# Patient Record
Sex: FEMALE | Race: WHITE | NOT HISPANIC OR LATINO | Employment: OTHER | URBAN - METROPOLITAN AREA
[De-identification: names, ages, dates, MRNs, and addresses within clinical notes are randomized per-mention and may not be internally consistent; named-entity substitution may affect disease eponyms.]

---

## 2017-03-13 ENCOUNTER — ALLSCRIPTS OFFICE VISIT (OUTPATIENT)
Dept: OTHER | Facility: OTHER | Age: 62
End: 2017-03-13

## 2017-03-13 DIAGNOSIS — I10 ESSENTIAL (PRIMARY) HYPERTENSION: ICD-10-CM

## 2017-03-13 DIAGNOSIS — R35.0 FREQUENCY OF MICTURITION: ICD-10-CM

## 2017-03-13 DIAGNOSIS — E55.9 VITAMIN D DEFICIENCY: ICD-10-CM

## 2017-03-13 DIAGNOSIS — E78.00 PURE HYPERCHOLESTEROLEMIA: ICD-10-CM

## 2017-03-13 DIAGNOSIS — R53.83 OTHER FATIGUE: ICD-10-CM
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
## 2017-04-17 ENCOUNTER — APPOINTMENT (OUTPATIENT)
Dept: LAB | Facility: CLINIC | Age: 62
End: 2017-04-17
Payer: COMMERCIAL

## 2017-04-17 ENCOUNTER — TRANSCRIBE ORDERS (OUTPATIENT)
Dept: LAB | Facility: CLINIC | Age: 62
End: 2017-04-17

## 2017-04-17 DIAGNOSIS — R35.0 FREQUENCY OF MICTURITION: ICD-10-CM

## 2017-04-17 DIAGNOSIS — E55.9 VITAMIN D DEFICIENCY: ICD-10-CM
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
## 2017-04-17 DIAGNOSIS — I10 ESSENTIAL (PRIMARY) HYPERTENSION: ICD-10-CM

## 2017-04-17 DIAGNOSIS — R53.83 OTHER FATIGUE: ICD-10-CM

## 2017-04-17 DIAGNOSIS — E78.00 PURE HYPERCHOLESTEROLEMIA: ICD-10-CM

## 2017-04-17 LAB
25(OH)D3 SERPL-MCNC: 28.7 NG/ML (ref 30–100)
ALBUMIN SERPL BCP-MCNC: 3.5 G/DL (ref 3.5–5)
ALP SERPL-CCNC: 71 U/L (ref 46–116)
ALT SERPL W P-5'-P-CCNC: 16 U/L (ref 12–78)
ANION GAP SERPL CALCULATED.3IONS-SCNC: 9 MMOL/L (ref 4–13)
AST SERPL W P-5'-P-CCNC: 12 U/L (ref 5–45)
BACTERIA UR QL AUTO: ABNORMAL /HPF
BILIRUB SERPL-MCNC: 0.47 MG/DL (ref 0.2–1)
BILIRUB UR QL STRIP: NEGATIVE
BUN SERPL-MCNC: 21 MG/DL (ref 5–25)
CALCIUM SERPL-MCNC: 9.3 MG/DL (ref 8.3–10.1)
CHLORIDE SERPL-SCNC: 106 MMOL/L (ref 100–108)
CHOLEST SERPL-MCNC: 270 MG/DL (ref 50–200)
CLARITY UR: ABNORMAL
CO2 SERPL-SCNC: 28 MMOL/L (ref 21–32)
COLOR UR: YELLOW
CREAT SERPL-MCNC: 0.64 MG/DL (ref 0.6–1.3)
ERYTHROCYTE [DISTWIDTH] IN BLOOD BY AUTOMATED COUNT: 15.8 % (ref 11.6–15.1)
GFR SERPL CREATININE-BSD FRML MDRD: >60 ML/MIN/1.73SQ M
GLUCOSE P FAST SERPL-MCNC: 95 MG/DL (ref 65–99)
GLUCOSE UR STRIP-MCNC: NEGATIVE MG/DL
HCT VFR BLD AUTO: 34.1 % (ref 34.8–46.1)
HDLC SERPL-MCNC: 55 MG/DL (ref 40–60)
HGB BLD-MCNC: 10.5 G/DL (ref 11.5–15.4)
HGB UR QL STRIP.AUTO: NEGATIVE
HYALINE CASTS #/AREA URNS LPF: ABNORMAL /LPF
KETONES UR STRIP-MCNC: NEGATIVE MG/DL
LDLC SERPL CALC-MCNC: 192 MG/DL (ref 0–100)
LEUKOCYTE ESTERASE UR QL STRIP: ABNORMAL
MCH RBC QN AUTO: 24.3 PG (ref 26.8–34.3)
MCHC RBC AUTO-ENTMCNC: 30.8 G/DL (ref 31.4–37.4)
MCV RBC AUTO: 79 FL (ref 82–98)
NITRITE UR QL STRIP: NEGATIVE
NON-SQ EPI CELLS URNS QL MICRO: ABNORMAL /HPF
PH UR STRIP.AUTO: 7 [PH] (ref 4.5–8)
PLATELET # BLD AUTO: 372 THOUSANDS/UL (ref 149–390)
PMV BLD AUTO: 11 FL (ref 8.9–12.7)
POTASSIUM SERPL-SCNC: 4.1 MMOL/L (ref 3.5–5.3)
PROT SERPL-MCNC: 7.1 G/DL (ref 6.4–8.2)
PROT UR STRIP-MCNC: NEGATIVE MG/DL
RBC # BLD AUTO: 4.32 MILLION/UL (ref 3.81–5.12)
RBC #/AREA URNS AUTO: ABNORMAL /HPF
SODIUM SERPL-SCNC: 143 MMOL/L (ref 136–145)
SP GR UR STRIP.AUTO: 1.02 (ref 1–1.03)
T3 SERPL-MCNC: 1.2 NG/ML (ref 0.6–1.8)
T4 FREE SERPL-MCNC: 1.14 NG/DL (ref 0.76–1.46)
TRIGL SERPL-MCNC: 116 MG/DL
TSH SERPL DL<=0.05 MIU/L-ACNC: 2.3 UIU/ML (ref 0.36–3.74)
UROBILINOGEN UR QL STRIP.AUTO: 0.2 E.U./DL
WBC # BLD AUTO: 4.65 THOUSAND/UL (ref 4.31–10.16)
WBC #/AREA URNS AUTO: ABNORMAL /HPF

## 2017-04-17 PROCEDURE — 85027 COMPLETE CBC AUTOMATED: CPT

## 2017-04-17 PROCEDURE — 87086 URINE CULTURE/COLONY COUNT: CPT

## 2017-04-17 PROCEDURE — 86618 LYME DISEASE ANTIBODY: CPT

## 2017-04-17 PROCEDURE — 84443 ASSAY THYROID STIM HORMONE: CPT

## 2017-04-17 PROCEDURE — 84439 ASSAY OF FREE THYROXINE: CPT

## 2017-04-17 PROCEDURE — 84480 ASSAY TRIIODOTHYRONINE (T3): CPT

## 2017-04-17 PROCEDURE — 36415 COLL VENOUS BLD VENIPUNCTURE: CPT

## 2017-04-17 PROCEDURE — 80053 COMPREHEN METABOLIC PANEL: CPT

## 2017-04-17 PROCEDURE — 82306 VITAMIN D 25 HYDROXY: CPT

## 2017-04-17 PROCEDURE — 81001 URINALYSIS AUTO W/SCOPE: CPT

## 2017-04-17 PROCEDURE — 80061 LIPID PANEL: CPT

## 2017-04-18 LAB — BACTERIA UR CULT: NORMAL

## 2017-04-19 ENCOUNTER — ALLSCRIPTS OFFICE VISIT (OUTPATIENT)
Dept: OTHER | Facility: OTHER | Age: 62
End: 2017-04-19

## 2017-04-19 DIAGNOSIS — R29.898 OTHER SYMPTOMS AND SIGNS INVOLVING THE MUSCULOSKELETAL SYSTEM: ICD-10-CM

## 2017-04-19 DIAGNOSIS — D64.9 ANEMIA: ICD-10-CM
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
## 2017-04-19 LAB
B BURGDOR IGG SER IA-ACNC: 0.07
B BURGDOR IGM SER IA-ACNC: 0.17

## 2017-04-24 ENCOUNTER — APPOINTMENT (OUTPATIENT)
Dept: LAB | Facility: CLINIC | Age: 62
End: 2017-04-24
Payer: COMMERCIAL

## 2017-04-24 DIAGNOSIS — D64.9 ANEMIA: ICD-10-CM
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
## 2017-04-24 DIAGNOSIS — R29.898 OTHER SYMPTOMS AND SIGNS INVOLVING THE MUSCULOSKELETAL SYSTEM: ICD-10-CM

## 2017-04-24 LAB — FERRITIN SERPL-MCNC: 4 NG/ML (ref 8–388)

## 2017-04-24 PROCEDURE — 86618 LYME DISEASE ANTIBODY: CPT

## 2017-04-24 PROCEDURE — 36415 COLL VENOUS BLD VENIPUNCTURE: CPT

## 2017-04-24 PROCEDURE — 82728 ASSAY OF FERRITIN: CPT

## 2017-04-26 LAB
B BURGDOR IGG SER IA-ACNC: 0.08
B BURGDOR IGM SER IA-ACNC: 0.17

## 2017-05-31 ENCOUNTER — TRANSCRIBE ORDERS (OUTPATIENT)
Dept: LAB | Facility: CLINIC | Age: 62
End: 2017-05-31

## 2017-05-31 ENCOUNTER — APPOINTMENT (OUTPATIENT)
Dept: LAB | Facility: CLINIC | Age: 62
End: 2017-05-31
Payer: COMMERCIAL

## 2017-05-31 ENCOUNTER — ALLSCRIPTS OFFICE VISIT (OUTPATIENT)
Dept: OTHER | Facility: OTHER | Age: 62
End: 2017-05-31

## 2017-05-31 DIAGNOSIS — D64.9 ANEMIA: ICD-10-CM

## 2017-05-31 LAB
IRON SERPL-MCNC: 15 UG/DL (ref 50–170)
TIBC SERPL-MCNC: 472 UG/DL (ref 250–450)

## 2017-05-31 PROCEDURE — 83550 IRON BINDING TEST: CPT

## 2017-05-31 PROCEDURE — 36415 COLL VENOUS BLD VENIPUNCTURE: CPT

## 2017-05-31 PROCEDURE — 83540 ASSAY OF IRON: CPT

## 2017-06-03 ENCOUNTER — GENERIC CONVERSION - ENCOUNTER (OUTPATIENT)
Dept: OTHER | Facility: OTHER | Age: 62
End: 2017-06-03

## 2017-06-25 ENCOUNTER — ANESTHESIA EVENT (OUTPATIENT)
Dept: GASTROENTEROLOGY | Facility: MEDICAL CENTER | Age: 62
End: 2017-06-25
Payer: COMMERCIAL

## 2017-06-26 ENCOUNTER — HOSPITAL ENCOUNTER (OUTPATIENT)
Facility: MEDICAL CENTER | Age: 62
Setting detail: OUTPATIENT SURGERY
Discharge: HOME | End: 2017-06-26
Attending: INTERNAL MEDICINE | Admitting: INTERNAL MEDICINE
Payer: COMMERCIAL

## 2017-06-26 ENCOUNTER — GENERIC CONVERSION - ENCOUNTER (OUTPATIENT)
Dept: GASTROENTEROLOGY | Facility: MEDICAL CENTER | Age: 62
End: 2017-06-26

## 2017-06-26 ENCOUNTER — ANESTHESIA (OUTPATIENT)
Dept: GASTROENTEROLOGY | Facility: MEDICAL CENTER | Age: 62
End: 2017-06-26
Payer: COMMERCIAL

## 2017-06-26 VITALS
WEIGHT: 175 LBS | HEIGHT: 63 IN | RESPIRATION RATE: 16 BRPM | SYSTOLIC BLOOD PRESSURE: 135 MMHG | BODY MASS INDEX: 31.01 KG/M2 | TEMPERATURE: 97.5 F | HEART RATE: 62 BPM | OXYGEN SATURATION: 100 % | DIASTOLIC BLOOD PRESSURE: 86 MMHG

## 2017-06-26 DIAGNOSIS — D50.9 IRON DEFICIENCY ANEMIA: ICD-10-CM

## 2017-06-26 PROCEDURE — 88313 SPECIAL STAINS GROUP 2: CPT | Performed by: INTERNAL MEDICINE

## 2017-06-26 PROCEDURE — 88305 TISSUE EXAM BY PATHOLOGIST: CPT | Performed by: INTERNAL MEDICINE

## 2017-06-26 PROCEDURE — 88342 IMHCHEM/IMCYTCHM 1ST ANTB: CPT | Performed by: INTERNAL MEDICINE

## 2017-06-26 RX ORDER — PROPOFOL 10 MG/ML
INJECTION, EMULSION INTRAVENOUS AS NEEDED
Status: DISCONTINUED | OUTPATIENT
Start: 2017-06-26 | End: 2017-06-26 | Stop reason: SURG

## 2017-06-26 RX ORDER — SODIUM CHLORIDE 9 MG/ML
125 INJECTION, SOLUTION INTRAVENOUS CONTINUOUS
Status: DISCONTINUED | OUTPATIENT
Start: 2017-06-26 | End: 2017-06-26 | Stop reason: HOSPADM

## 2017-06-26 RX ADMIN — SODIUM CHLORIDE 125 ML/HR: 0.9 INJECTION, SOLUTION INTRAVENOUS at 10:38

## 2017-06-26 RX ADMIN — PROPOFOL 100 MG: 10 INJECTION, EMULSION INTRAVENOUS at 11:11

## 2017-06-26 RX ADMIN — PROPOFOL 25 MG: 10 INJECTION, EMULSION INTRAVENOUS at 11:01

## 2017-06-26 RX ADMIN — PROPOFOL 50 MG: 10 INJECTION, EMULSION INTRAVENOUS at 11:21

## 2017-06-26 RX ADMIN — LIDOCAINE HYDROCHLORIDE 100 MG: 20 INJECTION, SOLUTION INTRAVENOUS at 10:57

## 2017-06-26 RX ADMIN — PROPOFOL 50 MG: 10 INJECTION, EMULSION INTRAVENOUS at 11:15

## 2017-06-26 RX ADMIN — PROPOFOL 125 MG: 10 INJECTION, EMULSION INTRAVENOUS at 10:57

## 2017-06-26 RX ADMIN — PROPOFOL 25 MG: 10 INJECTION, EMULSION INTRAVENOUS at 11:05

## 2017-07-04 ENCOUNTER — GENERIC CONVERSION - ENCOUNTER (OUTPATIENT)
Dept: OTHER | Facility: OTHER | Age: 62
End: 2017-07-04

## 2017-11-10 ENCOUNTER — ALLSCRIPTS OFFICE VISIT (OUTPATIENT)
Dept: OTHER | Facility: OTHER | Age: 62
End: 2017-11-10

## 2017-11-10 DIAGNOSIS — D50.9 IRON DEFICIENCY ANEMIA: ICD-10-CM

## 2017-11-11 NOTE — PROGRESS NOTES
Assessment    1  Gastric ulcer (531 90) (K25 9)   2  Iron deficiency anemia (280 9) (D50 9)    Plan  Gastric ulcer    · Protonix 40 MG Oral Tablet Delayed Release (Pantoprazole Sodium); TAKE 1TABLET DAILY   Rx By: Natalie Bustillos; Dispense: 30 Days ; #:1 X 30 Tablet Delayed Release Bottle; Refill: 0;For: Gastric ulcer; ANNETTE = N; [id number]; Last Updated By: System, [employer]; 11/10/2017 3:46:07 PM  Gastric ulcer, Iron deficiency anemia    · Pantoprazole Sodium 40 MG Oral Tablet Delayed Release (Protonix)   Rx By: Natalie Bustillos; Dispense: 90 Days ; #:180 Tablet Delayed Release; Refill: 3;For: Gastric ulcer, Iron deficiency anemia; ANNETTE = N; Sent To: [facility] #[id number]   · EGD; Status:Active; Requested for:10Nov2017;    Perform:[facility]; DVR:[id number]; Last Updated By:Jermaine Chaney; 11/10/2017 3:55:12 PM;Ordered; For:Gastric ulcer, Iron deficiency anemia; Ordered By:Jesus Alonzo;  Iron deficiency anemia    · Ferrous Sulfate 325 (65 Fe) MG Oral Tablet   Rx By: Chiquis Arora; Dispense: 90 Days ; #:270 Tablet; Refill: 3;For: Iron deficiency anemia; ANNETTE = N; [facility]; Last Updated By: Princess Jaimes; 11/10/2017 3:31:38 PM   · MoviPrep 100 GM Oral Solution Reconstituted   Rx By: Chiquis Arora; Dispense: 1 Days ; #:1 Solution Reconstituted; Refill: 0;For: Iron deficiency anemia; ANNETTE = N; Sent To: [facility] #0594; Last Updated By: Princess Jaimes; 11/10/2017 3:30:11 PM   · (1) IRON SATURATION %, TIBC; Status:Active; Requested for:10Nov2017;    Perform:[facility]; Due:10Nov2018; Ordered; For:Iron deficiency anemia; Ordered By:Jesus Alonzo;   · (1) IRON; Status:Active; Requested for:10Nov2017;    Perform:[facility]; Due:10Nov2018; Ordered; For:Iron deficiency anemia; Ordered By:Jesus Alonzo;  Joint disorder of pelvis or thigh    · TraMADol HCl - 50 MG Oral Tablet   Dispense: 0 Days ; #: Sufficient Tablet;  Refill: 0;Joint disorder of pelvis or thigh; ANNETTE = N; Record; Last Updated By: Kenia Arciniega; 11/10/2017 3:31:38 PM    Discussion/Summary  Discussion Summary:   42-year-old female here for follow-up of iron deficiency anemia and EGD and colonoscopy resultsiron deficiency anemia: she has been taking ferrous sulfate 325 mg 3 times a day  She is asymptomatic and does not report any melena or hematochezia  She had EGD and colonoscopy in June, which revealed gastric ulcers  ironcontinue iron supplementation for time beingGastric ulcers: EGD in June 2017 revealed 2 clean based ulcers in the antrum  Biopsies were negative  She has not been taking a PPI  She has recent diagnosis of bursitis and was recently prescribed anti-inflammatory by her orthopedic physician  I stated that we recommend she uses Tylenol and avoid NSAIDs however, patient states that due to her pain she will be taking anti-inflammatories  I will start her on PPIProtonix 40 mg daily while using daily anti-inflammatoriesrecommend Tylenol instead of NSAIDsrepeat EGD to ensure healing of gastric ulcersColonoscopy: colonoscopy June 2017 revealed diverticulosis otherwise normal  Her repeat colonoscopy recommended in 10 years    follow-up after EGD  Counseling Documentation With Imm: The patient was counseled regarding diagnostic results,-- instructions for management,-- risks and benefits of treatment options  Patient's Capacity to Self-Care: Patient is able to Self-Care  Medication SE Review and Pt Understands Tx: Possible side effects of new medications were reviewed with the patient/guardian today  Chief Complaint  Chief Complaint Free Text Note Form: Colon/EGD f/u  Pt states no active symptoms  History of Present Illness  HPI: 42-year-old female here for follow-up of iron deficiency anemia after EGD and colonoscopy   She was found to be iron deficient and given her strong family history of colon cancer she was sent to be evaluated by gastroenterologist  She has been taking iron supplementation 3 times a day  She had EGD and colonoscopy done in June  Colonoscopy was normal and she has recommended for repeat in 10 years  EGD revealed 2 clean based antral ulcers  She currently denies any symptoms including fatigue, dizziness, shortness of breath or any GI complaints including abdominal pain, melena, hematochezia  History Reviewed: The history was obtained today from the patient and I agree with the documented history  Review of Systems  Complete-Female GI Adult:  Constitutional: No fever, no chills, feels well, no tiredness, no recent weight gain or weight loss  Eyes: No complaints of eye pain, no red eyes, no eyesight problems, no discharge, no dry eyes, no itching of eyes  ENT: no complaints of earache, no loss of hearing, no nose bleeds, no nasal discharge, no sore throat, no hoarseness  Cardiovascular: No complaints of slow heart rate, no fast heart rate, no chest pain, no palpitations, no leg claudication, no lower extremity edema  Respiratory: No complaints of shortness of breath, no wheezing, no cough, no SOB on exertion, no orthopnea, no PND  Gastrointestinal: No complaints of abdominal pain, no constipation, no nausea or vomiting, no diarrhea, no bloody stools  Genitourinary: No complaints of dysuria, no incontinence, no pelvic pain, no dysmenorrhea, no vaginal discharge or bleeding  Musculoskeletal: No complaints of arthralgias, no myalgias, no joint swelling or stiffness, no limb pain or swelling  Integumentary: No complaints of skin rash or lesions, no itching, no skin wounds, no breast pain or lump  Neurological: No complaints of headache, no confusion, no convulsions, no numbness, no dizziness or fainting, no tingling, no limb weakness, no difficulty walking  Psychiatric: Not suicidal, no sleep disturbance, no anxiety or depression, no change in personality, no emotional problems    Endocrine: No complaints of proptosis, no hot flashes, no muscle weakness, no deepening of the voice, no feelings of weakness  Hematologic/Lymphatic: No complaints of swollen glands, no swollen glands in the neck, does not bleed easily, does not bruise easily  ROS Reviewed:   ROS reviewed  Active Problems  1  Anemia (285 9) (D64 9)   2  Benign essential hypertension (401 1) (I10)   3  Fatigue (780 79) (R53 83)   4  Hypercholesterolemia (272 0) (E78 00)   5  Hypotension (458 9) (I95 9)   6  Iron deficiency anemia (280 9) (D50 9)   7  Joint disorder of pelvis or thigh (719 95) (M25 9)   8  Leg weakness, bilateral (729 89) (R29 898)   9  Nocturia (788 43) (R35 1)   10  Overactive bladder (596 51) (N32 81)   11  Pain in both lower legs (729 5) (M79 661,M79 662)   12  Segmental and somatic dysfunction of sacral region (739 4) (M99 04)   13  Urine frequency (788 41) (R35 0)   14  Vitamin D deficiency (268 9) (E55 9)    Past Medical History  1  History of Screening for depression (V79 0) (Z13 89)   2  History of Tick bite, initial encounter (919 4,E906 4) [facility])  Active Problems And Past Medical History Reviewed: The active problems and past medical history were reviewed and updated today  Surgical History  1  History of  Section   2  History of Elbow Surgery   3  History of Hysterectomy   4  History of Knee Surgery  Surgical History Reviewed: The surgical history was reviewed and updated today  Family History  Mother    1  Family history of Colon cancer   2  Denied: Family history of Crohn's disease   3  Denied: Family history of liver disease  Father    4  Family history of    5  Family history of cardiac disorder (V17 49) (Z82 49)   6  Denied: Family history of Crohn's disease   7  Denied: Family history of liver disease   8  Family history of myocardial infarction (V17 3) (Z82 49)  Sister    5  Family history of Colon cancer  Maternal Grandmother    8  Family history of    6  Family history of malignant neoplasm (V16 9) (Z80 9)  Family History Reviewed:    The family history was reviewed and updated today  Social History     · Caffeine use (V49 89) (F15 90)   · Former smoker (U12 53) (U28 643)   · History of Former smoker (J69 36) (V60 136)   · No alcohol use  Social History Reviewed: The social history was reviewed and updated today  Current Meds   1  Ferrous Sulfate 325 (65 Fe) MG Oral Tablet; TAKE 1 TABLET 3 TIMES DAILY WITH FOOD; Therapy: 09HXS9554 to (Vince Free)  Requested for: 86BGJ5211; Last Rx:72Qrv1290 Ordered   2  MoviPrep 100 GM Oral Solution Reconstituted; USE AS DIRECTED; Therapy: 21XGC3898 to (Evaluate:01Jun2017)  Requested for: 74YLL5679; Last Rx:90Jrn5735 Ordered   3  TraMADol HCl - 50 MG Oral Tablet; TAKE 1 TABLET EVERY 6 TO 8 HOURS AS NEEDED FOR PAIN; Therapy: (Recorded:31May2017) to Recorded    Allergies  1  No Known Drug Allergies    Vitals  Vital Signs    Recorded: 94SJP0537 03:31PM   Temperature 97 9 F, Tympanic   Heart Rate 70   Systolic 347, LUE, Sitting   Diastolic 88, LUE, Sitting   Height 5 ft 3 in   Weight 185 lb    BMI Calculated 32 77   BSA Calculated 1 87   O2 Saturation 99, RA       Physical Exam   Constitutional  General appearance: No acute distress, well appearing and well nourished  Eyes  Conjunctiva and lids: No swelling, erythema or discharge  Ears, Nose, Mouth, and Throat  External inspection of ears and nose: Normal    Pulmonary  Respiratory effort: No increased work of breathing or signs of respiratory distress  Auscultation of lungs: Clear to auscultation  Cardiovascular  Auscultation of heart: Normal rate and rhythm, normal S1 and S2, without murmurs  Abdomen  Abdomen: Non-tender, no masses  The abdomen was rounded  Bowel sounds were normal  The abdomen was soft and nontender  The abdomen was not firm-- and-- not rigid  No rebound tenderness  No guarding  Liver and spleen: No hepatomegaly or splenomegaly     Musculoskeletal  Gait and station: Normal    Psychiatric  Orientation to person, place, and time: Normal    Mood and affect: Normal          Health Management  Iron deficiency anemia   COLONOSCOPY (GI, SURG); every 10 years; Last 72EXX5730; Next Due: 53WCE8901;  Active    Future Appointments    Date/Time Provider Specialty Site   12/07/2017 10:45 AM Benito Diaz DO Gastroenterology Adult [facility] GASTROENTEROLOGY ENDOSCOPY       Signatures   Electronically signed by : Karissa Barrera [facility]; Nov 10 2017  4:28PM EST                       (Author)    Electronically signed by : Renita Stokes MD; Nov 10 2017 11:09PM EST                       (Author)

## 2017-11-16 ENCOUNTER — APPOINTMENT (OUTPATIENT)
Dept: PHYSICAL THERAPY | Facility: CLINIC | Age: 62
End: 2017-11-16
Payer: COMMERCIAL

## 2017-11-16 PROCEDURE — 97140 MANUAL THERAPY 1/> REGIONS: CPT

## 2017-11-16 PROCEDURE — G8982 BODY POS GOAL STATUS: HCPCS

## 2017-11-16 PROCEDURE — 97163 PT EVAL HIGH COMPLEX 45 MIN: CPT

## 2017-11-16 PROCEDURE — G8981 BODY POS CURRENT STATUS: HCPCS

## 2017-11-20 ENCOUNTER — APPOINTMENT (OUTPATIENT)
Dept: PHYSICAL THERAPY | Facility: CLINIC | Age: 62
End: 2017-11-20
Payer: COMMERCIAL

## 2017-11-20 PROCEDURE — 97110 THERAPEUTIC EXERCISES: CPT

## 2017-11-20 PROCEDURE — 97140 MANUAL THERAPY 1/> REGIONS: CPT

## 2017-11-20 PROCEDURE — 97010 HOT OR COLD PACKS THERAPY: CPT

## 2017-11-22 ENCOUNTER — APPOINTMENT (OUTPATIENT)
Dept: PHYSICAL THERAPY | Facility: CLINIC | Age: 62
End: 2017-11-22
Payer: COMMERCIAL

## 2017-11-22 PROCEDURE — 97140 MANUAL THERAPY 1/> REGIONS: CPT

## 2017-11-22 PROCEDURE — 97110 THERAPEUTIC EXERCISES: CPT

## 2017-11-22 PROCEDURE — 97010 HOT OR COLD PACKS THERAPY: CPT

## 2017-11-27 ENCOUNTER — APPOINTMENT (OUTPATIENT)
Dept: PHYSICAL THERAPY | Facility: CLINIC | Age: 62
End: 2017-11-27
Payer: COMMERCIAL

## 2017-11-27 PROCEDURE — 97010 HOT OR COLD PACKS THERAPY: CPT

## 2017-11-27 PROCEDURE — 97140 MANUAL THERAPY 1/> REGIONS: CPT

## 2017-11-29 RX ORDER — FERROUS SULFATE 325(65) MG
325 TABLET ORAL
COMMUNITY
End: 2018-06-05 | Stop reason: SDUPTHER

## 2017-11-30 ENCOUNTER — APPOINTMENT (OUTPATIENT)
Dept: PHYSICAL THERAPY | Facility: CLINIC | Age: 62
End: 2017-11-30
Payer: COMMERCIAL

## 2017-11-30 PROCEDURE — 97140 MANUAL THERAPY 1/> REGIONS: CPT

## 2017-11-30 PROCEDURE — 97110 THERAPEUTIC EXERCISES: CPT

## 2017-11-30 PROCEDURE — 97010 HOT OR COLD PACKS THERAPY: CPT

## 2017-12-04 ENCOUNTER — APPOINTMENT (OUTPATIENT)
Dept: PHYSICAL THERAPY | Facility: CLINIC | Age: 62
End: 2017-12-04
Payer: COMMERCIAL

## 2017-12-04 PROCEDURE — 97110 THERAPEUTIC EXERCISES: CPT

## 2017-12-04 PROCEDURE — 97010 HOT OR COLD PACKS THERAPY: CPT

## 2017-12-04 PROCEDURE — 97140 MANUAL THERAPY 1/> REGIONS: CPT

## 2017-12-05 ENCOUNTER — APPOINTMENT (OUTPATIENT)
Dept: PHYSICAL THERAPY | Facility: CLINIC | Age: 62
End: 2017-12-05
Payer: COMMERCIAL

## 2017-12-07 ENCOUNTER — APPOINTMENT (OUTPATIENT)
Dept: PHYSICAL THERAPY | Facility: CLINIC | Age: 62
End: 2017-12-07
Payer: COMMERCIAL

## 2017-12-07 PROCEDURE — 97150 GROUP THERAPEUTIC PROCEDURES: CPT

## 2017-12-07 PROCEDURE — 97010 HOT OR COLD PACKS THERAPY: CPT

## 2017-12-07 PROCEDURE — 97140 MANUAL THERAPY 1/> REGIONS: CPT

## 2017-12-11 ENCOUNTER — APPOINTMENT (OUTPATIENT)
Dept: PHYSICAL THERAPY | Facility: CLINIC | Age: 62
End: 2017-12-11
Payer: COMMERCIAL

## 2017-12-11 PROCEDURE — 97110 THERAPEUTIC EXERCISES: CPT

## 2017-12-11 PROCEDURE — 97140 MANUAL THERAPY 1/> REGIONS: CPT

## 2017-12-14 ENCOUNTER — APPOINTMENT (OUTPATIENT)
Dept: PHYSICAL THERAPY | Facility: CLINIC | Age: 62
End: 2017-12-14
Payer: COMMERCIAL

## 2017-12-14 PROCEDURE — 97010 HOT OR COLD PACKS THERAPY: CPT

## 2017-12-14 PROCEDURE — 97150 GROUP THERAPEUTIC PROCEDURES: CPT

## 2017-12-14 PROCEDURE — 97140 MANUAL THERAPY 1/> REGIONS: CPT

## 2017-12-14 PROCEDURE — 97110 THERAPEUTIC EXERCISES: CPT

## 2017-12-15 ENCOUNTER — ANESTHESIA EVENT (OUTPATIENT)
Dept: GASTROENTEROLOGY | Facility: MEDICAL CENTER | Age: 62
End: 2017-12-15
Payer: COMMERCIAL

## 2017-12-18 ENCOUNTER — APPOINTMENT (OUTPATIENT)
Dept: PHYSICAL THERAPY | Facility: CLINIC | Age: 62
End: 2017-12-18
Payer: COMMERCIAL

## 2017-12-18 ENCOUNTER — GENERIC CONVERSION - ENCOUNTER (OUTPATIENT)
Dept: GASTROENTEROLOGY | Facility: CLINIC | Age: 62
End: 2017-12-18

## 2017-12-18 ENCOUNTER — ANESTHESIA (OUTPATIENT)
Dept: GASTROENTEROLOGY | Facility: MEDICAL CENTER | Age: 62
End: 2017-12-18
Payer: COMMERCIAL

## 2017-12-18 ENCOUNTER — HOSPITAL ENCOUNTER (OUTPATIENT)
Facility: MEDICAL CENTER | Age: 62
Setting detail: OUTPATIENT SURGERY
Discharge: HOME | End: 2017-12-18
Attending: INTERNAL MEDICINE | Admitting: INTERNAL MEDICINE
Payer: COMMERCIAL

## 2017-12-18 VITALS
TEMPERATURE: 99.2 F | DIASTOLIC BLOOD PRESSURE: 75 MMHG | WEIGHT: 180 LBS | HEART RATE: 69 BPM | BODY MASS INDEX: 31.89 KG/M2 | RESPIRATION RATE: 18 BRPM | HEIGHT: 63 IN | OXYGEN SATURATION: 100 % | SYSTOLIC BLOOD PRESSURE: 151 MMHG

## 2017-12-18 RX ORDER — SODIUM CHLORIDE 9 MG/ML
125 INJECTION, SOLUTION INTRAVENOUS CONTINUOUS
Status: DISCONTINUED | OUTPATIENT
Start: 2017-12-18 | End: 2017-12-18 | Stop reason: HOSPADM

## 2017-12-18 RX ORDER — PROPOFOL 10 MG/ML
INJECTION, EMULSION INTRAVENOUS AS NEEDED
Status: DISCONTINUED | OUTPATIENT
Start: 2017-12-18 | End: 2017-12-18 | Stop reason: SURG

## 2017-12-18 RX ADMIN — PROPOFOL 25 MG: 10 INJECTION, EMULSION INTRAVENOUS at 12:32

## 2017-12-18 RX ADMIN — PROPOFOL 125 MG: 10 INJECTION, EMULSION INTRAVENOUS at 12:27

## 2017-12-18 RX ADMIN — SODIUM CHLORIDE 125 ML/HR: 0.9 INJECTION, SOLUTION INTRAVENOUS at 11:41

## 2017-12-18 RX ADMIN — SODIUM CHLORIDE: 0.9 INJECTION, SOLUTION INTRAVENOUS at 12:25

## 2017-12-18 RX ADMIN — LIDOCAINE HYDROCHLORIDE 50 MG: 20 INJECTION, SOLUTION INTRAVENOUS at 12:26

## 2017-12-18 NOTE — ANESTHESIA PREPROCEDURE EVALUATION
Review of Systems/Medical History  Patient summary reviewed  Chart reviewed      Cardiovascular  Hyperlipidemia, Hypertension controlled,    Pulmonary  Smoker ex-smoker , ,        GI/Hepatic    PUD, GERD well controlled,          Comment: OAB     Endo/Other  Negative endo/other ROS      GYN  Negative gynecology ROS          Hematology  Anemia iron deficiency anemia,     Musculoskeletal  Obesity ,        Neurology    No neuromuscular disease ,    Psychology   Negative psychology ROS            Physical Exam    Airway    Mallampati score: II  TM Distance: >3 FB  Neck ROM: full     Dental   No notable dental hx     Cardiovascular  Rhythm: regular, Rate: normal, Cardiovascular exam normal    Pulmonary  Pulmonary exam normal Breath sounds clear to auscultation,     Other Findings        Anesthesia Plan  ASA Score- 2       Anesthesia Type- IV sedation with anesthesia with ASA Monitors  Additional Monitors:   Airway Plan:           Induction- intravenous  Informed Consent- Anesthetic plan and risks discussed with patient

## 2017-12-18 NOTE — OP NOTE
**** GI/ENDOSCOPY REPORT ****     PATIENT NAME: Nya Davila - VISIT ID:  Patient ID: [id number]   YOB: 1955     INTRODUCTION: Esophagogastroduodenoscopy - A 63 female patient presents   for an outpatient Esophagogastroduodenoscopy at [address] [place] [address]  INDICATIONS: Follow up of PUD  CONSENT: The benefits, risks, and alternatives to the procedure were   discussed and informed consent was obtained from the patient  PREPARATION:  EKG, pulse, pulse oximetry and blood pressure were monitored   throughout the procedure  MEDICATIONS: Anesthesia-check records     PROCEDURE:  The endoscope was passed without difficulty through the mouth   under direct visualization and advanced to the 2nd portion of the   duodenum  The scope was withdrawn and the mucosa was carefully examined  FINDINGS:   Esophagus: The entire esophagus appeared to be normal     Stomach: The entire stomach appeared to be normal   Duodenum: The 1st   portion of the duodenum and 2nd portion of the duodenum appeared to be   normal      COMPLICATIONS: There were no complications  IMPRESSIONS: Normal entire esophagus  Normal entire stomach  Normal 1st   portion of the duodenum and 2nd portion of the duodenum  RECOMMENDATIONS: The prior seen ulcers have healed  Avoid NSAIDS  Can   continue acid blocking medication only as needed for symptom control  ESTIMATED BLOOD LOSS:     PATHOLOGY SPECIMENS:     PROCEDURE CODES:     ICD-9 Codes:     ICD-10 Codes:     PERFORMED BY: MELANIE Tompkins  on 12/18/2017  Version 1, electronically signed by MELANIE Plascencia  on   12/18/2017 at 12:37

## 2017-12-18 NOTE — DISCHARGE INSTRUCTIONS
Upper Endoscopy   WHAT YOU NEED TO KNOW:   An upper endoscopy is also called an upper gastrointestinal (GI) endoscopy, or an esophagogastroduodenoscopy (EGD)  You may feel bloated, gassy, or have some abdominal discomfort after your procedure  Your throat may be sore for 24 to 36 hours  You may burp or pass gas from air that is still inside your body  DISCHARGE INSTRUCTIONS:   Call 911 if:   · You have sudden chest pain or trouble breathing  Seek care immediately if:   · You feel dizzy or faint  · You have trouble swallowing  · You have severe throat pain  · Your bowel movements are very dark or black  · Your abdomen is hard and firm and you have severe pain  · You vomit blood  Contact your healthcare provider if:   · You feel full or bloated and cannot burp or pass gas  · You have not had a bowel movement for 3 days after your procedure  · You have neck pain  · You have a fever or chills  · You have nausea or are vomiting  · You have a rash or hives  · You have questions or concerns about your endoscopy  Relieve a sore throat:  Suck on throat lozenges or crushed ice  Gargle with a small amount of warm salt water  Mix 1 teaspoon of salt and 1 cup of warm water to make salt water  Relieve gas and discomfort from bloating:  Lie on your right side with a heating pad on your abdomen  Take short walks to help pass gas  Eat small meals until bloating is relieved  Rest after your procedure:  Do not drive or make important decisions until the day after your procedure  Return to your normal activity as directed  You can usually return to work the day after your procedure  Follow up with your healthcare provider as directed:  Write down your questions so you remember to ask them during your visits  © 2017 [employer]  Information is for End User's use only and may not be sold, redistributed or otherwise used for commercial purposes   All illustrations and images included in [place] are the copyrighted property of A D A M , Inc  or Rasheed Bishop  The above information is an  only  It is not intended as medical advice for individual conditions or treatments  Talk to your doctor, nurse or pharmacist before following any medical regimen to see if it is safe and effective for you

## 2017-12-19 ENCOUNTER — APPOINTMENT (OUTPATIENT)
Dept: PHYSICAL THERAPY | Facility: CLINIC | Age: 62
End: 2017-12-19
Payer: COMMERCIAL

## 2017-12-19 PROCEDURE — 97140 MANUAL THERAPY 1/> REGIONS: CPT

## 2017-12-19 PROCEDURE — 97010 HOT OR COLD PACKS THERAPY: CPT

## 2017-12-19 PROCEDURE — 97110 THERAPEUTIC EXERCISES: CPT

## 2017-12-21 ENCOUNTER — APPOINTMENT (OUTPATIENT)
Dept: PHYSICAL THERAPY | Facility: CLINIC | Age: 62
End: 2017-12-21
Payer: COMMERCIAL

## 2017-12-21 PROCEDURE — 97010 HOT OR COLD PACKS THERAPY: CPT

## 2017-12-21 PROCEDURE — 97110 THERAPEUTIC EXERCISES: CPT

## 2017-12-21 PROCEDURE — 97140 MANUAL THERAPY 1/> REGIONS: CPT

## 2017-12-26 ENCOUNTER — APPOINTMENT (OUTPATIENT)
Dept: PHYSICAL THERAPY | Facility: CLINIC | Age: 62
End: 2017-12-26
Payer: COMMERCIAL

## 2017-12-28 ENCOUNTER — APPOINTMENT (OUTPATIENT)
Dept: PHYSICAL THERAPY | Facility: CLINIC | Age: 62
End: 2017-12-28
Payer: COMMERCIAL

## 2017-12-28 PROCEDURE — 97110 THERAPEUTIC EXERCISES: CPT

## 2017-12-28 PROCEDURE — 97140 MANUAL THERAPY 1/> REGIONS: CPT

## 2018-01-04 ENCOUNTER — APPOINTMENT (OUTPATIENT)
Dept: PHYSICAL THERAPY | Facility: CLINIC | Age: 63
End: 2018-01-04
Payer: COMMERCIAL

## 2018-01-09 ENCOUNTER — APPOINTMENT (OUTPATIENT)
Dept: PHYSICAL THERAPY | Facility: CLINIC | Age: 63
End: 2018-01-09
Payer: COMMERCIAL

## 2018-01-09 PROCEDURE — 97140 MANUAL THERAPY 1/> REGIONS: CPT

## 2018-01-09 PROCEDURE — 97010 HOT OR COLD PACKS THERAPY: CPT

## 2018-01-10 NOTE — RESULT NOTES
Verified Results  (1) IRON [id number] 11:07AM Nico Victoria Order Number: [id number]     Test Name Result Flag Reference   IRON 15 ug/dL L    Patients treated with metal-binding drugs (ie  Deferoxamine) may have depressed iron values

## 2018-01-11 ENCOUNTER — APPOINTMENT (OUTPATIENT)
Dept: PHYSICAL THERAPY | Facility: CLINIC | Age: 63
End: 2018-01-11
Payer: COMMERCIAL

## 2018-01-12 VITALS
TEMPERATURE: 98.4 F | DIASTOLIC BLOOD PRESSURE: 72 MMHG | RESPIRATION RATE: 16 BRPM | SYSTOLIC BLOOD PRESSURE: 116 MMHG | HEIGHT: 63 IN | BODY MASS INDEX: 30.71 KG/M2 | OXYGEN SATURATION: 99 % | WEIGHT: 173.31 LBS | HEART RATE: 68 BPM

## 2018-01-12 VITALS
WEIGHT: 185 LBS | HEART RATE: 70 BPM | OXYGEN SATURATION: 99 % | HEIGHT: 63 IN | BODY MASS INDEX: 32.78 KG/M2 | DIASTOLIC BLOOD PRESSURE: 88 MMHG | SYSTOLIC BLOOD PRESSURE: 126 MMHG | TEMPERATURE: 97.9 F

## 2018-01-12 NOTE — RESULT NOTES
Discussion/Summary   egd biopsies were normal     Verified Results  (1) TISSUE EXAM 26Jun2017 11:02AM Alonzo Sharp     Test Name Result Flag Reference   LAB AP CASE REPORT (Report)     Surgical Pathology Report             Case: [id number]                   Authorizing Provider: Orlando Acosta DO    Collected:      06/26/2017 1102        Ordering Location:   [address] [place] [address]    Received:      06/26/2017 [address] [facility]                            Pathologist:      Jorje Mendoza MD                             Specimens:  A) - Duodenum, cold bx's                                        B) - Stomach, antrum bx's   LAB AP FINAL DIAGNOSIS (Report)     A  Duodenum, biopsy:    - No significant histologic abnormality     - No increase in intraepithelial lymphocytes, villous blunting, acute   and chronic inflammation     - Negative for granulomas, dysplasia and malignancy  B  Gastric antrum, biopsy:    - Focal erosion, acute and chronic inflammation and reactive epithelial   changes  - Negative for Helicobacter pylori organisms (immunohistochemical stain   with appropriate positive control)  - Negative for intestinal metaplasia (AB/PAS stain with appropriate   control), dysplasia and malignancy  Electronically signed by Jorje Mendoza MD on 6/28/2017 at 10:36 AM   LAB AP NOTE      Interpretation performed at [address] [place] [address]   LAB [facility] (Report)     These tests were developed and their performance characteristics   determined by [facility] or [facility]  They may not be cleared or approved by the [place]  Food and   Drug Administration  The FDA has determined that such clearance or   approval is not necessary  These tests are used for clinical purposes  They should not be regarded as investigational or for research   This   laboratory has been approved by CLIA 88, designated as a high-complexity   laboratory and is qualified to perform these tests  LAB AP GROSS DESCRIPTION (Report)     A  The specimen is received in formalin, labeled with the patient's name   and hospital number, and is designated Duodenum  The specimen consists   of 4 tan to brown soft tissue fragments ranging in greatest dimension from   0 2 to 0 4 centimeters  Entirely submitted  One cassette  B  The specimen is received in formalin, labeled with the patient's name   and hospital number, and is designated Antrum biopsy  The specimen   consists of 2 white to tan soft tissue fragments measuring 0 3 and 0 4   centimeters in greatest dimension  Entirely submitted  One cassette  Note: The estimated total formalin fixation time based upon information   provided by the submitting clinician and the standard processing schedule   is 15 5 hours      ES   LAB AP CLINICAL INFORMATION normal     normal

## 2018-01-13 VITALS
SYSTOLIC BLOOD PRESSURE: 132 MMHG | TEMPERATURE: 97.6 F | WEIGHT: 178.25 LBS | OXYGEN SATURATION: 99 % | BODY MASS INDEX: 31.58 KG/M2 | DIASTOLIC BLOOD PRESSURE: 80 MMHG | HEART RATE: 74 BPM | HEIGHT: 63 IN

## 2018-01-14 VITALS
OXYGEN SATURATION: 98 % | TEMPERATURE: 98.2 F | BODY MASS INDEX: 31.18 KG/M2 | DIASTOLIC BLOOD PRESSURE: 70 MMHG | WEIGHT: 176 LBS | RESPIRATION RATE: 16 BRPM | HEART RATE: 76 BPM | HEIGHT: 63 IN | SYSTOLIC BLOOD PRESSURE: 150 MMHG

## 2018-03-26 ENCOUNTER — EVALUATION (OUTPATIENT)
Dept: PHYSICAL THERAPY | Facility: CLINIC | Age: 63
End: 2018-03-26
Payer: COMMERCIAL

## 2018-03-26 ENCOUNTER — TRANSCRIBE ORDERS (OUTPATIENT)
Dept: PHYSICAL THERAPY | Facility: CLINIC | Age: 63
End: 2018-03-26

## 2018-03-26 DIAGNOSIS — S13.4XXD SPRAIN OF LIGAMENTS OF CERVICAL SPINE, SUBSEQUENT ENCOUNTER: ICD-10-CM

## 2018-03-26 DIAGNOSIS — Z47.89 AFTERCARE FOLLOWING SURGERY OF THE MUSCULOSKELETAL SYSTEM: ICD-10-CM

## 2018-03-26 DIAGNOSIS — M54.2 CERVICALGIA: Primary | ICD-10-CM

## 2018-03-26 DIAGNOSIS — M25.552 LEFT HIP PAIN: ICD-10-CM

## 2018-03-26 DIAGNOSIS — Z47.89 AFTERCARE FOLLOWING SURGERY OF THE MUSCULOSKELETAL SYSTEM: Primary | ICD-10-CM

## 2018-03-26 PROCEDURE — 97162 PT EVAL MOD COMPLEX 30 MIN: CPT | Performed by: PHYSICAL THERAPIST

## 2018-03-26 PROCEDURE — G8978 MOBILITY CURRENT STATUS: HCPCS | Performed by: PHYSICAL THERAPIST

## 2018-03-26 PROCEDURE — G8979 MOBILITY GOAL STATUS: HCPCS | Performed by: PHYSICAL THERAPIST

## 2018-03-26 NOTE — LETTER
2018    Edwin Tristan MD  [address]  [address]     Patient: Joey Toussaint   YOB: 1955   Date of Visit: 3/26/2018     Encounter Diagnosis     ICD-10-CM    1  Aftercare following surgery of the musculoskeletal system Z47 89    2  Left hip pain M25 552        Dear Dr Jos Aguilar:    Please review the attached Plan of Care from [facility] recent visit  Please verify that you agree therapy should continue by signing the attached document and sending it back to our office  If you have any questions or concerns, please don't hesitate to call  Sincerely,    Rand Henley PT      Referring Provider:      I certify that I have read the below Plan of Care and certify the need for these services furnished under this plan of treatment while under my care  Edwin Tristan MD  [address]  [address]: [phone]          PT Evaluation     Today's date: 3/26/2018  Patient name: Joey Toussaint  : 1955  MRN: [id number]  Referring provider: Diedre Lanes, MD  Dx:   Encounter Diagnosis     ICD-10-CM    1  Aftercare following surgery of the musculoskeletal system Z47 89    2  Left hip pain M25 552                 Assessment  Impairments: abnormal gait, abnormal or restricted ROM, activity intolerance, impaired balance, impaired physical strength and pain with function    Assessment details: Pt is a 66year old female presenting to PT s/p left gluteus medius/minimus repair on 18  She demonstrates left hip pain, decreased range of motion, decreased lower extremity strength, antalgic gait, and decreased tolerance to activity  Patient would benefit from skilled PT services to address these issues and to maximize function  Thank you for the referral    PT placed call to surgeon to see if there is post-operative protocol, awaiting return call     Understanding of Dx/Px/POC: good   Prognosis: good    Goals  STG  1  Decrease pain 20-50% in 4 weeks  2  Increase strength 1/2 grade in 4 weeks  3  Balance & endurance & gait & locomotion are improved by 50% in 4 weeks  4  Soft tissue inflammation or restriction is reduced by 50% in 4 weeks    LTG  1  Ambulation is improved to maximal level of function  2  IADL performance in related activities is improved to maximal level of function  3  Patient is independent with HEP    Plan  Planned modality interventions: cryotherapy and H-Wave (prn)  Planned therapy interventions: manual therapy, balance, therapeutic exercise, flexibility and home exercise program  Frequency: 2-3x/week  Duration in weeks: 12        Subjective Evaluation    History of Present Illness  Date of surgery: 2018  Mechanism of injury: surgery  Mechanism of injury: Pt is a 66year old female presenting to PT s/p left gluteus medius/minimus repair on 18  She is currently 6 weeks, 4 days post-op  Previously, she trialed cortisone injections/PT for the last 8 months with no improvement in symptoms  She reports she went to [facility] and had a repeat MRI performed, revealing a 70% tear of gluteus minimus and 100% tear of gluteus medius  Pt was previously using bilateral axillary crutches for last 6 weeks  Returned to MD last week, states crutches were discharged and was instructed to have PT  Symptom AGGS: prolonged weight-bearing, prolonged sitting, ascending>descending stairs, getting in and out of car, squatting, lying on left side  Denies numbness/tingling  Pt also states left knee pain, was present prior to surgery but more prominent now  Symptom EASE: rest, ibuprofen as needed, ice  Next follow-up with MD scheduled for 18  Pt is currently off work, does ordering for OR     Pain  Current pain ratin  At best pain ratin  At worst pain ratin  Quality: dull ache and sharp    Social Support  Steps to enter house: yes (1)  Stairs in house: yes (13)   Lives in: multiple-level home    Hand dominance: right      Diagnostic Tests  MRI studies: abnormal  Treatments  Previous treatment: injection treatment and physical therapy  Patient Goals  Patient goals for therapy: decreased pain, improved balance, increased motion, decreased edema, increased strength, independence with ADLs/IADLs, return to work and return to sport/leisure activities          Objective     Strength/Myotome Testing     Left Hip   Planes of Motion   Flexion: 3+  External rotation: 3+ (pain, decreased motor control )  Internal rotation: 3+ (pain, decreased motor control )    Right Hip   Planes of Motion   Flexion: 4+  External rotation: 4  Internal rotation: 4+    Additional Strength Details  Knee ext/flexion on left: 4/5 with decreased motor control noted      General Comments     Hip Comments   Lumbar AROM: 50% flexion, 75% extension, 75% lateral flexion bilaterally, no reproduction of symptoms with active lumbar motion  (-) pelvic shift test (-) slump   Increased pelvic height on R, increased knee flexion in stance on L  Scar is closed/no excessive redness/warmth/swelling noted  No oozing present   Hypersensitivity over scar to light touch  (-) SLR, reduced hamstring length bilaterally  Gait: antalgic, limp present  Reduced quad length L>R  Reduced hip ER bilaterally L>R  Moderate tenderness to palpation glut musculature/piriformis          Flowsheet Rows    Flowsheet Row Most Recent Value   PT/OT G-Codes   Current Score  35   Projected Score  54   FOTO information reviewed  Yes   Assessment Type  Evaluation   G code set  Mobility: Walking & Moving Around   Mobility: Walking and Moving Around Current Status ()  CL   Mobility: Walking and Moving Around Goal Status ()  CK          Precautions: HTN, overactive bladder    Daily Treatment Diary     Manual  3/26            Gentle hip ER PROM in extension with anterior mob    Soft tissue mobilization over scar incision NV Exercise Diary  3/26                         Bike warm up NV            SKTC :10x5            Hip capsule release/bent knee fall outs NV            PPT with kegel NV            Prone hip iso extension NV            Prone quad stretch NV                                                                                                                                                                                     Progress to functional strengthening as tolerated    Modalities  3/26            CP/H-wave prn                                        HEP:

## 2018-03-26 NOTE — PROGRESS NOTES
PT Evaluation     Today's date: 3/26/2018  Patient name: Rani Fry  : 1955  MRN: [id number]  Referring provider: Christina Wong MD  Dx:   Encounter Diagnosis     ICD-10-CM    1  Aftercare following surgery of the musculoskeletal system Z47 89    2  Left hip pain M25 552                 Assessment  Impairments: abnormal gait, abnormal or restricted ROM, activity intolerance, impaired balance, impaired physical strength and pain with function    Assessment details: Pt is a 66year old female presenting to PT s/p left gluteus medius/minimus repair on 18  She demonstrates left hip pain, decreased range of motion, decreased lower extremity strength, antalgic gait, and decreased tolerance to activity  Patient would benefit from skilled PT services to address these issues and to maximize function  Thank you for the referral    PT placed call to surgeon to see if there is post-operative protocol, awaiting return call  Understanding of Dx/Px/POC: good   Prognosis: good    Goals  STG  1  Decrease pain 20-50% in 4 weeks  2  Increase strength 1/2 grade in 4 weeks  3  Balance & endurance & gait & locomotion are improved by 50% in 4 weeks  4  Soft tissue inflammation or restriction is reduced by 50% in 4 weeks    LTG  1  Ambulation is improved to maximal level of function  2  IADL performance in related activities is improved to maximal level of function  3  Patient is independent with HEP    Plan  Planned modality interventions: cryotherapy and H-Wave (prn)  Planned therapy interventions: manual therapy, balance, therapeutic exercise, flexibility and home exercise program  Frequency: 2-3x/week  Duration in weeks: 12        Subjective Evaluation    History of Present Illness  Date of surgery: 2018  Mechanism of injury: surgery  Mechanism of injury: Pt is a 66year old female presenting to PT s/p left gluteus medius/minimus repair on 18  She is currently 6 weeks, 4 days post-op    Previously, she trialed cortisone injections/PT for the last 8 months with no improvement in symptoms  She reports she went to [facility] and had a repeat MRI performed, revealing a 70% tear of gluteus minimus and 100% tear of gluteus medius  Pt was previously using bilateral axillary crutches for last 6 weeks  Returned to MD last week, states crutches were discharged and was instructed to have PT  Symptom AGGS: prolonged weight-bearing, prolonged sitting, ascending>descending stairs, getting in and out of car, squatting, lying on left side  Denies numbness/tingling  Pt also states left knee pain, was present prior to surgery but more prominent now  Symptom EASE: rest, ibuprofen as needed, ice  Next follow-up with MD scheduled for 18  Pt is currently off work, does ordering for OR     Pain  Current pain ratin  At best pain ratin  At worst pain ratin  Quality: dull ache and sharp    Social Support  Steps to enter house: yes (1)  Stairs in house: yes (13)   Lives in: multiple-level home    Hand dominance: right      Diagnostic Tests  MRI studies: abnormal  Treatments  Previous treatment: injection treatment and physical therapy  Patient Goals  Patient goals for therapy: decreased pain, improved balance, increased motion, decreased edema, increased strength, independence with ADLs/IADLs, return to work and return to sport/leisure activities          Objective     Strength/Myotome Testing     Left Hip   Planes of Motion   Flexion: 3+  External rotation: 3+ (pain, decreased motor control )  Internal rotation: 3+ (pain, decreased motor control )    Right Hip   Planes of Motion   Flexion: 4+  External rotation: 4  Internal rotation: 4+    Additional Strength Details  Knee ext/flexion on left: 4/5 with decreased motor control noted      General Comments     Hip Comments   Lumbar AROM: 50% flexion, 75% extension, 75% lateral flexion bilaterally, no reproduction of symptoms with active lumbar motion  (-) pelvic shift test (-) slump   Increased pelvic height on R, increased knee flexion in stance on L  Scar is closed/no excessive redness/warmth/swelling noted  No oozing present   Hypersensitivity over scar to light touch  (-) SLR, reduced hamstring length bilaterally  Gait: antalgic, limp present  Reduced quad length L>R  Reduced hip ER bilaterally L>R  Moderate tenderness to palpation glut musculature/piriformis          Flowsheet Rows    Flowsheet Row Most Recent Value   PT/OT G-Codes   Current Score  35   Projected Score  47   FOTO information reviewed  Yes   Assessment Type  Evaluation   G code set  Mobility: Walking & Moving Around   Mobility: Walking and Moving Around Current Status ()  CL   Mobility: Walking and Moving Around Goal Status ()  CK          Precautions: HTN, overactive bladder    Daily Treatment Diary     Manual  3/26            Gentle hip ER PROM in extension with anterior mob    Soft tissue mobilization over scar incision NV                                                                    Exercise Diary  3/26                         Bike warm up NV            SKTC :10x5            Hip capsule release/bent knee fall outs NV            PPT with kegel NV            Prone hip iso extension NV            Prone quad stretch NV                                                                                                                                                                                     Progress to functional strengthening as tolerated    Modalities  3/26            CP/H-wave prn                                        HEP:

## 2018-03-27 ENCOUNTER — OFFICE VISIT (OUTPATIENT)
Dept: PHYSICAL THERAPY | Facility: CLINIC | Age: 63
End: 2018-03-27
Payer: COMMERCIAL

## 2018-03-27 DIAGNOSIS — Z47.89 AFTERCARE FOLLOWING SURGERY OF THE MUSCULOSKELETAL SYSTEM: Primary | ICD-10-CM

## 2018-03-27 DIAGNOSIS — M25.552 LEFT HIP PAIN: ICD-10-CM

## 2018-03-27 PROCEDURE — 97140 MANUAL THERAPY 1/> REGIONS: CPT | Performed by: PHYSICAL THERAPIST

## 2018-03-27 PROCEDURE — 97110 THERAPEUTIC EXERCISES: CPT | Performed by: PHYSICAL THERAPIST

## 2018-03-27 NOTE — PROGRESS NOTES
Daily Note     Today's date: 3/27/2018  Patient name: Gini Padilla  : 1955  MRN: [id number]  Referring provider: Diego Trinidad MD  Dx:   Encounter Diagnosis     ICD-10-CM    1  Aftercare following surgery of the musculoskeletal system Z47 89    2  Left hip pain M25 552                   Subjective: Pt states mild left hip/buttock soreness from initial evaluation yesterday  Objective: See treatment diary below      Assessment: Progressed TE as noted this visit to good tolerance  Pt reported no pain/difficulty with exercise program  Gentle AAROM/PROM into hip ER with posterior compartment STM performed to good tolerance  Pt instructed to performed gentle self massage around incisional site to reduce hypersensitivity  Wilfredosugey De Dios was given an updated written HEP to progress home program  Reported improved symptoms post-tx, deferring modalities  Patient would benefit from continued PT  Plan: Continue per plan of care       Precautions: HTN, overactive bladder    Daily Treatment Diary     Manual  3/26 3/27           Gentle hip ER PROM in extension with anterior mob    Soft tissue mobilization over scar incision NV 10'                                                                   Exercise Diary  3/26 3/27                        Bike warm up NV 10'           SKTC :10x5 :10x5 with strap           Hip capsule release/bent knee fall outs NV :10x10           PPT with kegel NV :10x10           Resisted hip iso flexion  :[id number]           Prone hip iso extension NV :10x10           Prone quad stretch NV 2' ea                                                                                                                                                                                    Progress to functional strengthening as tolerated    Modalities  3/26 3/27           CP/H-wave prn deferred                                       HEP: SKTC, supine hip capsule release, resisted hip iso flexion, resisted hip iso extension, PPT, prone quad stretch

## 2018-03-29 ENCOUNTER — APPOINTMENT (OUTPATIENT)
Dept: PHYSICAL THERAPY | Facility: CLINIC | Age: 63
End: 2018-03-29
Payer: COMMERCIAL

## 2018-04-02 ENCOUNTER — APPOINTMENT (OUTPATIENT)
Dept: PHYSICAL THERAPY | Facility: CLINIC | Age: 63
End: 2018-04-02
Payer: COMMERCIAL

## 2018-04-03 ENCOUNTER — OFFICE VISIT (OUTPATIENT)
Dept: PHYSICAL THERAPY | Facility: CLINIC | Age: 63
End: 2018-04-03
Payer: COMMERCIAL

## 2018-04-03 DIAGNOSIS — Z47.89 AFTERCARE FOLLOWING SURGERY OF THE MUSCULOSKELETAL SYSTEM: Primary | ICD-10-CM

## 2018-04-03 DIAGNOSIS — M25.552 LEFT HIP PAIN: ICD-10-CM

## 2018-04-03 PROCEDURE — 97110 THERAPEUTIC EXERCISES: CPT | Performed by: PHYSICAL THERAPIST

## 2018-04-03 PROCEDURE — 97140 MANUAL THERAPY 1/> REGIONS: CPT | Performed by: PHYSICAL THERAPIST
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
## 2018-04-04 ENCOUNTER — OFFICE VISIT (OUTPATIENT)
Dept: URGENT CARE | Facility: CLINIC | Age: 63
End: 2018-04-04
Payer: COMMERCIAL

## 2018-04-04 VITALS
RESPIRATION RATE: 18 BRPM | SYSTOLIC BLOOD PRESSURE: 142 MMHG | DIASTOLIC BLOOD PRESSURE: 98 MMHG | HEART RATE: 73 BPM | TEMPERATURE: 98.5 F | OXYGEN SATURATION: 97 %

## 2018-04-04 DIAGNOSIS — L50.9 URTICARIA: Primary | ICD-10-CM

## 2018-04-04 PROCEDURE — 99203 OFFICE O/P NEW LOW 30 MIN: CPT | Performed by: NURSE PRACTITIONER

## 2018-04-04 RX ORDER — RANITIDINE 300 MG/1
300 CAPSULE ORAL EVERY EVENING
Qty: 7 CAPSULE | Refills: 0 | Status: SHIPPED | OUTPATIENT
Start: 2018-04-04 | End: 2017-11-01

## 2018-04-04 RX ORDER — METHYLPREDNISOLONE 4 MG/1
TABLET ORAL
Qty: 21 TABLET | Refills: 0 | Status: SHIPPED | OUTPATIENT
Start: 2018-04-04 | End: 2017-10-01

## 2018-04-04 RX ORDER — METHOCARBAMOL 750 MG/1
TABLET, FILM COATED ORAL
COMMUNITY
Start: 2018-03-28 | End: 2017-10-01

## 2018-04-04 RX ADMIN — Medication 10 MG: at 10:41

## 2018-04-04 NOTE — PATIENT INSTRUCTIONS
Urticaria   WHAT YOU NEED TO KNOW:   Urticaria is also called hives  Hives can change size and shape, and appear anywhere on your skin  They can be mild or severe and last from a few minutes to a few days  Hives may be a sign of a severe allergic reaction called anaphylaxis that needs immediate treatment  Urticaria that lasts longer than 6 weeks may be a chronic condition that needs long-term treatment  DISCHARGE INSTRUCTIONS:   Call 911 for signs or symptoms of anaphylaxis,  such as trouble breathing, swelling in your mouth or throat, or wheezing  You may also have itching, a rash, or feel like you are going to faint  Return to the emergency department if:   · Your heart is beating faster than it normally does  · You have cramping or severe pain in your abdomen  Contact your healthcare provider if:   · You have a fever  · Your skin still itches 24 hours after you take your medicine  · You still have hives after 7 days  · Your joints are painful and swollen  · You have questions or concerns about your condition or care  Medicines:   · Epinephrine  is used to treat severe allergic reactions such as anaphylaxis  · Antihistamines  decrease mild symptoms such as itching or a rash  · Steroids  decrease redness, pain, and swelling  · Take your medicine as directed  Contact your healthcare provider if you think your medicine is not helping or if you have side effects  Tell him of her if you are allergic to any medicine  Keep a list of the medicines, vitamins, and herbs you take  Include the amounts, and when and why you take them  Bring the list or the pill bottles to follow-up visits  Carry your medicine list with you in case of an emergency  Steps to take for signs or symptoms of anaphylaxis:   · Immediately  give 1 shot of epinephrine only into the outer thigh muscle  · Leave the shot in place  as directed   Your healthcare provider may recommend you leave it in place for up to 10 seconds before you remove it  This helps make sure all of the epinephrine is delivered  · Call 911 and go to the emergency department,  even if the shot improved symptoms  Do not drive yourself  Bring the used epinephrine shot with you  Safety precautions to take if you are at risk for anaphylaxis:   · Keep 2 shots of epinephrine with you at all times  You may need a second shot, because epinephrine only works for about 20 minutes and symptoms may return  Your healthcare provider can show you and family members how to give the shot  Check the expiration date every month and replace it before it expires  · Create an action plan  Your healthcare provider can help you create a written plan that explains the allergy and an emergency plan to treat a reaction  The plan explains when to give a second epinephrine shot if symptoms return or do not improve after the first  Give copies of the action plan and emergency instructions to family members, work and school staff, and  providers  Show them how to give a shot of epinephrine  · Be careful when you exercise  If you have had exercise-induced anaphylaxis, do not exercise right after you eat  Stop exercising right away if you start to develop any signs or symptoms of anaphylaxis  You may first feel tired, warm, or have itchy skin  Hives, swelling, and severe breathing problems may develop if you continue to exercise  · Carry medical alert identification  Wear medical alert jewelry or carry a card that explains the allergy  Ask your healthcare provider where to get these items  · Keep a record of triggers and symptoms  Record everything you eat, drink, or apply to your skin for 3 weeks  Include stressful events and what you were doing right before your hives started  Bring the record with you to follow-up visits with your healthcare provider  Manage urticaria:   · Cool your skin  This may help decrease itching  Apply a cool pack to your hives  Dip a hand towel in cool water, wring it out, and place it on your hives  You may also soak your skin in a cool oatmeal bath  · Do not rub your hives  This can irritate your skin and cause more hives  · Wear loose clothing  Tight clothes may irritate your skin and cause more hives  · Manage stress  Stress may trigger hives, or make them worse  Learn new ways to relax, such as deep breathing  Follow up with your healthcare provider as directed:  Write down your questions so you remember to ask them during your visits  © 2017 [employer] Information is for End User's use only and may not be sold, redistributed or otherwise used for commercial purposes  All illustrations and images included in CareNotes® are the copyrighted property of [employer]  or [employer]  The above information is an  only  It is not intended as medical advice for individual conditions or treatments  Talk to your doctor, nurse or pharmacist before following any medical regimen to see if it is safe and effective for you

## 2018-04-04 NOTE — PROGRESS NOTES
[employer]        NAME: Joan Cade is a 66 y o  female  : 1955    MRN: [id number]  DATE: 2018  TIME: 10:29 AM    Assessment and Plan   Urticaria [L50 9]  1  Urticaria  dexamethasone (DECADRON) injection 10 mg    Methylprednisolone 4 MG TBPK    ranitidine (ZANTAC) 300 MG capsule         Patient Instructions       Follow up with PCP in 3-5 days  Proceed to  ER if symptoms worsen  Chief Complaint     Chief Complaint   Patient presents with    Rash     Pt c/o a rash since yesterday  History of Present Illness       Rash   The current episode started yesterday  The problem has been gradually worsening since onset  The affected locations include the chest and torso  The rash is characterized by itchiness and redness  She was exposed to nothing  Past treatments include antihistamine  Review of Systems   Review of Systems   Constitutional: Negative  HENT: Negative  Eyes: Negative  Respiratory: Negative  Cardiovascular: Negative  Gastrointestinal: Negative  Genitourinary: Negative  Musculoskeletal: Negative  Skin: Positive for rash (anterior and posterior chest )  Neurological: Negative  Psychiatric/Behavioral: Negative            Current Medications       Current Outpatient Prescriptions:     CYCLOBENZAPRINE HCL PO, Take 10 mg by mouth daily at bedtime, Disp: , Rfl:     ferrous sulfate 325 (65 Fe) mg tablet, Take 325 mg by mouth 3 (three) times a day with meals, Disp: , Rfl:     methocarbamol (ROBAXIN) 750 mg tablet, , Disp: , Rfl:     Methylprednisolone 4 MG TBPK, Use as directed on package, Disp: 21 tablet, Rfl: 0    naproxen (NAPRELAN) 375 MG TB24, Take by mouth daily, Disp: , Rfl:     ranitidine (ZANTAC) 300 MG capsule, Take 1 capsule (300 mg total) by mouth every evening for 7 days, Disp: 7 capsule, Rfl: 0    TraMADol HCl 50 MG TBDP, Take by mouth every 8 (eight) hours as needed, Disp: , Rfl:     Current Facility-Administered Medications:     dexamethasone (DECADRON) injection 10 mg, 10 mg, Intramuscular, Once, Jonasoswaldo Townsend, CRNP    Current Allergies     Allergies as of 2018    (No Known Allergies)            The following portions of the patient's history were reviewed and updated as appropriate: allergies, current medications, past family history, past medical history, past social history, past surgical history and problem list      Past Medical History:   Diagnosis Date    Anemia     Hyperlipidemia     Hypertension     Overactive bladder     Vitamin D deficiency        Past Surgical History:   Procedure Laterality Date     SECTION      ELBOW SURGERY      ESOPHAGOGASTRODUODENOSCOPY N/A 2017    Procedure: ESOPHAGOGASTRODUODENOSCOPY (EGD); Surgeon: Nickie Gomez DO;  Location: [facility]; Service: Gastroenterology    HIP SURGERY Left 2018    glut medius/minimus repair    HYSTERECTOMY      KNEE SURGERY      x2    CT COLONOSCOPY FLX DX W/COLLJ SPEC WHEN PFRMD N/A 2017    Procedure: EGD AND COLONOSCOPY;  Surgeon: Nickie Gomez DO;  Location: [facility]; Service: Gastroenterology       No family history on file  Medications have been verified  Objective   /98   Pulse 73   Temp 98 5 °F (36 9 °C)   Resp 18   SpO2 97%        Physical Exam     Physical Exam   Constitutional: She is oriented to person, place, and time  She appears well-developed and well-nourished  No distress  HENT:   Head: Normocephalic and atraumatic  Right Ear: External ear normal    Left Ear: External ear normal    Mouth/Throat: Oropharynx is clear and moist    Eyes: Conjunctivae and EOM are normal  Pupils are equal, round, and reactive to light  Neck: Normal range of motion  Neck supple  Cardiovascular: Normal rate, regular rhythm and normal heart sounds  Pulmonary/Chest: Effort normal and breath sounds normal    Abdominal: Soft     Neurological: She is alert and oriented to person, place, and time  She has normal reflexes  Skin: Rash noted  Rash is maculopapular (anterior and posterior chest including abdomen and lower back area  )  She is not diaphoretic  Psychiatric: She has a normal mood and affect  Nursing note and vitals reviewed  I advised her to use a nondrowsy antihistamine daily for the next week

## 2018-04-05 ENCOUNTER — OFFICE VISIT (OUTPATIENT)
Dept: PHYSICAL THERAPY | Facility: CLINIC | Age: 63
End: 2018-04-05
Payer: COMMERCIAL

## 2018-04-05 DIAGNOSIS — Z47.89 AFTERCARE FOLLOWING SURGERY OF THE MUSCULOSKELETAL SYSTEM: Primary | ICD-10-CM

## 2018-04-05 DIAGNOSIS — M25.552 LEFT HIP PAIN: ICD-10-CM

## 2018-04-05 PROCEDURE — 97140 MANUAL THERAPY 1/> REGIONS: CPT

## 2018-04-05 PROCEDURE — 97110 THERAPEUTIC EXERCISES: CPT
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
## 2018-04-10 ENCOUNTER — EVALUATION (OUTPATIENT)
Dept: PHYSICAL THERAPY | Facility: CLINIC | Age: 63
End: 2018-04-10
Payer: COMMERCIAL

## 2018-04-10 DIAGNOSIS — S13.4XXD SPRAIN OF LIGAMENTS OF CERVICAL SPINE, SUBSEQUENT ENCOUNTER: ICD-10-CM

## 2018-04-10 DIAGNOSIS — M54.2 CERVICALGIA: Primary | ICD-10-CM

## 2018-04-10 PROCEDURE — 97162 PT EVAL MOD COMPLEX 30 MIN: CPT | Performed by: PHYSICAL THERAPIST

## 2018-04-10 PROCEDURE — G8982 BODY POS GOAL STATUS: HCPCS | Performed by: PHYSICAL THERAPIST

## 2018-04-10 PROCEDURE — G8981 BODY POS CURRENT STATUS: HCPCS | Performed by: PHYSICAL THERAPIST
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
## 2018-04-11 ENCOUNTER — OFFICE VISIT (OUTPATIENT)
Dept: PHYSICAL THERAPY | Facility: CLINIC | Age: 63
End: 2018-04-11
Payer: COMMERCIAL

## 2018-04-11 DIAGNOSIS — M54.2 CERVICALGIA: Primary | ICD-10-CM

## 2018-04-11 DIAGNOSIS — Z47.89 AFTERCARE FOLLOWING SURGERY OF THE MUSCULOSKELETAL SYSTEM: Primary | ICD-10-CM

## 2018-04-11 DIAGNOSIS — M25.552 LEFT HIP PAIN: ICD-10-CM

## 2018-04-11 DIAGNOSIS — S13.4XXD SPRAIN OF LIGAMENTS OF CERVICAL SPINE, SUBSEQUENT ENCOUNTER: ICD-10-CM

## 2018-04-11 PROCEDURE — 97140 MANUAL THERAPY 1/> REGIONS: CPT

## 2018-04-11 PROCEDURE — 97110 THERAPEUTIC EXERCISES: CPT

## 2018-04-11 PROCEDURE — 97014 ELECTRIC STIMULATION THERAPY: CPT

## 2018-04-11 NOTE — PROGRESS NOTES
Daily Note     Today's date: 2018  Patient name: Richie Grigsby  : 1955  MRN: [id number]  Referring provider: Aditya Finney MD  Dx:   Encounter Diagnosis     ICD-10-CM    1  Cervicalgia M54 2    2  Sprain of ligaments of cervical spine, subsequent encounter S13  4XXD                   Subjective: Patient reported having no adverse response following initial eval yesterday  Objective: See treatment diary below  Progressed program as documented below  Assessment: Tolerated treatment fair  Guarding present during manual  Provided with frequent cueing for proper breathing techniques ( continued pain in LB when completing diaphragmatic breathing )  Fair tolerance to H-wave  Patient would benefit from continued PT      Plan: Continue per plan of care  Progress treatment as tolerated  Precautions: HTN, overactive bladder    Daily Treatment Diary     Manual  4/10 4/11           *gentle* STM to cervical structures / SOR    L upper quadrant STM - NP    Assess joint mobilizations as tolerated NV 10 min                                                                   Exercise Diary  4/10 4/11           Upper thoracic ext over 1/2 pillow 2' f/b LTR :[id number] 2 min f/b LTR 5"x10 ea           Chin retractions NV 10"x  10           Supine UT stretch NV 10"x5 b/l           Diaphragmatic breathing reviewthis visit reviewed           Thoracic rotation EOT with deep breathing 3 breaths x5 each side 3 breaths x5 ea                                                                                                                                                                                                                           Progress postural strengthening as tolerated    Modalities  4/10 4/11           MHP to cervical spine with H-wave ( low setting ) supine 90/90 NV 10 min pre tx, H-wave post                                       Updated and reviewed home program with patient    HEP: sydni tucks, supine UT stretch, thoracic rotation EOT with deep breathing
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
## 2018-04-13 ENCOUNTER — OFFICE VISIT (OUTPATIENT)
Dept: PHYSICAL THERAPY | Facility: CLINIC | Age: 63
End: 2018-04-13
Payer: COMMERCIAL

## 2018-04-13 DIAGNOSIS — S13.4XXD SPRAIN OF LIGAMENTS OF CERVICAL SPINE, SUBSEQUENT ENCOUNTER: ICD-10-CM

## 2018-04-13 DIAGNOSIS — Z47.89 AFTERCARE FOLLOWING SURGERY OF THE MUSCULOSKELETAL SYSTEM: Primary | ICD-10-CM

## 2018-04-13 DIAGNOSIS — M25.552 LEFT HIP PAIN: ICD-10-CM

## 2018-04-13 DIAGNOSIS — M54.2 CERVICALGIA: Primary | ICD-10-CM

## 2018-04-13 PROCEDURE — 97110 THERAPEUTIC EXERCISES: CPT

## 2018-04-13 PROCEDURE — 97140 MANUAL THERAPY 1/> REGIONS: CPT

## 2018-04-13 PROCEDURE — 97014 ELECTRIC STIMULATION THERAPY: CPT
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
## 2018-04-13 NOTE — PROGRESS NOTES
Daily Note     Today's date: 2018  Patient name: Lenora Staley  : 1955  MRN: [id number]  Referring provider: Earline Villeda MD  Dx:   Encounter Diagnosis     ICD-10-CM    1  Cervicalgia M54 2    2  Sprain of ligaments of cervical spine, subsequent encounter S13  4XXD                   Subjective: Patient reported no significant soreness following previous treatment  Functional limitations remain with driving ( pain with cervical motions )  Objective: See treatment diary below      Assessment: Improved demonstration of diaphragmatic breathing  Positive response to manual therapy with mild improvement to tolerance levels  Provided with cueing to ensure proper form with exercise program       Plan: Continue per plan of care  Progress treatment as tolerated            Precautions: HTN, overactive bladder    Daily Treatment Diary     Manual  4/10 4/11 4/13          *gentle* STM to cervical structures / SOR    L upper quadrant STM - performed by PT    Assess joint mobilizations as tolerated NV 10 min 15 min                                                                  Exercise Diary  4/10 4/11 4/13          Upper thoracic ext over 1/2 pillow 2' f/b LTR :[id number] 2 min f/b LTR 5"x10 ea 2 min f/b LTR 5"x10 ea          Chin retractions NV 10"x  10 10"x 10          Supine UT stretch NV 10"x5 b/l 10"x5 b/l          Diaphragmatic breathing reviewthis visit reviewed reviewed          Thoracic rotation EOT with deep breathing 3 breaths x5 each side 3 breaths x5 ea 3 breaths x5 ea          Supine cervical rot isometric   NV                                                                                                                                                                                                             Progress postural strengthening as tolerated    Modalities  4/10 4/11 4/13          MHP to cervical spine with H-wave ( low setting ) supine 90/90 NV 10 min pre tx, H-wave post 10 min pre tx                                      HEP: chink tucks, supine UT stretch, thoracic rotation EOT with deep breathing    10 min of exercise not supervised

## 2018-04-17 ENCOUNTER — OFFICE VISIT (OUTPATIENT)
Dept: PHYSICAL THERAPY | Facility: CLINIC | Age: 63
End: 2018-04-17
Payer: COMMERCIAL

## 2018-04-17 DIAGNOSIS — M25.552 LEFT HIP PAIN: ICD-10-CM

## 2018-04-17 DIAGNOSIS — Z47.89 AFTERCARE FOLLOWING SURGERY OF THE MUSCULOSKELETAL SYSTEM: Primary | ICD-10-CM

## 2018-04-17 DIAGNOSIS — S13.4XXD SPRAIN OF LIGAMENTS OF CERVICAL SPINE, SUBSEQUENT ENCOUNTER: ICD-10-CM

## 2018-04-17 DIAGNOSIS — M54.2 CERVICALGIA: Primary | ICD-10-CM

## 2018-04-17 PROCEDURE — 97140 MANUAL THERAPY 1/> REGIONS: CPT | Performed by: PHYSICAL THERAPIST

## 2018-04-17 PROCEDURE — 97014 ELECTRIC STIMULATION THERAPY: CPT | Performed by: PHYSICAL THERAPIST

## 2018-04-17 PROCEDURE — 97110 THERAPEUTIC EXERCISES: CPT | Performed by: PHYSICAL THERAPIST

## 2018-04-17 NOTE — PROGRESS NOTES
Daily Note     Today's date: 2018  Patient name: Maryanne Swain  : 1955  MRN: [id number]  Referring provider: Celena Case MD  Dx:   Encounter Diagnosis     ICD-10-CM    1  Cervicalgia M54 2    2  Sprain of ligaments of cervical spine, subsequent encounter S13  4XXD                   Subjective: Patient reports an improvement in cervical pain, stating she continues to have "stiffness" in the morning that gradually reduces throughout the day  States she continues to have headaches but with decreased frequency  Objective: See treatment diary below  Assessment: Improved tolerance to manual therapy with reduced pain noted following  Good tolerance to ex program and progression  Plan: Continue per plan of care  Progress treatment as tolerated            Precautions: HTN, overactive bladder    Daily Treatment Diary     Manual  4/10 4/11 4/13 4/17         *gentle* STM to cervical structures / SOR    L upper quadrant STM - np    Assess joint mobilizations as tolerated NV 10 min 15 min 10 min                                                                 Exercise Diary  4/10 4/11 4/13 4/17         Upper thoracic ext over 1/2 pillow 2' f/b LTR :[id number] 2 min f/b LTR 5"x10 ea 2 min f/b LTR 5"x10 ea 2 min f/b LTR 5"x10 ea         Chin retractions NV 10"x  10 10"x 10 10"x 10         Supine UT stretch NV 10"x5 b/l 10"x5 b/l 10"x5 b/l         Diaphragmatic breathing reviewthis visit reviewed reviewed reviewed         Thoracic rotation EOT with deep breathing 3 breaths x5 each side 3 breaths x5 ea 3 breaths x5 ea 3 breaths x5 ea         Supine cervical rot isometric   NV :05x10 each                                                                                                                                                                                                            Progress postural strengthening as tolerated    Modalities  4/10 4/11 4/13 4/17         MHP to cervical spine with H-wave ( low setting ) supine 90/90 NV 10 min pre tx, H-wave post 10 min pre tx 10 min pre tx                                     HEP: chink tucks, supine UT stretch, thoracic rotation EOT with deep breathing

## 2018-04-17 NOTE — PROGRESS NOTES
Daily Note     Today's date: 2018  Patient name: Skyler Phelps  : 1955  MRN: [id number]  Referring provider: Yolanda Sun MD  Dx:   Encounter Diagnosis     ICD-10-CM    1  Aftercare following surgery of the musculoskeletal system Z47 89    2  Left hip pain M25 552                   Subjective: Presents 9 weeks, 5 day post-op  States continued lateral/gluteal "soreness/pain" with functional activity  Objective: See treatment diary below  Progressed strengthening program       Assessment: Good tolerance to ex program, continues to be challenged by strengthening exercises and progression of functional strengthening  Glute and lateral hip weakness present with progressions  Continued use of manual therapy to promote soft tissue extensibility and increased hip mobility to fair tolerance  Plan: Continue per plan of care  Progress treatment as tolerated           Precautions: HTN, overactive bladder    Daily Treatment Diary     Manual  3/26 3/27 4/3 4/5 4/11 4/13 4/17      Gentle hip ER PROM in extension with  anterior mob     Soft tissue mobilization over scar incision NV 10' 15' 15 min 15 min 15 min 15 min                                                              Exercise Diary  3/26 3/27 4/3 4                   Bike warm up NV 10' 10'  10 min 10 min 10 min 10 min      SKTC with strap :10x5 :10x5 with strap :10x5 with strap 10"x  10 b/l 10"x 10 b/l 10"x 10 b/l 10"x 10 b/l      Hip capsule release / bent knee fall outs NV :10x10 :10x10 10"x  10 b/l 10"x 10 b/l 10"x 10  10"x 10 on L      PPT with kegel NV :10x10 :10x10 10"x  10 10"x 10 10"x 10 10"x 10      Resisted hip iso flexion  :[id number] :10x10 each 10"x  10 b/l 10"x 10 b/l 10"x 10 b/l 10"x 10 b/l      Prone hip iso extension NV :10x10 :10x10 10"x  10 10"x  10 10"x 10 10"x 10      Prone quad stretch NV 2' ea 2' ea 2 min b/l 2 min b/l 2 min b/l 2 min on L      Bridges      5"x10 5"x10      Clamshells on L - tactile cueing      5"x10 5"x10      Sidelying hip abd - consider standing hip abd NV      unable unable      VG DLS      24% 4 min 36% 4 min      Glute squeeze on block       :10x5 each      Side stepping at bar       4 laps      Progress to SL balance as able                                                                                           Progress to functional strengthening as tolerated    Modalities  3/26 3/27 4/3 4/5 4/11 4/13 4/17      CP / H-wave prn deferred deferred NP NP CP during  cervical treatment CP during  cervical treatment                                  Updated and reviewed home program with patient    HEP: SKTC, hip capsule release, resisted hip iso flexion, prone hip iso extension, PPT, prone quad stretch, bridges, clamshells, standing hip abd

## 2018-04-19 ENCOUNTER — OFFICE VISIT (OUTPATIENT)
Dept: PHYSICAL THERAPY | Facility: CLINIC | Age: 63
End: 2018-04-19
Payer: COMMERCIAL

## 2018-04-19 DIAGNOSIS — M25.552 LEFT HIP PAIN: ICD-10-CM

## 2018-04-19 DIAGNOSIS — S13.4XXD SPRAIN OF LIGAMENTS OF CERVICAL SPINE, SUBSEQUENT ENCOUNTER: ICD-10-CM

## 2018-04-19 DIAGNOSIS — M54.2 CERVICALGIA: Primary | ICD-10-CM

## 2018-04-19 DIAGNOSIS — Z47.89 AFTERCARE FOLLOWING SURGERY OF THE MUSCULOSKELETAL SYSTEM: Primary | ICD-10-CM

## 2018-04-19 PROCEDURE — 97110 THERAPEUTIC EXERCISES: CPT | Performed by: PHYSICAL THERAPIST

## 2018-04-19 PROCEDURE — 97140 MANUAL THERAPY 1/> REGIONS: CPT | Performed by: PHYSICAL THERAPIST

## 2018-04-19 NOTE — PROGRESS NOTES
Daily Note     Today's date: 2018  Patient name: Chastity Watts  : 1955  MRN: [id number]  Referring provider: Marietta Suresh MD  Dx:   Encounter Diagnosis     ICD-10-CM    1  Aftercare following surgery of the musculoskeletal system Z47 89    2  Left hip pain M25 552                   Subjective: Presents 10 weeks post-op  Reports improved lateral hip pain from previous visit, stating symptoms have been reduced with less "pinching" pain  Objective: See treatment diary below  Assessment: Good tolerance to ex program, continues to be challenged by functional strengthening exercises  Glute weakness remains present with progressions  (+) response to MT with improved mobility noted following  Plan: Continue per plan of care  Progress treatment as tolerated           Precautions: HTN, overactive bladder    Daily Treatment Diary     Manual  3/26 3/27 4/3 4/5 4/11 4/13 4/17 4/19     Gentle hip ER PROM in extension with  anterior mob     Soft tissue mobilization over scar incision NV 10' 15' 15 min 15 min 15 min 15 min 15 min                                                             Exercise Diary  3/26 3/27 4/3 4                  Bike warm up NV 10' 10'  10 min 10 min 10 min 10 min 10 min     SKTC with strap :10x5 :10x5 with strap :10x5 with strap 10"x  10 b/l 10"x 10 b/l 10"x 10 b/l 10"x 10 b/l :10x10 on L     Hip capsule release / bent knee fall outs NV :10x10 :10x10 10"x  10 b/l 10"x 10 b/l 10"x 10  10"x 10 on L :10x10 on L (with cushioned half roll)     PPT with kegel NV :10x10 :10x10 10"x  10 10"x 10 10"x 10 10"x 10 :10x10     Resisted hip iso flexion  :[id number] :10x10 each 10"x  10 b/l 10"x 10 b/l 10"x 10 b/l 10"x 10 b/l :10x10 b/l     Prone hip iso extension NV :10x10 :10x10 10"x  10 10"x  10 10"x 10 10"x 10 10"x 10     Prone quad stretch NV 2' ea 2' ea 2 min b/l 2 min b/l 2 min b/l 2 min on L 2 min on L     Bridges      5"x10 5"x10 :05x10     Jordyn on L - tactile cueing      5"x10 5"x10 :05x10     Sidelying hip abd - consider standing hip abd NV      unable unable unable     VG DLS      86% 4 min 90% 4 min 40% 4 min     Glute squeeze on block       :10x5 each :10x5 each     Side stepping at bar       4 laps 4 laps     Progress to SL balance as able                                                                                           Progress to functional strengthening as tolerated    Modalities  3/26 3/27 4/3 4/5 4/11 4/13 4/17 4/19     CP / H-wave prn deferred deferred NP NP CP during  cervical treatment CP during  cervical treatment CP during  cervical treatment                                 Updated and reviewed home program with patient    HEP: SKTC, hip capsule release, resisted hip iso flexion, prone hip iso extension, PPT, prone quad stretch, bridges, clamshells, standing hip abd

## 2018-04-19 NOTE — PROGRESS NOTES
Daily Note     Today's date: 2018  Patient name: Sunshine Mejia  : 1955  MRN: [id number]  Referring provider: Dyanne Spurling, MD  Dx:   Encounter Diagnosis     ICD-10-CM    1  Cervicalgia M54 2    2  Sprain of ligaments of cervical spine, subsequent encounter S13  4XXD                   Subjective: Patient reports she had increased frequency of headaches, stating she woke up with headache symptoms the past two mornings  Objective: See treatment diary below  Assessment: Improved tolerance to manual therapy with reduced pain and headache symptoms noted following  Good tolerance to ex program       Plan: Continue per plan of care  Progress treatment as tolerated            Precautions: HTN, overactive bladder    Daily Treatment Diary     Manual  4/10 4/11 4/13 4/17 4/19        *gentle* STM to cervical structures / SOR    L upper quadrant STM - np    Assess joint mobilizations as tolerated NV 10 min 15 min 10 min 10 min                                                                 Exercise Diary  4/10 4/11 4/13 4/17 4/19        Upper thoracic ext over 1/2 pillow 2' f/b LTR :[id number] 2 min f/b LTR 5"x10 ea 2 min f/b LTR 5"x10 ea 2 min f/b LTR 5"x10 ea 2 min f/b LTR 5"x10 ea        Chin retractions NV 10"x  10 10"x 10 10"x 10 10"x 10        Supine UT stretch NV 10"x5 b/l 10"x5 b/l 10"x5 b/l 10"x5 b/l        Diaphragmatic breathing reviewthis visit reviewed reviewed reviewed reviewed        Thoracic rotation EOT with deep breathing 3 breaths x5 each side 3 breaths x5 ea 3 breaths x5 ea 3 breaths x5 ea 3 breaths x5 ea        Supine cervical rot isometric   NV :05x10 each :05x10 each                                                                                                                                                                                                           Progress postural strengthening as tolerated    Modalities  4/10 4/11 4/13 4/17 4/19        MHP to cervical spine with H-wave ( low setting ) supine 90/90 NV 10 min pre tx, H-wave post 10 min pre tx 10 min pre tx 10 min pre tx                                    HEP: chink tucks, supine UT stretch, thoracic rotation EOT with deep breathing

## 2018-04-23 ENCOUNTER — OFFICE VISIT (OUTPATIENT)
Dept: PHYSICAL THERAPY | Facility: CLINIC | Age: 63
End: 2018-04-23
Payer: COMMERCIAL

## 2018-04-23 DIAGNOSIS — S13.4XXD SPRAIN OF LIGAMENTS OF CERVICAL SPINE, SUBSEQUENT ENCOUNTER: ICD-10-CM

## 2018-04-23 DIAGNOSIS — Z47.89 AFTERCARE FOLLOWING SURGERY OF THE MUSCULOSKELETAL SYSTEM: Primary | ICD-10-CM

## 2018-04-23 DIAGNOSIS — M54.2 CERVICALGIA: Primary | ICD-10-CM

## 2018-04-23 DIAGNOSIS — M25.552 LEFT HIP PAIN: ICD-10-CM

## 2018-04-23 PROCEDURE — 97014 ELECTRIC STIMULATION THERAPY: CPT

## 2018-04-23 PROCEDURE — 97110 THERAPEUTIC EXERCISES: CPT

## 2018-04-23 PROCEDURE — 97140 MANUAL THERAPY 1/> REGIONS: CPT

## 2018-04-23 NOTE — PROGRESS NOTES
Daily Note     Today's date: 2018  Patient name: Kiara Horn  : 1955  MRN: [id number]  Referring provider: Camila Jain MD  Dx:   Encounter Diagnosis     ICD-10-CM    1  Cervicalgia M54 2    2  Sprain of ligaments of cervical spine, subsequent encounter S13  4XXD                   Subjective: Patient reported cervical symptoms have been making progress, along with increased cervical mobility  Objective: See treatment diary below  Progressed postural strengthening  Assessment: Responding well to manual therapy to address presence of soft tissue restrictions  Fatigued with progression of postural strengthening  Plan: Continue per plan of care  Progress treatment as tolerated            Precautions: HTN, overactive bladder    Daily Treatment Diary     Manual  4/10 4/11 4/13 4/17 4/19 4/23       *gentle* STM to cervical structures / SOR    L upper quadrant STM - NP    Assess joint mobilizations as tolerated NV 10 min 15 min 10 min 10 min  10 min                                                               Exercise Diary  4/10 4/11 4/13 4/17 4/19 4/23       Upper thoracic ext over 1/2 pillow 2' f/b LTR :[id number] 2 min f/b LTR 5"x10 ea 2 min f/b LTR 5"x10 ea 2 min f/b LTR 5"x10 ea 2 min f/b LTR 5"x10 ea 2 min f/b LTR 5"x10 ea       Chin retractions NV 10"x  10 10"x 10 10"x 10 10"x 10 10"x 10       Supine UT stretch NV 10"x5 b/l 10"x5 b/l 10"x5 b/l 10"x5 b/l 10"x5 b/l       Diaphragmatic breathing reviewthis visit reviewed reviewed reviewed reviewed NP       Thoracic rotation EOT with deep breathing 3 breaths x5 each side 3 breaths x5 ea 3 breaths x5 ea 3 breaths x5 ea 3 breaths x5 ea 3 breaths x 5 ea       Supine cervical rot isometric   NV :05x10 each :05x10 each 5"x10 b/l       Seated at foam roller TB ER / lower trap      Red 5"x10 ea Progress postural strengthening as tolerated    Modalities  4/10 4/11 4/13 4/17 4/19 4/23       MHP to cervical spine with H-wave ( low setting ) supine 90/90 NV 10 min pre tx, H-wave post 10 min pre tx 10 min pre tx 10 min pre tx 10 min pre tx                                   Consider postural strengthening for home program NV   HEP: chink tucks, supine UT stretch, thoracic rotation EOT with deep breathing

## 2018-04-23 NOTE — PROGRESS NOTES
Daily Note     Today's date: 2018  Patient name: Shola Wilkinson  : 1955  MRN: [id number]  Referring provider: Flaco Chen MD  Dx:   Encounter Diagnosis     ICD-10-CM    1  Aftercare following surgery of the musculoskeletal system Z47 89    2  Left hip pain M25 552                   Subjective: Patient reported symptoms persist, located in posterior and lateral glute / hip  Symptoms present with amb, standing, sitting ( no specific activity makes symptoms better / worse )  Hopes to return to work soon  Objective: See treatment diary below      Assessment: Fatigues with LE strengthening due to lack of functional strength in lateral and posterior hip musculature  Manual continues to promote increased hip mobility and address presence of scar tissue  Plan: Continue per plan of care  Progress treatment as tolerated  Next MD f/u scheduled for         Precautions: HTN, overactive bladder    Daily Treatment Diary     Manual  3/26 3/27 4/3 4    Gentle hip ER PROM in extension with  ( anterior mob - NP )     Soft tissue mobilization over scar incision NV 10' 15' 15 min 15 min 15 min 15 min 15 min 15 min                                                            Exercise Diary  3/26 3/27 4/3 4                 Bike warm up NV 10' 10'  10 min 10 min 10 min 10 min 10 min 10 min    SKTC with strap :10x5 :10x5 with strap :10x5 with strap 10"x  10 b/l 10"x 10 b/l 10"x 10 b/l 10"x 10 b/l :10x10 on L 10"x  10 on L    Hip capsule release / bent knee fall outs NV :10x10 :10x10 10"x  10 b/l 10"x 10 b/l 10"x 10  10"x 10 on L :10x10 on L (with cushioned half roll) 10"x  10 on L    PPT with kegel NV :10x10 :10x10 10"x  10 10"x 10 10"x 10 10"x 10 :10x10 10"x  10    Resisted hip iso flexion  :[id number] :10x10 each 10"x  10 b/l 10"x 10 b/l 10"x 10 b/l 10"x 10 b/l :10x10 b/l 10"x  10 b/l    Prone hip iso extension NV :10x10 :10x10 10"x  10 10"x  10 10"x 10 10"x 10 10"x 10 10"x  10    Prone quad stretch NV 2' ea 2' ea 2 min b/l 2 min b/l 2 min b/l 2 min on L 2 min on L 2 min on L    Bridges      5"x10 5"x10 :05x10 5"x10    Clamshells on L - tactile cueing      5"x10 5"x10 :05x10 5"x10    Sidelying hip abd - consider standing hip abd NV      unable unable unable unable    VG DLS      74% 4 min 43% 4 min 40% 4 min 40% 4 min    Glute squeeze on block       :10x5 each :10x5 each 10"x5 b/l    Side stepping at bar       4 laps 4 laps 4 laps                                                                                               Progress to functional strengthening, SL balance as able    Modalities  3/26 3/27 4/3 4/5 4/11 4/13 4/17 4/19 4/23    CP / H-wave prn deferred deferred NP NP CP during  cervical treatment CP during  cervical treatment CP during  cervical treatment CP duringcervical tratment                                HEP: SKTC, hip capsule release, resisted hip iso flexion, prone hip iso extension, PPT, prone quad stretch, bridges, clamshells, standing hip abd     10 min of exercise not supervised

## 2018-04-26 ENCOUNTER — OFFICE VISIT (OUTPATIENT)
Dept: PHYSICAL THERAPY | Facility: CLINIC | Age: 63
End: 2018-04-26
Payer: COMMERCIAL

## 2018-04-26 DIAGNOSIS — M25.552 LEFT HIP PAIN: ICD-10-CM

## 2018-04-26 DIAGNOSIS — S13.4XXD SPRAIN OF LIGAMENTS OF CERVICAL SPINE, SUBSEQUENT ENCOUNTER: ICD-10-CM

## 2018-04-26 DIAGNOSIS — M54.2 CERVICALGIA: Primary | ICD-10-CM

## 2018-04-26 DIAGNOSIS — Z47.89 AFTERCARE FOLLOWING SURGERY OF THE MUSCULOSKELETAL SYSTEM: Primary | ICD-10-CM

## 2018-04-26 PROCEDURE — 97110 THERAPEUTIC EXERCISES: CPT

## 2018-04-26 PROCEDURE — 97140 MANUAL THERAPY 1/> REGIONS: CPT

## 2018-04-26 PROCEDURE — 97014 ELECTRIC STIMULATION THERAPY: CPT

## 2018-04-26 RX ORDER — IBUPROFEN AND FAMOTIDINE 26.6; 8 MG/1; MG/1
TABLET, FILM COATED ORAL
COMMUNITY
End: 2017-10-01

## 2018-04-26 RX ORDER — RANITIDINE 300 MG/1
TABLET ORAL
COMMUNITY
Start: 2018-04-04 | End: 2017-11-01

## 2018-04-26 RX ORDER — GABAPENTIN 300 MG/1
CAPSULE ORAL
COMMUNITY
End: 2017-10-01

## 2018-04-26 NOTE — PROGRESS NOTES
Daily Note     Today's date: 2018  Patient name: Scott Mcgee  : 1955  MRN: [id number]  Referring provider: Halle Lopez MD  Dx:   Encounter Diagnosis     ICD-10-CM    1  Cervicalgia M54 2    2  Sprain of ligaments of cervical spine, subsequent encounter S13  4XXD                   Subjective: Patient reported minimal increase in soreness following progressions of postural strengthening  Objective: See treatment diary below      Assessment: Manual continues to promote increased soft tissue extensibility in cervical region, increasing pain free functional ROM  Unresolved scapular weakness, demonstrating fatigue with postural strengthening  Plan: Continue per plan of care  Progress treatment as tolerated            Precautions: HTN, overactive bladder    Daily Treatment Diary     Manual  4/10 4/11 4/13 4/17 4/19 4/23 4/26      *gentle* STM to cervical structures / SOR    L upper quadrant STM - NP    Assess joint mobilizations as tolerated NV 10 min 15 min 10 min 10 min  10 min 10 min                                                              Exercise Diary  4/10 4/11 4/13 4/17 4/19 4/23 4/26      Upper thoracic ext over 1/2 pillow 2' f/b LTR :[id number] 2 min f/b LTR 5"x10 ea 2 min f/b LTR 5"x10 ea 2 min f/b LTR 5"x10 ea 2 min f/b LTR 5"x10 ea 2 min f/b LTR 5"x10 ea 2 min f/b LTR 5"x10 ea      Chin retractions NV 10"x  10 10"x 10 10"x 10 10"x 10 10"x 10 10"x 10      Supine UT stretch NV 10"x5 b/l 10"x5 b/l 10"x5 b/l 10"x5 b/l 10"x5 b/l 10"x5 b/l      Diaphragmatic breathing reviewthis visit reviewed reviewed reviewed reviewed NP NP      Thoracic rotation EOT with deep breathing 3 breaths x5 each side 3 breaths x5 ea 3 breaths x5 ea 3 breaths x5 ea 3 breaths x5 ea 3 breaths x 5 ea 3 breaths x5 ea      Supine cervical rot isometric   NV :05x10 each :05x10 each 5"x10 b/l 5"x10 b/l      Seated at foam roller TB ER / lower trap      Red 5"x10 ea Red 5"x10 ea Progress postural strengthening as tolerated    Modalities  4/10 4/11 4/13 4/17 4/19 4/23 4/26      MHP to cervical spine with H-wave ( low setting ) supine 90/90 NV 10 min pre tx, H-wave post 10 min pre tx 10 min pre tx 10 min pre tx 10 min pre tx 10 min pre tx                                  Updated and reviewed home program with patient    HEP: chink tucks, supine UT stretch, thoracic rotation EOT with deep breathing, seated TB ER / lower trap ( red TB )

## 2018-04-26 NOTE — PROGRESS NOTES
Daily Note     Today's date: 2018  Patient name: Chuy Matias  : 1955  MRN: [id number]  Referring provider: Kendall Cortez MD  Dx:   Encounter Diagnosis     ICD-10-CM    1  Aftercare following surgery of the musculoskeletal system Z47 89    2  Left hip pain M25 552                   Subjective: Patient made her return to work this week, reporting increased soreness in L hip due to increased amb / activity  Objective: See treatment diary below      Assessment: Continues to present with functional weakness in L LE, especially through lateral hip musculature  Unresolved hip ER mobility, addressed with manual       Plan: Continue per plan of care  Progress treatment as tolerated  Next MD f/u scheduled for  ( for LB )        Precautions: HTN, overactive bladder    Daily Treatment Diary     Manual  3/26 3/27 4/3 4/5 4/11 4/13 4/17 4/19 4/23 4/26   Gentle hip ER PROM in extension with  ( anterior mob - NP )     Soft tissue mobilization over scar incision NV 10' 15' 15 min 15 min 15 min 15 min 15 min 15 min 15 min                                                           Exercise Diary  3/26 3/27 4/3 4                Bike warm up NV 10' 10'  10 min 10 min 10 min 10 min 10 min 10 min 10 min   SKTC with strap :10x5 :10x5 with strap :10x5 with strap 10"x  10 b/l 10"x 10 b/l 10"x 10 b/l 10"x 10 b/l :10x10 on L 10"x  10 on L 10"x 10 on L   Hip capsule release / bent knee fall outs NV :10x10 :10x10 10"x  10 b/l 10"x 10 b/l 10"x 10  10"x 10 on L :10x10 on L (with cushioned half roll) 10"x  10 on L 10"x 1 0 on L   PPT with kegel NV :10x10 :10x10 10"x  10 10"x 10 10"x 10 10"x 10 :10x10 10"x  10 10"x  10   Resisted hip iso flexion  :[id number] :10x10 each 10"x  10 b/l 10"x 10 b/l 10"x 10 b/l 10"x 10 b/l :10x10 b/l 10"x  10 b/l 10"x 10 b/l   Prone hip iso extension NV :10x10 :10x10 10"x  10 10"x  10 10"x 10 10"x 10 10"x 10 10"x  10 10"x  10    Prone quad stretch NV 2' ea 2' ea 2 min b/l 2 min b/l 2 min b/l 2 min on L 2 min on L 2 min on L 2 min on L   Bridges      5"x10 5"x10 :05x10 5"x10 5"x10   Clamshells on L - tactile cueing      5"x10 5"x10 :05x10 5"x10 5"x10   Sidelying hip abd - consider standing hip abd NV      unable unable unable unable NP   VG DLS      74% 4 min 99% 4 min 40% 4 min 40% 4 min 40% 4 min   Glute squeeze on block       :10x5 each :10x5 each 10"x5 b/l 10"x5 b/l   Side stepping at bar       4 laps 4 laps 4 laps 4 laps                                                                                              Progress to functional strengthening, SL balance as able    Modalities  3/26 3/27 4/3 4/5 4/11 4/13 4/17 4/19 4/23 4/26   CP / H-wave prn deferred deferred NP NP CP during  cervical treatment CP during  cervical treatment CP during  cervical treatment CP duringcervical tratment CP duringcervical treatment                               HEP: SKTC, hip capsule release, resisted hip iso flexion, prone hip iso extension, PPT, prone quad stretch, bridges, clamshells, standing hip abd

## 2018-04-30 ENCOUNTER — APPOINTMENT (OUTPATIENT)
Dept: PHYSICAL THERAPY | Facility: CLINIC | Age: 63
End: 2018-04-30
Payer: COMMERCIAL

## 2018-05-01 ENCOUNTER — OFFICE VISIT (OUTPATIENT)
Dept: FAMILY MEDICINE CLINIC | Facility: CLINIC | Age: 63
End: 2018-05-01
Payer: COMMERCIAL

## 2018-05-01 VITALS
RESPIRATION RATE: 17 BRPM | WEIGHT: 187 LBS | SYSTOLIC BLOOD PRESSURE: 150 MMHG | HEART RATE: 89 BPM | TEMPERATURE: 98.9 F | DIASTOLIC BLOOD PRESSURE: 110 MMHG | BODY MASS INDEX: 34.41 KG/M2 | OXYGEN SATURATION: 98 % | HEIGHT: 62 IN

## 2018-05-01 DIAGNOSIS — R52 PAIN: ICD-10-CM

## 2018-05-01 DIAGNOSIS — Z11.59 NEED FOR HEPATITIS C SCREENING TEST: ICD-10-CM

## 2018-05-01 DIAGNOSIS — R79.0 LOW IRON STORES: ICD-10-CM

## 2018-05-01 DIAGNOSIS — E78.00 HYPERCHOLESTEROLEMIA: Primary | ICD-10-CM

## 2018-05-01 DIAGNOSIS — I10 ESSENTIAL HYPERTENSION: ICD-10-CM

## 2018-05-01 DIAGNOSIS — N32.81 OVERACTIVE BLADDER: ICD-10-CM

## 2018-05-01 PROBLEM — R35.1 NOCTURIA: Status: ACTIVE | Noted: 2017-03-13

## 2018-05-01 PROBLEM — M25.9 JOINT DISORDER OF PELVIS OR THIGH: Status: ACTIVE | Noted: 2017-04-20

## 2018-05-01 PROBLEM — I95.9 HYPOTENSION: Status: ACTIVE | Noted: 2017-03-13

## 2018-05-01 PROBLEM — M79.661 PAIN IN BOTH LOWER LEGS: Status: ACTIVE | Noted: 2017-03-13

## 2018-05-01 PROBLEM — R29.898 LEG WEAKNESS, BILATERAL: Status: ACTIVE | Noted: 2017-04-19

## 2018-05-01 PROBLEM — E55.9 VITAMIN D DEFICIENCY: Status: ACTIVE | Noted: 2017-03-13

## 2018-05-01 PROBLEM — K25.9 GASTRIC ULCER: Status: RESOLVED | Noted: 2017-11-10 | Resolved: 2018-05-01

## 2018-05-01 PROBLEM — R53.83 FATIGUE: Status: ACTIVE | Noted: 2017-03-13

## 2018-05-01 PROBLEM — M79.662 PAIN IN BOTH LOWER LEGS: Status: ACTIVE | Noted: 2017-03-13

## 2018-05-01 PROBLEM — D50.9 IRON DEFICIENCY ANEMIA: Status: ACTIVE | Noted: 2017-05-31

## 2018-05-01 PROBLEM — M99.04 SEGMENTAL AND SOMATIC DYSFUNCTION OF SACRAL REGION: Status: ACTIVE | Noted: 2017-04-20

## 2018-05-01 PROBLEM — K25.9 GASTRIC ULCER: Status: ACTIVE | Noted: 2017-11-10

## 2018-05-01 PROCEDURE — 99214 OFFICE O/P EST MOD 30 MIN: CPT | Performed by: FAMILY MEDICINE

## 2018-05-01 RX ORDER — TRAMADOL HYDROCHLORIDE 50 MG/1
TABLET ORAL
COMMUNITY
Start: 2018-04-30 | End: 2018-05-01 | Stop reason: SDUPTHER
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
## 2018-05-03 ENCOUNTER — OFFICE VISIT (OUTPATIENT)
Dept: PHYSICAL THERAPY | Facility: CLINIC | Age: 63
End: 2018-05-03
Payer: COMMERCIAL

## 2018-05-03 DIAGNOSIS — Z47.89 AFTERCARE FOLLOWING SURGERY OF THE MUSCULOSKELETAL SYSTEM: Primary | ICD-10-CM

## 2018-05-03 DIAGNOSIS — M54.2 CERVICALGIA: Primary | ICD-10-CM

## 2018-05-03 DIAGNOSIS — M25.552 LEFT HIP PAIN: ICD-10-CM

## 2018-05-03 DIAGNOSIS — S13.4XXD SPRAIN OF LIGAMENTS OF CERVICAL SPINE, SUBSEQUENT ENCOUNTER: ICD-10-CM

## 2018-05-03 PROCEDURE — 97140 MANUAL THERAPY 1/> REGIONS: CPT

## 2018-05-03 PROCEDURE — 97014 ELECTRIC STIMULATION THERAPY: CPT

## 2018-05-03 PROCEDURE — 97110 THERAPEUTIC EXERCISES: CPT

## 2018-05-03 NOTE — PROGRESS NOTES
Daily Note     Today's date: 5/3/2018  Patient name: Jonny Barr  : 1955  MRN: [id number]  Referring provider: Kassy Zelaya MD  Dx:   Encounter Diagnosis     ICD-10-CM    1  Aftercare following surgery of the musculoskeletal system Z47 89    2  Left hip pain M25 552                   Subjective: Patient reported continued soreness with increased activity and amb at work  Objective: See treatment diary below      Assessment: Presents with antalgic gait pattern, decreasing weight bearing through L LE  Presents with continued functional weakness in lateral hip  Manual addressed unresolved hip ER deficits  Plan: Continue per plan of care  Progress treatment as tolerated           Precautions: HTN, overactive bladder    Daily Treatment Diary     Manual  5/3            Gentle hip ER PROM in extension with  ( anterior mob - NP )     Soft tissue mobilization over scar incision 10 min                                                                    Exercise Diary  5/3                         Bike warm up 10 min            SKTC with strap 10"x  10            Hip capsule release / bent knee fall outs 10"x  10            PPT with kegel 10"x  10            Resisted hip iso flexion 10"x  10 b/l            Prone hip iso extension 10"x  10            Prone quad stretch 2 min            Bridges 5"x10            Clamshells on L - tactile cueing 5"x10            Sidelying hip abd - consider standing hip abd NP            VG DLS 01% 4 min            Glute squeeze on block 10"x5            Side stepping at bar 2 laps                                                                                                       Progress to functional strengthening, SL balance as able    Modalities  5/3            CP - with cervical treatment 10 min post                                         HEP: SKTC, hip capsule release, resisted hip iso flexion, prone hip iso extension, PPT, prone quad stretch, bridges, clamshells, standing hip abd

## 2018-05-03 NOTE — PROGRESS NOTES
Daily Note     Today's date: 5/3/2018  Patient name: Yolie Del Toro  : 1955  MRN: [id number]  Referring provider: Loretta Yan MD  Dx:   Encounter Diagnosis     ICD-10-CM    1  Cervicalgia M54 2    2  Sprain of ligaments of cervical spine, subsequent encounter S13  4XXD                   Subjective: Patient reported increased cervical symptoms / soreness with work duties ( having to look down to write, look up to reach )  Objective: See treatment diary below      Assessment: Soft tissue extensibility improving through cervical region  Continued to be provided with cueing to ensure proper technique with exercises  Progressing with postural strength  Plan: Continue per plan of care  Progress treatment as tolerated            Precautions: HTN, overactive bladder    Daily Treatment Diary     Manual  4/10 4/11 4/13 4/17 4/19 4/23 4/26 5/3     *gentle* STM to cervical structures / SOR    L upper quadrant STM - NP    Assess joint mobilizations as tolerated NV 10 min 15 min 10 min 10 min  10 min 10 min 10 min                                                             Exercise Diary  4/10 4/11 4/13 4/17 4/19 4/23 4/26 53     Upper thoracic ext over 1/2 pillow 2' f/b LTR :[id number] 2 min f/b LTR 5"x10 ea 2 min f/b LTR 5"x10 ea 2 min f/b LTR 5"x10 ea 2 min f/b LTR 5"x10 ea 2 min f/b LTR 5"x10 ea 2 min f/b LTR 5"x10 ea 2 min f/b LTR 5"x10 ea     Chin retractions NV 10"x  10 10"x 10 10"x 10 10"x 10 10"x 10 10"x 10 10"x 10     Supine UT stretch NV 10"x5 b/l 10"x5 b/l 10"x5 b/l 10"x5 b/l 10"x5 b/l 10"x5 b/l 10"x5 b/l     Diaphragmatic breathing reviewthis visit reviewed reviewed reviewed reviewed NP NP NP     Thoracic rotation EOT with deep breathing 3 breaths x5 each side 3 breaths x5 ea 3 breaths x5 ea 3 breaths x5 ea 3 breaths x5 ea 3 breaths x 5 ea 3 breaths x5 ea NP     Supine cervical rot isometric   NV :05x10 each :05x10 each 5"x10 b/l 5"x10 b/l 5"x10 b/l     Seated at foam roller TB ER / lower trap Red 5"x10 ea Red 5"x10 ea Red 5"x10 ea                                                                                                                                                                                           Progress postural strengthening as tolerated    Modalities  4/10 4/11 4/13 4/17 4/19 4/23 4/26 5/3     MHP to cervical spine with H-wave ( low setting ) supine 90/90 NV 10 min pre tx, H-wave post 10 min pre tx 10 min pre tx 10 min pre tx 10 min pre tx 10 min pre tx 10 min pre tx                                 HEP: chink tucks, supine UT stretch, thoracic rotation EOT with deep breathing, seated TB ER / lower trap ( red TB )

## 2018-05-07 ENCOUNTER — APPOINTMENT (OUTPATIENT)
Dept: PHYSICAL THERAPY | Facility: CLINIC | Age: 63
End: 2018-05-07
Payer: COMMERCIAL

## 2018-05-07 ENCOUNTER — OFFICE VISIT (OUTPATIENT)
Dept: PHYSICAL THERAPY | Facility: CLINIC | Age: 63
End: 2018-05-07
Payer: COMMERCIAL

## 2018-05-07 DIAGNOSIS — M54.2 CERVICALGIA: Primary | ICD-10-CM

## 2018-05-07 DIAGNOSIS — S13.4XXD SPRAIN OF LIGAMENTS OF CERVICAL SPINE, SUBSEQUENT ENCOUNTER: ICD-10-CM

## 2018-05-07 PROCEDURE — 97140 MANUAL THERAPY 1/> REGIONS: CPT

## 2018-05-07 PROCEDURE — 97110 THERAPEUTIC EXERCISES: CPT

## 2018-05-07 PROCEDURE — 97014 ELECTRIC STIMULATION THERAPY: CPT

## 2018-05-07 NOTE — PROGRESS NOTES
Daily Note     Today's date: 2018  Patient name: Martin Rasmussen  : 1955  MRN: [id number]  Referring provider: Lux Arnett MD  Dx:   Encounter Diagnosis     ICD-10-CM    1  Cervicalgia M54 2    2  Sprain of ligaments of cervical spine, subsequent encounter S13  4XXD                   Subjective: Pt reports pain level 5/10 b/l cerv region today with work activity  Objective: See treatment diary below      Assessment:  Good tolerance to TE and manual therapy  Minimal to moderate TTP b/l cerv paraspinals and suboccipital region with manual therapy  Cont'd verbal cues required with ex for proper technique  Decrease cerv pain/tightness reported post treatment  Plan: Continue per plan of care  Progress treatment as tolerated            Precautions: HTN, overactive bladder    Daily Treatment Diary     Manual  4/10 4/11 4/13 4/17 4/19 4/23 4/26 5/3 5/7    *gentle* STM to cervical structures / SOR    L upper quadrant STM - NP    Assess joint mobilizations as tolerated NV 10 min 15 min 10 min 10 min  10 min 10 min 10 min 10 min                                                            Exercise Diary  4/10 4/11 4/13 4/17 4/19 4/23 4/26 5/3 5    Upper thoracic ext over 1/2 pillow 2' f/b LTR :[id number] 2 min f/b LTR 5"x10 ea 2 min f/b LTR 5"x10 ea 2 min f/b LTR 5"x10 ea 2 min f/b LTR 5"x10 ea 2 min f/b LTR 5"x10 ea 2 min f/b LTR 5"x10 ea 2 min f/b LTR 5"x10 ea 2 min f/b LTR 5"x10 ea    Chin retractions NV 10"x  10 10"x 10 10"x 10 10"x 10 10"x 10 10"x 10 10"x 10 10"x 10    Supine UT stretch NV 10"x5 b/l 10"x5 b/l 10"x5 b/l 10"x5 b/l 10"x5 b/l 10"x5 b/l 10"x5 b/l 10"x5 b/l    Diaphragmatic breathing reviewthis visit reviewed reviewed reviewed reviewed NP NP NP np    Thoracic rotation EOT with deep breathing 3 breaths x5 each side 3 breaths x5 ea 3 breaths x5 ea 3 breaths x5 ea 3 breaths x5 ea 3 breaths x 5 ea 3 breaths x5 ea NP np    Supine cervical rot isometric   NV :05x10 each :05x10 each 5"x10 b/l 5"x10 b/l 5"x10 b/l 5"x10 b/l    Seated at foam roller TB ER / lower trap      Red 5"x10 ea Red 5"x10 ea Red 5"x10 ea Red 5"x10 ea                                                                                                                                                                                          Progress postural strengthening as tolerated    Modalities  4/10 4/11 4/13 4/17 4/19 4/23 4/26 5/3 5/7    MHP to cervical spine with H-wave ( low setting ) supine 90/90 NV 10 min pre tx, H-wave post 10 min pre tx 10 min pre tx 10 min pre tx 10 min pre tx 10 min pre tx 10 min pre tx 10 min pre tx                                HEP: chink tucks, supine UT stretch, thoracic rotation EOT with deep breathing, seated TB ER / lower trap ( red TB )

## 2018-05-10 ENCOUNTER — OFFICE VISIT (OUTPATIENT)
Dept: PHYSICAL THERAPY | Facility: CLINIC | Age: 63
End: 2018-05-10
Payer: COMMERCIAL

## 2018-05-10 DIAGNOSIS — M25.552 LEFT HIP PAIN: ICD-10-CM

## 2018-05-10 DIAGNOSIS — M54.2 CERVICALGIA: Primary | ICD-10-CM

## 2018-05-10 DIAGNOSIS — S13.4XXD SPRAIN OF LIGAMENTS OF CERVICAL SPINE, SUBSEQUENT ENCOUNTER: ICD-10-CM

## 2018-05-10 DIAGNOSIS — Z47.89 AFTERCARE FOLLOWING SURGERY OF THE MUSCULOSKELETAL SYSTEM: Primary | ICD-10-CM

## 2018-05-10 PROCEDURE — 97140 MANUAL THERAPY 1/> REGIONS: CPT

## 2018-05-10 PROCEDURE — 97110 THERAPEUTIC EXERCISES: CPT

## 2018-05-10 PROCEDURE — 97014 ELECTRIC STIMULATION THERAPY: CPT

## 2018-05-10 NOTE — PROGRESS NOTES
Daily Note     Today's date: 5/10/2018  Patient name: Sera Garcia  : 1955  MRN: [id number]  Referring provider: Sadiq Jauregui MD  Dx:   Encounter Diagnosis     ICD-10-CM    1  Aftercare following surgery of the musculoskeletal system Z47 89    2  Left hip pain M25 552                   Subjective: Patient reported continued soreness post work due to frequent standing and amb  Recent increase in LB symptoms  Objective: See treatment diary below      Assessment: Presents with antalgic gait, decreasing stance phase on L, also compensating for lateral hip weakness  Continued fatigue and lack of strength in lateral and posterior hip / glute musculature  Tenderness over piriformis / Jazlyn Model  Plan: Continue per plan of care  Progress treatment as tolerated           Precautions: HTN, overactive bladder    Daily Treatment Diary     Manual  5/3 5/10           Gentle hip ER PROM in extension with  ( anterior mob - NP )     Soft tissue mobilization over scar incision 10 min 10 min                                                                   Exercise Diary  5/3 5/10                        Bike warm up 10 min 10 min           SKTC with strap 10"x  10 10"x 10           Hip capsule release / bent knee fall outs 10"x  10 10"x 10           PPT with kegel 10"x  10 10"x 10           Resisted hip iso flexion 10"x  10 b/l 10"x 10 b/l           Prone hip iso extension 10"x  10 10"x 10           Prone quad stretch 2 min 2 min           Bridges 5"x10 5"x10           Clamshells on L - tactile cueing 5"x10 5"x10           Sidelying hip abd - consider standing hip abd NP NP           VG DLS 43% 4 min 23% 4 min           Glute squeeze on block 10"x5 10"x5           Side stepping at bar 2 laps 2 laps                                                                                                      Progress to functional strengthening, SL balance as able    Modalities  5/3 5/10           CP - with cervical treatment 10 min post  10 min post                                       HEP: SKTC, hip capsule release, resisted hip iso flexion, prone hip iso extension, PPT, prone quad stretch, bridges, clamshells, standing hip abd

## 2018-05-10 NOTE — PROGRESS NOTES
Daily Note     Today's date: 5/10/2018  Patient name: Jonny Barr  : 1955  MRN: [id number]  Referring provider: Kassy Zelaya MD  Dx:   Encounter Diagnosis     ICD-10-CM    1  Cervicalgia M54 2    2  Sprain of ligaments of cervical spine, subsequent encounter S13  4XXD                   Subjective: Patient reported noticing mild improvement in cervical symptoms  Objective: See treatment diary below  Progressed postural strengthening  Assessment: Manual continues to focus on increasing cervical soft tissue extensibility for increased pain free functional mobility  Progressing steadily with postural strengthening, fatiguing with progressions  Plan: Continue per plan of care  Progress treatment as tolerated            Precautions: HTN, overactive bladder    Daily Treatment Diary     Manual  4/10 4/11 4/13 4/17 4/19 4/23 4/26 5/3 5/7 5/10   *gentle* STM to cervical structures / SOR    L upper quadrant STM - NP    Assess joint mobilizations as tolerated NV 10 min 15 min 10 min 10 min  10 min 10 min 10 min 10 min 10 min                                                           Exercise Diary  4/10 4/11 4/13 4/17 4/19 4/23 4/26 5/3 5/7 510   Upper thoracic ext over 1/2 pillow 2' f/b LTR :[id number] 2 min f/b LTR 5"x10 ea 2 min f/b LTR 5"x10 ea 2 min f/b LTR 5"x10 ea 2 min f/b LTR 5"x10 ea 2 min f/b LTR 5"x10 ea 2 min f/b LTR 5"x10 ea 2 min f/b LTR 5"x10 ea 2 min f/b LTR 5"x10 ea 2 min f/b LTR 5"x10 ea   Chin retractions NV 10"x  10 10"x 10 10"x 10 10"x 10 10"x 10 10"x 10 10"x 10 10"x 10 10"x 10   Supine UT stretch NV 10"x5 b/l 10"x5 b/l 10"x5 b/l 10"x5 b/l 10"x5 b/l 10"x5 b/l 10"x5 b/l 10"x5 b/l 10"x 5 b/l   Diaphragmatic breathing reviewthis visit reviewed reviewed reviewed reviewed NP NP NP np NP   Thoracic rotation EOT with deep breathing 3 breaths x5 each side 3 breaths x5 ea 3 breaths x5 ea 3 breaths x5 ea 3 breaths x5 ea 3 breaths x 5 ea 3 breaths x5 ea NP np NP   Supine cervical rot isometric   NV :05x10 each :05x10 each 5"x10 b/l 5"x10 b/l 5"x10 b/l 5"x10 b/l 5"x10 b/l   Seated at foam roller TB ER / lower trap      Red 5"x10 ea Red 5"x10 ea Red 5"x10 ea Red 5"x10 ea Green  10"x  10 ea                                                                                                                                                                                         Progress postural strengthening as tolerated    Modalities  4/10 4/11 4/13 4/17 4/19 4/23 4/26 5/3 5/7 5/10   MHP to cervical spine with H-wave ( low setting ) supine 90/90 NV 10 min pre tx, H-wave post 10 min pre tx 10 min pre tx 10 min pre tx 10 min pre tx 10 min pre tx 10 min pre tx 10 min pre tx 10 min pre tx                               HEP: chink tucks, supine UT stretch, thoracic rotation EOT with deep breathing, seated TB ER / lower trap ( red TB )

## 2018-05-14 ENCOUNTER — APPOINTMENT (OUTPATIENT)
Dept: PHYSICAL THERAPY | Facility: CLINIC | Age: 63
End: 2018-05-14
Payer: COMMERCIAL

## 2018-05-14 LAB
%SAT (HISTORICAL): 7 % (ref 20–55)
ALBUMIN SERPL BCP-MCNC: 4.2 G/DL (ref 3.5–5.7)
ALP SERPL-CCNC: 81 IU/L (ref 55–165)
ALT SERPL W P-5'-P-CCNC: 7 IU/L (ref 10–30)
ANION GAP SERPL CALCULATED.3IONS-SCNC: 12 MM/L
AST SERPL W P-5'-P-CCNC: 13 U/L (ref 7–26)
BACTERIA UR QL AUTO: ABNORMAL /HPF
BASOPHILS # BLD AUTO: 0 X3/UL (ref 0–0.3)
BASOPHILS # BLD AUTO: 0.4 % (ref 0–2)
BILIRUB SERPL-MCNC: 0.4 MG/DL (ref 0.3–1)
BILIRUB UR QL STRIP: NEGATIVE
BUN SERPL-MCNC: 23 MG/DL (ref 7–25)
CALCIUM SERPL-MCNC: 9.4 MG/DL (ref 8.6–10.5)
CHLORIDE SERPL-SCNC: 105 MM/L (ref 98–107)
CHOLEST SERPL-MCNC: 238 MG/DL (ref 0–200)
CLARITY UR: CLEAR
CO2 SERPL-SCNC: 28 MM/L (ref 21–31)
COLOR UR: YELLOW
CREAT SERPL-MCNC: 0.74 MG/DL (ref 0.6–1.2)
DEPRECATED RDW RBC AUTO: 18.1 %
EGFR (HISTORICAL): > 60 GFR
EGFR AFRICAN AMERICAN (HISTORICAL): > 60 GFR
EOSINOPHIL # BLD AUTO: 0.3 X3/UL (ref 0–0.5)
EOSINOPHIL NFR BLD AUTO: 6.3 % (ref 0–5)
FERRITIN SERPL-MCNC: 6.5 NG/ML (ref 22–322)
GLUCOSE (HISTORICAL): 102 MG/DL (ref 65–99)
GLUCOSE UR STRIP-MCNC: NEGATIVE MG/DL
HCT VFR BLD AUTO: 40.7 % (ref 37–47)
HDLC SERPL-MCNC: 52 MG/DL (ref 40–60)
HGB BLD-MCNC: 12.8 G/DL (ref 12–16)
HGB UR QL STRIP.AUTO: ABNORMAL
IRON SERPL-MCNC: 36 UG/DL (ref 50–212)
KETONES UR STRIP-MCNC: NEGATIVE MG/DL
LDLC SERPL CALC-MCNC: 164.9 MG/DL (ref 75–193)
LEUKOCYTE ESTERASE UR QL STRIP: NEGATIVE
LYMPHOCYTES # BLD AUTO: 1.4 X3/UL (ref 1.2–4.2)
LYMPHOCYTES NFR BLD AUTO: 26 % (ref 20.5–51.1)
MCH RBC QN AUTO: 24.4 PG (ref 26–34)
MCHC RBC AUTO-ENTMCNC: 31.5 G/DL (ref 31–37)
MCV RBC AUTO: 77.4 FL (ref 81–99)
MONOCYTES # BLD AUTO: 0.5 X3/UL (ref 0–1)
MONOCYTES NFR BLD AUTO: 8.8 % (ref 1.7–12)
MUCUS THREADS (HISTORICAL): PRESENT
NEUTROPHILS # BLD AUTO: 3.1 X3/UL (ref 1.4–6.5)
NEUTS SEG NFR BLD AUTO: 58.5 % (ref 42.2–75.2)
NITRITE UR QL STRIP: NEGATIVE
NON-SQ EPI CELLS URNS QL MICRO: ABNORMAL /LPF
OSMOLALITY, SERUM (HISTORICAL): 285 MOSM (ref 262–291)
PH UR STRIP.AUTO: 5.5 [PH] (ref 4.5–8)
PLATELET # BLD AUTO: 337 X3/UL (ref 130–400)
PMV BLD AUTO: 9 FL
POTASSIUM SERPL-SCNC: 4 MM/L (ref 3.5–5.5)
PROT UR STRIP-MCNC: NEGATIVE MG/DL
RBC # BLD AUTO: 5.26 X6/UL (ref 3.9–5.2)
SODIUM SERPL-SCNC: 141 MM/L (ref 134–143)
SP GR UR STRIP.AUTO: 1.02 (ref 1–1.03)
TIBC SERPL-MCNC: 514 UG/DL (ref 250–425)
TOTAL PROTEIN (HISTORICAL): 7.3 G/DL (ref 6.4–8.9)
TRANSFERRIN (HISTORICAL): 367 MG/DL (ref 250–380)
TRIGL SERPL-MCNC: 106 MG/DL (ref 44–166)
TSH SERPL DL<=0.05 MIU/L-ACNC: 2.09 UIU/M (ref 0.45–5.33)
UROBILINOGEN UR QL STRIP.AUTO: 0.2 EU/DL (ref 0.2–8)
VLDL CHOLESTEROL (HISTORICAL): 21 MG/DL (ref 5–51)
WBC # BLD AUTO: 5.4 X3/UL (ref 4.8–10.8)
WBC #/AREA URNS AUTO: ABNORMAL /HPF

## 2018-05-15 LAB — HEPATITIS C ANTIBODY (HISTORICAL): <0.1 S/CO (ref 0–0.9)

## 2018-05-16 DIAGNOSIS — N39.0 E. COLI UTI (URINARY TRACT INFECTION): Primary | ICD-10-CM

## 2018-05-16 DIAGNOSIS — B96.20 E. COLI UTI (URINARY TRACT INFECTION): Primary | ICD-10-CM

## 2018-05-16 RX ORDER — CIPROFLOXACIN 500 MG/1
500 TABLET, FILM COATED ORAL EVERY 12 HOURS SCHEDULED
Qty: 14 TABLET | Refills: 0 | Status: SHIPPED | OUTPATIENT
Start: 2018-05-16 | End: 2018-05-23

## 2018-05-17 ENCOUNTER — OFFICE VISIT (OUTPATIENT)
Dept: PHYSICAL THERAPY | Facility: CLINIC | Age: 63
End: 2018-05-17
Payer: COMMERCIAL

## 2018-05-17 DIAGNOSIS — M25.552 LEFT HIP PAIN: ICD-10-CM

## 2018-05-17 DIAGNOSIS — Z47.89 AFTERCARE FOLLOWING SURGERY OF THE MUSCULOSKELETAL SYSTEM: Primary | ICD-10-CM

## 2018-05-17 DIAGNOSIS — S13.4XXD SPRAIN OF LIGAMENTS OF CERVICAL SPINE, SUBSEQUENT ENCOUNTER: ICD-10-CM

## 2018-05-17 DIAGNOSIS — M54.2 CERVICALGIA: Primary | ICD-10-CM

## 2018-05-17 PROCEDURE — 97014 ELECTRIC STIMULATION THERAPY: CPT

## 2018-05-17 PROCEDURE — 97140 MANUAL THERAPY 1/> REGIONS: CPT

## 2018-05-17 PROCEDURE — 97110 THERAPEUTIC EXERCISES: CPT
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
## 2018-05-17 NOTE — PROGRESS NOTES
Daily Note     Today's date: 2018  Patient name: Mervat Gomez  : 1955  MRN: [id number]  Referring provider: Sofiya Cooper MD  Dx:   Encounter Diagnosis     ICD-10-CM    1  Cervicalgia M54 2    2  Sprain of ligaments of cervical spine, subsequent encounter S13  4XXD               Subjective: Pt reports decreased pain since LV and denies radiculopathy      Objective: See treatment diary below    Precautions: HTN, overactive bladder    Daily Treatment Diary     Manual  5/17 4/11 4/13 4/17 4/19 4/23 4/26 5/3 5/7 5/10   *gentle* STM to cervical structures / SOR    L upper quadrant STM - NP    Assess joint mobilizations as tolerated  10 min 15 min 10 min 10 min  10 min 10 min 10 min 10 min 10 min     Exercise Diary  5/17 4/11 4/13 4/17 4/19 4/23 4/26 5/3 5/7 5/10   Upper thoracic ext over 1/2 pillow 2' f/l LTR 5"x10 ea 2 min f/b LTR 5"x10 ea 2 min f/b LTR 5"x10 ea 2 min f/b LTR 5"x10 ea 2 min f/b LTR 5"x10 ea 2 min f/b LTR 5"x10 ea 2 min f/b LTR 5"x10 ea 2 min f/b LTR 5"x10 ea 2 min f/b LTR 5"x10 ea 2 min f/b LTR 5"x10 ea   Chin retractions 10"x10 10"x10 10"x10 10"x10 10"x10 10"x10 10"x10 10"x10 10"x10 10"x10   Supine UT stretch 10"x5 ea 10"x5 b/l 10"x5 b/l 10"x5 b/l 10"x5 b/l 10"x5 b/l 10"x5 b/l 10"x5 b/l 10"x5 b/l 10"x 5 b/l   Thoracic rotation EOT with deep breathing  3 breaths x5 ea 3 breaths x5 ea 3 breaths x5 ea 3 breaths x5 ea 3 breaths x 5 ea 3 breaths x5 ea NP np NP   Supine cervical rot isometric 5"x10 ea  NV :05x10 each :05x10 each 5"x10 b/l 5"x10 b/l 5"x10 b/l 5"x10 b/l 5"x10 b/l   Seated at foam roller TB ER / lower trap Green  10"x10 ea     Red 5"x10 ea Red 5"x10 ea Red 5"x10 ea Red 5"x10 ea Green  10"x10 ea                             Progress postural strengthening as tolerated    Modalities  5/17 4/11 4/13 4/17 4/19 4/23 4/26 5/3 5/7 5/10   MHP to cervical spine with H-wave ( low setting ) supine 90/90  10 min pre tx, H-wave post 10 min pre tx 10 min pre tx 10 min pre tx 10 min pre tx 10 min pre tx 10 min pre tx 10 min pre tx 10 min pre tx     Assessment: Tolerated treatment well  Patient demonstrated fatigue post treatment, exhibited good technique with therapeutic exercises and would benefit from continued PT  Plan: Progress treatment as tolerated        Jarrod Singh, PTA

## 2018-05-21 ENCOUNTER — APPOINTMENT (OUTPATIENT)
Dept: PHYSICAL THERAPY | Facility: CLINIC | Age: 63
End: 2018-05-21
Payer: COMMERCIAL

## 2018-05-24 ENCOUNTER — APPOINTMENT (OUTPATIENT)
Dept: PHYSICAL THERAPY | Facility: CLINIC | Age: 63
End: 2018-05-24
Payer: COMMERCIAL

## 2018-05-29 ENCOUNTER — APPOINTMENT (OUTPATIENT)
Dept: PHYSICAL THERAPY | Facility: CLINIC | Age: 63
End: 2018-05-29
Payer: COMMERCIAL

## 2018-05-31 ENCOUNTER — APPOINTMENT (OUTPATIENT)
Dept: PHYSICAL THERAPY | Facility: CLINIC | Age: 63
End: 2018-05-31
Payer: COMMERCIAL

## 2018-05-31 RX ORDER — METHYLPREDNISOLONE 4 MG/1
TABLET ORAL
COMMUNITY
Start: 2018-04-04 | End: 2018-05-21

## 2018-06-05 ENCOUNTER — OFFICE VISIT (OUTPATIENT)
Dept: FAMILY MEDICINE CLINIC | Facility: CLINIC | Age: 63
End: 2018-06-05
Payer: COMMERCIAL

## 2018-06-05 VITALS
TEMPERATURE: 98 F | HEART RATE: 74 BPM | WEIGHT: 187.4 LBS | DIASTOLIC BLOOD PRESSURE: 98 MMHG | OXYGEN SATURATION: 99 % | BODY MASS INDEX: 34.48 KG/M2 | HEIGHT: 62 IN | SYSTOLIC BLOOD PRESSURE: 152 MMHG

## 2018-06-05 DIAGNOSIS — E61.1 IRON DEFICIENCY: ICD-10-CM

## 2018-06-05 DIAGNOSIS — E78.00 HYPERCHOLESTEROLEMIA: ICD-10-CM

## 2018-06-05 DIAGNOSIS — I10 ESSENTIAL HYPERTENSION: Primary | ICD-10-CM

## 2018-06-05 DIAGNOSIS — M79.89 LEG SWELLING: ICD-10-CM

## 2018-06-05 PROCEDURE — 99214 OFFICE O/P EST MOD 30 MIN: CPT | Performed by: FAMILY MEDICINE

## 2018-06-05 PROCEDURE — 3008F BODY MASS INDEX DOCD: CPT | Performed by: FAMILY MEDICINE

## 2018-06-05 RX ORDER — LOSARTAN POTASSIUM AND HYDROCHLOROTHIAZIDE 12.5; 5 MG/1; MG/1
1 TABLET ORAL DAILY
Qty: 30 TABLET | Refills: 1 | Status: SHIPPED | OUTPATIENT
Start: 2018-06-05 | End: 2018-09-03 | Stop reason: SDUPTHER

## 2018-06-05 RX ORDER — GABAPENTIN 300 MG/1
100 CAPSULE ORAL
COMMUNITY
End: 2018-12-27

## 2018-06-05 RX ORDER — FERROUS SULFATE 325(65) MG
325 TABLET ORAL
Qty: 90 TABLET | Refills: 1 | Status: SHIPPED | OUTPATIENT
Start: 2018-06-05 | End: 2018-12-27

## 2018-06-05 NOTE — PROGRESS NOTES
Assessment/Plan:         Diagnoses and all orders for this visit:    Essential hypertension  -     losartan-hydrochlorothiazide (HYZAAR) 50-12 5 mg per tablet; Take 1 tablet by mouth daily  -     ECG 12 lead; Future    Hypercholesterolemia    Iron deficiency  -     ferrous sulfate 325 (65 Fe) mg tablet; Take 1 tablet (325 mg total) by mouth 3 (three) times a day with meals    Leg swelling    Other orders  -     Discontinue: Methylprednisolone 4 MG TBPK;   -     gabapentin (NEURONTIN) 300 mg capsule; Take 100 mg by mouth daily at bedtime          Subjective:   Chief Complaint   Patient presents with    Follow-up     Pt is here for a 3 week f/u exam and to review recent blood work results (in chart)  Patient ID: Martin Rasmussen is a 66 y o  female  Per c/c reviewed by me   was new pt here last month and lab testing done  States used to be on bp med when she lived in [place], maybe norvasc, but went off it and felt better  Does get HA's occasionally- relieved by tylenol  Also reports, "I have a little issue, my ankles are always swollen, my feet are always swollen," past 6mo to year per pt  urination has been fine, she does get up at least once at night to urinate  She "kind of" watches her salt intake        The following portions of the patient's history were reviewed and updated as appropriate: allergies, current medications, past family history, past medical history, past social history, past surgical history and problem list     Review of Systems   Constitutional: Negative  HENT: Negative  Eyes: Negative  Respiratory: Negative  Cardiovascular: Negative for chest pain and palpitations  Gastrointestinal: Negative  Endocrine: Negative  Skin: Negative  Neurological: Negative  Hematological: Negative            Objective:      /98 (BP Location: Left arm, Patient Position: Sitting, Cuff Size: Standard)   Pulse 74   Temp 98 °F (36 7 °C) (Tympanic)   Ht 5' 2" (1 575 m)   Wt 85 kg (187 lb 6 4 oz)   SpO2 99%   BMI 34 28 kg/m²          Physical Exam   Constitutional: She is oriented to person, place, and time  She appears well-developed  She is cooperative  Non-toxic appearance  She does not have a sickly appearance  She does not appear ill  No distress  HENT:   Head: Normocephalic and atraumatic  Mouth/Throat: Uvula is midline, oropharynx is clear and moist and mucous membranes are normal    Eyes: Conjunctivae and lids are normal  Pupils are equal, round, and reactive to light  Neck: Trachea normal  Neck supple  No JVD present  Carotid bruit is not present  No thyroid mass and no thyromegaly present  Cardiovascular: Normal rate, regular rhythm, normal heart sounds and normal pulses  No edema   Pulmonary/Chest: Effort normal and breath sounds normal    Lymphadenopathy:     She has no cervical adenopathy  Right: No supraclavicular adenopathy present  Left: No supraclavicular adenopathy present  Neurological: She is alert and oriented to person, place, and time  She has normal reflexes  Gait normal    Skin: Skin is warm and dry  She is not diaphoretic  No pallor  Psychiatric: She has a normal mood and affect  Her behavior is normal    Nursing note and vitals reviewed

## 2018-06-11 ENCOUNTER — EVALUATION (OUTPATIENT)
Dept: PHYSICAL THERAPY | Facility: CLINIC | Age: 63
End: 2018-06-11
Payer: COMMERCIAL

## 2018-06-11 DIAGNOSIS — Z47.89 AFTERCARE FOLLOWING SURGERY OF THE MUSCULOSKELETAL SYSTEM: Primary | ICD-10-CM

## 2018-06-11 DIAGNOSIS — M25.552 LEFT HIP PAIN: ICD-10-CM

## 2018-06-11 PROCEDURE — 97164 PT RE-EVAL EST PLAN CARE: CPT | Performed by: PHYSICAL MEDICINE & REHABILITATION

## 2018-06-11 PROCEDURE — G8979 MOBILITY GOAL STATUS: HCPCS | Performed by: PHYSICAL MEDICINE & REHABILITATION

## 2018-06-11 PROCEDURE — 97535 SELF CARE MNGMENT TRAINING: CPT | Performed by: PHYSICAL MEDICINE & REHABILITATION

## 2018-06-11 PROCEDURE — G8978 MOBILITY CURRENT STATUS: HCPCS | Performed by: PHYSICAL MEDICINE & REHABILITATION
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
## 2018-06-14 ENCOUNTER — OFFICE VISIT (OUTPATIENT)
Dept: PHYSICAL THERAPY | Facility: CLINIC | Age: 63
End: 2018-06-14
Payer: COMMERCIAL

## 2018-06-14 ENCOUNTER — TRANSCRIBE ORDERS (OUTPATIENT)
Dept: PHYSICAL THERAPY | Facility: CLINIC | Age: 63
End: 2018-06-14

## 2018-06-14 DIAGNOSIS — Z47.89 ORTHOTIC TRAINING: Primary | ICD-10-CM

## 2018-06-14 DIAGNOSIS — Z47.89 AFTERCARE FOLLOWING SURGERY OF THE MUSCULOSKELETAL SYSTEM: Primary | ICD-10-CM

## 2018-06-14 DIAGNOSIS — M25.552 LEFT HIP PAIN: ICD-10-CM

## 2018-06-14 PROCEDURE — 97110 THERAPEUTIC EXERCISES: CPT

## 2018-06-14 NOTE — PROGRESS NOTES
Daily Note     Today's date: 2018  Patient name: Chastity Watts  : 1955  MRN: [id number]  Referring provider: Marietta Suresh MD  Dx:   Encounter Diagnosis     ICD-10-CM    1  Aftercare following surgery of the musculoskeletal system Z47 89    2  Left hip pain M25 552        Start Time: 1535  Stop Time: 1630  Total time in clinic (min): 55 minutes  Precautions: HTN, overactive bladder      Re-eval Date: 18     Date       Visit Count  2      FOTO        Pain In 8/10 7/10      Pain Out 8/10         Subjective: "I worked all day so I am hurting "      Objective: See treatment diary below      Assessment: Tolerated treatment fair  Patient initiated ex program this date  Pt with limited hip motion 2* increased pain in hip and glut region  Strength deficits noted  Unable to perform 90/90 2* pain  Pt with difficulty changing position  Attempt to add ex next session if able  Plan: Continue per plan of care  Progress treatment as tolerated  Daily Treatment Diary     Manual        -Gentle L hip PROM  - Will cont to assess hip mobility upcoming Upcoming          Exercise Diary         Bike warm up vs NUSTEP  L2 10'      SKTC with strap AAROM  30"x4      Glute squeeze with hip add  20x5"      PPT with kegel  20x5"      Resisted hip iso flexion        HS stretch        Prone quad stretch  4x30"      Bridges  2x10      Clamshells on L - tactile cueing Reviewed HEP 2x10      Sidelying hip abd - consider standing hip abd  2x10       HS curls         90/90 SL hip abd         Side stepping at bar        Supine piriformis stretch 4x30"  4x30"      Standing SLR 3 way Reviewed HEP       Progress to functional strengthening, SL balance as able    Modalities         CP -  prn         -HEP was issued and reviewed this date for above noted exercises  Pt demonstrated understanding without incident and without questions/concerns  Will continue to update upcoming

## 2018-06-16 PROBLEM — M79.89 LEG SWELLING: Status: ACTIVE | Noted: 2018-06-16

## 2018-06-18 ENCOUNTER — EVALUATION (OUTPATIENT)
Dept: PHYSICAL THERAPY | Facility: CLINIC | Age: 63
End: 2018-06-18
Payer: COMMERCIAL

## 2018-06-18 DIAGNOSIS — M25.552 LEFT HIP PAIN: ICD-10-CM

## 2018-06-18 DIAGNOSIS — Z47.89 AFTERCARE FOLLOWING SURGERY OF THE MUSCULOSKELETAL SYSTEM: Primary | ICD-10-CM

## 2018-06-18 PROCEDURE — 97110 THERAPEUTIC EXERCISES: CPT | Performed by: PHYSICAL MEDICINE & REHABILITATION

## 2018-06-18 PROCEDURE — 97140 MANUAL THERAPY 1/> REGIONS: CPT | Performed by: PHYSICAL MEDICINE & REHABILITATION
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
## 2018-06-21 ENCOUNTER — APPOINTMENT (OUTPATIENT)
Dept: PHYSICAL THERAPY | Facility: CLINIC | Age: 63
End: 2018-06-21
Payer: COMMERCIAL

## 2018-06-25 ENCOUNTER — EVALUATION (OUTPATIENT)
Dept: PHYSICAL THERAPY | Facility: CLINIC | Age: 63
End: 2018-06-25
Payer: COMMERCIAL

## 2018-06-25 DIAGNOSIS — M25.552 LEFT HIP PAIN: ICD-10-CM

## 2018-06-25 DIAGNOSIS — Z47.89 AFTERCARE FOLLOWING SURGERY OF THE MUSCULOSKELETAL SYSTEM: Primary | ICD-10-CM

## 2018-06-25 PROCEDURE — 97110 THERAPEUTIC EXERCISES: CPT | Performed by: PHYSICAL MEDICINE & REHABILITATION

## 2018-06-25 PROCEDURE — 97140 MANUAL THERAPY 1/> REGIONS: CPT | Performed by: PHYSICAL MEDICINE & REHABILITATION
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
## 2018-06-28 ENCOUNTER — APPOINTMENT (OUTPATIENT)
Dept: PHYSICAL THERAPY | Facility: CLINIC | Age: 63
End: 2018-06-28
Payer: COMMERCIAL

## 2018-08-17 PROCEDURE — 88304 TISSUE EXAM BY PATHOLOGIST: CPT | Performed by: PATHOLOGY

## 2018-08-18 ENCOUNTER — LAB REQUISITION (OUTPATIENT)
Dept: LAB | Facility: HOSPITAL | Age: 63
End: 2018-08-18

## 2018-08-18 DIAGNOSIS — M25.552 PAIN IN LEFT HIP: ICD-10-CM

## 2018-08-23 NOTE — PROGRESS NOTES
Ike Avila will be discharged from outpatient physical therapy care due to expiration of plan of care  Last attended tx session was on 6/25 ;did not return to therapy after this date  No objective measures updated, Ike Avila is not present at time of discharge

## 2018-09-03 DIAGNOSIS — I10 ESSENTIAL HYPERTENSION: ICD-10-CM

## 2018-09-05 RX ORDER — LOSARTAN POTASSIUM AND HYDROCHLOROTHIAZIDE 12.5; 5 MG/1; MG/1
TABLET ORAL
Qty: 30 TABLET | Refills: 0 | Status: SHIPPED | OUTPATIENT
Start: 2018-09-05 | End: 2018-12-06 | Stop reason: SDUPTHER

## 2018-10-24 ENCOUNTER — EVALUATION (OUTPATIENT)
Dept: PHYSICAL THERAPY | Facility: CLINIC | Age: 63
End: 2018-10-24
Payer: COMMERCIAL

## 2018-10-24 DIAGNOSIS — I10 ESSENTIAL HYPERTENSION: ICD-10-CM

## 2018-10-24 DIAGNOSIS — S76.012D STRAIN OF GLUTEUS MEDIUS OF LEFT LOWER EXTREMITY, SUBSEQUENT ENCOUNTER: ICD-10-CM

## 2018-10-24 DIAGNOSIS — M25.552 PAIN OF LEFT HIP: Primary | ICD-10-CM

## 2018-10-24 DIAGNOSIS — R26.9 GAIT ABNORMALITY: ICD-10-CM

## 2018-10-24 PROCEDURE — G8979 MOBILITY GOAL STATUS: HCPCS | Performed by: PHYSICAL THERAPIST

## 2018-10-24 PROCEDURE — G8978 MOBILITY CURRENT STATUS: HCPCS | Performed by: PHYSICAL THERAPIST

## 2018-10-24 PROCEDURE — 97535 SELF CARE MNGMENT TRAINING: CPT | Performed by: PHYSICAL THERAPIST

## 2018-10-24 PROCEDURE — 97163 PT EVAL HIGH COMPLEX 45 MIN: CPT | Performed by: PHYSICAL THERAPIST
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
## 2018-10-26 ENCOUNTER — TRANSCRIBE ORDERS (OUTPATIENT)
Dept: PHYSICAL THERAPY | Facility: CLINIC | Age: 63
End: 2018-10-26

## 2018-10-26 ENCOUNTER — OFFICE VISIT (OUTPATIENT)
Dept: PHYSICAL THERAPY | Facility: CLINIC | Age: 63
End: 2018-10-26
Payer: COMMERCIAL

## 2018-10-26 DIAGNOSIS — I10 ESSENTIAL HYPERTENSION: ICD-10-CM

## 2018-10-26 DIAGNOSIS — R26.9 GAIT ABNORMALITY: ICD-10-CM

## 2018-10-26 DIAGNOSIS — M25.552 PAIN OF LEFT HIP: Primary | ICD-10-CM

## 2018-10-26 DIAGNOSIS — S76.012D STRAIN OF GLUTEUS MEDIUS OF LEFT LOWER EXTREMITY, SUBSEQUENT ENCOUNTER: ICD-10-CM

## 2018-10-26 PROCEDURE — 97140 MANUAL THERAPY 1/> REGIONS: CPT | Performed by: PHYSICAL THERAPIST

## 2018-10-26 PROCEDURE — 97110 THERAPEUTIC EXERCISES: CPT | Performed by: PHYSICAL THERAPIST

## 2018-10-26 NOTE — PROGRESS NOTES
Daily Note     Today's date: 10/26/2018  Patient name: Jos Marques  : 1955  MRN: [id number]  Referring provider: Marshal Lange, *  Dx:   Encounter Diagnosis     ICD-10-CM    1  Pain of left hip M25 552    2  Strain of gluteus medius of left lower extremity, subsequent encounter S76 012D    3  Essential hypertension I10    4  Gait abnormality R26 9        Start Time: 0900  Stop Time: 0945  Total time in clinic (min): 45 minutes    Subjective: Patient states she was sore after last session  Objective: See treatment diary below    Precautions: none noted    Re-evaluation:      Hip Specialty Daily Treatment Diary     Manual  10/24 10/26      PROM  performed      ITB         Graston left quad, ITB, scar  performed                          Exercise Diary  10/24 10/26      nustep  L1 10'      Core brace 5"x10 5"x10       Supine ball squeeze 10x 10x      Clamshell iso 5"x10 5"x10      Glut set 5"x10 5"x10      Hamstring set  Into p ball 5"x10      Heel slide        ITB stretch supine 4x15"       Hamstring stretch        Hip flexor stretch        Glut stretch        SLB        Step up        Total Gym Squat         hip IR, ER        Standing hip ext        Standing donkey kick                                                            Modalities                                  Assessment: Significant tenderness noted at region of scar with tightness into left quad and ITB likely due to alteration in gait mechanics  Plan: Continue per plan of care  Progress treatment as tolerated

## 2018-10-29 ENCOUNTER — OFFICE VISIT (OUTPATIENT)
Dept: PHYSICAL THERAPY | Facility: CLINIC | Age: 63
End: 2018-10-29
Payer: COMMERCIAL

## 2018-10-29 DIAGNOSIS — M25.552 PAIN OF LEFT HIP: Primary | ICD-10-CM

## 2018-10-29 DIAGNOSIS — S76.012D STRAIN OF GLUTEUS MEDIUS OF LEFT LOWER EXTREMITY, SUBSEQUENT ENCOUNTER: ICD-10-CM

## 2018-10-29 DIAGNOSIS — I10 ESSENTIAL HYPERTENSION: ICD-10-CM

## 2018-10-29 DIAGNOSIS — R26.9 GAIT ABNORMALITY: ICD-10-CM

## 2018-10-29 PROCEDURE — 97110 THERAPEUTIC EXERCISES: CPT | Performed by: PHYSICAL THERAPIST

## 2018-10-29 PROCEDURE — 97140 MANUAL THERAPY 1/> REGIONS: CPT | Performed by: PHYSICAL THERAPIST

## 2018-10-29 NOTE — PROGRESS NOTES
Daily Note     Today's date: 10/29/2018  Patient name: Arnoldo Eckert  : 1955  MRN: [id number]  Referring provider: Dm Kiser, *  Dx:   Encounter Diagnosis     ICD-10-CM    1  Pain of left hip M25 552    2  Strain of gluteus medius of left lower extremity, subsequent encounter S76 012D    3  Essential hypertension I10    4  Gait abnormality R26 9        Start Time: 0900  Stop Time: 09  Total time in clinic (min): 55 minutes    Subjective: Patient states a little soreness post last session, but no bruising or lasting pain  Pain level 5/10 in left hip today  Objective: See treatment diary below    Precautions: none noted    Re-evaluation:      Hip Specialty Daily Treatment Diary     Manual  10/24 10/26 10/29     PROM  performed performed     ITB    2 min     Graston left quad, ITB, scar  performed performed                         Exercise Diary  10/24 10/26 10/29     nustep  L1 10' L1 10'     Core brace 5"x10 5"x10  5"x10      Supine ball squeeze 10x 10x 10x     Clamshell iso 5"x10 5"x10 5"x10     Glut set 5"x10 5"x10 5"x10     Hamstring set  Into p ball 5"x10 Into p ball 5"x10     Heel slide   With p ball 5"x10     ITB stretch supine 4x15"  4x15"     Hamstring stretch   Seated 3x30"     Hip flexor stretch        Glut stretch        SLB        Step up        Total Gym Squat         hip IR, ER        Standing hip ext        Standing donkey kick                                                            Modalities                                  Assessment: Sensitivity to touch at scar region with trigger points and musculature tightness noted in left quad and ITB possibly contributing to symptoms  Plan: Continue per plan of care  Progress treatment as tolerated

## 2018-10-31 ENCOUNTER — OFFICE VISIT (OUTPATIENT)
Dept: PHYSICAL THERAPY | Facility: CLINIC | Age: 63
End: 2018-10-31
Payer: COMMERCIAL

## 2018-10-31 DIAGNOSIS — R26.9 GAIT ABNORMALITY: ICD-10-CM

## 2018-10-31 DIAGNOSIS — I10 ESSENTIAL HYPERTENSION: ICD-10-CM

## 2018-10-31 DIAGNOSIS — M25.552 PAIN OF LEFT HIP: Primary | ICD-10-CM

## 2018-10-31 DIAGNOSIS — S76.012D STRAIN OF GLUTEUS MEDIUS OF LEFT LOWER EXTREMITY, SUBSEQUENT ENCOUNTER: ICD-10-CM

## 2018-10-31 PROCEDURE — 97140 MANUAL THERAPY 1/> REGIONS: CPT | Performed by: PHYSICAL THERAPIST

## 2018-10-31 PROCEDURE — 97110 THERAPEUTIC EXERCISES: CPT | Performed by: PHYSICAL THERAPIST

## 2018-10-31 NOTE — PROGRESS NOTES
Daily Note     Today's date: 10/31/2018  Patient name: Sharon Hernandez  : 1955  MRN: [id number]  Referring provider: Paloma Aviles, *  Dx:   Encounter Diagnosis     ICD-10-CM    1  Pain of left hip M25 552    2  Strain of gluteus medius of left lower extremity, subsequent encounter S76 012D    3  Essential hypertension I10    4  Gait abnormality R26 9        Start Time: 0900  Stop Time: 1000  Total time in clinic (min): 60 minutes    Subjective: Feels cracking with some pain at lateral left knee when bending and straightening knee  Has been trying to walk around home without using SPC  Objective: See treatment diary below    Precautions: none noted    Re-evaluation:      Hip Specialty Daily Treatment Diary     Manual  10/24 10/26 10/29 10/31    PROM  performed performed np    ITB    2 min np    Graston left quad, ITB, scar  performed performed Performed supine and sidelying                        Exercise Diary  10/24 10/26 10/29 10/31    nustep  L1 10' L1 10' L1 10'    Core brace 5"x10 5"x10  5"x10  5"x10     Supine ball squeeze 10x 10x 10x 2x10    Clamshell iso 5"x10 5"x10 5"x10 5"x20    Glut set 5"x10 5"x10 5"x10 5"x20    Hamstring set  Into p ball 5"x10 Into p ball 5"x10 Into p ball 5"x10    Heel slide   With p ball 5"x10 With p ball 5"x10    ITB stretch supine 4x15"  4x15" 4x15"    Hamstring stretch   Seated 3x30" Seated 3x30"    Hip flexor stretch        Glut stretch        SLB        Step up    Small step 10x    Total Gym Squat         hip IR, ER        Standing hip ext    2x10 left    Standing donkey kick        Side steps in // bars    4 laps                                                Modalities                                  Assessment: Absent heel strike during ambulation without SPC with no TKE present  [place] during initial contact   Patient was advised to continue to utilize [facility] due to poor LLE mechanics when ambulating without, will continue to progress in therapy and instruct when she is able to begin to progress away from cane use  Patient was agreeable  Plan: Continue per plan of care  Progress treatment as tolerated

## 2018-11-02 ENCOUNTER — OFFICE VISIT (OUTPATIENT)
Dept: PHYSICAL THERAPY | Facility: CLINIC | Age: 63
End: 2018-11-02
Payer: COMMERCIAL

## 2018-11-02 DIAGNOSIS — S76.012D STRAIN OF GLUTEUS MEDIUS OF LEFT LOWER EXTREMITY, SUBSEQUENT ENCOUNTER: ICD-10-CM

## 2018-11-02 DIAGNOSIS — I10 ESSENTIAL HYPERTENSION: ICD-10-CM

## 2018-11-02 DIAGNOSIS — M25.552 PAIN OF LEFT HIP: Primary | ICD-10-CM

## 2018-11-02 DIAGNOSIS — R26.9 GAIT ABNORMALITY: ICD-10-CM

## 2018-11-02 PROCEDURE — 97110 THERAPEUTIC EXERCISES: CPT | Performed by: PHYSICAL THERAPIST

## 2018-11-02 PROCEDURE — 97140 MANUAL THERAPY 1/> REGIONS: CPT | Performed by: PHYSICAL THERAPIST

## 2018-11-02 NOTE — PROGRESS NOTES
Daily Note     Today's date: 2018  Patient name: Jessy Cain  : 1955  MRN: [id number]  Referring provider: Maddy Lynch, *  Dx:   Encounter Diagnosis     ICD-10-CM    1  Pain of left hip M25 552    2  Strain of gluteus medius of left lower extremity, subsequent encounter S76 012D    3  Essential hypertension I10    4  Gait abnormality R26 9        Start Time: 0850  Stop Time: 1000  Total time in clinic (min): 70 minutes    Subjective: Patient states she was sore and painful yesterday at lateral left hip and glut  Relaxed and didn't do any exercise  Is feeling better today, but still 6/10 pain level        Objective: See treatment diary below    Precautions: none noted    Re-evaluation:      Hip Specialty Daily Treatment Diary     Manual  10/24 10/26 10/29 10/31 11/2   PROM  performed performed np performed   ITB    2 min np np   Graston left quad, ITB, scar  performed performed Performed supine and sidelying np                       Exercise Diary  10/24 10/26 10/29 10/31 11/2   nustep  L1 10' L1 10' L1 10' L1   Core brace 5"x10 5"x10  5"x10  5"x10  5"x15   Supine ball squeeze 10x 10x 10x 2x10 2x10   Clamshell iso 5"x10 5"x10 5"x10 5"x20 5"x20   Glut set 5"x10 5"x10 5"x10 5"x20 5"x20   Hamstring set  Into p ball 5"x10 Into p ball 5"x10 Into p ball 5"x10 Into p ball 5"x10   Heel slide   With p ball 5"x10 With p ball 5"x10 With p ball 5"x10   ITB stretch supine 4x15"  4x15" 4x15" 4x15"   Hamstring stretch   Seated 3x30" Seated 3x30" Seated 3x30"   Hip flexor stretch        Glut stretch        SLB        Step up    Small step 10x Small step 10x   Total Gym Squat         hip IR, ER        Standing hip ext    2x10 left 2x10 left   Standing donkey kick        Side steps in // bars    4 laps 4 laps   Standing HR     2x10   UE wall slide     x10                               Modalities        IFC      Right side lying 10'                     Assessment: Overall patient is tolerating sessions well with ability to progress standing exercises for improved functional mobility  Does still display significant weakness and decreased mobility of left hip impacting gait mechanics  Plan: Continue per plan of care  Progress treatment as tolerated

## 2018-11-05 ENCOUNTER — OFFICE VISIT (OUTPATIENT)
Dept: PHYSICAL THERAPY | Facility: CLINIC | Age: 63
End: 2018-11-05
Payer: COMMERCIAL

## 2018-11-05 DIAGNOSIS — R26.9 GAIT ABNORMALITY: ICD-10-CM

## 2018-11-05 DIAGNOSIS — S76.012D STRAIN OF GLUTEUS MEDIUS OF LEFT LOWER EXTREMITY, SUBSEQUENT ENCOUNTER: ICD-10-CM

## 2018-11-05 DIAGNOSIS — M25.552 PAIN OF LEFT HIP: Primary | ICD-10-CM

## 2018-11-05 PROCEDURE — 97140 MANUAL THERAPY 1/> REGIONS: CPT | Performed by: PHYSICAL THERAPIST

## 2018-11-05 PROCEDURE — 97110 THERAPEUTIC EXERCISES: CPT | Performed by: PHYSICAL THERAPIST

## 2018-11-05 NOTE — PROGRESS NOTES
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 and had increased pain and soreness  Stated it felt like she had worked a 10 hour day when she got done  Pain today 5-6/10      Objective: See treatment diary below  Precautions: none noted    Re-evaluation:      Hip Specialty Daily Treatment Diary     Manual  11/5 10/26 10/29 10/31 11   PROM performed performed performed np performed   ITB    2 min np np   Graston left quad, ITB, scar performed performed performed Performed supine and sidelying np   Hamstring/calf stretch performed                    Exercise Diary  11/5 10/26 10/29 10/31 11/2   nustep L1 10' L1 10' L1 10' L1 10' L1   Core brace 5"x10 5"x10  5"x10  5"x10  5"x15   Supine ball squeeze 3x10 10x 10x 2x10 2x10   Clamshell iso 5"x20 5"x10 5"x10 5"x20 5"x20   Glut set 5"x20 5"x10 5"x10 5"x20 5"x20   Hamstring set Into p ball 5"x20 Into p ball 5"x10 Into p ball 5"x10 Into p ball 5"x10 Into p ball 5"x10   Heel slide With p ball 5"x10  With p ball 5"x10 With p ball 5"x10 With p ball 5"x10   ITB stretch supine 4x15"  4x15" 4x15" 4x15"   Hamstring stretch Seated 3x30"  Seated 3x30" Seated 3x30" Seated 3x30"   Hip flexor stretch        Glut stretch        SLB        Step up Small step 10x   Small step 10x Small step 10x   Total Gym Squat         hip IR, ER        Standing hip ext 2x10 left   2x10 left 2x10 left   Standing donkey kick        Side steps in // bars 4 laps 4 laps 4 laps   Standing HR 2x10    2x10   UE wall slide x10    x10                               Modalities 11/5 11/2   IFC  Seated 10'    Right side lying 10'                       Assessment: Decreased sensitivity to touch of lateral left hip at region of incision with increased scar mobility and decreased adhesions causing puckering of incision  Continues to be point tender distal to incision and at piriformis causing pain and tenderness  Plan: Continue per plan of care  Progress treatment as tolerated

## 2018-11-09 ENCOUNTER — OFFICE VISIT (OUTPATIENT)
Dept: PHYSICAL THERAPY | Facility: CLINIC | Age: 63
End: 2018-11-09
Payer: COMMERCIAL

## 2018-11-09 DIAGNOSIS — S76.012D STRAIN OF GLUTEUS MEDIUS OF LEFT LOWER EXTREMITY, SUBSEQUENT ENCOUNTER: ICD-10-CM

## 2018-11-09 DIAGNOSIS — R26.9 GAIT ABNORMALITY: ICD-10-CM

## 2018-11-09 DIAGNOSIS — M25.552 PAIN OF LEFT HIP: Primary | ICD-10-CM

## 2018-11-09 PROCEDURE — 97110 THERAPEUTIC EXERCISES: CPT | Performed by: PHYSICAL THERAPIST

## 2018-11-09 NOTE — PROGRESS NOTES
Daily Note     Today's date: 2018  Patient name: Kevin Fish  : 1955  MRN: [id number]  Referring provider: Radha Henderson, *  Dx:   Encounter Diagnosis     ICD-10-CM    1  Pain of left hip M25 552    2  Strain of gluteus medius of left lower extremity, subsequent encounter S76 012D    3  Gait abnormality R26 9        Start Time: 1010  Stop Time: 1055  Total time in clinic (min): 45 minutes    Subjective: Patient saw MD since last visit  Stated incision looked good at hip, but was more focused on knee pain  Was given cortisone injection and x-ray which was (+) lateral knee degeneration  To return to MD Dec  4th for possible 2nd injection and f/u with hip  Patient states knee was sore after seeing MD, but is feeling better the last few days  No pain in knee, hip has been bothering her more at left lateral glut region          Objective: See treatment diary below  Precautions: none noted    Re-evaluation:      Hip Specialty Daily Treatment Diary     Manual  11/5 11/9 10/29 10/31 11   PROM performed  performed np performed   ITB    2 min np np   Graston left quad, ITB, scar performed  performed Performed supine and sidelying np   Hamstring/calf stretch performed                    Exercise Diary  11/5 11/9 10/29 10/31 11   nustep L1 10' L1 10' L1 10' L1 10' L1   Core brace 5"x10 5"x10  5"x10  5"x10  5"x15   Supine ball squeeze 3x10 3x10 10x 2x10 2x10   Clamshell iso 5"x20 5"x20 5"x10 5"x20 5"x20   Glut set 5"x20 5"x20 5"x10 5"x20 5"x20   Hamstring set Into p ball 5"x20 Into p ball 5"x20 Into p ball 5"x10 Into p ball 5"x10 Into p ball 5"x10   Heel slide With p ball 5"x10 With p ball 5"x10 With p ball 5"x10 With p ball 5"x10 With p ball 5"x10   ITB stretch supine 4x15" 4x15" 4x15" 4x15" 4x15"   Hamstring stretch Seated 3x30" Seated 3x30" Seated 3x30" Seated 3x30" Seated 3x30"   Hip flexor stretch        SLB        Step up Small step 10x Small step 15x  Small step 10x Small step 10x   Total Gym Squat         hip IR, ER        Standing hip ext 2x10 left 2x10 left  2x10 left 2x10 left   Standing donkey kick        Side steps in // bars 4 laps 4 laps  4 laps 4 laps   Standing HR 2x10 2x10   2x10   UE wall slide x10 2x10   x10   VMO SLR  2x5      SAQ with ball squeeze  3x10                  Modalities 11/5 11/9   11/2   IFC  Seated 10'    Right side lying 10'                       Assessment: Medial knee weakness with lateral LE tightness as well as hip abductor and extensor weakness possibly contributing to knee and hip symptoms  Needs to work on [employer] and glut med strength as well as ITB flexibility  Continuation of skilled PT indicated  Plan: Continue per plan of care  Progress treatment as tolerated

## 2018-11-12 ENCOUNTER — OFFICE VISIT (OUTPATIENT)
Dept: PHYSICAL THERAPY | Facility: CLINIC | Age: 63
End: 2018-11-12
Payer: COMMERCIAL

## 2018-11-12 DIAGNOSIS — M25.552 PAIN OF LEFT HIP: Primary | ICD-10-CM

## 2018-11-12 DIAGNOSIS — S76.012D STRAIN OF GLUTEUS MEDIUS OF LEFT LOWER EXTREMITY, SUBSEQUENT ENCOUNTER: ICD-10-CM

## 2018-11-12 DIAGNOSIS — R26.9 GAIT ABNORMALITY: ICD-10-CM

## 2018-11-12 PROCEDURE — 97110 THERAPEUTIC EXERCISES: CPT | Performed by: PHYSICAL THERAPIST

## 2018-11-12 PROCEDURE — 97140 MANUAL THERAPY 1/> REGIONS: CPT | Performed by: PHYSICAL THERAPIST

## 2018-11-12 NOTE — PROGRESS NOTES
Daily Note     Today's date: 2018  Patient name: Eloisa Ramsey  : 1955  MRN: [id number]  Referring provider: Ling Cha, *  Dx:   Encounter Diagnosis     ICD-10-CM    1  Pain of left hip M25 552    2  Strain of gluteus medius of left lower extremity, subsequent encounter S76 012D    3  Gait abnormality R26 9        Start Time: 0900  Stop Time: 1005  Total time in clinic (min): 65 minutes    Subjective: 5/10 pain today in left hip  States she was in the car a lot over the weekend which irritated hip causing increased pain  Knee hasn't been doing as much "popping", but feels tight        Objective: See treatment diary below    Precautions: none noted    Re-evaluation:      Hip Specialty Daily Treatment Diary     Manual  11/5 11/9 11/12 10/31 11/2   PROM performed   np performed   ITB    Left quad, ITB np np   Graston left quad, ITB, scar performed   Performed supine and sidelying np   Hamstring/calf stretch performed        Active release stretch   Left ITB, quad         Exercise Diary  11/5 11/9 11/12 10/31 11/2   nustep L1 10' L1 10' L1 10' L1 10' L1   Core brace 5"x10 5"x10  5"x10 5"x10  5"x15   Supine ball squeeze 3x10 3x10 3x10 2x10 2x10   Clamshell iso 5"x20 5"x20 5"x20 5"x20 5"x20   Glut set 5"x20 5"x20 5"x20 5"x20 5"x20   Hamstring set Into p ball 5"x20 Into p ball 5"x20 Into p ball 5"x20 Into p ball 5"x10 Into p ball 5"x10   Heel slide With p ball 5"x10 With p ball 5"x10 With p ball 5"x10 With p ball 5"x10 With p ball 5"x10   ITB stretch supine 4x15" 4x15" nv 4x15" 4x15"   Hamstring stretch Seated 3x30" Seated 3x30" Seated 3x30" Seated 3x30" Seated 3x30"   Hip flexor stretch        SLB        Step up Small step 10x Small step 15x Small step 15x Small step 10x Small step 10x   Total Gym Squat         hip IR, ER        Standing hip ext 2x10 left 2x10 left 2x10 left 2x10 left 2x10 left   Standing donkey kick        Side steps in // bars 4 laps 4 laps 4x along counter 4 laps 4 laps   Standing HR 2x10 2x10 2x10  2x10   UE wall slide x10 2x10 2x10  x10   VMO SLR  2x5 2x5     SAQ with ball squeeze  3x10 3x10     LAQ with ball squeeze   10x         Modalities 11/5 11/9 11/12 11/2   IFC  Seated 10'  Seated 10'  Right side lying 10'                     Assessment: Muscle tightness and trigger points along quads and ITB on left possibly contributing to symptoms and likely caused by overcompensation due to glut weakness  Plan: Continue per plan of care  Progress treatment as tolerated

## 2018-11-14 ENCOUNTER — OFFICE VISIT (OUTPATIENT)
Dept: PHYSICAL THERAPY | Facility: CLINIC | Age: 63
End: 2018-11-14
Payer: COMMERCIAL

## 2018-11-14 DIAGNOSIS — M25.552 PAIN OF LEFT HIP: Primary | ICD-10-CM

## 2018-11-14 DIAGNOSIS — R26.9 GAIT ABNORMALITY: ICD-10-CM

## 2018-11-14 DIAGNOSIS — I10 ESSENTIAL HYPERTENSION: ICD-10-CM

## 2018-11-14 DIAGNOSIS — S76.012D STRAIN OF GLUTEUS MEDIUS OF LEFT LOWER EXTREMITY, SUBSEQUENT ENCOUNTER: ICD-10-CM

## 2018-11-14 PROCEDURE — 97140 MANUAL THERAPY 1/> REGIONS: CPT | Performed by: PHYSICAL THERAPIST

## 2018-11-14 PROCEDURE — 97110 THERAPEUTIC EXERCISES: CPT | Performed by: PHYSICAL THERAPIST

## 2018-11-14 NOTE — PROGRESS NOTES
Daily Note     Today's date: 2018  Patient name: Jos Marques  : 1955  MRN: [id number]  Referring provider: Marshal Lange, *  Dx:   Encounter Diagnosis     ICD-10-CM    1  Pain of left hip M25 552    2  Strain of gluteus medius of left lower extremity, subsequent encounter S76 012D    3  Gait abnormality R26 9    4  Essential hypertension I10        Start Time: 0800  Stop Time: 0910  Total time in clinic (min): 70 minutes    Subjective: Patient states knee hasn't been "popping" as much         Objective: See treatment diary below  Precautions: none noted    Re-evaluation:      Hip Specialty Daily Treatment Diary     Manual     PROM performed    performed   ITB    Left quad, ITB Left quad, ITB np   Graston left quad, ITB, scar performed    np   Hamstring/calf stretch performed        Active release stretch   Left ITB, quad Left quad, ITB        Exercise Diary     nustep L1 10' L1 10' L1 10' L1 10' L1   Core brace 5"x10 5"x10  5"x10 5"x10 5"x15   Supine ball squeeze 3x10 3x10 3x10 3x10 2x10   Clamshell iso 5"x20 5"x20 5"x20 5"x20 5"x20   Glut set 5"x20 5"x20 5"x20 5"x20 5"x20   Hamstring set Into p ball 5"x20 Into p ball 5"x20 Into p ball 5"x20 Into p ball 5"x20 Into p ball 5"x10   Heel slide With p ball 5"x10 With p ball 5"x10 With p ball 5"x10 With p ball 5"x10 With p ball 5"x10   ITB stretch supine 4x15" 4x15" nv 4x15" 4x15"   Hamstring stretch Seated 3x30" Seated 3x30" Seated 3x30" Seated 3x30" Seated 3x30"   Hip flexor stretch        SLB        Step up Small step 10x Small step 15x Small step 15x Small 20x Small step 10x   Total Gym Squat         hip IR, ER        Standing hip ext 2x10 left 2x10 left 2x10 left 2x10 left 2x10 left   Standing donkey kick        Side steps in // bars 4 laps 4 laps 4x along counter // bars 4 laps 4 laps   Standing HR 2x10 2x10 2x10 2x10 2x10   UE wall slide x10 2x10 2x10 nv x10   VMO SLR  2x5 2x5 2x5    SAQ with ball squeeze  3x10 3x10 3x10    LAQ with ball squeeze   10x 2x10        Modalities 11/5 11/9 11/12 11/14 11/2   IFC  Seated 10'  Seated 10' Seated 10' Right side lying 10'                       Assessment: Continued weakness of gluteal musculature with tightness of quad and ITB contributing to symptoms and gait dysfunction  Plan: Continue per plan of care  Progress treatment as tolerated

## 2018-11-16 ENCOUNTER — APPOINTMENT (OUTPATIENT)
Dept: PHYSICAL THERAPY | Facility: CLINIC | Age: 63
End: 2018-11-16
Payer: COMMERCIAL

## 2018-11-19 ENCOUNTER — OFFICE VISIT (OUTPATIENT)
Dept: PHYSICAL THERAPY | Facility: CLINIC | Age: 63
End: 2018-11-19
Payer: COMMERCIAL

## 2018-11-19 DIAGNOSIS — R26.9 GAIT ABNORMALITY: ICD-10-CM

## 2018-11-19 DIAGNOSIS — M25.552 PAIN OF LEFT HIP: Primary | ICD-10-CM

## 2018-11-19 DIAGNOSIS — S76.012D STRAIN OF GLUTEUS MEDIUS OF LEFT LOWER EXTREMITY, SUBSEQUENT ENCOUNTER: ICD-10-CM

## 2018-11-19 PROCEDURE — 97140 MANUAL THERAPY 1/> REGIONS: CPT | Performed by: PHYSICAL THERAPIST

## 2018-11-19 PROCEDURE — 97110 THERAPEUTIC EXERCISES: CPT | Performed by: PHYSICAL THERAPIST

## 2018-11-19 NOTE — PROGRESS NOTES
Daily Note     Today's date: 2018  Patient name: Oracio Romo  : 1955  MRN: [id number]  Referring provider: Adal Barron, *  Dx:   Encounter Diagnosis     ICD-10-CM    1  Pain of left hip M25 552    2  Strain of gluteus medius of left lower extremity, subsequent encounter S76 012D    3  Gait abnormality R26 9        Start Time: 0900  Stop Time: 1010  Total time in clinic (min): 70 minutes    Subjective: Patient states knee overall is feeling better  Both knee and hip hurt when ascending stairs  Continues to have the "same pain" in left glut region  NDV         Objective: See treatment diary below  Precautions: none noted    Re-evaluation:      Hip Specialty Daily Treatment Diary     Manual     PROM performed       ITB    Left quad, ITB Left quad, ITB Left quad, ITB   Graston left quad, ITB, scar performed       Hamstring/calf stretch performed        Active release stretch   Left ITB, quad Left quad, ITB Left quad, ITB       Exercise Diary     nustep L1 10' L1 10' L1 10' L1 10' L110'   Core brace 5"x10 5"x10  5"x10 5"x10 5"x10   Supine ball squeeze 3x10 3x10 3x10 3x10 3x10   Clamshell iso 5"x20 5"x20 5"x20 5"x20 5"x20   Glut set 5"x20 5"x20 5"x20 5"x20 5"x20   Hamstring set Into p ball 5"x20 Into p ball 5"x20 Into p ball 5"x20 Into p ball 5"x20 Into p ball 5"x20   Heel slide With p ball 5"x10 With p ball 5"x10 With p ball 5"x10 With p ball 5"x10 with p ball 5"x20   ITB stretch supine 4x15" 4x15" nv 4x15" 4x15"   Hamstring stretch Seated 3x30" Seated 3x30" Seated 3x30" Seated 3x30" Seated 3x30"   Hip flexor stretch        SLB        Step up Small step 10x Small step 15x Small step 15x Small 20x    Total Gym Squat         hip IR, ER        Standing hip ext 2x10 left 2x10 left 2x10 left 2x10 left 2x10 left   Standing donkey kick        Side steps in // bars 4 laps 4 laps 4x along counter // bars 4 laps    Standing HR 2x10 2x10 2x10 2x10 2x10   UE wall slide x10 2x10 2x10 nv    VMO SLR  2x5 2x5 2x5 3x5   SAQ with ball squeeze  3x10 3x10 3x10 3x10   LAQ with ball squeeze   10x 2x10 3x10       Modalities 11/5 11/9 11/12 11/14 11/19   IFC  Seated 10'  Seated 10' Seated 10'                        Assessment: Able to progress as noted above with some difficulty and soreness  Continues to display significant gluteal weakness with point tender pain  Plan: Continue per plan of care  Progress treatment as tolerated

## 2018-11-21 ENCOUNTER — OFFICE VISIT (OUTPATIENT)
Dept: PHYSICAL THERAPY | Facility: CLINIC | Age: 63
End: 2018-11-21
Payer: COMMERCIAL

## 2018-11-21 DIAGNOSIS — R26.9 GAIT ABNORMALITY: ICD-10-CM

## 2018-11-21 DIAGNOSIS — M25.552 PAIN OF LEFT HIP: Primary | ICD-10-CM

## 2018-11-21 DIAGNOSIS — I10 ESSENTIAL HYPERTENSION: ICD-10-CM

## 2018-11-21 DIAGNOSIS — S76.012D STRAIN OF GLUTEUS MEDIUS OF LEFT LOWER EXTREMITY, SUBSEQUENT ENCOUNTER: ICD-10-CM

## 2018-11-21 PROCEDURE — 97140 MANUAL THERAPY 1/> REGIONS: CPT | Performed by: PHYSICAL THERAPIST

## 2018-11-21 PROCEDURE — 97110 THERAPEUTIC EXERCISES: CPT | Performed by: PHYSICAL THERAPIST

## 2018-11-21 NOTE — PROGRESS NOTES
Daily Note     Today's date: 2018  Patient name: Ness Rooney  : 1955  MRN: [id number]  Referring provider: Josseline Deal, *  Dx:   Encounter Diagnosis     ICD-10-CM    1  Pain of left hip M25 552    2  Strain of gluteus medius of left lower extremity, subsequent encounter S76 012D    3  Gait abnormality R26 9    4  Essential hypertension I10        Start Time: 0900  Stop Time: 1000  Total time in clinic (min): 60 minutes    Subjective: Patient states she was looking after Grandchild and did some shopping yesterday which may be causing some increased left glut pain today  PQ 6/10        Objective: See treatment diary below  Precautions: none noted    Re-evaluation:      Hip Specialty Daily Treatment Diary     Manual     PROM        ITB  ITB, quad  Left quad, ITB Left quad, ITB Left quad, ITB   Graston left quad, ITB, scar Scar, ITB       Hamstring/calf stretch        Active release stretch   Left ITB, quad Left quad, ITB Left quad, ITB       Exercise Diary     nustep L1 10' L1 10' L1 10' L1 10' L110'   Core brace 5"x20 5"x10  5"x10 5"x10 5"x10   Supine ball squeeze 3x10 3x10 3x10 3x10 3x10   Clamshell iso Red TB 2x10 5"x20 5"x20 5"x20 5"x20   Glut set 5"x20 5"x20 5"x20 5"x20 5"x20   Hamstring set Into p ball 5"x20 Into p ball 5"x20 Into p ball 5"x20 Into p ball 5"x20 Into p ball 5"x20   Heel slide With p ball 5"x20 With p ball 5"x10 With p ball 5"x10 With p ball 5"x10 with p ball 5"x20   ITB stretch supine 4x15" 4x15" nv 4x15" 4x15"   Hamstring stretch Seated 3x30" Seated 3x30" Seated 3x30" Seated 3x30" Seated 3x30"   Hip flexor stretch        SLB        Step up nv Small step 15x Small step 15x Small 20x    Total Gym Squat 10x        hip IR, ER        Standing hip ext 2x10 left 2x10 left 2x10 left 2x10 left 2x10 left   Standing donkey kick        Side steps in // bars 4 laps along counter 4 laps 4x along counter // bars 4 laps    Standing HR nv 2x10 2x10 2x10 2x10   UE wall slide nv 2x10 2x10 nv    VMO SLR 5x (decreased due to glut pain) 2x5 2x5 2x5 3x5   SAQ with ball squeeze 3x10 3x10 3x10 3x10 3x10   LAQ with ball squeeze 3x10  10x 2x10 3x10       Modalities 11/21 11/9 11/12 11/14 11/19   IFC  declined  Seated 10' Seated 10'                        Assessment: Toe walk when ambulating without crutch likely to avoid left hip extension due to glut weakness and pain  Continuation of skilled PT indicated  Plan: Continue per plan of care  Progress treatment as tolerated

## 2018-11-23 ENCOUNTER — EVALUATION (OUTPATIENT)
Dept: PHYSICAL THERAPY | Facility: CLINIC | Age: 63
End: 2018-11-23
Payer: COMMERCIAL

## 2018-11-23 DIAGNOSIS — S76.012D STRAIN OF GLUTEUS MEDIUS OF LEFT LOWER EXTREMITY, SUBSEQUENT ENCOUNTER: ICD-10-CM

## 2018-11-23 DIAGNOSIS — M25.552 PAIN OF LEFT HIP: Primary | ICD-10-CM

## 2018-11-23 DIAGNOSIS — R26.9 GAIT ABNORMALITY: ICD-10-CM

## 2018-11-23 PROCEDURE — 97164 PT RE-EVAL EST PLAN CARE: CPT | Performed by: PHYSICAL THERAPIST

## 2018-11-23 PROCEDURE — G8979 MOBILITY GOAL STATUS: HCPCS | Performed by: PHYSICAL THERAPIST

## 2018-11-23 PROCEDURE — G8978 MOBILITY CURRENT STATUS: HCPCS | Performed by: PHYSICAL THERAPIST

## 2018-11-23 PROCEDURE — 97110 THERAPEUTIC EXERCISES: CPT | Performed by: PHYSICAL THERAPIST

## 2018-11-23 PROCEDURE — 97140 MANUAL THERAPY 1/> REGIONS: CPT | Performed by: PHYSICAL THERAPIST
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
## 2018-11-26 ENCOUNTER — OFFICE VISIT (OUTPATIENT)
Dept: PHYSICAL THERAPY | Facility: CLINIC | Age: 63
End: 2018-11-26
Payer: COMMERCIAL

## 2018-11-26 DIAGNOSIS — M25.552 PAIN OF LEFT HIP: Primary | ICD-10-CM

## 2018-11-26 DIAGNOSIS — S76.012D STRAIN OF GLUTEUS MEDIUS OF LEFT LOWER EXTREMITY, SUBSEQUENT ENCOUNTER: ICD-10-CM

## 2018-11-26 DIAGNOSIS — R26.9 GAIT ABNORMALITY: ICD-10-CM

## 2018-11-26 PROCEDURE — 97110 THERAPEUTIC EXERCISES: CPT

## 2018-11-26 PROCEDURE — 97140 MANUAL THERAPY 1/> REGIONS: CPT

## 2018-11-26 NOTE — PROGRESS NOTES
Daily Note     Today's date: 2018  Patient name: Malgorzata Perry  : 1955  MRN: [id number]  Referring provider: Claire Sharif, *  Dx:   Encounter Diagnosis     ICD-10-CM    1  Pain of left hip M25 552    2  Strain of gluteus medius of left lower extremity, subsequent encounter S76 012D    3  Gait abnormality R26 9                   Subjective: Patient states her hip pain is a 5/10 today  Patient states her home exercises are going "OK", but she knows she needs to do more at home       Objective: See treatment diary below  Precautions: none noted    Re-evaluation:      Hip Specialty Daily Treatment Diary     Manual     PROM  Left hip and knee Left hip and knee     ITB  ITB, quad   Left quad, ITB Left quad, ITB   Graston left quad, ITB, scar Scar, ITB       Hamstring/calf stretch        Active release stretch    Left quad, ITB Left quad, ITB       Exercise Diary     nustep S=8 L1 10' L1 10' L2x  10' L1 10' L110'   Core brace 5"x20 5"x20 :05x 20 5"x10 5"x10   Supine ball squeeze 3x10 3x10 3x10 3x10 3x10   Clamshell iso Red TB 2x10 Red 2x10 Supine red x20 5"x20 5"x20   Glut set 5"x20 5"x20 :05x20 5"x20 5"x20   Hamstring set Into p ball 5"x20 Into p ball 5"x20 Into p ball 5"x20 Into p ball 5"x20 Into p ball 5"x20   Heel slide With p ball 5"x20 With p ball 5"x20 With p ball 5"x20 With p ball 5"x10 with p ball 5"x20   ITB stretch supine 4x15"  4x :15 4x15" 4x15"   Hamstring stretch Seated 3x30" Seated 3x30" Seated 3x :30 Seated 3x30" Seated 3x30"   Hip flexor stretch        SLB        Step up nv  Small x20 Small 20x    Total Gym Squat 10x  x10      hip IR, ER        Standing hip ext 2x10 left  Left 2x10 2x10 left 2x10 left   Standing donkey kick        Side steps in // bars 4 laps along counter  4 laps along bars // bars 4 laps    Standing HR nv 2x10 2x10 2x10 2x10   UE wall slide nv 2x10 2x10 nv    VMO SLR 5x (decreased due to glut pain)  x5 2x5 3x5   SAQ with ball squeeze 3x10 3x10 3x10 3x10 3x10   LAQ with ball squeeze 3x10 3x10 3x10 2x10 3x10       Modalities 11/21 11/23 11/26 11/14 11/19   IFC  declined Seated 10' Seated x 10 min Seated 10'                        Assessment: Tolerated treatment well  Patient would benefit from continued PTfor stretching and strengthening  Reinforcement of proper performance of exercises needed 30% of the time  Plan: Continue per plan of care  Progress treatment as tolerated

## 2018-11-28 ENCOUNTER — OFFICE VISIT (OUTPATIENT)
Dept: PHYSICAL THERAPY | Facility: CLINIC | Age: 63
End: 2018-11-28
Payer: COMMERCIAL

## 2018-11-28 DIAGNOSIS — R26.9 GAIT ABNORMALITY: ICD-10-CM

## 2018-11-28 DIAGNOSIS — M25.552 PAIN OF LEFT HIP: Primary | ICD-10-CM

## 2018-11-28 DIAGNOSIS — S76.012D STRAIN OF GLUTEUS MEDIUS OF LEFT LOWER EXTREMITY, SUBSEQUENT ENCOUNTER: ICD-10-CM

## 2018-11-28 PROCEDURE — 97140 MANUAL THERAPY 1/> REGIONS: CPT | Performed by: PHYSICAL THERAPIST

## 2018-11-28 PROCEDURE — 97110 THERAPEUTIC EXERCISES: CPT | Performed by: PHYSICAL THERAPIST

## 2018-11-28 NOTE — PROGRESS NOTES
Daily Note     Today's date: 2018  Patient name: Too Triplett  : 1955  MRN: [id number]  Referring provider: Rei Lr, *  Dx:   Encounter Diagnosis     ICD-10-CM    1  Pain of left hip M25 552    2  Strain of gluteus medius of left lower extremity, subsequent encounter S76 012D    3  Gait abnormality R26 9        Start Time: 0900  Stop Time: 1010  Total time in clinic (min): 70 minutes    Subjective: Patient states she was a little sore after Monday's PT session, thinks it was because she didn't do a lot over the weekend  Has been putting up [place] decorations around the house involving bending and walking causing some discomfort in left glut region  PQ 6/10 today        Objective: See treatment diary below  Precautions: none noted    Re-evaluation:      Hip Specialty Daily Treatment Diary     Manual     PROM  Left hip and knee Left hip and knee Left knee and hip     ITB  ITB, quad   Left ITB and quad Left quad, ITB   Graston left quad, ITB, scar Scar, ITB       Hamstring/calf stretch        Active release stretch     Left quad, ITB       Exercise Diary     nustep S=8 L1 10' L1 10' L2x  10' L2 10' L110'   Core brace 5"x20 5"x20 :05x 20 5"x20 5"x10   Supine ball squeeze 3x10 3x10 3x10 3x10 3x10   Clamshell iso Red TB 2x10 Red 2x10 Supine red x20 Supine red x20 5"x20   Glut set 5"x20 5"x20 :05x20 5"x20 5"x20   Hamstring set Into p ball 5"x20 Into p ball 5"x20 Into p ball 5"x20 Into p ball 5"x20 Into p ball 5"x20   Heel slide With p ball 5"x20 With p ball 5"x20 With p ball 5"x20 With p ball 5"x20 with p ball 5"x20   ITB stretch supine 4x15"  4x :15 4x15" 4x15"   Hamstring stretch Seated 3x30" Seated 3x30" Seated 3x :30 Seated 3x30" no stool Seated 3x30"   Hip flexor stretch        SLB        Step up nv  Small x20 Small 2x10    Total Gym Squat 10x  x10 x10     hip IR, ER        Standing hip ext 2x10 left  Left 2x10 Left 2x10 2x10 left   Standing donkey kick    nv    Side steps in // bars 4 laps along counter  4 laps along bars 4 laps along counter    Standing HR nv 2x10 2x10 2x10 2x10   UE wall slide nv 2x10 2x10 2x10    VMO SLR 5x (decreased due to glut pain)  x5 x5 3x5   SAQ with ball squeeze 3x10 3x10 3x10 3x10 3x10   LAQ with ball squeeze 3x10 3x10 3x10 3x10 3x10   TKE    2x10        Modalities 11/21 11/23 11/26 11/28 11/19   IFC  declined Seated 10' Seated x 10 min Seated 10'                        Assessment: Patient continues to display decreased LLE stability with absent hip extension during mobility tasks due to general hip and LE weakness  Continuation of skilled PT indicated to address current deficits and improve QOL  Plan: Continue per plan of care  Progress treatment as tolerated

## 2018-11-30 ENCOUNTER — OFFICE VISIT (OUTPATIENT)
Dept: PHYSICAL THERAPY | Facility: CLINIC | Age: 63
End: 2018-11-30
Payer: COMMERCIAL

## 2018-11-30 DIAGNOSIS — R26.9 GAIT ABNORMALITY: ICD-10-CM

## 2018-11-30 DIAGNOSIS — S76.012D STRAIN OF GLUTEUS MEDIUS OF LEFT LOWER EXTREMITY, SUBSEQUENT ENCOUNTER: ICD-10-CM

## 2018-11-30 DIAGNOSIS — M25.552 PAIN OF LEFT HIP: Primary | ICD-10-CM

## 2018-11-30 PROCEDURE — 97110 THERAPEUTIC EXERCISES: CPT | Performed by: PHYSICAL THERAPIST

## 2018-11-30 PROCEDURE — 97140 MANUAL THERAPY 1/> REGIONS: CPT | Performed by: PHYSICAL THERAPIST

## 2018-11-30 NOTE — PROGRESS NOTES
Daily Note     Today's date: 2018  Patient name: Kirke Heimlich  : 1955  MRN: [id number]  Referring provider: Yoanna Angel, *  Dx:   Encounter Diagnosis     ICD-10-CM    1  Pain of left hip M25 552    2  Strain of gluteus medius of left lower extremity, subsequent encounter S76 012D    3  Gait abnormality R26 9        Start Time: 0900  Stop Time: 1005  Total time in clinic (min): 65 minutes    Subjective: Patient states symptoms are "the same"         Objective: See treatment diary below    Re-evaluation:      Hip Specialty Daily Treatment Diary     Manual     PROM  Left hip and knee Left hip and knee Left knee and hip  Left knee and hip   ITB  ITB, quad   Left ITB and quad Left ITB and quad   Graston left quad, ITB, scar Scar, ITB       Hamstring/calf stretch        Active release stretch            Exercise Diary     nustep S=8 L1 10' L1 10' L2x  10' L2 10' L2 10'   Core brace 5"x20 5"x20 :05x 20 5"x20 5"x20   Supine ball squeeze 3x10 3x10 3x10 3x10 3x10   Clamshell iso Red TB 2x10 Red 2x10 Supine red x20 Supine red x20 Supine yellow    Glut set 5"x20 5"x20 :05x20 5"x20 5"x20   Hamstring set Into p ball 5"x20 Into p ball 5"x20 Into p ball 5"x20 Into p ball 5"x20 into p ball 5"x20   Heel slide With p ball 5"x20 With p ball 5"x20 With p ball 5"x20 With p ball 5"x20 With p ball 5"x20   ITB stretch supine 4x15"  4x :15 4x15" 4x15"   Hamstring stretch Seated 3x30" Seated 3x30" Seated 3x :30 Seated 3x30" no stool Seated 3x30" no stool   Hip flexor stretch        SLB        Step up nv  Small x20 Small 2x10 Small 2x10   Total Gym Squat 10x  x10 x10     hip IR, ER        Standing hip ext 2x10 left  Left 2x10 Left 2x10 Left 2x10   Standing donkey kick    nv 10x   Side steps in // bars 4 laps along counter  4 laps along bars 4 laps along counter 4 laps along counter   Standing HR nv 2x10 2x10 2x10 2x10   UE wall slide nv 2x10 2x10 2x10    VMO SLR 5x (decreased due to glut pain)  x5 x5 5x   SAQ with ball squeeze 3x10 3x10 3x10 3x10 3x10   LAQ with ball squeeze 3x10 3x10 3x10 3x10 3x10   TKE    2x10 2x10   PPU     10"x10       Modalities 11/21 11/23 11/26 11/28 11/30   IFC  declined Seated 10' Seated x 10 min Seated 10' Seated 10'                     Assessment: Limitation with left hip extension active and passive  Patient avoids glut activation and hip extension due to pain impacting gait mechanics and general mobility  Continuation of skilled PT indicated  Plan: Continue per plan of care  Progress treatment as tolerated

## 2018-12-03 ENCOUNTER — OFFICE VISIT (OUTPATIENT)
Dept: PHYSICAL THERAPY | Facility: CLINIC | Age: 63
End: 2018-12-03
Payer: COMMERCIAL

## 2018-12-03 DIAGNOSIS — S76.012D STRAIN OF GLUTEUS MEDIUS OF LEFT LOWER EXTREMITY, SUBSEQUENT ENCOUNTER: ICD-10-CM

## 2018-12-03 DIAGNOSIS — M25.552 PAIN OF LEFT HIP: Primary | ICD-10-CM

## 2018-12-03 DIAGNOSIS — R26.9 GAIT ABNORMALITY: ICD-10-CM

## 2018-12-03 PROCEDURE — 97110 THERAPEUTIC EXERCISES: CPT | Performed by: PHYSICAL THERAPIST

## 2018-12-03 PROCEDURE — 97014 ELECTRIC STIMULATION THERAPY: CPT | Performed by: PHYSICAL THERAPIST

## 2018-12-03 PROCEDURE — 97140 MANUAL THERAPY 1/> REGIONS: CPT | Performed by: PHYSICAL THERAPIST

## 2018-12-03 NOTE — PROGRESS NOTES
Daily Note     Today's date: 12/3/2018  Patient name: Sami Lewis  : 1955  MRN: [id number]  Referring provider: Isabell Bender, *  Dx:   Encounter Diagnosis     ICD-10-CM    1  Pain of left hip M25 552    2  Strain of gluteus medius of left lower extremity, subsequent encounter S76 012D    3  Gait abnormality R26 9        Start Time: 0900  Stop Time: 1010  Total time in clinic (min): 70 minutes    Subjective: Patient states increased pain Friday night of unknown cause  Thought it felt better when waking this morning, but once she started moving only took a few minutes to return  To see MD tomorrow         Objective: See treatment diary below  Re-evaluation:      Hip Specialty Daily Treatment Diary     Manual  12/3 11/23 11/26 11/28 11/30   PROM Left knee and hip Left hip and knee Left hip and knee Left knee and hip  Left knee and hip   ITB  Left ITB and quad   Left ITB and quad Left ITB and quad   Graston left quad, ITB, scar        Hamstring/calf stretch        Active release stretch            Exercise Diary  12/3 11/23 11/26 11/28 11/30   nustep S=8 L2 10' L1 10' L2x  10' L2 10' L2 10'   Core brace 5"x20 5"x20 :05x 20 5"x20 5"x20   Supine ball squeeze 3x10 3x10 3x10 3x10 3x10   Clamshell iso Supine yellow 2x10 Red 2x10 Supine red x20 Supine red x20 Supine yellow    Glut set 5"x20 5"x20 :05x20 5"x20 5"x20   Hamstring set into p ball 5"x20 Into p ball 5"x20 Into p ball 5"x20 Into p ball 5"x20 into p ball 5"x20   Heel slide With p ball 5"x20 With p ball 5"x20 With p ball 5"x20 With p ball 5"x20 With p ball 5"x20   ITB stretch supine   4x :15 4x15" 4x15"   Hamstring stretch Seated 3x30" Seated 3x30" Seated 3x :30 Seated 3x30" no stool Seated 3x30" no stool   Hip flexor stretch        SLB        Step up Small 20x  Small x20 Small 2x10 Small 2x10   Total Gym Squat 10x  x10 x10     hip IR, ER        Standing hip ext Left 2x10  Left 2x10 Left 2x10 Left 2x10   Standing donkey kick 10x nv 10x   Side steps in // bars 4 laps along counter  4 laps along bars 4 laps along counter 4 laps along counter   Standing HR 2x10 2x10 2x10 2x10 2x10   UE wall slide 2x10 2x10 2x10 2x10    VMO SLR np  x5 x5 5x   SAQ with ball squeeze 3x10 3x10 3x10 3x10 3x10   LAQ with ball squeeze 3x10 3x10 3x10 3x10 3x10   TKE Green 2x10   2x10 2x10   PPU nv    10"x10       Modalities 12/3 11/23 11/26 11/28 11/30   IFC  Seated 10' Seated 10' Seated x 10 min Seated 10' Seated 10'                       Assessment: Noted tightness of left hip flexors and quad likely due to patient avoiding left hip extension  Continuation of skilled PT indicated  Plan: Continue per plan of care  Progress treatment as tolerated

## 2018-12-05 ENCOUNTER — OFFICE VISIT (OUTPATIENT)
Dept: PHYSICAL THERAPY | Facility: CLINIC | Age: 63
End: 2018-12-05
Payer: COMMERCIAL

## 2018-12-05 DIAGNOSIS — S76.012D STRAIN OF GLUTEUS MEDIUS OF LEFT LOWER EXTREMITY, SUBSEQUENT ENCOUNTER: ICD-10-CM

## 2018-12-05 DIAGNOSIS — M25.552 PAIN OF LEFT HIP: Primary | ICD-10-CM

## 2018-12-05 DIAGNOSIS — R26.9 GAIT ABNORMALITY: ICD-10-CM

## 2018-12-05 PROCEDURE — 97014 ELECTRIC STIMULATION THERAPY: CPT | Performed by: PHYSICAL THERAPIST

## 2018-12-05 PROCEDURE — 97110 THERAPEUTIC EXERCISES: CPT | Performed by: PHYSICAL THERAPIST

## 2018-12-05 NOTE — PROGRESS NOTES
Daily Note     Today's date: 2018  Patient name: Zari Landon  : 1955  MRN: [id number]  Referring provider: Socrates Flower, *  Dx:   Encounter Diagnosis     ICD-10-CM    1  Pain of left hip M25 552    2  Strain of gluteus medius of left lower extremity, subsequent encounter S76 012D    3  Gait abnormality R26 9        Start Time: 1000  Stop Time: 1105  Total time in clinic (min): 65 minutes    Subjective: Patient states pain -7/10 today  Saw MD yesterday, x-rays taken of left knee, left hip, and low back  Bone spur in left knee possibly causing "clunk"  MD thinks pain in left hip may also be referred from low back  To f/u in 2 weeks         Objective: See treatment diary below  Re-evaluation:      Hip Specialty Daily Treatment Diary     Manual  12/3 12/5 11/26 11/28 11/30   PROM Left knee and hip  Left hip and knee Left knee and hip  Left knee and hip   ITB  Left ITB and quad   Left ITB and quad Left ITB and quad   Graston left quad, ITB, scar        Hamstring/calf stretch        Active release stretch            Exercise Diary  12/3 12/5 11/26 11/28 11/30   nustep S=8 L2 10' L2 10' L2x  10' L2 10' L2 10'   Core brace 5"x20 5"x20 with p ball :05x 20 5"x20 5"x20   Supine ball squeeze 3x10 3x10 3x10 3x10 3x10   Clamshell iso Supine yellow 2x10 Supine yellow 2x10 Supine red x20 Supine red x20 Supine yellow    Glut set 5"x20 5"x20 :05x20 5"x20 5"x20   Hamstring set into p ball 5"x20 Into p ball 5"x20 Into p ball 5"x20 Into p ball 5"x20 into p ball 5"x20   Heel slide With p ball 5"x20 With p ball 10"x10 With p ball 5"x20 With p ball 5"x20 With p ball 5"x20   ITB stretch supine  4x15" 4x :15 4x15" 4x15"   Hamstring stretch Seated 3x30" Seated 3x30" Seated 3x :30 Seated 3x30" no stool Seated 3x30" no stool   Hip flexor stretch        LTR  15x      Step up Small 20x Small 20x Small x20 Small 2x10 Small 2x10   Total Gym Squat 10x np x10 x10    Seated p ball fwd, diag rolls  10x10" fwd  5x10" diag      Standing hip ext Left 2x10 Left 2x10 Left 2x10 Left 2x10 Left 2x10   Standing donkey kick 10x 10x  nv 10x   Side steps in // bars 4 laps along counter 4 laps along counter 4 laps along bars 4 laps along counter 4 laps along counter   Standing HR 2x10 2x10 2x10 2x10 2x10   UE wall slide 2x10 2x10 2x10 2x10    VMO SLR np np x5 x5 5x   SAQ with ball squeeze 3x10 3x10 3x10 3x10 3x10   LAQ with ball squeeze 3x10 3x10 3x10 3x10 3x10   TKE Green 2x10 nv  2x10 2x10   PPU nv dc   10"x10       Modalities 12/3 12/5 11/26 11/28 11/30   IFC  Seated 10' Seated 10' Seated x 10 min Seated 10' Seated 10'                     Pain with repetitive lumbar extension, no change with flexion  Poor activation of core with core brace, increased with resisted UE flexion  Patient instructed to continue to work on at home  Assessment: Program to include flexion bias exercises for back symptoms  Patient responded well to treatment with no increased pain today  Continuation of skilled PT indicated  Plan: Continue per plan of care  Progress treatment as tolerated

## 2018-12-06 DIAGNOSIS — I10 ESSENTIAL HYPERTENSION: ICD-10-CM

## 2018-12-07 ENCOUNTER — OFFICE VISIT (OUTPATIENT)
Dept: PHYSICAL THERAPY | Facility: CLINIC | Age: 63
End: 2018-12-07
Payer: COMMERCIAL

## 2018-12-07 DIAGNOSIS — M25.552 PAIN OF LEFT HIP: Primary | ICD-10-CM

## 2018-12-07 DIAGNOSIS — R26.9 GAIT ABNORMALITY: ICD-10-CM

## 2018-12-07 DIAGNOSIS — S76.012D STRAIN OF GLUTEUS MEDIUS OF LEFT LOWER EXTREMITY, SUBSEQUENT ENCOUNTER: ICD-10-CM

## 2018-12-07 PROCEDURE — 97014 ELECTRIC STIMULATION THERAPY: CPT | Performed by: PHYSICAL THERAPIST

## 2018-12-07 PROCEDURE — 97110 THERAPEUTIC EXERCISES: CPT | Performed by: PHYSICAL THERAPIST

## 2018-12-07 RX ORDER — LOSARTAN POTASSIUM AND HYDROCHLOROTHIAZIDE 12.5; 5 MG/1; MG/1
TABLET ORAL
Qty: 30 TABLET | Refills: 0 | Status: SHIPPED | OUTPATIENT
Start: 2018-12-07 | End: 2018-12-28

## 2018-12-07 NOTE — PROGRESS NOTES
Daily Note     Today's date: 2018  Patient name: Juan Potts  : 1955  MRN: [id number]  Referring provider: Khushbu Loya, *  Dx:   Encounter Diagnosis     ICD-10-CM    1  Pain of left hip M25 552    2  Strain of gluteus medius of left lower extremity, subsequent encounter S76 012D    3  Gait abnormality R26 9        Start Time: 0850  Stop Time: 1000  Total time in clinic (min): 70 minutes    Subjective: Patient states she was shopping for about an hour yesterday and "that was enough"  PQ increased to 7/10 in lateral left hip and increased popping in left knee  Feels best in am, pain in both knee and hip increases throughout the day         Objective: See treatment diary below  Re-evaluation:      Hip Specialty Daily Treatment Diary     Manual  12/3 12/5 12/7 11/28 11/30   PROM Left knee and hip   Left knee and hip  Left knee and hip   ITB  Left ITB and quad   Left ITB and quad Left ITB and quad   Graston left quad, ITB, scar        Hamstring/calf stretch        Active release stretch            Exercise Diary  12/3 12/5 12/7 11/28 11/30   nustep S=8 L2 10' L2 10' L2x  10' L2 10' L2 10'   Core brace 5"x20 5"x20 with p ball 5"x20 5"x20 5"x20   Supine ball squeeze 3x10 3x10 3x10 3x10 3x10   Clamshell iso Supine yellow 2x10 Supine yellow 2x10 Supine yellow 2x10 Supine red x20 Supine yellow    Glut set 5"x20 5"x20 5"x20 5"x20 5"x20   Hamstring set into p ball 5"x20 Into p ball 5"x20 Into p ball 5"x20 Into p ball 5"x20 into p ball 5"x20   Heel slide With p ball 5"x20 With p ball 10"x10 With p ball 10"x10 With p ball 5"x20 With p ball 5"x20   ITB stretch supine  4x15" 4x15" 4x15" 4x15"   Hamstring stretch Seated 3x30" Seated 3x30" Seated 3x30" Seated 3x30" no stool Seated 3x30" no stool   Hip flexor stretch        LTR  15x      Step up Small 20x Small 20x Small 2x10 Small 2x10 Small 2x10   Total Gym Squat 10x np np x10    Seated p ball fwd, diag rolls  10x10" fwd  5x10" diag 10x10" fwd  10x10" diag     Standing hip ext Left 2x10 Left 2x10 Left 25x Left 2x10 Left 2x10   Standing donkey kick 10x 10x 10x nv 10x   Side steps in // bars 4 laps along counter 4 laps along counter 6 laps in // bars 4 laps along counter 4 laps along counter   Standing HR 2x10 2x10 2x10 2x10 2x10   UE wall slide 2x10 2x10 2x10 2x10    VMO SLR np np np x5 5x   SAQ with ball squeeze 3x10 3x10 3x10 3x10 3x10   LAQ with ball squeeze 3x10 3x10 3x10 3x10 3x10   TKE Green 2x10 nv grn 3x10 2x10 2x10   PPU nv dc --  10"x10       Modalities 12/3 12/5 12/7 11/28 11/30   IFC  Seated 10' Seated 10' Seated x 10 min Seated 10' Seated 10'                       Assessment: Significant pop at approximately 60 degrees while performing LAQ  Needs to continue to work on stability and strength of right knee to promoted proper mechanics  Plan: Continue per plan of care  Progress treatment as tolerated

## 2018-12-10 ENCOUNTER — OFFICE VISIT (OUTPATIENT)
Dept: PHYSICAL THERAPY | Facility: CLINIC | Age: 63
End: 2018-12-10
Payer: COMMERCIAL

## 2018-12-10 DIAGNOSIS — S76.012D STRAIN OF GLUTEUS MEDIUS OF LEFT LOWER EXTREMITY, SUBSEQUENT ENCOUNTER: ICD-10-CM

## 2018-12-10 DIAGNOSIS — R26.9 GAIT ABNORMALITY: ICD-10-CM

## 2018-12-10 DIAGNOSIS — M25.552 PAIN OF LEFT HIP: Primary | ICD-10-CM

## 2018-12-10 PROCEDURE — 97110 THERAPEUTIC EXERCISES: CPT | Performed by: PHYSICAL THERAPIST

## 2018-12-10 PROCEDURE — 97014 ELECTRIC STIMULATION THERAPY: CPT | Performed by: PHYSICAL THERAPIST

## 2018-12-10 NOTE — PROGRESS NOTES
Daily Note     Today's date: 12/10/2018  Patient name: Kirke Heimlich  : 1955  MRN: [id number]  Referring provider: Yoanna Angel, *  Dx:   Encounter Diagnosis     ICD-10-CM    1  Pain of left hip M25 552    2  Strain of gluteus medius of left lower extremity, subsequent encounter S76 012D    3  Gait abnormality R26 9        Start Time: 0800  Stop Time: 0910  Total time in clinic (min): 70 minutes    Subjective: Patient states she drove to [place] over the weekend for a party, then went [place] shopping causing increased pain and discomfort  States she gets pain when she walks and pain when she sits too long         Objective: See treatment diary below    Re-evaluation:      Hip Specialty Daily Treatment Diary     Manual  12/3 12/5 12/7 12/10 11/30   PROM Left knee and hip    Left knee and hip   ITB  Left ITB and quad    Left ITB and quad   Graston left quad, ITB, scar        Hamstring/calf stretch        Active release stretch            Exercise Diary  12/3 12/5 12/7 12/10 11/30   nustep S=8 L2 10' L2 10' L2x  10' L2 10' L2 10'   Core brace 5"x20 5"x20 with p ball 5"x20 5"x20 5"x20   Supine ball squeeze 3x10 3x10 3x10 3x10 3x10   Clamshell iso Supine yellow 2x10 Supine yellow 2x10 Supine yellow 2x10 Supine yellow 3x10 Supine yellow    Glut set 5"x20 5"x20 5"x20 5"x20 5"x20   Hamstring set into p ball 5"x20 Into p ball 5"x20 Into p ball 5"x20 Into p ball 5"x20 into p ball 5"x20   Heel slide With p ball 5"x20 With p ball 10"x10 With p ball 10"x10 With p ball 10"x10 With p ball 5"x20   ITB stretch supine  4x15" 4x15" 4x15" 4x15"   Hamstring stretch Seated 3x30" Seated 3x30" Seated 3x30" Seated 3x30" Seated 3x30" no stool   Hip flexor stretch        LTR  15x  20x    Step up Small 20x Small 20x Small 2x10 Small 2x10 Small 2x10   Total Gym Squat 10x np np     Seated p ball fwd, diag rolls  10x10" fwd  5x10" diag 10x10" fwd  10x10" diag 10x10" fwd  10x10" diag    Standing hip ext Left 2x10 Left 2x10 Left 25x Left 25x Left 2x10   Standing donkey kick 10x 10x 10x 2x10 10x   Side steps in // bars 4 laps along counter 4 laps along counter 6 laps in // bars 4 laps along counter 4 laps along counter   Standing HR 2x10 2x10 2x10 3x10 2x10   UE wall slide 2x10 2x10 2x10 2x10    VMO SLR np np np -- 5x   SAQ with ball squeeze 3x10 3x10 3x10 3x10 3x10   LAQ with ball squeeze 3x10 3x10 3x10 3x10 3x10   TKE Green 2x10 nv grn 3x10 grn 3x10 2x10   PPU nv dc -- -- 10"x10       Modalities 12/3 12/5 12/7 12/10 11/30   IFC  Seated 10' Seated 10' Seated x 10 min  Seated 10'                     Assessment: Patient shows slight decrease in symptoms with lumbar flexion exercises, but continues to display abnormal gait with absent left hip extension  Continuation of skilled PT indicated  Plan: Continue per plan of care  Progress treatment as tolerated

## 2018-12-12 ENCOUNTER — OFFICE VISIT (OUTPATIENT)
Dept: PHYSICAL THERAPY | Facility: CLINIC | Age: 63
End: 2018-12-12
Payer: COMMERCIAL

## 2018-12-12 DIAGNOSIS — M25.552 PAIN OF LEFT HIP: Primary | ICD-10-CM

## 2018-12-12 DIAGNOSIS — R26.9 GAIT ABNORMALITY: ICD-10-CM

## 2018-12-12 DIAGNOSIS — S76.012D STRAIN OF GLUTEUS MEDIUS OF LEFT LOWER EXTREMITY, SUBSEQUENT ENCOUNTER: ICD-10-CM

## 2018-12-12 PROCEDURE — 97110 THERAPEUTIC EXERCISES: CPT | Performed by: PHYSICAL THERAPIST

## 2018-12-12 PROCEDURE — 97035 APP MDLTY 1+ULTRASOUND EA 15: CPT | Performed by: PHYSICAL THERAPIST

## 2018-12-12 NOTE — PROGRESS NOTES
Daily Note     Today's date: 2018  Patient name: Sj Santiago  : 1955  MRN: [id number]  Referring provider: Rod Prado, *  Dx:   Encounter Diagnosis     ICD-10-CM    1  Pain of left hip M25 552    2  Strain of gluteus medius of left lower extremity, subsequent encounter S76 012D    3  Gait abnormality R26 9        Start Time: 0900  Stop Time: 1005  Total time in clinic (min): 65 minutes    Subjective: Patient states sometimes she "feels like she's almost going backwards" with progress  Thinks she may be doing to much, but "it's hard not to with the holidays"         Objective: See treatment diary below  Re-evaluation:      Hip Specialty Daily Treatment Diary     Manual  12/3 12/5 12/7 12/10 12/12   PROM Left knee and hip       ITB  Left ITB and quad       Graston left quad, ITB, scar        Hamstring/calf stretch        Active release stretch            Exercise Diary  12/3 12/5 12/7 12/10 12/12   nustep S=8 L2 10' L2 10' L2x  10' L2 10' L2 10'   Core brace 5"x20 5"x20 with p ball 5"x20 5"x20 5"x20   Supine ball squeeze 3x10 3x10 3x10 3x10 nv   Clamshell iso Supine yellow 2x10 Supine yellow 2x10 Supine yellow 2x10 Supine yellow 3x10 nv   Glut set 5"x20 5"x20 5"x20 5"x20 5"x20   Hamstring set into p ball 5"x20 Into p ball 5"x20 Into p ball 5"x20 Into p ball 5"x20 Into p ball 5"x20   Heel slide With p ball 5"x20 With p ball 10"x10 With p ball 10"x10 With p ball 10"x10 With p ball 10"x10   ITB stretch supine  4x15" 4x15" 4x15" 4x15"   Hamstring stretch Seated 3x30" Seated 3x30" Seated 3x30" Seated 3x30" Seated 3x30"   Hip flexor stretch        LTR  15x  20x nv   Step up Small 20x Small 20x Small 2x10 Small 2x10 Small 2x10   Total Gym Squat 10x np np     Seated p ball fwd, diag rolls  10x10" fwd  5x10" diag 10x10" fwd  10x10" diag 10x10" fwd  10x10" diag 10x10" fwd  10x10" diag ea   Standing hip ext Left 2x10 Left 2x10 Left 25x Left 25x Left 25x   Standing donkey kick 10x 10x 10x 2x10 2x10   Side steps in // bars 4 laps along counter 4 laps along counter 6 laps in // bars 4 laps along counter 4 laps along counter   Standing HR 2x10 2x10 2x10 3x10 3x10   UE wall slide 2x10 2x10 2x10 2x10 30x   VMO SLR np np np -- --   SAQ with ball squeeze 3x10 3x10 3x10 3x10 1# 2x10    LAQ with ball squeeze 3x10 3x10 3x10 3x10 1# 2x10   TKE Green 2x10 nv grn 3x10 grn 3x10 grn 3x10   PPU nv dc -- --        Modalities 12/3 12/5 12/7 12/10 12/12   IFC  Seated 10' Seated 10' Seated x 10 min Seated 10'    Continuous US     1 MHz, 1 4W/cm^2 8' to left glut             LAQ 0-45 degrees only due to popping of knee from 90-45 degrees  Assessment: Will administer US at each session for possible benefits with deep healing  Continuation of skilled PT indicated  Plan: Continue per plan of care  Progress treatment as tolerated

## 2018-12-14 ENCOUNTER — OFFICE VISIT (OUTPATIENT)
Dept: PHYSICAL THERAPY | Facility: CLINIC | Age: 63
End: 2018-12-14
Payer: COMMERCIAL

## 2018-12-14 DIAGNOSIS — R26.9 GAIT ABNORMALITY: ICD-10-CM

## 2018-12-14 DIAGNOSIS — M25.552 PAIN OF LEFT HIP: Primary | ICD-10-CM

## 2018-12-14 DIAGNOSIS — S76.012D STRAIN OF GLUTEUS MEDIUS OF LEFT LOWER EXTREMITY, SUBSEQUENT ENCOUNTER: ICD-10-CM

## 2018-12-14 PROCEDURE — 97110 THERAPEUTIC EXERCISES: CPT | Performed by: PHYSICAL THERAPIST

## 2018-12-14 PROCEDURE — 97035 APP MDLTY 1+ULTRASOUND EA 15: CPT | Performed by: PHYSICAL THERAPIST

## 2018-12-14 NOTE — PROGRESS NOTES
Daily Note     Today's date: 2018  Patient name: Sudeep Saxena  : 1955  MRN: [id number]  Referring provider: Ruperto Marin, *  Dx:   Encounter Diagnosis     ICD-10-CM    1  Pain of left hip M25 552    2  Strain of gluteus medius of left lower extremity, subsequent encounter S76 012D    3  Gait abnormality R26 9        Start Time: 1000  Stop Time: 1105  Total time in clinic (min): 65 minutes    Subjective: Patient states she feels "sluggish" today  No significant change in pain  Objective: See treatment diary below      Assessment: Tolerated treatment well today with no increased pain  Continues to show deficits with left glut strength and hip range of motion with pain limiting overall functional mobility  Continuation of skilled PT indicated  Plan: Continue per plan of care  Progress treatment as tolerated          Re-evaluation:      Hip Specialty Daily Treatment Diary     Manual  12/14 12/5 12/7 12/10 12/12   PROM        ITB         Graston left quad, ITB, scar        Hamstring/calf stretch        Active release stretch            Exercise Diary  12/14 12/5 12/7 12/10 12/12   nustep S=8 L2 10' L2 10' L2x  10' L2 10' L2 10'   Core brace 5"x20 5"x20 with p ball 5"x20 5"x20 5"x20   Supine ball squeeze 3x10 3x10 3x10 3x10 nv   Clamshell iso np Supine yellow 2x10 Supine yellow 2x10 Supine yellow 3x10 nv   Glut set 5"x20 5"x20 5"x20 5"x20 5"x20   Hamstring set Into p ball 5"x20 Into p ball 5"x20 Into p ball 5"x20 Into p ball 5"x20 Into p ball 5"x20   Heel slide With p ball 10"x10 With p ball 10"x10 With p ball 10"x10 With p ball 10"x10 With p ball 10"x10   ITB stretch supine np 4x15" 4x15" 4x15" 4x15"   Hamstring stretch Seated 3x30" Seated 3x30" Seated 3x30" Seated 3x30" Seated 3x30"   Hip flexor stretch        LTR np 15x  20x nv   Step up Small 20x Small 20x Small 2x10 Small 2x10 Small 2x10   Total Gym Squat  np np     Seated p ball fwd, diag rolls 10x10" fwd  10x10" diag ea 10x10" fwd  5x10" diag 10x10" fwd  10x10" diag 10x10" fwd  10x10" diag 10x10" fwd  10x10" diag ea   Standing hip ext Left 3x10 Left 2x10 Left 25x Left 25x Left 25x   Standing donkey kick 10x2 10x 10x 2x10 2x10   Side steps in // bars 6 laps in // bars 4 laps along counter 6 laps in // bars 4 laps along counter 4 laps along counter   Standing HR 3x10 2x10 2x10 3x10 3x10   UE wall slide 2x10 2x10 2x10 2x10 30x   VMO SLR -- np np -- --   SAQ with ball squeeze 1# 3x10 3x10 3x10 3x10 1# 2x10    LAQ with ball squeeze 1# 3x10 3x10 3x10 3x10 1# 2x10   TKE Green 3x10 nv grn 3x10 grn 3x10 grn 3x10       Modalities 12/14 12/5 12/7 12/10 12/12   IFC  Seated 10' Seated 10' Seated x 10 min Seated 10'    Continuous US 1 MHz, 1 4W/cm^2 8' to left glut    1 MHz, 1 4W/cm^2 8' to left glut

## 2018-12-17 ENCOUNTER — OFFICE VISIT (OUTPATIENT)
Dept: PHYSICAL THERAPY | Facility: CLINIC | Age: 63
End: 2018-12-17
Payer: COMMERCIAL

## 2018-12-17 DIAGNOSIS — R26.9 GAIT ABNORMALITY: ICD-10-CM

## 2018-12-17 DIAGNOSIS — S76.012D STRAIN OF GLUTEUS MEDIUS OF LEFT LOWER EXTREMITY, SUBSEQUENT ENCOUNTER: ICD-10-CM

## 2018-12-17 DIAGNOSIS — M25.552 PAIN OF LEFT HIP: Primary | ICD-10-CM

## 2018-12-17 PROCEDURE — 97110 THERAPEUTIC EXERCISES: CPT | Performed by: PHYSICAL THERAPIST

## 2018-12-17 PROCEDURE — 97035 APP MDLTY 1+ULTRASOUND EA 15: CPT | Performed by: PHYSICAL THERAPIST

## 2018-12-17 NOTE — PROGRESS NOTES
Daily Note     Today's date: 2018  Patient name: Peng Chaiedz  : 1955  MRN: [id number]  Referring provider: Jeannette Quintanilla, *  Dx:   Encounter Diagnosis     ICD-10-CM    1  Pain of left hip M25 552    2  Strain of gluteus medius of left lower extremity, subsequent encounter S76 012D    3  Gait abnormality R26 9        Start Time: 0958  Stop Time: 1103  Total time in clinic (min): 65 minutes    Subjective: Patient to see MD tomorrow, will call with results         Objective: See treatment diary below    Re-evaluation:      Hip Specialty Daily Treatment Diary     Manual  12/14 12/17 12/7 12/10 12/12   PROM        ITB         Graston left quad, ITB, scar        Hamstring/calf stretch        Active release stretch            Exercise Diary  12/14 12/17 12/7 12/10 12/12   nustep S=8 L2 10' L2 10' L2x  10' L2 10' L2 10'   Core brace 5"x20 5"x20 5"x20 5"x20 5"x20   Supine ball squeeze 3x10 3x10 3x10 3x10 nv   Clamshell iso np np Supine yellow 2x10 Supine yellow 3x10 nv   Glut set 5"x20 5"x20 5"x20 5"x20 5"x20   Hamstring set Into p ball 5"x20 np Into p ball 5"x20 Into p ball 5"x20 Into p ball 5"x20   Heel slide With p ball 10"x10 np With p ball 10"x10 With p ball 10"x10 With p ball 10"x10   ITB stretch supine np np 4x15" 4x15" 4x15"   Hamstring stretch Seated 3x30" Seated 3x30" Seated 3x30" Seated 3x30" Seated 3x30"   Hip flexor stretch        LTR np np  20x nv   Step up Small 20x Small 20x Small 2x10 Small 2x10 Small 2x10   Total Gym Squat   np     Seated p ball fwd, diag rolls 10x10" fwd  10x10" diag ea 10x10" fwd  10x10" diag ea 10x10" fwd  10x10" diag 10x10" fwd  10x10" diag 10x10" fwd  10x10" diag ea   Standing hip ext Left 3x10 Left 3x10 Left 25x Left 25x Left 25x   Standing donkey kick 10x2 2x10 10x 2x10 2x10   Side steps in // bars 6 laps in // bars 4 laps along counter 6 laps in // bars 4 laps along counter 4 laps along counter   Standing HR 3x10 30x 2x10 3x10 3x10   UE wall slide 2x10 2x10 2x10 2x10 30x   VMO SLR --  np -- --   SAQ with ball squeeze 1# 3x10 1# 3x10 3x10 3x10 1# 2x10    LAQ with ball squeeze 1# 3x10 1# 3x10 3x10 3x10 1# 2x10   TKE Green 3x10 grn 3x10 grn 3x10 grn 3x10 grn 3x10       Modalities 12/14 12/17 12/7 12/10 12/12   IFC  Seated 10'  Seated x 10 min Seated 10'    Continuous US 1 MHz, 1 4W/cm^2 8' to left glut 1 MHz, 1 4W/cm^2 8' to left glut   1 MHz, 1 4W/cm^2 8' to left glut             Assessment: No significant change in symptoms  Requires SPC for normalized gait  Plan: Continue per plan of care  Progress treatment as tolerated

## 2018-12-18 ENCOUNTER — TRANSCRIBE ORDERS (OUTPATIENT)
Dept: ADMINISTRATIVE | Facility: HOSPITAL | Age: 63
End: 2018-12-18

## 2018-12-18 DIAGNOSIS — M54.16 LUMBAR RADICULOPATHY: Primary | ICD-10-CM

## 2018-12-22 ENCOUNTER — HOSPITAL ENCOUNTER (OUTPATIENT)
Dept: MRI IMAGING | Facility: HOSPITAL | Age: 63
Discharge: HOME | End: 2018-12-22
Payer: COMMERCIAL

## 2018-12-22 DIAGNOSIS — M54.16 LUMBAR RADICULOPATHY: ICD-10-CM

## 2018-12-22 PROCEDURE — 72148 MRI LUMBAR SPINE W/O DYE: CPT

## 2018-12-24 ENCOUNTER — EVALUATION (OUTPATIENT)
Dept: PHYSICAL THERAPY | Facility: CLINIC | Age: 63
End: 2018-12-24
Payer: COMMERCIAL

## 2018-12-24 DIAGNOSIS — M54.16 LUMBAR RADICULOPATHY: ICD-10-CM

## 2018-12-24 DIAGNOSIS — S76.012D STRAIN OF GLUTEUS MEDIUS OF LEFT LOWER EXTREMITY, SUBSEQUENT ENCOUNTER: ICD-10-CM

## 2018-12-24 DIAGNOSIS — M25.552 PAIN OF LEFT HIP: Primary | ICD-10-CM

## 2018-12-24 DIAGNOSIS — R26.9 GAIT ABNORMALITY: ICD-10-CM

## 2018-12-24 PROCEDURE — G8978 MOBILITY CURRENT STATUS: HCPCS | Performed by: PHYSICAL THERAPIST

## 2018-12-24 PROCEDURE — G8979 MOBILITY GOAL STATUS: HCPCS | Performed by: PHYSICAL THERAPIST

## 2018-12-24 PROCEDURE — 97164 PT RE-EVAL EST PLAN CARE: CPT | Performed by: PHYSICAL THERAPIST

## 2018-12-24 PROCEDURE — 97110 THERAPEUTIC EXERCISES: CPT | Performed by: PHYSICAL THERAPIST
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
## 2018-12-27 RX ORDER — IBUPROFEN 600 MG/1
600 TABLET ORAL EVERY 8 HOURS PRN
COMMUNITY
Start: 2018-03-27 | End: 2019-08-13

## 2018-12-27 RX ORDER — TIZANIDINE 4 MG/1
TABLET ORAL
Refills: 1 | COMMUNITY
Start: 2018-10-04 | End: 2018-12-28

## 2018-12-27 RX ORDER — ONDANSETRON 4 MG/1
4 TABLET, ORALLY DISINTEGRATING ORAL EVERY 8 HOURS PRN
COMMUNITY
Start: 2018-03-27 | End: 2018-12-28

## 2018-12-27 RX ORDER — PREDNISONE 1 MG/1
TABLET ORAL
Refills: 0 | COMMUNITY
Start: 2018-12-04 | End: 2018-12-28

## 2018-12-27 RX ORDER — METHOCARBAMOL 750 MG/1
750-1500 TABLET, FILM COATED ORAL 4 TIMES DAILY PRN
COMMUNITY
Start: 2018-03-27 | End: 2018-12-28

## 2018-12-28 ENCOUNTER — OFFICE VISIT (OUTPATIENT)
Dept: FAMILY MEDICINE CLINIC | Facility: CLINIC | Age: 63
End: 2018-12-28
Payer: COMMERCIAL

## 2018-12-28 VITALS
HEART RATE: 82 BPM | SYSTOLIC BLOOD PRESSURE: 150 MMHG | TEMPERATURE: 98.9 F | HEIGHT: 62 IN | DIASTOLIC BLOOD PRESSURE: 92 MMHG | OXYGEN SATURATION: 97 % | BODY MASS INDEX: 34.85 KG/M2 | WEIGHT: 189.4 LBS

## 2018-12-28 DIAGNOSIS — Z12.39 BREAST CANCER SCREENING: ICD-10-CM

## 2018-12-28 DIAGNOSIS — D50.9 IRON DEFICIENCY ANEMIA, UNSPECIFIED IRON DEFICIENCY ANEMIA TYPE: ICD-10-CM

## 2018-12-28 DIAGNOSIS — E04.9 ENLARGED THYROID GLAND: ICD-10-CM

## 2018-12-28 DIAGNOSIS — I10 ESSENTIAL HYPERTENSION: Primary | ICD-10-CM

## 2018-12-28 DIAGNOSIS — R73.01 IMPAIRED FASTING GLUCOSE: ICD-10-CM

## 2018-12-28 DIAGNOSIS — E78.00 HYPERCHOLESTEROLEMIA: ICD-10-CM

## 2018-12-28 DIAGNOSIS — Z23 FLU VACCINE NEED: ICD-10-CM

## 2018-12-28 DIAGNOSIS — R01.1 SYSTOLIC MURMUR: ICD-10-CM

## 2018-12-28 PROCEDURE — 90471 IMMUNIZATION ADMIN: CPT

## 2018-12-28 PROCEDURE — 90686 IIV4 VACC NO PRSV 0.5 ML IM: CPT

## 2018-12-28 PROCEDURE — 99214 OFFICE O/P EST MOD 30 MIN: CPT | Performed by: PHYSICIAN ASSISTANT

## 2018-12-28 RX ORDER — LOSARTAN POTASSIUM AND HYDROCHLOROTHIAZIDE 12.5; 1 MG/1; MG/1
1 TABLET ORAL DAILY
Qty: 90 TABLET | Refills: 0 | Status: SHIPPED | OUTPATIENT
Start: 2018-12-28 | End: 2019-02-01

## 2018-12-28 NOTE — PROGRESS NOTES
Routine Follow-up    Juan Potts 66 y o  female   Date:  12/28/2018      Assessment and Plan:    Valencia Avendano was seen today for follow-up  Diagnoses and all orders for this visit:    Essential hypertension  -     losartan-hydrochlorothiazide (HYZAAR) 100-12 5 MG per tablet; Take 1 tablet by mouth daily  -     Comprehensive metabolic panel; Future  -     Echo complete with contrast if indicated; Future    Hypercholesterolemia  -     Lipid panel; Future    Iron deficiency anemia, unspecified iron deficiency anemia type  -     CBC and differential; Future  -     Iron; Future  -     TIBC; Future    Impaired fasting glucose  -     Comprehensive metabolic panel; Future    Breast cancer screening  -     Cancel: Mammo screening bilateral w cad; Future    Flu vaccine need  -     SYRINGE/SINGLE-DOSE VIAL: influenza vaccine, [id number], quadrivalent, 0 5 mL, preservative-free, for patients 3+ yr (FLUZONE)    Enlarged thyroid gland  -     US thyroid; Future  -     TSH, 3rd generation with Free T4 reflex; Future    Systolic murmur  -     Echo complete with contrast if indicated; Future              HPI:  Chief Complaint   Patient presents with    Follow-up     HPI   Patient is a 67 yo female who presents for routine follow up  Patient has hx of thyroid nodule in the past, one being biopsied when she lived in [facility]  She never had follow up ultrasounds in a few years  She is due for repeat labs, was anemic in the past and was taking iron supplementation  She is current going through PT for L hip/buttock pain  Her Bp is better controlled at home, feels white coat syndrome elevates today  ROS: Review of Systems   Constitutional: Negative  Respiratory: Negative  Cardiovascular: Negative  Musculoskeletal: Positive for arthralgias  Negative for gait problem and neck pain  Skin: Negative  Neurological: Negative  Hematological: Negative          Past Medical History:   Diagnosis Date    Anemia     Hyperlipidemia     Hypertension     Overactive bladder     Vitamin D deficiency      Patient Active Problem List   Diagnosis    Hypercholesterolemia    Iron deficiency anemia    Joint disorder of pelvis or thigh    Leg weakness, bilateral    Nocturia    Overactive bladder    Pain in both lower legs    Segmental and somatic dysfunction of sacral region    Vitamin D deficiency    Hypertension    Leg swelling       Past Surgical History:   Procedure Laterality Date     SECTION      Last Assessed: 2017    ELBOW SURGERY      Last Assessed: 2017    ESOPHAGOGASTRODUODENOSCOPY N/A 2017    Procedure: ESOPHAGOGASTRODUODENOSCOPY (EGD); Surgeon: Denis Martinez DO;  Location: [facility]; Service: Gastroenterology    HIP SURGERY Left 2018    glut medius/minimus repair    HYSTERECTOMY          KNEE SURGERY      x2    DC COLONOSCOPY FLX DX W/COLLJ SPEC WHEN PFRMD N/A 2017    Procedure: EGD AND COLONOSCOPY;  Surgeon: Denis Martinez DO;  Location: [facility];   Service: Gastroenterology       Social History     Social History    Marital status: /Civil Union     Spouse name: N/A    Number of children: N/A    Years of education: N/A     Social History Main Topics    Smoking status: Former Smoker     Quit date:     Smokeless tobacco: Never Used      Comment: quit 25 yrs ago    Alcohol use No    Drug use: No    Sexual activity: Not Asked     Other Topics Concern    None     Social History Narrative    Caffeine use    , 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[employer], 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 [address] [place] [address]    2 grown children       Family History   Problem Relation Age of Onset    Colon cancer Mother     Heart attack Father     Other Father         Cardiac Disorder    Diabetes type II Father     Colon cancer Sister     Cancer Maternal Grandmother        No Known Allergies      Current Outpatient Prescriptions:     ibuprofen (MOTRIN) 600 mg tablet, Take 600 mg by mouth every 8 (eight) hours as needed, Disp: , Rfl:     losartan-hydrochlorothiazide (HYZAAR) 100-12 5 MG per tablet, Take 1 tablet by mouth daily, Disp: 90 tablet, Rfl: 0      Physical Exam:  /92 (BP Location: Left arm, Patient Position: Sitting, Cuff Size: Standard)   Pulse 82   Temp 98 9 °F (37 2 °C) (Tympanic)   Ht 5' 2" (1 575 m)   Wt 85 9 kg (189 lb 6 4 oz)   SpO2 97%   BMI 34 64 kg/m²     Physical Exam   Constitutional: She is oriented to person, place, and time  She appears well-developed and well-nourished  No distress  HENT:   Head: Normocephalic and atraumatic  Right Ear: External ear normal    Left Ear: External ear normal    Nose: Nose normal    Mouth/Throat: Oropharynx is clear and moist    Eyes: Pupils are equal, round, and reactive to light  Conjunctivae are normal    Neck: Normal range of motion  Neck supple  Cardiovascular: Normal rate and regular rhythm  Murmur (systolic) heard  Pulmonary/Chest: Effort normal and breath sounds normal  No respiratory distress  She has no wheezes  She has no rales  Musculoskeletal: She exhibits no edema or deformity  Lymphadenopathy:     She has no cervical adenopathy  Neurological: She is alert and oriented to person, place, and time  No cranial nerve deficit  Skin: Skin is warm and dry  No rash noted  Psychiatric: She has a normal mood and affect   Her behavior is normal          Labs:  Lab Results   Component Value Date    WBC 5 4 05/14/2018    HGB 12 8 05/14/2018    HCT 40 7 05/14/2018    MCV 77 4 (L) 05/14/2018     05/14/2018     Lab Results   Component Value Date     05/14/2018    K 4 0 05/14/2018     05/14/2018    CO2 28 05/14/2018    ANIONGAP 12 0 05/14/2018    BUN 23 05/14/2018    CREATININE 0 74 05/14/2018    GLUF 95 04/17/2017    CALCIUM 9 4 05/14/2018    AST 13 05/14/2018    ALT 7 (L) 05/14/2018    ALKPHOS 81 05/14/2018    PROT 7 3 05/14/2018    BILITOT 0 4 05/14/2018    EGFR >60 0 04/17/2017

## 2018-12-28 NOTE — PATIENT INSTRUCTIONS
Hypertension   AMBULATORY CARE:   Hypertension  is high blood pressure (BP)  Your BP is the force of your blood moving against the walls of your arteries  Normal BP is less than 120/80  Prehypertension is between 120/80 and 139/89  Hypertension is 140/90 or higher  Hypertension causes your BP to get so high that your heart has to work much harder than normal  This can damage your heart  You can control hypertension with a healthy lifestyle or medicines  A controlled blood pressure helps protect your organs, such as your heart, lungs, brain, and kidneys  Common symptoms include the following:   · Headache     · Blurred vision     · Chest pain     · Dizziness or weakness     · Trouble breathing    · Nosebleeds  Call 911 for any of the following:   · You have discomfort in your chest that feels like squeezing, pressure, fullness, or pain  · You become confused or have difficulty speaking  · You suddenly feel lightheaded or have trouble breathing  · You have pain or discomfort in your back, neck, jaw, stomach, or arm  Seek care immediately if:   · You have a severe headache or vision loss  · You have weakness in an arm or leg  Contact your healthcare provider if:   · You feel faint, dizzy, confused, or drowsy  · You have been taking your BP medicine and your BP is still higher than your healthcare provider says it should be  · You have questions or concerns about your condition or care  Treatment for hypertension  may include medicine to lower your BP and lower your cholesterol level  A low cholesterol level helps prevent heart disease and makes it easier to control your blood pressure  You may also need to make lifestyle changes  Take your medicine exactly as directed  Manage hypertension:  Talk with your healthcare provider about these and other ways to manage hypertension:  · Check your BP at home  Sit and rest for 5 minutes before you take your BP   Extend your arm and support it on a flat surface  Your arm should be at the same level as your heart  Follow the directions that came with your BP monitor  If possible, take at least 2 BP readings each time  Take your BP at least twice a day at the same times each day, such as morning and evening  Keep a record of your BP readings and bring it to your follow-up visits  Ask your healthcare provider what your BP should be  · Limit sodium (salt) as directed  Too much sodium can affect your fluid balance  Check labels to find low-sodium or no-salt-added foods  Some low-sodium foods use potassium salts for flavor  Too much potassium can also cause health problems  Your healthcare provider will tell you how much sodium and potassium are safe for you to have in a day  He or she may recommend that you limit sodium to 2,300 mg a day  · Follow the meal plan recommended by your healthcare provider  A dietitian or your provider can give you more information on low-sodium plans or the DASH (Dietary Approaches to Stop Hypertension) eating plan  The DASH plan is low in sodium, unhealthy fats, and total fat  It is high in potassium, calcium, and fiber  · Exercise to maintain a healthy weight  Exercise at least 30 minutes per day, on most days of the week  This will help decrease your blood pressure  Ask your healthcare provider about the best exercise plan for you  · Decrease stress  This may help lower your BP  Learn ways to relax, such as deep breathing or listening to music  · Limit alcohol  Women should limit alcohol to 1 drink a day  Men should limit alcohol to 2 drinks a day  A drink of alcohol is 12 ounces of beer, 5 ounces of wine, or 1½ ounces of liquor  · Do not smoke  Nicotine and other chemicals in cigarettes and cigars can increase your BP and also cause lung damage  Ask your healthcare provider for information if you currently smoke and need help to quit  E-cigarettes or smokeless tobacco still contain nicotine  Talk to your healthcare provider before you use these products  · Manage any other health conditions you have  Health conditions such as diabetes can increase your risk for hypertension  Follow your healthcare provider's instructions and take all your medicines as directed  Follow up with your healthcare provider as directed: You will need to return to have your BP checked and to have other lab tests done  Write down your questions so you remember to ask them during your visits  © 2017 [employer] Information is for End User's use only and may not be sold, redistributed or otherwise used for commercial purposes  All illustrations and images included in CareNotes® are the copyrighted property of A D A M , Inc  or Rasheed Bishop  The above information is an  only  It is not intended as medical advice for individual conditions or treatments  Talk to your doctor, nurse or pharmacist before following any medical regimen to see if it is safe and effective for you

## 2018-12-31 ENCOUNTER — HOSPITAL ENCOUNTER (OUTPATIENT)
Dept: ULTRASOUND IMAGING | Facility: HOSPITAL | Age: 63
Discharge: HOME | End: 2018-12-31
Payer: COMMERCIAL

## 2018-12-31 DIAGNOSIS — E04.9 ENLARGED THYROID GLAND: ICD-10-CM

## 2018-12-31 PROCEDURE — 76536 US EXAM OF HEAD AND NECK: CPT

## 2019-01-02 ENCOUNTER — OFFICE VISIT (OUTPATIENT)
Dept: PHYSICAL THERAPY | Facility: CLINIC | Age: 64
End: 2019-01-02
Payer: COMMERCIAL

## 2019-01-02 DIAGNOSIS — M54.16 LUMBAR RADICULOPATHY: ICD-10-CM

## 2019-01-02 DIAGNOSIS — S76.012D STRAIN OF GLUTEUS MEDIUS OF LEFT LOWER EXTREMITY, SUBSEQUENT ENCOUNTER: ICD-10-CM

## 2019-01-02 DIAGNOSIS — M25.552 PAIN OF LEFT HIP: Primary | ICD-10-CM

## 2019-01-02 DIAGNOSIS — R26.9 GAIT ABNORMALITY: ICD-10-CM

## 2019-01-02 PROCEDURE — 97110 THERAPEUTIC EXERCISES: CPT | Performed by: PHYSICAL THERAPIST

## 2019-01-02 PROCEDURE — 97035 APP MDLTY 1+ULTRASOUND EA 15: CPT | Performed by: PHYSICAL THERAPIST

## 2019-01-02 NOTE — PROGRESS NOTES
Daily Note     Today's date: 2019  Patient name: Asuncion Padilla  : 1955  MRN: [id number]  Referring provider: Chapito Britton, *  Dx:   Encounter Diagnosis     ICD-10-CM    1  Pain of left hip M25 552    2  Strain of gluteus medius of left lower extremity, subsequent encounter S76 012D    3  Gait abnormality R26 9    4  Lumbar radiculopathy M54 16        Start Time: 4548  Stop Time: 1000  Total time in clinic (min): 65 minutes    Subjective: No change in symptoms since last session  MRI results mild degenerative changes, similar to prior MRI of lumbar spine  NDV written on patient card         Objective: See treatment diary below    Re-evaluation:      Hip Specialty Daily Treatment Diary     Manual     PROM        ITB         Graston left quad, ITB, scar        Hamstring/calf stretch        Active release stretch            Exercise Diary     nustep S=8 L2 10' L2 10' L2x  10' L2 10'  L2 10'   Core brace 5"x20 5"x20 5"x20 5"x20 5"x20   Supine ball squeeze 3x10 3x10 3x10 3x10 nv   Clamshell iso np np np np nv   Glut set 5"x20 5"x20 5"x20 5"x20 5"x20   Hamstring set Into p ball 5"x20 np Into p ball 5"x20 Into p ball 5"x20 Into p ball 5"x20   Heel slide With p ball 10"x10 np With p ball 10"x10 With p ball 10"x10 With p ball 10"x10   ITB stretch supine np np np  4x15"   Hamstring stretch Seated 3x30" Seated 3x30" Seated 3x30" Seated 3x30" Seated 3x30"   Hip flexor stretch        LTR np np np np nv   Step up Small 20x Small 20x np np Small 2x10   Total Gym Squat        Seated p ball fwd, diag rolls 10x10" fwd  10x10" diag ea 10x10" fwd  10x10" diag ea 10x10" fwd  10x10" diag 10x10" fwd  10x10" diag 10x10" fwd  10x10" diag ea   Standing hip ext Left 3x10 Left 3x10 Left 30x Left 30x Left 25x   Standing donkey kick 10x2 2x10 10x2 10x2 2x10   Side steps in // bars 6 laps in // bars 4 laps along counter 4 laps along counter 4 laps along counter 4 laps along counter   Standing HR 3x10 30x 2x10 30x 3x10   UE wall slide 2x10 2x10  Lunge L UE slide 2x10 30x   VMO SLR --    --   SAQ with ball squeeze 1# 3x10 1# 3x10 np 1# 3x10 1# 2x10    LAQ with ball squeeze 1# 3x10 1# 3x10 np 1# 3x10 1# 2x10   TKE Green 3x10 grn 3x10 np np grn 3x10       Modalities 12/14 12/17 12/24 1/2 12/12   IFC  Seated 10'       Continuous US 1 MHz, 1 4W/cm^2 8' to left glut 1 MHz, 1 4W/cm^2 8' to left glut  1 MHz, 1 4W/cm^2 8' to left glut 1 MHz, 1 4W/cm^2 8' to left glut             Assessment: Completed exercises with good form  Continues to have pain with left leg extension impacting gait and transfers  Continuation of skilled PT indicated  Plan: Continue per plan of care  Progress treatment as tolerated

## 2019-01-05 ENCOUNTER — HOSPITAL ENCOUNTER (OUTPATIENT)
Dept: MAMMOGRAPHY | Facility: HOSPITAL | Age: 64
Discharge: HOME | End: 2019-01-05
Payer: COMMERCIAL

## 2019-01-05 VITALS — BODY MASS INDEX: 33.49 KG/M2 | HEIGHT: 63 IN | WEIGHT: 189 LBS

## 2019-01-05 DIAGNOSIS — Z12.39 BREAST CANCER SCREENING: ICD-10-CM

## 2019-01-05 PROCEDURE — 77063 BREAST TOMOSYNTHESIS BI: CPT

## 2019-01-05 PROCEDURE — 77067 SCR MAMMO BI INCL CAD: CPT

## 2019-01-07 ENCOUNTER — OFFICE VISIT (OUTPATIENT)
Dept: PHYSICAL THERAPY | Facility: CLINIC | Age: 64
End: 2019-01-07
Payer: COMMERCIAL

## 2019-01-07 ENCOUNTER — APPOINTMENT (OUTPATIENT)
Dept: LAB | Facility: CLINIC | Age: 64
End: 2019-01-07
Payer: COMMERCIAL

## 2019-01-07 DIAGNOSIS — M25.552 PAIN OF LEFT HIP: Primary | ICD-10-CM

## 2019-01-07 DIAGNOSIS — E04.9 ENLARGED THYROID GLAND: ICD-10-CM

## 2019-01-07 DIAGNOSIS — D50.9 IRON DEFICIENCY ANEMIA, UNSPECIFIED IRON DEFICIENCY ANEMIA TYPE: ICD-10-CM

## 2019-01-07 DIAGNOSIS — R73.01 IMPAIRED FASTING GLUCOSE: ICD-10-CM

## 2019-01-07 DIAGNOSIS — I10 ESSENTIAL HYPERTENSION: ICD-10-CM

## 2019-01-07 DIAGNOSIS — S76.012D STRAIN OF GLUTEUS MEDIUS OF LEFT LOWER EXTREMITY, SUBSEQUENT ENCOUNTER: ICD-10-CM

## 2019-01-07 DIAGNOSIS — R26.9 GAIT ABNORMALITY: ICD-10-CM

## 2019-01-07 DIAGNOSIS — M54.16 LUMBAR RADICULOPATHY: ICD-10-CM

## 2019-01-07 DIAGNOSIS — E78.00 HYPERCHOLESTEROLEMIA: ICD-10-CM

## 2019-01-07 LAB
ALBUMIN SERPL BCP-MCNC: 3.7 G/DL (ref 3.5–5)
ALP SERPL-CCNC: 94 U/L (ref 46–116)
ALT SERPL W P-5'-P-CCNC: 14 U/L (ref 12–78)
ANION GAP SERPL CALCULATED.3IONS-SCNC: 6 MMOL/L (ref 4–13)
AST SERPL W P-5'-P-CCNC: 10 U/L (ref 5–45)
BASOPHILS # BLD AUTO: 0.03 THOUSANDS/ΜL (ref 0–0.1)
BASOPHILS NFR BLD AUTO: 1 % (ref 0–1)
BILIRUB SERPL-MCNC: 0.4 MG/DL (ref 0.2–1)
BUN SERPL-MCNC: 20 MG/DL (ref 5–25)
CALCIUM SERPL-MCNC: 9.3 MG/DL (ref 8.3–10.1)
CHLORIDE SERPL-SCNC: 104 MMOL/L (ref 100–108)
CHOLEST SERPL-MCNC: 267 MG/DL (ref 50–200)
CO2 SERPL-SCNC: 30 MMOL/L (ref 21–32)
CREAT SERPL-MCNC: 0.77 MG/DL (ref 0.6–1.3)
EOSINOPHIL # BLD AUTO: 0.5 THOUSAND/ΜL (ref 0–0.61)
EOSINOPHIL NFR BLD AUTO: 8 % (ref 0–6)
ERYTHROCYTE [DISTWIDTH] IN BLOOD BY AUTOMATED COUNT: 15.5 % (ref 11.6–15.1)
GFR SERPL CREATININE-BSD FRML MDRD: 84 ML/MIN/1.73SQ M
GLUCOSE P FAST SERPL-MCNC: 99 MG/DL (ref 65–99)
HCT VFR BLD AUTO: 40.8 % (ref 34.8–46.1)
HDLC SERPL-MCNC: 49 MG/DL (ref 40–60)
HGB BLD-MCNC: 12.2 G/DL (ref 11.5–15.4)
IMM GRANULOCYTES # BLD AUTO: 0.02 THOUSAND/UL (ref 0–0.2)
IMM GRANULOCYTES NFR BLD AUTO: 0 % (ref 0–2)
IRON SERPL-MCNC: 47 UG/DL (ref 50–170)
LDLC SERPL CALC-MCNC: 179 MG/DL (ref 0–100)
LYMPHOCYTES # BLD AUTO: 2.37 THOUSANDS/ΜL (ref 0.6–4.47)
LYMPHOCYTES NFR BLD AUTO: 39 % (ref 14–44)
MCH RBC QN AUTO: 25.1 PG (ref 26.8–34.3)
MCHC RBC AUTO-ENTMCNC: 29.9 G/DL (ref 31.4–37.4)
MCV RBC AUTO: 84 FL (ref 82–98)
MONOCYTES # BLD AUTO: 0.57 THOUSAND/ΜL (ref 0.17–1.22)
MONOCYTES NFR BLD AUTO: 9 % (ref 4–12)
NEUTROPHILS # BLD AUTO: 2.55 THOUSANDS/ΜL (ref 1.85–7.62)
NEUTS SEG NFR BLD AUTO: 43 % (ref 43–75)
NONHDLC SERPL-MCNC: 218 MG/DL
NRBC BLD AUTO-RTO: 0 /100 WBCS
PLATELET # BLD AUTO: 368 THOUSANDS/UL (ref 149–390)
PMV BLD AUTO: 10.7 FL (ref 8.9–12.7)
POTASSIUM SERPL-SCNC: 4.1 MMOL/L (ref 3.5–5.3)
PROT SERPL-MCNC: 7.6 G/DL (ref 6.4–8.2)
RBC # BLD AUTO: 4.87 MILLION/UL (ref 3.81–5.12)
SODIUM SERPL-SCNC: 140 MMOL/L (ref 136–145)
TIBC SERPL-MCNC: 450 UG/DL (ref 250–450)
TRIGL SERPL-MCNC: 193 MG/DL
TSH SERPL DL<=0.05 MIU/L-ACNC: 1.9 UIU/ML (ref 0.36–3.74)
WBC # BLD AUTO: 6.04 THOUSAND/UL (ref 4.31–10.16)

## 2019-01-07 PROCEDURE — G8979 MOBILITY GOAL STATUS: HCPCS | Performed by: PHYSICAL THERAPIST

## 2019-01-07 PROCEDURE — 97110 THERAPEUTIC EXERCISES: CPT | Performed by: PHYSICAL THERAPIST

## 2019-01-07 PROCEDURE — 97035 APP MDLTY 1+ULTRASOUND EA 15: CPT | Performed by: PHYSICAL THERAPIST

## 2019-01-07 PROCEDURE — 36415 COLL VENOUS BLD VENIPUNCTURE: CPT

## 2019-01-07 PROCEDURE — 80061 LIPID PANEL: CPT

## 2019-01-07 PROCEDURE — 84443 ASSAY THYROID STIM HORMONE: CPT

## 2019-01-07 PROCEDURE — 83540 ASSAY OF IRON: CPT

## 2019-01-07 PROCEDURE — 85025 COMPLETE CBC W/AUTO DIFF WBC: CPT

## 2019-01-07 PROCEDURE — 80053 COMPREHEN METABOLIC PANEL: CPT

## 2019-01-07 PROCEDURE — G8980 MOBILITY D/C STATUS: HCPCS | Performed by: PHYSICAL THERAPIST

## 2019-01-07 PROCEDURE — 83550 IRON BINDING TEST: CPT

## 2019-01-07 NOTE — PROGRESS NOTES
Daily Note     Today's date: 2019  Patient name: Too Triplett  : 1955  MRN: [id number]  Referring provider: Rei Lr, *  Dx:   Encounter Diagnosis     ICD-10-CM    1  Pain of left hip M25 552    2  Strain of gluteus medius of left lower extremity, subsequent encounter S76 012D    3  Gait abnormality R26 9    4  Lumbar radiculopathy M54 16        Start Time: 0800  Stop Time: 0857  Total time in clinic (min): 57 minutes    Subjective: Patient states no change in symptoms since last session  To see back specialist tomorrow if weather cooperates         Objective: See treatment diary below    Re-evaluation:      Hip Specialty Daily Treatment Diary     Manual     PROM        ITB         Graston left quad, ITB, scar        Hamstring/calf stretch        Active release stretch            Exercise Diary     nustep S=8 L2 10' L2 10' L2x  10' L2 10'  L2 10'   Core brace 5"x20 5"x20 5"x20 5"x20 5"x20   Supine ball squeeze 3x10 3x10 3x10 3x10 3x10   Clamshell iso np np np np    Glut set 5"x20 5"x20 5"x20 5"x20 5"x20   Hamstring set Into p ball 5"x20 np Into p ball 5"x20 Into p ball 5"x20 Into p ball 5"x20   Heel slide With p ball 10"x10 np With p ball 10"x10 With p ball 10"x10 With p ball 10"x10   ITB stretch supine np np np     Hamstring stretch Seated 3x30" Seated 3x30" Seated 3x30" Seated 3x30" Seated 3x30"   Hip flexor stretch        LTR np np np np np   Step up Small 20x Small 20x np np np   Total Gym Squat        Seated p ball fwd, diag rolls 10x10" fwd  10x10" diag ea 10x10" fwd  10x10" diag ea 10x10" fwd  10x10" diag 10x10" fwd  10x10" diag 10x10" fwd  10x10" diag   Standing hip ext Left 3x10 Left 3x10 Left 30x Left 30x Left 30x   Standing donkey kick 10x2 2x10 10x2 10x2 2x10   Side steps in // bars 6 laps in // bars 4 laps along counter 4 laps along counter 4 laps along counter 4 laps along counter   Standing HR 3x10 30x 2x10 30x 30x   UE wall slide 2x10 2x10  Lunge L UE slide 2x10 Right forward lunge with LUE slide 2x10   VMO SLR --       SAQ with ball squeeze 1# 3x10 1# 3x10 np 1# 3x10 1# 3x10   LAQ with ball squeeze 1# 3x10 1# 3x10 np 1# 3x10 1# 3x10   TKE Green 3x10 grn 3x10 np np grn 3x10       Modalities 12/14 12/17 12/24 1/2 1/7   IFC  Seated 10'       Continuous US 1 MHz, 1 4W/cm^2 8' to left glut 1 MHz, 1 4W/cm^2 8' to left glut  1 MHz, 1 4W/cm^2 8' to left glut 1 MHz, 1 4W/cm^2 8' to left glut             Assessment: Patient continues to display weakness of left hip abductors and extensors, limited use functionally due to pain  Continuation of skilled PT indicated  Plan: Continue per plan of care  Progress treatment as tolerated

## 2019-01-09 ENCOUNTER — APPOINTMENT (OUTPATIENT)
Dept: PHYSICAL THERAPY | Facility: CLINIC | Age: 64
End: 2019-01-09
Payer: COMMERCIAL

## 2019-01-09 ENCOUNTER — HOSPITAL ENCOUNTER (OUTPATIENT)
Dept: NON INVASIVE DIAGNOSTICS | Facility: CLINIC | Age: 64
Discharge: HOME | End: 2019-01-09
Payer: COMMERCIAL

## 2019-01-09 DIAGNOSIS — I10 ESSENTIAL HYPERTENSION: ICD-10-CM

## 2019-01-09 DIAGNOSIS — R01.1 SYSTOLIC MURMUR: ICD-10-CM

## 2019-01-09 PROCEDURE — 93306 TTE W/DOPPLER COMPLETE: CPT | Performed by: INTERNAL MEDICINE

## 2019-01-09 PROCEDURE — 93306 TTE W/DOPPLER COMPLETE: CPT

## 2019-01-11 DIAGNOSIS — E61.1 IRON DEFICIENCY: Primary | ICD-10-CM

## 2019-01-11 RX ORDER — FERROUS SULFATE TAB EC 324 MG (65 MG FE EQUIVALENT) 324 (65 FE) MG
324 TABLET DELAYED RESPONSE ORAL
Qty: 30 TABLET | Refills: 1 | Status: SHIPPED | OUTPATIENT
Start: 2019-01-11 | End: 2019-09-24 | Stop reason: SDUPTHER

## 2019-02-01 ENCOUNTER — OFFICE VISIT (OUTPATIENT)
Dept: FAMILY MEDICINE CLINIC | Facility: CLINIC | Age: 64
End: 2019-02-01
Payer: COMMERCIAL

## 2019-02-01 VITALS
WEIGHT: 188 LBS | SYSTOLIC BLOOD PRESSURE: 150 MMHG | HEART RATE: 81 BPM | RESPIRATION RATE: 16 BRPM | OXYGEN SATURATION: 98 % | TEMPERATURE: 98 F | DIASTOLIC BLOOD PRESSURE: 100 MMHG | BODY MASS INDEX: 33.3 KG/M2

## 2019-02-01 DIAGNOSIS — I10 ESSENTIAL HYPERTENSION: Primary | ICD-10-CM

## 2019-02-01 DIAGNOSIS — E04.2 MULTIPLE THYROID NODULES: ICD-10-CM

## 2019-02-01 DIAGNOSIS — E78.00 HYPERCHOLESTEROLEMIA: ICD-10-CM

## 2019-02-01 DIAGNOSIS — F51.01 PRIMARY INSOMNIA: ICD-10-CM

## 2019-02-01 PROBLEM — E78.5 HYPERLIPIDEMIA: Status: RESOLVED | Noted: 2019-02-01 | Resolved: 2019-02-01

## 2019-02-01 PROCEDURE — 99214 OFFICE O/P EST MOD 30 MIN: CPT | Performed by: PHYSICIAN ASSISTANT

## 2019-02-01 RX ORDER — ATORVASTATIN CALCIUM 20 MG/1
20 TABLET, FILM COATED ORAL DAILY
Qty: 90 TABLET | Refills: 1 | Status: SHIPPED | OUTPATIENT
Start: 2019-02-01 | End: 2019-08-22 | Stop reason: SDUPTHER

## 2019-02-01 RX ORDER — HYDROXYZINE PAMOATE 25 MG/1
25-50 CAPSULE ORAL
Qty: 40 CAPSULE | Refills: 1 | Status: SHIPPED | OUTPATIENT
Start: 2019-02-01 | End: 2019-04-03 | Stop reason: SDUPTHER

## 2019-02-01 RX ORDER — LOSARTAN POTASSIUM AND HYDROCHLOROTHIAZIDE 25; 100 MG/1; MG/1
1 TABLET ORAL DAILY
Qty: 90 TABLET | Refills: 0 | Status: SHIPPED | OUTPATIENT
Start: 2019-02-01 | End: 2019-04-29 | Stop reason: SDUPTHER

## 2019-02-01 NOTE — PROGRESS NOTES
Routine Follow-up    Too Triplett 66 y o  female   Date:  2/1/2019      Assessment and Plan:    Laci Jaimes was seen today for follow-up  Diagnoses and all orders for this visit:    Essential hypertension  -     losartan-hydrochlorothiazide (HYZAAR) 100-25 MG per tablet; Take 1 tablet by mouth daily  -     Comprehensive metabolic panel; Future    Multiple thyroid nodules  -     Ambulatory referral to General Surgery; Future    Hypercholesterolemia  -     atorvastatin (LIPITOR) 20 mg tablet; Take 1 tablet (20 mg total) by mouth daily  -     Lipid panel; Future  -     Comprehensive metabolic panel; Future    Primary insomnia  -     hydrOXYzine pamoate (VISTARIL) 25 mg capsule; Take 1-2 capsules (25-50 mg total) by mouth daily at bedtime as needed for anxiety (insomnia)            HPI:  Chief Complaint   Patient presents with    Follow-up     HPI   Patient is a 65 yo female who presents for routine follow up  Her BP is above goal  She had unremarkable echo  Her labs reveal elevated cholesterol  She started taking iron and is tolerating well  She is UTD on colonoscopy, normal  She has hx of thyroid nodules in the past and upon repeat thyroid ultrasound, shows 2 new nodules and enlargement of nodule that was biopsied in the past which was inflammatory in the past  She has no concerns  ROS: Review of Systems   Constitutional: Negative for chills, fatigue, fever and unexpected weight change  HENT: Negative for congestion, ear pain, hearing loss, nosebleeds, sore throat and trouble swallowing  Eyes: Negative for pain, discharge and visual disturbance  Respiratory: Negative for cough, shortness of breath and wheezing  Cardiovascular: Negative for chest pain, palpitations and leg swelling  Gastrointestinal: Negative for abdominal pain, blood in stool, constipation, diarrhea, nausea and vomiting  Endocrine: Negative for cold intolerance and heat intolerance     Genitourinary: Negative for difficulty urinating, dysuria and hematuria  Musculoskeletal: Negative for arthralgias, gait problem and myalgias  Skin: Negative for color change, rash and wound  Neurological: Negative for dizziness, syncope, weakness, light-headedness and headaches  Hematological: Negative for adenopathy  Does not bruise/bleed easily  Psychiatric/Behavioral: Negative for confusion and sleep disturbance  The patient is not nervous/anxious  Past Medical History:   Diagnosis Date    Anemia     Hyperlipidemia     Hypertension     Overactive bladder     Vitamin D deficiency      Patient Active Problem List   Diagnosis    Hypercholesterolemia    Iron deficiency anemia    Joint disorder of pelvis or thigh    Leg weakness, bilateral    Nocturia    Overactive bladder    Pain in both lower legs    Segmental and somatic dysfunction of sacral region    Vitamin D deficiency    Hypertension    Leg swelling       Past Surgical History:   Procedure Laterality Date     SECTION      Last Assessed: 2017    ELBOW SURGERY      Last Assessed: 2017    ESOPHAGOGASTRODUODENOSCOPY N/A 2017    Procedure: ESOPHAGOGASTRODUODENOSCOPY (EGD); Surgeon: Hung Thakkar DO;  Location: [facility]; Service: Gastroenterology    HIP SURGERY Left 2018    glut medius/minimus repair  and 18    HYSTERECTOMY      2011    KNEE SURGERY      x2    AL COLONOSCOPY FLX DX W/COLLJ SPEC WHEN PFRMD N/A 2017    Procedure: EGD AND COLONOSCOPY;  Surgeon: Hung Thakkar DO;  Location: [facility];   Service: Gastroenterology       Social History     Socioeconomic History    Marital status: /Civil Union     Spouse name: Not on file    Number of children: Not on file    Years of education: Not on file    Highest education level: Not on file   Occupational History    Not on file   Social Needs    Financial resource strain: Not on file    Food insecurity:     Worry: Not on file     Inability: Not on file   Madelyn Mare Transportation needs:     Medical: Not on file     Non-medical: Not on file   Tobacco Use    Smoking status: Former Smoker     Last attempt to quit:      Years since quittin 1    Smokeless tobacco: Never Used    Tobacco comment: quit 25 yrs ago   Substance and Sexual Activity    Alcohol use: No    Drug use: No    Sexual activity: Not on file   Lifestyle    Physical activity:     Days per week: Not on file     Minutes per session: Not on file    Stress: Not on file   Relationships    Social connections:     Talks on phone: Not on file     Gets together: Not on file     Attends Hindu service: Not on file     Active member of club or organization: Not on file     Attends meetings of clubs or organizations: Not on file     Relationship status: Not on file    Intimate partner violence:     Fear of current or ex partner: Not on file     Emotionally abused: Not on file     Physically abused: Not on file     Forced sexual activity: Not on file   Other Topics Concern    Not on file   Social History Narrative    Caffeine use    , 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[employer], 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[place] [address] for [employer] [address]    2 grown children       Family History   Problem Relation Age of Onset    Colon cancer Mother     Heart attack Father     Other Father         Cardiac Disorder    Diabetes type II Father     Colon cancer Sister     Cancer Maternal Grandmother        No Known Allergies      Current Outpatient Medications:     ferrous sulfate 324 (65 Fe) mg, Take 1 tablet (324 mg total) by mouth daily before breakfast, Disp: 30 tablet, Rfl: 1    ibuprofen (MOTRIN) 600 mg tablet, Take 600 mg by mouth every 8 (eight) hours as needed, Disp: , Rfl:     atorvastatin (LIPITOR) 20 mg tablet, Take 1 tablet (20 mg total) by mouth daily, Disp: 90 tablet, Rfl: 1    hydrOXYzine pamoate (VISTARIL) 25 mg capsule, Take 1-2 capsules (25-50 mg total) by mouth daily at bedtime as needed for anxiety (insomnia), Disp: 40 capsule, Rfl: 1    losartan-hydrochlorothiazide (HYZAAR) 100-25 MG per tablet, Take 1 tablet by mouth daily, Disp: 90 tablet, Rfl: 0      Physical Exam:  /100   Pulse 81   Temp 98 °F (36 7 °C)   Resp 16   Wt 85 3 kg (188 lb)   SpO2 98%   BMI 33 30 kg/m²     Physical Exam   Constitutional: She is oriented to person, place, and time  She appears well-developed and well-nourished  No distress  HENT:   Head: Normocephalic and atraumatic  Right Ear: External ear normal    Left Ear: External ear normal    Mouth/Throat: Oropharynx is clear and moist    Eyes: Pupils are equal, round, and reactive to light  Conjunctivae are normal    Neck: Normal range of motion  Neck supple  Thyromegaly present  Cardiovascular: Normal rate, regular rhythm and intact distal pulses  Murmur heard  Pulmonary/Chest: Effort normal and breath sounds normal  No respiratory distress  She has no wheezes  She has no rales  Musculoskeletal: She exhibits no edema or deformity  Neurological: She is alert and oriented to person, place, and time  No cranial nerve deficit  Skin: Skin is warm and dry  No rash noted  Psychiatric: She has a normal mood and affect   Her behavior is normal          Labs:  Lab Results   Component Value Date    WBC 6 04 01/07/2019    HGB 12 2 01/07/2019    HCT 40 8 01/07/2019    MCV 84 01/07/2019     01/07/2019     Lab Results   Component Value Date     05/14/2018    K 4 1 01/07/2019     01/07/2019    CO2 30 01/07/2019    ANIONGAP 12 0 05/14/2018    BUN 20 01/07/2019    CREATININE 0 77 01/07/2019    GLUF 99 01/07/2019    CALCIUM 9 3 01/07/2019    AST 10 01/07/2019    ALT 14 01/07/2019    ALKPHOS 94 01/07/2019    PROT 7 3 05/14/2018    BILITOT 0 4 05/14/2018    EGFR 84 01/07/2019

## 2019-02-01 NOTE — PATIENT INSTRUCTIONS
Insomnia   AMBULATORY CARE:   Insomnia  is a condition that makes it hard to fall or stay asleep  Lack of sleep can lead to attention or memory problems during the day  You may also be li, depressed, clumsy, or have headaches  Contact your healthcare provider if:   · Your symptoms do not get better, or they get worse  · You begin to use drugs or alcohol to fall asleep  · You have questions or concerns about your condition or care  Medicines:   · Medicines  may help you sleep more regularly or help you feel less anxious  · Take your medicine as directed  Contact your healthcare provider if you think your medicine is not helping or if you have side effects  Tell him or her if you are allergic to any medicine  Keep a list of the medicines, vitamins, and herbs you take  Include the amounts, and when and why you take them  Bring the list or the pill bottles to follow-up visits  Carry your medicine list with you in case of an emergency  What you can do to improve your sleep:   · Create a sleep schedule  This will help you form a sleep routine  Keep a record of your sleep patterns, and any sleeping problems you have  Bring the record to follow-up visits with healthcare providers  · Do not take naps  Naps could make it hard for you to fall asleep at bedtime  · Keep your bedroom cool, quiet, and dark  Turn on white noise, such as a fan, to help you relax  Do not use your bed for any activity that will keep you awake  Do not read, exercise, eat, or watch TV in your bedroom  · Get up if you do not fall asleep within 20 minutes  Move to another room and do something relaxing until you become sleepy  · Limit caffeine, alcohol, and food to earlier in the day  Only drink caffeine in the morning  Do not drink alcohol within 6 hours of bedtime  Do not eat a heavy meal right before you go to bed  · Exercise regularly  Daily exercise may help you sleep better   Do not exercise within 4 hours of bedtime  Follow up with your healthcare provider as directed: Your healthcare provider may refer you for cognitive behavioral therapy  A behavioral therapist may help you find ways to relax, decrease stress, and improve sleep  Write down your questions so you remember to ask them during your visits  © 2017 [facility] Information is for End User's use only and may not be sold, redistributed or otherwise used for commercial purposes  All illustrations and images included in CareNotes® are the copyrighted property of [employer]  or Rasheed Bishop  The above information is an  only  It is not intended as medical advice for individual conditions or treatments  Talk to your doctor, nurse or pharmacist before following any medical regimen to see if it is safe and effective for you  Heart Healthy Diet   AMBULATORY CARE:   A heart healthy diet  is an eating plan low in total fat, unhealthy fats, and sodium (salt)  A heart healthy diet helps decrease your risk for heart disease and stroke  Limit the amount of fat you eat to 25% to 35% of your total daily calories  Limit sodium to less than 2,300 mg each day  Healthy fats:  Healthy fats can help improve cholesterol levels  The risk for heart disease is decreased when cholesterol levels are normal  Choose healthy fats, such as the following:  · Unsaturated fat  is found in foods such as soybean, canola, olive, corn, and safflower oils  It is also found in soft tub margarine that is made with liquid vegetable oil  · Omega-3 fat  is found in certain fish, such as salmon, tuna, and trout, and in walnuts and flaxseed  Unhealthy fats:  Unhealthy fats can cause unhealthy cholesterol levels in your blood and increase your risk of heart disease  Limit unhealthy fats, such as the following:  · Cholesterol  is found in animal foods, such as eggs and lobster, and in dairy products made from whole milk   Limit cholesterol to less than 300 milligrams (mg) each day  You may need to limit cholesterol to 200 mg each day if you have heart disease  · Saturated fat  is found in meats, such as hill and hamburger  It is also found in chicken or turkey skin, whole milk, and butter  Limit saturated fat to less than 7% of your total daily calories  Limit saturated fat to less than 6% if you have heart disease or are at increased risk for it  · Trans fat  is found in packaged foods, such as potato chips and cookies  It is also in hard margarine, some fried foods, and shortening  Avoid trans fats as much as possible    Heart healthy foods and drinks to include:  Ask your dietitian or healthcare provider how many servings to have from each of the following food groups:  · Grains:      ¨ Whole-wheat breads, cereals, and pastas, and brown rice    ¨ Low-fat, low-sodium crackers and chips    · Vegetables:      ¨ Broccoli, green beans, green peas, and spinach    ¨ Collards, kale, and lima beans    ¨ Carrots, sweet potatoes, tomatoes, and peppers    ¨ Canned vegetables with no salt added    · Fruits:      ¨ Bananas, peaches, pears, and pineapple    ¨ Grapes, raisins, and dates    ¨ Oranges, tangerines, grapefruit, orange juice, and grapefruit juice    ¨ Apricots, mangoes, melons, and papaya    ¨ Raspberries and strawberries    ¨ Canned fruit with no added sugar    · Low-fat dairy products:      ¨ Nonfat (skim) milk, 1% milk, and low-fat almond, cashew, or soy milks fortified with calcium    ¨ Low-fat cheese, regular or frozen yogurt, and cottage cheese    · Meats and proteins , such as lean cuts of beef and pork (loin, leg, round), skinless chicken and turkey, legumes, soy products, egg whites, and nuts  Foods and drinks to limit or avoid:  Ask your dietitian or healthcare provider about these and other foods that are high in unhealthy fat, sodium, and sugar:  · Snack or packaged foods , such as frozen dinners, cookies, macaroni and cheese, and cereals with more than 300 mg of sodium per serving    · Canned or dry mixes  for cakes, soups, sauces, or gravies    · Vegetables with added sodium , such as instant potatoes, vegetables with added sauces, or regular canned vegetables    · Other foods high in sodium , such as ketchup, barbecue sauce, salad dressing, pickles, olives, soy sauce, and miso    · High-fat dairy foods  such as whole or 2% milk, cream cheese, or sour cream, and cheeses     · High-fat protein foods  such as high-fat cuts of beef (T-bone steaks, ribs), chicken or turkey with skin, and organ meats, such as liver    · Cured or smoked meats , such as hot dogs, hill, and sausage    · Unhealthy fats and oils , such as butter, stick margarine, shortening, and cooking oils such as coconut or palm oil    · Food and drinks high in sugar , such as soft drinks (soda), sports drinks, sweetened tea, candy, cake, cookies, pies, and doughnuts  Other diet guidelines to follow:   · Eat more foods containing omega-3 fats  Eat fish high in omega-3 fats at least 2 times a week  · Limit alcohol  Too much alcohol can damage your heart and raise your blood pressure  Women should limit alcohol to 1 drink a day  Men should limit alcohol to 2 drinks a day  A drink of alcohol is 12 ounces of beer, 5 ounces of wine, or 1½ ounces of liquor  · Choose low-sodium foods  High-sodium foods can lead to high blood pressure  Add little or no salt to food you prepare  Use herbs and spices in place of salt  · Eat more fiber  to help lower cholesterol levels  Eat at least 5 servings of fruits and vegetables each day  Eat 3 ounces of whole-grain foods each day  Legumes (beans) are also a good source of fiber  Lifestyle guidelines:   · Do not smoke  Nicotine and other chemicals in cigarettes and cigars can cause lung and heart damage  Ask your healthcare provider for information if you currently smoke and need help to quit   E-cigarettes or smokeless tobacco still contain nicotine  Talk to your healthcare provider before you use these products  · Exercise regularly  to help you maintain a healthy weight and improve your blood pressure and cholesterol levels  Ask your healthcare provider about the best exercise plan for you  Do not start an exercise program without asking your healthcare provider  Follow up with your healthcare provider as directed:  Write down your questions so you remember to ask them during your visits  © 2017 [employer]  Information is for End User's use only and may not be sold, redistributed or otherwise used for commercial purposes  All illustrations and images included in CareNotes® are the copyrighted property of [employer]  or Rasheed Bishop  The above information is an  only  It is not intended as medical advice for individual conditions or treatments  Talk to your doctor, nurse or pharmacist before following any medical regimen to see if it is safe and effective for you

## 2019-02-04 ENCOUNTER — CONSULT (OUTPATIENT)
Dept: SURGERY | Facility: CLINIC | Age: 64
End: 2019-02-04
Payer: COMMERCIAL

## 2019-02-04 VITALS
BODY MASS INDEX: 33.31 KG/M2 | HEART RATE: 108 BPM | TEMPERATURE: 99.1 F | HEIGHT: 63 IN | DIASTOLIC BLOOD PRESSURE: 92 MMHG | SYSTOLIC BLOOD PRESSURE: 142 MMHG | RESPIRATION RATE: 18 BRPM | WEIGHT: 188 LBS

## 2019-02-04 DIAGNOSIS — E04.2 MULTIPLE THYROID NODULES: ICD-10-CM

## 2019-02-04 PROCEDURE — 99243 OFF/OP CNSLTJ NEW/EST LOW 30: CPT | Performed by: PHYSICIAN ASSISTANT

## 2019-02-04 NOTE — PROGRESS NOTES
Assessment/Plan:   Jos Marques is a 66 y  o female who is here for The encounter diagnosis was Multiple thyroid nodules  Patient with an increasing left thyroid nodule  Which was biopsied in 2012 consistent with chronic lymphocytic thyroiditis, no evidence of malignancy seen    Ultrasound of neck with a left 2 5 cm thyroid nodule, and a right upper pole nodule x2 measuring 1 7 cm    Patient with intermittent dysphagia  Plan:  Ultrasound guided biopsy with AFIRMA  Consider ENT referral due to size  May need subtotal to total thyroidectomy  ______________  CC:Thyroid Problem (Consult) and Patient Education (Given to patient )    HPI:  Jos Marques is a 66 y  o female who was referred for evaluation of Thyroid Problem (Consult) and Patient Education (Given to patient )    Patient with enlarging left mid thyroid nodule associated with tenderness and dysphagia  Currently enlarging left thyroid nodule and 2 new thyroid nodules on the right  denies fatigue, weight changes, heat/cold intolerance, bowel/skin changes or CVS symptoms    ROS:  General ROS: negative  negative for - chills, fatigue, fever or night sweats, weight loss  Respiratory ROS: no cough, shortness of breath, or wheezing  Cardiovascular ROS: no chest pain or dyspnea on exertion  Genito-Urinary ROS: no dysuria, trouble voiding, or hematuria  Musculoskeletal ROS: negative for - gait disturbance, joint pain or muscle pain  Neurological ROS: no TIA or stroke symptoms  dysphagia and see HPI  Skin ROS: no new rashes or lesions   Lymphatic ROS: no new adenopathy noted by pt     GYN ROS: see HPI, no new GYN history or bleeding noted  Psy ROS: no new mental or behavioral disturbances       Patient Active Problem List   Diagnosis    Hypercholesterolemia    Iron deficiency anemia    Joint disorder of pelvis or thigh    Leg weakness, bilateral    Nocturia    Overactive bladder    Pain in both lower legs    Segmental and somatic dysfunction of sacral region    Vitamin D deficiency    Hypertension    Leg swelling         Allergies:  Patient has no known allergies  Current Outpatient Prescriptions:     atorvastatin (LIPITOR) 20 mg tablet, Take 1 tablet (20 mg total) by mouth daily, Disp: 90 tablet, Rfl: 1    ferrous sulfate 324 (65 Fe) mg, Take 1 tablet (324 mg total) by mouth daily before breakfast, Disp: 30 tablet, Rfl: 1    hydrOXYzine pamoate (VISTARIL) 25 mg capsule, Take 1-2 capsules (25-50 mg total) by mouth daily at bedtime as needed for anxiety (insomnia), Disp: 40 capsule, Rfl: 1    ibuprofen (MOTRIN) 600 mg tablet, Take 600 mg by mouth every 8 (eight) hours as needed, Disp: , Rfl:     losartan-hydrochlorothiazide (HYZAAR) 100-25 MG per tablet, Take 1 tablet by mouth daily, Disp: 90 tablet, Rfl: 0    Past Medical History:   Diagnosis Date    Anemia     Hyperlipidemia     Hypertension     Overactive bladder     Vitamin D deficiency        Past Surgical History:   Procedure Laterality Date     SECTION      Last Assessed: 2017    ELBOW SURGERY      Last Assessed: 2017    ESOPHAGOGASTRODUODENOSCOPY N/A 2017    Procedure: ESOPHAGOGASTRODUODENOSCOPY (EGD); Surgeon: James Walker DO;  Location: [facility]; Service: Gastroenterology    HIP SURGERY Left 2018    glut medius/minimus repair  and 18    HYSTERECTOMY      2011    KNEE SURGERY      x2    DE COLONOSCOPY FLX DX W/COLLJ SPEC WHEN PFRMD N/A 2017    Procedure: EGD AND COLONOSCOPY;  Surgeon: James Walker DO;  Location: [facility]; Service: Gastroenterology       Family History   Problem Relation Age of Onset    Colon cancer Mother     Heart attack Father     Other Father         Cardiac Disorder    Diabetes type II Father     Colon cancer Sister     Cancer Maternal Grandmother         reports that she quit smoking about 28 years ago   She has never used smokeless tobacco  She reports that she does not drink alcohol or use drugs  Labs:   Lab Results   Component Value Date    WBC 6 04 01/07/2019    HGB 12 2 01/07/2019    HCT 40 8 01/07/2019    MCV 84 01/07/2019     01/07/2019     Lab Results   Component Value Date     05/14/2018    K 4 1 01/07/2019     01/07/2019    CO2 30 01/07/2019    ANIONGAP 12 0 05/14/2018    BUN 20 01/07/2019    CREATININE 0 77 01/07/2019    GLUF 99 01/07/2019    CALCIUM 9 3 01/07/2019    AST 10 01/07/2019    ALT 14 01/07/2019    ALKPHOS 94 01/07/2019    PROT 7 3 05/14/2018    BILITOT 0 4 05/14/2018    EGFR 84 01/07/2019         Imaging: Mildly enlarged multinodular gland  Largest is a solid isoechoic nodule  in the left mid thyroid lobe measuring 2 5 x 2 3 cm, a Ti-Rads 3 category  lesion  Uncertain if this has been biopsied in the past  By sonographic  features and size, this technically meets criteria for FNA  Further  evaluation with fine-needle aspiration is recommended to rule out  underlying thyroid malignancy  There are several other scattered slightly  smaller nodules which meet criteria for follow-up ultrasound in one year  PHYSICAL EXAM  General Appearance:    Alert, cooperative, no distress,   Head:    Normocephalic without obvious abnormality   Eyes:    PERRL, conjunctiva/corneas clear, EOM's intact        Neck:   Supple, no adenopathy, no JVD  enlarged and tender   Back:     Symmetric, no spinal or CVA tenderness   Lungs:     Clear to auscultation bilaterally, no wheezing or rhonchi   Heart:    Regular rate and rhythm, S1 and S2 normal, no murmur   Abdomen:    soft NTND, +BS   Extremities:   Extremities normal  No clubbing, cyanosis or edema   Psych:   Normal Affect, AOx3  Neurologic:  Skin:   CNII-XII intact  Strength symmetric, speech intact    Warm, dry, intact, no visible rashes or lesions                       Some portions of this record may have been generated with voice recognition software   There may be translation, syntax,  or grammatical errors  Occasional wrong word or "sound-a-like" substitutions may have occurred due to the inherent limitations of the voice recognition software  Read the chart carefully and recognize, using context, where substitutions may have occurred  If you have any questions, please contact the dictating provider for clarification or correction, as needed  This encounter has been coded by a non-certified coder         Mo Alanis MD    Date: 2/4/2019 Time: 1:03 PM

## 2019-02-04 NOTE — LETTER
February 4, 2019     Quentin Byers PA-C  [address]  [place] [facility]  [id number]    Patient: Asuncion Padilla   YOB: 1955   Date of Visit: 2/4/2019       Dear Dr Hayden Amanda: Thank you for referring Asuncion Padilla to me for evaluation  Below are my notes for this consultation  If you have questions, please do not hesitate to call me  I look forward to following your patient along with you  Sincerely,        Francisco Miramontes MD        CC: No Recipients  Liam Allen PA-C  2/4/2019  1:13 PM  Sign at close encounter  Assessment/Plan:   Asuncion Padilla is a 66 y  o female who is here for The encounter diagnosis was Multiple thyroid nodules  Patient with an increasing left thyroid nodule  Which was biopsied in 2012 consistent with chronic lymphocytic thyroiditis, no evidence of malignancy seen    Ultrasound of neck with a left 2 5 cm thyroid nodule, and a right upper pole nodule x2 measuring 1 7 cm    Patient with intermittent dysphagia  Plan:  Ultrasound guided biopsy with AFIRMA  Consider ENT referral due to size  May need subtotal to total thyroidectomy  ______________  CC:Thyroid Problem (Consult) and Patient Education (Given to patient )    HPI:  Asuncion Padilla is a 66 y  o female who was referred for evaluation of Thyroid Problem (Consult) and Patient Education (Given to patient )    Patient with enlarging left mid thyroid nodule associated with tenderness and dysphagia  Currently enlarging left thyroid nodule and 2 new thyroid nodules on the right     denies fatigue, weight changes, heat/cold intolerance, bowel/skin changes or CVS symptoms    ROS:  General ROS: negative  negative for - chills, fatigue, fever or night sweats, weight loss  Respiratory ROS: no cough, shortness of breath, or wheezing  Cardiovascular ROS: no chest pain or dyspnea on exertion  Genito-Urinary ROS: no dysuria, trouble voiding, or hematuria  Musculoskeletal ROS: negative for - gait disturbance, joint pain or muscle pain  Neurological ROS: no TIA or stroke symptoms  dysphagia and see HPI  Skin ROS: no new rashes or lesions   Lymphatic ROS: no new adenopathy noted by pt  GYN ROS: see HPI, no new GYN history or bleeding noted  Psy ROS: no new mental or behavioral disturbances       Patient Active Problem List   Diagnosis    Hypercholesterolemia    Iron deficiency anemia    Joint disorder of pelvis or thigh    Leg weakness, bilateral    Nocturia    Overactive bladder    Pain in both lower legs    Segmental and somatic dysfunction of sacral region    Vitamin D deficiency    Hypertension    Leg swelling         Allergies:  Patient has no known allergies  Current Outpatient Prescriptions:     atorvastatin (LIPITOR) 20 mg tablet, Take 1 tablet (20 mg total) by mouth daily, Disp: 90 tablet, Rfl: 1    ferrous sulfate 324 (65 Fe) mg, Take 1 tablet (324 mg total) by mouth daily before breakfast, Disp: 30 tablet, Rfl: 1    hydrOXYzine pamoate (VISTARIL) 25 mg capsule, Take 1-2 capsules (25-50 mg total) by mouth daily at bedtime as needed for anxiety (insomnia), Disp: 40 capsule, Rfl: 1    ibuprofen (MOTRIN) 600 mg tablet, Take 600 mg by mouth every 8 (eight) hours as needed, Disp: , Rfl:     losartan-hydrochlorothiazide (HYZAAR) 100-25 MG per tablet, Take 1 tablet by mouth daily, Disp: 90 tablet, Rfl: 0    Past Medical History:   Diagnosis Date    Anemia     Hyperlipidemia     Hypertension     Overactive bladder     Vitamin D deficiency        Past Surgical History:   Procedure Laterality Date     SECTION      Last Assessed: 2017    ELBOW SURGERY      Last Assessed: 2017    ESOPHAGOGASTRODUODENOSCOPY N/A 2017    Procedure: ESOPHAGOGASTRODUODENOSCOPY (EGD); Surgeon: [facility];  Location: [facility];   Service: Gastroenterology    HIP SURGERY Left 2018    glut medius/minimus repair  and 18    HYSTERECTOMY      2011  KNEE SURGERY      x2    MD COLONOSCOPY FLX DX W/COLLJ SPEC WHEN PFRMD N/A 6/26/2017    Procedure: EGD AND COLONOSCOPY;  Surgeon: Gamaliel Treviño DO;  Location: [facility]; Service: Gastroenterology       Family History   Problem Relation Age of Onset    Colon cancer Mother     Heart attack Father     Other Father         Cardiac Disorder    Diabetes type II Father     Colon cancer Sister     Cancer Maternal Grandmother         reports that she quit smoking about 28 years ago  She has never used smokeless tobacco  She reports that she does not drink alcohol or use drugs  Labs:   Lab Results   Component Value Date    WBC 6 04 01/07/2019    HGB 12 2 01/07/2019    HCT 40 8 01/07/2019    MCV 84 01/07/2019     01/07/2019     Lab Results   Component Value Date     05/14/2018    K 4 1 01/07/2019     01/07/2019    CO2 30 01/07/2019    ANIONGAP 12 0 05/14/2018    BUN 20 01/07/2019    CREATININE 0 77 01/07/2019    GLUF 99 01/07/2019    CALCIUM 9 3 01/07/2019    AST 10 01/07/2019    ALT 14 01/07/2019    ALKPHOS 94 01/07/2019    PROT 7 3 05/14/2018    BILITOT 0 4 05/14/2018    EGFR 84 01/07/2019         Imaging: Mildly enlarged multinodular gland  Largest is a solid isoechoic nodule  in the left mid thyroid lobe measuring 2 5 x 2 3 cm, a Ti-Rads 3 category  lesion  Uncertain if this has been biopsied in the past  By sonographic  features and size, this technically meets criteria for FNA  Further  evaluation with fine-needle aspiration is recommended to rule out  underlying thyroid malignancy  There are several other scattered slightly  smaller nodules which meet criteria for follow-up ultrasound in one year          PHYSICAL EXAM  General Appearance:    Alert, cooperative, no distress,   Head:    Normocephalic without obvious abnormality   Eyes:    PERRL, conjunctiva/corneas clear, EOM's intact        Neck:   Supple, no adenopathy, no JVD  enlarged and tender   Back:     Symmetric, no spinal or CVA tenderness   Lungs:     Clear to auscultation bilaterally, no wheezing or rhonchi   Heart:    Regular rate and rhythm, S1 and S2 normal, no murmur   Abdomen:    soft NTND, +BS   Extremities:   Extremities normal  No clubbing, cyanosis or edema   Psych:   Normal Affect, AOx3  Neurologic:  Skin:   CNII-XII intact  Strength symmetric, speech intact    Warm, dry, intact, no visible rashes or lesions                       Some portions of this record may have been generated with voice recognition software  There may be translation, syntax,  or grammatical errors  Occasional wrong word or "sound-a-like" substitutions may have occurred due to the inherent limitations of the voice recognition software  Read the chart carefully and recognize, using context, where substitutions may have occurred  If you have any questions, please contact the dictating provider for clarification or correction, as needed  This encounter has been coded by a non-certified coder         Samantha Anderson MD    Date: 2/4/2019 Time: 1:03 PM

## 2019-02-05 ENCOUNTER — TRANSCRIBE ORDERS (OUTPATIENT)
Dept: ADMINISTRATIVE | Facility: HOSPITAL | Age: 64
End: 2019-02-05

## 2019-02-05 DIAGNOSIS — M51.36 DISC DEGENERATION, LUMBAR: ICD-10-CM

## 2019-02-05 DIAGNOSIS — Z47.89 AFTERCARE FOLLOWING SURGERY OF THE MUSCULOSKELETAL SYSTEM: Primary | ICD-10-CM

## 2019-02-05 DIAGNOSIS — M70.62 GREATER TROCHANTERIC BURSITIS OF LEFT HIP: ICD-10-CM

## 2019-02-05 DIAGNOSIS — M25.552 LEFT HIP PAIN: ICD-10-CM

## 2019-02-05 DIAGNOSIS — M51.26 PROTRUDED LUMBAR DISC: ICD-10-CM

## 2019-02-05 DIAGNOSIS — S76.012A STRAIN OF GLUTEUS MEDIUS OF LEFT LOWER EXTREMITY, INITIAL ENCOUNTER: ICD-10-CM

## 2019-02-06 NOTE — PROGRESS NOTES
2/6/19: Spoke to patient today who recently had f/u with MD  New diagnosis of piriformis syndrome contributing to symptoms  To have an MRI in 2 weeks and will f/u with MD again at that time to plan of care  Will DC patient at this time from skilled PT due to lack of progress and unstable symptoms

## 2019-02-16 ENCOUNTER — HOSPITAL ENCOUNTER (OUTPATIENT)
Dept: MRI IMAGING | Facility: HOSPITAL | Age: 64
Discharge: HOME | End: 2019-02-16
Payer: COMMERCIAL

## 2019-02-16 DIAGNOSIS — M25.552 LEFT HIP PAIN: ICD-10-CM

## 2019-02-16 PROCEDURE — 73721 MRI JNT OF LWR EXTRE W/O DYE: CPT

## 2019-02-18 ENCOUNTER — APPOINTMENT (OUTPATIENT)
Dept: LAB | Facility: CLINIC | Age: 64
End: 2019-02-18
Payer: COMMERCIAL

## 2019-02-18 DIAGNOSIS — M70.62 GREATER TROCHANTERIC BURSITIS OF LEFT HIP: ICD-10-CM

## 2019-02-18 DIAGNOSIS — M51.26 PROTRUDED LUMBAR DISC: ICD-10-CM

## 2019-02-18 DIAGNOSIS — Z47.89 AFTERCARE FOLLOWING SURGERY OF THE MUSCULOSKELETAL SYSTEM: ICD-10-CM

## 2019-02-18 DIAGNOSIS — R79.0 LOW IRON STORES: ICD-10-CM

## 2019-02-18 DIAGNOSIS — M51.36 DISC DEGENERATION, LUMBAR: ICD-10-CM

## 2019-02-18 DIAGNOSIS — S76.012A STRAIN OF GLUTEUS MEDIUS OF LEFT LOWER EXTREMITY, INITIAL ENCOUNTER: ICD-10-CM

## 2019-02-18 LAB
ALBUMIN SERPL BCP-MCNC: 4.1 G/DL (ref 3.5–5)
ALP SERPL-CCNC: 103 U/L (ref 46–116)
ALT SERPL W P-5'-P-CCNC: 16 U/L (ref 12–78)
ANION GAP SERPL CALCULATED.3IONS-SCNC: 9 MMOL/L (ref 4–13)
AST SERPL W P-5'-P-CCNC: 16 U/L (ref 5–45)
BASOPHILS # BLD AUTO: 0.03 THOUSANDS/ΜL (ref 0–0.1)
BASOPHILS NFR BLD AUTO: 0 % (ref 0–1)
BILIRUB SERPL-MCNC: 0.44 MG/DL (ref 0.2–1)
BUN SERPL-MCNC: 20 MG/DL (ref 5–25)
CALCIUM SERPL-MCNC: 9.5 MG/DL (ref 8.3–10.1)
CHLORIDE SERPL-SCNC: 103 MMOL/L (ref 100–108)
CO2 SERPL-SCNC: 28 MMOL/L (ref 21–32)
CREAT SERPL-MCNC: 0.75 MG/DL (ref 0.6–1.3)
CRP SERPL QL: 3.8 MG/L
EOSINOPHIL # BLD AUTO: 0.33 THOUSAND/ΜL (ref 0–0.61)
EOSINOPHIL NFR BLD AUTO: 4 % (ref 0–6)
ERYTHROCYTE [DISTWIDTH] IN BLOOD BY AUTOMATED COUNT: 15.3 % (ref 11.6–15.1)
ERYTHROCYTE [SEDIMENTATION RATE] IN BLOOD: 21 MM/HOUR (ref 0–20)
GFR SERPL CREATININE-BSD FRML MDRD: 86 ML/MIN/1.73SQ M
GLUCOSE SERPL-MCNC: 101 MG/DL (ref 65–140)
HCT VFR BLD AUTO: 42.5 % (ref 34.8–46.1)
HGB BLD-MCNC: 13.4 G/DL (ref 11.5–15.4)
IMM GRANULOCYTES # BLD AUTO: 0.02 THOUSAND/UL (ref 0–0.2)
IMM GRANULOCYTES NFR BLD AUTO: 0 % (ref 0–2)
LYMPHOCYTES # BLD AUTO: 1.47 THOUSANDS/ΜL (ref 0.6–4.47)
LYMPHOCYTES NFR BLD AUTO: 20 % (ref 14–44)
MCH RBC QN AUTO: 26.1 PG (ref 26.8–34.3)
MCHC RBC AUTO-ENTMCNC: 31.5 G/DL (ref 31.4–37.4)
MCV RBC AUTO: 83 FL (ref 82–98)
MONOCYTES # BLD AUTO: 0.6 THOUSAND/ΜL (ref 0.17–1.22)
MONOCYTES NFR BLD AUTO: 8 % (ref 4–12)
NEUTROPHILS # BLD AUTO: 4.97 THOUSANDS/ΜL (ref 1.85–7.62)
NEUTS SEG NFR BLD AUTO: 68 % (ref 43–75)
NRBC BLD AUTO-RTO: 0 /100 WBCS
PLATELET # BLD AUTO: 321 THOUSANDS/UL (ref 149–390)
PMV BLD AUTO: 11.1 FL (ref 8.9–12.7)
POTASSIUM SERPL-SCNC: 3.4 MMOL/L (ref 3.5–5.3)
PROT SERPL-MCNC: 7.9 G/DL (ref 6.4–8.2)
RBC # BLD AUTO: 5.13 MILLION/UL (ref 3.81–5.12)
SODIUM SERPL-SCNC: 140 MMOL/L (ref 136–145)
WBC # BLD AUTO: 7.42 THOUSAND/UL (ref 4.31–10.16)

## 2019-02-18 PROCEDURE — 85025 COMPLETE CBC W/AUTO DIFF WBC: CPT

## 2019-02-18 PROCEDURE — 86140 C-REACTIVE PROTEIN: CPT

## 2019-02-18 PROCEDURE — 86430 RHEUMATOID FACTOR TEST QUAL: CPT

## 2019-02-18 PROCEDURE — 85652 RBC SED RATE AUTOMATED: CPT

## 2019-02-18 PROCEDURE — 80053 COMPREHEN METABOLIC PANEL: CPT

## 2019-02-18 PROCEDURE — 36415 COLL VENOUS BLD VENIPUNCTURE: CPT

## 2019-02-18 PROCEDURE — 86038 ANTINUCLEAR ANTIBODIES: CPT

## 2019-02-19 LAB — RHEUMATOID FACT SER QL LA: NEGATIVE

## 2019-02-20 LAB — RYE IGE QN: NEGATIVE

## 2019-03-05 ENCOUNTER — TELEPHONE (OUTPATIENT)
Dept: OBGYN CLINIC | Facility: HOSPITAL | Age: 64
End: 2019-03-05

## 2019-03-05 NOTE — TELEPHONE ENCOUNTER
Call from patient  Phone # [phone]  [place] office    Pt is checking on the medical release form that she signed so we can get her records from [facility] beds at the [facility]  She wants to see dr Tammie Perez in [place] [address]  Intake completed

## 2019-03-07 NOTE — TELEPHONE ENCOUNTER
Received another referral via fax in office  Need to obtain prior pain records  LMOM for return  for Rishi Shipman RN ([phone] ext  [phone]) at [place] [address]  to obtain these

## 2019-03-08 NOTE — TELEPHONE ENCOUNTER
Received call back from [facility] who requested another release be faxed to them  Release faxed to [phone]

## 2019-03-12 NOTE — TELEPHONE ENCOUNTER
Patient is scheduled & patient understands non opiod therapy  [employer] NP PACKET  Patient is asking if there is anyway she can be seen sooner because her job has been eliminated & she only has insurance coverage until 4/30/19  Patient is hoping to get injections prior to the insurance ending

## 2019-03-22 ENCOUNTER — OFFICE VISIT (OUTPATIENT)
Dept: PAIN MEDICINE | Facility: MEDICAL CENTER | Age: 64
End: 2019-03-22
Payer: COMMERCIAL

## 2019-03-22 VITALS
HEART RATE: 92 BPM | BODY MASS INDEX: 33.31 KG/M2 | SYSTOLIC BLOOD PRESSURE: 148 MMHG | DIASTOLIC BLOOD PRESSURE: 74 MMHG | HEIGHT: 63 IN | WEIGHT: 188 LBS | RESPIRATION RATE: 14 BRPM

## 2019-03-22 DIAGNOSIS — M25.552 GREATER TROCHANTERIC PAIN SYNDROME OF LEFT LOWER EXTREMITY: ICD-10-CM

## 2019-03-22 DIAGNOSIS — M46.1 SACROILIITIS (HCC): Primary | ICD-10-CM

## 2019-03-22 DIAGNOSIS — M79.18 MYOFASCIAL PAIN SYNDROME: ICD-10-CM

## 2019-03-22 PROCEDURE — 99244 OFF/OP CNSLTJ NEW/EST MOD 40: CPT | Performed by: PHYSICAL MEDICINE & REHABILITATION
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
## 2019-04-01 ENCOUNTER — APPOINTMENT (OUTPATIENT)
Dept: RADIOLOGY | Facility: CLINIC | Age: 64
End: 2019-04-01
Payer: COMMERCIAL

## 2019-04-01 ENCOUNTER — OFFICE VISIT (OUTPATIENT)
Dept: OBGYN CLINIC | Facility: MEDICAL CENTER | Age: 64
End: 2019-04-01
Payer: COMMERCIAL

## 2019-04-01 VITALS
SYSTOLIC BLOOD PRESSURE: 128 MMHG | BODY MASS INDEX: 32.96 KG/M2 | DIASTOLIC BLOOD PRESSURE: 86 MMHG | HEART RATE: 92 BPM | WEIGHT: 186 LBS | HEIGHT: 63 IN

## 2019-04-01 DIAGNOSIS — M25.561 RIGHT KNEE PAIN, UNSPECIFIED CHRONICITY: ICD-10-CM

## 2019-04-01 DIAGNOSIS — M25.562 LEFT KNEE PAIN, UNSPECIFIED CHRONICITY: ICD-10-CM

## 2019-04-01 DIAGNOSIS — M25.561 RIGHT KNEE PAIN, UNSPECIFIED CHRONICITY: Primary | ICD-10-CM

## 2019-04-01 DIAGNOSIS — M17.12 PRIMARY OSTEOARTHRITIS OF LEFT KNEE: ICD-10-CM

## 2019-04-01 DIAGNOSIS — M17.11 OSTEOARTHRITIS OF RIGHT KNEE, UNSPECIFIED OSTEOARTHRITIS TYPE: ICD-10-CM

## 2019-04-01 PROCEDURE — 20610 DRAIN/INJ JOINT/BURSA W/O US: CPT | Performed by: ORTHOPAEDIC SURGERY

## 2019-04-01 PROCEDURE — 73564 X-RAY EXAM KNEE 4 OR MORE: CPT

## 2019-04-01 PROCEDURE — 99203 OFFICE O/P NEW LOW 30 MIN: CPT | Performed by: ORTHOPAEDIC SURGERY

## 2019-04-01 RX ORDER — LIDOCAINE HYDROCHLORIDE 10 MG/ML
3 INJECTION, SOLUTION INFILTRATION; PERINEURAL
Status: COMPLETED | OUTPATIENT
Start: 2019-04-01 | End: 2019-04-01

## 2019-04-01 RX ORDER — METHYLPREDNISOLONE ACETATE 40 MG/ML
2 INJECTION, SUSPENSION INTRA-ARTICULAR; INTRALESIONAL; INTRAMUSCULAR; SOFT TISSUE
Status: COMPLETED | OUTPATIENT
Start: 2019-04-01 | End: 2019-04-01

## 2019-04-01 RX ORDER — DICLOFENAC SODIUM 75 MG/1
75 TABLET, DELAYED RELEASE ORAL 2 TIMES DAILY
Qty: 60 TABLET | Refills: 1 | Status: SHIPPED | OUTPATIENT
Start: 2019-04-01 | End: 2019-08-13

## 2019-04-01 RX ADMIN — LIDOCAINE HYDROCHLORIDE 3 ML: 10 INJECTION, SOLUTION INFILTRATION; PERINEURAL at 13:12

## 2019-04-01 RX ADMIN — METHYLPREDNISOLONE ACETATE 2 ML: 40 INJECTION, SUSPENSION INTRA-ARTICULAR; INTRALESIONAL; INTRAMUSCULAR; SOFT TISSUE at 13:12

## 2019-04-03 DIAGNOSIS — F51.01 PRIMARY INSOMNIA: ICD-10-CM

## 2019-04-04 RX ORDER — HYDROXYZINE PAMOATE 25 MG/1
25-50 CAPSULE ORAL
Qty: 40 CAPSULE | Refills: 0 | Status: SHIPPED | OUTPATIENT
Start: 2019-04-04 | End: 2019-04-30 | Stop reason: SDUPTHER

## 2019-04-08 ENCOUNTER — TELEPHONE (OUTPATIENT)
Dept: OBGYN CLINIC | Facility: HOSPITAL | Age: 64
End: 2019-04-08

## 2019-04-08 DIAGNOSIS — M17.12 PRIMARY OSTEOARTHRITIS OF LEFT KNEE: Primary | ICD-10-CM

## 2019-04-08 DIAGNOSIS — M17.11 OSTEOARTHRITIS OF RIGHT KNEE, UNSPECIFIED OSTEOARTHRITIS TYPE: ICD-10-CM

## 2019-04-12 ENCOUNTER — HOSPITAL ENCOUNTER (OUTPATIENT)
Dept: MRI IMAGING | Facility: HOSPITAL | Age: 64
Discharge: HOME | End: 2019-04-12
Payer: COMMERCIAL

## 2019-04-12 DIAGNOSIS — M17.12 PRIMARY OSTEOARTHRITIS OF LEFT KNEE: ICD-10-CM

## 2019-04-12 DIAGNOSIS — M17.11 OSTEOARTHRITIS OF RIGHT KNEE, UNSPECIFIED OSTEOARTHRITIS TYPE: ICD-10-CM

## 2019-04-12 PROCEDURE — 73721 MRI JNT OF LWR EXTRE W/O DYE: CPT

## 2019-04-15 ENCOUNTER — HOSPITAL ENCOUNTER (OUTPATIENT)
Dept: RADIOLOGY | Facility: MEDICAL CENTER | Age: 64
Discharge: HOME | End: 2019-04-15
Attending: PHYSICAL MEDICINE & REHABILITATION | Admitting: PHYSICAL MEDICINE & REHABILITATION
Payer: COMMERCIAL

## 2019-04-15 VITALS
SYSTOLIC BLOOD PRESSURE: 129 MMHG | OXYGEN SATURATION: 98 % | TEMPERATURE: 98 F | HEART RATE: 62 BPM | RESPIRATION RATE: 20 BRPM | DIASTOLIC BLOOD PRESSURE: 83 MMHG

## 2019-04-15 DIAGNOSIS — M46.1 SACROILIITIS (HCC): ICD-10-CM

## 2019-04-15 PROCEDURE — 27096 INJECT SACROILIAC JOINT: CPT | Performed by: PHYSICAL MEDICINE & REHABILITATION

## 2019-04-15 RX ORDER — METHYLPREDNISOLONE ACETATE 40 MG/ML
40 INJECTION, SUSPENSION INTRA-ARTICULAR; INTRALESIONAL; INTRAMUSCULAR; PARENTERAL; SOFT TISSUE ONCE
Status: COMPLETED | OUTPATIENT
Start: 2019-04-15 | End: 2019-04-15

## 2019-04-15 RX ORDER — LIDOCAINE HYDROCHLORIDE 10 MG/ML
5 INJECTION, SOLUTION EPIDURAL; INFILTRATION; INTRACAUDAL; PERINEURAL ONCE
Status: COMPLETED | OUTPATIENT
Start: 2019-04-15 | End: 2019-04-15

## 2019-04-15 RX ORDER — BUPIVACAINE HCL/PF 2.5 MG/ML
10 VIAL (ML) INJECTION ONCE
Status: COMPLETED | OUTPATIENT
Start: 2019-04-15 | End: 2019-04-15

## 2019-04-15 RX ADMIN — LIDOCAINE HYDROCHLORIDE 1 ML: 10 INJECTION, SOLUTION EPIDURAL; INFILTRATION; INTRACAUDAL; PERINEURAL at 14:54

## 2019-04-15 RX ADMIN — IOHEXOL 0.5 ML: 300 INJECTION, SOLUTION INTRAVENOUS at 14:55

## 2019-04-15 RX ADMIN — Medication 1.5 ML: at 14:55

## 2019-04-15 RX ADMIN — METHYLPREDNISOLONE ACETATE 40 MG: 40 INJECTION, SUSPENSION INTRA-ARTICULAR; INTRALESIONAL; INTRAMUSCULAR; PARENTERAL; SOFT TISSUE at 14:55

## 2019-04-19 ENCOUNTER — OFFICE VISIT (OUTPATIENT)
Dept: FAMILY MEDICINE CLINIC | Facility: CLINIC | Age: 64
End: 2019-04-19
Payer: COMMERCIAL

## 2019-04-19 VITALS
BODY MASS INDEX: 31.53 KG/M2 | SYSTOLIC BLOOD PRESSURE: 110 MMHG | WEIGHT: 178 LBS | OXYGEN SATURATION: 96 % | RESPIRATION RATE: 17 BRPM | HEART RATE: 83 BPM | DIASTOLIC BLOOD PRESSURE: 76 MMHG | TEMPERATURE: 98.9 F

## 2019-04-19 DIAGNOSIS — J06.9 VIRAL URI WITH COUGH: Primary | ICD-10-CM

## 2019-04-19 PROCEDURE — 99213 OFFICE O/P EST LOW 20 MIN: CPT | Performed by: PHYSICIAN ASSISTANT

## 2019-04-19 RX ORDER — BENZONATATE 200 MG/1
200 CAPSULE ORAL 3 TIMES DAILY PRN
Qty: 30 CAPSULE | Refills: 0 | Status: SHIPPED | OUTPATIENT
Start: 2019-04-19 | End: 2019-08-13

## 2019-04-19 RX ORDER — TRAMADOL HYDROCHLORIDE 50 MG/1
TABLET ORAL EVERY 8 HOURS PRN
COMMUNITY
Start: 2019-04-18 | End: 2020-05-20 | Stop reason: HOSPADM

## 2019-04-22 ENCOUNTER — TELEPHONE (OUTPATIENT)
Dept: FAMILY MEDICINE CLINIC | Facility: CLINIC | Age: 64
End: 2019-04-22

## 2019-04-22 ENCOUNTER — TELEPHONE (OUTPATIENT)
Dept: OBGYN CLINIC | Facility: CLINIC | Age: 64
End: 2019-04-22

## 2019-04-22 ENCOUNTER — TELEPHONE (OUTPATIENT)
Dept: PAIN MEDICINE | Facility: CLINIC | Age: 64
End: 2019-04-22

## 2019-04-22 DIAGNOSIS — R05.9 COUGH WITH FEVER: Primary | ICD-10-CM

## 2019-04-22 DIAGNOSIS — R50.9 COUGH WITH FEVER: Primary | ICD-10-CM

## 2019-04-22 RX ORDER — AZITHROMYCIN 250 MG/1
TABLET, FILM COATED ORAL
Qty: 6 TABLET | Refills: 0 | Status: SHIPPED | OUTPATIENT
Start: 2019-04-22 | End: 2019-04-26

## 2019-04-26 ENCOUNTER — OFFICE VISIT (OUTPATIENT)
Dept: OBGYN CLINIC | Facility: MEDICAL CENTER | Age: 64
End: 2019-04-26
Payer: COMMERCIAL

## 2019-04-26 ENCOUNTER — OFFICE VISIT (OUTPATIENT)
Dept: PAIN MEDICINE | Facility: MEDICAL CENTER | Age: 64
End: 2019-04-26
Payer: COMMERCIAL

## 2019-04-26 VITALS
HEART RATE: 82 BPM | HEIGHT: 63 IN | WEIGHT: 176.2 LBS | DIASTOLIC BLOOD PRESSURE: 78 MMHG | SYSTOLIC BLOOD PRESSURE: 130 MMHG | BODY MASS INDEX: 31.22 KG/M2

## 2019-04-26 VITALS
SYSTOLIC BLOOD PRESSURE: 118 MMHG | BODY MASS INDEX: 31.4 KG/M2 | HEIGHT: 63 IN | DIASTOLIC BLOOD PRESSURE: 79 MMHG | HEART RATE: 75 BPM | WEIGHT: 177.2 LBS

## 2019-04-26 DIAGNOSIS — M79.662 PAIN IN BOTH LOWER LEGS: ICD-10-CM

## 2019-04-26 DIAGNOSIS — M23.201 OLD TEAR OF LATERAL MENISCUS OF LEFT KNEE, UNSPECIFIED TEAR TYPE: ICD-10-CM

## 2019-04-26 DIAGNOSIS — M79.605 LEFT LEG PAIN: Primary | ICD-10-CM

## 2019-04-26 DIAGNOSIS — M79.661 PAIN IN BOTH LOWER LEGS: ICD-10-CM

## 2019-04-26 DIAGNOSIS — M17.0 PRIMARY OSTEOARTHRITIS OF BOTH KNEES: Primary | ICD-10-CM

## 2019-04-26 PROCEDURE — 99214 OFFICE O/P EST MOD 30 MIN: CPT | Performed by: NURSE PRACTITIONER

## 2019-04-26 PROCEDURE — 99213 OFFICE O/P EST LOW 20 MIN: CPT | Performed by: ORTHOPAEDIC SURGERY

## 2019-04-26 RX ORDER — DULOXETIN HYDROCHLORIDE 20 MG/1
CAPSULE, DELAYED RELEASE ORAL
Qty: 30 CAPSULE | Refills: 0 | Status: SHIPPED | OUTPATIENT
Start: 2019-04-26 | End: 2019-08-13

## 2019-04-29 DIAGNOSIS — I10 ESSENTIAL HYPERTENSION: ICD-10-CM

## 2019-04-30 DIAGNOSIS — F51.01 PRIMARY INSOMNIA: ICD-10-CM

## 2019-05-06 RX ORDER — LOSARTAN POTASSIUM AND HYDROCHLOROTHIAZIDE 25; 100 MG/1; MG/1
TABLET ORAL
Qty: 90 TABLET | Refills: 1 | Status: SHIPPED | OUTPATIENT
Start: 2019-05-06 | End: 2019-06-07

## 2019-05-06 RX ORDER — HYDROXYZINE PAMOATE 25 MG/1
CAPSULE ORAL
Qty: 60 CAPSULE | Refills: 1 | Status: SHIPPED | OUTPATIENT
Start: 2019-05-06 | End: 2019-07-28

## 2019-05-22 ENCOUNTER — TELEPHONE (OUTPATIENT)
Dept: PAIN MEDICINE | Facility: CLINIC | Age: 64
End: 2019-05-22

## 2019-06-07 ENCOUNTER — TELEPHONE (OUTPATIENT)
Dept: FAMILY MEDICINE CLINIC | Facility: CLINIC | Age: 64
End: 2019-06-07

## 2019-06-07 DIAGNOSIS — I10 ESSENTIAL HYPERTENSION: Primary | ICD-10-CM

## 2019-06-07 RX ORDER — CANDESARTAN CILEXETIL AND HYDROCHLOROTHIAZIDE 32; 12.5 MG/1; MG/1
1 TABLET ORAL DAILY
Qty: 90 TABLET | Refills: 0 | Status: SHIPPED | OUTPATIENT
Start: 2019-06-07 | End: 2019-08-13

## 2019-06-07 NOTE — TELEPHONE ENCOUNTER
Received paper from [employer] about recall of losartan containing Bp medications; please have patient stop and start direct alternative medicine that was sent to [facility] (candesartan 32-hctz 12 5 tab daily

## 2019-06-17 NOTE — TELEPHONE ENCOUNTER
Spoke with Alisa Jefferson at pharmacy she said that they got a restock of the losartan in not recalled and they candesartan is not covered by her ins

## 2019-07-27 DIAGNOSIS — F51.01 PRIMARY INSOMNIA: Primary | ICD-10-CM

## 2019-07-28 RX ORDER — HYDROXYZINE HYDROCHLORIDE 25 MG/1
TABLET, FILM COATED ORAL
Qty: 60 TABLET | Refills: 0 | Status: SHIPPED | OUTPATIENT
Start: 2019-07-28 | End: 2019-08-26 | Stop reason: SDUPTHER

## 2019-07-31 ENCOUNTER — TELEPHONE (OUTPATIENT)
Dept: SURGERY | Facility: CLINIC | Age: 64
End: 2019-07-31

## 2019-07-31 NOTE — TELEPHONE ENCOUNTER
Patient called, last seen 2/2019  Per office note plan: U/S guided biopsy with [facility]  Consider ENT referral due to size  Patient lost insurance at that time, she now has insurance  Questioning if this can be ordered again? Does she need regular u/s first?     Patient is aware I will call her after I speak with Azael Hall PA-C on Thursday afternoon who she saw at last visit

## 2019-08-01 ENCOUNTER — APPOINTMENT (OUTPATIENT)
Dept: LAB | Facility: CLINIC | Age: 64
End: 2019-08-01
Payer: COMMERCIAL

## 2019-08-01 DIAGNOSIS — I10 ESSENTIAL HYPERTENSION: ICD-10-CM

## 2019-08-01 DIAGNOSIS — E78.00 HYPERCHOLESTEROLEMIA: ICD-10-CM

## 2019-08-01 LAB
ALBUMIN SERPL BCP-MCNC: 3.7 G/DL (ref 3.5–5)
ALP SERPL-CCNC: 76 U/L (ref 46–116)
ALT SERPL W P-5'-P-CCNC: 16 U/L (ref 12–78)
ANION GAP SERPL CALCULATED.3IONS-SCNC: 8 MMOL/L (ref 4–13)
AST SERPL W P-5'-P-CCNC: 9 U/L (ref 5–45)
BILIRUB SERPL-MCNC: 0.77 MG/DL (ref 0.2–1)
BUN SERPL-MCNC: 26 MG/DL (ref 5–25)
CALCIUM SERPL-MCNC: 9.3 MG/DL (ref 8.3–10.1)
CHLORIDE SERPL-SCNC: 106 MMOL/L (ref 100–108)
CHOLEST SERPL-MCNC: 194 MG/DL (ref 50–200)
CO2 SERPL-SCNC: 31 MMOL/L (ref 21–32)
CREAT SERPL-MCNC: 0.81 MG/DL (ref 0.6–1.3)
FERRITIN SERPL-MCNC: 24 NG/ML (ref 8–388)
GFR SERPL CREATININE-BSD FRML MDRD: 78 ML/MIN/1.73SQ M
GLUCOSE P FAST SERPL-MCNC: 92 MG/DL (ref 65–99)
HDLC SERPL-MCNC: 60 MG/DL (ref 40–60)
LDLC SERPL CALC-MCNC: 113 MG/DL (ref 0–100)
NONHDLC SERPL-MCNC: 134 MG/DL
POTASSIUM SERPL-SCNC: 4.7 MMOL/L (ref 3.5–5.3)
PROT SERPL-MCNC: 7.2 G/DL (ref 6.4–8.2)
SODIUM SERPL-SCNC: 145 MMOL/L (ref 136–145)
TRIGL SERPL-MCNC: 104 MG/DL

## 2019-08-01 PROCEDURE — 80053 COMPREHEN METABOLIC PANEL: CPT

## 2019-08-01 PROCEDURE — 82728 ASSAY OF FERRITIN: CPT

## 2019-08-01 PROCEDURE — 36415 COLL VENOUS BLD VENIPUNCTURE: CPT

## 2019-08-01 PROCEDURE — 80061 LIPID PANEL: CPT

## 2019-08-05 NOTE — TELEPHONE ENCOUNTER
Per Bertha Vargas PA-C if she is following up with us we can reorder U/S guided biopsy with [facility]  If she is going to go right to ENT due to size she can see them 1st for their evaluation  Tried reaching patient, no answer  Left message on voicemail for patient to return call

## 2019-08-13 ENCOUNTER — OFFICE VISIT (OUTPATIENT)
Dept: FAMILY MEDICINE CLINIC | Facility: CLINIC | Age: 64
End: 2019-08-13
Payer: COMMERCIAL

## 2019-08-13 VITALS
TEMPERATURE: 97.5 F | HEIGHT: 63 IN | BODY MASS INDEX: 31.54 KG/M2 | SYSTOLIC BLOOD PRESSURE: 120 MMHG | RESPIRATION RATE: 17 BRPM | OXYGEN SATURATION: 98 % | WEIGHT: 178 LBS | DIASTOLIC BLOOD PRESSURE: 88 MMHG | HEART RATE: 66 BPM

## 2019-08-13 DIAGNOSIS — E61.1 IRON DEFICIENCY: ICD-10-CM

## 2019-08-13 DIAGNOSIS — I10 ESSENTIAL HYPERTENSION: Primary | ICD-10-CM

## 2019-08-13 DIAGNOSIS — E78.00 HYPERCHOLESTEROLEMIA: ICD-10-CM

## 2019-08-13 DIAGNOSIS — Z01.118 ABNORMAL EXAM OF RIGHT EAR: ICD-10-CM

## 2019-08-13 DIAGNOSIS — R12 HEART BURN: ICD-10-CM

## 2019-08-13 PROBLEM — M79.18 MYOFASCIAL PAIN: Status: ACTIVE | Noted: 2019-07-15

## 2019-08-13 PROBLEM — N39.3 SUI (STRESS URINARY INCONTINENCE, FEMALE): Status: ACTIVE | Noted: 2019-07-15

## 2019-08-13 PROCEDURE — 99214 OFFICE O/P EST MOD 30 MIN: CPT | Performed by: PHYSICIAN ASSISTANT

## 2019-08-13 PROCEDURE — 3008F BODY MASS INDEX DOCD: CPT | Performed by: PHYSICIAN ASSISTANT

## 2019-08-13 PROCEDURE — 3074F SYST BP LT 130 MM HG: CPT | Performed by: PHYSICIAN ASSISTANT

## 2019-08-13 RX ORDER — BACLOFEN 10 MG/1
10 TABLET ORAL 2 TIMES DAILY
COMMUNITY
Start: 2019-08-08 | End: 2020-02-12

## 2019-08-13 RX ORDER — LOSARTAN POTASSIUM AND HYDROCHLOROTHIAZIDE 25; 100 MG/1; MG/1
1 TABLET ORAL DAILY
COMMUNITY
Start: 2019-07-16 | End: 2019-08-13

## 2019-08-13 RX ORDER — OXYBUTYNIN CHLORIDE 5 MG/1
5 TABLET, EXTENDED RELEASE ORAL DAILY
COMMUNITY
Start: 2019-07-15 | End: 2020-02-12

## 2019-08-13 RX ORDER — LOSARTAN POTASSIUM 100 MG/1
100 TABLET ORAL DAILY
Qty: 90 TABLET | Refills: 1 | Status: SHIPPED | OUTPATIENT
Start: 2019-08-13 | End: 2019-12-10 | Stop reason: SDUPTHER

## 2019-08-13 RX ORDER — INDOMETHACIN 50 MG/1
50 CAPSULE ORAL DAILY
COMMUNITY
Start: 2019-08-08 | End: 2019-11-18

## 2019-08-13 RX ORDER — FAMOTIDINE 20 MG/1
20 TABLET, FILM COATED ORAL
Qty: 90 TABLET | Refills: 1 | Status: SHIPPED | OUTPATIENT
Start: 2019-08-13 | End: 2019-12-10 | Stop reason: SDUPTHER

## 2019-08-13 NOTE — PATIENT INSTRUCTIONS
Please call the office in 2-3 weeks for BP log  Gastroesophageal Reflux Disease   WHAT YOU NEED TO KNOW:   Gastroesophageal reflux occurs when acid and food in the stomach back up into the esophagus  Gastroesophageal reflux disease (GERD) is reflux that occurs more than twice a week for a few weeks  It usually causes heartburn and other symptoms  GERD can cause other health problems over time if it is not treated  DISCHARGE INSTRUCTIONS:   Return to the emergency department if:   · You feel full and cannot burp or vomit  · You have severe chest pain and sudden trouble breathing  · Your bowel movements are black, bloody, or tarry-looking  · Your vomit looks like coffee grounds or has blood in it  Contact your healthcare provider if:   · You vomit large amounts, or you vomit often  · You have trouble breathing after you vomit  · You have trouble swallowing, or pain with swallowing  · You are losing weight without trying  · Your symptoms get worse or do not improve with treatment  · You have questions or concerns about your condition or care  Medicines:   · Medicines  are used to decrease stomach acid  Medicine may also be used to help your lower esophageal sphincter and stomach contract (tighten) more  · Take your medicine as directed  Contact your healthcare provider if you think your medicine is not helping or if you have side effects  Tell him of her if you are allergic to any medicine  Keep a list of the medicines, vitamins, and herbs you take  Include the amounts, and when and why you take them  Bring the list or the pill bottles to follow-up visits  Carry your medicine list with you in case of an emergency  Manage GERD:   · Do not have foods or drinks that may increase heartburn  These include chocolate, peppermint, fried or fatty foods, drinks that contain caffeine, or carbonated drinks (soda)   Other foods include spicy foods, onions, tomatoes, and tomato-based foods  Do not have foods or drinks that can irritate your esophagus, such as citrus fruits, juices, and alcohol  · Do not eat large meals  When you eat a lot of food at one time, your stomach needs more acid to digest it  Eat 6 small meals each day instead of 3 large ones, and eat slowly  Do not eat meals 2 to 3 hours before bedtime  · Elevate the head of your bed  Place 6-inch blocks under the head of your bed frame  You may also use more than one pillow under your head and shoulders while you sleep  · Maintain a healthy weight  If you are overweight, weight loss may help relieve symptoms of GERD  · Do not smoke  Smoking weakens the lower esophageal sphincter and increases the risk of GERD  Ask your healthcare provider for information if you currently smoke and need help to quit  E-cigarettes or smokeless tobacco still contain nicotine  Talk to your healthcare provider before you use these products  · Do not wear clothing that is tight around your waist   Tight clothing can put pressure on your stomach and cause or worsen GERD symptoms  Follow up with your healthcare provider as directed:  Write down your questions so you remember to ask them during your visits  © 2017 [facility] Information is for End User's use only and may not be sold, redistributed or otherwise used for commercial purposes  All illustrations and images included in CareNotes® are the copyrighted property of [employer] , [employer]  or Rasheed Bishop  The above information is an  only  It is not intended as medical advice for individual conditions or treatments  Talk to your doctor, nurse or pharmacist before following any medical regimen to see if it is safe and effective for you

## 2019-08-13 NOTE — PROGRESS NOTES
Routine Follow-up    Richie Grigsby 60 y o  female   Date:  8/13/2019      Assessment and Plan:    Booker Tao was seen today for follow-up  Diagnoses and all orders for this visit:    Essential hypertension  -     losartan (COZAAR) 100 MG tablet; Take 1 tablet (100 mg total) by mouth daily  -     Comprehensive metabolic panel; Future  - monitor BP at home, call if BP >130/90 and may consider adding back norvasc as now off HCTZ due to urinary concerns    Heart burn  -     famotidine (PEPCID) 20 mg tablet; Take 1 tablet (20 mg total) by mouth daily before dinner  - avoid eating large meals before bed, less acidic foods  - may increase pepcid to bid, call if no improvement     Hypercholesterolemia  -     Comprehensive metabolic panel; Future  -     Lipid panel; Future  - stable, continue lipitor without changes    Iron deficiency  -     CBC and differential; Future  - ferritin normal     Abnormal exam of right ear  - no sign of infection, ?scarring of TM, see will have ENT evaluate as well  - monitor for dischrage, pain and fever and then may consider ear drop due to recent swimming        HPI:  Chief Complaint   Patient presents with    Follow-up     Review labs      HPI   Patient is a 61 yo female who presents for routine follow up  She has been struggling with overactive bladder and wonders if could come off HCTZ  Her Bp is stable on losartan-hctz combo  She will continue to monitor her BP at home with coming off HCTZ  She also used to be on norvasc, no reaction and can consider going back to it if not controlled with losartan alone  She gets heart burn at night  She cannot pin point any certain foods  She snacks at night  She does not eat big meal close  She is going to see the ENT for thyroid nodules in Sept - [facility] with Dr Katharina Son  She has had follow up with GYN and completed mammogram  She is following with urogyn due to urinary concerns and history of bladder sling   Her labs were stable - cholesterol greatly improved  She also reports that her R ear "feels weird sometimes, was not sure if related to swimming"  No hx of frequent ear infections  No fever, ear pain or discharge"  She will have ENT look at it too  ROS: Review of Systems   Constitutional: Negative  Respiratory: Negative  Cardiovascular: Negative  Gastrointestinal: Negative for abdominal pain, diarrhea, nausea and vomiting  Heart burn     Genitourinary: Positive for urgency (and stress incont, overactive)  Negative for dysuria, flank pain and menstrual problem  Skin: Negative  Neurological: Negative  Psychiatric/Behavioral: Negative  Past Medical History:   Diagnosis Date    Anemia     Hyperlipidemia     Hypertension     Overactive bladder     Vitamin D deficiency      Patient Active Problem List   Diagnosis    Iron deficiency anemia    Joint disorder of pelvis or thigh    Leg weakness, bilateral    Nocturia    OAB (overactive bladder)    Pain in both lower legs    Segmental and somatic dysfunction of sacral region    Vitamin D deficiency    Hypertension    Leg swelling    Sacroiliitis (HCC)    Myofascial pain    DALTON (stress urinary incontinence, female)       Past Surgical History:   Procedure Laterality Date     SECTION      Last Assessed: 2017    ELBOW SURGERY      Last Assessed: 2017    ESOPHAGOGASTRODUODENOSCOPY N/A 2017    Procedure: ESOPHAGOGASTRODUODENOSCOPY (EGD); Surgeon: Chet Holguin DO;  Location: [facility] GI [facility]; Service: Gastroenterology    HIP SURGERY Left 2018    glut medius/minimus repair  and 18    HYSTERECTOMY      2011    KNEE SURGERY      x2    IL COLONOSCOPY FLX DX W/COLLJ SPEC WHEN PFRMD N/A 2017    Procedure: EGD AND COLONOSCOPY;  Surgeon: Chet Holguin DO;  Location: [facility] GI LAB;   Service: Gastroenterology       Social History     Socioeconomic History    Marital status: /Civil Union     Spouse name: None    Number of children: None    Years of education: None    Highest education level: None   Occupational History    None   Social Needs    Financial resource strain: None    Food insecurity:     Worry: None     Inability: None    Transportation needs:     Medical: None     Non-medical: None   Tobacco Use    Smoking status: Former Smoker     Last attempt to quit:      Years since quittin 6    Smokeless tobacco: Never Used    Tobacco comment: quit 25 yrs ago   Substance and Sexual Activity    Alcohol use: No    Drug use: No    Sexual activity: None   Lifestyle    Physical activity:     Days per week: None     Minutes per session: None    Stress: None   Relationships    Social connections:     Talks on phone: None     Gets together: None     Attends Denominational service: None     Active member of club or organization: None     Attends meetings of clubs or organizations: None     Relationship status: None    Intimate partner violence:     Fear of current or ex partner: None     Emotionally abused: None     Physically abused: None     Forced sexual activity: None   Other Topics Concern    None   Social History Narrative    Caffeine use    , 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[employer], now at [employer] and [address] [place] [address] for [address] [address] [facility] [address]    2 grown children       Family History   Problem Relation Age of Onset    Colon cancer Mother     Heart attack Father     Other Father         Cardiac Disorder    Diabetes type II Father     Colon cancer Sister     Cancer Maternal Grandmother        No Known Allergies      Current Outpatient Medications:     atorvastatin (LIPITOR) 20 mg tablet, Take 1 tablet (20 mg total) by mouth daily, Disp: 90 tablet, Rfl: 1    baclofen 10 mg tablet, Take 10 mg by mouth 2 (two) times a day , Disp: , Rfl:     ferrous sulfate 324 (65 Fe) mg, Take 1 tablet (324 mg total) by mouth daily before breakfast, Disp: 30 tablet, Rfl: 1    hydrOXYzine HCL (ATARAX) 25 mg tablet, TAKE 1 TO 2 TABLETS BY MOUTH AT BEDTIME AS NEEDED FOR ANXIETY OR INSOMNIA, Disp: 60 tablet, Rfl: 0    indomethacin (INDOCIN) 50 mg capsule, Take 50 mg by mouth daily , Disp: , Rfl:     oxybutynin (DITROPAN-XL) 5 mg 24 hr tablet, Take 5 mg by mouth daily, Disp: , Rfl:     traMADol (ULTRAM) 50 mg tablet, , Disp: , Rfl:     famotidine (PEPCID) 20 mg tablet, Take 1 tablet (20 mg total) by mouth daily before dinner, Disp: 90 tablet, Rfl: 1    losartan (COZAAR) 100 MG tablet, Take 1 tablet (100 mg total) by mouth daily, Disp: 90 tablet, Rfl: 1      Physical Exam:  /88 (BP Location: Left arm, Patient Position: Sitting, Cuff Size: Adult)   Pulse 66   Temp 97 5 °F (36 4 °C) (Tympanic)   Resp 17   Ht 5' 2 5" (1 588 m)   Wt 80 7 kg (178 lb)   SpO2 98%   BMI 32 04 kg/m²     Physical Exam   Constitutional: She is oriented to person, place, and time  She appears well-developed and well-nourished  No distress  HENT:   Head: Normocephalic and atraumatic  Right Ear: External ear and ear canal normal  No drainage or tenderness  No middle ear effusion  Left Ear: External ear and ear canal normal  No drainage or tenderness  No middle ear effusion  Mouth/Throat: Oropharynx is clear and moist    TMs normal but does have appearance of scarring along bottom or R TM   Eyes: Pupils are equal, round, and reactive to light  Conjunctivae are normal    Neck: Normal range of motion  Neck supple  Cardiovascular: Normal rate, regular rhythm and normal heart sounds  No murmur heard  Pulmonary/Chest: Effort normal and breath sounds normal  No respiratory distress  She has no wheezes  Musculoskeletal: She exhibits no edema or deformity  Lymphadenopathy:     She has no cervical adenopathy  Neurological: She is alert and oriented to person, place, and time  No cranial nerve deficit  Skin: Skin is warm and dry  No rash noted  Psychiatric: She has a normal mood and affect   Her behavior is normal          Labs:  Lab Results Component Value Date    WBC 7 42 02/18/2019    HGB 13 4 02/18/2019    HCT 42 5 02/18/2019    MCV 83 02/18/2019     02/18/2019     Lab Results   Component Value Date     05/14/2018    K 4 7 08/01/2019     08/01/2019    CO2 31 08/01/2019    ANIONGAP 12 0 05/14/2018    BUN 26 (H) 08/01/2019    CREATININE 0 81 08/01/2019    GLUF 92 08/01/2019    CALCIUM 9 3 08/01/2019    AST 9 08/01/2019    ALT 16 08/01/2019    ALKPHOS 76 08/01/2019    PROT 7 3 05/14/2018    BILITOT 0 4 05/14/2018    EGFR 78 08/01/2019

## 2019-08-22 DIAGNOSIS — E78.00 HYPERCHOLESTEROLEMIA: ICD-10-CM

## 2019-08-22 RX ORDER — ATORVASTATIN CALCIUM 20 MG/1
20 TABLET, FILM COATED ORAL DAILY
Qty: 90 TABLET | Refills: 1 | Status: SHIPPED | OUTPATIENT
Start: 2019-08-22 | End: 2019-12-10 | Stop reason: SDUPTHER

## 2019-08-24 DIAGNOSIS — E78.00 HYPERCHOLESTEROLEMIA: ICD-10-CM

## 2019-08-24 RX ORDER — ATORVASTATIN CALCIUM 20 MG/1
TABLET, FILM COATED ORAL
Qty: 30 TABLET | Refills: 0 | OUTPATIENT
Start: 2019-08-24

## 2019-08-26 DIAGNOSIS — F51.01 PRIMARY INSOMNIA: ICD-10-CM

## 2019-08-27 RX ORDER — HYDROXYZINE HYDROCHLORIDE 25 MG/1
TABLET, FILM COATED ORAL
Qty: 60 TABLET | Refills: 0 | Status: SHIPPED | OUTPATIENT
Start: 2019-08-27 | End: 2019-09-21 | Stop reason: SDUPTHER

## 2019-08-29 ENCOUNTER — OFFICE VISIT (OUTPATIENT)
Dept: URGENT CARE | Facility: HOSPITAL | Age: 64
End: 2019-08-29
Payer: COMMERCIAL

## 2019-08-29 VITALS
OXYGEN SATURATION: 96 % | TEMPERATURE: 98.8 F | HEART RATE: 88 BPM | BODY MASS INDEX: 31.86 KG/M2 | RESPIRATION RATE: 18 BRPM | DIASTOLIC BLOOD PRESSURE: 91 MMHG | HEIGHT: 63 IN | WEIGHT: 179.8 LBS | SYSTOLIC BLOOD PRESSURE: 139 MMHG

## 2019-08-29 DIAGNOSIS — B00.1 COLD SORE: Primary | ICD-10-CM

## 2019-08-29 PROCEDURE — 99283 EMERGENCY DEPT VISIT LOW MDM: CPT | Performed by: EMERGENCY MEDICINE

## 2019-08-29 PROCEDURE — G0382 LEV 3 HOSP TYPE B ED VISIT: HCPCS | Performed by: EMERGENCY MEDICINE

## 2019-08-29 PROCEDURE — 99203 OFFICE O/P NEW LOW 30 MIN: CPT | Performed by: EMERGENCY MEDICINE

## 2019-08-29 RX ORDER — ACYCLOVIR 200 MG/1
400 CAPSULE ORAL 3 TIMES DAILY
Qty: 42 CAPSULE | Refills: 0 | Status: SHIPPED | OUTPATIENT
Start: 2019-08-29 | End: 2019-12-11

## 2019-08-29 NOTE — PROGRESS NOTES
[facility]        NAME: Chastity Watts is a 60 y o  female  : 1955    MRN: [id number]  DATE: 2019  TIME: 10:06 AM    Assessment and Plan   Cold sore [B00 1]  1  Cold sore  acyclovir (ZOVIRAX) 200 mg capsule     Acyclovir 400 TID 7 days  Lip balm    Patient Instructions     Patient Instructions   1  Cool compresses to area  2  Lip balm for dryness          Oral Herpes Simplex Virus Infections   WHAT YOU NEED TO KNOW:   Oral herpes simplex virus (HSV) infections cause sores to form on the mouth, lips, or gums  HSV has 2 types  Oral HSV infections are most often caused by HSV type 1  HSV type 2 normally affects the genital area, but may also occur in the mouth  After you are infected, the virus hides in your nerves and may return  An HSV infection that comes back is also known as a cold sore  DISCHARGE INSTRUCTIONS:   Medicines:   · Antiviral medicine: This decreases symptoms and shortens the amount of time blisters are present  You may also need to take it daily to prevent blisters  The medicine may be given as a liquid, pill, or ointment  Use as directed  · Numbing medicine: This decreases mouth pain  It is usually given as a mouth rinse  Use it before you eat or drink, or as directed  Follow up with your healthcare provider as directed:  Write down your questions so you remember to ask them during your visits  Self-care:   · Eat soft, bland foods:  Avoid salty, acidic, spicy, sharp-edged, and hard foods  Eat healthy foods to help healing  · Drink liquids:  Cool liquids may help soothe your mouth and numb the pain  Avoid citrus or carbonated drinks, such as orange or grapefruit juice, lemonade, or soda  These liquids may cause your mouth to hurt more  A straw may help if you have blisters on the lips or tongue  · Use ice:  Ice helps decrease swelling and pain  Drink cold water or suck on ice to help decrease pain on your tongue or inside your mouth   Use an ice pack, or put crushed ice in a plastic bag on your lip  Cover it with a towel and place it on your lip for 15 to 20 minutes every hour or as directed  Prevent the spread of the herpes simplex virus:   · Do not have close contact with people until the blisters heal  This includes touching, kissing, and oral sex  · Do not get close to babies or to people who are sick while you have cold sores  · Do not share eating utensils, towels, lip balm, or makeup with another person  · Do not touch the blisters or pick at the scabs  Do not touch other body parts, especially your eyes or genitals without washing your hands first  Wash your hands often  Contact your healthcare provider if:   · You have a fever  · Your symptoms become worse or do not improve a week after you start treatment  · You have difficulty eating or drinking because of the pain in your mouth  · You get a headache, are nauseated, or vomit  · Your eyes feel irritated, or you feel like you have something in your eye  · Your skin becomes itchy, swollen, or develops a rash after you take your medicine  · You have questions or concerns about your condition or care  Return to the emergency department if:   · You get a fever, feel achy, or see pus instead of clear fluid in the sores  · You get sores on your eyes  · You have abdominal pain, a severe headache, or confusion  · You get new symptoms, or old symptoms return after you have been treated  © 2017 [address] [employer] Information is for End User's use only and may not be sold, redistributed or otherwise used for commercial purposes  All illustrations and images included in CareNotes® are the copyrighted property of [employer]  or Rasheed Bishop  The above information is an  only  It is not intended as medical advice for individual conditions or treatments   Talk to your doctor, nurse or pharmacist before following any medical regimen to see if it is safe and effective for you  Follow up with PCP in 3-5 days  Proceed to  ER if symptoms worsen  Chief Complaint     Chief Complaint   Patient presents with    Mouth Lesions     on lip left side          History of Present Illness       This is a 62-year-old female who arrives with complaint of the lesion on her left lower lip  This has been a recurrent problem for her  She denies upper respiratory symptoms and fever  The ulceration is limited to her left lower lip  Review of Systems   Review of Systems   Constitutional: Negative  Negative for fever  HENT: Negative  Negative for congestion  Respiratory: Negative  Negative for cough  Skin:        Small painful lesion on the left lower lip           Current Medications       Current Outpatient Medications:     atorvastatin (LIPITOR) 20 mg tablet, Take 1 tablet (20 mg total) by mouth daily, Disp: 90 tablet, Rfl: 1    famotidine (PEPCID) 20 mg tablet, Take 1 tablet (20 mg total) by mouth daily before dinner, Disp: 90 tablet, Rfl: 1    ferrous sulfate 324 (65 Fe) mg, Take 1 tablet (324 mg total) by mouth daily before breakfast, Disp: 30 tablet, Rfl: 1    hydrOXYzine HCL (ATARAX) 25 mg tablet, TAKE 1 TO 2 TABLETS BY MOUTH AT BEDTIME AS NEEDED FOR ANXIETY OR INSOMNIA, Disp: 60 tablet, Rfl: 0    indomethacin (INDOCIN) 50 mg capsule, Take 50 mg by mouth daily , Disp: , Rfl:     losartan (COZAAR) 100 MG tablet, Take 1 tablet (100 mg total) by mouth daily, Disp: 90 tablet, Rfl: 1    oxybutynin (DITROPAN-XL) 5 mg 24 hr tablet, Take 5 mg by mouth daily, Disp: , Rfl:     traMADol (ULTRAM) 50 mg tablet, , Disp: , Rfl:     acyclovir (ZOVIRAX) 200 mg capsule, Take 2 capsules (400 mg total) by mouth 3 (three) times a day for 7 days, Disp: 42 capsule, Rfl: 0    baclofen 10 mg tablet, Take 10 mg by mouth 2 (two) times a day , Disp: , Rfl:     Current Allergies     Allergies as of 08/29/2019    (No Known Allergies)            The following portions of the patient's history were reviewed and updated as appropriate: allergies, current medications, past family history, past medical history, past social history, past surgical history and problem list      Past Medical History:   Diagnosis Date    Anemia     Hyperlipidemia     Hypertension     Overactive bladder     Vitamin D deficiency        Past Surgical History:   Procedure Laterality Date     SECTION      Last Assessed: 2017    ELBOW SURGERY      Last Assessed: 2017    ESOPHAGOGASTRODUODENOSCOPY N/A 2017    Procedure: ESOPHAGOGASTRODUODENOSCOPY (EGD); Surgeon: Dorcas Landa DO;  Location: [facility]; Service: Gastroenterology    HIP SURGERY Left 2018    glut medius/minimus repair  and 18    HYSTERECTOMY          KNEE SURGERY      x2    IN COLONOSCOPY FLX DX W/COLLJ SPEC WHEN PFRMD N/A 2017    Procedure: EGD AND COLONOSCOPY;  Surgeon: Dorcas Landa DO;  Location: [facility]; Service: Gastroenterology       Family History   Problem Relation Age of Onset    Colon cancer Mother     Heart attack Father     Other Father         Cardiac Disorder    Diabetes type II Father     Colon cancer Sister     Cancer Maternal Grandmother          Medications have been verified  Objective   /91 (BP Location: Left arm, Patient Position: Sitting, Cuff Size: Adult)   Pulse 88   Temp 98 8 °F (37 1 °C) (Tympanic)   Resp 18   Ht 5' 3" (1 6 m)   Wt 81 6 kg (179 lb 12 8 oz)   SpO2 96%   BMI 31 85 kg/m²        Physical Exam     Physical Exam   Constitutional: She is oriented to person, place, and time  She appears well-developed and well-nourished  HENT:   Head: Normocephalic  Right Ear: External ear normal    Left Ear: External ear normal    Nose: Nose normal    Mouth/Throat: Oropharynx is clear and moist    There is a circular ulceration approximately 1/2 cm to the left lower lip which does not extend onto the mucosal surface  It has some dryness and crusting about it  Eyes: Pupils are equal, round, and reactive to light  EOM are normal    Neck: Normal range of motion  Neck supple  Cardiovascular: Normal rate  Pulmonary/Chest: Effort normal    Musculoskeletal: Normal range of motion  Neurological: She is alert and oriented to person, place, and time  Skin: Skin is warm and dry  Nursing note and vitals reviewed

## 2019-08-29 NOTE — PATIENT INSTRUCTIONS
1    Cool compresses to area  2  Lip balm for dryness          Oral Herpes Simplex Virus Infections   WHAT YOU NEED TO KNOW:   Oral herpes simplex virus (HSV) infections cause sores to form on the mouth, lips, or gums  HSV has 2 types  Oral HSV infections are most often caused by HSV type 1  HSV type 2 normally affects the genital area, but may also occur in the mouth  After you are infected, the virus hides in your nerves and may return  An HSV infection that comes back is also known as a cold sore  DISCHARGE INSTRUCTIONS:   Medicines:   · Antiviral medicine: This decreases symptoms and shortens the amount of time blisters are present  You may also need to take it daily to prevent blisters  The medicine may be given as a liquid, pill, or ointment  Use as directed  · Numbing medicine: This decreases mouth pain  It is usually given as a mouth rinse  Use it before you eat or drink, or as directed  Follow up with your healthcare provider as directed:  Write down your questions so you remember to ask them during your visits  Self-care:   · Eat soft, bland foods:  Avoid salty, acidic, spicy, sharp-edged, and hard foods  Eat healthy foods to help healing  · Drink liquids:  Cool liquids may help soothe your mouth and numb the pain  Avoid citrus or carbonated drinks, such as orange or grapefruit juice, lemonade, or soda  These liquids may cause your mouth to hurt more  A straw may help if you have blisters on the lips or tongue  · Use ice:  Ice helps decrease swelling and pain  Drink cold water or suck on ice to help decrease pain on your tongue or inside your mouth  Use an ice pack, or put crushed ice in a plastic bag on your lip  Cover it with a towel and place it on your lip for 15 to 20 minutes every hour or as directed  Prevent the spread of the herpes simplex virus:   · Do not have close contact with people until the blisters heal  This includes touching, kissing, and oral sex       · Do not get close to babies or to people who are sick while you have cold sores  · Do not share eating utensils, towels, lip balm, or makeup with another person  · Do not touch the blisters or pick at the scabs  Do not touch other body parts, especially your eyes or genitals without washing your hands first  Wash your hands often  Contact your healthcare provider if:   · You have a fever  · Your symptoms become worse or do not improve a week after you start treatment  · You have difficulty eating or drinking because of the pain in your mouth  · You get a headache, are nauseated, or vomit  · Your eyes feel irritated, or you feel like you have something in your eye  · Your skin becomes itchy, swollen, or develops a rash after you take your medicine  · You have questions or concerns about your condition or care  Return to the emergency department if:   · You get a fever, feel achy, or see pus instead of clear fluid in the sores  · You get sores on your eyes  · You have abdominal pain, a severe headache, or confusion  · You get new symptoms, or old symptoms return after you have been treated  © 2017 [facility] Information is for End User's use only and may not be sold, redistributed or otherwise used for commercial purposes  All illustrations and images included in CareNotes® are the copyrighted property of A [employer]  or Rasheed Bishop  The above information is an  only  It is not intended as medical advice for individual conditions or treatments  Talk to your doctor, nurse or pharmacist before following any medical regimen to see if it is safe and effective for you

## 2019-09-19 ENCOUNTER — OFFICE VISIT (OUTPATIENT)
Dept: OBGYN CLINIC | Facility: CLINIC | Age: 64
End: 2019-09-19
Payer: COMMERCIAL

## 2019-09-19 VITALS
SYSTOLIC BLOOD PRESSURE: 145 MMHG | HEART RATE: 68 BPM | HEIGHT: 63 IN | BODY MASS INDEX: 30.83 KG/M2 | DIASTOLIC BLOOD PRESSURE: 89 MMHG | WEIGHT: 174 LBS

## 2019-09-19 DIAGNOSIS — M25.562 CHRONIC PAIN OF LEFT KNEE: ICD-10-CM

## 2019-09-19 DIAGNOSIS — G89.29 CHRONIC PAIN OF LEFT KNEE: ICD-10-CM

## 2019-09-19 DIAGNOSIS — M17.0 PRIMARY OSTEOARTHRITIS OF BOTH KNEES: Primary | ICD-10-CM

## 2019-09-19 PROCEDURE — 99214 OFFICE O/P EST MOD 30 MIN: CPT | Performed by: ORTHOPAEDIC SURGERY

## 2019-09-19 RX ORDER — TROSPIUM CHLORIDE 20 MG/1
20 TABLET, FILM COATED ORAL 2 TIMES DAILY
COMMUNITY
Start: 2019-09-18 | End: 2020-08-28 | Stop reason: ALTCHOICE

## 2019-09-19 RX ORDER — HYDROCODONE BITARTRATE AND ACETAMINOPHEN 5; 325 MG/1; MG/1
TABLET ORAL
COMMUNITY
Start: 2019-09-17 | End: 2020-02-12

## 2019-09-19 NOTE — PROGRESS NOTES
ASSESSMENT/PLAN:    Diagnoses and all orders for this visit:    Primary osteoarthritis of both knees    Chronic pain of left knee    Plan:  I discussed treatment options  I offered to perform a corticosteroid injection today  She did not want to consider corticosteroid at this time but does wish to proceed with hyaluronic acid injection  I will see her once this has been approved  I have encouraged her to contact the office if questions or concerns arise in the interim  _____________________________________________________  CHIEF COMPLAINT:  Chief Complaint   Patient presents with    Left Knee - Pain, Clicking, popping    Knee Pain     pt states has pain to L knee 7/10, pt states feels like something is pulling  SUBJECTIVE:  Sujatha November is a 60y o  year old female who presents for evaluation of her left knee  She was last seen in April by Dr Sonia Mazariegos and underwent injection of both knees  The right knee responded well and has continued to be of no significant concern to her  Her left knee pain has returned and she believes it is as bad, perhaps worse, than it was in April  She noted onset of symptoms about 1 month ago  She notes morning and start-up stiffness and pain  She denies pain is getting severe enough to force her to sit and rest, but she does have difficulty with daily activities secondary to her pain  She denies instability  She does not believe there is any significant swelling  PAST MEDICAL HISTORY:  Past Medical History:   Diagnosis Date    Anemia     Hyperlipidemia     Hypertension     Overactive bladder     Vitamin D deficiency        PAST SURGICAL HISTORY:  Past Surgical History:   Procedure Laterality Date     SECTION      Last Assessed: 2017    ELBOW SURGERY      Last Assessed: 2017    ESOPHAGOGASTRODUODENOSCOPY N/A 2017    Procedure: ESOPHAGOGASTRODUODENOSCOPY (EGD);   Surgeon: Missy Cota DO;  Location: [facility]; Service: Gastroenterology    HIP SURGERY Left 2018    glut medius/minimus repair  and 18    HYSTERECTOMY          KNEE SURGERY      x2    [place] COLONOSCOPY FLX DX W/COLLJ SPEC WHEN PFRMD N/A 2017    Procedure: EGD AND COLONOSCOPY;  Surgeon: Li Ramirez DO;  Location: [facility];   Service: Gastroenterology       FAMILY HISTORY:  Family History   Problem Relation Age of Onset    Colon cancer Mother     Heart attack Father     Other Father         Cardiac Disorder    Diabetes type II Father     Colon cancer Sister     Cancer Maternal Grandmother        SOCIAL HISTORY:  Social History     Tobacco Use    Smoking status: Former Smoker     Last attempt to quit:      Years since quittin 7    Smokeless tobacco: Never Used    Tobacco comment: quit 25 yrs ago   Substance Use Topics    Alcohol use: No    Drug use: No       MEDICATIONS:    Current Outpatient Medications:     atorvastatin (LIPITOR) 20 mg tablet, Take 1 tablet (20 mg total) by mouth daily, Disp: 90 tablet, Rfl: 1    baclofen 10 mg tablet, Take 10 mg by mouth 2 (two) times a day , Disp: , Rfl:     famotidine (PEPCID) 20 mg tablet, Take 1 tablet (20 mg total) by mouth daily before dinner, Disp: 90 tablet, Rfl: 1    ferrous sulfate 324 (65 Fe) mg, Take 1 tablet (324 mg total) by mouth daily before breakfast, Disp: 30 tablet, Rfl: 1    hydrOXYzine HCL (ATARAX) 25 mg tablet, TAKE 1 TO 2 TABLETS BY MOUTH AT BEDTIME AS NEEDED FOR ANXIETY OR INSOMNIA, Disp: 60 tablet, Rfl: 0    indomethacin (INDOCIN) 50 mg capsule, Take 50 mg by mouth daily , Disp: , Rfl:     losartan (COZAAR) 100 MG tablet, Take 1 tablet (100 mg total) by mouth daily, Disp: 90 tablet, Rfl: 1    oxybutynin (DITROPAN-XL) 5 mg 24 hr tablet, Take 5 mg by mouth daily, Disp: , Rfl:     traMADol (ULTRAM) 50 mg tablet, , Disp: , Rfl:     trospium chloride (SANCTURA) 20 mg tablet, Take 20 mg by mouth 2 (two) times a day, Disp: , Rfl:     acyclovir (ZOVIRAX) 200 mg capsule, Take 2 capsules (400 mg total) by mouth 3 (three) times a day for 7 days, Disp: 42 capsule, Rfl: 0    HYDROcodone-acetaminophen (NORCO) 5-325 mg per tablet, , Disp: , Rfl:     ALLERGIES:  No Known Allergies    Review of systems:   Constitutional: Negative for fatigue, fever or loss of apetite  HENT: Negative  Respiratory: Negative for shortness of breath, dyspnea  Cardiovascular: Negative for chest pain/tightness  Gastrointestinal: Negative for abdominal pain, N/V  Endocrine: Negative for cold/heat intolerance, unexplained weight loss/gain  Genitourinary: Negative for flank pain, dysuria, hematuria  Musculoskeletal:  Positive as in the HPI   Skin: Negative for rash  Neurological:  Negative  Psychiatric/Behavioral: Negative for agitation  _____________________________________________________  PHYSICAL EXAMINATION:    Blood pressure 145/89, pulse 68, height 5' 3" (1 6 m), weight 78 9 kg (174 lb)  General: well developed and well nourished, alert, oriented times 3 and appears comfortable  HEENT: Benign, normocephalic, atraumatic  Cardiovascular:  Regular    Pulmonary: No wheezing or stridor  Abdomen: Soft, Nontender  Skin:  No lacerations or abrasions noted  Neurovascular: Motor and sensory exams are grossly intact, pulses are palpable and there is no significant peripheral edema    MUSCULOSKELETAL EXAMINATION:  Extremities: The bilateral knee exam demonstrates good range of motion  She does complain of left knee pain with end range flexion and extension  There is mild valgus deformity  Ligaments are grossly stable although the valgus deformity can be corrected to a physiologic position  She had no localized tenderness  There is crepitus noted during range of motion  There is a minimal effusion present  Patellofemoral symptoms are absent  There is no joint line tenderness    The remainder of the lower extremity examination, bilaterally, demonstrated no acute abnormalities  _____________________________________________________  STUDIES REVIEWED:  X-rays from April were reviewed  These demonstrate significant degenerative disease of the lateral compartment of the left knee  There is lesser involvement of the medial and patellofemoral compartments  The right knee demonstrates a lesser degree of degenerative disease  The reports were reviewed          Trish Domingo

## 2019-09-21 DIAGNOSIS — F51.01 PRIMARY INSOMNIA: ICD-10-CM

## 2019-09-23 RX ORDER — HYDROXYZINE HYDROCHLORIDE 25 MG/1
TABLET, FILM COATED ORAL
Qty: 60 TABLET | Refills: 0 | Status: SHIPPED | OUTPATIENT
Start: 2019-09-23 | End: 2019-12-10 | Stop reason: SDUPTHER

## 2019-09-24 DIAGNOSIS — E61.1 IRON DEFICIENCY: ICD-10-CM

## 2019-09-24 RX ORDER — FERROUS SULFATE 325(65) MG
TABLET ORAL
Qty: 90 TABLET | Refills: 0 | OUTPATIENT
Start: 2019-09-24

## 2019-09-25 RX ORDER — FERROUS SULFATE TAB EC 324 MG (65 MG FE EQUIVALENT) 324 (65 FE) MG
324 TABLET DELAYED RESPONSE ORAL
Qty: 30 TABLET | Refills: 1 | Status: SHIPPED | OUTPATIENT
Start: 2019-09-25 | End: 2019-12-10 | Stop reason: SDUPTHER

## 2019-09-26 ENCOUNTER — TELEPHONE (OUTPATIENT)
Dept: OBGYN CLINIC | Facility: CLINIC | Age: 64
End: 2019-09-26

## 2019-11-18 ENCOUNTER — OFFICE VISIT (OUTPATIENT)
Dept: FAMILY MEDICINE CLINIC | Facility: CLINIC | Age: 64
End: 2019-11-18
Payer: COMMERCIAL

## 2019-11-18 VITALS
RESPIRATION RATE: 17 BRPM | OXYGEN SATURATION: 97 % | HEIGHT: 63 IN | TEMPERATURE: 98.5 F | DIASTOLIC BLOOD PRESSURE: 90 MMHG | BODY MASS INDEX: 32.43 KG/M2 | HEART RATE: 97 BPM | SYSTOLIC BLOOD PRESSURE: 144 MMHG | WEIGHT: 183 LBS

## 2019-11-18 DIAGNOSIS — M77.12 LEFT LATERAL EPICONDYLITIS: Primary | ICD-10-CM

## 2019-11-18 DIAGNOSIS — J06.9 VIRAL URI WITH COUGH: ICD-10-CM

## 2019-11-18 DIAGNOSIS — L91.8 SKIN TAG: ICD-10-CM

## 2019-11-18 DIAGNOSIS — D17.21 LIPOMA OF RIGHT UPPER EXTREMITY: ICD-10-CM

## 2019-11-18 DIAGNOSIS — Z12.39 BREAST CANCER SCREENING: ICD-10-CM

## 2019-11-18 PROCEDURE — 1036F TOBACCO NON-USER: CPT | Performed by: PHYSICIAN ASSISTANT

## 2019-11-18 PROCEDURE — 3725F SCREEN DEPRESSION PERFORMED: CPT | Performed by: PHYSICIAN ASSISTANT

## 2019-11-18 PROCEDURE — 99214 OFFICE O/P EST MOD 30 MIN: CPT | Performed by: PHYSICIAN ASSISTANT

## 2019-11-18 RX ORDER — CELECOXIB 200 MG/1
200 CAPSULE ORAL 2 TIMES DAILY PRN
Qty: 60 CAPSULE | Refills: 0 | Status: SHIPPED | OUTPATIENT
Start: 2019-11-18 | End: 2020-02-12

## 2019-11-18 RX ORDER — DEXTROMETHORPHAN HYDROBROMIDE AND PROMETHAZINE HYDROCHLORIDE 15; 6.25 MG/5ML; MG/5ML
2.5 SOLUTION ORAL 4 TIMES DAILY PRN
Qty: 118 ML | Refills: 0 | Status: SHIPPED | OUTPATIENT
Start: 2019-11-18 | End: 2019-12-02 | Stop reason: SDUPTHER

## 2019-11-18 NOTE — PROGRESS NOTES
Omar Jovel 60 y o  female   Date:  11/18/2019      Assessment and Plan:    Nael Murillo was seen today for elbow pain, cyst, cough and follow-up  Diagnoses and all orders for this visit:    Left lateral epicondylitis  -     celecoxib (CeleBREX) 200 mg capsule; Take 1 capsule (200 mg total) by mouth 2 (two) times a day as needed for mild pain  - rest as able, ice 20 min on off, can get elbow sleeve otc  - return if no improvement  - take celebrex x 2 weeks, then prn    Breast cancer screening  -     Mammo screening bilateral w 3d & cad; Future    Skin tag  - recommend schedule with ophthalmology    Lipoma of right upper extremity  - reassurance provided, offered ultrasound but given benign characteristics and no changes in several years, will continue to monitor without ultrasound which is reasonable    Viral URI with cough  -     Promethazine-DM (PHENERGAN-DM) 6 25-15 mg/5 mL oral syrup; Take 2 5 mL by mouth 4 (four) times a day as needed for cough  - afebrile, clear lung exam; viral, supportive care               HPI:  Chief Complaint   Patient presents with    Elbow Pain     Left elbow pain for about a month    Cyst     Right upper eyelid  Not painful  First noticed about a month ago  Has not grown in size   Cough     Started about five days ago    Follow-up     Declines flu vaccine      HPI   Patient is a 59 yo female who presents with a few concerns  She has an irritating lesion on her R eyelid  No redness, pain but is bothersome  She already had an eye doctor but cannot recall the name, unsure if optometrist or not  She has elbow surgery done about 8 years ago when living in [place]  Over the past month, she has pain along lateral part of elbow  It is aggravated by lifting  No swelling or injury  She was taking indomethacin for back pain  No icing  She started with a cough 5-6 days ago  She has sick contacts in her grandkids  No fever, chills, wheezing, sob  She is sneezing   The cough is dry  No sore throat, congestion, ear pain  She has not tried anything over the counter  She was given tessalon in the past without relief  She also appreciates lumps on R forearm x years without pain  ROS: Review of Systems   Constitutional: Negative for appetite change, chills and fever  HENT: Positive for sneezing  Negative for congestion, ear pain, rhinorrhea, sore throat and trouble swallowing  Eyes: Negative for pain, discharge, redness and visual disturbance  Lesion on R upper eyelid   Respiratory: Positive for cough  Negative for chest tightness, shortness of breath and wheezing  Cardiovascular: Negative for chest pain, palpitations and leg swelling  Musculoskeletal: Positive for arthralgias and myalgias  Negative for gait problem and joint swelling  Skin: Negative  Neurological: Negative for dizziness, syncope and numbness  Past Medical History:   Diagnosis Date    Anemia     Hyperlipidemia     Hypertension     Overactive bladder     Vitamin D deficiency      Patient Active Problem List   Diagnosis    Iron deficiency anemia    Joint disorder of pelvis or thigh    Leg weakness, bilateral    Nocturia    OAB (overactive bladder)    Pain in both lower legs    Segmental and somatic dysfunction of sacral region    Vitamin D deficiency    Hypertension    Leg swelling    Sacroiliitis (HCC)    Myofascial pain    DALTON (stress urinary incontinence, female)    Primary osteoarthritis of both knees    Chronic pain of left knee       Past Surgical History:   Procedure Laterality Date     SECTION      Last Assessed: 2017    ELBOW SURGERY      Last Assessed: 2017    ESOPHAGOGASTRODUODENOSCOPY N/A 2017    Procedure: ESOPHAGOGASTRODUODENOSCOPY (EGD); Surgeon: Ne Shore DO;  Location: [facility];   Service: Gastroenterology    HIP SURGERY Left 2018    glut medius/minimus repair  and 18    HYSTERECTOMY      2011    KNEE SURGERY      x2    [place] COLONOSCOPY FLX DX W/COLLJ SPEC WHEN PFRMD N/A 2017    Procedure: EGD AND COLONOSCOPY;  Surgeon: Juana Green DO;  Location: [facility];   Service: Gastroenterology       Social History     Socioeconomic History    Marital status: /Civil Union     Spouse name: None    Number of children: None    Years of education: None    Highest education level: None   Occupational History    None   Social Needs    Financial resource strain: None    Food insecurity:     Worry: None     Inability: None    Transportation needs:     Medical: No     Non-medical: No   Tobacco Use    Smoking status: Former Smoker     Last attempt to quit:      Years since quittin 8    Smokeless tobacco: Never Used    Tobacco comment: quit 25 yrs ago   Substance and Sexual Activity    Alcohol use: No    Drug use: No    Sexual activity: Yes     Partners: Male   Lifestyle    Physical activity:     Days per week: 0 days     Minutes per session: 0 min    Stress: None   Relationships    Social connections:     Talks on phone: None     Gets together: None     Attends Alevism service: None     Active member of club or organization: None     Attends meetings of clubs or organizations: None     Relationship status: None    Intimate partner violence:     Fear of current or ex partner: None     Emotionally abused: None     Physically abused: None     Forced sexual activity: None   Other Topics Concern    None   Social History Narrative    Caffeine use    , 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[employer], now at [employer] and [address] [place] [address] for [address] [place] [address]    2 grown children       Family History   Problem Relation Age of Onset    Colon cancer Mother     Heart attack Father     Other Father         Cardiac Disorder    Diabetes type II Father     Colon cancer Sister     Cancer Maternal Grandmother        No Known Allergies      Current Outpatient Medications:     atorvastatin (LIPITOR) 20 mg tablet, Take 1 tablet (20 mg total) by mouth daily, Disp: 90 tablet, Rfl: 1    baclofen 10 mg tablet, Take 10 mg by mouth 2 (two) times a day , Disp: , Rfl:     famotidine (PEPCID) 20 mg tablet, Take 1 tablet (20 mg total) by mouth daily before dinner, Disp: 90 tablet, Rfl: 1    ferrous sulfate 324 (65 Fe) mg, Take 1 tablet (324 mg total) by mouth daily before breakfast, Disp: 30 tablet, Rfl: 1    HYDROcodone-acetaminophen (NORCO) 5-325 mg per tablet, , Disp: , Rfl:     hydrOXYzine HCL (ATARAX) 25 mg tablet, TAKE 1 TO 2 TABLETS BY MOUTH AT BEDTIME AS NEEDED FOR ANXIETY OR INSOMNIA, Disp: 60 tablet, Rfl: 0    losartan (COZAAR) 100 MG tablet, Take 1 tablet (100 mg total) by mouth daily, Disp: 90 tablet, Rfl: 1    traMADol (ULTRAM) 50 mg tablet, , Disp: , Rfl:     trospium chloride (SANCTURA) 20 mg tablet, Take 20 mg by mouth 2 (two) times a day, Disp: , Rfl:     acyclovir (ZOVIRAX) 200 mg capsule, Take 2 capsules (400 mg total) by mouth 3 (three) times a day for 7 days, Disp: 42 capsule, Rfl: 0    celecoxib (CeleBREX) 200 mg capsule, Take 1 capsule (200 mg total) by mouth 2 (two) times a day as needed for mild pain, Disp: 60 capsule, Rfl: 0    oxybutynin (DITROPAN-XL) 5 mg 24 hr tablet, Take 5 mg by mouth daily, Disp: , Rfl:     Promethazine-DM (PHENERGAN-DM) 6 25-15 mg/5 mL oral syrup, Take 2 5 mL by mouth 4 (four) times a day as needed for cough, Disp: 118 mL, Rfl: 0      Physical Exam:  /90 (BP Location: Left arm)   Pulse 97   Temp 98 5 °F (36 9 °C) (Tympanic)   Resp 17   Ht 5' 3" (1 6 m)   Wt 83 kg (183 lb)   SpO2 97%   BMI 32 42 kg/m²     Physical Exam   Constitutional: She is oriented to person, place, and time  She appears well-developed and well-nourished  No distress  HENT:   Head: Normocephalic and atraumatic  Eyes: Pupils are equal, round, and reactive to light  Conjunctivae are normal  Right eye exhibits no discharge and no hordeolum   No foreign body present in the right eye  Left eye exhibits no discharge and no hordeolum  No foreign body present in the left eye  Cardiovascular: Normal rate, regular rhythm and normal heart sounds  Pulmonary/Chest: Effort normal and breath sounds normal  No respiratory distress  She has no wheezes  Musculoskeletal: She exhibits no edema or deformity  Normal ROM of L elbow; tenderness to palpation along lateral elbow and pain induced with resistance to pronation; no bony deformity or effusion, no redness or warmth   Neurological: She is alert and oriented to person, place, and time  No sensory deficit  Skin: Skin is warm and dry  2 mobile lipomas of R forearm   Psychiatric: She has a normal mood and affect   Her behavior is normal          Labs:  Lab Results   Component Value Date    WBC 7 42 02/18/2019    HGB 13 4 02/18/2019    HCT 42 5 02/18/2019    MCV 83 02/18/2019     02/18/2019     Lab Results   Component Value Date     05/14/2018    K 4 7 08/01/2019     08/01/2019    CO2 31 08/01/2019    ANIONGAP 12 0 05/14/2018    BUN 26 (H) 08/01/2019    CREATININE 0 81 08/01/2019    GLUF 92 08/01/2019    CALCIUM 9 3 08/01/2019    AST 9 08/01/2019    ALT 16 08/01/2019    ALKPHOS 76 08/01/2019    PROT 7 3 05/14/2018    BILITOT 0 4 05/14/2018    EGFR 78 08/01/2019

## 2019-11-18 NOTE — PATIENT INSTRUCTIONS
Tennis Elbow   WHAT YOU NEED TO KNOW:   Tennis elbow is inflammation of the tendons in your elbow  Tendons are strong tissues that connect muscle to bone  DISCHARGE INSTRUCTIONS:   Return to the emergency department if:   · You suddenly have no feeling in your arm, hand, or fingers  · You suddenly cannot move your arm, wrist, hand, or fingers  Contact your healthcare provider if:   · You have a fever  · You have more pain or weakness in your arm, wrist, hand, or fingers  · You have new numbness or tingling in your arm, hand, or fingers  · You have questions or concerns about your condition or care  Medicines:   · Acetaminophen  decreases pain and fever  It is available without a doctor's order  Ask how much to take and how often to take it  Follow directions  Read the labels of all other medicines you are using to see if they also contain acetaminophen, or ask your doctor or pharmacist  Acetaminophen can cause liver damage if not taken correctly  Do not use more than 4 grams (4,000 milligrams) total of acetaminophen in one day  · NSAIDs , such as ibuprofen, help decrease swelling, pain, and fever  This medicine is available with or without a doctor's order  NSAIDs can cause stomach bleeding or kidney problems in certain people  If you take blood thinner medicine, always ask your healthcare provider if NSAIDs are safe for you  Always read the medicine label and follow directions  · Take your medicine as directed  Contact your healthcare provider if you think your medicine is not helping or if you have side effects  Tell him or her if you are allergic to any medicine  Keep a list of the medicines, vitamins, and herbs you take  Include the amounts, and when and why you take them  Bring the list or the pill bottles to follow-up visits  Carry your medicine list with you in case of an emergency    Physical therapy:  A physical therapist teaches you exercises to help improve movement and strength, and to decrease pain  Self-care:   · Wear your support device as directed  Devices, such as an arm strap, brace, or splint, help limit your arm movement  They also decrease pain and help prevent more damage to your tendon  Ask your healthcare provider how to care for your arm while you wear a brace or splint  · Rest your injured arm  and avoid activities that cause pain  This will help your tendons heal     · Apply ice on your elbow  for 15 to 20 minutes every hour or as directed  Use an ice pack, or put crushed ice in a plastic bag  Cover it with a towel before you apply it to your skin  Ice helps prevent tissue damage and decreases swelling and pain  · Elevate your elbow  above the level of your heart as often as you can  This will help decrease swelling and pain  Prop your elbow on pillows or blankets to keep it elevated comfortably  Follow up with your healthcare provider as directed:  Write down your questions so you remember to ask them during your visits  © 2017 [facility] Information is for End User's use only and may not be sold, redistributed or otherwise used for commercial purposes  All illustrations and images included in CareNotes® are the copyrighted property of A D A M [employer] Inc  or Rasheed Bishop  The above information is an  only  It is not intended as medical advice for individual conditions or treatments  Talk to your doctor, nurse or pharmacist before following any medical regimen to see if it is safe and effective for you

## 2019-11-19 ENCOUNTER — TRANSCRIBE ORDERS (OUTPATIENT)
Dept: PHYSICAL THERAPY | Facility: CLINIC | Age: 64
End: 2019-11-19

## 2019-11-19 ENCOUNTER — EVALUATION (OUTPATIENT)
Dept: PHYSICAL THERAPY | Facility: CLINIC | Age: 64
End: 2019-11-19
Payer: COMMERCIAL

## 2019-11-19 DIAGNOSIS — M25.562 LEFT KNEE PAIN, UNSPECIFIED CHRONICITY: Primary | ICD-10-CM

## 2019-11-19 DIAGNOSIS — R26.9 ABNORMAL GAIT: ICD-10-CM

## 2019-11-19 PROCEDURE — 97535 SELF CARE MNGMENT TRAINING: CPT | Performed by: PHYSICAL THERAPIST

## 2019-11-19 PROCEDURE — 97164 PT RE-EVAL EST PLAN CARE: CPT | Performed by: PHYSICAL THERAPIST

## 2019-11-19 NOTE — PROGRESS NOTES
PT Evaluation     Today's date: 2019  Patient name: Omar Jovel  : 1955  MRN: [id number]  Referring provider: Lalo Robins, *  Dx:   Encounter Diagnosis     ICD-10-CM    1  Left knee pain, unspecified chronicity M25 562    2  Abnormal gait R26 9        Start Time: 0900  Stop Time: 0945  Total time in clinic (min): 45 minutes    Assessment  Assessment details: Omar Jovel was seen for an initial PT evaluation today  Patient is a 60 y o  female with diagnosis of left knee pain and past medical history significant for left meniscal surgery, left elbow surgery, HTN, Anemia, sacroiliitis, and multiple hip surgeries  High complexity evaluation  due to number of participation restrictions, functional outcome measure of 72% limitation, and unstable clinical presentation  Findings today show significant left hip and knee weakness with poor core stability, limited left knee range of motion, and poor balance/stability impacting ability to ambulate and perform functional ADLs  Skilled PT indicated to treat at this time to address left quad activation, LE strength and stability as well as reduction in pain to help improve quality of life  Impairments: abnormal gait, abnormal muscle firing, abnormal or restricted ROM, abnormal movement, activity intolerance, impaired balance, impaired physical strength, lacks appropriate home exercise program and pain with function    Goals  STG (4 weeks)  1  Patient will have reported 3/10 pain in left knee at rest    2  Improve patient's left knee flexion to 130 degrees for increased ability to take proper strides during ambulation  3  Increase patient's left single leg balance to 3 seconds for increased stability on stairs  LTG (8 weeks)  1  Patient's left LE strength will improve by 1 grade for ability to ambulate and return to functional activities at [facility]  2  Patient will be able to walk community distances with 4/10 pain in left knee     3  Patient will be independent with home exercise program for continued maintenance post PT discharge  Plan  Plan details: Reassessment in 4 weeks  Patient would benefit from: skilled physical therapy  Planned modality interventions: cryotherapy, unattended electrical stimulation, thermotherapy: hydrocollator packs and electrical stimulation/Russian stimulation  Planned therapy interventions: manual therapy, therapeutic activities, neuromuscular re-education, home exercise program and gait training  Frequency: 2x week  Plan of Care beginning date: 2019  Plan of Care expiration date: 2020  Treatment plan discussed with: patient and PTA        Subjective Evaluation    History of Present Illness  Date of onset: 2018  Mechanism of injury: Curly Way is a 60 y o  female who presents to outpatient Physical Therapy today with complaints of left knee pain  Knee pain began >1 year ago of insidious onset  History of left knee menisectomy with lateral release (20 years ago)  Has also had 2 surgeries on left hip which may be a contributor  Recently saw hip surgeon for knee pain who has administered 2 this year  Patient states some relief from injections, but not resolved  Would like to attempt conservative treatment in PT due to inability to have surgery on knee at this time likely due to previous surgery  Left knee IMPRESSION:     Large complex tear of the lateral meniscus body and posterior horn associated with large parameniscal cyst in the posterior lateral corner of the knee (series 5001 images 30 through 9 )     Small radial tear in the medial meniscal body (series 8001 image 14 )     Moderate tricompartmental osteoarthritis    Pain  Current pain ratin  At best pain ratin  At worst pain ratin  Location: lateral left knee  Quality: sharp and throbbing  Relieving factors: relaxation and ice  Aggravating factors: walking, standing and stair climbing  Progression: improved (since injections)    Social Support  Steps to enter house: no  Stairs in house: yes   13  Lives in: multiple-level home    Employment status: not working (disability)  Hand dominance: right      Diagnostic Tests  MRI studies: abnormal  Treatments  Current treatment: injection treatment and physical therapy  Patient Goals  Patient goal: walk without pain        Objective     Observations   Left Knee   Negative for edema  Palpation   Left   No palpable tenderness to the distal biceps femoris and distal semitendinosus  Tenderness of the rectus femoris  Tenderness   Left Knee   Tenderness in the ITB, lateral joint line, lateral patella and lateral retinaculum  No tenderness in the inferior patella, medial joint line, medial patella, patellar tendon and pes anserinus  Neurological Testing     Sensation     Knee   Left Knee   Intact: light touch    Right Knee   Intact: light touch     Active Range of Motion   Left Knee   Flexion: 120 degrees with pain  Extension: 4 degrees with pain    Right Knee   Flexion: 132 degrees   Extension: 6 degrees     Mobility   Patellar Mobility:   Left Knee   WFL: medial, lateral, superior and inferior  Strength/Myotome Testing     Left Hip   Planes of Motion   Flexion: 3+  Extension: 2+  Abduction: 2+    Right Hip   Planes of Motion   Flexion: 4+  Extension: 4  Abduction: 4-    Left Knee   Flexion: 3-  Extension: 3-  Quadriceps contraction: poor    Tests     Left Knee   Negative valgus stress test at 0 degrees and varus stress test at 0 degrees  Additional Tests Details  Tightness of left hamstring and quad    General Comments:      Knee Comments  Gait: Antalgic, no AD, decreased left hip extension and knee extension, positive trendelenburg       FOTO: 28% function, 43% predicted function                Re-Evaluation:  11/19    Knee Specialty Daily Treatment Diary     Manual  11/19       PROM        Hamstring stretch        Quad stretch        Patellar mobs        Quad/itb trell            Exercise Diary  11/19       Warm up  *      [place] e-stim  *      QS                SAQ        LAQ        Hamstring stretch        Calf stretch        Standing hamstring curls        TKE        Leg press Squat        SLB        Step up        [employer] board        [employer]                                                    Modalities

## 2019-11-19 NOTE — LETTER
2019    Shanta Griffith [facility] [phone] [id number]    Patient: Shayna Bee   YOB: 1955   Date of Visit: 2019     Encounter Diagnosis     ICD-10-CM    1  Left knee pain, unspecified chronicity M25 562    2  Abnormal gait R26 9        Dear Dr Juan Pizano: Thank you for your recent referral of Shayna Bee  Please review the attached evaluation summary from Jamee's recent visit  Please verify that you agree with the plan of care by signing the attached order  If you have any questions or concerns, please do not hesitate to call  I sincerely appreciate the opportunity to share in the care of one of your patients and hope to have another opportunity to work with you in the near future  Sincerely,    Michael Reyez, PT      Referring Provider:      I certify that I have read the below Plan of Care and certify the need for these services furnished under this plan of treatment while under my care  Ace Garcia MD  [address] [place] [address] [address] [place] [address] [place]          PT Evaluation     Today's date: 2019  Patient name: Shayna Bee  : 1955  MRN: [id number]  Referring provider: Judson Orellana, *  Dx:   Encounter Diagnosis     ICD-10-CM    1  Left knee pain, unspecified chronicity M25 562    2  Abnormal gait R26 9        Start Time: 0900  Stop Time: 0945  Total time in clinic (min): 45 minutes    Assessment  Assessment details: Shayna Bee was seen for an initial PT evaluation today  Patient is a 60 y o  female with diagnosis of left knee pain and past medical history significant for left meniscal surgery, left elbow surgery, HTN, Anemia, sacroiliitis, and multiple hip surgeries  High complexity evaluation  due to number of participation restrictions, functional outcome measure of 72% limitation, and unstable clinical presentation   Findings today show significant left hip and knee weakness with poor core stability, limited left knee range of motion, and poor balance/stability impacting ability to ambulate and perform functional ADLs  Skilled PT indicated to treat at this time to address left quad activation, LE strength and stability as well as reduction in pain to help improve quality of life  Impairments: abnormal gait, abnormal muscle firing, abnormal or restricted ROM, abnormal movement, activity intolerance, impaired balance, impaired physical strength, lacks appropriate home exercise program and pain with function    Goals  STG (4 weeks)  1  Patient will have reported 3/10 pain in left knee at rest    2  Improve patient's left knee flexion to 130 degrees for increased ability to take proper strides during ambulation  3  Increase patient's left single leg balance to 3 seconds for increased stability on stairs  LTG (8 weeks)  1  Patient's left LE strength will improve by 1 grade for ability to ambulate and return to functional activities at [facility]  2  Patient will be able to walk community distances with 4/10 pain in left knee  3  Patient will be independent with home exercise program for continued maintenance post PT discharge         Plan  Plan details: Reassessment in 4 weeks  Patient would benefit from: skilled physical therapy  Planned modality interventions: cryotherapy, unattended electrical stimulation, thermotherapy: hydrocollator packs and electrical stimulation/Russian stimulation  Planned therapy interventions: manual therapy, therapeutic activities, neuromuscular re-education, home exercise program and gait training  Frequency: 2x week  Plan of Care beginning date: 11/19/2019  Plan of Care expiration date: 1/19/2020  Treatment plan discussed with: patient and PTA        Subjective Evaluation    History of Present Illness  Date of onset: 11/19/2018  Mechanism of injury: Clotilde Allen is a 60 y o  female who presents to outpatient Physical Therapy today with complaints of left knee pain  Knee pain began >1 year ago of insidious onset  History of left knee menisectomy with lateral release (20 years ago)  Has also had 2 surgeries on left hip which may be a contributor  Recently saw hip surgeon for knee pain who has administered 2 this year  Patient states some relief from injections, but not resolved  Would like to attempt conservative treatment in PT due to inability to have surgery on knee at this time likely due to previous surgery  Left knee IMPRESSION:     Large complex tear of the lateral meniscus body and posterior horn associated with large parameniscal cyst in the posterior lateral corner of the knee (series 5001 images 30 through 9 )     Small radial tear in the medial meniscal body (series 8001 image 14 )     Moderate tricompartmental osteoarthritis  Pain  Current pain ratin  At best pain ratin  At worst pain ratin  Location: lateral left knee  Quality: sharp and throbbing  Relieving factors: relaxation and ice  Aggravating factors: walking, standing and stair climbing  Progression: improved (since injections)    Social Support  Steps to enter house: no  Stairs in house: yes   13  Lives in: multiple-level home    Employment status: not working (disability)  Hand dominance: right      Diagnostic Tests  MRI studies: abnormal  Treatments  Current treatment: injection treatment and physical therapy  Patient Goals  Patient goal: walk without pain        Objective     Observations   Left Knee   Negative for edema  Palpation   Left   No palpable tenderness to the distal biceps femoris and distal semitendinosus  Tenderness of the rectus femoris  Tenderness   Left Knee   Tenderness in the ITB, lateral joint line, lateral patella and lateral retinaculum  No tenderness in the inferior patella, medial joint line, medial patella, patellar tendon and pes anserinus       Neurological Testing     Sensation     Knee   Left Knee   Intact: light touch    Right Knee   Intact: light touch     Active Range of Motion   Left Knee   Flexion: 120 degrees with pain  Extension: 4 degrees with pain    Right Knee   Flexion: 132 degrees   Extension: 6 degrees     Mobility   Patellar Mobility:   Left Knee   WFL: medial, lateral, superior and inferior  Strength/Myotome Testing     Left Hip   Planes of Motion   Flexion: 3+  Extension: 2+  Abduction: 2+    Right Hip   Planes of Motion   Flexion: 4+  Extension: 4  Abduction: 4-    Left Knee   Flexion: 3-  Extension: 3-  Quadriceps contraction: poor    Tests     Left Knee   Negative valgus stress test at 0 degrees and varus stress test at 0 degrees  Additional Tests Details  Tightness of left hamstring and quad    General Comments:      Knee Comments  Gait: Antalgic, no AD, decreased left hip extension and knee extension, positive trendelenburg       FOTO: 28% function, 43% predicted function                Re-Evaluation:  11/19    Knee Specialty Daily Treatment Diary     Manual  11/19       PROM        Hamstring stretch        Quad stretch        Patellar mobs        Quad/itb roller            Exercise Diary  11/19       Warm up  *      [place] e-stim  *      QS                SAQ        LAQ        Hamstring stretch        Calf stretch        Standing hamstring curls        TKE        Leg press Squat        SLB        Step up        [employer]

## 2019-11-21 ENCOUNTER — OFFICE VISIT (OUTPATIENT)
Dept: PHYSICAL THERAPY | Facility: CLINIC | Age: 64
End: 2019-11-21
Payer: COMMERCIAL

## 2019-11-21 DIAGNOSIS — R26.9 ABNORMAL GAIT: ICD-10-CM

## 2019-11-21 DIAGNOSIS — M25.562 LEFT KNEE PAIN, UNSPECIFIED CHRONICITY: Primary | ICD-10-CM

## 2019-11-21 PROCEDURE — 97110 THERAPEUTIC EXERCISES: CPT | Performed by: PHYSICAL THERAPIST

## 2019-11-21 NOTE — PROGRESS NOTES
Daily Note     Today's date: 2019  Patient name: Sherren Orn  : 1955  MRN: [id number]  Referring provider: Jamila Finley, *  Dx:   Encounter Diagnosis     ICD-10-CM    1  Left knee pain, unspecified chronicity M25 562    2  Abnormal gait R26 9        Start Time: 1000  Stop Time: 1045  Total time in clinic (min): 45 minutes    Subjective: No change since initial evaluation  Objective: See treatment diary below      Assessment: Tolerated treatment well  Difficulty maintaining supine positioning due to low back and hip pain  Patient would benefit from continued PT working on left knee strength and general stability  Plan: Continue per plan of care  Progress treatment as tolerated         Re-Evaluation:      Knee Specialty Daily Treatment Diary     Manual        PROM        Hamstring stretch        Quad stretch        Patellar mobs        Quad/itb roller  left          Exercise Diary        Warm up  Bike Seat 5 10 min       [place] e-stim  15 min QS 10 min, SAQ 5 min      QS                SAQ        LAQ  3x10      Hamstring stretch        Calf stretch        Standing hamstring curls        TKE  Blue 30x      Leg press Squat        SLB        Step up        [employer]        [place]        Ball squeeze  3x10 seated      Standing HR/TR  3x10                                  Modalities

## 2019-11-26 ENCOUNTER — OFFICE VISIT (OUTPATIENT)
Dept: PHYSICAL THERAPY | Facility: CLINIC | Age: 64
End: 2019-11-26
Payer: COMMERCIAL

## 2019-11-26 DIAGNOSIS — M25.562 LEFT KNEE PAIN, UNSPECIFIED CHRONICITY: Primary | ICD-10-CM

## 2019-11-26 DIAGNOSIS — R26.9 ABNORMAL GAIT: ICD-10-CM

## 2019-11-26 PROCEDURE — 97112 NEUROMUSCULAR REEDUCATION: CPT | Performed by: PHYSICAL THERAPIST

## 2019-11-26 PROCEDURE — 97110 THERAPEUTIC EXERCISES: CPT | Performed by: PHYSICAL THERAPIST

## 2019-11-26 NOTE — PROGRESS NOTES
Daily Note     Today's date: 2019  Patient name: Jia Fox  : 1955  MRN: [id number]  Referring provider: Andree Davis, *  Dx:   Encounter Diagnosis     ICD-10-CM    1  Left knee pain, unspecified chronicity M25 562    2  Abnormal gait R26 9        Start Time: 1000  Stop Time: 1045  Total time in clinic (min): 45 minutes    Subjective: Patient states left glut has been hurting more than usual the last few days  Objective: See treatment diary below      Assessment: Tolerated treatment well  Needs to continue to work on quad strengthening and activation  Difficulty due to co-contraction of glut with quad activation causing some hip discomfort  Patient would benefit from continued PT      Plan: Continue per plan of care  Patient states if she feels as sore tomorrow as she did after last session she may have to cancel, but will call        Re-Evaluation:      Knee Specialty Daily Treatment Diary     Manual       PROM        Hamstring stretch        Quad stretch        Patellar mobs        Quad/itb roller  left          Exercise Diary       Warm up  Bike Seat 5 10 min  Bike Seat 5 10 min     [place] e-stim  15 min QS 10 min, SAQ 5 min 15 min QS 10 min, SAQ 5 min     QS                SAQ        LAQ  3x10 3x10     Hamstring stretch        Calf stretch        Standing hamstring curls        TKE  Blue 30x Ball push back      Leg press Squat        SLB        Step up        [employer]        [place]        Ball squeeze  3x10 seated hold     Standing HR/TR  3x10 3x10 ea                                 Modalities

## 2019-11-27 ENCOUNTER — APPOINTMENT (OUTPATIENT)
Dept: PHYSICAL THERAPY | Facility: CLINIC | Age: 64
End: 2019-11-27
Payer: COMMERCIAL

## 2019-12-02 DIAGNOSIS — J06.9 VIRAL URI WITH COUGH: ICD-10-CM

## 2019-12-03 ENCOUNTER — OFFICE VISIT (OUTPATIENT)
Dept: PHYSICAL THERAPY | Facility: CLINIC | Age: 64
End: 2019-12-03
Payer: COMMERCIAL

## 2019-12-03 DIAGNOSIS — R26.9 ABNORMAL GAIT: ICD-10-CM

## 2019-12-03 DIAGNOSIS — M25.562 LEFT KNEE PAIN, UNSPECIFIED CHRONICITY: Primary | ICD-10-CM

## 2019-12-03 PROCEDURE — 97110 THERAPEUTIC EXERCISES: CPT | Performed by: PHYSICAL THERAPIST

## 2019-12-03 PROCEDURE — 97112 NEUROMUSCULAR REEDUCATION: CPT | Performed by: PHYSICAL THERAPIST

## 2019-12-03 RX ORDER — DEXTROMETHORPHAN HYDROBROMIDE AND PROMETHAZINE HYDROCHLORIDE 15; 6.25 MG/5ML; MG/5ML
2.5 SOLUTION ORAL 4 TIMES DAILY PRN
Qty: 118 ML | Refills: 0 | Status: SHIPPED | OUTPATIENT
Start: 2019-12-03 | End: 2019-12-11

## 2019-12-03 NOTE — PROGRESS NOTES
Daily Note     Today's date: 12/3/2019  Patient name: Shen Vaughan  : 1955  MRN: [id number]  Referring provider: Yadiel Langford, *  Dx:   Encounter Diagnosis     ICD-10-CM    1  Left knee pain, unspecified chronicity M25 562    2  Abnormal gait R26 9        Start Time: 0900  Stop Time: 6699  Total time in clinic (min): 55 minutes    Subjective: Patient states knee feels tight when flexed  Objective: See treatment diary below      Assessment: Tolerated treatment well  Noted minimal edema in left knee possibly contributing to feeling of tightness  Good patellar mobility unlikely a cause of tightness  Patient would benefit from continued PT  Plan: Continue per plan of care        Re-Evaluation:      Knee Specialty Daily Treatment Diary     Manual  11/19 11/21 11/26 12/3    PROM        Hamstring stretch        Quad stretch        Patellar mobs    WNL    Quad/itb roller  left          Exercise Diary  11/19 11/21 11/26 12/3    Warm up  Bike Seat 5 10 min  Bike Seat 5 10 min TM 1 2 mph 10 min    [place] e-stim  15 min QS 10 min, SAQ 5 min 15 min QS 10 min, SAQ 5 min 15 min QS 10 min, SAQ 5 min Re-assess need   QS                SAQ        LAQ  3x10 3x10 3x10    Hamstring stretch        Calf stretch        Standing hamstring curls        TKE  Blue 30x Ball push back  Blue TB     Leg press Squat        SLB        Step up        [employer]        [place]        Ball squeeze  3x10 seated hold     Standing HR/TR  3x10 3x10 ea 3x10 ea                                Modalities

## 2019-12-04 ENCOUNTER — TELEPHONE (OUTPATIENT)
Dept: FAMILY MEDICINE CLINIC | Facility: CLINIC | Age: 64
End: 2019-12-04

## 2019-12-04 NOTE — TELEPHONE ENCOUNTER
She had skin tags removed today and her blood pressure was running high she said it has been running high, it was 174/102 and before she left it was 169/91  She did make an appt with you on 12/10 but didn't know if you wanted to adjust anything before that

## 2019-12-04 NOTE — TELEPHONE ENCOUNTER
Pt notified, she will call with blood pressure reading, she is not having any symptoms if she will go to ER

## 2019-12-04 NOTE — TELEPHONE ENCOUNTER
I would have her check twice daily for another day or two because we will have to add a second medication  Stay hydrated, avoid caffeine  Ensure she is not having any chest pain, sob, palpitations, dizziness, headaches, lightheadedness

## 2019-12-05 ENCOUNTER — OFFICE VISIT (OUTPATIENT)
Dept: PHYSICAL THERAPY | Facility: CLINIC | Age: 64
End: 2019-12-05
Payer: COMMERCIAL

## 2019-12-05 DIAGNOSIS — M25.562 LEFT KNEE PAIN, UNSPECIFIED CHRONICITY: Primary | ICD-10-CM

## 2019-12-05 DIAGNOSIS — R26.9 ABNORMAL GAIT: ICD-10-CM

## 2019-12-05 PROCEDURE — 97110 THERAPEUTIC EXERCISES: CPT | Performed by: PHYSICAL THERAPIST

## 2019-12-05 NOTE — PROGRESS NOTES
Daily Note     Today's date: 2019  Patient name: Curly Way  : 1955  MRN: [id number]  Referring provider: Ac Ayala, *  Dx:   Encounter Diagnosis     ICD-10-CM    1  Left knee pain, unspecified chronicity M25 562    2  Abnormal gait R26 9        Start Time: 0900  Stop Time: 0940  Total time in clinic (min): 40 minutes    Subjective: C/o stiffness in left knee 7/10 today  Hip hasn't been great since the start of PT, always has pain in hip  Objective: See treatment diary below      Assessment: Able to activate distal quads today with quad set, russian stim no longer indicated  Patient would benefit from continued PT      Plan: Continue per plan of care  Progress treatment as tolerated         Re-Evaluation:      Knee Specialty Daily Treatment Diary     Manual  11/19 11/21 11/26 12/3 12/5   PROM        Hamstring stretch        Quad stretch        Patellar mobs    WNL    Quad/itb roller  left          Exercise Diary  11/19 11/21 11/26 12/3 12/5   Warm up  Bike Seat 5 10 min  Bike Seat 5 10 min TM 1 2 mph 10 min TM 1 2 mph 10 min   [place] e-stim  15 min QS 10 min, SAQ 5 min 15 min QS 10 min, SAQ 5 min 15 min QS 10 min, SAQ 5 min DC   QS     5"x10   LAQ iso into SB     5"x5 at 90  5"x5 at 45   SAQ     2x10 large roll   LAQ  3x10 3x10 3x10 3x10   Hamstring stretch        Calf stretch        Standing hamstring curls     Seated no resistance 2x10   TKE  Blue 30x Ball push back  Blue TB  Blue TB    Leg press Squat        SLB        Step up        [facility]        [place]        Ball squeeze  3x10 seated hold     Standing HR/TR  3x10 3x10 ea 3x10 ea 3x10 ea                               Modalities

## 2019-12-08 DIAGNOSIS — M77.12 LEFT LATERAL EPICONDYLITIS: ICD-10-CM

## 2019-12-10 ENCOUNTER — OFFICE VISIT (OUTPATIENT)
Dept: PHYSICAL THERAPY | Facility: CLINIC | Age: 64
End: 2019-12-10
Payer: COMMERCIAL

## 2019-12-10 ENCOUNTER — OFFICE VISIT (OUTPATIENT)
Dept: FAMILY MEDICINE CLINIC | Facility: CLINIC | Age: 64
End: 2019-12-10
Payer: COMMERCIAL

## 2019-12-10 VITALS
SYSTOLIC BLOOD PRESSURE: 142 MMHG | BODY MASS INDEX: 32.78 KG/M2 | OXYGEN SATURATION: 98 % | DIASTOLIC BLOOD PRESSURE: 84 MMHG | HEART RATE: 86 BPM | RESPIRATION RATE: 17 BRPM | TEMPERATURE: 98.7 F | WEIGHT: 185 LBS | HEIGHT: 63 IN

## 2019-12-10 DIAGNOSIS — I10 BENIGN ESSENTIAL HYPERTENSION: Primary | ICD-10-CM

## 2019-12-10 DIAGNOSIS — R10.13 EPIGASTRIC PAIN: ICD-10-CM

## 2019-12-10 DIAGNOSIS — M25.562 LEFT KNEE PAIN, UNSPECIFIED CHRONICITY: Primary | ICD-10-CM

## 2019-12-10 DIAGNOSIS — E61.1 IRON DEFICIENCY: ICD-10-CM

## 2019-12-10 DIAGNOSIS — R26.9 ABNORMAL GAIT: ICD-10-CM

## 2019-12-10 DIAGNOSIS — Z11.4 SCREENING FOR HIV (HUMAN IMMUNODEFICIENCY VIRUS): ICD-10-CM

## 2019-12-10 DIAGNOSIS — F51.01 PRIMARY INSOMNIA: ICD-10-CM

## 2019-12-10 DIAGNOSIS — R10.11 RUQ PAIN: ICD-10-CM

## 2019-12-10 DIAGNOSIS — Z87.11 HISTORY OF GASTRIC ULCER: ICD-10-CM

## 2019-12-10 DIAGNOSIS — I10 ESSENTIAL HYPERTENSION: ICD-10-CM

## 2019-12-10 DIAGNOSIS — E78.00 HYPERCHOLESTEROLEMIA: ICD-10-CM

## 2019-12-10 DIAGNOSIS — R12 HEART BURN: ICD-10-CM

## 2019-12-10 DIAGNOSIS — R14.0 POSTPRANDIAL ABDOMINAL BLOATING: ICD-10-CM

## 2019-12-10 PROBLEM — N39.46 MIXED INCONTINENCE: Status: ACTIVE | Noted: 2019-07-15

## 2019-12-10 PROCEDURE — 1036F TOBACCO NON-USER: CPT | Performed by: PHYSICIAN ASSISTANT

## 2019-12-10 PROCEDURE — 97110 THERAPEUTIC EXERCISES: CPT | Performed by: PHYSICAL THERAPIST

## 2019-12-10 PROCEDURE — 3008F BODY MASS INDEX DOCD: CPT | Performed by: PHYSICIAN ASSISTANT

## 2019-12-10 PROCEDURE — 99214 OFFICE O/P EST MOD 30 MIN: CPT | Performed by: PHYSICIAN ASSISTANT

## 2019-12-10 RX ORDER — FAMOTIDINE 20 MG/1
20 TABLET, FILM COATED ORAL
Qty: 90 TABLET | Refills: 1 | Status: SHIPPED | OUTPATIENT
Start: 2019-12-10 | End: 2020-05-20 | Stop reason: HOSPADM

## 2019-12-10 RX ORDER — CELECOXIB 200 MG/1
200 CAPSULE ORAL 2 TIMES DAILY PRN
Qty: 60 CAPSULE | Refills: 0 | OUTPATIENT
Start: 2019-12-10

## 2019-12-10 RX ORDER — ERYTHROMYCIN 5 MG/G
OINTMENT OPHTHALMIC
COMMUNITY
Start: 2019-12-05 | End: 2020-02-12

## 2019-12-10 RX ORDER — ATORVASTATIN CALCIUM 20 MG/1
20 TABLET, FILM COATED ORAL DAILY
Qty: 90 TABLET | Refills: 1 | Status: SHIPPED | OUTPATIENT
Start: 2019-12-10 | End: 2020-06-18 | Stop reason: SDUPTHER

## 2019-12-10 RX ORDER — HYDROXYZINE HYDROCHLORIDE 25 MG/1
TABLET, FILM COATED ORAL
Qty: 60 TABLET | Refills: 0 | Status: SHIPPED | OUTPATIENT
Start: 2019-12-10 | End: 2020-03-09 | Stop reason: SDUPTHER

## 2019-12-10 RX ORDER — FERROUS SULFATE TAB EC 324 MG (65 MG FE EQUIVALENT) 324 (65 FE) MG
324 TABLET DELAYED RESPONSE ORAL
Qty: 30 TABLET | Refills: 1 | Status: SHIPPED | OUTPATIENT
Start: 2019-12-10 | End: 2020-03-08

## 2019-12-10 RX ORDER — AMLODIPINE BESYLATE 5 MG/1
5 TABLET ORAL DAILY
Qty: 90 TABLET | Refills: 1 | Status: SHIPPED | OUTPATIENT
Start: 2019-12-10 | End: 2020-05-15 | Stop reason: SDUPTHER

## 2019-12-10 RX ORDER — LOSARTAN POTASSIUM 100 MG/1
100 TABLET ORAL DAILY
Qty: 90 TABLET | Refills: 1 | Status: SHIPPED | OUTPATIENT
Start: 2019-12-10 | End: 2020-05-18 | Stop reason: SDUPTHER

## 2019-12-10 NOTE — PROGRESS NOTES
Daily Note     Today's date: 12/10/2019  Patient name: Dl Anthony  : 1955  MRN: [id number]  Referring provider: Isabel Hunter, *  Dx:   Encounter Diagnosis     ICD-10-CM    1  Left knee pain, unspecified chronicity M25 562    2  Abnormal gait R26 9        Start Time: 0900  Stop Time: 0940  Total time in clinic (min): 40 minutes    Subjective: Patient states she thinks exercises are irritating her left hip which has had more pain as of late  Will make f/u appointment with MD for  if able  Objective: See treatment diary below      Assessment: Tolerated treatment well  Felt tightness in left knee with SAQ and left hip with LAQ  Feels like there is "something in her hip stabbing her"  Patient would benefit from continued PT      Plan: Continue per plan of care        Re-Evaluation:      Knee Specialty Daily Treatment Diary     Manual  12/10 11/21 11/26 12/3 12/5   PROM        Hamstring stretch        Quad stretch        Patellar mobs    WNL    Quad/itb roller  left          Exercise Diary  12/10 11/21 11/26 12/3 12/5   Warm up TM 1 2 mph 10 min Bike Seat 5 10 min  Bike Seat 5 10 min TM 1 2 mph 10 min TM 1 2 mph 10 min   QS 5"x10    5"x10   LAQ iso into SB 5"x5 at 90  5"x5 at 45    5"x5 at 90  5"x5 at 45   SAQ 2x10 large roll    2x10 large roll   LAQ 3x10 3x10 3x10 3x10 3x10   Hamstring stretch        Calf stretch        Standing hamstring curls Seated no resistance 3x10    Seated no resistance 2x10   TKE Blue TB 20x Blue 30x Ball push back  Blue TB  Blue TB    Leg press Squat        SLB        Step up        [employer]        [place]        Ball squeeze  3x10 seated hold     Standing HR/TR 3x10 ea 3x10 3x10 ea 3x10 ea 3x10 ea                               Modalities

## 2019-12-10 NOTE — PROGRESS NOTES
Routine Follow-up    Daryel Nyhan 60 y o  female   Date:  12/10/2019      Assessment and Plan:    Marcus Keith was seen today for follow-up and hypertension  Diagnoses and all orders for this visit:    Benign essential hypertension  -     amLODIPine (NORVASC) 5 mg tablet; Take 1 tablet (5 mg total) by mouth daily  - continue losartan, add norvasc   - continue home BP monitoring     Primary insomnia  -     hydrOXYzine HCL (ATARAX) 25 mg tablet; TAKE 1 TO 2 TABLETS BY MOUTH AT BEDTIME AS NEEDED FOR ANXIETY OR INSOMNIA    Iron deficiency  -     ferrous sulfate 324 (65 Fe) mg; Take 1 tablet (324 mg total) by mouth daily before breakfast    Hypercholesterolemia  -     atorvastatin (LIPITOR) 20 mg tablet; Take 1 tablet (20 mg total) by mouth daily    Heart burn  -     famotidine (PEPCID) 20 mg tablet; Take 1 tablet (20 mg total) by mouth daily before dinner  -     Ambulatory referral to Gastroenterology; Future  - feels has been controlled    Screening for HIV (human immunodeficiency virus)  -     HIV 1/2 AG-AB combo; Future    BMI 32 0-32 9,adult    Essential hypertension  -     losartan (COZAAR) 100 MG tablet; Take 1 tablet (100 mg total) by mouth daily    Postprandial abdominal bloating  -     Ambulatory referral to Gastroenterology; Future  -     US right upper quadrant; Future  - try modifying diet, low carb/fat diet, small portions    History of gastric ulcer  -     Ambulatory referral to Gastroenterology; Future    RUQ pain;Epigastric pain  -     US right upper quadrant; Future  - return to GI          BMI Counseling: Body mass index is 32 77 kg/m²  The BMI is above normal  Nutrition recommendations include decreasing portion sizes, limiting drinks that contain sugar, moderation in carbohydrate intake, increasing intake of lean protein and reducing intake of saturated and trans fat  Exercise recommendations include exercising 3-5 times per week             HPI:  Chief Complaint   Patient presents with    Follow-up Declines flu vaccine     Hypertension     Is taking Losartan 100 mg  Blood pressure is still elevated  Has blood pressure readings for review  HPI   Patient is a 61 yo female who presents due to home BP readings being above goal, 140-150s/90s  She has been tolerating losartan  She used to be on HCTZ but was exacerbating her uinary troubles  She used to be on norvasc in the past and is agreeable to restart  She has outstanding labs in chart that are due for Feb  She does complain of abd bloating after eating - she cannot correlate to any specific foods, feels like it is all of the time  Sometimes it feels like her stomach is hard and painful  She did have EGD 2 years ago with gastric ulcer  ROS: Review of Systems   Constitutional: Negative for appetite change, chills and fever  Respiratory: Negative  Cardiovascular: Negative  Gastrointestinal: Positive for abdominal pain (and bloating)  Negative for blood in stool, constipation, diarrhea and vomiting  Skin: Negative  Neurological: Negative  Psychiatric/Behavioral: Negative          Past Medical History:   Diagnosis Date    Anemia     Hyperlipidemia     Hypertension     Overactive bladder     Vitamin D deficiency      Patient Active Problem List   Diagnosis    Iron deficiency anemia    Joint disorder of pelvis or thigh    Leg weakness, bilateral    Nocturia    OAB (overactive bladder)    Pain in both lower legs    Segmental and somatic dysfunction of sacral region    Vitamin D deficiency    Hypertension    Leg swelling    Sacroiliitis (HCC)    Myofascial pain    Mixed incontinence    Primary osteoarthritis of both knees    Chronic pain of left knee    BMI 32 0-32 9,adult       Past Surgical History:   Procedure Laterality Date     SECTION      Last Assessed: 2017    ELBOW SURGERY      Last Assessed: 2017    ESOPHAGOGASTRODUODENOSCOPY N/A 2017    Procedure: ESOPHAGOGASTRODUODENOSCOPY (EGD); Surgeon: Poonam Hercules DO;  Location: [facility]; Service: Gastroenterology    HIP SURGERY Left 2018    glut medius/minimus repair  and 18    HYSTERECTOMY      2011    KNEE SURGERY      x2    AK COLONOSCOPY FLX DX W/COLLJ SPEC WHEN PFRMD N/A 2017    Procedure: EGD AND COLONOSCOPY;  Surgeon: Poonam Hercules DO;  Location: [facility];   Service: Gastroenterology       Social History     Socioeconomic History    Marital status: /Civil Union     Spouse name: None    Number of children: None    Years of education: None    Highest education level: None   Occupational History    None   Social Needs    Financial resource strain: None    Food insecurity:     Worry: None     Inability: None    Transportation needs:     Medical: No     Non-medical: No   Tobacco Use    Smoking status: Former Smoker     Last attempt to quit:      Years since quittin 9    Smokeless tobacco: Never Used    Tobacco comment: quit 25 yrs ago   Substance and Sexual Activity    Alcohol use: No    Drug use: No    Sexual activity: Yes     Partners: Male   Lifestyle    Physical activity:     Days per week: 0 days     Minutes per session: 0 min    Stress: None   Relationships    Social connections:     Talks on phone: None     Gets together: None     Attends [employer] service: None     Active member of club or organization: None     Attends meetings of clubs or organizations: None     Relationship status: None    Intimate partner violence:     Fear of current or ex partner: None     Emotionally abused: None     Physically abused: None     Forced sexual activity: None   Other Topics Concern    None   Social History Narrative    Caffeine use    , 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[employer], now at [employer] and [email or web address] [place] for [address]    2 grown children       Family History   Problem Relation Age of Onset    Colon cancer Mother     Heart attack Father    Nishant Rodriguez Other Father Cardiac Disorder    Diabetes type II Father     Colon cancer Sister     Cancer Maternal Grandmother        No Known Allergies      Current Outpatient Medications:     atorvastatin (LIPITOR) 20 mg tablet, Take 1 tablet (20 mg total) by mouth daily, Disp: 90 tablet, Rfl: 1    baclofen 10 mg tablet, Take 10 mg by mouth 2 (two) times a day , Disp: , Rfl:     celecoxib (CeleBREX) 200 mg capsule, Take 1 capsule (200 mg total) by mouth 2 (two) times a day as needed for mild pain, Disp: 60 capsule, Rfl: 0    erythromycin (ILOTYCIN) ophthalmic ointment, , Disp: , Rfl:     famotidine (PEPCID) 20 mg tablet, Take 1 tablet (20 mg total) by mouth daily before dinner, Disp: 90 tablet, Rfl: 1    ferrous sulfate 324 (65 Fe) mg, Take 1 tablet (324 mg total) by mouth daily before breakfast, Disp: 30 tablet, Rfl: 1    HYDROcodone-acetaminophen (NORCO) 5-325 mg per tablet, , Disp: , Rfl:     hydrOXYzine HCL (ATARAX) 25 mg tablet, TAKE 1 TO 2 TABLETS BY MOUTH AT BEDTIME AS NEEDED FOR ANXIETY OR INSOMNIA, Disp: 60 tablet, Rfl: 0    losartan (COZAAR) 100 MG tablet, Take 1 tablet (100 mg total) by mouth daily, Disp: 90 tablet, Rfl: 1    traMADol (ULTRAM) 50 mg tablet, , Disp: , Rfl:     trospium chloride (SANCTURA) 20 mg tablet, Take 20 mg by mouth 2 (two) times a day, Disp: , Rfl:     acyclovir (ZOVIRAX) 200 mg capsule, Take 2 capsules (400 mg total) by mouth 3 (three) times a day for 7 days, Disp: 42 capsule, Rfl: 0    amLODIPine (NORVASC) 5 mg tablet, Take 1 tablet (5 mg total) by mouth daily, Disp: 90 tablet, Rfl: 1    oxybutynin (DITROPAN-XL) 5 mg 24 hr tablet, Take 5 mg by mouth daily, Disp: , Rfl:     Promethazine-DM (PHENERGAN-DM) 6 25-15 mg/5 mL oral syrup, Take 2 5 mL by mouth 4 (four) times a day as needed for cough (Patient not taking: Reported on 12/10/2019), Disp: 118 mL, Rfl: 0      Physical Exam:  /84 (BP Location: Left arm, Patient Position: Sitting)   Pulse 86   Temp 98 7 °F (37 1 °C) (Tympanic) Resp 17   Ht 5' 3" (1 6 m)   Wt 83 9 kg (185 lb)   SpO2 98%   BMI 32 77 kg/m²     Physical Exam   Constitutional: She is oriented to person, place, and time  She appears well-developed and well-nourished  No distress  HENT:   Head: Normocephalic and atraumatic  Eyes: Pupils are equal, round, and reactive to light  Conjunctivae are normal    Neck: Normal range of motion  Neck supple  No JVD present  Cardiovascular: Normal rate, regular rhythm and normal heart sounds  Pulmonary/Chest: Effort normal and breath sounds normal  No respiratory distress  She has no wheezes  Abdominal: Normal appearance and bowel sounds are normal  There is tenderness in the right upper quadrant and epigastric area  Musculoskeletal: She exhibits no edema  Neurological: She is alert and oriented to person, place, and time  No cranial nerve deficit  Skin: Skin is warm and dry  No rash noted  Psychiatric: She has a normal mood and affect   Her behavior is normal          Labs:  Lab Results   Component Value Date    WBC 7 42 02/18/2019    HGB 13 4 02/18/2019    HCT 42 5 02/18/2019    MCV 83 02/18/2019     02/18/2019     Lab Results   Component Value Date     05/14/2018    K 4 7 08/01/2019     08/01/2019    CO2 31 08/01/2019    ANIONGAP 12 0 05/14/2018    BUN 26 (H) 08/01/2019    CREATININE 0 81 08/01/2019    GLUF 92 08/01/2019    CALCIUM 9 3 08/01/2019    AST 9 08/01/2019    ALT 16 08/01/2019    ALKPHOS 76 08/01/2019    PROT 7 3 05/14/2018    BILITOT 0 4 05/14/2018    EGFR 78 08/01/2019

## 2019-12-12 ENCOUNTER — OFFICE VISIT (OUTPATIENT)
Dept: PHYSICAL THERAPY | Facility: CLINIC | Age: 64
End: 2019-12-12
Payer: COMMERCIAL

## 2019-12-12 DIAGNOSIS — R26.9 ABNORMAL GAIT: ICD-10-CM

## 2019-12-12 DIAGNOSIS — M25.562 LEFT KNEE PAIN, UNSPECIFIED CHRONICITY: Primary | ICD-10-CM

## 2019-12-12 PROCEDURE — 97110 THERAPEUTIC EXERCISES: CPT | Performed by: PHYSICAL THERAPIST

## 2019-12-12 NOTE — PROGRESS NOTES
Daily Note     Today's date: 2019  Patient name: Victorino Doshi  : 1955  MRN: [id number]  Referring provider: Britni Barnett, *  Dx:   Encounter Diagnosis     ICD-10-CM    1  Left knee pain, unspecified chronicity M25 562    2  Abnormal gait R26 9        Start Time: 0900  Stop Time: 0940  Total time in clinic (min): 40 minutes    Subjective: Patient states no significant change in symptoms  Did make f/u appointment with doctor for   Objective: See treatment diary below      Assessment: Some pain with LAQ activity, difficulty isolating knee strengthening from hip  Hip strengthening causing pain in glut region  Patient would benefit from continued PT      Plan: Continue per plan of care  Progress treatment as tolerated         Re-Evaluation:      Knee Specialty Daily Treatment Diary     Manual  12/10 12/12  12/3 12/5   PROM        Hamstring stretch        Quad stretch        Patellar mobs    WNL    Quad/itb roller            Exercise Diary  12/10 12/12  12/3 12/5   Warm up TM 1 2 mph 10 min TM 1 2 mph 10 min  TM 1 2 mph 10 min TM 1 2 mph 10 min   QS 5"x10 5"x10   5"x10   LAQ iso into SB 5"x5 at 90  5"x5 at 45 5"x8 at 90  5"x8 at 45   5"x5 at 90  5"x5 at 45   SAQ 2x10 large roll 3x10 large roll   2x10 large roll   LAQ 3x10 3x10  3x10 3x10   Hamstring stretch        Calf stretch        Standing hamstring curls Seated no resistance 3x10 Seated no resistance 3x10   Seated no resistance 2x10   TKE Blue TB 20x Blue 20x  Blue TB  Blue TB    Leg press Squat        SLB        Step up        [employer] board  AP 20x      Mini Squat        [place]        Ball squeeze (hold, pain)        Standing HR/TR 3x10 ea 3x10  3x10 ea 3x10 ea                               Modalities

## 2019-12-12 NOTE — PROGRESS NOTES
Daily Note     Today's date: 2019  Patient name: Wally Simms  : 1955  MRN: [id number]  Referring provider: Rose Marley, *  Dx: No diagnosis found  Subjective: ***      Objective: See treatment diary below      Assessment: Tolerated treatment {Tolerated treatment :6370827852}   Patient {assessment:[id number]}      Plan: {PLAN:}     Re-Evaluation:      Knee Specialty Daily Treatment Diary     Manual  12/12 11/21 11/26 12/3 12/5   PROM        Hamstring stretch        Quad stretch        Patellar mobs    WNL    Quad/itb roller  left          Exercise Diary   11/21 11/26 12/3 12/5   Warm up  Bike Seat 5 10 min  Bike Seat 5 10 min TM 1 2 mph 10 min TM 1 2 mph 10 min   [place] e-stim  15 min QS 10 min, SAQ 5 min 15 min QS 10 min, SAQ 5 min 15 min QS 10 min, SAQ 5 min DC   QS     5"x10   LAQ iso into SB     5"x5 at 90  5"x5 at 45   SAQ     2x10 large roll   LAQ  3x10 3x10 3x10 3x10   Hamstring stretch        Calf stretch        Standing hamstring curls     Seated no resistance 2x10   TKE  Blue 30x Ball push back  Blue TB  Blue TB    Leg press Squat        SLB        Step up        [facility]        [place]        Ball squeeze  3x10 seated hold     Standing HR/TR  3x10 3x10 ea 3x10 ea 3x10 ea                               Modalities

## 2019-12-14 ENCOUNTER — HOSPITAL ENCOUNTER (OUTPATIENT)
Dept: ULTRASOUND IMAGING | Facility: HOSPITAL | Age: 64
Discharge: HOME | End: 2019-12-14
Payer: COMMERCIAL

## 2019-12-14 DIAGNOSIS — R10.13 EPIGASTRIC PAIN: ICD-10-CM

## 2019-12-14 DIAGNOSIS — R14.0 POSTPRANDIAL ABDOMINAL BLOATING: ICD-10-CM

## 2019-12-14 PROCEDURE — 76705 ECHO EXAM OF ABDOMEN: CPT

## 2019-12-17 ENCOUNTER — EVALUATION (OUTPATIENT)
Dept: PHYSICAL THERAPY | Facility: CLINIC | Age: 64
End: 2019-12-17
Payer: COMMERCIAL

## 2019-12-17 ENCOUNTER — TRANSCRIBE ORDERS (OUTPATIENT)
Dept: PHYSICAL THERAPY | Facility: CLINIC | Age: 64
End: 2019-12-17

## 2019-12-17 DIAGNOSIS — M25.562 LEFT KNEE PAIN, UNSPECIFIED CHRONICITY: Primary | ICD-10-CM

## 2019-12-17 DIAGNOSIS — R26.9 ABNORMAL GAIT: ICD-10-CM

## 2019-12-17 PROCEDURE — 97164 PT RE-EVAL EST PLAN CARE: CPT | Performed by: PHYSICAL THERAPIST

## 2019-12-17 PROCEDURE — 97110 THERAPEUTIC EXERCISES: CPT | Performed by: PHYSICAL THERAPIST

## 2019-12-17 NOTE — PROGRESS NOTES
PT Re-Evaluation  and PT Discharge    Today's date: 2019  Patient name: Naresh Eubanks  : 1955  MRN: [id number]  Referring provider: Basilia Galan, *  Dx:   Encounter Diagnosis     ICD-10-CM    1  Left knee pain, unspecified chronicity M25 562    2  Abnormal gait R26 9        Start Time: 1000  Stop Time: 1045  Total time in clinic (min): 45 minutes    Assessment/Plan   Assessment details: Naresh Eubanks has been seen in outpatient PT for 8 sessions beginning   Patient is a 60 y o  female with diagnosis of left knee pain and past medical history significant for left meniscal surgery, left elbow surgery, HTN, Anemia, sacroiliitis, and multiple hip surgeries  Re-evaluation performed today after 4 weeks of PT  Findings today show improvement in left quad activation, but no significant change in symptoms or strength/stability of knee  FOTO functional reporting shows a decrease in function by 5 points since initial evaluation  At this time skilled PT is no longer indicated for Araceli Celesteo due to lack of progress  She was educated on current home program to help increase left knee strength, avoiding use of left hip  Impairments: abnormal gait, abnormal muscle firing, abnormal or restricted ROM, abnormal movement, activity intolerance, impaired balance, impaired physical strength, lacks appropriate home exercise program and pain with function    Goals  STG (4 weeks)  1  Patient will have reported 3/10 pain in left knee at rest  - not met  2  Improve patient's left knee flexion to 130 degrees for increased ability to take proper strides during ambulation  - not met  3  Increase patient's left single leg balance to 3 seconds for increased stability on stairs  - not met  LTG (8 weeks)  1  Patient's left LE strength will improve by 1 grade for ability to ambulate and return to functional activities at [facility]  - not met  2  Patient will be able to walk community distances with 4/10 pain in left knee  -not met  3  Patient will be independent with home exercise program for continued maintenance post PT discharge  - met      Plan  Plan details: DC PT  Plan of Care beginning date: 2019  Plan of Care expiration date: 2020  Treatment plan discussed with: patient and PTA      Subjective   History of Present Illness  Date of onset: 2018  Subjective : Patient states no improvement in left knee symptoms over the past month of PT  Left knee continues to "clunck" and feels tight with flexion  Slight increase in left hip pain during the course of PT  To see referring MD later today  Mechanism of injury: Stanley Gray is a 60 y o  female who presents to outpatient Physical Therapy today with complaints of left knee pain  Knee pain began >1 year ago of insidious onset  History of left knee menisectomy with lateral release (20 years ago)  Has also had 2 surgeries on left hip which may be a contributor  Recently saw hip surgeon for knee pain who has administered 2 this year  Patient states some relief from injections, but not resolved  Would like to attempt conservative treatment in PT due to inability to have surgery on knee at this time likely due to previous surgery  Left knee IMPRESSION:     Large complex tear of the lateral meniscus body and posterior horn associated with large parameniscal cyst in the posterior lateral corner of the knee (series 5001 images 30 through 9 )     Small radial tear in the medial meniscal body (series 8001 image 14 )     Moderate tricompartmental osteoarthritis    Pain      Current pain ratin  8  At best pain ratin  7  At worst pain ratin  9  Location: lateral left knee  Quality: sharp and throbbing  Relieving factors: relaxation and ice  Aggravating factors: walking, standing and stair climbing  Progression: improved (since injections)    Social Support  Steps to enter house: no  Stairs in house: yes   13  Lives in: multiple-level home    Employment status: not working (disability)  Hand dominance: right      Diagnostic Tests  MRI studies: abnormal  Treatments  Current treatment: injection treatment and physical therapy  Patient Goals  Patient goal: walk without pain    Objective   Observations   Left Knee   Negative for edema  Palpation   Left   No palpable tenderness to the distal biceps femoris and distal semitendinosus  Tenderness of the rectus femoris  Tenderness   Left Knee   Tenderness in the ITB, lateral joint line, lateral patella and lateral retinaculum  No tenderness in the inferior patella, medial joint line, medial patella, patellar tendon and pes anserinus  Neurological Testing     Sensation     Knee   Left Knee   Intact: light touch    Right Knee   Intact: light touch     Active Range of Motion    12/17  Left Knee   Flexion: 120 degrees with pain  115 with pain  Extension: 4 degrees with pain  WNL    Right Knee   Flexion: 132 degrees   Extension: 6 degrees     Mobility   Patellar Mobility:   Left Knee   WFL: medial, lateral, superior and inferior  Strength/Myotome Testing   12/17    Left Hip   Planes of Motion   Flexion: 3+  Extension: 2+  Abduction: 2+    Right Hip   Planes of Motion   Flexion: 4+  Extension: 4  Abduction: 4-    Left Knee   Flexion: 3-     3-  Extension: 3-     3-  Quadriceps contraction: poor   moderate    Tests     Left Knee   Negative valgus stress test at 0 degrees and varus stress test at 0 degrees  Additional Tests Details  Tightness of left hamstring and quad    General Comments:      Knee Comments  Gait: Antalgic, no AD, decreased left hip extension and knee extension, positive trendelenburg       FOTO: 23% was 28% function, 43% predicted function    Flowsheet Rows      Most Recent Value   PT/OT G-Codes   Current Score  23   Projected Score  43                 Knee Specialty Daily Treatment Diary     Manual  12/10 12/12 12/17  12/5   PROM        Hamstring stretch        Quad stretch        Patellar mobs        Quad/itb roller            Exercise Diary  12/10 12/12 12/17  12/5   Warm up TM 1 2 mph 10 min TM 1 2 mph 10 min TM 1 1 mph 10  TM 1 2 mph 10 min   QS 5"x10 5"x10 5"x20  5"x10   LAQ iso into SB 5"x5 at 90  5"x5 at 45 5"x8 at 90  5"x8 at 45 5"x8 at 90  5"x8 at 45  5"x5 at 90  5"x5 at 45   SAQ 2x10 large roll 3x10 large roll 3x10 large roll  2x10 large roll   LAQ 3x10 3x10 3x10  3x10   Hamstring stretch        Calf stretch        Standing hamstring curls Seated no resistance 3x10 Seated no resistance 3x10 Seated no resistance 3x10  Seated no resistance 2x10   TKE Blue TB 20x Blue 20x Blue 20  Blue TB    Leg press Squat        SLB        Step up        [employer] board  AP 20x AP 20x     Mini Squat        [place]        Ball squeeze (hold, pain)        Standing HR/TR 3x10 ea 3x10 3x10  3x10 ea                               Modalities                                Criss Patient was given a handout and verbal instruction of customized home exercise program  Patient accepted program and was able to demonstrate exercises

## 2019-12-17 NOTE — LETTER
2019    [id number]    Patient: Aurea Melton   YOB: 1955   Date of Visit: 2019     Encounter Diagnosis     ICD-10-CM    1  Left knee pain, unspecified chronicity M25 562    2  Abnormal gait R26 9        Dear Dr Forrest Prudent: Thank you for your recent referral of Aurea Melton  Please review the attached evaluation summary from Jamee's recent visit  Please verify that you agree with the plan of care by signing the attached order  If you have any questions or concerns, please do not hesitate to call  I sincerely appreciate the opportunity to share in the care of one of your patients and hope to have another opportunity to work with you in the near future  Sincerely,    Adrian Correa, PT      Referring Provider:      I certify that I have read the below Plan of Care and certify the need for these services furnished under this plan of treatment while under my care  Virginia Lilly MD  [address] [facility] [place]: [phone]          PT Re-Evaluation  and PT Discharge    Today's date: 2019  Patient name: Aurea Melton  : 1955  MRN: [id number]  Referring provider: Jadyn Underwood, *  Dx:   Encounter Diagnosis     ICD-10-CM    1  Left knee pain, unspecified chronicity M25 562    2  Abnormal gait R26 9        Start Time: 1000  Stop Time: 1045  Total time in clinic (min): 45 minutes    Assessment/Plan   Assessment details: Aurea Melton has been seen in outpatient PT for 8 sessions beginning   Patient is a 60 y o  female with diagnosis of left knee pain and past medical history significant for left meniscal surgery, left elbow surgery, HTN, Anemia, sacroiliitis, and multiple hip surgeries  Re-evaluation performed today after 4 weeks of PT   Findings today show improvement in left quad activation, but no significant change in symptoms or strength/stability of knee  FOTO functional reporting shows a decrease in function by 5 points since initial evaluation  At this time skilled PT is no longer indicated for Minus Moulding due to lack of progress  She was educated on current home program to help increase left knee strength, avoiding use of left hip  Impairments: abnormal gait, abnormal muscle firing, abnormal or restricted ROM, abnormal movement, activity intolerance, impaired balance, impaired physical strength, lacks appropriate home exercise program and pain with function    Goals  STG (4 weeks)  1  Patient will have reported 3/10 pain in left knee at rest  - not met  2  Improve patient's left knee flexion to 130 degrees for increased ability to take proper strides during ambulation  - not met  3  Increase patient's left single leg balance to 3 seconds for increased stability on stairs  - not met  LTG (8 weeks)  1  Patient's left LE strength will improve by 1 grade for ability to ambulate and return to functional activities at [facility]  - not met  2  Patient will be able to walk community distances with 4/10 pain in left knee  -not met  3  Patient will be independent with home exercise program for continued maintenance post PT discharge  - met      Plan  Plan details: DC PT  Plan of Care beginning date: 11/19/2019  Plan of Care expiration date: 12/17/2020  Treatment plan discussed with: patient and PTA      Subjective   History of Present Illness  Date of onset: 11/19/2018  Subjective 12/17: Patient states no improvement in left knee symptoms over the past month of PT  Left knee continues to "clunck" and feels tight with flexion  Slight increase in left hip pain during the course of PT  To see referring MD later today  Mechanism of injury: Joey Bennett is a 60 y o  female who presents to outpatient Physical Therapy today with complaints of left knee pain  Knee pain began >1 year ago of insidious onset   History of left knee menisectomy with lateral release (20 years ago)  Has also had 2 surgeries on left hip which may be a contributor  Recently saw hip surgeon for knee pain who has administered 2 this year  Patient states some relief from injections, but not resolved  Would like to attempt conservative treatment in PT due to inability to have surgery on knee at this time likely due to previous surgery  Left knee IMPRESSION:     Large complex tear of the lateral meniscus body and posterior horn associated with large parameniscal cyst in the posterior lateral corner of the knee (series 5001 images 30 through 9 )     Small radial tear in the medial meniscal body (series 8001 image 14 )     Moderate tricompartmental osteoarthritis  Pain      Current pain ratin  8  At best pain ratin  7  At worst pain ratin  9  Location: lateral left knee  Quality: sharp and throbbing  Relieving factors: relaxation and ice  Aggravating factors: walking, standing and stair climbing  Progression: improved (since injections)    Social Support  Steps to enter house: no  Stairs in house: yes   13  Lives in: multiple-level home    Employment status: not working (disability)  Hand dominance: right      Diagnostic Tests  MRI studies: abnormal  Treatments  Current treatment: injection treatment and physical therapy  Patient Goals  Patient goal: walk without pain    Objective   Observations   Left Knee   Negative for edema  Palpation   Left   No palpable tenderness to the distal biceps femoris and distal semitendinosus  Tenderness of the rectus femoris  Tenderness   Left Knee   Tenderness in the ITB, lateral joint line, lateral patella and lateral retinaculum  No tenderness in the inferior patella, medial joint line, medial patella, patellar tendon and pes anserinus       Neurological Testing     Sensation     Knee   Left Knee   Intact: light touch    Right Knee   Intact: light touch     Active Range of Motion      Left Knee   Flexion: 120 degrees with pain  115 with pain  Extension: 4 degrees with pain  WNL    Right Knee   Flexion: 132 degrees   Extension: 6 degrees     Mobility   Patellar Mobility:   Left Knee   WFL: medial, lateral, superior and inferior  Strength/Myotome Testing   12/17    Left Hip   Planes of Motion   Flexion: 3+  Extension: 2+  Abduction: 2+    Right Hip   Planes of Motion   Flexion: 4+  Extension: 4  Abduction: 4-    Left Knee   Flexion: 3-     3-  Extension: 3-     3-  Quadriceps contraction: poor   moderate    Tests     Left Knee   Negative valgus stress test at 0 degrees and varus stress test at 0 degrees  Additional Tests Details  Tightness of left hamstring and quad    General Comments:      Knee Comments  Gait: Antalgic, no AD, decreased left hip extension and knee extension, positive trendelenburg       FOTO: 23% was 28% function, 43% predicted function    Flowsheet Rows      Most Recent Value   PT/OT G-Codes   Current Score  23   Projected Score  43                 Knee Specialty Daily Treatment Diary     Manual  12/10 12/12 12/17  12/5   PROM        Hamstring stretch        Quad stretch        Patellar mobs        Quad/itb roller            Exercise Diary  12/10 12/12 12/17  12/5   Warm up TM 1 2 mph 10 min TM 1 2 mph 10 min TM 1 1 mph 10  TM 1 2 mph 10 min   QS 5"x10 5"x10 5"x20  5"x10   LAQ iso into SB 5"x5 at 90  5"x5 at 45 5"x8 at 90  5"x8 at 45 5"x8 at 90  5"x8 at 45  5"x5 at 90  5"x5 at 45   SAQ 2x10 large roll 3x10 large roll 3x10 large roll  2x10 large roll   LAQ 3x10 3x10 3x10  3x10   Hamstring stretch        Calf stretch        Standing hamstring curls Seated no resistance 3x10 Seated no resistance 3x10 Seated no resistance 3x10  Seated no resistance 2x10   TKE Blue TB 20x Blue 20x Blue 20  Blue TB    Leg press Squat        SLB        Step up        [employer] board  AP 20x AP 20x     Mini Squat        Old Stine        Ball squeeze (hold, pain)        Standing HR/TR 3x10 ea 3x10 3x10  3x10 ea Modalities                                Zoraidan Members was given a handout and verbal instruction of customized home exercise program  Patient accepted program and was able to demonstrate exercises

## 2019-12-19 ENCOUNTER — TELEPHONE (OUTPATIENT)
Dept: FAMILY MEDICINE CLINIC | Facility: CLINIC | Age: 64
End: 2019-12-19

## 2019-12-19 ENCOUNTER — APPOINTMENT (OUTPATIENT)
Dept: PHYSICAL THERAPY | Facility: CLINIC | Age: 64
End: 2019-12-19
Payer: COMMERCIAL

## 2019-12-19 NOTE — TELEPHONE ENCOUNTER
The new blood pressure medication is giving her headaches wants to know if you can give her something else

## 2019-12-20 ENCOUNTER — TELEPHONE (OUTPATIENT)
Dept: GASTROENTEROLOGY | Facility: MEDICAL CENTER | Age: 64
End: 2019-12-20

## 2019-12-20 NOTE — TELEPHONE ENCOUNTER
Pt called to schedule an egd, she has not been seen since 2017 and is having symptoms should she be scheduled or have follow up ?

## 2019-12-30 ENCOUNTER — OFFICE VISIT (OUTPATIENT)
Dept: GASTROENTEROLOGY | Facility: MEDICAL CENTER | Age: 64
End: 2019-12-30
Payer: COMMERCIAL

## 2019-12-30 VITALS
BODY MASS INDEX: 32.25 KG/M2 | SYSTOLIC BLOOD PRESSURE: 159 MMHG | DIASTOLIC BLOOD PRESSURE: 94 MMHG | HEIGHT: 63 IN | WEIGHT: 182 LBS | HEART RATE: 72 BPM | TEMPERATURE: 97.3 F

## 2019-12-30 DIAGNOSIS — R13.10 DYSPHAGIA, UNSPECIFIED TYPE: ICD-10-CM

## 2019-12-30 DIAGNOSIS — K21.9 GASTROESOPHAGEAL REFLUX DISEASE WITHOUT ESOPHAGITIS: Primary | ICD-10-CM

## 2019-12-30 DIAGNOSIS — R10.10 PAIN OF UPPER ABDOMEN: ICD-10-CM

## 2019-12-30 PROCEDURE — 99214 OFFICE O/P EST MOD 30 MIN: CPT | Performed by: PHYSICIAN ASSISTANT

## 2019-12-30 RX ORDER — OMEPRAZOLE 20 MG/1
20 CAPSULE, DELAYED RELEASE ORAL DAILY
Qty: 30 CAPSULE | Refills: 5 | Status: SHIPPED | OUTPATIENT
Start: 2019-12-30 | End: 2020-05-08 | Stop reason: SDUPTHER
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
## 2019-12-30 NOTE — PATIENT INSTRUCTIONS
Pt is scheduled with dr Preston Velazquez at [facility] for an egd on 1/27/20, pt has blue folder with instructions  Patient is aware she will need a  to and from  She will get a call the day before with an exact time for arrival   Pt is scheduled for barium swallow on 1/8/20 in [facility]

## 2020-01-03 ENCOUNTER — TELEPHONE (OUTPATIENT)
Dept: GASTROENTEROLOGY | Facility: AMBULARY SURGERY CENTER | Age: 65
End: 2020-01-03

## 2020-01-03 ENCOUNTER — HOSPITAL ENCOUNTER (OUTPATIENT)
Dept: RADIOLOGY | Facility: HOSPITAL | Age: 65
Discharge: HOME | End: 2020-01-03
Payer: COMMERCIAL

## 2020-01-03 DIAGNOSIS — K21.9 GASTROESOPHAGEAL REFLUX DISEASE WITHOUT ESOPHAGITIS: ICD-10-CM

## 2020-01-03 DIAGNOSIS — R13.10 DYSPHAGIA, UNSPECIFIED TYPE: ICD-10-CM

## 2020-01-03 PROCEDURE — 74220 X-RAY XM ESOPHAGUS 1CNTRST: CPT

## 2020-01-03 NOTE — TELEPHONE ENCOUNTER
Patients GI provider:  Dr Flower Riojas    Number to return call: ( n /a     Reason for call: Vivian Qiu from radiology called with significant findings on barium swallow , sent tiger text    Scheduled procedure/appointment date if applicable: Apt/procedure  2-10-20

## 2020-01-06 ENCOUNTER — EVALUATION (OUTPATIENT)
Dept: PHYSICAL THERAPY | Facility: CLINIC | Age: 65
End: 2020-01-06
Payer: COMMERCIAL

## 2020-01-06 ENCOUNTER — TELEPHONE (OUTPATIENT)
Dept: GASTROENTEROLOGY | Facility: CLINIC | Age: 65
End: 2020-01-06

## 2020-01-06 DIAGNOSIS — M25.522 LEFT ELBOW PAIN: ICD-10-CM

## 2020-01-06 DIAGNOSIS — M77.12 LATERAL EPICONDYLITIS OF LEFT ELBOW: Primary | ICD-10-CM

## 2020-01-06 PROCEDURE — 97110 THERAPEUTIC EXERCISES: CPT | Performed by: PHYSICAL THERAPIST

## 2020-01-06 PROCEDURE — 97163 PT EVAL HIGH COMPLEX 45 MIN: CPT | Performed by: PHYSICAL THERAPIST

## 2020-01-06 NOTE — PROGRESS NOTES
PT Evaluation     Today's date: 2020  Patient name: Stanley Gray  : 1955  MRN: [id number]  Referring provider: Sarah Henderson MD  Dx:   Encounter Diagnosis     ICD-10-CM    1  Lateral epicondylitis of left elbow M77 12    2  Left elbow pain M25 522        Start Time: 0900  Stop Time: 1000  Total time in clinic (min): 60 minutes    Assessment  Assessment details: Stanley Gray was seen for an initial PT evaluation today  Patient is a 60 y o  female with diagnosis of left tennis elbow and past medical history significant for left meniscal surgery, left elbow surgery, HTN, Anemia, sacroiliitis, and multiple hip surgeries with gluteal tearing  High complexity evaluation  due to number of participation restrictions, functional outcome measure of 62% limitation, and unstable clinical presentation  Findings today show limited left elbow range of motion with elbow and wrist weakness, tenderness to palpation, and tightness of wrist extensors contributing to pain and decreased function with ADLs  Skilled PT indicated to treat at this time to address pain, tissue restriction, and improving elbow range of motion and strength to return patient to pain free prior level of function  Impairments: abnormal muscle firing, abnormal muscle tone, abnormal or restricted ROM, abnormal movement, impaired physical strength, lacks appropriate home exercise program and pain with function    Goals  STG (4 weeks)  1  Patient's left elbow extension AROM will increase to 0 with 0/10 pain for increased ability to perform overhead ADLs  2  Patient will have 0/10 pain in left elbow at rest   3  Patient's left wrist extensor and RD strength will increase to 4/5 for increased ability to lift  LTG (8 weeks)  1  Patient's left elbow AROM equal bilaterally for ability to complete hair hygiene, overhead, and behind the back ADLs  2  Patient's UE strength will be equal bilaterally for ability to lift and carry at PLOF     3  Patient will be independent with home exercise program for continued maintenance post PT discharge  Plan  Plan details: Reassessment in 4 weeks  Patient would benefit from: skilled physical therapy  Planned modality interventions: cryotherapy, thermotherapy: hydrocollator packs, unattended electrical stimulation, iontophoresis, ultrasound and fluidotherapy  Planned therapy interventions: manual therapy, neuromuscular re-education, therapeutic activities, therapeutic exercise, gait training and home exercise program  Other planned therapy interventions: Graston Technique  Frequency: 2x week  Duration in weeks: 8  Plan of Care beginning date: 2020  Plan of Care expiration date: 3/6/2020  Treatment plan discussed with: patient and PTA        Subjective Evaluation    History of Present Illness  Date of onset: 2019  Mechanism of injury: Oralia Weinberg is a 60 y o  female who presents to outpatient Physical Therapy today with complaints of left elbow pain  Pain began of insidious onset approximately 6 months ago  Saw ortho in December where x-ray were taken (-), she received an injection, and was instructed to begin PT with f/u in 8 weeks  She was also instructed to wear an elbow strap when active  Since visit patient states decrease in symptoms overall  History of right elbow surgery and PRP to left elbow 8-9 years ago  Pain  Current pain ratin  At best pain ratin  At worst pain ratin  Location: lateral left elbow  Quality: sharp  Aggravating factors: lifting    Social Support  Steps to enter house: no  Stairs in house: yes   13  Lives in: multiple-level home    Employment status: not working (Disability)  Hand dominance: right      Diagnostic Tests  X-ray: normal  Treatments  Current treatment: injection treatment  Patient Goals  Patient goal: use left arm without pain        Objective     Palpation   Left   No palpable tenderness to the biceps, pronator teres and triceps  Tenderness of the supinator, wrist extensors and wrist flexors  Tenderness     Left Elbow   Tenderness in the lateral epicondyle, olecranon process and radial head  No tenderness in the medial epicondyle  Left Wrist/Hand   Tenderness in the lateral epicondyle and olecranon process  No tenderness in the medial epicondyle  Neurological Testing     Sensation     Elbow   Left Elbow   Inact: light touch    Right Elbow   Intact: light touch    Active Range of Motion     Left Elbow   Flexion: 142 degrees with pain  Extension: -16 degrees with pain  Forearm supination: 95 degrees   Forearm pronation: 100 degrees     Right Elbow   Flexion: 148 degrees   Extension: 9 degrees   Forearm supination: 98 degrees   Forearm pronation: 96 degrees     Additional Active Range of Motion Details  Left shoulder functional IR decreased to L3 due to lateral elbow pain       Strength/Myotome Testing     Left Elbow   Flexion: 4- (pain)  Extension: 4 (pain)  Forearm supination: 4- (pain)  Forearm pronation: 4    Right Elbow   Normal strength    Left Wrist/Hand   Wrist extension: 5  Wrist flexion: 4 (pain)  Radial deviation: 5  Ulnar deviation: 4+ (pain)     (2nd hand position)     Trial 1: 11    Trial 2: 10    Trial 3: 12    Comments: pain    Right Wrist/Hand   Normal wrist strength     (2nd hand position)     Trial 1: 48    Trial 2: 35    Trial 3: 35    General Comments:      Elbow Comments       FOTO: 38% function, 60% predicted function                   Precautions none noted       Manual  1/6       IASTM left wrist extensors/flexors        Active release of left wrist extensors/flexors and elbow flexors                                    Exercise Diary  1/6       Wrist flexor stretch with elbow extended L 4x15"       Wrist extensor stretch with elbow extended L 4x15"       Wrist AROM all directions         Modalities 1/6       US         Ice massage post        MHP pre            Jammie Angelo was given a handout and verbal instruction of customized home exercise program  Patient accepted program and was able to demonstrate exercises

## 2020-01-06 NOTE — TELEPHONE ENCOUNTER
----- Message from Elroy Roberts PA-C sent at 1/3/2020 11:40 AM EST -----  She has a large hiatal hernia likely causing her reflux & dysphagia  We can discuss hiatal hernia repair at her next visit   Toney Coronel

## 2020-01-06 NOTE — LETTER
2020    CHANDA Aparicio [address] [place] [address]    Patient: Rosa Castellanos   YOB: 1955   Date of Visit: 2020     Encounter Diagnosis     ICD-10-CM    1  Lateral epicondylitis of left elbow M77 12    2  Left elbow pain M25 522        Dear Dr Dominga White:    Thank you for your recent referral of Rosa Castellanos  Please review the attached evaluation summary from Jamee's recent visit  Please verify that you agree with the plan of care by signing the attached order  If you have any questions or concerns, please do not hesitate to call  I sincerely appreciate the opportunity to share in the care of one of your patients and hope to have another opportunity to work with you in the near future  Sincerely,    Greer Abel, PT      Referring Provider:      I certify that I have read the below Plan of Care and certify the need for these services furnished under this plan of treatment while under my care  MD ADELFO Aparicio/ Valerio [address] [place] [id number]  VIA Facsimile: [phone]          PT Evaluation     Today's date: 2020  Patient name: Rosa Castellanos  : 1955  MRN: [id number]  Referring provider: Edgardo Nieto MD  Dx:   Encounter Diagnosis     ICD-10-CM    1  Lateral epicondylitis of left elbow M77 12    2  Left elbow pain M25 522        Start Time: 0900  Stop Time: 1000  Total time in clinic (min): 60 minutes    Assessment  Assessment details: Rosa Castellanos was seen for an initial PT evaluation today  Patient is a 60 y o  female with diagnosis of left tennis elbow and past medical history significant for left meniscal surgery, left elbow surgery, HTN, Anemia, sacroiliitis, and multiple hip surgeries with gluteal tearing  High complexity evaluation  due to number of participation restrictions, functional outcome measure of 62% limitation, and unstable clinical presentation   Findings today show limited left elbow range of motion with elbow and wrist weakness, tenderness to palpation, and tightness of wrist extensors contributing to pain and decreased function with ADLs  Skilled PT indicated to treat at this time to address pain, tissue restriction, and improving elbow range of motion and strength to return patient to pain free prior level of function  Impairments: abnormal muscle firing, abnormal muscle tone, abnormal or restricted ROM, abnormal movement, impaired physical strength, lacks appropriate home exercise program and pain with function    Goals  STG (4 weeks)  1  Patient's left elbow extension AROM will increase to 0 with 0/10 pain for increased ability to perform overhead ADLs  2  Patient will have 0/10 pain in left elbow at rest   3  Patient's left wrist extensor and RD strength will increase to 4/5 for increased ability to lift  LTG (8 weeks)  1  Patient's left elbow AROM equal bilaterally for ability to complete hair hygiene, overhead, and behind the back ADLs  2  Patient's UE strength will be equal bilaterally for ability to lift and carry at PLOF  3  Patient will be independent with home exercise program for continued maintenance post PT discharge  Plan  Plan details: Reassessment in 4 weeks     Patient would benefit from: skilled physical therapy  Planned modality interventions: cryotherapy, thermotherapy: hydrocollator packs, unattended electrical stimulation, iontophoresis, ultrasound and fluidotherapy  Planned therapy interventions: manual therapy, neuromuscular re-education, therapeutic activities, therapeutic exercise, gait training and home exercise program  Other planned therapy interventions: Graston Technique  Frequency: 2x week  Duration in weeks: 8  Plan of Care beginning date: 1/6/2020  Plan of Care expiration date: 3/6/2020  Treatment plan discussed with: patient and PTA        Subjective Evaluation    History of Present Illness  Date of onset: 2019  Mechanism of injury: Serena Crane is a 60 y o  female who presents to outpatient Physical Therapy today with complaints of left elbow pain  Pain began of insidious onset approximately 6 months ago  Saw ortho in December where x-ray were taken (-), she received an injection, and was instructed to begin PT with f/u in 8 weeks  She was also instructed to wear an elbow strap when active  Since visit patient states decrease in symptoms overall  History of right elbow surgery and PRP to left elbow 8-9 years ago  Pain  Current pain ratin  At best pain ratin  At worst pain ratin  Location: lateral left elbow  Quality: sharp  Aggravating factors: lifting    Social Support  Steps to enter house: no  Stairs in house: yes   13  Lives in: multiple-level home    Employment status: not working (Disability)  Hand dominance: right      Diagnostic Tests  X-ray: normal  Treatments  Current treatment: injection treatment  Patient Goals  Patient goal: use left arm without pain        Objective     Palpation   Left   No palpable tenderness to the biceps, pronator teres and triceps  Tenderness of the supinator, wrist extensors and wrist flexors  Tenderness     Left Elbow   Tenderness in the lateral epicondyle, olecranon process and radial head  No tenderness in the medial epicondyle  Left Wrist/Hand   Tenderness in the lateral epicondyle and olecranon process  No tenderness in the medial epicondyle       Neurological Testing     Sensation     Elbow   Left Elbow   Inact: light touch    Right Elbow   Intact: light touch    Active Range of Motion     Left Elbow   Flexion: 142 degrees with pain  Extension: -16 degrees with pain  Forearm supination: 95 degrees   Forearm pronation: 100 degrees     Right Elbow   Flexion: 148 degrees   Extension: 9 degrees   Forearm supination: 98 degrees   Forearm pronation: 96 degrees     Additional Active Range of Motion Details  Left shoulder functional IR decreased to L3 due to lateral elbow pain       Strength/Myotome Testing     Left Elbow   Flexion: 4- (pain)  Extension: 4 (pain)  Forearm supination: 4- (pain)  Forearm pronation: 4    Right Elbow   Normal strength    Left Wrist/Hand   Wrist extension: 5  Wrist flexion: 4 (pain)  Radial deviation: 5  Ulnar deviation: 4+ (pain)     (2nd hand position)     Trial 1: 11    Trial 2: 10    Trial 3: 12    Comments: pain    Right Wrist/Hand   Normal wrist strength     (2nd hand position)     Trial 1: 48    Trial 2: 35    Trial 3: 35    General Comments:      Elbow Comments       FOTO: 38% function, 60% predicted function                   Precautions none noted       Manual  1/6       IASTM left wrist extensors/flexors        Active release of left wrist extensors/flexors and elbow flexors                                    Exercise Diary  1/6       Wrist flexor stretch with elbow extended L 4x15"       Wrist extensor stretch with elbow extended L 4x15"       Wrist AROM all directions                                                                                                                                                     Modalities 1/6       US         Ice massage post        MHP tashi White was given a handout and verbal instruction of customized home exercise program  Patient accepted program and was able to demonstrate exercises

## 2020-01-07 ENCOUNTER — TRANSCRIBE ORDERS (OUTPATIENT)
Dept: PHYSICAL THERAPY | Facility: CLINIC | Age: 65
End: 2020-01-07

## 2020-01-07 DIAGNOSIS — M77.12 LATERAL EPICONDYLITIS OF LEFT ELBOW: Primary | ICD-10-CM

## 2020-01-08 ENCOUNTER — APPOINTMENT (OUTPATIENT)
Dept: PHYSICAL THERAPY | Facility: CLINIC | Age: 65
End: 2020-01-08
Payer: COMMERCIAL

## 2020-01-10 ENCOUNTER — TELEPHONE (OUTPATIENT)
Dept: GASTROENTEROLOGY | Facility: CLINIC | Age: 65
End: 2020-01-10

## 2020-01-10 NOTE — TELEPHONE ENCOUNTER
Patients GI provider:  Dr Bernadette Caceres    Number to return call: [phone]    Reason for call: Pt calling to reschedule her procedure  Patient would like to know if you have 1/20/20 available       Scheduled procedure/appointment date if applicable: Apt/procedure 1/27/20

## 2020-01-13 ENCOUNTER — APPOINTMENT (OUTPATIENT)
Dept: LAB | Facility: CLINIC | Age: 65
End: 2020-01-13
Payer: COMMERCIAL

## 2020-01-13 ENCOUNTER — HOSPITAL ENCOUNTER (OUTPATIENT)
Dept: MAMMOGRAPHY | Facility: HOSPITAL | Age: 65
Discharge: HOME | End: 2020-01-13
Payer: COMMERCIAL

## 2020-01-13 ENCOUNTER — OFFICE VISIT (OUTPATIENT)
Dept: PHYSICAL THERAPY | Facility: CLINIC | Age: 65
End: 2020-01-13
Payer: COMMERCIAL

## 2020-01-13 VITALS — WEIGHT: 180 LBS | BODY MASS INDEX: 31.89 KG/M2 | HEIGHT: 63 IN

## 2020-01-13 DIAGNOSIS — I10 ESSENTIAL HYPERTENSION: ICD-10-CM

## 2020-01-13 DIAGNOSIS — E78.00 HYPERCHOLESTEROLEMIA: ICD-10-CM

## 2020-01-13 DIAGNOSIS — E61.1 IRON DEFICIENCY: ICD-10-CM

## 2020-01-13 DIAGNOSIS — M25.522 LEFT ELBOW PAIN: ICD-10-CM

## 2020-01-13 DIAGNOSIS — Z12.39 BREAST CANCER SCREENING: ICD-10-CM

## 2020-01-13 DIAGNOSIS — Z11.4 SCREENING FOR HIV (HUMAN IMMUNODEFICIENCY VIRUS): ICD-10-CM

## 2020-01-13 DIAGNOSIS — M77.12 LATERAL EPICONDYLITIS OF LEFT ELBOW: Primary | ICD-10-CM

## 2020-01-13 LAB
ALBUMIN SERPL BCP-MCNC: 3.8 G/DL (ref 3.5–5)
ALP SERPL-CCNC: 99 U/L (ref 46–116)
ALT SERPL W P-5'-P-CCNC: 17 U/L (ref 12–78)
ANION GAP SERPL CALCULATED.3IONS-SCNC: 6 MMOL/L (ref 4–13)
AST SERPL W P-5'-P-CCNC: 11 U/L (ref 5–45)
BASOPHILS # BLD AUTO: 0.02 THOUSANDS/ΜL (ref 0–0.1)
BASOPHILS NFR BLD AUTO: 0 % (ref 0–1)
BILIRUB SERPL-MCNC: 0.4 MG/DL (ref 0.2–1)
BUN SERPL-MCNC: 22 MG/DL (ref 5–25)
CALCIUM SERPL-MCNC: 9.3 MG/DL (ref 8.3–10.1)
CHLORIDE SERPL-SCNC: 109 MMOL/L (ref 100–108)
CHOLEST SERPL-MCNC: 185 MG/DL (ref 50–200)
CO2 SERPL-SCNC: 27 MMOL/L (ref 21–32)
CREAT SERPL-MCNC: 0.82 MG/DL (ref 0.6–1.3)
EOSINOPHIL # BLD AUTO: 0.49 THOUSAND/ΜL (ref 0–0.61)
EOSINOPHIL NFR BLD AUTO: 10 % (ref 0–6)
ERYTHROCYTE [DISTWIDTH] IN BLOOD BY AUTOMATED COUNT: 12.9 % (ref 11.6–15.1)
GFR SERPL CREATININE-BSD FRML MDRD: 77 ML/MIN/1.73SQ M
GLUCOSE P FAST SERPL-MCNC: 91 MG/DL (ref 65–99)
HCT VFR BLD AUTO: 41.5 % (ref 34.8–46.1)
HDLC SERPL-MCNC: 52 MG/DL
HGB BLD-MCNC: 12.5 G/DL (ref 11.5–15.4)
IMM GRANULOCYTES # BLD AUTO: 0.01 THOUSAND/UL (ref 0–0.2)
IMM GRANULOCYTES NFR BLD AUTO: 0 % (ref 0–2)
LDLC SERPL CALC-MCNC: 107 MG/DL (ref 0–100)
LYMPHOCYTES # BLD AUTO: 1.78 THOUSANDS/ΜL (ref 0.6–4.47)
LYMPHOCYTES NFR BLD AUTO: 37 % (ref 14–44)
MCH RBC QN AUTO: 26.2 PG (ref 26.8–34.3)
MCHC RBC AUTO-ENTMCNC: 30.1 G/DL (ref 31.4–37.4)
MCV RBC AUTO: 87 FL (ref 82–98)
MONOCYTES # BLD AUTO: 0.47 THOUSAND/ΜL (ref 0.17–1.22)
MONOCYTES NFR BLD AUTO: 10 % (ref 4–12)
NEUTROPHILS # BLD AUTO: 2.11 THOUSANDS/ΜL (ref 1.85–7.62)
NEUTS SEG NFR BLD AUTO: 43 % (ref 43–75)
NONHDLC SERPL-MCNC: 133 MG/DL
NRBC BLD AUTO-RTO: 0 /100 WBCS
PLATELET # BLD AUTO: 323 THOUSANDS/UL (ref 149–390)
PMV BLD AUTO: 10.9 FL (ref 8.9–12.7)
POTASSIUM SERPL-SCNC: 4.3 MMOL/L (ref 3.5–5.3)
PROT SERPL-MCNC: 7.4 G/DL (ref 6.4–8.2)
RBC # BLD AUTO: 4.78 MILLION/UL (ref 3.81–5.12)
SODIUM SERPL-SCNC: 142 MMOL/L (ref 136–145)
TRIGL SERPL-MCNC: 128 MG/DL
WBC # BLD AUTO: 4.88 THOUSAND/UL (ref 4.31–10.16)

## 2020-01-13 PROCEDURE — 77063 BREAST TOMOSYNTHESIS BI: CPT

## 2020-01-13 PROCEDURE — 80061 LIPID PANEL: CPT

## 2020-01-13 PROCEDURE — 80053 COMPREHEN METABOLIC PANEL: CPT

## 2020-01-13 PROCEDURE — 85025 COMPLETE CBC W/AUTO DIFF WBC: CPT

## 2020-01-13 PROCEDURE — 97110 THERAPEUTIC EXERCISES: CPT | Performed by: PHYSICAL THERAPIST

## 2020-01-13 PROCEDURE — 77067 SCR MAMMO BI INCL CAD: CPT

## 2020-01-13 PROCEDURE — 97140 MANUAL THERAPY 1/> REGIONS: CPT | Performed by: PHYSICAL THERAPIST

## 2020-01-13 PROCEDURE — 87389 HIV-1 AG W/HIV-1&-2 AB AG IA: CPT

## 2020-01-13 PROCEDURE — 36415 COLL VENOUS BLD VENIPUNCTURE: CPT

## 2020-01-13 NOTE — PROGRESS NOTES
Daily Note     Today's date: 2020  Patient name: Naomie Hollingsworth  : 1955  MRN: [id number]  Referring provider: Laure Hernandez MD  Dx:   Encounter Diagnosis     ICD-10-CM    1  Lateral epicondylitis of left elbow M77 12    2  Left elbow pain M25 522        Start Time: 0900  Stop Time: 1690  Total time in clinic (min): 55 minutes    Subjective: Patient states elbow is feeling much better since injection  Has been consistent with home exercise program        Objective: See treatment diary below      Assessment: Tolerated treatment well  Noted tightness in left wrist extensor muscle mass likely contributing to symptoms at this time  Patient would benefit from continued PT      Plan: Continue per plan of care  Progress treatment as tolerated  Precautions none noted       Manual        IASTM left wrist extensors/flexors  GT 6, 2, 3 x15' in sitting for patient comfort  Active release of left wrist extensors/flexors and elbow flexors  10' in sitting for patient comfort      Ice massage  5'                      : Reviewed Graston Technique with patient, questionnaire was signed         Exercise Diary        Wrist flexor stretch with elbow extended L 4x15" L 4x15"      Wrist extensor stretch with elbow extended L 4x15" L 4x15"      Wrist AROM all directions  1# 3x10 ea         Red 30x                                                                                                                                          Modalities       US         Ice massage post  10 min      MHP pre          Naomie Hollingsworth was given a handout and verbal instruction of customized home exercise program  Patient accepted program and was able to demonstrate exercises

## 2020-01-14 LAB — HIV 1+2 AB+HIV1 P24 AG SERPL QL IA: NORMAL

## 2020-01-15 ENCOUNTER — OFFICE VISIT (OUTPATIENT)
Dept: PHYSICAL THERAPY | Facility: CLINIC | Age: 65
End: 2020-01-15
Payer: COMMERCIAL

## 2020-01-15 DIAGNOSIS — M25.522 LEFT ELBOW PAIN: ICD-10-CM

## 2020-01-15 DIAGNOSIS — M77.12 LATERAL EPICONDYLITIS OF LEFT ELBOW: Primary | ICD-10-CM

## 2020-01-15 PROCEDURE — 97140 MANUAL THERAPY 1/> REGIONS: CPT | Performed by: PHYSICAL THERAPIST

## 2020-01-15 PROCEDURE — 97110 THERAPEUTIC EXERCISES: CPT | Performed by: PHYSICAL THERAPIST

## 2020-01-15 NOTE — PROGRESS NOTES
Daily Note     Today's date: 1/15/2020  Patient name: Joey Bennett  : 1955  MRN: [id number]  Referring provider: Heath Fair MD  Dx:   Encounter Diagnosis     ICD-10-CM    1  Lateral epicondylitis of left elbow M77 12    2  Left elbow pain M25 522        Start Time: 0900  Stop Time: 0149  Total time in clinic (min): 55 minutes    Subjective: Patient states no elbow pain today  No significant soreness after last session  Objective: See treatment diary below      Assessment: Tolerated treatment well  Continued tightness noted in left wrist extensors likely contributing to symptoms  Patient would benefit from continued PT      Plan: Continue per plan of care  Progress treatment as tolerated  Precautions none noted       Manual  1/6 1/13 1/15     IASTM left wrist extensors/flexors  GT 6, 2, 3 x15' in sitting for patient comfort  GT 6, 2, 3, 5 x15' in sitting for patient comfort  Active release of left wrist extensors/flexors and elbow flexors  10' in sitting for patient comfort 10' in sitting for patient comfort     Ice massage  5' 5 min                     : Reviewed Graston Technique with patient, questionnaire was signed         Exercise Diary  1/6 1/13 1/15     Wrist flexor stretch with elbow extended L 4x15" L 4x15" L 4x15"     Wrist extensor stretch with elbow extended L 4x15" L 4x15" L 4x15"     Wrist AROM all directions  1# 3x10 ea 1# 3x10 ea        Red 30x Yellow 30x, pain with red     Webbing    30x red                                                                                                                                 Modalities 1/6 1/13 1/15                      MHP pre  10 min 10 min

## 2020-01-20 ENCOUNTER — OFFICE VISIT (OUTPATIENT)
Dept: PHYSICAL THERAPY | Facility: CLINIC | Age: 65
End: 2020-01-20
Payer: COMMERCIAL

## 2020-01-20 DIAGNOSIS — M77.12 LATERAL EPICONDYLITIS OF LEFT ELBOW: Primary | ICD-10-CM

## 2020-01-20 DIAGNOSIS — M25.522 LEFT ELBOW PAIN: ICD-10-CM

## 2020-01-20 PROCEDURE — 97140 MANUAL THERAPY 1/> REGIONS: CPT | Performed by: PHYSICAL THERAPIST

## 2020-01-20 PROCEDURE — 97110 THERAPEUTIC EXERCISES: CPT | Performed by: PHYSICAL THERAPIST

## 2020-01-20 NOTE — PROGRESS NOTES
Daily Note     Today's date: 2020  Patient name: Curly Way  : 1955  MRN: [id number]  Referring provider: Gordo Bolanos MD  Dx:   Encounter Diagnosis     ICD-10-CM    1  Lateral epicondylitis of left elbow M77 12    2  Left elbow pain M25 522        Start Time: 1000  Stop Time: 1045  Total time in clinic (min): 45 minutes    Subjective: Patient states she attempted to  coffee cup over the weekend and had pain at lateral elbow  Objective: See treatment diary below      Assessment: Tolerated treatment well  Tightness noted in left wrist extensors likely contributing to stress and pain at lateral epicondyle  Patient would benefit from continued PT      Plan: Continue per plan of care  Progress treatment as tolerated  May add US as necessary  Precautions none noted       Manual  1/6 1/13 1/15 1/20    IASTM left wrist extensors/flexors  GT 6, 2, 3 x15' in sitting for patient comfort  GT 6, 2, 3, 5 x15' in sitting for patient comfort  GT 6, 2, 3, 5 x15' in sitting for patient comfort  Wrist extensors only    Active release of left wrist extensors/flexors and elbow flexors  10' in sitting for patient comfort 10' in sitting for patient comfort 10' in sitting for patient comfort; wrist extensors only    Ice massage  5' 5 min 5'                    : Reviewed Graston Technique with patient, questionnaire was signed         Exercise Diary  1/6 1/13 1/15 1/20    Wrist flexor stretch with elbow extended L 4x15" L 4x15" L 4x15" L 4x15"    Wrist extensor stretch with elbow extended L 4x15" L 4x15" L 4x15" L 4x15"    Wrist AROM all directions  1# 3x10 ea 1# 3x10 ea 1# 3x10 ea       Red 30x Yellow 30x, pain with red Yellow 30x    Webbing    30x red 30x red    therabar    bilat supination, red 15x                                                                                                                        Modalities 1/6 1/13 1/15 1/20                     MHP pre  10 min 10 min 10 min

## 2020-01-22 ENCOUNTER — OFFICE VISIT (OUTPATIENT)
Dept: PHYSICAL THERAPY | Facility: CLINIC | Age: 65
End: 2020-01-22
Payer: COMMERCIAL

## 2020-01-22 DIAGNOSIS — M77.12 LATERAL EPICONDYLITIS OF LEFT ELBOW: Primary | ICD-10-CM

## 2020-01-22 DIAGNOSIS — M25.522 LEFT ELBOW PAIN: ICD-10-CM

## 2020-01-22 PROCEDURE — 97140 MANUAL THERAPY 1/> REGIONS: CPT | Performed by: PHYSICAL THERAPIST

## 2020-01-22 PROCEDURE — 97110 THERAPEUTIC EXERCISES: CPT | Performed by: PHYSICAL THERAPIST

## 2020-01-22 NOTE — PROGRESS NOTES
Daily Note     Today's date: 20  Patient name: Cory Silverman  : 1955  MRN: [id number]  Referring provider: Yenifer Sanders MD  Dx:   Encounter Diagnosis     ICD-10-CM    1  Lateral epicondylitis of left elbow M77 12    2  Left elbow pain M25 522        Start Time: 0900  Stop Time: 0955  Total time in clinic (min): 55 minutes    Subjective: Patient states elbow is a little sore leaving PT, but once soreness goes away has been feeling a little better  Objective: See treatment diary below      Assessment: Tolerated treatment well  Continued tightness of wrist extensors, needs to be consisted with HEP stretching  Patient would benefit from continued PT      Plan: Continue per plan of care  Progress treatment as tolerated  Precautions none noted       Manual  1/6 1/13 1/15 1/20 1/22   IASTM left wrist extensors/flexors  GT 6, 2, 3 x15' in sitting for patient comfort  GT 6, 2, 3, 5 x15' in sitting for patient comfort  GT 6, 2, 3, 5 x15' in sitting for patient comfort  Wrist extensors only GT 6, 2, 3, 5 x15' in sitting for patient comfort  Wrist extensors only   Active release of left wrist extensors/flexors and elbow flexors  10' in sitting for patient comfort 10' in sitting for patient comfort 10' in sitting for patient comfort; wrist extensors only 10' in sitting for patient comfort   Ice massage  5' 5 min 5' 5'                   : Reviewed Graston Technique with patient, questionnaire was signed         Exercise Diary  1/6 1/13 1/15 1/20 1/22   Wrist flexor stretch with elbow extended L 4x15" L 4x15" L 4x15" L 4x15" L 4x15"   Wrist extensor stretch with elbow extended L 4x15" L 4x15" L 4x15" L 4x15" L 4x15"   Wrist AROM all directions  1# 3x10 ea 1# 3x10 ea 1# 3x10 ea 1# 3x10 ea      Red 30x Yellow 30x, pain with red Yellow 30x Yellow 30x   Webbing    30x red 30x red 30x red   therabar    bilat supination, red 15x bilat supination, red 15x Modalities 1/6 1/13 1/15 1/20 1/22   US                 MHP pre  10 min 10 min 10 min 10 min

## 2020-01-24 ENCOUNTER — APPOINTMENT (OUTPATIENT)
Dept: RADIOLOGY | Facility: HOSPITAL | Age: 65
End: 2020-01-24
Payer: COMMERCIAL

## 2020-01-24 ENCOUNTER — OFFICE VISIT (OUTPATIENT)
Dept: FAMILY MEDICINE CLINIC | Facility: CLINIC | Age: 65
End: 2020-01-24
Payer: COMMERCIAL

## 2020-01-24 VITALS
SYSTOLIC BLOOD PRESSURE: 130 MMHG | RESPIRATION RATE: 17 BRPM | BODY MASS INDEX: 32.95 KG/M2 | DIASTOLIC BLOOD PRESSURE: 80 MMHG | WEIGHT: 186 LBS | TEMPERATURE: 98.8 F | OXYGEN SATURATION: 98 % | HEART RATE: 86 BPM

## 2020-01-24 DIAGNOSIS — R05.3 PERSISTENT COUGH FOR 3 WEEKS OR LONGER: ICD-10-CM

## 2020-01-24 DIAGNOSIS — R05.3 PERSISTENT COUGH FOR 3 WEEKS OR LONGER: Primary | ICD-10-CM

## 2020-01-24 LAB
FLUAV RNA NPH QL NAA+PROBE: NORMAL
FLUBV RNA NPH QL NAA+PROBE: NORMAL
RSV RNA NPH QL NAA+PROBE: NORMAL

## 2020-01-24 PROCEDURE — 87631 RESP VIRUS 3-5 TARGETS: CPT | Performed by: FAMILY MEDICINE

## 2020-01-24 PROCEDURE — 71046 X-RAY EXAM CHEST 2 VIEWS: CPT

## 2020-01-24 PROCEDURE — 99213 OFFICE O/P EST LOW 20 MIN: CPT | Performed by: FAMILY MEDICINE

## 2020-01-24 PROCEDURE — 87801 DETECT AGNT MULT DNA AMPLI: CPT | Performed by: FAMILY MEDICINE

## 2020-01-24 RX ORDER — PROMETHAZINE HYDROCHLORIDE AND CODEINE PHOSPHATE 6.25; 1 MG/5ML; MG/5ML
5 SYRUP ORAL EVERY 4 HOURS PRN
Qty: 120 ML | Refills: 0 | Status: SHIPPED | OUTPATIENT
Start: 2020-01-24 | End: 2020-02-12 | Stop reason: SDUPTHER

## 2020-01-24 NOTE — PROGRESS NOTES
Assessment/Plan:         Diagnoses and all orders for this visit:    Persistent cough for 3 weeks or longer  -     Bordetella pertussis / parapertussis PCR; Future  -     Influenza A/B and RSV PCR; Future  -     XR chest pa & lateral; Future  -     promethazine-codeine (PHENERGAN WITH CODEINE) 6 25-10 mg/5 mL syrup; Take 5 mL by mouth every 4 (four) hours as needed for cough   -     Influenza A/B and RSV PCR  -     Bordetella pertussis / parapertussis PCR          Subjective:   Chief Complaint   Patient presents with    Cough     still since November         Patient ID: Serena Crane is a 60 y o  female      Here for problem visit  Cough for over a month, "keep thinking it's going to go away, but it's not"  "Pills didn't work, then gave me the syrup- that didn't work" per pt- was rx'd tessalon and phenergan   Original sx started in early November -"from what I can recall, those symptoms went away" per pt, then started back up over a month ago- dry cough, no rx meds left  No fever or chills  No recent ill contacts last few months and denies any travel  Pt coughs every few minutes throughout visit  Pt is retired/disabled  No hx asthma  Did not receive flu vaccine this season              Karthikeyan Epstein   Age: 60  Data as of: 01/24/2020    Demographics    Linked Records  Search Criteria  Summary    Summary  Total Prescriptions:    13    Total Prescribers:    8    Total Pharmacies:    2    Narcotics* (excluding Buprenorphine)  Current Qty:   0   Current MME/day:   0 00   30 Day Avg MME/day:   0 00   Buprenorphine*  Current Qty:   0   Current mg/day:   0 00   30 Day Avg mg/day:   0 00   Prescriptions    *Highlighted drugs could not be included in score calculations   Prescriptions  Total Prescriptions: 13    Total Private Pay: 0    Fill Date ID   Written Drug Qty Days Prescriber Rx # Pharmacy Refill   Daily Dose* Pymt Type     09/17/2019  1   09/06/2019  Hydrocodone-Acetamin 5-325 MG  90 00 30 [place]   [id number]   [facility] ([id number])   0  15 00 [place]  07/27/2019  1   06/29/2019  Tramadol Hcl 50 MG Tablet  240 00 30 [place]   [id number]   [facility] ([id number])   0  40 00 [place]  06/22/2019  1   04/18/2019  Tramadol Hcl 50 MG Tablet  240 00 30 [place]   [id number]   [facility] ([id number])   0  40 00 [place]  02/18/[place]  1   02/18/2019  Tramadol Hcl 50 MG Tablet  240 00 20 [place]   [id number]   [facility] (3300)   0  60 00 [place]ME  [place]  08/18/2018  1   08/18/2018  Hydrocodone-Acetamin 5-325 MG  50 00 4 [place]   [id number]   [facility] ([id number])   0  62 50 MME             The following portions of the patient's history were reviewed and updated as appropriate: allergies, current medications, past family history, past medical history, past social history, past surgical history and problem list     Review of Systems   All other systems reviewed and are negative  Objective:      /80   Pulse 86   Temp 98 8 °F (37 1 °C)   Resp 17   Wt 84 4 kg (186 lb)   SpO2 98%   BMI 32 95 kg/m²          Physical Exam   Constitutional: She is oriented to person, place, and time  She appears well-developed  She is cooperative  Non-toxic appearance  She does not have a sickly appearance  She does not appear ill  No distress  HENT:   Head: Normocephalic and atraumatic  Right Ear: Tympanic membrane and ear canal normal    Left Ear: Tympanic membrane and ear canal normal    Nose: Nose normal    Mouth/Throat: Uvula is midline, oropharynx is clear and moist and mucous membranes are normal    Eyes: Pupils are equal, round, and reactive to light  Conjunctivae and lids are normal    Neck: Trachea normal  Neck supple  No JVD present  No thyroid mass and no thyromegaly present  Cardiovascular: Normal rate, regular rhythm, normal heart sounds and normal pulses  Pulmonary/Chest: Effort normal and breath sounds normal    Abdominal: Soft  Bowel sounds are normal  She exhibits no distension and no mass  There is no hepatosplenomegaly  There is no tenderness  Lymphadenopathy:     She has no cervical adenopathy  Right: No supraclavicular adenopathy present  Left: No supraclavicular adenopathy present  Neurological: She is alert and oriented to person, place, and time  She has normal reflexes  Gait normal    Skin: Skin is warm and dry  Capillary refill takes less than 2 seconds  She is not diaphoretic  No cyanosis  No pallor  Psychiatric: She has a normal mood and affect  Her behavior is normal    Nursing note and vitals reviewed

## 2020-01-26 LAB
B PARAPERT DNA SPEC QL NAA+PROBE: NOT DETECTED
B PERT DNA SPEC QL NAA+PROBE: NOT DETECTED

## 2020-01-27 ENCOUNTER — ANESTHESIA EVENT (OUTPATIENT)
Dept: GASTROENTEROLOGY | Facility: MEDICAL CENTER | Age: 65
End: 2020-01-27

## 2020-01-27 ENCOUNTER — HOSPITAL ENCOUNTER (OUTPATIENT)
Dept: GASTROENTEROLOGY | Facility: MEDICAL CENTER | Age: 65
Setting detail: OUTPATIENT SURGERY
Discharge: HOME | End: 2020-01-27
Admitting: INTERNAL MEDICINE
Payer: COMMERCIAL

## 2020-01-27 ENCOUNTER — ANESTHESIA (OUTPATIENT)
Dept: GASTROENTEROLOGY | Facility: MEDICAL CENTER | Age: 65
End: 2020-01-27

## 2020-01-27 VITALS
TEMPERATURE: 98.8 F | WEIGHT: 186 LBS | DIASTOLIC BLOOD PRESSURE: 78 MMHG | BODY MASS INDEX: 32.96 KG/M2 | OXYGEN SATURATION: 98 % | RESPIRATION RATE: 16 BRPM | HEIGHT: 63 IN | HEART RATE: 74 BPM | SYSTOLIC BLOOD PRESSURE: 138 MMHG

## 2020-01-27 DIAGNOSIS — K44.9 HIATAL HERNIA: Primary | ICD-10-CM

## 2020-01-27 DIAGNOSIS — K21.9 GASTROESOPHAGEAL REFLUX DISEASE WITHOUT ESOPHAGITIS: ICD-10-CM

## 2020-01-27 DIAGNOSIS — R13.10 DYSPHAGIA, UNSPECIFIED TYPE: ICD-10-CM

## 2020-01-27 PROCEDURE — 88305 TISSUE EXAM BY PATHOLOGIST: CPT | Performed by: PATHOLOGY

## 2020-01-27 PROCEDURE — 43239 EGD BIOPSY SINGLE/MULTIPLE: CPT | Performed by: INTERNAL MEDICINE

## 2020-01-27 RX ORDER — PROPOFOL 10 MG/ML
INJECTION, EMULSION INTRAVENOUS AS NEEDED
Status: DISCONTINUED | OUTPATIENT
Start: 2020-01-27 | End: 2020-01-27 | Stop reason: SURG

## 2020-01-27 RX ORDER — SODIUM CHLORIDE 9 MG/ML
125 INJECTION, SOLUTION INTRAVENOUS CONTINUOUS
Status: DISCONTINUED | OUTPATIENT
Start: 2020-01-27 | End: 2020-01-31 | Stop reason: HOSPADM

## 2020-01-27 RX ADMIN — PROPOFOL 100 MG: 10 INJECTION, EMULSION INTRAVENOUS at 11:31

## 2020-01-27 RX ADMIN — SODIUM CHLORIDE 125 ML/HR: 0.9 INJECTION, SOLUTION INTRAVENOUS at 10:36

## 2020-01-27 RX ADMIN — PROPOFOL 150 MG: 10 INJECTION, EMULSION INTRAVENOUS at 11:27

## 2020-01-27 RX ADMIN — PROPOFOL 50 MG: 10 INJECTION, EMULSION INTRAVENOUS at 11:34

## 2020-01-27 RX ADMIN — PROPOFOL 100 MG: 10 INJECTION, EMULSION INTRAVENOUS at 11:48

## 2020-01-27 RX ADMIN — PROPOFOL 100 MG: 10 INJECTION, EMULSION INTRAVENOUS at 11:33

## 2020-01-27 NOTE — ANESTHESIA PREPROCEDURE EVALUATION
Review of Systems/Medical History  Patient summary reviewed  Chart reviewed      Cardiovascular  Exercise tolerance (METS): >4,  Hyperlipidemia, Hypertension ,    Pulmonary  Negative pulmonary ROS        GI/Hepatic    GERD well controlled,        Negative  ROS        Endo/Other  Negative endo/other ROS      GYN       Hematology  Anemia ,     Musculoskeletal    Arthritis     Neurology  Negative neurology ROS      Psychology   Negative psychology ROS              Physical Exam    Airway    Mallampati score: II  TM Distance: <3 FB  Neck ROM: full     Dental       Cardiovascular  Rhythm: regular, Rate: normal,     Pulmonary  Breath sounds clear to auscultation,     Other Findings        Anesthesia Plan  ASA Score- 2     Anesthesia Type- IV sedation with anesthesia with ASA Monitors  Additional Monitors:   Airway Plan:         Plan Factors-Patient not instructed to abstain from smoking on day of procedure  Patient did not smoke on day of surgery  Induction- intravenous  Postoperative Plan-     Informed Consent- Anesthetic plan and risks discussed with patient

## 2020-01-27 NOTE — ANESTHESIA POSTPROCEDURE EVALUATION
Post-Op Assessment Note    CV Status:  Stable  Pain Score: 2    Pain management: adequate     Mental Status:  Alert and awake   Hydration Status:  Euvolemic   PONV Controlled:  Controlled   Airway Patency:  Patent   Post Op Vitals Reviewed: Yes      Staff: Anesthesiologist           /66 (01/27/20 1144)    Temp      Pulse 80 (01/27/20 1144)   Resp 14 (01/27/20 1144)    SpO2 95 % (01/27/20 1144)

## 2020-01-27 NOTE — H&P
History and Physical - [facility]  Daryel Nyhan 60 y o  female MRN: [id number]                  HPI: Daryel Nyhan is a 60y o  year old female who presents for dysphagia and esophagram with hiatal hernia evaluation  EGD is planned  REVIEW OF SYSTEMS: Per the HPI, and otherwise unremarkable  Historical Information   Past Medical History:   Diagnosis Date    Anemia     Hyperlipidemia     Hypertension     Overactive bladder     Vitamin D deficiency      Past Surgical History:   Procedure Laterality Date     SECTION      Last Assessed: 2017    ELBOW SURGERY      Last Assessed: 2017    ESOPHAGOGASTRODUODENOSCOPY N/A 2017    Procedure: ESOPHAGOGASTRODUODENOSCOPY (EGD); Surgeon: Luis Armando Hartman DO;  Location: [facility]; Service: Gastroenterology    HIP SURGERY Left 2018    glut medius/minimus repair  and 18    HYSTERECTOMY      2011    KNEE SURGERY      x2    UT COLONOSCOPY FLX DX W/COLLJ SPEC WHEN PFRMD N/A 2017    Procedure: EGD AND COLONOSCOPY;  Surgeon: Luis Armando Hartman DO;  Location: [facility];   Service: Gastroenterology     Social History   Social History     Substance and Sexual Activity   Alcohol Use No     Social History     Substance and Sexual Activity   Drug Use No     Social History     Tobacco Use   Smoking Status Former Smoker    Last attempt to quit: Steve Poag Years since quittin 0   Smokeless Tobacco Never Used   Tobacco Comment    quit 25 yrs ago     Family History   Problem Relation Age of Onset    Colon cancer Mother     Heart attack Father     Other Father         Cardiac Disorder    Diabetes type II Father     Colon cancer Sister     Cancer Maternal Grandmother     No Known Problems Daughter     No Known Problems Paternal Grandmother     No Known Problems Sister     No Known Problems Sister     No Known Problems Sister     No Known Problems Sister     No Known Problems Maternal Aunt  No Known Problems Maternal Aunt     No Known Problems Maternal Aunt        Meds/Allergies       (Not in a hospital admission)    No Known Allergies    Objective     BP (!) 185/89   Pulse 78   Temp 98 8 °F (37 1 °C) (Temporal)   Resp 18   Ht 5' 3" (1 6 m)   Wt 84 4 kg (186 lb)   SpO2 97%   BMI 32 95 kg/m²       PHYSICAL EXAM    Gen: NAD  CV: RRR  CHEST: Clear  ABD: soft, NT/ND  EXT: no edema      ASSESSMENT/PLAN:  This is a 60y o  year old female here for EGD and she is stable and optimized for her procedure

## 2020-01-27 NOTE — DISCHARGE INSTRUCTIONS
Upper Endoscopy   WHAT YOU NEED TO KNOW:   An upper endoscopy is also called an upper gastrointestinal (GI) endoscopy, or an esophagogastroduodenoscopy (EGD)  You may feel bloated, gassy, or have some abdominal discomfort after your procedure  Your throat may be sore for 24 to 36 hours  You may burp or pass gas from air that is still inside your body  DISCHARGE INSTRUCTIONS:   Call 911 if:   · You have sudden chest pain or trouble breathing  Seek care immediately if:   · You feel dizzy or faint  · You have trouble swallowing  · You have severe throat pain  · Your bowel movements are very dark or black  · Your abdomen is hard and firm and you have severe pain  · You vomit blood  Contact your healthcare provider if:   · You feel full or bloated and cannot burp or pass gas  · You have not had a bowel movement for 3 days after your procedure  · You have neck pain  · You have a fever or chills  · You have nausea or are vomiting  · You have a rash or hives  · You have questions or concerns about your endoscopy  Relieve a sore throat:  Suck on throat lozenges or crushed ice  Gargle with a small amount of warm salt water  Mix 1 teaspoon of salt and 1 cup of warm water to make salt water  Relieve gas and discomfort from bloating:  Lie on your right side with a heating pad on your abdomen  Take short walks to help pass gas  Eat small meals until bloating is relieved  Rest after your procedure:  Do not drive or make important decisions until the day after your procedure  Return to your normal activity as directed  You can usually return to work the day after your procedure  Follow up with your healthcare provider as directed:  Write down your questions so you remember to ask them during your visits  © 2017 [employer] Information is for End User's use only and may not be sold, redistributed or otherwise used for commercial purposes   All illustrations and images included in CareNotes® are the copyrighted property of [employer]  or Rasheed [employer]ohp  The above information is an  only  It is not intended as medical advice for individual conditions or treatments  Talk to your doctor, nurse or pharmacist before following any medical regimen to see if it is safe and effective for you  Hiatal Hernia   WHAT YOU NEED TO KNOW:   A hiatal hernia is a condition that causes part of your stomach to bulge through the hiatus (small opening) in your diaphragm  The part of the stomach may move up and down, or it may get trapped above the diaphragm  DISCHARGE INSTRUCTIONS:   Seek care immediately if:   · You have severe abdominal pain  · You try to vomit but nothing comes out (retching)  · You have severe chest pain and sudden trouble breathing  · Your bowel movements are black or bloody  · Your vomit looks like coffee grounds or has blood in it  Contact your healthcare provider if:   · Your symptoms are getting worse  · You have nausea, and you are vomiting  · You are losing weight without trying  · You have questions or concerns about your condition or care  Medicines:   · Medicines  may be given to relieve heartburn symptoms  These medicines help to decrease or block stomach acid  You may also be given medicines that help to tighten the esophageal sphincter  · Take your medicine as directed  Contact your healthcare provider if you think your medicine is not helping or if you have side effects  Tell him or her if you are allergic to any medicine  Keep a list of the medicines, vitamins, and herbs you take  Include the amounts, and when and why you take them  Bring the list or the pill bottles to follow-up visits  Carry your medicine list with you in case of an emergency  Follow up with your healthcare provider as directed:  Write down your questions so you remember to ask them during your visits    Self care:   · Avoid foods that make your symptoms worse  These may include spicy foods, fruit juices, alcohol, caffeine, chocolate, and mint  · Eat several small meals during the day  Small meals give your stomach less food to digest     · Avoid lying down and bending forward after you eat  Do not eat meals 2 to 3 hours before bedtime  This decreases your risk for reflux  · Maintain a healthy weight  If you are overweight, weight loss may help relieve your symptoms  · Sleep with your head elevated  at least 6 inches  · Do not smoke  Smoking can increase your symptoms of heartburn  © 2017 [facility] Information is for End User's use only and may not be sold, redistributed or otherwise used for commercial purposes  All illustrations and images included in CareNotes® are the copyrighted property of [employer]  or Rasheed Bishop  The above information is an  only  It is not intended as medical advice for individual conditions or treatments  Talk to your doctor, nurse or pharmacist before following any medical regimen to see if it is safe and effective for you  Gastroesophageal Reflux Disease   WHAT YOU NEED TO KNOW:   Gastroesophageal reflux occurs when acid and food in the stomach back up into the esophagus  Gastroesophageal reflux disease (GERD) is reflux that occurs more than twice a week for a few weeks  It usually causes heartburn and other symptoms  GERD can cause other health problems over time if it is not treated  DISCHARGE INSTRUCTIONS:   Seek care immediately if:   · You feel full and cannot burp or vomit  · You have severe chest pain and sudden trouble breathing  · Your bowel movements are black, bloody, or tarry-looking  · Your vomit looks like coffee grounds or has blood in it  Contact your healthcare provider if:   · You vomit large amounts, or you vomit often  · You have trouble breathing after you vomit       · You have trouble swallowing, or pain with swallowing  · You are losing weight without trying  · Your symptoms get worse or do not improve with treatment  · You have questions or concerns about your condition or care  Medicines:   · Medicines  are used to decrease stomach acid  Medicine may also be used to help your lower esophageal sphincter and stomach contract (tighten) more  · Take your medicine as directed  Contact your healthcare provider if you think your medicine is not helping or if you have side effects  Tell him or her if you are allergic to any medicine  Keep a list of the medicines, vitamins, and herbs you take  Include the amounts, and when and why you take them  Bring the list or the pill bottles to follow-up visits  Carry your medicine list with you in case of an emergency  Manage GERD:   · Do not have foods or drinks that may increase heartburn  These include chocolate, peppermint, fried or fatty foods, drinks that contain caffeine, or carbonated drinks (soda)  Other foods include spicy foods, onions, tomatoes, and tomato-based foods  Do not have foods or drinks that can irritate your esophagus, such as citrus fruits, juices, and alcohol  · Do not eat large meals  When you eat a lot of food at one time, your stomach needs more acid to digest it  Eat 6 small meals each day instead of 3 large ones, and eat slowly  Do not eat meals 2 to 3 hours before bedtime  · Elevate the head of your bed  Place 6-inch blocks under the head of your bed frame  You may also use more than one pillow under your head and shoulders while you sleep  · Maintain a healthy weight  If you are overweight, weight loss may help relieve symptoms of GERD  · Do not smoke  Smoking weakens the lower esophageal sphincter and increases the risk of GERD  Ask your healthcare provider for information if you currently smoke and need help to quit  E-cigarettes or smokeless tobacco still contain nicotine   Talk to your healthcare provider before you use these products  · Do not wear clothing that is tight around your waist   Tight clothing can put pressure on your stomach and cause or worsen GERD symptoms  Follow up with your healthcare provider as directed:  Write down your questions so you remember to ask them during your visits  © 2017 [employer] Information is for End User's use only and may not be sold, redistributed or otherwise used for commercial purposes  All illustrations and images included in CareNotes® are the copyrighted property of [employer]  or Rasheed Bishop  The above information is an  only  It is not intended as medical advice for individual conditions or treatments  Talk to your doctor, nurse or pharmacist before following any medical regimen to see if it is safe and effective for you

## 2020-01-28 ENCOUNTER — OFFICE VISIT (OUTPATIENT)
Dept: PHYSICAL THERAPY | Facility: CLINIC | Age: 65
End: 2020-01-28
Payer: COMMERCIAL

## 2020-01-28 DIAGNOSIS — M77.12 LATERAL EPICONDYLITIS OF LEFT ELBOW: Primary | ICD-10-CM

## 2020-01-28 DIAGNOSIS — M25.522 LEFT ELBOW PAIN: ICD-10-CM

## 2020-01-28 PROCEDURE — 97140 MANUAL THERAPY 1/> REGIONS: CPT | Performed by: PHYSICAL THERAPIST

## 2020-01-28 PROCEDURE — 97035 APP MDLTY 1+ULTRASOUND EA 15: CPT | Performed by: PHYSICAL THERAPIST

## 2020-01-28 PROCEDURE — 97110 THERAPEUTIC EXERCISES: CPT | Performed by: PHYSICAL THERAPIST

## 2020-01-28 NOTE — PROGRESS NOTES
Daily Note     Today's date: 2020  Patient name: Cory Silverman  : 1955  MRN: [id number]  Referring provider: Yenifer Sanders MD  Dx: No diagnosis found  Subjective: Patient states elbow was bothering her over the weekend, thinks her injection may be wearing off  5/10 pain in elbow today  Objective: See treatment diary below      Assessment: Tolerated treatment well  Patient would benefit from continued PT      Plan: Continue per plan of care  Progress treatment as tolerated  Precautions none noted       Manual  1/6 1/13 1/15 1/20 1/22   IASTM left wrist extensors/flexors  GT 6, 2, 3 x15' in sitting for patient comfort  GT 6, 2, 3, 5 x15' in sitting for patient comfort  GT 6, 2, 3, 5 x15' in sitting for patient comfort  Wrist extensors only GT 6, 2, 3, 5 x15' in sitting for patient comfort  Wrist extensors only   Active release of left wrist extensors/flexors and elbow flexors  10' in sitting for patient comfort 10' in sitting for patient comfort 10' in sitting for patient comfort; wrist extensors only 10' in sitting for patient comfort   Ice massage  5' 5 min 5' 5'                   : Reviewed Graston Technique with patient, questionnaire was signed         Exercise Diary  1/6 1/13 1/15 1/20 1/22   Wrist flexor stretch with elbow extended L 4x15" L 4x15" L 4x15" L 4x15" L 4x15"   Wrist extensor stretch with elbow extended L 4x15" L 4x15" L 4x15" L 4x15" L 4x15"   Wrist AROM all directions  1# 3x10 ea 1# 3x10 ea 1# 3x10 ea 1# 3x10 ea      Red 30x Yellow 30x, pain with red Yellow 30x Yellow 30x   Webbing    30x red 30x red 30x red   therabar    bilat supination, red 15x bilat supination, red 15x                                                                                                                       Modalities 1/6 1/13 1/15 1/20 1/22                    MHP pre  10 min 10 min 10 min 10 min

## 2020-01-28 NOTE — PROGRESS NOTES
Daily Note     Today's date: 2020  Patient name: Greta Braga  : 1955  MRN: [id number]  Referring provider: Perri Feliciano MD  Dx:   Encounter Diagnosis     ICD-10-CM    1  Lateral epicondylitis of left elbow M77 12    2  Left elbow pain M25 522        Start Time: 1000  Stop Time: 1100  Total time in clinic (min): 60 minutes    Subjective: Patient states elbow was bothering her over the weekend, thinks her injection may be wearing off  5/10 pain in elbow today  Objective: See treatment diary below      Assessment: Tolerated treatment well  Some c/o soreness at the end of the session in lateral left elbow  Included ultrasound today with no issues  Patient would benefit from continued PT      Plan: Continue per plan of care  Progress treatment as tolerated  Precautions none noted       Manual  1/28 1/13 1/15 1/20 1/22   IASTM left wrist extensors/flexors GT 6, 2, 3, 5 x15' in sitting for patient comfort  Wrist extensors only GT 6, 2, 3 x15' in sitting for patient comfort  GT 6, 2, 3, 5 x15' in sitting for patient comfort  GT 6, 2, 3, 5 x15' in sitting for patient comfort  Wrist extensors only GT 6, 2, 3, 5 x15' in sitting for patient comfort  Wrist extensors only   Active release of left wrist extensors/flexors and elbow flexors np 10' in sitting for patient comfort 10' in sitting for patient comfort 10' in sitting for patient comfort; wrist extensors only 10' in sitting for patient comfort   Ice massage np 5' 5 min 5' 5'                   : Reviewed Graston Technique with patient, questionnaire was signed         Exercise Diary  1/28 1/13 1/15 1/20 1/22   Wrist flexor stretch with elbow extended L 4x15" L 4x15" L 4x15" L 4x15" L 4x15"   Wrist extensor stretch with elbow extended L 4x15" L 4x15" L 4x15" L 4x15" L 4x15"   Wrist AROM all directions 1# 3x10 ea 1# 3x10 ea 1# 3x10 ea 1# 3x10 ea 1# 3x10 ea     Yellow 30x Red 30x Yellow 30x, pain with red Yellow 30x Yellow 30x Webbing  30x red  30x red 30x red 30x red   therabar bilat supination, red 30x   bilat supination, red 15x bilat supination, red 15x                                                                                                                       Modalities 1/28 1/13 1/15 1/20 1/22   US  1 0w/cm2 3  3MHz 10 min               MHP pre 10 min 10 min 10 min 10 min 10 min

## 2020-01-29 ENCOUNTER — TELEPHONE (OUTPATIENT)
Dept: BARIATRICS | Facility: CLINIC | Age: 65
End: 2020-01-29

## 2020-01-29 ENCOUNTER — PREP FOR PROCEDURE (OUTPATIENT)
Dept: GASTROENTEROLOGY | Facility: MEDICAL CENTER | Age: 65
End: 2020-01-29

## 2020-01-29 DIAGNOSIS — K44.9 HIATAL HERNIA: Primary | ICD-10-CM

## 2020-01-29 NOTE — TELEPHONE ENCOUNTER
----- Message from Jolly Beasley MD sent at 1/29/2020 12:46 PM EST -----  Please call this patient to schedule a consultation  ----- Message -----  From: Destin Harris MD  Sent: 1/29/2020  11:40 AM EST  To: Jolly Beasley MD    She has a large hiatal hernia which she symptomatic from  Can we have her see you in the office  Manometry is already planned

## 2020-01-30 ENCOUNTER — OFFICE VISIT (OUTPATIENT)
Dept: PHYSICAL THERAPY | Facility: CLINIC | Age: 65
End: 2020-01-30
Payer: COMMERCIAL

## 2020-01-30 ENCOUNTER — TELEPHONE (OUTPATIENT)
Dept: FAMILY MEDICINE CLINIC | Facility: CLINIC | Age: 65
End: 2020-01-30

## 2020-01-30 DIAGNOSIS — M25.522 LEFT ELBOW PAIN: ICD-10-CM

## 2020-01-30 DIAGNOSIS — M77.12 LATERAL EPICONDYLITIS OF LEFT ELBOW: Primary | ICD-10-CM

## 2020-01-30 PROCEDURE — 97035 APP MDLTY 1+ULTRASOUND EA 15: CPT | Performed by: PHYSICAL THERAPIST

## 2020-01-30 PROCEDURE — 97140 MANUAL THERAPY 1/> REGIONS: CPT | Performed by: PHYSICAL THERAPIST

## 2020-01-30 PROCEDURE — 97110 THERAPEUTIC EXERCISES: CPT | Performed by: PHYSICAL THERAPIST

## 2020-01-30 NOTE — PROGRESS NOTES
Daily Note     Today's date: 2020  Patient name: Kye Bunn  : 1955  MRN: [id number]  Referring provider: Lane Mota MD  Dx:   Encounter Diagnosis     ICD-10-CM    1  Lateral epicondylitis of left elbow M77 12    2  Left elbow pain M25 522        Start Time: 1000  Stop Time: 1100  Total time in clinic (min): 60 minutes    Subjective: Patient states elbow was a little sore after last session  No significant change in elbow symptoms since the start of PT  Objective: See treatment diary below      Assessment: Tolerated treatment well  Lesion and tightness noted in left wrist extensors at proximal myotendinous junction likely contributing to pain  Patient would benefit from continued PT      Plan: Continue per plan of care  Progress treatment as tolerated  Precautions none noted       Manual  1/28 1/30 1/15 1/20 1/22   IASTM left wrist extensors/flexors GT 6, 2, 3, 5 x15' in sitting for patient comfort  Wrist extensors only GT 6, 2, 3 x15' in sitting for patient comfort  GT 6, 2, 3, 5 x15' in sitting for patient comfort  GT 6, 2, 3, 5 x15' in sitting for patient comfort  Wrist extensors only GT 6, 2, 3, 5 x15' in sitting for patient comfort  Wrist extensors only   Active release of left wrist extensors/flexors and elbow flexors np 10' in sitting for patient comfort 10' in sitting for patient comfort 10' in sitting for patient comfort; wrist extensors only 10' in sitting for patient comfort   Ice massage np 5' 5 min 5' 5'                   : Reviewed Graston Technique with patient, questionnaire was signed         Exercise Diary  1/28 1/30 1/15 1/20 1/22   Wrist flexor stretch with elbow extended L 4x15" L 4x15" L 4x15" L 4x15" L 4x15"   Wrist extensor stretch with elbow extended L 4x15" L 4x15" L 4x15" L 4x15" L 4x15"   Wrist AROM all directions 1# 3x10 ea 1# 3x10 ea 1# 3x10 ea 1# 3x10 ea 1# 3x10 ea     Yellow 30x Red 30x Yellow 30x, pain with red Yellow 30x Yellow 30x Webbing  30x red 30x red 30x red 30x red 30x red   therabar bilat supination, red 30x bilat supination, red 30x  bilat supination, red 15x bilat supination, red 15x                                                                                                                       Modalities 1/28 1/13 1/15 1/20 1/22   US to left proximal wrist extensors 1 0w/cm2 3  3MHz 10 min 1 4w/cm2 3  3MHz 10 min              MHP pre 10 min 10 min 10 min 10 min 10 min

## 2020-01-30 NOTE — TELEPHONE ENCOUNTER
Given that her BP was stable and her labs were stable, I am okay bumping out her 6 month follow up  We are here if she needs up earlier than that!

## 2020-01-30 NOTE — TELEPHONE ENCOUNTER
Patient called stating that Dr Angelica Aguayo reviewed her lab results when she was in for a sick visit on 01/24/2020, does she need to come in for a 6m f/u on Monday 2/3/20? In research I found patient was in 8/13/2019 and scheduled 6m f/u for 2/3/20  Patient was in 12/10/2019 for a follow up  Then sick visit on 01/24/20  Please advise if patient needs to come in on Monday or reschedule to what month  Thank you

## 2020-02-03 DIAGNOSIS — R05.3 PERSISTENT COUGH FOR 3 WEEKS OR LONGER: ICD-10-CM

## 2020-02-04 ENCOUNTER — OFFICE VISIT (OUTPATIENT)
Dept: PHYSICAL THERAPY | Facility: CLINIC | Age: 65
End: 2020-02-04
Payer: COMMERCIAL

## 2020-02-04 DIAGNOSIS — M77.12 LATERAL EPICONDYLITIS OF LEFT ELBOW: Primary | ICD-10-CM

## 2020-02-04 DIAGNOSIS — M25.522 LEFT ELBOW PAIN: ICD-10-CM

## 2020-02-04 PROCEDURE — 97110 THERAPEUTIC EXERCISES: CPT | Performed by: PHYSICAL THERAPIST

## 2020-02-04 PROCEDURE — 97035 APP MDLTY 1+ULTRASOUND EA 15: CPT | Performed by: PHYSICAL THERAPIST

## 2020-02-04 PROCEDURE — 97140 MANUAL THERAPY 1/> REGIONS: CPT | Performed by: PHYSICAL THERAPIST

## 2020-02-04 NOTE — PROGRESS NOTES
Daily Note     Today's date: 2020  Patient name: Sherren Orn  : 1955  MRN: [id number]  Referring provider: Jose De Jesus Forbes MD  Dx: No diagnosis found  Start Time: 945  Stop Time:   Total time in clinic (min): 60 minutes    Subjective: Patient states elbow was sore over the weekend  Had some pain this morning pulling on boots  Objective: See treatment diary below      Assessment: Tolerated treatment well  Was given tennis ball massage for home to decrease tightness of wrist extensors contributing to pain  Patient would benefit from continued PT      Plan: Continue per plan of care  Progress note during next visit  Progress treatment as tolerated  Precautions none noted       Manual     IASTM left wrist extensors/flexors GT 6, 2, 3, 5 x15' in sitting for patient comfort  Wrist extensors only GT 6, 2, 3 x15' in sitting for patient comfort  GT 4, 2, 5 x10' in sitting for patient comfort  GT 6, 2, 3, 5 x15' in sitting for patient comfort  Wrist extensors only GT 6, 2, 3, 5 x15' in sitting for patient comfort  Wrist extensors only   Active release of left wrist extensors/flexors and elbow flexors np 10' in sitting for patient comfort 5' in sitting for patient comfort 10' in sitting for patient comfort; wrist extensors only 10' in sitting for patient comfort   Ice massage np 5' np 5' 5'                   : Reviewed Graston Technique with patient, questionnaire was signed         Exercise Diary     Wrist flexor stretch with elbow extended L 4x15" L 4x15" L 4x15" L 4x15" L 4x15"   Wrist extensor stretch with elbow extended L 4x15" L 4x15" L 4x15" L 4x15" L 4x15"   Wrist AROM all directions 1# 3x10 ea 1# 3x10 ea 1# 3x10 ea 1# 3x10 ea 1# 3x10 ea     Yellow 30x Red 30x Yellow 30x, pain with red Yellow 30x Yellow 30x   Webbing  30x red 30x red 30x red 30x red 30x red   therabar bilat supination, red 30x bilat supination, red 30x bilat supination, red 30x bilat supination, red 15x bilat supination, red 15x   Tennis ball release   5 min                                                                                                                 Modalities 1/28 1/13 2/4 1/20 1/22   US to left proximal wrist extensors 1 0w/cm2 3  3MHz 10 min 1 4w/cm2 3  3MHz 10 min 1 2w/cm2 3  0MHz 10 min, small sound head             MHP pre 10 min 10 min 10 min 10 min 10 min

## 2020-02-05 RX ORDER — PROMETHAZINE HYDROCHLORIDE AND CODEINE PHOSPHATE 6.25; 1 MG/5ML; MG/5ML
5 SYRUP ORAL EVERY 4 HOURS PRN
Qty: 120 ML | Refills: 0 | OUTPATIENT
Start: 2020-02-05

## 2020-02-06 ENCOUNTER — EVALUATION (OUTPATIENT)
Dept: PHYSICAL THERAPY | Facility: CLINIC | Age: 65
End: 2020-02-06
Payer: COMMERCIAL

## 2020-02-06 DIAGNOSIS — M77.12 LATERAL EPICONDYLITIS OF LEFT ELBOW: Primary | ICD-10-CM

## 2020-02-06 DIAGNOSIS — M25.522 LEFT ELBOW PAIN: ICD-10-CM

## 2020-02-06 PROCEDURE — 97110 THERAPEUTIC EXERCISES: CPT | Performed by: PHYSICAL THERAPIST

## 2020-02-06 PROCEDURE — 97140 MANUAL THERAPY 1/> REGIONS: CPT | Performed by: PHYSICAL THERAPIST

## 2020-02-06 PROCEDURE — 97035 APP MDLTY 1+ULTRASOUND EA 15: CPT | Performed by: PHYSICAL THERAPIST

## 2020-02-06 NOTE — PROGRESS NOTES
PT Re-Evaluation     Today's date: 2020  Patient name: Ismael De Dios  : 1955  MRN: [id number]  Referring provider: Melissa Manjarrez MD  Dx:   Encounter Diagnosis     ICD-10-CM    1  Lateral epicondylitis of left elbow M77 12    2  Left elbow pain M25 522        Start Time: 0945  Stop Time: 1045  Total time in clinic (min): 60 minutes    Assessment/Plan   Assessment details: Ismael De Dios has been seen in outpatient PT for 6 sessions beginning   Patient is a 60 y o  female with diagnosis of left tennis elbow and past medical history significant for left meniscal surgery, left elbow surgery, HTN, Anemia, sacroiliitis, hernia, and multiple hip surgeries with gluteal tearing  FOTO score taken today at 40% function, improving from 38%  Findings today show no significant gains in left elbow or wrist strength or range of motion  Subjective pain levels have increased from initial evaluation  Continues to show tightness and restriction of wrist extensor musculature as well as brachioradialis with trigger points and tenderness  Patient states she will occasionally work on home program, but not frequently  Educated patient on cause of symptoms and need to be consistent with exercises/ stretching multiple times a day for pain relief  To visit with MD next week and will follow up with PT after that visit  Impairments: abnormal muscle firing, abnormal muscle tone, abnormal or restricted ROM, abnormal movement, impaired physical strength, lacks appropriate home exercise program and pain with function    Goals  STG (4 weeks)  1  Patient's left elbow extension AROM will increase to 0 with 0/10 pain for increased ability to perform overhead ADLs  - NOT MET (decline)  2  Patient will have 0/10 pain in left elbow at rest  -NOT MET (decline)  3  Patient's left wrist extensor and RD strength will increase to 4/5 for increased ability to lift  - NOT MET (decline)  LTG (8 weeks)  1   Patient's left elbow AROM equal bilaterally for ability to complete hair hygiene, overhead, and behind the back ADLs  - NOT MET (no change)  2  Patient's UE strength will be equal bilaterally for ability to lift and carry at PLOF  NOT MET  3  Patient will be independent with home exercise program for continued maintenance post PT discharge  - NOT MET      Plan  Plan details: Patient to see referring doctor , will hold therapy until that time  Planned modality interventions: cryotherapy, thermotherapy: hydrocollator packs, unattended electrical stimulation, iontophoresis, ultrasound and fluidotherapy  Planned therapy interventions: manual therapy, neuromuscular re-education, therapeutic activities, therapeutic exercise, gait training and home exercise program  Other planned therapy interventions: Graston Technique  Frequency: 2x week  Duration in weeks: 8  Plan of Care beginning date: 2020  Plan of Care expiration date: 3/6/2020  Treatment plan discussed with: patient and PTA      Subjective   History of Present Illness  Date of onset: 2019  Subjective 20: Patient states no significant improvement over the course of PT  Has not been consistent with HEP  Pain increases in left lateral elbow when attempting to pick something up (coffee cup)  Worse towards the end of the day  Constant pain, increased with activity  To f/u with referring doctor 20  Mechanism of injury: Drake Crenshaw is a 60 y o  female who presents to outpatient Physical Therapy today with complaints of left elbow pain  Pain began of insidious onset approximately 6 months ago  Saw ortho in December where x-ray were taken (-), she received an injection, and was instructed to begin PT with f/u in 8 weeks  She was also instructed to wear an elbow strap when active  Since visit patient states decrease in symptoms overall  History of right elbow surgery and PRP to left elbow 8-9 years ago       Pain    2/  Current pain ratin  6  At best pain rating: 0  5  At worst pain ratin  8  Location: lateral left elbow  Quality: sharp  Aggravating factors: lifting    Social Support  Steps to enter house: no  Stairs in house: yes   13  Lives in: multiple-level home    Employment status: not working (Disability)  Hand dominance: right      Diagnostic Tests  X-ray: normal  Treatments  Current treatment: injection treatment  Patient Goals  Patient goal: use left arm without pain    Objective   Palpation   Left   No palpable tenderness to the biceps, pronator teres and triceps  Tenderness of the supinator, wrist extensors and wrist flexors  Tenderness     Left Elbow   Tenderness in the lateral epicondyle, olecranon process and radial head  No tenderness in the medial epicondyle  Left Wrist/Hand   Tenderness in the lateral epicondyle and olecranon process  No tenderness in the medial epicondyle  Neurological Testing     Sensation     Elbow   Left Elbow   Inact: light touch    Right Elbow   Intact: light touch    Active Range of Motion   2/6    Left Elbow   Flexion: 142 degrees with pain 145 no pain  Extension: -16 degrees with pain -15 degrees pain  Forearm supination: 95 degrees  69 pain  Forearm pronation: 100 degrees 113 no pain    Right Elbow   Flexion: 148 degrees   Extension: 9 degrees   Forearm supination: 98 degrees   Forearm pronation: 96 degrees     Additional Active Range of Motion Details  Left shoulder functional IR decreased to L3 due to lateral elbow pain       Strength/Myotome Testing  2/6    Left Elbow   Flexion: 4- (pain)   5 no pain  Extension: 4 (pain)   4- pain  Forearm supination: 4- (pain)  4- pain  Forearm pronation: 4   4+ no pain  ;  Right Elbow   Normal strength    Left Wrist/Hand    2/6  Wrist extension: 5   4 pain  Wrist flexion: 4 (pain)   5 pain free  Radial deviation: 5   4+ pain  Ulnar deviation: 4+ (pain)  5 pain free     (2nd hand position)      Trial 1: 11    16     Trial 2: 10    9    Trial 3: 12    9    Comments: pain    Right Wrist/Hand   Normal wrist strength     (2nd hand position)     Trial 1: 48    Trial 2: 35    Trial 3: 35    General Comments:      Elbow Comments       FOTO: 40% was 38% function, 60% predicted function            Precautions none noted       Manual  1/28 1/30 2/4 2/6 1/22   IASTM left wrist extensors/flexors GT 6, 2, 3, 5 x15' in sitting for patient comfort  Wrist extensors only GT 6, 2, 3 x15' in sitting for patient comfort  GT 4, 2, 5 x10' in sitting for patient comfort  GT 6, 3, 4, 5 x15' in sitting for patient comfort  Wrist extensors only GT 6, 2, 3, 5 x15' in sitting for patient comfort  Wrist extensors only   Active release of left wrist extensors/flexors and elbow flexors np 10' in sitting for patient comfort 5' in sitting for patient comfort  10' in sitting for patient comfort   Ice massage np 5' np Patient deferred 5'                   1/13: Reviewed Graston Technique with patient, questionnaire was signed  Exercise Diary  1/28 1/30 2/4 2/6 1/22   Wrist flexor stretch with elbow extended L 4x15" L 4x15" L 4x15" L 4x15" L 4x15"   Wrist extensor stretch with elbow extended L 4x15" L 4x15" L 4x15" L 4x15" L 4x15"   Wrist AROM all directions 1# 3x10 ea 1# 3x10 ea 1# 3x10 ea 1# 3x10 ea 1# 3x10 ea     Yellow 30x Red 30x Yellow 30x, pain with red Yellow 30x Yellow 30x   Webbing  30x red 30x red 30x red 30x red 30x red   therabar bilat supination, red 30x bilat supination, red 30x bilat supination, red 30x bilat supination, red 30x bilat supination, red 15x   Tennis ball release   5 min 5 min                                                                                                                Modalities 1/28 1/13 2/4 2/6 1/22   US to left proximal wrist extensors 1 0w/cm2 3  3MHz 10 min 1 4w/cm2 3  3MHz 10 min 1 2w/cm2 3  0MHz 10 min, small sound head 1 4w/cm2 3  0MHz 10 min, small sound head            MHP pre 10 min 10 min 10 min 10 min 10 min

## 2020-02-06 NOTE — LETTER
2020    Laura ArguellesCHANDA [address] [place] [id number]    Patient: Silvano Ponce   YOB: 1955   Date of Visit: 2020     Encounter Diagnosis     ICD-10-CM    1  Lateral epicondylitis of left elbow M77 12    2  Left elbow pain M25 522        Dear Dr [employer]:    Thank you for your recent referral of Silvano Ponce  Please review the attached evaluation summary from Jamee's recent visit  Please verify that you agree with the plan of care by signing the attached order  If you have any questions or concerns, please do not hesitate to call  I sincerely appreciate the opportunity to share in the care of one of your patients and hope to have another opportunity to work with you in the near future  Sincerely,    Edna Spencer, PT      Referring Provider:      I certify that I have read the below Plan of Care and certify the need for these services furnished under this plan of treatment while under my care  Laura ArguellesCHANDA [address] [place] [id number]  VIA Facsimile: [phone]          PT Re-Evaluation     Today's date: 2020  Patient name: Silvano Ponce  : 1955  MRN: [id number]  Referring provider: Alexander Leal MD  Dx:   Encounter Diagnosis     ICD-10-CM    1  Lateral epicondylitis of left elbow M77 12    2  Left elbow pain M25 522        Start Time: 0945  Stop Time: 1045  Total time in clinic (min): 60 minutes    Assessment/Plan   Assessment details: Silvano Ponce has been seen in outpatient PT for 6 sessions beginning   Patient is a 60 y o  female with diagnosis of left tennis elbow and past medical history significant for left meniscal surgery, left elbow surgery, HTN, Anemia, sacroiliitis, hernia, and multiple hip surgeries with gluteal tearing  FOTO score taken today at 40% function, improving from 38%   Findings today show no significant gains in left elbow or wrist strength or range of motion  Subjective pain levels have increased from initial evaluation  Continues to show tightness and restriction of wrist extensor musculature as well as brachioradialis with trigger points and tenderness  Patient states she will occasionally work on home program, but not frequently  Educated patient on cause of symptoms and need to be consistent with exercises/ stretching multiple times a day for pain relief  To visit with MD next week and will follow up with PT after that visit  Impairments: abnormal muscle firing, abnormal muscle tone, abnormal or restricted ROM, abnormal movement, impaired physical strength, lacks appropriate home exercise program and pain with function    Goals  STG (4 weeks)  1  Patient's left elbow extension AROM will increase to 0 with 0/10 pain for increased ability to perform overhead ADLs  - NOT MET (decline)  2  Patient will have 0/10 pain in left elbow at rest  -NOT MET (decline)  3  Patient's left wrist extensor and RD strength will increase to 4/5 for increased ability to lift  - NOT MET (decline)  LTG (8 weeks)  1  Patient's left elbow AROM equal bilaterally for ability to complete hair hygiene, overhead, and behind the back ADLs  - NOT MET (no change)  2  Patient's UE strength will be equal bilaterally for ability to lift and carry at PLOF  NOT MET  3  Patient will be independent with home exercise program for continued maintenance post PT discharge  - NOT MET      Plan  Plan details: Patient to see referring doctor 2/12, will hold therapy until that time     Planned modality interventions: cryotherapy, thermotherapy: hydrocollator packs, unattended electrical stimulation, iontophoresis, ultrasound and fluidotherapy  Planned therapy interventions: manual therapy, neuromuscular re-education, therapeutic activities, therapeutic exercise, gait training and home exercise program  Other planned therapy interventions: Graston Technique  Frequency: 2x week  Duration in weeks: 8  Plan of Care beginning date: 2020  Plan of Care expiration date: 3/6/2020  Treatment plan discussed with: patient and PTA      Subjective   History of Present Illness  Date of onset: 2019  Subjective 20: Patient states no significant improvement over the course of PT  Has not been consistent with HEP  Pain increases in left lateral elbow when attempting to pick something up (coffee cup)  Worse towards the end of the day  Constant pain, increased with activity  To f/u with referring doctor 20  Mechanism of injury: Michael Mcnamara is a 60 y o  female who presents to outpatient Physical Therapy today with complaints of left elbow pain  Pain began of insidious onset approximately 6 months ago  Saw ortho in December where x-ray were taken (-), she received an injection, and was instructed to begin PT with f/u in 8 weeks  She was also instructed to wear an elbow strap when active  Since visit patient states decrease in symptoms overall  History of right elbow surgery and PRP to left elbow 8-9 years ago  Pain      Current pain ratin  6  At best pain ratin  5  At worst pain ratin  8  Location: lateral left elbow  Quality: sharp  Aggravating factors: lifting    Social Support  Steps to enter house: no  Stairs in house: yes   13  Lives in: multiple-level home    Employment status: not working (Disability)  Hand dominance: right      Diagnostic Tests  X-ray: normal  Treatments  Current treatment: injection treatment  Patient Goals  Patient goal: use left arm without pain    Objective   Palpation   Left   No palpable tenderness to the biceps, pronator teres and triceps  Tenderness of the supinator, wrist extensors and wrist flexors  Tenderness     Left Elbow   Tenderness in the lateral epicondyle, olecranon process and radial head  No tenderness in the medial epicondyle  Left Wrist/Hand   Tenderness in the lateral epicondyle and olecranon process   No tenderness in the medial epicondyle  Neurological Testing     Sensation     Elbow   Left Elbow   Inact: light touch    Right Elbow   Intact: light touch    Active Range of Motion   2/6    Left Elbow   Flexion: 142 degrees with pain 145 no pain  Extension: -16 degrees with pain -15 degrees pain  Forearm supination: 95 degrees  69 pain  Forearm pronation: 100 degrees 113 no pain    Right Elbow   Flexion: 148 degrees   Extension: 9 degrees   Forearm supination: 98 degrees   Forearm pronation: 96 degrees     Additional Active Range of Motion Details  Left shoulder functional IR decreased to L3 due to lateral elbow pain  Strength/Myotome Testing  2/6    Left Elbow   Flexion: 4- (pain)   5 no pain  Extension: 4 (pain)   4- pain  Forearm supination: 4- (pain)  4- pain  Forearm pronation: 4   4+ no pain  ;  Right Elbow   Normal strength    Left Wrist/Hand    2/6  Wrist extension: 5   4 pain  Wrist flexion: 4 (pain)   5 pain free  Radial deviation: 5   4+ pain  Ulnar deviation: 4+ (pain)  5 pain free     (2nd hand position)      Trial 1: 11    16     Trial 2: 10    9    Trial 3: 12    9    Comments: pain    Right Wrist/Hand   Normal wrist strength     (2nd hand position)     Trial 1: 48    Trial 2: 35    Trial 3: 35    General Comments:      Elbow Comments       FOTO: 40% was 38% function, 60% predicted function            Precautions none noted       Manual  1/28 1/30 2/4 2/6 1/22   IASTM left wrist extensors/flexors GT 6, 2, 3, 5 x15' in sitting for patient comfort  Wrist extensors only GT 6, 2, 3 x15' in sitting for patient comfort  GT 4, 2, 5 x10' in sitting for patient comfort  GT 6, 3, 4, 5 x15' in sitting for patient comfort  Wrist extensors only GT 6, 2, 3, 5 x15' in sitting for patient comfort   Wrist extensors only   Active release of left wrist extensors/flexors and elbow flexors np 10' in sitting for patient comfort 5' in sitting for patient comfort  10' in sitting for patient comfort   Ice massage np 5' np Patient deferred 5' 1/13: Reviewed Graston Technique with patient, questionnaire was signed  Exercise Diary  1/28 1/30 2/4 2/6 1/22   Wrist flexor stretch with elbow extended L 4x15" L 4x15" L 4x15" L 4x15" L 4x15"   Wrist extensor stretch with elbow extended L 4x15" L 4x15" L 4x15" L 4x15" L 4x15"   Wrist AROM all directions 1# 3x10 ea 1# 3x10 ea 1# 3x10 ea 1# 3x10 ea 1# 3x10 ea     Yellow 30x Red 30x Yellow 30x, pain with red Yellow 30x Yellow 30x   Webbing  30x red 30x red 30x red 30x red 30x red   therabar bilat supination, red 30x bilat supination, red 30x bilat supination, red 30x bilat supination, red 30x bilat supination, red 15x   Tennis ball release   5 min 5 min                                                                                                                Modalities 1/28 1/13 2/4 2/6 1/22   US to left proximal wrist extensors 1 0w/cm2 3  3MHz 10 min 1 4w/cm2 3  3MHz 10 min 1 2w/cm2 3  0MHz 10 min, small sound head 1 4w/cm2 3  0MHz 10 min, small sound head            MHP pre 10 min 10 min 10 min 10 min 10 min

## 2020-02-10 ENCOUNTER — OFFICE VISIT (OUTPATIENT)
Dept: GASTROENTEROLOGY | Facility: MEDICAL CENTER | Age: 65
End: 2020-02-10
Payer: COMMERCIAL

## 2020-02-10 VITALS
TEMPERATURE: 98.6 F | DIASTOLIC BLOOD PRESSURE: 98 MMHG | SYSTOLIC BLOOD PRESSURE: 140 MMHG | BODY MASS INDEX: 32.6 KG/M2 | HEIGHT: 63 IN | WEIGHT: 184 LBS | HEART RATE: 90 BPM

## 2020-02-10 DIAGNOSIS — R13.19 ESOPHAGEAL DYSPHAGIA: ICD-10-CM

## 2020-02-10 DIAGNOSIS — K44.9 LARGE HIATAL HERNIA: Primary | ICD-10-CM

## 2020-02-10 DIAGNOSIS — R14.0 BLOATING: ICD-10-CM

## 2020-02-10 DIAGNOSIS — R10.10 PAIN OF UPPER ABDOMEN: ICD-10-CM

## 2020-02-10 DIAGNOSIS — K21.9 GASTROESOPHAGEAL REFLUX DISEASE WITHOUT ESOPHAGITIS: ICD-10-CM

## 2020-02-10 PROCEDURE — 3080F DIAST BP >= 90 MM HG: CPT | Performed by: PHYSICIAN ASSISTANT

## 2020-02-10 PROCEDURE — 3077F SYST BP >= 140 MM HG: CPT | Performed by: PHYSICIAN ASSISTANT

## 2020-02-10 PROCEDURE — 1036F TOBACCO NON-USER: CPT | Performed by: PHYSICIAN ASSISTANT

## 2020-02-10 PROCEDURE — 99214 OFFICE O/P EST MOD 30 MIN: CPT | Performed by: PHYSICIAN ASSISTANT

## 2020-02-10 PROCEDURE — 3008F BODY MASS INDEX DOCD: CPT | Performed by: PHYSICIAN ASSISTANT

## 2020-02-10 NOTE — PROGRESS NOTES
[facility] - Outpatient Follow-up Note  Radha Mendoza 60 y o  female MRN: [id number]  Encounter: [id number]          ASSESSMENT AND PLAN:    1  Large hiatal hernia  2  GERD  3  Dysphagia  4  Upper abdominal pain  5  Bloating   -she continues to have symptoms of upper abdominal pain intermittently, difficulty swallowing solids but not liquids and bloating  She does feel that her reflux is well controlled on Prilosec and Pepcid    -she had a barium esophagram showing a mildly dilated esophagus and large hiatal hernia and free-flowing reflux    -she then had an upper endoscopy showing a large hiatal hernia but this was otherwise normal, biopsies are still pending    -she has an appointment with Dr Brandon Acuña on Thursday to discuss possible surgical correction of the hiatal hernia; it is possible although not guaranteed that this is causing her upper abdominal pain and certainly this is contributing to her reflux    -she has esophageal manometry testing scheduled for Friday to evaluate for esophageal dysmotility  -weight loss may be helpful as well   -she would like to schedule a follow-up appointment based on the outcome of her appointment with Dr Brandon Acuña; offered her a follow up in 3 months but she politely declined at this time  ____________________________________________________________    SUBJECTIVE:    82-year-old female with past medical history of GERD, hypertension and overactive bladder presents to the office for follow-up  She reports that she continues to have difficulty swallowing solid foods but denies any dysphagia to liquids  She states the dysphagia is not changing or worsening in any way  She continues to have intermittent upper abdominal pain and bloating  She has not pascual any aggravating factors  She denies any constipation or diarrhea  She feels that generally her reflux is well controlled on Prilosec and Pepcid    She has occasional breakthrough reflux though  She denies any nausea, vomiting, hematochezia, melena, change in bowel habits, change in appetite, unintended weight loss, fevers, chills or jaundice  REVIEW OF SYSTEMS IS OTHERWISE NEGATIVE  Historical Information   Past Medical History:   Diagnosis Date    Anemia     Hyperlipidemia     Hypertension     Overactive bladder     Vitamin D deficiency      Past Surgical History:   Procedure Laterality Date     SECTION      Last Assessed: 2017    ELBOW SURGERY      Last Assessed: 2017    ESOPHAGOGASTRODUODENOSCOPY N/A 2017    Procedure: ESOPHAGOGASTRODUODENOSCOPY (EGD); Surgeon: Luis Armando Hartman DO;  Location: [facility]; Service: Gastroenterology    HIP SURGERY Left 2018    glut medius/minimus repair  and 18    HYSTERECTOMY          KNEE SURGERY      x2    RI COLONOSCOPY FLX DX W/COLLJ SPEC WHEN PFRMD N/A 2017    Procedure: EGD AND COLONOSCOPY;  Surgeon: Luis Armando Hartman DO;  Location: [facility];   Service: Gastroenterology     Social History   Social History     Substance and Sexual Activity   Alcohol Use No     Social History     Substance and Sexual Activity   Drug Use No     Social History     Tobacco Use   Smoking Status Former Smoker    Last attempt to quit:     Years since quittin 1   Smokeless Tobacco Never Used   Tobacco Comment    quit 25 yrs ago     Family History   Problem Relation Age of Onset    Colon cancer Mother     Heart attack Father     Other Father         Cardiac Disorder    Diabetes type II Father     Colon cancer Sister     Cancer Maternal Grandmother     No Known Problems Daughter     No Known Problems Paternal Grandmother     No Known Problems Sister     No Known Problems Sister     No Known Problems Sister     No Known Problems Sister     No Known Problems Maternal Aunt     No Known Problems Maternal Aunt     No Known Problems Maternal Aunt        Meds/Allergies       Current Outpatient Medications:     amLODIPine (NORVASC) 5 mg tablet    atorvastatin (LIPITOR) 20 mg tablet    baclofen 10 mg tablet    celecoxib (CeleBREX) 200 mg capsule    famotidine (PEPCID) 20 mg tablet    ferrous sulfate 324 (65 Fe) mg    HYDROcodone-acetaminophen (NORCO) 5-325 mg per tablet    hydrOXYzine HCL (ATARAX) 25 mg tablet    losartan (COZAAR) 100 MG tablet    omeprazole (PriLOSEC) 20 mg delayed release capsule    traMADol (ULTRAM) 50 mg tablet    erythromycin (ILOTYCIN) ophthalmic ointment    oxybutynin (DITROPAN-XL) 5 mg 24 hr tablet    promethazine-codeine (PHENERGAN WITH CODEINE) 6 25-10 mg/5 mL syrup    trospium chloride (SANCTURA) 20 mg tablet    No Known Allergies        Objective     Blood pressure 140/98, pulse 90, temperature 98 6 °F (37 °C), temperature source Tympanic, height 5' 3" (1 6 m), weight 83 5 kg (184 lb)  PHYSICAL EXAM:      Physical Exam   Constitutional: She is oriented to person, place, and time  She appears well-developed  No distress  Obese   HENT:   Head: Normocephalic and atraumatic  Eyes: Right eye exhibits no discharge  Left eye exhibits no discharge  No scleral icterus  Neck: Neck supple  No tracheal deviation present  Cardiovascular: Normal rate, regular rhythm and normal heart sounds  Exam reveals no gallop and no friction rub  No murmur heard  Pulmonary/Chest: Effort normal  No respiratory distress  She has no wheezes  She has no rales  Abdominal: Soft  Bowel sounds are normal  She exhibits no distension  There is no tenderness  There is no rebound and no guarding  Neurological: She is alert and oriented to person, place, and time  Skin: Skin is warm and dry  Psychiatric: She has a normal mood and affect  Lab Results:   No visits with results within 1 Day(s) from this visit     Latest known visit with results is:   Office Visit on 01/24/2020   Component Date Value    INFLUENZA A PCR 01/24/2020 None Detected     INFLUENZA B PCR 01/24/2020 None Detected     RSV PCR 01/24/2020 None Detected     Bordetella Pertussis PCR 01/24/2020 Not Detected     BORDETELLA PARAPERTUSSIS* 01/24/2020 Not Detected          Radiology Results:   Xr Chest Pa & Lateral    Result Date: 1/24/2020  Narrative: CHEST INDICATION:   R05: Cough  COMPARISON:  None EXAM PERFORMED/VIEWS:  XR CHEST PA & LATERAL FINDINGS: Cardiomediastinal silhouette appears upper normal in size  Moderate hiatal hernia  The lungs are clear  No pneumothorax or pleural effusion  Osseous structures appear within normal limits for patient age  Impression: No acute cardiopulmonary disease  Workstation performed: [id number]     Mammo Screening Bilateral W 3d & Cad    Result Date: 1/14/2020  Narrative: DIAGNOSIS: Breast cancer screening TECHNIQUE: Digital screening mammography was performed  Computer Aided Detection (CAD) analyzed all applicable images  COMPARISONS: Prior breast imaging dated: 01/05/2019, 06/10/2016, 06/25/2014, 03/15/2012, 09/13/2011, and 09/08/2011 RELEVANT HISTORY: Family Breast Cancer History: No known family history of breast cancer  Family Medical History: Family medical history includes colon cancer in mother and colon cancer in sister  Personal History: No known relevant hormone history  Surgical history includes hysterectomy  No known relevant medical history  The patient is scheduled in a reminder system for screening mammography  8-10% of cancers will be missed on mammography  Management of a palpable abnormality must be based on clinical grounds  Patients will be notified of their results via letter from our facility  Accredited by [employer] of [employer] and FDA  RISK ASSESSMENT: 5 Year Tyrer-Cuzick: 1 24 % 10 Year Tyrer-Cuzick: 2 4 % Lifetime Tyrer-Cuzick: 5 66 % TISSUE DENSITY: There are scattered areas of fibroglandular density  INDICATION: Daryel Nyhan is a 60 y o  female presenting for screening mammography   FINDINGS: Bilateral There are no suspicious masses, grouped microcalcifications or areas of architectural distortion  The skin and nipple areolar complex are unremarkable  Bilateral retroareolar ectatic ducts are unchanged since multiple prior studies  Impression: No mammographic evidence of malignancy  ASSESSMENT/BI-RADS CATEGORY: Left: 2 - Benign Right: 2 - Benign Overall: 2 - Benign RECOMMENDATION:      - Routine screening mammogram in 1 year for both breasts  Workstation ID: [id number]   Answers for HPI/ROS submitted by the patient on 2/3/2020   Abdominal pain  Chronicity: new  Onset: more than 1 month ago  Onset quality: undetermined  Frequency: daily  Episode duration: 2 hours  Progression since onset: unchanged  Pain location: LLQ  Pain - numeric: 8/10  Pain quality: a sensation of fullness, sharp  Radiates to: LUQ  anorexia: No  arthralgias: Yes  belching: No  constipation: No  diarrhea: No  dysuria: No  fever: No  flatus: Yes  frequency: Yes  hematochezia: No  hematuria: No  melena: Yes  myalgias: Yes  nausea:  No  weight loss: No  vomiting: No  Aggravated by: nothing  Relieved by: certain positions  Diagnostic workup: ultrasound

## 2020-02-11 ENCOUNTER — TELEPHONE (OUTPATIENT)
Dept: FAMILY MEDICINE CLINIC | Facility: CLINIC | Age: 65
End: 2020-02-11

## 2020-02-11 NOTE — TELEPHONE ENCOUNTER
Pt called in asking for the results of her chest x-ray  Pt also wants refill of her cough syrup- but it is marked not taking as of 2/10/2020

## 2020-02-12 ENCOUNTER — OFFICE VISIT (OUTPATIENT)
Dept: FAMILY MEDICINE CLINIC | Facility: CLINIC | Age: 65
End: 2020-02-12
Payer: COMMERCIAL

## 2020-02-12 VITALS
RESPIRATION RATE: 17 BRPM | HEART RATE: 88 BPM | SYSTOLIC BLOOD PRESSURE: 136 MMHG | DIASTOLIC BLOOD PRESSURE: 86 MMHG | HEIGHT: 63 IN | WEIGHT: 186 LBS | BODY MASS INDEX: 32.96 KG/M2 | TEMPERATURE: 98.7 F | OXYGEN SATURATION: 98 %

## 2020-02-12 DIAGNOSIS — R05.3 PERSISTENT COUGH FOR 3 WEEKS OR LONGER: ICD-10-CM

## 2020-02-12 PROCEDURE — 1036F TOBACCO NON-USER: CPT | Performed by: PHYSICIAN ASSISTANT

## 2020-02-12 PROCEDURE — 3008F BODY MASS INDEX DOCD: CPT | Performed by: PHYSICIAN ASSISTANT

## 2020-02-12 PROCEDURE — 3075F SYST BP GE 130 - 139MM HG: CPT | Performed by: PHYSICIAN ASSISTANT

## 2020-02-12 PROCEDURE — 99213 OFFICE O/P EST LOW 20 MIN: CPT | Performed by: PHYSICIAN ASSISTANT

## 2020-02-12 PROCEDURE — 3079F DIAST BP 80-89 MM HG: CPT | Performed by: PHYSICIAN ASSISTANT

## 2020-02-12 RX ORDER — PROMETHAZINE HYDROCHLORIDE AND CODEINE PHOSPHATE 6.25; 1 MG/5ML; MG/5ML
5 SYRUP ORAL EVERY 4 HOURS PRN
Qty: 180 ML | Refills: 0 | Status: SHIPPED | OUTPATIENT
Start: 2020-02-12 | End: 2020-02-28 | Stop reason: ALTCHOICE

## 2020-02-12 RX ORDER — ALBUTEROL SULFATE 90 UG/1
2 AEROSOL, METERED RESPIRATORY (INHALATION) EVERY 6 HOURS PRN
Qty: 1 INHALER | Refills: 0 | Status: SHIPPED | OUTPATIENT
Start: 2020-02-12 | End: 2020-02-28 | Stop reason: ALTCHOICE

## 2020-02-12 NOTE — PROGRESS NOTES
Aurea Melton 60 y o  female   Date:  2/12/2020      Assessment and Plan:    Janet Bal was seen today for cough  Diagnoses and all orders for this visit:    Persistent cough for 3 weeks or longer  -     promethazine-codeine (PHENERGAN WITH CODEINE) 6 25-10 mg/5 mL syrup; Take 5 mL by mouth every 4 (four) hours as needed for cough  -     albuterol (PROVENTIL HFA,VENTOLIN HFA) 90 mcg/act inhaler; Inhale 2 puffs every 6 (six) hours as needed for wheezing or shortness of breath (cough)  - cough has been on and off since Nov, 3 months   - no other URI symptoms; does not sound infectious at this point   - negative flu, pertussis and chest xray   - no hx of asthma or COPD, no smoking  - patient did have some relief from above cough medicine, will not take it with tramadol which she uses intermittently  - will given albuterol to see if possible reactive bronchospasm  - however main etiology may be due to hiatal hernia vs silent acid reflux  - has an evaluation with surgery tomorrow for hiatal hernia and will mention this to him  - can consider CT chest and PFT if not felt to be related to GI issues            HPI:  Chief Complaint   Patient presents with    Cough     Started about a month ago, non productive      HPI  Patient is a 59 yo female who presents to office due to continued cough  Patient was seen by Dr Jarred Pérez on 1/24 due to nonproductive cough for about 1 month  She had negative flu, pertussis testing and normal chest xray  She reports that she started with a cough as far back as Nov and it went away and then came back  She reports otherwise feeling fine  She has no wheezing, sob, productive cough, fevers, chills, chest pain, runny nose, ear pain, tinnitus, sore throat and she cannot appreciate voice change  Of note, patient has been undergoing testing with GI for GERD and was found to have large hiatal hernia  She is going for appt with Dr Heron Ordonez tomorrow  She also has upcoming testing with manometry   Her GERD has seemed to be controlled with prilosec and pepcid however she is trying to hold on taking it due to needing to have manometry done  She has no history of asthma or COPD  She is not a smoker  ROS: Review of Systems   Constitutional: Negative for appetite change, diaphoresis, fever and unexpected weight change  HENT: Positive for trouble swallowing (dysphagia, chronic and was found to have hiatal hernia)  Negative for congestion, ear pain, postnasal drip, rhinorrhea, sore throat, tinnitus and voice change  Respiratory: Positive for cough  Negative for chest tightness, shortness of breath, wheezing and stridor  Cardiovascular: Negative for chest pain, palpitations and leg swelling  Skin: Negative  Neurological: Negative for dizziness, speech difficulty, weakness and headaches  Past Medical History:   Diagnosis Date    Anemia     Hyperlipidemia     Hypertension     Overactive bladder     Vitamin D deficiency      Patient Active Problem List   Diagnosis    Iron deficiency anemia    Joint disorder of pelvis or thigh    Leg weakness, bilateral    Nocturia    OAB (overactive bladder)    Pain in both lower legs    Segmental and somatic dysfunction of sacral region    Vitamin D deficiency    Hypertension    Leg swelling    Sacroiliitis (HCC)    Myofascial pain    Mixed incontinence    Primary osteoarthritis of both knees    Chronic pain of left knee    BMI 32 0-32 9,adult    GERD (gastroesophageal reflux disease)    Dysphagia    Pain of upper abdomen       Past Surgical History:   Procedure Laterality Date     SECTION      Last Assessed: 2017    ELBOW SURGERY      Last Assessed: 2017    ESOPHAGOGASTRODUODENOSCOPY N/A 2017    Procedure: ESOPHAGOGASTRODUODENOSCOPY (EGD); Surgeon: Donaldo Gunn DO;  Location: [facility];   Service: Gastroenterology    HIP SURGERY Left 2018    glut medius/minimus repair  and 18    HYSTERECTOMY 2011    KNEE SURGERY      x2    MA COLONOSCOPY FLX DX W/COLLJ SPEC WHEN PFRMD N/A 2017    Procedure: EGD AND COLONOSCOPY;  Surgeon: Wily Decker DO;  Location: [facility];   Service: Gastroenterology       Social History     Socioeconomic History    Marital status: /Civil Union     Spouse name: None    Number of children: None    Years of education: None    Highest education level: None   Occupational History    None   Social Needs    Financial resource strain: None    Food insecurity:     Worry: None     Inability: None    Transportation needs:     Medical: No     Non-medical: No   Tobacco Use    Smoking status: Former Smoker     Last attempt to quit:      Years since quittin 1    Smokeless tobacco: Never Used    Tobacco comment: quit 25 yrs ago   Substance and Sexual Activity    Alcohol use: No    Drug use: No    Sexual activity: Yes     Partners: Male   Lifestyle    Physical activity:     Days per week: 0 days     Minutes per session: 0 min    Stress: None   Relationships    Social connections:     Talks on phone: None     Gets together: None     Attends Sikhism service: None     Active member of club or organization: None     Attends meetings of clubs or organizations: None     Relationship status: None    Intimate partner violence:     Fear of current or ex partner: None     Emotionally abused: None     Physically abused: None     Forced sexual activity: None   Other Topics Concern    None   Social History Narrative    Caffeine use    , 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[employer], now at [employer] [address] for [address]    2 grown children       Family History   Problem Relation Age of Onset    Colon cancer Mother     Heart attack Father     Other Father         Cardiac Disorder    Diabetes type II Father     Colon cancer Sister     Cancer Maternal Grandmother     No Known Problems Daughter     No Known Problems Paternal Grandmother     No Known Problems Sister     No Known Problems Sister     No Known Problems Sister     No Known Problems Sister     No Known Problems Maternal Aunt     No Known Problems Maternal Aunt     No Known Problems Maternal Aunt        No Known Allergies      Current Outpatient Medications:     amLODIPine (NORVASC) 5 mg tablet, Take 1 tablet (5 mg total) by mouth daily, Disp: 90 tablet, Rfl: 1    atorvastatin (LIPITOR) 20 mg tablet, Take 1 tablet (20 mg total) by mouth daily, Disp: 90 tablet, Rfl: 1    famotidine (PEPCID) 20 mg tablet, Take 1 tablet (20 mg total) by mouth daily before dinner, Disp: 90 tablet, Rfl: 1    ferrous sulfate 324 (65 Fe) mg, Take 1 tablet (324 mg total) by mouth daily before breakfast, Disp: 30 tablet, Rfl: 1    hydrOXYzine HCL (ATARAX) 25 mg tablet, TAKE 1 TO 2 TABLETS BY MOUTH AT BEDTIME AS NEEDED FOR ANXIETY OR INSOMNIA, Disp: 60 tablet, Rfl: 0    losartan (COZAAR) 100 MG tablet, Take 1 tablet (100 mg total) by mouth daily, Disp: 90 tablet, Rfl: 1    omeprazole (PriLOSEC) 20 mg delayed release capsule, Take 1 capsule (20 mg total) by mouth daily, Disp: 30 capsule, Rfl: 5    traMADol (ULTRAM) 50 mg tablet, , Disp: , Rfl:     trospium chloride (SANCTURA) 20 mg tablet, Take 20 mg by mouth 2 (two) times a day, Disp: , Rfl:     albuterol (PROVENTIL HFA,VENTOLIN HFA) 90 mcg/act inhaler, Inhale 2 puffs every 6 (six) hours as needed for wheezing or shortness of breath (cough), Disp: 1 Inhaler, Rfl: 0    promethazine-codeine (PHENERGAN WITH CODEINE) 6 25-10 mg/5 mL syrup, Take 5 mL by mouth every 4 (four) hours as needed for cough, Disp: 180 mL, Rfl: 0      Physical Exam:  /86 (BP Location: Left arm, Patient Position: Sitting)   Pulse 88   Temp 98 7 °F (37 1 °C) (Tympanic)   Resp 17   Ht 5' 3" (1 6 m)   Wt 84 4 kg (186 lb)   SpO2 98%   BMI 32 95 kg/m²     Physical Exam   Constitutional: She is oriented to person, place, and time   She appears well-developed and well-nourished  No distress  HENT:   Head: Normocephalic and atraumatic  Right Ear: Ear canal normal  Tympanic membrane is scarred  Tympanic membrane is not erythematous  Left Ear: Ear canal normal  Tympanic membrane is scarred  Tympanic membrane is not erythematous  Nose: Nose normal    Mouth/Throat: Uvula is midline, oropharynx is clear and moist and mucous membranes are normal    Eyes: Pupils are equal, round, and reactive to light  Conjunctivae are normal    Neck: Normal range of motion  Neck supple  No thyromegaly present  Cardiovascular: Normal rate and regular rhythm  Pulmonary/Chest: Effort normal  No stridor  No respiratory distress  She has no wheezes  She has no rales  She exhibits no tenderness  Dry cough during appt several times   Musculoskeletal: She exhibits no edema  Lymphadenopathy:     She has no cervical adenopathy  Neurological: She is alert and oriented to person, place, and time  Skin: Skin is warm and dry  No rash noted  No pallor           Labs:  Lab Results   Component Value Date    WBC 4 88 01/13/2020    HGB 12 5 01/13/2020    HCT 41 5 01/13/2020    MCV 87 01/13/2020     01/13/2020     Lab Results   Component Value Date     05/14/2018    K 4 3 01/13/2020     (H) 01/13/2020    CO2 27 01/13/2020    ANIONGAP 12 0 05/14/2018    BUN 22 01/13/2020    CREATININE 0 82 01/13/2020    GLUF 91 01/13/2020    CALCIUM 9 3 01/13/2020    AST 11 01/13/2020    ALT 17 01/13/2020    ALKPHOS 99 01/13/2020    PROT 7 3 05/14/2018    BILITOT 0 4 05/14/2018    EGFR 77 01/13/2020

## 2020-02-12 NOTE — TELEPHONE ENCOUNTER
cxr showed no acute cardiopulmonary abnormalities  It is not appropriate to refill codeine cough syrup, and pt needs recheck if she still is having a problem with cough  When I saw pt for a same day sick visit 01/24, I had discussed with her keeping the routine appt already scheduled 02/03 with Emery Thomas to recheck cough problem as well  Per phone notes, pt called and requested rescheduling that appt to later date, explaining that I had gone over her routine labwork with her at the sick visit     Please have pt schedule recheck with Emery Thomas

## 2020-02-13 ENCOUNTER — OFFICE VISIT (OUTPATIENT)
Dept: BARIATRICS | Facility: CLINIC | Age: 65
End: 2020-02-13
Payer: COMMERCIAL

## 2020-02-13 VITALS
HEART RATE: 96 BPM | DIASTOLIC BLOOD PRESSURE: 88 MMHG | SYSTOLIC BLOOD PRESSURE: 140 MMHG | BODY MASS INDEX: 32.78 KG/M2 | TEMPERATURE: 98 F | HEIGHT: 63 IN | WEIGHT: 185 LBS

## 2020-02-13 DIAGNOSIS — K21.9 GASTROESOPHAGEAL REFLUX DISEASE WITHOUT ESOPHAGITIS: ICD-10-CM

## 2020-02-13 DIAGNOSIS — K44.9 PARAESOPHAGEAL HERNIA: Primary | ICD-10-CM

## 2020-02-13 PROCEDURE — 3008F BODY MASS INDEX DOCD: CPT | Performed by: SURGERY

## 2020-02-13 PROCEDURE — 99203 OFFICE O/P NEW LOW 30 MIN: CPT | Performed by: SURGERY

## 2020-02-13 PROCEDURE — 1036F TOBACCO NON-USER: CPT | Performed by: SURGERY

## 2020-02-13 PROCEDURE — 3077F SYST BP >= 140 MM HG: CPT | Performed by: SURGERY

## 2020-02-13 PROCEDURE — 3079F DIAST BP 80-89 MM HG: CPT | Performed by: SURGERY

## 2020-02-13 NOTE — PROGRESS NOTES
Michael Mcnamara 63 y o  female MRN: [id number]  Unit/Bed#:  Encounter: [id number]      HPI:  Michael Mcnamara is a 63 y o  female with a history of longstanding gastroesophageal reflux disease as well as a large hiatal hernia  She complains of symptoms including heartburn, regurgitation, epigastric discomfort and bloating, and shortness of breath  She was previously seen by Dr Shelton Parr who worked her up with an EGD that demonstrated a 6 cm hiatal hernia an upper GI series to demonstrated a large fixed hiatal hernia with partially intrathoracic stomach and free flowing gastroesophageal reflux  She is scheduled undergo esophageal manometry tomorrow  She presents office today discuss findings from recent workup as well as surgical options to address her large paraesophageal hernia with gastroesophageal reflux  Review of Systems   Constitutional: Negative  HENT: Negative  Eyes: Negative  Respiratory: Positive for choking and shortness of breath  Negative for apnea, cough, chest tightness, wheezing and stridor  Cardiovascular: Positive for chest pain  Negative for palpitations and leg swelling  Gastrointestinal:        As per HPI  Symptoms of GERD including heartburn and regurgitation  Endocrine: Negative  Genitourinary: Negative  Musculoskeletal: Negative  Skin: Negative  Allergic/Immunologic: Negative  Neurological: Negative  Hematological: Negative  Psychiatric/Behavioral: Negative  All other systems reviewed and are negative        Historical Information   Past Medical History:   Diagnosis Date    Anemia     Arthritis     GERD (gastroesophageal reflux disease)     Heartburn     Hiatal hernia     Hyperlipidemia     Hypertension     Overactive bladder     Vitamin D deficiency      Past Surgical History:   Procedure Laterality Date     SECTION      Last Assessed: 2017    ELBOW SURGERY      Last Assessed: 2017    ESOPHAGOGASTRODUODENOSCOPY N/A 2017    Procedure: ESOPHAGOGASTRODUODENOSCOPY (EGD); Surgeon: Ashtyn Aviles DO;  Location: [facility]; Service: Gastroenterology    HIP SURGERY Left 2018    glut medius/minimus repair  and 18    HYSTERECTOMY      2011    KNEE SURGERY      x2    IA COLONOSCOPY FLX DX W/COLLJ SPEC WHEN PFRMD N/A 2017    Procedure: EGD AND COLONOSCOPY;  Surgeon: Ashtyn Aviles DO;  Location: [facility]; Service: Gastroenterology     Social History   Social History     Substance and Sexual Activity   Alcohol Use No     Social History     Substance and Sexual Activity   Drug Use No     Social History     Tobacco Use   Smoking Status Former Smoker    Last attempt to quit: [facility] Years since quittin 1   Smokeless Tobacco Never Used   Tobacco Comment    quit 25 yrs ago     Family History: non-contributory    Meds/Allergies   all medications and allergies reviewed  No Known Allergies    Objective     Current Vitals:   Blood Pressure: 140/88 (20 1115)  Pulse: 96 (20 1115)  Temperature: 98 °F (36 7 °C) (20 111)  Temp Source: Tympanic (20 111)  Height: 5' 3" (160 cm) (20 111)  Weight - Scale: 83 9 kg (185 lb) (20)    [unfilled]    Invasive Devices     None                 Physical Exam   Constitutional: She is oriented to person, place, and time  She appears well-developed and well-nourished  Eyes: Pupils are equal, round, and reactive to light  Conjunctivae and EOM are normal    Neck: Normal range of motion  Neck supple  Cardiovascular: Normal rate, regular rhythm and normal heart sounds  Pulmonary/Chest: Effort normal and breath sounds normal    Abdominal: Soft  She exhibits no distension  There is no tenderness  Musculoskeletal: Normal range of motion  Neurological: She is alert and oriented to person, place, and time  Skin: Skin is warm and dry  Psychiatric: She has a normal mood and affect   Her behavior is normal  Judgment and thought content normal        Imaging:    BARIUM SWALLOW-ESOPHAGRAM     INDICATION:   K21 9: Gastro-esophageal reflux disease without esophagitis  R13 10: Dysphagia, unspecified      COMPARISON:  None     IMAGES:  26     FLUOROSCOPY TIME:   2 minutes     TECHNIQUE:  The patient was given effervescent granules and barium by mouth and images of the esophagus were obtained      FINDINGS:     The esophagus is mildly dilated  There is a large fixed hiatal hernia with partially intrathoracic stomach and free flowing gastroesophageal reflux  No mucosal lesion, ulceration or evidence of fold thickening is seen       IMPRESSION:     Large fixed hiatal hernia with partially intrathoracic stomach and free flowing gastroesophageal reflux    Code Status: Full Code      Assessment/Plan:  orquidea Shaikh is a 63 y o  female with a history of longstanding gastroesophageal reflux disease as well as a large hiatal hernia  Her workup today includes:    Upper GI series:   Large fixed hiatal hernia with partially intrathoracic stomach and free flowing gastroesophageal reflux  EGD:  · Large hiatal hernia - GE junction 34 cm from the incisors, diaphragmatic impression 40 cm from the incisors  · The esophagus, stomach and duodenum appeared normal  Other than large hiatal hernia EGD was within normal limits  · Performed biopsies in the upper third of the esophagus, body of the stomach, antrum, duodenal bulb and 2nd part of the duodenum  Random biopsies taken from proximal esophagus for eosinophilic esophagitis  Random biopsies taken from the stomach for H  pylori  Random biopsies taken from duodenum for celiac disease    Her pathology from her recent EGD is still pending  She is scheduled undergo esophageal manometry tomorrow        She presents office today discuss findings from recent workup as well as surgical options to address her large paraesophageal hernia with gastroesophageal reflux    Patient will undergo esophageal manometry tomorrow  Additionally, she will meet with her primary care provider to obtain preoperative risk stratification  She will will follow up in office after completion of her manometry and preoperative clearance to discuss further surgical options and schedule surgery  Consultation and examination performed with Dr Nella Power

## 2020-02-14 ENCOUNTER — HOSPITAL ENCOUNTER (OUTPATIENT)
Dept: GASTROENTEROLOGY | Facility: HOSPITAL | Age: 65
Discharge: HOME | End: 2020-02-14
Attending: INTERNAL MEDICINE
Payer: COMMERCIAL

## 2020-02-14 VITALS
TEMPERATURE: 98.4 F | OXYGEN SATURATION: 96 % | HEART RATE: 88 BPM | DIASTOLIC BLOOD PRESSURE: 101 MMHG | RESPIRATION RATE: 16 BRPM | SYSTOLIC BLOOD PRESSURE: 156 MMHG

## 2020-02-14 DIAGNOSIS — K44.9 HIATAL HERNIA: ICD-10-CM

## 2020-02-14 PROCEDURE — 91020 GASTRIC MOTILITY STUDIES: CPT

## 2020-02-17 PROCEDURE — 91010 ESOPHAGUS MOTILITY STUDY: CPT | Performed by: INTERNAL MEDICINE

## 2020-02-28 ENCOUNTER — OFFICE VISIT (OUTPATIENT)
Dept: BARIATRICS | Facility: CLINIC | Age: 65
End: 2020-02-28
Payer: COMMERCIAL

## 2020-02-28 VITALS
WEIGHT: 185 LBS | SYSTOLIC BLOOD PRESSURE: 128 MMHG | DIASTOLIC BLOOD PRESSURE: 70 MMHG | TEMPERATURE: 98.9 F | BODY MASS INDEX: 32.78 KG/M2 | HEART RATE: 75 BPM | HEIGHT: 63 IN

## 2020-02-28 DIAGNOSIS — K21.9 GASTROESOPHAGEAL REFLUX DISEASE WITHOUT ESOPHAGITIS: Primary | ICD-10-CM

## 2020-02-28 DIAGNOSIS — R13.10 DYSPHAGIA, UNSPECIFIED TYPE: ICD-10-CM

## 2020-02-28 DIAGNOSIS — K44.9 PARAESOPHAGEAL HIATAL HERNIA: ICD-10-CM

## 2020-02-28 PROBLEM — R12 HEARTBURN: Status: ACTIVE | Noted: 2020-02-28

## 2020-02-28 PROCEDURE — 1036F TOBACCO NON-USER: CPT | Performed by: SURGERY

## 2020-02-28 PROCEDURE — 3078F DIAST BP <80 MM HG: CPT | Performed by: SURGERY

## 2020-02-28 PROCEDURE — 99214 OFFICE O/P EST MOD 30 MIN: CPT | Performed by: SURGERY

## 2020-02-28 PROCEDURE — 3008F BODY MASS INDEX DOCD: CPT | Performed by: SURGERY

## 2020-02-28 PROCEDURE — 3074F SYST BP LT 130 MM HG: CPT | Performed by: SURGERY

## 2020-03-02 ENCOUNTER — EVALUATION (OUTPATIENT)
Dept: PHYSICAL THERAPY | Facility: CLINIC | Age: 65
End: 2020-03-02
Payer: COMMERCIAL

## 2020-03-02 ENCOUNTER — CLINICAL SUPPORT (OUTPATIENT)
Dept: URGENT CARE | Facility: CLINIC | Age: 65
End: 2020-03-02
Payer: COMMERCIAL

## 2020-03-02 ENCOUNTER — TRANSCRIBE ORDERS (OUTPATIENT)
Dept: PHYSICAL THERAPY | Facility: CLINIC | Age: 65
End: 2020-03-02

## 2020-03-02 DIAGNOSIS — M25.522 LEFT ELBOW PAIN: ICD-10-CM

## 2020-03-02 DIAGNOSIS — Z01.818 PREOPERATIVE CLEARANCE: Primary | ICD-10-CM

## 2020-03-02 DIAGNOSIS — Z01.818 PREOPERATIVE CLEARANCE: ICD-10-CM

## 2020-03-02 DIAGNOSIS — M77.12 LATERAL EPICONDYLITIS OF LEFT ELBOW: Primary | ICD-10-CM

## 2020-03-02 PROCEDURE — 97035 APP MDLTY 1+ULTRASOUND EA 15: CPT | Performed by: PHYSICAL THERAPIST

## 2020-03-02 PROCEDURE — 97164 PT RE-EVAL EST PLAN CARE: CPT | Performed by: PHYSICAL THERAPIST

## 2020-03-02 PROCEDURE — 93005 ELECTROCARDIOGRAM TRACING: CPT

## 2020-03-02 PROCEDURE — 97110 THERAPEUTIC EXERCISES: CPT | Performed by: PHYSICAL THERAPIST

## 2020-03-02 NOTE — LETTER
2020    Severiano Mani, [address] [place] [address]    Patient: Cory Silverman   YOB: 1955   Date of Visit: 3/2/2020     Encounter Diagnosis     ICD-10-CM    1  Lateral epicondylitis of left elbow M77 12    2  Left elbow pain M25 522        Dear Dr Thomson [place]:    Thank you for your recent referral of Cory Silverman  Please review the attached evaluation summary from Jamee's recent visit  Please verify that you agree with the plan of care by signing the attached order  If you have any questions or concerns, please do not hesitate to call  I sincerely appreciate the opportunity to share in the care of one of your patients and hope to have another opportunity to work with you in the near future  Sincerely,    Gypsy Bennett, PT      Referring Provider:      I certify that I have read the below Plan of Care and certify the need for these services furnished under this plan of treatment while under my care  Severiano Mani, [facility]: [phone]          PT Re-Evaluation     Today's date: 3/2/2020  Patient name: Cory Silverman  : 1955  MRN: [id number]  Referring provider: Yenifer Sanders MD  Dx:   Encounter Diagnosis     ICD-10-CM    1  Lateral epicondylitis of left elbow M77 12    2  Left elbow pain M25 522        Start Time: 0945  Stop Time: 1030  Total time in clinic (min): 45 minutes    Assessment/Plan   Assessment details: Cory Silverman is returning to PT after seeing MD who advised another 6 weeks of PT  Patient is a 60 y o  female with diagnosis of left tennis elbow and past medical history significant for left meniscal surgery, left elbow surgery, HTN, Anemia, sacroiliitis, hernia, and multiple hip surgeries with gluteal tearing  Re-evaluation performed today shows no significant change in subjective or objective findings when compared to last session on 2020  Skilled PT indicated to address left elbow pain, tightness, and range of motion restriction as tolerated to return patient to prior level of function  Keyla Penn was educated on need for consistent follow through with home program to make most measurable gains over the next 6 weeks which she was agreeable to  Impairments: abnormal muscle firing, abnormal muscle tone, abnormal or restricted ROM, abnormal movement, impaired physical strength, lacks appropriate home exercise program and pain with function    Goals  STG (4 weeks)  1  Patient's left elbow extension AROM will increase to 0 with 0/10 pain for increased ability to perform overhead ADLs  - NOT MET  2  Patient will have 0/10 pain in left elbow at rest  -NOT MET   3  Patient's left wrist extensor and RD strength will increase to 4/5 for increased ability to lift  - NOT MET   4  Patient will be able to lift a coffee cup with <3/10 pain in lateral elbow  NEW (3/2)  5  Patient will consistently perform HEP 2-3x/day as instructed  -NEW (3/2)  LTG (8 weeks)  1  Patient's left elbow AROM equal bilaterally for ability to complete hair hygiene, overhead, and behind the back ADLs  - NOT MET   2  Patient's UE strength will be equal bilaterally for ability to lift and carry at PLOF  NOT MET  3  Patient will be independent with home exercise program for continued maintenance post PT discharge  - NOT MET  4  Patient will be able to drive with no pain in left elbow  -NEW (3/2)    Plan  Plan details: Continuation of skilled PT  Progress note in 4-6 weeks     Planned modality interventions: cryotherapy, thermotherapy: hydrocollator packs, unattended electrical stimulation, iontophoresis, ultrasound and fluidotherapy  Planned therapy interventions: manual therapy, neuromuscular re-education, therapeutic activities, therapeutic exercise, gait training and home exercise program  Other planned therapy interventions: Graston Technique/IASTM  Frequency: 2x week  Duration in weeks: 12 additional   Plan of Care beginning date: 2020  Plan of Care expiration date: 2020  Treatment plan discussed with: patient and PTA      Subjective   History of Present Illness  Date of onset: 2019  Subjective 3/2/2020: Recent f/u with MD after MRI which patient states showed an avulsion fx of left elbow  Doctor would like her to continue with Physical Therapy for 6 additional weeks  Patient deferred cortisone injection at last visit  F/u   No significant changes over the past month since last visit  States any type of lifting or movement will cause pain  Subjective 20: Patient states no significant improvement over the course of PT  Has not been consistent with HEP  Pain increases in left lateral elbow when attempting to pick something up (coffee cup)  Worse towards the end of the day  Constant pain, increased with activity  To f/u with referring doctor 20  Mechanism of injury: Curly Way is a 60 y o  female who presents to outpatient Physical Therapy today with complaints of left elbow pain  Pain began of insidious onset approximately 6 months ago  Saw ortho in December where x-ray were taken (-), she received an injection, and was instructed to begin PT with f/u in 8 weeks  She was also instructed to wear an elbow strap when active  Since visit patient states decrease in symptoms overall  History of right elbow surgery and PRP to left elbow 8-9 years ago       Pain    2/6 3/2  Current pain ratin  6 6  At best pain ratin  5 3  At worst pain ratin  8 9  Location: lateral left elbow  Quality: sharp  Aggravating factors: lifting, "just moving"    Social Support  Steps to enter house: no  Stairs in house: yes   13  Lives in: multiple-level home    Employment status: not working (Disability)  Hand dominance: right      Diagnostic Tests  X-ray: normal  Treatments  Current treatment: injection treatment  Patient Goals  Patient goal: use left arm without pain    Objective Palpation   Left   No palpable tenderness to the biceps, pronator teres and triceps  Tenderness of the supinator, wrist extensors and wrist flexors  Tenderness     Left Elbow   Tenderness in the lateral epicondyle, olecranon process and radial head  No tenderness in the medial epicondyle  Left Wrist/Hand   Tenderness in the lateral epicondyle and olecranon process  No tenderness in the medial epicondyle  Neurological Testing     Sensation     Elbow   Left Elbow   Inact: light touch    Right Elbow   Intact: light touch    Active Range of Motion   2/6   3/2    Left Elbow   Flexion: 142 degrees with pain 145 no pain  133 pain   Extension: -16 degrees with pain -15 degrees pain  Forearm supination: 95 degrees  69 pain   53 pain  Forearm pronation: 100 degrees 113 no pain  85 pain    Right Elbow   Flexion: 148 degrees   Extension: 9 degrees   Forearm supination: 98 degrees   Forearm pronation: 96 degrees     Additional Active Range of Motion Details  Left shoulder functional IR decreased to L3 due to lateral elbow pain       Strength/Myotome Testing  2/6  3/2    Left Elbow   Flexion: 4- (pain)   5 no pain 4+ pain*  Extension: 4 (pain)   4- pain  4- pain  Forearm supination: 4- (pain)  4- pain  4- pain*  Forearm pronation: 4   4+ no pain 4+  ;  Right Elbow   Normal strength    Left Wrist/Hand    2/6  3/2  Wrist extension: 5   4 pain  4 pain*  Wrist flexion: 4 (pain)   5 pain free 4+ pain  Radial deviation: 5   4+ pain 4+ pain*  Ulnar deviation: 4+ (pain)  5 pain free 5 pain     (2nd hand position)      Trial 1: 11    16  08    Trial 2: 10    9  11    Trial 3: 12    9  10    Comments: pain    Right Wrist/Hand   Normal wrist strength     (2nd hand position)     Trial 1: 48    Trial 2: 35    Trial 3: 35    General Comments:      Elbow Comments       FOTO:37% was 38% function at IE, 60% predicted function            Precautions none noted       Manual  1/28 1/30 2/4 2/6 3/2   IASTM left wrist extensors/flexors GT 6, 2, 3, 5 x15' in sitting for patient comfort  Wrist extensors only GT 6, 2, 3 x15' in sitting for patient comfort  GT 4, 2, 5 x10' in sitting for patient comfort  GT 6, 3, 4, 5 x15' in sitting for patient comfort  Wrist extensors only Resume, no framing around epicondyles, only muscle masses  Active release of left wrist extensors/flexors and elbow flexors np 10' in sitting for patient comfort 5' in sitting for patient comfort     Ice massage np 5' np Patient deferred                    1/13: Reviewed Graston Technique with patient, questionnaire was signed  Exercise Diary  1/28 1/30 2/4 2/6 3/2   Wrist flexor stretch with elbow extended L 4x15" L 4x15" L 4x15" L 4x15" L 4x15"   Wrist extensor stretch with elbow extended L 4x15" L 4x15" L 4x15" L 4x15" L 4x15"   Wrist AROM all directions 1# 3x10 ea 1# 3x10 ea 1# 3x10 ea 1# 3x10 ea      Yellow 30x Red 30x Yellow 30x, pain with red Yellow 30x    Webbing  30x red 30x red 30x red 30x red    therabar bilat supination, red 30x bilat supination, red 30x bilat supination, red 30x bilat supination, red 30x    Tennis ball release   5 min 5 min                                                                                                                Modalities 1/28 1/13 2/4 2/6 3/2   US to left proximal wrist extensors 1 0w/cm2 3  3MHz 10 min 1 4w/cm2 3  3MHz 10 min 1 2w/cm2 3  0MHz 10 min, small sound head 1 4w/cm2 3  0MHz 10 min, small sound head 1 4w/cm2 1 MHz 8 min to wrist extensors, not around epicondyle              MHP pre 10 min 10 min 10 min 10 min

## 2020-03-02 NOTE — PROGRESS NOTES
PT Re-Evaluation     Today's date: 3/2/2020  Patient name: Naresh Eubanks  : 1955  MRN: [id number]  Referring provider: Deirdre Castillo MD  Dx:   Encounter Diagnosis     ICD-10-CM    1  Lateral epicondylitis of left elbow M77 12    2  Left elbow pain M25 522        Start Time: 0945  Stop Time: 1030  Total time in clinic (min): 45 minutes    Assessment/Plan   Assessment details: Naresh Eubanks is returning to PT after seeing MD who advised another 6 weeks of PT  Patient is a 60 y o  female with diagnosis of left tennis elbow and past medical history significant for left meniscal surgery, left elbow surgery, HTN, Anemia, sacroiliitis, hernia, and multiple hip surgeries with gluteal tearing  Re-evaluation performed today shows no significant change in subjective or objective findings when compared to last session on 2020  Skilled PT indicated to address left elbow pain, tightness, and range of motion restriction as tolerated to return patient to prior level of function  Araceli Downs was educated on need for consistent follow through with home program to make most measurable gains over the next 6 weeks which she was agreeable to  Impairments: abnormal muscle firing, abnormal muscle tone, abnormal or restricted ROM, abnormal movement, impaired physical strength, lacks appropriate home exercise program and pain with function    Goals  STG (4 weeks)  1  Patient's left elbow extension AROM will increase to 0 with 0/10 pain for increased ability to perform overhead ADLs  - NOT MET  2  Patient will have 0/10 pain in left elbow at rest  -NOT MET   3  Patient's left wrist extensor and RD strength will increase to 4/5 for increased ability to lift  - NOT MET   4  Patient will be able to lift a coffee cup with <3/10 pain in lateral elbow  NEW (3/2)  5  Patient will consistently perform HEP 2-3x/day as instructed  -NEW (3/2)  LTG (8 weeks)  1   Patient's left elbow AROM equal bilaterally for ability to complete hair hygiene, overhead, and behind the back ADLs  - NOT MET   2  Patient's UE strength will be equal bilaterally for ability to lift and carry at PLOF  NOT MET  3  Patient will be independent with home exercise program for continued maintenance post PT discharge  - NOT MET  4  Patient will be able to drive with no pain in left elbow  -NEW (3/2)    Plan  Plan details: Continuation of skilled PT  Progress note in 4-6 weeks  Planned modality interventions: cryotherapy, thermotherapy: hydrocollator packs, unattended electrical stimulation, iontophoresis, ultrasound and fluidotherapy  Planned therapy interventions: manual therapy, neuromuscular re-education, therapeutic activities, therapeutic exercise, gait training and home exercise program  Other planned therapy interventions: Graston Technique/IASTM  Frequency: 2x week  Duration in weeks: 12 additional   Plan of Care beginning date: 1/6/2020  Plan of Care expiration date: 6/2/2020  Treatment plan discussed with: patient and PTA      Subjective   History of Present Illness  Date of onset: 7/6/2019  Subjective 3/2/2020: Recent f/u with MD after MRI which patient states showed an avulsion fx of left elbow  Doctor would like her to continue with Physical Therapy for 6 additional weeks  Patient deferred cortisone injection at last visit  F/u 4/8  No significant changes over the past month since last visit  States any type of lifting or movement will cause pain  Subjective 2/6/20: Patient states no significant improvement over the course of PT  Has not been consistent with HEP  Pain increases in left lateral elbow when attempting to pick something up (coffee cup)  Worse towards the end of the day  Constant pain, increased with activity  To f/u with referring doctor 2/12/20  Mechanism of injury: Victorino Doshi is a 60 y o  female who presents to outpatient Physical Therapy today with complaints of left elbow pain   Pain began of insidious onset approximately 6 months ago  Saw ortho in December where x-ray were taken (-), she received an injection, and was instructed to begin PT with f/u in 8 weeks  She was also instructed to wear an elbow strap when active  Since visit patient states decrease in symptoms overall  History of right elbow surgery and PRP to left elbow 8-9 years ago  Pain    2/ 3/  Current pain ratin  6 6  At best pain ratin  5 3  At worst pain ratin  8 9  Location: lateral left elbow  Quality: sharp  Aggravating factors: lifting, "just moving"    Social Support  Steps to enter house: no  Stairs in house: yes   13  Lives in: multiple-level home    Employment status: not working (Disability)  Hand dominance: right      Diagnostic Tests  X-ray: normal  Treatments  Current treatment: injection treatment  Patient Goals  Patient goal: use left arm without pain    Objective   Palpation   Left   No palpable tenderness to the biceps, pronator teres and triceps  Tenderness of the supinator, wrist extensors and wrist flexors  Tenderness     Left Elbow   Tenderness in the lateral epicondyle, olecranon process and radial head  No tenderness in the medial epicondyle  Left Wrist/Hand   Tenderness in the lateral epicondyle and olecranon process  No tenderness in the medial epicondyle  Neurological Testing     Sensation     Elbow   Left Elbow   Inact: light touch    Right Elbow   Intact: light touch    Active Range of Motion   2/6   3/2    Left Elbow   Flexion: 142 degrees with pain 145 no pain  133 pain   Extension: -16 degrees with pain -15 degrees pain  Forearm supination: 95 degrees  69 pain   53 pain  Forearm pronation: 100 degrees 113 no pain  85 pain    Right Elbow   Flexion: 148 degrees   Extension: 9 degrees   Forearm supination: 98 degrees   Forearm pronation: 96 degrees     Additional Active Range of Motion Details  Left shoulder functional IR decreased to L3 due to lateral elbow pain       Strength/Myotome Testing  2/6  3/2    Left Elbow   Flexion: 4- (pain)   5 no pain 4+ pain*  Extension: 4 (pain)   4- pain  4- pain  Forearm supination: 4- (pain)  4- pain  4- pain*  Forearm pronation: 4   4+ no pain 4+  ;  Right Elbow   Normal strength    Left Wrist/Hand    2/6  3/2  Wrist extension: 5   4 pain  4 pain*  Wrist flexion: 4 (pain)   5 pain free 4+ pain  Radial deviation: 5   4+ pain 4+ pain*  Ulnar deviation: 4+ (pain)  5 pain free 5 pain     (2nd hand position)      Trial 1: 11    16  08    Trial 2: 10    9  11    Trial 3: 12    9  10    Comments: pain    Right Wrist/Hand   Normal wrist strength     (2nd hand position)     Trial 1: 48    Trial 2: 35    Trial 3: 35    General Comments:      Elbow Comments       FOTO:37% was 38% function at IE, 60% predicted function            Precautions none noted       Manual  1/28 1/30 2/4 2/6 3/2   IASTM left wrist extensors/flexors GT 6, 2, 3, 5 x15' in sitting for patient comfort  Wrist extensors only GT 6, 2, 3 x15' in sitting for patient comfort  GT 4, 2, 5 x10' in sitting for patient comfort  GT 6, 3, 4, 5 x15' in sitting for patient comfort  Wrist extensors only Resume, no framing around epicondyles, only muscle masses  Active release of left wrist extensors/flexors and elbow flexors np 10' in sitting for patient comfort 5' in sitting for patient comfort     Ice massage np 5' np Patient deferred                    1/13: Reviewed Graston Technique with patient, questionnaire was signed         Exercise Diary  1/28 1/30 2/4 2/6 3/2   Wrist flexor stretch with elbow extended L 4x15" L 4x15" L 4x15" L 4x15" L 4x15"   Wrist extensor stretch with elbow extended L 4x15" L 4x15" L 4x15" L 4x15" L 4x15"   Wrist AROM all directions 1# 3x10 ea 1# 3x10 ea 1# 3x10 ea 1# 3x10 ea      Yellow 30x Red 30x Yellow 30x, pain with red Yellow 30x    Webbing  30x red 30x red 30x red 30x red    therabar bilat supination, red 30x bilat supination, red 30x bilat supination, red 30x bilat supination, red 30x    Tennis ball release   5 min 5 min                                                                                                                Modalities 1/28 1/13 2/4 2/6 3/2   US to left proximal wrist extensors 1 0w/cm2 3  3MHz 10 min 1 4w/cm2 3  3MHz 10 min 1 2w/cm2 3  0MHz 10 min, small sound head 1 4w/cm2 3  0MHz 10 min, small sound head 1 4w/cm2 1 MHz 8 min to wrist extensors, not around epicondyle              MHP pre 10 min 10 min 10 min 10 min

## 2020-03-03 LAB
ATRIAL RATE: 69 BPM
P AXIS: 22 DEGREES
PR INTERVAL: 148 MS
QRS AXIS: -25 DEGREES
QRSD INTERVAL: 78 MS
QT INTERVAL: 392 MS
QTC INTERVAL: 420 MS
T WAVE AXIS: -4 DEGREES
VENTRICULAR RATE: 69 BPM

## 2020-03-03 PROCEDURE — 93010 ELECTROCARDIOGRAM REPORT: CPT

## 2020-03-05 ENCOUNTER — OFFICE VISIT (OUTPATIENT)
Dept: PHYSICAL THERAPY | Facility: CLINIC | Age: 65
End: 2020-03-05
Payer: COMMERCIAL

## 2020-03-05 DIAGNOSIS — M77.12 LATERAL EPICONDYLITIS OF LEFT ELBOW: Primary | ICD-10-CM

## 2020-03-05 DIAGNOSIS — M25.522 LEFT ELBOW PAIN: ICD-10-CM

## 2020-03-05 PROCEDURE — 97035 APP MDLTY 1+ULTRASOUND EA 15: CPT | Performed by: PHYSICAL THERAPIST

## 2020-03-05 PROCEDURE — 97140 MANUAL THERAPY 1/> REGIONS: CPT | Performed by: PHYSICAL THERAPIST

## 2020-03-05 PROCEDURE — 97110 THERAPEUTIC EXERCISES: CPT | Performed by: PHYSICAL THERAPIST

## 2020-03-05 NOTE — PROGRESS NOTES
Daily Note     Today's date: 3/5/2020  Patient name: Radha Mendoza  : 1955  MRN: [id number]  Referring provider: Shoshana Barnes MD  Dx:   Encounter Diagnosis     ICD-10-CM    1  Lateral epicondylitis of left elbow M77 12    2  Left elbow pain M25 522        Start Time: 0900  Stop Time: 1000  Total time in clinic (min): 60 minutes    Subjective: Patient states elbow feels ok  Went to see eye doctor earlier this week and may have       Objective: See treatment diary below      Assessment: Tolerated treatment well  Noted significant muscular restriction at distal biceps and deltoid insertion possibly contributing to symptoms  Patient would benefit from continued PT working on muscle flexibility and gentle strengthening  Plan: Continue per plan of care  Progress treatment as tolerated  Precautions none noted       Manual  3/5  2/4 2 3/2   IASTM left wrist extensors/flexors GT 3, 4, 5 x20' in sitting for patient comfort  Wrist extensors and mm proximal to elbow  GT 4, 2, 5 x10' in sitting for patient comfort  GT 6, 3, 4, 5 x15' in sitting for patient comfort  Wrist extensors only Resume, no framing around epicondyles, only muscle masses  Active release of left wrist extensors/flexors and elbow flexors   5' in sitting for patient comfort     Ice massage   np Patient deferred                    : Reviewed Graston Technique with patient, questionnaire was signed         Exercise Diary  3/5  2/4 2 3/2   Wrist flexor stretch with elbow extended L 4x15"  L 4x15" L 4x15" L 4x15"   Wrist extensor stretch with elbow extended L 4x15"  L 4x15" L 4x15" L 4x15"   Wrist AROM all directions 0#  1# 3x10 ea 1# 3x10 ea        Yellow 30x, pain with red Yellow 30x    Webbing    30x red 30x red    therabar   bilat supination, red 30x bilat supination, red 30x    Tennis ball release 5 min  5 min 5 min Modalities 1/28  2/4 2/6 3/2   US to left proximal wrist extensors 1 6W/cm2 1MHz 6 min to wrist extensors; 1 8W/cm2 1MHz 6 min to proximal elbow  1 2w/cm2 3  0MHz 10 min, small sound head 1 4w/cm2 3  0MHz 10 min, small sound head 1 4w/cm2 1 MHz 8 min to wrist extensors, not around epicondyle              MHP pre 10 min  10 min 10 min

## 2020-03-06 DIAGNOSIS — E61.1 IRON DEFICIENCY: Primary | ICD-10-CM

## 2020-03-08 RX ORDER — FERROUS SULFATE 325(65) MG
TABLET ORAL
Qty: 30 TABLET | Refills: 2 | Status: SHIPPED | OUTPATIENT
Start: 2020-03-08 | End: 2020-06-18 | Stop reason: SDUPTHER

## 2020-03-09 ENCOUNTER — APPOINTMENT (OUTPATIENT)
Dept: PHYSICAL THERAPY | Facility: CLINIC | Age: 65
End: 2020-03-09
Payer: COMMERCIAL

## 2020-03-09 DIAGNOSIS — F51.01 PRIMARY INSOMNIA: ICD-10-CM

## 2020-03-09 RX ORDER — HYDROXYZINE HYDROCHLORIDE 25 MG/1
TABLET, FILM COATED ORAL
Qty: 60 TABLET | Refills: 1 | Status: SHIPPED | OUTPATIENT
Start: 2020-03-09 | End: 2020-05-18 | Stop reason: SDUPTHER

## 2020-03-10 ENCOUNTER — APPOINTMENT (OUTPATIENT)
Dept: PHYSICAL THERAPY | Facility: CLINIC | Age: 65
End: 2020-03-10
Payer: COMMERCIAL

## 2020-03-11 ENCOUNTER — OFFICE VISIT (OUTPATIENT)
Dept: PHYSICAL THERAPY | Facility: CLINIC | Age: 65
End: 2020-03-11
Payer: COMMERCIAL

## 2020-03-11 DIAGNOSIS — M77.12 LATERAL EPICONDYLITIS OF LEFT ELBOW: Primary | ICD-10-CM

## 2020-03-11 DIAGNOSIS — M25.522 LEFT ELBOW PAIN: ICD-10-CM

## 2020-03-11 PROCEDURE — 97110 THERAPEUTIC EXERCISES: CPT | Performed by: PHYSICAL THERAPIST

## 2020-03-11 PROCEDURE — 97140 MANUAL THERAPY 1/> REGIONS: CPT | Performed by: PHYSICAL THERAPIST

## 2020-03-11 PROCEDURE — 97035 APP MDLTY 1+ULTRASOUND EA 15: CPT | Performed by: PHYSICAL THERAPIST

## 2020-03-11 NOTE — PROGRESS NOTES
Daily Note     Today's date: 3/11/2020  Patient name: Greta Braga  : 1955  MRN: [id number]  Referring provider: Perri Feliciano MD  Dx:   Encounter Diagnosis     ICD-10-CM    1  Lateral epicondylitis of left elbow M77 12    2  Left elbow pain M25 522        Start Time: 1030  Stop Time: 1130  Total time in clinic (min): 60 minutes    Subjective: Patient states elbow has been feeling better  She has been more consistent with home exercise program  <5/10 pain level today,  "pretty good"  Objective: See treatment diary below      Assessment: Tolerated treatment well  Tightness noted at distal lateral arm proximal to elbow with muscular lesions and trigger point likely contributing to symptoms  Patient would benefit from continued PT      Plan: Continue per plan of care  Progress treatment as tolerated  Precautions none noted       Manual  3/5 3/11  2/6 32   IASTM left wrist extensors/flexors GT 3, 4, 5 x20' in sitting for patient comfort  Wrist extensors and mm proximal to elbow GT 3, 4, 5 x15' in sitting for patient comfort  Wrist extensors and mm proximal to elbow  GT 6, 3, 4, 5 x15' in sitting for patient comfort  Wrist extensors only Resume, no framing around epicondyles, only muscle masses  Active release of left wrist extensors/flexors and elbow flexors        Ice massage    Patient deferred                    : Reviewed Graston Technique with patient, questionnaire was signed  Exercise Diary  3/5 3/11  2/6 3/2   Wrist flexor stretch with elbow extended L 4x15" L 4x15"  L 4x15" L 4x15"   Wrist extensor stretch with elbow extended L 4x15" L 4x15"  L 4x15" L 4x15"   Wrist AROM all directions 0# 0# 3x10  1# 3x10 ea       Yellow 30x  Yellow 30x    Webbing     30x red    therabar    bilat supination, red 30x    Tennis ball release 5 min 2 min proximal to elbow, 2 minutes distal to elbow    5 min Modalities 3/5 3/11  2/6 3/2   US to left proximal wrist extensors 1 6W/cm2 1MHz 6 min to wrist extensors; 1 8W/cm2 1MHz 6 min to proximal elbow 1 6W/cm2 1MHz 6 min to wrist extensors; 1 8W/cm2 1MHz 6 min to proximal elbow  1 4w/cm2 3  0MHz 10 min, small sound head 1 4w/cm2 1 MHz 8 min to wrist extensors, not around epicondyle              MHP pre 10 min 10 min   10 min

## 2020-03-12 ENCOUNTER — OFFICE VISIT (OUTPATIENT)
Dept: PHYSICAL THERAPY | Facility: CLINIC | Age: 65
End: 2020-03-12
Payer: COMMERCIAL

## 2020-03-12 DIAGNOSIS — M25.522 LEFT ELBOW PAIN: ICD-10-CM

## 2020-03-12 DIAGNOSIS — M77.12 LATERAL EPICONDYLITIS OF LEFT ELBOW: Primary | ICD-10-CM

## 2020-03-12 PROCEDURE — 97035 APP MDLTY 1+ULTRASOUND EA 15: CPT

## 2020-03-12 PROCEDURE — 97110 THERAPEUTIC EXERCISES: CPT

## 2020-03-12 NOTE — PROGRESS NOTES
Daily Note     Today's date: 3/12/2020  Patient name: Rosa Castellanos  : 1955  MRN: [id number]  Referring provider: Edgardo Nieto MD  Dx:   Encounter Diagnosis     ICD-10-CM    1  Lateral epicondylitis of left elbow M77 12    2  Left elbow pain M25 522        Start Time: 0930          Subjective: Patient states her pain level is a 5/10 and she feels it is getting better  Exercises are reported going "good" at home  Objective: See treatment diary below      Assessment: Tolerated treatment well  Patient would benefit from continued PT for stretching and strengthening  Patient was able to perform exercises without difficulty or increase of pain  She continues to like the treatment because it makes her feel better  Plan: Continue per plan of care  Progress treatment as tolerated  Precautions none noted       Manual  3/5 3/11 3/12  3   IASTM left wrist extensors/flexors GT 3, 4, 5 x20' in sitting for patient comfort  Wrist extensors and mm proximal to elbow GT 3, 4, 5 x15' in sitting for patient comfort  Wrist extensors and mm proximal to elbow IASTM wrist extensors and mm proximal to elbow x 10 min  Resume, no framing around epicondyles, only muscle masses  Active release of left wrist extensors/flexors and elbow flexors        Ice massage                        : Reviewed Graston Technique with patient, questionnaire was signed  Exercise Diary  3/5 3/11 3/12  3   Wrist flexor stretch with elbow extended L 4x15" L 4x15" L 4x :15  L 4x15"   Wrist extensor stretch with elbow extended L 4x15" L 4x15" L 4x :15  L 4x15"   Wrist AROM all directions 0# 0# 3x10 3x10        Yellow 30x Yellow x30     Webbing         therabar        Tennis ball release 5 min 2 min proximal to elbow, 2 minutes distal to elbow  2 min proximal to elbow, 2 minutes distal to elbow  Modalities 3/5 3/11 3/12  3/2   US to left proximal wrist extensors 1 6W/cm2 1MHz 6 min to wrist extensors; 1 8W/cm2 1MHz 6 min to proximal elbow 1 6W/cm2 1MHz 6 min to wrist extensors; 1 8W/cm2 1MHz 6 min to proximal elbow 1 6W/cm2 1MHz 6 min to wrist extensors; 1 8W/cm2 1MHz 6 min to proximal elbow  1 4w/cm2 1 MHz 8 min to wrist extensors, not around epicondyle              MHP pre 10 min 10 min  x12 min

## 2020-03-13 ENCOUNTER — OFFICE VISIT (OUTPATIENT)
Dept: FAMILY MEDICINE CLINIC | Facility: CLINIC | Age: 65
End: 2020-03-13
Payer: COMMERCIAL

## 2020-03-13 VITALS
HEIGHT: 63 IN | TEMPERATURE: 99.1 F | RESPIRATION RATE: 18 BRPM | HEART RATE: 89 BPM | DIASTOLIC BLOOD PRESSURE: 78 MMHG | WEIGHT: 185 LBS | SYSTOLIC BLOOD PRESSURE: 120 MMHG | BODY MASS INDEX: 32.78 KG/M2 | OXYGEN SATURATION: 99 %

## 2020-03-13 DIAGNOSIS — E78.00 HYPERCHOLESTEROLEMIA: ICD-10-CM

## 2020-03-13 DIAGNOSIS — R05.3 PERSISTENT COUGH: ICD-10-CM

## 2020-03-13 DIAGNOSIS — I10 ESSENTIAL HYPERTENSION: ICD-10-CM

## 2020-03-13 DIAGNOSIS — K44.9 LARGE HIATAL HERNIA: ICD-10-CM

## 2020-03-13 DIAGNOSIS — R94.31 ABNORMAL EKG: ICD-10-CM

## 2020-03-13 DIAGNOSIS — Z01.818 PREOP EXAMINATION: Primary | ICD-10-CM

## 2020-03-13 PROBLEM — R76.8 ANTI-TPO ANTIBODIES PRESENT: Status: ACTIVE | Noted: 2019-12-20

## 2020-03-13 PROBLEM — D64.9 ANEMIA: Status: ACTIVE | Noted: 2020-03-13

## 2020-03-13 PROBLEM — E04.2 MULTINODULAR GOITER: Status: ACTIVE | Noted: 2019-12-20

## 2020-03-13 PROCEDURE — 99244 OFF/OP CNSLTJ NEW/EST MOD 40: CPT | Performed by: PHYSICIAN ASSISTANT

## 2020-03-13 RX ORDER — PROMETHAZINE HYDROCHLORIDE AND CODEINE PHOSPHATE 6.25; 1 MG/5ML; MG/5ML
5 SYRUP ORAL EVERY 4 HOURS PRN
Qty: 120 ML | Refills: 0 | Status: SHIPPED | OUTPATIENT
Start: 2020-03-13 | End: 2020-04-14 | Stop reason: ALTCHOICE

## 2020-03-13 NOTE — PROGRESS NOTES
PRE-OPERATIVE EVALUATION  [facility]    NAME: Aurea Melton  AGE: 63 y o  SEX: female  : 1955     DATE: 3/13/2020    Internal Medicine Pre-Operative Evaluation      Chief Complaint: Pre-operative Evaluation     Surgery: REPAIR HERNIA PARAESOPHAGEAL LAPAROSCOPIC W ROBOTICS AND NISSEN FUNDOPLICATION  Anticipated Date of Surgery: 3/23/20  Referring Provider: Sherry Araujo MD    Chief Complaint   Patient presents with    Pre-op Exam     hernia repair        History of Present Illness:     Aurea Melton is a 63 y o  female who presents to the office today for a preoperative consultation at the request of surgeon  Planned anesthesia is general  Patient has a bleeding risk of: no recent abnormal bleeding  Patient does not have objections to receiving blood products if needed  Current anti-platelet/anti-coagulation medications that the patient is prescribed includes: sometimes take advil         Assessment of Chronic Conditions:   HTN - stable on current regimen  HLD - lipitor  Iron def anemia - on supplementation  Insomnia - prn hydroxyzine  Overactive bladder - sanctura, follows with urogyn   GERD/hiatal hernia - pepcid and prilosec, going for repair   Persistent cough - no infectious or lung disorders, most likely due to large haital hernia but the cough medicine really helps her at night, asks for 1 more refill     Assessment of Cardiac Risk:  · Denies unstable or severe angina or MI in the last 6 weeks or history of stent placement in the last year   · Denies decompensated heart failure (e g  New onset heart failure, NYHA functional class IV heart failure, or worsening existing heart failure)  · Denies significant arrhythmias such as high grade AV block, symptomatic ventricular arrhythmia, newly recognized ventricular tachycardia, supraventricular tachycardia with resting heart rate >100, or symptomatic bradycardia  · Denies severe heart valve disease including aortic stenosis or symptomatic mitral stenosis     Exercise Capacity:  · Able to walk 4 blocks without symptoms?: Yes  · Able to walk 2 flights without symptoms?: Yes (both difficult due to orthopedic problems)    Prior Anesthesia Reactions: No     Personal history of venous thromboembolic disease? No    History of steroid use for >2 weeks within last year? No       Review of Systems:     Review of Systems   Constitutional: Negative  Respiratory: Positive for cough  Negative for apnea, choking, shortness of breath and wheezing  Cardiovascular: Negative  Gastrointestinal: Negative  Genitourinary: Negative  Musculoskeletal: Positive for arthralgias  Negative for myalgias  Skin: Negative  Neurological: Negative  Psychiatric/Behavioral: Negative          Current Problem List:     Patient Active Problem List   Diagnosis    Iron deficiency anemia    Joint disorder of pelvis or thigh    Leg weakness, bilateral    Nocturia    OAB (overactive bladder)    Pain in both lower legs    Segmental and somatic dysfunction of sacral region    Vitamin D deficiency    Hypertension    Leg swelling    Sacroiliitis (HCC)    Myofascial pain    Mixed incontinence    Primary osteoarthritis of both knees    Chronic pain of left knee    BMI 32 0-32 9,adult    GERD (gastroesophageal reflux disease)    Dysphagia    Pain of upper abdomen    Paraesophageal hernia    Gastroesophageal reflux disease    Heartburn    Anti-TPO antibodies present    Multinodular goiter    Anemia       Allergies:     No Known Allergies    Physical Exam:       Current Outpatient Medications:     amLODIPine (NORVASC) 5 mg tablet, Take 1 tablet (5 mg total) by mouth daily, Disp: 90 tablet, Rfl: 1    atorvastatin (LIPITOR) 20 mg tablet, Take 1 tablet (20 mg total) by mouth daily, Disp: 90 tablet, Rfl: 1    famotidine (PEPCID) 20 mg tablet, Take 1 tablet (20 mg total) by mouth daily before dinner, Disp: 90 tablet, Rfl: 1    FEROSUL 325 (65 Fe) MG tablet, TAKE ONE TABLET BY MOUTH BEFORE BREAKFAST, Disp: 30 tablet, Rfl: 2    hydrOXYzine HCL (ATARAX) 25 mg tablet, TAKE 1 TO 2 TABLETS BY MOUTH AT BEDTIME AS NEEDED FOR ANXIETY OR INSOMNIA, Disp: 60 tablet, Rfl: 1    losartan (COZAAR) 100 MG tablet, Take 1 tablet (100 mg total) by mouth daily, Disp: 90 tablet, Rfl: 1    omeprazole (PriLOSEC) 20 mg delayed release capsule, Take 1 capsule (20 mg total) by mouth daily, Disp: 30 capsule, Rfl: 5    traMADol (ULTRAM) 50 mg tablet, , Disp: , Rfl:     trospium chloride (SANCTURA) 20 mg tablet, Take 20 mg by mouth 2 (two) times a day, Disp: , Rfl:     promethazine-codeine (PHENERGAN WITH CODEINE) 6 25-10 mg/5 mL syrup, Take 5 mL by mouth every 4 (four) hours as needed for cough, Disp: 120 mL, Rfl: 0    Past Medical History:       Past Medical History:   Diagnosis Date    Anemia     Arthritis     GERD (gastroesophageal reflux disease)     Heartburn     Hiatal hernia     Hyperlipidemia     Hypertension     Overactive bladder     Vitamin D deficiency         Past Surgical History:   Procedure Laterality Date     SECTION      Last Assessed: 2017    ELBOW SURGERY      Last Assessed: 2017    ESOPHAGOGASTRODUODENOSCOPY N/A 2017    Procedure: ESOPHAGOGASTRODUODENOSCOPY (EGD); Surgeon: Errol Caldwell DO;  Location: [facility]; Service: Gastroenterology    HIP SURGERY Left 2018    glut medius/minimus repair  and 18    HYSTERECTOMY          KNEE SURGERY      x2    ID COLONOSCOPY FLX DX W/COLLJ SPEC WHEN PFRMD N/A 2017    Procedure: EGD AND COLONOSCOPY;  Surgeon: Errol Caldwell DO;  Location: [facility];   Service: Gastroenterology        Family History   Problem Relation Age of Onset    Colon cancer Mother     Heart attack Father     Other Father         Cardiac Disorder    Diabetes type II Father     Colon cancer Sister     Cancer Maternal Grandmother     No Known Problems Daughter     No Known Problems Paternal Grandmother     No Known Problems Sister     No Known Problems Sister     No Known Problems Sister     No Known Problems Sister     No Known Problems Maternal Aunt     No Known Problems Maternal Aunt     No Known Problems Maternal Aunt         Social History     Socioeconomic History    Marital status: /Civil Union     Spouse name: Not on file    Number of children: Not on file    Years of education: Not on file    Highest education level: Not on file   Occupational History    Not on file   Social Needs    Financial resource strain: Not on file    Food insecurity:     Worry: Not on file     Inability: Not on file    Transportation needs:     Medical: No     Non-medical: No   Tobacco Use    Smoking status: Former Smoker     Last attempt to quit:      Years since quittin 2    Smokeless tobacco: Never Used    Tobacco comment: quit 25 yrs ago   Substance and Sexual Activity    Alcohol use: No    Drug use: No    Sexual activity: Yes     Partners: Male   Lifestyle    Physical activity:     Days per week: 0 days     Minutes per session: 0 min    Stress: Not on file   Relationships    Social connections:     Talks on phone: Not on file     Gets together: Not on file     Attends Cheondoism service: Not on file     Active member of club or organization: Not on file     Attends meetings of clubs or organizations: Not on file     Relationship status: Not on file    Intimate partner violence:     Fear of current or ex partner: Not on file     Emotionally abused: Not on file     Physically abused: Not on file     Forced sexual activity: Not on file   Other Topics Concern    Not on file   Social History Narrative    Caffeine use    , 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[employer], 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 [address] [place] [address] for OR    2 grown children        Physical Exam:     Vitals:    20 1330   BP: 120/78   Pulse: 89   Resp: 18   Temp: 99 1 °F (37 3 °C) SpO2: 99%     Physical Exam   Constitutional: She is oriented to person, place, and time  Vital signs are normal  She appears well-developed and well-nourished  No distress  HENT:   Head: Normocephalic and atraumatic  Right Ear: Tympanic membrane, external ear and ear canal normal    Left Ear: Tympanic membrane, external ear and ear canal normal    Nose: Nose normal    Mouth/Throat: Oropharynx is clear and moist    Eyes: Pupils are equal, round, and reactive to light  Conjunctivae and lids are normal    Neck: Trachea normal and normal range of motion  Neck supple  Cardiovascular: Normal rate, regular rhythm, S1 normal, S2 normal and normal heart sounds  No murmur heard  Pulmonary/Chest: Effort normal and breath sounds normal  No respiratory distress  She has no wheezes  She has no rhonchi  She has no rales  Abdominal: Normal appearance  Musculoskeletal: Normal range of motion  She exhibits no edema or deformity  Neurological: She is alert and oriented to person, place, and time  She has normal reflexes  No cranial nerve deficit or sensory deficit  Skin: Skin is warm and dry  No rash noted  No cyanosis  No pallor  Nails show no clubbing  Psychiatric: She has a normal mood and affect  Her behavior is normal  Cognition and memory are normal         Data:     Pre-operative work-up    Laboratory Results: I have personally reviewed the pertinent laboratory results/reports     EKG: I have personally reviewed pertinent reports  EKG NSR with nonspecific ST changes  There are no prior EKGs to compare  She had stable echo in 2019  She has never had any anginal symptoms  She recently underwent anesthesia at [address] [place] [address] without complication  We did order stress test for completeness and is scheduled for Monday 3/16  Chest x-ray: I have personally reviewed pertinent reports        Previous cardiopulmonary studies within the past year:  Echocardiogram: 0/2852 - Systolic function was normal  Ejection fraction was estimated to be 60 %  · There was mild concentric hypertrophy  ·       Assessment & Recommendations:     1  Hypercholesterolemia  Echo stress test w contrast if indicated   2  Essential hypertension  Echo stress test w contrast if indicated   3  Abnormal EKG  Echo stress test w contrast if indicated   4  Persistent cough  promethazine-codeine (PHENERGAN WITH CODEINE) 6 25-10 mg/5 mL syrup       Pre-Op Evaluation Assessment  63 y o  female with planned surgery  Known risk factors for perioperative complications: Anemia  anemia stable on labs  Cardiac Risk Estimation: per the Revised Cardiac Risk Index (Circ  100:1043, 1999), the patient's risk factors for cardiac complications include none, putting her in: RCI RISK CLASS I (0 risk factors, risk of major cardiac compl  appr  0 5%)  Current medications which may produce withdrawal symptoms if withheld perioperatively: prn tramadol    Pre-Op Evaluation Plan  1  Further preoperative workup as follows:   - No further testing required prior to surgery  We did order stress stress for nonspecific EKG with none to compare but she has never had any anginal symptoms and can tolerate physical activity without the same  I reviewed EKG with Dr Corbin Sepulveda whom felt stress testing did not have to be complete prior to surgery  She recently tolerating anesthesia without complication  However, patient was able to schedule the stress testing on Monday march 16 and this will be reviewed prior to surgery  2  Medication Management/Recommendations:   - None, continue medication regimen including morning of surgery, with sip of water  - Patient has been instructed to avoid herbs or non-directed vitamins the week prior to surgery to ensure no drug interactions with perioperative surgical and anesthetic medications  - Patient has been instructed to avoid aspirin containing medications or non-steroidal anti-inflammatory drugs for the week preceding surgery      3  Prophylaxis for cardiac events with perioperative beta-blockers: should be considered, specific regimen per anesthesia  4  Patient requires further consultation with: None    Clearance  Patient is CLEARED for surgery        Tracee Retana [facility]  [address] [facility] [address]  Phone#  [phone]  Fax#  [phone]

## 2020-03-16 ENCOUNTER — TELEPHONE (OUTPATIENT)
Dept: FAMILY MEDICINE CLINIC | Facility: CLINIC | Age: 65
End: 2020-03-16

## 2020-03-16 ENCOUNTER — OFFICE VISIT (OUTPATIENT)
Dept: PHYSICAL THERAPY | Facility: CLINIC | Age: 65
End: 2020-03-16
Payer: COMMERCIAL

## 2020-03-16 ENCOUNTER — HOSPITAL ENCOUNTER (OUTPATIENT)
Dept: NON INVASIVE DIAGNOSTICS | Facility: CLINIC | Age: 65
Discharge: HOME | End: 2020-03-16
Payer: COMMERCIAL

## 2020-03-16 DIAGNOSIS — R94.31 ABNORMAL EKG: ICD-10-CM

## 2020-03-16 DIAGNOSIS — E78.00 HYPERCHOLESTEROLEMIA: ICD-10-CM

## 2020-03-16 DIAGNOSIS — I10 ESSENTIAL HYPERTENSION: ICD-10-CM

## 2020-03-16 DIAGNOSIS — M77.12 LATERAL EPICONDYLITIS OF LEFT ELBOW: Primary | ICD-10-CM

## 2020-03-16 DIAGNOSIS — M25.522 LEFT ELBOW PAIN: ICD-10-CM

## 2020-03-16 PROCEDURE — 97140 MANUAL THERAPY 1/> REGIONS: CPT

## 2020-03-16 PROCEDURE — 93350 STRESS TTE ONLY: CPT

## 2020-03-16 PROCEDURE — 93351 STRESS TTE COMPLETE: CPT | Performed by: INTERNAL MEDICINE

## 2020-03-16 PROCEDURE — 97110 THERAPEUTIC EXERCISES: CPT

## 2020-03-16 NOTE — PROGRESS NOTES
Daily Note     Today's date: 3/16/2020  Patient name: Cindy Castellano  : 1955  MRN: [id number]  Referring provider: Juan Alberto Mejia MD  Dx:   Encounter Diagnosis     ICD-10-CM    1  Lateral epicondylitis of left elbow M77 12    2  Left elbow pain M25 522        Start Time: 0930          Subjective: Patient states her pain is a 5/10 when resting, but it increases to a 7/10 when she touches the lateral epicondyle  Exercises continue to be good  Objective: See treatment diary below      Assessment: Tolerated treatment well  Patient would benefit from continued PT for stretching and strengthening  She was not able to increase the gripper due to not feeling she has the strength and the increase discomfort when she attempted one  Patient felt good when she left department  Plan: Continue per plan of care  Progress treatment as tolerated  Precautions none  Manual  3/5 3/11 3/12 3/16    IASTM left wrist extensors/flexors GT 3, 4, 5 x20' in sitting for patient comfort  Wrist extensors and mm proximal to elbow GT 3, 4, 5 x15' in sitting for patient comfort  Wrist extensors and mm proximal to elbow IASTM wrist extensors and mm proximal to elbow x 10 min IASTM wrist extensors and mm proximal to elbow x 10 min    Active release of left wrist extensors/flexors and elbow flexors        Ice massage                        : Reviewed Graston Technique with patient, questionnaire was signed  Exercise Diary  3/5 3/11 3/12 3/16    Wrist flexor stretch with elbow extended L 4x15" L 4x15" L 4x :15 L 4x :15    Wrist extensor stretch with elbow extended L 4x15" L 4x15" L 4x :15 L 4:15    Wrist AROM all directions 0# 0# 3x10 3x10 3x10       Yellow 30x Yellow x30 Yellow x30    Webbing         therabar        Tennis ball release 5 min 2 min proximal to elbow, 2 minutes distal to elbow  2 min proximal to elbow, 2 minutes distal to elbow  2 min proximal to elbow, 2 minutes distal to elbow  Modalities 3/5 3/11 3/12 3/16    US to left proximal wrist extensors 1 6W/cm2 1MHz 6 min to wrist extensors; 1 8W/cm2 1MHz 6 min to proximal elbow 1 6W/cm2 1MHz 6 min to wrist extensors; 1 8W/cm2 1MHz 6 min to proximal elbow 1 6W/cm2 1MHz 6 min to wrist extensors; 1 8W/cm2 1MHz 6 min to proximal elbow 1 6W/cm2 1MHz 6 min to wrist extensors; 1 8W/cm2 1MHz 6 min to proximal elbow            MHP pre 10 min 10 min  x12 min x10 min

## 2020-03-16 NOTE — TELEPHONE ENCOUNTER
Called [facility] spoke with Jaja Escamilla No Ne Dom is needed as long as it is out patient and in network   For [phone] echo stress

## 2020-03-17 ENCOUNTER — APPOINTMENT (OUTPATIENT)
Dept: PHYSICAL THERAPY | Facility: CLINIC | Age: 65
End: 2020-03-17
Payer: COMMERCIAL

## 2020-03-19 ENCOUNTER — APPOINTMENT (OUTPATIENT)
Dept: PHYSICAL THERAPY | Facility: CLINIC | Age: 65
End: 2020-03-19
Payer: COMMERCIAL

## 2020-03-20 LAB
ARRHY DURING EX: NORMAL
CHEST PAIN STATEMENT: NORMAL
MAX DIASTOLIC BP: 100 MMHG
MAX HEART RATE: 173 BPM
MAX PREDICTED HEART RATE: 157 BPM
MAX. SYSTOLIC BP: 184 MMHG
PROTOCOL NAME: NORMAL
TARGET HR FORMULA: NORMAL
TIME IN EXERCISE PHASE: NORMAL

## 2020-03-23 ENCOUNTER — APPOINTMENT (OUTPATIENT)
Dept: PHYSICAL THERAPY | Facility: CLINIC | Age: 65
End: 2020-03-23
Payer: COMMERCIAL

## 2020-03-24 ENCOUNTER — TELEPHONE (OUTPATIENT)
Dept: BARIATRICS | Facility: CLINIC | Age: 65
End: 2020-03-24

## 2020-03-26 ENCOUNTER — APPOINTMENT (OUTPATIENT)
Dept: PHYSICAL THERAPY | Facility: CLINIC | Age: 65
End: 2020-03-26
Payer: COMMERCIAL

## 2020-03-31 ENCOUNTER — APPOINTMENT (OUTPATIENT)
Dept: PHYSICAL THERAPY | Facility: CLINIC | Age: 65
End: 2020-03-31
Payer: COMMERCIAL

## 2020-04-03 ENCOUNTER — TELEPHONE (OUTPATIENT)
Dept: BARIATRICS | Facility: CLINIC | Age: 65
End: 2020-04-03

## 2020-04-14 ENCOUNTER — TELEMEDICINE (OUTPATIENT)
Dept: BARIATRICS | Facility: CLINIC | Age: 65
End: 2020-04-14
Payer: COMMERCIAL

## 2020-04-14 VITALS — WEIGHT: 180 LBS | BODY MASS INDEX: 31.89 KG/M2 | HEIGHT: 63 IN

## 2020-04-14 DIAGNOSIS — K44.9 PARAESOPHAGEAL HERNIA: Primary | ICD-10-CM

## 2020-04-14 PROCEDURE — 99213 OFFICE O/P EST LOW 20 MIN: CPT | Performed by: SURGERY

## 2020-04-14 RX ORDER — PREDNISONE 10 MG/1
TABLET ORAL
COMMUNITY
Start: 2020-02-18 | End: 2020-05-08 | Stop reason: ALTCHOICE

## 2020-05-08 ENCOUNTER — OFFICE VISIT (OUTPATIENT)
Dept: BARIATRICS | Facility: CLINIC | Age: 65
End: 2020-05-08
Payer: COMMERCIAL

## 2020-05-08 VITALS
HEART RATE: 107 BPM | SYSTOLIC BLOOD PRESSURE: 140 MMHG | WEIGHT: 184.5 LBS | DIASTOLIC BLOOD PRESSURE: 78 MMHG | HEIGHT: 63 IN | BODY MASS INDEX: 32.69 KG/M2 | TEMPERATURE: 97.4 F

## 2020-05-08 DIAGNOSIS — K44.9 PARAESOPHAGEAL HERNIA: ICD-10-CM

## 2020-05-08 DIAGNOSIS — K21.9 GASTROESOPHAGEAL REFLUX DISEASE WITHOUT ESOPHAGITIS: Primary | ICD-10-CM

## 2020-05-08 PROCEDURE — 3008F BODY MASS INDEX DOCD: CPT | Performed by: SURGERY

## 2020-05-08 PROCEDURE — 1036F TOBACCO NON-USER: CPT | Performed by: SURGERY

## 2020-05-08 PROCEDURE — 3078F DIAST BP <80 MM HG: CPT | Performed by: SURGERY

## 2020-05-08 PROCEDURE — 99213 OFFICE O/P EST LOW 20 MIN: CPT | Performed by: SURGERY

## 2020-05-08 PROCEDURE — 3077F SYST BP >= 140 MM HG: CPT | Performed by: SURGERY

## 2020-05-08 RX ORDER — OMEPRAZOLE 20 MG/1
20 CAPSULE, DELAYED RELEASE ORAL DAILY
Qty: 30 CAPSULE | Refills: 3 | Status: SHIPPED | OUTPATIENT
Start: 2020-05-08 | End: 2020-07-01 | Stop reason: SDUPTHER

## 2020-05-08 RX ORDER — OXYCODONE HYDROCHLORIDE 5 MG/1
5 TABLET ORAL EVERY 4 HOURS PRN
Qty: 10 TABLET | Refills: 0 | Status: CANCELLED | OUTPATIENT
Start: 2020-05-19

## 2020-05-11 RX ORDER — INDOMETHACIN 50 MG/1
50 CAPSULE ORAL 2 TIMES DAILY WITH MEALS
COMMUNITY
End: 2020-05-20 | Stop reason: HOSPADM

## 2020-05-11 RX ORDER — LOSARTAN POTASSIUM AND HYDROCHLOROTHIAZIDE 25; 100 MG/1; MG/1
TABLET ORAL
Qty: 30 TABLET | Refills: 0 | OUTPATIENT
Start: 2020-05-11

## 2020-05-12 DIAGNOSIS — Z11.59 SCREENING FOR VIRAL DISEASE: ICD-10-CM

## 2020-05-12 PROCEDURE — U0003 INFECTIOUS AGENT DETECTION BY NUCLEIC ACID (DNA OR RNA); SEVERE ACUTE RESPIRATORY SYNDROME CORONAVIRUS 2 (SARS-COV-2) (CORONAVIRUS DISEASE [COVID-19]), AMPLIFIED PROBE TECHNIQUE, MAKING USE OF HIGH THROUGHPUT TECHNOLOGIES AS DESCRIBED BY CMS-2020-01-R: HCPCS

## 2020-05-13 LAB — SARS-COV-2 RNA RESP QL NAA+PROBE: NEGATIVE

## 2020-05-13 NOTE — PROGRESS NOTES
PT Discharge    Today's date: 2020  Patient name: Criss Krishna  : 1955  MRN: [id number]  Referring provider: Taylor Geiger MD  Dx:   Encounter Diagnosis     ICD-10-CM    1  Lateral epicondylitis of left elbow M77 12    2  Left elbow pain M25 522        Start Time: 930  Stop Time: 1022  Total time in clinic (min): 52 minutes    Assessment/Plan   Assessment details: Criss Patient is a 60 y o  Female the has attended 14 sessions of skilled PT  The patient has not attended therapy since 3/16/20 due to the Covid-19 pandemic  The patient was contacted by phone and she is set to have a medical procedure not related to her therapy diagnosis  Due to the patient having an unexpected medical procedure we will discharge therapy at this time  The patient reports she would like to return to therapy once she is cleared to do so - at which time she will need a new prescription  The patient is aware and agreeable to this plan  NO NEW OBJECTIVE MEASURES WERE TAKEN  Impairments: abnormal muscle firing, abnormal muscle tone, abnormal or restricted ROM, abnormal movement, impaired physical strength, lacks appropriate home exercise program and pain with function    Goals  STG (4 weeks)  1  Patient's left elbow extension AROM will increase to 0 with 0/10 pain for increased ability to perform overhead ADLs  - N/A  2  Patient will have 0/10 pain in left elbow at rest  -N/A  3  Patient's left wrist extensor and RD strength will increase to 4/5 for increased ability to lift  - N/A  4  Patient will be able to lift a coffee cup with <3/10 pain in lateral elbow  N/A  5  Patient will consistently perform HEP 2-3x/day as instructed  -N/A  LTG (8 weeks)  1  Patient's left elbow AROM equal bilaterally for ability to complete hair hygiene, overhead, and behind the back ADLs  - N/A  2  Patient's UE strength will be equal bilaterally for ability to lift and carry at PLOF  N/A  3   Patient will be independent with home exercise program for continued maintenance post PT discharge  - N/A  4  Patient will be able to drive with no pain in left elbow  -N/A    Plan  Plan details: D/C PT - PATIENT PLANS TO RETURN AFTER CLEARED FROM MEDICAL PROCEDURE  Subjective   History of Present Illness  Date of onset: 7/6/2019  SUBJECTIVE: 5/13/20: The patient has not attended therapy since 3/16/20 due to the Covid-19 pandemic  The patient was contacted by phone and she is set to have a medical procedure not related to her therapy diagnosis  Due to the patient having an unexpected medical procedure we will discharge therapy at this time  The patient reports she would like to return to therapy once she is cleared to do so - at which time she will need a new prescription  The patient is aware and agreeable to this plan  Subjective 3/2/2020: Recent f/u with MD after MRI which patient states showed an avulsion fx of left elbow  Doctor would like her to continue with Physical Therapy for 6 additional weeks  Patient deferred cortisone injection at last visit  F/u 4/8  No significant changes over the past month since last visit  States any type of lifting or movement will cause pain  Subjective 2/6/20: Patient states no significant improvement over the course of PT  Has not been consistent with HEP  Pain increases in left lateral elbow when attempting to pick something up (coffee cup)  Worse towards the end of the day  Constant pain, increased with activity  To f/u with referring doctor 2/12/20  Mechanism of injury: Cindy Castellano is a 60 y o  female who presents to outpatient Physical Therapy today with complaints of left elbow pain  Pain began of insidious onset approximately 6 months ago  Saw ortho in December where x-ray were taken (-), she received an injection, and was instructed to begin PT with f/u in 8 weeks  She was also instructed to wear an elbow strap when active  Since visit patient states decrease in symptoms overall  History of right elbow surgery and PRP to left elbow 8-9 years ago  Pain    2/ 3/2  Current pain ratin  6 6  At best pain ratin  5 3  At worst pain ratin  8 9  Location: lateral left elbow  Quality: sharp  Aggravating factors: lifting, "just moving"    Social Support  Steps to enter house: no  Stairs in house: yes   13  Lives in: multiple-level home    Employment status: not working (Disability)  Hand dominance: right      Diagnostic Tests  X-ray: normal  Treatments  Current treatment: injection treatment  Patient Goals  Patient goal: use left arm without pain    Objective   Palpation   Left   No palpable tenderness to the biceps, pronator teres and triceps  Tenderness of the supinator, wrist extensors and wrist flexors  Tenderness     Left Elbow   Tenderness in the lateral epicondyle, olecranon process and radial head  No tenderness in the medial epicondyle  Left Wrist/Hand   Tenderness in the lateral epicondyle and olecranon process  No tenderness in the medial epicondyle  Neurological Testing     Sensation     Elbow   Left Elbow   Inact: light touch    Right Elbow   Intact: light touch    Active Range of Motion   2/6   3/2    Left Elbow   Flexion: 142 degrees with pain 145 no pain  133 pain   Extension: -16 degrees with pain -15 degrees pain  Forearm supination: 95 degrees  69 pain   53 pain  Forearm pronation: 100 degrees 113 no pain  85 pain    Right Elbow   Flexion: 148 degrees   Extension: 9 degrees   Forearm supination: 98 degrees   Forearm pronation: 96 degrees     Additional Active Range of Motion Details  Left shoulder functional IR decreased to L3 due to lateral elbow pain       Strength/Myotome Testing  2/6  3/2    Left Elbow   Flexion: 4- (pain)   5 no pain 4+ pain*  Extension: 4 (pain)   4- pain  4- pain  Forearm supination: 4- (pain)  4- pain  4- pain*  Forearm pronation: 4   4+ no pain 4+  ;  Right Elbow   Normal strength    Left Wrist/Hand    2/6  3/2  Wrist extension: 5   4 pain  4 pain*  Wrist flexion: 4 (pain)   5 pain free 4+ pain  Radial deviation: 5   4+ pain 4+ pain*  Ulnar deviation: 4+ (pain)  5 pain free 5 pain     (2nd hand position)      Trial 1: 11    16  08    Trial 2: 10    9  11    Trial 3: 12    9  10    Comments: pain    Right Wrist/Hand   Normal wrist strength     (2nd hand position)     Trial 1: 48    Trial 2: 35    Trial 3: 35    General Comments:      Elbow Comments       FOTO:37% was 38% function at IE, 60% predicted function

## 2020-05-15 DIAGNOSIS — F51.01 PRIMARY INSOMNIA: ICD-10-CM

## 2020-05-15 DIAGNOSIS — I10 ESSENTIAL HYPERTENSION: ICD-10-CM

## 2020-05-15 DIAGNOSIS — I10 BENIGN ESSENTIAL HYPERTENSION: ICD-10-CM

## 2020-05-15 RX ORDER — LOSARTAN POTASSIUM AND HYDROCHLOROTHIAZIDE 25; 100 MG/1; MG/1
TABLET ORAL
Qty: 30 TABLET | Refills: 0 | OUTPATIENT
Start: 2020-05-15

## 2020-05-15 RX ORDER — AMLODIPINE BESYLATE 5 MG/1
5 TABLET ORAL DAILY
Qty: 90 TABLET | Refills: 1 | Status: SHIPPED | OUTPATIENT
Start: 2020-05-15 | End: 2020-06-23 | Stop reason: SDUPTHER

## 2020-05-18 ENCOUNTER — ANESTHESIA EVENT (OUTPATIENT)
Dept: PERIOP | Facility: HOSPITAL | Age: 65
End: 2020-05-18
Payer: COMMERCIAL

## 2020-05-18 RX ORDER — HYDROXYZINE HYDROCHLORIDE 25 MG/1
TABLET, FILM COATED ORAL
Qty: 60 TABLET | Refills: 1 | Status: SHIPPED | OUTPATIENT
Start: 2020-05-18 | End: 2020-06-18 | Stop reason: SDUPTHER

## 2020-05-18 RX ORDER — LOSARTAN POTASSIUM 100 MG/1
100 TABLET ORAL DAILY
Qty: 90 TABLET | Refills: 1 | Status: SHIPPED | OUTPATIENT
Start: 2020-05-18 | End: 2020-06-23 | Stop reason: SDUPTHER

## 2020-05-19 ENCOUNTER — ANESTHESIA (OUTPATIENT)
Dept: PERIOP | Facility: HOSPITAL | Age: 65
End: 2020-05-19
Payer: COMMERCIAL

## 2020-05-19 ENCOUNTER — HOSPITAL ENCOUNTER (OUTPATIENT)
Facility: HOSPITAL | Age: 65
Setting detail: OUTPATIENT SURGERY
Discharge: HOME | End: 2020-05-20
Attending: SURGERY | Admitting: SURGERY
Payer: COMMERCIAL

## 2020-05-19 DIAGNOSIS — I10 ESSENTIAL HYPERTENSION: ICD-10-CM

## 2020-05-19 DIAGNOSIS — Z11.59 SCREENING FOR VIRAL DISEASE: ICD-10-CM

## 2020-05-19 DIAGNOSIS — K44.9 PARAESOPHAGEAL HERNIA: ICD-10-CM

## 2020-05-19 DIAGNOSIS — R12 HEARTBURN: ICD-10-CM

## 2020-05-19 DIAGNOSIS — K21.9 GASTROESOPHAGEAL REFLUX DISEASE WITHOUT ESOPHAGITIS: Primary | ICD-10-CM

## 2020-05-19 DIAGNOSIS — K21.9 GASTROESOPHAGEAL REFLUX DISEASE: ICD-10-CM

## 2020-05-19 PROCEDURE — C1781 MESH (IMPLANTABLE): HCPCS | Performed by: SURGERY

## 2020-05-19 PROCEDURE — 43282 LAP PARAESOPH HER RPR W/MESH: CPT | Performed by: SURGERY

## 2020-05-19 PROCEDURE — C9290 INJ, BUPIVACAINE LIPOSOME: HCPCS | Performed by: SURGERY

## 2020-05-19 PROCEDURE — 88302 TISSUE EXAM BY PATHOLOGIST: CPT | Performed by: PATHOLOGY

## 2020-05-19 DEVICE — BIO-A TISSUE REINFORCEMENT 7CMX10CM
Type: IMPLANTABLE DEVICE | Site: ABDOMEN | Status: FUNCTIONAL
Brand: GORE BIO-A TISSUE REINFORCEMENT

## 2020-05-19 RX ORDER — METOCLOPRAMIDE HYDROCHLORIDE 5 MG/ML
10 INJECTION INTRAMUSCULAR; INTRAVENOUS EVERY 6 HOURS PRN
Status: DISCONTINUED | OUTPATIENT
Start: 2020-05-19 | End: 2020-05-20 | Stop reason: HOSPADM

## 2020-05-19 RX ORDER — ONDANSETRON 2 MG/ML
INJECTION INTRAMUSCULAR; INTRAVENOUS AS NEEDED
Status: DISCONTINUED | OUTPATIENT
Start: 2020-05-19 | End: 2020-05-19 | Stop reason: SURG

## 2020-05-19 RX ORDER — CEFAZOLIN SODIUM 2 G/50ML
2000 SOLUTION INTRAVENOUS ONCE
Status: COMPLETED | OUTPATIENT
Start: 2020-05-19 | End: 2020-05-19

## 2020-05-19 RX ORDER — GLYCOPYRROLATE 0.2 MG/ML
INJECTION INTRAMUSCULAR; INTRAVENOUS AS NEEDED
Status: DISCONTINUED | OUTPATIENT
Start: 2020-05-19 | End: 2020-05-19 | Stop reason: SURG

## 2020-05-19 RX ORDER — ACETAMINOPHEN 160 MG/5ML
975 SUSPENSION, ORAL (FINAL DOSE FORM) ORAL EVERY 8 HOURS
Status: DISCONTINUED | OUTPATIENT
Start: 2020-05-19 | End: 2020-05-20 | Stop reason: HOSPADM

## 2020-05-19 RX ORDER — MAGNESIUM HYDROXIDE 1200 MG/15ML
LIQUID ORAL AS NEEDED
Status: DISCONTINUED | OUTPATIENT
Start: 2020-05-19 | End: 2020-05-19 | Stop reason: HOSPADM

## 2020-05-19 RX ORDER — SIMETHICONE 80 MG
80 TABLET,CHEWABLE ORAL 4 TIMES DAILY PRN
Status: DISCONTINUED | OUTPATIENT
Start: 2020-05-19 | End: 2020-05-20 | Stop reason: HOSPADM

## 2020-05-19 RX ORDER — DIPHENHYDRAMINE HYDROCHLORIDE 50 MG/ML
12.5 INJECTION INTRAMUSCULAR; INTRAVENOUS EVERY 6 HOURS PRN
Status: DISCONTINUED | OUTPATIENT
Start: 2020-05-19 | End: 2020-05-20 | Stop reason: HOSPADM

## 2020-05-19 RX ORDER — HYDROMORPHONE HCL/PF 1 MG/ML
0.5 SYRINGE (ML) INJECTION EVERY 4 HOURS PRN
Status: DISCONTINUED | OUTPATIENT
Start: 2020-05-19 | End: 2020-05-20 | Stop reason: HOSPADM

## 2020-05-19 RX ORDER — DEXMEDETOMIDINE HYDROCHLORIDE 100 UG/ML
INJECTION, SOLUTION INTRAVENOUS AS NEEDED
Status: DISCONTINUED | OUTPATIENT
Start: 2020-05-19 | End: 2020-05-19 | Stop reason: SURG

## 2020-05-19 RX ORDER — FENTANYL CITRATE 50 UG/ML
INJECTION, SOLUTION INTRAMUSCULAR; INTRAVENOUS AS NEEDED
Status: DISCONTINUED | OUTPATIENT
Start: 2020-05-19 | End: 2020-05-19 | Stop reason: SURG

## 2020-05-19 RX ORDER — ONDANSETRON 2 MG/ML
4 INJECTION INTRAMUSCULAR; INTRAVENOUS EVERY 6 HOURS SCHEDULED
Status: DISCONTINUED | OUTPATIENT
Start: 2020-05-19 | End: 2020-05-20 | Stop reason: HOSPADM

## 2020-05-19 RX ORDER — SODIUM CHLORIDE, SODIUM LACTATE, POTASSIUM CHLORIDE, CALCIUM CHLORIDE 600; 310; 30; 20 MG/100ML; MG/100ML; MG/100ML; MG/100ML
100 INJECTION, SOLUTION INTRAVENOUS CONTINUOUS
Status: DISCONTINUED | OUTPATIENT
Start: 2020-05-19 | End: 2020-05-20 | Stop reason: HOSPADM

## 2020-05-19 RX ORDER — ROCURONIUM BROMIDE 10 MG/ML
INJECTION, SOLUTION INTRAVENOUS AS NEEDED
Status: DISCONTINUED | OUTPATIENT
Start: 2020-05-19 | End: 2020-05-19 | Stop reason: SURG

## 2020-05-19 RX ORDER — DEXAMETHASONE SODIUM PHOSPHATE 4 MG/ML
INJECTION, SOLUTION INTRA-ARTICULAR; INTRALESIONAL; INTRAMUSCULAR; INTRAVENOUS; SOFT TISSUE AS NEEDED
Status: DISCONTINUED | OUTPATIENT
Start: 2020-05-19 | End: 2020-05-19 | Stop reason: SURG

## 2020-05-19 RX ORDER — CEFAZOLIN SODIUM 2 G/50ML
2000 SOLUTION INTRAVENOUS ONCE
Status: DISCONTINUED | OUTPATIENT
Start: 2020-05-19 | End: 2020-05-19 | Stop reason: SDUPTHER

## 2020-05-19 RX ORDER — PROPOFOL 10 MG/ML
INJECTION, EMULSION INTRAVENOUS AS NEEDED
Status: DISCONTINUED | OUTPATIENT
Start: 2020-05-19 | End: 2020-05-19 | Stop reason: SURG

## 2020-05-19 RX ORDER — HYDRALAZINE HYDROCHLORIDE 20 MG/ML
5 INJECTION INTRAMUSCULAR; INTRAVENOUS EVERY 6 HOURS PRN
Status: DISCONTINUED | OUTPATIENT
Start: 2020-05-19 | End: 2020-05-20 | Stop reason: HOSPADM

## 2020-05-19 RX ORDER — SODIUM CHLORIDE, SODIUM LACTATE, POTASSIUM CHLORIDE, CALCIUM CHLORIDE 600; 310; 30; 20 MG/100ML; MG/100ML; MG/100ML; MG/100ML
INJECTION, SOLUTION INTRAVENOUS CONTINUOUS PRN
Status: DISCONTINUED | OUTPATIENT
Start: 2020-05-19 | End: 2020-05-19 | Stop reason: SURG

## 2020-05-19 RX ORDER — BUPIVACAINE HYDROCHLORIDE AND EPINEPHRINE 5; 5 MG/ML; UG/ML
INJECTION, SOLUTION PERINEURAL AS NEEDED
Status: DISCONTINUED | OUTPATIENT
Start: 2020-05-19 | End: 2020-05-19 | Stop reason: HOSPADM

## 2020-05-19 RX ORDER — MIDAZOLAM HYDROCHLORIDE 2 MG/2ML
INJECTION, SOLUTION INTRAMUSCULAR; INTRAVENOUS AS NEEDED
Status: DISCONTINUED | OUTPATIENT
Start: 2020-05-19 | End: 2020-05-19 | Stop reason: SURG

## 2020-05-19 RX ORDER — SODIUM CHLORIDE 9 MG/ML
125 INJECTION, SOLUTION INTRAVENOUS CONTINUOUS
Status: DISCONTINUED | OUTPATIENT
Start: 2020-05-19 | End: 2020-05-20

## 2020-05-19 RX ORDER — SCOLOPAMINE TRANSDERMAL SYSTEM 1 MG/1
1 PATCH, EXTENDED RELEASE TRANSDERMAL
Status: DISCONTINUED | OUTPATIENT
Start: 2020-05-19 | End: 2020-05-20 | Stop reason: HOSPADM

## 2020-05-19 RX ORDER — MORPHINE SULFATE 4 MG/ML
4 INJECTION, SOLUTION INTRAMUSCULAR; INTRAVENOUS EVERY 2 HOUR PRN
Status: DISCONTINUED | OUTPATIENT
Start: 2020-05-19 | End: 2020-05-20 | Stop reason: HOSPADM

## 2020-05-19 RX ORDER — NEOSTIGMINE METHYLSULFATE 1 MG/ML
INJECTION INTRAVENOUS AS NEEDED
Status: DISCONTINUED | OUTPATIENT
Start: 2020-05-19 | End: 2020-05-19 | Stop reason: SURG

## 2020-05-19 RX ORDER — KETAMINE HYDROCHLORIDE 50 MG/ML
INJECTION, SOLUTION, CONCENTRATE INTRAMUSCULAR; INTRAVENOUS AS NEEDED
Status: DISCONTINUED | OUTPATIENT
Start: 2020-05-19 | End: 2020-05-19 | Stop reason: SURG

## 2020-05-19 RX ORDER — HYDROMORPHONE HCL/PF 1 MG/ML
0.5 SYRINGE (ML) INJECTION
Status: DISCONTINUED | OUTPATIENT
Start: 2020-05-19 | End: 2020-05-19 | Stop reason: HOSPADM

## 2020-05-19 RX ORDER — OXYCODONE HCL 5 MG/5 ML
5 SOLUTION, ORAL ORAL EVERY 4 HOURS PRN
Status: DISCONTINUED | OUTPATIENT
Start: 2020-05-19 | End: 2020-05-20

## 2020-05-19 RX ORDER — FENTANYL CITRATE/PF 50 MCG/ML
25 SYRINGE (ML) INJECTION
Status: DISCONTINUED | OUTPATIENT
Start: 2020-05-19 | End: 2020-05-19 | Stop reason: HOSPADM

## 2020-05-19 RX ORDER — ACETAMINOPHEN 325 MG/1
975 TABLET ORAL EVERY 8 HOURS
Status: DISCONTINUED | OUTPATIENT
Start: 2020-05-19 | End: 2020-05-20 | Stop reason: HOSPADM

## 2020-05-19 RX ORDER — HYDROMORPHONE HCL 110MG/55ML
PATIENT CONTROLLED ANALGESIA SYRINGE INTRAVENOUS AS NEEDED
Status: DISCONTINUED | OUTPATIENT
Start: 2020-05-19 | End: 2020-05-19 | Stop reason: SURG

## 2020-05-19 RX ORDER — ALBUTEROL SULFATE 2.5 MG/3ML
2.5 SOLUTION RESPIRATORY (INHALATION) ONCE AS NEEDED
Status: DISCONTINUED | OUTPATIENT
Start: 2020-05-19 | End: 2020-05-19 | Stop reason: HOSPADM

## 2020-05-19 RX ORDER — PROMETHAZINE HYDROCHLORIDE 25 MG/ML
12.5 INJECTION, SOLUTION INTRAMUSCULAR; INTRAVENOUS EVERY 6 HOURS PRN
Status: DISCONTINUED | OUTPATIENT
Start: 2020-05-19 | End: 2020-05-20 | Stop reason: HOSPADM

## 2020-05-19 RX ADMIN — DEXMEDETOMIDINE HYDROCHLORIDE 20 MCG: 100 INJECTION, SOLUTION INTRAVENOUS at 15:16

## 2020-05-19 RX ADMIN — SODIUM CHLORIDE: 0.9 INJECTION, SOLUTION INTRAVENOUS at 15:10

## 2020-05-19 RX ADMIN — PHENYLEPHRINE HYDROCHLORIDE 100 MCG: 10 INJECTION INTRAVENOUS at 13:00

## 2020-05-19 RX ADMIN — PROPOFOL 200 MG: 10 INJECTION, EMULSION INTRAVENOUS at 12:51

## 2020-05-19 RX ADMIN — KETAMINE HYDROCHLORIDE 25 MG: 50 INJECTION INTRAMUSCULAR; INTRAVENOUS at 12:51

## 2020-05-19 RX ADMIN — ONDANSETRON 4 MG: 2 INJECTION INTRAMUSCULAR; INTRAVENOUS at 17:23

## 2020-05-19 RX ADMIN — PHENYLEPHRINE HYDROCHLORIDE 150 MCG: 10 INJECTION INTRAVENOUS at 15:00

## 2020-05-19 RX ADMIN — HYDROMORPHONE HYDROCHLORIDE 0.5 MG: 2 INJECTION, SOLUTION INTRAMUSCULAR; INTRAVENOUS; SUBCUTANEOUS at 13:59

## 2020-05-19 RX ADMIN — PHENYLEPHRINE HYDROCHLORIDE 100 MCG: 10 INJECTION INTRAVENOUS at 14:34

## 2020-05-19 RX ADMIN — GLYCOPYRROLATE 0.6 MG: 0.2 INJECTION INTRAMUSCULAR; INTRAVENOUS at 15:21

## 2020-05-19 RX ADMIN — LIDOCAINE HYDROCHLORIDE 100 MG: 20 INJECTION, SOLUTION INTRAVENOUS at 12:51

## 2020-05-19 RX ADMIN — FAMOTIDINE 20 MG: 10 INJECTION, SOLUTION INTRAVENOUS at 17:23

## 2020-05-19 RX ADMIN — ROCURONIUM BROMIDE 10 MG: 10 INJECTION, SOLUTION INTRAVENOUS at 15:01

## 2020-05-19 RX ADMIN — FENTANYL CITRATE 50 MCG: 50 INJECTION, SOLUTION INTRAMUSCULAR; INTRAVENOUS at 13:25

## 2020-05-19 RX ADMIN — SODIUM CHLORIDE, SODIUM LACTATE, POTASSIUM CHLORIDE, AND CALCIUM CHLORIDE 100 ML/HR: .6; .31; .03; .02 INJECTION, SOLUTION INTRAVENOUS at 17:14

## 2020-05-19 RX ADMIN — PHENYLEPHRINE HYDROCHLORIDE 100 MCG: 10 INJECTION INTRAVENOUS at 14:29

## 2020-05-19 RX ADMIN — ROCURONIUM BROMIDE 10 MG: 10 INJECTION, SOLUTION INTRAVENOUS at 14:32

## 2020-05-19 RX ADMIN — ONDANSETRON 4 MG: 2 INJECTION INTRAMUSCULAR; INTRAVENOUS at 15:09

## 2020-05-19 RX ADMIN — METOCLOPRAMIDE 10 MG: 5 INJECTION, SOLUTION INTRAMUSCULAR; INTRAVENOUS at 21:19

## 2020-05-19 RX ADMIN — SODIUM CHLORIDE, SODIUM LACTATE, POTASSIUM CHLORIDE, AND CALCIUM CHLORIDE: .6; .31; .03; .02 INJECTION, SOLUTION INTRAVENOUS at 13:22

## 2020-05-19 RX ADMIN — KETAMINE HYDROCHLORIDE 15 MG: 50 INJECTION INTRAMUSCULAR; INTRAVENOUS at 14:56

## 2020-05-19 RX ADMIN — FENTANYL CITRATE 50 MCG: 50 INJECTION, SOLUTION INTRAMUSCULAR; INTRAVENOUS at 12:49

## 2020-05-19 RX ADMIN — DEXAMETHASONE SODIUM PHOSPHATE 4 MG: 4 INJECTION, SOLUTION INTRAMUSCULAR; INTRAVENOUS at 12:55

## 2020-05-19 RX ADMIN — Medication 2 SPRAY: at 17:23

## 2020-05-19 RX ADMIN — MIDAZOLAM 2 MG: 1 INJECTION INTRAMUSCULAR; INTRAVENOUS at 12:40

## 2020-05-19 RX ADMIN — FENTANYL CITRATE 50 MCG: 50 INJECTION, SOLUTION INTRAMUSCULAR; INTRAVENOUS at 13:14

## 2020-05-19 RX ADMIN — ENOXAPARIN SODIUM 40 MG: 40 INJECTION SUBCUTANEOUS at 12:57

## 2020-05-19 RX ADMIN — MORPHINE SULFATE 4 MG: 4 INJECTION INTRAVENOUS at 17:23

## 2020-05-19 RX ADMIN — CEFAZOLIN SODIUM 2000 MG: 2 SOLUTION INTRAVENOUS at 12:33

## 2020-05-19 RX ADMIN — ROCURONIUM BROMIDE 50 MG: 10 INJECTION, SOLUTION INTRAVENOUS at 12:51

## 2020-05-19 RX ADMIN — NEOSTIGMINE METHYLSULFATE 4 MG: 1 INJECTION, SOLUTION INTRAVENOUS at 15:21

## 2020-05-19 RX ADMIN — ROCURONIUM BROMIDE 10 MG: 10 INJECTION, SOLUTION INTRAVENOUS at 13:56

## 2020-05-19 RX ADMIN — FENTANYL CITRATE 25 MCG: 50 INJECTION, SOLUTION INTRAMUSCULAR; INTRAVENOUS at 16:35

## 2020-05-19 RX ADMIN — KETAMINE HYDROCHLORIDE 10 MG: 50 INJECTION INTRAMUSCULAR; INTRAVENOUS at 13:56

## 2020-05-19 RX ADMIN — MORPHINE SULFATE 4 MG: 4 INJECTION INTRAVENOUS at 21:19

## 2020-05-19 RX ADMIN — SODIUM CHLORIDE 125 ML/HR: 0.9 INJECTION, SOLUTION INTRAVENOUS at 09:53

## 2020-05-19 RX ADMIN — DEXMEDETOMIDINE HYDROCHLORIDE 20 MCG: 100 INJECTION, SOLUTION INTRAVENOUS at 14:23

## 2020-05-19 RX ADMIN — FENTANYL CITRATE 50 MCG: 50 INJECTION, SOLUTION INTRAMUSCULAR; INTRAVENOUS at 15:23

## 2020-05-20 ENCOUNTER — APPOINTMENT (OUTPATIENT)
Dept: RADIOLOGY | Facility: HOSPITAL | Age: 65
End: 2020-05-20
Payer: COMMERCIAL

## 2020-05-20 VITALS
TEMPERATURE: 98.1 F | HEIGHT: 63 IN | OXYGEN SATURATION: 95 % | SYSTOLIC BLOOD PRESSURE: 127 MMHG | HEART RATE: 86 BPM | DIASTOLIC BLOOD PRESSURE: 81 MMHG | WEIGHT: 184 LBS | BODY MASS INDEX: 32.6 KG/M2 | RESPIRATION RATE: 18 BRPM

## 2020-05-20 LAB
ANION GAP SERPL CALCULATED.3IONS-SCNC: 6 MMOL/L (ref 4–13)
BUN SERPL-MCNC: 19 MG/DL (ref 5–25)
CALCIUM SERPL-MCNC: 8.4 MG/DL (ref 8.3–10.1)
CHLORIDE SERPL-SCNC: 108 MMOL/L (ref 100–108)
CO2 SERPL-SCNC: 29 MMOL/L (ref 21–32)
CREAT SERPL-MCNC: 1.07 MG/DL (ref 0.6–1.3)
ERYTHROCYTE [DISTWIDTH] IN BLOOD BY AUTOMATED COUNT: 13.9 % (ref 11.6–15.1)
GFR SERPL CREATININE-BSD FRML MDRD: 55 ML/MIN/1.73SQ M
GLUCOSE SERPL-MCNC: 127 MG/DL (ref 65–140)
HCT VFR BLD AUTO: 34 % (ref 34.8–46.1)
HGB BLD-MCNC: 10.6 G/DL (ref 11.5–15.4)
MCH RBC QN AUTO: 26.3 PG (ref 26.8–34.3)
MCHC RBC AUTO-ENTMCNC: 31.2 G/DL (ref 31.4–37.4)
MCV RBC AUTO: 84 FL (ref 82–98)
PLATELET # BLD AUTO: 256 THOUSANDS/UL (ref 149–390)
PMV BLD AUTO: 11.2 FL (ref 8.9–12.7)
POTASSIUM SERPL-SCNC: 4 MMOL/L (ref 3.5–5.3)
RBC # BLD AUTO: 4.03 MILLION/UL (ref 3.81–5.12)
SODIUM SERPL-SCNC: 143 MMOL/L (ref 136–145)
WBC # BLD AUTO: 9.76 THOUSAND/UL (ref 4.31–10.16)

## 2020-05-20 PROCEDURE — 74240 X-RAY XM UPR GI TRC 1CNTRST: CPT

## 2020-05-20 PROCEDURE — 80048 BASIC METABOLIC PNL TOTAL CA: CPT | Performed by: SURGERY

## 2020-05-20 PROCEDURE — NC001 PR NO CHARGE: Performed by: SURGERY

## 2020-05-20 PROCEDURE — 99024 POSTOP FOLLOW-UP VISIT: CPT | Performed by: SURGERY

## 2020-05-20 PROCEDURE — 85027 COMPLETE CBC AUTOMATED: CPT | Performed by: SURGERY

## 2020-05-20 RX ORDER — KETOROLAC TROMETHAMINE 30 MG/ML
15 INJECTION, SOLUTION INTRAMUSCULAR; INTRAVENOUS EVERY 6 HOURS SCHEDULED
Status: DISCONTINUED | OUTPATIENT
Start: 2020-05-20 | End: 2020-05-20 | Stop reason: HOSPADM

## 2020-05-20 RX ORDER — AMLODIPINE BESYLATE 5 MG/1
5 TABLET ORAL DAILY
Status: DISCONTINUED | OUTPATIENT
Start: 2020-05-20 | End: 2020-05-20 | Stop reason: HOSPADM

## 2020-05-20 RX ORDER — OXYCODONE HCL 5 MG/5 ML
5 SOLUTION, ORAL ORAL EVERY 4 HOURS PRN
Status: DISCONTINUED | OUTPATIENT
Start: 2020-05-20 | End: 2020-05-20 | Stop reason: HOSPADM

## 2020-05-20 RX ORDER — ATORVASTATIN CALCIUM 20 MG/1
20 TABLET, FILM COATED ORAL
Status: DISCONTINUED | OUTPATIENT
Start: 2020-05-20 | End: 2020-05-20 | Stop reason: HOSPADM

## 2020-05-20 RX ORDER — OXYCODONE HYDROCHLORIDE 5 MG/1
5 TABLET ORAL EVERY 6 HOURS PRN
Qty: 15 TABLET | Refills: 0 | Status: SHIPPED | OUTPATIENT
Start: 2020-05-20 | End: 2020-05-28 | Stop reason: ALTCHOICE

## 2020-05-20 RX ADMIN — MORPHINE SULFATE 4 MG: 4 INJECTION INTRAVENOUS at 02:59

## 2020-05-20 RX ADMIN — ONDANSETRON 4 MG: 2 INJECTION INTRAMUSCULAR; INTRAVENOUS at 05:27

## 2020-05-20 RX ADMIN — AMLODIPINE BESYLATE 5 MG: 5 TABLET ORAL at 13:42

## 2020-05-20 RX ADMIN — ONDANSETRON 4 MG: 2 INJECTION INTRAMUSCULAR; INTRAVENOUS at 11:33

## 2020-05-20 RX ADMIN — KETOROLAC TROMETHAMINE 15 MG: 30 INJECTION, SOLUTION INTRAMUSCULAR at 08:39

## 2020-05-20 RX ADMIN — FAMOTIDINE 20 MG: 10 INJECTION, SOLUTION INTRAVENOUS at 08:39

## 2020-05-20 RX ADMIN — ONDANSETRON 4 MG: 2 INJECTION INTRAMUSCULAR; INTRAVENOUS at 00:20

## 2020-05-20 RX ADMIN — SODIUM CHLORIDE, SODIUM LACTATE, POTASSIUM CHLORIDE, AND CALCIUM CHLORIDE 100 ML/HR: .6; .31; .03; .02 INJECTION, SOLUTION INTRAVENOUS at 11:36

## 2020-05-20 RX ADMIN — KETOROLAC TROMETHAMINE 15 MG: 30 INJECTION, SOLUTION INTRAMUSCULAR at 11:34

## 2020-05-20 RX ADMIN — ACETAMINOPHEN 975 MG: 160 SUSPENSION ORAL at 08:38

## 2020-05-20 RX ADMIN — IOHEXOL 100 ML: 350 INJECTION, SOLUTION INTRAVENOUS at 11:40

## 2020-05-20 RX ADMIN — SODIUM CHLORIDE, SODIUM LACTATE, POTASSIUM CHLORIDE, AND CALCIUM CHLORIDE 100 ML/HR: .6; .31; .03; .02 INJECTION, SOLUTION INTRAVENOUS at 03:00

## 2020-05-21 ENCOUNTER — TELEPHONE (OUTPATIENT)
Dept: BARIATRICS | Facility: CLINIC | Age: 65
End: 2020-05-21

## 2020-05-21 ENCOUNTER — TELEPHONE (OUTPATIENT)
Dept: MEDSURG UNIT | Facility: HOSPITAL | Age: 65
End: 2020-05-21

## 2020-05-22 ENCOUNTER — TELEPHONE (OUTPATIENT)
Dept: BARIATRICS | Facility: CLINIC | Age: 65
End: 2020-05-22

## 2020-05-26 ENCOUNTER — TELEPHONE (OUTPATIENT)
Dept: BARIATRICS | Facility: CLINIC | Age: 65
End: 2020-05-26

## 2020-05-27 ENCOUNTER — TELEPHONE (OUTPATIENT)
Dept: BARIATRICS | Facility: CLINIC | Age: 65
End: 2020-05-27

## 2020-05-28 ENCOUNTER — OFFICE VISIT (OUTPATIENT)
Dept: BARIATRICS | Facility: CLINIC | Age: 65
End: 2020-05-28

## 2020-05-28 VITALS
DIASTOLIC BLOOD PRESSURE: 80 MMHG | BODY MASS INDEX: 31.01 KG/M2 | HEIGHT: 63 IN | SYSTOLIC BLOOD PRESSURE: 142 MMHG | HEART RATE: 100 BPM | WEIGHT: 175 LBS | TEMPERATURE: 98.9 F

## 2020-05-28 DIAGNOSIS — K44.9 PARAESOPHAGEAL HERNIA: Primary | ICD-10-CM

## 2020-05-28 DIAGNOSIS — K21.9 GASTROESOPHAGEAL REFLUX DISEASE WITHOUT ESOPHAGITIS: ICD-10-CM

## 2020-05-28 DIAGNOSIS — K21.9 GASTROESOPHAGEAL REFLUX DISEASE WITHOUT ESOPHAGITIS: Primary | ICD-10-CM

## 2020-05-28 PROCEDURE — 3008F BODY MASS INDEX DOCD: CPT | Performed by: SURGERY

## 2020-05-28 PROCEDURE — 3079F DIAST BP 80-89 MM HG: CPT | Performed by: SURGERY

## 2020-05-28 PROCEDURE — RECHECK: Performed by: DIETITIAN, REGISTERED

## 2020-05-28 PROCEDURE — 99024 POSTOP FOLLOW-UP VISIT: CPT | Performed by: SURGERY

## 2020-05-28 PROCEDURE — 3077F SYST BP >= 140 MM HG: CPT | Performed by: SURGERY

## 2020-06-01 ENCOUNTER — TELEPHONE (OUTPATIENT)
Dept: BARIATRICS | Facility: CLINIC | Age: 65
End: 2020-06-01

## 2020-06-01 DIAGNOSIS — Z98.890 S/P REPAIR OF PARAESOPHAGEAL HERNIA: ICD-10-CM

## 2020-06-01 DIAGNOSIS — R11.0 NAUSEA: Primary | ICD-10-CM

## 2020-06-01 DIAGNOSIS — Z87.19 S/P REPAIR OF PARAESOPHAGEAL HERNIA: ICD-10-CM

## 2020-06-01 RX ORDER — ONDANSETRON 4 MG/1
4 TABLET, FILM COATED ORAL EVERY 8 HOURS PRN
Qty: 15 TABLET | Refills: 0 | Status: SHIPPED | OUTPATIENT
Start: 2020-06-01 | End: 2020-08-28 | Stop reason: ALTCHOICE

## 2020-06-08 ENCOUNTER — APPOINTMENT (OUTPATIENT)
Dept: PERIOP | Facility: HOSPITAL | Age: 65
End: 2020-06-08
Attending: SURGERY
Payer: COMMERCIAL

## 2020-06-08 ENCOUNTER — TELEPHONE (OUTPATIENT)
Dept: BARIATRICS | Facility: CLINIC | Age: 65
End: 2020-06-08

## 2020-06-08 ENCOUNTER — ANESTHESIA EVENT (OUTPATIENT)
Dept: PERIOP | Facility: HOSPITAL | Age: 65
End: 2020-06-08
Payer: COMMERCIAL

## 2020-06-08 ENCOUNTER — HOSPITAL ENCOUNTER (OUTPATIENT)
Facility: HOSPITAL | Age: 65
Setting detail: OBSERVATION
Discharge: HOME | End: 2020-06-09
Attending: EMERGENCY MEDICINE | Admitting: SURGERY
Payer: COMMERCIAL

## 2020-06-08 ENCOUNTER — ANESTHESIA (OUTPATIENT)
Dept: PERIOP | Facility: HOSPITAL | Age: 65
End: 2020-06-08
Payer: COMMERCIAL

## 2020-06-08 ENCOUNTER — APPOINTMENT (EMERGENCY)
Dept: CT IMAGING | Facility: HOSPITAL | Age: 65
End: 2020-06-08
Payer: COMMERCIAL

## 2020-06-08 DIAGNOSIS — E87.6 HYPOKALEMIA: ICD-10-CM

## 2020-06-08 DIAGNOSIS — K22.89 ESOPHAGEAL THICKENING: ICD-10-CM

## 2020-06-08 DIAGNOSIS — R13.10 DYSPHAGIA, UNSPECIFIED TYPE: ICD-10-CM

## 2020-06-08 DIAGNOSIS — R11.0 NAUSEA: ICD-10-CM

## 2020-06-08 DIAGNOSIS — K21.9 GASTROESOPHAGEAL REFLUX DISEASE WITHOUT ESOPHAGITIS: ICD-10-CM

## 2020-06-08 DIAGNOSIS — T18.128A FOOD IMPACTION OF ESOPHAGUS, INITIAL ENCOUNTER: ICD-10-CM

## 2020-06-08 DIAGNOSIS — R11.10 VOMITING: Primary | ICD-10-CM

## 2020-06-08 LAB
ALBUMIN SERPL BCP-MCNC: 4.2 G/DL (ref 3.5–5)
ALP SERPL-CCNC: 92 U/L (ref 46–116)
ALT SERPL W P-5'-P-CCNC: 19 U/L (ref 12–78)
ANION GAP SERPL CALCULATED.3IONS-SCNC: 10 MMOL/L (ref 4–13)
AST SERPL W P-5'-P-CCNC: 19 U/L (ref 5–45)
BASOPHILS # BLD AUTO: 0.03 THOUSANDS/ΜL (ref 0–0.1)
BASOPHILS NFR BLD AUTO: 0 % (ref 0–1)
BILIRUB SERPL-MCNC: 0.71 MG/DL (ref 0.2–1)
BILIRUB UR QL STRIP: NEGATIVE
BUN SERPL-MCNC: 21 MG/DL (ref 5–25)
CALCIUM SERPL-MCNC: 9.6 MG/DL (ref 8.3–10.1)
CHLORIDE SERPL-SCNC: 99 MMOL/L (ref 100–108)
CLARITY UR: CLEAR
CO2 SERPL-SCNC: 30 MMOL/L (ref 21–32)
COLOR UR: YELLOW
COLOR, POC: YELLOW
CREAT SERPL-MCNC: 1.08 MG/DL (ref 0.6–1.3)
EOSINOPHIL # BLD AUTO: 0.22 THOUSAND/ΜL (ref 0–0.61)
EOSINOPHIL NFR BLD AUTO: 2 % (ref 0–6)
ERYTHROCYTE [DISTWIDTH] IN BLOOD BY AUTOMATED COUNT: 14.1 % (ref 11.6–15.1)
GFR SERPL CREATININE-BSD FRML MDRD: 55 ML/MIN/1.73SQ M
GLUCOSE SERPL-MCNC: 126 MG/DL (ref 65–140)
GLUCOSE UR STRIP-MCNC: NEGATIVE MG/DL
HCT VFR BLD AUTO: 44.5 % (ref 34.8–46.1)
HGB BLD-MCNC: 14.1 G/DL (ref 11.5–15.4)
HGB UR QL STRIP.AUTO: ABNORMAL
IMM GRANULOCYTES # BLD AUTO: 0.03 THOUSAND/UL (ref 0–0.2)
IMM GRANULOCYTES NFR BLD AUTO: 0 % (ref 0–2)
KETONES UR STRIP-MCNC: ABNORMAL MG/DL
LEUKOCYTE ESTERASE UR QL STRIP: NEGATIVE
LIPASE SERPL-CCNC: 136 U/L (ref 73–393)
LYMPHOCYTES # BLD AUTO: 2.5 THOUSANDS/ΜL (ref 0.6–4.47)
LYMPHOCYTES NFR BLD AUTO: 27 % (ref 14–44)
MCH RBC QN AUTO: 26.3 PG (ref 26.8–34.3)
MCHC RBC AUTO-ENTMCNC: 31.7 G/DL (ref 31.4–37.4)
MCV RBC AUTO: 83 FL (ref 82–98)
MONOCYTES # BLD AUTO: 0.7 THOUSAND/ΜL (ref 0.17–1.22)
MONOCYTES NFR BLD AUTO: 8 % (ref 4–12)
NEUTROPHILS # BLD AUTO: 5.68 THOUSANDS/ΜL (ref 1.85–7.62)
NEUTS SEG NFR BLD AUTO: 63 % (ref 43–75)
NITRITE UR QL STRIP: POSITIVE
NRBC BLD AUTO-RTO: 0 /100 WBCS
PH UR STRIP.AUTO: 5.5 [PH] (ref 4.5–8)
PLATELET # BLD AUTO: 399 THOUSANDS/UL (ref 149–390)
PMV BLD AUTO: 11.2 FL (ref 8.9–12.7)
POTASSIUM SERPL-SCNC: 3.1 MMOL/L (ref 3.5–5.3)
PROT SERPL-MCNC: 7.8 G/DL (ref 6.4–8.2)
PROT UR STRIP-MCNC: NEGATIVE MG/DL
RBC # BLD AUTO: 5.36 MILLION/UL (ref 3.81–5.12)
SARS-COV-2 RNA RESP QL NAA+PROBE: NEGATIVE
SODIUM SERPL-SCNC: 139 MMOL/L (ref 136–145)
SP GR UR STRIP.AUTO: <=1.005 (ref 1–1.03)
UROBILINOGEN UR QL STRIP.AUTO: 0.2 E.U./DL
WBC # BLD AUTO: 9.16 THOUSAND/UL (ref 4.31–10.16)

## 2020-06-08 PROCEDURE — 74177 CT ABD & PELVIS W/CONTRAST: CPT

## 2020-06-08 PROCEDURE — 96375 TX/PRO/DX INJ NEW DRUG ADDON: CPT

## 2020-06-08 PROCEDURE — 99285 EMERGENCY DEPT VISIT HI MDM: CPT | Performed by: EMERGENCY MEDICINE

## 2020-06-08 PROCEDURE — 99285 EMERGENCY DEPT VISIT HI MDM: CPT

## 2020-06-08 PROCEDURE — C9113 INJ PANTOPRAZOLE SODIUM, VIA: HCPCS | Performed by: EMERGENCY MEDICINE

## 2020-06-08 PROCEDURE — 85025 COMPLETE CBC W/AUTO DIFF WBC: CPT | Performed by: EMERGENCY MEDICINE

## 2020-06-08 PROCEDURE — 83690 ASSAY OF LIPASE: CPT | Performed by: EMERGENCY MEDICINE

## 2020-06-08 PROCEDURE — 96361 HYDRATE IV INFUSION ADD-ON: CPT

## 2020-06-08 PROCEDURE — 96366 THER/PROPH/DIAG IV INF ADDON: CPT

## 2020-06-08 PROCEDURE — 96365 THER/PROPH/DIAG IV INF INIT: CPT

## 2020-06-08 PROCEDURE — 80053 COMPREHEN METABOLIC PANEL: CPT | Performed by: EMERGENCY MEDICINE

## 2020-06-08 PROCEDURE — 36415 COLL VENOUS BLD VENIPUNCTURE: CPT | Performed by: EMERGENCY MEDICINE

## 2020-06-08 PROCEDURE — 87635 SARS-COV-2 COVID-19 AMP PRB: CPT | Performed by: SURGERY

## 2020-06-08 PROCEDURE — 99024 POSTOP FOLLOW-UP VISIT: CPT | Performed by: SURGERY

## 2020-06-08 PROCEDURE — 43235 EGD DIAGNOSTIC BRUSH WASH: CPT | Performed by: SURGERY

## 2020-06-08 RX ORDER — PANTOPRAZOLE SODIUM 40 MG/1
40 INJECTION, POWDER, FOR SOLUTION INTRAVENOUS
Status: DISCONTINUED | OUTPATIENT
Start: 2020-06-09 | End: 2020-06-09 | Stop reason: HOSPADM

## 2020-06-08 RX ORDER — ONDANSETRON 2 MG/ML
4 INJECTION INTRAMUSCULAR; INTRAVENOUS ONCE AS NEEDED
Status: DISCONTINUED | OUTPATIENT
Start: 2020-06-08 | End: 2020-06-08 | Stop reason: HOSPADM

## 2020-06-08 RX ORDER — DIPHENHYDRAMINE HYDROCHLORIDE 50 MG/ML
25 INJECTION INTRAMUSCULAR; INTRAVENOUS EVERY 6 HOURS PRN
Status: DISCONTINUED | OUTPATIENT
Start: 2020-06-08 | End: 2020-06-09 | Stop reason: HOSPADM

## 2020-06-08 RX ORDER — LOSARTAN POTASSIUM 50 MG/1
100 TABLET ORAL DAILY
Status: DISCONTINUED | OUTPATIENT
Start: 2020-06-09 | End: 2020-06-09 | Stop reason: HOSPADM

## 2020-06-08 RX ORDER — AMLODIPINE BESYLATE 5 MG/1
5 TABLET ORAL DAILY
Status: DISCONTINUED | OUTPATIENT
Start: 2020-06-09 | End: 2020-06-09 | Stop reason: HOSPADM

## 2020-06-08 RX ORDER — PANTOPRAZOLE SODIUM 40 MG/1
40 INJECTION, POWDER, FOR SOLUTION INTRAVENOUS ONCE
Status: COMPLETED | OUTPATIENT
Start: 2020-06-08 | End: 2020-06-08

## 2020-06-08 RX ORDER — ACETAMINOPHEN 160 MG/5ML
650 SUSPENSION, ORAL (FINAL DOSE FORM) ORAL EVERY 8 HOURS PRN
Status: DISCONTINUED | OUTPATIENT
Start: 2020-06-08 | End: 2020-06-09 | Stop reason: HOSPADM

## 2020-06-08 RX ORDER — MORPHINE SULFATE 4 MG/ML
4 INJECTION, SOLUTION INTRAMUSCULAR; INTRAVENOUS EVERY 2 HOUR PRN
Status: DISCONTINUED | OUTPATIENT
Start: 2020-06-08 | End: 2020-06-09 | Stop reason: HOSPADM

## 2020-06-08 RX ORDER — SODIUM CHLORIDE, SODIUM LACTATE, POTASSIUM CHLORIDE, CALCIUM CHLORIDE 600; 310; 30; 20 MG/100ML; MG/100ML; MG/100ML; MG/100ML
75 INJECTION, SOLUTION INTRAVENOUS CONTINUOUS
Status: DISCONTINUED | OUTPATIENT
Start: 2020-06-08 | End: 2020-06-09 | Stop reason: HOSPADM

## 2020-06-08 RX ORDER — ONDANSETRON 2 MG/ML
4 INJECTION INTRAMUSCULAR; INTRAVENOUS EVERY 6 HOURS PRN
Status: DISCONTINUED | OUTPATIENT
Start: 2020-06-08 | End: 2020-06-09 | Stop reason: HOSPADM

## 2020-06-08 RX ORDER — PROPOFOL 10 MG/ML
INJECTION, EMULSION INTRAVENOUS AS NEEDED
Status: DISCONTINUED | OUTPATIENT
Start: 2020-06-08 | End: 2020-06-08 | Stop reason: SURG

## 2020-06-08 RX ORDER — POTASSIUM CHLORIDE 14.9 MG/ML
20 INJECTION INTRAVENOUS ONCE
Status: DISCONTINUED | OUTPATIENT
Start: 2020-06-08 | End: 2020-06-08

## 2020-06-08 RX ORDER — SUCCINYLCHOLINE/SOD CL,ISO/PF 100 MG/5ML
SYRINGE (ML) INTRAVENOUS AS NEEDED
Status: DISCONTINUED | OUTPATIENT
Start: 2020-06-08 | End: 2020-06-08 | Stop reason: SURG

## 2020-06-08 RX ORDER — SODIUM CHLORIDE, SODIUM LACTATE, POTASSIUM CHLORIDE, CALCIUM CHLORIDE 600; 310; 30; 20 MG/100ML; MG/100ML; MG/100ML; MG/100ML
100 INJECTION, SOLUTION INTRAVENOUS CONTINUOUS
Status: DISCONTINUED | OUTPATIENT
Start: 2020-06-08 | End: 2020-06-09 | Stop reason: HOSPADM

## 2020-06-08 RX ORDER — SIMETHICONE 80 MG
80 TABLET,CHEWABLE ORAL 4 TIMES DAILY PRN
Status: DISCONTINUED | OUTPATIENT
Start: 2020-06-08 | End: 2020-06-09 | Stop reason: HOSPADM

## 2020-06-08 RX ORDER — ONDANSETRON 2 MG/ML
INJECTION INTRAMUSCULAR; INTRAVENOUS AS NEEDED
Status: DISCONTINUED | OUTPATIENT
Start: 2020-06-08 | End: 2020-06-08 | Stop reason: SURG

## 2020-06-08 RX ORDER — ONDANSETRON 2 MG/ML
4 INJECTION INTRAMUSCULAR; INTRAVENOUS EVERY 6 HOURS PRN
Status: DISCONTINUED | OUTPATIENT
Start: 2020-06-08 | End: 2020-06-08 | Stop reason: SDUPTHER

## 2020-06-08 RX ORDER — POTASSIUM CHLORIDE 14.9 MG/ML
20 INJECTION INTRAVENOUS ONCE
Status: COMPLETED | OUTPATIENT
Start: 2020-06-08 | End: 2020-06-08

## 2020-06-08 RX ORDER — ATORVASTATIN CALCIUM 20 MG/1
20 TABLET, FILM COATED ORAL
Status: DISCONTINUED | OUTPATIENT
Start: 2020-06-09 | End: 2020-06-09 | Stop reason: HOSPADM

## 2020-06-08 RX ORDER — FENTANYL CITRATE/PF 50 MCG/ML
25 SYRINGE (ML) INJECTION
Status: DISCONTINUED | OUTPATIENT
Start: 2020-06-08 | End: 2020-06-08 | Stop reason: HOSPADM

## 2020-06-08 RX ORDER — SODIUM CHLORIDE 9 MG/ML
125 INJECTION, SOLUTION INTRAVENOUS CONTINUOUS
Status: DISCONTINUED | OUTPATIENT
Start: 2020-06-08 | End: 2020-06-09 | Stop reason: HOSPADM

## 2020-06-08 RX ORDER — ROCURONIUM BROMIDE 10 MG/ML
INJECTION, SOLUTION INTRAVENOUS AS NEEDED
Status: DISCONTINUED | OUTPATIENT
Start: 2020-06-08 | End: 2020-06-08 | Stop reason: SURG

## 2020-06-08 RX ADMIN — SODIUM CHLORIDE, SODIUM LACTATE, POTASSIUM CHLORIDE, AND CALCIUM CHLORIDE 100 ML/HR: .6; .31; .03; .02 INJECTION, SOLUTION INTRAVENOUS at 20:02

## 2020-06-08 RX ADMIN — IOHEXOL 100 ML: 350 INJECTION, SOLUTION INTRAVENOUS at 15:29

## 2020-06-08 RX ADMIN — ROCURONIUM BROMIDE 5 MG: 10 INJECTION, SOLUTION INTRAVENOUS at 18:32

## 2020-06-08 RX ADMIN — SODIUM CHLORIDE 1000 ML: 0.9 INJECTION, SOLUTION INTRAVENOUS at 13:15

## 2020-06-08 RX ADMIN — Medication 100 MG: at 18:33

## 2020-06-08 RX ADMIN — ONDANSETRON 4 MG: 2 INJECTION INTRAMUSCULAR; INTRAVENOUS at 18:41

## 2020-06-08 RX ADMIN — SODIUM CHLORIDE, SODIUM LACTATE, POTASSIUM CHLORIDE, AND CALCIUM CHLORIDE 75 ML/HR: .6; .31; .03; .02 INJECTION, SOLUTION INTRAVENOUS at 19:46

## 2020-06-08 RX ADMIN — PROPOFOL 200 MG: 10 INJECTION, EMULSION INTRAVENOUS at 18:33

## 2020-06-08 RX ADMIN — IOHEXOL 50 ML: 240 INJECTION, SOLUTION INTRATHECAL; INTRAVASCULAR; INTRAVENOUS; ORAL at 15:29

## 2020-06-08 RX ADMIN — POTASSIUM CHLORIDE 20 MEQ: 14.9 INJECTION, SOLUTION INTRAVENOUS at 14:50

## 2020-06-08 RX ADMIN — PANTOPRAZOLE SODIUM 40 MG: 40 INJECTION, POWDER, FOR SOLUTION INTRAVENOUS at 16:17

## 2020-06-08 RX ADMIN — SODIUM CHLORIDE 125 ML/HR: 0.9 INJECTION, SOLUTION INTRAVENOUS at 18:24

## 2020-06-08 RX ADMIN — Medication 2 SPRAY: at 21:21

## 2020-06-09 VITALS
TEMPERATURE: 96.2 F | HEART RATE: 68 BPM | BODY MASS INDEX: 30.46 KG/M2 | OXYGEN SATURATION: 97 % | DIASTOLIC BLOOD PRESSURE: 79 MMHG | RESPIRATION RATE: 20 BRPM | SYSTOLIC BLOOD PRESSURE: 135 MMHG | WEIGHT: 171.96 LBS
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
## 2020-06-09 PROBLEM — R11.0 NAUSEA: Status: ACTIVE | Noted: 2020-06-09

## 2020-06-09 PROBLEM — K22.2 ESOPHAGEAL OBSTRUCTION DUE TO FOOD IMPACTION: Status: ACTIVE | Noted: 2020-06-09

## 2020-06-09 PROBLEM — T18.128A ESOPHAGEAL OBSTRUCTION DUE TO FOOD IMPACTION: Status: ACTIVE | Noted: 2020-06-09

## 2020-06-09 LAB
ANION GAP SERPL CALCULATED.3IONS-SCNC: 8 MMOL/L (ref 4–13)
BUN SERPL-MCNC: 11 MG/DL (ref 5–25)
CALCIUM SERPL-MCNC: 8.6 MG/DL (ref 8.3–10.1)
CHLORIDE SERPL-SCNC: 107 MMOL/L (ref 100–108)
CO2 SERPL-SCNC: 29 MMOL/L (ref 21–32)
CREAT SERPL-MCNC: 0.83 MG/DL (ref 0.6–1.3)
ERYTHROCYTE [DISTWIDTH] IN BLOOD BY AUTOMATED COUNT: 14.4 % (ref 11.6–15.1)
GFR SERPL CREATININE-BSD FRML MDRD: 75 ML/MIN/1.73SQ M
GLUCOSE P FAST SERPL-MCNC: 92 MG/DL (ref 65–99)
GLUCOSE SERPL-MCNC: 92 MG/DL (ref 65–140)
HCT VFR BLD AUTO: 40.1 % (ref 34.8–46.1)
HGB BLD-MCNC: 12.3 G/DL (ref 11.5–15.4)
MCH RBC QN AUTO: 25.9 PG (ref 26.8–34.3)
MCHC RBC AUTO-ENTMCNC: 30.7 G/DL (ref 31.4–37.4)
MCV RBC AUTO: 85 FL (ref 82–98)
PLATELET # BLD AUTO: 309 THOUSANDS/UL (ref 149–390)
PMV BLD AUTO: 10.5 FL (ref 8.9–12.7)
POTASSIUM SERPL-SCNC: 3.3 MMOL/L (ref 3.5–5.3)
RBC # BLD AUTO: 4.74 MILLION/UL (ref 3.81–5.12)
SODIUM SERPL-SCNC: 144 MMOL/L (ref 136–145)
WBC # BLD AUTO: 8.08 THOUSAND/UL (ref 4.31–10.16)

## 2020-06-09 PROCEDURE — 85027 COMPLETE CBC AUTOMATED: CPT | Performed by: SURGERY

## 2020-06-09 PROCEDURE — NC001 PR NO CHARGE: Performed by: SURGERY

## 2020-06-09 PROCEDURE — 80048 BASIC METABOLIC PNL TOTAL CA: CPT | Performed by: SURGERY

## 2020-06-09 PROCEDURE — 99024 POSTOP FOLLOW-UP VISIT: CPT | Performed by: SURGERY

## 2020-06-09 PROCEDURE — C9113 INJ PANTOPRAZOLE SODIUM, VIA: HCPCS | Performed by: SURGERY

## 2020-06-09 RX ORDER — POLYETHYLENE GLYCOL 3350 17 G/17G
17 POWDER, FOR SOLUTION ORAL DAILY
Qty: 14 EACH | Refills: 0 | Status: SHIPPED | OUTPATIENT
Start: 2020-06-10 | End: 2020-07-01 | Stop reason: SDUPTHER

## 2020-06-09 RX ORDER — ACETAMINOPHEN 160 MG/5ML
650 SUSPENSION, ORAL (FINAL DOSE FORM) ORAL EVERY 8 HOURS PRN
Qty: 118 ML | Refills: 0 | Status: SHIPPED | OUTPATIENT
Start: 2020-06-09 | End: 2020-08-28 | Stop reason: ALTCHOICE

## 2020-06-09 RX ORDER — POLYETHYLENE GLYCOL 3350 17 G/17G
17 POWDER, FOR SOLUTION ORAL DAILY
Status: DISCONTINUED | OUTPATIENT
Start: 2020-06-09 | End: 2020-06-09 | Stop reason: HOSPADM

## 2020-06-09 RX ORDER — DOCUSATE SODIUM 100 MG/1
100 CAPSULE, LIQUID FILLED ORAL 2 TIMES DAILY
Qty: 10 CAPSULE | Refills: 0 | Status: SHIPPED | OUTPATIENT
Start: 2020-06-09 | End: 2020-07-01 | Stop reason: SDUPTHER

## 2020-06-09 RX ADMIN — PANTOPRAZOLE SODIUM 40 MG: 40 INJECTION, POWDER, FOR SOLUTION INTRAVENOUS at 09:13

## 2020-06-09 RX ADMIN — SODIUM CHLORIDE, SODIUM LACTATE, POTASSIUM CHLORIDE, AND CALCIUM CHLORIDE 75 ML/HR: .6; .31; .03; .02 INJECTION, SOLUTION INTRAVENOUS at 04:01

## 2020-06-09 RX ADMIN — LOSARTAN POTASSIUM 100 MG: 50 TABLET, FILM COATED ORAL at 09:12

## 2020-06-09 RX ADMIN — AMLODIPINE BESYLATE 5 MG: 5 TABLET ORAL at 09:12

## 2020-06-09 RX ADMIN — POLYETHYLENE GLYCOL 3350 17 G: 17 POWDER, FOR SOLUTION ORAL at 09:13

## 2020-06-10 ENCOUNTER — TELEPHONE (OUTPATIENT)
Dept: MEDSURG UNIT | Facility: HOSPITAL | Age: 65
End: 2020-06-10

## 2020-06-18 DIAGNOSIS — E78.00 HYPERCHOLESTEROLEMIA: ICD-10-CM

## 2020-06-18 DIAGNOSIS — E61.1 IRON DEFICIENCY: ICD-10-CM

## 2020-06-18 DIAGNOSIS — F51.01 PRIMARY INSOMNIA: ICD-10-CM

## 2020-06-18 RX ORDER — ATORVASTATIN CALCIUM 20 MG/1
20 TABLET, FILM COATED ORAL DAILY
Qty: 90 TABLET | Refills: 1 | Status: SHIPPED | OUTPATIENT
Start: 2020-06-18 | End: 2020-06-23 | Stop reason: SDUPTHER

## 2020-06-18 RX ORDER — HYDROXYZINE HYDROCHLORIDE 25 MG/1
TABLET, FILM COATED ORAL
Qty: 60 TABLET | Refills: 2 | Status: SHIPPED | OUTPATIENT
Start: 2020-06-18 | End: 2020-07-30 | Stop reason: SDUPTHER

## 2020-06-18 RX ORDER — FERROUS SULFATE 325(65) MG
325 TABLET ORAL
Qty: 90 TABLET | Refills: 1 | Status: SHIPPED | OUTPATIENT
Start: 2020-06-18 | End: 2020-06-23 | Stop reason: SDUPTHER

## 2020-06-19 ENCOUNTER — OFFICE VISIT (OUTPATIENT)
Dept: BARIATRICS | Facility: CLINIC | Age: 65
End: 2020-06-19

## 2020-06-19 VITALS
HEART RATE: 102 BPM | SYSTOLIC BLOOD PRESSURE: 122 MMHG | WEIGHT: 170 LBS | DIASTOLIC BLOOD PRESSURE: 78 MMHG | TEMPERATURE: 99.1 F | BODY MASS INDEX: 30.12 KG/M2 | HEIGHT: 63 IN

## 2020-06-19 DIAGNOSIS — K21.9 GASTROESOPHAGEAL REFLUX DISEASE WITHOUT ESOPHAGITIS: Primary | ICD-10-CM

## 2020-06-19 DIAGNOSIS — K22.2 ESOPHAGEAL OBSTRUCTION DUE TO FOOD IMPACTION: ICD-10-CM

## 2020-06-19 DIAGNOSIS — R13.10 DYSPHAGIA, UNSPECIFIED TYPE: ICD-10-CM

## 2020-06-19 DIAGNOSIS — K44.9 PARAESOPHAGEAL HERNIA: ICD-10-CM

## 2020-06-19 DIAGNOSIS — R12 HEARTBURN: ICD-10-CM

## 2020-06-19 DIAGNOSIS — T18.128A ESOPHAGEAL OBSTRUCTION DUE TO FOOD IMPACTION: ICD-10-CM

## 2020-06-19 PROCEDURE — RECHECK: Performed by: DIETITIAN, REGISTERED

## 2020-06-19 PROCEDURE — 3074F SYST BP LT 130 MM HG: CPT | Performed by: SURGERY

## 2020-06-19 PROCEDURE — 99024 POSTOP FOLLOW-UP VISIT: CPT | Performed by: SURGERY

## 2020-06-19 PROCEDURE — 3008F BODY MASS INDEX DOCD: CPT | Performed by: SURGERY

## 2020-06-19 PROCEDURE — 3078F DIAST BP <80 MM HG: CPT | Performed by: SURGERY

## 2020-06-19 RX ORDER — PREDNISONE 20 MG/1
75 TABLET ORAL DAILY
Qty: 20 TABLET | Refills: 0 | Status: SHIPPED | OUTPATIENT
Start: 2020-06-19 | End: 2020-06-24

## 2020-06-23 DIAGNOSIS — I10 BENIGN ESSENTIAL HYPERTENSION: ICD-10-CM

## 2020-06-23 DIAGNOSIS — I10 ESSENTIAL HYPERTENSION: ICD-10-CM

## 2020-06-23 DIAGNOSIS — E61.1 IRON DEFICIENCY: ICD-10-CM

## 2020-06-23 DIAGNOSIS — E78.00 HYPERCHOLESTEROLEMIA: ICD-10-CM

## 2020-06-23 RX ORDER — AMLODIPINE BESYLATE 5 MG/1
5 TABLET ORAL DAILY
Qty: 90 TABLET | Refills: 1 | Status: SHIPPED | OUTPATIENT
Start: 2020-06-23 | End: 2020-07-30 | Stop reason: SDUPTHER

## 2020-06-23 RX ORDER — ATORVASTATIN CALCIUM 20 MG/1
20 TABLET, FILM COATED ORAL DAILY
Qty: 90 TABLET | Refills: 1 | Status: SHIPPED | OUTPATIENT
Start: 2020-06-23 | End: 2020-07-30 | Stop reason: SDUPTHER

## 2020-06-23 RX ORDER — FERROUS SULFATE 325(65) MG
325 TABLET ORAL
Qty: 90 TABLET | Refills: 1 | Status: SHIPPED | OUTPATIENT
Start: 2020-06-23 | End: 2020-07-30 | Stop reason: SDUPTHER

## 2020-06-23 RX ORDER — LOSARTAN POTASSIUM 100 MG/1
100 TABLET ORAL DAILY
Qty: 90 TABLET | Refills: 1 | Status: SHIPPED | OUTPATIENT
Start: 2020-06-23 | End: 2020-07-30 | Stop reason: SDUPTHER

## 2020-07-01 DIAGNOSIS — R11.0 NAUSEA: ICD-10-CM

## 2020-07-01 DIAGNOSIS — R13.10 DYSPHAGIA, UNSPECIFIED TYPE: ICD-10-CM

## 2020-07-01 DIAGNOSIS — K21.9 GASTROESOPHAGEAL REFLUX DISEASE WITHOUT ESOPHAGITIS: ICD-10-CM

## 2020-07-01 RX ORDER — POLYETHYLENE GLYCOL 3350 17 G/17G
17 POWDER, FOR SOLUTION ORAL DAILY
Qty: 14 EACH | Refills: 0 | Status: SHIPPED | OUTPATIENT
Start: 2020-07-01 | End: 2020-08-28 | Stop reason: ALTCHOICE

## 2020-07-01 RX ORDER — OMEPRAZOLE 20 MG/1
20 CAPSULE, DELAYED RELEASE ORAL DAILY
Qty: 30 CAPSULE | Refills: 0 | Status: SHIPPED | OUTPATIENT
Start: 2020-07-01 | End: 2020-07-30 | Stop reason: SDUPTHER

## 2020-07-01 RX ORDER — DOCUSATE SODIUM 100 MG/1
100 CAPSULE, LIQUID FILLED ORAL 2 TIMES DAILY
Qty: 10 CAPSULE | Refills: 0 | Status: SHIPPED | OUTPATIENT
Start: 2020-07-01 | End: 2020-08-28 | Stop reason: ALTCHOICE

## 2020-07-27 DIAGNOSIS — K21.9 GASTROESOPHAGEAL REFLUX DISEASE WITHOUT ESOPHAGITIS: Primary | ICD-10-CM

## 2020-07-27 DIAGNOSIS — I10 ESSENTIAL HYPERTENSION: ICD-10-CM

## 2020-07-27 DIAGNOSIS — I10 BENIGN ESSENTIAL HYPERTENSION: ICD-10-CM

## 2020-07-27 DIAGNOSIS — E78.00 HYPERCHOLESTEROLEMIA: ICD-10-CM

## 2020-07-27 RX ORDER — ATORVASTATIN CALCIUM 20 MG/1
20 TABLET, FILM COATED ORAL DAILY
Qty: 90 TABLET | Refills: 0 | Status: CANCELLED | OUTPATIENT
Start: 2020-07-27

## 2020-07-27 RX ORDER — AMLODIPINE BESYLATE 5 MG/1
5 TABLET ORAL DAILY
Qty: 90 TABLET | Refills: 0 | Status: CANCELLED | OUTPATIENT
Start: 2020-07-27

## 2020-07-27 RX ORDER — LOSARTAN POTASSIUM 100 MG/1
100 TABLET ORAL DAILY
Qty: 90 TABLET | Refills: 0 | Status: CANCELLED | OUTPATIENT
Start: 2020-07-27

## 2020-07-28 NOTE — TELEPHONE ENCOUNTER
Last refill date:   Amlodipine- 6/23/20 # 90 with one refill (refill not due)  Losartan- 6/23/20 # 90 with one refill (refill not due)  Atorvastatin- 6/23/20 # 90 with one refill (refill not due)  Famotidine- 12/10/19 # 90 with one refill  Last appointment: 3/13/20  Next appointment:  No future appointment

## 2020-07-29 RX ORDER — FAMOTIDINE 20 MG/1
20 TABLET, FILM COATED ORAL 2 TIMES DAILY
Qty: 180 TABLET | Refills: 1 | Status: SHIPPED | OUTPATIENT
Start: 2020-07-29 | End: 2020-07-30 | Stop reason: SDUPTHER

## 2020-07-30 DIAGNOSIS — E78.00 HYPERCHOLESTEROLEMIA: ICD-10-CM

## 2020-07-30 DIAGNOSIS — K21.9 GASTROESOPHAGEAL REFLUX DISEASE WITHOUT ESOPHAGITIS: ICD-10-CM

## 2020-07-30 DIAGNOSIS — I10 BENIGN ESSENTIAL HYPERTENSION: ICD-10-CM

## 2020-07-30 DIAGNOSIS — I10 ESSENTIAL HYPERTENSION: ICD-10-CM

## 2020-07-30 DIAGNOSIS — E61.1 IRON DEFICIENCY: ICD-10-CM

## 2020-07-30 DIAGNOSIS — F51.01 PRIMARY INSOMNIA: ICD-10-CM

## 2020-07-30 RX ORDER — FERROUS SULFATE 325(65) MG
325 TABLET ORAL
Qty: 90 TABLET | Refills: 1 | Status: SHIPPED | OUTPATIENT
Start: 2020-07-30 | End: 2022-04-20 | Stop reason: ALTCHOICE

## 2020-07-30 RX ORDER — AMLODIPINE BESYLATE 5 MG/1
5 TABLET ORAL DAILY
Qty: 90 TABLET | Refills: 1 | Status: SHIPPED | OUTPATIENT
Start: 2020-07-30 | End: 2020-12-02

## 2020-07-30 RX ORDER — OMEPRAZOLE 20 MG/1
20 CAPSULE, DELAYED RELEASE ORAL DAILY
Qty: 30 CAPSULE | Refills: 0 | Status: SHIPPED | OUTPATIENT
Start: 2020-07-30 | End: 2020-08-28 | Stop reason: ALTCHOICE

## 2020-07-30 RX ORDER — LOSARTAN POTASSIUM 100 MG/1
100 TABLET ORAL DAILY
Qty: 90 TABLET | Refills: 1 | Status: SHIPPED | OUTPATIENT
Start: 2020-07-30 | End: 2020-12-02

## 2020-07-30 RX ORDER — FAMOTIDINE 20 MG/1
20 TABLET, FILM COATED ORAL 2 TIMES DAILY
Qty: 180 TABLET | Refills: 1 | Status: SHIPPED | OUTPATIENT
Start: 2020-07-30 | End: 2020-10-15 | Stop reason: SDUPTHER

## 2020-07-30 RX ORDER — HYDROXYZINE HYDROCHLORIDE 25 MG/1
TABLET, FILM COATED ORAL
Qty: 60 TABLET | Refills: 1 | Status: SHIPPED | OUTPATIENT
Start: 2020-07-30 | End: 2020-10-02

## 2020-07-30 RX ORDER — ATORVASTATIN CALCIUM 20 MG/1
20 TABLET, FILM COATED ORAL DAILY
Qty: 90 TABLET | Refills: 1 | Status: SHIPPED | OUTPATIENT
Start: 2020-07-30 | End: 2020-12-02

## 2020-07-30 NOTE — TELEPHONE ENCOUNTER
All medications need refills as pharmacy has changed    Last appointment: 3/13/20  Next appointment: no future appointment

## 2020-08-28 ENCOUNTER — OFFICE VISIT (OUTPATIENT)
Dept: BARIATRICS | Facility: CLINIC | Age: 65
End: 2020-08-28

## 2020-08-28 VITALS
DIASTOLIC BLOOD PRESSURE: 74 MMHG | WEIGHT: 150.5 LBS | SYSTOLIC BLOOD PRESSURE: 130 MMHG | TEMPERATURE: 98.8 F | HEIGHT: 63 IN | BODY MASS INDEX: 26.67 KG/M2 | HEART RATE: 89 BPM

## 2020-08-28 DIAGNOSIS — K44.9 HIATAL HERNIA: Primary | ICD-10-CM

## 2020-08-28 PROCEDURE — 3075F SYST BP GE 130 - 139MM HG: CPT | Performed by: SURGERY

## 2020-08-28 PROCEDURE — 3078F DIAST BP <80 MM HG: CPT | Performed by: SURGERY

## 2020-08-28 PROCEDURE — 99024 POSTOP FOLLOW-UP VISIT: CPT | Performed by: SURGERY

## 2020-08-28 PROCEDURE — 3008F BODY MASS INDEX DOCD: CPT | Performed by: SURGERY

## 2020-08-28 NOTE — PROGRESS NOTES
POST OP UP VISIT - BARIATRIC SURGERY  Maribeth Avalos 63 y o  female MRN: [id number]  Unit/Bed#:  Encounter: [id number]      HPI:  Maribeth Avalos is a 63 y o  female status post robotic PEHR and Nissen fundoplication by Dr Geneva Shipman 2020   Her postoperative course was complicated by a food bolus in the distal esophagus causing a partial obstruction requiring readmission and upper endoscopy with removal of food bolus  She now presents to the office for follow-up    she has no heart pain, of medication  Patient report intermittent food stuck in her throat  She also report 34 lb weight loss  She denies abdominal pain, nausea, vomiting, bloating  Review of Systems   Constitutional: Negative for chills and fatigue  HENT: Negative for ear pain  Eyes: Negative for pain  Respiratory: Negative for cough, shortness of breath and wheezing  Cardiovascular: Negative for chest pain  Gastrointestinal: Negative for abdominal distention and abdominal pain  Trouble swallowing   Endocrine: Negative for polydipsia  Genitourinary: Negative for flank pain  Musculoskeletal: Negative for arthralgias and back pain  Skin: Negative for pallor  Allergic/Immunologic: Negative for food allergies  Neurological: Negative for weakness and headaches  Hematological: Does not bruise/bleed easily  Psychiatric/Behavioral: Negative for confusion         Historical Information   Past Medical History:   Diagnosis Date    Anemia     Arthritis     Chronic pain disorder     back and hip pain    Disease of thyroid gland     nodules    GERD (gastroesophageal reflux disease)     Heart murmur     Heartburn     Hiatal hernia     Hyperlipidemia     Hypertension     Overactive bladder     Vitamin D deficiency     unsure    Wears dentures     upper    Wears glasses      Past Surgical History:   Procedure Laterality Date     SECTION      Last Assessed: 2017    ELBOW SURGERY      Last Assessed: 2017    ESOPHAGOGASTRODUODENOSCOPY N/A 2017    Procedure: ESOPHAGOGASTRODUODENOSCOPY (EGD); Surgeon: Tanya Austin DO;  Location: [facility]; Service: Gastroenterology    HIP SURGERY Left 2018    glut medius/minimus repair  and 18    HYSTERECTOMY          KNEE SURGERY      x2    PARAESOPHAGEAL HERNIA REPAIR N/A 2020    Procedure: LAPAROSCOPIC PARAESOPHAGEAL HERNIA REPAIR WITH MESH AND NISSEN FUNDOPLICATION WITH ROBOTICS;  Surgeon: Makenzie Velasco MD;  [facility];  Service: General    LA COLONOSCOPY FLX DX W/COLLJ SPEC WHEN PFRMD N/A 2017    Procedure: EGD AND COLONOSCOPY;  Surgeon: Tanya Austin DO;  Location: [facility]; Service: Gastroenterology     Social History   Social History     Substance and Sexual Activity   Alcohol Use No     Social History     Substance and Sexual Activity   Drug Use No     Social History     Tobacco Use   Smoking Status Former Smoker    Last attempt to quit: Agusto Angel Years since quittin 6   Smokeless Tobacco Never Used   Tobacco Comment    quit 25 yrs ago     Family History: non-contributory    Meds/Allergies   all medications and allergies reviewed  No Known Allergies    Objective       Current Vitals:   Blood Pressure: 130/74 (20)  Pulse: 89 (20)  Temperature: 98 8 °F (37 1 °C) (20)  Height: 5' 3" (160 cm) (20)  Weight - Scale: 68 3 kg (150 lb 8 oz) (20)      Invasive Devices     None                 Physical Exam  Constitutional:       Appearance: Normal appearance  HENT:      Head: Normocephalic  Mouth/Throat:      Mouth: Mucous membranes are moist    Eyes:      Conjunctiva/sclera: Conjunctivae normal       Pupils: Pupils are equal, round, and reactive to light  Neck:      Musculoskeletal: Normal range of motion  Cardiovascular:      Rate and Rhythm: Normal rate and regular rhythm     Pulmonary:      Effort: Pulmonary effort is normal  Abdominal:      General: Abdomen is flat  There is no distension  Tenderness: There is no abdominal tenderness  Comments: Incisions healed   Musculoskeletal: Normal range of motion  Skin:     General: Skin is warm  Capillary Refill: Capillary refill takes less than 2 seconds  Neurological:      General: No focal deficit present  Mental Status: She is alert  Psychiatric:         Mood and Affect: Mood normal              Pathology, and Other Studies: I have personally reviewed pertinent reports  Assessment/PLAN:    63 y o  female status post robotic PEHR and Nissen fundoplication by Dr James Sinclair 5/19/2020  She report intermittent dysphagia  She lost 34 lb  Follow-up within 3 months  Advanced diet as tolerated                  Jennifer Marie MD  8/28/2020  10:12 AM

## 2020-10-01 DIAGNOSIS — F51.01 PRIMARY INSOMNIA: ICD-10-CM

## 2020-10-02 RX ORDER — HYDROXYZINE HYDROCHLORIDE 25 MG/1
TABLET, FILM COATED ORAL
Qty: 60 TABLET | Refills: 1 | Status: SHIPPED | OUTPATIENT
Start: 2020-10-02 | End: 2020-11-29

## 2020-10-15 DIAGNOSIS — K21.9 GASTROESOPHAGEAL REFLUX DISEASE WITHOUT ESOPHAGITIS: ICD-10-CM

## 2020-10-16 RX ORDER — FAMOTIDINE 20 MG/1
20 TABLET, FILM COATED ORAL 2 TIMES DAILY
Qty: 180 TABLET | Refills: 0 | Status: SHIPPED | OUTPATIENT
Start: 2020-10-16 | End: 2021-01-01 | Stop reason: SDUPTHER

## 2020-11-18 ENCOUNTER — EVALUATION (OUTPATIENT)
Dept: PHYSICAL THERAPY | Facility: CLINIC | Age: 65
End: 2020-11-18
Payer: COMMERCIAL

## 2020-11-18 ENCOUNTER — TRANSCRIBE ORDERS (OUTPATIENT)
Dept: PHYSICAL THERAPY | Facility: CLINIC | Age: 65
End: 2020-11-18

## 2020-11-18 ENCOUNTER — OFFICE VISIT (OUTPATIENT)
Dept: BARIATRICS | Facility: CLINIC | Age: 65
End: 2020-11-18
Payer: COMMERCIAL

## 2020-11-18 VITALS
WEIGHT: 149 LBS | RESPIRATION RATE: 18 BRPM | TEMPERATURE: 97.6 F | HEIGHT: 63 IN | DIASTOLIC BLOOD PRESSURE: 66 MMHG | BODY MASS INDEX: 26.4 KG/M2 | SYSTOLIC BLOOD PRESSURE: 128 MMHG | HEART RATE: 76 BPM

## 2020-11-18 DIAGNOSIS — K44.9 PARAESOPHAGEAL HERNIA: Primary | ICD-10-CM

## 2020-11-18 DIAGNOSIS — G57.02 PIRIFORMIS SYNDROME OF LEFT SIDE: ICD-10-CM

## 2020-11-18 DIAGNOSIS — R26.2 DIFFICULTY WALKING: ICD-10-CM

## 2020-11-18 DIAGNOSIS — M25.552 LEFT HIP PAIN: Primary | ICD-10-CM

## 2020-11-18 DIAGNOSIS — M70.62 TROCHANTERIC BURSITIS OF LEFT HIP: ICD-10-CM

## 2020-11-18 PROCEDURE — 3078F DIAST BP <80 MM HG: CPT | Performed by: SURGERY

## 2020-11-18 PROCEDURE — 1036F TOBACCO NON-USER: CPT | Performed by: SURGERY

## 2020-11-18 PROCEDURE — 3074F SYST BP LT 130 MM HG: CPT | Performed by: SURGERY

## 2020-11-18 PROCEDURE — 97163 PT EVAL HIGH COMPLEX 45 MIN: CPT | Performed by: PHYSICAL THERAPIST

## 2020-11-18 PROCEDURE — 97112 NEUROMUSCULAR REEDUCATION: CPT | Performed by: PHYSICAL THERAPIST

## 2020-11-18 PROCEDURE — 99214 OFFICE O/P EST MOD 30 MIN: CPT | Performed by: SURGERY

## 2020-11-18 PROCEDURE — 3008F BODY MASS INDEX DOCD: CPT | Performed by: SURGERY

## 2020-11-19 ENCOUNTER — TELEPHONE (OUTPATIENT)
Dept: FAMILY MEDICINE CLINIC | Facility: CLINIC | Age: 65
End: 2020-11-19

## 2020-11-19 DIAGNOSIS — E06.3 HASHIMOTO'S THYROIDITIS: Primary | ICD-10-CM

## 2020-11-20 ENCOUNTER — LAB (OUTPATIENT)
Dept: LAB | Facility: CLINIC | Age: 65
End: 2020-11-20
Payer: COMMERCIAL

## 2020-11-20 DIAGNOSIS — E06.3 HASHIMOTO'S THYROIDITIS: ICD-10-CM

## 2020-11-20 LAB
T4 FREE SERPL-MCNC: 1.13 NG/DL (ref 0.76–1.46)
TSH SERPL DL<=0.05 MIU/L-ACNC: 0.81 UIU/ML (ref 0.36–3.74)

## 2020-11-20 PROCEDURE — 36415 COLL VENOUS BLD VENIPUNCTURE: CPT

## 2020-11-20 PROCEDURE — 84439 ASSAY OF FREE THYROXINE: CPT

## 2020-11-20 PROCEDURE — 84443 ASSAY THYROID STIM HORMONE: CPT

## 2020-11-23 ENCOUNTER — TELEPHONE (OUTPATIENT)
Dept: FAMILY MEDICINE CLINIC | Facility: CLINIC | Age: 65
End: 2020-11-23

## 2020-11-25 ENCOUNTER — APPOINTMENT (OUTPATIENT)
Dept: PHYSICAL THERAPY | Facility: CLINIC | Age: 65
End: 2020-11-25
Payer: COMMERCIAL

## 2020-11-27 ENCOUNTER — APPOINTMENT (OUTPATIENT)
Dept: PHYSICAL THERAPY | Facility: CLINIC | Age: 65
End: 2020-11-27
Payer: COMMERCIAL

## 2020-11-28 DIAGNOSIS — F51.01 PRIMARY INSOMNIA: ICD-10-CM

## 2020-11-29 RX ORDER — HYDROXYZINE HYDROCHLORIDE 25 MG/1
TABLET, FILM COATED ORAL
Qty: 60 TABLET | Refills: 1 | Status: SHIPPED | OUTPATIENT
Start: 2020-11-29 | End: 2021-01-01 | Stop reason: SDUPTHER

## 2020-11-30 ENCOUNTER — OFFICE VISIT (OUTPATIENT)
Dept: PHYSICAL THERAPY | Facility: CLINIC | Age: 65
End: 2020-11-30
Payer: COMMERCIAL

## 2020-11-30 DIAGNOSIS — G57.02 PIRIFORMIS SYNDROME OF LEFT SIDE: ICD-10-CM

## 2020-11-30 DIAGNOSIS — M70.62 TROCHANTERIC BURSITIS OF LEFT HIP: ICD-10-CM

## 2020-11-30 DIAGNOSIS — R26.2 DIFFICULTY WALKING: ICD-10-CM

## 2020-11-30 DIAGNOSIS — M25.552 LEFT HIP PAIN: Primary | ICD-10-CM

## 2020-11-30 PROCEDURE — 97110 THERAPEUTIC EXERCISES: CPT | Performed by: PHYSICAL THERAPIST

## 2020-11-30 PROCEDURE — 97112 NEUROMUSCULAR REEDUCATION: CPT | Performed by: PHYSICAL THERAPIST

## 2020-11-30 PROCEDURE — 97116 GAIT TRAINING THERAPY: CPT | Performed by: PHYSICAL THERAPIST

## 2020-12-02 ENCOUNTER — OFFICE VISIT (OUTPATIENT)
Dept: FAMILY MEDICINE CLINIC | Facility: CLINIC | Age: 65
End: 2020-12-02
Payer: COMMERCIAL

## 2020-12-02 VITALS
BODY MASS INDEX: 26.12 KG/M2 | DIASTOLIC BLOOD PRESSURE: 82 MMHG | WEIGHT: 153 LBS | HEART RATE: 88 BPM | HEIGHT: 64 IN | TEMPERATURE: 98 F | OXYGEN SATURATION: 99 % | SYSTOLIC BLOOD PRESSURE: 136 MMHG

## 2020-12-02 DIAGNOSIS — Z86.39 HISTORY OF VITAMIN B DEFICIENCY: ICD-10-CM

## 2020-12-02 DIAGNOSIS — Z12.31 BREAST CANCER SCREENING BY MAMMOGRAM: ICD-10-CM

## 2020-12-02 DIAGNOSIS — Z86.19 H/O COLD SORES: ICD-10-CM

## 2020-12-02 DIAGNOSIS — E78.00 HYPERCHOLESTEROLEMIA: ICD-10-CM

## 2020-12-02 DIAGNOSIS — Z23 NEED FOR INFLUENZA VACCINATION: ICD-10-CM

## 2020-12-02 DIAGNOSIS — Z00.00 ANNUAL PHYSICAL EXAM: Primary | ICD-10-CM

## 2020-12-02 DIAGNOSIS — Z86.79 HISTORY OF HYPERTENSION: ICD-10-CM

## 2020-12-02 PROCEDURE — 90682 RIV4 VACC RECOMBINANT DNA IM: CPT

## 2020-12-02 PROCEDURE — 3079F DIAST BP 80-89 MM HG: CPT | Performed by: PHYSICIAN ASSISTANT

## 2020-12-02 PROCEDURE — 3075F SYST BP GE 130 - 139MM HG: CPT | Performed by: PHYSICIAN ASSISTANT

## 2020-12-02 PROCEDURE — 90471 IMMUNIZATION ADMIN: CPT

## 2020-12-02 PROCEDURE — 3008F BODY MASS INDEX DOCD: CPT | Performed by: PHYSICIAN ASSISTANT

## 2020-12-02 PROCEDURE — 1036F TOBACCO NON-USER: CPT | Performed by: PHYSICIAN ASSISTANT

## 2020-12-02 PROCEDURE — 3725F SCREEN DEPRESSION PERFORMED: CPT | Performed by: PHYSICIAN ASSISTANT

## 2020-12-02 PROCEDURE — 99396 PREV VISIT EST AGE 40-64: CPT | Performed by: PHYSICIAN ASSISTANT

## 2020-12-02 RX ORDER — METHENAMINE HIPPURATE 1000 MG/1
1 TABLET ORAL
COMMUNITY
Start: 2020-09-23 | End: 2020-12-22

## 2020-12-02 RX ORDER — MELOXICAM 7.5 MG/1
7.5 TABLET ORAL
COMMUNITY
Start: 2020-11-13 | End: 2021-04-09 | Stop reason: ALTCHOICE

## 2020-12-02 RX ORDER — DEXAMETHASONE 0.5 MG/5ML
5 ELIXIR ORAL 3 TIMES DAILY PRN
Qty: 237 ML | Refills: 1 | Status: CANCELLED | OUTPATIENT
Start: 2020-12-02

## 2020-12-02 RX ORDER — VALACYCLOVIR HYDROCHLORIDE 1 G/1
1000 TABLET, FILM COATED ORAL 2 TIMES DAILY
Qty: 6 TABLET | Refills: 3 | Status: SHIPPED | OUTPATIENT
Start: 2020-12-02 | End: 2021-04-14 | Stop reason: SDUPTHER

## 2020-12-03 ENCOUNTER — LAB (OUTPATIENT)
Dept: LAB | Facility: CLINIC | Age: 65
End: 2020-12-03
Payer: COMMERCIAL

## 2020-12-03 ENCOUNTER — OFFICE VISIT (OUTPATIENT)
Dept: PHYSICAL THERAPY | Facility: CLINIC | Age: 65
End: 2020-12-03
Payer: COMMERCIAL

## 2020-12-03 DIAGNOSIS — E78.00 HYPERCHOLESTEROLEMIA: ICD-10-CM

## 2020-12-03 DIAGNOSIS — Z86.19 H/O COLD SORES: ICD-10-CM

## 2020-12-03 DIAGNOSIS — Z86.39 HISTORY OF VITAMIN B DEFICIENCY: ICD-10-CM

## 2020-12-03 DIAGNOSIS — R26.2 DIFFICULTY WALKING: ICD-10-CM

## 2020-12-03 DIAGNOSIS — M25.552 LEFT HIP PAIN: Primary | ICD-10-CM

## 2020-12-03 DIAGNOSIS — G57.02 PIRIFORMIS SYNDROME OF LEFT SIDE: ICD-10-CM

## 2020-12-03 DIAGNOSIS — M70.62 TROCHANTERIC BURSITIS OF LEFT HIP: ICD-10-CM

## 2020-12-03 LAB
CHOLEST SERPL-MCNC: 288 MG/DL (ref 50–200)
HDLC SERPL-MCNC: 60 MG/DL
LDLC SERPL CALC-MCNC: 202 MG/DL (ref 0–100)
NONHDLC SERPL-MCNC: 228 MG/DL
TRIGL SERPL-MCNC: 128 MG/DL
VIT B12 SERPL-MCNC: 394 PG/ML (ref 100–900)

## 2020-12-03 PROCEDURE — 97110 THERAPEUTIC EXERCISES: CPT | Performed by: PHYSICAL THERAPIST

## 2020-12-03 PROCEDURE — 97140 MANUAL THERAPY 1/> REGIONS: CPT | Performed by: PHYSICAL THERAPIST

## 2020-12-03 PROCEDURE — 97530 THERAPEUTIC ACTIVITIES: CPT | Performed by: PHYSICAL THERAPIST

## 2020-12-03 PROCEDURE — 97112 NEUROMUSCULAR REEDUCATION: CPT | Performed by: PHYSICAL THERAPIST

## 2020-12-03 PROCEDURE — 82607 VITAMIN B-12: CPT

## 2020-12-03 PROCEDURE — 80061 LIPID PANEL: CPT

## 2020-12-03 PROCEDURE — 36415 COLL VENOUS BLD VENIPUNCTURE: CPT

## 2020-12-06 DIAGNOSIS — E78.5 HYPERLIPIDEMIA, UNSPECIFIED HYPERLIPIDEMIA TYPE: Primary | ICD-10-CM

## 2020-12-06 RX ORDER — ATORVASTATIN CALCIUM 10 MG/1
10 TABLET, FILM COATED ORAL DAILY
Qty: 90 TABLET | Refills: 1 | Status: SHIPPED | OUTPATIENT
Start: 2020-12-06 | End: 2021-08-22 | Stop reason: SDUPTHER

## 2020-12-09 ENCOUNTER — APPOINTMENT (OUTPATIENT)
Dept: PHYSICAL THERAPY | Facility: CLINIC | Age: 65
End: 2020-12-09
Payer: COMMERCIAL

## 2020-12-10 DIAGNOSIS — I10 ESSENTIAL HYPERTENSION: ICD-10-CM

## 2020-12-10 RX ORDER — LOSARTAN POTASSIUM 100 MG/1
TABLET ORAL
Qty: 90 TABLET | Refills: 1 | OUTPATIENT
Start: 2020-12-10

## 2020-12-11 ENCOUNTER — APPOINTMENT (OUTPATIENT)
Dept: PHYSICAL THERAPY | Facility: CLINIC | Age: 65
End: 2020-12-11
Payer: COMMERCIAL

## 2020-12-16 ENCOUNTER — OFFICE VISIT (OUTPATIENT)
Dept: PHYSICAL THERAPY | Facility: CLINIC | Age: 65
End: 2020-12-16
Payer: COMMERCIAL

## 2020-12-16 DIAGNOSIS — G57.02 PIRIFORMIS SYNDROME OF LEFT SIDE: ICD-10-CM

## 2020-12-16 DIAGNOSIS — M70.62 TROCHANTERIC BURSITIS OF LEFT HIP: ICD-10-CM

## 2020-12-16 DIAGNOSIS — R26.2 DIFFICULTY WALKING: ICD-10-CM

## 2020-12-16 DIAGNOSIS — M25.552 LEFT HIP PAIN: Primary | ICD-10-CM

## 2020-12-16 PROCEDURE — 97530 THERAPEUTIC ACTIVITIES: CPT | Performed by: PHYSICAL THERAPIST

## 2020-12-16 PROCEDURE — 97110 THERAPEUTIC EXERCISES: CPT | Performed by: PHYSICAL THERAPIST

## 2020-12-16 PROCEDURE — 97112 NEUROMUSCULAR REEDUCATION: CPT | Performed by: PHYSICAL THERAPIST

## 2020-12-18 ENCOUNTER — OFFICE VISIT (OUTPATIENT)
Dept: PHYSICAL THERAPY | Facility: CLINIC | Age: 65
End: 2020-12-18
Payer: COMMERCIAL

## 2020-12-18 DIAGNOSIS — G57.02 PIRIFORMIS SYNDROME OF LEFT SIDE: ICD-10-CM

## 2020-12-18 DIAGNOSIS — R26.2 DIFFICULTY WALKING: ICD-10-CM

## 2020-12-18 DIAGNOSIS — M70.62 TROCHANTERIC BURSITIS OF LEFT HIP: ICD-10-CM

## 2020-12-18 DIAGNOSIS — M25.552 LEFT HIP PAIN: Primary | ICD-10-CM

## 2020-12-18 PROCEDURE — 97112 NEUROMUSCULAR REEDUCATION: CPT | Performed by: PHYSICAL THERAPIST

## 2020-12-18 PROCEDURE — 97110 THERAPEUTIC EXERCISES: CPT | Performed by: PHYSICAL THERAPIST

## 2020-12-18 PROCEDURE — 97140 MANUAL THERAPY 1/> REGIONS: CPT | Performed by: PHYSICAL THERAPIST

## 2020-12-21 ENCOUNTER — OFFICE VISIT (OUTPATIENT)
Dept: PHYSICAL THERAPY | Facility: CLINIC | Age: 65
End: 2020-12-21
Payer: COMMERCIAL

## 2020-12-21 DIAGNOSIS — M25.552 LEFT HIP PAIN: Primary | ICD-10-CM

## 2020-12-21 DIAGNOSIS — G57.02 PIRIFORMIS SYNDROME OF LEFT SIDE: ICD-10-CM

## 2020-12-21 DIAGNOSIS — R26.2 DIFFICULTY WALKING: ICD-10-CM

## 2020-12-21 DIAGNOSIS — M70.62 TROCHANTERIC BURSITIS OF LEFT HIP: ICD-10-CM

## 2020-12-21 PROCEDURE — 97112 NEUROMUSCULAR REEDUCATION: CPT | Performed by: PHYSICAL THERAPIST

## 2020-12-21 PROCEDURE — 97140 MANUAL THERAPY 1/> REGIONS: CPT | Performed by: PHYSICAL THERAPIST

## 2020-12-21 PROCEDURE — 97110 THERAPEUTIC EXERCISES: CPT | Performed by: PHYSICAL THERAPIST

## 2020-12-21 PROCEDURE — 97116 GAIT TRAINING THERAPY: CPT | Performed by: PHYSICAL THERAPIST

## 2020-12-30 ENCOUNTER — EVALUATION (OUTPATIENT)
Dept: PHYSICAL THERAPY | Facility: CLINIC | Age: 65
End: 2020-12-30
Payer: COMMERCIAL

## 2020-12-30 DIAGNOSIS — G57.02 PIRIFORMIS SYNDROME OF LEFT SIDE: ICD-10-CM

## 2020-12-30 DIAGNOSIS — M25.552 LEFT HIP PAIN: Primary | ICD-10-CM

## 2020-12-30 DIAGNOSIS — M70.62 TROCHANTERIC BURSITIS OF LEFT HIP: ICD-10-CM

## 2020-12-30 DIAGNOSIS — R26.2 DIFFICULTY WALKING: ICD-10-CM

## 2020-12-30 PROCEDURE — 97116 GAIT TRAINING THERAPY: CPT | Performed by: PHYSICAL THERAPIST

## 2020-12-30 PROCEDURE — 97110 THERAPEUTIC EXERCISES: CPT | Performed by: PHYSICAL THERAPIST

## 2020-12-30 PROCEDURE — 97112 NEUROMUSCULAR REEDUCATION: CPT | Performed by: PHYSICAL THERAPIST

## 2020-12-30 PROCEDURE — 97014 ELECTRIC STIMULATION THERAPY: CPT | Performed by: PHYSICAL THERAPIST

## 2021-01-01 DIAGNOSIS — F51.01 PRIMARY INSOMNIA: ICD-10-CM

## 2021-01-01 DIAGNOSIS — K21.9 GASTROESOPHAGEAL REFLUX DISEASE WITHOUT ESOPHAGITIS: ICD-10-CM

## 2021-01-04 RX ORDER — FAMOTIDINE 20 MG/1
20 TABLET, FILM COATED ORAL 2 TIMES DAILY
Qty: 180 TABLET | Refills: 0 | Status: SHIPPED | OUTPATIENT
Start: 2021-01-04 | End: 2021-04-28

## 2021-01-04 RX ORDER — HYDROXYZINE HYDROCHLORIDE 25 MG/1
TABLET, FILM COATED ORAL
Qty: 60 TABLET | Refills: 0 | OUTPATIENT
Start: 2021-01-04

## 2021-01-04 RX ORDER — HYDROXYZINE HYDROCHLORIDE 25 MG/1
25-50 TABLET, FILM COATED ORAL
Qty: 60 TABLET | Refills: 1 | Status: SHIPPED | OUTPATIENT
Start: 2021-01-04 | End: 2021-02-28

## 2021-02-28 DIAGNOSIS — F51.01 PRIMARY INSOMNIA: ICD-10-CM

## 2021-02-28 RX ORDER — HYDROXYZINE HYDROCHLORIDE 25 MG/1
25-50 TABLET, FILM COATED ORAL
Qty: 60 TABLET | Refills: 1 | Status: SHIPPED | OUTPATIENT
Start: 2021-02-28 | End: 2021-05-03

## 2021-03-31 ENCOUNTER — TRANSCRIBE ORDERS (OUTPATIENT)
Dept: PHYSICAL THERAPY | Facility: CLINIC | Age: 66
End: 2021-03-31

## 2021-03-31 ENCOUNTER — EVALUATION (OUTPATIENT)
Dept: PHYSICAL THERAPY | Facility: CLINIC | Age: 66
End: 2021-03-31
Payer: MEDICARE

## 2021-03-31 ENCOUNTER — TELEPHONE (OUTPATIENT)
Dept: OBGYN CLINIC | Facility: MEDICAL CENTER | Age: 66
End: 2021-03-31

## 2021-03-31 DIAGNOSIS — M79.18 MYOFASCIAL PAIN: Primary | ICD-10-CM

## 2021-03-31 DIAGNOSIS — M54.2 CERVICAL PAIN: ICD-10-CM

## 2021-03-31 DIAGNOSIS — M79.601 RIGHT UPPER LIMB PAIN: ICD-10-CM

## 2021-03-31 PROCEDURE — 97112 NEUROMUSCULAR REEDUCATION: CPT | Performed by: PHYSICAL THERAPIST

## 2021-03-31 PROCEDURE — 97163 PT EVAL HIGH COMPLEX 45 MIN: CPT | Performed by: PHYSICAL THERAPIST

## 2021-03-31 PROCEDURE — 97110 THERAPEUTIC EXERCISES: CPT | Performed by: PHYSICAL THERAPIST
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
## 2021-03-31 NOTE — TELEPHONE ENCOUNTER
Patient sees Dr Feli Mathis  Patient is calling in asking if Dr Stephen Mcneal office can retrieve her records for her  Please give her a call to let her know how she can have the medical release signed to have the  office fax over records to Dr Araceli Sharma       # [phone]

## 2021-03-31 NOTE — TELEPHONE ENCOUNTER
Will reach out to [facility] to have her chart scanned over  Will call patient to have her stop by one of the offices to sign the medical release
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
## 2021-04-09 ENCOUNTER — OFFICE VISIT (OUTPATIENT)
Dept: PHYSICAL THERAPY | Facility: CLINIC | Age: 66
End: 2021-04-09
Payer: MEDICARE

## 2021-04-09 ENCOUNTER — OFFICE VISIT (OUTPATIENT)
Dept: OBGYN CLINIC | Facility: CLINIC | Age: 66
End: 2021-04-09
Payer: MEDICARE

## 2021-04-09 VITALS
DIASTOLIC BLOOD PRESSURE: 100 MMHG | HEIGHT: 64 IN | HEART RATE: 70 BPM | BODY MASS INDEX: 25.95 KG/M2 | WEIGHT: 152 LBS | SYSTOLIC BLOOD PRESSURE: 163 MMHG

## 2021-04-09 DIAGNOSIS — M54.2 CERVICAL PAIN: ICD-10-CM

## 2021-04-09 DIAGNOSIS — M16.12 ARTHRITIS OF LEFT HIP: Primary | ICD-10-CM

## 2021-04-09 DIAGNOSIS — M79.601 RIGHT UPPER LIMB PAIN: ICD-10-CM

## 2021-04-09 DIAGNOSIS — M70.62 TROCHANTERIC BURSITIS OF LEFT HIP: ICD-10-CM

## 2021-04-09 DIAGNOSIS — M79.18 MYOFASCIAL PAIN: Primary | ICD-10-CM

## 2021-04-09 PROCEDURE — 99213 OFFICE O/P EST LOW 20 MIN: CPT | Performed by: ORTHOPAEDIC SURGERY

## 2021-04-09 PROCEDURE — 97110 THERAPEUTIC EXERCISES: CPT | Performed by: PHYSICAL THERAPIST

## 2021-04-09 PROCEDURE — 20610 DRAIN/INJ JOINT/BURSA W/O US: CPT | Performed by: ORTHOPAEDIC SURGERY

## 2021-04-09 PROCEDURE — 97112 NEUROMUSCULAR REEDUCATION: CPT | Performed by: PHYSICAL THERAPIST

## 2021-04-09 PROCEDURE — 97140 MANUAL THERAPY 1/> REGIONS: CPT | Performed by: PHYSICAL THERAPIST

## 2021-04-09 RX ORDER — METHYLPREDNISOLONE ACETATE 40 MG/ML
2 INJECTION, SUSPENSION INTRA-ARTICULAR; INTRALESIONAL; INTRAMUSCULAR; SOFT TISSUE
Status: COMPLETED | OUTPATIENT
Start: 2021-04-09 | End: 2021-04-09

## 2021-04-09 RX ORDER — BUPIVACAINE HYDROCHLORIDE 2.5 MG/ML
4 INJECTION, SOLUTION INFILTRATION; PERINEURAL
Status: COMPLETED | OUTPATIENT
Start: 2021-04-09 | End: 2021-04-09

## 2021-04-09 RX ADMIN — METHYLPREDNISOLONE ACETATE 2 ML: 40 INJECTION, SUSPENSION INTRA-ARTICULAR; INTRALESIONAL; INTRAMUSCULAR; SOFT TISSUE at 10:14

## 2021-04-09 RX ADMIN — BUPIVACAINE HYDROCHLORIDE 4 ML: 2.5 INJECTION, SOLUTION INFILTRATION; PERINEURAL at 10:14

## 2021-04-09 NOTE — PROGRESS NOTES
Assessment/Plan:    No problem-specific Assessment & Plan notes found for this encounter  Diagnoses and all orders for this visit:    Arthritis of left hip  -     XR hip/pelv 2-3 vws left if performed; Future    Trochanteric bursitis of left hip           the left hip was injected with Depo-Medrol and Marcaine  She tolerated   The procedure quite well  Return back in 6 weeks for strength and motion check  If there are any further problems, an MRI may be considered for her left hip  Subjective:      Patient ID: Joie Sotelo is a 61 y o  female  HPI       The patient is having trouble with her left hip for the past several years  Apparently, she has had 2 hip surgeries for repair of gluteal tendons  Both were performed at [place] [address]  The 1st from from Dr Delores Christianson, the 2nd one from Dr Carlos Cornelius today  She denies any recent trauma  She denies any numbness or tingling  She denies any fever chills  At times, she will get pain along her low back  The following portions of the patient's history were reviewed and updated as appropriate: allergies, current medications, past family history, past medical history, past social history, past surgical history and problem list     Review of Systems   Constitutional: Negative for chills, fever and unexpected weight change  HENT: Negative for hearing loss, nosebleeds and sore throat  Eyes: Negative for pain, redness and visual disturbance  Respiratory: Negative for cough, shortness of breath and wheezing  Cardiovascular: Negative for chest pain, palpitations and leg swelling  Gastrointestinal: Negative for abdominal pain, nausea and vomiting  Endocrine: Negative for polydipsia and polyuria  Genitourinary: Negative for dysuria and hematuria  Musculoskeletal: Positive for arthralgias, gait problem and myalgias  Negative for back pain, joint swelling, neck pain and neck stiffness  As noted in HPI   Skin: Negative for rash and wound  Neurological: Negative for dizziness, numbness and headaches  Psychiatric/Behavioral: Negative for decreased concentration and suicidal ideas  The patient is not nervous/anxious  Objective:      /100   Pulse 70   Ht 5' 4" (1 626 m)   Wt 68 9 kg (152 lb)   BMI 26 09 kg/m²          Physical Exam            Spine is midline  There is no gross tenderness in his spine  There is a negative straight leg and a negative contralateral straight leg raising test   Left lower extremity is neurovascular intact  Toes are pink and mobile  Compartments are soft  There is a well-healed lateral incision which is clean, dry, intact  There is point tenderness along the trochanteric bursa  There is less tenderness along the gluteus insertion point  Rotation intact bilaterally  Neurologically intact distally  Remaining sensation, motor, deep tender reflexes intact  X-rays show no fractures or dislocations  There is no metallic hardware  Articular surface is intact  Large joint arthrocentesis: L greater trochanteric bursa  Universal Protocol:  Consent: Verbal consent obtained  Risks and benefits: risks, benefits and alternatives were discussed  Consent given by: patient  Time out: Immediately prior to procedure a "time out" was called to verify the correct patient, procedure, equipment, support staff and site/side marked as required  Patient understanding: patient states understanding of the procedure being performed  Test results: test results available and properly labeled  Site marked: the operative site was marked  Radiology Images displayed and confirmed   If images not available, report reviewed: imaging studies available  Patient identity confirmed: verbally with patient    Supporting Documentation  Indications: pain   Procedure Details  Location: hip - L greater trochanteric bursa  Preparation: Patient was prepped and draped in the usual sterile fashion  Needle size: 22 G  Ultrasound guidance: no  Approach: lateral  Medications administered: 4 mL bupivacaine 0 25 %; 2 mL methylPREDNISolone acetate 40 mg/mL    Patient tolerance: patient tolerated the procedure well with no immediate complications  Dressing:  Sterile dressing applied

## 2021-04-14 DIAGNOSIS — Z86.19 H/O COLD SORES: ICD-10-CM

## 2021-04-15 ENCOUNTER — OFFICE VISIT (OUTPATIENT)
Dept: PHYSICAL THERAPY | Facility: CLINIC | Age: 66
End: 2021-04-15
Payer: MEDICARE

## 2021-04-15 DIAGNOSIS — M54.2 CERVICAL PAIN: ICD-10-CM

## 2021-04-15 DIAGNOSIS — M79.601 RIGHT UPPER LIMB PAIN: ICD-10-CM

## 2021-04-15 DIAGNOSIS — M79.18 MYOFASCIAL PAIN: Primary | ICD-10-CM

## 2021-04-15 DIAGNOSIS — Z86.19 H/O COLD SORES: ICD-10-CM

## 2021-04-15 PROCEDURE — 97140 MANUAL THERAPY 1/> REGIONS: CPT | Performed by: PHYSICAL THERAPIST

## 2021-04-15 PROCEDURE — 97110 THERAPEUTIC EXERCISES: CPT | Performed by: PHYSICAL THERAPIST

## 2021-04-15 PROCEDURE — 97032 APPL MODALITY 1+ESTIM EA 15: CPT | Performed by: PHYSICAL THERAPIST

## 2021-04-15 PROCEDURE — 97112 NEUROMUSCULAR REEDUCATION: CPT | Performed by: PHYSICAL THERAPIST

## 2021-04-15 RX ORDER — VALACYCLOVIR HYDROCHLORIDE 1 G/1
1000 TABLET, FILM COATED ORAL 2 TIMES DAILY
Qty: 6 TABLET | Refills: 2 | Status: SHIPPED | OUTPATIENT
Start: 2021-04-15 | End: 2021-05-23

## 2021-04-15 RX ORDER — VALACYCLOVIR HYDROCHLORIDE 1 G/1
1000 TABLET, FILM COATED ORAL 2 TIMES DAILY
Qty: 6 TABLET | Refills: 2 | Status: SHIPPED | OUTPATIENT
Start: 2021-04-15 | End: 2021-04-15 | Stop reason: SDUPTHER

## 2021-04-15 NOTE — PROGRESS NOTES
Daily Note     Today's date: 4/15/2021  Patient name: Jos Marques  : 1955  MRN: [id number]  Referring provider: ADELFO Sheikh*  Dx:   Encounter Diagnosis     ICD-10-CM    1  Myofascial pain  M79 18    2  Cervical pain  M54 2    3  Right upper limb pain  M79 601        Start Time: 0730  Stop Time: 0815  Total time in clinic (min): 45 minutes    Subjective: Patient states pain in right shoulder 8/10  Pain with lifting and general RUE movement as well as having arm hand to side  Also c/o difficulty sleeping due to shoulder pain  Objective: See treatment diary below      Assessment: Tolerated treatment fair  Hypersensitive to touch of right UE, pain during [facility] jt  Mobs grade II  Very guarded, increased ease with AAROM vs PROM  Patient would benefit from continued PT working on general mobility and range of motion of right UE as well as muscle activation  Plan: Continue per plan of care  Progress treatment as tolerated         Precautions: none noted  Progress note:   POC:     Manuals 3/31 4/9 4/15     Stretching with active release  Right shoulder Right shoulder, prox arm     GH jt mobs  *grade II-III AP Grade II-III     SOR        IASTM        Neuro Re-Ed        UBE        scap retraction 10x 10x 20x     Shoulder rolls 10x 10x 20x     Chin tucks  *:05x10 :05x10     MTP/LTP        Supine chin tuck :05x10 :05x10      Shoulder isometrics   nv *    Ther Ex        Pulleys  *10x:10 10x:10     UT stretch 3x:30 3x:30 bilat 3x:30 bilat     LS stretch        Table slides :05x10 :05x10 :05x10     pendulums  *:30x ea :30x ea     Supine flexion ROM pain       Supine ER ROM        Side lying shoulder ER                Prone row        Standing YTI        Wall walks                                        Ther Activity                        Gait Training                        Modalities        IFC   Seated to right shoulder with  post               Access Code: [email or web address]  URL: [email or web address]  Date: 03/31/2021  Prepared by: Austin Hammond    Exercises  Supine Cervical Retraction with Towel - 3 x daily - 7 x weekly - 1 sets - 10 reps - 5 sec hold  Seated Upper Trapezius Stretch - 3 x daily - 7 x weekly - 3 reps - 1 sets - 30 sec hold  Seated Shoulder Flexion Towel Slide at Table Top - 3 x daily - 7 x weekly - 10 reps - 1 sets - 5 sec hold  Seated Shoulder Abduction Towel Slide at Table Top - 3 x daily - 7 x weekly - 10 reps - 1 sets - 5 sec hold  Standing Backward Shoulder Rolls - 3 x daily - 7 x weekly - 3 sets - 10 reps  Seated Scapular Retraction - 3 x daily - 7 x weekly - 3 sets - 10 reps

## 2021-04-16 ENCOUNTER — OFFICE VISIT (OUTPATIENT)
Dept: SURGERY | Facility: CLINIC | Age: 66
End: 2021-04-16
Payer: MEDICARE

## 2021-04-16 ENCOUNTER — OFFICE VISIT (OUTPATIENT)
Dept: PHYSICAL THERAPY | Facility: CLINIC | Age: 66
End: 2021-04-16
Payer: MEDICARE

## 2021-04-16 VITALS
BODY MASS INDEX: 26.93 KG/M2 | HEIGHT: 63 IN | WEIGHT: 152 LBS | HEART RATE: 70 BPM | TEMPERATURE: 98.1 F | RESPIRATION RATE: 12 BRPM

## 2021-04-16 DIAGNOSIS — M54.2 CERVICAL PAIN: ICD-10-CM

## 2021-04-16 DIAGNOSIS — D17.22 BENIGN LIPOMATOUS NEOPLASM OF SKIN AND SUBCUTANEOUS TISSUE OF LEFT ARM: Primary | ICD-10-CM

## 2021-04-16 DIAGNOSIS — M79.601 RIGHT UPPER LIMB PAIN: ICD-10-CM

## 2021-04-16 DIAGNOSIS — M79.18 MYOFASCIAL PAIN: Primary | ICD-10-CM

## 2021-04-16 PROCEDURE — 88304 TISSUE EXAM BY PATHOLOGIST: CPT | Performed by: PATHOLOGY

## 2021-04-16 PROCEDURE — 97112 NEUROMUSCULAR REEDUCATION: CPT | Performed by: PHYSICAL THERAPIST

## 2021-04-16 PROCEDURE — 97140 MANUAL THERAPY 1/> REGIONS: CPT | Performed by: PHYSICAL THERAPIST

## 2021-04-16 PROCEDURE — 25065 BIOPSY FOREARM SOFT TISSUES: CPT | Performed by: SURGERY

## 2021-04-16 PROCEDURE — 97110 THERAPEUTIC EXERCISES: CPT | Performed by: PHYSICAL THERAPIST

## 2021-04-16 PROCEDURE — 99212 OFFICE O/P EST SF 10 MIN: CPT | Performed by: SURGERY

## 2021-04-16 PROCEDURE — 97014 ELECTRIC STIMULATION THERAPY: CPT | Performed by: PHYSICAL THERAPIST

## 2021-04-16 RX ORDER — TRAMADOL HYDROCHLORIDE 50 MG/1
TABLET ORAL
COMMUNITY
Start: 2021-03-18 | End: 2021-06-23 | Stop reason: HOSPADM

## 2021-04-16 NOTE — ASSESSMENT & PLAN NOTE
Patient presents to the office with complaints of symptomatic lipomas bilaterally  The larger 2 lipomas on the left forearm and left arm are most problematic  The patient is interested in definitive treatment  On physical examination the patient has a 25 mm left forearm lipoma and a 20 mm left arm lipoma  Both amenable to excision under local anesthesia  The technical details of the procedures well as the benefits and risks reviewed and informed verbal consent taken to proceed

## 2021-04-16 NOTE — LETTER
April 16, 2021     Balwinder Wang PA-C  [address] [facility] [address]    Patient: Raymundo Menendez   YOB: 1955   Date of Visit: 4/16/2021       Dear Dr Álvaro Kenny: Thank you for referring Raymundo Menendez to me for evaluation  Below are my notes for this consultation  If you have questions, please do not hesitate to call me  I look forward to following your patient along with you  Sincerely,        Lon Gale MD        CC: No Recipients  Lon Gale MD  4/16/2021  1:37 PM  Sign when Signing Visit  Assessment/Plan:    Benign lipomatous neoplasm of skin and subcutaneous tissue of left arm  Patient presents to the office with complaints of symptomatic lipomas bilaterally  The larger 2 lipomas on the left forearm and left arm are most problematic  The patient is interested in definitive treatment  On physical examination the patient has a 25 mm left forearm lipoma and a 20 mm left arm lipoma  Both amenable to excision under local anesthesia  The technical details of the procedures well as the benefits and risks reviewed and informed verbal consent taken to proceed  Subjective:      Patient ID: Raymundo Menendez is a 61 y o  female  62 yo F here for bothersome skin cysts on bilateral upper extremities, 4 in total, present for "a while"  Review of Systems   Constitutional: Negative for chills and fever  HENT: Negative for ear pain and sore throat  Eyes: Negative for pain and visual disturbance  Respiratory: Negative for cough and shortness of breath  Cardiovascular: Negative for chest pain and palpitations  Gastrointestinal: Negative for abdominal pain and vomiting  Genitourinary: Negative for dysuria and hematuria  Musculoskeletal: Negative for arthralgias and back pain  Skin: Negative for color change and rash  Neurological: Negative for seizures and syncope  All other systems reviewed and are negative  Objective:      Pulse 70   Temp 98 1 °F (36 7 °C) (Temporal)   Resp 12   Ht 5' 3" (1 6 m)   Wt 68 9 kg (152 lb)   BMI 26 93 kg/m²            Physical Exam  Constitutional:       Appearance: She is well-developed  HENT:      Head: Normocephalic and atraumatic  Eyes:      Conjunctiva/sclera: Conjunctivae normal       Pupils: Pupils are equal, round, and reactive to light  Neck:      Musculoskeletal: Normal range of motion and neck supple  Cardiovascular:      Rate and Rhythm: Normal rate and regular rhythm  Pulmonary:      Effort: Pulmonary effort is normal       Breath sounds: Normal breath sounds  Abdominal:      General: Bowel sounds are normal       Palpations: Abdomen is soft  Musculoskeletal: Normal range of motion  Comments: Bilateral forearm lipomas present  Plan is for excision of the left-sided lipomas today and the right-sided lipomas at the time of the next office visit  Skin:     General: Skin is warm and dry  Neurological:      Mental Status: She is alert and oriented to person, place, and time  Psychiatric:         Behavior: Behavior normal          Thought Content: Thought content normal          Judgment: Judgment normal        Biopsy    Date/Time: 4/16/2021 1:36 PM  Performed by: Chika Funes MD  Authorized by: Chika Funes MD   Universal Protocol:  Procedure performed by: Candelaria Layton PA-C)  Consent: Verbal consent obtained  Risks and benefits: risks, benefits and alternatives were discussed  Consent given by: patient  Time out: Immediately prior to procedure a "time out" was called to verify the correct patient, procedure, equipment, support staff and site/side marked as required    Timeout called at: 4/16/2021 1:36 PM   Patient understanding: patient states understanding of the procedure being performed  Patient consent: the patient's understanding of the procedure matches consent given  Site marked: the operative site was marked  Patient identity confirmed: verbally with patient      Procedure Details - Skin Biopsy:     Biopsy tissue type: superficial soft tissue    Malignancy: benign lesion      Destruction method comment:  Excisional biopsy     Procedure note: With informed verbal consent under sterile conditions using aseptic technique using 1% lidocaine local anesthesia with epinephrine and bicarbonate the 25 mm left forearm lipoma was sharply excised and the wound closed primarily with subcuticular sutures of 4-0 Vicryl  Next the left arm lipoma was excised  This was 20 mm in diameter  The wound again closed with subcuticular 4-0 Vicryl

## 2021-04-16 NOTE — PROGRESS NOTES
Daily Note     Today's date: 2021  Patient name: Heron Capone  : 1955  MRN: [id number]  Referring provider: SCOTT Webster  Dx:   Encounter Diagnosis     ICD-10-CM    1  Myofascial pain  M79 18    2  Cervical pain  M54 2    3  Right upper limb pain  M79 601        Start Time: 0745  Stop Time: 1028  Total time in clinic (min): 50 minutes    Subjective: Patient states 8/10 pain in right shoulder today  Objective: See treatment diary below      Assessment: Tolerated treatment fair  Guarding with passive movements, no pain through range, but painful end feels  Patient would benefit from continued PT      Plan: Continue per plan of care  Progress treatment as tolerated  Precautions: none noted  Progress note:   POC:     Manuals 3/31 4/9 4/15 4/16    Stretching with active release  Right shoulder Right shoulder, prox arm Right shoulder, prox arm    GH jt mobs  *grade II-III AP Grade II-III Grade II-III    SOR        IASTM        Neuro Re-Ed        UBE        scap retraction 10x 10x 20x 20x    Shoulder rolls 10x 10x 20x 20x    Chin tucks  *:05x10 :05x10 :05x10    MTP/LTP        Supine chin tuck :05x10 :05x10      Shoulder isometrics   nv :05x5 flex, abd, IR, ER    Ther Ex        Pulleys  *10x:10 10x:10 10x:10    UT stretch 3x:30 3x:30 bilat 3x:30 bilat 3x:30    LS stretch    3x:30    Table slides :05x10 :05x10 :05x10 :05x10    pendulums  *:30x ea :30x ea :30 ea    Supine flexion ROM pain       Supine ER ROM        Side lying shoulder ER                Prone row        Standing YTI        Wall walks                                        Ther Activity                        Gait Training                        Modalities        IFC   Seated to right shoulder with MH post Seated to right shoulder with MH post 10 min              Access Code: [email or web address]  URL: [email or web address]  Date: 2021  Prepared by: Delmy Stapleton    Exercises  Supine Cervical Retraction with Towel - 3 x daily - 7 x weekly - 1 sets - 10 reps - 5 sec hold  Seated Upper Trapezius Stretch - 3 x daily - 7 x weekly - 3 reps - 1 sets - 30 sec hold  Seated Shoulder Flexion Towel Slide at Table Top - 3 x daily - 7 x weekly - 10 reps - 1 sets - 5 sec hold  Seated Shoulder Abduction Towel Slide at Table Top - 3 x daily - 7 x weekly - 10 reps - 1 sets - 5 sec hold  Standing Backward Shoulder Rolls - 3 x daily - 7 x weekly - 3 sets - 10 reps  Seated Scapular Retraction - 3 x daily - 7 x weekly - 3 sets - 10 reps

## 2021-04-16 NOTE — PROGRESS NOTES
Assessment/Plan:    Benign lipomatous neoplasm of skin and subcutaneous tissue of left arm  Patient presents to the office with complaints of symptomatic lipomas bilaterally  The larger 2 lipomas on the left forearm and left arm are most problematic  The patient is interested in definitive treatment  On physical examination the patient has a 25 mm left forearm lipoma and a 20 mm left arm lipoma  Both amenable to excision under local anesthesia  The technical details of the procedures well as the benefits and risks reviewed and informed verbal consent taken to proceed  Subjective:      Patient ID: Sami Lewis is a 61 y o  female  60 yo F here for bothersome skin cysts on bilateral upper extremities, 4 in total, present for "a while"  Review of Systems   Constitutional: Negative for chills and fever  HENT: Negative for ear pain and sore throat  Eyes: Negative for pain and visual disturbance  Respiratory: Negative for cough and shortness of breath  Cardiovascular: Negative for chest pain and palpitations  Gastrointestinal: Negative for abdominal pain and vomiting  Genitourinary: Negative for dysuria and hematuria  Musculoskeletal: Negative for arthralgias and back pain  Skin: Negative for color change and rash  Neurological: Negative for seizures and syncope  All other systems reviewed and are negative  Objective:      Pulse 70   Temp 98 1 °F (36 7 °C) (Temporal)   Resp 12   Ht 5' 3" (1 6 m)   Wt 68 9 kg (152 lb)   BMI 26 93 kg/m²            Physical Exam  Constitutional:       Appearance: She is well-developed  HENT:      Head: Normocephalic and atraumatic  Eyes:      Conjunctiva/sclera: Conjunctivae normal       Pupils: Pupils are equal, round, and reactive to light  Neck:      Musculoskeletal: Normal range of motion and neck supple  Cardiovascular:      Rate and Rhythm: Normal rate and regular rhythm     Pulmonary:      Effort: Pulmonary effort is normal       Breath sounds: Normal breath sounds  Abdominal:      General: Bowel sounds are normal       Palpations: Abdomen is soft  Musculoskeletal: Normal range of motion  Comments: Bilateral forearm lipomas present  Plan is for excision of the left-sided lipomas today and the right-sided lipomas at the time of the next office visit  Skin:     General: Skin is warm and dry  Neurological:      Mental Status: She is alert and oriented to person, place, and time  Psychiatric:         Behavior: Behavior normal          Thought Content: Thought content normal          Judgment: Judgment normal        Biopsy    Date/Time: 4/16/2021 1:36 PM  Performed by: Rosie Marquez MD  Authorized by: Rosie Marquez MD   Universal Protocol:  Procedure performed by: Doyle Layton PA-C)  Consent: Verbal consent obtained  Risks and benefits: risks, benefits and alternatives were discussed  Consent given by: patient  Time out: Immediately prior to procedure a "time out" was called to verify the correct patient, procedure, equipment, support staff and site/side marked as required  Timeout called at: 4/16/2021 1:36 PM   Patient understanding: patient states understanding of the procedure being performed  Patient consent: the patient's understanding of the procedure matches consent given  Site marked: the operative site was marked  Patient identity confirmed: verbally with patient      Procedure Details - Skin Biopsy:     Biopsy tissue type: superficial soft tissue    Malignancy: benign lesion      Destruction method comment:  Excisional biopsy     Procedure note: With informed verbal consent under sterile conditions using aseptic technique using 1% lidocaine local anesthesia with epinephrine and bicarbonate the 25 mm left forearm lipoma was sharply excised and the wound closed primarily with subcuticular sutures of 4-0 Vicryl  Next the left arm lipoma was excised  This was 20 mm in diameter    The wound again closed with subcuticular 4-0 Vicryl

## 2021-04-19 ENCOUNTER — OFFICE VISIT (OUTPATIENT)
Dept: PHYSICAL THERAPY | Facility: CLINIC | Age: 66
End: 2021-04-19
Payer: MEDICARE

## 2021-04-19 DIAGNOSIS — M79.18 MYOFASCIAL PAIN: Primary | ICD-10-CM

## 2021-04-19 DIAGNOSIS — M54.2 CERVICAL PAIN: ICD-10-CM

## 2021-04-19 DIAGNOSIS — M79.601 RIGHT UPPER LIMB PAIN: ICD-10-CM

## 2021-04-19 PROCEDURE — 97140 MANUAL THERAPY 1/> REGIONS: CPT | Performed by: PHYSICAL THERAPIST

## 2021-04-19 PROCEDURE — 97110 THERAPEUTIC EXERCISES: CPT | Performed by: PHYSICAL THERAPIST

## 2021-04-19 PROCEDURE — 97112 NEUROMUSCULAR REEDUCATION: CPT | Performed by: PHYSICAL THERAPIST

## 2021-04-19 PROCEDURE — 97014 ELECTRIC STIMULATION THERAPY: CPT | Performed by: PHYSICAL THERAPIST

## 2021-04-19 NOTE — PROGRESS NOTES
Daily Note     Today's date: 2021  Patient name: Kayla Box  : 1955  MRN: [id number]  Referring provider: ADELFO Andrade*  Dx:   Encounter Diagnosis     ICD-10-CM    1  Myofascial pain  M79 18    2  Cervical pain  M54 2    3  Right upper limb pain  M79 601        Start Time: 07  Stop Time: 08  Total time in clinic (min): 55 minutes    Subjective: No new complaints today  Objective: See treatment diary below      Assessment: Tolerated treatment well  Continues to lack significant passive and active range of motion with painful passive end feels and guarding  Patient would benefit from continued PT      Plan: Continue per plan of care  Progress treatment as tolerated  Precautions: none noted  Progress note:   POC:     Manuals 3/31 4/9 4/15 4/16 4/19   Stretching with active release  Right shoulder Right shoulder, prox arm Right shoulder, prox arm    GH jt mobs  *grade II-III AP Grade II-III Grade II-III    SOR        IASTM        Neuro Re-Ed        UBE        scap retraction 10x 10x 20x 20x 20x   Shoulder rolls 10x 10x 20x 20x 20x   Chin tucks  *:05x10 :05x10 :05x10 :05x10   MTP/LTP        Supine chin tuck :05x10 :05x10      Shoulder isometrics   nv :05x5 flex, abd, IR, ER    Ther Ex        Pulleys  *10x:10 10x:10 10x:10 10x:10   UT stretch 3x:30 3x:30 bilat 3x:30 bilat 3x:30 3x:30   LS stretch    3x:30 3x:30   Table slides :05x10 :05x10 :05x10 :05x10    pendulums  *:30x ea :30x ea :30 ea    Supine flexion ROM pain       Supine ER ROM        Side lying shoulder ER                Prone row        Standing YTI        Wall walks                                        Ther Activity                        Gait Training                        Modalities        IFC   Seated to right shoulder with MH post Seated to right shoulder with MH post 10 min              Access Code: [email or web address]  URL: [email or web address]  Date: 2021  Prepared by: Verona Rai Balajthy    Exercises  Supine Cervical Retraction with Towel - 3 x daily - 7 x weekly - 1 sets - 10 reps - 5 sec hold  Seated Upper Trapezius Stretch - 3 x daily - 7 x weekly - 3 reps - 1 sets - 30 sec hold  Seated Shoulder Flexion Towel Slide at Table Top - 3 x daily - 7 x weekly - 10 reps - 1 sets - 5 sec hold  Seated Shoulder Abduction Towel Slide at Table Top - 3 x daily - 7 x weekly - 10 reps - 1 sets - 5 sec hold  Standing Backward Shoulder Rolls - 3 x daily - 7 x weekly - 3 sets - 10 reps  Seated Scapular Retraction - 3 x daily - 7 x weekly - 3 sets - 10 reps

## 2021-04-20 ENCOUNTER — TELEPHONE (OUTPATIENT)
Dept: SURGERY | Facility: CLINIC | Age: 66
End: 2021-04-20

## 2021-04-20 NOTE — TELEPHONE ENCOUNTER
Called patient in follow up in regards to her appointment  On 04/16/2021  Stated that she satisfied with the office and everything went well  States that her incision site looks good with no redness/discharge  Post op appointment was confirmed with the patient

## 2021-04-22 ENCOUNTER — OFFICE VISIT (OUTPATIENT)
Dept: PHYSICAL THERAPY | Facility: CLINIC | Age: 66
End: 2021-04-22
Payer: MEDICARE

## 2021-04-22 DIAGNOSIS — M54.2 CERVICAL PAIN: ICD-10-CM

## 2021-04-22 DIAGNOSIS — M79.601 RIGHT UPPER LIMB PAIN: ICD-10-CM

## 2021-04-22 DIAGNOSIS — M79.18 MYOFASCIAL PAIN: Primary | ICD-10-CM

## 2021-04-22 PROCEDURE — 97014 ELECTRIC STIMULATION THERAPY: CPT | Performed by: PHYSICAL THERAPIST

## 2021-04-22 PROCEDURE — 97110 THERAPEUTIC EXERCISES: CPT | Performed by: PHYSICAL THERAPIST

## 2021-04-22 PROCEDURE — 97140 MANUAL THERAPY 1/> REGIONS: CPT | Performed by: PHYSICAL THERAPIST

## 2021-04-22 PROCEDURE — 97112 NEUROMUSCULAR REEDUCATION: CPT | Performed by: PHYSICAL THERAPIST

## 2021-04-22 NOTE — PROGRESS NOTES
Daily Note     Today's date: 2021  Patient name: Juan Potts  : 1955  MRN: [id number]  Referring provider: SCOTT Hall  Dx:   Encounter Diagnosis     ICD-10-CM    1  Myofascial pain  M79 18    2  Cervical pain  M54 2    3  Right upper limb pain  M79 601        Start Time: 730  Stop Time: 08  Total time in clinic (min): 50 minutes    Subjective: No new complaints with right shoulder  Continues to have pain with active and passive movement  Objective: See treatment diary below      Assessment: Tolerated treatment fair  Range of motion continues to be limited due to pain  Consistently painful during IR isometrics today  Patient would benefit from continued PT      Plan: Continue per plan of care  Progress treatment as tolerated         Precautions: none noted  Progress note:   POC:     Manuals 4/22  4/15 4/16 4/19   Stretching with active release Right shoulder, prox arm  Right shoulder, prox arm Right shoulder, prox arm    GH jt mobs Grade II-III  Grade II-III Grade II-III    SOR        IASTM        Neuro Re-Ed        UBE        scap retraction 20x  20x 20x 20x   Shoulder rolls 20x  20x 20x 20x   Chin tucks :05x10  :05x10 :05x10 :05x10   MTP/LTP        Supine chin tuck        Shoulder isometrics :05x5 flex, abd, IR, ER, ext  nv :05x5 flex, abd, IR, ER    Ther Ex        Pulleys Flex, scap 10x:10  10x:10 10x:10 10x:10   UT stretch 3x:30  3x:30 bilat 3x:30 3x:30   LS stretch 3x:30   3x:30 3x:30   Table slides :05x10 flex, scap  :05x10 :05x10    pendulums :30 ea  :30x ea :30 ea    Supine flexion ROM        Supine ER ROM        Side lying shoulder ER                Prone row        Standing YTI        Wall walks nv *                                      Ther Activity                        Gait Training                        Modalities        IFC Seated to right shoulder with MH post 10 min  Seated to right shoulder with MH post Seated to right shoulder with MH post 10 min Access Code: [email or web address]  URL: [email or web address]  Date: 03/31/2021  Prepared by: Aisha Selby    Exercises  Supine Cervical Retraction with Towel - 3 x daily - 7 x weekly - 1 sets - 10 reps - 5 sec hold  Seated Upper Trapezius Stretch - 3 x daily - 7 x weekly - 3 reps - 1 sets - 30 sec hold  Seated Shoulder Flexion Towel Slide at Table Top - 3 x daily - 7 x weekly - 10 reps - 1 sets - 5 sec hold  Seated Shoulder Abduction Towel Slide at Table Top - 3 x daily - 7 x weekly - 10 reps - 1 sets - 5 sec hold  Standing Backward Shoulder Rolls - 3 x daily - 7 x weekly - 3 sets - 10 reps  Seated Scapular Retraction - 3 x daily - 7 x weekly - 3 sets - 10 reps

## 2021-04-23 ENCOUNTER — HOSPITAL ENCOUNTER (OUTPATIENT)
Dept: MAMMOGRAPHY | Facility: HOSPITAL | Age: 66
Discharge: HOME | End: 2021-04-23
Payer: MEDICARE

## 2021-04-23 VITALS — BODY MASS INDEX: 26.93 KG/M2 | HEIGHT: 63 IN | WEIGHT: 152 LBS

## 2021-04-23 DIAGNOSIS — Z12.31 BREAST CANCER SCREENING BY MAMMOGRAM: ICD-10-CM

## 2021-04-23 PROCEDURE — 77067 SCR MAMMO BI INCL CAD: CPT

## 2021-04-23 PROCEDURE — 77063 BREAST TOMOSYNTHESIS BI: CPT

## 2021-04-26 ENCOUNTER — OFFICE VISIT (OUTPATIENT)
Dept: PHYSICAL THERAPY | Facility: CLINIC | Age: 66
End: 2021-04-26
Payer: MEDICARE

## 2021-04-26 ENCOUNTER — TELEPHONE (OUTPATIENT)
Dept: SURGERY | Facility: CLINIC | Age: 66
End: 2021-04-26

## 2021-04-26 DIAGNOSIS — M54.2 CERVICAL PAIN: ICD-10-CM

## 2021-04-26 DIAGNOSIS — M79.601 RIGHT UPPER LIMB PAIN: ICD-10-CM

## 2021-04-26 DIAGNOSIS — M79.18 MYOFASCIAL PAIN: Primary | ICD-10-CM

## 2021-04-26 PROCEDURE — 97110 THERAPEUTIC EXERCISES: CPT | Performed by: PHYSICAL THERAPIST

## 2021-04-26 PROCEDURE — 97014 ELECTRIC STIMULATION THERAPY: CPT | Performed by: PHYSICAL THERAPIST

## 2021-04-26 PROCEDURE — 97112 NEUROMUSCULAR REEDUCATION: CPT | Performed by: PHYSICAL THERAPIST

## 2021-04-26 PROCEDURE — 97140 MANUAL THERAPY 1/> REGIONS: CPT | Performed by: PHYSICAL THERAPIST

## 2021-04-26 NOTE — PROGRESS NOTES
Daily Note     Today's date: 2021  Patient name: Joie Sotelo  : 1955  MRN: [id number]  Referring provider: SCOTT Kumar  Dx:   Encounter Diagnosis     ICD-10-CM    1  Myofascial pain  M79 18    2  Cervical pain  M54 2    3  Right upper limb pain  M79 601        Start Time: 0730  Stop Time: 08  Total time in clinic (min): 55 minutes    Subjective: Patient states she has a HA today, will get them in the middle of the night  Objective: See treatment diary below      Assessment: Tolerated treatment fair  Reviewed sleeping postures for HA reduction  Improved tolerance to stretching today with decreased guarding and c/o pain  Able to passively reach 120 deg flex and abduction  Patient would benefit from continued PT      Plan: Continue per plan of care  Progress treatment as tolerated         Precautions: none noted  Progress note:   POC:     Manuals    Stretching with active release Right shoulder, prox arm Right shoulder, prox arm  Right shoulder, prox arm    GH jt mobs Grade II-III Grade II-III  Grade II-III    SOR        IASTM        Neuro Re-Ed        UBE        scap retraction 20x 20x  20x 20x   Shoulder rolls 20x 20x  20x 20x   Chin tucks :05x10 :05x10  :05x10 :05x10   MTP/LTP        Supine chin tuck        Shoulder isometrics :05x5 flex, abd, IR, ER, ext :05x5 flex, abd, IR, ER, ext  :05x5 flex, abd, IR, ER    Ther Ex        Pulleys Flex, scap 10x:10 Flex, scap 10x:10  10x:10 10x:10   UT stretch 3x:30 3x:30  3x:30 3x:30   LS stretch 3x:30 3x:30  3x:30 3x:30   Table slides :05x10 flex, scap :05x10 flex, scap  :05x10    pendulums :30 ea :30 ea  :30 ea    Supine flexion ROM        Supine ER ROM        Side lying shoulder ER  10x              Prone row        Standing YTI        Wall walks nv *5x flex, scap                                      Ther Activity                        Gait Training                        Modalities        IFC Seated to right shoulder with MH post 10 min Seated to right shoulder with MH post 10 min  Seated to right shoulder with MH post 10 min              Access Code: [email or web address]  URL: [email or web address]  Date: 03/31/2021  Prepared by: Jose Manuel Diana    Exercises  Supine Cervical Retraction with Towel - 3 x daily - 7 x weekly - 1 sets - 10 reps - 5 sec hold  Seated Upper Trapezius Stretch - 3 x daily - 7 x weekly - 3 reps - 1 sets - 30 sec hold  Seated Shoulder Flexion Towel Slide at Table Top - 3 x daily - 7 x weekly - 10 reps - 1 sets - 5 sec hold  Seated Shoulder Abduction Towel Slide at Table Top - 3 x daily - 7 x weekly - 10 reps - 1 sets - 5 sec hold  Standing Backward Shoulder Rolls - 3 x daily - 7 x weekly - 3 sets - 10 reps  Seated Scapular Retraction - 3 x daily - 7 x weekly - 3 sets - 10 reps

## 2021-04-27 DIAGNOSIS — K21.9 GASTROESOPHAGEAL REFLUX DISEASE WITHOUT ESOPHAGITIS: ICD-10-CM

## 2021-04-28 RX ORDER — FAMOTIDINE 20 MG/1
TABLET, FILM COATED ORAL
Qty: 180 TABLET | Refills: 0 | Status: SHIPPED | OUTPATIENT
Start: 2021-04-28 | End: 2021-06-22

## 2021-04-29 ENCOUNTER — EVALUATION (OUTPATIENT)
Dept: PHYSICAL THERAPY | Facility: CLINIC | Age: 66
End: 2021-04-29
Payer: MEDICARE

## 2021-04-29 DIAGNOSIS — M79.601 RIGHT UPPER LIMB PAIN: ICD-10-CM

## 2021-04-29 DIAGNOSIS — M54.2 CERVICAL PAIN: ICD-10-CM

## 2021-04-29 DIAGNOSIS — M79.18 MYOFASCIAL PAIN: Primary | ICD-10-CM

## 2021-04-29 PROCEDURE — 97140 MANUAL THERAPY 1/> REGIONS: CPT | Performed by: PHYSICAL THERAPIST

## 2021-04-29 PROCEDURE — 97014 ELECTRIC STIMULATION THERAPY: CPT | Performed by: PHYSICAL THERAPIST

## 2021-04-29 PROCEDURE — 97110 THERAPEUTIC EXERCISES: CPT | Performed by: PHYSICAL THERAPIST

## 2021-04-29 PROCEDURE — 97112 NEUROMUSCULAR REEDUCATION: CPT | Performed by: PHYSICAL THERAPIST
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
## 2021-04-30 ENCOUNTER — OFFICE VISIT (OUTPATIENT)
Dept: SURGERY | Facility: CLINIC | Age: 66
End: 2021-04-30
Payer: MEDICARE

## 2021-04-30 VITALS — HEIGHT: 63 IN | BODY MASS INDEX: 26.93 KG/M2 | RESPIRATION RATE: 16 BRPM | HEART RATE: 75 BPM | TEMPERATURE: 97.9 F

## 2021-04-30 DIAGNOSIS — D17.21 LIPOMA OF RIGHT FOREARM: Primary | ICD-10-CM

## 2021-04-30 DIAGNOSIS — F51.01 PRIMARY INSOMNIA: ICD-10-CM

## 2021-04-30 DIAGNOSIS — D17.22 BENIGN LIPOMATOUS NEOPLASM OF SKIN AND SUBCUTANEOUS TISSUE OF LEFT ARM: ICD-10-CM

## 2021-04-30 PROCEDURE — 88304 TISSUE EXAM BY PATHOLOGIST: CPT | Performed by: PATHOLOGY

## 2021-04-30 PROCEDURE — 25065 BIOPSY FOREARM SOFT TISSUES: CPT | Performed by: SURGERY

## 2021-04-30 PROCEDURE — 99213 OFFICE O/P EST LOW 20 MIN: CPT | Performed by: SURGERY

## 2021-04-30 NOTE — LETTER
April 30, 2021     Laila Rivas PA-C  [address] [facility] [address]    Patient: Kayla Box   YOB: 1955   Date of Visit: 4/30/2021       Dear Dr Neda Garcia: Thank you for referring Kayla Box to me for evaluation  Below are my notes for this consultation  If you have questions, please do not hesitate to call me  I look forward to following your patient along with you  Sincerely,        Lorie Campbell MD        CC: No Recipients  Lorie Campbell MD  4/30/2021 11:46 AM  Sign when Signing Visit  Assessment/Plan:    Benign lipomatous neoplasm of skin and subcutaneous tissue of left arm   Patient is well status post excision of 2 left upper extremity lipomas  The pathology report reviewed with the patient all questions answered to her satisfaction  The surgical wounds are clean dry and intact  Lipoma of right forearm  Patient is a pleasant 62-year-old female presenting with 2 symptomatic right forearm lipomas for which definitive treatment by excisional biopsy here in the office under local anesthesia is now indicated  Patient has 2 mid forearm lipomas on the dorsal aspect of the arm  Greatest diameter 25 mm for the more proximal lipoma and 20 mm for the more distal right forearm lipoma  The technical details of the procedures well as the benefits and risks again were reviewed with the patient and informed verbal consent taken to proceed  Subjective:      Patient ID: Kayla Box is a 61 y o  female  Patient is here today in follow up s/p excision of left arm and excision of lipoma on right arm  The left arm incisions sites looks good and are healing nicely  No fever or chills  Chrissy Nuñez MA          Review of Systems   Constitutional: Negative for chills and fever  HENT: Negative for ear pain and sore throat  Eyes: Negative for pain and visual disturbance     Respiratory: Negative for cough and shortness Subjective   Patient ID: Jer is a 77 year old female.    Chief Complaint   Patient presents with   • Back Pain     lower back pain, numbness traveling down right leg   • ER F/U     HPI    Patient's medications, allergies, past medical, surgical, social and family histories were reviewed and updated as appropriate.    Patient with a history of hypertension, new onset A. fib currently on metoprolol and Eliquis presenting for back pain  Pt with hx of two major surgeries in the past >10 years ago thinks it may be 2/2 pinched nerve   -Has hx of low back pain however since beginning of Jan pain has gotten worse   -7-8/10 back pain   -Feels tender on the \"bone\" per pt.   -Feels numb into the groin region and inner thigh  -Bending - feels like a needle in the back  -No injuries/falls  -No fevers/chills   -Feels decreased sensation or somewhat numb when wiping both front and back  -No Incontinence  -No pain into bilateral lower ext   -Feels weak but no gait changes or foot drop     Constipation - Change in bowel habits   -Small pellots   -Straining  -Since hospitalization   -Taking miralax     Review of Systems   All other systems reviewed and are negative.      Objective   Physical Exam  Constitutional:       Appearance: Normal appearance.   Cardiovascular:      Rate and Rhythm: Normal rate and regular rhythm.      Pulses: Normal pulses.      Heart sounds: No murmur.   Pulmonary:      Effort: Pulmonary effort is normal. No respiratory distress.   Abdominal:      General: Bowel sounds are normal. There is no distension.   Musculoskeletal:      Comments: Flexion > Extension  Pain with flexion  Bony tenderness on L4/L5   Neg straight leg test    Neurological:      Mental Status: She is alert.      Sensory: No sensory deficit.      Motor: No weakness.      Coordination: Coordination normal.      Gait: Gait normal.      Comments: Normal gait   Lower ext reflexes elicited and equal/normal   Bilateral 5/5 strength   +Normal  of breath  Cardiovascular: Negative for chest pain and palpitations  Gastrointestinal: Negative for abdominal pain and vomiting  Genitourinary: Negative for dysuria and hematuria  Musculoskeletal: Negative for arthralgias and back pain  Skin: Negative for color change and rash  Neurological: Negative for seizures and syncope  All other systems reviewed and are negative  Objective:      Pulse 75   Temp 97 9 °F (36 6 °C) (Temporal)   Resp 16   Ht 5' 3" (1 6 m)   BMI 26 93 kg/m²            Physical Exam  Constitutional:       Appearance: She is well-developed  HENT:      Head: Normocephalic and atraumatic  Eyes:      Conjunctiva/sclera: Conjunctivae normal       Pupils: Pupils are equal, round, and reactive to light  Neck:      Musculoskeletal: Normal range of motion and neck supple  Cardiovascular:      Rate and Rhythm: Normal rate and regular rhythm  Pulmonary:      Effort: Pulmonary effort is normal       Breath sounds: Normal breath sounds  Abdominal:      General: Bowel sounds are normal       Palpations: Abdomen is soft  Musculoskeletal: Normal range of motion  Comments:  Right mid forearm dorsal surface -2 lipomas, 25 mm and 20 mm in diameter, proximally and distally respectively   Skin:     General: Skin is warm and dry  Comments:  Left arm and forearm wounds clean dry and intact   Neurological:      Mental Status: She is alert and oriented to person, place, and time  Psychiatric:         Behavior: Behavior normal          Thought Content: Thought content normal          Judgment: Judgment normal        Biopsy    Date/Time: 4/30/2021 11:44 AM  Performed by: Jacques Hassan MD  Authorized by: Jacques Hassan MD   Universal Protocol:  Consent: Verbal consent obtained    Risks and benefits: risks, benefits and alternatives were discussed  Consent given by: patient  Time out: Immediately prior to procedure a "time out" was called to verify the correct patient, rectal tone   + sensation of rectum on my exam   No bruising/hematoma noted on exam  Tenderness to palpation on L4/L5          Assessment   Problem List Items Addressed This Visit        Musculoskeletal    Acute midline low back pain with sciatica - Primary     Acute on chronic midline low back pain + decreased sensation in the groin  Diff includes compression fracture vs disc herniation vs muscle spasm less likely to be cauda equina given no incontinence, normal rectal tone, normal muscle strength and sensation on exam   -Will obtain XR and MRI of lumbosacral region  -Obtain basic labs   -Follow up closely with us in 1 week   -Mild stretching exercises + Tylenol for pain control + Heat/ice   -Consider steroids after imaging results            Relevant Orders    XR LUMBAR SPINE FLEX AND EXT ONLY 2 VIEWS    XR SACRUM AND COCCYX MIN 2 VIEWS    CBC WITH DIFFERENTIAL    BASIC METABOLIC PANEL    MRI LUMBAR SPINE WO CONTRAST    MRI SACRUM COCCYX WO CONTRAST      Other Visit Diagnoses     Constipation, unspecified constipation type        Continue miralax  Encourage PO hydration  Natural: increase fiber/prunes  Ok to try OTC docusate          procedure, equipment, support staff and site/side marked as required  Timeout called at: 4/30/2021 11:44 AM   Patient understanding: patient states understanding of the procedure being performed  Patient consent: the patient's understanding of the procedure matches consent given  Site marked: the operative site was marked  Patient identity confirmed: verbally with patient      Procedure Details - Skin Biopsy:     Biopsy tissue type: superficial soft tissue    Final defect size (mm):  25    Malignancy: benign lesion      Destruction method comment:   excisional biopsy      Procedure note: With informed verbal consent under sterile conditions using aseptic technique using 1% lidocaine local anesthesia with epinephrine and bicarbonate 2 mid right forearm lipomas were sharply excised through 2 separate incisions with a 15 scalpel  Each wound was closed primarily with subcuticular 4-0 Vicryl suture  The patient tolerated the procedure well

## 2021-04-30 NOTE — ASSESSMENT & PLAN NOTE
Patient is well status post excision of 2 left upper extremity lipomas  The pathology report reviewed with the patient all questions answered to her satisfaction  The surgical wounds are clean dry and intact

## 2021-04-30 NOTE — ASSESSMENT & PLAN NOTE
Patient is a pleasant 53-year-old female presenting with 2 symptomatic right forearm lipomas for which definitive treatment by excisional biopsy here in the office under local anesthesia is now indicated  Patient has 2 mid forearm lipomas on the dorsal aspect of the arm  Greatest diameter 25 mm for the more proximal lipoma and 20 mm for the more distal right forearm lipoma  The technical details of the procedures well as the benefits and risks again were reviewed with the patient and informed verbal consent taken to proceed

## 2021-04-30 NOTE — PROGRESS NOTES
Assessment/Plan:    Benign lipomatous neoplasm of skin and subcutaneous tissue of left arm   Patient is well status post excision of 2 left upper extremity lipomas  The pathology report reviewed with the patient all questions answered to her satisfaction  The surgical wounds are clean dry and intact  Lipoma of right forearm  Patient is a pleasant 72-year-old female presenting with 2 symptomatic right forearm lipomas for which definitive treatment by excisional biopsy here in the office under local anesthesia is now indicated  Patient has 2 mid forearm lipomas on the dorsal aspect of the arm  Greatest diameter 25 mm for the more proximal lipoma and 20 mm for the more distal right forearm lipoma  The technical details of the procedures well as the benefits and risks again were reviewed with the patient and informed verbal consent taken to proceed  Subjective:      Patient ID: Peng Chaidez is a 61 y o  female  Patient is here today in follow up s/p excision of left arm and excision of lipoma on right arm  The left arm incisions sites looks good and are healing nicely  No fever or chills  Chrissy Nuñez MA          Review of Systems   Constitutional: Negative for chills and fever  HENT: Negative for ear pain and sore throat  Eyes: Negative for pain and visual disturbance  Respiratory: Negative for cough and shortness of breath  Cardiovascular: Negative for chest pain and palpitations  Gastrointestinal: Negative for abdominal pain and vomiting  Genitourinary: Negative for dysuria and hematuria  Musculoskeletal: Negative for arthralgias and back pain  Skin: Negative for color change and rash  Neurological: Negative for seizures and syncope  All other systems reviewed and are negative          Objective:      Pulse 75   Temp 97 9 °F (36 6 °C) (Temporal)   Resp 16   Ht 5' 3" (1 6 m)   BMI 26 93 kg/m²            Physical Exam  Constitutional: Appearance: She is well-developed  HENT:      Head: Normocephalic and atraumatic  Eyes:      Conjunctiva/sclera: Conjunctivae normal       Pupils: Pupils are equal, round, and reactive to light  Neck:      Musculoskeletal: Normal range of motion and neck supple  Cardiovascular:      Rate and Rhythm: Normal rate and regular rhythm  Pulmonary:      Effort: Pulmonary effort is normal       Breath sounds: Normal breath sounds  Abdominal:      General: Bowel sounds are normal       Palpations: Abdomen is soft  Musculoskeletal: Normal range of motion  Comments:  Right mid forearm dorsal surface -2 lipomas, 25 mm and 20 mm in diameter, proximally and distally respectively   Skin:     General: Skin is warm and dry  Comments:  Left arm and forearm wounds clean dry and intact   Neurological:      Mental Status: She is alert and oriented to person, place, and time  Psychiatric:         Behavior: Behavior normal          Thought Content: Thought content normal          Judgment: Judgment normal        Biopsy    Date/Time: 4/30/2021 11:44 AM  Performed by: Ary Najjar, MD  Authorized by: Ary Najjar, MD   Universal Protocol:  Consent: Verbal consent obtained  Risks and benefits: risks, benefits and alternatives were discussed  Consent given by: patient  Time out: Immediately prior to procedure a "time out" was called to verify the correct patient, procedure, equipment, support staff and site/side marked as required    Timeout called at: 4/30/2021 11:44 AM   Patient understanding: patient states understanding of the procedure being performed  Patient consent: the patient's understanding of the procedure matches consent given  Site marked: the operative site was marked  Patient identity confirmed: verbally with patient      Procedure Details - Skin Biopsy:     Biopsy tissue type: superficial soft tissue    Final defect size (mm):  25    Malignancy: benign lesion      Destruction method comment: excisional biopsy      Procedure note: With informed verbal consent under sterile conditions using aseptic technique using 1% lidocaine local anesthesia with epinephrine and bicarbonate 2 mid right forearm lipomas were sharply excised through 2 separate incisions with a 15 scalpel  Each wound was closed primarily with subcuticular 4-0 Vicryl suture  The patient tolerated the procedure well

## 2021-05-01 ENCOUNTER — IMMUNIZATIONS (OUTPATIENT)
Dept: FAMILY MEDICINE CLINIC | Facility: HOSPITAL | Age: 66
End: 2021-05-01

## 2021-05-01 DIAGNOSIS — Z23 ENCOUNTER FOR IMMUNIZATION: Primary | ICD-10-CM

## 2021-05-01 PROCEDURE — 0001A SARS-COV-2 / COVID-19 MRNA VACCINE (PFIZER-BIONTECH) 30 MCG: CPT

## 2021-05-01 PROCEDURE — 91300 SARS-COV-2 / COVID-19 MRNA VACCINE (PFIZER-BIONTECH) 30 MCG: CPT

## 2021-05-03 RX ORDER — HYDROXYZINE HYDROCHLORIDE 25 MG/1
25-50 TABLET, FILM COATED ORAL
Qty: 60 TABLET | Refills: 1 | Status: SHIPPED | OUTPATIENT
Start: 2021-05-03 | End: 2021-07-01

## 2021-05-05 ENCOUNTER — TELEPHONE (OUTPATIENT)
Dept: SURGERY | Facility: CLINIC | Age: 66
End: 2021-05-05

## 2021-05-07 ENCOUNTER — TELEPHONE (OUTPATIENT)
Dept: OBGYN CLINIC | Facility: CLINIC | Age: 66
End: 2021-05-07

## 2021-05-14 ENCOUNTER — OFFICE VISIT (OUTPATIENT)
Dept: OBGYN CLINIC | Facility: CLINIC | Age: 66
End: 2021-05-14
Payer: MEDICARE

## 2021-05-14 ENCOUNTER — OFFICE VISIT (OUTPATIENT)
Dept: SURGERY | Facility: CLINIC | Age: 66
End: 2021-05-14

## 2021-05-14 VITALS
HEART RATE: 72 BPM | DIASTOLIC BLOOD PRESSURE: 90 MMHG | WEIGHT: 152 LBS | HEIGHT: 63 IN | SYSTOLIC BLOOD PRESSURE: 164 MMHG | BODY MASS INDEX: 26.93 KG/M2

## 2021-05-14 VITALS — TEMPERATURE: 98.2 F

## 2021-05-14 DIAGNOSIS — M75.41 IMPINGEMENT SYNDROME OF RIGHT SHOULDER: Primary | ICD-10-CM

## 2021-05-14 DIAGNOSIS — D17.21 LIPOMA OF RIGHT FOREARM: Primary | ICD-10-CM

## 2021-05-14 DIAGNOSIS — M25.511 RIGHT SHOULDER PAIN, UNSPECIFIED CHRONICITY: ICD-10-CM

## 2021-05-14 PROCEDURE — 99213 OFFICE O/P EST LOW 20 MIN: CPT | Performed by: ORTHOPAEDIC SURGERY

## 2021-05-14 PROCEDURE — 99024 POSTOP FOLLOW-UP VISIT: CPT | Performed by: SURGERY

## 2021-05-14 NOTE — ASSESSMENT & PLAN NOTE
Patient returns to the office for routine follow-up status post excision of 2 right forearm lipomas  Today in the office the patient voiced no complaints referable to the wound  On physical examination the surgical wounds are clean dry and intact  The pathology report reviewed with the patient a copy provided for her permanent record  The patient has been encouraged to follow up with the practice at any point the future with questions or concerns

## 2021-05-14 NOTE — PROGRESS NOTES
Assessment/Plan:    Lipoma of right forearm    Patient returns to the office for routine follow-up status post excision of 2 right forearm lipomas  Today in the office the patient voiced no complaints referable to the wound  On physical examination the surgical wounds are clean dry and intact  The pathology report reviewed with the patient a copy provided for her permanent record  The patient has been encouraged to follow up with the practice at any point the future with questions or concerns  Subjective:      Patient ID: Heron Capone is a 61 y o  female  Patient is here today s/p excision of of right arm 4/30/2021, no sutures  Patients excision site looks good and area healing nicely  Stated that she is doing well and denied any new problems or concerns  Chrissy Nuñez MA          Review of Systems   Constitutional: Negative for chills and fever  HENT: Negative for ear pain and sore throat  Eyes: Negative for pain and visual disturbance  Respiratory: Negative for cough and shortness of breath  Cardiovascular: Negative for chest pain and palpitations  Gastrointestinal: Negative for abdominal pain and vomiting  Genitourinary: Negative for dysuria and hematuria  Musculoskeletal: Negative for arthralgias and back pain  Skin: Negative for color change and rash  Neurological: Negative for seizures and syncope  All other systems reviewed and are negative  Objective:      Temp 98 2 °F (36 8 °C) (Temporal)          Physical Exam  Constitutional:       Appearance: She is well-developed  HENT:      Head: Normocephalic and atraumatic  Eyes:      Conjunctiva/sclera: Conjunctivae normal       Pupils: Pupils are equal, round, and reactive to light  Neck:      Musculoskeletal: Normal range of motion and neck supple  Cardiovascular:      Rate and Rhythm: Normal rate and regular rhythm     Pulmonary:      Effort: Pulmonary effort is normal       Breath sounds: Normal breath sounds  Abdominal:      General: Bowel sounds are normal       Palpations: Abdomen is soft  Musculoskeletal: Normal range of motion  Skin:     General: Skin is warm and dry  Comments: Right forearm wounds clean dry and intact   Neurological:      Mental Status: She is alert and oriented to person, place, and time  Psychiatric:         Behavior: Behavior normal          Thought Content:  Thought content normal          Judgment: Judgment normal

## 2021-05-14 NOTE — LETTER
May 14, 2021     Breana Ortega PA-C  [address] [facility] [address]    Patient: Kirke Heimlich   YOB: 1955   Date of Visit: 5/14/2021       Dear Dr Filomena Rojas: Thank you for referring Kirke Heimlich to me for evaluation  Below are my notes for this consultation  If you have questions, please do not hesitate to call me  I look forward to following your patient along with you  Sincerely,        Chika Funes MD        CC: No Recipients  Chika Funes MD  5/14/2021 12:14 PM  Incomplete  Assessment/Plan:    Lipoma of right forearm    Patient returns to the office for routine follow-up status post excision of 2 right forearm lipomas  Today in the office the patient voiced no complaints referable to the wound  On physical examination the surgical wounds are clean dry and intact  The pathology report reviewed with the patient a copy provided for her permanent record  The patient has been encouraged to follow up with the practice at any point the future with questions or concerns  Subjective:      Patient ID: Kirke Heimlich is a 61 y o  female  Patient is here today s/p excision of of right arm 4/30/2021, no sutures  Patients excision site looks good and area healing nicely  Stated that she is doing well and denied any new problems or concerns  Chrissy Nuñez MA          Review of Systems   Constitutional: Negative for chills and fever  HENT: Negative for ear pain and sore throat  Eyes: Negative for pain and visual disturbance  Respiratory: Negative for cough and shortness of breath  Cardiovascular: Negative for chest pain and palpitations  Gastrointestinal: Negative for abdominal pain and vomiting  Genitourinary: Negative for dysuria and hematuria  Musculoskeletal: Negative for arthralgias and back pain  Skin: Negative for color change and rash  Neurological: Negative for seizures and syncope     All other systems reviewed and are negative  Objective:      Temp 98 2 °F (36 8 °C) (Temporal)          Physical Exam  Constitutional:       Appearance: She is well-developed  HENT:      Head: Normocephalic and atraumatic  Eyes:      Conjunctiva/sclera: Conjunctivae normal       Pupils: Pupils are equal, round, and reactive to light  Neck:      Musculoskeletal: Normal range of motion and neck supple  Cardiovascular:      Rate and Rhythm: Normal rate and regular rhythm  Pulmonary:      Effort: Pulmonary effort is normal       Breath sounds: Normal breath sounds  Abdominal:      General: Bowel sounds are normal       Palpations: Abdomen is soft  Musculoskeletal: Normal range of motion  Skin:     General: Skin is warm and dry  Comments: Right forearm wounds clean dry and intact   Neurological:      Mental Status: She is alert and oriented to person, place, and time  Psychiatric:         Behavior: Behavior normal          Thought Content:  Thought content normal          Judgment: Judgment normal

## 2021-05-14 NOTE — PROGRESS NOTES
ASSESSMENT/PLAN:    Diagnoses and all orders for this visit:    Impingement syndrome of right shoulder    Right shoulder pain, unspecified chronicity  -     XR shoulder 2+ vw right; Future  -     MRI shoulder right wo contrast; Future        61 y o  female with impingement of the right shoulder and possible rotator cuff tear  I discussed with the patient that due to her trying conservative treatment for this injury in the past without relief that an MRI of the right shoulder would be warranted at this time to further evaluate the rotator cuff  I will see her back in office after the MRI is complete to discuss the results and re-evaluate the right shoulder  Return for after MRI        the patient has had pain right shoulder for least the past 4 months  This was precipitated by fall  She has participated in physical therapy without any relief her symptomatology  Physical examination shows very limited range of motion right shoulder with approximately 85° of forward flexion and abduction  Rotator cuff strength testing was 4  Out of 5  There is a positive drop-arm test   X-rays performed show a large subacromial spur  Is my medical opinion that the patient sustained a rotator cuff tear of her right shoulder  An MRI is the gold standard to look for this pathology  The patient has exhausted all conservative treatment including activity modification, oral analgesics, as well as rehabilitation  The patient will return back once the MRI is complete       _____________________________________________________  CHIEF COMPLAINT:  Chief Complaint   Patient presents with    Right Shoulder - Pain         SUBJECTIVE:  Asuncion Padilla is a 61 y o  female who presents to the office with right shoulder pain  Patient stated that her pain began when she fell while in a grocery store  She stated that she has tried conservative treatments in the past at [facility]  including formal physical therapy without relief  Patient stated that she has decreased range of motion since the incidence  Patient stated that she experiences pain with overhead motions  Patient denied any numbness or tingling  Patient denied any fevers or chills  The following portions of the patient's history were reviewed and updated as appropriate: allergies, current medications, past family history, past medical history, past social history, past surgical history and problem list     PAST MEDICAL HISTORY:  Past Medical History:   Diagnosis Date    Anemia     Arthritis     Chronic pain disorder     back and hip pain    Disease of thyroid gland     nodules    GERD (gastroesophageal reflux disease)     Heart murmur     Heartburn     Hiatal hernia     Hyperlipidemia     Hypertension     Overactive bladder     Vitamin D deficiency     unsure    Wears dentures     upper    Wears glasses        PAST SURGICAL HISTORY:  Past Surgical History:   Procedure Laterality Date     SECTION      Last Assessed: 2017    ELBOW SURGERY      Last Assessed: 2017    ESOPHAGOGASTRODUODENOSCOPY N/A 2017    Procedure: ESOPHAGOGASTRODUODENOSCOPY (EGD); Surgeon: Andrew Abernathy DO;  Location: [facility]; Service: Gastroenterology    HERNIA REPAIR  2020    Hiatal hernia repair    HIP SURGERY Left 2018    glut medius/minimus repair  and 18    HYSTERECTOMY          KNEE SURGERY      x2    PARAESOPHAGEAL HERNIA REPAIR N/A 2020    Procedure: LAPAROSCOPIC PARAESOPHAGEAL HERNIA REPAIR WITH MESH AND NISSEN FUNDOPLICATION WITH ROBOTICS;  Surgeon: Samy Alexander MD;  Location: [facility] OR;  Service: General    NV COLONOSCOPY FLX DX W/COLLJ SPEC WHEN PFRMD N/A 2017    Procedure: EGD AND COLONOSCOPY;  Surgeon: Andrew Abernathy DO;  Location: [facility];   Service: Gastroenterology       FAMILY HISTORY:  Family History   Problem Relation Age of Onset    Colon cancer Mother     Heart attack Father     Other Father         Cardiac Disorder    Diabetes type II Father     Colon cancer Sister     Cancer Maternal Grandmother     No Known Problems Daughter     No Known Problems Paternal Grandmother     No Known Problems Sister     No Known Problems Sister     No Known Problems Sister     No Known Problems Sister     No Known Problems Maternal Aunt     No Known Problems Maternal Aunt     No Known Problems Maternal Aunt        SOCIAL HISTORY:  Social History     Tobacco Use    Smoking status: Former Smoker     Quit date:      Years since quittin 3    Smokeless tobacco: Never Used    Tobacco comment: quit 25 yrs ago   Substance Use Topics    Alcohol use: No    Drug use: No       MEDICATIONS:    Current Outpatient Medications:     atorvastatin (LIPITOR) 10 mg tablet, Take 1 tablet (10 mg total) by mouth daily, Disp: 90 tablet, Rfl: 1    Catheters MISC, Self - Cath 14-46 FR, Disp: , Rfl:     famotidine (PEPCID) 20 mg tablet, TAKE 1 TABLET (20 MG TOTAL) BY MOUTH 2 (TWO) TIMES DAILY, Disp: 180 tablet, Rfl: 0    ferrous sulfate (FeroSul) 325 (65 Fe) mg tablet, Take 1 tablet (325 mg total) by mouth daily with breakfast, Disp: 90 tablet, Rfl: 1    hydrOXYzine HCL (ATARAX) 25 mg tablet, TAKE 1-2 TABLETS (25-50 MG TOTAL) BY MOUTH DAILY AT BEDTIME AS NEEDED FOR ANXIETY (INSOMNIA), Disp: 60 tablet, Rfl: 1    traMADol (ULTRAM) 50 mg tablet, take 1 tablet by mouth four times a day if needed for SEVERE PAIN ( PAIN SCALE 7-10 ), Disp: , Rfl:     valACYclovir (VALTREX) 1,000 mg tablet, Take 1 tablet (1,000 mg total) by mouth 2 (two) times a day for 3 days, Disp: 6 tablet, Rfl: 2    ALLERGIES:  No Known Allergies    ROS:  Review of Systems   Constitutional: Negative for chills and fever  HENT: Negative for ear pain and sore throat  Eyes: Negative for pain and visual disturbance  Respiratory: Negative for cough and shortness of breath  Cardiovascular: Negative for chest pain and palpitations  Gastrointestinal: Negative for abdominal pain and vomiting  Genitourinary: Negative for dysuria and hematuria  Musculoskeletal: Positive for arthralgias  Negative for back pain  Skin: Negative for color change and rash  Neurological: Negative for seizures and syncope  All other systems reviewed and are negative  Constitutional: Negative for fatigue, fever or loss of appetite  HENT: Negative  Respiratory: Negative for shortness of breath, dyspnea  Cardiovascular: Negative for chest pain/tightness  Gastrointestinal: Negative for abdominal pain, N/V  Endocrine: Negative for cold/heat intolerance, unexplained weight loss/gain  Genitourinary: Negative for flank pain, dysuria, hematuria  Musculoskeletal: Positive for arthralgia   Skin: Negative for rash  Neurological: Negative for numbness or tingling  Psychiatric/Behavioral: Negative for agitation  _____________________________________________________  PHYSICAL EXAMINATION:    Blood pressure 164/90, pulse 72, height 5' 3" (1 6 m), weight 68 9 kg (152 lb)  Constitutional: Oriented to person, place, and time  Appears well-developed and well-nourished  No distress  HENT:   Head: Normocephalic  Eyes: Conjunctivae are normal  Right eye exhibits no discharge  Left eye exhibits no discharge  No scleral icterus  Cardiovascular: Normal rate  Pulmonary/Chest: Effort normal    Neurological: Alert and oriented to person, place, and time  Skin: Skin is warm and dry  No rash noted  Not diaphoretic  No erythema  No pallor  Psychiatric: Normal mood and affect   Behavior is normal  Judgment and thought content normal       MUSCULOSKELETAL EXAMINATION:   Physical Exam  Ortho Exam    Right Shoulder:   Skin intact   No obvious swelling   No erythema or ecchymosis   Positive drop arm test   Forward flexion less than 90 degrees actively   Compartments soft   Fingers pink and mobile   Sensation intact       Objective:  BP Readings from Last 1 Encounters:   05/14/21 164/90      Wt Readings from Last 1 Encounters:   05/14/21 68 9 kg (152 lb)        BMI:   Estimated body mass index is 26 93 kg/m² as calculated from the following:    Height as of this encounter: 5' 3" (1 6 m)  Weight as of this encounter: 68 9 kg (152 lb)        PROCEDURES PERFORMED:  Procedures      Scribe Attestation    I,:  Tommy Peña am acting as a scribe while in the presence of the attending physician :       I,:  Nadia Cehng, DO personally performed the services described in this documentation    as scribed in my presence :

## 2021-05-19 ENCOUNTER — HOSPITAL ENCOUNTER (OUTPATIENT)
Dept: MRI IMAGING | Facility: HOSPITAL | Age: 66
Discharge: HOME | End: 2021-05-19
Attending: ORTHOPAEDIC SURGERY
Payer: MEDICARE

## 2021-05-19 DIAGNOSIS — M25.511 RIGHT SHOULDER PAIN, UNSPECIFIED CHRONICITY: ICD-10-CM

## 2021-05-19 PROCEDURE — G1004 CDSM NDSC: HCPCS

## 2021-05-19 PROCEDURE — 73221 MRI JOINT UPR EXTREM W/O DYE: CPT

## 2021-05-21 ENCOUNTER — OFFICE VISIT (OUTPATIENT)
Dept: OBGYN CLINIC | Facility: CLINIC | Age: 66
End: 2021-05-21
Payer: MEDICARE

## 2021-05-21 VITALS
BODY MASS INDEX: 26.93 KG/M2 | HEIGHT: 63 IN | DIASTOLIC BLOOD PRESSURE: 103 MMHG | WEIGHT: 152 LBS | HEART RATE: 76 BPM | SYSTOLIC BLOOD PRESSURE: 185 MMHG

## 2021-05-21 DIAGNOSIS — M75.101 TEAR OF RIGHT ROTATOR CUFF, UNSPECIFIED TEAR EXTENT, UNSPECIFIED WHETHER TRAUMATIC: Primary | ICD-10-CM

## 2021-05-21 DIAGNOSIS — Z01.812 PRE-OPERATIVE LABORATORY EXAMINATION: ICD-10-CM

## 2021-05-21 PROCEDURE — 99213 OFFICE O/P EST LOW 20 MIN: CPT | Performed by: ORTHOPAEDIC SURGERY

## 2021-05-21 RX ORDER — CHLORHEXIDINE GLUCONATE 4 G/100ML
SOLUTION TOPICAL DAILY PRN
Status: CANCELLED | OUTPATIENT
Start: 2021-06-23

## 2021-05-21 RX ORDER — CEFAZOLIN SODIUM 2 G/50ML
2000 SOLUTION INTRAVENOUS ONCE
Status: CANCELLED | OUTPATIENT
Start: 2021-06-23 | End: 2021-05-21

## 2021-05-21 RX ORDER — CHLORHEXIDINE GLUCONATE 0.12 MG/ML
15 RINSE ORAL ONCE
Status: CANCELLED | OUTPATIENT
Start: 2021-06-23 | End: 2021-05-21

## 2021-05-21 NOTE — PROGRESS NOTES
Assessment/Plan:    No problem-specific Assessment & Plan notes found for this encounter  Diagnoses and all orders for this visit:    Tear of right rotator cuff, unspecified tear extent, unspecified whether traumatic  -     Case request operating room: ARTHROSCOPY SHOULDER WITH POSSIBLE ROTATOR CUFF REPAIR; Standing  -     Immob Shoulder Univeral  -     Ambulatory referral to Physical Therapy; Future  -     Ambulatory referral to Occupational Therapy; Future  -     Case request operating room: ARTHROSCOPY SHOULDER WITH POSSIBLE ROTATOR CUFF REPAIR    Pre-operative laboratory examination  -     Basic metabolic panel; Future  -     CBC and differential; Future  -     EKG 12 lead; Future  -     Immob Shoulder Univeral  -     Ambulatory referral to Physical Therapy; Future  -     Ambulatory referral to Occupational Therapy; Future    Other orders  -     Nursing Communication Warmimg Interventions Implemented; Standing  -     Nursing Communication CHG bath, have staff wash entire body (neck down) per pre-op bathing protocol  Routine, evening prior to, and day of surgery ; Standing  -     Nursing Communication Swab both nares with Povidone-Iodine solution, EXCLUDE if patient has shellfish/Iodine allergy  Routine, day of surgery, on call to OR; Standing  -     chlorhexidine (PERIDEX) 0 12 % oral rinse 15 mL  -     Void on call to OR; Standing  -     Insert peripheral IV; Standing  -     chlorhexidine (HIBICLENS) 4 % topical liquid  -     ceFAZolin (ANCEF) 2,000 mg in dextrose 5 % 100 mL IVPB           treatment options were discussed with the patient in great detail  She has opted for elective arthroscopy right shoulder with a probable rotator cuff repair    All risks, complications, benefits were discussed with the patient in great detail including bleeding, infection, blood clots, pain, stiffness, neurovascular damage, fractures, dislocations, the possibility of loss of life from surgery, heart attack, stroke, etcetera, the possibility that there is a longstanding rotator cuff repair which cannot be adequately repaired  Her surgery is tentatively scheduled for June 23, 2021    Subjective:      Patient ID: Peng Chaidez is a 61 y o  female  HPI     the patient presents for MRI report of her right shoulder  It did show a high-grade, nearly full-thickness tear of the rotator cuff  She states she still has a lot of pain  The pain does affect her lifestyle  She wants to proceed with surgical intervention to her right shoulder  As far as her left hip, she stated is fairly level for now  And does not want any further treatment along that region    The following portions of the patient's history were reviewed and updated as appropriate: allergies, current medications, past family history, past medical history, past social history, past surgical history and problem list     Review of Systems   Constitutional: Negative for chills, fever and unexpected weight change  HENT: Negative for hearing loss, nosebleeds and sore throat  Eyes: Negative for pain, redness and visual disturbance  Respiratory: Negative for cough, shortness of breath and wheezing  Cardiovascular: Negative for chest pain, palpitations and leg swelling  Gastrointestinal: Negative for abdominal pain, nausea and vomiting  Endocrine: Negative for polydipsia and polyuria  Genitourinary: Negative for dysuria and hematuria  Musculoskeletal: Positive for arthralgias, gait problem, joint swelling and myalgias  Negative for back pain, neck pain and neck stiffness  As noted in HPI   Skin: Negative for rash and wound  Neurological: Negative for dizziness, numbness and headaches  Psychiatric/Behavioral: Negative for decreased concentration and suicidal ideas  The patient is not nervous/anxious            Objective:      BP (!) 185/103   Pulse 76   Ht 5' 3" (1 6 m)   Wt 68 9 kg (152 lb)   BMI 26 93 kg/m²          Physical Exam right upper extremity was neurovascular intact  Fingers are pink and mobile  Compartments are soft  Range of motion of her shoulder was 90° of forward flexion and abduction  Internal rotation to L3-L4  There is mild signs of impingement  No gross instability  There is a positive drop-arm test   There is a painful arc of motion about her right shoulder    Rotator cuff strength testing was 3 5/5     MRI showed a nearly full-thickness tear of the rotator cuff

## 2021-05-22 ENCOUNTER — IMMUNIZATIONS (OUTPATIENT)
Dept: FAMILY MEDICINE CLINIC | Facility: HOSPITAL | Age: 66
End: 2021-05-22

## 2021-05-22 DIAGNOSIS — Z23 ENCOUNTER FOR IMMUNIZATION: Primary | ICD-10-CM

## 2021-05-22 PROCEDURE — 91300 SARS-COV-2 / COVID-19 MRNA VACCINE (PFIZER-BIONTECH) 30 MCG: CPT

## 2021-05-22 PROCEDURE — 0002A SARS-COV-2 / COVID-19 MRNA VACCINE (PFIZER-BIONTECH) 30 MCG: CPT

## 2021-05-23 DIAGNOSIS — Z86.19 H/O COLD SORES: ICD-10-CM

## 2021-05-23 RX ORDER — VALACYCLOVIR HYDROCHLORIDE 1 G/1
TABLET, FILM COATED ORAL
Qty: 6 TABLET | Refills: 2 | Status: SHIPPED | OUTPATIENT
Start: 2021-05-23 | End: 2021-06-27

## 2021-06-04 ENCOUNTER — APPOINTMENT (OUTPATIENT)
Dept: LAB | Facility: CLINIC | Age: 66
End: 2021-06-04
Payer: MEDICARE

## 2021-06-04 ENCOUNTER — TELEPHONE (OUTPATIENT)
Dept: OBGYN CLINIC | Facility: HOSPITAL | Age: 66
End: 2021-06-04

## 2021-06-04 DIAGNOSIS — Z01.812 PRE-OPERATIVE LABORATORY EXAMINATION: ICD-10-CM

## 2021-06-04 LAB
ANION GAP SERPL CALCULATED.3IONS-SCNC: 4 MMOL/L (ref 4–13)
BASOPHILS # BLD AUTO: 0.03 THOUSANDS/ΜL (ref 0–0.1)
BASOPHILS NFR BLD AUTO: 1 % (ref 0–1)
BUN SERPL-MCNC: 11 MG/DL (ref 5–25)
CALCIUM SERPL-MCNC: 9.5 MG/DL (ref 8.3–10.1)
CHLORIDE SERPL-SCNC: 110 MMOL/L (ref 100–108)
CO2 SERPL-SCNC: 29 MMOL/L (ref 21–32)
CREAT SERPL-MCNC: 0.7 MG/DL (ref 0.6–1.3)
EOSINOPHIL # BLD AUTO: 0.35 THOUSAND/ΜL (ref 0–0.61)
EOSINOPHIL NFR BLD AUTO: 7 % (ref 0–6)
ERYTHROCYTE [DISTWIDTH] IN BLOOD BY AUTOMATED COUNT: 12.5 % (ref 11.6–15.1)
GFR SERPL CREATININE-BSD FRML MDRD: 92 ML/MIN/1.73SQ M
GLUCOSE P FAST SERPL-MCNC: 90 MG/DL (ref 65–99)
HCT VFR BLD AUTO: 45.3 % (ref 34.8–46.1)
HGB BLD-MCNC: 14 G/DL (ref 11.5–15.4)
IMM GRANULOCYTES # BLD AUTO: 0.01 THOUSAND/UL (ref 0–0.2)
IMM GRANULOCYTES NFR BLD AUTO: 0 % (ref 0–2)
LYMPHOCYTES # BLD AUTO: 1.69 THOUSANDS/ΜL (ref 0.6–4.47)
LYMPHOCYTES NFR BLD AUTO: 31 % (ref 14–44)
MCH RBC QN AUTO: 28.3 PG (ref 26.8–34.3)
MCHC RBC AUTO-ENTMCNC: 30.9 G/DL (ref 31.4–37.4)
MCV RBC AUTO: 92 FL (ref 82–98)
MONOCYTES # BLD AUTO: 0.48 THOUSAND/ΜL (ref 0.17–1.22)
MONOCYTES NFR BLD AUTO: 9 % (ref 4–12)
NEUTROPHILS # BLD AUTO: 2.84 THOUSANDS/ΜL (ref 1.85–7.62)
NEUTS SEG NFR BLD AUTO: 52 % (ref 43–75)
NRBC BLD AUTO-RTO: 0 /100 WBCS
PLATELET # BLD AUTO: 314 THOUSANDS/UL (ref 149–390)
PMV BLD AUTO: 10.6 FL (ref 8.9–12.7)
POTASSIUM SERPL-SCNC: 4 MMOL/L (ref 3.5–5.3)
RBC # BLD AUTO: 4.94 MILLION/UL (ref 3.81–5.12)
SODIUM SERPL-SCNC: 143 MMOL/L (ref 136–145)
WBC # BLD AUTO: 5.4 THOUSAND/UL (ref 4.31–10.16)

## 2021-06-04 PROCEDURE — 36415 COLL VENOUS BLD VENIPUNCTURE: CPT

## 2021-06-04 PROCEDURE — 85025 COMPLETE CBC W/AUTO DIFF WBC: CPT

## 2021-06-04 PROCEDURE — 80048 BASIC METABOLIC PNL TOTAL CA: CPT

## 2021-06-04 NOTE — TELEPHONE ENCOUNTER
Dr Marika Bah    [phone]    Patient is scheduled for surgery on her right shoulder on 6/23  She is requesting a cortisone injection for her left shoulder on 6/11 @ 8 am   Is it ok if I schedule a new issue at that time? Thank you!
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
## 2021-06-11 ENCOUNTER — OFFICE VISIT (OUTPATIENT)
Dept: OBGYN CLINIC | Facility: CLINIC | Age: 66
End: 2021-06-11
Payer: MEDICARE

## 2021-06-11 ENCOUNTER — TELEPHONE (OUTPATIENT)
Dept: OBGYN CLINIC | Facility: MEDICAL CENTER | Age: 66
End: 2021-06-11

## 2021-06-11 VITALS
SYSTOLIC BLOOD PRESSURE: 147 MMHG | HEART RATE: 71 BPM | DIASTOLIC BLOOD PRESSURE: 81 MMHG | BODY MASS INDEX: 27.11 KG/M2 | WEIGHT: 153 LBS | HEIGHT: 63 IN

## 2021-06-11 DIAGNOSIS — S46.012A TRAUMATIC COMPLETE TEAR OF LEFT ROTATOR CUFF, INITIAL ENCOUNTER: ICD-10-CM

## 2021-06-11 DIAGNOSIS — M25.512 LEFT SHOULDER PAIN, UNSPECIFIED CHRONICITY: Primary | ICD-10-CM

## 2021-06-11 PROCEDURE — 20610 DRAIN/INJ JOINT/BURSA W/O US: CPT | Performed by: ORTHOPAEDIC SURGERY

## 2021-06-11 PROCEDURE — 99213 OFFICE O/P EST LOW 20 MIN: CPT | Performed by: ORTHOPAEDIC SURGERY

## 2021-06-11 RX ORDER — BUPIVACAINE HYDROCHLORIDE 2.5 MG/ML
4 INJECTION, SOLUTION INFILTRATION; PERINEURAL
Status: COMPLETED | OUTPATIENT
Start: 2021-06-11 | End: 2021-06-11

## 2021-06-11 RX ORDER — METHYLPREDNISOLONE ACETATE 40 MG/ML
2 INJECTION, SUSPENSION INTRA-ARTICULAR; INTRALESIONAL; INTRAMUSCULAR; SOFT TISSUE
Status: COMPLETED | OUTPATIENT
Start: 2021-06-11 | End: 2021-06-11

## 2021-06-11 RX ADMIN — METHYLPREDNISOLONE ACETATE 2 ML: 40 INJECTION, SUSPENSION INTRA-ARTICULAR; INTRALESIONAL; INTRAMUSCULAR; SOFT TISSUE at 08:33

## 2021-06-11 RX ADMIN — BUPIVACAINE HYDROCHLORIDE 4 ML: 2.5 INJECTION, SOLUTION INFILTRATION; PERINEURAL at 08:33

## 2021-06-11 NOTE — PROGRESS NOTES
Assessment/Plan:    No problem-specific Assessment & Plan notes found for this encounter  Diagnoses and all orders for this visit:    Left shoulder pain, unspecified chronicity  -     XR shoulder 2+ vw left; Future    Traumatic complete tear of left rotator cuff, initial encounter  -     MRI shoulder left wo contrast; Future    Inflammation of sacroiliac joint (HCC)           the left shoulder was injected with Depo-Medrol and Marcaine  She tolerated procedure quite well  She is already scheduled for right rotator cuff surgery on the 23rd of June  We will discuss results at that time, as long as the MRI is complete  Subjective:      Patient ID: Selene Hills is a 61 y o  female  HPI      The patient is having trouble with her left shoulder  She already has an issue with her right shoulder with a rotator cuff tear  She is scheduled for right shoulder rotator cuff surgery on June 23rd  She states she has trouble lifting her left shoulder  The pain does feel like the contralateral side  She notes weakness present she denies any numbness or tingling  She denies any neck pain  The following portions of the patient's history were reviewed and updated as appropriate: allergies, current medications, past family history, past medical history, past social history, past surgical history and problem list     Review of Systems   Constitutional: Negative for chills, fever and unexpected weight change  HENT: Negative for hearing loss, nosebleeds and sore throat  Eyes: Negative for pain, redness and visual disturbance  Respiratory: Negative for cough, shortness of breath and wheezing  Cardiovascular: Negative for chest pain, palpitations and leg swelling  Gastrointestinal: Negative for abdominal pain, nausea and vomiting  Endocrine: Negative for polydipsia and polyuria  Genitourinary: Negative for dysuria and hematuria     Musculoskeletal: Positive for arthralgias, joint swelling and myalgias  Negative for back pain, gait problem, neck pain and neck stiffness  As noted in HPI   Skin: Negative for rash and wound  Neurological: Negative for dizziness, numbness and headaches  Psychiatric/Behavioral: Negative for decreased concentration and suicidal ideas  The patient is not nervous/anxious  Objective:      /81   Pulse 71   Ht 5' 3" (1 6 m)   Wt 69 4 kg (153 lb)   BMI 27 10 kg/m²          Physical Exam          Neck was soft and supple  There is negative axial compression test her neck  Left upper extremity was neurovascular intact  Fingers are pink and mobile  Compartments are soft  There is limited range of motion along her left shoulder with 90° of flexion, 90° of abduction, internal rotation to the sacrum  There is a positive drop-arm test   Rotator cuff strength testing was 3 5/5  There is mild signs of impingement  No gross instability  No AC joint pain  X-rays of the left shoulder show a small subacromial spur    Large joint arthrocentesis: L subacromial bursa  Universal Protocol:  Consent: Verbal consent obtained  Risks and benefits: risks, benefits and alternatives were discussed  Consent given by: patient  Time out: Immediately prior to procedure a "time out" was called to verify the correct patient, procedure, equipment, support staff and site/side marked as required  Patient understanding: patient states understanding of the procedure being performed  Test results: test results available and properly labeled  Site marked: the operative site was marked  Radiology Images displayed and confirmed   If images not available, report reviewed: imaging studies available  Patient identity confirmed: verbally with patient    Supporting Documentation  Indications: pain   Procedure Details  Location: shoulder - L subacromial bursa  Preparation: Patient was prepped and draped in the usual sterile fashion  Needle size: 22 G  Ultrasound guidance: no  Approach: lateral  Medications administered: 4 mL bupivacaine 0 25 %; 2 mL methylPREDNISolone acetate 40 mg/mL    Patient tolerance: patient tolerated the procedure well with no immediate complications  Dressing:  Sterile dressing applied

## 2021-06-11 NOTE — TELEPHONE ENCOUNTER
Patient sees Dr Nadeem Arias  Patient states she was told she does not need an EKG, she spoke to someone stating it needs to be removed if she does not need the test     Please confirm if this is true      #  [phone]

## 2021-06-16 RX ORDER — LOSARTAN POTASSIUM 100 MG/1
25 TABLET ORAL DAILY
COMMUNITY
End: 2021-10-06 | Stop reason: SDUPTHER

## 2021-06-16 NOTE — PRE-PROCEDURE INSTRUCTIONS
Pre-Surgery Instructions:   Medication Instructions    atorvastatin (LIPITOR) 10 mg tablet Instructed patient per Anesthesia Guidelines   famotidine (PEPCID) 20 mg tablet Instructed patient per Anesthesia Guidelines   ferrous sulfate (FeroSul) 325 (65 Fe) mg tablet Instructed patient per Anesthesia Guidelines   hydrOXYzine HCL (ATARAX) 25 mg tablet Instructed patient per Anesthesia Guidelines   losartan (COZAAR) 100 MG tablet Instructed patient per Anesthesia Guidelines   traMADol (ULTRAM) 50 mg tablet Instructed patient per Anesthesia Guidelines   valACYclovir (VALTREX) 1,000 mg tablet Instructed patient per Anesthesia Guidelines  Pt verbalizes understanding to hold Losartan DOP but can take other meds with a sip of water  Pt verbalizes understanding of bathing instructions

## 2021-06-21 ENCOUNTER — HOSPITAL ENCOUNTER (OUTPATIENT)
Dept: MRI IMAGING | Facility: HOSPITAL | Age: 66
Discharge: HOME | End: 2021-06-21
Attending: ORTHOPAEDIC SURGERY
Payer: MEDICARE

## 2021-06-21 DIAGNOSIS — S46.012A TRAUMATIC COMPLETE TEAR OF LEFT ROTATOR CUFF, INITIAL ENCOUNTER: ICD-10-CM

## 2021-06-21 PROCEDURE — 73221 MRI JOINT UPR EXTREM W/O DYE: CPT

## 2021-06-21 PROCEDURE — NC001 PR NO CHARGE: Performed by: ORTHOPAEDIC SURGERY

## 2021-06-21 NOTE — H&P
H&P Exam - Orthopedics   Sami Lewis 61 y o  female MRN: [id number]  Unit/Bed#:  Encounter: [id number]    Assessment/Plan     Assessment:  Rotator cuff tear right shoulder  Plan:   elective right shoulder arthroscopy with possible rotator cuff repair    History of Present Illness   HPI:  Sami Lewis is a 61 y o  female who presented to our office complaining of right shoulder pain which began after she fell at a grocery store  She states she tried several conservative treatments including formal physical therapy without any relief of symptoms  An MRI was performed which was consistent with a rotator cuff tear  After exhausting conservative treatment, the risks and benefits of surgery discussed with the patient  She decided on an elective right shoulder arthroscopy with possible rotator cuff repair  Review of Systems   Constitutional: Negative for chills, fever and unexpected weight change  HENT: Negative for nosebleeds and sore throat  Eyes: Negative for pain, redness and visual disturbance  Respiratory: Negative for cough, shortness of breath and wheezing  Cardiovascular: Negative for chest pain, palpitations and leg swelling  Gastrointestinal: Negative for abdominal pain, nausea and vomiting  Endocrine: Negative for polydipsia and polyuria  Genitourinary: Negative for dysuria and hematuria  Musculoskeletal: Positive for arthralgias and myalgias  As noted in HPI   Skin: Negative for rash and wound  Neurological: Negative for dizziness, numbness and headaches  Psychiatric/Behavioral: Negative for decreased concentration and suicidal ideas  The patient is not nervous/anxious          Historical Information   Past Medical History:   Diagnosis Date    Anemia     Arthritis     Chronic pain disorder     back and hip pain    Disease of thyroid gland     nodules    GERD (gastroesophageal reflux disease)     Heart murmur     Heartburn     Hiatal hernia     Hyperlipidemia  Hypertension     Overactive bladder     Rotator cuff tear, left     Rotator cuff tear, right     Vitamin D deficiency     unsure    Wears dentures     upper    Wears glasses     Wears partial dentures     upper     Past Surgical History:   Procedure Laterality Date     SECTION      Last Assessed: 2017    ELBOW SURGERY      Last Assessed: 2017    ESOPHAGOGASTRODUODENOSCOPY N/A 2017    Procedure: ESOPHAGOGASTRODUODENOSCOPY (EGD); Surgeon: Roberto Perrin DO;  Location: [facility] GI [facility]; Service: Gastroenterology    HERNIA REPAIR  2020    Hiatal hernia repair    HIP SURGERY Left 2018    glut medius/minimus repair  and 18    HYSTERECTOMY          KNEE SURGERY      x2    PARAESOPHAGEAL HERNIA REPAIR N/A 2020    Procedure: LAPAROSCOPIC PARAESOPHAGEAL HERNIA REPAIR WITH MESH AND NISSEN FUNDOPLICATION WITH ROBOTICS;  Surgeon: Kimberlyn Trevino MD;  Location: [facility] OR;  Service: General    OH COLONOSCOPY FLX DX W/COLLJ SPEC WHEN PFRMD N/A 2017    Procedure: EGD AND COLONOSCOPY;  Surgeon: Roberto Perrin DO;  Location: [facility] GI [facility]; Service: Gastroenterology    SHOULDER ARTHROCENTESIS       Social History   Social History     Substance and Sexual Activity   Alcohol Use No     Social History     Substance and Sexual Activity   Drug Use No     Social History     Tobacco Use   Smoking Status Former Smoker    Quit date: 12    Years since quittin 4   Smokeless Tobacco Never Used   Tobacco Comment    quit 25 yrs ago     Family History: non-contributory    Meds/Allergies   all medications and allergies reviewed  No Known Allergies    Objective   Vitals: Height 5' 3" (1 6 m), weight 68 kg (150 lb)  ,Body mass index is 26 57 kg/m²  No intake or output data in the 24 hours ending 21 1251    No intake/output data recorded      Invasive Devices     None                 Physical Exam  Ortho Exam   right upper extremity is neurovascularly intact  Fingers are pink and mobile   Compartments are soft   Range of motion of the shoulder is from 0-90 degrees of forward flexion and abduction  Internal rotation to L3-L4   Mild signs of impingement   No gross instability   Positive drop-arm test   Painful arc of motion throughout her shoulder   Rotator cuff strength 3 5/5  Lungs CTA  Heart RRR  Lab Results: I have personally reviewed pertinent lab results  Imaging: I have personally reviewed pertinent reports  EKG, Pathology, and Other Studies: I have personally reviewed pertinent reports  Code Status: No Order  Advance Directive and Living Will:      Power of :    POLST:      Counseling / Coordination of Care  Total floor / unit time spent today 30 minutes  Greater than 50% of total time was spent with the patient and / or family counseling and / or coordination of care

## 2021-06-22 ENCOUNTER — ANESTHESIA EVENT (OUTPATIENT)
Dept: PERIOP | Facility: HOSPITAL | Age: 66
End: 2021-06-22
Payer: MEDICARE

## 2021-06-23 ENCOUNTER — ANESTHESIA (OUTPATIENT)
Dept: PERIOP | Facility: HOSPITAL | Age: 66
End: 2021-06-23
Payer: MEDICARE

## 2021-06-23 ENCOUNTER — HOSPITAL ENCOUNTER (OUTPATIENT)
Facility: HOSPITAL | Age: 66
Setting detail: OUTPATIENT SURGERY
Discharge: HOME | End: 2021-06-23
Attending: ORTHOPAEDIC SURGERY | Admitting: ORTHOPAEDIC SURGERY
Payer: MEDICARE

## 2021-06-23 VITALS
WEIGHT: 147 LBS | TEMPERATURE: 97.3 F | OXYGEN SATURATION: 100 % | DIASTOLIC BLOOD PRESSURE: 69 MMHG | HEART RATE: 88 BPM | BODY MASS INDEX: 26.05 KG/M2 | SYSTOLIC BLOOD PRESSURE: 129 MMHG | RESPIRATION RATE: 18 BRPM | HEIGHT: 63 IN

## 2021-06-23 DIAGNOSIS — M75.101 TEAR OF RIGHT ROTATOR CUFF, UNSPECIFIED TEAR EXTENT, UNSPECIFIED WHETHER TRAUMATIC: ICD-10-CM

## 2021-06-23 DIAGNOSIS — M75.121 COMPLETE TEAR OF RIGHT ROTATOR CUFF, UNSPECIFIED WHETHER TRAUMATIC: Primary | ICD-10-CM

## 2021-06-23 PROCEDURE — C9290 INJ, BUPIVACAINE LIPOSOME: HCPCS | Performed by: STUDENT IN AN ORGANIZED HEALTH CARE EDUCATION/TRAINING PROGRAM

## 2021-06-23 PROCEDURE — C1713 ANCHOR/SCREW BN/BN,TIS/BN: HCPCS | Performed by: ORTHOPAEDIC SURGERY

## 2021-06-23 PROCEDURE — 88304 TISSUE EXAM BY PATHOLOGIST: CPT | Performed by: PATHOLOGY

## 2021-06-23 PROCEDURE — 29827 SHO ARTHRS SRG RT8TR CUF RPR: CPT | Performed by: PHYSICIAN ASSISTANT

## 2021-06-23 PROCEDURE — 29826 SHO ARTHRS SRG DECOMPRESSION: CPT | Performed by: PHYSICIAN ASSISTANT

## 2021-06-23 PROCEDURE — 29826 SHO ARTHRS SRG DECOMPRESSION: CPT | Performed by: ORTHOPAEDIC SURGERY

## 2021-06-23 PROCEDURE — 29827 SHO ARTHRS SRG RT8TR CUF RPR: CPT | Performed by: ORTHOPAEDIC SURGERY

## 2021-06-23 DEVICE — BIO-COMP SWVLK C, CLD 4.75X19.1MM
Type: IMPLANTABLE DEVICE | Site: SHOULDER | Status: FUNCTIONAL
Brand: ARTHREX®

## 2021-06-23 RX ORDER — ONDANSETRON 2 MG/ML
4 INJECTION INTRAMUSCULAR; INTRAVENOUS ONCE AS NEEDED
Status: DISCONTINUED | OUTPATIENT
Start: 2021-06-23 | End: 2021-06-23 | Stop reason: HOSPADM

## 2021-06-23 RX ORDER — BUPIVACAINE HYDROCHLORIDE AND EPINEPHRINE 5; 5 MG/ML; UG/ML
INJECTION, SOLUTION PERINEURAL AS NEEDED
Status: DISCONTINUED | OUTPATIENT
Start: 2021-06-23 | End: 2021-06-23 | Stop reason: HOSPADM

## 2021-06-23 RX ORDER — OXYCODONE HYDROCHLORIDE AND ACETAMINOPHEN 5; 325 MG/1; MG/1
1 TABLET ORAL EVERY 4 HOURS PRN
Status: DISCONTINUED | OUTPATIENT
Start: 2021-06-23 | End: 2021-06-23 | Stop reason: HOSPADM

## 2021-06-23 RX ORDER — CEPHALEXIN 500 MG/1
500 CAPSULE ORAL EVERY 8 HOURS SCHEDULED
Qty: 15 CAPSULE | Refills: 0 | Status: SHIPPED | OUTPATIENT
Start: 2021-06-23 | End: 2021-06-28

## 2021-06-23 RX ORDER — BUPIVACAINE HYDROCHLORIDE 5 MG/ML
INJECTION, SOLUTION PERINEURAL
Status: COMPLETED | OUTPATIENT
Start: 2021-06-23 | End: 2021-06-23

## 2021-06-23 RX ORDER — ONDANSETRON 2 MG/ML
INJECTION INTRAMUSCULAR; INTRAVENOUS AS NEEDED
Status: DISCONTINUED | OUTPATIENT
Start: 2021-06-23 | End: 2021-06-23

## 2021-06-23 RX ORDER — CEFAZOLIN SODIUM 2 G/50ML
2000 SOLUTION INTRAVENOUS ONCE
Status: COMPLETED | OUTPATIENT
Start: 2021-06-23 | End: 2021-06-23

## 2021-06-23 RX ORDER — POVIDONE-IODINE 10 MG/G
OINTMENT TOPICAL AS NEEDED
Status: DISCONTINUED | OUTPATIENT
Start: 2021-06-23 | End: 2021-06-23 | Stop reason: HOSPADM

## 2021-06-23 RX ORDER — FENTANYL CITRATE/PF 50 MCG/ML
25 SYRINGE (ML) INJECTION
Status: DISCONTINUED | OUTPATIENT
Start: 2021-06-23 | End: 2021-06-23 | Stop reason: HOSPADM

## 2021-06-23 RX ORDER — FENTANYL CITRATE 50 UG/ML
INJECTION, SOLUTION INTRAMUSCULAR; INTRAVENOUS AS NEEDED
Status: DISCONTINUED | OUTPATIENT
Start: 2021-06-23 | End: 2021-06-23

## 2021-06-23 RX ORDER — CHLORHEXIDINE GLUCONATE 4 G/100ML
SOLUTION TOPICAL DAILY PRN
Status: DISCONTINUED | OUTPATIENT
Start: 2021-06-23 | End: 2021-06-23 | Stop reason: HOSPADM

## 2021-06-23 RX ORDER — SUCCINYLCHOLINE/SOD CL,ISO/PF 100 MG/5ML
SYRINGE (ML) INTRAVENOUS AS NEEDED
Status: DISCONTINUED | OUTPATIENT
Start: 2021-06-23 | End: 2021-06-23

## 2021-06-23 RX ORDER — SODIUM CHLORIDE, SODIUM LACTATE, POTASSIUM CHLORIDE, CALCIUM CHLORIDE 600; 310; 30; 20 MG/100ML; MG/100ML; MG/100ML; MG/100ML
125 INJECTION, SOLUTION INTRAVENOUS CONTINUOUS
Status: ACTIVE | OUTPATIENT
Start: 2021-06-23 | End: 2021-06-23

## 2021-06-23 RX ORDER — CHLORHEXIDINE GLUCONATE 0.12 MG/ML
15 RINSE ORAL ONCE
Status: COMPLETED | OUTPATIENT
Start: 2021-06-23 | End: 2021-06-23

## 2021-06-23 RX ORDER — SODIUM CHLORIDE, SODIUM LACTATE, POTASSIUM CHLORIDE, CALCIUM CHLORIDE 600; 310; 30; 20 MG/100ML; MG/100ML; MG/100ML; MG/100ML
125 INJECTION, SOLUTION INTRAVENOUS CONTINUOUS
Status: DISCONTINUED | OUTPATIENT
Start: 2021-06-23 | End: 2021-06-23

## 2021-06-23 RX ORDER — MIDAZOLAM HYDROCHLORIDE 2 MG/2ML
INJECTION, SOLUTION INTRAMUSCULAR; INTRAVENOUS AS NEEDED
Status: DISCONTINUED | OUTPATIENT
Start: 2021-06-23 | End: 2021-06-23

## 2021-06-23 RX ORDER — ONDANSETRON 2 MG/ML
4 INJECTION INTRAMUSCULAR; INTRAVENOUS EVERY 6 HOURS PRN
Status: DISCONTINUED | OUTPATIENT
Start: 2021-06-23 | End: 2021-06-23 | Stop reason: HOSPADM

## 2021-06-23 RX ORDER — OXYCODONE HYDROCHLORIDE AND ACETAMINOPHEN 5; 325 MG/1; MG/1
1 TABLET ORAL EVERY 4 HOURS PRN
Qty: 30 TABLET | Refills: 0 | Status: SHIPPED | OUTPATIENT
Start: 2021-06-23 | End: 2021-07-03

## 2021-06-23 RX ORDER — MELOXICAM 15 MG/1
15 TABLET ORAL DAILY
Qty: 30 TABLET | Refills: 0 | Status: SHIPPED | OUTPATIENT
Start: 2021-06-23 | End: 2021-08-06

## 2021-06-23 RX ORDER — PROPOFOL 10 MG/ML
INJECTION, EMULSION INTRAVENOUS AS NEEDED
Status: DISCONTINUED | OUTPATIENT
Start: 2021-06-23 | End: 2021-06-23

## 2021-06-23 RX ORDER — DEXAMETHASONE SODIUM PHOSPHATE 4 MG/ML
INJECTION, SOLUTION INTRA-ARTICULAR; INTRALESIONAL; INTRAMUSCULAR; INTRAVENOUS; SOFT TISSUE AS NEEDED
Status: DISCONTINUED | OUTPATIENT
Start: 2021-06-23 | End: 2021-06-23

## 2021-06-23 RX ORDER — GLYCOPYRROLATE 0.2 MG/ML
INJECTION INTRAMUSCULAR; INTRAVENOUS AS NEEDED
Status: DISCONTINUED | OUTPATIENT
Start: 2021-06-23 | End: 2021-06-23

## 2021-06-23 RX ORDER — LIDOCAINE HYDROCHLORIDE 10 MG/ML
INJECTION, SOLUTION EPIDURAL; INFILTRATION; INTRACAUDAL; PERINEURAL AS NEEDED
Status: DISCONTINUED | OUTPATIENT
Start: 2021-06-23 | End: 2021-06-23

## 2021-06-23 RX ADMIN — CHLORHEXIDINE GLUCONATE 0.12% ORAL RINSE 15 ML: 1.2 LIQUID ORAL at 10:23

## 2021-06-23 RX ADMIN — ONDANSETRON 4 MG: 2 INJECTION INTRAMUSCULAR; INTRAVENOUS at 12:54

## 2021-06-23 RX ADMIN — FENTANYL CITRATE 50 MCG: 50 INJECTION INTRAMUSCULAR; INTRAVENOUS at 11:08

## 2021-06-23 RX ADMIN — LIDOCAINE HYDROCHLORIDE 100 MG: 10 INJECTION, SOLUTION EPIDURAL; INFILTRATION; INTRACAUDAL; PERINEURAL at 12:01

## 2021-06-23 RX ADMIN — SODIUM CHLORIDE, SODIUM LACTATE, POTASSIUM CHLORIDE, AND CALCIUM CHLORIDE: .6; .31; .03; .02 INJECTION, SOLUTION INTRAVENOUS at 12:50

## 2021-06-23 RX ADMIN — SODIUM CHLORIDE, SODIUM LACTATE, POTASSIUM CHLORIDE, AND CALCIUM CHLORIDE 125 ML/HR: .6; .31; .03; .02 INJECTION, SOLUTION INTRAVENOUS at 10:17

## 2021-06-23 RX ADMIN — Medication 100 MG: at 12:01

## 2021-06-23 RX ADMIN — FENTANYL CITRATE 50 MCG: 50 INJECTION INTRAMUSCULAR; INTRAVENOUS at 12:01

## 2021-06-23 RX ADMIN — MIDAZOLAM HYDROCHLORIDE 2 MG: 1 INJECTION, SOLUTION INTRAMUSCULAR; INTRAVENOUS at 11:03

## 2021-06-23 RX ADMIN — PHENYLEPHRINE HYDROCHLORIDE 50 MCG/MIN: 10 INJECTION INTRAVENOUS at 12:05

## 2021-06-23 RX ADMIN — CEFAZOLIN SODIUM 2000 MG: 2 SOLUTION INTRAVENOUS at 11:57

## 2021-06-23 RX ADMIN — GLYCOPYRROLATE 0.2 MG: 0.2 INJECTION, SOLUTION INTRAMUSCULAR; INTRAVENOUS at 12:14

## 2021-06-23 RX ADMIN — BUPIVACAINE HYDROCHLORIDE 10 ML: 5 INJECTION, SOLUTION PERINEURAL at 11:16

## 2021-06-23 RX ADMIN — DEXAMETHASONE SODIUM PHOSPHATE 8 MG: 4 INJECTION, SOLUTION INTRA-ARTICULAR; INTRALESIONAL; INTRAMUSCULAR; INTRAVENOUS; SOFT TISSUE at 12:17

## 2021-06-23 RX ADMIN — PROPOFOL 150 MG: 10 INJECTION, EMULSION INTRAVENOUS at 12:01

## 2021-06-23 NOTE — ANESTHESIA PREPROCEDURE EVALUATION
Procedure:  SHOULDER ARTHROSCOPY WITH POSSIBLE ROTATOR CUFF REPAIR (Right Shoulder)    Relevant Problems   CARDIO   (+) Hypertension      GI/HEPATIC   (+) Dysphagia   (+) GERD (gastroesophageal reflux disease)   (+) Gastroesophageal reflux disease   (+) Paraesophageal hernia      HEMATOLOGY   (+) Anemia   (+) Iron deficiency anemia      MUSCULOSKELETAL   (+) Primary osteoarthritis of both knees   (+) Sacroiliitis (HCC)             Anesthesia Plan  ASA Score- 2     Anesthesia Type- regional and general with ASA Monitors  Additional Monitors:   Airway Plan: ETT  Plan Factors-    Chart reviewed  Induction- intravenous  Postoperative Plan- Plan for postoperative opioid use  Planned trial extubation    Informed Consent- Anesthetic plan and risks discussed with patient  I personally reviewed this patient with the CRNA  Discussed and agreed on the Anesthesia Plan with the CRNA  Arvind Chun

## 2021-06-23 NOTE — ANESTHESIA PROCEDURE NOTES
Peripheral Block    Patient location during procedure: pre-op  Start time: 6/23/2021 11:00 AM  Reason for block: at surgeon's request and post-op pain management  Staffing  Performed: anesthesiologist   Anesthesiologist: Lizy Bird DO  Preanesthetic Checklist  Completed: patient identified, IV checked, site marked, risks and benefits discussed, surgical consent, monitors and equipment checked, pre-op evaluation and timeout performed  Peripheral Block  Patient position: supine  Prep: ChloraPrep  Patient monitoring: continuous pulse ox and frequent blood pressure checks  Block type: interscalene  Laterality: right  Injection technique: single-shot  Procedures: ultrasound guided, Ultrasound guidance required for the procedure to increase accuracy and safety of medication placement and decrease risk of complications  bupivacaine (MARCAINE) 0 5 % perineural infiltration, 10 mL  Needle  Needle type: Stimuplex   Needle length: 5 cm  Needle localization: ultrasound guidance  Test dose: negative  Assessment  Injection assessment: incremental injection and local visualized surrounding nerve on ultrasound  Paresthesia pain: none  Post-procedure:  site cleaned  patient tolerated the procedure well with no immediate complications

## 2021-06-23 NOTE — INTERVAL H&P NOTE
H&P reviewed  After examining the patient I find no changes in the patients condition since the H&P had been written      Vitals:    06/23/21 0951   BP: (!) 171/68   Pulse: 74   Resp: 18   Temp: 98 4 °F (36 9 °C)   SpO2: 97%

## 2021-06-23 NOTE — OP NOTE
OPERATIVE REPORT  PATIENT NAME: Ignacio Jaimes    :  1955  MRN: [id number]  Pt Location: [address] [place] [address] OR ROOM 03    SURGERY DATE: 2021    Surgeon(s) and Role:     * Darwin Jara DO - Primary     * Garret Silverman PA-C - Assisting    Preop Diagnosis:  Tear of right rotator cuff, unspecified tear extent, unspecified whether traumatic [M75 101]    Post-Op Diagnosis Codes:     * Tear of right rotator cuff, unspecified tear extent, unspecified whether traumatic [M75 101]    Rotator cuff tear right shoulder  Impingement  Tear of glenoid labrum  Synovitis    Procedure(s) (LRB):  SHOULDER ARTHROSCOPY WITH POSSIBLE ROTATOR CUFF REPAIR (Right)     Right shoulder arthroscopy  Synovectomy  Debridement  Acromioplasty  Arthroscopic rotator cuff repair  Post arthroscopic injection of intra-articular joint space and peripheral portals    Specimen(s):  ID Type Source Tests Collected by Time Destination   1 : shavings Tissue Joint, Shoulder TISSUE EXAM Darwin Jara DO 2021 1205    Shavings    Estimated Blood Loss:   Minimal    Drains:  None    Anesthesia Type:   General w/ Interscalene Block    Operative Indications:  Tear of right rotator cuff, unspecified tear extent, unspecified whether traumatic [M75 101]      Operative Findings:  TT: 0    Complications:   None    Procedure and Technique:    The patient was properly identified and brought into the operating room suite  After successful induction of the general anesthetic, the patient was placed in the [facility] chair position  All areas of possible skin pressurization were well padded to avoid the above  The right upper extremity neck and torso region were then prepped and draped in the usual sterile fashion  It was medically necessary that the physician assistant be in the room to aid in positioning and placing the appropriate amount of retraction on the patient  A qualified resident was not available    The physician assistants rule in this procedure was very integral to its success  He has cyst in on positioning, holding of the arthroscope in a dynamic position to facilitate appropriate placement of the anchors, assisted with anchor placement, and assisted in closure of a complex wound     The surgical landmarks were outlined with a skin marker  All the portals were infiltrated with 0 5% Marcaine with epinephrine  Using a #11  Scalpel blade a posterior portal was made approximately 1-1/2 cm medial and inferior to the posterior lateral corner of the acromion  The  arthroscope was then introduced into the glenohumeral joint space  There was a tremendous amount of synovial tissue present  Using a outside in technique an accessory anterior inferior portal was made and a synovectomy was performed  There was a degenerative tear along the entire labrum which was debrided with a shaver  There was some grade 2 arthritic changes along the humeral head  The biceps tendon was probed and found to be intact  The subscapularis tendon was found to be intact  There was a large tear of the supraspinatus  tendon  This be further evaluated from the superior viewing portal  At this point, the arthroscope was then withdrawn from the glenohumeral joint space and introduced into the subacromial joint space  An accessory lateral portal was made  A bursectomy was performed  Once this occurred, there was a large rotator cuff tear present  It was confirmed with a grasper that there was adequate tissue quality  Using the Arthrex suture anchor system,  1 suture anchor was placed in a horizontal mattress fashion anatomic reducing the rotator cuff  At this point there was good tension on the cuff which was visualized using multiple arthroscopic portals  The soft tissue on the undersurface of the acromion was then removed  The coracoacromial ligament was then divided  At this point there was a large subacromial spur present    Using a motorized bur, an acromioplasty 1st occur with the bur in the lateral portal and then switched to posterior portal to move any spur that was remaining  At this point, there was adequate decompression of the subacromial joint space  The shoulder was then copiously irrigated with sterile arthroscopic solution  All the excess fluid was removed  The portals were then closed with 3-0 nylon  The subacromial joint space was injected with 0 5% Marcaine with epinephrine and dressed with ointment, Xeroform, 4x4s, ABDs, and tape  A shoulder immobilizer with an abductor pillow was placed  There was no complications during procedure   She was successfully woken, transferred to [facility], and went to recovery room in stable condition           I was present for the entire procedure, A qualified resident physician was not available and A physician assistant was required during the procedure for retraction tissue handling,dissection and suturing    Patient Disposition:  PACU     SIGNATURE: Isma Florian DO  DATE: June 23, 2021  TIME: 1:01 PM

## 2021-06-23 NOTE — ANESTHESIA POSTPROCEDURE EVALUATION
Post-Op Assessment Note    CV Status:  Stable  Pain Score: 0    Pain management: adequate     Mental Status:  Arousable   Hydration Status:  Stable   PONV Controlled:  None   Airway Patency:  Patent      Post Op Vitals Reviewed: Yes      Staff: CRNA         No complications documented      BP  121/95   Temp   97 3   Pulse  92   Resp   16   SpO2   100%

## 2021-06-25 ENCOUNTER — EVALUATION (OUTPATIENT)
Dept: PHYSICAL THERAPY | Facility: CLINIC | Age: 66
End: 2021-06-25
Payer: MEDICARE

## 2021-06-25 DIAGNOSIS — Z86.19 H/O COLD SORES: ICD-10-CM

## 2021-06-25 DIAGNOSIS — M75.101 TEAR OF RIGHT ROTATOR CUFF, UNSPECIFIED TEAR EXTENT, UNSPECIFIED WHETHER TRAUMATIC: Primary | ICD-10-CM

## 2021-06-25 DIAGNOSIS — M25.511 RIGHT SHOULDER PAIN, UNSPECIFIED CHRONICITY: ICD-10-CM

## 2021-06-25 DIAGNOSIS — R29.3 POSTURE ABNORMALITY: ICD-10-CM

## 2021-06-25 PROCEDURE — 97140 MANUAL THERAPY 1/> REGIONS: CPT | Performed by: PHYSICAL THERAPIST

## 2021-06-25 PROCEDURE — 97110 THERAPEUTIC EXERCISES: CPT | Performed by: PHYSICAL THERAPIST

## 2021-06-25 PROCEDURE — 97163 PT EVAL HIGH COMPLEX 45 MIN: CPT | Performed by: PHYSICAL THERAPIST
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
## 2021-06-27 RX ORDER — VALACYCLOVIR HYDROCHLORIDE 1 G/1
TABLET, FILM COATED ORAL
Qty: 6 TABLET | Refills: 2 | Status: SHIPPED | OUTPATIENT
Start: 2021-06-27 | End: 2021-07-28

## 2021-06-28 ENCOUNTER — OFFICE VISIT (OUTPATIENT)
Dept: PHYSICAL THERAPY | Facility: CLINIC | Age: 66
End: 2021-06-28
Payer: MEDICARE

## 2021-06-28 DIAGNOSIS — M75.101 TEAR OF RIGHT ROTATOR CUFF, UNSPECIFIED TEAR EXTENT, UNSPECIFIED WHETHER TRAUMATIC: Primary | ICD-10-CM

## 2021-06-28 DIAGNOSIS — M25.511 RIGHT SHOULDER PAIN, UNSPECIFIED CHRONICITY: ICD-10-CM

## 2021-06-28 DIAGNOSIS — R29.3 POSTURE ABNORMALITY: ICD-10-CM

## 2021-06-28 PROCEDURE — 97112 NEUROMUSCULAR REEDUCATION: CPT | Performed by: PHYSICAL THERAPIST

## 2021-06-28 PROCEDURE — 97110 THERAPEUTIC EXERCISES: CPT | Performed by: PHYSICAL THERAPIST

## 2021-06-28 PROCEDURE — 97140 MANUAL THERAPY 1/> REGIONS: CPT | Performed by: PHYSICAL THERAPIST

## 2021-06-28 NOTE — PROGRESS NOTES
Daily Note     Today's date: 2021  Patient name: Marc Sorenson  : 1955  MRN: [id number]  Referring provider: Neelima Hernandez  Dx:   Encounter Diagnosis     ICD-10-CM    1  Tear of right rotator cuff, unspecified tear extent, unspecified whether traumatic  M75 101    2  Right shoulder pain, unspecified chronicity  M25 511    3  Posture abnormality  R29 3        Start Time: 1030  Stop Time: 1115  Total time in clinic (min): 45 minutes    Subjective: Patient states she is surprised, she has had no pain  Nerve block has worn off  Objective: See treatment diary below      Assessment: Tolerated treatment well  Soreness at end range movements, no sharp pains  Progressing since last session with range of motion  Reviewed home exercise program today and was given handout for home  Patient would benefit from continued PT      Plan: Continue per plan of care  Progress treatment as tolerated  Precautions: no AROM 6 weeks  Progress note:   POC:     Manuals       Stretching with active release  *15 min      GH jt mobs        Scapular PNF        IASTM        Wound care Dressing change 10       Neuro Re-Ed        UBE        scap retraction reviewed 20x      Shoulder rolls reviewed 20x      Chin tucks  *:05x10                      Ther Ex         reviewed *red 20x      UT stretch  *3x:30      LS stretch  3x:30      Table slides  *flex 10x:10 Flex, scap     pendulums reviewed *:30 ea      Supine flexion ROM   *     Wrist AROM                                                                                                        Ther Activity                        Gait Training                        Modalities                          Access Code: [email or web address]  URL: [email or web address]  Date: 2021  Prepared by: Rebekah Christensen    Exercises  Standing Backward Shoulder Rolls - 3 x daily - 7 x weekly - 3 sets - 10 reps  Seated Scapular Retraction - 3 x daily - 7 x weekly - 3 sets - 10 reps  Circular Shoulder Pendulum with Table Support - 3 x daily - 7 x weekly - 3 sets - 10 reps  Flexion-Extension Shoulder Pendulum with Table Support - 3 x daily - 7 x weekly - 3 sets - 10 reps  Horizontal Shoulder Pendulum with Table Support - 3 x daily - 7 x weekly - 3 sets - 10 reps  Seated Upper Trapezius Stretch - 3 x daily - 7 x weekly - 3 reps - 1 sets - 30 sec hold  Gentle Levator Scapulae Stretch - 3 x daily - 7 x weekly - 3 reps - 1 sets - 30 sec hold  Seated Shoulder Flexion Towel Slide at Table Top - 3 x daily - 7 x weekly - 10 reps - 1 sets - 5 sec hold  Seated Shoulder Abduction Towel Slide at Table Top - 3 x daily - 7 x weekly - 10 reps - 1 sets - 5 sec hold

## 2021-06-30 DIAGNOSIS — F51.01 PRIMARY INSOMNIA: ICD-10-CM

## 2021-07-01 ENCOUNTER — OFFICE VISIT (OUTPATIENT)
Dept: PHYSICAL THERAPY | Facility: CLINIC | Age: 66
End: 2021-07-01
Payer: MEDICARE

## 2021-07-01 DIAGNOSIS — M75.101 TEAR OF RIGHT ROTATOR CUFF, UNSPECIFIED TEAR EXTENT, UNSPECIFIED WHETHER TRAUMATIC: Primary | ICD-10-CM

## 2021-07-01 DIAGNOSIS — R29.3 POSTURE ABNORMALITY: ICD-10-CM

## 2021-07-01 DIAGNOSIS — M25.511 RIGHT SHOULDER PAIN, UNSPECIFIED CHRONICITY: ICD-10-CM

## 2021-07-01 PROCEDURE — 97112 NEUROMUSCULAR REEDUCATION: CPT | Performed by: PHYSICAL THERAPIST

## 2021-07-01 PROCEDURE — 97140 MANUAL THERAPY 1/> REGIONS: CPT | Performed by: PHYSICAL THERAPIST

## 2021-07-01 PROCEDURE — 97110 THERAPEUTIC EXERCISES: CPT | Performed by: PHYSICAL THERAPIST

## 2021-07-01 RX ORDER — HYDROXYZINE HYDROCHLORIDE 25 MG/1
25-50 TABLET, FILM COATED ORAL
Qty: 60 TABLET | Refills: 1 | Status: SHIPPED | OUTPATIENT
Start: 2021-07-01 | End: 2021-09-07

## 2021-07-07 ENCOUNTER — APPOINTMENT (OUTPATIENT)
Dept: PHYSICAL THERAPY | Facility: CLINIC | Age: 66
End: 2021-07-07
Payer: MEDICARE

## 2021-07-09 ENCOUNTER — OFFICE VISIT (OUTPATIENT)
Dept: OBGYN CLINIC | Facility: CLINIC | Age: 66
End: 2021-07-09

## 2021-07-09 ENCOUNTER — OFFICE VISIT (OUTPATIENT)
Dept: PHYSICAL THERAPY | Facility: CLINIC | Age: 66
End: 2021-07-09
Payer: MEDICARE

## 2021-07-09 VITALS
SYSTOLIC BLOOD PRESSURE: 147 MMHG | WEIGHT: 147 LBS | HEIGHT: 63 IN | BODY MASS INDEX: 26.05 KG/M2 | HEART RATE: 76 BPM | DIASTOLIC BLOOD PRESSURE: 96 MMHG

## 2021-07-09 DIAGNOSIS — S46.011D TRAUMATIC COMPLETE TEAR OF RIGHT ROTATOR CUFF, SUBSEQUENT ENCOUNTER: Primary | ICD-10-CM

## 2021-07-09 DIAGNOSIS — Z98.890 S/P ARTHROSCOPY OF RIGHT SHOULDER: ICD-10-CM

## 2021-07-09 DIAGNOSIS — M75.101 TEAR OF RIGHT ROTATOR CUFF, UNSPECIFIED TEAR EXTENT, UNSPECIFIED WHETHER TRAUMATIC: Primary | ICD-10-CM

## 2021-07-09 DIAGNOSIS — R29.3 POSTURE ABNORMALITY: ICD-10-CM

## 2021-07-09 DIAGNOSIS — M25.511 RIGHT SHOULDER PAIN, UNSPECIFIED CHRONICITY: ICD-10-CM

## 2021-07-09 PROCEDURE — 97110 THERAPEUTIC EXERCISES: CPT | Performed by: PHYSICAL THERAPIST

## 2021-07-09 PROCEDURE — 97112 NEUROMUSCULAR REEDUCATION: CPT | Performed by: PHYSICAL THERAPIST

## 2021-07-09 PROCEDURE — 99024 POSTOP FOLLOW-UP VISIT: CPT | Performed by: ORTHOPAEDIC SURGERY

## 2021-07-09 PROCEDURE — 97140 MANUAL THERAPY 1/> REGIONS: CPT | Performed by: PHYSICAL THERAPIST

## 2021-07-09 NOTE — PROGRESS NOTES
Daily Note     Today's date: 2021  Patient name: Jeremy Polanco  : 1955  MRN: [id number]  Referring provider: Raine Washington  Dx:   Encounter Diagnosis     ICD-10-CM    1  Tear of right rotator cuff, unspecified tear extent, unspecified whether traumatic  M75 101    2  Right shoulder pain, unspecified chronicity  M25 511    3  Posture abnormality  R29 3        Start Time: 0900  Stop Time: 0945  Total time in clinic (min): 45 minutes    Subjective: Patient saw surgeon prior to visit today who was pleased with progress  Stitches were removed  To f/u in 4 weeks  Objective: See treatment diary below      Assessment: Tolerated treatment well  Difficulty relaxing during passive movements today, tendency to guard and tighten mm with OH movements  Reviewed wound care with patient  Patient would benefit from continued PT      Plan: Continue per plan of care  Progress treatment as tolerated  Precautions: no AROM 6 weeks  Progress note:   POC:     Manuals     Stretching with active release  *15 min 15 min 15 min    GH jt mobs        Scapular PNF        IASTM        Wound care Dressing change 10       Neuro Re-Ed        UBE        scap retraction reviewed 20x 20x 20x    Shoulder rolls reviewed 20x 20x 20x    Chin tucks  *:05x10 :05x10 :05x10                    Ther Ex         reviewed *red 20x red 30x red 30x    UT stretch  *3x:30 3x:30 3x:30    LS stretch  3x:30 3x:30 3x:30    Table slides  *flex 10x:10 Flex, scap 10x:10 Flex, scap 10x:10    pendulums reviewed *:30 ea :30 ea :30 ea    Supine flexion ROM   *unable to relax hold    Wrist AROM   10x ea 2x10 ea    Elbow flex    10x                                                                                            Ther Activity                        Gait Training                        Modalities                          Access Code: [email or web address]  URL: [email or web address]  Date: 2021  Prepared by: Veronica Roberts    Exercises  Standing Backward Shoulder Rolls - 3 x daily - 7 x weekly - 3 sets - 10 reps  Seated Scapular Retraction - 3 x daily - 7 x weekly - 3 sets - 10 reps  Circular Shoulder Pendulum with Table Support - 3 x daily - 7 x weekly - 3 sets - 10 reps  Flexion-Extension Shoulder Pendulum with Table Support - 3 x daily - 7 x weekly - 3 sets - 10 reps  Horizontal Shoulder Pendulum with Table Support - 3 x daily - 7 x weekly - 3 sets - 10 reps  Seated Upper Trapezius Stretch - 3 x daily - 7 x weekly - 3 reps - 1 sets - 30 sec hold  Gentle Levator Scapulae Stretch - 3 x daily - 7 x weekly - 3 reps - 1 sets - 30 sec hold  Seated Shoulder Flexion Towel Slide at Table Top - 3 x daily - 7 x weekly - 10 reps - 1 sets - 5 sec hold  Seated Shoulder Abduction Towel Slide at Table Top - 3 x daily - 7 x weekly - 10 reps - 1 sets - 5 sec hold

## 2021-07-09 NOTE — PROGRESS NOTES
Assessment/Plan:  Assessment/Plan   Diagnoses and all orders for this visit:    Traumatic complete tear of left rotator cuff, initial encounter  -     Cancel: XR shoulder 2+ vw right; Future  -     XR shoulder 2+ vw right; Future      Continue use of immobilizer with pillow for additional 4 weeks  Continue NSAIDs/ Tylenol as needed for pain and soreness  Continue PT as per protocol  Return in 4 weeks (on 8/6/2021) for Re-evaluation  the patient is doing quite well  She offers no complaints of pain  Continue immobilizer  Continue therapy  Return back in 1 month for evaluation  X-rays show adequate decompression of the subacromial joint space  If there are any problems sooner, she will not hesitate to let us know    Subjective:   Patient ID: Kyaw Desouza is a 61 y o  female  HPI   Patient presents 2 weeks and 2 days P/O right shoulder arthroscopy with rotator cuff repair performed 6/23/2021  She has initiated physical therapy as per protocol  She states that she has minimal pain, only occasional soreness and twinges with exercises and therapy  She denies any recent onset bruising, swelling, numbness, tingling, feelings of instability, or mechanical symptoms  She also denies any recent fever, chills, headache, nausea, dizziness, or malaise        The following portions of the patient's history were reviewed and updated as appropriate: allergies, current medications, past family history, past medical history, past social history, past surgical history and problem list     Past Medical History:   Diagnosis Date    Anemia     Arthritis     Chronic pain disorder     back and hip pain    Disease of thyroid gland     nodules    GERD (gastroesophageal reflux disease)     Heart murmur     Heartburn     Hiatal hernia     Hyperlipidemia     Hypertension     Overactive bladder     Rotator cuff tear, left     Rotator cuff tear, right     Vitamin D deficiency     unsure    Wears dentures     upper  Wears glasses     Wears partial dentures     upper     Past Surgical History:   Procedure Laterality Date     SECTION      Last Assessed: 2017    ELBOW SURGERY      Last Assessed: 2017    ESOPHAGOGASTRODUODENOSCOPY N/A 2017    Procedure: ESOPHAGOGASTRODUODENOSCOPY (EGD); Surgeon: Yodit Rosario DO;  Location: [facility]; Service: Gastroenterology    HERNIA REPAIR  2020    Hiatal hernia repair    HIP SURGERY Left 2018    glut medius/minimus repair  and 18    HYSTERECTOMY          KNEE SURGERY      x2    PARAESOPHAGEAL HERNIA REPAIR N/A 2020    Procedure: LAPAROSCOPIC PARAESOPHAGEAL HERNIA REPAIR WITH MESH AND NISSEN FUNDOPLICATION WITH ROBOTICS;  Surgeon: Renny Lindsay MD;  Location: [facility];  Service: General    NE COLONOSCOPY FLX DX W/COLLJ SPEC WHEN PFRMD N/A 2017    Procedure: EGD AND COLONOSCOPY;  Surgeon: Yodit Rosario DO;  Location: [facility];   Service: Gastroenterology    SHOULDER ARTHROCENTESIS      SHOULDER ARTHROSCOPY Right 2021    Procedure: SHOULDER ARTHROSCOPY WITH acromioplasty, debridment, and ROTATOR CUFF REPAIR;  Surgeon: Paul Castano DO;  Location: [facility];  Service: Orthopedics     Family History   Problem Relation Age of Onset    Colon cancer Mother     Heart attack Father     Other Father         Cardiac Disorder    Diabetes type II Father     Colon cancer Sister     Cancer Maternal Grandmother     No Known Problems Daughter     No Known Problems Paternal Grandmother     No Known Problems Sister     No Known Problems Sister     No Known Problems Sister     No Known Problems Sister     No Known Problems Maternal Aunt     No Known Problems Maternal Aunt     No Known Problems Maternal Aunt      Social History     Socioeconomic History    Marital status: /Civil Union     Spouse name: None    Number of children: None    Years of education: None    Highest education level: None Occupational History    None   Tobacco Use    Smoking status: Former Smoker     Quit date:      Years since quittin 5    Smokeless tobacco: Never Used    Tobacco comment: quit 25 yrs ago   Vaping Use    Vaping Use: Never used   Substance and Sexual Activity    Alcohol use: No    Drug use: No    Sexual activity: Yes     Partners: Male   Other Topics Concern    None   Social History Narrative    Caffeine use    , 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[employer], now at [employer] and [address] [place], [place] [address]way for [address]    2 grown children     Social Determinants of Health     Financial Resource Strain:     Difficulty of Paying Living Expenses:    Food Insecurity:     Worried About [address] in the Last Year:    [address] in the Last Year:    Transportation Needs: No Transportation Needs    Lack of Transportation (Medical): No    Lack of Transportation (Non-Medical):  No   Physical Activity: Inactive    Days of Exercise per Week: 0 days    Minutes of Exercise per Session: 0 min   Stress:     Feeling of Stress :    Social Connections:     Frequency of Communication with Friends and Family:     Frequency of Social Gatherings with Friends and Family:     Attends [employer] Services:     Active Member of Clubs or Organizations:     Attends Club or Organization Meetings:     Marital Status:    Intimate Partner Violence:     Fear of Current or Ex-Partner:     Emotionally Abused:     Physically Abused:     Sexually Abused:        Current Outpatient Medications:     atorvastatin (LIPITOR) 10 mg tablet, Take 1 tablet (10 mg total) by mouth daily, Disp: 90 tablet, Rfl: 1    famotidine (PEPCID) 20 mg tablet, TAKE 1 TABLET (20 MG TOTAL) BY MOUTH 2 (TWO) TIMES DAILY, Disp: 180 tablet, Rfl: 1    ferrous sulfate (FeroSul) 325 (65 Fe) mg tablet, Take 1 tablet (325 mg total) by mouth daily with breakfast, Disp: 90 tablet, Rfl: 1    hydrOXYzine HCL (ATARAX) 25 mg tablet, TAKE 1-2 TABLETS (25-50 MG TOTAL) BY MOUTH DAILY AT BEDTIME AS NEEDED FOR ANXIETY (INSOMNIA), Disp: 60 tablet, Rfl: 1    losartan (COZAAR) 100 MG tablet, Take 25 mg by mouth daily, Disp: , Rfl:     meloxicam (MOBIC) 15 mg tablet, Take 1 tablet (15 mg total) by mouth daily, Disp: 30 tablet, Rfl: 0    Catheters MISC, Self - Cath 14-46 FR, Disp: , Rfl:     valACYclovir (VALTREX) 1,000 mg tablet, take 1 tablet by mouth twice a day for 3 days, Disp: 6 tablet, Rfl: 2    No Known Allergies    Review of Systems   Constitutional: Negative for fatigue  Gastrointestinal: Negative for nausea  Musculoskeletal:        As noted in HPI   Neurological: Negative for dizziness, weakness, numbness and headaches  All other systems reviewed and are negative  Objective:  /96   Pulse 76   Ht 5' 3" (1 6 m)   Wt 66 7 kg (147 lb)   BMI 26 04 kg/m²     Ortho Exam   Right Shoulder -   Incisions:   Clean, dry, healing -  No signs of drainage or dehiscence  Skin is warm and dry to touch with no signs of erythema, ecchymosis, infection  ROM:  Deferred -  Demonstrates normal elbow, wrist, and finger motion  Strength: deferred  2+ distal radial pulse with brisk capillary refill to the fingers  Radial, median, and ulnar motor and sensory distributions intact  Sensation light touch intact distally    Physical Exam  Constitutional:       Appearance: She is well-developed  HENT:      Right Ear: External ear normal       Left Ear: External ear normal       Nose: Nose normal    Eyes:      Conjunctiva/sclera: Conjunctivae normal       Pupils: Pupils are equal, round, and reactive to light  Pulmonary:      Effort: Pulmonary effort is normal    Musculoskeletal:         General: Normal range of motion  Cervical back: Normal range of motion  Skin:     General: Skin is warm and dry  Neurological:      Mental Status: She is alert and oriented to person, place, and time     Psychiatric:         Behavior: Behavior normal          Thought Content:  Thought content normal          Judgment: Judgment normal          Imaging:  Attending Physician has personally reviewed pertinent imaging in PACS, impression is as follows:    Review of radiographic series taken  7/9/2021 of the  Right shoulder shows adequate decompression of the subacromial space    Scribe Attestation    I,:  Blake Phelps am acting as a scribe while in the presence of the attending physician :       I,:  Wen Noble, DO personally performed the services described in this documentation    as scribed in my presence :

## 2021-07-12 ENCOUNTER — OFFICE VISIT (OUTPATIENT)
Dept: PHYSICAL THERAPY | Facility: CLINIC | Age: 66
End: 2021-07-12
Payer: MEDICARE

## 2021-07-12 DIAGNOSIS — M25.511 RIGHT SHOULDER PAIN, UNSPECIFIED CHRONICITY: ICD-10-CM

## 2021-07-12 DIAGNOSIS — M75.101 TEAR OF RIGHT ROTATOR CUFF, UNSPECIFIED TEAR EXTENT, UNSPECIFIED WHETHER TRAUMATIC: Primary | ICD-10-CM

## 2021-07-12 DIAGNOSIS — R29.3 POSTURE ABNORMALITY: ICD-10-CM

## 2021-07-12 PROCEDURE — 97140 MANUAL THERAPY 1/> REGIONS: CPT | Performed by: PHYSICAL THERAPIST

## 2021-07-12 PROCEDURE — 97112 NEUROMUSCULAR REEDUCATION: CPT | Performed by: PHYSICAL THERAPIST

## 2021-07-12 PROCEDURE — 97110 THERAPEUTIC EXERCISES: CPT | Performed by: PHYSICAL THERAPIST

## 2021-07-12 NOTE — PROGRESS NOTES
Daily Note     Today's date: 2021  Patient name: Cholo Ledesma  : 1955  MRN: [id number]  Referring provider: Zandra Holt  Dx:   Encounter Diagnosis     ICD-10-CM    1  Tear of right rotator cuff, unspecified tear extent, unspecified whether traumatic  M75 101    2  Right shoulder pain, unspecified chronicity  M25 511    3  Posture abnormality  R29 3        Start Time: 0730  Stop Time: 9800  Total time in clinic (min): 40 minutes    Subjective: Patient states shoulder/ proximal arm has been achy and sore  Objective: See treatment diary below      Assessment: Tolerated treatment well  Showing progress with passive range of motion, need to continue to work on end range flexion and abduction  Tendency to guard during passive stretching  Patient would benefit from continued PT      Plan: Continue per plan of care  Progress treatment as tolerated         Precautions: no AROM 6 weeks  Progress note:   POC:     Manuals    Stretching with active release  *15 min 15 min 15 min 15 min   GH jt mobs        Scapular PNF        IASTM        Wound care Dressing change 10       Neuro Re-Ed        UBE        scap retraction reviewed 20x 20x 20x 20x   Shoulder rolls reviewed 20x 20x 20x 20x   Chin tucks  *:05x10 :05x10 :05x10 :05x10                   Ther Ex         reviewed *red 20x red 30x red 30x Red 30x   UT stretch  *3x:30 3x:30 3x:30 3x:30   LS stretch  3x:30 3x:30 3x:30 3x:30   Table slides  *flex 10x:10 Flex, scap 10x:10 Flex, scap 10x:10 Flex, scap 10x:10   pendulums reviewed *:30 ea :30 ea :30 ea :30 ea   Supine flexion ROM   *unable to relax hold    Wrist AROM   10x ea 2x10 ea 3x10 ea   Elbow flex    10x 10x   Elbow sup/pron     2x10                                                                                   Ther Activity                        Gait Training                        Modalities                          Access Code: [email or web address]  URL: [email or web address]  Date: 06/28/2021  Prepared by: Veronica Roberts    Exercises  Standing Backward Shoulder Rolls - 3 x daily - 7 x weekly - 3 sets - 10 reps  Seated Scapular Retraction - 3 x daily - 7 x weekly - 3 sets - 10 reps  Circular Shoulder Pendulum with Table Support - 3 x daily - 7 x weekly - 3 sets - 10 reps  Flexion-Extension Shoulder Pendulum with Table Support - 3 x daily - 7 x weekly - 3 sets - 10 reps  Horizontal Shoulder Pendulum with Table Support - 3 x daily - 7 x weekly - 3 sets - 10 reps  Seated Upper Trapezius Stretch - 3 x daily - 7 x weekly - 3 reps - 1 sets - 30 sec hold  Gentle Levator Scapulae Stretch - 3 x daily - 7 x weekly - 3 reps - 1 sets - 30 sec hold  Seated Shoulder Flexion Towel Slide at Table Top - 3 x daily - 7 x weekly - 10 reps - 1 sets - 5 sec hold  Seated Shoulder Abduction Towel Slide at Table Top - 3 x daily - 7 x weekly - 10 reps - 1 sets - 5 sec hold

## 2021-07-13 ENCOUNTER — APPOINTMENT (OUTPATIENT)
Dept: PHYSICAL THERAPY | Facility: CLINIC | Age: 66
End: 2021-07-13
Payer: MEDICARE

## 2021-07-15 ENCOUNTER — OFFICE VISIT (OUTPATIENT)
Dept: PHYSICAL THERAPY | Facility: CLINIC | Age: 66
End: 2021-07-15
Payer: MEDICARE

## 2021-07-15 DIAGNOSIS — M75.101 TEAR OF RIGHT ROTATOR CUFF, UNSPECIFIED TEAR EXTENT, UNSPECIFIED WHETHER TRAUMATIC: Primary | ICD-10-CM

## 2021-07-15 DIAGNOSIS — M25.511 RIGHT SHOULDER PAIN, UNSPECIFIED CHRONICITY: ICD-10-CM

## 2021-07-15 DIAGNOSIS — R29.3 POSTURE ABNORMALITY: ICD-10-CM

## 2021-07-15 PROCEDURE — 97112 NEUROMUSCULAR REEDUCATION: CPT | Performed by: PHYSICAL THERAPIST

## 2021-07-15 PROCEDURE — 97110 THERAPEUTIC EXERCISES: CPT | Performed by: PHYSICAL THERAPIST

## 2021-07-15 PROCEDURE — 97140 MANUAL THERAPY 1/> REGIONS: CPT | Performed by: PHYSICAL THERAPIST

## 2021-07-15 NOTE — PROGRESS NOTES
Daily Note     Today's date: 7/15/2021  Patient name: Shazia Cooney   : 1955  MRN: [id number]  Referring provider: Jordan Tinsley  Dx:   Encounter Diagnosis     ICD-10-CM    1  Tear of right rotator cuff, unspecified tear extent, unspecified whether traumatic  M75 101    2  Right shoulder pain, unspecified chronicity  M25 511    3  Posture abnormality  R29 3        Start Time: 07  Stop Time: 0815  Total time in clinic (min): 45 minutes    Subjective: States she feels like recovery is going backwards  Feels more soreness and stiffness than she had last week  Objective: See treatment diary below      Assessment: Tolerated treatment fair  Guarding with passive range of motion causing some increased discomfort and limited ability to stretch in full range  Did educated patient that soreness was normal and expected  Patient would benefit from continued PT      Plan: Continue per plan of care  Progress treatment as tolerated         Precautions: no AROM 6 weeks  Progress note:   POC:     Manuals 7/15  7/1 7/9 7/12   Stretching with active release 15 min  15 min 15 min 15 min   GH jt mobs        Scapular PNF        IASTM        Wound care        Neuro Re-Ed        UBE        scap retraction 20x  20x 20x 20x   Shoulder rolls 20x  20x 20x 20x   Chin tucks :05x10  :05x10 :05x10 :05x10                   Ther Ex         Red 30x  red 30x red 30x Red 30x   UT stretch 3x:30  3x:30 3x:30 3x:30   LS stretch 3x:30  3x:30 3x:30 3x:30   Table slides Flex, scap 10x:10  Flex, scap 10x:10 Flex, scap 10x:10 Flex, scap 10x:10   pendulums :30 ea  :30 ea :30 ea :30 ea   Supine flexion ROM   *unable to relax hold    Wrist AROM 3x10 ea  10x ea 2x10 ea 3x10 ea   Elbow flex 2x10   10x 10x   Elbow sup/pron 3x10    2x10                                                                                   Ther Activity                        Gait Training                        Modalities Access Code: [email or web address]  URL: [email or web address]  Date: 06/28/2021  Prepared by: Live Alicia    Exercises  Standing Backward Shoulder Rolls - 3 x daily - 7 x weekly - 3 sets - 10 reps  Seated Scapular Retraction - 3 x daily - 7 x weekly - 3 sets - 10 reps  Circular Shoulder Pendulum with Table Support - 3 x daily - 7 x weekly - 3 sets - 10 reps  Flexion-Extension Shoulder Pendulum with Table Support - 3 x daily - 7 x weekly - 3 sets - 10 reps  Horizontal Shoulder Pendulum with Table Support - 3 x daily - 7 x weekly - 3 sets - 10 reps  Seated Upper Trapezius Stretch - 3 x daily - 7 x weekly - 3 reps - 1 sets - 30 sec hold  Gentle Levator Scapulae Stretch - 3 x daily - 7 x weekly - 3 reps - 1 sets - 30 sec hold  Seated Shoulder Flexion Towel Slide at Table Top - 3 x daily - 7 x weekly - 10 reps - 1 sets - 5 sec hold  Seated Shoulder Abduction Towel Slide at Table Top - 3 x daily - 7 x weekly - 10 reps - 1 sets - 5 sec hold

## 2021-07-19 ENCOUNTER — OFFICE VISIT (OUTPATIENT)
Dept: PHYSICAL THERAPY | Facility: CLINIC | Age: 66
End: 2021-07-19
Payer: MEDICARE

## 2021-07-19 DIAGNOSIS — M25.511 RIGHT SHOULDER PAIN, UNSPECIFIED CHRONICITY: ICD-10-CM

## 2021-07-19 DIAGNOSIS — R29.3 POSTURE ABNORMALITY: ICD-10-CM

## 2021-07-19 DIAGNOSIS — M75.101 TEAR OF RIGHT ROTATOR CUFF, UNSPECIFIED TEAR EXTENT, UNSPECIFIED WHETHER TRAUMATIC: Primary | ICD-10-CM

## 2021-07-19 PROCEDURE — 97110 THERAPEUTIC EXERCISES: CPT | Performed by: PHYSICAL THERAPIST

## 2021-07-19 PROCEDURE — 97140 MANUAL THERAPY 1/> REGIONS: CPT | Performed by: PHYSICAL THERAPIST

## 2021-07-19 PROCEDURE — 97112 NEUROMUSCULAR REEDUCATION: CPT | Performed by: PHYSICAL THERAPIST

## 2021-07-19 NOTE — PROGRESS NOTES
Daily Note     Today's date: 2021  Patient name: Tamra Perales  : 1955  MRN: [id number]  Referring provider: Ezio Roche  Dx:   Encounter Diagnosis     ICD-10-CM    1  Tear of right rotator cuff, unspecified tear extent, unspecified whether traumatic  M75 101    2  Right shoulder pain, unspecified chronicity  M25 511    3  Posture abnormality  R29 3        Start Time: 0730  Stop Time: 5890  Total time in clinic (min): 45 minutes    Subjective: States pain in anterior arm, shoulder is getting more sore; 7/10 today  Objective: See treatment diary below      Assessment: Tolerated treatment well  Tightness and tenderness into right bicep today, guarding with stretching and attempted graston for a more effective muscle release  Patient would benefit from continued PT      Plan: Continue per plan of care  Progress treatment as tolerated         Precautions: no AROM 6 weeks  Progress note:   POC:     Manuals 7/15 7/19  7/9 7/12   Stretching with active release 15 min 10 min  15 min 15 min   GH jt mobs        Scapular PNF        IASTM  graston to anterior arm grade GT 4,5      Wound care        Neuro Re-Ed        UBE        scap retraction 20x 20x  20x 20x   Shoulder rolls 20x 20x  20x 20x   Chin tucks :05x10 :05x10  :05x10 :05x10                   Ther Ex         Red 30x Red 30x  red 30x Red 30x   UT stretch 3x:30 3x:30  3x:30 3x:30   LS stretch 3x:30 3x:30  3x:30 3x:30   Table slides Flex, scap 10x:10 Flex, scap 10x:10  Flex, scap 10x:10 Flex, scap 10x:10   pendulums :30 ea :30 ea  :30 ea :30 ea   Supine flexion ROM    hold    Wrist AROM 3x10 ea 3x10 ea  2x10 ea 3x10 ea   Elbow flex 2x10 2x10  10x 10x   Elbow sup/pron 3x10 2x10   2x10                                                                                   Ther Activity                        Gait Training                        Modalities                          Access Code: [email or web address]  URL: [email or web address]  Date: 06/28/2021  Prepared by: Tiara Jimenez    Exercises  Standing Backward Shoulder Rolls - 3 x daily - 7 x weekly - 3 sets - 10 reps  Seated Scapular Retraction - 3 x daily - 7 x weekly - 3 sets - 10 reps  Circular Shoulder Pendulum with Table Support - 3 x daily - 7 x weekly - 3 sets - 10 reps  Flexion-Extension Shoulder Pendulum with Table Support - 3 x daily - 7 x weekly - 3 sets - 10 reps  Horizontal Shoulder Pendulum with Table Support - 3 x daily - 7 x weekly - 3 sets - 10 reps  Seated Upper Trapezius Stretch - 3 x daily - 7 x weekly - 3 reps - 1 sets - 30 sec hold  Gentle Levator Scapulae Stretch - 3 x daily - 7 x weekly - 3 reps - 1 sets - 30 sec hold  Seated Shoulder Flexion Towel Slide at Table Top - 3 x daily - 7 x weekly - 10 reps - 1 sets - 5 sec hold  Seated Shoulder Abduction Towel Slide at Table Top - 3 x daily - 7 x weekly - 10 reps - 1 sets - 5 sec hold

## 2021-07-20 ENCOUNTER — APPOINTMENT (OUTPATIENT)
Dept: PHYSICAL THERAPY | Facility: CLINIC | Age: 66
End: 2021-07-20
Payer: MEDICARE

## 2021-07-22 ENCOUNTER — OFFICE VISIT (OUTPATIENT)
Dept: PHYSICAL THERAPY | Facility: CLINIC | Age: 66
End: 2021-07-22
Payer: MEDICARE

## 2021-07-22 DIAGNOSIS — R29.3 POSTURE ABNORMALITY: ICD-10-CM

## 2021-07-22 DIAGNOSIS — M25.511 RIGHT SHOULDER PAIN, UNSPECIFIED CHRONICITY: ICD-10-CM

## 2021-07-22 DIAGNOSIS — M75.101 TEAR OF RIGHT ROTATOR CUFF, UNSPECIFIED TEAR EXTENT, UNSPECIFIED WHETHER TRAUMATIC: Primary | ICD-10-CM

## 2021-07-22 PROCEDURE — 97112 NEUROMUSCULAR REEDUCATION: CPT | Performed by: PHYSICAL THERAPIST

## 2021-07-22 PROCEDURE — 97110 THERAPEUTIC EXERCISES: CPT | Performed by: PHYSICAL THERAPIST

## 2021-07-22 PROCEDURE — 97140 MANUAL THERAPY 1/> REGIONS: CPT | Performed by: PHYSICAL THERAPIST
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
## 2021-07-27 ENCOUNTER — APPOINTMENT (OUTPATIENT)
Dept: PHYSICAL THERAPY | Facility: CLINIC | Age: 66
End: 2021-07-27
Payer: MEDICARE

## 2021-07-28 ENCOUNTER — OFFICE VISIT (OUTPATIENT)
Dept: PHYSICAL THERAPY | Facility: CLINIC | Age: 66
End: 2021-07-28
Payer: MEDICARE

## 2021-07-28 DIAGNOSIS — M75.101 TEAR OF RIGHT ROTATOR CUFF, UNSPECIFIED TEAR EXTENT, UNSPECIFIED WHETHER TRAUMATIC: Primary | ICD-10-CM

## 2021-07-28 DIAGNOSIS — M25.511 RIGHT SHOULDER PAIN, UNSPECIFIED CHRONICITY: ICD-10-CM

## 2021-07-28 DIAGNOSIS — R29.3 POSTURE ABNORMALITY: ICD-10-CM

## 2021-07-28 DIAGNOSIS — Z86.19 H/O COLD SORES: ICD-10-CM

## 2021-07-28 PROCEDURE — 97140 MANUAL THERAPY 1/> REGIONS: CPT

## 2021-07-28 PROCEDURE — 97112 NEUROMUSCULAR REEDUCATION: CPT

## 2021-07-28 PROCEDURE — 97110 THERAPEUTIC EXERCISES: CPT

## 2021-07-28 RX ORDER — VALACYCLOVIR HYDROCHLORIDE 1 G/1
TABLET, FILM COATED ORAL
Qty: 6 TABLET | Refills: 2 | Status: SHIPPED | OUTPATIENT
Start: 2021-07-28 | End: 2021-09-07

## 2021-07-28 NOTE — PROGRESS NOTES
Daily Note     Today's date: 2021  Patient name: Mervat Gomez  : 1955  MRN: [id number]  Referring provider: Eulalia Hutchinson  Dx:   Encounter Diagnosis     ICD-10-CM    1  Tear of right rotator cuff, unspecified tear extent, unspecified whether traumatic  M75 101    2  Right shoulder pain, unspecified chronicity  M25 511    3  Posture abnormality  R29 3                   Subjective: The patient reports feeling a 7-8/10 pain at her right anterior upper arm this morning      Objective: See treatment diary below      Assessment: Tolerated treatment well  Overall improving well in accordance to protocol  Limited in sh flexion and abduction due to pain  Tightness/adhesions presents in bicipital area, continues benefit from Graston  Patient would benefit from continued PT      Plan: Continue per plan of care  Progress treament per protocol        Precautions: no AROM 6 weeks  Progress note:   POC:     Manuals 7/15 7/19 7/22 7/28    Stretching with active release 15 min 10 min 15 MIN 15    GH jt mobs        Scapular PNF        IASTM  graston to anterior arm grade GT 4,5 graston to anterior arm grade GT 3,4,5 graston to anterior arm grade GT 3,4,5    Wound care        Neuro Re-Ed        UBE        scap retraction 20x 20x 20x 20x    Shoulder rolls 20x 20x 20x 20x    Chin tucks :05x10 :05x10 :05x10                     Ther Ex         Red 30x Red 30x Red 30x     UT stretch 3x:30 3x:30 3x:30 3x:30    LS stretch 3x:30 3x:30 3x:30 3x:30    Table slides Flex, scap 10x:10 Flex, scap 10x:10 Flex, scap 10x:10 Flex/scap 10x:10    pendulums :30 ea :30 ea :30 ea x30 ea    Supine flexion ROM        Wrist AROM 3x10 ea 3x10 ea Deferred      Elbow flex 2x10 2x10 deferred     Elbow sup/pron 3x10 2x10 deferred                                                                                     Ther Activity                        Gait Training                        Modalities                          Access Code: [email or web address]  URL: [email or web address]  Date: 06/28/2021  Prepared by: Vladimir Paris  Standing Backward Shoulder Rolls - 3 x daily - 7 x weekly - 3 sets - 10 reps  Seated Scapular Retraction - 3 x daily - 7 x weekly - 3 sets - 10 reps  Circular Shoulder Pendulum with Table Support - 3 x daily - 7 x weekly - 3 sets - 10 reps  Flexion-Extension Shoulder Pendulum with Table Support - 3 x daily - 7 x weekly - 3 sets - 10 reps  Horizontal Shoulder Pendulum with Table Support - 3 x daily - 7 x weekly - 3 sets - 10 reps  Seated Upper Trapezius Stretch - 3 x daily - 7 x weekly - 3 reps - 1 sets - 30 sec hold  Gentle Levator Scapulae Stretch - 3 x daily - 7 x weekly - 3 reps - 1 sets - 30 sec hold  Seated Shoulder Flexion Towel Slide at Table Top - 3 x daily - 7 x weekly - 10 reps - 1 sets - 5 sec hold  Seated Shoulder Abduction Towel Slide at Table Top - 3 x daily - 7 x weekly - 10 reps - 1 sets - 5 sec hold

## 2021-07-29 ENCOUNTER — OFFICE VISIT (OUTPATIENT)
Dept: PHYSICAL THERAPY | Facility: CLINIC | Age: 66
End: 2021-07-29
Payer: MEDICARE

## 2021-07-29 DIAGNOSIS — M25.511 RIGHT SHOULDER PAIN, UNSPECIFIED CHRONICITY: ICD-10-CM

## 2021-07-29 DIAGNOSIS — M75.101 TEAR OF RIGHT ROTATOR CUFF, UNSPECIFIED TEAR EXTENT, UNSPECIFIED WHETHER TRAUMATIC: Primary | ICD-10-CM

## 2021-07-29 DIAGNOSIS — R29.3 POSTURE ABNORMALITY: ICD-10-CM

## 2021-07-29 PROCEDURE — 97110 THERAPEUTIC EXERCISES: CPT | Performed by: PHYSICAL THERAPIST

## 2021-07-29 PROCEDURE — 97140 MANUAL THERAPY 1/> REGIONS: CPT | Performed by: PHYSICAL THERAPIST

## 2021-07-29 PROCEDURE — 97112 NEUROMUSCULAR REEDUCATION: CPT | Performed by: PHYSICAL THERAPIST

## 2021-07-29 NOTE — PROGRESS NOTES
Daily Note     Today's date: 2021  Patient name: Shazia Cooney  : 1955  MRN: [id number]  Referring provider: Jordan Tinsley  Dx:   Encounter Diagnosis     ICD-10-CM    1  Tear of right rotator cuff, unspecified tear extent, unspecified whether traumatic  M75 101    2  Right shoulder pain, unspecified chronicity  M25 511    3  Posture abnormality  R29 3                   Subjective: The patient reports feeling 7-8/10 R anterior upper arm " soreness"  The patient feels she is going backwards in regards to the right shoulder progression  The patient denies and trauma or incident to cause the increased soreness  The patient denies any decline in ROM  The patient was educated to take 1 day at a time and focus on the functional return versus the functional deficits  Objective: See treatment diary below      Assessment: The patient tolerated the treatment fair today with some complaints of R anterior upper arm pain most notable with the scapular retractions  The patient had some difficulty muscle guarding with the manual therapy today  The muscle guarding may be the source of discomfort at the patient's R upper arm  The patient will benefit from continued PT to achieve her goals of therapy  Plan: Continue per plan of care  Progress treatment as tolerated  The patient is set to have her sling discharged next week       Precautions: no AROM 6 weeks  Progress note:   POC:     Manuals 7/15 7/19 7/22 7/28 7/29   Stretching with active release 15 min 10 min 15 MIN 15 X 15 mins   GH jt mobs        Scapular PNF        IASTM  graston to anterior arm grade GT 4,5 graston to anterior arm grade GT 3,4,5 graston to anterior arm grade GT 3,4,5 IASTM to R anterior upper arm grade GT 3,4,5   Wound care        Neuro Re-Ed        UBE        scap retraction 20x 20x 20x 20x 2x10   Shoulder rolls 20x 20x 20x 20x 30x   Chin tucks :05x10 :05x10 :05x10  x10                   Ther Ex         Red 30x Red 30x Red 30x RED 30 RED individual finger x30   UT stretch 3x:30 3x:30 3x:30 3x:30 3x:30   LS stretch 3x:30 3x:30 3x:30 3x:30 3x:30   Table slides Flex, scap 10x:10 Flex, scap 10x:10 Flex, scap 10x:10 Flex/scap 10x:10 Flex/scap 10x:10   pendulums :30 ea :30 ea :30 ea x30 ea x30 ea   Supine flexion ROM        Wrist AROM 3x10 ea 3x10 ea Deferred      Elbow flex 2x10 2x10 deferred     Elbow sup/pron 3x10 2x10 deferred                                                                                     Ther Activity                        Gait Training                        Modalities                          Access Code: [email or web address]  URL: [email or web address]  Date: 06/28/2021  Prepared by: Armond Galicia    Exercises  Standing Backward Shoulder Rolls - 3 x daily - 7 x weekly - 3 sets - 10 reps  Seated Scapular Retraction - 3 x daily - 7 x weekly - 3 sets - 10 reps  Circular Shoulder Pendulum with Table Support - 3 x daily - 7 x weekly - 3 sets - 10 reps  Flexion-Extension Shoulder Pendulum with Table Support - 3 x daily - 7 x weekly - 3 sets - 10 reps  Horizontal Shoulder Pendulum with Table Support - 3 x daily - 7 x weekly - 3 sets - 10 reps  Seated Upper Trapezius Stretch - 3 x daily - 7 x weekly - 3 reps - 1 sets - 30 sec hold  Gentle Levator Scapulae Stretch - 3 x daily - 7 x weekly - 3 reps - 1 sets - 30 sec hold  Seated Shoulder Flexion Towel Slide at Table Top - 3 x daily - 7 x weekly - 10 reps - 1 sets - 5 sec hold  Seated Shoulder Abduction Towel Slide at Table Top - 3 x daily - 7 x weekly - 10 reps - 1 sets - 5 sec hold

## 2021-08-02 ENCOUNTER — APPOINTMENT (OUTPATIENT)
Dept: PHYSICAL THERAPY | Facility: CLINIC | Age: 66
End: 2021-08-02
Payer: MEDICARE

## 2021-08-03 ENCOUNTER — APPOINTMENT (OUTPATIENT)
Dept: PHYSICAL THERAPY | Facility: CLINIC | Age: 66
End: 2021-08-03
Payer: MEDICARE

## 2021-08-04 ENCOUNTER — APPOINTMENT (OUTPATIENT)
Dept: PHYSICAL THERAPY | Facility: CLINIC | Age: 66
End: 2021-08-04
Payer: MEDICARE

## 2021-08-05 ENCOUNTER — APPOINTMENT (OUTPATIENT)
Dept: PHYSICAL THERAPY | Facility: CLINIC | Age: 66
End: 2021-08-05
Payer: MEDICARE

## 2021-08-06 ENCOUNTER — OFFICE VISIT (OUTPATIENT)
Dept: OBGYN CLINIC | Facility: CLINIC | Age: 66
End: 2021-08-06

## 2021-08-06 ENCOUNTER — OFFICE VISIT (OUTPATIENT)
Dept: PHYSICAL THERAPY | Facility: CLINIC | Age: 66
End: 2021-08-06
Payer: MEDICARE

## 2021-08-06 VITALS
SYSTOLIC BLOOD PRESSURE: 164 MMHG | HEIGHT: 63 IN | HEART RATE: 71 BPM | BODY MASS INDEX: 28.17 KG/M2 | WEIGHT: 159 LBS | DIASTOLIC BLOOD PRESSURE: 88 MMHG

## 2021-08-06 DIAGNOSIS — R29.3 POSTURE ABNORMALITY: ICD-10-CM

## 2021-08-06 DIAGNOSIS — Z98.890 S/P ARTHROSCOPY OF RIGHT SHOULDER: Primary | ICD-10-CM

## 2021-08-06 DIAGNOSIS — M75.101 TEAR OF RIGHT ROTATOR CUFF, UNSPECIFIED TEAR EXTENT, UNSPECIFIED WHETHER TRAUMATIC: Primary | ICD-10-CM

## 2021-08-06 DIAGNOSIS — M75.121 COMPLETE TEAR OF RIGHT ROTATOR CUFF, UNSPECIFIED WHETHER TRAUMATIC: ICD-10-CM

## 2021-08-06 DIAGNOSIS — M25.511 RIGHT SHOULDER PAIN, UNSPECIFIED CHRONICITY: ICD-10-CM

## 2021-08-06 PROCEDURE — 99024 POSTOP FOLLOW-UP VISIT: CPT | Performed by: ORTHOPAEDIC SURGERY

## 2021-08-06 PROCEDURE — 97140 MANUAL THERAPY 1/> REGIONS: CPT

## 2021-08-06 PROCEDURE — 97110 THERAPEUTIC EXERCISES: CPT

## 2021-08-06 PROCEDURE — 97112 NEUROMUSCULAR REEDUCATION: CPT

## 2021-08-06 RX ORDER — MELOXICAM 15 MG/1
TABLET ORAL
Qty: 30 TABLET | Refills: 0 | Status: SHIPPED | OUTPATIENT
Start: 2021-08-06 | End: 2021-10-10 | Stop reason: SDUPTHER

## 2021-08-06 NOTE — PROGRESS NOTES
Daily Note     Today's date: 2021  Patient name: Scott Mcgee  : 1955  MRN: [id number]  Referring provider: Faustino Lamar  Dx:   Encounter Diagnosis     ICD-10-CM    1  Tear of right rotator cuff, unspecified tear extent, unspecified whether traumatic  M75 101    2  Right shoulder pain, unspecified chronicity  M25 511    3  Posture abnormality  R29 3                   Subjective: Pt reports R shoulder feels pretty good today  States she had appointment with Dr Lina Sewell and sling was D/c'd today  Objective: See treatment diary below      Assessment: Tolerated treatment well  Patient is very guarded with R shoulder PROM  Patient exhibited good technique with therapeutic exercises and would benefit from continued PT as per RTC repair protocol  Plan: Continue per plan of care        Precautions: no AROM 6 weeks  Progress note:   POC:     Manuals    Stretching with active release 15 min 10 min 15 MIN 15 X 15 mins   GH jt mobs        Scapular PNF        IASTM IASTM to anterior arm  Grade 4,5,5  Performed by Terri Even to anterior arm grade GT 4,5 graston to anterior arm grade GT 3,4,5 graston to anterior arm grade GT 3,4,5 IASTM to R anterior upper arm grade GT 3,4,5   Wound care        Neuro Re-Ed        UBE        scap retraction 20x 20x 20x 20x 2x10   Shoulder rolls 20x 20x 20x 20x 30x   Chin tucks :05x10 :05x10 :05x10  x10                   Ther Ex         Green x20  Red 30x Red 30x RED 30 RED individual finger x30   UT stretch 3x:30 3x:30 3x:30 3x:30 3x:30   LS stretch 3x:30 3x:30 3x:30 3x:30 3x:30   Table slides Flex, scap 10x:10 Flex, scap 10x:10 Flex, scap 10x:10 Flex/scap 10x:10 Flex/scap 10x:10   pendulums :30 ea :30 ea :30 ea x30 ea x30 ea   Supine flexion ROM        Wrist AROM  3x10 ea Deferred      Elbow flex  2x10 deferred     Elbow sup/pron  2x10 deferred                                                                                     Ther Activity                        Gait Training                        Modalities                          Access Code: [email or web address]  URL: [email or web address]  Date: 06/28/2021  Prepared by: Ashley Deluca    Exercises  Standing Backward Shoulder Rolls - 3 x daily - 7 x weekly - 3 sets - 10 reps  Seated Scapular Retraction - 3 x daily - 7 x weekly - 3 sets - 10 reps  Circular Shoulder Pendulum with Table Support - 3 x daily - 7 x weekly - 3 sets - 10 reps  Flexion-Extension Shoulder Pendulum with Table Support - 3 x daily - 7 x weekly - 3 sets - 10 reps  Horizontal Shoulder Pendulum with Table Support - 3 x daily - 7 x weekly - 3 sets - 10 reps  Seated Upper Trapezius Stretch - 3 x daily - 7 x weekly - 3 reps - 1 sets - 30 sec hold  Gentle Levator Scapulae Stretch - 3 x daily - 7 x weekly - 3 reps - 1 sets - 30 sec hold  Seated Shoulder Flexion Towel Slide at Table Top - 3 x daily - 7 x weekly - 10 reps - 1 sets - 5 sec hold  Seated Shoulder Abduction Towel Slide at Table Top - 3 x daily - 7 x weekly - 10 reps - 1 sets - 5 sec hold

## 2021-08-06 NOTE — PROGRESS NOTES
Assessment/Plan:  Assessment/Plan   Diagnoses and all orders for this visit:    S/P arthroscopy of right shoulder     Discussed with patient that she can discontinue use of shoulder immobilizer  She will continue formal physical therapy as per protocol  She can continue NSAIDs / Tylenol as needed for pain and soreness  She will be seen for follow-up in 6 weeks for re-evaluation  Patient is in agreement with this treatment plan  The patient is doing quite well from a right shoulder arthroscopy  The immobilizer will be discontinued  She may start an active range in motion program with strengthening  Follow-up 6 weeks for re-evaluation  If there are any problems sooner, she will not hesitate to let us know      Subjective:   Patient ID: Michael Del Castillo is a 61 y o  female  HPI    Patient presents 6 weeks s/p right shoulder arthroscopy with rotator cuff repair performed 6/23/2021  Patient reports that she has been consistent with formal physical therapy, and use of shoulder immobilizer as instructed postoperatively  She reports soreness with general motion, but denies any recent onset bruising, swelling, numbness, tingling, feelings of instability, or mechanical symptoms      The following portions of the patient's history were reviewed and updated as appropriate: allergies, current medications, past family history, past medical history, past social history, past surgical history and problem list     Past Medical History:   Diagnosis Date    Anemia     Arthritis     Chronic pain disorder     back and hip pain    Disease of thyroid gland     nodules    GERD (gastroesophageal reflux disease)     Heart murmur     Heartburn     Hiatal hernia     Hyperlipidemia     Hypertension     Overactive bladder     Rotator cuff tear, left     Rotator cuff tear, right     Vitamin D deficiency     unsure    Wears dentures     upper    Wears glasses     Wears partial dentures     upper     Past Surgical History:   Procedure Laterality Date     SECTION      Last Assessed: 2017    ELBOW SURGERY      Last Assessed: 2017    ESOPHAGOGASTRODUODENOSCOPY N/A 2017    Procedure: ESOPHAGOGASTRODUODENOSCOPY (EGD); Surgeon: Eve Bean DO;  Location: [facility] GI LAB; Service: Gastroenterology    HERNIA REPAIR  2020    Hiatal hernia repair    HIP SURGERY Left 2018    glut medius/minimus repair  and 18    HYSTERECTOMY          KNEE SURGERY      x2    PARAESOPHAGEAL HERNIA REPAIR N/A 2020    Procedure: LAPAROSCOPIC PARAESOPHAGEAL HERNIA REPAIR WITH MESH AND NISSEN FUNDOPLICATION WITH ROBOTICS;  Surgeon: Neftaly Lorenzana MD;  Location: [facility];  Service: General    WI COLONOSCOPY FLX DX W/COLLJ SPEC WHEN PFRMD N/A 2017    Procedure: EGD AND COLONOSCOPY;  Surgeon: Eve Bean DO;  Location: [facility] GI LAB;   Service: Gastroenterology    SHOULDER ARTHROCENTESIS      SHOULDER ARTHROSCOPY Right 2021    Procedure: SHOULDER ARTHROSCOPY WITH acromioplasty, debridment, and ROTATOR CUFF REPAIR;  Surgeon: Gunner Brooks DO;  Location: [facility] MAIN OR;  Service: Orthopedics     Family History   Problem Relation Age of Onset    Colon cancer Mother     Heart attack Father     Other Father         Cardiac Disorder    Diabetes type II Father     Colon cancer Sister     Cancer Maternal Grandmother     No Known Problems Daughter     No Known Problems Paternal Grandmother     No Known Problems Sister     No Known Problems Sister     No Known Problems Sister     No Known Problems Sister     No Known Problems Maternal Aunt     No Known Problems Maternal Aunt     No Known Problems Maternal Aunt      Social History     Socioeconomic History    Marital status: /Civil Union     Spouse name: None    Number of children: None    Years of education: None    Highest education level: None   Occupational History    None   Tobacco Use    Smoking status: Former Smoker     Quit date: 12     Years since quittin 6    Smokeless tobacco: Never Used    Tobacco comment: quit 25 yrs ago   Vaping Use    Vaping Use: Never used   Substance and Sexual Activity    Alcohol use: No    Drug use: No    Sexual activity: Yes     Partners: Male   Other Topics Concern    None   Social History Narrative    Caffeine use    , 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[employer], now at [employer] for [address]    2 grown children     Social Determinants of Health     Financial Resource Strain:     Difficulty of Paying Living Expenses:    Food Insecurity:     Worried About [address] in the Last Year:    [address] in the Last Year:    Transportation Needs: No Transportation Needs    Lack of Transportation (Medical): No    Lack of Transportation (Non-Medical):  No   Physical Activity: Inactive    Days of Exercise per Week: 0 days    Minutes of Exercise per Session: 0 min   Stress:     Feeling of Stress :    Social Connections:     Frequency of Communication with Friends and Family:     Frequency of Social Gatherings with Friends and Family:     Attends Quaker Services:     Active Member of Clubs or Organizations:     Attends Club or Organization Meetings:     Marital Status:    Intimate Partner Violence:     Fear of Current or Ex-Partner:     Emotionally Abused:     Physically Abused:     Sexually Abused:        Current Outpatient Medications:     atorvastatin (LIPITOR) 10 mg tablet, Take 1 tablet (10 mg total) by mouth daily, Disp: 90 tablet, Rfl: 1    famotidine (PEPCID) 20 mg tablet, TAKE 1 TABLET (20 MG TOTAL) BY MOUTH 2 (TWO) TIMES DAILY, Disp: 180 tablet, Rfl: 1    ferrous sulfate (FeroSul) 325 (65 Fe) mg tablet, Take 1 tablet (325 mg total) by mouth daily with breakfast, Disp: 90 tablet, Rfl: 1    hydrOXYzine HCL (ATARAX) 25 mg tablet, TAKE 1-2 TABLETS (25-50 MG TOTAL) BY MOUTH DAILY AT BEDTIME AS NEEDED FOR ANXIETY (INSOMNIA), Disp: 60 tablet, Rfl: 1    losartan (COZAAR) 100 MG tablet, Take 25 mg by mouth daily, Disp: , Rfl:     meloxicam (MOBIC) 15 mg tablet, Take 1 tablet (15 mg total) by mouth daily, Disp: 30 tablet, Rfl: 0    Catheters MISC, Self - Cath 14-46 FR, Disp: , Rfl:     valACYclovir (VALTREX) 1,000 mg tablet, take 1 tablet by mouth twice a day for 3 days, Disp: 6 tablet, Rfl: 2    No Known Allergies    Review of Systems   Constitutional: Negative for fatigue  Gastrointestinal: Negative for nausea  Musculoskeletal:        As noted in HPI   Neurological: Negative for dizziness, weakness, numbness and headaches  All other systems reviewed and are negative  Objective:  /88   Pulse 71   Ht 5' 3" (1 6 m)   Wt 72 1 kg (159 lb) Comment: with immobilizer  BMI 28 17 kg/m²     Ortho Exam   Right shoulder -   Incision:  Clean, dry, well healed  Skin is warm and dry to touch with no signs of erythema, ecchymosis, infection  ROM: FF 0°-100°, ABD  0°-100° -  Demonstrates normal elbow, wrist, and finger motion  Strength: 4/5 MMT throughout  2+ distal radial pulse with brisk capillary refill to the fingers  Radial, median, and ulnar motor and sensory distributions intact  Sensation light touch intact distally    Physical Exam  Constitutional:       Appearance: She is well-developed  HENT:      Right Ear: External ear normal       Left Ear: External ear normal       Nose: Nose normal    Eyes:      Conjunctiva/sclera: Conjunctivae normal       Pupils: Pupils are equal, round, and reactive to light  Pulmonary:      Effort: Pulmonary effort is normal    Musculoskeletal:         General: Normal range of motion  Cervical back: Normal range of motion  Skin:     General: Skin is warm and dry  Neurological:      Mental Status: She is alert and oriented to person, place, and time  Psychiatric:         Behavior: Behavior normal          Thought Content:  Thought content normal          Judgment: Judgment normal          Imagaing:  No new imaging reviewed this visit    Scribe Attestation    I,:  Lance Roy am acting as a scribe while in the presence of the attending physician :       I,:  Tolu Hernandez, DO personally performed the services described in this documentation    as scribed in my presence :

## 2021-08-10 ENCOUNTER — APPOINTMENT (OUTPATIENT)
Dept: PHYSICAL THERAPY | Facility: CLINIC | Age: 66
End: 2021-08-10
Payer: MEDICARE

## 2021-08-11 ENCOUNTER — OFFICE VISIT (OUTPATIENT)
Dept: PHYSICAL THERAPY | Facility: CLINIC | Age: 66
End: 2021-08-11
Payer: MEDICARE

## 2021-08-11 DIAGNOSIS — M25.511 RIGHT SHOULDER PAIN, UNSPECIFIED CHRONICITY: ICD-10-CM

## 2021-08-11 DIAGNOSIS — R29.3 POSTURE ABNORMALITY: ICD-10-CM

## 2021-08-11 DIAGNOSIS — M75.101 TEAR OF RIGHT ROTATOR CUFF, UNSPECIFIED TEAR EXTENT, UNSPECIFIED WHETHER TRAUMATIC: Primary | ICD-10-CM

## 2021-08-11 PROCEDURE — 97112 NEUROMUSCULAR REEDUCATION: CPT

## 2021-08-11 PROCEDURE — 97110 THERAPEUTIC EXERCISES: CPT

## 2021-08-11 PROCEDURE — 97140 MANUAL THERAPY 1/> REGIONS: CPT

## 2021-08-11 NOTE — PROGRESS NOTES
Daily Note     Today's date: 2021  Patient name: Sujatha November  : 1955  MRN: [id number]  Referring provider: Eva Del Valle  Dx:   Encounter Diagnosis     ICD-10-CM    1  Tear of right rotator cuff, unspecified tear extent, unspecified whether traumatic  M75 101    2  Right shoulder pain, unspecified chronicity  M25 511    3  Posture abnormality  R29 3                   Subjective: Patient reports, "I feel a bit sore and achy "      Objective: See treatment diary below      Assessment: Tolerated treatment well  Able to progress to [facility] within protocol  Patient had difficulty with AA cane flexion able tp perform AA ER/IR  IASTM brought relief  Patient would benefit from continued PT      Plan: Continue per plan of care        Precautions: no AROM 6 weeks  Progress note:   POC:     Manuals    Stretching with active release 15 min 12 min 15 MIN 15 X 15 mins   GH jt mobs        Scapular PNF        IASTM IASTM to anterior arm  Grade 4,5,5  Performed by GF IASTM to anterior arm graston to anterior arm grade GT 3,4,5 graston to anterior arm grade GT 3,4,5 IASTM to R anterior upper arm grade GT 3,4,5   Wound care        Neuro Re-Ed        UBE        scap retraction 20x 20x  20x 20x 2x10   Shoulder rolls 20x 20x 20x 20x 30x   Chin tucks :05x10 "05x10 :05x10  x10                   Ther Ex         Green x20  Green x20 Red 30x RED 30 RED individual finger x30   UT stretch 3x:30 3x :30 3x:30 3x:30 3x:30   LS stretch 3x:30 3x :30 3x:30 3x:30 3x:30   Table slides Flex, scap 10x:10 Flex, scap 10x :10  Flex, scap 10x:10 Flex/scap 10x:10 Flex/scap 10x:10   pendulums :30 ea :30 ea :30 ea x30 ea x30 ea   Supine flexion ROM  AA Cane   unable      Supine ER/IR  AA Cane 10x ea      Wrist AROM   Deferred      Elbow flex   deferred     Elbow sup/pron   deferred     Pulleys  2min                                                                              Ther Activity Gait Training                        Modalities                          Access Code: [email or web address]  URL: [email or web address]  Date: 06/28/2021  Prepared by: Pearlean Galicia    Exercises  Standing Backward Shoulder Rolls - 3 x daily - 7 x weekly - 3 sets - 10 reps  Seated Scapular Retraction - 3 x daily - 7 x weekly - 3 sets - 10 reps  Circular Shoulder Pendulum with Table Support - 3 x daily - 7 x weekly - 3 sets - 10 reps  Flexion-Extension Shoulder Pendulum with Table Support - 3 x daily - 7 x weekly - 3 sets - 10 reps  Horizontal Shoulder Pendulum with Table Support - 3 x daily - 7 x weekly - 3 sets - 10 reps  Seated Upper Trapezius Stretch - 3 x daily - 7 x weekly - 3 reps - 1 sets - 30 sec hold  Gentle Levator Scapulae Stretch - 3 x daily - 7 x weekly - 3 reps - 1 sets - 30 sec hold  Seated Shoulder Flexion Towel Slide at Table Top - 3 x daily - 7 x weekly - 10 reps - 1 sets - 5 sec hold  Seated Shoulder Abduction Towel Slide at Table Top - 3 x daily - 7 x weekly - 10 reps - 1 sets - 5 sec hold

## 2021-08-12 ENCOUNTER — APPOINTMENT (OUTPATIENT)
Dept: PHYSICAL THERAPY | Facility: CLINIC | Age: 66
End: 2021-08-12
Payer: MEDICARE

## 2021-08-13 ENCOUNTER — OFFICE VISIT (OUTPATIENT)
Dept: PHYSICAL THERAPY | Facility: CLINIC | Age: 66
End: 2021-08-13
Payer: MEDICARE

## 2021-08-13 DIAGNOSIS — M25.511 RIGHT SHOULDER PAIN, UNSPECIFIED CHRONICITY: ICD-10-CM

## 2021-08-13 DIAGNOSIS — R29.3 POSTURE ABNORMALITY: ICD-10-CM

## 2021-08-13 DIAGNOSIS — M75.101 TEAR OF RIGHT ROTATOR CUFF, UNSPECIFIED TEAR EXTENT, UNSPECIFIED WHETHER TRAUMATIC: Primary | ICD-10-CM

## 2021-08-13 PROCEDURE — 97112 NEUROMUSCULAR REEDUCATION: CPT | Performed by: PHYSICAL THERAPIST

## 2021-08-13 PROCEDURE — 97110 THERAPEUTIC EXERCISES: CPT | Performed by: PHYSICAL THERAPIST

## 2021-08-13 PROCEDURE — 97140 MANUAL THERAPY 1/> REGIONS: CPT | Performed by: PHYSICAL THERAPIST

## 2021-08-13 NOTE — PROGRESS NOTES
Daily Note     Today's date: 2021  Patient name: Pam Sinclair  : 1955  MRN: [id number]  Referring provider: Claudean Heart  Dx:   Encounter Diagnosis     ICD-10-CM    1  Tear of right rotator cuff, unspecified tear extent, unspecified whether traumatic  M75 101    2  Right shoulder pain, unspecified chronicity  M25 511    3  Posture abnormality  R29 3        Start Time: 0745  Stop Time: 08  Total time in clinic (min): 45 minutes    Subjective: Patient states she has been having sharp pains in right shoulder of unknown cause, can just be sitting and will have pain  No longer wearing sling  Attempts to use RUE when able, restricted to <1lb lifting  Objective: See treatment diary below      Assessment: Tolerated treatment fair  Hesitant and guarding bringing arm overhead, but was able to tolerate new exercises focused on muscle activation and active range of motion  Patient would benefit from continued PT      Plan: Continue per plan of care  Progress treatment as tolerated         Precautions: <1lb lifting  Progress note:   POC:     Manuals    Stretching with active release 15 min 12 min 15min  X 15 mins   GH jt mobs   Grade II-III     Scapular PNF        IASTM IASTM to anterior arm  Grade 4,5,5  Performed by GF IASTM to anterior arm   IASTM to R anterior upper arm grade GT 3,4,5   Wound care        Neuro Re-Ed        UBE        scap retraction 20x 20x  20x  2x10   Shoulder rolls 20x 20x 20x  30x   Chin tucks :05x10 "05x10 :05x10  x10   Shoulder isometrics   :05x5 flex, abd             Ther Ex         Green x20  Green x20 Green 30x  RED individual finger x30   UT stretch 3x:30 3x :30 3x :30  3x:30   LS stretch 3x:30 3x :30 3x :30  3x:30   Table slides Flex, scap 10x:10 Flex, scap 10x :10  Flex, scap 10x :10   Flex/scap 10x:10   pendulums :30 ea :30 ea :30 ea  x30 ea   Supine flexion ROM  AA Cane   unable attempted     Supine ER/IR  AA Cane 10x ea 10x:05 Pulleys  2min 10x:10     Wall walks   :05x5 flex, scap     Standing Ronaldo Neri    *                                                            Ther Activity                        Gait Training                        Modalities                          Access Code: [email or web address]  URL: [email or web address]  Date: 06/28/2021  Prepared by: Kelli Sagastume    Exercises  Standing Backward Shoulder Rolls - 3 x daily - 7 x weekly - 3 sets - 10 reps  Seated Scapular Retraction - 3 x daily - 7 x weekly - 3 sets - 10 reps  Circular Shoulder Pendulum with Table Support - 3 x daily - 7 x weekly - 3 sets - 10 reps  Flexion-Extension Shoulder Pendulum with Table Support - 3 x daily - 7 x weekly - 3 sets - 10 reps  Horizontal Shoulder Pendulum with Table Support - 3 x daily - 7 x weekly - 3 sets - 10 reps  Seated Upper Trapezius Stretch - 3 x daily - 7 x weekly - 3 reps - 1 sets - 30 sec hold  Gentle Levator Scapulae Stretch - 3 x daily - 7 x weekly - 3 reps - 1 sets - 30 sec hold  Seated Shoulder Flexion Towel Slide at Table Top - 3 x daily - 7 x weekly - 10 reps - 1 sets - 5 sec hold  Seated Shoulder Abduction Towel Slide at Table Top - 3 x daily - 7 x weekly - 10 reps - 1 sets - 5 sec hold

## 2021-08-17 ENCOUNTER — APPOINTMENT (OUTPATIENT)
Dept: PHYSICAL THERAPY | Facility: CLINIC | Age: 66
End: 2021-08-17
Payer: MEDICARE

## 2021-08-18 ENCOUNTER — OFFICE VISIT (OUTPATIENT)
Dept: PHYSICAL THERAPY | Facility: CLINIC | Age: 66
End: 2021-08-18
Payer: MEDICARE

## 2021-08-18 DIAGNOSIS — R29.3 POSTURE ABNORMALITY: ICD-10-CM

## 2021-08-18 DIAGNOSIS — M25.511 RIGHT SHOULDER PAIN, UNSPECIFIED CHRONICITY: ICD-10-CM

## 2021-08-18 DIAGNOSIS — M75.101 TEAR OF RIGHT ROTATOR CUFF, UNSPECIFIED TEAR EXTENT, UNSPECIFIED WHETHER TRAUMATIC: Primary | ICD-10-CM

## 2021-08-18 PROCEDURE — 97112 NEUROMUSCULAR REEDUCATION: CPT | Performed by: PHYSICAL THERAPIST

## 2021-08-18 PROCEDURE — 97110 THERAPEUTIC EXERCISES: CPT | Performed by: PHYSICAL THERAPIST

## 2021-08-18 PROCEDURE — 97140 MANUAL THERAPY 1/> REGIONS: CPT | Performed by: PHYSICAL THERAPIST

## 2021-08-18 NOTE — PROGRESS NOTES
Daily Note     Today's date: 2021  Patient name: Maryanne Swain  : 1955  MRN: [id number]  Referring provider: Lamin Cornejo  Dx:   Encounter Diagnosis     ICD-10-CM    1  Tear of right rotator cuff, unspecified tear extent, unspecified whether traumatic  M75 101    2  Right shoulder pain, unspecified chronicity  M25 511    3  Posture abnormality  R29 3        Start Time: 730  Stop Time: 08  Total time in clinic (min): 50 minutes    Subjective: States she has increased stiffness and soreness in am and pm making it difficult to raise RUE  Objective: See treatment diary below      Assessment: Tolerated treatment fair+  Shows improvement with tolerance to active range of motion exercise today  Continues to guard and is fearful with passive stretching  Patient would benefit from continued PT working on continuing to progress active range of motion and strength  Plan: Continue per plan of care  Progress treatment as tolerated         Precautions: <1lb lifting  Progress note:   POC:     Manuals     Stretching with active release 15 min 12 min 15min 15 min    GH jt mobs   Grade II-III Grade 2-3    Scapular PNF        IASTM IASTM to anterior arm  Grade 4,5,5  Performed by GF IASTM to anterior arm      Wound care        Neuro Re-Ed        UBE        scap retraction 20x 20x  20x HEP/prn    Shoulder rolls 20x 20x 20x HEP/prn    Chin tucks :05x10 "05x10 :05x10 HEP    Shoulder isometrics   :05x5 flex, abd Flex, abd, IR, ER, Ext :05x5            Ther Ex         Green x20  Green x20 Green 30x Green 30x    UT stretch 3x:30 3x :30 3x :30     LS stretch 3x:30 3x :30 3x :30     Table slides Flex, scap 10x:10 Flex, scap 10x :10  Flex, scap 10x :10  Flex, scap 10x :10    pendulums :30 ea :30 ea :30 ea prn    Supine flexion ROM  AA Cane   unable attempted     Supine ER/IR  AA Cane 10x ea 10x:05 10x:05    Pulleys  2min 10x:10 10x:10    Wall walks   :05x5 flex, scap :05x5 flex, scap    Standing Cane AAROM    *flex, scap 10x                                                            Ther Activity                        Gait Training                        Modalities                          Access Code: [email or web address]  URL: [email or web address]  Date: 06/28/2021  Prepared by: Monalisa Multani    Exercises  Standing Backward Shoulder Rolls - 3 x daily - 7 x weekly - 3 sets - 10 reps  Seated Scapular Retraction - 3 x daily - 7 x weekly - 3 sets - 10 reps  Circular Shoulder Pendulum with Table Support - 3 x daily - 7 x weekly - 3 sets - 10 reps  Flexion-Extension Shoulder Pendulum with Table Support - 3 x daily - 7 x weekly - 3 sets - 10 reps  Horizontal Shoulder Pendulum with Table Support - 3 x daily - 7 x weekly - 3 sets - 10 reps  Seated Upper Trapezius Stretch - 3 x daily - 7 x weekly - 3 reps - 1 sets - 30 sec hold  Gentle Levator Scapulae Stretch - 3 x daily - 7 x weekly - 3 reps - 1 sets - 30 sec hold  Seated Shoulder Flexion Towel Slide at Table Top - 3 x daily - 7 x weekly - 10 reps - 1 sets - 5 sec hold  Seated Shoulder Abduction Towel Slide at Table Top - 3 x daily - 7 x weekly - 10 reps - 1 sets - 5 sec hold

## 2021-08-19 ENCOUNTER — APPOINTMENT (OUTPATIENT)
Dept: PHYSICAL THERAPY | Facility: CLINIC | Age: 66
End: 2021-08-19
Payer: MEDICARE

## 2021-08-20 ENCOUNTER — OFFICE VISIT (OUTPATIENT)
Dept: PHYSICAL THERAPY | Facility: CLINIC | Age: 66
End: 2021-08-20
Payer: MEDICARE

## 2021-08-20 DIAGNOSIS — M75.101 TEAR OF RIGHT ROTATOR CUFF, UNSPECIFIED TEAR EXTENT, UNSPECIFIED WHETHER TRAUMATIC: Primary | ICD-10-CM

## 2021-08-20 DIAGNOSIS — M25.511 RIGHT SHOULDER PAIN, UNSPECIFIED CHRONICITY: ICD-10-CM

## 2021-08-20 DIAGNOSIS — R29.3 POSTURE ABNORMALITY: ICD-10-CM

## 2021-08-20 PROCEDURE — 97140 MANUAL THERAPY 1/> REGIONS: CPT

## 2021-08-20 PROCEDURE — 97110 THERAPEUTIC EXERCISES: CPT

## 2021-08-20 NOTE — PROGRESS NOTES
Daily Note     Today's date: 2021  Patient name: Darryl Lr  : 1955  MRN: [id number]  Referring provider: Marlin Samaniego  Dx:   Encounter Diagnosis     ICD-10-CM    1  Tear of right rotator cuff, unspecified tear extent, unspecified whether traumatic  M75 101    2  Right shoulder pain, unspecified chronicity  M25 511    3  Posture abnormality  R29 3        Start Time: 0745  Stop Time: 0830  Total time in clinic (min): 45 minutes    Subjective: Pt states she has 8/10 shoulder pain but this is very typical for her early in the morning and late at night      Objective: See treatment diary below      Assessment: Pt tolerated treatment session fairly well today  Reports pain with sh isometrics, reinforced concept of submaximal, pain free isometrics; pt verbalized understanding  Continuing to improve PROM of R shoulder  Pt would benefit from continued OP PT services      Plan: Continue per plan of care  Precautions: <1lb lifting  Progress note:   POC:     Manuals    Stretching with active release 15 min 12 min 15min 15 min 15 min   GH jt mobs   Grade II-III Grade 2-3 Grade 2-3   Scapular PNF        IASTM IASTM to anterior arm  Grade 4,5,5  Performed by GF IASTM to anterior arm      Wound care        Neuro Re-Ed        UBE        scap retraction 20x 20x  20x HEP/prn    Shoulder rolls 20x 20x 20x HEP/prn    Chin tucks :05x10 "05x10 :05x10 HEP    Shoulder isometrics   :05x5 flex, abd Flex, abd, IR, ER, Ext :05x5 Flex, abd, IR, ER, Ext :05x10           Ther Ex         Green x20  Green x20 Green 30x Green 30x Green 30x   UT stretch 3x:30 3x :30 3x :30     LS stretch 3x:30 3x :30 3x :30     Table slides Flex, scap 10x:10 Flex, scap 10x :10  Flex, scap 10x :10  Flex, scap 10x :10 Flex, scap 10x :10   pendulums :30 ea :30 ea :30 ea prn    Supine flexion ROM  AA Cane   unable attempted  Attempted P!    Supine ER/IR  AA Cane 10x ea 10x:05 10x:05 10x:05   Pulleys  2min 10x:10 10x:10 10x :10   Wall walks   :05x5 flex, scap :05x5 flex, scap :05x5 flex, scap   Standing Cane AAROM    *flex, scap 10x flex, scap 10x                                                           Ther Activity                        Gait Training                        Modalities                          Access Code: [email or web address]  URL: [email or web address]  Date: 06/28/2021  Prepared by: Ashley Deluca    Exercises  Standing Backward Shoulder Rolls - 3 x daily - 7 x weekly - 3 sets - 10 reps  Seated Scapular Retraction - 3 x daily - 7 x weekly - 3 sets - 10 reps  Circular Shoulder Pendulum with Table Support - 3 x daily - 7 x weekly - 3 sets - 10 reps  Flexion-Extension Shoulder Pendulum with Table Support - 3 x daily - 7 x weekly - 3 sets - 10 reps  Horizontal Shoulder Pendulum with Table Support - 3 x daily - 7 x weekly - 3 sets - 10 reps  Seated Upper Trapezius Stretch - 3 x daily - 7 x weekly - 3 reps - 1 sets - 30 sec hold  Gentle Levator Scapulae Stretch - 3 x daily - 7 x weekly - 3 reps - 1 sets - 30 sec hold  Seated Shoulder Flexion Towel Slide at Table Top - 3 x daily - 7 x weekly - 10 reps - 1 sets - 5 sec hold  Seated Shoulder Abduction Towel Slide at Table Top - 3 x daily - 7 x weekly - 10 reps - 1 sets - 5 sec hold

## 2021-08-24 ENCOUNTER — APPOINTMENT (OUTPATIENT)
Dept: PHYSICAL THERAPY | Facility: CLINIC | Age: 66
End: 2021-08-24
Payer: MEDICARE

## 2021-08-25 ENCOUNTER — EVALUATION (OUTPATIENT)
Dept: PHYSICAL THERAPY | Facility: CLINIC | Age: 66
End: 2021-08-25
Payer: MEDICARE

## 2021-08-25 DIAGNOSIS — M25.511 RIGHT SHOULDER PAIN, UNSPECIFIED CHRONICITY: ICD-10-CM

## 2021-08-25 DIAGNOSIS — M75.101 TEAR OF RIGHT ROTATOR CUFF, UNSPECIFIED TEAR EXTENT, UNSPECIFIED WHETHER TRAUMATIC: Primary | ICD-10-CM

## 2021-08-25 DIAGNOSIS — R29.3 POSTURE ABNORMALITY: ICD-10-CM

## 2021-08-25 PROCEDURE — 97112 NEUROMUSCULAR REEDUCATION: CPT | Performed by: PHYSICAL THERAPIST

## 2021-08-25 PROCEDURE — 97140 MANUAL THERAPY 1/> REGIONS: CPT | Performed by: PHYSICAL THERAPIST

## 2021-08-25 PROCEDURE — 97110 THERAPEUTIC EXERCISES: CPT | Performed by: PHYSICAL THERAPIST
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
## 2021-08-26 ENCOUNTER — APPOINTMENT (OUTPATIENT)
Dept: PHYSICAL THERAPY | Facility: CLINIC | Age: 66
End: 2021-08-26
Payer: MEDICARE

## 2021-08-27 ENCOUNTER — OFFICE VISIT (OUTPATIENT)
Dept: PHYSICAL THERAPY | Facility: CLINIC | Age: 66
End: 2021-08-27
Payer: MEDICARE

## 2021-08-27 DIAGNOSIS — R29.3 POSTURE ABNORMALITY: ICD-10-CM

## 2021-08-27 DIAGNOSIS — M75.101 TEAR OF RIGHT ROTATOR CUFF, UNSPECIFIED TEAR EXTENT, UNSPECIFIED WHETHER TRAUMATIC: Primary | ICD-10-CM

## 2021-08-27 DIAGNOSIS — M25.511 RIGHT SHOULDER PAIN, UNSPECIFIED CHRONICITY: ICD-10-CM

## 2021-08-27 PROCEDURE — 97140 MANUAL THERAPY 1/> REGIONS: CPT | Performed by: PHYSICAL THERAPIST

## 2021-08-27 PROCEDURE — 97110 THERAPEUTIC EXERCISES: CPT | Performed by: PHYSICAL THERAPIST

## 2021-08-27 PROCEDURE — 97112 NEUROMUSCULAR REEDUCATION: CPT | Performed by: PHYSICAL THERAPIST

## 2021-08-27 NOTE — PROGRESS NOTES
Daily Note     Today's date: 2021  Patient name: Lenora Staley  : 1955  MRN: [id number]  Referring provider: Andre Moore  Dx:   Encounter Diagnosis     ICD-10-CM    1  Tear of right rotator cuff, unspecified tear extent, unspecified whether traumatic  M75 101    2  Right shoulder pain, unspecified chronicity  M25 511    3  Posture abnormality  R29 3        Start Time: 0745  Stop Time: 0830  Total time in clinic (min): 45 minutes    Subjective: Patient states she is sore today, has been attempting to use arm as much as she can  Continues to have discomfort and pain with overhead use  Objective: See treatment diary below      Assessment: Tolerated treatment well  Increased tolerance to passive movement today with >120 degrees flexion and abduction  Patient would benefit from continued PT      Plan: Continue per plan of care  Progress treatment as tolerated  Precautions: <1lb lifting  Progress note:   POC:     Manuals    Stretching with active release 15 min  15 min  15 min 15 min   GH jt mobs Grade 2-3 Grade 2-3 AP distraction  Grade 2-3 Grade 2-3   Scapular PNF        IAS        Wound care        Neuro Re-Ed        UBE  Scifit 4 min L1      scap retraction 20x 20x  HEP/prn    Shoulder rolls 20x 20x  HEP/prn    Chin tucks    HEP    Shoulder isometrics nv Flex, abd, IR, ER, Ext :05x10  Flex, abd, IR, ER, Ext :05x5 Flex, abd, IR, ER, Ext :05x10           Ther Ex         nv Green 30x  Green 30x Green 30x   UT stretch        LS stretch        Table slides Flex, scap 10x :10 Flex, scap 10x :10  Flex, scap 10x :10 Flex, scap 10x :10   pendulums    prn    Supine flexion ROM     Attempted P!    Supine ER/IR 10x:05 10x:05  10x:05 10x:05   Pulleys 10x :10 10x :10  10x:10 10x :10   Wall walks :05x5 flex, scap :05x5 flex, scap  :05x5 flex, scap :05x5 flex, scap   Standing Cane AAROM flex, scap 10x flex, scap 10x ^ *flex, scap 10x flex, scap 10x Ther Activity                        Gait Training                        Modalities                          Access Code: [email or web address]  URL: [email or web address]  Date: 06/28/2021  Prepared by: Crystal Gill    Exercises  Standing Backward Shoulder Rolls - 3 x daily - 7 x weekly - 3 sets - 10 reps  Seated Scapular Retraction - 3 x daily - 7 x weekly - 3 sets - 10 reps  Circular Shoulder Pendulum with Table Support - 3 x daily - 7 x weekly - 3 sets - 10 reps  Flexion-Extension Shoulder Pendulum with Table Support - 3 x daily - 7 x weekly - 3 sets - 10 reps  Horizontal Shoulder Pendulum with Table Support - 3 x daily - 7 x weekly - 3 sets - 10 reps  Seated Upper Trapezius Stretch - 3 x daily - 7 x weekly - 3 reps - 1 sets - 30 sec hold  Gentle Levator Scapulae Stretch - 3 x daily - 7 x weekly - 3 reps - 1 sets - 30 sec hold  Seated Shoulder Flexion Towel Slide at Table Top - 3 x daily - 7 x weekly - 10 reps - 1 sets - 5 sec hold  Seated Shoulder Abduction Towel Slide at Table Top - 3 x daily - 7 x weekly - 10 reps - 1 sets - 5 sec hold

## 2021-08-31 ENCOUNTER — APPOINTMENT (OUTPATIENT)
Dept: PHYSICAL THERAPY | Facility: CLINIC | Age: 66
End: 2021-08-31
Payer: MEDICARE

## 2021-09-01 ENCOUNTER — OFFICE VISIT (OUTPATIENT)
Dept: PHYSICAL THERAPY | Facility: CLINIC | Age: 66
End: 2021-09-01
Payer: MEDICARE

## 2021-09-01 DIAGNOSIS — R29.3 POSTURE ABNORMALITY: ICD-10-CM

## 2021-09-01 DIAGNOSIS — M25.511 RIGHT SHOULDER PAIN, UNSPECIFIED CHRONICITY: ICD-10-CM

## 2021-09-01 DIAGNOSIS — M75.101 TEAR OF RIGHT ROTATOR CUFF, UNSPECIFIED TEAR EXTENT, UNSPECIFIED WHETHER TRAUMATIC: Primary | ICD-10-CM

## 2021-09-01 PROCEDURE — 97112 NEUROMUSCULAR REEDUCATION: CPT | Performed by: PHYSICAL THERAPIST

## 2021-09-01 PROCEDURE — 97140 MANUAL THERAPY 1/> REGIONS: CPT | Performed by: PHYSICAL THERAPIST

## 2021-09-01 PROCEDURE — 97110 THERAPEUTIC EXERCISES: CPT | Performed by: PHYSICAL THERAPIST

## 2021-09-01 NOTE — PROGRESS NOTES
Daily Note     Today's date: 2021  Patient name: Skyler Phelps  : 1955  MRN: [id number]  Referring provider: María Fernández  Dx:   Encounter Diagnosis     ICD-10-CM    1  Tear of right rotator cuff, unspecified tear extent, unspecified whether traumatic  M75 101    2  Right shoulder pain, unspecified chronicity  M25 511    3  Posture abnormality  R29 3        Start Time: 0730  Stop Time: 0815  Total time in clinic (min): 45 minutes    Subjective: Patient states she has been feeling better the past few days  Thinks she is getting more motion with decreased pain in right shoulder  Objective: See treatment diary below      Assessment: Tolerated treatment well  Continued improvement noted with right shoulder passive range of motion  Noted significant limitation of right scapular mobility impacting scapulothoracic rhythm  Patient would benefit from continued PT      Plan: Continue per plan of care  Progress treatment as tolerated  Precautions: <1lb lifting  Progress note:   POC:     Manuals    Stretching with active release 15 min  15 min 15 min  15 min   GH jt mobs Grade 2-3 Grade 2-3 AP distraction Grade 3-4 AP and scapular mobs  Grade 2-3   Scapular PNF        IASTM        Wound care        Neuro Re-Ed        UBE  Scifit 4 min L1 Scifit 6 min L1     scap retraction 20x 20x HEP     Shoulder rolls 20x 20x HEP     Chin tucks        Shoulder isometrics nv Flex, abd, IR, ER, Ext :05x10 Flex, abd, IR, ER, Ext :05x10  Flex, abd, IR, ER, Ext :05x10           Ther Ex         nv Green 30x PRN  Green 30x   UT stretch        LS stretch        Table slides Flex, scap 10x :10 Flex, scap 10x :10 GREEN SB 10x:10 ea  Flex, scap 10x :10   pendulums        Supine flexion ROM     Attempted P!    Supine ER/IR 10x:05 10x:05 10x:05  10x:05   Pulleys 10x :10 10x :10 10x :10  10x :10   Wall walks :05x5 flex, scap :05x5 flex, scap :05x5 flex, scap  :05x5 flex, scap   Standing Cane AAROM flex, scap 10x flex, scap 10x ^ nv  flex, scap 10x                                                           Ther Activity                        Gait Training                        Modalities                          Access Code: [email or web address]  URL: [email or web address]  Date: 06/28/2021  Prepared by: Vladimir Mckeon    Exercises  Standing Backward Shoulder Rolls - 3 x daily - 7 x weekly - 3 sets - 10 reps  Seated Scapular Retraction - 3 x daily - 7 x weekly - 3 sets - 10 reps  Circular Shoulder Pendulum with Table Support - 3 x daily - 7 x weekly - 3 sets - 10 reps  Flexion-Extension Shoulder Pendulum with Table Support - 3 x daily - 7 x weekly - 3 sets - 10 reps  Horizontal Shoulder Pendulum with Table Support - 3 x daily - 7 x weekly - 3 sets - 10 reps  Seated Upper Trapezius Stretch - 3 x daily - 7 x weekly - 3 reps - 1 sets - 30 sec hold  Gentle Levator Scapulae Stretch - 3 x daily - 7 x weekly - 3 reps - 1 sets - 30 sec hold  Seated Shoulder Flexion Towel Slide at Table Top - 3 x daily - 7 x weekly - 10 reps - 1 sets - 5 sec hold  Seated Shoulder Abduction Towel Slide at Table Top - 3 x daily - 7 x weekly - 10 reps - 1 sets - 5 sec hold

## 2021-09-02 ENCOUNTER — APPOINTMENT (OUTPATIENT)
Dept: PHYSICAL THERAPY | Facility: CLINIC | Age: 66
End: 2021-09-02
Payer: MEDICARE

## 2021-09-03 ENCOUNTER — APPOINTMENT (OUTPATIENT)
Dept: PHYSICAL THERAPY | Facility: CLINIC | Age: 66
End: 2021-09-03
Payer: MEDICARE

## 2021-09-04 DIAGNOSIS — Z86.19 H/O COLD SORES: ICD-10-CM

## 2021-09-05 DIAGNOSIS — F51.01 PRIMARY INSOMNIA: ICD-10-CM

## 2021-09-07 RX ORDER — VALACYCLOVIR HYDROCHLORIDE 1 G/1
TABLET, FILM COATED ORAL
Qty: 6 TABLET | Refills: 2 | Status: SHIPPED | OUTPATIENT
Start: 2021-09-07 | End: 2021-10-04

## 2021-09-07 RX ORDER — HYDROXYZINE HYDROCHLORIDE 25 MG/1
25-50 TABLET, FILM COATED ORAL
Qty: 60 TABLET | Refills: 1 | Status: SHIPPED | OUTPATIENT
Start: 2021-09-07 | End: 2021-11-01

## 2021-09-09 ENCOUNTER — OFFICE VISIT (OUTPATIENT)
Dept: PHYSICAL THERAPY | Facility: CLINIC | Age: 66
End: 2021-09-09
Payer: MEDICARE

## 2021-09-09 DIAGNOSIS — R29.3 POSTURE ABNORMALITY: ICD-10-CM

## 2021-09-09 DIAGNOSIS — M75.101 TEAR OF RIGHT ROTATOR CUFF, UNSPECIFIED TEAR EXTENT, UNSPECIFIED WHETHER TRAUMATIC: Primary | ICD-10-CM

## 2021-09-09 DIAGNOSIS — M25.511 RIGHT SHOULDER PAIN, UNSPECIFIED CHRONICITY: ICD-10-CM

## 2021-09-09 PROCEDURE — 97140 MANUAL THERAPY 1/> REGIONS: CPT | Performed by: PHYSICAL THERAPIST

## 2021-09-09 PROCEDURE — 97112 NEUROMUSCULAR REEDUCATION: CPT | Performed by: PHYSICAL THERAPIST

## 2021-09-09 PROCEDURE — 97110 THERAPEUTIC EXERCISES: CPT | Performed by: PHYSICAL THERAPIST

## 2021-09-09 NOTE — PROGRESS NOTES
Daily Note     Today's date: 2021  Patient name: Zaynab Smith  : 1955  MRN: [id number]  Referring provider: Sherry Mclean  Dx:   Encounter Diagnosis     ICD-10-CM    1  Tear of right rotator cuff, unspecified tear extent, unspecified whether traumatic  M75 101    2  Right shoulder pain, unspecified chronicity  M25 511    3  Posture abnormality  R29 3                   Subjective: Patient states continued difficulty sleeping waking 1-2x/night  Also has been complaining of frontal HA symptoms  Objective: See treatment diary below      Assessment: Tolerated treatment well  Showing good progress with strength and active range of motion with good tolerance to exercises today  Patient would benefit from continued PT progressing shoulder strength stability and active range of motion  Plan: Continue per plan of care  Progress treatment as tolerated  Precautions: <1lb lifting  Progress note:   POC:     Manuals     Stretching with active release 15 min  15 min 15 min 15 min    GH jt mobs Grade 2-3 Grade 2-3 AP distraction Grade 3-4 AP and scapular mobs Grade 3-4 AP and scapular mobs    Scapular PNF        IASTM        Wound care        Neuro Re-Ed        UBE  Scifit 4 min L1 Scifit 6 min L1 Scifit 6 min L1 alt    scap retraction 20x 20x HEP     Shoulder rolls 20x 20x HEP     Chin tucks        Shoulder isometrics nv Flex, abd, IR, ER, Ext :05x10 Flex, abd, IR, ER, Ext :05x10 nv    MTP     ?    Ther Ex         nv Green 30x PRN     UT stretch        LS stretch        Table slides Flex, scap 10x :10 Flex, scap 10x :10 GREEN SB 10x:10 ea GREEN SB 10x:10 ea    pendulums        Supine flexion ROM        Supine ER/IR 10x:05 10x:05 10x:05 nv    Pulleys 10x :10 10x :10 10x :10 10x :10ea    Wall walks :05x5 flex, scap :05x5 flex, scap :05x5 flex, scap :05x5 flex, scap    Standing Cane AAROM flex, scap 10x flex, scap 10x ^ nv flex, scap 15x    Side lying shoulder ER 10x                                                    Ther Activity                        Gait Training                        Modalities                          Access Code: [email or web address]  URL: [email or web address]  Date: 06/28/2021  Prepared by: iTsh Gray    Exercises  Standing Backward Shoulder Rolls - 3 x daily - 7 x weekly - 3 sets - 10 reps  Seated Scapular Retraction - 3 x daily - 7 x weekly - 3 sets - 10 reps  Circular Shoulder Pendulum with Table Support - 3 x daily - 7 x weekly - 3 sets - 10 reps  Flexion-Extension Shoulder Pendulum with Table Support - 3 x daily - 7 x weekly - 3 sets - 10 reps  Horizontal Shoulder Pendulum with Table Support - 3 x daily - 7 x weekly - 3 sets - 10 reps  Seated Upper Trapezius Stretch - 3 x daily - 7 x weekly - 3 reps - 1 sets - 30 sec hold  Gentle Levator Scapulae Stretch - 3 x daily - 7 x weekly - 3 reps - 1 sets - 30 sec hold  Seated Shoulder Flexion Towel Slide at Table Top - 3 x daily - 7 x weekly - 10 reps - 1 sets - 5 sec hold  Seated Shoulder Abduction Towel Slide at Table Top - 3 x daily - 7 x weekly - 10 reps - 1 sets - 5 sec hold

## 2021-09-13 ENCOUNTER — APPOINTMENT (OUTPATIENT)
Dept: PHYSICAL THERAPY | Facility: CLINIC | Age: 66
End: 2021-09-13
Payer: MEDICARE

## 2021-09-16 ENCOUNTER — OFFICE VISIT (OUTPATIENT)
Dept: PHYSICAL THERAPY | Facility: CLINIC | Age: 66
End: 2021-09-16
Payer: MEDICARE

## 2021-09-16 DIAGNOSIS — M75.101 TEAR OF RIGHT ROTATOR CUFF, UNSPECIFIED TEAR EXTENT, UNSPECIFIED WHETHER TRAUMATIC: Primary | ICD-10-CM

## 2021-09-16 DIAGNOSIS — R29.3 POSTURE ABNORMALITY: ICD-10-CM

## 2021-09-16 DIAGNOSIS — M25.511 RIGHT SHOULDER PAIN, UNSPECIFIED CHRONICITY: ICD-10-CM

## 2021-09-16 PROCEDURE — 97112 NEUROMUSCULAR REEDUCATION: CPT | Performed by: PHYSICAL THERAPIST

## 2021-09-16 PROCEDURE — 97140 MANUAL THERAPY 1/> REGIONS: CPT | Performed by: PHYSICAL THERAPIST

## 2021-09-16 PROCEDURE — 97110 THERAPEUTIC EXERCISES: CPT | Performed by: PHYSICAL THERAPIST

## 2021-09-16 NOTE — PROGRESS NOTES
Daily Note     Today's date: 2021  Patient name: Lenora Staley  : 1955  MRN: [id number]  Referring provider: Andre Moore  Dx:   Encounter Diagnosis     ICD-10-CM    1  Tear of right rotator cuff, unspecified tear extent, unspecified whether traumatic  M75 101    2  Right shoulder pain, unspecified chronicity  M25 511    3  Posture abnormality  R29 3        Start Time: 0730  Stop Time: 0815  Total time in clinic (min): 45 minutes    Subjective: States she feels pretty much the same as at prior session  Is having difficulty sleeping due to pain waking 1-3x/night  Objective: See treatment diary below      Assessment: Tolerated treatment fair+  Fatigue and soreness post session today  Continued difficulty with end range passive motion as well as transition from AAROM to AROM  Patient would benefit from continued PT working to progress active range of motion for return to PLOF  Plan: Continue per plan of care  Progress treatment as tolerated  Precautions: <1lb lifting  Progress note:   POC:     Manuals    Stretching with active release 15 min  15 min 15 min 15 min 15 min   GH jt mobs Grade 2-3 Grade 2-3 AP distraction Grade 3-4 AP and scapular mobs Grade 3-4 AP and scapular mobs Grade 3-4 AP and scapular mobs   Scapular PNF        IASTM        Wound care        Neuro Re-Ed        UBE  Scifit 4 min L1 Scifit 6 min L1 Scifit 6 min L1 alt Scifit 8 min L1 alt   scap retraction 20x 20x HEP     Shoulder rolls 20x 20x HEP     Chin tucks        Shoulder isometrics nv Flex, abd, IR, ER, Ext :05x10 Flex, abd, IR, ER, Ext :05x10 nv    MTP     ?    Ther Ex         nv Green 30x PRN     UT stretch        LS stretch        Table slides Flex, scap 10x :10 Flex, scap 10x :10 GREEN SB 10x:10 ea GREEN SB 10x:10 ea GREEN SB 10x:10 ea   pendulums        Supine flexion ROM        Supine ER/IR 10x:05 10x:05 10x:05 nv 10x   Pulleys 10x :10 10x :10 10x :10 10x :10ea 10x :Kirstie Arms walks :05x5 flex, scap :05x5 flex, scap :05x5 flex, scap :05x5 flex, scap    Standing Cane AAROM flex, scap 10x flex, scap 10x ^ nv flex, scap 15x flex, scap 15x   Side lying shoulder ER    10x 15x   AROM flex, scap        AROM abd to 90                                        Ther Activity                        Gait Training                        Modalities                          Access Code: [email or web address]  URL: [email or web address]  Date: 06/28/2021  Prepared by: Crystal Gill    Exercises  Standing Backward Shoulder Rolls - 3 x daily - 7 x weekly - 3 sets - 10 reps  Seated Scapular Retraction - 3 x daily - 7 x weekly - 3 sets - 10 reps  Circular Shoulder Pendulum with Table Support - 3 x daily - 7 x weekly - 3 sets - 10 reps  Flexion-Extension Shoulder Pendulum with Table Support - 3 x daily - 7 x weekly - 3 sets - 10 reps  Horizontal Shoulder Pendulum with Table Support - 3 x daily - 7 x weekly - 3 sets - 10 reps  Seated Upper Trapezius Stretch - 3 x daily - 7 x weekly - 3 reps - 1 sets - 30 sec hold  Gentle Levator Scapulae Stretch - 3 x daily - 7 x weekly - 3 reps - 1 sets - 30 sec hold  Seated Shoulder Flexion Towel Slide at Table Top - 3 x daily - 7 x weekly - 10 reps - 1 sets - 5 sec hold  Seated Shoulder Abduction Towel Slide at Table Top - 3 x daily - 7 x weekly - 10 reps - 1 sets - 5 sec hold

## 2021-09-17 ENCOUNTER — OFFICE VISIT (OUTPATIENT)
Dept: OBGYN CLINIC | Facility: CLINIC | Age: 66
End: 2021-09-17

## 2021-09-17 ENCOUNTER — OFFICE VISIT (OUTPATIENT)
Dept: PHYSICAL THERAPY | Facility: CLINIC | Age: 66
End: 2021-09-17
Payer: MEDICARE

## 2021-09-17 VITALS
HEART RATE: 77 BPM | WEIGHT: 155 LBS | HEIGHT: 63 IN | DIASTOLIC BLOOD PRESSURE: 86 MMHG | BODY MASS INDEX: 27.46 KG/M2 | SYSTOLIC BLOOD PRESSURE: 133 MMHG

## 2021-09-17 DIAGNOSIS — Z98.890 S/P RIGHT ROTATOR CUFF REPAIR: Primary | ICD-10-CM

## 2021-09-17 DIAGNOSIS — M25.511 RIGHT SHOULDER PAIN, UNSPECIFIED CHRONICITY: ICD-10-CM

## 2021-09-17 DIAGNOSIS — R29.3 POSTURE ABNORMALITY: ICD-10-CM

## 2021-09-17 DIAGNOSIS — M75.101 TEAR OF RIGHT ROTATOR CUFF, UNSPECIFIED TEAR EXTENT, UNSPECIFIED WHETHER TRAUMATIC: Primary | ICD-10-CM

## 2021-09-17 PROCEDURE — 97110 THERAPEUTIC EXERCISES: CPT | Performed by: PHYSICAL THERAPIST

## 2021-09-17 PROCEDURE — 97112 NEUROMUSCULAR REEDUCATION: CPT | Performed by: PHYSICAL THERAPIST

## 2021-09-17 PROCEDURE — 99024 POSTOP FOLLOW-UP VISIT: CPT | Performed by: ORTHOPAEDIC SURGERY

## 2021-09-17 PROCEDURE — 97140 MANUAL THERAPY 1/> REGIONS: CPT | Performed by: PHYSICAL THERAPIST

## 2021-09-17 NOTE — PROGRESS NOTES
Daily Note     Today's date: 2021  Patient name: Etta Orozco  : 1955  MRN: [id number]  Referring provider: Payam Rea  Dx:   Encounter Diagnosis     ICD-10-CM    1  Tear of right rotator cuff, unspecified tear extent, unspecified whether traumatic  M75 101    2  Right shoulder pain, unspecified chronicity  M25 511    3  Posture abnormality  R29 3        Start Time: 830  Stop Time: 915  Total time in clinic (min): 45 minutes    Subjective: Saw surgeon prior to today's session  Stated he told her to continue using RUE, but don't abuse it  Will f/u in 2 months  Continue PT  Loren Zambrano c/o 7/10 soreness at start of session  Had HA and shoulder pain waking her last night  Woke with HA which resolved throughout the am       Objective: See treatment diary below      Assessment: Tolerated treatment well  Improved tolerance throughout session today with improved active and passive movement when compared to yesterdays session  Fatigue post treatment  Patient would benefit from continued PT working to progress to PLOF  Plan: Continue per plan of care  Progress treatment as tolerated  Precautions: <1lb lifting  Progress note:   POC:     Manuals    Stretching with active release 15 min  15 min 15 min 15 min   GH jt mobs Grade 3-4 AP and scapular mobs2  Grade 3-4 AP and scapular mobs Grade 3-4 AP and scapular mobs Grade 3-4 AP and scapular mobs   Scapular PNF        IASTM        Wound care        Neuro Re-Ed        UBE Scifit 8 min L1 alt  Scifit 6 min L1 Scifit 6 min L1 alt Scifit 8 min L1 alt   scap retraction   HEP     Shoulder rolls   HEP     Chin tucks        Shoulder isometrics nv  Flex, abd, IR, ER, Ext :05x10 nv Flex, abd, IR, ER, Ext :05x10   MTP     ?    Ther Ex           PRN     UT stretch        LS stretch        Table slides GREEN SB 10x:10 ea  GREEN SB 10x:10 ea GREEN SB 10x:10 ea GREEN SB 10x:10 ea   pendulums        Supine flexion ROM Supine ER/IR 10x  10x:05 nv 10x   Pulleys 10x :10ea  10x :10 10x :10ea 10x :10ea   Wall walks :05x5 flex, scap  :05x5 flex, scap :05x5 flex, scap    Standing Cane AAROM flex, scap 15x  ^ nv flex, scap 15x flex, scap 15x   Side lying shoulder ER    10x 15x   AROM flex, scap        AROM abd to 90 10x                                       Ther Activity                        Gait Training                        Modalities                          Access Code: [email or web address]  URL: [email or web address]  Date: 06/28/2021  Prepared by: Sanket Sosa    Exercises  Standing Backward Shoulder Rolls - 3 x daily - 7 x weekly - 3 sets - 10 reps  Seated Scapular Retraction - 3 x daily - 7 x weekly - 3 sets - 10 reps  Circular Shoulder Pendulum with Table Support - 3 x daily - 7 x weekly - 3 sets - 10 reps  Flexion-Extension Shoulder Pendulum with Table Support - 3 x daily - 7 x weekly - 3 sets - 10 reps  Horizontal Shoulder Pendulum with Table Support - 3 x daily - 7 x weekly - 3 sets - 10 reps  Seated Upper Trapezius Stretch - 3 x daily - 7 x weekly - 3 reps - 1 sets - 30 sec hold  Gentle Levator Scapulae Stretch - 3 x daily - 7 x weekly - 3 reps - 1 sets - 30 sec hold  Seated Shoulder Flexion Towel Slide at Table Top - 3 x daily - 7 x weekly - 10 reps - 1 sets - 5 sec hold  Seated Shoulder Abduction Towel Slide at Table Top - 3 x daily - 7 x weekly - 10 reps - 1 sets - 5 sec hold

## 2021-09-17 NOTE — PROGRESS NOTES
Assessment/Plan:    No problem-specific Assessment & Plan notes found for this encounter  Diagnoses and all orders for this visit:    S/P right rotator cuff repair         the patient is doing well  Continue home exercise program   Continue stretching  Continue therapy to maximize  Return back in 2 months for re-evaluation  If her condition changes, she will not hesitate to let us know    Subjective:      Patient ID: Richie Grigsby is a 61 y o  female  HPI      the patient is status post rotator cuff repair of her right shoulder from nearly 3 months ago  She offers no major complaints of pain  She states her shoulder is stiff in the morning when she starts to move it, it improves  She denies any focal weakness  She is actively participating in physical therapy  She denies any neck pain  The following portions of the patient's history were reviewed and updated as appropriate: allergies, current medications, past family history, past medical history, past social history, past surgical history and problem list     Review of Systems   Constitutional: Negative for chills, fever and unexpected weight change  HENT: Negative for hearing loss, nosebleeds and sore throat  Eyes: Negative for pain, redness and visual disturbance  Respiratory: Negative for cough, shortness of breath and wheezing  Cardiovascular: Negative for chest pain, palpitations and leg swelling  Gastrointestinal: Negative for abdominal pain, nausea and vomiting  Endocrine: Negative for polydipsia and polyuria  Genitourinary: Negative for dysuria and hematuria  Musculoskeletal: Positive for arthralgias and myalgias  Negative for back pain, gait problem, joint swelling, neck pain and neck stiffness  As noted in HPI   Skin: Negative for rash and wound  Neurological: Negative for dizziness, numbness and headaches  Psychiatric/Behavioral: Negative for decreased concentration and suicidal ideas   The patient is not nervous/anxious  Objective:      /86   Pulse 77   Ht 5' 3" (1 6 m)   Wt 70 3 kg (155 lb)   BMI 27 46 kg/m²          Physical Exam        Neck was soft and supple  There is a negative axial compression test in her neck  Right upper extremity is neurovascular intact  Fingers are pink and mobile  Compartments are soft  Range of motion of her right shoulder is 110° of flexion and abduction  Passively past 160°  Rotator cuff strength testing is 4 5/5  There is a negative drop-arm test   There was no impingement  No instability

## 2021-09-20 ENCOUNTER — OFFICE VISIT (OUTPATIENT)
Dept: PHYSICAL THERAPY | Facility: CLINIC | Age: 66
End: 2021-09-20
Payer: MEDICARE

## 2021-09-20 DIAGNOSIS — R29.3 POSTURE ABNORMALITY: ICD-10-CM

## 2021-09-20 DIAGNOSIS — M75.101 TEAR OF RIGHT ROTATOR CUFF, UNSPECIFIED TEAR EXTENT, UNSPECIFIED WHETHER TRAUMATIC: Primary | ICD-10-CM

## 2021-09-20 DIAGNOSIS — M25.511 RIGHT SHOULDER PAIN, UNSPECIFIED CHRONICITY: ICD-10-CM

## 2021-09-20 PROCEDURE — 97110 THERAPEUTIC EXERCISES: CPT | Performed by: PHYSICAL THERAPIST

## 2021-09-20 PROCEDURE — 97140 MANUAL THERAPY 1/> REGIONS: CPT | Performed by: PHYSICAL THERAPIST

## 2021-09-20 PROCEDURE — 97112 NEUROMUSCULAR REEDUCATION: CPT | Performed by: PHYSICAL THERAPIST

## 2021-09-20 NOTE — PROGRESS NOTES
Daily Note     Today's date: 2021  Patient name: Zaynab Smith  : 1955  MRN: [id number]  Referring provider: Sherry Mclean  Dx:   Encounter Diagnosis     ICD-10-CM    1  Tear of right rotator cuff, unspecified tear extent, unspecified whether traumatic  M75 101    2  Right shoulder pain, unspecified chronicity  M25 511    3  Posture abnormality  R29 3        Start Time: 0730  Stop Time: 0815  Total time in clinic (min): 45 minutes    Subjective: Patient states she feels she is improving, decreased pain over the weekend and today  PQ 5/10  Objective: See treatment diary below      Assessment: Tolerated treatment well  Increased tolerance to passive stretching as well as well as increased control of active RUE movement  Patient would benefit from continued PT working to progress active movement and return to PLOF      Plan: Continue per plan of care  Progress treatment as tolerated  Precautions: <1lb lifting  Progress note:   POC:     Manuals    Stretching with active release 15 min 15 min  15 min 15 min   GH jt mobs Grade 3-4 AP and scapular mobs2 Grade 3-4 AP and scapular mobs2  Grade 3-4 AP and scapular mobs Grade 3-4 AP and scapular mobs   Scapular PNF        IASTM        Wound care        Neuro Re-Ed        UBE Scifit 8 min L1 alt Scifit 8 min L1 alt  Scifit 6 min L1 alt Scifit 8 min L1 alt   scap retraction        Shoulder rolls        Chin tucks        Shoulder isometrics nv   nv Flex, abd, IR, ER, Ext :05x10   MTP  Red 10x   ?    Ther Ex                UT stretch        LS stretch        Table slides GREEN SB 10x:10 ea   GREEN SB 10x:10 ea GREEN SB 10x:10 ea   pendulums        Supine flexion ROM        Supine ER/IR 10x 10x  nv 10x   Pulleys 10x :10ea 10x :10ea  10x :10ea 10x :10ea   Wall walks :05x5 flex, scap :05x5 flex, scap  :05x5 flex, scap    Standing Cane AAROM flex, scap 15x flex, scap 15x  flex, scap 15x flex, scap 15x   Side lying shoulder ER  2x10  10x 15x   AROM flex, scap  10x flex      AROM abd to 90 10x 15x                                      Ther Activity                        Gait Training                        Modalities                          Access Code: [email or web address]  URL: [email or web address]/  Date: 06/28/2021  Prepared by: Magalis Spencer    Exercises  Standing Backward Shoulder Rolls - 3 x daily - 7 x weekly - 3 sets - 10 reps  Seated Scapular Retraction - 3 x daily - 7 x weekly - 3 sets - 10 reps  Circular Shoulder Pendulum with Table Support - 3 x daily - 7 x weekly - 3 sets - 10 reps  Flexion-Extension Shoulder Pendulum with Table Support - 3 x daily - 7 x weekly - 3 sets - 10 reps  Horizontal Shoulder Pendulum with Table Support - 3 x daily - 7 x weekly - 3 sets - 10 reps  Seated Upper Trapezius Stretch - 3 x daily - 7 x weekly - 3 reps - 1 sets - 30 sec hold  Gentle Levator Scapulae Stretch - 3 x daily - 7 x weekly - 3 reps - 1 sets - 30 sec hold  Seated Shoulder Flexion Towel Slide at Table Top - 3 x daily - 7 x weekly - 10 reps - 1 sets - 5 sec hold  Seated Shoulder Abduction Towel Slide at Table Top - 3 x daily - 7 x weekly - 10 reps - 1 sets - 5 sec hold

## 2021-09-23 ENCOUNTER — EVALUATION (OUTPATIENT)
Dept: PHYSICAL THERAPY | Facility: CLINIC | Age: 66
End: 2021-09-23
Payer: MEDICARE

## 2021-09-23 DIAGNOSIS — M25.511 RIGHT SHOULDER PAIN, UNSPECIFIED CHRONICITY: ICD-10-CM

## 2021-09-23 DIAGNOSIS — R29.3 POSTURE ABNORMALITY: ICD-10-CM

## 2021-09-23 DIAGNOSIS — M75.101 TEAR OF RIGHT ROTATOR CUFF, UNSPECIFIED TEAR EXTENT, UNSPECIFIED WHETHER TRAUMATIC: Primary | ICD-10-CM

## 2021-09-23 PROCEDURE — 97140 MANUAL THERAPY 1/> REGIONS: CPT | Performed by: PHYSICAL THERAPIST

## 2021-09-23 PROCEDURE — 97164 PT RE-EVAL EST PLAN CARE: CPT | Performed by: PHYSICAL THERAPIST

## 2021-09-23 PROCEDURE — 97112 NEUROMUSCULAR REEDUCATION: CPT | Performed by: PHYSICAL THERAPIST

## 2021-09-23 PROCEDURE — 97110 THERAPEUTIC EXERCISES: CPT | Performed by: PHYSICAL THERAPIST
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
## 2021-09-27 ENCOUNTER — OFFICE VISIT (OUTPATIENT)
Dept: PHYSICAL THERAPY | Facility: CLINIC | Age: 66
End: 2021-09-27
Payer: MEDICARE

## 2021-09-27 DIAGNOSIS — R29.3 POSTURE ABNORMALITY: ICD-10-CM

## 2021-09-27 DIAGNOSIS — M25.511 RIGHT SHOULDER PAIN, UNSPECIFIED CHRONICITY: ICD-10-CM

## 2021-09-27 DIAGNOSIS — M75.101 TEAR OF RIGHT ROTATOR CUFF, UNSPECIFIED TEAR EXTENT, UNSPECIFIED WHETHER TRAUMATIC: Primary | ICD-10-CM

## 2021-09-27 PROCEDURE — 97140 MANUAL THERAPY 1/> REGIONS: CPT | Performed by: PHYSICAL THERAPIST

## 2021-09-27 PROCEDURE — 97110 THERAPEUTIC EXERCISES: CPT | Performed by: PHYSICAL THERAPIST

## 2021-09-27 NOTE — PROGRESS NOTES
Daily Note     Today's date: 2021  Patient name: Kiara Horn  : 1955  MRN: [id number]  Referring provider: Tj Brown  Dx:   Encounter Diagnosis     ICD-10-CM    1  Tear of right rotator cuff, unspecified tear extent, unspecified whether traumatic  M75 101    2  Right shoulder pain, unspecified chronicity  M25 511    3  Posture abnormality  R29 3        Start Time: 0730  Stop Time: 0820  Total time in clinic (min): 50 minutes    Subjective: 5/10 pain in shoulder today  No new complaints  Objective: See treatment diary below      Assessment: Tolerated treatment well  Continues to show progress with right shoulder passive and active range of motion  Increased tolerance to active overhead movement today  Patient would benefit from continued PT      Plan: Continue per plan of care  Progress treatment as tolerated         Precautions: <1lb lifting  Progress note: 10/25  POC:     Manuals     Stretching with active release 15 min 15 min 15 min 15 min    GH jt mobs Grade 3-4 AP and scapular mobs2 Grade 3-4 AP and scapular mobs2 Grade 3-4 AP and scapular mobs Grade 3-4 AP and scapular mobs    Scapular PNF        IASTM        Wound care        Neuro Re-Ed        UBE Scifit 8 min L1 alt Scifit 8 min L1 alt Scifit 8 min L1 alt Scifit 8 min L1 alt    scap retraction        Shoulder rolls        Chin tucks        Shoulder isometrics nv       MTP  Red 10x Red 15x nv *   Ther Ex                UT stretch        LS stretch        Table slides GREEN SB 10x:10 ea       pendulums        Supine flexion ROM        Supine ER/IR 10x 10x 10x 10x    Pulleys 10x :10ea 10x :10ea 10x :10ea 10x :10ea    Wall walks :05x5 flex, scap :05x5 flex, scap :05x5 flex, scap :05x5 flex, scap    Standing Cane AAROM flex, scap 15x flex, scap 15x flex, scap 2x10 flex, scap 2x10            Side lying shoulder ER  2x10 nv 3x10    AROM flex, scap  10x flex 10x flex 15x ea    AROM abd to 90 10x 15x 2x10 2x10    Serratus punch     *   Standing YTIs    *5x    Bent over row   *10x 2x10            Ther Activity                        Gait Training                        Modalities                          Access Code: [email or web address]  URL: [email or web address]  Date: 06/28/2021  Prepared by: Kelli Sagastume    Exercises  Standing Backward Shoulder Rolls - 3 x daily - 7 x weekly - 3 sets - 10 reps  Seated Scapular Retraction - 3 x daily - 7 x weekly - 3 sets - 10 reps  Circular Shoulder Pendulum with Table Support - 3 x daily - 7 x weekly - 3 sets - 10 reps  Flexion-Extension Shoulder Pendulum with Table Support - 3 x daily - 7 x weekly - 3 sets - 10 reps  Horizontal Shoulder Pendulum with Table Support - 3 x daily - 7 x weekly - 3 sets - 10 reps  Seated Upper Trapezius Stretch - 3 x daily - 7 x weekly - 3 reps - 1 sets - 30 sec hold  Gentle Levator Scapulae Stretch - 3 x daily - 7 x weekly - 3 reps - 1 sets - 30 sec hold  Seated Shoulder Flexion Towel Slide at Table Top - 3 x daily - 7 x weekly - 10 reps - 1 sets - 5 sec hold  Seated Shoulder Abduction Towel Slide at Table Top - 3 x daily - 7 x weekly - 10 reps - 1 sets - 5 sec hold

## 2021-09-28 ENCOUNTER — TELEPHONE (OUTPATIENT)
Dept: OBGYN CLINIC | Facility: HOSPITAL | Age: 66
End: 2021-09-28
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
## 2021-09-28 NOTE — TELEPHONE ENCOUNTER
Please reference Dr Iraj Mata note from 04/09/2021 where he addressed this hip in question & G-Troch Inj performed  Patient previously had 2 left glutes surgery at [facility]  The 1st from from Dr William Belle, the 2nd one from Dr Ivelisse Beltran was going to have patient return 6 weeks from April for strength and motion check & to consider an MRI if needed

## 2021-09-30 ENCOUNTER — OFFICE VISIT (OUTPATIENT)
Dept: PHYSICAL THERAPY | Facility: CLINIC | Age: 66
End: 2021-09-30
Payer: MEDICARE

## 2021-09-30 DIAGNOSIS — K21.9 GASTROESOPHAGEAL REFLUX DISEASE WITHOUT ESOPHAGITIS: ICD-10-CM

## 2021-09-30 DIAGNOSIS — R29.3 POSTURE ABNORMALITY: ICD-10-CM

## 2021-09-30 DIAGNOSIS — M25.511 RIGHT SHOULDER PAIN, UNSPECIFIED CHRONICITY: ICD-10-CM

## 2021-09-30 DIAGNOSIS — M75.101 TEAR OF RIGHT ROTATOR CUFF, UNSPECIFIED TEAR EXTENT, UNSPECIFIED WHETHER TRAUMATIC: Primary | ICD-10-CM

## 2021-09-30 PROCEDURE — 97110 THERAPEUTIC EXERCISES: CPT

## 2021-09-30 PROCEDURE — 97140 MANUAL THERAPY 1/> REGIONS: CPT

## 2021-09-30 PROCEDURE — 97112 NEUROMUSCULAR REEDUCATION: CPT

## 2021-09-30 RX ORDER — FAMOTIDINE 20 MG/1
20 TABLET, FILM COATED ORAL 2 TIMES DAILY
Qty: 180 TABLET | Refills: 0 | Status: SHIPPED | OUTPATIENT
Start: 2021-09-30 | End: 2022-02-03

## 2021-09-30 NOTE — PROGRESS NOTES
Daily Note     Today's date: 2021  Patient name: Chastity Watts  : 1955  MRN: [id number]  Referring provider: Yuri Coronado  Dx:   Encounter Diagnosis     ICD-10-CM    1  Tear of right rotator cuff, unspecified tear extent, unspecified whether traumatic  M75 101    2  Right shoulder pain, unspecified chronicity  M25 511    3  Posture abnormality  R29 3        Start Time: 0730          Subjective: Patient states her shoulder still hurts and rates the pain a 6/10  She feels her range has been getting better  Objective: See treatment diary below    Patient's home exercise program updated to include additional exercises  Handout issued and explained with demonstration  Patient accepts new exercises  Patient educated on pectoral/ arm pit area massage to desensitize and loosen the area  Assessment: Tolerated treatment well  Patient would benefit from continued PT for stretching and strengthening  Patient completed her exercises with little difficulty and pain  Patient seemed to understand all education on new exercises  She felt looser by the end of the session  Patient felt ok when she left department  Plan: Continue per plan of care  Progress treatment as tolerated         Precautions: <1lb lifting  Progress note: 10/25  POC:     Manuals    Stretching with active release 15 min 15 min 15 min 15 min x15 min   GH jt mobs Grade 3-4 AP and scapular mobs2 Grade 3-4 AP and scapular mobs2 Grade 3-4 AP and scapular mobs Grade 3-4 AP and scapular mobs Muscle energy performed   Scapular PNF        IASTM        Wound care        Neuro Re-Ed        UBE Scifit 8 min L1 alt Scifit 8 min L1 alt Scifit 8 min L1 alt Scifit 8 min L1 alt Scifit 8 min L1 alt   scap retraction        Shoulder rolls        Chin tucks        Shoulder isometrics nv       MTP  Red 10x Red 15x nv *red 2x10   Ther Ex                UT stretch        LS stretch        Table slides GREEN SB PT VOMITING AT BEDSIDE, FELT BETTER AFTER. PT VSS. ER MD MADE AWARE 10x:10 ea       pendulums        Supine flexion ROM        Supine ER/IR 10x 10x 10x 10x x10   Pulleys 10x :10ea 10x :10ea 10x :10ea 10x :10ea :10x10   Wall walks :05x5 flex, scap :05x5 flex, scap :05x5 flex, scap :05x5 flex, scap :05x5 flex, scap   Standing Cane AAROM flex, scap 15x flex, scap 15x flex, scap 2x10 flex, scap 2x10 flex, scap 2x10           Side lying shoulder ER  2x10 nv 3x10 3x10   AROM flex, scap  10x flex 10x flex 15x ea x15ea   AROM abd to 90 10x 15x 2x10 2x10 2x10   Serratus punch     *x10   Standing YTIs    *5x x5 ea   Bent over row   *10x 2x10 2x10           Ther Activity                        Gait Training                        Modalities                          Access Code: [email or web address]  URL: [email or web address]  Date: 06/28/2021  Prepared by: Niraj Nieto    Exercises  Standing Backward Shoulder Rolls - 3 x daily - 7 x weekly - 3 sets - 10 reps  Seated Scapular Retraction - 3 x daily - 7 x weekly - 3 sets - 10 reps  Circular Shoulder Pendulum with Table Support - 3 x daily - 7 x weekly - 3 sets - 10 reps  Flexion-Extension Shoulder Pendulum with Table Support - 3 x daily - 7 x weekly - 3 sets - 10 reps  Horizontal Shoulder Pendulum with Table Support - 3 x daily - 7 x weekly - 3 sets - 10 reps  Seated Upper Trapezius Stretch - 3 x daily - 7 x weekly - 3 reps - 1 sets - 30 sec hold  Gentle Levator Scapulae Stretch - 3 x daily - 7 x weekly - 3 reps - 1 sets - 30 sec hold  Seated Shoulder Flexion Towel Slide at Table Top - 3 x daily - 7 x weekly - 10 reps - 1 sets - 5 sec hold  Seated Shoulder Abduction Towel Slide at Table Top - 3 x daily - 7 x weekly - 10 reps - 1 sets - 5 sec hold

## 2021-10-02 DIAGNOSIS — Z86.19 H/O COLD SORES: ICD-10-CM

## 2021-10-04 ENCOUNTER — OFFICE VISIT (OUTPATIENT)
Dept: PHYSICAL THERAPY | Facility: CLINIC | Age: 66
End: 2021-10-04
Payer: MEDICARE

## 2021-10-04 DIAGNOSIS — M75.101 TEAR OF RIGHT ROTATOR CUFF, UNSPECIFIED TEAR EXTENT, UNSPECIFIED WHETHER TRAUMATIC: Primary | ICD-10-CM

## 2021-10-04 DIAGNOSIS — R29.3 POSTURE ABNORMALITY: ICD-10-CM

## 2021-10-04 DIAGNOSIS — M25.511 RIGHT SHOULDER PAIN, UNSPECIFIED CHRONICITY: ICD-10-CM

## 2021-10-04 PROCEDURE — 97112 NEUROMUSCULAR REEDUCATION: CPT | Performed by: PHYSICAL THERAPIST

## 2021-10-04 PROCEDURE — 97110 THERAPEUTIC EXERCISES: CPT | Performed by: PHYSICAL THERAPIST

## 2021-10-04 PROCEDURE — 97140 MANUAL THERAPY 1/> REGIONS: CPT | Performed by: PHYSICAL THERAPIST

## 2021-10-04 RX ORDER — VALACYCLOVIR HYDROCHLORIDE 1 G/1
TABLET, FILM COATED ORAL
Qty: 6 TABLET | Refills: 2 | Status: SHIPPED | OUTPATIENT
Start: 2021-10-04 | End: 2021-12-01

## 2021-10-06 ENCOUNTER — OFFICE VISIT (OUTPATIENT)
Dept: BARIATRICS | Facility: CLINIC | Age: 66
End: 2021-10-06
Payer: MEDICARE

## 2021-10-06 VITALS
TEMPERATURE: 98.2 F | HEART RATE: 66 BPM | SYSTOLIC BLOOD PRESSURE: 126 MMHG | HEIGHT: 63 IN | WEIGHT: 157.5 LBS | BODY MASS INDEX: 27.91 KG/M2 | DIASTOLIC BLOOD PRESSURE: 88 MMHG

## 2021-10-06 DIAGNOSIS — M75.121 COMPLETE TEAR OF RIGHT ROTATOR CUFF, UNSPECIFIED WHETHER TRAUMATIC: ICD-10-CM

## 2021-10-06 DIAGNOSIS — Z48.815 ENCOUNTER FOR SURGICAL AFTERCARE FOLLOWING SURGERY OF DIGESTIVE SYSTEM: Primary | ICD-10-CM

## 2021-10-06 DIAGNOSIS — I10 PRIMARY HYPERTENSION: ICD-10-CM

## 2021-10-06 DIAGNOSIS — R13.10 DYSPHAGIA: ICD-10-CM

## 2021-10-06 DIAGNOSIS — K44.9 PARAESOPHAGEAL HERNIA: ICD-10-CM

## 2021-10-06 DIAGNOSIS — I10 ESSENTIAL HYPERTENSION: Primary | ICD-10-CM

## 2021-10-06 PROCEDURE — 99214 OFFICE O/P EST MOD 30 MIN: CPT | Performed by: PHYSICIAN ASSISTANT

## 2021-10-06 RX ORDER — TRAMADOL HYDROCHLORIDE 50 MG/1
TABLET ORAL
COMMUNITY
Start: 2021-10-04 | End: 2022-03-18

## 2021-10-07 ENCOUNTER — OFFICE VISIT (OUTPATIENT)
Dept: PHYSICAL THERAPY | Facility: CLINIC | Age: 66
End: 2021-10-07
Payer: MEDICARE

## 2021-10-07 DIAGNOSIS — R29.3 POSTURE ABNORMALITY: ICD-10-CM

## 2021-10-07 DIAGNOSIS — M25.511 RIGHT SHOULDER PAIN, UNSPECIFIED CHRONICITY: ICD-10-CM

## 2021-10-07 DIAGNOSIS — M75.101 TEAR OF RIGHT ROTATOR CUFF, UNSPECIFIED TEAR EXTENT, UNSPECIFIED WHETHER TRAUMATIC: Primary | ICD-10-CM

## 2021-10-07 PROCEDURE — 97110 THERAPEUTIC EXERCISES: CPT

## 2021-10-07 PROCEDURE — 97140 MANUAL THERAPY 1/> REGIONS: CPT

## 2021-10-07 PROCEDURE — 97112 NEUROMUSCULAR REEDUCATION: CPT

## 2021-10-07 RX ORDER — LOSARTAN POTASSIUM 100 MG/1
100 TABLET ORAL DAILY
Qty: 90 TABLET | Refills: 1 | Status: SHIPPED | OUTPATIENT
Start: 2021-10-07 | End: 2022-04-04

## 2021-10-08 ENCOUNTER — PREP FOR PROCEDURE (OUTPATIENT)
Dept: BARIATRICS | Facility: CLINIC | Age: 66
End: 2021-10-08

## 2021-10-08 DIAGNOSIS — K21.9 GASTROESOPHAGEAL REFLUX DISEASE WITHOUT ESOPHAGITIS: ICD-10-CM

## 2021-10-08 DIAGNOSIS — Z98.890 S/P REPAIR OF PARAESOPHAGEAL HERNIA: Primary | ICD-10-CM

## 2021-10-08 DIAGNOSIS — Z87.19 S/P REPAIR OF PARAESOPHAGEAL HERNIA: Primary | ICD-10-CM

## 2021-10-10 DIAGNOSIS — M75.121 COMPLETE TEAR OF RIGHT ROTATOR CUFF, UNSPECIFIED WHETHER TRAUMATIC: ICD-10-CM

## 2021-10-11 ENCOUNTER — APPOINTMENT (OUTPATIENT)
Dept: PHYSICAL THERAPY | Facility: CLINIC | Age: 66
End: 2021-10-11
Payer: MEDICARE

## 2021-10-11 RX ORDER — MELOXICAM 15 MG/1
15 TABLET ORAL DAILY
Qty: 30 TABLET | Refills: 0 | Status: SHIPPED | OUTPATIENT
Start: 2021-10-11 | End: 2022-01-24

## 2021-10-13 ENCOUNTER — OFFICE VISIT (OUTPATIENT)
Dept: PHYSICAL THERAPY | Facility: CLINIC | Age: 66
End: 2021-10-13
Payer: MEDICARE

## 2021-10-13 DIAGNOSIS — M75.101 TEAR OF RIGHT ROTATOR CUFF, UNSPECIFIED TEAR EXTENT, UNSPECIFIED WHETHER TRAUMATIC: Primary | ICD-10-CM

## 2021-10-13 DIAGNOSIS — M25.511 RIGHT SHOULDER PAIN, UNSPECIFIED CHRONICITY: ICD-10-CM

## 2021-10-13 DIAGNOSIS — R29.3 POSTURE ABNORMALITY: ICD-10-CM

## 2021-10-13 PROCEDURE — 97110 THERAPEUTIC EXERCISES: CPT | Performed by: PHYSICAL THERAPIST

## 2021-10-13 PROCEDURE — 97112 NEUROMUSCULAR REEDUCATION: CPT | Performed by: PHYSICAL THERAPIST

## 2021-10-13 PROCEDURE — 97140 MANUAL THERAPY 1/> REGIONS: CPT | Performed by: PHYSICAL THERAPIST

## 2021-10-14 ENCOUNTER — APPOINTMENT (OUTPATIENT)
Dept: PHYSICAL THERAPY | Facility: CLINIC | Age: 66
End: 2021-10-14
Payer: MEDICARE

## 2021-10-15 ENCOUNTER — OFFICE VISIT (OUTPATIENT)
Dept: PHYSICAL THERAPY | Facility: CLINIC | Age: 66
End: 2021-10-15
Payer: MEDICARE

## 2021-10-15 DIAGNOSIS — M75.101 TEAR OF RIGHT ROTATOR CUFF, UNSPECIFIED TEAR EXTENT, UNSPECIFIED WHETHER TRAUMATIC: Primary | ICD-10-CM

## 2021-10-15 DIAGNOSIS — M25.511 RIGHT SHOULDER PAIN, UNSPECIFIED CHRONICITY: ICD-10-CM

## 2021-10-15 DIAGNOSIS — R29.3 POSTURE ABNORMALITY: ICD-10-CM

## 2021-10-15 PROCEDURE — 97140 MANUAL THERAPY 1/> REGIONS: CPT | Performed by: PHYSICAL THERAPIST

## 2021-10-15 PROCEDURE — 97112 NEUROMUSCULAR REEDUCATION: CPT | Performed by: PHYSICAL THERAPIST

## 2021-10-15 PROCEDURE — 97110 THERAPEUTIC EXERCISES: CPT | Performed by: PHYSICAL THERAPIST

## 2021-10-18 ENCOUNTER — OFFICE VISIT (OUTPATIENT)
Dept: PHYSICAL THERAPY | Facility: CLINIC | Age: 66
End: 2021-10-18
Payer: MEDICARE

## 2021-10-18 DIAGNOSIS — M75.101 TEAR OF RIGHT ROTATOR CUFF, UNSPECIFIED TEAR EXTENT, UNSPECIFIED WHETHER TRAUMATIC: Primary | ICD-10-CM

## 2021-10-18 DIAGNOSIS — M25.511 RIGHT SHOULDER PAIN, UNSPECIFIED CHRONICITY: ICD-10-CM

## 2021-10-18 DIAGNOSIS — R29.3 POSTURE ABNORMALITY: ICD-10-CM

## 2021-10-18 PROCEDURE — 97140 MANUAL THERAPY 1/> REGIONS: CPT | Performed by: PHYSICAL THERAPIST

## 2021-10-18 PROCEDURE — 97112 NEUROMUSCULAR REEDUCATION: CPT | Performed by: PHYSICAL THERAPIST

## 2021-10-18 PROCEDURE — 97110 THERAPEUTIC EXERCISES: CPT | Performed by: PHYSICAL THERAPIST

## 2021-10-19 ENCOUNTER — OFFICE VISIT (OUTPATIENT)
Dept: FAMILY MEDICINE CLINIC | Facility: CLINIC | Age: 66
End: 2021-10-19
Payer: MEDICARE

## 2021-10-19 VITALS
HEART RATE: 72 BPM | WEIGHT: 161 LBS | HEIGHT: 63 IN | TEMPERATURE: 98.3 F | RESPIRATION RATE: 17 BRPM | SYSTOLIC BLOOD PRESSURE: 144 MMHG | DIASTOLIC BLOOD PRESSURE: 98 MMHG | OXYGEN SATURATION: 99 % | BODY MASS INDEX: 28.53 KG/M2

## 2021-10-19 DIAGNOSIS — Z00.00 MEDICARE ANNUAL WELLNESS VISIT, INITIAL: Primary | ICD-10-CM

## 2021-10-19 DIAGNOSIS — R93.7 ABNORMAL BONE DENSITY SCREENING: ICD-10-CM

## 2021-10-19 DIAGNOSIS — E78.5 HYPERLIPIDEMIA, UNSPECIFIED HYPERLIPIDEMIA TYPE: ICD-10-CM

## 2021-10-19 DIAGNOSIS — E06.3 HASHIMOTO'S THYROIDITIS: ICD-10-CM

## 2021-10-19 DIAGNOSIS — Z78.0 POSTMENOPAUSAL: ICD-10-CM

## 2021-10-19 DIAGNOSIS — I10 ESSENTIAL HYPERTENSION: ICD-10-CM

## 2021-10-19 PROCEDURE — G0402 INITIAL PREVENTIVE EXAM: HCPCS | Performed by: PHYSICIAN ASSISTANT

## 2021-10-20 ENCOUNTER — APPOINTMENT (OUTPATIENT)
Dept: LAB | Facility: CLINIC | Age: 66
End: 2021-10-20
Payer: MEDICARE

## 2021-10-20 DIAGNOSIS — E06.3 HASHIMOTO'S THYROIDITIS: ICD-10-CM

## 2021-10-20 DIAGNOSIS — E78.5 HYPERLIPIDEMIA, UNSPECIFIED HYPERLIPIDEMIA TYPE: ICD-10-CM

## 2021-10-20 DIAGNOSIS — I10 ESSENTIAL HYPERTENSION: ICD-10-CM

## 2021-10-20 LAB
ALBUMIN SERPL BCP-MCNC: 3.4 G/DL (ref 3.5–5)
ALP SERPL-CCNC: 100 U/L (ref 46–116)
ALT SERPL W P-5'-P-CCNC: 16 U/L (ref 12–78)
ANION GAP SERPL CALCULATED.3IONS-SCNC: 1 MMOL/L (ref 4–13)
AST SERPL W P-5'-P-CCNC: 17 U/L (ref 5–45)
BILIRUB SERPL-MCNC: 0.49 MG/DL (ref 0.2–1)
BUN SERPL-MCNC: 23 MG/DL (ref 5–25)
CALCIUM ALBUM COR SERPL-MCNC: 9.6 MG/DL (ref 8.3–10.1)
CALCIUM SERPL-MCNC: 9.1 MG/DL (ref 8.3–10.1)
CHLORIDE SERPL-SCNC: 108 MMOL/L (ref 100–108)
CHOLEST SERPL-MCNC: 183 MG/DL (ref 50–200)
CO2 SERPL-SCNC: 31 MMOL/L (ref 21–32)
CREAT SERPL-MCNC: 0.72 MG/DL (ref 0.6–1.3)
GFR SERPL CREATININE-BSD FRML MDRD: 89 ML/MIN/1.73SQ M
GLUCOSE P FAST SERPL-MCNC: 87 MG/DL (ref 65–99)
HDLC SERPL-MCNC: 54 MG/DL
LDLC SERPL CALC-MCNC: 105 MG/DL (ref 0–100)
NONHDLC SERPL-MCNC: 129 MG/DL
POTASSIUM SERPL-SCNC: 4.3 MMOL/L (ref 3.5–5.3)
PROT SERPL-MCNC: 7 G/DL (ref 6.4–8.2)
SODIUM SERPL-SCNC: 140 MMOL/L (ref 136–145)
T4 FREE SERPL-MCNC: 0.96 NG/DL (ref 0.76–1.46)
TRIGL SERPL-MCNC: 118 MG/DL
TSH SERPL DL<=0.05 MIU/L-ACNC: 1.87 UIU/ML (ref 0.36–3.74)

## 2021-10-20 PROCEDURE — 36415 COLL VENOUS BLD VENIPUNCTURE: CPT

## 2021-10-20 PROCEDURE — 84443 ASSAY THYROID STIM HORMONE: CPT

## 2021-10-20 PROCEDURE — 84439 ASSAY OF FREE THYROXINE: CPT

## 2021-10-20 PROCEDURE — 80053 COMPREHEN METABOLIC PANEL: CPT

## 2021-10-20 PROCEDURE — 80061 LIPID PANEL: CPT

## 2021-10-21 ENCOUNTER — OFFICE VISIT (OUTPATIENT)
Dept: PHYSICAL THERAPY | Facility: CLINIC | Age: 66
End: 2021-10-21
Payer: MEDICARE

## 2021-10-21 DIAGNOSIS — M25.511 RIGHT SHOULDER PAIN, UNSPECIFIED CHRONICITY: ICD-10-CM

## 2021-10-21 DIAGNOSIS — R29.3 POSTURE ABNORMALITY: ICD-10-CM

## 2021-10-21 DIAGNOSIS — M75.101 TEAR OF RIGHT ROTATOR CUFF, UNSPECIFIED TEAR EXTENT, UNSPECIFIED WHETHER TRAUMATIC: Primary | ICD-10-CM

## 2021-10-21 PROCEDURE — 97140 MANUAL THERAPY 1/> REGIONS: CPT | Performed by: PHYSICAL THERAPIST

## 2021-10-21 PROCEDURE — 97110 THERAPEUTIC EXERCISES: CPT | Performed by: PHYSICAL THERAPIST

## 2021-10-21 PROCEDURE — 97112 NEUROMUSCULAR REEDUCATION: CPT | Performed by: PHYSICAL THERAPIST

## 2021-10-25 ENCOUNTER — OFFICE VISIT (OUTPATIENT)
Dept: PHYSICAL THERAPY | Facility: CLINIC | Age: 66
End: 2021-10-25
Payer: MEDICARE

## 2021-10-25 DIAGNOSIS — M75.101 TEAR OF RIGHT ROTATOR CUFF, UNSPECIFIED TEAR EXTENT, UNSPECIFIED WHETHER TRAUMATIC: Primary | ICD-10-CM

## 2021-10-25 DIAGNOSIS — M25.511 RIGHT SHOULDER PAIN, UNSPECIFIED CHRONICITY: ICD-10-CM

## 2021-10-25 DIAGNOSIS — R29.3 POSTURE ABNORMALITY: ICD-10-CM

## 2021-10-25 PROCEDURE — 97140 MANUAL THERAPY 1/> REGIONS: CPT | Performed by: PHYSICAL THERAPIST

## 2021-10-25 PROCEDURE — 97110 THERAPEUTIC EXERCISES: CPT | Performed by: PHYSICAL THERAPIST

## 2021-10-25 PROCEDURE — 97112 NEUROMUSCULAR REEDUCATION: CPT | Performed by: PHYSICAL THERAPIST

## 2021-10-27 ENCOUNTER — HOSPITAL ENCOUNTER (OUTPATIENT)
Dept: BONE DENSITY | Facility: HOSPITAL | Age: 66
Discharge: HOME | End: 2021-10-27
Payer: MEDICARE

## 2021-10-27 DIAGNOSIS — R93.7 ABNORMAL BONE DENSITY SCREENING: ICD-10-CM

## 2021-10-27 DIAGNOSIS — Z78.0 POSTMENOPAUSAL: ICD-10-CM

## 2021-10-27 PROCEDURE — 77080 DXA BONE DENSITY AXIAL: CPT

## 2021-10-28 ENCOUNTER — EVALUATION (OUTPATIENT)
Dept: PHYSICAL THERAPY | Facility: CLINIC | Age: 66
End: 2021-10-28
Payer: MEDICARE

## 2021-10-28 DIAGNOSIS — M75.101 TEAR OF RIGHT ROTATOR CUFF, UNSPECIFIED TEAR EXTENT, UNSPECIFIED WHETHER TRAUMATIC: Primary | ICD-10-CM

## 2021-10-28 DIAGNOSIS — R29.3 POSTURE ABNORMALITY: ICD-10-CM

## 2021-10-28 DIAGNOSIS — M25.511 RIGHT SHOULDER PAIN, UNSPECIFIED CHRONICITY: ICD-10-CM

## 2021-10-28 PROCEDURE — 97110 THERAPEUTIC EXERCISES: CPT | Performed by: PHYSICAL THERAPIST

## 2021-10-28 PROCEDURE — 97112 NEUROMUSCULAR REEDUCATION: CPT | Performed by: PHYSICAL THERAPIST

## 2021-11-05 ENCOUNTER — OFFICE VISIT (OUTPATIENT)
Dept: OBGYN CLINIC | Facility: MEDICAL CENTER | Age: 66
End: 2021-11-05
Payer: MEDICARE

## 2021-11-05 VITALS
DIASTOLIC BLOOD PRESSURE: 106 MMHG | HEIGHT: 63 IN | BODY MASS INDEX: 28.39 KG/M2 | SYSTOLIC BLOOD PRESSURE: 149 MMHG | WEIGHT: 160.2 LBS | HEART RATE: 76 BPM

## 2021-11-05 DIAGNOSIS — M25.552 PAIN IN LEFT HIP: Primary | ICD-10-CM

## 2021-11-05 PROCEDURE — 99213 OFFICE O/P EST LOW 20 MIN: CPT | Performed by: ORTHOPAEDIC SURGERY

## 2021-11-11 ENCOUNTER — HOSPITAL ENCOUNTER (OUTPATIENT)
Dept: MRI IMAGING | Facility: HOSPITAL | Age: 66
Discharge: HOME | End: 2021-11-11
Attending: ORTHOPAEDIC SURGERY
Payer: MEDICARE

## 2021-11-11 DIAGNOSIS — M25.552 PAIN IN LEFT HIP: ICD-10-CM

## 2021-11-11 PROCEDURE — G1004 CDSM NDSC: HCPCS

## 2021-11-11 PROCEDURE — 73721 MRI JNT OF LWR EXTRE W/O DYE: CPT

## 2021-11-19 ENCOUNTER — OFFICE VISIT (OUTPATIENT)
Dept: OBGYN CLINIC | Facility: CLINIC | Age: 66
End: 2021-11-19
Payer: MEDICARE

## 2021-11-19 ENCOUNTER — TELEPHONE (OUTPATIENT)
Dept: PAIN MEDICINE | Facility: MEDICAL CENTER | Age: 66
End: 2021-11-19

## 2021-11-19 ENCOUNTER — OFFICE VISIT (OUTPATIENT)
Dept: OBGYN CLINIC | Facility: MEDICAL CENTER | Age: 66
End: 2021-11-19
Payer: MEDICARE

## 2021-11-19 VITALS
DIASTOLIC BLOOD PRESSURE: 84 MMHG | WEIGHT: 161 LBS | SYSTOLIC BLOOD PRESSURE: 140 MMHG | HEART RATE: 71 BPM | BODY MASS INDEX: 28.53 KG/M2 | HEIGHT: 63 IN

## 2021-11-19 VITALS
DIASTOLIC BLOOD PRESSURE: 86 MMHG | HEART RATE: 71 BPM | BODY MASS INDEX: 28.53 KG/M2 | SYSTOLIC BLOOD PRESSURE: 141 MMHG | WEIGHT: 161 LBS | HEIGHT: 63 IN

## 2021-11-19 DIAGNOSIS — M53.3 CHRONIC LEFT SI JOINT PAIN: ICD-10-CM

## 2021-11-19 DIAGNOSIS — M75.121 COMPLETE TEAR OF RIGHT ROTATOR CUFF, UNSPECIFIED WHETHER TRAUMATIC: Primary | ICD-10-CM

## 2021-11-19 DIAGNOSIS — G89.29 CHRONIC LEFT SI JOINT PAIN: ICD-10-CM

## 2021-11-19 DIAGNOSIS — M25.552 PAIN IN LEFT HIP: Primary | ICD-10-CM

## 2021-11-19 DIAGNOSIS — Z98.890 S/P RIGHT ROTATOR CUFF REPAIR: ICD-10-CM

## 2021-11-19 PROCEDURE — 99213 OFFICE O/P EST LOW 20 MIN: CPT | Performed by: ORTHOPAEDIC SURGERY

## 2021-11-24 DIAGNOSIS — E78.5 HYPERLIPIDEMIA, UNSPECIFIED HYPERLIPIDEMIA TYPE: ICD-10-CM

## 2021-11-24 RX ORDER — ATORVASTATIN CALCIUM 10 MG/1
TABLET, FILM COATED ORAL
Qty: 90 TABLET | Refills: 0 | Status: SHIPPED | OUTPATIENT
Start: 2021-11-24 | End: 2022-02-24

## 2021-11-30 DIAGNOSIS — Z86.19 H/O COLD SORES: ICD-10-CM

## 2021-12-01 RX ORDER — VALACYCLOVIR HYDROCHLORIDE 1 G/1
TABLET, FILM COATED ORAL
Qty: 6 TABLET | Refills: 2 | Status: SHIPPED | OUTPATIENT
Start: 2021-12-01 | End: 2022-01-04

## 2021-12-07 ENCOUNTER — TELEPHONE (OUTPATIENT)
Dept: GASTROENTEROLOGY | Facility: HOSPITAL | Age: 66
End: 2021-12-07

## 2021-12-08 ENCOUNTER — ANESTHESIA (OUTPATIENT)
Dept: GASTROENTEROLOGY | Facility: HOSPITAL | Age: 66
End: 2021-12-08

## 2021-12-08 ENCOUNTER — HOSPITAL ENCOUNTER (OUTPATIENT)
Dept: GASTROENTEROLOGY | Facility: HOSPITAL | Age: 66
Setting detail: OUTPATIENT SURGERY
Discharge: HOME | End: 2021-12-08
Attending: SURGERY | Admitting: SURGERY
Payer: MEDICARE

## 2021-12-08 ENCOUNTER — ANESTHESIA EVENT (OUTPATIENT)
Dept: GASTROENTEROLOGY | Facility: HOSPITAL | Age: 66
End: 2021-12-08

## 2021-12-08 VITALS
HEIGHT: 63 IN | DIASTOLIC BLOOD PRESSURE: 63 MMHG | WEIGHT: 160.94 LBS | RESPIRATION RATE: 16 BRPM | OXYGEN SATURATION: 98 % | BODY MASS INDEX: 28.52 KG/M2 | SYSTOLIC BLOOD PRESSURE: 136 MMHG | HEART RATE: 64 BPM

## 2021-12-08 DIAGNOSIS — Z98.890 S/P REPAIR OF PARAESOPHAGEAL HERNIA: ICD-10-CM

## 2021-12-08 DIAGNOSIS — R13.10 DYSPHAGIA, UNSPECIFIED TYPE: Primary | ICD-10-CM

## 2021-12-08 DIAGNOSIS — Z87.19 S/P REPAIR OF PARAESOPHAGEAL HERNIA: ICD-10-CM

## 2021-12-08 PROCEDURE — 43235 EGD DIAGNOSTIC BRUSH WASH: CPT | Performed by: SURGERY

## 2021-12-08 RX ORDER — LIDOCAINE HYDROCHLORIDE 20 MG/ML
INJECTION, SOLUTION EPIDURAL; INFILTRATION; INTRACAUDAL; PERINEURAL AS NEEDED
Status: DISCONTINUED | OUTPATIENT
Start: 2021-12-08 | End: 2021-12-08

## 2021-12-08 RX ORDER — PROPOFOL 10 MG/ML
INJECTION, EMULSION INTRAVENOUS AS NEEDED
Status: DISCONTINUED | OUTPATIENT
Start: 2021-12-08 | End: 2021-12-08

## 2021-12-08 RX ORDER — SODIUM CHLORIDE 9 MG/ML
125 INJECTION, SOLUTION INTRAVENOUS CONTINUOUS
Status: DISCONTINUED | OUTPATIENT
Start: 2021-12-08 | End: 2021-12-12 | Stop reason: HOSPADM

## 2021-12-08 RX ADMIN — LIDOCAINE HYDROCHLORIDE 50 MG: 20 INJECTION, SOLUTION EPIDURAL; INFILTRATION; INTRACAUDAL; PERINEURAL at 09:45

## 2021-12-08 RX ADMIN — SODIUM CHLORIDE 125 ML/HR: 0.9 INJECTION, SOLUTION INTRAVENOUS at 09:19

## 2021-12-08 RX ADMIN — PROPOFOL 150 MG: 10 INJECTION, EMULSION INTRAVENOUS at 09:45

## 2021-12-10 ENCOUNTER — HOSPITAL ENCOUNTER (OUTPATIENT)
Dept: RADIOLOGY | Facility: HOSPITAL | Age: 66
Discharge: HOME | End: 2021-12-10
Payer: MEDICARE

## 2021-12-10 ENCOUNTER — TELEPHONE (OUTPATIENT)
Dept: BARIATRICS | Facility: CLINIC | Age: 66
End: 2021-12-10

## 2021-12-10 DIAGNOSIS — R13.10 DYSPHAGIA, UNSPECIFIED TYPE: ICD-10-CM

## 2021-12-10 PROCEDURE — 74240 X-RAY XM UPR GI TRC 1CNTRST: CPT

## 2021-12-15 ENCOUNTER — OFFICE VISIT (OUTPATIENT)
Dept: BARIATRICS | Facility: CLINIC | Age: 66
End: 2021-12-15
Payer: MEDICARE

## 2021-12-15 VITALS
HEART RATE: 66 BPM | BODY MASS INDEX: 28.62 KG/M2 | WEIGHT: 161.5 LBS | HEIGHT: 63 IN | DIASTOLIC BLOOD PRESSURE: 88 MMHG | TEMPERATURE: 98.2 F | SYSTOLIC BLOOD PRESSURE: 140 MMHG

## 2021-12-15 DIAGNOSIS — Z98.84 BARIATRIC SURGERY STATUS: Primary | ICD-10-CM

## 2021-12-15 DIAGNOSIS — R13.10 DYSPHAGIA, UNSPECIFIED TYPE: ICD-10-CM

## 2021-12-15 PROCEDURE — 99213 OFFICE O/P EST LOW 20 MIN: CPT | Performed by: SURGERY

## 2021-12-15 RX ORDER — OFLOXACIN 3 MG/ML
SOLUTION/ DROPS OPHTHALMIC
COMMUNITY
Start: 2021-12-02 | End: 2022-05-11

## 2021-12-15 RX ORDER — PREDNISOLONE ACETATE 10 MG/ML
SUSPENSION/ DROPS OPHTHALMIC
COMMUNITY
Start: 2021-12-02 | End: 2022-05-11

## 2021-12-20 ENCOUNTER — OFFICE VISIT (OUTPATIENT)
Dept: PAIN MEDICINE | Facility: CLINIC | Age: 66
End: 2021-12-20
Payer: MEDICARE

## 2021-12-20 VITALS
HEART RATE: 96 BPM | WEIGHT: 161 LBS | SYSTOLIC BLOOD PRESSURE: 130 MMHG | DIASTOLIC BLOOD PRESSURE: 84 MMHG | HEIGHT: 63 IN | BODY MASS INDEX: 28.53 KG/M2

## 2021-12-20 DIAGNOSIS — S76.012D: Primary | ICD-10-CM

## 2021-12-20 DIAGNOSIS — M67.952 TENDINOPATHY OF LEFT GLUTEUS MEDIUS: ICD-10-CM

## 2021-12-20 PROCEDURE — 99214 OFFICE O/P EST MOD 30 MIN: CPT | Performed by: ANESTHESIOLOGY

## 2021-12-29 DIAGNOSIS — F51.01 PRIMARY INSOMNIA: ICD-10-CM

## 2021-12-30 RX ORDER — HYDROXYZINE HYDROCHLORIDE 25 MG/1
TABLET, FILM COATED ORAL
Qty: 60 TABLET | Refills: 1 | Status: SHIPPED | OUTPATIENT
Start: 2021-12-30 | End: 2022-02-28

## 2022-01-04 DIAGNOSIS — Z86.19 H/O COLD SORES: ICD-10-CM

## 2022-01-04 RX ORDER — VALACYCLOVIR HYDROCHLORIDE 1 G/1
TABLET, FILM COATED ORAL
Qty: 6 TABLET | Refills: 2 | Status: SHIPPED | OUTPATIENT
Start: 2022-01-04 | End: 2022-02-14

## 2022-01-12 ENCOUNTER — TELEPHONE (OUTPATIENT)
Dept: FAMILY MEDICINE CLINIC | Facility: CLINIC | Age: 67
End: 2022-01-12

## 2022-01-12 ENCOUNTER — PROCEDURE VISIT (OUTPATIENT)
Dept: PAIN MEDICINE | Facility: CLINIC | Age: 67
End: 2022-01-12
Payer: MEDICARE

## 2022-01-12 DIAGNOSIS — M67.952 TENDINOPATHY OF LEFT GLUTEUS MEDIUS: Primary | ICD-10-CM

## 2022-01-12 DIAGNOSIS — M79.18 MYOFASCIAL PAIN: ICD-10-CM

## 2022-01-12 DIAGNOSIS — S76.012D: ICD-10-CM

## 2022-01-12 PROCEDURE — 76942 ECHO GUIDE FOR BIOPSY: CPT | Performed by: ANESTHESIOLOGY

## 2022-01-12 PROCEDURE — 20553 NJX 1/MLT TRIGGER POINTS 3/>: CPT | Performed by: ANESTHESIOLOGY

## 2022-01-12 RX ORDER — BUPIVACAINE HYDROCHLORIDE 2.5 MG/ML
10 INJECTION, SOLUTION EPIDURAL; INFILTRATION; INTRACAUDAL
Status: COMPLETED | OUTPATIENT
Start: 2022-01-12 | End: 2022-01-12

## 2022-01-12 RX ORDER — TRIAMCINOLONE ACETONIDE 40 MG/ML
40 INJECTION, SUSPENSION INTRA-ARTICULAR; INTRAMUSCULAR
Status: COMPLETED | OUTPATIENT
Start: 2022-01-12 | End: 2022-01-12

## 2022-01-12 RX ADMIN — BUPIVACAINE HYDROCHLORIDE 10 ML: 2.5 INJECTION, SOLUTION EPIDURAL; INFILTRATION; INTRACAUDAL at 10:14

## 2022-01-12 RX ADMIN — TRIAMCINOLONE ACETONIDE 40 MG: 40 INJECTION, SUSPENSION INTRA-ARTICULAR; INTRAMUSCULAR at 10:14

## 2022-01-12 NOTE — TELEPHONE ENCOUNTER
Patient called asking if Annabel Moncada PA-C, would renew her Disabled [address]  Since we have forms here, the patient portion was completed and form was placed in Gali's bin for determination  Please call patient with provider's final decision

## 2022-01-12 NOTE — PROGRESS NOTES
Universal Protocol:  Consent: Verbal consent obtained  Written consent obtained  Risks and benefits: risks, benefits and alternatives were discussed  Consent given by: patient  Time out: Immediately prior to procedure a "time out" was called to verify the correct patient, procedure, equipment, support staff and site/side marked as required  Timeout called at: 1/12/2022 7:10 AM   Patient understanding: patient states understanding of the procedure being performed  Patient consent: the patient's understanding of the procedure matches consent given  Procedure consent: procedure consent matches procedure scheduled  Relevant documents: relevant documents present and verified  Test results: test results available and properly labeled  Site marked: the operative site was marked  Radiology Images displayed and confirmed  If images not available, report reviewed: imaging studies available  Patient identity confirmed: verbally with patient    Supporting Documentation  Indications: pain   Trigger Point Injections: multiple trigger points: 3 or more muscle groups    Injection site identified by: ultrasound    Procedure Details  Location(s):    Lower extremity injection site: L zGluteus minimus, caitlin, medius      Prep: patient was prepped and draped in usual sterile fashion  Needle size: 22 G  Medications: 10 mL bupivacaine (PF) 0 25 %; 40 mg triamcinolone acetonide 40 mg/mL  Patient tolerance: patient tolerated the procedure well with no immediate complications

## 2022-01-12 NOTE — PATIENT INSTRUCTIONS
1  Do not apply heat to any area that is numb  If you have discomfort or soreness at the injection site, you may apply ice today, 20 minutes on and 20 minutes off  Tomorrow you may use ice or warm, moist heat  Do not apply ice or heat directly to the skin  2  If you experience severe shortness of breath, go to the Emergency Room  3  You may have numbness for several hours from the local anesthetic  Please use caution and common sense, especially with weight-bearing activities  4  You may have an increase or change in the discomfort for 36-48 hours after your treatment  Apply ice and continue with any pain medicine you have been prescribed  5  Do not do anything strenuous today  You may shower, but no tub baths or hot tubs today  You may resume your normal activities tomorrow, but do not overdo it  Resume normal activities slowly when you are feeling better  6  If you experience redness, drainage or swelling at the injection site, or if you develop a fever above 100 degrees, please call [facility] at [phone] or go to the Emergency Room  7  Continue to take all routine medicines prescribed by your primary care physician unless otherwise instructed by our staff  Most blood thinners should be started again according to your regularly scheduled dosing  If you have any questions, please give our office a call  As no general anesthesia was used in today's procedure, you should not experience any side effects related to anesthesia  If you have a problem specifically related to your procedure, please call our office at [phone]  Problems not related to your procedure should be directed to your primary care physician

## 2022-01-19 ENCOUNTER — TELEPHONE (OUTPATIENT)
Dept: PAIN MEDICINE | Facility: CLINIC | Age: 67
End: 2022-01-19

## 2022-01-24 ENCOUNTER — OFFICE VISIT (OUTPATIENT)
Dept: FAMILY MEDICINE CLINIC | Facility: CLINIC | Age: 67
End: 2022-01-24
Payer: MEDICARE

## 2022-01-24 VITALS
HEART RATE: 70 BPM | BODY MASS INDEX: 28.35 KG/M2 | RESPIRATION RATE: 16 BRPM | SYSTOLIC BLOOD PRESSURE: 126 MMHG | WEIGHT: 160 LBS | TEMPERATURE: 98.1 F | OXYGEN SATURATION: 99 % | HEIGHT: 63 IN | DIASTOLIC BLOOD PRESSURE: 82 MMHG

## 2022-01-24 DIAGNOSIS — Z12.31 BREAST CANCER SCREENING BY MAMMOGRAM: ICD-10-CM

## 2022-01-24 DIAGNOSIS — Z01.818 PREOPERATIVE CLEARANCE: Primary | ICD-10-CM

## 2022-01-24 DIAGNOSIS — E04.2 MULTINODULAR GOITER: ICD-10-CM

## 2022-01-24 PROCEDURE — 99214 OFFICE O/P EST MOD 30 MIN: CPT | Performed by: PHYSICIAN ASSISTANT

## 2022-01-24 NOTE — PROGRESS NOTES
PRE-OPERATIVE EVALUATION  F[facility]ILY PRACTICE AT [id number]    NAME: Olman Barger  AGE: 61 y o  SEX: female  : 1955     DATE: 2022    Internal Medicine Pre-Operative Evaluation      Chief Complaint: Pre-operative Evaluation  Chief Complaint   Patient presents with    Pre-op Exam     Right eye Cataract sx 2022 @ [facility]  Declined flu shot  No refills today  History of Present Illness:     Olman Barger is a 61 y o  female who presents to the office today for a preoperative consultation at the request of surgeon, Dr Renny Kussmaul, who plans on performing cataract removal  Planned anesthesia is topical  Patient has a bleeding risk of: no recent abnormal bleeding  Patient does not have objections to receiving blood products if needed  Current anti-platelet/anti-coagulation medications that the patient is prescribed includes: none  Assessment of Chronic Conditions:   HTN - stable on ACEI  HLD - stable on lipid   Multinodular thyroid - function stable, overdue for biopsy  Iron anemia - supplement  Heart burn - currently stable, using pepcid intermittently     Assessment of Cardiac Risk:  · Denies unstable or severe angina or MI in the last 6 weeks or history of stent placement in the last year   · Denies decompensated heart failure (e g  New onset heart failure, NYHA functional class IV heart failure, or worsening existing heart failure)  · Denies significant arrhythmias such as high grade AV block, symptomatic ventricular arrhythmia, newly recognized ventricular tachycardia, supraventricular tachycardia with resting heart rate >100, or symptomatic bradycardia  · Denies severe heart valve disease including aortic stenosis or symptomatic mitral stenosis     Exercise Capacity:  · Able to walk 4 blocks without symptoms?: Yes  · Able to walk 2 flights without symptoms?: Yes    Prior Anesthesia Reactions: No     Personal history of venous thromboembolic disease?  No    History of steroid use for >2 weeks within last year? No    STOP-BANG Sleep Apnea Screening Questionnaire:         Review of Systems:     Review of Systems   Constitutional: Negative  Eyes: Positive for visual disturbance  Respiratory: Negative  Cardiovascular: Negative  Gastrointestinal: Negative  Neurological: Negative          Current Problem List:     Patient Active Problem List   Diagnosis    Iron deficiency anemia    Joint disorder of pelvis or thigh    Leg weakness, bilateral    Nocturia    OAB (overactive bladder)    Pain in both lower legs    Segmental and somatic dysfunction of sacral region    Vitamin D deficiency    Hypertension    Leg swelling    Sacroiliitis (HCC)    Myofascial pain    Mixed incontinence    Primary osteoarthritis of both knees    Chronic pain of left knee    BMI 32 0-32 9,adult    GERD (gastroesophageal reflux disease)    Dysphagia    Pain of upper abdomen    Paraesophageal hernia    Gastroesophageal reflux disease    Heartburn    Anti-TPO antibodies present    Multinodular goiter    Anemia    Nausea    Esophageal obstruction due to food impaction    Benign lipomatous neoplasm of skin and subcutaneous tissue of left arm    Tear of right rotator cuff    Rupture of gluteus minimus tendon, left, subsequent encounter    Tendinopathy of left gluteus medius       Allergies:     No Known Allergies    Physical Exam:       Current Outpatient Medications:     atorvastatin (LIPITOR) 10 mg tablet, take 1 tablet by mouth once daily, Disp: 90 tablet, Rfl: 0    famotidine (PEPCID) 20 mg tablet, Take 1 tablet (20 mg total) by mouth 2 (two) times a day (Patient taking differently: Take 20 mg by mouth 2 (two) times a day as needed  ), Disp: 180 tablet, Rfl: 0    ferrous sulfate (FeroSul) 325 (65 Fe) mg tablet, Take 1 tablet (325 mg total) by mouth daily with breakfast, Disp: 90 tablet, Rfl: 1    hydrOXYzine HCL (ATARAX) 25 mg tablet, take 1 TO 2 tablets by mouth at bedtime if needed for anxiety or insomnia, Disp: 60 tablet, Rfl: 1    losartan (COZAAR) 100 MG tablet, Take 1 tablet (100 mg total) by mouth daily, Disp: 90 tablet, Rfl: 1    ofloxacin (OCUFLOX) 0 3 % ophthalmic solution, Will start after cataract sx, Disp: , Rfl:     prednisoLONE acetate (PRED FORTE) 1 % ophthalmic suspension, instill 1 drop every 2 hours into left eye TO BE STARTED AFTER SURGERY IS COMPLETED, Disp: , Rfl:     valACYclovir (VALTREX) 1,000 mg tablet, take 1 tablet by mouth twice a day for 3 days (Patient taking differently: As needed ), Disp: 6 tablet, Rfl: 2    traMADol (ULTRAM) 50 mg tablet, , Disp: , Rfl:     Past Medical History:       Past Medical History:   Diagnosis Date    Anemia     Arthritis     Chronic pain disorder     back and hip pain    Disease of thyroid gland     nodules    GERD (gastroesophageal reflux disease)     Heart murmur     Heartburn     Hiatal hernia     Hyperlipidemia     Hypertension     Overactive bladder     Rotator cuff tear, left     Rotator cuff tear, right     Vitamin D deficiency     unsure    Wears dentures     upper    Wears glasses     Wears partial dentures     upper        Past Surgical History:   Procedure Laterality Date     SECTION      Last Assessed: 2017    ELBOW SURGERY      Last Assessed: 2017    ESOPHAGOGASTRODUODENOSCOPY N/A 2017    Procedure: ESOPHAGOGASTRODUODENOSCOPY (EGD); Surgeon: James Walker DO;  Location: [facility] GI LAB;   Service: Gastroenterology    HERNIA REPAIR  2020    Hiatal hernia repair    HIP SURGERY Left 2018    glut medius/minimus repair  and 18    HYSTERECTOMY          KNEE SURGERY      x2    PARAESOPHAGEAL HERNIA REPAIR N/A 2020    Procedure: LAPAROSCOPIC PARAESOPHAGEAL HERNIA REPAIR WITH MESH AND NISSEN FUNDOPLICATION WITH ROBOTICS;  Surgeon: Gladis Hammond MD;  Location: [facility] OR;  Service: General    WV COLONOSCOPY FLX DX W/COLLJ SPEC WHEN PFRMD N/A 2017    Procedure: EGD AND COLONOSCOPY;  Surgeon: Willian Caballero DO;  Location: [facility] GI LAB; Service: Gastroenterology    SHOULDER ARTHROCENTESIS      SHOULDER ARTHROSCOPY Right 2021    Procedure: SHOULDER ARTHROSCOPY WITH acromioplasty, debridment, and ROTATOR CUFF REPAIR;  Surgeon: Nadia Cheng DO;  Location: [address] [place] [address] OR;  Service: Orthopedics        Family History   Problem Relation Age of Onset    Colon cancer Mother     Heart attack Father     Other Father         Cardiac Disorder    Diabetes type II Father     Colon cancer Sister     Cancer Maternal Grandmother     No Known Problems Daughter     No Known Problems Paternal Grandmother     No Known Problems Sister     No Known Problems Sister     No Known Problems Sister     No Known Problems Sister     No Known Problems Maternal Aunt     No Known Problems Maternal Aunt     No Known Problems Maternal Aunt         Social History     Socioeconomic History    Marital status: /Civil Union     Spouse name: Not on file    Number of children: Not on file    Years of education: Not on file    Highest education level: Not on file   Occupational History    Not on file   Tobacco Use    Smoking status: Former Smoker     Quit date:      Years since quittin 0    Smokeless tobacco: Never Used    Tobacco comment: quit 25 yrs ago   Vaping Use    Vaping Use: Never used   Substance and Sexual Activity    Alcohol use: No    Drug use: No    Sexual activity: Yes     Partners: Male   Other Topics Concern    Not on file   Social History Narrative    Caffeine use    , 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[employer], 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[address] [place] [address] for OR    2 grown children     Social Determinants of Health     Financial Resource Strain: Not on file   Food Insecurity: Not on file   Transportation Needs: No Transportation Needs    Lack of Transportation (Medical):  No    Lack of Transportation (Non-Medical): No   Physical Activity: Not on file   Stress: Not on file   Social Connections: Not on file   Intimate Partner Violence: Not on file   Housing Stability: Not on file        Physical Exam:     Vitals:    01/24/22 0733   BP: 126/82   Pulse: 70   Resp: 16   Temp: 98 1 °F (36 7 °C)   SpO2: 99%     Physical Exam  Constitutional:       General: She is not in acute distress  Appearance: Normal appearance  HENT:      Head: Normocephalic and atraumatic  Right Ear: Tympanic membrane, ear canal and external ear normal       Left Ear: Tympanic membrane, ear canal and external ear normal       Nose: Nose normal       Mouth/Throat:      Mouth: Mucous membranes are moist       Pharynx: Oropharynx is clear  Eyes:      Conjunctiva/sclera: Conjunctivae normal       Pupils: Pupils are equal, round, and reactive to light  Cardiovascular:      Rate and Rhythm: Normal rate and regular rhythm  Heart sounds: No murmur heard  Pulmonary:      Effort: Pulmonary effort is normal  No respiratory distress  Breath sounds: Normal breath sounds  No wheezing  Musculoskeletal:         General: No deformity  Skin:     General: Skin is warm and dry  Coloration: Skin is not pale  Neurological:      General: No focal deficit present  Mental Status: She is alert and oriented to person, place, and time  Cranial Nerves: No cranial nerve deficit  Psychiatric:         Mood and Affect: Mood normal          Behavior: Behavior normal           Data:     Pre-operative work-up    Laboratory Results: I have personally reviewed the pertinent laboratory results/reports   Routine labs in Oct wn      Assessment & Recommendations:     1  Preoperative clearance     2  Breast cancer screening by mammogram  Mammo screening bilateral w 3d & cad   3  Multinodular goiter  Ambulatory Referral to Otolaryngology    CANCELED: Ambulatory Referral to Otolaryngology       Pre-Op Evaluation Assessment  61 y o  female with planned surgery above  Known risk factors for perioperative complications: Anemia  Cardiac Risk Estimation: per the Revised Cardiac Risk Index (Circ  100:1043, 1999), the patient's risk factors for cardiac complications include noen, putting her in: RCI RISK CLASS I (0 risk factors, risk of major cardiac compl  appr  0 5%)  Current medications which may produce withdrawal symptoms if withheld perioperatively: none  Pre-Op Evaluation Plan  1  Further preoperative workup as follows:   - None; no further preoperative work-up is required    2  Medication Management/Recommendations:   - None, continue medication regimen including morning of surgery, with sip of water  - Patient has been instructed to avoid aspirin containing medications or non-steroidal anti-inflammatory drugs for the week preceding surgery  3  Prophylaxis for cardiac events with perioperative beta-blockers: not indicated  4  Patient requires further consultation with: None    Clearance  Patient is CLEARED for surgery without any additional cardiac testing       Saul Kimble [facility]  [address] [facility] [address]  Phone#  [phone]  Fax#  [phone]

## 2022-02-03 ENCOUNTER — OFFICE VISIT (OUTPATIENT)
Dept: GASTROENTEROLOGY | Facility: MEDICAL CENTER | Age: 67
End: 2022-02-03
Payer: MEDICARE

## 2022-02-03 VITALS
HEART RATE: 71 BPM | SYSTOLIC BLOOD PRESSURE: 176 MMHG | TEMPERATURE: 98.1 F | BODY MASS INDEX: 28.52 KG/M2 | DIASTOLIC BLOOD PRESSURE: 122 MMHG | WEIGHT: 161 LBS

## 2022-02-03 DIAGNOSIS — Z80.0 FAMILY HISTORY OF COLON CANCER: ICD-10-CM

## 2022-02-03 DIAGNOSIS — T18.128A ESOPHAGEAL OBSTRUCTION DUE TO FOOD IMPACTION: ICD-10-CM

## 2022-02-03 DIAGNOSIS — K22.2 ESOPHAGEAL OBSTRUCTION DUE TO FOOD IMPACTION: ICD-10-CM

## 2022-02-03 DIAGNOSIS — K21.9 GASTROESOPHAGEAL REFLUX DISEASE WITHOUT ESOPHAGITIS: Primary | ICD-10-CM

## 2022-02-03 DIAGNOSIS — R14.0 BLOATING: ICD-10-CM

## 2022-02-03 DIAGNOSIS — K44.9 PARAESOPHAGEAL HERNIA: ICD-10-CM

## 2022-02-03 DIAGNOSIS — R13.10 DYSPHAGIA, UNSPECIFIED TYPE: ICD-10-CM

## 2022-02-03 PROCEDURE — 99214 OFFICE O/P EST MOD 30 MIN: CPT | Performed by: INTERNAL MEDICINE

## 2022-02-03 NOTE — PROGRESS NOTES
Outpatient Follow up  [address]    [place] [address]  Khushi Segura MD  Ph : [phone]  Fax : [phone]  Mobile : [phone]  Email : [email or web address]  Also available on [employer]    Sammie Thomason 61 y o  female MRN: [id number]    PCP: Reed Garrido PA-C  Referring: No referring provider defined for this encounter  Sammie Thomason presented for a follow up visit  My recommendations are included  Please do not hesitate to contact me with any questions you may have  ASSESSMENT AND PLAN:      No problem-specific Assessment & Plan notes found for this encounter  Diagnoses and all orders for this visit:    Gastroesophageal reflux disease without esophagitis  -     NM gastric emptying; Future    Dysphagia, unspecified type  -     EGD; Future  -     NM gastric emptying; Future    Esophageal obstruction due to food impaction    Paraesophageal hernia  -     EGD; Future    Family history of colon cancer  -     Colonoscopy; Future  -     Sodium Sulfate-Mag Sulfate-KCl 3708-096-419 MG TABS; Take 24 tablets by mouth once for 1 dose Take as directed    Bloating  -     NM gastric emptying; Future       1  Dysphagia status post Almodovar's off a hernia repair with Nissen in 2020  We will plan to do an EGD with dilation  Her barium study was also reviewed personally and with the patient  Slight narrowing is seen at the distal esophagus which could be from her hernia repair  I discussed with her that she may require multiple sessions for dilation  She is agreeable  May need a manometry again - she would like to have this done with an EGD as she does not want to be awake for it  However I would like to hold off on this at the time being and will do an EGD with dilation only 1st     2  Dyspepsia and bloating symptoms  Gastric emptying study       3  Colonoscopy this year due to family h/o colon cancer (mother and sister)  We will plan this in the future as well  Discussed genetic counselor evaluation  We have decided to hold off on that at this time until her next colonoscopy   ______________________________________________________________________    SUBJECTIVE:  61 y o  female with history of PEH repair with Nissen with Dr Rocio Milan on 5/19/2020, presents to the office today for follow up of EGD and UGI performed on 12/8/21 by Dr Rocio Milan  Patient has had symptoms of food getting stuck for the past several months  She notes these symptoms occur with solid food, but do not occur with fluids  Barium UGI : Postoperative distal esophagus narrowed lumen; thick barium mild passage delay and barium tablet marked delayed passage    She is having bloating, hiccups and gas (flatulence)  She is having this constantly  She has dysphagia  It is not constant but does happen  When she had the hiatal hernia fixed she had an EGD done food impaction  It could be any type of food  Water would help  She has lost weight initially when she had the surgery done but now is stable  She gained 8 lbs back  She has no heartburn  No regurgitation  She has no constipation/ diarrhea  Last colonsocop in 2017 - repeat recommended in 10 years  She does have a family h/o colon cancer  Her mother had colon cancer : 75 y/o, sister had colon cancer : 61 y/o  REVIEW OF SYSTEMS IS OTHERWISE NEGATIVE        Historical Information   Past Medical History:   Diagnosis Date    Anemia     Arthritis     Chronic pain disorder     back and hip pain    Disease of thyroid gland     nodules    GERD (gastroesophageal reflux disease)     Heart murmur     Heartburn     Hiatal hernia     Hyperlipidemia     Hypertension     Overactive bladder     Rotator cuff tear, left     Rotator cuff tear, right     Vitamin D deficiency     unsure    Wears dentures     upper    Wears glasses     Wears partial dentures     upper     Past Surgical History:   Procedure Laterality Date     SECTION      Last Assessed: 2017    ELBOW SURGERY      Last Assessed: 2017    ESOPHAGOGASTRODUODENOSCOPY N/A 2017    Procedure: ESOPHAGOGASTRODUODENOSCOPY (EGD); Surgeon: Shanna Gill DO;  Location: [facility]; Service: Gastroenterology    HERNIA REPAIR  2020    Hiatal hernia repair    HIP SURGERY Left 2018    glut medius/minimus repair  and 18    HYSTERECTOMY          KNEE SURGERY      x2    PARAESOPHAGEAL HERNIA REPAIR N/A 2020    Procedure: LAPAROSCOPIC PARAESOPHAGEAL HERNIA REPAIR WITH MESH AND NISSEN FUNDOPLICATION WITH ROBOTICS;  Surgeon: Sal Braun MD;  Location: [facility];  Service: General    OK COLONOSCOPY FLX DX W/COLLJ SPEC WHEN PFRMD N/A 2017    Procedure: EGD AND COLONOSCOPY;  Surgeon: Shanna Gill DO;  Location: [facility];   Service: Gastroenterology    SHOULDER ARTHROCENTESIS      SHOULDER ARTHROSCOPY Right 2021    Procedure: SHOULDER ARTHROSCOPY WITH acromioplasty, debridment, and ROTATOR CUFF REPAIR;  Surgeon: Yogesh Martel DO;  Location: [address] [place], [place] [address]o [facility];  Service: Orthopedics     Social History   Social History     Substance and Sexual Activity   Alcohol Use No     Social History     Substance and Sexual Activity   Drug Use No     Social History     Tobacco Use   Smoking Status Former Smoker    Quit date:     Years since quittin    Smokeless Tobacco Never Used   Tobacco Comment    quit 25 yrs ago     Family History   Problem Relation Age of Onset    Colon cancer Mother     Heart attack Father     Other Father         Cardiac Disorder    Diabetes type II Father     Colon cancer Sister     Cancer Maternal Grandmother     No Known Problems Daughter     No Known Problems Paternal Grandmother     No Known Problems Sister     No Known Problems Sister     No Known Problems Sister     No Known Problems Sister     No Known Problems Maternal Aunt     No Known Problems Maternal Aunt     No Known Problems Maternal Aunt        Meds/Allergies       Current Outpatient Medications:     atorvastatin (LIPITOR) 10 mg tablet    ferrous sulfate (FeroSul) 325 (65 Fe) mg tablet    hydrOXYzine HCL (ATARAX) 25 mg tablet    losartan (COZAAR) 100 MG tablet    ofloxacin (OCUFLOX) 0 3 % ophthalmic solution    prednisoLONE acetate (PRED FORTE) 1 % ophthalmic suspension    Sodium Sulfate-Mag Sulfate-KCl 4976-817-243 MG TABS    traMADol (ULTRAM) 50 mg tablet    valACYclovir (VALTREX) 1,000 mg tablet    No Known Allergies        Objective     Blood pressure (!) 176/122, pulse 71, temperature 98 1 °F (36 7 °C), temperature source Probe, weight 73 kg (161 lb)  Body mass index is 28 52 kg/m²  PHYSICAL EXAM:      Physical Exam  Constitutional:       Appearance: Normal appearance  She is well-developed  HENT:      Head: Normocephalic and atraumatic  Eyes:      General: No scleral icterus  Conjunctiva/sclera: Conjunctivae normal       Pupils: Pupils are equal, round, and reactive to light  Cardiovascular:      Rate and Rhythm: Normal rate and regular rhythm  Heart sounds: Normal heart sounds  Pulmonary:      Effort: Pulmonary effort is normal  No respiratory distress  Breath sounds: Normal breath sounds  Abdominal:      General: Bowel sounds are normal  There is no distension  Palpations: Abdomen is soft  There is no mass  Tenderness: There is no abdominal tenderness  Hernia: No hernia is present  Musculoskeletal:         General: Normal range of motion  Cervical back: Normal range of motion  Lymphadenopathy:      Cervical: No cervical adenopathy  Skin:     General: Skin is warm  Neurological:      Mental Status: She is alert and oriented to person, place, and time  Psychiatric:         Behavior: Behavior normal          Thought Content:  Thought content normal          Lab Results:   No visits with results within 1 Day(s) from this visit  Latest known visit with results is:   Appointment on 10/20/2021   Component Date Value    Cholesterol 10/20/2021 183     Triglycerides 10/20/2021 118     HDL, Direct 10/20/2021 54     LDL Calculated 10/20/2021 105*    Non-HDL-Chol (CHOL-HDL) 10/20/2021 129     TSH 3RD GENERATON 10/20/2021 1 870     Free T4 10/20/2021 0 96     Sodium 10/20/2021 140     Potassium 10/20/2021 4 3     Chloride 10/20/2021 108     CO2 10/20/2021 31     ANION GAP 10/20/2021 1*    BUN 10/20/2021 23     Creatinine 10/20/2021 0 72     Glucose, Fasting 10/20/2021 87     Calcium 10/20/2021 9 1     Corrected Calcium 10/20/2021 9 6     AST 10/20/2021 17     ALT 10/20/2021 16     Alkaline Phosphatase 10/20/2021 100     Total Protein 10/20/2021 7 0     Albumin 10/20/2021 3 4*    Total Bilirubin 10/20/2021 0 49     eGFR 10/20/2021 89          Radiology Results:   No results found  Answers for HPI/ROS submitted by the patient on 1/27/2022  Chronicity: new  Onset: more than 1 month ago  Onset quality: undetermined  Frequency: daily  Episode duration: 2 hours  Progression since onset: waxing and waning  Pain location: generalized abdominal region  Pain - numeric: 7/10  Pain quality: a sensation of fullness  Radiates to: epigastric region  anorexia: No  arthralgias: No  belching: Yes  constipation: No  diarrhea: No  dysuria: No  fever: No  flatus: Yes  frequency: Yes  headaches: No  hematochezia: No  hematuria: No  melena: No  myalgias: No  nausea:  No  weight loss: No  vomiting: No  Aggravated by: eating  Relieved by: nothing

## 2022-02-03 NOTE — PATIENT INSTRUCTIONS
Patient has 2 procedures scheduled on 2 sperate days    Scheduled date of EGD (as of today) 4/20/22  Physician performing Dr Juwan Barr  Location of procedure  [facility]  Bowel prep reviewed with patient: na  Instructions reviewed with patient by: valdemar  Clearances: na      Scheduled date of Colon (as of today) 5/11/22  Physician performing Dr Juwan Barr  Location of procedure  [facility]  Bowel prep reviewed with patient: Chris  Instructions reviewed with patient by: ma  Clearances: na

## 2022-02-04 ENCOUNTER — TELEPHONE (OUTPATIENT)
Dept: OTHER | Facility: OTHER | Age: 67
End: 2022-02-04

## 2022-02-04 NOTE — TELEPHONE ENCOUNTER
Pt called in stating she has to be sedated for her upcoming procedure  The dr has to put in a new order because sedation was not on the order  She is requesting a call back from the office

## 2022-02-21 ENCOUNTER — OFFICE VISIT (OUTPATIENT)
Dept: PAIN MEDICINE | Facility: CLINIC | Age: 67
End: 2022-02-21
Payer: MEDICARE

## 2022-02-21 ENCOUNTER — APPOINTMENT (OUTPATIENT)
Dept: RADIOLOGY | Facility: CLINIC | Age: 67
End: 2022-02-21
Payer: MEDICARE

## 2022-02-21 VITALS
BODY MASS INDEX: 28.53 KG/M2 | DIASTOLIC BLOOD PRESSURE: 100 MMHG | WEIGHT: 161 LBS | SYSTOLIC BLOOD PRESSURE: 182 MMHG | HEIGHT: 63 IN | HEART RATE: 88 BPM

## 2022-02-21 DIAGNOSIS — M54.16 LUMBAR RADICULOPATHY: Primary | ICD-10-CM

## 2022-02-21 DIAGNOSIS — M46.1 SACROILIITIS (HCC): ICD-10-CM

## 2022-02-21 DIAGNOSIS — M79.18 MYOFASCIAL PAIN: ICD-10-CM

## 2022-02-21 DIAGNOSIS — M54.16 LUMBAR RADICULOPATHY: ICD-10-CM

## 2022-02-21 PROCEDURE — 99214 OFFICE O/P EST MOD 30 MIN: CPT | Performed by: ANESTHESIOLOGY

## 2022-02-21 PROCEDURE — 72110 X-RAY EXAM L-2 SPINE 4/>VWS: CPT

## 2022-02-21 NOTE — PATIENT INSTRUCTIONS
Lumbar Radiculopathy   WHAT YOU NEED TO KNOW:   What is lumbar radiculopathy? Lumbar radiculopathy is a painful condition that happens when a nerve in your lumbar spine (lower back) is pinched or irritated  Nerves control feeling and movement in your body  What causes lumbar radiculopathy? You may get a pinched nerve in your lumbar spine if you have disc damage  Discs are natural, spongy cushions between your vertebrae (back bones) that allow your spine to move  Your discs may move out of place and pinch the nerve in your spine  Your risk for a pinched nerve and lumbar radiculopathy increases if:  · You smoke  · You have diabetes, a spinal infection, or a growth in your spine  · You are overweight  · You are male  · You are elderly  What are the signs and symptoms of lumbar radiculopathy? You may have any of the following:  · Pain that moves from your lower back to your buttocks, groin, and the back of your leg  The pain is often felt below your knee  Your pain may worsen when you cough, sneeze, stand, or sit  · Numbness, weakness, or tingling in your back or legs  How is lumbar radiculopathy diagnosed? Your healthcare provider will examine you and ask about your family history of back and leg pain  He may also test you for weakness, numbness, or tingling in your back, buttocks, and legs  Your healthcare provider may ask you to lie on your back and lift your leg to locate your pain  You may have any of the following:  · Blood tests: You may need blood taken to give healthcare providers information about how your body is working  The blood may be taken from your hand, arm, or IV  · Magnetic resonance imaging (MRI): An MRI machine is used to take a picture of your lower back  Your healthcare provider will use this picture to check for problems and changes in your back bones, nerves, and discs  You will need to lie still during this test  The MRI machine contains a very powerful magnet  Never enter the MRI room with any metal objects  This can cause serious injury  Tell your healthcare provider if you have any metal implants in your body  · X-ray: An x-ray is a picture of your lower back  A lumbar x-ray may show signs of infection or other problems with your spine  · An electromyography (EMG)  test measures the electrical activity of your muscles at rest and with movement  · Computed tomography (CT) scan: A special x-ray machine uses a computer to take pictures of your lower back  It may be used to look at your bones, discs, and nerves  You may be given dye in your IV to help improve the pictures  Tell your healthcare provider if you are allergic to shellfish, or have other allergies or medical conditions  People who are allergic to iodine or shellfish (lobster, crab, or shrimp) may be allergic to some dyes  How is lumbar radiculopathy treated? Treatment of lumbar radiculopathy may reduce pain and swelling, and improve your ability to walk and do your normal activities  Ask your healthcare provider for more information about these and other treatments for lumbar radiculopathy:  · Medicines:     ? NSAIDs , such as ibuprofen, help decrease swelling, pain, and fever  This medicine is available with or without a doctor's order  NSAIDs can cause stomach bleeding or kidney problems in certain people  If you take blood thinner medicine, always ask your healthcare provider if NSAIDs are safe for you  Always read the medicine label and follow directions  ? Muscle relaxers  help decrease pain and muscle spasms  ? Opioids: This is a strong medicine given to reduce severe pain  It is also called narcotic pain medicine  Take this medicine exactly as directed by your healthcare provider  ? Oral steroids: Steroids may be given to reduce swelling and pain  ? Steroid injections: Healthcare providers may give you steroid medicine through a needle (shot) into your lumbar spine   This may help decrease your nerve pain and swelling  You may need more than 1 injection if your symptoms do not improve after the first treatment  · Physical therapy: Your healthcare provider may suggest physical therapy  Your physical therapist may teach you certain exercises to improve posture (the way you stand and sit), flexibility, and strength in your lower back  He may also teach you how to remain safely active and avoid further injury  Follow the exercise plan given to you by your physical therapist     · Transcutaneous electrical nerve stimulation: This treatment, called TENS, stimulates your nerves and may decrease your pain  Wires are attached to pads  The pads are attached to your skin  The wires send a mild current through your nerves  · Surgery: You may need surgery to relieve your pinched nerve if your condition has not improved within 4 to 6 weeks  You may also need it if you have lumbar radiculopathy more than once  What are the risks of treatment for lumbar radiculopathy? · Without treatment, your pain may worsen  The pinched and swollen nerve may lead to problems when you walk or move  In severe cases, you may lose control of your urine or bowel movements  Bedrest can make your symptoms worse  · Pain medicines can cause vomiting, upset stomach, constipation (large, hard bowel movements that are difficult to pass), or kidney or liver problems  Opioid medicine may be addictive (hard to quit taking once you start)  Oral steroids and steroid injections may have side effects, such as facial redness, fluid retention (water weight gain), and mood changes  Steroid injections may be painful and can cause severe headaches, infections, allergic reactions, or nerve damage  Surgery risks include delayed problems with healing, spinal or bladder infection, damage to the spinal cord or other nerves, and ongoing pain  In rare cases, you could become paralyzed (not able to move your arms or legs)      How can I care for myself when I have lumbar radiculopathy? · Stay active: It is best to be active when you have lumbar radiculopathy  Your healthcare provider may tell you to take walks to ease yourself back into your daily routine  Avoid long periods of bed rest  Bed rest could worsen your symptoms  Do not move in ways that increase your pain  Ask your healthcare provider for more information about the best ways to stay active  · Use ice or heat packs:  Use ice or heat packs on the sore area of your body to decrease the pain and swelling  Put ice in a plastic bag covered with a towel on your low back  Cover heated items with a towel to avoid burns  Use ice and heat as directed  · Avoid heavy lifting: Your condition may worsen if you lift heavy things  Avoid lifting if possible  · Maintain a healthy weight:  Excess body weight may strain your back  Talk with your healthcare provider about ways to lose excess weight if you are overweight  When should I contact my healthcare provider? · Your pain does not improve within 1 to 3 weeks after treatment  · Your pain and weakness keep you from your normal activities at work, home, or school  · You lose more than 10 pounds in 6 months without trying  · You become depressed or sad because of the pain  · You have questions or concerns about your condition or care  When should I seek immediate care or call 911? · You have a fever greater than 100 4°F for longer than 2 days  · You have new, severe back or leg pain, or your pain spreads to both legs  · You have any new signs of numbness or weakness, especially in your lower back, legs, arms, or genital area  · You have new trouble controlling your urine and bowel movements  · You do not feel like your bladder empties when you urinate  CARE AGREEMENT:   You have the right to help plan your care  Learn about your health condition and how it may be treated   Discuss treatment options with your healthcare providers to decide what care you want to receive  You always have the right to refuse treatment  The above information is an  only  It is not intended as medical advice for individual conditions or treatments  Talk to your doctor, nurse or pharmacist before following any medical regimen to see if it is safe and effective for you  © Copyright [employer] 2021 Information is for End User's use only and may not be sold, redistributed or otherwise used for commercial purposes   All illustrations and images included in CareNotes® are the copyrighted property of [employer]  or [address] [place] [address]

## 2022-02-21 NOTE — PROGRESS NOTES
Assessment:  1  Lumbar radiculopathy    2  Sacroiliitis ([address] )    3  Myofascial pain        Plan:  Patient is a 51-year-old female with complaints of left hip pain and buttock pain with chronic pain syndrome secondary to sacroiliitis presents office for initial consultation  Patient is status post left gluteus medius and gluteus minimus steroid injection with ultrasound guidance performed on 01/12/2020 to which she reported 0% relief  Pain continues to be experience with sitting from standing position, standing from sitting position, sitting for any prolonged period of time  Patient has had sacroiliac joint steroid injections performed in without any significant alleviation of symptoms  After further discussion there is a high probability of assess the neuropathic symptom more so than arthritic joint pain  Patient also notes burning sensation in her    1  We will order an x-ray of the lumbar spine assess for any degenerative changes that correlate patient's current presentation  2  We will order an MRI of the lumbar spine to better assess the discogenic pathology behind patient's left-sided radicular symptoms which appear to be in the L4 and L5 nerve root distribution  3  Follow-up 1 month after injection    Comment:  Patient blood pressure 180/100 was discussed with the patient reports this is normal when she does not take her medication and she is at a doctor's office  Patient denies any symptoms of hypertensive emergency  Complete risks and benefits including bleeding, infection, tissue reaction, nerve injury and allergic reaction were discussed  The approach was demonstrated using models and literature was provided  Verbal and written consent was obtained  [place] Prescription Drug Monitoring Program report was reviewed and was appropriate       History of Present Illness: The patient is a 74 y o  female who presents for a follow up office visit in regards to Back Pain and Leg Pain     The patients current symptoms include 9/10 constant sharp, throbbing pain in the left buttock without any particular time pattern  Current pain medications includes:  Tramadol  The patient reports that this regimen is providing 30% pain relief  The patient is reporting no side effects from this pain medication regimen  I have personally reviewed and/or updated the patient's past medical history, past surgical history, family history, social history, current medications, allergies, and vital signs today  Review of Systems  Review of Systems   Musculoskeletal: Positive for gait problem  Pain in extremity- butt and back            Past Medical History:   Diagnosis Date    Anemia     Arthritis     Chronic pain disorder     back and hip pain    Disease of thyroid gland     nodules    GERD (gastroesophageal reflux disease)     Heart murmur     Heartburn     Hiatal hernia     Hyperlipidemia     Hypertension     Overactive bladder     Rotator cuff tear, left     Rotator cuff tear, right     Vitamin D deficiency     unsure    Wears dentures     upper    Wears glasses     Wears partial dentures     upper       Past Surgical History:   Procedure Laterality Date     SECTION      Last Assessed: 2017    ELBOW SURGERY      Last Assessed: 2017    ESOPHAGOGASTRODUODENOSCOPY N/A 2017    Procedure: ESOPHAGOGASTRODUODENOSCOPY (EGD); Surgeon: Tanya Austin DO;  Location: [facility];   Service: Gastroenterology    HERNIA REPAIR  2020    Hiatal hernia repair    HIP SURGERY Left 2018    glut medius/minimus repair  and 18    HYSTERECTOMY      2011    KNEE SURGERY      x2    PARAESOPHAGEAL HERNIA REPAIR N/A 2020    Procedure: LAPAROSCOPIC PARAESOPHAGEAL HERNIA REPAIR WITH MESH AND NISSEN FUNDOPLICATION WITH ROBOTICS;  Surgeon: Makenzie Velasco MD;  Location: [facility] OR;  Service: General    OR COLONOSCOPY FLX DX W/COLLJ SPEC WHEN PFRMD N/A 2017    Procedure: EGD AND COLONOSCOPY;  Surgeon: Vanesa Machuca DO;  Location: [facility];   Service: Gastroenterology    SHOULDER ARTHROCENTESIS      SHOULDER ARTHROSCOPY Right 2021    Procedure: SHOULDER ARTHROSCOPY WITH acromioplasty, debridment, and ROTATOR CUFF REPAIR;  Surgeon: Shabnam Price DO;  Location: [facility] MAIN OR;  Service: Orthopedics       Family History   Problem Relation Age of Onset    Colon cancer Mother     Heart attack Father     Other Father         Cardiac Disorder    Diabetes type II Father     Colon cancer Sister     Cancer Maternal Grandmother     No Known Problems Daughter     No Known Problems Paternal Grandmother     No Known Problems Sister     No Known Problems Sister     No Known Problems Sister     No Known Problems Sister     No Known Problems Maternal Aunt     No Known Problems Maternal Aunt     No Known Problems Maternal Aunt        Social History     Occupational History    Not on file   Tobacco Use    Smoking status: Former Smoker     Quit date:      Years since quittin 1    Smokeless tobacco: Never Used    Tobacco comment: quit 25 yrs ago   Vaping Use    Vaping Use: Never used   Substance and Sexual Activity    Alcohol use: No    Drug use: No    Sexual activity: Yes     Partners: Male         Current Outpatient Medications:     atorvastatin (LIPITOR) 10 mg tablet, take 1 tablet by mouth once daily, Disp: 90 tablet, Rfl: 0    ferrous sulfate (FeroSul) 325 (65 Fe) mg tablet, Take 1 tablet (325 mg total) by mouth daily with breakfast, Disp: 90 tablet, Rfl: 1    hydrOXYzine HCL (ATARAX) 25 mg tablet, take 1 TO 2 tablets by mouth at bedtime if needed for anxiety or insomnia, Disp: 60 tablet, Rfl: 1    losartan (COZAAR) 100 MG tablet, Take 1 tablet (100 mg total) by mouth daily, Disp: 90 tablet, Rfl: 1    ofloxacin (OCUFLOX) 0 3 % ophthalmic solution, Will start after cataract sx, Disp: , Rfl:     prednisoLONE acetate (PRED FORTE) 1 % ophthalmic suspension, instill 1 drop every 2 hours into left eye TO BE STARTED AFTER SURGERY IS COMPLETED, Disp: , Rfl:     traMADol (ULTRAM) 50 mg tablet,  , Disp: , Rfl:     valACYclovir (VALTREX) 1,000 mg tablet, take 1 tablet by mouth twice a day for 3 days, Disp: 6 tablet, Rfl: 2    No Known Allergies    Physical Exam:    BP (!) 182/100   Pulse 88   Ht 5' 3" (1 6 m)   Wt 73 kg (161 lb)   BMI 28 52 kg/m²     Constitutional:normal, well developed, well nourished, alert, in no distress and non-toxic and no overt pain behavior  Eyes:anicteric  HEENT:grossly intact  Neck:supple, symmetric, trachea midline and no masses   Pulmonary:even and unlabored  Cardiovascular:No edema or pitting edema present  Skin:Normal without rashes or lesions and well hydrated  Psychiatric:Mood and affect appropriate  Neurologic:Cranial Nerves II-XII grossly intact  Musculoskeletal:antalgic, positive pain with palpation left sacroiliac joint    Lumbar/Sacral Spine examination demonstrates  Full range of motion lumbar spine with pain upon: flexion, lateral rotation to the left/right, and bending to the left/right  Bilateral lumbar paraspinals tender to palpation  Muscle spasms noted in the lumbar area bilaterally  4/5 lower extremity strength in all muscle groups bilaterally  Positive seated straight leg raise for left lower extremities  Sensitivity to light touch intact bilateral lower extremities  2+ reflexes in the patella and Achilles  No ankle clonus             Imaging  No orders to display       No orders of the defined types were placed in this encounter

## 2022-02-22 ENCOUNTER — HOSPITAL ENCOUNTER (OUTPATIENT)
Dept: ULTRASOUND IMAGING | Facility: HOSPITAL | Age: 67
Discharge: HOME | End: 2022-02-22
Attending: OTOLARYNGOLOGY
Payer: MEDICARE

## 2022-02-22 DIAGNOSIS — E04.2 MULTINODULAR GOITER: ICD-10-CM

## 2022-02-22 PROCEDURE — 76536 US EXAM OF HEAD AND NECK: CPT

## 2022-02-24 DIAGNOSIS — E78.5 HYPERLIPIDEMIA, UNSPECIFIED HYPERLIPIDEMIA TYPE: ICD-10-CM

## 2022-02-24 RX ORDER — ATORVASTATIN CALCIUM 10 MG/1
TABLET, FILM COATED ORAL
Qty: 90 TABLET | Refills: 0 | Status: SHIPPED | OUTPATIENT
Start: 2022-02-24 | End: 2022-05-27

## 2022-03-03 ENCOUNTER — HOSPITAL ENCOUNTER (OUTPATIENT)
Dept: MRI IMAGING | Facility: HOSPITAL | Age: 67
Discharge: HOME | End: 2022-03-03
Payer: MEDICARE

## 2022-03-03 DIAGNOSIS — M54.16 LUMBAR RADICULOPATHY: ICD-10-CM

## 2022-03-03 PROCEDURE — 72148 MRI LUMBAR SPINE W/O DYE: CPT

## 2022-03-03 PROCEDURE — G1004 CDSM NDSC: HCPCS

## 2022-03-18 ENCOUNTER — OFFICE VISIT (OUTPATIENT)
Dept: PAIN MEDICINE | Facility: CLINIC | Age: 67
End: 2022-03-18
Payer: MEDICARE

## 2022-03-18 VITALS
HEART RATE: 81 BPM | SYSTOLIC BLOOD PRESSURE: 138 MMHG | WEIGHT: 154.8 LBS | DIASTOLIC BLOOD PRESSURE: 86 MMHG | BODY MASS INDEX: 27.43 KG/M2 | HEIGHT: 63 IN

## 2022-03-18 DIAGNOSIS — M54.16 LUMBAR RADICULOPATHY: ICD-10-CM

## 2022-03-18 DIAGNOSIS — G89.4 CHRONIC PAIN SYNDROME: Primary | ICD-10-CM

## 2022-03-18 DIAGNOSIS — S76.012D: ICD-10-CM

## 2022-03-18 DIAGNOSIS — M67.952 TENDINOPATHY OF LEFT GLUTEUS MEDIUS: ICD-10-CM

## 2022-03-18 DIAGNOSIS — M79.18 MYOFASCIAL PAIN: ICD-10-CM

## 2022-03-18 PROCEDURE — 99214 OFFICE O/P EST MOD 30 MIN: CPT | Performed by: NURSE PRACTITIONER

## 2022-03-18 RX ORDER — DULOXETIN HYDROCHLORIDE 30 MG/1
30 CAPSULE, DELAYED RELEASE ORAL
Qty: 30 CAPSULE | Refills: 1 | Status: SHIPPED | OUTPATIENT
Start: 2022-03-18 | End: 2022-04-20 | Stop reason: ALTCHOICE

## 2022-03-18 NOTE — PROGRESS NOTES
Assessment:  1  Chronic pain syndrome    2  Lumbar radiculopathy    3  Myofascial pain    4  Rupture of gluteus minimus tendon, left, subsequent encounter    5  Tendinopathy of left gluteus medius        Plan:  While the patient was in the office today, I did have a thorough conversation regarding their chronic pain syndrome, medication management, and treatment plan options  Patient is being seen for follow-up visit  She was last seen here on 02/21/2022 at which time an x-ray and an MRI of her lumbar spine were ordered  X-ray of the lumbar spine reveals multilevel spondylosis  MRI of the lumbar spine also revealed multilevel spondylosis  There is a left-sided neural foraminal disc protrusion with mild left neural foraminal narrowing  Patient will be scheduled for a diagnostic/therapeutic left-sided L4-5 transforaminal epidural steroid injection in the near future  Complete risks and benefits including bleeding, infection, tissue reaction, nerve injury and allergic reaction were discussed  The approach was demonstrated using models and literature was provided  Verbal and written consent was obtained  I discussed with the patient that since they have a neuropathic component of pain that it would be beneficial to start him on a medication such as Cymbalta  The patient denied being prescribed any antidepressant and/or psychiatric medications  I reviewed with the patient that it may take 3-4 weeks for the medications effects to be noticed and that it should never be abruptly stopped  Possible side effects include, but are not limited to:  Vertigo, lethargy, nausea, and edema of the extremities  Advised patient to call our office if they experience any side effects  The patient verbalized understanding  Start Cymbalta 30 mg at bedtime  Prescription was sent to her pharmacy  Follow-up 1 month after the injection  History of Present Illness:   The patient is a 74 y o  female who presents for a follow up office visit in regards to Leg Pain and Hip Pain  The patients current symptoms include complaints of low back pain, left buttock pain, pain that radiates down the lateral aspect of her left thigh  Current pain level is a 9/10  Quality pain is described as burning, sharp  Current pain medications includes:  None   I have personally reviewed and/or updated the patient's past medical history, past surgical history, family history, social history, current medications, allergies, and vital signs today  Review of Systems  Review of Systems   Respiratory: Negative for shortness of breath  Cardiovascular: Negative for chest pain  Gastrointestinal: Negative for constipation, diarrhea, nausea and vomiting  Musculoskeletal: Positive for gait problem  Negative for arthralgias, joint swelling and myalgias  Pain in extremity - back/butt   Neurological: Negative for dizziness, seizures and weakness  All other systems reviewed and are negative  Past Medical History:   Diagnosis Date    Anemia     Arthritis     Chronic pain disorder     back and hip pain    Disease of thyroid gland     nodules    GERD (gastroesophageal reflux disease)     Heart murmur     Heartburn     Hiatal hernia     Hyperlipidemia     Hypertension     Overactive bladder     Rotator cuff tear, left     Rotator cuff tear, right     Vitamin D deficiency     unsure    Wears dentures     upper    Wears glasses     Wears partial dentures     upper       Past Surgical History:   Procedure Laterality Date     SECTION      Last Assessed: 2017    ELBOW SURGERY      Last Assessed: 2017    ESOPHAGOGASTRODUODENOSCOPY N/A 2017    Procedure: ESOPHAGOGASTRODUODENOSCOPY (EGD); Surgeon: Tommie Lilly DO;  Location: [facility];   Service: Gastroenterology    HERNIA REPAIR  2020    Hiatal hernia repair    HIP SURGERY Left 2018    glut medius/minimus repair  and 18    HYSTERECTOMY      2011    KNEE SURGERY      x2    PARAESOPHAGEAL HERNIA REPAIR N/A 2020    Procedure: LAPAROSCOPIC PARAESOPHAGEAL HERNIA REPAIR WITH MESH AND NISSEN FUNDOPLICATION WITH ROBOTICS;  Surgeon: Ashley Guidry MD;  Location: [facility];  Service: General    [place] COLONOSCOPY FLX DX W/COLLJ SPEC WHEN PFRMD N/A 2017    Procedure: EGD AND COLONOSCOPY;  Surgeon: Denis Martinez DO;  Location: [facility];   Service: Gastroenterology    SHOULDER ARTHROCENTESIS      SHOULDER ARTHROSCOPY Right 2021    Procedure: SHOULDER ARTHROSCOPY WITH acromioplasty, debridment, and ROTATOR CUFF REPAIR;  Surgeon: Armen Rodriguez DO;  Location: [facility] MA[facility] OR;  Service: Orthopedics       Family History   Problem Relation Age of Onset    Colon cancer Mother     Heart attack Father     Other Father         Cardiac Disorder    Diabetes type II Father     Colon cancer Sister     Cancer Maternal Grandmother     No Known Problems Daughter     No Known Problems Paternal Grandmother     No Known Problems Sister     No Known Problems Sister     No Known Problems Sister     No Known Problems Sister     No Known Problems Maternal Aunt     No Known Problems Maternal Aunt     No Known Problems Maternal Aunt        Social History     Occupational History    Not on file   Tobacco Use    Smoking status: Former Smoker     Quit date:      Years since quittin 2    Smokeless tobacco: Never Used    Tobacco comment: quit 25 yrs ago   Vaping Use    Vaping Use: Never used   Substance and Sexual Activity    Alcohol use: No    Drug use: No    Sexual activity: Yes     Partners: Male         Current Outpatient Medications:     atorvastatin (LIPITOR) 10 mg tablet, take 1 tablet by mouth once daily, Disp: 90 tablet, Rfl: 0    ferrous sulfate (FeroSul) 325 (65 Fe) mg tablet, Take 1 tablet (325 mg total) by mouth daily with breakfast, Disp: 90 tablet, Rfl: 1    hydrOXYzine HCL (ATARAX) 25 mg tablet, take 1 TO 2 tablets by mouth at bedtime if needed for anxiety or insomnia, Disp: 60 tablet, Rfl: 1    losartan (COZAAR) 100 MG tablet, Take 1 tablet (100 mg total) by mouth daily, Disp: 90 tablet, Rfl: 1    ofloxacin (OCUFLOX) 0 3 % ophthalmic solution, Will start after cataract sx, Disp: , Rfl:     prednisoLONE acetate (PRED FORTE) 1 % ophthalmic suspension, instill 1 drop every 2 hours into left eye TO BE STARTED AFTER SURGERY IS COMPLETED, Disp: , Rfl:     DULoxetine (CYMBALTA) 30 mg delayed release capsule, Take 1 capsule (30 mg total) by mouth daily at bedtime, Disp: 30 capsule, Rfl: 1    valACYclovir (VALTREX) 1,000 mg tablet, take 1 tablet by mouth twice a day for 3 days (Patient not taking: Reported on 2/21/2022), Disp: 6 tablet, Rfl: 2    No Known Allergies    Physical Exam:    /86 (BP Location: Left arm, Patient Position: Sitting, Cuff Size: Standard)   Pulse 81   Ht 5' 3" (1 6 m)   Wt 70 2 kg (154 lb 12 8 oz)   BMI 27 42 kg/m²     Constitutional:normal, well developed, well nourished, alert, in no distress and non-toxic and no overt pain behavior  Eyes:anicteric  HEENT:grossly intact  Neck:supple, symmetric, trachea midline and no masses   Pulmonary:even and unlabored  Cardiovascular:No edema or pitting edema present  Skin:Normal without rashes or lesions and well hydrated  Psychiatric:Mood and affect appropriate  Neurologic:Cranial Nerves II-XII grossly intact  Musculoskeletal:Full range of motion of the lumbar spine  there are lumbar paraspinal muscle spasms present  Positive straight leg raise on the left at about 40 °  Imaging  MRI LUMBAR SPINE WITHOUT CONTRAST     INDICATION: M54 [id number]: Radiculopathy, lumbar region     Low back pain radiating down the left lower extremity      COMPARISON:  12/22/2018     TECHNIQUE:  Sagittal T1, sagittal T2, sagittal inversion recovery, axial T1 and axial T2, coronal T2     IMAGE QUALITY:  Diagnostic     FINDINGS:     VERTEBRAL BODIES:  There are 5 lumbar type vertebral bodies  Normal alignment of the lumbar spine  No spondylolysis or spondylolisthesis  No scoliosis  No compression fracture  Hemangioma in the posterior superior endplate of L2  Scattered   degenerative endplate changes  No focally suspicious marrow lesions  No bone marrow edema or compression abnormality      SACRUM:  Normal signal within the sacrum  No evidence of insufficiency or stress fracture      DISTAL CORD AND CONUS:  Normal size and signal within the distal cord and conus  Conus medullaris terminates at the L1-2 intervertebral disc level      PARASPINAL SOFT TISSUES:  Paraspinal soft tissues are unremarkable      LOWER THORACIC DISC SPACES:  Mild noncompressive lower thoracic degenerative change      LUMBAR DISC SPACES:     L1-L2:  There is bilateral facet hypertrophy  No significant central canal or neural foraminal narrowing      L2-L3:  There is bilateral facet hypertrophy  There is a right paracentral disc herniation, protrusion type  Mild right neural foraminal narrowing  Minimal central canal narrowing  Minimal left neural foraminal narrowing      L3-L4:  There is no focal disk herniation, central canal or neural foraminal narrowing  Bilateral facet hypertrophy noted      L4-L5:  There is bilateral facet hypertrophy  There is a small left neural foraminal disc protrusion  Very mild left neural foraminal narrowing  Central canal and left neural foramen patent      L5-S1:  There is a diffuse disk bulge  No significant central canal or neural foraminal narrowing  Bilateral facet hypertrophy noted      IMPRESSION:     Mild multilevel spondylosis

## 2022-03-18 NOTE — PATIENT INSTRUCTIONS
Duloxetine (By mouth)   Duloxetine (doo-LOX-[id number]-teen)  Treats depression, anxiety, diabetic peripheral neuropathy, fibromyalgia, and chronic muscle or bone pain  This medicine is an SSNRI  Brand Name(s): Cymbalta, izalma Sprinkle, Doka   There may be other brand names for this medicine  When This Medicine Should Not Be Used: This medicine is not right for everyone  Do not use it if you had an allergic reaction to duloxetine  How to Use This Medicine:   Capsule, Delayed Release Capsule  · Take your medicine as directed  Your dose may need to be changed several times to find what works best for you  · Delayed-release capsule: Swallow the capsule whole  Do not crush, chew, break, or open it  Do not open the Cymbalta® delayed-release capsule and sprinkle the contents on food or in liquids  · If you have trouble swallowing the Elex Lass delayed release capsule:   ? You may open the capsule and sprinkle the contents over one tablespoon (15 mL) of applesauce  Swallow the mixture right away and do not save any of the mixture to use later  ? You may open the capsule and pour the contents to an all plastic catheter tip syringe and add 50 mL of water  Do not use other liquids  Gently shake it for 10 seconds, and then use it through a nasogastric tube  Rinse with additional water (about 15 mL) if needed  · This medicine should come with a Medication Guide  Ask your pharmacist for a copy if you do not have one  · Missed dose: Take a dose as soon as you remember  If it is almost time for your next dose, wait until then and take a regular dose  Do not take extra medicine to make up for a missed dose  · Store the medicine in a closed container at room temperature, away from heat, moisture, and direct light  Drugs and Foods to Avoid:   Ask your doctor or pharmacist before using any other medicine, including over-the-counter medicines, vitamins, and herbal products    · Do not take duloxetine if you have used an MAO inhibitor (MAOI) within the past 14 days  Do not start taking an MAO inhibitor within 5 days of stopping duloxetine  Ask your doctor if you are not sure if you take an MAOI, including linezolid or methylene blue injection  · Some medicines can affect how duloxetine works  Tell your doctor if you are using any of the following:  ? Buspirone, cimetidine, ciprofloxacin, enoxacin, fentanyl, fluvoxamine, lithium, Nory's wort, theophylline, tramadol, tryptophan, warfarin  ? Amphetamines  ? Blood pressure medicine  ? Diuretic (water pill)  ? Medicine for heart rhythm problems (including flecainide, propafenone, quinidine)  ? Medicine to treat migraine headaches (including triptans)  ? NSAID pain or arthritis medicine (including aspirin, celecoxib, diclofenac, ibuprofen, naproxen)  ? Other medicine to treat depression or mood disorders (including amitriptyline, desipramine, fluoxetine, imipramine, nortriptyline, paroxetine)  ? Phenothiazine medicine (including thioridazine)  ? Stomach medicine (including famotidine, antacids containing aluminum or magnesium, PPIs)  · Tell your doctor if you use anything else that makes you sleepy  Some examples are allergy medicine, narcotic pain medicine, and alcohol  · Do not drink alcohol while you are using this medicine  Warnings While Using This Medicine:   · Tell your doctor if you are pregnant or breastfeeding, or if you have kidney disease, liver disease, bleeding problems, diabetes, digestion problems, glaucoma, heart disease, high or low blood pressure, or problems with urination  Tell your doctor if you smoke or you have a history of seizures, mental health problems (including bipolar disorder, jonathon), or drug or alcohol addiction  · This medicine may cause the following problems:   ? Serious liver problems  ? Serotonin syndrome, when used with certain medicines  ? Increased risk of bleeding problems  ?  Serious skin reactions, including erythema multiforme, Leal-Suraj syndrome  ? Low sodium levels in the blood  ? Sexual problems  · This medicine can increase thoughts of suicide  Tell your doctor right away if you start to feel depressed and have thoughts about hurting yourself  · This medicine may cause blurred vision, dizziness, drowsiness, trouble with thinking, or trouble with controlling body movements, which may lead to falls, fractures, or other injuries  Do not drive or do anything that could be dangerous until you know how this medicine affects you  Stand up slowly to avoid falls  · Do not stop using this medicine suddenly  Your doctor will need to slowly decrease your dose before you stop it completely  · Your doctor will check your progress and the effects of this medicine at regular visits  Keep all appointments  · Keep all medicine out of the reach of children  Never share your medicine with anyone    Possible Side Effects While Using This Medicine:   Call your doctor right away if you notice any of these side effects:  · Allergic reaction: Itching or hives, swelling in your face or hands, swelling or tingling in your mouth or throat, chest tightness, trouble breathing  · Anxiety, restlessness, fever, fast heartbeat, sweating, muscle spasms, diarrhea, seeing or hearing things that are not there  · Blistering, peeling, red skin rash  · Confusion, weakness, muscle twitching  · Dark urine or pale stools, nausea, vomiting, loss of appetite, stomach pain, yellow skin or eyes  · Decrease in how much or how often you urinate  · Decrease in sexual ability, desire, drive, or performance  · Eye pain, vision changes, seeing halos around lights  · Feeling more energetic than usual  · Lightheadedness, dizziness, fainting  · Unusual moods or behaviors, worsening depression, thoughts about hurting yourself, trouble sleeping  · Unusual bleeding or bruising  If you notice these less serious side effects, talk with your doctor:   · Decrease in appetite or weight  · Dry mouth, constipation, mild nausea  · Headache  · Unusual drowsiness, sleepiness, or tiredness  If you notice other side effects that you think are caused by this medicine, tell your doctor  Call your doctor for medical advice about side effects  You may report side effects to FDA at 7-954-FDA-3619    © Copyright [employer] 2022 Information is for End User's use only and may not be sold, redistributed or otherwise used for commercial purposes  The above information is an  only  It is not intended as medical advice for individual conditions or treatments  Talk to your doctor, nurse or pharmacist before following any medical regimen to see if it is safe and effective for you

## 2022-03-18 NOTE — H&P (VIEW-ONLY)
Assessment:  1  Chronic pain syndrome    2  Lumbar radiculopathy    3  Myofascial pain    4  Rupture of gluteus minimus tendon, left, subsequent encounter    5  Tendinopathy of left gluteus medius        Plan:  While the patient was in the office today, I did have a thorough conversation regarding their chronic pain syndrome, medication management, and treatment plan options  Patient is being seen for follow-up visit  She was last seen here on 02/21/2022 at which time an x-ray and an MRI of her lumbar spine were ordered  X-ray of the lumbar spine reveals multilevel spondylosis  MRI of the lumbar spine also revealed multilevel spondylosis  There is a left-sided neural foraminal disc protrusion with mild left neural foraminal narrowing  Patient will be scheduled for a diagnostic/therapeutic left-sided L4-5 transforaminal epidural steroid injection in the near future  Complete risks and benefits including bleeding, infection, tissue reaction, nerve injury and allergic reaction were discussed  The approach was demonstrated using models and literature was provided  Verbal and written consent was obtained  I discussed with the patient that since they have a neuropathic component of pain that it would be beneficial to start him on a medication such as Cymbalta  The patient denied being prescribed any antidepressant and/or psychiatric medications  I reviewed with the patient that it may take 3-4 weeks for the medications effects to be noticed and that it should never be abruptly stopped  Possible side effects include, but are not limited to:  Vertigo, lethargy, nausea, and edema of the extremities  Advised patient to call our office if they experience any side effects  The patient verbalized understanding  Start Cymbalta 30 mg at bedtime  Prescription was sent to her pharmacy  Follow-up 1 month after the injection  History of Present Illness:   The patient is a 74 y o  female who presents for a follow up office visit in regards to Leg Pain and Hip Pain  The patients current symptoms include complaints of low back pain, left buttock pain, pain that radiates down the lateral aspect of her left thigh  Current pain level is a 9/10  Quality pain is described as burning, sharp  Current pain medications includes:  None   I have personally reviewed and/or updated the patient's past medical history, past surgical history, family history, social history, current medications, allergies, and vital signs today  Review of Systems  Review of Systems   Respiratory: Negative for shortness of breath  Cardiovascular: Negative for chest pain  Gastrointestinal: Negative for constipation, diarrhea, nausea and vomiting  Musculoskeletal: Positive for gait problem  Negative for arthralgias, joint swelling and myalgias  Pain in extremity - back/butt   Neurological: Negative for dizziness, seizures and weakness  All other systems reviewed and are negative  Past Medical History:   Diagnosis Date    Anemia     Arthritis     Chronic pain disorder     back and hip pain    Disease of thyroid gland     nodules    GERD (gastroesophageal reflux disease)     Heart murmur     Heartburn     Hiatal hernia     Hyperlipidemia     Hypertension     Overactive bladder     Rotator cuff tear, left     Rotator cuff tear, right     Vitamin D deficiency     unsure    Wears dentures     upper    Wears glasses     Wears partial dentures     upper       Past Surgical History:   Procedure Laterality Date     SECTION      Last Assessed: 2017    ELBOW SURGERY      Last Assessed: 2017    ESOPHAGOGASTRODUODENOSCOPY N/A 2017    Procedure: ESOPHAGOGASTRODUODENOSCOPY (EGD); Surgeon: Arden Pope DO;  Location: [facility];   Service: Gastroenterology    HERNIA REPAIR  2020    Hiatal hernia repair    HIP SURGERY Left 2018    glut medius/minimus repair  and 18    HYSTERECTOMY      2011    KNEE SURGERY      x2    PARAESOPHAGEAL HERNIA REPAIR N/A 2020    Procedure: LAPAROSCOPIC PARAESOPHAGEAL HERNIA REPAIR WITH MESH AND NISSEN FUNDOPLICATION WITH ROBOTICS;  Surgeon: Yolanda Sotelo MD;  Location: [facility];  Service: General    [place] COLONOSCOPY FLX DX W/COLLJ SPEC WHEN PFRMD N/A 2017    Procedure: EGD AND COLONOSCOPY;  Surgeon: Ashtyn Avilse DO;  Location: [facility] GI LAB;   Service: Gastroenterology    SHOULDER ARTHROCENTESIS      SHOULDER ARTHROSCOPY Right 2021    Procedure: SHOULDER ARTHROSCOPY WITH acromioplasty, debridment, and ROTATOR CUFF REPAIR;  Surgeon: Marian Arguelles DO;  Location: [facility] MAIN OR;  Service: Orthopedics       Family History   Problem Relation Age of Onset    Colon cancer Mother     Heart attack Father     Other Father         Cardiac Disorder    Diabetes type II Father     Colon cancer Sister     Cancer Maternal Grandmother     No Known Problems Daughter     No Known Problems Paternal Grandmother     No Known Problems Sister     No Known Problems Sister     No Known Problems Sister     No Known Problems Sister     No Known Problems Maternal Aunt     No Known Problems Maternal Aunt     No Known Problems Maternal Aunt        Social History     Occupational History    Not on file   Tobacco Use    Smoking status: Former Smoker     Quit date:      Years since quittin 2    Smokeless tobacco: Never Used    Tobacco comment: quit 25 yrs ago   Vaping Use    Vaping Use: Never used   Substance and Sexual Activity    Alcohol use: No    Drug use: No    Sexual activity: Yes     Partners: Male         Current Outpatient Medications:     atorvastatin (LIPITOR) 10 mg tablet, take 1 tablet by mouth once daily, Disp: 90 tablet, Rfl: 0    ferrous sulfate (FeroSul) 325 (65 Fe) mg tablet, Take 1 tablet (325 mg total) by mouth daily with breakfast, Disp: 90 tablet, Rfl: 1    hydrOXYzine HCL (ATARAX) 25 mg tablet, take 1 TO 2 tablets by mouth at bedtime if needed for anxiety or insomnia, Disp: 60 tablet, Rfl: 1    losartan (COZAAR) 100 MG tablet, Take 1 tablet (100 mg total) by mouth daily, Disp: 90 tablet, Rfl: 1    ofloxacin (OCUFLOX) 0 3 % ophthalmic solution, Will start after cataract sx, Disp: , Rfl:     prednisoLONE acetate (PRED FORTE) 1 % ophthalmic suspension, instill 1 drop every 2 hours into left eye TO BE STARTED AFTER SURGERY IS COMPLETED, Disp: , Rfl:     DULoxetine (CYMBALTA) 30 mg delayed release capsule, Take 1 capsule (30 mg total) by mouth daily at bedtime, Disp: 30 capsule, Rfl: 1    valACYclovir (VALTREX) 1,000 mg tablet, take 1 tablet by mouth twice a day for 3 days (Patient not taking: Reported on 2/21/2022), Disp: 6 tablet, Rfl: 2    No Known Allergies    Physical Exam:    /86 (BP Location: Left arm, Patient Position: Sitting, Cuff Size: Standard)   Pulse 81   Ht 5' 3" (1 6 m)   Wt 70 2 kg (154 lb 12 8 oz)   BMI 27 42 kg/m²     Constitutional:normal, well developed, well nourished, alert, in no distress and non-toxic and no overt pain behavior  Eyes:anicteric  HEENT:grossly intact  Neck:supple, symmetric, trachea midline and no masses   Pulmonary:even and unlabored  Cardiovascular:No edema or pitting edema present  Skin:Normal without rashes or lesions and well hydrated  Psychiatric:Mood and affect appropriate  Neurologic:Cranial Nerves II-XII grossly intact  Musculoskeletal:Full range of motion of the lumbar spine  there are lumbar paraspinal muscle spasms present  Positive straight leg raise on the left at about 40 °  Imaging  MRI LUMBAR SPINE WITHOUT CONTRAST     INDICATION: M54 16: Radiculopathy, lumbar region     Low back pain radiating down the left lower extremity      COMPARISON:  12/22/2018     TECHNIQUE:  Sagittal T1, sagittal T2, sagittal inversion recovery, axial T1 and axial T2, coronal T2     IMAGE QUALITY:  Diagnostic     FINDINGS:     VERTEBRAL BODIES:  There are 5 lumbar type vertebral bodies  Normal alignment of the lumbar spine  No spondylolysis or spondylolisthesis  No scoliosis  No compression fracture  Hemangioma in the posterior superior endplate of L2  Scattered   degenerative endplate changes  No focally suspicious marrow lesions  No bone marrow edema or compression abnormality      SACRUM:  Normal signal within the sacrum  No evidence of insufficiency or stress fracture      DISTAL CORD AND CONUS:  Normal size and signal within the distal cord and conus  Conus medullaris terminates at the L1-2 intervertebral disc level      PARASPINAL SOFT TISSUES:  Paraspinal soft tissues are unremarkable      LOWER THORACIC DISC SPACES:  Mild noncompressive lower thoracic degenerative change      LUMBAR DISC SPACES:     L1-L2:  There is bilateral facet hypertrophy  No significant central canal or neural foraminal narrowing      L2-L3:  There is bilateral facet hypertrophy  There is a right paracentral disc herniation, protrusion type  Mild right neural foraminal narrowing  Minimal central canal narrowing  Minimal left neural foraminal narrowing      L3-L4:  There is no focal disk herniation, central canal or neural foraminal narrowing  Bilateral facet hypertrophy noted      L4-L5:  There is bilateral facet hypertrophy  There is a small left neural foraminal disc protrusion  Very mild left neural foraminal narrowing  Central canal and left neural foramen patent      L5-S1:  There is a diffuse disk bulge  No significant central canal or neural foraminal narrowing  Bilateral facet hypertrophy noted      IMPRESSION:     Mild multilevel spondylosis

## 2022-04-01 ENCOUNTER — HOSPITAL ENCOUNTER (OUTPATIENT)
Dept: RADIOLOGY | Facility: HOSPITAL | Age: 67
Discharge: HOME | End: 2022-04-01
Admitting: ANESTHESIOLOGY
Payer: COMMERCIAL

## 2022-04-01 VITALS
SYSTOLIC BLOOD PRESSURE: 153 MMHG | HEART RATE: 76 BPM | OXYGEN SATURATION: 98 % | DIASTOLIC BLOOD PRESSURE: 91 MMHG | TEMPERATURE: 97.9 F | RESPIRATION RATE: 20 BRPM

## 2022-04-01 DIAGNOSIS — M54.16 LUMBAR RADICULOPATHY: ICD-10-CM

## 2022-04-01 DIAGNOSIS — G89.4 CHRONIC PAIN SYNDROME: ICD-10-CM

## 2022-04-01 PROCEDURE — 64483 NJX AA&/STRD TFRM EPI L/S 1: CPT | Performed by: ANESTHESIOLOGY

## 2022-04-01 RX ORDER — LIDOCAINE HYDROCHLORIDE 10 MG/ML
5 INJECTION, SOLUTION EPIDURAL; INFILTRATION; INTRACAUDAL; PERINEURAL ONCE
Status: COMPLETED | OUTPATIENT
Start: 2022-04-01 | End: 2022-04-01

## 2022-04-01 RX ORDER — PAPAVERINE HCL 150 MG
7.5 CAPSULE, EXTENDED RELEASE ORAL ONCE
Status: COMPLETED | OUTPATIENT
Start: 2022-04-01 | End: 2022-04-01

## 2022-04-01 RX ADMIN — LIDOCAINE HYDROCHLORIDE 2 ML: 10 INJECTION, SOLUTION EPIDURAL; INFILTRATION; INTRACAUDAL; PERINEURAL at 11:47

## 2022-04-01 RX ADMIN — Medication 7.5 MG: at 11:49

## 2022-04-01 NOTE — INTERVAL H&P NOTE
Update: (This section must be completed if the H&P was completed greater than 24 hrs to procedure or admission)    H&P reviewed  After examining the patient, I find no changed to the H&P since it had been written  Patient re-evaluated   Accept as history and physical     Claudette Bradford MD/April 1, 2022/11:40 AM

## 2022-04-01 NOTE — DISCHARGE INSTRUCTIONS
Epidural Steroid Injection   WHAT YOU NEED TO KNOW:   An epidural steroid injection (BOYD) is a procedure to inject steroid medicine into the epidural space  The epidural space is between your spinal cord and vertebrae  Steroids reduce inflammation and fluid buildup in your spine that may be causing pain  You may be given pain medicine along with the steroids  ACTIVITY  · Do not drive or operate machinery today  · No strenuous activity today - bending, lifting, etc   · You may resume normal activites starting tomorrow - start slowly and as tolerated  · You may shower today, but no tub baths or hot tubs  · You may have numbness for several hours from the local anesthetic  Please use caution and common sense, especially with weight-bearing activities  CARE OF THE INJECTION SITE  · If you have soreness or pain, apply ice to the area today (20 minutes on/20 minutes off)  · Starting tomorrow, you may use warm, moist heat or ice if needed  · You may have an increase or change in your discomfort for 36-48 hours after your treatment  · Apply ice and continue with any pain medication you have been prescribed  · Notify [facility] if you have any of the following: redness, drainage, swelling, headache, stiff neck or fever above 100°F     SPECIAL INSTRUCTIONS  · Our office will contact you in approximately 7 days for a progress report  MEDICATIONS  · Continue to take all routine medications  · Our office may have instructed you to hold some medications  As no general anesthesia was used in today's procedure, you should not experience any side effects related to anesthesia  If you have a problem specifically related to your procedure, please call our office at [phone]  Problems not related to your procedure should be directed to your primary care physician

## 2022-04-08 ENCOUNTER — TELEPHONE (OUTPATIENT)
Dept: PAIN MEDICINE | Facility: CLINIC | Age: 67
End: 2022-04-08

## 2022-04-08 DIAGNOSIS — M79.18 MYOFASCIAL PAIN SYNDROME: Primary | ICD-10-CM

## 2022-04-08 NOTE — TELEPHONE ENCOUNTER
Pt is calling back stating that she had no improvement and pain level is still 8/9-10    Please advise

## 2022-04-11 RX ORDER — METHOCARBAMOL 500 MG/1
500 TABLET, FILM COATED ORAL 3 TIMES DAILY
Qty: 90 TABLET | Refills: 1 | Status: SHIPPED | OUTPATIENT
Start: 2022-04-11 | End: 2022-05-24

## 2022-04-11 NOTE — TELEPHONE ENCOUNTER
Spoke to pt regarding pain in left lower back more in the "glutes" radiating down left leg describing it as sharp and shooting  Pt stated she is taking tylenol as needed with no relief  Writing nurse advised pt to try motrin as well as alternation between ice and heat  Last OV 3/18   Please advise

## 2022-04-11 NOTE — TELEPHONE ENCOUNTER
Prescription for muscle relaxer, Robaxin, to her pharmacy  She can take 3 times daily if needed for spasms  Also, please advise her that the injection was only 10 days ago  It can take up to 2 weeks to start seeing possible improvement after this injection  Please make sure she is scheduled for 1 month follow-up visit

## 2022-04-15 ENCOUNTER — HOSPITAL ENCOUNTER (OUTPATIENT)
Dept: NUCLEAR MEDICINE | Facility: HOSPITAL | Age: 67
Discharge: HOME | End: 2022-04-15
Attending: INTERNAL MEDICINE
Payer: COMMERCIAL

## 2022-04-15 DIAGNOSIS — R13.10 DYSPHAGIA, UNSPECIFIED TYPE: ICD-10-CM

## 2022-04-15 DIAGNOSIS — K21.9 GASTROESOPHAGEAL REFLUX DISEASE WITHOUT ESOPHAGITIS: ICD-10-CM

## 2022-04-15 DIAGNOSIS — R14.0 BLOATING: ICD-10-CM

## 2022-04-15 PROCEDURE — G1004 CDSM NDSC: HCPCS

## 2022-04-15 PROCEDURE — A9541 TC99M SULFUR COLLOID: HCPCS

## 2022-04-15 PROCEDURE — 78264 GASTRIC EMPTYING IMG STUDY: CPT

## 2022-04-16 DIAGNOSIS — K21.9 GASTROESOPHAGEAL REFLUX DISEASE WITHOUT ESOPHAGITIS: ICD-10-CM

## 2022-04-18 RX ORDER — FAMOTIDINE 20 MG/1
TABLET, FILM COATED ORAL
Qty: 180 TABLET | Refills: 0 | Status: SHIPPED | OUTPATIENT
Start: 2022-04-18 | End: 2022-07-15

## 2022-04-19 ENCOUNTER — TELEPHONE (OUTPATIENT)
Dept: GASTROENTEROLOGY | Facility: CLINIC | Age: 67
End: 2022-04-19

## 2022-04-19 NOTE — TELEPHONE ENCOUNTER
Patients GI provider:  Dr Butch Fulton    Number to return call: [phone]    Reason for call: Pt called stating that the time given to her for her procedure tomorrow is too late  She is scheduled for 3:30p and she said she cannot go that long without eating  She will start to feel sick  Requesting to see if she can be pushed up earlier in the day  She does not wish to reschedule, she see waited so long for this appt already  Above is her number       Scheduled procedure/appointment date if applicable: procedure 6/09/05

## 2022-04-20 ENCOUNTER — HOSPITAL ENCOUNTER (OUTPATIENT)
Dept: GASTROENTEROLOGY | Facility: HOSPITAL | Age: 67
Setting detail: OUTPATIENT SURGERY
Discharge: HOME | End: 2022-04-20
Attending: INTERNAL MEDICINE | Admitting: INTERNAL MEDICINE
Payer: COMMERCIAL

## 2022-04-20 ENCOUNTER — ANESTHESIA (OUTPATIENT)
Dept: GASTROENTEROLOGY | Facility: HOSPITAL | Age: 67
End: 2022-04-20

## 2022-04-20 ENCOUNTER — ANESTHESIA EVENT (OUTPATIENT)
Dept: GASTROENTEROLOGY | Facility: HOSPITAL | Age: 67
End: 2022-04-20

## 2022-04-20 VITALS
RESPIRATION RATE: 16 BRPM | SYSTOLIC BLOOD PRESSURE: 167 MMHG | HEART RATE: 61 BPM | TEMPERATURE: 97.5 F | OXYGEN SATURATION: 100 % | DIASTOLIC BLOOD PRESSURE: 79 MMHG

## 2022-04-20 DIAGNOSIS — R13.10 DYSPHAGIA, UNSPECIFIED TYPE: ICD-10-CM

## 2022-04-20 DIAGNOSIS — K44.9 PARAESOPHAGEAL HERNIA: ICD-10-CM

## 2022-04-20 PROBLEM — D50.9 IRON DEFICIENCY ANEMIA: Status: RESOLVED | Noted: 2017-05-31 | Resolved: 2022-04-20

## 2022-04-20 PROBLEM — E78.5 HYPERLIPIDEMIA: Status: ACTIVE | Noted: 2019-02-01

## 2022-04-20 PROBLEM — D64.9 ANEMIA: Status: RESOLVED | Noted: 2020-03-13 | Resolved: 2022-04-20

## 2022-04-20 PROCEDURE — 43248 EGD GUIDE WIRE INSERTION: CPT | Performed by: INTERNAL MEDICINE

## 2022-04-20 PROCEDURE — 88305 TISSUE EXAM BY PATHOLOGIST: CPT | Performed by: PATHOLOGY

## 2022-04-20 PROCEDURE — 43239 EGD BIOPSY SINGLE/MULTIPLE: CPT | Performed by: INTERNAL MEDICINE

## 2022-04-20 RX ORDER — PROPOFOL 10 MG/ML
INJECTION, EMULSION INTRAVENOUS CONTINUOUS PRN
Status: DISCONTINUED | OUTPATIENT
Start: 2022-04-20 | End: 2022-04-20

## 2022-04-20 RX ORDER — SODIUM CHLORIDE 9 MG/ML
INJECTION, SOLUTION INTRAVENOUS CONTINUOUS PRN
Status: DISCONTINUED | OUTPATIENT
Start: 2022-04-20 | End: 2022-04-20

## 2022-04-20 RX ORDER — LIDOCAINE HYDROCHLORIDE 10 MG/ML
INJECTION, SOLUTION EPIDURAL; INFILTRATION; INTRACAUDAL; PERINEURAL AS NEEDED
Status: DISCONTINUED | OUTPATIENT
Start: 2022-04-20 | End: 2022-04-20

## 2022-04-20 RX ORDER — FENTANYL CITRATE 50 UG/ML
INJECTION, SOLUTION INTRAMUSCULAR; INTRAVENOUS AS NEEDED
Status: DISCONTINUED | OUTPATIENT
Start: 2022-04-20 | End: 2022-04-20

## 2022-04-20 RX ORDER — PROPOFOL 10 MG/ML
INJECTION, EMULSION INTRAVENOUS AS NEEDED
Status: DISCONTINUED | OUTPATIENT
Start: 2022-04-20 | End: 2022-04-20

## 2022-04-20 RX ADMIN — LIDOCAINE HYDROCHLORIDE 50 MG: 10 INJECTION, SOLUTION EPIDURAL; INFILTRATION; INTRACAUDAL; PERINEURAL at 12:48

## 2022-04-20 RX ADMIN — FENTANYL CITRATE 25 MCG: 50 INJECTION INTRAMUSCULAR; INTRAVENOUS at 12:56

## 2022-04-20 RX ADMIN — SODIUM CHLORIDE: 9 INJECTION, SOLUTION INTRAVENOUS at 12:42

## 2022-04-20 RX ADMIN — PROPOFOL 50 MG: 10 INJECTION, EMULSION INTRAVENOUS at 12:56

## 2022-04-20 RX ADMIN — FENTANYL CITRATE 25 MCG: 50 INJECTION INTRAMUSCULAR; INTRAVENOUS at 12:58

## 2022-04-20 RX ADMIN — PROPOFOL 100 MCG/KG/MIN: 10 INJECTION, EMULSION INTRAVENOUS at 12:52

## 2022-04-20 RX ADMIN — FENTANYL CITRATE 50 MCG: 50 INJECTION INTRAMUSCULAR; INTRAVENOUS at 12:45

## 2022-04-20 RX ADMIN — PROPOFOL 110 MG: 10 INJECTION, EMULSION INTRAVENOUS at 12:48

## 2022-04-20 RX ADMIN — PROPOFOL 40 MG: 10 INJECTION, EMULSION INTRAVENOUS at 12:51

## 2022-04-20 NOTE — RESULT ENCOUNTER NOTE
Inform patient via [employer]  Please review the pathology/lab result of further discussion      Copied from [address] message :

## 2022-04-20 NOTE — H&P
History and Physical - [facility]  Criss Patient 74 y o  female MRN: [id number]    HPI: Criss Patient is a 74y o  year old female who presents with esophageal narrowing  Review of Systems    Historical Information   Past Medical History:   Diagnosis Date    Anemia     Arthritis     Chronic pain disorder     back and hip pain    Disease of thyroid gland     nodules    GERD (gastroesophageal reflux disease)     Heart murmur     Heartburn     Hiatal hernia     Hyperlipidemia     Hypertension     Overactive bladder     Rotator cuff tear, left     Rotator cuff tear, right     Vitamin D deficiency     unsure    Wears dentures     upper    Wears glasses     Wears partial dentures     upper     Past Surgical History:   Procedure Laterality Date     SECTION      Last Assessed: 2017    ELBOW SURGERY      Last Assessed: 2017    ESOPHAGOGASTRODUODENOSCOPY N/A 2017    Procedure: ESOPHAGOGASTRODUODENOSCOPY (EGD); Surgeon: Wily Decker DO;  Location: [facility] GI LAB; Service: Gastroenterology    HERNIA REPAIR  2020    Hiatal hernia repair    HIP SURGERY Left 2018    glut medius/minimus repair  and 18    HYSTERECTOMY          KNEE SURGERY      x2    PARAESOPHAGEAL HERNIA REPAIR N/A 2020    Procedure: LAPAROSCOPIC PARAESOPHAGEAL HERNIA REPAIR WITH MESH AND NISSEN FUNDOPLICATION WITH ROBOTICS;  Surgeon: Bo Rice MD;  Location: [facility];  Service: General    [place] COLONOSCOPY FLX DX W/COLLJ SPEC WHEN PFRMD N/A 2017    Procedure: EGD AND COLONOSCOPY;  Surgeon: Wily Decker DO;  Location: [facility] GI LAB;   Service: Gastroenterology    SHOULDER ARTHROCENTESIS      SHOULDER ARTHROSCOPY Right 2021    Procedure: SHOULDER ARTHROSCOPY WITH acromioplasty, debridment, and ROTATOR CUFF REPAIR;  Surgeon: Iris Erazo DO;  Location: [facility] MAIN OR;  Service: Orthopedics     Social History   Social History     Substance and Sexual Activity   Alcohol Use No     Social History     Substance and Sexual Activity   Drug Use No     Social History     Tobacco Use   Smoking Status Former Smoker    Quit date: 12    Years since quittin 3   Smokeless Tobacco Never Used   Tobacco Comment    quit 25 yrs ago     Family History   Problem Relation Age of Onset    Colon cancer Mother     Heart attack Father     Other Father         Cardiac Disorder    Diabetes type II Father     Colon cancer Sister     Cancer Maternal Grandmother     No Known Problems Daughter     No Known Problems Paternal Grandmother     No Known Problems Sister     No Known Problems Sister     No Known Problems Sister     No Known Problems Sister     No Known Problems Maternal Aunt     No Known Problems Maternal Aunt     No Known Problems Maternal Aunt        Meds/Allergies     (Not in a hospital admission)      No Known Allergies    Objective     BP (!) 179/88   Pulse 69   Temp 99 1 °F (37 3 °C) (Tympanic)   Resp 18   SpO2 100%       PHYSICAL EXAM    Gen: NAD  CV: RRR  CHEST: Clear  ABD: soft, NT/ND  EXT: no edema  Neuro: AAO      ASSESSMENT/PLAN:  This is a 74y o  year old female here for EGD with dilatoin  H/o paraesophageal hernia repair        PLAN:   Procedure: EGD

## 2022-04-20 NOTE — ANESTHESIA PREPROCEDURE EVALUATION
Procedure:  EGD    Relevant Problems   ANESTHESIA (within normal limits)      CARDIO   (+) Hyperlipidemia   (+) Hypertension   (-) CAD (coronary artery disease)   (-) Chest pain   (-) ACOSTA (dyspnea on exertion)      GI/HEPATIC   (+) Dysphagia (asy,ptomatic this AM)   (+) GERD (gastroesophageal reflux disease)   (+) Gastroesophageal reflux disease   (+) Paraesophageal hernia      HEMATOLOGY   (+) Iron deficiency anemia (Resolved)   (-) Anemia   (-) Hypercoagulable state (HCC)      MUSCULOSKELETAL   (+) Primary osteoarthritis of both knees   (+) Sacroiliitis (HCC)      NEURO/PSYCH   (+) Chronic pain syndrome      PULMONARY   (-) Asthma   (-) Chronic obstructive pulmonary disease (HCC)   (-) Sleep apnea   (-) Smoking        Physical Exam    Airway    Mallampati score: I  TM Distance: >3 FB  Neck ROM: full     Dental   No notable dental hx upper dentures,     Cardiovascular  Rhythm: regular, Rate: normal,     Pulmonary      Other Findings        Anesthesia Plan  ASA Score- 2     Anesthesia Type- IV sedation with anesthesia with ASA Monitors  Additional Monitors:   Airway Plan:           Plan Factors-Exercise tolerance (METS): >4 METS  Existing labs reviewed  Patient summary reviewed  Patient is not a current smoker  Obstructive sleep apnea risk education given perioperatively  Induction- intravenous  Postoperative Plan-     Informed Consent- Anesthetic plan and risks discussed with patient  I personally reviewed this patient with the CRNA  Discussed and agreed on the Anesthesia Plan with the CRNA  Grady Spain

## 2022-05-04 DIAGNOSIS — F51.01 PRIMARY INSOMNIA: ICD-10-CM

## 2022-05-04 RX ORDER — HYDROXYZINE HYDROCHLORIDE 25 MG/1
TABLET, FILM COATED ORAL
Qty: 60 TABLET | Refills: 1 | Status: SHIPPED | OUTPATIENT
Start: 2022-05-04 | End: 2022-05-11

## 2022-05-05 ENCOUNTER — TELEPHONE (OUTPATIENT)
Dept: FAMILY MEDICINE CLINIC | Facility: CLINIC | Age: 67
End: 2022-05-05

## 2022-05-05 NOTE — TELEPHONE ENCOUNTER
Received notification that Hydroxyzine requires a prior authorization  Prior authorization started on covermymeds  Key code is [id number]  Awaiting response from insurance

## 2022-05-05 NOTE — RESULT ENCOUNTER NOTE
Inform patient via [employer]  Please review the pathology/lab result of further discussion  Copied from [employer] message :     Lucy Fraction,     Your biopsies were unremarkable  Follow up in the office as needed      Best regards,     Loly Marte MD
Wound Check/Suture Removal

## 2022-05-06 ENCOUNTER — OFFICE VISIT (OUTPATIENT)
Dept: PAIN MEDICINE | Facility: CLINIC | Age: 67
End: 2022-05-06
Payer: COMMERCIAL

## 2022-05-06 VITALS
DIASTOLIC BLOOD PRESSURE: 72 MMHG | TEMPERATURE: 98.4 F | HEART RATE: 82 BPM | BODY MASS INDEX: 28.88 KG/M2 | HEIGHT: 63 IN | WEIGHT: 163 LBS | SYSTOLIC BLOOD PRESSURE: 138 MMHG

## 2022-05-06 DIAGNOSIS — M54.16 LUMBAR RADICULOPATHY: ICD-10-CM

## 2022-05-06 DIAGNOSIS — M79.18 LEFT BUTTOCK PAIN: ICD-10-CM

## 2022-05-06 DIAGNOSIS — M79.18 PIRIFORMIS MUSCLE PAIN: ICD-10-CM

## 2022-05-06 DIAGNOSIS — M79.18 MYOFASCIAL PAIN: ICD-10-CM

## 2022-05-06 DIAGNOSIS — G89.4 CHRONIC PAIN SYNDROME: Primary | ICD-10-CM

## 2022-05-06 DIAGNOSIS — M67.952 TENDINOPATHY OF LEFT GLUTEUS MEDIUS: ICD-10-CM

## 2022-05-06 PROCEDURE — 99214 OFFICE O/P EST MOD 30 MIN: CPT | Performed by: NURSE PRACTITIONER

## 2022-05-06 NOTE — TELEPHONE ENCOUNTER
Please inform patient that insurance is not covering this medication for use for insomnia  Recommend her try melatonin otc as needed for sleep and follow up if further medication options need to be trialed

## 2022-05-06 NOTE — PROGRESS NOTES
Assessment:  1  Chronic pain syndrome    2  Lumbar radiculopathy    3  Myofascial pain    4  Left buttock pain    5  Tendinopathy of left gluteus medius    6  Piriformis muscle pain        Plan:  While the patient was in the office today, I did have a thorough conversation regarding their chronic pain syndrome, medication management, and treatment plan options  Patient is being seen for follow-up visit  She recently underwent a left-sided L4-5 interlaminar epidural steroid injection on 04/01/2022  Unfortunately, she denies any improvement with this injection  She previously underwent trigger point injections of the left gluteus medius and left gluteus minimus without improvement  Previous sacroiliac joint injections also failed to provide her with any relief  Patient will be scheduled for a diagnostic/therapeutic left piriformis muscle in the near future  Complete risks and benefits including bleeding, infection, tissue reaction, nerve injury and allergic reaction were discussed  The approach was demonstrated using models and literature was provided  Verbal and written consent was obtained  She has been on a number of medications in the past including gabapentin and Cymbalta which failed to provide her with relief  She has been on a number of muscle relaxers without significant improvement  Follow-up 1 month after the injection  History of Present Illness: The patient is a 74 y o  female who presents for a follow up office visit in regards to Hip Pain, Leg Pain, and Back Pain  The patients current symptoms include complaints of left buttock pain with intermittent left leg pain, lateral thigh  Current pain level is a 9/10  Quality pain is described as sharp, throbbing  Current pain medications includes:  Robaxin 500 mg 3 times daily if needed for spasms   The patient reports that this regimen is providing 10 % pain relief    The patient is reporting no side effects from this pain medication regimen  I have personally reviewed and/or updated the patient's past medical history, past surgical history, family history, social history, current medications, allergies, and vital signs today  Review of Systems  Review of Systems   Respiratory: Negative for shortness of breath  Cardiovascular: Negative for chest pain  Gastrointestinal: Negative for constipation, diarrhea, nausea and vomiting  Musculoskeletal: Positive for gait problem  Negative for arthralgias, joint swelling and myalgias  Joint stiffness  Pain in extremity - back, butt   Neurological: Negative for dizziness, seizures and weakness  All other systems reviewed and are negative  Past Medical History:   Diagnosis Date    Anemia     Arthritis     Chronic pain disorder     back and hip pain    Disease of thyroid gland     nodules    GERD (gastroesophageal reflux disease)     Heart murmur     Heartburn     Hiatal hernia     Hyperlipidemia     Hypertension     Overactive bladder     Rotator cuff tear, left     Rotator cuff tear, right     Vitamin D deficiency     unsure    Wears dentures     upper    Wears glasses     Wears partial dentures     upper       Past Surgical History:   Procedure Laterality Date     SECTION      Last Assessed: 2017    ELBOW SURGERY      Last Assessed: 2017    ESOPHAGOGASTRODUODENOSCOPY N/A 2017    Procedure: ESOPHAGOGASTRODUODENOSCOPY (EGD); Surgeon: Errol Caldwell DO;  Location: [facility] GI LAB;   Service: Gastroenterology    HERNIA REPAIR  2020    Hiatal hernia repair    HIP SURGERY Left 2018    glut medius/minimus repair  and 18    HYSTERECTOMY      2011    KNEE SURGERY      x2    PARAESOPHAGEAL HERNIA REPAIR N/A 2020    Procedure: LAPAROSCOPIC PARAESOPHAGEAL HERNIA REPAIR WITH MESH AND NISSEN FUNDOPLICATION WITH ROBOTICS;  Surgeon: Basil Jean MD;  Location: [facility] OR;  Service: General    SD COLONOSCOPY FLX DX W/COLLJ SPEC WHEN PFRMD N/A 2017    Procedure: EGD AND COLONOSCOPY;  Surgeon: Jamir Sharp DO;  Location: [facility] GI LAB;   Service: Gastroenterology    SHOULDER ARTHROCENTESIS      SHOULDER ARTHROSCOPY Right 2021    Procedure: SHOULDER ARTHROSCOPY WITH acromioplasty, debridment, and ROTATOR CUFF REPAIR;  Surgeon: Morgan Gant DO;  Location: [address] [place] [address] MAIN OR;  Service: Orthopedics       Family History   Problem Relation Age of Onset    Colon cancer Mother     Heart attack Father     Other Father         Cardiac Disorder    Diabetes type II Father     Colon cancer Sister     Cancer Maternal Grandmother     No Known Problems Daughter     No Known Problems Paternal Grandmother     No Known Problems Sister     No Known Problems Sister     No Known Problems Sister     No Known Problems Sister     No Known Problems Maternal Aunt     No Known Problems Maternal Aunt     No Known Problems Maternal Aunt        Social History     Occupational History    Not on file   Tobacco Use    Smoking status: Former Smoker     Quit date:      Years since quittin 3    Smokeless tobacco: Never Used    Tobacco comment: quit 25 yrs ago   Vaping Use    Vaping Use: Never used   Substance and Sexual Activity    Alcohol use: No    Drug use: No    Sexual activity: Yes     Partners: Male         Current Outpatient Medications:     atorvastatin (LIPITOR) 10 mg tablet, take 1 tablet by mouth once daily, Disp: 90 tablet, Rfl: 0    famotidine (PEPCID) 20 mg tablet, take 1 tablet by mouth twice a day, Disp: 180 tablet, Rfl: 0    hydrOXYzine HCL (ATARAX) 25 mg tablet, take 1 TO 2 tablets by mouth at bedtime if needed for anxiety or insomnia, Disp: 60 tablet, Rfl: 1    losartan (COZAAR) 100 MG tablet, take 1 tablet by mouth once daily, Disp: 90 tablet, Rfl: 1    methocarbamol (ROBAXIN) 500 mg tablet, Take 1 tablet (500 mg total) by mouth 3 (three) times a day, Disp: 90 tablet, Rfl: 1   ofloxacin (OCUFLOX) 0 3 % ophthalmic solution, Will start after cataract sx, Disp: , Rfl:     prednisoLONE acetate (PRED FORTE) 1 % ophthalmic suspension, instill 1 drop every 2 hours into left eye TO BE STARTED AFTER SURGERY IS COMPLETED, Disp: , Rfl:     valACYclovir (VALTREX) 1,000 mg tablet, take 1 tablet by mouth twice a day for 3 days, Disp: 6 tablet, Rfl: 2    No Known Allergies    Physical Exam:    /72 (BP Location: Left arm, Patient Position: Sitting, Cuff Size: Standard)   Pulse 82   Temp 98 4 °F (36 9 °C)   Ht 5' 3" (1 6 m)   Wt 73 9 kg (163 lb)   BMI 28 87 kg/m²     Constitutional:normal, well developed, well nourished, alert, in no distress and non-toxic and no overt pain behavior  Eyes:anicteric  HEENT:grossly intact  Neck:supple, symmetric, trachea midline and no masses   Pulmonary:even and unlabored  Cardiovascular:No edema or pitting edema present  Skin:Normal without rashes or lesions and well hydrated  Psychiatric:Mood and affect appropriate  Neurologic:Cranial Nerves II-XII grossly intact  Musculoskeletal:Gait is slow and guarded  There is tenderness over the left sacroiliac joint  there is tenderness over the left gluteus caitlin and gluteus medius muscles  There is tenderness over the left piriformis muscle  Imaging  MRI LUMBAR SPINE WITHOUT CONTRAST     INDICATION: M54 [id number]: Radiculopathy, lumbar region  Low back pain radiating down the left lower extremity      COMPARISON:  12/22/2018     TECHNIQUE:  Sagittal T1, sagittal T2, sagittal inversion recovery, axial T1 and axial T2, coronal T2     IMAGE QUALITY:  Diagnostic     FINDINGS:     VERTEBRAL BODIES:  There are 5 lumbar type vertebral bodies  Normal alignment of the lumbar spine  No spondylolysis or spondylolisthesis  No scoliosis  No compression fracture  Hemangioma in the posterior superior endplate of L2  Scattered   degenerative endplate changes  No focally suspicious marrow lesions    No bone marrow edema or compression abnormality      SACRUM:  Normal signal within the sacrum  No evidence of insufficiency or stress fracture      DISTAL CORD AND CONUS:  Normal size and signal within the distal cord and conus  Conus medullaris terminates at the L1-2 intervertebral disc level      PARASPINAL SOFT TISSUES:  Paraspinal soft tissues are unremarkable      LOWER THORACIC DISC SPACES:  Mild noncompressive lower thoracic degenerative change      LUMBAR DISC SPACES:     L1-L2:  There is bilateral facet hypertrophy  No significant central canal or neural foraminal narrowing      L2-L3:  There is bilateral facet hypertrophy  There is a right paracentral disc herniation, protrusion type  Mild right neural foraminal narrowing  Minimal central canal narrowing  Minimal left neural foraminal narrowing      L3-L4:  There is no focal disk herniation, central canal or neural foraminal narrowing  Bilateral facet hypertrophy noted      L4-L5:  There is bilateral facet hypertrophy  There is a small left neural foraminal disc protrusion  Very mild left neural foraminal narrowing  Central canal and left neural foramen patent      L5-S1:  There is a diffuse disk bulge  No significant central canal or neural foraminal narrowing  Bilateral facet hypertrophy noted      IMPRESSION:     Mild multilevel spondylosis

## 2022-05-10 ENCOUNTER — TELEPHONE (OUTPATIENT)
Dept: PAIN MEDICINE | Facility: CLINIC | Age: 67
End: 2022-05-10

## 2022-05-11 ENCOUNTER — OFFICE VISIT (OUTPATIENT)
Dept: FAMILY MEDICINE CLINIC | Facility: CLINIC | Age: 67
End: 2022-05-11
Payer: COMMERCIAL

## 2022-05-11 VITALS
WEIGHT: 164 LBS | TEMPERATURE: 97.9 F | DIASTOLIC BLOOD PRESSURE: 88 MMHG | OXYGEN SATURATION: 98 % | HEART RATE: 69 BPM | SYSTOLIC BLOOD PRESSURE: 140 MMHG | BODY MASS INDEX: 29.06 KG/M2 | HEIGHT: 63 IN

## 2022-05-11 DIAGNOSIS — G47.09 OTHER INSOMNIA: Primary | ICD-10-CM

## 2022-05-11 DIAGNOSIS — R22.43 SKIN LUMP OF LEG, BILATERAL: ICD-10-CM

## 2022-05-11 PROCEDURE — 99213 OFFICE O/P EST LOW 20 MIN: CPT | Performed by: PHYSICIAN ASSISTANT

## 2022-05-11 RX ORDER — TRAZODONE HYDROCHLORIDE 50 MG/1
50 TABLET ORAL
Qty: 30 TABLET | Refills: 1 | Status: SHIPPED | OUTPATIENT
Start: 2022-05-11 | End: 2022-05-27 | Stop reason: SDUPTHER

## 2022-05-11 NOTE — PROGRESS NOTES
Edith Bullard 74 y o  female   Date:  5/11/2022      Assessment and Plan:    Trudy Harding was seen today for follow-up and mass  Diagnoses and all orders for this visit:    Other insomnia  -     traZODone (DESYREL) 50 mg tablet; Take 1 tablet (50 mg total) by mouth daily at bedtime as needed for sleep  - no relief with melatonin, tylenol pm and hydroxyzine no longer covered  - ensure good sleep hygiene  - will trial trazodone and titrate prn    Skin lump of leg, bilateral  -     US extremity soft tissue; Future  -     Ambulatory Referral to General Surgery; Future  - ?lipomas             HPI:  Chief Complaint   Patient presents with    Follow-up     Discuss sleeping medication  Hydroxyzine not covered by insurance  Has tried melatonin with no success   Mass     Lumps on upper legs  Not bothersome  First noticed "awhile" ago but feels like the lumps are getting bigger  HPI   Patient is a 73 yo female who presents due to insomnia  Patient was previously taking hydroxyzine with good benefit, hwoever insurance is not covering due to it not being taken for itchiness or anxiety  She denies anxiety or depressive symptoms  She has trouble falling and staying asleep  She only gets a few hours of interrupted sleep  She has tried melatonin, tylenol pm  She has good sleep hygiene  She avoid caffeines later in day, does not exercise late at night  She also reports lumps on b/l thighs for >1 year  They are nontender and w/o skin changes but they are bothersome in appearance  She had lipoma removed from L arm previously  ROS: Review of Systems   Constitutional: Positive for fatigue  Negative for activity change and unexpected weight change  Respiratory: Negative  Cardiovascular: Negative  Skin: Negative for color change, rash and wound  Lumps on thighs     Psychiatric/Behavioral: Positive for sleep disturbance  Negative for dysphoric mood  The patient is not nervous/anxious          Past Medical History:   Diagnosis Date    Anemia     Arthritis     Chronic pain disorder     back and hip pain    Disease of thyroid gland     nodules    GERD (gastroesophageal reflux disease)     Heart murmur     Heartburn     Hiatal hernia     Hyperlipidemia     Hypertension     Overactive bladder     Rotator cuff tear, left     Rotator cuff tear, right     Vitamin D deficiency     unsure    Wears dentures     upper    Wears glasses     Wears partial dentures     upper     Patient Active Problem List   Diagnosis    Joint disorder of pelvis or thigh    Leg weakness, bilateral    Nocturia    OAB (overactive bladder)    Pain in both lower legs    Segmental and somatic dysfunction of sacral region    Vitamin D deficiency    Hypertension    Leg swelling    Hyperlipidemia    Sacroiliitis (HCC)    Myofascial pain    Mixed incontinence    Primary osteoarthritis of both knees    Chronic pain of left knee    BMI 32 0-32 9,adult    GERD (gastroesophageal reflux disease)    Dysphagia    Pain of upper abdomen    Paraesophageal hernia    Gastroesophageal reflux disease    Heartburn    Anti-TPO antibodies present    Multinodular goiter    Nausea    Esophageal obstruction due to food impaction    Benign lipomatous neoplasm of skin and subcutaneous tissue of left arm    Tear of right rotator cuff    Rupture of gluteus minimus tendon, left, subsequent encounter    Tendinopathy of left gluteus medius    Family history of colon cancer    Bloating    Chronic pain syndrome    Lumbar radiculopathy    Left buttock pain    Piriformis muscle pain       Past Surgical History:   Procedure Laterality Date     SECTION      Last Assessed: 2017    ELBOW SURGERY      Last Assessed: 2017    ESOPHAGOGASTRODUODENOSCOPY N/A 2017    Procedure: ESOPHAGOGASTRODUODENOSCOPY (EGD); Surgeon: Ashtyn Aviles DO;  Location: [facility] GI LAB;   Service: Gastroenterology    HERNIA REPAIR 2020    Hiatal hernia repair    HIP SURGERY Left 2018    glut medius/minimus repair  and 18    HYSTERECTOMY      2011    KNEE SURGERY      x2    PARAESOPHAGEAL HERNIA REPAIR N/A 2020    Procedure: LAPAROSCOPIC PARAESOPHAGEAL HERNIA REPAIR WITH MESH AND NISSEN FUNDOPLICATION WITH ROBOTICS;  Surgeon: Katherine Hendrix MD;  Location: [facility];  Service: General    KS COLONOSCOPY FLX DX W/COLLJ SPEC WHEN PFRMD N/A 2017    Procedure: EGD AND COLONOSCOPY;  Surgeon: Jia Luu DO;  Location: [facility];   Service: Gastroenterology    SHOULDER ARTHROCENTESIS      SHOULDER ARTHROSCOPY Right 2021    Procedure: SHOULDER ARTHROSCOPY WITH acromioplasty, debridment, and ROTATOR CUFF REPAIR;  Surgeon: Jacquie Keen DO;  Location: [address] [place] [address] MAIN OR;  Service: Orthopedics       Social History     Socioeconomic History    Marital status: /Civil Union     Spouse name: None    Number of children: None    Years of education: None    Highest education level: None   Occupational History    None   Tobacco Use    Smoking status: Former Smoker     Quit date:      Years since quittin 3    Smokeless tobacco: Never Used    Tobacco comment: quit 25 yrs ago   Vaping Use    Vaping Use: Never used   Substance and Sexual Activity    Alcohol use: No    Drug use: No    Sexual activity: Yes     Partners: Male   Other Topics Concern    None   Social History Narrative    Caffeine use    , 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 [employer] for OR    2 grown children     Social Determinants of Health     Financial Resource Strain: Not on file   Food Insecurity: Not on file   Transportation Needs: Not on file   Physical Activity: Not on file   Stress: Not on file   Social Connections: Not on file   Intimate Partner Violence: Not on file   Housing Stability: Not on file       Family History   Problem Relation Age of Onset    Colon cancer Mother  Heart attack Father     Other Father         Cardiac Disorder    Diabetes type II Father     Colon cancer Sister     Cancer Maternal Grandmother     No Known Problems Daughter     No Known Problems Paternal Grandmother     No Known Problems Sister     No Known Problems Sister     No Known Problems Sister     No Known Problems Sister     No Known Problems Maternal Aunt     No Known Problems Maternal Aunt     No Known Problems Maternal Aunt        No Known Allergies      Current Outpatient Medications:     atorvastatin (LIPITOR) 10 mg tablet, take 1 tablet by mouth once daily, Disp: 90 tablet, Rfl: 0    famotidine (PEPCID) 20 mg tablet, take 1 tablet by mouth twice a day, Disp: 180 tablet, Rfl: 0    losartan (COZAAR) 100 MG tablet, take 1 tablet by mouth once daily, Disp: 90 tablet, Rfl: 1    traZODone (DESYREL) 50 mg tablet, Take 1 tablet (50 mg total) by mouth daily at bedtime as needed for sleep, Disp: 30 tablet, Rfl: 1    methocarbamol (ROBAXIN) 500 mg tablet, Take 1 tablet (500 mg total) by mouth 3 (three) times a day (Patient not taking: Reported on 5/11/2022), Disp: 90 tablet, Rfl: 1    valACYclovir (VALTREX) 1,000 mg tablet, take 1 tablet by mouth twice a day for 3 days, Disp: 6 tablet, Rfl: 2      Physical Exam:  /88 (BP Location: Left arm, Patient Position: Sitting)   Pulse 69   Temp 97 9 °F (36 6 °C)   Ht 5' 3" (1 6 m)   Wt 74 4 kg (164 lb)   SpO2 98%   BMI 29 05 kg/m²     Physical Exam  Constitutional:       General: She is not in acute distress  Appearance: Normal appearance  HENT:      Head: Normocephalic and atraumatic  Cardiovascular:      Rate and Rhythm: Normal rate and regular rhythm  Pulmonary:      Effort: Pulmonary effort is normal  No respiratory distress  Skin:     General: Skin is warm and dry  Comments: Scattered lumps palpable on b/l thighs without redness, tenderness or skin changes   Neurological:      General: No focal deficit present  Mental Status: She is alert and oriented to person, place, and time     Psychiatric:         Mood and Affect: Mood normal          Behavior: Behavior normal            Labs:  Lab Results   Component Value Date    WBC 5 40 06/04/2021    HGB 14 0 06/04/2021    HCT 45 3 06/04/2021    MCV 92 06/04/2021     06/04/2021     Lab Results   Component Value Date     05/14/2018    K 4 3 10/20/2021     10/20/2021    CO2 31 10/20/2021    ANIONGAP 12 0 05/14/2018    BUN 23 10/20/2021    CREATININE 0 72 10/20/2021    GLUF 87 10/20/2021    CALCIUM 9 1 10/20/2021    CORRECTEDCA 9 6 10/20/2021    AST 17 10/20/2021    ALT 16 10/20/2021    ALKPHOS 100 10/20/2021    PROT 7 3 05/14/2018    BILITOT 0 4 05/14/2018    EGFR 89 10/20/2021

## 2022-05-13 ENCOUNTER — CONSULT (OUTPATIENT)
Dept: SURGERY | Facility: CLINIC | Age: 67
End: 2022-05-13
Payer: COMMERCIAL

## 2022-05-13 ENCOUNTER — TELEPHONE (OUTPATIENT)
Dept: SURGERY | Facility: CLINIC | Age: 67
End: 2022-05-13

## 2022-05-13 VITALS
RESPIRATION RATE: 14 BRPM | OXYGEN SATURATION: 98 % | DIASTOLIC BLOOD PRESSURE: 70 MMHG | SYSTOLIC BLOOD PRESSURE: 120 MMHG | HEIGHT: 63 IN | TEMPERATURE: 97.5 F | BODY MASS INDEX: 28.88 KG/M2 | HEART RATE: 60 BPM | WEIGHT: 163 LBS

## 2022-05-13 DIAGNOSIS — D17.20 LIPOMA OF THIGH: Primary | ICD-10-CM

## 2022-05-13 DIAGNOSIS — R22.43 SKIN LUMP OF LEG, BILATERAL: ICD-10-CM

## 2022-05-13 PROBLEM — M25.9 JOINT DISORDER OF PELVIS OR THIGH: Status: RESOLVED | Noted: 2017-04-20 | Resolved: 2022-05-13

## 2022-05-13 PROBLEM — R29.898 LEG WEAKNESS, BILATERAL: Status: RESOLVED | Noted: 2017-04-19 | Resolved: 2022-05-13

## 2022-05-13 PROBLEM — M99.04 SEGMENTAL AND SOMATIC DYSFUNCTION OF SACRAL REGION: Status: RESOLVED | Noted: 2017-04-20 | Resolved: 2022-05-13

## 2022-05-13 PROBLEM — R35.1 NOCTURIA: Status: RESOLVED | Noted: 2017-03-13 | Resolved: 2022-05-13

## 2022-05-13 PROBLEM — M79.662 PAIN IN BOTH LOWER LEGS: Status: RESOLVED | Noted: 2017-03-13 | Resolved: 2022-05-13

## 2022-05-13 PROBLEM — M79.89 LEG SWELLING: Status: RESOLVED | Noted: 2018-06-16 | Resolved: 2022-05-13

## 2022-05-13 PROBLEM — M79.661 PAIN IN BOTH LOWER LEGS: Status: RESOLVED | Noted: 2017-03-13 | Resolved: 2022-05-13

## 2022-05-13 PROCEDURE — 3074F SYST BP LT 130 MM HG: CPT | Performed by: SURGERY

## 2022-05-13 PROCEDURE — 3078F DIAST BP <80 MM HG: CPT | Performed by: SURGERY

## 2022-05-13 PROCEDURE — 99204 OFFICE O/P NEW MOD 45 MIN: CPT | Performed by: SURGERY

## 2022-05-13 PROCEDURE — 1036F TOBACCO NON-USER: CPT | Performed by: SURGERY

## 2022-05-13 RX ORDER — SODIUM CHLORIDE, SODIUM LACTATE, POTASSIUM CHLORIDE, CALCIUM CHLORIDE 600; 310; 30; 20 MG/100ML; MG/100ML; MG/100ML; MG/100ML
125 INJECTION, SOLUTION INTRAVENOUS CONTINUOUS
Status: CANCELLED | OUTPATIENT
Start: 2022-05-13

## 2022-05-13 NOTE — TELEPHONE ENCOUNTER
Called patient regarding scheduling her procedure  We are looking at 05/24/2022, if that can't be done then we will schedule for 05/31/2022  I went over the pre op instructions and gave her all information  Once we have the date I will mail information out  to the patient, she is aware I will call her back on Monday (05/23/2022) with a definitive date

## 2022-05-13 NOTE — H&P (VIEW-ONLY)
Assessment/Plan:    Lipoma of thigh  The patient is a pleasant 19-year-old female presenting with multiple bilateral thigh lipomas that are physically and psychologically distressing to her for which she now desires definitive treatment by excision  On physical examination she is a pleasant well-nourished well-developed female in no acute distress  Awake alert oriented  Competent reliable as a historian  She has multiple palpable subcutaneous masses consistent with the diagnosis of by laterally over the anterior surface of both thighs  Given the number and size of the lipomas I have advised excision under anesthesia  The options benefits risks alternatives the procedure were reviewed  Specific risks related to anesthesia, bleeding, infection and recurrent lipoma formation were all reviewed  The patient understands the benign nature of the condition and that surgery is not mandatory  All questions answered to the satisfaction of the patient and informed consent obtained to proceed  Subjective:      Patient ID: Sherren Orn is a 74 y o  female  Patient came in today for a lumps on both of her thigh that she had for a while but they seem to be getting bigger  The lumps only hurt when she press down on them  No fevers or chills  Preet KEE MA      The following portions of the patient's history were reviewed and updated as appropriate: allergies, current medications, past family history, past medical history, past social history, past surgical history and problem list     Review of Systems   Constitutional: Negative for chills and fever  HENT: Negative for ear pain and sore throat  Eyes: Negative for pain and visual disturbance  Respiratory: Negative for cough and shortness of breath  Cardiovascular: Negative for chest pain and palpitations  Gastrointestinal: Negative for abdominal pain and vomiting  Genitourinary: Negative for dysuria and hematuria     Musculoskeletal: Negative for arthralgias and back pain  Skin: Negative for color change and rash  Neurological: Negative for seizures and syncope  All other systems reviewed and are negative  Objective:      /70 (BP Location: Left arm, Patient Position: Sitting, Cuff Size: Large)   Pulse 60   Temp 97 5 °F (36 4 °C) (Temporal)   Resp 14   Ht 5' 3" (1 6 m)   Wt 73 9 kg (163 lb)   SpO2 98%   BMI 28 87 kg/m²          Physical Exam  Constitutional:       Appearance: She is well-developed  HENT:      Head: Normocephalic and atraumatic  Eyes:      Conjunctiva/sclera: Conjunctivae normal       Pupils: Pupils are equal, round, and reactive to light  Cardiovascular:      Rate and Rhythm: Normal rate and regular rhythm  Pulmonary:      Effort: Pulmonary effort is normal       Breath sounds: Normal breath sounds  Abdominal:      General: Bowel sounds are normal       Palpations: Abdomen is soft  Musculoskeletal:         General: Normal range of motion  Cervical back: Normal range of motion and neck supple  Comments: Bilateral thigh with multiple subcutaneous masses consistent with the diagnosis of lipoma   Skin:     General: Skin is warm and dry  Neurological:      Mental Status: She is alert and oriented to person, place, and time  Psychiatric:         Behavior: Behavior normal          Thought Content:  Thought content normal          Judgment: Judgment normal

## 2022-05-13 NOTE — LETTER
May 13, 2022     Arvind Khan PA-C  [address] [facility] [address]    Patient: Cady Payton   YOB: 1955   Date of Visit: 5/13/2022       Dear Dr Lennox Maas: Thank you for referring Cady Payton to me for evaluation  Below are my notes for this consultation  If you have questions, please do not hesitate to call me  I look forward to following your patient along with you  Sincerely,        Lisa Paez MD        CC: No Recipients  Lisa Paez MD  5/13/2022 12:33 PM  Sign when Signing Visit  Assessment/Plan:    Lipoma of thigh  The patient is a pleasant 81-year-old female presenting with multiple bilateral thigh lipomas that are physically and psychologically distressing to her for which she now desires definitive treatment by excision  On physical examination she is a pleasant well-nourished well-developed female in no acute distress  Awake alert oriented  Competent reliable as a historian  She has multiple palpable subcutaneous masses consistent with the diagnosis of by laterally over the anterior surface of both thighs  Given the number and size of the lipomas I have advised excision under anesthesia  The options benefits risks alternatives the procedure were reviewed  Specific risks related to anesthesia, bleeding, infection and recurrent lipoma formation were all reviewed  The patient understands the benign nature of the condition and that surgery is not mandatory  All questions answered to the satisfaction of the patient and informed consent obtained to proceed  Subjective:      Patient ID: Cady Payton is a 74 y o  female  Patient came in today for a lumps on both of her thigh that she had for a while but they seem to be getting bigger  The lumps only hurt when she press down on them  No fevers or chills  Preet KEE MA      The following portions of the patient's history were reviewed and updated as appropriate: allergies, current medications, past family history, past medical history, past social history, past surgical history and problem list     Review of Systems   Constitutional: Negative for chills and fever  HENT: Negative for ear pain and sore throat  Eyes: Negative for pain and visual disturbance  Respiratory: Negative for cough and shortness of breath  Cardiovascular: Negative for chest pain and palpitations  Gastrointestinal: Negative for abdominal pain and vomiting  Genitourinary: Negative for dysuria and hematuria  Musculoskeletal: Negative for arthralgias and back pain  Skin: Negative for color change and rash  Neurological: Negative for seizures and syncope  All other systems reviewed and are negative  Objective:      /70 (BP Location: Left arm, Patient Position: Sitting, Cuff Size: Large)   Pulse 60   Temp 97 5 °F (36 4 °C) (Temporal)   Resp 14   Ht 5' 3" (1 6 m)   Wt 73 9 kg (163 lb)   SpO2 98%   BMI 28 87 kg/m²          Physical Exam  Constitutional:       Appearance: She is well-developed  HENT:      Head: Normocephalic and atraumatic  Eyes:      Conjunctiva/sclera: Conjunctivae normal       Pupils: Pupils are equal, round, and reactive to light  Cardiovascular:      Rate and Rhythm: Normal rate and regular rhythm  Pulmonary:      Effort: Pulmonary effort is normal       Breath sounds: Normal breath sounds  Abdominal:      General: Bowel sounds are normal       Palpations: Abdomen is soft  Musculoskeletal:         General: Normal range of motion  Cervical back: Normal range of motion and neck supple  Comments: Bilateral thigh with multiple subcutaneous masses consistent with the diagnosis of lipoma   Skin:     General: Skin is warm and dry  Neurological:      Mental Status: She is alert and oriented to person, place, and time  Psychiatric:         Behavior: Behavior normal          Thought Content:  Thought content normal          Judgment: Judgment normal

## 2022-05-13 NOTE — ASSESSMENT & PLAN NOTE
The patient is a pleasant 72-year-old female presenting with multiple bilateral thigh lipomas that are physically and psychologically distressing to her for which she now desires definitive treatment by excision  On physical examination she is a pleasant well-nourished well-developed female in no acute distress  Awake alert oriented  Competent reliable as a historian  She has multiple palpable subcutaneous masses consistent with the diagnosis of by laterally over the anterior surface of both thighs  Given the number and size of the lipomas I have advised excision under anesthesia  The options benefits risks alternatives the procedure were reviewed  Specific risks related to anesthesia, bleeding, infection and recurrent lipoma formation were all reviewed  The patient understands the benign nature of the condition and that surgery is not mandatory  All questions answered to the satisfaction of the patient and informed consent obtained to proceed

## 2022-05-13 NOTE — H&P
Assessment/Plan:    Lipoma of thigh  The patient is a pleasant 44-year-old female presenting with multiple bilateral thigh lipomas that are physically and psychologically distressing to her for which she now desires definitive treatment by excision  On physical examination she is a pleasant well-nourished well-developed female in no acute distress  Awake alert oriented  Competent reliable as a historian  She has multiple palpable subcutaneous masses consistent with the diagnosis of by laterally over the anterior surface of both thighs  Given the number and size of the lipomas I have advised excision under anesthesia  The options benefits risks alternatives the procedure were reviewed  Specific risks related to anesthesia, bleeding, infection and recurrent lipoma formation were all reviewed  The patient understands the benign nature of the condition and that surgery is not mandatory  All questions answered to the satisfaction of the patient and informed consent obtained to proceed  Subjective:      Patient ID: Jammie Angelo is a 74 y o  female  Patient came in today for a lumps on both of her thigh that she had for a while but they seem to be getting bigger  The lumps only hurt when she press down on them  No fevers or chills  Preet KEE MA      The following portions of the patient's history were reviewed and updated as appropriate: allergies, current medications, past family history, past medical history, past social history, past surgical history and problem list     Review of Systems   Constitutional: Negative for chills and fever  HENT: Negative for ear pain and sore throat  Eyes: Negative for pain and visual disturbance  Respiratory: Negative for cough and shortness of breath  Cardiovascular: Negative for chest pain and palpitations  Gastrointestinal: Negative for abdominal pain and vomiting  Genitourinary: Negative for dysuria and hematuria     Musculoskeletal: Negative for arthralgias and back pain  Skin: Negative for color change and rash  Neurological: Negative for seizures and syncope  All other systems reviewed and are negative  Objective:      /70 (BP Location: Left arm, Patient Position: Sitting, Cuff Size: Large)   Pulse 60   Temp 97 5 °F (36 4 °C) (Temporal)   Resp 14   Ht 5' 3" (1 6 m)   Wt 73 9 kg (163 lb)   SpO2 98%   BMI 28 87 kg/m²          Physical Exam  Constitutional:       Appearance: She is well-developed  HENT:      Head: Normocephalic and atraumatic  Eyes:      Conjunctiva/sclera: Conjunctivae normal       Pupils: Pupils are equal, round, and reactive to light  Cardiovascular:      Rate and Rhythm: Normal rate and regular rhythm  Pulmonary:      Effort: Pulmonary effort is normal       Breath sounds: Normal breath sounds  Abdominal:      General: Bowel sounds are normal       Palpations: Abdomen is soft  Musculoskeletal:         General: Normal range of motion  Cervical back: Normal range of motion and neck supple  Comments: Bilateral thigh with multiple subcutaneous masses consistent with the diagnosis of lipoma   Skin:     General: Skin is warm and dry  Neurological:      Mental Status: She is alert and oriented to person, place, and time  Psychiatric:         Behavior: Behavior normal          Thought Content:  Thought content normal          Judgment: Judgment normal

## 2022-05-13 NOTE — PROGRESS NOTES
Assessment/Plan:    Lipoma of thigh  The patient is a pleasant 72-year-old female presenting with multiple bilateral thigh lipomas that are physically and psychologically distressing to her for which she now desires definitive treatment by excision  On physical examination she is a pleasant well-nourished well-developed female in no acute distress  Awake alert oriented  Competent reliable as a historian  She has multiple palpable subcutaneous masses consistent with the diagnosis of by laterally over the anterior surface of both thighs  Given the number and size of the lipomas I have advised excision under anesthesia  The options benefits risks alternatives the procedure were reviewed  Specific risks related to anesthesia, bleeding, infection and recurrent lipoma formation were all reviewed  The patient understands the benign nature of the condition and that surgery is not mandatory  All questions answered to the satisfaction of the patient and informed consent obtained to proceed  Subjective:      Patient ID: Ismael De Dios is a 74 y o  female  Patient came in today for a lumps on both of her thigh that she had for a while but they seem to be getting bigger  The lumps only hurt when she press down on them  No fevers or chills  Preet KEE MA      The following portions of the patient's history were reviewed and updated as appropriate: allergies, current medications, past family history, past medical history, past social history, past surgical history and problem list     Review of Systems   Constitutional: Negative for chills and fever  HENT: Negative for ear pain and sore throat  Eyes: Negative for pain and visual disturbance  Respiratory: Negative for cough and shortness of breath  Cardiovascular: Negative for chest pain and palpitations  Gastrointestinal: Negative for abdominal pain and vomiting  Genitourinary: Negative for dysuria and hematuria     Musculoskeletal: Negative for arthralgias and back pain  Skin: Negative for color change and rash  Neurological: Negative for seizures and syncope  All other systems reviewed and are negative  Objective:      /70 (BP Location: Left arm, Patient Position: Sitting, Cuff Size: Large)   Pulse 60   Temp 97 5 °F (36 4 °C) (Temporal)   Resp 14   Ht 5' 3" (1 6 m)   Wt 73 9 kg (163 lb)   SpO2 98%   BMI 28 87 kg/m²          Physical Exam  Constitutional:       Appearance: She is well-developed  HENT:      Head: Normocephalic and atraumatic  Eyes:      Conjunctiva/sclera: Conjunctivae normal       Pupils: Pupils are equal, round, and reactive to light  Cardiovascular:      Rate and Rhythm: Normal rate and regular rhythm  Pulmonary:      Effort: Pulmonary effort is normal       Breath sounds: Normal breath sounds  Abdominal:      General: Bowel sounds are normal       Palpations: Abdomen is soft  Musculoskeletal:         General: Normal range of motion  Cervical back: Normal range of motion and neck supple  Comments: Bilateral thigh with multiple subcutaneous masses consistent with the diagnosis of lipoma   Skin:     General: Skin is warm and dry  Neurological:      Mental Status: She is alert and oriented to person, place, and time  Psychiatric:         Behavior: Behavior normal          Thought Content:  Thought content normal          Judgment: Judgment normal

## 2022-05-16 ENCOUNTER — TELEPHONE (OUTPATIENT)
Dept: SURGERY | Facility: CLINIC | Age: 67
End: 2022-05-16

## 2022-05-20 ENCOUNTER — OFFICE VISIT (OUTPATIENT)
Dept: OBGYN CLINIC | Facility: CLINIC | Age: 67
End: 2022-05-20
Payer: COMMERCIAL

## 2022-05-20 ENCOUNTER — PREP FOR PROCEDURE (OUTPATIENT)
Dept: OBGYN CLINIC | Facility: CLINIC | Age: 67
End: 2022-05-20

## 2022-05-20 ENCOUNTER — TELEPHONE (OUTPATIENT)
Dept: PAIN MEDICINE | Facility: MEDICAL CENTER | Age: 67
End: 2022-05-20

## 2022-05-20 VITALS
BODY MASS INDEX: 28.7 KG/M2 | SYSTOLIC BLOOD PRESSURE: 151 MMHG | HEIGHT: 63 IN | WEIGHT: 162 LBS | HEART RATE: 71 BPM | DIASTOLIC BLOOD PRESSURE: 95 MMHG

## 2022-05-20 DIAGNOSIS — M25.562 LEFT KNEE PAIN, UNSPECIFIED CHRONICITY: ICD-10-CM

## 2022-05-20 DIAGNOSIS — S83.282D OTHER TEAR OF LATERAL MENISCUS OF LEFT KNEE, UNSPECIFIED WHETHER OLD OR CURRENT TEAR, SUBSEQUENT ENCOUNTER: Primary | ICD-10-CM

## 2022-05-20 DIAGNOSIS — Z01.812 PRE-OPERATIVE LABORATORY EXAMINATION: ICD-10-CM

## 2022-05-20 DIAGNOSIS — M23.201 OLD COMPLEX TEAR OF LATERAL MENISCUS OF LEFT KNEE: ICD-10-CM

## 2022-05-20 PROCEDURE — 3008F BODY MASS INDEX DOCD: CPT | Performed by: SURGERY

## 2022-05-20 PROCEDURE — 99213 OFFICE O/P EST LOW 20 MIN: CPT | Performed by: ORTHOPAEDIC SURGERY

## 2022-05-20 RX ORDER — CHLORHEXIDINE GLUCONATE 0.12 MG/ML
15 RINSE ORAL ONCE
Status: CANCELLED | OUTPATIENT
Start: 2022-05-20 | End: 2022-05-20

## 2022-05-20 RX ORDER — CEFAZOLIN SODIUM 2 G/50ML
2000 SOLUTION INTRAVENOUS ONCE
Status: CANCELLED | OUTPATIENT
Start: 2022-06-15 | End: 2022-05-20

## 2022-05-20 RX ORDER — CHLORHEXIDINE GLUCONATE 4 G/100ML
SOLUTION TOPICAL DAILY PRN
Status: CANCELLED | OUTPATIENT
Start: 2022-05-20

## 2022-05-20 NOTE — TELEPHONE ENCOUNTER
Spoke to pt regarding medication   Pt stated her last OV on 5/6 a new medication was discussed but nothing was sent to pharmacy      Pt inquiring if something was going to be sent over

## 2022-05-20 NOTE — TELEPHONE ENCOUNTER
Unfortunately, I am not sure what medication she is talking about  I specifically commented in my note that she has been on a number of medications without significant improvement  I am not sure what other medication I would have recommended, but there was not a specific medicine that I was planning to sent to her pharmacy  She can take Tylenol up to 3000 mg a day  Keep scheduled appointment for injection

## 2022-05-20 NOTE — H&P (VIEW-ONLY)
Assessment/Plan:    No problem-specific Assessment & Plan notes found for this encounter  Diagnoses and all orders for this visit:    Left knee pain, unspecified chronicity  -     XR knee 3 vw left non injury; Future    Old complex tear of lateral meniscus of left knee          Treatment options were discussed with the patient in great detail  She has opted for elective arthroscopy of her left knee  All risks, complications, benefits were discussed with the patient in great detail including bleeding, infection, blood clots, pain, stiffness, neurovascular damage, fractures, dislocations, the possibility of loss of life limb for surgery, heart attack, stroke, etcetera  Her surgery scheduled for Toshia 15, 2022    Subjective:      Patient ID: Juanis Bunch is a 74 y o  female  HPI    The patient has a history of a tear of the lateral meniscus of her left knee has been present for least the past 3 years  She did have an MRI at that time which did confirm this  She has had multiple injections in the past by other treating physicians  Unfortunately, the injections do not last for an extended period time  She wants proceed with elective left knee arthroscopy    The following portions of the patient's history were reviewed and updated as appropriate: allergies, current medications, past family history, past medical history, past social history, past surgical history and problem list     Review of Systems   Constitutional: Negative for chills, fever and unexpected weight change  HENT: Negative for hearing loss, nosebleeds and sore throat  Eyes: Negative for pain, redness and visual disturbance  Respiratory: Negative for cough, shortness of breath and wheezing  Cardiovascular: Negative for chest pain, palpitations and leg swelling  Gastrointestinal: Negative for abdominal pain, nausea and vomiting  Endocrine: Negative for polydipsia and polyuria     Genitourinary: Negative for dysuria and hematuria  Musculoskeletal: Positive for arthralgias, gait problem and myalgias  Negative for back pain, joint swelling, neck pain and neck stiffness  As noted in HPI   Skin: Negative for rash and wound  Neurological: Negative for dizziness, numbness and headaches  Psychiatric/Behavioral: Negative for decreased concentration and suicidal ideas  The patient is not nervous/anxious  Objective:      /95 (BP Location: Left arm, Patient Position: Sitting, Cuff Size: Standard)   Pulse 71   Ht 5' 3" (1 6 m)   Wt 73 5 kg (162 lb)   BMI 28 70 kg/m²          Physical Exam      Left lower extremity is neurovascular intact  Toes are pink and mobile  Compartments are soft  Range of motion of her knee is from 5-120 degrees  There is a negative Lachman's, drawer, pivot shift test   There is medial joint tenderness, more pronounced lateral joint tenderness, and patellofemoral crepitation  A positive Mago's is present    Negative Homans    X-rays show no fractures or dislocations  MRI performed in April 2019 was consistent with a complex tear of the lateral meniscus

## 2022-05-20 NOTE — TELEPHONE ENCOUNTER
Pt called stating she was offered a new script during her ov but nothing was sent to the pharmacy for her     Please advise what it was because pt does not remember       Pt can be reached at [phone]

## 2022-05-20 NOTE — PROGRESS NOTES
Assessment/Plan:    No problem-specific Assessment & Plan notes found for this encounter  Diagnoses and all orders for this visit:    Left knee pain, unspecified chronicity  -     XR knee 3 vw left non injury; Future    Old complex tear of lateral meniscus of left knee          Treatment options were discussed with the patient in great detail  She has opted for elective arthroscopy of her left knee  All risks, complications, benefits were discussed with the patient in great detail including bleeding, infection, blood clots, pain, stiffness, neurovascular damage, fractures, dislocations, the possibility of loss of life limb for surgery, heart attack, stroke, etcetera  Her surgery scheduled for Toshia 15, 2022    Subjective:      Patient ID: Serena Crane is a 74 y o  female  HPI    The patient has a history of a tear of the lateral meniscus of her left knee has been present for least the past 3 years  She did have an MRI at that time which did confirm this  She has had multiple injections in the past by other treating physicians  Unfortunately, the injections do not last for an extended period time  She wants proceed with elective left knee arthroscopy    The following portions of the patient's history were reviewed and updated as appropriate: allergies, current medications, past family history, past medical history, past social history, past surgical history and problem list     Review of Systems   Constitutional: Negative for chills, fever and unexpected weight change  HENT: Negative for hearing loss, nosebleeds and sore throat  Eyes: Negative for pain, redness and visual disturbance  Respiratory: Negative for cough, shortness of breath and wheezing  Cardiovascular: Negative for chest pain, palpitations and leg swelling  Gastrointestinal: Negative for abdominal pain, nausea and vomiting  Endocrine: Negative for polydipsia and polyuria     Genitourinary: Negative for dysuria and hematuria  Musculoskeletal: Positive for arthralgias, gait problem and myalgias  Negative for back pain, joint swelling, neck pain and neck stiffness  As noted in HPI   Skin: Negative for rash and wound  Neurological: Negative for dizziness, numbness and headaches  Psychiatric/Behavioral: Negative for decreased concentration and suicidal ideas  The patient is not nervous/anxious  Objective:      /95 (BP Location: Left arm, Patient Position: Sitting, Cuff Size: Standard)   Pulse 71   Ht 5' 3" (1 6 m)   Wt 73 5 kg (162 lb)   BMI 28 70 kg/m²          Physical Exam      Left lower extremity is neurovascular intact  Toes are pink and mobile  Compartments are soft  Range of motion of her knee is from 5-120 degrees  There is a negative Lachman's, drawer, pivot shift test   There is medial joint tenderness, more pronounced lateral joint tenderness, and patellofemoral crepitation  A positive Mago's is present    Negative Homans    X-rays show no fractures or dislocations  MRI performed in April 2019 was consistent with a complex tear of the lateral meniscus

## 2022-05-23 ENCOUNTER — ANESTHESIA EVENT (OUTPATIENT)
Dept: PERIOP | Facility: HOSPITAL | Age: 67
End: 2022-05-23
Payer: COMMERCIAL

## 2022-05-24 NOTE — PRE-PROCEDURE INSTRUCTIONS
Pre-Surgery Instructions:   Medication Instructions    atorvastatin (LIPITOR) 10 mg tablet Hold day of surgery   CALCIUM PO Hold day of surgery   famotidine (PEPCID) 20 mg tablet Take day of surgery   losartan (COZAAR) 100 MG tablet Hold day of surgery   traZODone (DESYREL) 50 mg tablet Take night before surgery    valACYclovir (VALTREX) 1,000 mg tablet Hold day of surgery  NPO after midnight, meds in am with sips of water  Understands when to expect preop phone call  Remove all jewelry  Advised of visitation policy  Before your operation, you play an important role in decreasing your risk for infection by washing with special antiseptic soap  This is an effective way to reduce bacteria on the skin which may help to prevent infections at the surgical site  Please read the following directions in advance  1  In the week before your operation purchase a 4 ounce bottle of antiseptic soap containing chlorhexidine gluconate 4%  Some brand names include: Aplicare, Endure, and Hibiclens  The cost is usually less than $5 00  · For your convenience, the [address] [place] [address] carries the soap  · It may also be available at your doctor's office or pre-admission testing center, and at most retail pharmacies  · If you are allergic or sensitive to soaps containing chlorhexidine gluconate (CHG), please let your doctor know so another antiseptic soap can be suggested  · CHG antiseptic soap is for external use only  2      The day before your operation follow these directions carefully to get ready  · Place clean lines (sheets) on your bed; you should sleep on clean sheets after your evening shower  · Get clean towels and washcloths ready - you need enough for 2 showers  · Set aside clean underwear, pajamas, and clothing to wear after the shower  Reminders:  · DO NOT use any other soap or body rinse on your skin during or after the antiseptic showers    · DO NOT use lotion , powder, deodorant, or perfume/aftershave of any kind on your skin after your antiseptic shower  · DO NOT shave any body parts in the 24 hours/the day before your operation  · DO NOT get the antiseptic soap in your eyes, ears, nose, mouth, or vaginal area  3      You will need to shower the night before AND the morning of your Surgery  Shower 1:  · The evening before your operation, take the fist shower  · First, shampoo your hair with regular shampoo and rinse it completely before you use the anitseptic soap  After washing and rinsing your hair, rinse your body  · Next, use a clean wash cloth to apply the antiseptic soap and wash your body from the neck down to your toes using 1/2 bottle of the antiseptic soap  You will use the other 1/2 bottle for the second shower  · Clean the area where your incision will be; later this area well for about 2 minutes  · If you ar having head or neck surgery, wash areas with the antiseptic soap  · Rinse yourself completely with running water  · Use a clean towel to dry off  · Wear clean underwear and clothing/pajamas  Shower 2:  · The Morning of your operation, take the second shower following the same steps as Shower 1 using the second 1/2 of the bottle of antiseptic soap  · Use clean cloths and towels to was and dry yourself off  · Wear clean underwear and clothing

## 2022-05-26 ENCOUNTER — HOSPITAL ENCOUNTER (OUTPATIENT)
Facility: HOSPITAL | Age: 67
Setting detail: OUTPATIENT SURGERY
Discharge: HOME | End: 2022-05-26
Attending: SURGERY | Admitting: SURGERY
Payer: COMMERCIAL

## 2022-05-26 ENCOUNTER — ANESTHESIA (OUTPATIENT)
Dept: PERIOP | Facility: HOSPITAL | Age: 67
End: 2022-05-26
Payer: COMMERCIAL

## 2022-05-26 VITALS
DIASTOLIC BLOOD PRESSURE: 57 MMHG | HEIGHT: 63 IN | RESPIRATION RATE: 18 BRPM | SYSTOLIC BLOOD PRESSURE: 129 MMHG | WEIGHT: 162 LBS | BODY MASS INDEX: 28.7 KG/M2 | OXYGEN SATURATION: 98 % | HEART RATE: 88 BPM | TEMPERATURE: 97.9 F

## 2022-05-26 DIAGNOSIS — D17.20 LIPOMA OF THIGH: ICD-10-CM

## 2022-05-26 DIAGNOSIS — M79.18 MYOFASCIAL PAIN: Primary | ICD-10-CM

## 2022-05-26 PROCEDURE — 88304 TISSUE EXAM BY PATHOLOGIST: CPT | Performed by: PATHOLOGY

## 2022-05-26 PROCEDURE — 27327 EXC THIGH/KNEE LES SC < 3 CM: CPT | Performed by: SURGERY

## 2022-05-26 PROCEDURE — NC001 PR NO CHARGE: Performed by: PHYSICIAN ASSISTANT

## 2022-05-26 PROCEDURE — 27337 EXC THIGH/KNEE LES SC 3 CM/>: CPT | Performed by: SURGERY

## 2022-05-26 RX ORDER — ACETAMINOPHEN 325 MG/1
650 TABLET ORAL EVERY 6 HOURS PRN
Status: DISCONTINUED | OUTPATIENT
Start: 2022-05-26 | End: 2022-05-26 | Stop reason: HOSPADM

## 2022-05-26 RX ORDER — FENTANYL CITRATE/PF 50 MCG/ML
25 SYRINGE (ML) INJECTION
Status: DISCONTINUED | OUTPATIENT
Start: 2022-05-26 | End: 2022-05-26 | Stop reason: HOSPADM

## 2022-05-26 RX ORDER — HYDROCODONE BITARTRATE AND ACETAMINOPHEN 5; 325 MG/1; MG/1
1 TABLET ORAL EVERY 6 HOURS PRN
Status: DISCONTINUED | OUTPATIENT
Start: 2022-05-26 | End: 2022-05-26 | Stop reason: HOSPADM

## 2022-05-26 RX ORDER — SODIUM CHLORIDE, SODIUM LACTATE, POTASSIUM CHLORIDE, CALCIUM CHLORIDE 600; 310; 30; 20 MG/100ML; MG/100ML; MG/100ML; MG/100ML
125 INJECTION, SOLUTION INTRAVENOUS CONTINUOUS
Status: DISCONTINUED | OUTPATIENT
Start: 2022-05-26 | End: 2022-05-26

## 2022-05-26 RX ORDER — ONDANSETRON 2 MG/ML
INJECTION INTRAMUSCULAR; INTRAVENOUS AS NEEDED
Status: DISCONTINUED | OUTPATIENT
Start: 2022-05-26 | End: 2022-05-26

## 2022-05-26 RX ORDER — CEFAZOLIN SODIUM 2 G/50ML
SOLUTION INTRAVENOUS
Status: COMPLETED
Start: 2022-05-26 | End: 2022-05-26

## 2022-05-26 RX ORDER — MIDAZOLAM HYDROCHLORIDE 2 MG/2ML
INJECTION, SOLUTION INTRAMUSCULAR; INTRAVENOUS AS NEEDED
Status: DISCONTINUED | OUTPATIENT
Start: 2022-05-26 | End: 2022-05-26

## 2022-05-26 RX ORDER — SODIUM CHLORIDE, SODIUM LACTATE, POTASSIUM CHLORIDE, CALCIUM CHLORIDE 600; 310; 30; 20 MG/100ML; MG/100ML; MG/100ML; MG/100ML
125 INJECTION, SOLUTION INTRAVENOUS CONTINUOUS
Status: ACTIVE | OUTPATIENT
Start: 2022-05-26 | End: 2022-05-26

## 2022-05-26 RX ORDER — CEFAZOLIN SODIUM 2 G/50ML
2000 SOLUTION INTRAVENOUS ONCE
Status: COMPLETED | OUTPATIENT
Start: 2022-05-26 | End: 2022-05-26

## 2022-05-26 RX ORDER — KETOROLAC TROMETHAMINE 30 MG/ML
INJECTION, SOLUTION INTRAMUSCULAR; INTRAVENOUS AS NEEDED
Status: DISCONTINUED | OUTPATIENT
Start: 2022-05-26 | End: 2022-05-26

## 2022-05-26 RX ORDER — DEXAMETHASONE SODIUM PHOSPHATE 10 MG/ML
INJECTION, SOLUTION INTRAMUSCULAR; INTRAVENOUS AS NEEDED
Status: DISCONTINUED | OUTPATIENT
Start: 2022-05-26 | End: 2022-05-26

## 2022-05-26 RX ORDER — LIDOCAINE HYDROCHLORIDE 20 MG/ML
INJECTION, SOLUTION EPIDURAL; INFILTRATION; INTRACAUDAL; PERINEURAL AS NEEDED
Status: DISCONTINUED | OUTPATIENT
Start: 2022-05-26 | End: 2022-05-26

## 2022-05-26 RX ORDER — HYDROCODONE BITARTRATE AND ACETAMINOPHEN 5; 325 MG/1; MG/1
2 TABLET ORAL EVERY 6 HOURS PRN
Status: DISCONTINUED | OUTPATIENT
Start: 2022-05-26 | End: 2022-05-26 | Stop reason: HOSPADM

## 2022-05-26 RX ORDER — LIDOCAINE HYDROCHLORIDE AND EPINEPHRINE 10; 10 MG/ML; UG/ML
INJECTION, SOLUTION INFILTRATION; PERINEURAL AS NEEDED
Status: DISCONTINUED | OUTPATIENT
Start: 2022-05-26 | End: 2022-05-26 | Stop reason: HOSPADM

## 2022-05-26 RX ORDER — SODIUM CHLORIDE, SODIUM LACTATE, POTASSIUM CHLORIDE, CALCIUM CHLORIDE 600; 310; 30; 20 MG/100ML; MG/100ML; MG/100ML; MG/100ML
75 INJECTION, SOLUTION INTRAVENOUS CONTINUOUS
Status: DISCONTINUED | OUTPATIENT
Start: 2022-05-26 | End: 2022-05-26 | Stop reason: HOSPADM

## 2022-05-26 RX ORDER — PROPOFOL 10 MG/ML
INJECTION, EMULSION INTRAVENOUS AS NEEDED
Status: DISCONTINUED | OUTPATIENT
Start: 2022-05-26 | End: 2022-05-26

## 2022-05-26 RX ORDER — ONDANSETRON 2 MG/ML
4 INJECTION INTRAMUSCULAR; INTRAVENOUS ONCE AS NEEDED
Status: DISCONTINUED | OUTPATIENT
Start: 2022-05-26 | End: 2022-05-26 | Stop reason: HOSPADM

## 2022-05-26 RX ORDER — EPHEDRINE SULFATE 50 MG/ML
INJECTION INTRAVENOUS AS NEEDED
Status: DISCONTINUED | OUTPATIENT
Start: 2022-05-26 | End: 2022-05-26

## 2022-05-26 RX ORDER — HYDROCODONE BITARTRATE AND ACETAMINOPHEN 5; 325 MG/1; MG/1
1 TABLET ORAL EVERY 6 HOURS PRN
Qty: 5 TABLET | Refills: 0 | Status: SHIPPED | OUTPATIENT
Start: 2022-05-26 | End: 2022-06-05

## 2022-05-26 RX ORDER — FENTANYL CITRATE 50 UG/ML
INJECTION, SOLUTION INTRAMUSCULAR; INTRAVENOUS AS NEEDED
Status: DISCONTINUED | OUTPATIENT
Start: 2022-05-26 | End: 2022-05-26

## 2022-05-26 RX ADMIN — SODIUM CHLORIDE, SODIUM LACTATE, POTASSIUM CHLORIDE, AND CALCIUM CHLORIDE 125 ML/HR: .6; .31; .03; .02 INJECTION, SOLUTION INTRAVENOUS at 09:38

## 2022-05-26 RX ADMIN — HYDROCODONE BITARTRATE AND ACETAMINOPHEN 1 TABLET: 5; 325 TABLET ORAL at 11:11

## 2022-05-26 RX ADMIN — EPHEDRINE SULFATE 10 MG: 50 INJECTION, SOLUTION INTRAVENOUS at 09:47

## 2022-05-26 RX ADMIN — LIDOCAINE HYDROCHLORIDE 100 MG: 20 INJECTION, SOLUTION EPIDURAL; INFILTRATION; INTRACAUDAL at 09:43

## 2022-05-26 RX ADMIN — DEXAMETHASONE SODIUM PHOSPHATE 10 MG: 10 INJECTION INTRAMUSCULAR; INTRAVENOUS at 09:43

## 2022-05-26 RX ADMIN — ONDANSETRON 4 MG: 2 INJECTION INTRAMUSCULAR; INTRAVENOUS at 10:05

## 2022-05-26 RX ADMIN — MIDAZOLAM 2 MG: 1 INJECTION INTRAMUSCULAR; INTRAVENOUS at 09:37

## 2022-05-26 RX ADMIN — PROPOFOL 150 MG: 10 INJECTION, EMULSION INTRAVENOUS at 09:43

## 2022-05-26 RX ADMIN — FENTANYL CITRATE 50 MCG: 50 INJECTION, SOLUTION INTRAMUSCULAR; INTRAVENOUS at 09:43

## 2022-05-26 RX ADMIN — KETOROLAC TROMETHAMINE 15 MG: 30 INJECTION, SOLUTION INTRAMUSCULAR at 10:05

## 2022-05-26 RX ADMIN — FENTANYL CITRATE 50 MCG: 50 INJECTION, SOLUTION INTRAMUSCULAR; INTRAVENOUS at 09:56

## 2022-05-26 RX ADMIN — CEFAZOLIN SODIUM 2000 MG: 2 SOLUTION INTRAVENOUS at 09:40

## 2022-05-26 RX ADMIN — EPHEDRINE SULFATE 10 MG: 50 INJECTION, SOLUTION INTRAVENOUS at 10:10

## 2022-05-26 NOTE — OP NOTE
OPERATIVE REPORT  PATIENT NAME: Aurea Melton    :  1955  MRN: [id number]  Pt Location: [facility] ROOM 01    SURGERY DATE: 2022    Surgeon(s) and Role:     Chula Maldonado MD - Primary     * Chilo Layton PA-C - Assisting  The PA was necessary to provide expert assistance; i e  in the form of providing optimal exposure with retraction, suturing, and assistance with dissection in order to perform the most efficient operation and in order to optimize patient safety in the abscence of a qualified surgical resident  Preop Diagnosis:  Lipoma of thigh [D17 20]    Post-Op Diagnosis Codes:     * Lipoma of thigh [D17 20]    Procedure(s) (LRB):  EXCISION BIOPSY TISSUE LESION/MASS LOWER EXTREMITY (Bilateral)    Specimen(s):  ID Type Source Tests Collected by Time Destination   1 : SOFT TISSUE MASS X 5 Tissue Leg, Right TISSUE [place] MD Tanika 2022 1853    2 : SOFT TISSUE MASS X 5 Tissue Leg, Left TISSUE Ilan Sagastume MD 2022 [id number]        Estimated Blood Loss:   Minimal    Drains:  * No LDAs found *    Anesthesia Type:   General/LMA    Operative Indications:  Lipoma of thigh [D17 20]  The patient is a pleasant 61-year-old female presenting with 10 symptomatic lipomas distributed evenly over each anterior thigh, 5 on the right 5 on the left  Maximum diameter 3 cm  Excision under anesthesia is now indicated for definitive treatment  Operative Findings:  Five anterior right thigh lipomas  Five anterior left thigh lipomas  Maximum diameter 3 cm on each thigh  Minimum diameter 1 cm on each thigh  Right thigh 1  3 cm  Right thigh 2  2 cm   Right thigh 3  2 cm  Right thigh 4  2 cm   Right thigh 5  1 cm     Left thigh 1  3 cm  Left thigh 2  2 cm  Left thigh 3  2 cm  Left thigh 4  2 cm  Left thigh 5  1 cm      Complications:   None    Procedure and Technique:  Patient taken to the operating room where they are properly identified monitored and anesthetized    Skin prepped and draped  Time-out performed  Skin infiltrated with 1% lidocaine local anesthesia with epinephrine  Excision started 1st on the right than the left  Skin incised  The lipoma was easily shelled out from the subcutaneous fat  Wounds closed primarily with simple interrupted subcuticular 4 Monocryl suture  This procedure was performed 10 times in total   5 times on the right thigh 5 times on the left thigh  At the procedures conclusion the wounds dressed  Patient extubated taken to recovery in stable condition     I was present for the entire procedure    Patient Disposition:  PACU       SIGNATURE: Tiffany Jones MD  DATE: May 26, 2022  TIME: 10:35 AM

## 2022-05-26 NOTE — ANESTHESIA PREPROCEDURE EVALUATION
Procedure:  EXCISION BIOPSY TISSUE LESION/MASS LOWER EXTREMITY (Bilateral Thigh)    Relevant Problems   CARDIO   (+) Hyperlipidemia   (+) Hypertension      GI/HEPATIC   (+) Dysphagia   (+) GERD (gastroesophageal reflux disease)   (+) Gastroesophageal reflux disease   (+) Paraesophageal hernia      MUSCULOSKELETAL   (+) Primary osteoarthritis of both knees   (+) Sacroiliitis (HCC)      NEURO/PSYCH   (+) Chronic pain syndrome        ECHO 2020  Duration of exercise was 6 min  Maximal work rate was 7 METs  Maximal heart rate during stress was 173 bpm ( 110 % of maximal predicted heart rate)  Target heart rate was achieved  There was resting hypertension with a hypertensive blood pressure response to stress  There was no chest pain during stress      ECG CONCLUSIONS:  The stress ECG was negative for ischemia      BASELINE:  There were no regional wall motion abnormalities  Left ventricular size was normal   Overall left ventricular systolic function was normal      PEAK STRESS:  There were no regional wall motion abnormalities  There was an appropriate reduction in left ventricular size  There was an appropriate augmentation in LV function      ECHO CONCLUSIONS:  There was no echocardiographic evidence for stress-induced ischemia  Physical Exam    Airway    Mallampati score: I  TM Distance: >3 FB  Neck ROM: full     Dental   upper dentures,     Cardiovascular  Cardiovascular exam normal    Pulmonary  Pulmonary exam normal     Other Findings        Anesthesia Plan  ASA Score- 2     Anesthesia Type- general with ASA Monitors  Additional Monitors:   Airway Plan: LMA  Plan Factors-    Chart reviewed  EKG reviewed  Existing labs reviewed  Patient summary reviewed  Induction- intravenous  Postoperative Plan- Plan for postoperative opioid use  Planned trial extubation    Informed Consent- Anesthetic plan and risks discussed with patient  I personally reviewed this patient with the CRNA  Discussed and agreed on the Anesthesia Plan with the CRNA  Makenzie Age is a 74 y o  history of GERD presenting today for excisional bip[sy opf masses in B/L LE    NPO since last night  Took following medications none (as noted in chart)  Discussed risk and benefits of general which include and are not limited to: MI, stroke, sore throat, PONV, post- op pain  No new symptoms of CP, SOB, F, chills, N/V, dizziness, numbness or tingling, cough, and other symptoms except as noted  AQA  Consented  History of anesthesia: no personal issues/family issues

## 2022-05-26 NOTE — INTERVAL H&P NOTE
H&P reviewed  After examining the patient I find no changes in the patients condition since the H&P had been written      Vitals:    05/26/22 0918   BP: (!) 183/81   Pulse: 81   Resp: 18   Temp: 99 5 °F (37 5 °C)   SpO2: 100%

## 2022-05-26 NOTE — ANESTHESIA POSTPROCEDURE EVALUATION
Post-Op Assessment Note    CV Status:  Stable  Pain Score: 0    Pain management: adequate     Mental Status:  Arousable   Hydration Status:  Stable   PONV Controlled:  None   Airway Patency:  Patent      Post Op Vitals Reviewed: Yes      Staff: CRNA         No complications documented      BP  138/65   Temp   97 9   Pulse  93   Resp   18   SpO2   100%

## 2022-05-27 ENCOUNTER — TELEPHONE (OUTPATIENT)
Dept: SURGERY | Facility: CLINIC | Age: 67
End: 2022-05-27

## 2022-05-27 DIAGNOSIS — G47.09 OTHER INSOMNIA: ICD-10-CM

## 2022-05-27 DIAGNOSIS — E78.5 HYPERLIPIDEMIA, UNSPECIFIED HYPERLIPIDEMIA TYPE: ICD-10-CM

## 2022-05-27 RX ORDER — ATORVASTATIN CALCIUM 10 MG/1
TABLET, FILM COATED ORAL
Qty: 90 TABLET | Refills: 0 | Status: SHIPPED | OUTPATIENT
Start: 2022-05-27

## 2022-05-27 RX ORDER — TRAZODONE HYDROCHLORIDE 50 MG/1
50 TABLET ORAL
Qty: 30 TABLET | Refills: 0 | Status: SHIPPED | OUTPATIENT
Start: 2022-05-27 | End: 2022-05-31 | Stop reason: SDUPTHER

## 2022-05-27 NOTE — TELEPHONE ENCOUNTER
Patient called to stated that she was giving Loli Slain and she stated that she can not take this medication  I placed the patient on hold and spoke with Dr Lisa Calixto  He stated that she can take tylenol or ibuprofen for the pain  Patient was agreeable

## 2022-05-27 NOTE — TELEPHONE ENCOUNTER
Pt states that 1 50mg tablet isn't working and that she needs a new script sent to pharmacy for 2 tablets at bed   Please advise

## 2022-05-31 ENCOUNTER — TELEPHONE (OUTPATIENT)
Dept: BARIATRICS | Facility: CLINIC | Age: 67
End: 2022-05-31

## 2022-05-31 DIAGNOSIS — G47.09 OTHER INSOMNIA: ICD-10-CM

## 2022-05-31 RX ORDER — TRAZODONE HYDROCHLORIDE 50 MG/1
100 TABLET ORAL
Qty: 30 TABLET | Refills: 0 | Status: SHIPPED | OUTPATIENT
Start: 2022-05-31 | End: 2022-06-08

## 2022-05-31 RX ORDER — TRAZODONE HYDROCHLORIDE 50 MG/1
50 TABLET ORAL
Qty: 30 TABLET | Refills: 0 | Status: CANCELLED | OUTPATIENT
Start: 2022-05-31

## 2022-05-31 NOTE — TELEPHONE ENCOUNTER
Reached out to Pt re: message received  Pt requested a sooner appt with Dr Nina Fowler (original appt 7/7)  After investigating Dr Dominga Sherwood schedule, informed Pt that 7/7 is currently his soonest available appt  Suggested adding Pt to Dr Dominga Sherwood wait list  Also encouraged Pt to call the office to inquire between now and the 7/7  Pt was agreeable to this plan

## 2022-06-01 ENCOUNTER — APPOINTMENT (OUTPATIENT)
Dept: LAB | Facility: CLINIC | Age: 67
End: 2022-06-01
Payer: COMMERCIAL

## 2022-06-01 DIAGNOSIS — Z01.812 PRE-OPERATIVE LABORATORY EXAMINATION: ICD-10-CM

## 2022-06-01 LAB
ALBUMIN SERPL BCP-MCNC: 3.4 G/DL (ref 3.5–5)
ALP SERPL-CCNC: 95 U/L (ref 46–116)
ALT SERPL W P-5'-P-CCNC: 17 U/L (ref 12–78)
ANION GAP SERPL CALCULATED.3IONS-SCNC: 8 MMOL/L (ref 4–13)
AST SERPL W P-5'-P-CCNC: 16 U/L (ref 5–45)
BASOPHILS # BLD AUTO: 0.02 THOUSANDS/ΜL (ref 0–0.1)
BASOPHILS NFR BLD AUTO: 0 % (ref 0–1)
BILIRUB SERPL-MCNC: 0.49 MG/DL (ref 0.2–1)
BUN SERPL-MCNC: 22 MG/DL (ref 5–25)
CALCIUM ALBUM COR SERPL-MCNC: 10 MG/DL (ref 8.3–10.1)
CALCIUM SERPL-MCNC: 9.5 MG/DL (ref 8.3–10.1)
CHLORIDE SERPL-SCNC: 109 MMOL/L (ref 100–108)
CO2 SERPL-SCNC: 26 MMOL/L (ref 21–32)
CREAT SERPL-MCNC: 0.72 MG/DL (ref 0.6–1.3)
EOSINOPHIL # BLD AUTO: 0.46 THOUSAND/ΜL (ref 0–0.61)
EOSINOPHIL NFR BLD AUTO: 7 % (ref 0–6)
ERYTHROCYTE [DISTWIDTH] IN BLOOD BY AUTOMATED COUNT: 12.5 % (ref 11.6–15.1)
GFR SERPL CREATININE-BSD FRML MDRD: 88 ML/MIN/1.73SQ M
GLUCOSE P FAST SERPL-MCNC: 94 MG/DL (ref 65–99)
HCT VFR BLD AUTO: 40.8 % (ref 34.8–46.1)
HGB BLD-MCNC: 13.4 G/DL (ref 11.5–15.4)
IMM GRANULOCYTES # BLD AUTO: 0.03 THOUSAND/UL (ref 0–0.2)
IMM GRANULOCYTES NFR BLD AUTO: 1 % (ref 0–2)
LYMPHOCYTES # BLD AUTO: 1.6 THOUSANDS/ΜL (ref 0.6–4.47)
LYMPHOCYTES NFR BLD AUTO: 24 % (ref 14–44)
MCH RBC QN AUTO: 28.7 PG (ref 26.8–34.3)
MCHC RBC AUTO-ENTMCNC: 32.8 G/DL (ref 31.4–37.4)
MCV RBC AUTO: 87 FL (ref 82–98)
MONOCYTES # BLD AUTO: 0.6 THOUSAND/ΜL (ref 0.17–1.22)
MONOCYTES NFR BLD AUTO: 9 % (ref 4–12)
NEUTROPHILS # BLD AUTO: 3.9 THOUSANDS/ΜL (ref 1.85–7.62)
NEUTS SEG NFR BLD AUTO: 59 % (ref 43–75)
NRBC BLD AUTO-RTO: 0 /100 WBCS
PLATELET # BLD AUTO: 325 THOUSANDS/UL (ref 149–390)
PMV BLD AUTO: 10.7 FL (ref 8.9–12.7)
POTASSIUM SERPL-SCNC: 4.1 MMOL/L (ref 3.5–5.3)
PROT SERPL-MCNC: 7.1 G/DL (ref 6.4–8.2)
RBC # BLD AUTO: 4.67 MILLION/UL (ref 3.81–5.12)
SODIUM SERPL-SCNC: 143 MMOL/L (ref 136–145)
WBC # BLD AUTO: 6.61 THOUSAND/UL (ref 4.31–10.16)

## 2022-06-01 PROCEDURE — 36415 COLL VENOUS BLD VENIPUNCTURE: CPT

## 2022-06-01 PROCEDURE — 80053 COMPREHEN METABOLIC PANEL: CPT

## 2022-06-01 PROCEDURE — 85025 COMPLETE CBC W/AUTO DIFF WBC: CPT

## 2022-06-03 ENCOUNTER — OFFICE VISIT (OUTPATIENT)
Dept: GASTROENTEROLOGY | Facility: MEDICAL CENTER | Age: 67
End: 2022-06-03
Payer: COMMERCIAL

## 2022-06-03 VITALS
SYSTOLIC BLOOD PRESSURE: 164 MMHG | BODY MASS INDEX: 28.7 KG/M2 | DIASTOLIC BLOOD PRESSURE: 90 MMHG | WEIGHT: 162 LBS | TEMPERATURE: 98.7 F | HEART RATE: 68 BPM

## 2022-06-03 DIAGNOSIS — Z80.0 FAMILY HISTORY OF COLON CANCER: ICD-10-CM

## 2022-06-03 DIAGNOSIS — R14.3 FLATULENCE: ICD-10-CM

## 2022-06-03 DIAGNOSIS — R13.10 DYSPHAGIA, UNSPECIFIED TYPE: Primary | ICD-10-CM

## 2022-06-03 DIAGNOSIS — R14.0 BLOATING: ICD-10-CM

## 2022-06-03 PROCEDURE — 99214 OFFICE O/P EST MOD 30 MIN: CPT | Performed by: INTERNAL MEDICINE

## 2022-06-03 NOTE — PATIENT INSTRUCTIONS
Avoid dairy for 2 weeks and keep a diary to see how your symptoms do  FODMAP diet  Try to limit 1 - 2 foods at a time and write down your symptoms  If symptoms are not improved in 3 - 4 weeks please send me a message through [phone] and let me know  We can consider starting an antibiotic at that time  Chew food well  Moisten foods  Probiotic : Align  Gas-X 2 - 3 times/ day  Colonoscopy in the future  Exercise including walking and yoga - [employer] or [employer] (yoga for beginners)   Sleep evaluation

## 2022-06-03 NOTE — PROGRESS NOTES
Outpatient Follow up  [address]    [facility] [address]  Margaret Elizabeth MD  Ph : [phone]  Fax : [phone]  Mobile : [phone]  Email : [email or web address]  Also available on [employer]    Ancil Glance 74 y o  female MRN: [id number]    PCP: Lizy Cassidy PA-C  Referring: No referring provider defined for this encounter  Ancil Glance presented for a follow up visit  My recommendations are included  Please do not hesitate to contact me with any questions you may have  ASSESSMENT AND PLAN:      No problem-specific Assessment & Plan notes found for this encounter  Diagnoses and all orders for this visit:    Dysphagia, unspecified type    Family history of colon cancer    Bloating    Flatulence         1  Dysphagia status post hernia repair with Nissen in 2020  Symptoms of dysphagia  EGD was done in April which did not show any pathology  Dilation was performed  I discussed with her that since she does not have any food impactions and the symptoms are not all the time and only with certain types of foods she would benefit most from try to take small bites, chew food well and moisten her food  She will try this and let us know if no significant improvement  If not then we may need to consider doing a manometry  2  Dyspepsia and bloating symptoms  Gastric emptying study normal   Her main symptoms are bloating  If symptoms are not improved with following a FODMAP diet, dairy free then I may consider using either Xifaxan or doxycycline for SIBO  If that does not improve any symptoms then may consider doing imaging study with CT  3  Colonoscopy this year due to family h/o colon cancer (mother and sister)  We will plan this in the future as well  Discussed genetic counselor evaluation  We have decided to hold off on that at this time until her next colonoscopy    She would like to hold off on scheduling the next colonoscopy at this time due to her surgeries there are scheduled  She will reach out to us again  Recommendations :   1  Avoid dairy for 2 weeks and keep a diary to see how your symptoms do    2  FODMAP diet  Try to limit 1 - 2 foods at a time and write down your symptoms  3  If symptoms are not improved in 3 - 4 weeks please send me a message through [address] and let me know  We can consider starting an antibiotic at that time  4  Chew food well  5  Moisten foods  6  Probiotic : Align  7  Gas-X 2 - 3 times/ day  8  Colonoscopy in the future  9  Exercise including walking and yoga - [employer] or [employer] (yoga for beginners)   10  Sleep evaluation   ______________________________________________________________________    SUBJECTIVE:  61 y o  female with history of PEH repair with Nissen with Dr Maria L Carter on 5/19/2020, presents to the office today for follow up  She had an EGD and UGI performed on 12/8/21 by Dr Maria L Carter  Patient has had symptoms of food getting stuck for the past several months  She notes these symptoms occur with solid food, but do not occur with fluids  Barium UGI : Postoperative distal esophagus narrowed lumen; thick barium mild passage delay and barium tablet marked delayed passage    She was having bloating, hiccups and gas (flatulence)  She is having this constantly  She had a gastric emptying study done which was normal     She has dysphagia  It is not constant but does happen  When she had the hiatal hernia fixed she had an EGD done food impaction  It could be any type of food  Water would help  She has lost weight initially when she had the surgery done but now is stable  She gained 8 lbs back  She then underwent an endoscopy in April 2022 which was essentially unremarkable  There was evidence of prior paraesophageal hernia repair  Dilation was performed up to [address]  No mucosal tear was seen  Biopsies were unremarkable        She has no heartburn  No regurgitation  She has no constipation/ diarrhea  Last colonsocop in 2017 - repeat recommended in 10 years  She does have a family h/o colon cancer  Her mother had colon cancer : 75 y/o, sister had colon cancer : 63 y/o  She feels that she does not sleep well  She takes sleep medications  No exercise  Answers for HPI/ROS submitted by the patient on 2022  Chronicity: recurrent  Onset: more than 1 month ago  Onset quality: gradual  Frequency: daily  Episode duration: 2 hours  Progression since onset: unchanged  Pain location: LUQ, RUQ, generalized abdominal region  Pain - numeric: 7/10  Pain quality: a sensation of fullness  Radiates to: epigastric region  anorexia: No  arthralgias: No  belching: No  constipation: No  diarrhea: No  dysuria: No  fever: No  flatus: Yes  frequency: No  headaches: No  hematochezia: No  hematuria: No  melena: No  myalgias: No  nausea: No  weight loss: No  vomiting: No  Aggravated by: eating  Relieved by: passing flatus  Diagnostic workup: lower endoscopy          REVIEW OF SYSTEMS IS OTHERWISE NEGATIVE  Historical Information   Past Medical History:   Diagnosis Date    Anemia     Arthritis     Chronic pain disorder     back and hip pain    Disease of thyroid gland     nodules    GERD (gastroesophageal reflux disease)     Heart murmur     Heartburn     Hiatal hernia     Hyperlipidemia     Hypertension     Overactive bladder     Rotator cuff tear, left     Rotator cuff tear, right     Vitamin D deficiency     unsure    Wears dentures     upper    Wears glasses     Wears partial dentures     upper     Past Surgical History:   Procedure Laterality Date     SECTION      Last Assessed: 2017    ELBOW SURGERY      Last Assessed: 2017    ESOPHAGOGASTRODUODENOSCOPY N/A 2017    Procedure: ESOPHAGOGASTRODUODENOSCOPY (EGD); Surgeon: Errol Caldwell DO;  Location: [facility] GI LAB;   Service: Gastroenterology    HERNIA REPAIR  2020    Hiatal hernia repair    HIP SURGERY Left 2018    glut medius/minimus repair  and 18    HYSTERECTOMY      2011    KNEE SURGERY      x2    PARAESOPHAGEAL HERNIA REPAIR N/A 2020    Procedure: LAPAROSCOPIC PARAESOPHAGEAL HERNIA REPAIR WITH MESH AND NISSEN FUNDOPLICATION WITH ROBOTICS;  Surgeon: Oriana Cameron MD;  Location: [facility];  Service: General    OR COLONOSCOPY FLX DX W/COLLJ SPEC WHEN PFRMD N/A 2017    Procedure: EGD AND COLONOSCOPY;  Surgeon: Sammie Church DO;  Location: [facility] GI LAB;   Service: Gastroenterology    OR EXCISON TUMOR SOFT TISSUE THIGH/KNEE SUBQ 3+CM Bilateral 2022    Procedure: EXCISION BIOPSY TISSUE LESION/MASS LOWER EXTREMITY;  Surgeon: Bharati Campbell MD;  Location: [facility];  Service: General    SHOULDER ARTHROCENTESIS      SHOULDER ARTHROSCOPY Right 2021    Procedure: SHOULDER ARTHROSCOPY WITH acromioplasty, debridment, and ROTATOR CUFF REPAIR;  Surgeon: Storm Beach DO;  Location: [address] [place] [address] MAIN OR;  Service: Orthopedics     Social History   Social History     Substance and Sexual Activity   Alcohol Use No     Social History     Substance and Sexual Activity   Drug Use No     Social History     Tobacco Use   Smoking Status Former Smoker    Packs/day: 1 00    Quit date: 12    Years since quittin 4   Smokeless Tobacco Never Used   Tobacco Comment    quit 25 yrs ago     Family History   Problem Relation Age of Onset    Colon cancer Mother     Heart attack Father     Other Father         Cardiac Disorder    Diabetes type II Father     Colon cancer Sister     Cancer Maternal Grandmother     No Known Problems Daughter     No Known Problems Paternal Grandmother     No Known Problems Sister     No Known Problems Sister     No Known Problems Sister     No Known Problems Sister     No Known Problems Maternal Aunt     No Known Problems Maternal Aunt     No Known Problems Maternal Aunt Meds/Allergies       Current Outpatient Medications:     atorvastatin (LIPITOR) 10 mg tablet    CALCIUM PO    famotidine (PEPCID) 20 mg tablet    HYDROcodone-acetaminophen (Norco) 5-325 mg per tablet    losartan (COZAAR) 100 MG tablet    traZODone (DESYREL) 50 mg tablet    valACYclovir (VALTREX) 1,000 mg tablet    No Known Allergies        Objective     Blood pressure 164/90, pulse 68, temperature 98 7 °F (37 1 °C), weight 73 5 kg (162 lb)  Body mass index is 28 7 kg/m²  PHYSICAL EXAM:      Physical Exam  Constitutional:       Appearance: Normal appearance  She is well-developed  HENT:      Head: Normocephalic and atraumatic  Eyes:      General: No scleral icterus  Conjunctiva/sclera: Conjunctivae normal       Pupils: Pupils are equal, round, and reactive to light  Cardiovascular:      Rate and Rhythm: Normal rate and regular rhythm  Heart sounds: Normal heart sounds  Pulmonary:      Effort: Pulmonary effort is normal  No respiratory distress  Breath sounds: Normal breath sounds  Abdominal:      General: Bowel sounds are normal  There is no distension  Palpations: Abdomen is soft  There is no mass  Tenderness: There is no abdominal tenderness  Hernia: No hernia is present  Musculoskeletal:         General: Normal range of motion  Cervical back: Normal range of motion  Lymphadenopathy:      Cervical: No cervical adenopathy  Skin:     General: Skin is warm  Neurological:      Mental Status: She is alert and oriented to person, place, and time  Psychiatric:         Behavior: Behavior normal          Thought Content: Thought content normal          Lab Results:   No visits with results within 1 Day(s) from this visit     Latest known visit with results is:   Appointment on 06/01/2022   Component Date Value    WBC 06/01/2022 6 61     RBC 06/01/2022 4 67     Hemoglobin 06/01/2022 13 4     Hematocrit 06/01/2022 40 8     MCV 06/01/2022 87     MCH 06/01/2022 28 7     MCHC 06/01/2022 32 8     RDW 06/01/2022 12 5     MPV 06/01/2022 10 7     Platelets 52/69/6638 325     nRBC 06/01/2022 0     Neutrophils Relative 06/01/2022 59     Immat GRANS % 06/01/2022 1     Lymphocytes Relative 06/01/2022 24     Monocytes Relative 06/01/2022 9     Eosinophils Relative 06/01/2022 7 (A)    Basophils Relative 06/01/2022 0     Neutrophils Absolute 06/01/2022 3 90     Immature Grans Absolute 06/01/2022 0 03     Lymphocytes Absolute 06/01/2022 1 60     Monocytes Absolute 06/01/2022 0 60     Eosinophils Absolute 06/01/2022 0 46     Basophils Absolute 06/01/2022 0 02     Sodium 06/01/2022 143     Potassium 06/01/2022 4 1     Chloride 06/01/2022 109 (A)    CO2 06/01/2022 26     ANION GAP 06/01/2022 8     BUN 06/01/2022 22     Creatinine 06/01/2022 0 72     Glucose, Fasting 06/01/2022 94     Calcium 06/01/2022 9 5     Corrected Calcium 06/01/2022 10 0     AST 06/01/2022 16     ALT 06/01/2022 17     Alkaline Phosphatase 06/01/2022 95     Total Protein 06/01/2022 7 1     Albumin 06/01/2022 3 4 (A)    Total Bilirubin 06/01/2022 0 49     eGFR 06/01/2022 88          Radiology Results:   No results found  Answers for HPI/ROS submitted by the patient on 1/27/2022  Chronicity: new  Onset: more than 1 month ago  Onset quality: undetermined  Frequency: daily  Episode duration: 2 hours  Progression since onset: waxing and waning  Pain location: generalized abdominal region  Pain - numeric: 7/10  Pain quality: a sensation of fullness  Radiates to: epigastric region  anorexia: No  arthralgias: No  belching: Yes  constipation: No  diarrhea: No  dysuria: No  fever: No  flatus: Yes  frequency: Yes  headaches: No  hematochezia: No  hematuria: No  melena: No  myalgias: No  nausea:  No  weight loss: No  vomiting: No  Aggravated by: eating  Relieved by: nothing

## 2022-06-08 ENCOUNTER — OFFICE VISIT (OUTPATIENT)
Dept: SURGERY | Facility: CLINIC | Age: 67
End: 2022-06-08

## 2022-06-08 ENCOUNTER — TELEPHONE (OUTPATIENT)
Dept: OBGYN CLINIC | Facility: HOSPITAL | Age: 67
End: 2022-06-08

## 2022-06-08 ENCOUNTER — TELEPHONE (OUTPATIENT)
Dept: FAMILY MEDICINE CLINIC | Facility: CLINIC | Age: 67
End: 2022-06-08

## 2022-06-08 VITALS
RESPIRATION RATE: 18 BRPM | HEART RATE: 77 BPM | DIASTOLIC BLOOD PRESSURE: 60 MMHG | BODY MASS INDEX: 28.7 KG/M2 | SYSTOLIC BLOOD PRESSURE: 140 MMHG | HEIGHT: 63 IN | TEMPERATURE: 98.3 F | WEIGHT: 162 LBS | OXYGEN SATURATION: 97 %

## 2022-06-08 DIAGNOSIS — G47.09 OTHER INSOMNIA: Primary | ICD-10-CM

## 2022-06-08 DIAGNOSIS — D17.20 LIPOMA OF THIGH: Primary | ICD-10-CM

## 2022-06-08 DIAGNOSIS — Z86.19 H/O COLD SORES: ICD-10-CM

## 2022-06-08 PROCEDURE — 99024 POSTOP FOLLOW-UP VISIT: CPT | Performed by: SURGERY

## 2022-06-08 RX ORDER — HYDROXYZINE HYDROCHLORIDE 25 MG/1
25-50 TABLET, FILM COATED ORAL
Qty: 60 TABLET | Refills: 2 | Status: SHIPPED | OUTPATIENT
Start: 2022-06-08

## 2022-06-08 RX ORDER — VALACYCLOVIR HYDROCHLORIDE 1 G/1
TABLET, FILM COATED ORAL
Qty: 6 TABLET | Refills: 2 | Status: SHIPPED | OUTPATIENT
Start: 2022-06-08 | End: 2022-07-22

## 2022-06-08 NOTE — TELEPHONE ENCOUNTER
DR Feli Mathis  RE appointment reschedule  [facility] [phone]    Patient returned call to reschedule her post op appointment with Dr Feli Mathis on 7/1/2022  Patient is unable to come in on 6/30 PM       Please call patient back with different option of day and time

## 2022-06-08 NOTE — LETTER
June 8, 2022     Lizz Almaguer PA-C  [address] [facility] [address]    Patient: Kye Bunn   YOB: 1955   Date of Visit: 6/8/2022       Dear Dr Quintin Ivory: Thank you for referring Kye Bunn to me for evaluation  Below are my notes for this consultation  If you have questions, please do not hesitate to call me  I look forward to following your patient along with you  Sincerely,        Marilee Bishop MD        CC: No Recipients  Marilee Bishop MD  6/8/2022  8:17 AM  Incomplete  Assessment/Plan:    Lipoma of thigh  Patient returns for routine scheduled follow-up status post excision of bilateral thigh lipomas, 5 from the right and 5 from the left  Patient voiced no new complaints or concerns today  She is satisfied with the wound healing  On physical examination she is well appearing  Awake alert oriented  Competent reliable as a historian  The surgical wounds are clean dry and intact  Patient appears clinically well status post excision of bilateral thigh lipomas  I will notify her with the results of pathology once that report is finalized  Patient has identified additional lipomas that she would be interested in having removed in the office later this summer  Own appointment has been scheduled for August to accommodate that request            Subjective:      Patient ID: Kye Bunn is a 74 y o  female  Patient came in today for s/p exc of bi-lateral lower extremity lipomas 05/26/2022  Patient states her inc are healed and theres no drainage  Patient thinks there might be a few more lipomas on both legs  No fevers or chills  Felix KEE MA        The following portions of the patient's history were reviewed and updated as appropriate: allergies, current medications, past family history, past medical history, past social history, past surgical history and problem list     Review of Systems   Constitutional: Negative for chills and fever  HENT: Negative for ear pain and sore throat  Eyes: Negative for pain and visual disturbance  Respiratory: Negative for cough and shortness of breath  Cardiovascular: Negative for chest pain and palpitations  Gastrointestinal: Negative for abdominal pain and vomiting  Genitourinary: Negative for dysuria and hematuria  Musculoskeletal: Negative for arthralgias and back pain  Skin: Negative for color change and rash  Neurological: Negative for seizures and syncope  All other systems reviewed and are negative  Objective:      /60 (BP Location: Left arm, Patient Position: Sitting, Cuff Size: Large)   Pulse 77   Temp 98 3 °F (36 8 °C) (Temporal)   Resp 18   Ht 5' 3" (1 6 m)   Wt 73 5 kg (162 lb)   SpO2 97%   BMI 28 70 kg/m²          Physical Exam  Constitutional:       Appearance: She is well-developed  HENT:      Head: Normocephalic and atraumatic  Eyes:      Conjunctiva/sclera: Conjunctivae normal       Pupils: Pupils are equal, round, and reactive to light  Cardiovascular:      Rate and Rhythm: Normal rate and regular rhythm  Pulmonary:      Effort: Pulmonary effort is normal       Breath sounds: Normal breath sounds  Abdominal:      General: Bowel sounds are normal       Palpations: Abdomen is soft  Musculoskeletal:         General: Normal range of motion  Cervical back: Normal range of motion and neck supple  Comments: Bilateral thigh wounds clean dry intact   Skin:     General: Skin is warm and dry  Neurological:      Mental Status: She is alert and oriented to person, place, and time  Psychiatric:         Behavior: Behavior normal          Thought Content:  Thought content normal          Judgment: Judgment normal

## 2022-06-08 NOTE — PROGRESS NOTES
Assessment/Plan:    Lipoma of thigh  Patient returns for routine scheduled follow-up status post excision of bilateral thigh lipomas, 5 from the right and 5 from the left  Patient voiced no new complaints or concerns today  She is satisfied with the wound healing  On physical examination she is well appearing  Awake alert oriented  Competent reliable as a historian  The surgical wounds are clean dry and intact  Patient appears clinically well status post excision of bilateral thigh lipomas  I will notify her with the results of pathology once that report is finalized  Patient has identified additional lipomas that she would be interested in having removed in the office later this summer  Own appointment has been scheduled for August to accommodate that request            Subjective:      Patient ID: Criss Patient is a 74 y o  female  Patient came in today for s/p exc of bi-lateral lower extremity lipomas 05/26/2022  Patient states her inc are healed and theres no drainage  Patient thinks there might be a few more lipomas on both legs  No fevers or chills  Felix KEE MA        The following portions of the patient's history were reviewed and updated as appropriate: allergies, current medications, past family history, past medical history, past social history, past surgical history and problem list     Review of Systems   Constitutional: Negative for chills and fever  HENT: Negative for ear pain and sore throat  Eyes: Negative for pain and visual disturbance  Respiratory: Negative for cough and shortness of breath  Cardiovascular: Negative for chest pain and palpitations  Gastrointestinal: Negative for abdominal pain and vomiting  Genitourinary: Negative for dysuria and hematuria  Musculoskeletal: Negative for arthralgias and back pain  Skin: Negative for color change and rash  Neurological: Negative for seizures and syncope  All other systems reviewed and are negative  Objective:      /60 (BP Location: Left arm, Patient Position: Sitting, Cuff Size: Large)   Pulse 77   Temp 98 3 °F (36 8 °C) (Temporal)   Resp 18   Ht 5' 3" (1 6 m)   Wt 73 5 kg (162 lb)   SpO2 97%   BMI 28 70 kg/m²          Physical Exam  Constitutional:       Appearance: She is well-developed  HENT:      Head: Normocephalic and atraumatic  Eyes:      Conjunctiva/sclera: Conjunctivae normal       Pupils: Pupils are equal, round, and reactive to light  Cardiovascular:      Rate and Rhythm: Normal rate and regular rhythm  Pulmonary:      Effort: Pulmonary effort is normal       Breath sounds: Normal breath sounds  Abdominal:      General: Bowel sounds are normal       Palpations: Abdomen is soft  Musculoskeletal:         General: Normal range of motion  Cervical back: Normal range of motion and neck supple  Comments: Bilateral thigh wounds clean dry intact   Skin:     General: Skin is warm and dry  Neurological:      Mental Status: She is alert and oriented to person, place, and time  Psychiatric:         Behavior: Behavior normal          Thought Content:  Thought content normal          Judgment: Judgment normal  Alert and oriented, no focal deficits, no motor or sensory deficits.

## 2022-06-08 NOTE — ASSESSMENT & PLAN NOTE
Patient returns for routine scheduled follow-up status post excision of bilateral thigh lipomas, 5 from the right and 5 from the left  Patient voiced no new complaints or concerns today  She is satisfied with the wound healing  On physical examination she is well appearing  Awake alert oriented  Competent reliable as a historian  The surgical wounds are clean dry and intact  Patient appears clinically well status post excision of bilateral thigh lipomas  I will notify her with the results of pathology once that report is finalized  Patient has identified additional lipomas that she would be interested in having removed in the office later this summer      Own appointment has been scheduled for August to accommodate that request

## 2022-06-08 NOTE — TELEPHONE ENCOUNTER
Patient called stating she is currently taking Trazodone to help her sleep  She said it is not working and actually has the opposite effect on her  She said she used to take Hydroxyzine and it helped much better  She said her insurance does not cover the Hydroxyzine but she is willing to pay out of pocket if you will write her a script for it  Please advise  Thanks!

## 2022-06-09 ENCOUNTER — TELEPHONE (OUTPATIENT)
Dept: SURGERY | Facility: CLINIC | Age: 67
End: 2022-06-09

## 2022-06-10 ENCOUNTER — APPOINTMENT (OUTPATIENT)
Dept: PREADMISSION TESTING | Facility: HOSPITAL | Age: 67
End: 2022-06-10
Payer: COMMERCIAL

## 2022-06-15 ENCOUNTER — ANESTHESIA (OUTPATIENT)
Dept: PERIOP | Facility: HOSPITAL | Age: 67
End: 2022-06-15
Payer: COMMERCIAL

## 2022-06-15 ENCOUNTER — ANESTHESIA EVENT (OUTPATIENT)
Dept: PERIOP | Facility: HOSPITAL | Age: 67
End: 2022-06-15
Payer: COMMERCIAL

## 2022-06-15 ENCOUNTER — HOSPITAL ENCOUNTER (OUTPATIENT)
Facility: HOSPITAL | Age: 67
Setting detail: OUTPATIENT SURGERY
Discharge: HOME | End: 2022-06-15
Attending: ORTHOPAEDIC SURGERY | Admitting: ORTHOPAEDIC SURGERY
Payer: COMMERCIAL

## 2022-06-15 VITALS
SYSTOLIC BLOOD PRESSURE: 164 MMHG | DIASTOLIC BLOOD PRESSURE: 89 MMHG | RESPIRATION RATE: 18 BRPM | OXYGEN SATURATION: 98 % | HEIGHT: 63 IN | BODY MASS INDEX: 28.7 KG/M2 | HEART RATE: 78 BPM | WEIGHT: 162 LBS | TEMPERATURE: 98.4 F

## 2022-06-15 DIAGNOSIS — S83.242A TEAR OF MEDIAL MENISCUS OF LEFT KNEE, CURRENT, UNSPECIFIED TEAR TYPE, INITIAL ENCOUNTER: Primary | ICD-10-CM

## 2022-06-15 DIAGNOSIS — S83.282D OTHER TEAR OF LATERAL MENISCUS OF LEFT KNEE, UNSPECIFIED WHETHER OLD OR CURRENT TEAR, SUBSEQUENT ENCOUNTER: ICD-10-CM

## 2022-06-15 PROCEDURE — 88304 TISSUE EXAM BY PATHOLOGIST: CPT | Performed by: PATHOLOGY

## 2022-06-15 PROCEDURE — 88311 DECALCIFY TISSUE: CPT | Performed by: PATHOLOGY

## 2022-06-15 PROCEDURE — 29881 ARTHRS KNE SRG MNISECTMY M/L: CPT | Performed by: ORTHOPAEDIC SURGERY

## 2022-06-15 PROCEDURE — G0289 ARTHRO, LOOSE BODY + CHONDRO: HCPCS | Performed by: ORTHOPAEDIC SURGERY

## 2022-06-15 PROCEDURE — 29881 ARTHRS KNE SRG MNISECTMY M/L: CPT | Performed by: PHYSICIAN ASSISTANT

## 2022-06-15 PROCEDURE — G0289 ARTHRO, LOOSE BODY + CHONDRO: HCPCS | Performed by: PHYSICIAN ASSISTANT

## 2022-06-15 RX ORDER — CEFAZOLIN SODIUM 2 G/50ML
2000 SOLUTION INTRAVENOUS ONCE
Status: COMPLETED | OUTPATIENT
Start: 2022-06-15 | End: 2022-06-15

## 2022-06-15 RX ORDER — MELOXICAM 15 MG/1
15 TABLET ORAL DAILY
Qty: 30 TABLET | Refills: 0 | Status: SHIPPED | OUTPATIENT
Start: 2022-06-15

## 2022-06-15 RX ORDER — HYDROMORPHONE HCL/PF 1 MG/ML
0.5 SYRINGE (ML) INJECTION
Status: DISCONTINUED | OUTPATIENT
Start: 2022-06-15 | End: 2022-06-15 | Stop reason: HOSPADM

## 2022-06-15 RX ORDER — ONDANSETRON 2 MG/ML
INJECTION INTRAMUSCULAR; INTRAVENOUS AS NEEDED
Status: DISCONTINUED | OUTPATIENT
Start: 2022-06-15 | End: 2022-06-15

## 2022-06-15 RX ORDER — FENTANYL CITRATE 50 UG/ML
INJECTION, SOLUTION INTRAMUSCULAR; INTRAVENOUS AS NEEDED
Status: DISCONTINUED | OUTPATIENT
Start: 2022-06-15 | End: 2022-06-15

## 2022-06-15 RX ORDER — SODIUM CHLORIDE, SODIUM LACTATE, POTASSIUM CHLORIDE, CALCIUM CHLORIDE 600; 310; 30; 20 MG/100ML; MG/100ML; MG/100ML; MG/100ML
125 INJECTION, SOLUTION INTRAVENOUS CONTINUOUS
Status: DISCONTINUED | OUTPATIENT
Start: 2022-06-15 | End: 2022-06-15 | Stop reason: HOSPADM

## 2022-06-15 RX ORDER — OXYCODONE HYDROCHLORIDE AND ACETAMINOPHEN 5; 325 MG/1; MG/1
1 TABLET ORAL EVERY 4 HOURS PRN
Status: DISCONTINUED | OUTPATIENT
Start: 2022-06-15 | End: 2022-06-15 | Stop reason: HOSPADM

## 2022-06-15 RX ORDER — CHLORHEXIDINE GLUCONATE 4 G/100ML
SOLUTION TOPICAL DAILY PRN
Status: DISCONTINUED | OUTPATIENT
Start: 2022-06-15 | End: 2022-06-15 | Stop reason: HOSPADM

## 2022-06-15 RX ORDER — POVIDONE-IODINE 10 MG/G
OINTMENT TOPICAL AS NEEDED
Status: DISCONTINUED | OUTPATIENT
Start: 2022-06-15 | End: 2022-06-15 | Stop reason: HOSPADM

## 2022-06-15 RX ORDER — CEPHALEXIN 500 MG/1
500 CAPSULE ORAL EVERY 8 HOURS SCHEDULED
Qty: 9 CAPSULE | Refills: 0 | Status: SHIPPED | OUTPATIENT
Start: 2022-06-15 | End: 2022-06-18

## 2022-06-15 RX ORDER — LIDOCAINE HYDROCHLORIDE 20 MG/ML
INJECTION, SOLUTION EPIDURAL; INFILTRATION; INTRACAUDAL; PERINEURAL AS NEEDED
Status: DISCONTINUED | OUTPATIENT
Start: 2022-06-15 | End: 2022-06-15

## 2022-06-15 RX ORDER — METOCLOPRAMIDE HYDROCHLORIDE 5 MG/ML
10 INJECTION INTRAMUSCULAR; INTRAVENOUS ONCE AS NEEDED
Status: DISCONTINUED | OUTPATIENT
Start: 2022-06-15 | End: 2022-06-15 | Stop reason: HOSPADM

## 2022-06-15 RX ORDER — FENTANYL CITRATE/PF 50 MCG/ML
50 SYRINGE (ML) INJECTION
Status: DISCONTINUED | OUTPATIENT
Start: 2022-06-15 | End: 2022-06-15 | Stop reason: HOSPADM

## 2022-06-15 RX ORDER — MIDAZOLAM HYDROCHLORIDE 2 MG/2ML
INJECTION, SOLUTION INTRAMUSCULAR; INTRAVENOUS AS NEEDED
Status: DISCONTINUED | OUTPATIENT
Start: 2022-06-15 | End: 2022-06-15

## 2022-06-15 RX ORDER — ONDANSETRON 2 MG/ML
4 INJECTION INTRAMUSCULAR; INTRAVENOUS EVERY 6 HOURS PRN
Status: CANCELLED | OUTPATIENT
Start: 2022-06-15

## 2022-06-15 RX ORDER — BUPIVACAINE HYDROCHLORIDE AND EPINEPHRINE 5; 5 MG/ML; UG/ML
INJECTION, SOLUTION PERINEURAL AS NEEDED
Status: DISCONTINUED | OUTPATIENT
Start: 2022-06-15 | End: 2022-06-15 | Stop reason: HOSPADM

## 2022-06-15 RX ORDER — DEXAMETHASONE SODIUM PHOSPHATE 10 MG/ML
INJECTION, SOLUTION INTRAMUSCULAR; INTRAVENOUS AS NEEDED
Status: DISCONTINUED | OUTPATIENT
Start: 2022-06-15 | End: 2022-06-15

## 2022-06-15 RX ORDER — OXYCODONE HYDROCHLORIDE AND ACETAMINOPHEN 5; 325 MG/1; MG/1
1 TABLET ORAL EVERY 4 HOURS PRN
Qty: 21 TABLET | Refills: 0 | Status: SHIPPED | OUTPATIENT
Start: 2022-06-15 | End: 2022-06-25

## 2022-06-15 RX ORDER — MAGNESIUM HYDROXIDE 1200 MG/15ML
LIQUID ORAL AS NEEDED
Status: DISCONTINUED | OUTPATIENT
Start: 2022-06-15 | End: 2022-06-15 | Stop reason: HOSPADM

## 2022-06-15 RX ORDER — PROPOFOL 10 MG/ML
INJECTION, EMULSION INTRAVENOUS AS NEEDED
Status: DISCONTINUED | OUTPATIENT
Start: 2022-06-15 | End: 2022-06-15

## 2022-06-15 RX ORDER — CHLORHEXIDINE GLUCONATE 0.12 MG/ML
15 RINSE ORAL ONCE
Status: COMPLETED | OUTPATIENT
Start: 2022-06-15 | End: 2022-06-15

## 2022-06-15 RX ADMIN — FENTANYL CITRATE 25 MCG: 50 INJECTION, SOLUTION INTRAMUSCULAR; INTRAVENOUS at 10:46

## 2022-06-15 RX ADMIN — OXYCODONE HYDROCHLORIDE AND ACETAMINOPHEN 1 TABLET: 5; 325 TABLET ORAL at 11:54

## 2022-06-15 RX ADMIN — CHLORHEXIDINE GLUCONATE 0.12% ORAL RINSE 15 ML: 1.2 LIQUID ORAL at 08:46

## 2022-06-15 RX ADMIN — CEFAZOLIN SODIUM 2000 MG: 2 SOLUTION INTRAVENOUS at 10:28

## 2022-06-15 RX ADMIN — MIDAZOLAM 2 MG: 1 INJECTION INTRAMUSCULAR; INTRAVENOUS at 10:21

## 2022-06-15 RX ADMIN — FENTANYL CITRATE 50 MCG: 50 INJECTION, SOLUTION INTRAMUSCULAR; INTRAVENOUS at 11:07

## 2022-06-15 RX ADMIN — SODIUM CHLORIDE, SODIUM LACTATE, POTASSIUM CHLORIDE, AND CALCIUM CHLORIDE 125 ML/HR: .6; .31; .03; .02 INJECTION, SOLUTION INTRAVENOUS at 08:47

## 2022-06-15 RX ADMIN — DEXAMETHASONE SODIUM PHOSPHATE 10 MG: 10 INJECTION INTRAMUSCULAR; INTRAVENOUS at 10:37

## 2022-06-15 RX ADMIN — ONDANSETRON 4 MG: 2 INJECTION INTRAMUSCULAR; INTRAVENOUS at 11:02

## 2022-06-15 RX ADMIN — FENTANYL CITRATE 25 MCG: 50 INJECTION, SOLUTION INTRAMUSCULAR; INTRAVENOUS at 10:31

## 2022-06-15 RX ADMIN — LIDOCAINE HYDROCHLORIDE 100 MG: 20 INJECTION, SOLUTION EPIDURAL; INFILTRATION; INTRACAUDAL at 10:25

## 2022-06-15 RX ADMIN — PROPOFOL 200 MG: 10 INJECTION, EMULSION INTRAVENOUS at 10:25

## 2022-06-15 NOTE — ANESTHESIA POSTPROCEDURE EVALUATION
Post-Op Assessment Note    CV Status:  Stable    Pain management: adequate     Mental Status:  Alert and awake   Hydration Status:  Euvolemic   PONV Controlled:  Controlled   Airway Patency:  Patent      Post Op Vitals Reviewed: Yes      Staff: CRNA         No complications documented      /81 (06/15/22 1117)    Temp 98 4 °F (36 9 °C) (06/15/22 1117)    Pulse 72 (06/15/22 1117)   Resp (!) 23 (06/15/22 1117)    SpO2 100 % (06/15/22 1117)

## 2022-06-15 NOTE — OP NOTE
OPERATIVE REPORT  PATIENT NAME: Edith Bullard    :  1955  MRN: [id number]  Pt Location: [facility] ROOM 02    SURGERY DATE: 6/15/2022    Surgeon(s) and Role:     * Kellie Alejandro DO - Primary     * Giovanna Broderick PA-C - Assisting    Preop Diagnosis:  Other tear of lateral meniscus of left knee, unspecified whether old or current tear, subsequent encounter [S83 282D]    Post-Op Diagnosis Codes:     * Other tear of lateral meniscus of left knee, unspecified whether old or current tear, subsequent encounter [S83 282D]    Tear of lateral meniscus left knee  Degenerative joint disease  Synovitis  Multiple large loose intra-articular bodies which had to be removed by elongating arthroscopic portal    Procedure(s) (LRB):  KNEE ARTHROSCOPY WITH POSSIBLE MENISCECTOMY (Left)     Lateral meniscectomy  Chondroplasty  Synovectomy  Excision of multiple loose bodies in which portal had to be elongated  Post arthroscopic injection of intra-articular joint space and peripheral portals    Specimen(s):  Shavings    Estimated Blood Loss:   Minimal    Drains:  None    Anesthesia Type:   LMA with local placed at conclusion of procedure    Operative Indications: Other tear of lateral meniscus of left knee, unspecified whether old or current tear, subsequent encounter [S83 282D]      Operative Findings:  TT: 18    Complications:   None    Procedure and Technique:    The patient was properly identified and brought into the  operative suite  After  Successful induction of the general anesthetic, a tourniquet was placed on patient's left proximal thigh  The left lower extremity was then prepped and draped in the usual fashion  It is medically necessary that the physician's assistant be in the room to aid in positioning and placing  the appropriate amount of retraction on  the patient  The patient was also given a dose of intravenous antibiotics   Ivie Simmonds, PA-C-assisting necessary for the procedure for assistance with minimally invasive arthroscopic techniques for medial and lateral meniscectomies as well as assistance with utilization of the camera and shaver and the biter  An Esmarch was used to exsanguinate the left lower extremity  The tourniquet was then inflated  Using a #11  Scalpel blade an anterior lateral portal was made approximately 1 cm above the joint line 1 cm lateral to patellar tendon and an anterior medial portal was made approximately 1 cm above the joint line 1 cm medial to patellar tendon  The arthroscope was 1st introduced into the  into the lateral portal   First the  medial joint space was inspected  There was a tremendous amount of synovial tissue  A synovectomy did occur with the assistance of a shaver  There was a degeneration along the medial meniscus but no focal tearing present  Meniscectomy was not warranted  There was grade 3 arthritic changes along the medial femoral condyle  An OCD lesion was present along the medial femoral condyle directly over the weight-bearing surface It was then smoothed out with shaver  A Chondroplasty did occur where there was rough articular cartilage  It was then confirmed with a probe that there was no further loosening of the meniscus and articular cartilage  The arthroscope was then introduced into the intercondylar notch  The ACL was probed and found be quite stable  There were multiple loose intra-articular bodies which were removed with an arthroscopic grasper  The medial portal had to be elongated to facilitate the removal         The arthroscope was then introduced in the lateral side of her knee  After a synovectomy was performed, there was a degenerative  tear along the lateral meniscus  There is also grade 4 arthritis along the entire lateral joint space  It appears the patient underwent a previous lateral meniscectomy  The remaining tear was removed  Using arthroscopic shaver a lateral meniscectomy did occur    It was then confirmed with a probe that there was no further loosening of the meniscal tissue  There was significant arthritis along the lateral joint space  After a meniscectomy and chondroplasty occurred  It was confirmed that there is no further loosening of this cartilage     The arthroscope was then induced the patellofemoral joint  There was some grade 3 and grade  early arthritic changes along the undersurface of the patella  Using arthroscopic shaver a chondroplasty to occur smoothing the articular cartilage down to a nice base  The medial and lateral gutters were inspected next and there was no loose bodies  The knee was then copiously irrigated sterile arthroscopic solution  The portals were closed with 3 O nylon  The portals were then injected with 0 5% Marcaine with epinephrine, and the knee was injected with 0 5% Marcaine with epinephrine and Depo-Medrol  The wounds were then dressed with ointment Xeroform 4x4s sterile Webril and Ace bandage  The tourniquet was then deflated  There was no complications during procedure  She was successfully woken transferred her hospital bed in with recurrent stable condition        I was present for the entire procedure, A qualified resident physician was not available and A physician assistant was required during the procedure for retraction tissue handling,dissection and suturing    Patient Disposition:  PACU       SIGNATURE: Mitch Rooney DO  DATE: Toshia 15, 2022  TIME: 10:25 AM

## 2022-06-15 NOTE — INTERVAL H&P NOTE
H&P reviewed  After examining the patient I find no changes in the patients condition since the H&P had been written    Lungs CTA  Heart RRR  Vitals:    06/15/22 0830   BP: (!) 177/86   Pulse: 70   Resp: 18   Temp: 98 4 °F (36 9 °C)   SpO2: 100%

## 2022-06-17 ENCOUNTER — OFFICE VISIT (OUTPATIENT)
Dept: BARIATRICS | Facility: CLINIC | Age: 67
End: 2022-06-17
Payer: COMMERCIAL

## 2022-06-17 VITALS
HEIGHT: 63 IN | TEMPERATURE: 98.4 F | BODY MASS INDEX: 29.23 KG/M2 | SYSTOLIC BLOOD PRESSURE: 146 MMHG | WEIGHT: 165 LBS | DIASTOLIC BLOOD PRESSURE: 82 MMHG | HEART RATE: 99 BPM

## 2022-06-17 DIAGNOSIS — Z98.84 BARIATRIC SURGERY STATUS: ICD-10-CM

## 2022-06-17 DIAGNOSIS — R13.10 DYSPHAGIA, UNSPECIFIED TYPE: Primary | ICD-10-CM

## 2022-06-17 PROCEDURE — 99213 OFFICE O/P EST LOW 20 MIN: CPT | Performed by: SURGERY

## 2022-06-17 PROCEDURE — 1036F TOBACCO NON-USER: CPT | Performed by: SURGERY

## 2022-06-17 RX ORDER — METOCLOPRAMIDE 10 MG/1
10 TABLET ORAL 4 TIMES DAILY
Qty: 120 TABLET | Refills: 6 | Status: SHIPPED | OUTPATIENT
Start: 2022-06-17 | End: 2022-07-17

## 2022-06-17 NOTE — PROGRESS NOTES
OFFICE VISIT - BARIATRIC SURGERY  Serena Crane 74 y o  female MRN: [id number]  Unit/Bed#:  Encounter: [id number]      HPI:  Serena Crane is a 74 y o  female status post PEH repair with Nissen Fundoplication by Dr Jerry Bryan on 5/19/20  Comes to the office today with complaints of dysphagia, hiccups, and flatulence  Subjective     Patient has had symptoms of dysphagia and food getting stuck for the past several months  She notes these symptoms occur with solid food, but do not occur with fluids  She was having bloating, hiccups and gas  She is having this constantly  She has no heartburn  No regurgitation  She has no constipation/diarrhea        She then underwent an endoscopy in April 2022 which was essentially unremarkable  There was evidence of prior paraesophageal hernia repair  Dilation was performed up to [address] [place]  No mucosal tear was seen and biopsies were unremarkable  Patient notes that dilation did not help at all  Barium UGI : Postoperative distal esophagus narrowed lumen; thick barium mild passage delay and barium tablet marked delayed passage      She had a gastric emptying study done which was normal     GI recommended to take small bites, chew food well, and moisten her food  They also recommended dairy free diet, probiotics, gas-x  If no improvement, GI plans to consider doing a manometry  Patient not taking gas x, not taking probiotics, not eating dairy free diet  Review of Systems   Constitutional: Negative for chills and fever  HENT: Negative for ear pain and sore throat  Eyes: Negative for pain and visual disturbance  Respiratory: Negative for cough and shortness of breath  Cardiovascular: Negative for chest pain and palpitations  Gastrointestinal: Negative for abdominal pain and vomiting  Dysphagia, flatulence    Genitourinary: Negative for dysuria and hematuria  Musculoskeletal: Negative for arthralgias and back pain     Skin: Negative for color change and rash  Neurological: Negative for seizures and syncope  All other systems reviewed and are negative  Historical Information   Past Medical History:   Diagnosis Date    Anemia     Arthritis     Chronic pain disorder     back and hip pain    Disease of thyroid gland     nodules    GERD (gastroesophageal reflux disease)     Heart murmur     Heartburn     Hiatal hernia     Hyperlipidemia     Hypertension     Overactive bladder     Rotator cuff tear, left     Rotator cuff tear, right     Vitamin D deficiency     unsure    Wears dentures     upper    Wears glasses     Wears partial dentures     upper     Past Surgical History:   Procedure Laterality Date     SECTION      Last Assessed: 2017    ELBOW SURGERY      Last Assessed: 2017    ESOPHAGOGASTRODUODENOSCOPY N/A 2017    Procedure: ESOPHAGOGASTRODUODENOSCOPY (EGD); Surgeon: Burgess Abiodun DO;  Location: [facility]; Service: Gastroenterology    HERNIA REPAIR  2020    Hiatal hernia repair    HIP SURGERY Left 2018    glut medius/minimus repair  and 18    HYSTERECTOMY          KNEE SURGERY      x2    PARAESOPHAGEAL HERNIA REPAIR N/A 2020    Procedure: LAPAROSCOPIC PARAESOPHAGEAL HERNIA REPAIR WITH MESH AND NISSEN FUNDOPLICATION WITH ROBOTICS;  Surgeon: Tino Anderson MD;  Location: [facility];  Service: General    UT COLONOSCOPY FLX DX W/COLLJ SPEC WHEN PFRMD N/A 2017    Procedure: EGD AND COLONOSCOPY;  Surgeon: Burgess Abiodun DO;  Location: [facility];   Service: Gastroenterology    UT EXCISON TUMOR SOFT TISSUE THIGH/KNEE SUBQ 3+CM Bilateral 2022    Procedure: EXCISION BIOPSY TISSUE LESION/MASS LOWER EXTREMITY;  Surgeon: Marilee Bishop MD;  Location: [facility];  Service: General    UT KNEE SCOPE,MED/LAT MENISECTOMY Left 6/15/2022    Procedure: ;left knee arthroscopy, meniscectomy,synevectomy,chondroplasty, loose body removal, injection;  Surgeon: Sterling Webster DO;  Location: [facility] OR;  Service: Orthopedics    SHOULDER ARTHROCENTESIS      SHOULDER ARTHROSCOPY Right 2021    Procedure: SHOULDER ARTHROSCOPY WITH acromioplasty, debridment, and ROTATOR CUFF REPAIR;  Surgeon: Adina Caruso DO;  Location: [address] [place] [address] MAIN OR;  Service: Orthopedics     Social History   Social History     Substance and Sexual Activity   Alcohol Use No     Social History     Substance and Sexual Activity   Drug Use No     Social History     Tobacco Use   Smoking Status Former Smoker    Packs/day: 1 00    Quit date: 12    Years since quittin 4   Smokeless Tobacco Never Used   Tobacco Comment    quit 25 yrs ago       Objective       Current Vitals:   Blood Pressure: 146/82 (22 1311)  Pulse: 99 (22 1311)  Temperature: 98 4 °F (36 9 °C) (22 131)  Temp Source: Tympanic (22 131)  Height: 5' 3" (160 cm) (22 131)  Weight - Scale: 74 8 kg (165 lb) (22 131)    Invasive Devices  Report    None                 Physical Exam  Constitutional:       Appearance: Normal appearance  She is obese  HENT:      Head: Normocephalic and atraumatic  Nose: Nose normal       Mouth/Throat:      Mouth: Mucous membranes are moist    Eyes:      Extraocular Movements: Extraocular movements intact  Pupils: Pupils are equal, round, and reactive to light  Cardiovascular:      Rate and Rhythm: Normal rate and regular rhythm  Pulses: Normal pulses  Heart sounds: Normal heart sounds  Pulmonary:      Effort: Pulmonary effort is normal       Breath sounds: Normal breath sounds  Abdominal:      Palpations: Abdomen is soft  Comments: Well-healed incisions   Musculoskeletal:      Cervical back: Normal range of motion  Skin:     General: Skin is warm  Neurological:      General: No focal deficit present  Mental Status: She is alert and oriented to person, place, and time     Psychiatric:         Mood and Affect: Mood normal  Behavior: Behavior normal            Pathology, and Other Studies: I have personally reviewed pertinent reports  Assessment/PLAN:    Clotilde Allen is a 74 y o  female status post PEH repair with Nissen Fundoplication by Dr Brittney Dubon on 5/19/20  Comes to the office today with complaints of dysphagia, hiccups, and flatulence  Workup thus far reviewed and discussed with patient  Workup includes:       Upper GI (12/10/21): FINDINGS:     The esophagus is normal in caliber   Esophageal motility is normal and emptying of contrast from the esophagus is prompt  There is no mucosal mass, ulceration or fold thickening identified      Distal esophagus GE junction postoperative presentation compatible with history of Niessen Fundoplication   Narrowed lumen with slight delayed thick barium passage      Administered barium tablet; marked delayed passage      No gastroesophageal reflux      The stomach is otherwise unremarkable in size   The gastric mucosa is normal   No penetrating ulcers or masses      Contrast empties promptly into the duodenum   The duodenum is normal in caliber   The ligament of Treitz/duodenojejunal junction lies in a normal position      IMPRESSION:     Postoperative distal esophagus narrowed lumen; thick barium mild passage delay and barium tablet marked delayed passage        The study was marked in EPIC for significant notification      EGD     [address] [facility]  [address]  [phone]        DATE OF SERVICE:  4/20/22     PHYSICIAN(S):  Attending:   Nico Tucker MD      Fellow:   No Staff Documented         INDICATION:  Paraesophageal hernia, Dysphagia, unspecified type     POST-OP DIAGNOSIS:  See the impression below      PREPROCEDURE:  Informed consent was obtained for the procedure, including sedation  Risks of perforation, hemorrhage, adverse drug reaction and aspiration were discussed   The patient was placed in the left lateral decubitus position      Patient was explained about the risks and benefits of the procedure  Risks including but not limited to bleeding, infection, and perforation were explained in detail  Also explained about less than 100% sensitivity with the exam and other alternatives      DETAILS OF PROCEDURE:  Patient was taken to the procedure room where a time out was performed to confirm correct patient and correct procedure  The patient underwent monitored anesthesia care, which was administered by an anesthesia professional  The patient's blood pressure, heart rate, level of consciousness, respirations and oxygen were monitored throughout the procedure  The scope was advanced to the second part of the duodenum  Retroflexion was performed in the fundus  The patient experienced no blood loss  The procedure was not difficult  The patient tolerated the procedure well  There were no apparent complications       ANESTHESIA INFORMATION:  ASA: II  Anesthesia Type: IV Sedation with Anesthesia     MEDICATIONS:  No administrations occurring from 1244 to 1307 on 04/20/22         FINDINGS:  · The esophagus appeared normal  Performed random biopsy  · Dilated in the GE junction with Savary-Naheed dilator from 54 Fr starting size to 60 Fr ending size  · Z-line 36 cm from the incisors  · Normal stomach  Retroflexion showed evidence of hernia repair  Gastric biopsies were done  · The duodenum appeared normal  Performed random biopsy  The bulb and 2nd portion were visualized         SPECIMENS:  ID Type Source Tests Collected by Time Destination   1 : cold bx - random  Tissue Esophagus TISSUE EXAM Zahraa Quintanilla MD 4/20/2022  1:00 PM     2 : cold bx - r/o H pylori Tissue Stomach TISSUE EXAM Zahraa Quintanilla MD 4/20/2022  1:02 PM     3 : cold bx - r/o celiac Tissue Duodenum TISSUE EXAM Zahraa Quintanilla MD 4/20/2022  1:03 PM              IMPRESSION:  Normal post paraesophageal hernia repair anatomy  Esophageal, gastric and duodenal biopsies were done     RECOMMENDATION:  Await pathology results  Follow up with me in clinic       Josselyn Velez MD     --------------------------------------------------------------------      Reviewed studies with patient  Reassured patient that she needs more time for wrap to loosen  Recommended patient to take gas x and reglan x 6 months for gas complaints  Follow up with dietician to discuss dietary habits     Follow up in 6 months with Dr Ronie Runner Rupell, PA-C  Bariatric Surgery  6/17/2022  1:57 PM

## 2022-06-20 ENCOUNTER — EVALUATION (OUTPATIENT)
Dept: PHYSICAL THERAPY | Facility: CLINIC | Age: 67
End: 2022-06-20
Payer: COMMERCIAL

## 2022-06-20 DIAGNOSIS — R26.9 GAIT ABNORMALITY: ICD-10-CM

## 2022-06-20 DIAGNOSIS — S83.282D TEAR OF LATERAL MENISCUS OF LEFT KNEE, UNSPECIFIED TEAR TYPE, UNSPECIFIED WHETHER OLD OR CURRENT TEAR, SUBSEQUENT ENCOUNTER: Primary | ICD-10-CM

## 2022-06-20 DIAGNOSIS — M25.562 LEFT KNEE PAIN, UNSPECIFIED CHRONICITY: ICD-10-CM

## 2022-06-20 PROCEDURE — 97162 PT EVAL MOD COMPLEX 30 MIN: CPT | Performed by: PHYSICAL THERAPIST

## 2022-06-20 PROCEDURE — 97110 THERAPEUTIC EXERCISES: CPT | Performed by: PHYSICAL THERAPIST

## 2022-06-20 NOTE — PROGRESS NOTES
PT Evaluation     Today's date: 2022  Patient name: Curly Way  : 1955  MRN: [id number]  Referring provider: Lóepz Castellanos  Dx:   Encounter Diagnosis     ICD-10-CM    1  Tear of lateral meniscus of left knee, unspecified tear type, unspecified whether old or current tear, subsequent encounter  S83 282D    2  Gait abnormality  R26 9    3  Left knee pain, unspecified chronicity  M25 562        Start Time: 1115  Stop Time: 1155  Total time in clinic (min): 40 minutes    Assessment  Assessment details: Curly Way was seen for an initial PT evaluation today  Patient is a 74 y o  female with diagnosis of L knee lateral meniscus tear with menisectomy and past medical history significant for OA, chronic pain, thyroid disease, GERD, hiatal hernia, hyperlipidemia, HTN, rotator cuff tear with repair, L buttock pain with history of surgery, lipomas with removal, lumbar radiculopathy  Moderate complexity evaluation  due to number of participation restrictions, functional outcome measure of 33% limitation, and evolving clinical presentation  Findings today show limitation in left knee strength, range of motion, stability and pain impacting gait and functional tasks consistent with s/p diagnosis  Skilled PT indicated to treat at this time to address above stated deficits and return patient to PLOF  Impairments: abnormal gait, abnormal muscle firing, abnormal muscle tone, abnormal or restricted ROM, abnormal movement, activity intolerance, impaired balance, impaired physical strength, lacks appropriate home exercise program, pain with function and safety issue  Functional limitations: walking, stairs, squatting, lunging, lifting, kneeling  Goals  STG (6 weeks)  1  Patient will have reported 0/10 pain in left knee at rest    2  Improve patient's left knee flexion to 120 degrees for increased ability to take proper strides during ambulation    3  Increase patient's left single leg balance to 5 seconds for increased stability on stairs  LTG (12 weeks)  1  Patient's LE strength will be equal bilaterally for ability to ambulate and return to functional activities at OF  2  Patient will be able to walk in community with 0/10 pain in left knee  3  Patient will be independent with home exercise program for continued maintenance post PT discharge  Plan  Plan details: Progress note in 4 weeks  Patient would benefit from: skilled physical therapy  Planned modality interventions: unattended electrical stimulation, thermotherapy: hydrocollator packs and cryotherapy  Planned therapy interventions: manual therapy, neuromuscular re-education, self care, home exercise program, gait training, therapeutic exercise and therapeutic activities  Frequency: 2x week  Duration in weeks: 12  Plan of Care beginning date: 2022  Plan of Care expiration date: 2022  Treatment plan discussed with: patient and PTA        Subjective Evaluation    History of Present Illness  Date of surgery: 6/15/2022  Mechanism of injury: Serena Crane is a 74 y o  female who presents to outpatient Physical Therapy today with complaints of left knee pain  Underwent L knee scope 6/15  Ambulated with crutches 2 days post, no AD at this time  Dressing changed , no issues reported       MD instruction: Evaluate and Treat   Status post left knee arthroscopy with meniscectomy   Weightbearing as tolerated   Range of motion, strengthening, stretching, edema control  Pain  Current pain ratin  At best pain ratin  Pain scale at highest: "stretch"  Location: anterior left knee  Quality: pulling  Relieving factors: relaxation    Social Support  Steps to enter house: yes  Stairs in house: yes   Lives in: multiple-level home  Lives with: spouse    Employment status: not working  Hand dominance: right    Patient Goals  Patient goals for therapy: decreased pain  Patient goal: "feel better"        Objective     Observations Additional Observation Details  2 scope incisions at medal and lateral L knee joint line closed with sutures  Slight Redness at sutures, no drainage  Noted 2cm edema of jt line  Covered and patient education on wound care (dressing changes and showering)       Neurological Testing     Sensation     Knee   Left Knee   Intact: light touch    Right Knee   Intact: light touch     Active Range of Motion   Left Knee   Flexion: 92 degrees   Extension: -5 degrees     Right Knee   Flexion: 137 degrees   Extension: 7 degrees     Mobility   Patellar Mobility:   Left Knee   Hypomobile: left medial, left lateral, left superior and left inferior    Strength/Myotome Testing     Left Knee   Flexion: 4-  Extension: 4-  Quadriceps contraction: fair    Right Knee   Normal strength    Swelling     Left Knee Girth Measurement (cm)   Joint line: 39 cm    Right Knee Girth Measurement (cm)   Joint line: 37 5 cm    General Comments:      Knee Comments  Gait: antalgic, decreased TKE at heel strike, decreased knee flexion     FOTO: 67% function, 76% predicted function                Precautions: None noted  Progress note: 7/20  POC: 9/20    Manuals 6/20       PROM         stretching        Patellar mobs                Neuro Re-Ed        QS :05x10       SLB        Tandem stance        Fitter board                                Ther Ex        Nustep  *      SAQ        LAQ 10x       bridging        Leg raises 10x flex       Heel slides With strap 10x:05                       HOIST  Knee ext  Knee flex          Leg press S=  Squat  HR        Standing HR/TR        TKE        Standing hamstring curls                                        Calf stretch        Hamstring stretch Seated 3x:30       Ther Activity        Step ups        Sit to stand        Mini squats                        Gait Training                        Modalities                        Ynes White was given a handout and verbal instruction of customized home exercise program  Patient accepted program and was able to demonstrate exercises  Access Code: [email or web address]  URL: [email or web address]  Date: 06/20/2022  Prepared by: Phu Mora    Exercises  · Long Sitting Quad Set - 3 x daily - 7 x weekly - 1 sets - 10 reps - 5 sec hold  · Active Straight Leg Raise with Quad Set - 3 x daily - 7 x weekly - 1-3 sets - 10 reps  · Supine Heel Slide with Strap - 3 x daily - 7 x weekly - 1-3 sets - 10 reps  · Seated Long Arc Quad - 3 x daily - 7 x weekly - 3 sets - 10 reps  · Seated Hamstring Stretch - 3 x daily - 7 x weekly - 1 sets - 3 reps - 30 sec hold

## 2022-06-22 ENCOUNTER — OFFICE VISIT (OUTPATIENT)
Dept: PHYSICAL THERAPY | Facility: CLINIC | Age: 67
End: 2022-06-22
Payer: COMMERCIAL

## 2022-06-22 DIAGNOSIS — M25.562 LEFT KNEE PAIN, UNSPECIFIED CHRONICITY: ICD-10-CM

## 2022-06-22 DIAGNOSIS — R26.9 GAIT ABNORMALITY: ICD-10-CM

## 2022-06-22 DIAGNOSIS — S83.282D TEAR OF LATERAL MENISCUS OF LEFT KNEE, UNSPECIFIED TEAR TYPE, UNSPECIFIED WHETHER OLD OR CURRENT TEAR, SUBSEQUENT ENCOUNTER: Primary | ICD-10-CM

## 2022-06-22 PROCEDURE — 97140 MANUAL THERAPY 1/> REGIONS: CPT | Performed by: PHYSICAL THERAPIST

## 2022-06-22 PROCEDURE — 97110 THERAPEUTIC EXERCISES: CPT | Performed by: PHYSICAL THERAPIST

## 2022-06-22 PROCEDURE — 97112 NEUROMUSCULAR REEDUCATION: CPT | Performed by: PHYSICAL THERAPIST

## 2022-06-22 NOTE — PROGRESS NOTES
Daily Note     Today's date: 2022  Patient name: Dl Anthony  : 1955  MRN: [id number]  Referring provider: Rosa Cooper  Dx:   Encounter Diagnosis     ICD-10-CM    1  Tear of lateral meniscus of left knee, unspecified tear type, unspecified whether old or current tear, subsequent encounter  S83 282D    2  Gait abnormality  R26 9    3  Left knee pain, unspecified chronicity  M25 562        Start Time: 09  Stop Time: 945  Total time in clinic (min): 45 minutes    Subjective: Was a little sore after last session  PQ 3/10 today  Objective: See treatment diary below      Assessment: Tolerated treatment well  Noted edema at superior patellar space  Minimal increased knee symptoms throughout session today, able to progress to standing CKC without difficulty  Patient would benefit from continued PT      Plan: Continue per plan of care  Progress treatment as tolerated         Precautions: None noted  Progress note:   POC:     Manuals       PROM   Left knee flexion      stretching  L calf (pain in glut with HS)      Patellar mobs  Grade III-IV all planes               Neuro Re-Ed        QS :05x10 :05x10      SLB        Tandem stance        Fitter board        CSMi balance  L1-3                      Ther Ex        Nustep  L1 10 min      SAQ        LAQ 10x 2x10              Leg raises 10x flex 2x10 flex      Heel slides With strap 10x:05 With strap 10x:05                      HOIST  Knee ext  Knee flex          Leg press S=  Squat  HR        Standing HR/TR  HR 2x10      TKE  Red 10x      Standing hamstring curls  10x                                      Calf stretch        Hamstring stretch Seated 3x:30 Seated with stool 3x:30      Ther Activity        Step ups  4" 10x       Sit to stand        Mini squats  CSMi 2 min                      Gait Training                        Modalities                        Dl Anthony was given a handout and verbal instruction of customized home exercise program  Patient accepted program and was able to demonstrate exercises  Access Code: [email or web address]  URL: [email or web address]  Date: 06/20/2022  Prepared by: Edna Spencer    Exercises  · Long Sitting Quad Set - 3 x daily - 7 x weekly - 1 sets - 10 reps - 5 sec hold  · Active Straight Leg Raise with Quad Set - 3 x daily - 7 x weekly - 1-3 sets - 10 reps  · Supine Heel Slide with Strap - 3 x daily - 7 x weekly - 1-3 sets - 10 reps  · Seated Long Arc Quad - 3 x daily - 7 x weekly - 3 sets - 10 reps  · Seated Hamstring Stretch - 3 x daily - 7 x weekly - 1 sets - 3 reps - 30 sec hold

## 2022-06-27 ENCOUNTER — OFFICE VISIT (OUTPATIENT)
Dept: PHYSICAL THERAPY | Facility: CLINIC | Age: 67
End: 2022-06-27
Payer: COMMERCIAL

## 2022-06-27 DIAGNOSIS — S83.282D TEAR OF LATERAL MENISCUS OF LEFT KNEE, UNSPECIFIED TEAR TYPE, UNSPECIFIED WHETHER OLD OR CURRENT TEAR, SUBSEQUENT ENCOUNTER: Primary | ICD-10-CM

## 2022-06-27 DIAGNOSIS — R26.9 GAIT ABNORMALITY: ICD-10-CM

## 2022-06-27 DIAGNOSIS — M25.562 LEFT KNEE PAIN, UNSPECIFIED CHRONICITY: ICD-10-CM

## 2022-06-27 PROCEDURE — 97110 THERAPEUTIC EXERCISES: CPT | Performed by: PHYSICAL THERAPIST

## 2022-06-27 PROCEDURE — 97112 NEUROMUSCULAR REEDUCATION: CPT | Performed by: PHYSICAL THERAPIST

## 2022-06-27 PROCEDURE — 97140 MANUAL THERAPY 1/> REGIONS: CPT | Performed by: PHYSICAL THERAPIST

## 2022-06-27 NOTE — PROGRESS NOTES
Daily Note     Today's date: 2022  Patient name: Marcela Reddy  : 1955  MRN: [id number]  Referring provider: Andres Ribeiro  Dx:   Encounter Diagnosis     ICD-10-CM    1  Tear of lateral meniscus of left knee, unspecified tear type, unspecified whether old or current tear, subsequent encounter  S83 282D    2  Gait abnormality  R26 9    3  Left knee pain, unspecified chronicity  M25 562        Start Time: 730  Stop Time: 815  Total time in clinic (min): 45 minutes    Subjective: States knee has been sore  PQ 3/10  Has been icing  No AD  Thinks a stitch may have come out  Objective: See treatment diary below      Assessment: Tolerated treatment well  Showing good progress towards goals with increased active left knee flexion  No issues performing current exercises  VC required for mini squat activity to avoid forward translation of knees, but able to keep weight distributed fairly evenly  Patient would benefit from continued PT      Plan: Continue per plan of care  Progress treatment as tolerated         Precautions: None noted  Progress note:   POC:     Manuals      PROM   Left knee flexion Left knee flexion     stretching  L calf (pain in glut with HS) L calf and hamstring     Patellar mobs  Grade III-IV all planes  Grade III-IV all planes              Neuro Re-Ed        QS :05x10 :05x10 :05x10     SLB        Tandem stance        Fitter board        CSMi balance  L1-3 L1-4                     Ther Ex        Nustep  L1 10 min L1 10 min     SAQ        LAQ 10x 2x10 2x10             Leg raises 10x flex 2x10 flex 2x10 flex     Heel slides With strap 10x:05 With strap 10x:05 With strap 10x:05                     HOIST  Knee ext  Knee flex          Leg press S=  Squat  HR        Standing HR/TR  HR 2x10 HR 2x10     TKE  Red 10x Red 2x10     Standing hamstring curls  10x 2x10                                     Calf stretch        Hamstring stretch Seated 3x:30 Seated with stool 3x:30 Seated with stool 3x:30     Ther Activity        Step ups  4" 10x  4" 15x     Sit to stand        Mini squats  CSMi 2 min CSMi 2 min                     Gait Training                        Modalities                        Michael Mcnamara was given a handout and verbal instruction of customized home exercise program  Patient accepted program and was able to demonstrate exercises  Access Code: [email or web address]  URL: [email or web address]  Date: 06/20/2022  Prepared by: Brigid Pack    Exercises  · Long Sitting Quad Set - 3 x daily - 7 x weekly - 1 sets - 10 reps - 5 sec hold  · Active Straight Leg Raise with Quad Set - 3 x daily - 7 x weekly - 1-3 sets - 10 reps  · Supine Heel Slide with Strap - 3 x daily - 7 x weekly - 1-3 sets - 10 reps  · Seated Long Arc Quad - 3 x daily - 7 x weekly - 3 sets - 10 reps  · Seated Hamstring Stretch - 3 x daily - 7 x weekly - 1 sets - 3 reps - 30 sec hold

## 2022-06-29 ENCOUNTER — OFFICE VISIT (OUTPATIENT)
Dept: PHYSICAL THERAPY | Facility: CLINIC | Age: 67
End: 2022-06-29
Payer: COMMERCIAL

## 2022-06-29 ENCOUNTER — TELEPHONE (OUTPATIENT)
Dept: OBGYN CLINIC | Facility: HOSPITAL | Age: 67
End: 2022-06-29

## 2022-06-29 DIAGNOSIS — R26.9 GAIT ABNORMALITY: ICD-10-CM

## 2022-06-29 DIAGNOSIS — S83.282D TEAR OF LATERAL MENISCUS OF LEFT KNEE, UNSPECIFIED TEAR TYPE, UNSPECIFIED WHETHER OLD OR CURRENT TEAR, SUBSEQUENT ENCOUNTER: Primary | ICD-10-CM

## 2022-06-29 DIAGNOSIS — M25.562 LEFT KNEE PAIN, UNSPECIFIED CHRONICITY: ICD-10-CM

## 2022-06-29 PROCEDURE — 97140 MANUAL THERAPY 1/> REGIONS: CPT | Performed by: PHYSICAL THERAPIST

## 2022-06-29 PROCEDURE — 97112 NEUROMUSCULAR REEDUCATION: CPT | Performed by: PHYSICAL THERAPIST

## 2022-06-29 PROCEDURE — 97110 THERAPEUTIC EXERCISES: CPT | Performed by: PHYSICAL THERAPIST

## 2022-06-29 NOTE — TELEPHONE ENCOUNTER
Patient is scheduled for Thursday,  6/30 at 1:00 pm for left knee suture removal     Patient is unable to come in at that time  Could you accommodate her early morning Thursday, June 30 or  Friday , July 1 for 2 sutures to be removed        Please advise   [phone]

## 2022-06-29 NOTE — PROGRESS NOTES
Daily Note     Today's date: 2022  Patient name: Naomie Hollingsworth  : 1955  MRN: [id number]  Referring provider: Jung Butler,*  Dx: No diagnosis found  Subjective: States knee feels a little stiff and tight today  To see surgeon tomorrow for suture removal  No new complaints  Objective: See treatment diary below      Assessment: Tolerated treatment well  Showing good progress towards goals  Does display decreased weight shift over LLE, was able to improve with verbal cueing  Patient would benefit from continued PT      Plan: Continue per plan of care  Progress treatment as tolerated         Precautions: None noted  Progress note:   POC:     Manuals     PROM   Left knee flexion Left knee flexion Left knee flexion    stretching  L calf (pain in glut with HS) L calf and hamstring     Patellar mobs  Grade III-IV all planes  Grade III-IV all planes  Grade III-IV all planes             Neuro Re-Ed        QS :05x10 :05x10 :05x10 :05x10    SLB        Tandem stance        Fitter board    10x ea with stops    CSMi balance  L1-3 L1-4 L1-4                    Ther Ex        Nustep  L1 10 min L1 10 min L1 10 min    SAQ        LAQ 10x 2x10 2x10 3x10            Leg raises 10x flex 2x10 flex 2x10 flex 2x10 flex    Heel slides With strap 10x:05 With strap 10x:05 With strap 10x:05 10x:05                    HOIST  Knee ext  Knee flex          Leg press S=  Squat  HR        Standing HR/TR  HR 2x10 HR 2x10 HR 2x10    TKE  Red 10x Red 2x10 Red 2x10    Standing hamstring curls  10x 2x10 2x10                                    Calf stretch        Hamstring stretch Seated 3x:30 Seated with stool 3x:30 Seated with stool 3x:30 Seated 3x:30    Ther Activity        Step ups  4" 10x  4" 15x 4" x15    Sit to stand        Mini squats  CSMi 2 min CSMi 2 min CSMi 2 min    Weight shifting    CSMi 2 min            Gait Training                        Modalities Cady Payton was given a handout and verbal instruction of customized home exercise program  Patient accepted program and was able to demonstrate exercises  Access Code: [email or web address]  URL: [email or web address]  Date: 06/20/2022  Prepared by: Margarita Backers    Exercises  · Long Sitting Quad Set - 3 x daily - 7 x weekly - 1 sets - 10 reps - 5 sec hold  · Active Straight Leg Raise with Quad Set - 3 x daily - 7 x weekly - 1-3 sets - 10 reps  · Supine Heel Slide with Strap - 3 x daily - 7 x weekly - 1-3 sets - 10 reps  · Seated Long Arc Quad - 3 x daily - 7 x weekly - 3 sets - 10 reps  · Seated Hamstring Stretch - 3 x daily - 7 x weekly - 1 sets - 3 reps - 30 sec hold

## 2022-06-30 ENCOUNTER — OFFICE VISIT (OUTPATIENT)
Dept: OBGYN CLINIC | Facility: CLINIC | Age: 67
End: 2022-06-30

## 2022-06-30 VITALS
HEART RATE: 70 BPM | SYSTOLIC BLOOD PRESSURE: 156 MMHG | WEIGHT: 165 LBS | HEIGHT: 63 IN | BODY MASS INDEX: 29.23 KG/M2 | DIASTOLIC BLOOD PRESSURE: 89 MMHG

## 2022-06-30 DIAGNOSIS — Z98.890 S/P LEFT KNEE ARTHROSCOPY: Primary | ICD-10-CM

## 2022-06-30 PROCEDURE — 3008F BODY MASS INDEX DOCD: CPT | Performed by: SURGERY

## 2022-06-30 PROCEDURE — 99024 POSTOP FOLLOW-UP VISIT: CPT | Performed by: ORTHOPAEDIC SURGERY

## 2022-06-30 NOTE — PROGRESS NOTES
Assessment/Plan:   Diagnoses and all orders for this visit:    S/P left knee arthroscopy    Patient presents as expected postoperatively  Knee is stable and she demonstrates good strength  Continue physical therapy as previously prescribed  Continue NSAIDs/Tylenol as needed for pain and soreness  She will be seen in 4 weeks for 6 week follow-up postop  Should any questions or concerns arise prior to that time, she can contact the office to be seen sooner  Patient expresses understanding is in agreement with this treatment plan  The patient is doing quite well from her left knee arthroscopy  She understands the implication of her grade 4 arthritis  Continue therapy till she maximizes  Return back in 1 month for re-evaluation  If her condition changes, she will not hesitate to let us know    Subjective:   Patient ID: Jammie Angelo  1955     HPI  Patient is a 74 y o  female who presents for postop evaluation 15 days s/p left knee arthroscopy with meniscectomy performed 6/15/2022  Patient states that she is seen improvement in her mechanical symptoms and stabbing pain, but still reports achy pain in the medial aspect of the knee  She denies any recent onset bruising, swelling, numbness, or tingling  She also denies any feelings of instability  She denies any recent fever, chills, headache, nausea, dizziness, or malaise  She reports she has been consistent with physical therapy as prescribed      The following portions of the patient's history were reviewed and updated as appropriate:  Past medical history, past surgical history, Family history, social history, current medications and allergies    Past Medical History:   Diagnosis Date    Anemia     Arthritis     Chronic pain disorder     back and hip pain    Disease of thyroid gland     nodules    GERD (gastroesophageal reflux disease)     Heart murmur     Heartburn     Hiatal hernia     Hyperlipidemia     Hypertension     Overactive bladder     Rotator cuff tear, left     Rotator cuff tear, right     Vitamin D deficiency     unsure    Wears dentures     upper    Wears glasses     Wears partial dentures     upper       Past Surgical History:   Procedure Laterality Date     SECTION      Last Assessed: 2017    ELBOW SURGERY      Last Assessed: 2017    ESOPHAGOGASTRODUODENOSCOPY N/A 2017    Procedure: ESOPHAGOGASTRODUODENOSCOPY (EGD); Surgeon: Aakash Mora DO;  Location: [facility]; Service: Gastroenterology    HERNIA REPAIR  2020    Hiatal hernia repair    HIP SURGERY Left 2018    glut medius/minimus repair  and 18    HYSTERECTOMY          KNEE SURGERY      x2    PARAESOPHAGEAL HERNIA REPAIR N/A 2020    Procedure: LAPAROSCOPIC PARAESOPHAGEAL HERNIA REPAIR WITH MESH AND NISSEN FUNDOPLICATION WITH ROBOTICS;  Surgeon: Vel Velazquez MD;  Location: [facility];  Service: General    VA COLONOSCOPY FLX DX W/COLLJ SPEC WHEN PFRMD N/A 2017    Procedure: EGD AND COLONOSCOPY;  Surgeon: Aakash Mora DO;  Location: [facility];   Service: Gastroenterology    VA EXCISON TUMOR SOFT TISSUE THIGH/KNEE SUBQ 3+CM Bilateral 2022    Procedure: EXCISION BIOPSY TISSUE LESION/MASS LOWER EXTREMITY;  Surgeon: Malachi Hills MD;  Location: [facility];  Service: General    VA KNEE SCOPE,MED/LAT MENISECTOMY Left 6/15/2022    Procedure: ;left knee arthroscopy, meniscectomy,synevectomy,chondroplasty, loose body removal, injection;  Surgeon: Ryan Jaffe DO;  Location: [facility];  Service: Orthopedics    SHOULDER ARTHROCENTESIS      SHOULDER ARTHROSCOPY Right 2021    Procedure: SHOULDER ARTHROSCOPY WITH acromioplasty, debridment, and ROTATOR CUFF REPAIR;  Surgeon: Rosario Don DO;  Location: [facility] MA[facility] OR;  Service: Orthopedics       Family History   Problem Relation Age of Onset    Colon cancer Mother     Heart attack Father     Other Father         Cardiac Disorder    Diabetes type II Father     Colon cancer Sister     Cancer Maternal Grandmother     No Known Problems Daughter     No Known Problems Paternal Grandmother     No Known Problems Sister     No Known Problems Sister     No Known Problems Sister     No Known Problems Sister     No Known Problems Maternal Aunt     No Known Problems Maternal Aunt     No Known Problems Maternal Aunt        Social History     Socioeconomic History    Marital status: /Civil Union     Spouse name: None    Number of children: None    Years of education: None    Highest education level: None   Occupational History    None   Tobacco Use    Smoking status: Former Smoker     Packs/day:  00     Quit date:      Years since quittin 5    Smokeless tobacco: Never Used    Tobacco comment: quit 25 yrs ago   Vaping Use    Vaping Use: Never used   Substance and Sexual Activity    Alcohol use: No    Drug use: No    Sexual activity: Yes     Partners: Male   Other Topics Concern    None   Social History Narrative    Caffeine use    , 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[employer], now at [employer] and [address] [place] [address] for OR    2 grown children     Social Determinants of Health     Financial Resource Strain: Not on file   Food Insecurity: Not on file   Transportation Needs: Not on file   Physical Activity: Not on file   Stress: Not on file   Social Connections: Not on file   Intimate Partner Violence: Not on file   Housing Stability: Not on file         Current Outpatient Medications:     atorvastatin (LIPITOR) 10 mg tablet, take 1 tablet by mouth once daily, Disp: 90 tablet, Rfl: 0    CALCIUM PO, Take by mouth, Disp: , Rfl:     famotidine (PEPCID) 20 mg tablet, take 1 tablet by mouth twice a day (Patient taking differently: Take 20 mg by mouth 2 (two) times a day as needed), Disp: 180 tablet, Rfl: 0    hydrOXYzine HCL (ATARAX) 25 mg tablet, Take 1-2 tablets (25-50 mg total) by mouth daily at bedtime as needed for anxiety (insomnia), Disp: 60 tablet, Rfl: 2    losartan (COZAAR) 100 MG tablet, take 1 tablet by mouth once daily, Disp: 90 tablet, Rfl: 1    meloxicam (MOBIC) 15 mg tablet, Take 1 tablet (15 mg total) by mouth daily, Disp: 30 tablet, Rfl: 0    metoclopramide (Reglan) 10 mg tablet, Take 1 tablet (10 mg total) by mouth 4 (four) times a day, Disp: 120 tablet, Rfl: 6    valACYclovir (VALTREX) 1,000 mg tablet, take 1 tablet by mouth twice a day for 3 days, Disp: 6 tablet, Rfl: 2    Allergies   Allergen Reactions    Hydrocodone Other (See Comments) and Irritability     Patient reports insomnia when taking        Review of Systems   Constitutional: Negative for fatigue  Gastrointestinal: Negative for nausea  Musculoskeletal:        As noted in HPI   Neurological: Negative for dizziness, weakness, numbness and headaches  All other systems reviewed and are negative  Objective:  /89 (BP Location: Left arm, Patient Position: Sitting, Cuff Size: Standard)   Pulse 70   Ht 5' 3" (1 6 m)   Wt 74 8 kg (165 lb)   BMI 29 23 kg/m²     Ortho Exam  Left knee -   Weight-bearing status:  Full WBAT uses no assistive device  Incision(s):  Clean, dry, edges well approximated with sutures - sutures removed at time of visit without difficulty - no drainage or dehiscence  Skin is warm and dry with no signs of erythema, ecchymosis, or infection  Mild soft tissue swelling as expected postoperatively, no effusion  ROM:  5°-110°  Strength:  4+/5 throughout  Knee is stable to varus and valgus stress  Calf compartments are soft  - Yany's sign  2+ TP and DP pulses with brisk capillary refill to the toes  Sural, saphenous, tibial, superficial and deep peroneal motor and sensory distributions intact  Sensation light touch intact distally    Physical Exam  Vitals and nursing note reviewed  Constitutional:       General: She is not in acute distress  Appearance: She is well-developed     HENT:      Head: Normocephalic and atraumatic  Eyes:      Conjunctiva/sclera: Conjunctivae normal    Cardiovascular:      Rate and Rhythm: Normal rate  Pulmonary:      Effort: Pulmonary effort is normal    Musculoskeletal:      Cervical back: Neck supple  Skin:     General: Skin is warm and dry  Capillary Refill: Capillary refill takes less than 2 seconds  Neurological:      Mental Status: She is alert and oriented to person, place, and time     Psychiatric:         Mood and Affect: Mood normal          Behavior: Behavior normal           Diagnostic Test Review:  No new imaging performed this visit    Procedures   No procedures performed this visit    Scribe Attestation    I,:  Kareem Karimi am acting as a scribe while in the presence of the attending physician :       I,:  Kellie Gave, DO personally performed the services described in this documentation    as scribed in my presence :

## 2022-07-06 ENCOUNTER — APPOINTMENT (OUTPATIENT)
Dept: PHYSICAL THERAPY | Facility: CLINIC | Age: 67
End: 2022-07-06
Payer: COMMERCIAL

## 2022-07-08 ENCOUNTER — APPOINTMENT (OUTPATIENT)
Dept: PHYSICAL THERAPY | Facility: CLINIC | Age: 67
End: 2022-07-08
Payer: COMMERCIAL

## 2022-07-13 ENCOUNTER — APPOINTMENT (OUTPATIENT)
Dept: PHYSICAL THERAPY | Facility: CLINIC | Age: 67
End: 2022-07-13
Payer: COMMERCIAL

## 2022-07-15 ENCOUNTER — APPOINTMENT (OUTPATIENT)
Dept: PHYSICAL THERAPY | Facility: CLINIC | Age: 67
End: 2022-07-15
Payer: COMMERCIAL

## 2022-07-20 ENCOUNTER — EVALUATION (OUTPATIENT)
Dept: PHYSICAL THERAPY | Facility: CLINIC | Age: 67
End: 2022-07-20
Payer: COMMERCIAL

## 2022-07-20 DIAGNOSIS — R26.9 GAIT ABNORMALITY: ICD-10-CM

## 2022-07-20 DIAGNOSIS — S83.282D TEAR OF LATERAL MENISCUS OF LEFT KNEE, UNSPECIFIED TEAR TYPE, UNSPECIFIED WHETHER OLD OR CURRENT TEAR, SUBSEQUENT ENCOUNTER: Primary | ICD-10-CM

## 2022-07-20 PROCEDURE — 97110 THERAPEUTIC EXERCISES: CPT | Performed by: PHYSICAL THERAPIST

## 2022-07-20 PROCEDURE — 97112 NEUROMUSCULAR REEDUCATION: CPT | Performed by: PHYSICAL THERAPIST

## 2022-07-20 NOTE — PROGRESS NOTES
PT Re-Evaluation  and PT Discharge    Today's date: 2022  Patient name: Juan Potts  : 1955  MRN: [id number]  Referring provider: Cristy Rivera  Dx:   Encounter Diagnosis     ICD-10-CM    1  Tear of lateral meniscus of left knee, unspecified tear type, unspecified whether old or current tear, subsequent encounter  S83 282D    2  Gait abnormality  R26 9        Start Time: 0730  Stop Time: 0810  Total time in clinic (min): 40 minutes    Assessment  Assessment details: Juan Potts was seen for an initial PT evaluation and 3 f/u sessions  Patient is a 74 y o  female with diagnosis of L knee lateral meniscus tear with menisectomy and past medical history significant for OA, chronic pain, thyroid disease, GERD, hiatal hernia, hyperlipidemia, HTN, rotator cuff tear with repair, L buttock pain with history of surgery, lipomas with removal, lumbar radiculopathy  FOTO functional score shows improvement from 67% at intake to 91% today surpassing predicted value of 76%  Findings of examination today show improvement with left knee range of motion, strength, mm activation and stability  Noted continued edema of left knee likely due to increased activity and educated patient on edema control techniques (compression and cryo)  At this time Doneta Age no longer requires skilled PT and has met all goals set at initial evaluation, to be discharged from [facility]  STG (6 weeks)  1  Patient will have reported 0/10 pain in left knee at rest  - MET   2  Improve patient's left knee flexion to 120 degrees for increased ability to take proper strides during ambulation  - MET   3  Increase patient's left single leg balance to 5 seconds for increased stability on stairs  - MET 7 (10 SEC)  LTG (12 weeks)  1  Patient's LE strength will be equal bilaterally for ability to ambulate and return to functional activities at [facility]  - MET   2   Patient will be able to walk in community with 0/10 pain in left knee  - MET   3  Patient will be independent with home exercise program for continued maintenance post PT discharge  - MET       Plan  Plan details: DC PT TO HEP  Plan of Care beginning date: 2022  Treatment plan discussed with: patient and PTA        Subjective Evaluation    History of Present Illness  Date of surgery: 6/15/2022  Subjective : Patient states she feels 100% improved since surgery  Has had no pain and is able to return to prior level of function  Denies difficulty walking, stairs and squatting  To have f/u with surgeon later this week  Mechanism of injury: Ness Rooney is a 74 y o  female who presents to outpatient Physical Therapy today with complaints of left knee pain  Underwent L knee scope 6/15  Ambulated with crutches 2 days post, no AD at this time  Dressing changed , no issues reported  MD instruction: Evaluate and Treat   Status post left knee arthroscopy with meniscectomy   Weightbearing as tolerated   Range of motion, strengthening, stretching, edema control  Pain      Current pain ratin  0  At best pain ratin  Pain scale at highest: "stretch"  Location: anterior left knee  Quality: pulling  Relieving factors: relaxation    Social Support  Steps to enter house: yes  Stairs in house: yes   Lives in: multiple-level home  Lives with: spouse    Employment status: not working  Hand dominance: right    Patient Goals  Patient goals for therapy: decreased pain  Patient goal: "feel better"        Objective     Observations     Additional Observation Details  2 scope incisions at medal and lateral L knee joint line closed with sutures  Slight Redness at sutures, no drainage  Noted 2cm edema of jt line  Covered and patient education on wound care (dressing changes and showering)       : 2 scope incisions closed and scared, good mobility    Neurological Testing     Sensation     Knee   Left Knee   Intact: light touch    Right Knee   Intact: light touch     Active Range of Motion  7/20  Left Knee   Flexion: 92 degrees   130  Extension: -5 degrees  3    Right Knee   Flexion: 137 degrees   Extension: 7 degrees     Mobility   Patellar Mobility:   Left Knee   Hypomobile: left medial, left lateral, left superior and left inferior    Strength/Myotome Testing  7/20    Left Knee   Flexion: 4-    5  Extension: 4-    5  Quadriceps contraction: fair  Good, no ext lag    Right Knee   Normal strength    Swelling     Left Knee Girth Measurement (cm)   Joint line: 39 cm  7/20: 40cm    Right Knee Girth Measurement (cm)   Joint line: 37 5 cm      General Comments:      Knee Comments  Gait: antalgic, decreased TKE at heel strike, decreased knee flexion   7/20: baseline, no AD    FOTO: 67% function, 76% predicted function   7/20: 91%         Precautions: None noted  Progress note: 7/20  POC: 9/20    Manuals 6/20 6/22 6/27 6/29 7/20   PROM   Left knee flexion Left knee flexion Left knee flexion    stretching  L calf (pain in glut with HS) L calf and hamstring     Patellar mobs  Grade III-IV all planes  Grade III-IV all planes  Grade III-IV all planes             Neuro Re-Ed        QS :05x10 :05x10 :05x10 :05x10    SLB        Tandem stance        Fitter board    10x ea with stops    CSMi balance  L1-3 L1-4 L1-4                    Ther Ex     HEP review   Nustep  L1 10 min L1 10 min L1 10 min L1 10 min   SAQ        LAQ 10x 2x10 2x10 3x10            Leg raises 10x flex 2x10 flex 2x10 flex 2x10 flex    Heel slides With strap 10x:05 With strap 10x:05 With strap 10x:05 10x:05                    HOIST  Knee ext  Knee flex          Leg press S=  Squat  HR        Standing HR/TR  HR 2x10 HR 2x10 HR 2x10    TKE  Red 10x Red 2x10 Red 2x10    Standing hamstring curls  10x 2x10 2x10                                    Calf stretch        Hamstring stretch Seated 3x:30 Seated with stool 3x:30 Seated with stool 3x:30 Seated 3x:30    Ther Activity        Step ups  4" 10x  4" 15x 4" x15 Sit to stand        Mini squats  CSMi 2 min CSMi 2 min CSMi 2 min    Weight shifting    CSMi 2 min            Gait Training                        Modalities                        Heron Capone was given a handout and verbal instruction of customized home exercise program  Patient accepted program and was able to demonstrate exercises  ·  Access Code: [email or web address]  · URL: [email or web address]  · Date: 07/20/2022  · Prepared by: Delmy Stapleton  ·   · Exercises  · Seated Long Arc Quad - 3 x daily - 7 x weekly - 3 sets - 10 reps  · Seated Hamstring Stretch - 3 x daily - 7 x weekly - 1 sets - 3 reps - 30 sec hold  · Mini Squat with Counter Support - 1 x daily - 7 x weekly - 3 sets - 10 reps  · Standing Heel Raise - 1 x daily - 7 x weekly - 3 sets - 10 reps  · Single Leg Stance - 1 x daily - 7 x weekly - 1 sets - 3 reps - 30 sec hold  · Step Up - 1 x daily - 7 x weekly - 3 sets - 10 reps  ·

## 2022-07-22 ENCOUNTER — OFFICE VISIT (OUTPATIENT)
Dept: OBGYN CLINIC | Facility: CLINIC | Age: 67
End: 2022-07-22

## 2022-07-22 VITALS
BODY MASS INDEX: 29.23 KG/M2 | SYSTOLIC BLOOD PRESSURE: 164 MMHG | HEART RATE: 66 BPM | HEIGHT: 63 IN | DIASTOLIC BLOOD PRESSURE: 103 MMHG | WEIGHT: 165 LBS

## 2022-07-22 DIAGNOSIS — Z98.890 S/P LEFT KNEE ARTHROSCOPY: Primary | ICD-10-CM

## 2022-07-22 PROCEDURE — 99024 POSTOP FOLLOW-UP VISIT: CPT | Performed by: ORTHOPAEDIC SURGERY

## 2022-07-22 NOTE — PROGRESS NOTES
Patient Name:  Jos Marques  MRN:  [id number]    Assessment     1  S/P left knee arthroscopy         Plan     1  Shaun Mejia is doing well, knee ROM is full and she is not experiencing any pain   2  Activities to tolerance, no restrictions   3  Follow up in the office as needed if symptoms worsen or fail to improve     Return if symptoms worsen or fail to improve  The patient is doing quite well from her left knee arthroscopy  Strength and motion are intact  No major tenderness  Continue home exercise program   Continue stretching  Follow up on an as-needed basis  If her condition changes, she will not hesitate to let us know      Subjective     74 y o  female presents to the office aprox  6 weeks s/p left knee arthroscopy with menisectomy, DOS 6/15/22  Overall Shaun Mejia is doing well  She notes knee ROM is full  She denies any pain  She notes some swelling to the knee  Objective     BP (!) 164/103   Pulse 66   Ht 5' 3" (1 6 m)   Wt 74 8 kg (165 lb)   BMI 29 23 kg/m²     Left knee:   No erythema, ecchymosis or significant edema  No effusion   Well healed surgical incision   Full knee ROM   Non tender to palpation medial or lateral joint line   Ambulates without assistance       Data Review     I have personally reviewed pertinent films in PACS, and my interpretation follows      No new imaging to review       Scribe Attestation    I,:  Jessica Reynoso am acting as a scribe while in the presence of the attending physician :       I,:  Fabrizio Scott, DO personally performed the services described in this documentation    as scribed in my presence :

## 2022-08-01 NOTE — TELEPHONE ENCOUNTER
Okay to schedule with [facility] Vital Signs Last 24 Hrs  T(C): 36.6 (01 Aug 2022 15:00), Max: 37 (01 Aug 2022 14:39)  T(F): 97.8 (01 Aug 2022 15:00), Max: 98.6 (01 Aug 2022 14:39)  HR: 65 (01 Aug 2022 15:00) (65 - 170)  BP: 105/79 (01 Aug 2022 15:00) (101/57 - 123/103)  BP(mean): 83 (01 Aug 2022 14:39) (71 - 96)  RR: 19 (01 Aug 2022 15:00) (16 - 20)  SpO2: 95% (01 Aug 2022 15:00) (90% - 96%)    Parameters below as of 01 Aug 2022 15:00  Patient On (Oxygen Delivery Method): nasal cannula  O2 Flow (L/min): 2      CONSTITUTIONAL: Well-groomed, in no apparent distress  EYES: No conjunctival or scleral injection, non-icteric; PERRLA and symmetric  ENMT: No external nasal lesions; nasal mucosa not inflamed; normal dentition; no pharyngeal injection or exudates, oral mucosa with moist membranes  NECK: Trachea midline without palpable neck mass; thyroid not enlarged and non-tender  RESPIRATORY: Breathing comfortably; no dullness to percussion; lungs CTA without wheeze/rhonchi/rales  CARDIOVASCULAR: +S1S2, RRR, no M/G/R; no carotid bruits; pedal pulses full and symmetric; no lower extremity edema  CHEST/BREAST: Breasts are symmetric in appearance; no palpable masses or lumps  GASTROINTESTINAL: No palpable masses or tenderness, +BS throughout, no rebound/guarding; no hepatosplenomegaly; no hernia palpated  LYMPHATIC: No cervical LAD or tenderness; no axillary LAD or tenderness; no inguinal LAD or tenderness  MUSCULOSKELETAL: no digital clubbing or cyanosis; no paraspinal tenderness; normal strength and tone of extremities  SKIN: No rashes or ulcers noted; no subcutaneous nodules or induration palpable  NEUROLOGIC: CN II-XII intact; sensation intact in LEs b/l to light touch  PSYCHIATRIC: A+O x 3; mood and affect appropriate; appropriate insight and judgment

## 2022-08-04 ENCOUNTER — TELEPHONE (OUTPATIENT)
Dept: PAIN MEDICINE | Facility: CLINIC | Age: 67
End: 2022-08-04

## 2022-08-04 NOTE — TELEPHONE ENCOUNTER
Patient called asking if the office can call if theres any cancellations prior to the 10/13 procedure - thank you        [phone]

## 2022-08-08 NOTE — PROGRESS NOTES
Weight Management Nutrition Follow-Up    Weight 74 3 kg (163 lb 11 2 oz)  Diagnosis: paraesophageal hernia, GERD, Heartburn    Bariatric Surgeon: Dr Raymon Hurst    Surgery: LAPAROSCOPIC PARAESOPHAGEAL HERNIA REPAIR WITH MESH AND NISSEN FUNDOPLICATION WITH ROBOTICS on 5/19/2020  complaints of dysphagia, hiccups, and flatulence    Patient has had symptoms of dysphagia and food getting stuck for the past several months  She notes these symptoms occur with solid food, but do not occur with fluids  She was having bloating, hiccups and gas  She is having this constantly  She has no heartburn  No regurgitation  She has no constipation/diarrhea  Topics discussed today include:   Reviewed post-operative foregut/hernia repair/fundoplication diet stages with pt  Surgeon is recommending one week pureed foods and then six weeks of soft foods  Reviewed pureed and soft food diet choices/suggestions  Provided contact info for future questions  Patient was able to verbalize basic diet (protein, fluid, vitamin and mineral) recommendations and possible nutrition-related complications   Yes

## 2022-08-10 ENCOUNTER — OFFICE VISIT (OUTPATIENT)
Dept: BARIATRICS | Facility: CLINIC | Age: 67
End: 2022-08-10

## 2022-08-10 VITALS — WEIGHT: 163.7 LBS | BODY MASS INDEX: 29 KG/M2

## 2022-08-10 DIAGNOSIS — K44.9 PARAESOPHAGEAL HERNIA: ICD-10-CM

## 2022-08-10 DIAGNOSIS — S83.242A TEAR OF MEDIAL MENISCUS OF LEFT KNEE, CURRENT, UNSPECIFIED TEAR TYPE, INITIAL ENCOUNTER: ICD-10-CM

## 2022-08-10 DIAGNOSIS — R13.10 DYSPHAGIA, UNSPECIFIED TYPE: Primary | ICD-10-CM

## 2022-08-10 PROCEDURE — RECHECK: Performed by: DIETITIAN, REGISTERED

## 2022-08-12 RX ORDER — CEPHALEXIN 500 MG/1
CAPSULE ORAL
Qty: 9 CAPSULE | Refills: 0 | OUTPATIENT
Start: 2022-08-12

## 2022-08-17 ENCOUNTER — TELEPHONE (OUTPATIENT)
Dept: SURGERY | Facility: CLINIC | Age: 67
End: 2022-08-17

## 2022-08-17 ENCOUNTER — PROCEDURE VISIT (OUTPATIENT)
Dept: SURGERY | Facility: CLINIC | Age: 67
End: 2022-08-17
Payer: COMMERCIAL

## 2022-08-17 VITALS
TEMPERATURE: 97.1 F | RESPIRATION RATE: 18 BRPM | OXYGEN SATURATION: 98 % | DIASTOLIC BLOOD PRESSURE: 70 MMHG | WEIGHT: 162 LBS | HEART RATE: 77 BPM | SYSTOLIC BLOOD PRESSURE: 128 MMHG | BODY MASS INDEX: 28.7 KG/M2

## 2022-08-17 DIAGNOSIS — D17.20 LIPOMA OF THIGH: Primary | ICD-10-CM

## 2022-08-17 PROCEDURE — 99214 OFFICE O/P EST MOD 30 MIN: CPT | Performed by: SURGERY

## 2022-08-17 RX ORDER — SODIUM CHLORIDE, SODIUM LACTATE, POTASSIUM CHLORIDE, CALCIUM CHLORIDE 600; 310; 30; 20 MG/100ML; MG/100ML; MG/100ML; MG/100ML
125 INJECTION, SOLUTION INTRAVENOUS CONTINUOUS
Status: CANCELLED | OUTPATIENT
Start: 2022-08-17

## 2022-08-17 RX ORDER — CEFAZOLIN SODIUM 2 G/50ML
2000 SOLUTION INTRAVENOUS ONCE
Status: CANCELLED | OUTPATIENT
Start: 2022-08-25 | End: 2022-08-17

## 2022-08-17 NOTE — ASSESSMENT & PLAN NOTE
Patient is a pleasant 68-year-old female presenting to the office 3 months status post excision of bilateral thigh lipomas with new symptomatic lipomas distributed over both eyes for which the patient has a strong desire for definitive treatment by excisional biopsy  Given the number of lipomas that the patient has identified at the time of today's appointment I have advised that she be return to the operating room for the excision of the new benign neoplasms  The options benefits risks and alternatives to surgery under anesthesia in the operating room were discussed including the option to remove them in the office under local anesthesia over a series of visits  Patient understands that there is a high probability that she will identified new lipomas over her arms or legs in the future requiring additional surgical interventions for symptom relief  All questions answered to the satisfaction of the patient and informed consent obtained to proceed

## 2022-08-17 NOTE — H&P (VIEW-ONLY)
Assessment/Plan:    Lipoma of thigh  Patient is a pleasant 74-year-old female presenting to the office 3 months status post excision of bilateral thigh lipomas with new symptomatic lipomas distributed over both eyes for which the patient has a strong desire for definitive treatment by excisional biopsy  Given the number of lipomas that the patient has identified at the time of today's appointment I have advised that she be return to the operating room for the excision of the new benign neoplasms  The options benefits risks and alternatives to surgery under anesthesia in the operating room were discussed including the option to remove them in the office under local anesthesia over a series of visits  Patient understands that there is a high probability that she will identified new lipomas over her arms or legs in the future requiring additional surgical interventions for symptom relief  All questions answered to the satisfaction of the patient and informed consent obtained to proceed  Subjective:      Patient ID: Peng Chaidez is a 74 y o  female  Pt is coming in today for lipomas in the thighs she was seen in the OR and there is still some there she would like to be taken out  No fever/chills and no pain ZORA Collins MA  The following portions of the patient's history were reviewed and updated as appropriate: allergies, current medications, past family history, past medical history, past social history, past surgical history and problem list     Review of Systems   Constitutional: Negative for chills and fever  HENT: Negative for ear pain and sore throat  Eyes: Negative for pain and visual disturbance  Respiratory: Negative for cough and shortness of breath  Cardiovascular: Negative for chest pain and palpitations  Gastrointestinal: Negative for abdominal pain and vomiting  Genitourinary: Negative for dysuria and hematuria     Musculoskeletal: Negative for arthralgias and back pain  Skin: Negative for color change and rash  Neurological: Negative for seizures and syncope  All other systems reviewed and are negative  Objective:      /70 (BP Location: Left arm, Patient Position: Sitting, Cuff Size: Large)   Pulse 77   Temp (!) 97 1 °F (36 2 °C) (Temporal)   Resp 18   Wt 73 5 kg (162 lb)   SpO2 98%   BMI 28 70 kg/m²          Physical Exam  Constitutional:       Appearance: She is well-developed  HENT:      Head: Normocephalic and atraumatic  Eyes:      Conjunctiva/sclera: Conjunctivae normal       Pupils: Pupils are equal, round, and reactive to light  Cardiovascular:      Rate and Rhythm: Normal rate and regular rhythm  Pulmonary:      Effort: Pulmonary effort is normal       Breath sounds: Normal breath sounds  Abdominal:      General: Bowel sounds are normal       Palpations: Abdomen is soft  Musculoskeletal:         General: Normal range of motion  Cervical back: Normal range of motion and neck supple  Comments: Multiple palpable subcutaneous masses over both anterior thighs numbering approximately 12 on the right and 5 on the left  Skin:     General: Skin is warm and dry  Neurological:      Mental Status: She is alert and oriented to person, place, and time  Psychiatric:         Behavior: Behavior normal          Thought Content:  Thought content normal          Judgment: Judgment normal

## 2022-08-17 NOTE — LETTER
August 17, 2022     Aubree Stover PA-C  [address] [facility]  [address]  [address] [place] [address]    Patient: Gini Padilla   YOB: 1955   Date of Visit: 8/17/2022       Dear Dr WILLEM MARTINEZ: Thank you for referring Gini Padilla to me for evaluation  Below are my notes for this consultation  If you have questions, please do not hesitate to call me  I look forward to following your patient along with you  Sincerely,        Will Castrejon MD        CC: No Recipients  Will Castrejon MD  8/17/2022  8:25 AM  Sign when Signing Visit  Assessment/Plan:    Lipoma of thigh  Patient is a pleasant 61-year-old female presenting to the office 3 months status post excision of bilateral thigh lipomas with new symptomatic lipomas distributed over both eyes for which the patient has a strong desire for definitive treatment by excisional biopsy  Given the number of lipomas that the patient has identified at the time of today's appointment I have advised that she be return to the operating room for the excision of the new benign neoplasms  The options benefits risks and alternatives to surgery under anesthesia in the operating room were discussed including the option to remove them in the office under local anesthesia over a series of visits  Patient understands that there is a high probability that she will identified new lipomas over her arms or legs in the future requiring additional surgical interventions for symptom relief  All questions answered to the satisfaction of the patient and informed consent obtained to proceed  Subjective:      Patient ID: Gini Padilla is a 74 y o  female  Pt is coming in today for lipomas in the thighs she was seen in the OR and there is still some there she would like to be taken out  No fever/chills and no pain ZORA Collins MA        The following portions of the patient's history were reviewed and updated as appropriate: allergies, current medications, past family history, past medical history, past social history, past surgical history and problem list     Review of Systems   Constitutional: Negative for chills and fever  HENT: Negative for ear pain and sore throat  Eyes: Negative for pain and visual disturbance  Respiratory: Negative for cough and shortness of breath  Cardiovascular: Negative for chest pain and palpitations  Gastrointestinal: Negative for abdominal pain and vomiting  Genitourinary: Negative for dysuria and hematuria  Musculoskeletal: Negative for arthralgias and back pain  Skin: Negative for color change and rash  Neurological: Negative for seizures and syncope  All other systems reviewed and are negative  Objective:      /70 (BP Location: Left arm, Patient Position: Sitting, Cuff Size: Large)   Pulse 77   Temp (!) 97 1 °F (36 2 °C) (Temporal)   Resp 18   Wt 73 5 kg (162 lb)   SpO2 98%   BMI 28 70 kg/m²          Physical Exam  Constitutional:       Appearance: She is well-developed  HENT:      Head: Normocephalic and atraumatic  Eyes:      Conjunctiva/sclera: Conjunctivae normal       Pupils: Pupils are equal, round, and reactive to light  Cardiovascular:      Rate and Rhythm: Normal rate and regular rhythm  Pulmonary:      Effort: Pulmonary effort is normal       Breath sounds: Normal breath sounds  Abdominal:      General: Bowel sounds are normal       Palpations: Abdomen is soft  Musculoskeletal:         General: Normal range of motion  Cervical back: Normal range of motion and neck supple  Comments: Multiple palpable subcutaneous masses over both anterior thighs numbering approximately 12 on the right and 5 on the left  Skin:     General: Skin is warm and dry  Neurological:      Mental Status: She is alert and oriented to person, place, and time  Psychiatric:         Behavior: Behavior normal          Thought Content:  Thought content normal          Judgment: Judgment normal

## 2022-08-17 NOTE — H&P
Assessment/Plan:    Lipoma of thigh  Patient is a pleasant 72-year-old female presenting to the office 3 months status post excision of bilateral thigh lipomas with new symptomatic lipomas distributed over both eyes for which the patient has a strong desire for definitive treatment by excisional biopsy  Given the number of lipomas that the patient has identified at the time of today's appointment I have advised that she be return to the operating room for the excision of the new benign neoplasms  The options benefits risks and alternatives to surgery under anesthesia in the operating room were discussed including the option to remove them in the office under local anesthesia over a series of visits  Patient understands that there is a high probability that she will identified new lipomas over her arms or legs in the future requiring additional surgical interventions for symptom relief  All questions answered to the satisfaction of the patient and informed consent obtained to proceed  Subjective:      Patient ID: Laure Phelps is a 74 y o  female  Pt is coming in today for lipomas in the thighs she was seen in the OR and there is still some there she would like to be taken out  No fever/chills and no pain ZORA Collins MA  The following portions of the patient's history were reviewed and updated as appropriate: allergies, current medications, past family history, past medical history, past social history, past surgical history and problem list     Review of Systems   Constitutional: Negative for chills and fever  HENT: Negative for ear pain and sore throat  Eyes: Negative for pain and visual disturbance  Respiratory: Negative for cough and shortness of breath  Cardiovascular: Negative for chest pain and palpitations  Gastrointestinal: Negative for abdominal pain and vomiting  Genitourinary: Negative for dysuria and hematuria     Musculoskeletal: Negative for arthralgias and back pain  Skin: Negative for color change and rash  Neurological: Negative for seizures and syncope  All other systems reviewed and are negative  Objective:      /70 (BP Location: Left arm, Patient Position: Sitting, Cuff Size: Large)   Pulse 77   Temp (!) 97 1 °F (36 2 °C) (Temporal)   Resp 18   Wt 73 5 kg (162 lb)   SpO2 98%   BMI 28 70 kg/m²          Physical Exam  Constitutional:       Appearance: She is well-developed  HENT:      Head: Normocephalic and atraumatic  Eyes:      Conjunctiva/sclera: Conjunctivae normal       Pupils: Pupils are equal, round, and reactive to light  Cardiovascular:      Rate and Rhythm: Normal rate and regular rhythm  Pulmonary:      Effort: Pulmonary effort is normal       Breath sounds: Normal breath sounds  Abdominal:      General: Bowel sounds are normal       Palpations: Abdomen is soft  Musculoskeletal:         General: Normal range of motion  Cervical back: Normal range of motion and neck supple  Comments: Multiple palpable subcutaneous masses over both anterior thighs numbering approximately 12 on the right and 5 on the left  Skin:     General: Skin is warm and dry  Neurological:      Mental Status: She is alert and oriented to person, place, and time  Psychiatric:         Behavior: Behavior normal          Thought Content:  Thought content normal          Judgment: Judgment normal

## 2022-08-17 NOTE — PROGRESS NOTES
Assessment/Plan:    Lipoma of thigh  Patient is a pleasant 63-year-old female presenting to the office 3 months status post excision of bilateral thigh lipomas with new symptomatic lipomas distributed over both eyes for which the patient has a strong desire for definitive treatment by excisional biopsy  Given the number of lipomas that the patient has identified at the time of today's appointment I have advised that she be return to the operating room for the excision of the new benign neoplasms  The options benefits risks and alternatives to surgery under anesthesia in the operating room were discussed including the option to remove them in the office under local anesthesia over a series of visits  Patient understands that there is a high probability that she will identified new lipomas over her arms or legs in the future requiring additional surgical interventions for symptom relief  All questions answered to the satisfaction of the patient and informed consent obtained to proceed  Subjective:      Patient ID: Kevin Fish is a 74 y o  female  Pt is coming in today for lipomas in the thighs she was seen in the OR and there is still some there she would like to be taken out  No fever/chills and no pain ZORA Collins MA  The following portions of the patient's history were reviewed and updated as appropriate: allergies, current medications, past family history, past medical history, past social history, past surgical history and problem list     Review of Systems   Constitutional: Negative for chills and fever  HENT: Negative for ear pain and sore throat  Eyes: Negative for pain and visual disturbance  Respiratory: Negative for cough and shortness of breath  Cardiovascular: Negative for chest pain and palpitations  Gastrointestinal: Negative for abdominal pain and vomiting  Genitourinary: Negative for dysuria and hematuria     Musculoskeletal: Negative for arthralgias and back pain  Skin: Negative for color change and rash  Neurological: Negative for seizures and syncope  All other systems reviewed and are negative  Objective:      /70 (BP Location: Left arm, Patient Position: Sitting, Cuff Size: Large)   Pulse 77   Temp (!) 97 1 °F (36 2 °C) (Temporal)   Resp 18   Wt 73 5 kg (162 lb)   SpO2 98%   BMI 28 70 kg/m²          Physical Exam  Constitutional:       Appearance: She is well-developed  HENT:      Head: Normocephalic and atraumatic  Eyes:      Conjunctiva/sclera: Conjunctivae normal       Pupils: Pupils are equal, round, and reactive to light  Cardiovascular:      Rate and Rhythm: Normal rate and regular rhythm  Pulmonary:      Effort: Pulmonary effort is normal       Breath sounds: Normal breath sounds  Abdominal:      General: Bowel sounds are normal       Palpations: Abdomen is soft  Musculoskeletal:         General: Normal range of motion  Cervical back: Normal range of motion and neck supple  Comments: Multiple palpable subcutaneous masses over both anterior thighs numbering approximately 12 on the right and 5 on the left  Skin:     General: Skin is warm and dry  Neurological:      Mental Status: She is alert and oriented to person, place, and time  Psychiatric:         Behavior: Behavior normal          Thought Content:  Thought content normal          Judgment: Judgment normal

## 2022-08-22 DIAGNOSIS — E78.5 HYPERLIPIDEMIA, UNSPECIFIED HYPERLIPIDEMIA TYPE: ICD-10-CM

## 2022-08-23 RX ORDER — ATORVASTATIN CALCIUM 10 MG/1
TABLET, FILM COATED ORAL
Qty: 90 TABLET | Refills: 0 | Status: SHIPPED | OUTPATIENT
Start: 2022-08-23

## 2022-08-23 NOTE — PRE-PROCEDURE INSTRUCTIONS
Pre-Surgery Instructions:   Medication Instructions    atorvastatin (LIPITOR) 10 mg tablet Take night before surgery    CALCIUM PO Hold until after surgery    famotidine (PEPCID) 20 mg tablet Uses PRN- OK to take day of surgery    hydrOXYzine HCL (ATARAX) 25 mg tablet Uses PRN- ok to take night prior to surgery    losartan (COZAAR) 100 MG tablet Hold day of surgery  Reviewed with patient, in detail, instructions from "My Surgical Experience"  Instructed to avoid all  OTC vitamins/supplements and NSAIDS from now until after surgery per anesthesia guidelines  Tylenol ok to take PRN  Advised patient that Maxx Escudero will call with surgery arrival time and hospital directions the business day prior to surgery  Advised patient nothing eat or drink after midnight prior to surgery  Instructed to call surgeon's office in meantime with any new illnesses/exposure  Patient verbalized understanding of current visitor restrictions/masking guidelines and advised that he/she can confirm these at time of arrival call with Maxx Escudero  Patient verbalized understanding and knows to call surgeon's office with any additional questions prior to surgery

## 2022-08-24 ENCOUNTER — ANESTHESIA EVENT (OUTPATIENT)
Dept: PERIOP | Facility: HOSPITAL | Age: 67
End: 2022-08-24
Payer: COMMERCIAL

## 2022-08-25 ENCOUNTER — ANESTHESIA (OUTPATIENT)
Dept: PERIOP | Facility: HOSPITAL | Age: 67
End: 2022-08-25
Payer: COMMERCIAL

## 2022-08-25 ENCOUNTER — HOSPITAL ENCOUNTER (OUTPATIENT)
Facility: HOSPITAL | Age: 67
Setting detail: OUTPATIENT SURGERY
Discharge: HOME | End: 2022-08-25
Attending: SURGERY | Admitting: SURGERY
Payer: COMMERCIAL

## 2022-08-25 VITALS
DIASTOLIC BLOOD PRESSURE: 58 MMHG | HEART RATE: 70 BPM | HEIGHT: 63 IN | WEIGHT: 162 LBS | SYSTOLIC BLOOD PRESSURE: 127 MMHG | OXYGEN SATURATION: 96 % | RESPIRATION RATE: 18 BRPM | BODY MASS INDEX: 28.7 KG/M2 | TEMPERATURE: 97.6 F

## 2022-08-25 DIAGNOSIS — D17.20 LIPOMA OF THIGH: Primary | ICD-10-CM

## 2022-08-25 DIAGNOSIS — Z86.19 H/O COLD SORES: ICD-10-CM

## 2022-08-25 PROCEDURE — 11406 EXC TR-EXT B9+MARG >4.0 CM: CPT | Performed by: SURGERY

## 2022-08-25 PROCEDURE — NC001 PR NO CHARGE: Performed by: SURGERY

## 2022-08-25 PROCEDURE — 88304 TISSUE EXAM BY PATHOLOGIST: CPT | Performed by: PATHOLOGY

## 2022-08-25 PROCEDURE — 11402 EXC TR-EXT B9+MARG 1.1-2 CM: CPT | Performed by: SURGERY

## 2022-08-25 PROCEDURE — 11401 EXC TR-EXT B9+MARG 0.6-1 CM: CPT | Performed by: SURGERY

## 2022-08-25 PROCEDURE — 11403 EXC TR-EXT B9+MARG 2.1-3CM: CPT | Performed by: SURGERY

## 2022-08-25 PROCEDURE — 11404 EXC TR-EXT B9+MARG 3.1-4 CM: CPT | Performed by: SURGERY

## 2022-08-25 RX ORDER — HYDROMORPHONE HCL/PF 1 MG/ML
0.5 SYRINGE (ML) INJECTION
Status: DISCONTINUED | OUTPATIENT
Start: 2022-08-25 | End: 2022-08-25 | Stop reason: HOSPADM

## 2022-08-25 RX ORDER — ACETAMINOPHEN 325 MG/1
650 TABLET ORAL EVERY 6 HOURS PRN
Status: DISCONTINUED | OUTPATIENT
Start: 2022-08-25 | End: 2022-08-25 | Stop reason: HOSPADM

## 2022-08-25 RX ORDER — OXYCODONE HYDROCHLORIDE AND ACETAMINOPHEN 5; 325 MG/1; MG/1
1 TABLET ORAL EVERY 6 HOURS PRN
Status: DISCONTINUED | OUTPATIENT
Start: 2022-08-25 | End: 2022-08-25 | Stop reason: HOSPADM

## 2022-08-25 RX ORDER — FENTANYL CITRATE 50 UG/ML
INJECTION, SOLUTION INTRAMUSCULAR; INTRAVENOUS AS NEEDED
Status: DISCONTINUED | OUTPATIENT
Start: 2022-08-25 | End: 2022-08-25

## 2022-08-25 RX ORDER — METOCLOPRAMIDE HYDROCHLORIDE 5 MG/ML
10 INJECTION INTRAMUSCULAR; INTRAVENOUS ONCE AS NEEDED
Status: DISCONTINUED | OUTPATIENT
Start: 2022-08-25 | End: 2022-08-25 | Stop reason: HOSPADM

## 2022-08-25 RX ORDER — MAGNESIUM HYDROXIDE 1200 MG/15ML
LIQUID ORAL AS NEEDED
Status: DISCONTINUED | OUTPATIENT
Start: 2022-08-25 | End: 2022-08-25 | Stop reason: HOSPADM

## 2022-08-25 RX ORDER — DEXAMETHASONE SODIUM PHOSPHATE 10 MG/ML
INJECTION, SOLUTION INTRAMUSCULAR; INTRAVENOUS AS NEEDED
Status: DISCONTINUED | OUTPATIENT
Start: 2022-08-25 | End: 2022-08-25

## 2022-08-25 RX ORDER — OXYCODONE HYDROCHLORIDE AND ACETAMINOPHEN 5; 325 MG/1; MG/1
1 TABLET ORAL EVERY 4 HOURS PRN
Qty: 5 TABLET | Refills: 0 | Status: SHIPPED | OUTPATIENT
Start: 2022-08-25 | End: 2022-09-04

## 2022-08-25 RX ORDER — PROPOFOL 10 MG/ML
INJECTION, EMULSION INTRAVENOUS AS NEEDED
Status: DISCONTINUED | OUTPATIENT
Start: 2022-08-25 | End: 2022-08-25

## 2022-08-25 RX ORDER — SODIUM CHLORIDE, SODIUM LACTATE, POTASSIUM CHLORIDE, CALCIUM CHLORIDE 600; 310; 30; 20 MG/100ML; MG/100ML; MG/100ML; MG/100ML
75 INJECTION, SOLUTION INTRAVENOUS CONTINUOUS
Status: DISCONTINUED | OUTPATIENT
Start: 2022-08-25 | End: 2022-08-25 | Stop reason: HOSPADM

## 2022-08-25 RX ORDER — FENTANYL CITRATE/PF 50 MCG/ML
50 SYRINGE (ML) INJECTION
Status: DISCONTINUED | OUTPATIENT
Start: 2022-08-25 | End: 2022-08-25 | Stop reason: HOSPADM

## 2022-08-25 RX ORDER — SODIUM CHLORIDE, SODIUM LACTATE, POTASSIUM CHLORIDE, CALCIUM CHLORIDE 600; 310; 30; 20 MG/100ML; MG/100ML; MG/100ML; MG/100ML
125 INJECTION, SOLUTION INTRAVENOUS CONTINUOUS
Status: DISCONTINUED | OUTPATIENT
Start: 2022-08-25 | End: 2022-08-25 | Stop reason: HOSPADM

## 2022-08-25 RX ORDER — ONDANSETRON 2 MG/ML
INJECTION INTRAMUSCULAR; INTRAVENOUS AS NEEDED
Status: DISCONTINUED | OUTPATIENT
Start: 2022-08-25 | End: 2022-08-25

## 2022-08-25 RX ORDER — LIDOCAINE HYDROCHLORIDE 20 MG/ML
INJECTION, SOLUTION EPIDURAL; INFILTRATION; INTRACAUDAL; PERINEURAL AS NEEDED
Status: DISCONTINUED | OUTPATIENT
Start: 2022-08-25 | End: 2022-08-25

## 2022-08-25 RX ORDER — OXYCODONE HYDROCHLORIDE AND ACETAMINOPHEN 5; 325 MG/1; MG/1
2 TABLET ORAL EVERY 6 HOURS PRN
Status: DISCONTINUED | OUTPATIENT
Start: 2022-08-25 | End: 2022-08-25 | Stop reason: HOSPADM

## 2022-08-25 RX ORDER — VALACYCLOVIR HYDROCHLORIDE 1 G/1
TABLET, FILM COATED ORAL
Qty: 6 TABLET | Refills: 2 | Status: SHIPPED | OUTPATIENT
Start: 2022-08-25 | End: 2022-08-28

## 2022-08-25 RX ORDER — CEFAZOLIN SODIUM 2 G/50ML
2000 SOLUTION INTRAVENOUS ONCE
Status: COMPLETED | OUTPATIENT
Start: 2022-08-25 | End: 2022-08-25

## 2022-08-25 RX ORDER — MIDAZOLAM HYDROCHLORIDE 2 MG/2ML
INJECTION, SOLUTION INTRAMUSCULAR; INTRAVENOUS AS NEEDED
Status: DISCONTINUED | OUTPATIENT
Start: 2022-08-25 | End: 2022-08-25

## 2022-08-25 RX ORDER — SODIUM CHLORIDE, SODIUM LACTATE, POTASSIUM CHLORIDE, CALCIUM CHLORIDE 600; 310; 30; 20 MG/100ML; MG/100ML; MG/100ML; MG/100ML
125 INJECTION, SOLUTION INTRAVENOUS CONTINUOUS
Status: CANCELLED | OUTPATIENT
Start: 2022-08-25 | End: 2022-08-25

## 2022-08-25 RX ADMIN — SODIUM CHLORIDE, SODIUM LACTATE, POTASSIUM CHLORIDE, AND CALCIUM CHLORIDE: .6; .31; .03; .02 INJECTION, SOLUTION INTRAVENOUS at 07:19

## 2022-08-25 RX ADMIN — FENTANYL CITRATE 25 MCG: 50 INJECTION, SOLUTION INTRAMUSCULAR; INTRAVENOUS at 07:39

## 2022-08-25 RX ADMIN — OXYCODONE HYDROCHLORIDE AND ACETAMINOPHEN 2 TABLET: 5; 325 TABLET ORAL at 09:26

## 2022-08-25 RX ADMIN — FENTANYL CITRATE 50 MCG: 50 INJECTION INTRAMUSCULAR; INTRAVENOUS at 09:06

## 2022-08-25 RX ADMIN — FENTANYL CITRATE 25 MCG: 50 INJECTION, SOLUTION INTRAMUSCULAR; INTRAVENOUS at 08:12

## 2022-08-25 RX ADMIN — SODIUM CHLORIDE, SODIUM LACTATE, POTASSIUM CHLORIDE, AND CALCIUM CHLORIDE 125 ML/HR: .6; .31; .03; .02 INJECTION, SOLUTION INTRAVENOUS at 06:52

## 2022-08-25 RX ADMIN — FENTANYL CITRATE 25 MCG: 50 INJECTION, SOLUTION INTRAMUSCULAR; INTRAVENOUS at 07:30

## 2022-08-25 RX ADMIN — PROPOFOL 200 MG: 10 INJECTION, EMULSION INTRAVENOUS at 07:30

## 2022-08-25 RX ADMIN — DEXAMETHASONE SODIUM PHOSPHATE 8 MG: 10 INJECTION, SOLUTION INTRAMUSCULAR; INTRAVENOUS at 07:30

## 2022-08-25 RX ADMIN — MIDAZOLAM 2 MG: 1 INJECTION INTRAMUSCULAR; INTRAVENOUS at 07:22

## 2022-08-25 RX ADMIN — FENTANYL CITRATE 25 MCG: 50 INJECTION, SOLUTION INTRAMUSCULAR; INTRAVENOUS at 08:00

## 2022-08-25 RX ADMIN — ONDANSETRON 4 MG: 2 INJECTION INTRAMUSCULAR; INTRAVENOUS at 08:35

## 2022-08-25 RX ADMIN — CEFAZOLIN SODIUM 2000 MG: 2 SOLUTION INTRAVENOUS at 07:22

## 2022-08-25 RX ADMIN — LIDOCAINE HYDROCHLORIDE 100 MG: 20 INJECTION, SOLUTION EPIDURAL; INFILTRATION; INTRACAUDAL; PERINEURAL at 07:30

## 2022-08-25 RX ADMIN — FENTANYL CITRATE 50 MCG: 50 INJECTION INTRAMUSCULAR; INTRAVENOUS at 09:13

## 2022-08-25 NOTE — ANESTHESIA PREPROCEDURE EVALUATION
Procedure:  EXCISION BIOPSY TISSUE LESION/MASS LOWER EXTREMITY (Bilateral Thigh)    Relevant Problems   CARDIO   (+) Hyperlipidemia   (+) Hypertension      GI/HEPATIC   (+) Dysphagia   (+) GERD (gastroesophageal reflux disease)   (+) Gastroesophageal reflux disease   (+) Paraesophageal hernia      MUSCULOSKELETAL   (+) Primary osteoarthritis of both knees   (+) Sacroiliitis (HCC)      NEURO/PSYCH   (+) Chronic pain syndrome        Physical Exam    Airway    Mallampati score: I  TM Distance: >3 FB  Neck ROM: full     Dental   upper dentures,     Cardiovascular      Pulmonary      Other Findings        Anesthesia Plan  ASA Score- 2     Anesthesia Type- general with ASA Monitors  Additional Monitors:   Airway Plan: LMA  Plan Factors-Exercise tolerance (METS): >4 METS  Chart reviewed  EKG reviewed  Patient summary reviewed  Patient is not a current smoker  There is medical exclusion for perioperative obstructive sleep apnea risk education  Induction- intravenous  Postoperative Plan- Plan for postoperative opioid use  Informed Consent- Anesthetic plan and risks discussed with patient  I personally reviewed this patient with the CRNA  Discussed and agreed on the Anesthesia Plan with the CRNA  Grisel Shin

## 2022-08-25 NOTE — OP NOTE
OPERATIVE REPORT  PATIENT NAME: Sj Santiago    :  1955  MRN: [id number]  Pt Location: [facility] ROOM 02    SURGERY DATE: 2022    Surgeon(s) and Role:     Cherylene Lack, MD - Primary     * Chilo Layton PA-C - Assisting  The PA was necessary to provide expert assistance; i e  in the form of providing optimal exposure with retraction, suturing, and assistance with dissection in order to perform the most efficient operation and in order to optimize patient safety in the abscence of a qualified surgical resident  Preop Diagnosis:  Lipoma of thigh [D17 20]    Post-Op Diagnosis Codes:     * Lipoma of thigh [D17 20]    Procedure(s) (LRB):  EXCISION BIOPSY TISSUE LESION/MASS LOWER EXTREMITY (Bilateral)    Specimen(s):  ID Type Source Tests Collected by Time Destination   1 : EXCISION OF LIPOMA(S), THIGH Tissue Leg, Left TISSUE Carlos Corley MD 2022 3901    2 : EXCISION OF LIPOMA(S), THIGH Tissue Leg, Right TISSUE Carlos Corley MD 2022 0801        Estimated Blood Loss:   Minimal    Drains:  * No LDAs found *    Anesthesia Type:   General/LMA    Operative Indications:  Lipoma of thigh [D17 20]  The patient is a 72-year-old female presenting with 21 lipomas distributed over both thighs  Fifteen on the right thigh and 6 on the left thigh for which surgery under anesthesia is indicated for safe resection of all lesions  Operative Findings: The following lipomas were excised from the right thigh:  1  Upper outer right thigh 12 mm  2  Upper inner right thigh 20 mm  3  Upper inner right thigh 14 mm  4  Upper inner right thigh 21 mm  5  Lateral right thigh 18 mm  6  Outer right thigh 22 mm  7  Distal right thigh 30 mm  8  Distal medial right thigh 7 mm  9  Medial right thigh 15 mm  10  Lower medial right thigh 15 mm  11  Lower inner right thigh 25 mm  12  Anterior right thigh 35 mm  13  Medial right thigh 20 mm  14  Medial right thigh 35 mm  15   Medial right thigh 8 mm Following lipomas were excised from the left thigh:  1  Upper lateral left thigh 15 mm  2  Upper outer left thigh 35 mm  3  Inner left thigh 25 mm  4  Outer left thigh 12 mm  5  Outer left thigh 45 mm  6  Upper outer left thigh 20     Complications:   None    Procedure and Technique:  Patient was taken to the operating room where she was properly identified monitored and anesthetized  She received antibiotics perioperatively  Skin prepped and draped  Time-out performed  Skin incised over the lipoma with a 15 blade  Lipoma dissected out with Metzenbaum scissors delivered into the operative field and sent for permanent pathologic evaluation  The skin closed with subcuticular 4-0 Monocryl suture  Wound dressed with skin glue  This procedure was performed 21 times  Patient tolerated the procedure well      I was present for the entire procedure    Patient Disposition:  PACU       SIGNATURE: Jill Dailey MD  DATE: August 25, 2022  TIME: 12:01 PM

## 2022-08-25 NOTE — INTERVAL H&P NOTE
H&P reviewed  After examining the patient I find no changes in the patients condition since the H&P had been written      Vitals:    08/25/22 0644   BP: 159/74   Pulse: 72   Resp: 18   Temp: 97 6 °F (36 4 °C)   SpO2: 100%

## 2022-08-25 NOTE — ANESTHESIA POSTPROCEDURE EVALUATION
Post-Op Assessment Note    CV Status:  Stable  Pain Score: 0    Pain management: adequate     Mental Status:  Arousable   Hydration Status:  Stable   PONV Controlled:  None   Airway Patency:  Patent      Post Op Vitals Reviewed: Yes      Staff: CRNA         No complications documented      BP   169/71   Temp   98   Pulse  63   Resp   16   SpO2   100

## 2022-08-25 NOTE — DISCHARGE INSTRUCTIONS
[facility]    Discharge Instructions  Surgical glue will fall off with time  You may shower starting tomorrow  Take discharge medications as prescribed  Notify our office for nausea, vomiting, fever, diarrhea, chest pain, trouble breathing  Follow up in our office in 2 weeks or sooner if needed  Call with additional questions or concerns [phone]

## 2022-08-30 PROCEDURE — 88304 TISSUE EXAM BY PATHOLOGIST: CPT | Performed by: PATHOLOGY

## 2022-09-09 ENCOUNTER — OFFICE VISIT (OUTPATIENT)
Dept: OBGYN CLINIC | Facility: CLINIC | Age: 67
End: 2022-09-09
Payer: COMMERCIAL

## 2022-09-09 ENCOUNTER — OFFICE VISIT (OUTPATIENT)
Dept: SURGERY | Facility: CLINIC | Age: 67
End: 2022-09-09

## 2022-09-09 VITALS
DIASTOLIC BLOOD PRESSURE: 88 MMHG | WEIGHT: 162 LBS | BODY MASS INDEX: 28.7 KG/M2 | HEART RATE: 85 BPM | HEIGHT: 63 IN | SYSTOLIC BLOOD PRESSURE: 162 MMHG

## 2022-09-09 VITALS — TEMPERATURE: 97.5 F

## 2022-09-09 DIAGNOSIS — D17.20 LIPOMA OF THIGH: Primary | ICD-10-CM

## 2022-09-09 DIAGNOSIS — M23.91 INTERNAL DERANGEMENT OF MULTIPLE SITES OF RIGHT KNEE: Primary | ICD-10-CM

## 2022-09-09 PROCEDURE — 99024 POSTOP FOLLOW-UP VISIT: CPT | Performed by: PHYSICIAN ASSISTANT

## 2022-09-09 PROCEDURE — 3077F SYST BP >= 140 MM HG: CPT | Performed by: ORTHOPAEDIC SURGERY

## 2022-09-09 PROCEDURE — 3079F DIAST BP 80-89 MM HG: CPT | Performed by: ORTHOPAEDIC SURGERY

## 2022-09-09 PROCEDURE — 99213 OFFICE O/P EST LOW 20 MIN: CPT | Performed by: ORTHOPAEDIC SURGERY

## 2022-09-09 NOTE — PROGRESS NOTES
Assessment/Plan:   Diagnoses and all orders for this visit:    Internal derangement of multiple sites of right knee  -     XR knee 3 vw right non injury; Future  -     MRI knee right wo contrast; Future    Reviewed today's physical exam findings and recent x-ray findings with patient at time of visit  Her x-rays demonstrate possibly more advanced osteoarthritic changes than what was seen comparatively on the left lower extremity earlier this year  Her physical exam is suspicious for meniscal pathology versus other intra-articular pathology  She is being provided a referral for MRI of the right knee for further evaluation  Continue activity as tolerated, but is instructed to avoid activities that exacerbate her symptoms  She can continue OTC NSAIDs/Tylenol as needed for pain and soreness  She will be seen for follow-up after MRI is complete  Patient expresses understanding is in agreement with this treatment plan  Clinically, the patient has a tear of her medial meniscus of her right knee  She has the inability to straighten her knee  There is a positive De La Rosa's  There is medial joint line pain  The knee continues to lock on her several times a day  An MRI was ordered to rule out meniscal pathology  Return back once the MRI is complete  If her condition changes, she will not hesitate let us know    Subjective:   Patient ID: Jessy Cain  1955     HPI  Patient is a 74 y o  female who presents for initial evaluation of ongoing right knee pain  She states that she has had pain for years, however her symptoms were masked by more prevalent symptoms on the left lower extremity  On today's presentation she reports deep left knee pain that is typically achy in HEP, but sharp with certain motions  She reports moments of instability that happen several times a week  She reports occasional swelling, but denies any associated bruising, numbness, or tingling      The following portions of the patient's history were reviewed and updated as appropriate:  Past medical history, past surgical history, Family history, social history, current medications and allergies    Past Medical History:   Diagnosis Date    Anemia     Arthritis     Chronic pain disorder     back and hip pain    Disease of thyroid gland     nodules    GERD (gastroesophageal reflux disease)     Heart murmur     Heartburn     Hiatal hernia     Hyperlipidemia     Hypertension     Overactive bladder     Rotator cuff tear, left     Rotator cuff tear, right     Vitamin D deficiency     unsure    Wears dentures     upper    Wears glasses     Wears partial dentures     upper       Past Surgical History:   Procedure Laterality Date     SECTION      Last Assessed: 2017    COLONOSCOPY      ELBOW SURGERY      Last Assessed: 2017    ESOPHAGOGASTRODUODENOSCOPY N/A 2017    Procedure: ESOPHAGOGASTRODUODENOSCOPY (EGD); Surgeon: Raymon Montesinos DO;  Location: [facility]; Service: Gastroenterology    HERNIA REPAIR  2020    Hiatal hernia repair    HIP SURGERY Left 2018    glut medius/minimus repair  and 18    HYSTERECTOMY          KNEE SURGERY      x2    PARAESOPHAGEAL HERNIA REPAIR N/A 2020    Procedure: LAPAROSCOPIC PARAESOPHAGEAL HERNIA REPAIR WITH MESH AND NISSEN FUNDOPLICATION WITH ROBOTICS;  Surgeon: Arie Bello MD;  Location: [facility];  Service: General    AK COLONOSCOPY FLX DX W/COLLJ SPEC WHEN PFRMD N/A 2017    Procedure: EGD AND COLONOSCOPY;  Surgeon: Raymon Montesinos DO;  Location: [facility];   Service: Gastroenterology    AK EXCISON TUMOR SOFT TISSUE THIGH/KNEE SUBQ 3+CM Bilateral 2022    Procedure: EXCISION BIOPSY TISSUE LESION/MASS LOWER EXTREMITY;  Surgeon: Codi Martinez MD;  Location: [facility];  Service: General    AK EXCISON TUMOR SOFT TISSUE THIGH/KNEE SUBQ 3+CM Bilateral 2022    Procedure: EXCISION BIOPSY TISSUE LESION/MASS LOWER EXTREMITY;  Surgeon: Ary Najjar, MD;  Location: [facility];  Service: General    SD KNEE SCOPE,MED/LAT MENISECTOMY Left 06/15/2022    Procedure: ;left knee arthroscopy, meniscectomy,synevectomy,chondroplasty, loose body removal, injection;  Surgeon: Rodríguez Ramsey DO;  Location: [facility];  Service: Orthopedics    SHOULDER ARTHROCENTESIS      SHOULDER ARTHROSCOPY Right 2021    Procedure: SHOULDER ARTHROSCOPY WITH acromioplasty, debridment, and ROTATOR CUFF REPAIR;  Surgeon: Darwin Jara DO;  Location: [facility] OR;  Service: Orthopedics       Family History   Problem Relation Age of Onset    Colon cancer Mother     Heart attack Father     Other Father         Cardiac Disorder    Diabetes type II Father     Colon cancer Sister     No Known Problems Sister     No Known Problems Sister     No Known Problems Sister     No Known Problems Sister     No Known Problems Daughter     No Known Problems Maternal Aunt     No Known Problems Maternal Aunt     No Known Problems Maternal Aunt     Cancer Maternal Grandmother     No Known Problems Paternal Grandmother        Social History     Socioeconomic History    Marital status: /Civil Union     Spouse name: None    Number of children: None    Years of education: None    Highest education level: None   Occupational History    None   Tobacco Use    Smoking status: Former Smoker     Packs/day: 1 00     Quit date:      Years since quittin 7    Smokeless tobacco: Never Used    Tobacco comment: quit 25 yrs ago   Vaping Use    Vaping Use: Never used   Substance and Sexual Activity    Alcohol use: Not Currently    Drug use: No    Sexual activity: Yes     Partners: Male   Other Topics Concern    None   Social History Narrative    Caffeine use    , 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[employer], now at [employer] and [address] [place], [place] [address]way for [address]    2 grown children     Social Determinants of [address] [facility] Strain: Not on file   Food Insecurity: Not on file   Transportation Needs: Not on file   Physical Activity: Not on file   Stress: Not on file   Social Connections: Not on file   Intimate Partner Violence: Not on file   Housing Stability: Not on file         Current Outpatient Medications:     atorvastatin (LIPITOR) 10 mg tablet, take 1 tablet by mouth once daily (Patient taking differently: Take 10 mg by mouth every evening), Disp: 90 tablet, Rfl: 0    CALCIUM PO, Take by mouth, Disp: , Rfl:     famotidine (PEPCID) 20 mg tablet, Take 1 tablet (20 mg total) by mouth 2 (two) times a day (Patient taking differently: Take 20 mg by mouth as needed), Disp: 180 tablet, Rfl: 1    hydrOXYzine HCL (ATARAX) 25 mg tablet, TAKE 1 TO 2 TABLETS BY MOUTH DAILY AT BEDTIME AS NEEDED, Disp: 60 tablet, Rfl: 2    losartan (COZAAR) 100 MG tablet, take 1 tablet by mouth once daily (Patient taking differently: Take 100 mg by mouth every morning), Disp: 90 tablet, Rfl: 1    meloxicam (MOBIC) 15 mg tablet, Take 1 tablet (15 mg total) by mouth daily, Disp: 30 tablet, Rfl: 0    valACYclovir (VALTREX) 1,000 mg tablet, take 1 tablet by mouth twice a day for 3 days, Disp: 6 tablet, Rfl: 2    Allergies   Allergen Reactions    Hydrocodone Other (See Comments) and Irritability     Patient reports insomnia when taking        Review of Systems   Constitutional: Negative for chills, fever and unexpected weight change  HENT: Negative for hearing loss, nosebleeds and sore throat  Eyes: Negative for pain, redness and visual disturbance  Respiratory: Negative for cough, shortness of breath and wheezing  Cardiovascular: Negative for chest pain, palpitations and leg swelling  Gastrointestinal: Negative for abdominal pain, nausea and vomiting  Endocrine: Negative for polydipsia and polyuria  Genitourinary: Negative for dysuria and hematuria  Musculoskeletal:        As noted in HPI   Skin: Negative for rash and wound     Neurological: Negative for dizziness, numbness and headaches  Psychiatric/Behavioral: Negative for decreased concentration and suicidal ideas  The patient is not nervous/anxious  Objective:  /88   Pulse 85   Ht 5' 3" (1 6 m)   Wt 73 5 kg (162 lb)   BMI 28 70 kg/m²     Ortho Exam  Right knee -   Patient ambulates with stable gait pattern  Uses no assistive device  No anatomical deformity  Skin is warm and dry to touch with no signs of erythema, ecchymosis, infection  No soft tissue swelling, trace effusion noted  ROM 5°-120° with pain at end range flexion  TTP over medial joint line, mildly TTP over lateral joint line  Flexor and extensor mechanisms intact  Knee is stable to varus and valgus stress  - Lachman's  - Anterior Drawer, - Posterior Drawer  + medial Mago's, - lateral Mago's  - Pivot Shift  Patella tracks centrally with palpable crepitus  Calf compartments are soft and supple  2+ TP and DP pulses with brisk capillary refill to the toes  Sural, saphenous, tibial, superficial and deep peroneal motor and sensory distributions intact  Sensation light touch intact distally    Physical Exam  Vitals and nursing note reviewed  Constitutional:       General: She is not in acute distress  Appearance: She is well-developed  HENT:      Head: Normocephalic and atraumatic  Eyes:      Conjunctiva/sclera: Conjunctivae normal    Cardiovascular:      Rate and Rhythm: Normal rate  Pulmonary:      Effort: Pulmonary effort is normal    Musculoskeletal:      Cervical back: Neck supple  Skin:     General: Skin is warm and dry  Capillary Refill: Capillary refill takes less than 2 seconds  Neurological:      Mental Status: She is alert and oriented to person, place, and time     Psychiatric:         Mood and Affect: Mood normal          Behavior: Behavior normal           Diagnostic Test Review:  Attending Physician has personally reviewed pertinent imaging in PACS, impression is as follows:    Review of radiographic series taken 9/9/2022 of the right knee shows moderate tricompartmental osteoarthritis evident joint space narrowing in all 3 compartments      Procedures   No procedures performed this visit    Scribe Attestation    I,:  Obed Shoemaker am acting as a scribe while in the presence of the attending physician :       I,:  Annabelle Patricio DO personally performed the services described in this documentation    as scribed in my presence :

## 2022-09-09 NOTE — ASSESSMENT & PLAN NOTE
Doing well after lipoma excision  One site on the anterior right thigh with evidence of small hematoma without infection  I advised monitoring alone, or use of heat/ice for comfort  Remainder of incisions well healed  Copy of op/path provided and reviewed  Questions answered, will see back on as needed basis

## 2022-09-22 ENCOUNTER — HOSPITAL ENCOUNTER (OUTPATIENT)
Dept: MRI IMAGING | Facility: HOSPITAL | Age: 67
End: 2022-09-22
Attending: ORTHOPAEDIC SURGERY
Payer: COMMERCIAL

## 2022-09-22 DIAGNOSIS — M23.91 INTERNAL DERANGEMENT OF MULTIPLE SITES OF RIGHT KNEE: ICD-10-CM

## 2022-09-22 PROCEDURE — 73721 MRI JNT OF LWR EXTRE W/O DYE: CPT

## 2022-09-22 PROCEDURE — G1004 CDSM NDSC: HCPCS

## 2022-09-30 ENCOUNTER — OFFICE VISIT (OUTPATIENT)
Dept: OBGYN CLINIC | Facility: CLINIC | Age: 67
End: 2022-09-30
Payer: COMMERCIAL

## 2022-09-30 VITALS — WEIGHT: 162 LBS | HEIGHT: 63 IN | BODY MASS INDEX: 28.7 KG/M2

## 2022-09-30 DIAGNOSIS — M17.0 PRIMARY OSTEOARTHRITIS OF BOTH KNEES: Primary | ICD-10-CM

## 2022-09-30 PROCEDURE — 20610 DRAIN/INJ JOINT/BURSA W/O US: CPT | Performed by: ORTHOPAEDIC SURGERY

## 2022-09-30 PROCEDURE — 99213 OFFICE O/P EST LOW 20 MIN: CPT | Performed by: ORTHOPAEDIC SURGERY

## 2022-09-30 RX ORDER — BUPIVACAINE HYDROCHLORIDE 2.5 MG/ML
4 INJECTION, SOLUTION INFILTRATION; PERINEURAL
Status: COMPLETED | OUTPATIENT
Start: 2022-09-30 | End: 2022-09-30

## 2022-09-30 RX ORDER — TRIAMCINOLONE ACETONIDE 40 MG/ML
80 INJECTION, SUSPENSION INTRA-ARTICULAR; INTRAMUSCULAR
Status: COMPLETED | OUTPATIENT
Start: 2022-09-30 | End: 2022-09-30

## 2022-09-30 RX ADMIN — TRIAMCINOLONE ACETONIDE 80 MG: 40 INJECTION, SUSPENSION INTRA-ARTICULAR; INTRAMUSCULAR at 08:54

## 2022-09-30 RX ADMIN — BUPIVACAINE HYDROCHLORIDE 4 ML: 2.5 INJECTION, SOLUTION INFILTRATION; PERINEURAL at 08:54

## 2022-09-30 NOTE — PROGRESS NOTES
Assessment/Plan:    No problem-specific Assessment & Plan notes found for this encounter  Diagnoses and all orders for this visit:    Primary osteoarthritis of both knees          Under aseptic technique, the right knee was injected with Kenalog and Marcaine  She tolerated procedure quite well  Return back in 6 weeks re-evaluation  In the meantime, we will get her approved for viscosupplementation to her bilateral knees    Subjective:      Patient ID: Kayla Box is a 74 y o  female  HPI    The patient presents for MRI report of her right knee  Fortunately, showed no meniscal pathology but did have disseminated arthritis  She states she still has a lot of pain in that knee  She has not received any injections  She denies any numbness or tingling  She denies any fever or chills    The following portions of the patient's history were reviewed and updated as appropriate: allergies, current medications, past family history, past medical history, past social history, past surgical history and problem list     Review of Systems   Constitutional: Negative for chills, fever and unexpected weight change  HENT: Negative for hearing loss, nosebleeds and sore throat  Eyes: Negative for pain, redness and visual disturbance  Respiratory: Negative for cough, shortness of breath and wheezing  Cardiovascular: Negative for chest pain, palpitations and leg swelling  Gastrointestinal: Negative for abdominal pain, nausea and vomiting  Endocrine: Negative for polydipsia and polyuria  Genitourinary: Negative for dysuria and hematuria  Musculoskeletal: Positive for arthralgias, gait problem and myalgias  Negative for back pain, joint swelling, neck pain and neck stiffness  As noted in HPI   Skin: Negative for rash and wound  Neurological: Negative for dizziness, numbness and headaches  Psychiatric/Behavioral: Negative for decreased concentration and suicidal ideas   The patient is not nervous/anxious  Objective:      Ht 5' 3" (1 6 m)   Wt 73 5 kg (162 lb)   BMI 28 70 kg/m²          Physical Exam        Right lower extremity is neurovascular intact  Toes are pink and mobile  Compartments are soft  A varus deformities present  Range of motion is from 5-115 degrees  There is a negative Lachman's, drawer, pivot shift test   There is medial joint line tenderness, lateral joint tenderness, and patellofemoral crepitation    MRI as above    Large joint arthrocentesis: R knee  Universal Protocol:  Consent: Verbal consent obtained  Written consent not obtained  Consent given by: patient  Time out: Immediately prior to procedure a "time out" was called to verify the correct patient, procedure, equipment, support staff and site/side marked as required  Patient understanding: patient states understanding of the procedure being performed  Test results: test results available and properly labeled  Site marked: the operative site was marked  Radiology Images displayed and confirmed   If images not available, report reviewed: imaging studies available  Patient identity confirmed: verbally with patient    Supporting Documentation  Indications: pain   Procedure Details  Location: knee - R knee  Preparation: Patient was prepped and draped in the usual sterile fashion  Needle size: 22 G  Ultrasound guidance: no  Approach: lateral  Medications administered: 4 mL bupivacaine 0 25 %; 80 mg triamcinolone acetonide 40 mg/mL    Patient tolerance: patient tolerated the procedure well with no immediate complications  Dressing:  Sterile dressing applied

## 2022-10-02 DIAGNOSIS — K21.9 GASTROESOPHAGEAL REFLUX DISEASE WITHOUT ESOPHAGITIS: ICD-10-CM

## 2022-10-03 ENCOUNTER — TELEPHONE (OUTPATIENT)
Dept: PAIN MEDICINE | Facility: CLINIC | Age: 67
End: 2022-10-03

## 2022-10-03 ENCOUNTER — OFFICE VISIT (OUTPATIENT)
Dept: PAIN MEDICINE | Facility: CLINIC | Age: 67
End: 2022-10-03
Payer: COMMERCIAL

## 2022-10-03 VITALS
HEIGHT: 63 IN | BODY MASS INDEX: 29.06 KG/M2 | HEART RATE: 60 BPM | DIASTOLIC BLOOD PRESSURE: 93 MMHG | SYSTOLIC BLOOD PRESSURE: 175 MMHG | WEIGHT: 164 LBS

## 2022-10-03 DIAGNOSIS — M67.952 TENDINOPATHY OF LEFT GLUTEUS MEDIUS: ICD-10-CM

## 2022-10-03 DIAGNOSIS — M67.951 TENDINOPATHY OF RIGHT GLUTEUS MEDIUS: ICD-10-CM

## 2022-10-03 DIAGNOSIS — S76.012D: Primary | ICD-10-CM

## 2022-10-03 PROCEDURE — 99214 OFFICE O/P EST MOD 30 MIN: CPT | Performed by: ANESTHESIOLOGY

## 2022-10-03 RX ORDER — FAMOTIDINE 20 MG/1
20 TABLET, FILM COATED ORAL 2 TIMES DAILY
Qty: 180 TABLET | Refills: 0 | Status: SHIPPED | OUTPATIENT
Start: 2022-10-03

## 2022-10-03 NOTE — PATIENT INSTRUCTIONS
Core Strengthening Exercises   WHAT YOU NEED TO KNOW:   What do I need to know about core strengthening exercises? Your core includes the muscles of your lower back, hip, pelvis, and abdomen  Core strengthening exercises help heal and strengthen these muscles  This helps prevent another injury, and keeps your pelvis, spine, and hips in the correct position  What do I need to know about exercise safety? Talk to your healthcare provider before you start an exercise program  A physical therapist can teach you how to do core strengthening exercises safely  Do the exercises on a mat or firm surface  A firm surface will support your spine and prevent low back pain  Do not do these exercises on a bed  Move slowly and smoothly  Avoid fast or jerky motions  Stop if you feel pain  Core exercises should not be painful  Stop if you feel pain  Breathe normally during core exercises  Do not hold your breath  This may cause an increase in blood pressure and prevent muscle strengthening  Your healthcare provider will tell you when to inhale and exhale during the exercise  Begin all of your exercises with abdominal bracing  Abdominal bracing helps warm up your core muscles  You can also practice abdominal bracing throughout the day  Lie on your back with your knees bent and feet flat on the floor  Place your arms in a relaxed position beside your body  Tighten your abdominal muscles  Pull your belly button in and up toward your spine  Hold for 5 seconds  Relax your muscles  Repeat 10 times  How do I perform core strengthening exercises? Your healthcare provider will tell you how often to do these exercises  The provider will also tell you how many repetitions of each exercise you should do  Hold each exercise for 5 seconds or as directed  As you get stronger, increase your hold to 10 to 15 seconds  You can do some of these exercises on a stability ball, or with a weight   Ask your healthcare provider how to use a stability ball or weight for these exercises:  Bridging:  Lie on your back with your knees bent and feet flat on the floor  Rest your arms at your side  Tighten your buttocks, and then lift your hips 1 inch off the floor  Hold for 5 seconds  When you can do this exercise without pain for 10 seconds, increase the distance you lift your hips  A good goal is to be able to lift your hips so that your shoulders, hips, and knees are in a straight line  Dead bug:  Lie on your back with your knees bent and feet flat on the floor  Place your arms in a relaxed position beside your body  Begin with abdominal bracing  Next, raise one leg, keeping your knee bent  Hold for 5 seconds  Repeat with the other leg  When you can do this exercise without pain for 10 to 15 seconds, you may raise one straight leg and hold  Repeat with the other leg  Quadruped:  Place your hands and knees on the floor  Keep your wrists directly below your shoulders and your knees directly below your hips  Pull your belly button in toward your spine  Do not flatten or arch your back  Tighten your abdominal muscles below your belly button  Hold for 5 seconds  When you can do this exercise without pain for 10 to 15 seconds, you may extend one arm and hold  Repeat on the other side  Side bridge exercises:      Standing side bridge:  Stand next to a wall and extend one arm toward the wall  Place your palm flat on the wall with your fingers pointing upward  Begin with abdominal bracing  Next, without moving your feet, slowly bend your arm to 90 degrees  Hold for 5 seconds  Repeat on the other side  When you can do this exercise without pain for 10 to 15 seconds, you may do the bent leg side bridge on the floor  Bent leg side bridge:  Lie on one side with your legs, hips, and shoulders in a straight line  Prop yourself up onto your forearm so your elbow is directly below your shoulder   Bend your knees back to 90 degrees  Begin with abdominal bracing  Next, lift your hips and balance yourself on your forearm and knees  Hold for 5 seconds  Repeat on the other side  When you can do this exercise without pain for 10 to 15 seconds, you may do the straight leg side bridge on the floor  Straight leg side bridge:  Lie on one side with your legs, hips, and shoulders in a straight line  Prop yourself up onto your forearm so your elbow is directly below your shoulder  Begin with abdominal bracing  Lift your hips off the floor and balance yourself on your forearm and the outside of your flexed foot  Do not let your ankle bend sideways  Hold for 5 seconds  Repeat on the other side  When you can do this exercise without pain for 10 to 15 seconds, ask your healthcare provider for more advanced exercises  Superman:  Lie on your stomach  Extend your arms forward on the floor  Tighten your abdominal muscles and lift your right hand and left leg off the floor  Hold this position  Slowly return to the starting position  Tighten your abdominal muscles and lift your left hand and right leg off the floor  Hold this position  Slowly return to the starting position  Clam:  Lie on your side with your knees bent  Put your bottom arm under your head to keep your neck in line  Put your top hand on your hip to keep your pelvis from moving  Put your heels together, and keep them together during this exercise  Slowly raise your top knee toward the ceiling  Then lower your leg so your knees are together  Repeat this exercise 10 times  Then switch sides and do the exercise 10 times with the other leg  Curl up:  Lie on your back with your knees bent and feet flat on the floor  Place your hands, palms down, underneath your lower back  Next, with your elbows on the floor, lift your shoulders and chest 2 to 3 inches off the floor  Keep your head in line with your shoulders  Hold this position   Slowly return to the starting position  Straight leg raises:  Lie on your back with one leg straight  Bend the other knee and place your foot flat on the floor  Tighten your abdominal muscles  Keep your leg straight and slowly lift it straight up 6 to 12 inches off the floor  Hold this position  Lower your leg slowly  Do as many repetitions as directed on this side  Repeat with the other leg  Plank:  Lie on your stomach  Bend your elbows and place your forearms flat on the floor  Lift your chest, stomach, and knees off the floor  Make sure your elbows are below your shoulders  Your body should be in a straight line  Do not let your hips or lower back sink to the ground  Squeeze your abdominal muscles together and hold for 15 seconds  To make this exercise harder, hold for 30 seconds or lift 1 leg at a time  Bicycles:  Lie on your back  Bend both knees and bring them toward your chest  Your calves should be parallel to the floor  Place the palms of your hands on the back of your head  Straighten your right leg and keep it lifted 2 inches off the floor  Raise your head and shoulders off the floor and twist towards your left  Keep your head and shoulders lifted  Bend your right knee while you straighten your left leg  Keep your left leg 2 inches off the floor  Twist your head and chest towards the left leg  Continue to straighten 1 leg at a time and twist        When should I contact my healthcare provider? You have sharp or worsening pain during exercise or at rest     You have questions or concerns about your condition, care, or exercise program     CARE AGREEMENT:   You have the right to help plan your care  Learn about your health condition and how it may be treated  Discuss treatment options with your healthcare providers to decide what care you want to receive  You always have the right to refuse treatment  The above information is an  only   It is not intended as medical advice for individual conditions or treatments  Talk to your doctor, nurse or pharmacist before following any medical regimen to see if it is safe and effective for you  © Copyright [employer] [facility] 2022 Information is for End User's use only and may not be sold, redistributed or otherwise used for commercial purposes   All illustrations and images included in CareNotes® are the copyrighted property of A D A M , [employer]  or [address] [place] [address]

## 2022-10-03 NOTE — PROGRESS NOTES
Assessment:  1  Rupture of gluteus minimus tendon, left, subsequent encounter    2  Tendinopathy of left gluteus medius    3  Tendinopathy of right gluteus medius        Plan:  Patient is a 71-year-old female with complaints of bilateral gluteal pain status post gluteus medius muscle tear presents office for follow-up visit  Patient has failed multiple sacroiliac joint steroid injections, tendon steroid injections and tendinopathy procedures  At this time we feel that the best step is to exhaust conservative therapy with strengthening the remaining muscles in order to offset pain from the week stress muscles  1  We will provide patient with aqua therapy for gluteal muscle strengthening and lumbar core strengthening exercises    History of Present Illness: The patient is a 74 y o  female who presents for a follow up office visit in regards to Back Pain  The patients current symptoms include 9/10 constant dull/aching, sharp pain in the right buttock without any particular time pattern  Current pain medications includes:  None  I have personally reviewed and/or updated the patient's past medical history, past surgical history, family history, social history, current medications, allergies, and vital signs today  Review of Systems  Review of Systems   Constitutional: Negative for unexpected weight change  HENT: Negative for hearing loss  Eyes: Negative for visual disturbance  Respiratory: Negative for shortness of breath  Cardiovascular: Negative for leg swelling  Gastrointestinal: Negative for constipation  Endocrine: Negative for polyuria  Genitourinary: Negative for difficulty urinating  Musculoskeletal: Positive for gait problem  Negative for joint swelling and myalgias  Pain in extremity- glutes   Skin: Negative for rash  Neurological: Negative for weakness and headaches  Psychiatric/Behavioral: Negative for decreased concentration     All other systems reviewed and are negative  Past Medical History:   Diagnosis Date    Anemia     Arthritis     Chronic pain disorder     back and hip pain    Disease of thyroid gland     nodules    GERD (gastroesophageal reflux disease)     Heart murmur     Heartburn     Hiatal hernia     Hyperlipidemia     Hypertension     Overactive bladder     Rotator cuff tear, left     Rotator cuff tear, right     Vitamin D deficiency     unsure    Wears dentures     upper    Wears glasses     Wears partial dentures     upper       Past Surgical History:   Procedure Laterality Date     SECTION      Last Assessed: 2017    COLONOSCOPY      ELBOW SURGERY      Last Assessed: 2017    ESOPHAGOGASTRODUODENOSCOPY N/A 2017    Procedure: ESOPHAGOGASTRODUODENOSCOPY (EGD); Surgeon: Rekha Gonzalez DO;  Location: [facility]; Service: Gastroenterology    HERNIA REPAIR  2020    Hiatal hernia repair    HIP SURGERY Left 2018    glut medius/minimus repair  and 18    HYSTERECTOMY          KNEE SURGERY      x2    PARAESOPHAGEAL HERNIA REPAIR N/A 2020    Procedure: LAPAROSCOPIC PARAESOPHAGEAL HERNIA REPAIR WITH MESH AND NISSEN FUNDOPLICATION WITH ROBOTICS;  Surgeon: Abraham Guerra MD;  Location: [facility];  Service: General    LA COLONOSCOPY FLX DX W/COLLJ SPEC WHEN PFRMD N/A 2017    Procedure: EGD AND COLONOSCOPY;  Surgeon: Rekha Gonzalez DO;  Location: [facility];   Service: Gastroenterology    LA EXCISON TUMOR SOFT TISSUE THIGH/KNEE SUBQ 3+CM Bilateral 2022    Procedure: EXCISION BIOPSY TISSUE LESION/MASS LOWER EXTREMITY;  Surgeon: Getachew Haley MD;  Location: [facility];  Service: General    LA EXCISON TUMOR SOFT TISSUE THIGH/KNEE SUBQ 3+CM Bilateral 2022    Procedure: EXCISION BIOPSY TISSUE LESION/MASS LOWER EXTREMITY;  Surgeon: Getachew Haley MD;  Location: [facility];  Service: General    LA KNEE SCOPE,MED/LAT MENISECTOMY Left 06/15/2022 Procedure: ;left knee arthroscopy, meniscectomy,synevectomy,chondroplasty, loose body removal, injection;  Surgeon: Tami Ng DO;  Location: [facility];  Service: Orthopedics    SHOULDER ARTHROCENTESIS      SHOULDER ARTHROSCOPY Right 2021    Procedure: SHOULDER ARTHROSCOPY WITH acromioplasty, debridment, and ROTATOR CUFF REPAIR;  Surgeon: Deborah Saenz DO;  [facility] MA[facility];  Service: Orthopedics       Family History   Problem Relation Age of Onset    Colon cancer Mother     Heart attack Father     Other Father         Cardiac Disorder    Diabetes type II Father     Colon cancer Sister     No Known Problems Sister     No Known Problems Sister     No Known Problems Sister     No Known Problems Sister     No Known Problems Daughter     No Known Problems Maternal Aunt     No Known Problems Maternal Aunt     No Known Problems Maternal Aunt     Cancer Maternal Grandmother     No Known Problems Paternal Grandmother        Social History     Occupational History    Not on file   Tobacco Use    Smoking status: Former Smoker     Packs/day:      Quit date:      Years since quittin 7    Smokeless tobacco: Never Used    Tobacco comment: quit 25 yrs ago   Vaping Use    Vaping Use: Never used   Substance and Sexual Activity    Alcohol use: Not Currently    Drug use: No    Sexual activity: Yes     Partners: Male         Current Outpatient Medications:     atorvastatin (LIPITOR) 10 mg tablet, take 1 tablet by mouth once daily (Patient taking differently: Take 10 mg by mouth every evening), Disp: 90 tablet, Rfl: 0    CALCIUM PO, Take by mouth, Disp: , Rfl:     famotidine (PEPCID) 20 mg tablet, Take 1 tablet (20 mg total) by mouth 2 (two) times a day, Disp: 180 tablet, Rfl: 0    hydrOXYzine HCL (ATARAX) 25 mg tablet, TAKE 1 TO 2 TABLETS BY MOUTH DAILY AT BEDTIME AS NEEDED, Disp: 60 tablet, Rfl: 2    losartan (COZAAR) 100 MG tablet, take 1 tablet by mouth once daily (Patient taking differently: Take 100 mg by mouth every morning), Disp: 90 tablet, Rfl: 1    meloxicam (MOBIC) 15 mg tablet, Take 1 tablet (15 mg total) by mouth daily, Disp: 30 tablet, Rfl: 0    valACYclovir (VALTREX) 1,000 mg tablet, take 1 tablet by mouth twice a day for 3 days, Disp: 6 tablet, Rfl: 2    Allergies   Allergen Reactions    Hydrocodone Other (See Comments) and Irritability     Patient reports insomnia when taking        Physical Exam:    BP (!) 175/93   Pulse 60   Ht 5' 3" (1 6 m)   Wt 74 4 kg (164 lb)   BMI 29 05 kg/m²     Constitutional:normal, well developed, well nourished, alert, in no distress and non-toxic and no overt pain behavior  Eyes:anicteric  HEENT:grossly intact  Neck:supple, symmetric, trachea midline and no masses   Pulmonary:even and unlabored  Cardiovascular:No edema or pitting edema present  Skin:Normal without rashes or lesions and well hydrated  Psychiatric:Mood and affect appropriate  Neurologic:Cranial Nerves II-XII grossly intact  Musculoskeletal:normal    Imaging  No orders to display       No orders of the defined types were placed in this encounter

## 2022-10-03 NOTE — TELEPHONE ENCOUNTER
Pt called with a new issue- her right side glut muscle     Can slot be opened for her in the afternoon for her in [place]      cb [phone]

## 2022-10-05 DIAGNOSIS — M67.951 TENDINOPATHY OF RIGHT GLUTEUS MEDIUS: Primary | ICD-10-CM

## 2022-10-05 NOTE — TELEPHONE ENCOUNTER
S/w pt who states that she has the same feeling in her right gluteus and hip that she had when she tore her glute 4 yrs ago and is requesting an MRI before starting aquatherapy  Pt is concerned about damaging further if  already torn  Advised will notify RM and cb    Please advise

## 2022-10-05 NOTE — TELEPHONE ENCOUNTER
Pt called asking if RM can give a script so she can have a MRI done of both her gluts before she starts the PT  Please call the pt when RM thinks she can have the MRI and when it was ordered      Pt # [phone]

## 2022-10-06 DIAGNOSIS — I10 ESSENTIAL HYPERTENSION: ICD-10-CM

## 2022-10-06 RX ORDER — LOSARTAN POTASSIUM 100 MG/1
TABLET ORAL
Qty: 90 TABLET | Refills: 1 | Status: SHIPPED | OUTPATIENT
Start: 2022-10-06

## 2022-10-15 ENCOUNTER — HOSPITAL ENCOUNTER (OUTPATIENT)
Dept: MRI IMAGING | Facility: HOSPITAL | Age: 67
Discharge: HOME | End: 2022-10-15
Payer: COMMERCIAL

## 2022-10-15 DIAGNOSIS — M67.951 TENDINOPATHY OF RIGHT GLUTEUS MEDIUS: ICD-10-CM

## 2022-10-15 PROCEDURE — 73721 MRI JNT OF LWR EXTRE W/O DYE: CPT

## 2022-10-15 PROCEDURE — G1004 CDSM NDSC: HCPCS

## 2022-10-20 ENCOUNTER — EVALUATION (OUTPATIENT)
Dept: PHYSICAL THERAPY | Age: 67
End: 2022-10-20
Payer: COMMERCIAL

## 2022-10-20 DIAGNOSIS — M67.952 TENDINOPATHY OF LEFT GLUTEUS MEDIUS: Primary | ICD-10-CM

## 2022-10-20 DIAGNOSIS — S76.012D: ICD-10-CM

## 2022-10-20 DIAGNOSIS — M67.951 TENDINOPATHY OF RIGHT GLUTEUS MEDIUS: ICD-10-CM

## 2022-10-20 PROCEDURE — 97113 AQUATIC THERAPY/EXERCISES: CPT | Performed by: PHYSICAL THERAPIST

## 2022-10-20 PROCEDURE — 97162 PT EVAL MOD COMPLEX 30 MIN: CPT | Performed by: PHYSICAL THERAPIST
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
## 2022-10-24 ENCOUNTER — OFFICE VISIT (OUTPATIENT)
Dept: PHYSICAL THERAPY | Age: 67
End: 2022-10-24
Payer: COMMERCIAL

## 2022-10-24 DIAGNOSIS — S76.012D: ICD-10-CM

## 2022-10-24 DIAGNOSIS — M67.952 TENDINOPATHY OF LEFT GLUTEUS MEDIUS: Primary | ICD-10-CM

## 2022-10-24 DIAGNOSIS — M67.951 TENDINOPATHY OF RIGHT GLUTEUS MEDIUS: ICD-10-CM

## 2022-10-24 PROCEDURE — 97113 AQUATIC THERAPY/EXERCISES: CPT

## 2022-10-24 NOTE — PROGRESS NOTES
Daily Note     Today's date: 10/24/2022  Patient name: Hector Lemus  : 1955  MRN: [id number]  Referring provider: Alphonse Hendricks MD  Dx:   Encounter Diagnosis     ICD-10-CM    1  Tendinopathy of left gluteus medius  M67 952    2  Tendinopathy of right gluteus medius  M67 951    3  Rupture of gluteus minimus tendon, left, subsequent encounter  S76 012D        Start Time: 0800  Stop Time: 0900  Total time in clinic (min): 60 minutes    Subjective: pt notes she was sore after her initial visit  She continues w/ soreness in LB and into glut  Objective: See treatment diary below      Assessment: Tolerated treatment fair  Gentle ex's today for LE's performed in chest deep water  Noodle ex's for LB and posture f/b seated ex's for LE's  some relief after session will monitor soreness next visit  Did educate pt on  muscle soreness after PT sessions  Initiated gentle ex program today in the water, no c/o increased pain or soreness w/ new ex's  Gentle LE stretches after session  Patient would benefit from continued PT      Plan: Continue per plan of care        Precautions: L glut repair 2x 2018, L RTC repair , L knee scope 2022, OA, GERD, HTN    Daily Treatment Diary     Date 10/20 10/24         Water Walk (FW, BW, side) x10’  10 12         LE work at wall               Hip FF/ext swing   2           Toe/heel   1           Hip ABD/ADD   2                      Knee flexion & extension  1           Squats  1         UE noodle x3             Push/pull   1           Rotate   1           Row forward & back   2           OH side bend            UE/Core (AROM, paddles, MB)              ABD/ADD              Horizontal Pec Fly              Alt  arm swing (shld flex/ext)              Push pull              Single paddle rotation           SLS (EO, EC, ball toss)            Aqua Step (FW, lat, eccentric)            Core work:              MF press (red, blue, blk)             Kickboard press (blue, red)              Row/ext w/ tubing (red, blue, blk)           Seated on bench             ankle DF/PF              March 1           ABD/ADD   1           Knee flexion/extension   1         DW TX - hang in the deep water  5 5,8           DW ABD/ADD              DW Bicycle  5 5           DW Scissor hip flexion/extension            Stretches              HS/calf   2           Wall stretches             Other:            Hot Tub - 10 minutes only  5 10

## 2022-10-31 ENCOUNTER — OFFICE VISIT (OUTPATIENT)
Dept: PHYSICAL THERAPY | Age: 67
End: 2022-10-31

## 2022-10-31 DIAGNOSIS — S76.012D: ICD-10-CM

## 2022-10-31 DIAGNOSIS — M67.952 TENDINOPATHY OF LEFT GLUTEUS MEDIUS: Primary | ICD-10-CM

## 2022-10-31 DIAGNOSIS — M67.951 TENDINOPATHY OF RIGHT GLUTEUS MEDIUS: ICD-10-CM

## 2022-10-31 NOTE — PROGRESS NOTES
Daily Note     Today's date: 10/31/2022  Patient name: Michael Noble  : 1955  MRN: [id number]  Referring provider: Edda Paulino MD  Dx:   Encounter Diagnosis     ICD-10-CM    1  Tendinopathy of left gluteus medius  M67 952    2  Tendinopathy of right gluteus medius  M67 951    3  Rupture of gluteus minimus tendon, left, subsequent encounter  S76 012D        Start Time: 1500  Stop Time: 1545  Total time in clinic (min): 45 minutes    Subjective: pt notes she had sig increased soreness and pain after her last visit  It was still bothering her over the weekend  It did calm down for today  Objective: See treatment diary below      Assessment: Tolerated treatment fair  Modified session today due to increased pain after last visit  Gentle movements today w/ rest periods of DWTX and no HS or glut ex's today  Will monitor pt's pain and soreness next visit as we progress pt  No pain after session  Patient would benefit from continued PT      Plan: Continue per plan of care        Precautions: L glut repair 2x 2018, L RTC repair , L knee scope 2022, OA, GERD, HTN    Daily Treatment Diary     Date 10/20 10/24 10/31        Water Walk (FW, BW, side) x10’  10 12 10        LE work at wall               Hip FF/ext swing   2           Toe/heel   1 1          Hip ABD/ADD   2  1          Knee flexion & extension  1           Squats  1         UE noodle x3             Push/pull   1           Rotate   1           Row forward & back   2           OH side bend            UE/Core (AROM, paddles, MB)              ABD/ADD              Horizontal Pec Fly              Alt  arm swing (shld flex/ext)              Push pull              Single paddle rotation           SLS (EO, EC, ball toss)            Aqua Step (FW, lat, eccentric)            Core work:              MF press (red, blue, blk)             Kickboard press (blue, red)              Row/ext w/ tubing (red, blue, blk)           Seated on bench             ankle DF/PF              March 1           ABD/ADD   1           Knee flexion/extension   1         DW TX - hang in the deep water  5 5,8 5,10          DW ABD/ADD              DW Bicycle  5 5 5          DW Scissor hip flexion/extension            Stretches              HS/calf   2 2          Wall stretches             Other:            Hot Tub - 10 minutes only  5 10 10

## 2022-11-03 ENCOUNTER — OFFICE VISIT (OUTPATIENT)
Dept: PHYSICAL THERAPY | Age: 67
End: 2022-11-03

## 2022-11-03 DIAGNOSIS — M67.951 TENDINOPATHY OF RIGHT GLUTEUS MEDIUS: ICD-10-CM

## 2022-11-03 DIAGNOSIS — M67.952 TENDINOPATHY OF LEFT GLUTEUS MEDIUS: Primary | ICD-10-CM

## 2022-11-03 DIAGNOSIS — S76.012D: ICD-10-CM

## 2022-11-03 NOTE — PROGRESS NOTES
Daily Note     Today's date: 11/3/2022  Patient name: Amadeo Cotto  : 1955  MRN: [id number]  Referring provider: Sherri Sierra MD  Dx:   Encounter Diagnosis     ICD-10-CM    1  Tendinopathy of left gluteus medius  M67 952    2  Tendinopathy of right gluteus medius  M67 951    3  Rupture of gluteus minimus tendon, left, subsequent encounter  S76 012D        Start Time: 0800  Stop Time: 09  Total time in clinic (min): 60 minutes    Subjective: Patient reports she is still the same with therapy, notes she is getting increased pain and spasms end of the day  Patient rates her pain this morning 8/10 in her L glute      Objective: See treatment diary below      Assessment: Tolerated treatment fair, continued with modified program today due to high pain levels, kept patient in dw most of session, increased dw exercises today  C/o soreness with all movements in L glute>R glute  Patient demonstrated fatigue post treatment and would benefit from continued PT      Plan: Continue per plan of care        Precautions: L glut repair 2x 2018, L RTC repair , L knee scope 2022, OA, GERD, HTN    Daily Treatment Diary     Date 10/20 10/24 10/31 11/3       Water Walk (FW, BW, side) x10’  10 12 10 10       LE work at wall               Hip FF/ext swing   2           Toe/heel   1 1 1         Hip ABD/ADD   2 1 1          1 1         Knee flexion & extension  1  1         Squats  1         UE noodle x3             Push/pull   1           Rotate   1           Row forward & back   2           OH side bend            UE/Core (AROM, paddles, MB)              ABD/ADD              Horizontal Pec Fly              Alt  arm swing (shld flex/ext)              Push pull              Single paddle rotation           SLS (EO, EC, ball toss)            Aqua Step (FW, lat, eccentric)            Core work:              MF press (red, blue, blk)             Kickboard press (blue, red)              Row/ext w/ tubing (red, blue, blk)           Seated on bench             ankle DF/PF     1 March 1  1         ABD/ADD   1  1         Knee flexion/extension   1  2       DW TX - hang in the deep water  5 5,8 5,10 5 10         DW ABD/ADD     1         DW Bicycle  5 5 5 5         DW Scissor hip flexion/extension     1       Stretches              HS/calf   2 2 2         Wall stretches             Other:            Hot Tub - 10 minutes only  5 10 10 10

## 2022-11-07 ENCOUNTER — OFFICE VISIT (OUTPATIENT)
Dept: PHYSICAL THERAPY | Age: 67
End: 2022-11-07

## 2022-11-07 DIAGNOSIS — S76.012D: ICD-10-CM

## 2022-11-07 DIAGNOSIS — M67.952 TENDINOPATHY OF LEFT GLUTEUS MEDIUS: Primary | ICD-10-CM

## 2022-11-07 DIAGNOSIS — M67.951 TENDINOPATHY OF RIGHT GLUTEUS MEDIUS: ICD-10-CM

## 2022-11-07 NOTE — PROGRESS NOTES
Daily Note     Today's date: 2022  Patient name: Edith Bullard  : 1955  MRN: [id number]  Referring provider: Manuel Marin MD  Dx:   Encounter Diagnosis     ICD-10-CM    1  Tendinopathy of left gluteus medius  M67 952    2  Tendinopathy of right gluteus medius  M67 951    3  Rupture of gluteus minimus tendon, left, subsequent encounter  S76 012D        Start Time: 1400  Stop Time: 1500  Total time in clinic (min): 60 minutes    Subjective: pt notes "I feel the same " high pain levels 8/10 w/ no relief post PT  Objective: See treatment diary below      Assessment: Tolerated treatment fair  Progressing slow w/ ex's for pt due to high pain levels  Gentle movements in the water w/ no c/o increased symptoms in the water  Patient would benefit from continued PT      Plan: Continue per plan of care        Precautions: L glut repair 2x 2018, L RTC repair , L knee scope 2022, OA, GERD, HTN    Daily Treatment Diary     Date 10/20 10/24 10/31 11/3 11/7      Water Walk (FW, BW, side) x10’  10 12 10 10 10      LE work at wall               Hip FF/ext swing   2   2        Toe/heel   1 1 1 1        Hip ABD/ADD   2 1 1 2         1 1 1        Knee flexion & extension  1  1 1        Squats  1         UE noodle x3             Push/pull   1           Rotate   1   1        Row forward & back   2           OH side bend            UE/Core (AROM, paddles, MB)              ABD/ADD              Horizontal Pec Fly              Alt  arm swing (shld flex/ext)              Push pull              Single paddle rotation           SLS (EO, EC, ball toss)            Aqua Step (FW, lat, eccentric)            Core work:              MF press (red, blue, blk)             Kickboard press (blue, red)              Row/ext w/ tubing (red, blue, blk)           Seated on bench             ankle DF/PF     1 1          1 1        ABD/ADD   1  1 1        Knee flexion/extension   1  2 2      DW TX - hang in the deep water  5 5,8 5,10 5 10 10        DW ABD/ADD     1 1        DW Bicycle  5 5 5 5 6        DW Scissor hip flexion/extension     1 1 dktc      Stretches              HS/calf   2 2 2 2        Wall stretches             Other:            Hot Tub - 10 minutes only  5 10 10 10 10

## 2022-11-10 ENCOUNTER — OFFICE VISIT (OUTPATIENT)
Dept: PHYSICAL THERAPY | Age: 67
End: 2022-11-10

## 2022-11-10 DIAGNOSIS — M67.951 TENDINOPATHY OF RIGHT GLUTEUS MEDIUS: ICD-10-CM

## 2022-11-10 DIAGNOSIS — S76.012D: ICD-10-CM

## 2022-11-10 DIAGNOSIS — M67.952 TENDINOPATHY OF LEFT GLUTEUS MEDIUS: Primary | ICD-10-CM

## 2022-11-10 NOTE — PROGRESS NOTES
Daily Note     Today's date: 11/10/2022  Patient name: Shayna Bee  : 1955  MRN: [id number]  Referring provider: Elvira Foster MD  Dx:   Encounter Diagnosis     ICD-10-CM    1  Tendinopathy of left gluteus medius  M67 952    2  Tendinopathy of right gluteus medius  M67 951    3  Rupture of gluteus minimus tendon, left, subsequent encounter  S76 012D        Start Time: 0800  Stop Time: 0900  Total time in clinic (min): 60 minutes    Subjective: the same  Objective: See treatment diary below      Assessment: Tolerated treatment fairly well, continue to have increased pain with TE, increased program today added step ups and theraband row and ext focusing on core strengthening  Reduced pain level after session  Cues for proper ex technique and carry over  Patient demonstrated fatigue post treatment and would benefit from continued PT      Plan: Continue per plan of care        Precautions: L glut repair 2x 2018, L RTC repair , L knee scope 2022, OA, GERD, HTN    Daily Treatment Diary     Date 10/20 10/24 10/31 11/3 11/7 11/10     Water Walk (FW, BW, side) x10’  10 12 10 10 10 12     LE work at wall               Hip FF/ext swing   2   2 2       Toe/heel   1 1 1 1 1       Hip ABD/ADD   2 1 1 2 2       March  1 1 1 1 1       Knee flexion & extension  1  1 1 1       Squats  1    1     UE noodle x3             Push/pull   1           Rotate   1   1 1       Row forward & back   2           OH side bend            UE/Core (AROM, paddles, MB)              ABD/ADD              Horizontal Pec Fly              Alt  arm swing (shld flex/ext)              Push pull              Single paddle rotation           SLS (EO, EC, ball toss)            Aqua Step (FW, lat, eccentric)       2' fwd     Core work:              MF press (red, blue, blk)             Kickboard press (blue, red)              Row/ext w/ tubing (red, blue, blk)      2 yellow     Seated on bench             ankle DF/PF     1 1 1 March   1  1 1 1       ABD/ADD   1  1 1 1       Knee flexion/extension   1  2 2 2     DW TX - hang in the deep water  5 5,8 5,10 5 10 10 10       DW ABD/ADD     1 1 1       DW Bicycle  5 5 5 5 6 5       DW Scissor hip flexion/extension     1 1 dktc 1     Stretches              HS/calf   2 2 2 2 2       Wall stretches             Other:            Hot Tub - 10 minutes only  5 10 10 10 10 10

## 2022-11-14 ENCOUNTER — OFFICE VISIT (OUTPATIENT)
Dept: PHYSICAL THERAPY | Age: 67
End: 2022-11-14

## 2022-11-14 DIAGNOSIS — M67.952 TENDINOPATHY OF LEFT GLUTEUS MEDIUS: Primary | ICD-10-CM

## 2022-11-14 DIAGNOSIS — M67.951 TENDINOPATHY OF RIGHT GLUTEUS MEDIUS: ICD-10-CM

## 2022-11-14 DIAGNOSIS — S76.012D: ICD-10-CM

## 2022-11-14 NOTE — PROGRESS NOTES
Daily Note     Today's date: 2022  Patient name: Asuncion Padilla  : 1955  MRN: [id number]  Referring provider: Corinna Jones MD  Dx:   Encounter Diagnosis     ICD-10-CM    1  Tendinopathy of left gluteus medius  M67 952    2  Tendinopathy of right gluteus medius  M67 951    3  Rupture of gluteus minimus tendon, left, subsequent encounter  S76 012D        Start Time: 1600  Stop Time: 1645  Total time in clinic (min): 45 minutes    Subjective: Patient reports that she felt good after last session but by the time her 25 minute drive home her pain increased  Objective: See treatment diary below      Assessment: Tolerated treatment fairly well, continues to have increased pain with TE, kept program same focusing on core strengthen  Some relief after session but short lived  Patient demonstrated fatigue post treatment and would benefit from continued PT      Plan: Continue per plan of care        Precautions: L glut repair 2x 2018, L RTC repair , L knee scope 2022, OA, GERD, HTN    Daily Treatment Diary     Date 10/20 10/24 10/31 11/3 11/7 11/10 11/14    Water Walk (FW, BW, side) x10’  10 12 10 10 10 12 12    LE work at wall               Hip FF/ext swing   2   2 2 2      Toe/heel   1 1 1 1 1 1      Hip ABD/ADD   2 1 1 2 2 2      March  1 1 1 1 1 1      Knee flexion & extension  1  1 1 1 1      Squats  1    1 1    UE noodle x3             Push/pull   1           Rotate   1   1 1 1      Row forward & back   2           OH side bend            UE/Core (AROM, paddles, MB)              ABD/ADD              Horizontal Pec Fly              Alt  arm swing (shld flex/ext)              Push pull              Single paddle rotation           SLS (EO, EC, ball toss)            Aqua Step (FW, lat, eccentric)       2' fwd 2'    Core work:              MF press (red, blue, blk)             Kickboard press (blue, red)              Row/ext w/ tubing (red, blue, blk)      2 yellow 2'    Seated on bench ankle DF/PF     1 1 1 1      March   1  1 1 1 1      ABD/ADD   1  1 1 1 1      Knee flexion/extension   1  2 2 2 2    DW TX - hang in the deep water  5 5,8 5,10 5 10 10 10 10      DW ABD/ADD     1 1 1 1      DW Bicycle  5 5 5 5 6 5 5      DW Scissor hip flexion/extension     1 1 dktc 1 1    Stretches              HS/calf   2 2 2 2 2 2      Wall stretches             Other:            Hot Tub - 10 minutes only  5 10 10 10 10 10 10

## 2022-11-15 ENCOUNTER — PROCEDURE VISIT (OUTPATIENT)
Dept: OBGYN CLINIC | Facility: CLINIC | Age: 67
End: 2022-11-15

## 2022-11-15 VITALS
HEART RATE: 69 BPM | SYSTOLIC BLOOD PRESSURE: 138 MMHG | HEIGHT: 63 IN | DIASTOLIC BLOOD PRESSURE: 87 MMHG | BODY MASS INDEX: 29.05 KG/M2

## 2022-11-15 DIAGNOSIS — M17.0 PRIMARY OSTEOARTHRITIS OF BOTH KNEES: Primary | ICD-10-CM

## 2022-11-15 NOTE — PROGRESS NOTES
ASSESSMENT/PLAN:    Diagnoses and all orders for this visit:    Primary osteoarthritis of both knees    Other orders  -     Large joint arthrocentesis      Both of the patient's knees were injected with her 1st set of Orthovisc  She tolerated the injections quite well  She will follow up with our office next week for her 2nd set  The patient is acceptable to this plan  Return in about 1 week (around 11/22/2022)  Under aseptic technique, both knees were injected with her 1st set of Orthovisc  She tolerated procedures quite well  Return back next week for 2nd set of injections  If her condition changes, she will not hesitate to let us know    _____________________________________________________  CHIEF COMPLAINT:  Chief Complaint   Patient presents with   • Left Knee - Follow-up   • Right Knee - Follow-up         SUBJECTIVE:  Maribeth Avalos is a 74 y o  female who presents to our office for viscosupplementation of both knees  She is here today to begin Orthovisc injections  She still complains of bilateral knee pain  She denies any numbness or tingling  She denies any fever or chills        The following portions of the patient's history were reviewed and updated as appropriate: allergies, current medications, past family history, past medical history, past social history, past surgical history and problem list     PAST MEDICAL HISTORY:  Past Medical History:   Diagnosis Date   • Anemia    • Arthritis    • Chronic pain disorder     back and hip pain   • Disease of thyroid gland     nodules   • GERD (gastroesophageal reflux disease)    • Heart murmur    • Heartburn    • Hiatal hernia    • Hyperlipidemia    • Hypertension    • Overactive bladder    • Rotator cuff tear, left    • Rotator cuff tear, right    • Vitamin D deficiency     unsure   • Wears dentures     upper   • Wears glasses    • Wears partial dentures     upper       PAST SURGICAL HISTORY:  Past Surgical History:   Procedure Laterality Date •  SECTION      Last Assessed: 2017   • COLONOSCOPY     • ELBOW SURGERY      Last Assessed: 2017   • ESOPHAGOGASTRODUODENOSCOPY N/A 2017    Procedure: ESOPHAGOGASTRODUODENOSCOPY (EGD); Surgeon: Carlos Sy DO;  Location: [facility]; Service: Gastroenterology   • HERNIA REPAIR  2020    Hiatal hernia repair   • HIP SURGERY Left 2018    glut medius/minimus repair  and 18   • HYSTERECTOMY         • KNEE SURGERY      x2   • PARAESOPHAGEAL HERNIA REPAIR N/A 2020    Procedure: LAPAROSCOPIC PARAESOPHAGEAL HERNIA REPAIR WITH MESH AND NISSEN FUNDOPLICATION WITH ROBOTICS;  Surgeon: Sonia Arita MD;  Location: [facility];  Service: General   • MA COLONOSCOPY FLX DX W/COLLJ SPEC WHEN PFRMD N/A 2017    Procedure: EGD AND COLONOSCOPY;  Surgeon: Carlos Sy DO;  Location: [facility];   Service: Gastroenterology   • MA EXCISON TUMOR SOFT TISSUE THIGH/KNEE SUBQ 3+CM Bilateral 2022    Procedure: EXCISION BIOPSY TISSUE LESION/MASS LOWER EXTREMITY;  Surgeon: Gagan Hitchcock MD;  Location: [facility];  Service: General   • MA EXCISON TUMOR SOFT TISSUE THIGH/KNEE SUBQ 3+CM Bilateral 2022    Procedure: EXCISION BIOPSY TISSUE LESION/MASS LOWER EXTREMITY;  Surgeon: Gagan Hitchcock MD;  Location: [facility];  Service: General   • MA KNEE SCOPE,MED/LAT MENISECTOMY Left 06/15/2022    Procedure: ;left knee arthroscopy, meniscectomy,synevectomy,chondroplasty, loose body removal, injection;  Surgeon: Fabrizio Scott DO;  Location: [facility];  Service: Orthopedics   • SHOULDER ARTHROCENTESIS     • SHOULDER ARTHROSCOPY Right 2021    Procedure: SHOULDER ARTHROSCOPY WITH acromioplasty, debridment, and ROTATOR CUFF REPAIR;  Surgeon: Silvestre Cummings DO;  Location: [address] [place] [address] MAIN OR;  Service: Orthopedics       FAMILY HISTORY:  Family History   Problem Relation Age of Onset   • Colon cancer Mother    • Heart attack Father    • Other Father         Cardiac Disorder   • Diabetes type II Father    • Colon cancer Sister    • No Known Problems Sister    • No Known Problems Sister    • No Known Problems Sister    • No Known Problems Sister    • No Known Problems Daughter    • No Known Problems Maternal Aunt    • No Known Problems Maternal Aunt    • No Known Problems Maternal Aunt    • Cancer Maternal Grandmother    • No Known Problems Paternal Grandmother        SOCIAL HISTORY:  Social History     Tobacco Use   • Smoking status: Former Smoker     Packs/day: 1 00     Quit date:      Years since quittin 8   • Smokeless tobacco: Never Used   • Tobacco comment: quit 25 yrs ago   Vaping Use   • Vaping Use: Never used   Substance Use Topics   • Alcohol use: Not Currently   • Drug use: No       MEDICATIONS:    Current Outpatient Medications:   •  atorvastatin (LIPITOR) 10 mg tablet, take 1 tablet by mouth once daily (Patient taking differently: Take 10 mg by mouth every evening), Disp: 90 tablet, Rfl: 0  •  CALCIUM PO, Take by mouth, Disp: , Rfl:   •  famotidine (PEPCID) 20 mg tablet, Take 1 tablet (20 mg total) by mouth 2 (two) times a day, Disp: 180 tablet, Rfl: 0  •  hydrOXYzine HCL (ATARAX) 25 mg tablet, TAKE 1 TO 2 TABLETS BY MOUTH DAILY AT BEDTIME AS NEEDED, Disp: 60 tablet, Rfl: 2  •  losartan (COZAAR) 100 MG tablet, take 1 tablet by mouth once daily, Disp: 90 tablet, Rfl: 1  •  meloxicam (MOBIC) 15 mg tablet, Take 1 tablet (15 mg total) by mouth daily, Disp: 30 tablet, Rfl: 0  •  valACYclovir (VALTREX) 1,000 mg tablet, take 1 tablet by mouth twice a day for 3 days, Disp: 6 tablet, Rfl: 2    ALLERGIES:  Allergies   Allergen Reactions   • Hydrocodone Other (See Comments) and Irritability     Patient reports insomnia when taking        ROS:  Review of Systems     Constitutional: Negative for fatigue, fever or loss of appetite  HENT: Negative  Respiratory: Negative for shortness of breath, dyspnea  Cardiovascular: Negative for chest pain/tightness  Gastrointestinal: Negative for abdominal pain, N/V  Endocrine: Negative for cold/heat intolerance, unexplained weight loss/gain  Genitourinary: Negative for flank pain, dysuria, hematuria  Musculoskeletal: Positive for arthralgia   Skin: Negative for rash  Neurological: Negative for numbness or tingling  Psychiatric/Behavioral: Negative for agitation  _____________________________________________________  PHYSICAL EXAMINATION:    Blood pressure 138/87, pulse 69, height 5' 3" (1 6 m)  Constitutional: Oriented to person, place, and time  Appears well-developed and well-nourished  No distress  HENT:   Head: Normocephalic  Eyes: Conjunctivae are normal  Right eye exhibits no discharge  Left eye exhibits no discharge  No scleral icterus  Cardiovascular: Normal rate  Pulmonary/Chest: Effort normal    Neurological: Alert and oriented to person, place, and time  Skin: Skin is warm and dry  No rash noted  Not diaphoretic  No erythema  No pallor  Psychiatric: Normal mood and affect  Behavior is normal  Judgment and thought content normal       MUSCULOSKELETAL EXAMINATION:   Physical Exam  Ortho Exam    Bilateral lower extremities are neurovascularly intact  Toes are pink and mobile   Compartments are soft  No warmth, erythema or ecchymosis  ROM of knees are from 5-115 degrees  Negative Lachman, drawer or pivot shift  No medial instability  Medial joint line tenderness, slight lateral joint line tenderness  Patellofemoral crepitation  Objective:  BP Readings from Last 1 Encounters:   11/15/22 138/87      Wt Readings from Last 1 Encounters:   10/03/22 74 4 kg (164 lb)        BMI:   Estimated body mass index is 29 05 kg/m² as calculated from the following:    Height as of this encounter: 5' 3" (1 6 m)  Weight as of 10/3/22: 74 4 kg (164 lb)        PROCEDURES PERFORMED:  Large joint arthrocentesis: bilateral knee  Universal Protocol:  Risks and benefits: risks, benefits and alternatives were discussed  Consent given by: patient  Patient understanding: patient states understanding of the procedure being performed  Site marked: the operative site was marked  Supporting Documentation  Indications: pain   Procedure Details  Location: knee - bilateral knee  Preparation: Patient was prepped and draped in the usual sterile fashion  Needle size: 22 G  Ultrasound guidance: no  Approach: lateral    Medications (Right): 30 mg sodium hyaluronate 30 mg/2 mLMedications (Left): 30 mg sodium hyaluronate 30 mg/2 mL   Patient tolerance: patient tolerated the procedure well with no immediate complications  Dressing:  Sterile dressing applied            Scribe Attestation    I,:  Ivy Pierson PA-C am acting as a scribe while in the presence of the attending physician :       I,:  Katya Hernandez DO personally performed the services described in this documentation    as scribed in my presence :

## 2022-11-17 ENCOUNTER — APPOINTMENT (OUTPATIENT)
Dept: PHYSICAL THERAPY | Age: 67
End: 2022-11-17

## 2022-11-21 ENCOUNTER — APPOINTMENT (OUTPATIENT)
Dept: PHYSICAL THERAPY | Age: 67
End: 2022-11-21
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
## 2022-11-22 ENCOUNTER — APPOINTMENT (OUTPATIENT)
Dept: PHYSICAL THERAPY | Age: 67
End: 2022-11-22

## 2022-11-22 ENCOUNTER — PROCEDURE VISIT (OUTPATIENT)
Dept: OBGYN CLINIC | Facility: CLINIC | Age: 67
End: 2022-11-22

## 2022-11-22 VITALS
HEIGHT: 63 IN | DIASTOLIC BLOOD PRESSURE: 85 MMHG | HEART RATE: 76 BPM | SYSTOLIC BLOOD PRESSURE: 142 MMHG | BODY MASS INDEX: 29.06 KG/M2 | WEIGHT: 164 LBS

## 2022-11-22 DIAGNOSIS — M17.0 PRIMARY OSTEOARTHRITIS OF BOTH KNEES: Primary | ICD-10-CM

## 2022-11-22 NOTE — PROGRESS NOTES
Which were of two left shoulders to degrees sign Assessment/Plan:   Diagnoses and all orders for this visit:    Primary osteoarthritis of both knees  -     Large joint arthrocentesis  -     Large joint arthrocentesis    Patient was provided with 2nd of 3 shot Orthovisc Visco injection series for treatment of primary osteoarthritis of the bilateral knees  Patient tolerated treatment well  She will be seen for follow-up in 1 week for completion of Visco injection series  Patient expresses understanding and is in agreement with this treatment plan  Under aseptic technique, both knees were injected with her 2nd set of Orthovisc  She tolerated procedures quite well  Return back next week for 3rd set of injections  If her condition changes, she will not hesitate to let us know quite clean with motion he    Subjective:   Patient ID: Clotilde Allen  1955     HPI  Patient is a 74 y o  female who presents for 2nd of 3 shot Orthovisc viscosupplementation injection series for treatment of primary osteoarthritis of the bilateral knees  She was last seen in regards to this issue on 11/15/2022 at which time she received her initial injections  Patient states that she experienced no adverse reactions to those injections including fever, chills, headache, nausea, dizziness, or malaise      The following portions of the patient's history were reviewed and updated as appropriate:  Past medical history, past surgical history, Family history, social history, current medications and allergies    Past Medical History:   Diagnosis Date   • Anemia    • Arthritis    • Chronic pain disorder     back and hip pain   • Disease of thyroid gland     nodules   • GERD (gastroesophageal reflux disease)    • Heart murmur    • Heartburn    • Hiatal hernia    • Hyperlipidemia    • Hypertension    • Overactive bladder    • Rotator cuff tear, left    • Rotator cuff tear, right    • Vitamin D deficiency     unsure   • Wears dentures upper   • Wears glasses    • Wears partial dentures     upper       Past Surgical History:   Procedure Laterality Date   •  SECTION      Last Assessed: 2017   • COLONOSCOPY     • ELBOW SURGERY      Last Assessed: 2017   • ESOPHAGOGASTRODUODENOSCOPY N/A 2017    Procedure: ESOPHAGOGASTRODUODENOSCOPY (EGD); Surgeon: Karolina Bosch DO;  Location: [facility] GI [facility]; Service: Gastroenterology   • HERNIA REPAIR  2020    Hiatal hernia repair   • HIP SURGERY Left 2018    glut medius/minimus repair  and 18   • HYSTERECTOMY         • KNEE SURGERY      x2   • PARAESOPHAGEAL HERNIA REPAIR N/A 2020    Procedure: LAPAROSCOPIC PARAESOPHAGEAL HERNIA REPAIR WITH MESH AND NISSEN FUNDOPLICATION WITH ROBOTICS;  Surgeon: Melissa Gomez MD;  Location: [facility];  Service: General   • TX COLONOSCOPY FLX DX W/COLLJ SPEC WHEN PFRMD N/A 2017    Procedure: EGD AND COLONOSCOPY;  Surgeon: Karolina Bosch DO;  Location: [facility];   Service: Gastroenterology   • TX EXCISON TUMOR SOFT TISSUE THIGH/KNEE SUBQ 3+CM Bilateral 2022    Procedure: EXCISION BIOPSY TISSUE LESION/MASS LOWER EXTREMITY;  Surgeon: Obed Jenkins MD;  Location: [facility];  Service: General   • TX EXCISON TUMOR SOFT TISSUE THIGH/KNEE SUBQ 3+CM Bilateral 2022    Procedure: EXCISION BIOPSY TISSUE LESION/MASS LOWER EXTREMITY;  Surgeon: Obed Jenkins MD;  Location: [facility];  Service: General   • TX KNEE SCOPE,MED/LAT MENISECTOMY Left 06/15/2022    Procedure: ;left knee arthroscopy, meniscectomy,synevectomy,chondroplasty, loose body removal, injection;  Surgeon: Karyle Sites, DO;  Location: [facility];  Service: Orthopedics   • SHOULDER ARTHROCENTESIS     • SHOULDER ARTHROSCOPY Right 2021    Procedure: SHOULDER ARTHROSCOPY WITH acromioplasty, debridment, and ROTATOR CUFF REPAIR;  Surgeon: Yang Velasquez DO;  Location: [facility] MA[facility] OR;  Service: Orthopedics       Family History   Problem Relation Age of Onset   • Colon cancer Mother    • Heart attack Father    • Other Father         Cardiac Disorder   • Diabetes type II Father    • Colon cancer Sister    • No Known Problems Sister    • No Known Problems Sister    • No Known Problems Sister    • No Known Problems Sister    • No Known Problems Daughter    • No Known Problems Maternal Aunt    • No Known Problems Maternal Aunt    • No Known Problems Maternal Aunt    • Cancer Maternal Grandmother    • No Known Problems Paternal Grandmother        Social History     Socioeconomic History   • Marital status: /Civil Union     Spouse name: None   • Number of children: None   • Years of education: None   • Highest education level: None   Occupational History   • None   Tobacco Use   • Smoking status: Former     Packs/day:      Types: Cigarettes     Quit date:      Years since quittin 9   • Smokeless tobacco: Never   • Tobacco comments:     quit 25 yrs ago   Vaping Use   • Vaping Use: Never used   Substance and Sexual Activity   • Alcohol use: Not Currently   • Drug use: No   • Sexual activity: Yes     Partners: Male   Other Topics Concern   • None   Social History Narrative    Caffeine use    , 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[employer], now at [employer] and [address] [place] [address] for OR    2 grown children     Social Determinants of Health     Financial Resource Strain: Not on file   Food Insecurity: Not on file   Transportation Needs: Not on file   Physical Activity: Not on file   Stress: Not on file   Social Connections: Not on file   Intimate Partner Violence: Not on file   Housing Stability: Not on file         Current Outpatient Medications:   •  atorvastatin (LIPITOR) 10 mg tablet, take 1 tablet by mouth once daily (Patient taking differently: Take 10 mg by mouth every evening), Disp: 90 tablet, Rfl: 0  •  CALCIUM PO, Take by mouth, Disp: , Rfl:   •  famotidine (PEPCID) 20 mg tablet, Take 1 tablet (20 mg total) by mouth 2 (two) times a day, Disp: 180 tablet, Rfl: 0  •  hydrOXYzine HCL (ATARAX) 25 mg tablet, TAKE 1 TO 2 TABLETS BY MOUTH DAILY AT BEDTIME AS NEEDED, Disp: 60 tablet, Rfl: 2  •  losartan (COZAAR) 100 MG tablet, take 1 tablet by mouth once daily, Disp: 90 tablet, Rfl: 1  •  meloxicam (MOBIC) 15 mg tablet, Take 1 tablet (15 mg total) by mouth daily, Disp: 30 tablet, Rfl: 0  •  valACYclovir (VALTREX) 1,000 mg tablet, take 1 tablet by mouth twice a day for 3 days, Disp: 6 tablet, Rfl: 2    Allergies   Allergen Reactions   • Hydrocodone Other (See Comments) and Irritability     Patient reports insomnia when taking        Review of Systems   Constitutional: Negative for fatigue  Gastrointestinal: Negative for nausea  Musculoskeletal:        As noted in HPI   Neurological: Negative for dizziness, weakness, numbness and headaches  All other systems reviewed and are negative  Objective:  /85   Pulse 76   Ht 5' 3" (1 6 m)   Wt 74 4 kg (164 lb)   BMI 29 05 kg/m²     Ortho Exam  Bilateral knees -   Patient ambulates with steady gait pattern  Uses no assistive device  No anatomical deformity  Skin is warm and dry to touch with no signs of erythema, ecchymosis, infection  No soft tissue swelling, no effusion noted  ROM 5°-115°  TTP over medial joint lines, mildly TTP over lateral joint line  Flexor and extensor mechanisms intact  Knee is stable to varus and valgus stress  - Lachman's  - Anterior Drawer, - Posterior Drawer  - medial Mago's, - lateral Mago's  - Pivot Shift  Patella tracks centrally with palpable crepitus  Calf compartments are soft and supple  2+ TP and DP pulses with brisk capillary refill to the toes  Sural, saphenous, tibial, superficial and deep peroneal motor and sensory distributions intact  Sensation light touch intact distally    Physical Exam  Vitals and nursing note reviewed  Constitutional:       General: She is not in acute distress  Appearance: She is well-developed     HENT: Head: Normocephalic and atraumatic  Eyes:      Conjunctiva/sclera: Conjunctivae normal    Cardiovascular:      Rate and Rhythm: Normal rate  Pulmonary:      Effort: Pulmonary effort is normal    Musculoskeletal:      Cervical back: Neck supple  Skin:     General: Skin is warm and dry  Capillary Refill: Capillary refill takes less than 2 seconds  Neurological:      Mental Status: She is alert and oriented to person, place, and time  Psychiatric:         Mood and Affect: Mood normal          Behavior: Behavior normal           Diagnostic Test Review:  No new imaging reviewed this visit    Large joint arthrocentesis: R knee  Universal Protocol:  Consent: Verbal consent obtained  Risks and benefits: risks, benefits and alternatives were discussed  Consent given by: patient  Time out: Immediately prior to procedure a "time out" was called to verify the correct patient, procedure, equipment, support staff and site/side marked as required  Timeout called at: 11/22/2022 2:34 PM   Patient understanding: patient states understanding of the procedure being performed  Site marked: the operative site was marked  Radiology Images displayed and confirmed  If images not available, report reviewed: imaging studies available  Patient identity confirmed: verbally with patient    Supporting Documentation  Indications: pain and joint swelling   Procedure Details  Location: knee - R knee  Preparation: Patient was prepped and draped in the usual sterile fashion  Needle size: 22 G  Ultrasound guidance: no  Approach: anterolateral  Medications administered: 30 mg sodium hyaluronate 30 mg/2 mL    Patient tolerance: patient tolerated the procedure well with no immediate complications  Dressing:  Sterile dressing applied    Large joint arthrocentesis: L knee  Universal Protocol:  Consent: Verbal consent obtained    Risks and benefits: risks, benefits and alternatives were discussed  Consent given by: patient  Time out: Immediately prior to procedure a "time out" was called to verify the correct patient, procedure, equipment, support staff and site/side marked as required  Timeout called at: 11/22/2022 2:34 PM   Patient understanding: patient states understanding of the procedure being performed  Site marked: the operative site was marked  Radiology Images displayed and confirmed   If images not available, report reviewed: imaging studies available  Patient identity confirmed: verbally with patient    Supporting Documentation  Indications: pain and joint swelling   Procedure Details  Location: knee - L knee  Preparation: Patient was prepped and draped in the usual sterile fashion  Needle size: 22 G  Ultrasound guidance: no  Approach: anterolateral  Medications administered: 30 mg sodium hyaluronate 30 mg/2 mL    Patient tolerance: patient tolerated the procedure well with no immediate complications  Dressing:  Sterile dressing applied         Scribe Attestation    I,:  Kobe Melendez am acting as a scribe while in the presence of the attending physician :       I,:  Magali Castle DO personally performed the services described in this documentation    as scribed in my presence :

## 2022-11-25 ENCOUNTER — EVALUATION (OUTPATIENT)
Dept: PHYSICAL THERAPY | Age: 67
End: 2022-11-25

## 2022-11-25 DIAGNOSIS — S76.012D: ICD-10-CM

## 2022-11-25 DIAGNOSIS — M67.951 TENDINOPATHY OF RIGHT GLUTEUS MEDIUS: ICD-10-CM

## 2022-11-25 DIAGNOSIS — M67.952 TENDINOPATHY OF LEFT GLUTEUS MEDIUS: Primary | ICD-10-CM
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
## 2022-11-28 ENCOUNTER — OFFICE VISIT (OUTPATIENT)
Dept: PHYSICAL THERAPY | Age: 67
End: 2022-11-28

## 2022-11-28 DIAGNOSIS — M67.952 TENDINOPATHY OF LEFT GLUTEUS MEDIUS: Primary | ICD-10-CM

## 2022-11-28 DIAGNOSIS — M67.951 TENDINOPATHY OF RIGHT GLUTEUS MEDIUS: ICD-10-CM

## 2022-11-28 DIAGNOSIS — S76.012D: ICD-10-CM

## 2022-11-28 NOTE — PROGRESS NOTES
Daily Note     Today's date: 2022  Patient name: Daryel Nyhan  : 1955  MRN: [id number]  Referring provider: Saida Solis MD  Dx:   Encounter Diagnosis     ICD-10-CM    1  Tendinopathy of left gluteus medius  M67 952       2  Tendinopathy of right gluteus medius  M67 951       3  Rupture of gluteus minimus tendon, left, subsequent encounter  S76 012D                      Subjective: Patient reports 7/10 pain today coming into therapy  Objective: See treatment diary below      Assessment: Tolerated treatment fairly well, cues for ex technique, less pain noted end of session  Patient demonstrated fatigue post treatment and would benefit from continued PT      Plan: Continue per plan of care  Progress as tolerated        Precautions: L glut repair 2x 2018, L RTC repair , L knee scope 2022, OA, GERD, HTN    Daily Treatment Diary     Date 11/28  10/31 11/3 11/7 11/10 11/14 11/25   Water Walk (FW, BW, side) x10’  12 12 10 10 10 12 12 12   LE work at wall               Hip FF/extention 2 2   2 2 2 4'     Toe/heel  1 1 1 1 1 1 1 1     Hip ABD/ADD 2 2 1 1 2 2 2 2      1 1 1 1 1 1 1     Knee flexion & extension 1 1  1 1 1 1 1     Squats with hip ER 1 1    1 1 1   UE noodle x3             Push/pull   1           Rotate  1 1   1 1 1 1'     Row forward & back   2           OH side bend            UE/Core (AROM, paddles, MB)              ABD/ADD              Horizontal Pec Fly              Alt  arm swing (shld flex/ext)              Push pull              Single paddle rotation           SLS (EO, EC, ball toss)            Aqua Step (FW, lat, eccentric)  2     2' fwd 2' 2   Core work:              MF press (red, blue, blk)             Kickboard press (blue, red)              Row/ext w/ tubing (red, blue, blk) 2 blue     2 yellow 2' 2'   Seated on bench             ankle DF/PF  1   1 1 1 1 1'     March  1 1  1 1 1 1 1'     ABD/ADD  1 1  1 1 1 1 1'     Knee flexion/extension  1 1  2 2 2 2 1'   DW TX - hang in the deep water  5 5,8 5,10 5 10 10 10 10 nt     DW ABD/ADD     1 1 1 1 1'     DW Bicycle  5 5 5 5 6 5 5 5'     DW Scissor hip flexion/extension  1   1 1 dktc 1 1 1'   Stretches              HS/calf  2 2 2 2 2 2 2 2     Wall stretches             Other:            Hot Tub - 10 minutes only  10 10 10 10 10 10 10 10

## 2022-11-29 ENCOUNTER — PROCEDURE VISIT (OUTPATIENT)
Dept: OBGYN CLINIC | Facility: CLINIC | Age: 67
End: 2022-11-29

## 2022-11-29 VITALS
HEIGHT: 63 IN | WEIGHT: 164 LBS | DIASTOLIC BLOOD PRESSURE: 88 MMHG | BODY MASS INDEX: 29.06 KG/M2 | SYSTOLIC BLOOD PRESSURE: 161 MMHG | HEART RATE: 76 BPM

## 2022-11-29 DIAGNOSIS — M17.0 PRIMARY OSTEOARTHRITIS OF BOTH KNEES: Primary | ICD-10-CM

## 2022-11-29 NOTE — PROGRESS NOTES
ASSESSMENT/PLAN:    Diagnoses and all orders for this visit:    Primary osteoarthritis of both knees    Other orders  -     Large joint arthrocentesis        Both of the patient's knees were injected with her 3rd and final set of Orthovisc  She tolerated the injections quite well  She will follow up with our office in 6 weeks  The patient is acceptable to this plan  Return in about 6 weeks (around 1/10/2023)  Under aseptic technique, both knees were injected with her final set of Orthovisc  She tolerated procedures quite well  Return back in 6 weeks re-evaluation  If her condition changes, she will not hesitate to let us know      _____________________________________________________  CHIEF COMPLAINT:  Chief Complaint   Patient presents with   • Right Knee - Follow-up   • Left Knee - Follow-up         SUBJECTIVE:  Omar Jovel is a 74 y o  female who presents to our office for viscosupplementation of both knees  She is here today for her 3rd and final set of Orthovisc  She tolerated her previous injections without issue  She denies any numbness or tingling  She denies any fever or chills        The following portions of the patient's history were reviewed and updated as appropriate: allergies, current medications, past family history, past medical history, past social history, past surgical history and problem list     PAST MEDICAL HISTORY:  Past Medical History:   Diagnosis Date   • Anemia    • Arthritis    • Chronic pain disorder     back and hip pain   • Disease of thyroid gland     nodules   • GERD (gastroesophageal reflux disease)    • Heart murmur    • Heartburn    • Hiatal hernia    • Hyperlipidemia    • Hypertension    • Overactive bladder    • Rotator cuff tear, left    • Rotator cuff tear, right    • Vitamin D deficiency     unsure   • Wears dentures     upper   • Wears glasses    • Wears partial dentures     upper       PAST SURGICAL HISTORY:  Past Surgical History:   Procedure Laterality Date   •  SECTION      Last Assessed: 2017   • COLONOSCOPY     • ELBOW SURGERY      Last Assessed: 2017   • ESOPHAGOGASTRODUODENOSCOPY N/A 2017    Procedure: ESOPHAGOGASTRODUODENOSCOPY (EGD); Surgeon: Harris Chapman DO;  Location: [facility]; Service: Gastroenterology   • HERNIA REPAIR  2020    Hiatal hernia repair   • HIP SURGERY Left 2018    glut medius/minimus repair  and 18   • HYSTERECTOMY         • KNEE SURGERY      x2   • PARAESOPHAGEAL HERNIA REPAIR N/A 2020    Procedure: LAPAROSCOPIC PARAESOPHAGEAL HERNIA REPAIR WITH MESH AND NISSEN FUNDOPLICATION WITH ROBOTICS;  Surgeon: Ranjith Singh MD;  Location: [facility];  Service: General   • NM COLONOSCOPY FLX DX W/COLLJ SPEC WHEN PFRMD N/A 2017    Procedure: EGD AND COLONOSCOPY;  Surgeon: Harris Chapman DO;  Location: [facility];   Service: Gastroenterology   • NM EXCISON TUMOR SOFT TISSUE THIGH/KNEE SUBQ 3+CM Bilateral 2022    Procedure: EXCISION BIOPSY TISSUE LESION/MASS LOWER EXTREMITY;  Surgeon: Zohra Green MD;  Location: [facility];  Service: General   • NM EXCISON TUMOR SOFT TISSUE THIGH/KNEE SUBQ 3+CM Bilateral 2022    Procedure: EXCISION BIOPSY TISSUE LESION/MASS LOWER EXTREMITY;  Surgeon: Zohra Green MD;  Location: [facility];  Service: General   • NM KNEE SCOPE,MED/LAT MENISECTOMY Left 06/15/2022    Procedure: ;left knee arthroscopy, meniscectomy,synevectomy,chondroplasty, loose body removal, injection;  Surgeon: Cristin Huang DO;  Location: [facility];  Service: Orthopedics   • SHOULDER ARTHROCENTESIS     • SHOULDER ARTHROSCOPY Right 2021    Procedure: SHOULDER ARTHROSCOPY WITH acromioplasty, debridment, and ROTATOR CUFF REPAIR;  Surgeon: Yayo Hernandez DO;  Location: [address] [place] [address] MAIN OR;  Service: Orthopedics       FAMILY HISTORY:  Family History   Problem Relation Age of Onset   • Colon cancer Mother    • Heart attack Father    • Other Father Cardiac Disorder   • Diabetes type II Father    • Colon cancer Sister    • No Known Problems Sister    • No Known Problems Sister    • No Known Problems Sister    • No Known Problems Sister    • No Known Problems Daughter    • No Known Problems Maternal Aunt    • No Known Problems Maternal Aunt    • No Known Problems Maternal Aunt    • Cancer Maternal Grandmother    • No Known Problems Paternal Grandmother        SOCIAL HISTORY:  Social History     Tobacco Use   • Smoking status: Former     Packs/day: 1 00     Types: Cigarettes     Quit date:      Years since quittin 9   • Smokeless tobacco: Never   • Tobacco comments:     quit 25 yrs ago   Vaping Use   • Vaping Use: Never used   Substance Use Topics   • Alcohol use: Not Currently   • Drug use: No       MEDICATIONS:    Current Outpatient Medications:   •  atorvastatin (LIPITOR) 10 mg tablet, take 1 tablet by mouth once daily (Patient taking differently: Take 10 mg by mouth every evening), Disp: 90 tablet, Rfl: 0  •  CALCIUM PO, Take by mouth, Disp: , Rfl:   •  famotidine (PEPCID) 20 mg tablet, Take 1 tablet (20 mg total) by mouth 2 (two) times a day, Disp: 180 tablet, Rfl: 0  •  hydrOXYzine HCL (ATARAX) 25 mg tablet, TAKE 1 TO 2 TABLETS BY MOUTH DAILY AT BEDTIME AS NEEDED, Disp: 60 tablet, Rfl: 2  •  losartan (COZAAR) 100 MG tablet, take 1 tablet by mouth once daily, Disp: 90 tablet, Rfl: 1  •  meloxicam (MOBIC) 15 mg tablet, Take 1 tablet (15 mg total) by mouth daily, Disp: 30 tablet, Rfl: 0  •  valACYclovir (VALTREX) 1,000 mg tablet, take 1 tablet by mouth twice a day for 3 days, Disp: 6 tablet, Rfl: 2    ALLERGIES:  Allergies   Allergen Reactions   • Hydrocodone Other (See Comments) and Irritability     Patient reports insomnia when taking        ROS:  Review of Systems     Constitutional: Negative for fatigue, fever or loss of appetite  HENT: Negative  Respiratory: Negative for shortness of breath, dyspnea      Cardiovascular: Negative for chest pain/tightness  Gastrointestinal: Negative for abdominal pain, N/V  Endocrine: Negative for cold/heat intolerance, unexplained weight loss/gain  Genitourinary: Negative for flank pain, dysuria, hematuria  Musculoskeletal: Positive for arthralgia   Skin: Negative for rash  Neurological: Negative for numbness or tingling  Psychiatric/Behavioral: Negative for agitation  _____________________________________________________  PHYSICAL EXAMINATION:    Blood pressure 161/88, pulse 76, height 5' 3" (1 6 m), weight 74 4 kg (164 lb)  Constitutional: Oriented to person, place, and time  Appears well-developed and well-nourished  No distress  HENT:   Head: Normocephalic  Eyes: Conjunctivae are normal  Right eye exhibits no discharge  Left eye exhibits no discharge  No scleral icterus  Cardiovascular: Normal rate  Pulmonary/Chest: Effort normal    Neurological: Alert and oriented to person, place, and time  Skin: Skin is warm and dry  No rash noted  Not diaphoretic  No erythema  No pallor  Psychiatric: Normal mood and affect  Behavior is normal  Judgment and thought content normal       MUSCULOSKELETAL EXAMINATION:   Physical Exam  Ortho Exam    Bilateral lower extremities are neurovascularly intact  Toes are pink and mobile   Compartments are soft  No warmth, erythema or ecchymosis  ROM of knees are from 5-115 degrees  Negative Lachman, drawer or pivot shift  No medial instability  Medial joint line tenderness, slight lateral joint line tenderness  Patellofemoral crepitation    Objective:  BP Readings from Last 1 Encounters:   11/29/22 161/88      Wt Readings from Last 1 Encounters:   11/29/22 74 4 kg (164 lb)        BMI:   Estimated body mass index is 29 05 kg/m² as calculated from the following:    Height as of this encounter: 5' 3" (1 6 m)  Weight as of this encounter: 74 4 kg (164 lb)        PROCEDURES PERFORMED:  Large joint arthrocentesis: bilateral knee  Universal Protocol:  Risks and benefits: risks, benefits and alternatives were discussed  Consent given by: patient  Patient understanding: patient states understanding of the procedure being performed  Site marked: the operative site was marked  Supporting Documentation  Indications: pain   Procedure Details  Location: knee - bilateral knee  Preparation: Patient was prepped and draped in the usual sterile fashion  Needle size: 22 G  Ultrasound guidance: no  Approach: lateral    Medications (Right): 30 mg sodium hyaluronate 30 mg/2 mLMedications (Left): 30 mg sodium hyaluronate 30 mg/2 mL   Patient tolerance: patient tolerated the procedure well with no immediate complications  Dressing:  Sterile dressing applied            Scribe Attestation    I,:  Mona Mena PA-C am acting as a scribe while in the presence of the attending physician :       I,:  Xena Olvera DO personally performed the services described in this documentation    as scribed in my presence :

## 2022-12-01 ENCOUNTER — OFFICE VISIT (OUTPATIENT)
Dept: PHYSICAL THERAPY | Age: 67
End: 2022-12-01

## 2022-12-01 DIAGNOSIS — M67.951 TENDINOPATHY OF RIGHT GLUTEUS MEDIUS: ICD-10-CM

## 2022-12-01 DIAGNOSIS — M67.952 TENDINOPATHY OF LEFT GLUTEUS MEDIUS: Primary | ICD-10-CM

## 2022-12-01 DIAGNOSIS — S76.012D: ICD-10-CM

## 2022-12-01 NOTE — PROGRESS NOTES
Daily Note     Today's date: 2022  Patient name: Greta Braga  : 1955  MRN: [id number]  Referring provider: Caio Thao MD  Dx:   Encounter Diagnosis     ICD-10-CM    1  Tendinopathy of left gluteus medius  M67 952       2  Tendinopathy of right gluteus medius  M67 951       3  Rupture of gluteus minimus tendon, left, subsequent encounter  S76 012D           Start Time: 0700  Stop Time: 0800  Total time in clinic (min): 60 minutes    Subjective: Patient reports she 7/10 pain today, note the same overall  Objective: See treatment diary below      Assessment: Tolerated treatment fairly well, able to complete program added lateral strep ups, notes she still having increased pain throughout program today kelsie with seated TE  Patient demonstrated fatigue post treatment and would benefit from continued PT      Plan: Continue per plan of care  Progress as tolerated        Precautions: L glut repair 2x 2018, L RTC repair , L knee scope 2022, OA, GERD, HTN    Daily Treatment Diary     Date 11/28 12/1  11/3 11/7 11/10 11/14 11/25   Water Walk (FW, BW, side) x10’  12 12 10 10 10 12 12 12   LE work at wall               Hip FF/extention 2 2   2 2 2 4'     Toe/heel  1 1 1 1 1 1 1 1     Hip ABD/ADD 2 2 1 1 2 2 2 2      1 1 1 1 1 1 1     Knee flexion & extension 1 1  1 1 1 1 1     Squats with hip ER 1 1    1 1 1   UE noodle x3             Push/pull   1           Rotate  1 1   1 1 1 1'     Row forward & back   2           OH side bend            UE/Core (AROM, paddles, MB)              ABD/ADD              Horizontal Pec Fly              Alt  arm swing (shld flex/ext)              Push pull              Single paddle rotation           SLS (EO, EC, ball toss)            Aqua Step (FW, lat, eccentric)  2     2' fwd 2' 2   Core work:              MF press (red, blue, blk)             Kickboard press (blue, red)              Row/ext w/ tubing (red, blue, blk) 2 blue 2    2 yellow 2' 2' Seated on bench             ankle DF/PF  1   1 1 1 1 1'     March  1 1  1 1 1 1 1'     ABD/ADD  1 1  1 1 1 1 1'     Knee flexion/extension  1 1  2 2 2 2 1'   DW TX - hang in the deep water  5 10 5,10 5 10 10 10 10 nt     DW ABD/ADD     1 1 1 1 1'     DW Bicycle  5 6 5 5 6 5 5 5'     DW Scissor hip flexion/extension  1 1  1 1 dktc 1 1 1'   Stretches              HS/calf  2 2 2 2 2 2 2 2     Wall stretches             Other:            Hot Tub - 10 minutes only  10 10 10 10 10 10 10 10

## 2022-12-05 ENCOUNTER — APPOINTMENT (OUTPATIENT)
Dept: PHYSICAL THERAPY | Age: 67
End: 2022-12-05

## 2022-12-05 ENCOUNTER — OFFICE VISIT (OUTPATIENT)
Dept: PAIN MEDICINE | Facility: CLINIC | Age: 67
End: 2022-12-05

## 2022-12-05 VITALS
BODY MASS INDEX: 29.84 KG/M2 | WEIGHT: 168.4 LBS | OXYGEN SATURATION: 99 % | HEIGHT: 63 IN | DIASTOLIC BLOOD PRESSURE: 80 MMHG | HEART RATE: 71 BPM | SYSTOLIC BLOOD PRESSURE: 120 MMHG

## 2022-12-05 DIAGNOSIS — S76.012D: Primary | ICD-10-CM

## 2022-12-05 DIAGNOSIS — M67.951 TENDINOPATHY OF RIGHT GLUTEUS MEDIUS: ICD-10-CM

## 2022-12-05 DIAGNOSIS — M67.952 TENDINOPATHY OF LEFT GLUTEAL REGION: ICD-10-CM

## 2022-12-05 NOTE — PROGRESS NOTES
Assessment:  1  Rupture of gluteus minimus tendon, left, subsequent encounter    2  Tendinopathy of right gluteus medius    3  Tendinopathy of left gluteal region        Plan:  Patient is a 79-year-old female complains of left gluteal pain we copiously secondary to gluteal tendon rupture status post repair presents to the office for follow-up visit  Patient has failed physical therapy and multiple steroid injections with ultrasound guidance to help alleviate her pain  Patient has also failed water therapy  At this time we have exhausted conservative therapy  It appears that her pain has been getting steadily worse after being improved after the surgery  There is a high suspicion for a re-tear of the tendon  New line   1  We will order a new MRI of the left hip to assess for a gluteal tear  2  Will provide patient with a plastic surgery referral for assessment of peripheral nerve entrapment  3  Follow up in 1 month    History of Present Illness: The patient is a 74 y o  female who presents for a follow up office visit in regards to Back Pain  The patient’s current symptoms include 8/10 constant throbbing, pressure-like pain with any particular side  Current pain medications includes:  None  I have personally reviewed and/or updated the patient's past medical history, past surgical history, family history, social history, current medications, allergies, and vital signs today  Review of Systems  Review of Systems   Constitutional: Negative for unexpected weight change  HENT: Negative for hearing loss  Eyes: Negative for visual disturbance  Respiratory: Negative for shortness of breath  Cardiovascular: Negative for leg swelling  Gastrointestinal: Negative for constipation  Endocrine: Negative for polyuria  Genitourinary: Negative for difficulty urinating  Musculoskeletal: Positive for gait problem  Negative for joint swelling and myalgias  Skin: Negative for rash  Neurological: Negative for weakness and headaches  Psychiatric/Behavioral: Negative for decreased concentration  All other systems reviewed and are negative  Past Medical History:   Diagnosis Date   • Anemia    • Arthritis    • Chronic pain disorder     back and hip pain   • Disease of thyroid gland     nodules   • GERD (gastroesophageal reflux disease)    • Heart murmur    • Heartburn    • Hiatal hernia    • Hyperlipidemia    • Hypertension    • Overactive bladder    • Rotator cuff tear, left    • Rotator cuff tear, right    • Vitamin D deficiency     unsure   • Wears dentures     upper   • Wears glasses    • Wears partial dentures     upper       Past Surgical History:   Procedure Laterality Date   •  SECTION      Last Assessed: 2017   • COLONOSCOPY     • ELBOW SURGERY      Last Assessed: 2017   • ESOPHAGOGASTRODUODENOSCOPY N/A 2017    Procedure: ESOPHAGOGASTRODUODENOSCOPY (EGD); Surgeon: Ezequiel Yanes DO;  Location: [facility]; Service: Gastroenterology   • HERNIA REPAIR  2020    Hiatal hernia repair   • HIP SURGERY Left 2018    glut medius/minimus repair  and 18   • HYSTERECTOMY         • KNEE SURGERY      x2   • PARAESOPHAGEAL HERNIA REPAIR N/A 2020    Procedure: LAPAROSCOPIC PARAESOPHAGEAL HERNIA REPAIR WITH MESH AND NISSEN FUNDOPLICATION WITH ROBOTICS;  Surgeon: López Villasenor MD;  Location: [facility];  Service: General   • [place] COLONOSCOPY FLX DX W/COLLJ SPEC WHEN PFRMD N/A 2017    Procedure: EGD AND COLONOSCOPY;  Surgeon: Ezequiel Yanes DO;  Location: [facility];   Service: Gastroenterology   • [place] EXCISON TUMOR SOFT TISSUE THIGH/KNEE SUBQ 3+CM Bilateral 2022    Procedure: EXCISION BIOPSY TISSUE LESION/MASS LOWER EXTREMITY;  Surgeon: Cassie Morales MD;  Location: [facility];  Service: General   • [place] EXCISON TUMOR SOFT TISSUE THIGH/KNEE SUBQ 3+CM Bilateral 2022    Procedure: EXCISION BIOPSY TISSUE LESION/MASS LOWER EXTREMITY;  Surgeon: Rebel Kirk MD;  Location: [facility];  Service: General   • [place] KNEE SCOPE,MED/LAT MENISECTOMY Left 06/15/2022    Procedure: ;left knee arthroscopy, meniscectomy,synevectomy,chondroplasty, loose body removal, injection;  Surgeon: Polly Arguello DO;  Location: [facility];  Service: Orthopedics   • SHOULDER ARTHROCENTESIS     • SHOULDER ARTHROSCOPY Right 2021    Procedure: SHOULDER ARTHROSCOPY WITH acromioplasty, debridment, and ROTATOR CUFF REPAIR;  Surgeon: Rachelle Bermudez DO;  Location: [facility] MA[facility] OR;  Service: Orthopedics       Family History   Problem Relation Age of Onset   • Colon cancer Mother    • Heart attack Father    • Other Father         Cardiac Disorder   • Diabetes type II Father    • Colon cancer Sister    • No Known Problems Sister    • No Known Problems Sister    • No Known Problems Sister    • No Known Problems Sister    • No Known Problems Daughter    • No Known Problems Maternal Aunt    • No Known Problems Maternal Aunt    • No Known Problems Maternal Aunt    • Cancer Maternal Grandmother    • No Known Problems Paternal Grandmother        Social History     Occupational History   • Not on file   Tobacco Use   • Smoking status: Former     Packs/day: 1 00     Types: Cigarettes     Quit date:      Years since quittin 9   • Smokeless tobacco: Never   • Tobacco comments:     quit 25 yrs ago   Vaping Use   • Vaping Use: Never used   Substance and Sexual Activity   • Alcohol use: Not Currently   • Drug use: No   • Sexual activity: Yes     Partners: Male         Current Outpatient Medications:   •  atorvastatin (LIPITOR) 10 mg tablet, take 1 tablet by mouth once daily, Disp: 90 tablet, Rfl: 0  •  CALCIUM PO, Take by mouth, Disp: , Rfl:   •  famotidine (PEPCID) 20 mg tablet, Take 1 tablet (20 mg total) by mouth 2 (two) times a day, Disp: 180 tablet, Rfl: 0  •  hydrOXYzine HCL (ATARAX) 25 mg tablet, TAKE 1 TO 2 TABLETS BY MOUTH DAILY AT BEDTIME AS NEEDED, Disp: 60 tablet, Rfl: 2  •  losartan (COZAAR) 100 MG tablet, take 1 tablet by mouth once daily, Disp: 90 tablet, Rfl: 1  •  meloxicam (MOBIC) 15 mg tablet, Take 1 tablet (15 mg total) by mouth daily, Disp: 30 tablet, Rfl: 0  •  valACYclovir (VALTREX) 1,000 mg tablet, take 1 tablet by mouth twice a day for 3 days, Disp: 6 tablet, Rfl: 2    Allergies   Allergen Reactions   • Hydrocodone Other (See Comments) and Irritability     Patient reports insomnia when taking        Physical Exam:    /80   Pulse 71   Ht 5' 3" (1 6 m)   Wt 76 4 kg (168 lb 6 4 oz)   SpO2 99%   BMI 29 83 kg/m²     Constitutional:normal, well developed, well nourished, alert, in no distress and non-toxic and no overt pain behavior  Eyes:anicteric  HEENT:grossly intact  Neck:supple, symmetric, trachea midline and no masses   Pulmonary:even and unlabored  Cardiovascular:No edema or pitting edema present  Skin:Normal without rashes or lesions and well hydrated  Psychiatric:Mood and affect appropriate  Neurologic:Cranial Nerves II-XII grossly intact  Musculoskeletal:antalgic    Imaging  No orders to display       No orders of the defined types were placed in this encounter

## 2022-12-05 NOTE — PATIENT INSTRUCTIONS
Core Strengthening Exercises   WHAT YOU NEED TO KNOW:   What do I need to know about core strengthening exercises? Your core includes the muscles of your lower back, hip, pelvis, and abdomen  Core strengthening exercises help heal and strengthen these muscles  This helps prevent another injury, and keeps your pelvis, spine, and hips in the correct position  What do I need to know about exercise safety? Talk to your healthcare provider before you start an exercise program  A physical therapist can teach you how to do core strengthening exercises safely  Do the exercises on a mat or firm surface  A firm surface will support your spine and prevent low back pain  Do not do these exercises on a bed  Move slowly and smoothly  Avoid fast or jerky motions  Stop if you feel pain  Core exercises should not be painful  Stop if you feel pain  Breathe normally during core exercises  Do not hold your breath  This may cause an increase in blood pressure and prevent muscle strengthening  Your healthcare provider will tell you when to inhale and exhale during the exercise  Begin all of your exercises with abdominal bracing  Abdominal bracing helps warm up your core muscles  You can also practice abdominal bracing throughout the day  Lie on your back with your knees bent and feet flat on the floor  Place your arms in a relaxed position beside your body  Tighten your abdominal muscles  Pull your belly button in and up toward your spine  Hold for 5 seconds  Relax your muscles  Repeat 10 times  How do I perform core strengthening exercises? Your healthcare provider will tell you how often to do these exercises  The provider will also tell you how many repetitions of each exercise you should do  Hold each exercise for 5 seconds or as directed  As you get stronger, increase your hold to 10 to 15 seconds  You can do some of these exercises on a stability ball, or with a weight   Ask your healthcare provider how to use a stability ball or weight for these exercises:  Bridging:  Lie on your back with your knees bent and feet flat on the floor  Rest your arms at your side  Tighten your buttocks, and then lift your hips 1 inch off the floor  Hold for 5 seconds  When you can do this exercise without pain for 10 seconds, increase the distance you lift your hips  A good goal is to be able to lift your hips so that your shoulders, hips, and knees are in a straight line  Dead bug:  Lie on your back with your knees bent and feet flat on the floor  Place your arms in a relaxed position beside your body  Begin with abdominal bracing  Next, raise one leg, keeping your knee bent  Hold for 5 seconds  Repeat with the other leg  When you can do this exercise without pain for 10 to 15 seconds, you may raise one straight leg and hold  Repeat with the other leg  Quadruped:  Place your hands and knees on the floor  Keep your wrists directly below your shoulders and your knees directly below your hips  Pull your belly button in toward your spine  Do not flatten or arch your back  Tighten your abdominal muscles below your belly button  Hold for 5 seconds  When you can do this exercise without pain for 10 to 15 seconds, you may extend one arm and hold  Repeat on the other side  Side bridge exercises:      Standing side bridge:  Stand next to a wall and extend one arm toward the wall  Place your palm flat on the wall with your fingers pointing upward  Begin with abdominal bracing  Next, without moving your feet, slowly bend your arm to 90 degrees  Hold for 5 seconds  Repeat on the other side  When you can do this exercise without pain for 10 to 15 seconds, you may do the bent leg side bridge on the floor  Bent leg side bridge:  Lie on one side with your legs, hips, and shoulders in a straight line  Prop yourself up onto your forearm so your elbow is directly below your shoulder   Bend your knees back to 90 degrees  Begin with abdominal bracing  Next, lift your hips and balance yourself on your forearm and knees  Hold for 5 seconds  Repeat on the other side  When you can do this exercise without pain for 10 to 15 seconds, you may do the straight leg side bridge on the floor  Straight leg side bridge:  Lie on one side with your legs, hips, and shoulders in a straight line  Prop yourself up onto your forearm so your elbow is directly below your shoulder  Begin with abdominal bracing  Lift your hips off the floor and balance yourself on your forearm and the outside of your flexed foot  Do not let your ankle bend sideways  Hold for 5 seconds  Repeat on the other side  When you can do this exercise without pain for 10 to 15 seconds, ask your healthcare provider for more advanced exercises  Superman:  Lie on your stomach  Extend your arms forward on the floor  Tighten your abdominal muscles and lift your right hand and left leg off the floor  Hold this position  Slowly return to the starting position  Tighten your abdominal muscles and lift your left hand and right leg off the floor  Hold this position  Slowly return to the starting position  Clam:  Lie on your side with your knees bent  Put your bottom arm under your head to keep your neck in line  Put your top hand on your hip to keep your pelvis from moving  Put your heels together, and keep them together during this exercise  Slowly raise your top knee toward the ceiling  Then lower your leg so your knees are together  Repeat this exercise 10 times  Then switch sides and do the exercise 10 times with the other leg  Curl up:  Lie on your back with your knees bent and feet flat on the floor  Place your hands, palms down, underneath your lower back  Next, with your elbows on the floor, lift your shoulders and chest 2 to 3 inches off the floor  Keep your head in line with your shoulders  Hold this position   Slowly return to the starting position  Straight leg raises:  Lie on your back with one leg straight  Bend the other knee and place your foot flat on the floor  Tighten your abdominal muscles  Keep your leg straight and slowly lift it straight up 6 to 12 inches off the floor  Hold this position  Lower your leg slowly  Do as many repetitions as directed on this side  Repeat with the other leg  Plank:  Lie on your stomach  Bend your elbows and place your forearms flat on the floor  Lift your chest, stomach, and knees off the floor  Make sure your elbows are below your shoulders  Your body should be in a straight line  Do not let your hips or lower back sink to the ground  Squeeze your abdominal muscles together and hold for 15 seconds  To make this exercise harder, hold for 30 seconds or lift 1 leg at a time  Bicycles:  Lie on your back  Bend both knees and bring them toward your chest  Your calves should be parallel to the floor  Place the palms of your hands on the back of your head  Straighten your right leg and keep it lifted 2 inches off the floor  Raise your head and shoulders off the floor and twist towards your left  Keep your head and shoulders lifted  Bend your right knee while you straighten your left leg  Keep your left leg 2 inches off the floor  Twist your head and chest towards the left leg  Continue to straighten 1 leg at a time and twist        When should I contact my healthcare provider? You have sharp or worsening pain during exercise or at rest     You have questions or concerns about your condition, care, or exercise program     CARE AGREEMENT:   You have the right to help plan your care  Learn about your health condition and how it may be treated  Discuss treatment options with your healthcare providers to decide what care you want to receive  You always have the right to refuse treatment  The above information is an  only   It is not intended as medical advice for individual conditions or treatments  Talk to your doctor, nurse or pharmacist before following any medical regimen to see if it is safe and effective for you  © Copyright [employer] [facility] 2022 Information is for End User's use only and may not be sold, redistributed or otherwise used for commercial purposes   All illustrations and images included in CareNotes® are the copyrighted property of A D A M , Inc  or [address] [place] [address]

## 2022-12-08 ENCOUNTER — APPOINTMENT (OUTPATIENT)
Dept: PHYSICAL THERAPY | Age: 67
End: 2022-12-08

## 2022-12-09 ENCOUNTER — TELEPHONE (OUTPATIENT)
Dept: PAIN MEDICINE | Facility: CLINIC | Age: 67
End: 2022-12-09

## 2022-12-09 ENCOUNTER — TELEPHONE (OUTPATIENT)
Dept: RADIOLOGY | Facility: CLINIC | Age: 67
End: 2022-12-09

## 2022-12-09 NOTE — TELEPHONE ENCOUNTER
----- Message -----   From: Arnoldo Stafford   Sent: 12/9/2022   8:58 AM EST   To: [facility]   Subject: Peer to peer                                     The prior authorization request for this study has not been approved  You can schedule a peer to peer by calling [facility] Director [phone] Case# [id number] with the deadline date of 12/16  The insurance determined the following:     [ ] Does not meet Medical Necessity   [x ] No prior imaging   [ ] PT or conservative treatment incomplete   [ ] Missing Labs   [ ] Frequency     The request cannot be approved because: You must provide results of an x-ray taken after your symptoms started or changed that   show a need for further imaging  If you choose not to complete a peer to peer then please reply to this message to let us know   Please notify your patient and contact central scheduling by calling [phone] to cancel the appointment and cancel the order in Epic

## 2022-12-09 NOTE — TELEPHONE ENCOUNTER
Caller: PT    Doctor/Office: Dr Leslie Alvarez asked to speak to: Nurse    Call was transferred to: Nurse

## 2022-12-09 NOTE — TELEPHONE ENCOUNTER
Caller: Priscilla Rosenberg    Doctor: Jennifer Tamez     Reason for call: returning our call but she is not sure who called her    Call back#: [phone]

## 2022-12-09 NOTE — TELEPHONE ENCOUNTER
S/w pt informed her about MRI denial and reason for denial  Advised by Monday RM will give us advise about the denial, maybe most likely RM will order xray first and she can call central scheduling on Monday to cancel her MRI  Pt verbalized understanding

## 2022-12-12 ENCOUNTER — APPOINTMENT (OUTPATIENT)
Dept: PHYSICAL THERAPY | Age: 67
End: 2022-12-12

## 2022-12-12 ENCOUNTER — HOSPITAL ENCOUNTER (OUTPATIENT)
Dept: RADIOLOGY | Facility: HOSPITAL | Age: 67
Discharge: HOME | End: 2022-12-12

## 2022-12-12 DIAGNOSIS — S76.012D: ICD-10-CM

## 2022-12-12 DIAGNOSIS — S76.012D: Primary | ICD-10-CM

## 2022-12-12 NOTE — TELEPHONE ENCOUNTER
I ordered an XR of the left hip which I am not sure why they require it when looking for a muscle tear but at this point I will try anything

## 2022-12-12 NOTE — TELEPHONE ENCOUNTER
S/W pt  She has not seen previous provider since 2018  (coordinated health)  Switched to our practice as she no longer wanted to receive care there    Please advise further as denial was based on her not having an xray prior to the MRI being ordered DISPLAY PLAN FREE TEXT

## 2022-12-12 NOTE — TELEPHONE ENCOUNTER
Caller: Elena Avelar    Doctor: Jonny Tatum    Reason for call: patient called for status of xray order or Dr Mary Rosas next steps in care     Call back#: [phone]

## 2022-12-12 NOTE — TELEPHONE ENCOUNTER
See below  MRI was denied    (Noted no prior imaging)   Pt is looking for plan for next steps in her care

## 2022-12-13 NOTE — TELEPHONE ENCOUNTER
Caller: Jane Coleman PT    Doctor: Dr Jonna Betts     Reason for call: Pt called in to let the doctor know the [facility] has been completed  Now can the pt reschedule the MRI    Please advise    Call back#: [phone]

## 2022-12-15 ENCOUNTER — APPOINTMENT (OUTPATIENT)
Dept: PHYSICAL THERAPY | Age: 67
End: 2022-12-15

## 2022-12-15 NOTE — TELEPHONE ENCOUNTER
Please let pt know    Janeth Acosta, CHELSIE  Good morning,  I uploaded the Xray to the MRI case with [employer] and they have now approved it  Auth info is in the referral, patient is still on the schedule to have it done so she is all set     Thank you, Oralia Carrasco

## 2022-12-16 DIAGNOSIS — R13.10 DYSPHAGIA, UNSPECIFIED TYPE: Primary | ICD-10-CM

## 2022-12-16 RX ORDER — METOCLOPRAMIDE 10 MG/1
10 TABLET ORAL 4 TIMES DAILY
Qty: 120 TABLET | Refills: 6 | Status: SHIPPED | OUTPATIENT
Start: 2022-12-16

## 2022-12-17 ENCOUNTER — HOSPITAL ENCOUNTER (OUTPATIENT)
Dept: MRI IMAGING | Facility: HOSPITAL | Age: 67
Discharge: HOME | End: 2022-12-17

## 2022-12-17 DIAGNOSIS — M67.952 TENDINOPATHY OF LEFT GLUTEAL REGION: ICD-10-CM

## 2022-12-17 DIAGNOSIS — S76.012D: ICD-10-CM

## 2022-12-19 ENCOUNTER — TELEPHONE (OUTPATIENT)
Dept: PAIN MEDICINE | Facility: MEDICAL CENTER | Age: 67
End: 2022-12-19

## 2022-12-19 ENCOUNTER — APPOINTMENT (OUTPATIENT)
Dept: PHYSICAL THERAPY | Age: 67
End: 2022-12-19

## 2022-12-19 NOTE — TELEPHONE ENCOUNTER
Caller: Jamee Shaikh     Doctor: Dr Bradford     Reason for call: Patient would like to review MRI Result please call.    Call back#: [phone]

## 2022-12-22 ENCOUNTER — APPOINTMENT (OUTPATIENT)
Dept: PHYSICAL THERAPY | Age: 67
End: 2022-12-22

## 2022-12-26 DIAGNOSIS — K21.9 GASTROESOPHAGEAL REFLUX DISEASE WITHOUT ESOPHAGITIS: ICD-10-CM

## 2022-12-27 RX ORDER — FAMOTIDINE 20 MG/1
TABLET, FILM COATED ORAL
Qty: 180 TABLET | Refills: 0 | Status: SHIPPED | OUTPATIENT
Start: 2022-12-27 | End: 2022-12-31 | Stop reason: SDUPTHER

## 2022-12-30 ENCOUNTER — TELEPHONE (OUTPATIENT)
Dept: FAMILY MEDICINE CLINIC | Facility: CLINIC | Age: 67
End: 2022-12-30

## 2023-01-11 ENCOUNTER — OFFICE VISIT (OUTPATIENT)
Dept: PAIN MEDICINE | Facility: CLINIC | Age: 68
End: 2023-01-11

## 2023-01-11 VITALS
HEART RATE: 45 BPM | WEIGHT: 164.6 LBS | DIASTOLIC BLOOD PRESSURE: 86 MMHG | SYSTOLIC BLOOD PRESSURE: 150 MMHG | BODY MASS INDEX: 29.16 KG/M2 | HEIGHT: 63 IN

## 2023-01-11 DIAGNOSIS — S76.012D: ICD-10-CM

## 2023-01-11 DIAGNOSIS — M79.18 LEFT BUTTOCK PAIN: ICD-10-CM

## 2023-01-11 DIAGNOSIS — M67.951 TENDINOPATHY OF RIGHT GLUTEUS MEDIUS: ICD-10-CM

## 2023-01-11 DIAGNOSIS — M67.952 TENDINOPATHY OF LEFT GLUTEUS MEDIUS: ICD-10-CM

## 2023-01-11 DIAGNOSIS — Z86.19 H/O COLD SORES: ICD-10-CM

## 2023-01-11 DIAGNOSIS — G89.4 CHRONIC PAIN SYNDROME: Primary | ICD-10-CM

## 2023-01-11 RX ORDER — METHOCARBAMOL 750 MG/1
750 TABLET, FILM COATED ORAL EVERY 8 HOURS SCHEDULED
Qty: 90 TABLET | Refills: 1 | Status: SHIPPED | OUTPATIENT
Start: 2023-01-11

## 2023-01-11 RX ORDER — VALACYCLOVIR HYDROCHLORIDE 1 G/1
TABLET, FILM COATED ORAL
Qty: 6 TABLET | Refills: 2 | Status: SHIPPED | OUTPATIENT
Start: 2023-01-11 | End: 2023-01-19

## 2023-01-11 NOTE — PROGRESS NOTES
Assessment:  1  Chronic pain syndrome    2  Left buttock pain    3  Rupture of gluteus minimus tendon, left, subsequent encounter    4  Tendinopathy of left gluteus medius    5  Tendinopathy of right gluteus medius        Plan:  While the patient was in the office today, I did have a thorough conversation regarding their chronic pain syndrome, medication management, and treatment plan options  Is being seen for a follow-up visit  She was last seen here on 12/5/2022 at which time an MRI of her left hip was ordered  MRI reveals prior left gluteal tendon repair with no discrete read tear seen  There is moderate fatty atrophy of the left gluteus medius and minimus musculature  MRI results were reviewed with patient during today's visit  She has participated in physical therapy and aqua therapy without significant improvement in her symptoms  Patient has failed multiple injections with ultrasound guidance  Since the patient is having myofascial pain and/or spasms, I advised the patient that starting on a muscle relaxer could help decrease some of the myofascial pain and/or spasms  I advised patient that they should not drive or operate heavy machinery while on this medication as it could cause lethargy  I advised the patient to call our office if they experience any side effects  The patient verbalized understanding  Start Robaxin-750 milligrams 3 times daily if needed for spasms  The patient will follow-up in 6 weeks for medication prescription refill and reevaluation  The patient was advised to contact the office should their symptoms worsen in the interim  The patient was agreeable and verbalized an understanding  History of Present Illness: The patient is a 74 y o  female who presents for a follow up office visit in regards to Back Pain and Hip Pain  The patient’s current symptoms include complaints of left buttock pain, radiates down the posterior left thigh  Current pain level is an 8/10  Quality of pain is described as dull, aching, throbbing  Current pain medications includes: None  I have personally reviewed and/or updated the patient's past medical history, past surgical history, family history, social history, current medications, allergies, and vital signs today  Review of Systems  Review of Systems   Constitutional: Negative for unexpected weight change  HENT: Negative for hearing loss  Eyes: Negative for visual disturbance  Respiratory: Negative for shortness of breath  Cardiovascular: Negative for leg swelling  Gastrointestinal: Negative for constipation  Endocrine: Negative for polyuria  Genitourinary: Negative for difficulty urinating  Musculoskeletal: Positive for gait problem  Negative for joint swelling and myalgias  Skin: Negative for rash  Neurological: Positive for dizziness  Negative for weakness and headaches  Psychiatric/Behavioral: Negative for decreased concentration  All other systems reviewed and are negative  Past Medical History:   Diagnosis Date   • Anemia    • Arthritis    • Chronic pain disorder     back and hip pain   • Disease of thyroid gland     nodules   • GERD (gastroesophageal reflux disease)    • Heart murmur    • Heartburn    • Hiatal hernia    • Hyperlipidemia    • Hypertension    • Overactive bladder    • Rotator cuff tear, left    • Rotator cuff tear, right    • Vitamin D deficiency     unsure   • Wears dentures     upper   • Wears glasses    • Wears partial dentures     upper       Past Surgical History:   Procedure Laterality Date   •  SECTION      Last Assessed: 2017   • COLONOSCOPY     • ELBOW SURGERY      Last Assessed: 2017   • ESOPHAGOGASTRODUODENOSCOPY N/A 2017    Procedure: ESOPHAGOGASTRODUODENOSCOPY (EGD); Surgeon: Lisa Nino DO;  Location: [facility];   Service: Gastroenterology   • HERNIA REPAIR  2020    Hiatal hernia repair   • HIP SURGERY Left 2018 glut medius/minimus repair  and 8/8/18   • HYSTERECTOMY      2011   • KNEE SURGERY      x2   • PARAESOPHAGEAL HERNIA REPAIR N/A 05/19/2020    Procedure: LAPAROSCOPIC PARAESOPHAGEAL HERNIA REPAIR WITH MESH AND NISSEN FUNDOPLICATION WITH ROBOTICS;  Surgeon: Jeanmarie Gee MD;  Location: [facility];  Service: General   • NV ARTHRS [address] W/MENISCECTOMY MED/LAT W/SHVG Left 06/15/2022    Procedure: ;left knee arthroscopy, meniscectomy,synevectomy,chondroplasty, loose body removal, injection;  Surgeon: Tami Ng DO;  Location: [facility];  Service: Orthopedics   • NV COLONOSCOPY FLX DX W/COLLJ SPEC WHEN PFRMD N/A 06/26/2017    Procedure: EGD AND COLONOSCOPY;  Surgeon: Dave Wright DO;  Location: [facility];   Service: Gastroenterology   • NV EXCISON TUMOR SOFT TISSUE THIGH/KNEE SUBQ 3 CM/> Bilateral 05/26/2022    Procedure: EXCISION BIOPSY TISSUE LESION/MASS LOWER EXTREMITY;  Surgeon: Raad Ardon MD;  Location: [facility];  Service: General   • NV EXCISON TUMOR SOFT TISSUE THIGH/KNEE SUBQ 3 CM/> Bilateral 8/25/2022    Procedure: EXCISION BIOPSY TISSUE LESION/MASS LOWER EXTREMITY;  Surgeon: Raad Ardon MD;  Location: [facility];  Service: General   • SHOULDER ARTHROCENTESIS     • SHOULDER ARTHROSCOPY Right 06/23/2021    Procedure: SHOULDER ARTHROSCOPY WITH acromioplasty, debridment, and ROTATOR CUFF REPAIR;  Surgeon: Deborah Saenz DO;  Location: [address] [place] [address] MAIN OR;  Service: Orthopedics       Family History   Problem Relation Age of Onset   • Colon cancer Mother    • Heart attack Father    • Other Father         Cardiac Disorder   • Diabetes type II Father    • Colon cancer Sister    • No Known Problems Sister    • No Known Problems Sister    • No Known Problems Sister    • No Known Problems Sister    • No Known Problems Daughter    • No Known Problems Maternal Aunt    • No Known Problems Maternal Aunt    • No Known Problems Maternal Aunt    • Cancer Maternal Grandmother    • No Known Problems Paternal Grandmother        Social History     Occupational History   • Not on file   Tobacco Use   • Smoking status: Former     Packs/day: 1 00     Types: Cigarettes     Quit date:      Years since quittin 0   • Smokeless tobacco: Never   • Tobacco comments:     quit 25 yrs ago   Vaping Use   • Vaping Use: Never used   Substance and Sexual Activity   • Alcohol use: Not Currently   • Drug use: No   • Sexual activity: Yes     Partners: Male         Current Outpatient Medications:   •  atorvastatin (LIPITOR) 10 mg tablet, take 1 tablet by mouth once daily, Disp: 90 tablet, Rfl: 0  •  CALCIUM PO, Take by mouth, Disp: , Rfl:   •  famotidine (PEPCID) 20 mg tablet, Take 1 tablet (20 mg total) by mouth 2 (two) times a day, Disp: 180 tablet, Rfl: 0  •  hydrOXYzine HCL (ATARAX) 25 mg tablet, TAKE 1 TO 2 TABLETS BY MOUTH DAILY AT BEDTIME AS NEEDED, Disp: 60 tablet, Rfl: 2  •  losartan (COZAAR) 100 MG tablet, take 1 tablet by mouth once daily, Disp: 90 tablet, Rfl: 1  •  meloxicam (MOBIC) 15 mg tablet, Take 1 tablet (15 mg total) by mouth daily, Disp: 30 tablet, Rfl: 0  •  methocarbamol (Robaxin-750) 750 mg tablet, Take 1 tablet (750 mg total) by mouth every 8 (eight) hours, Disp: 90 tablet, Rfl: 1  •  metoclopramide (REGLAN) 10 mg tablet, Take 1 tablet (10 mg total) by mouth 4 (four) times a day, Disp: 120 tablet, Rfl: 6  •  valACYclovir (VALTREX) 1,000 mg tablet, take 1 tablet by mouth twice a day for 3 days, Disp: 6 tablet, Rfl: 2    Allergies   Allergen Reactions   • Hydrocodone Other (See Comments) and Irritability     Patient reports insomnia when taking        Physical Exam:    /86   Pulse (!) 45   Ht 5' 3" (1 6 m)   Wt 74 7 kg (164 lb 9 6 oz)   BMI 29 16 kg/m²     Constitutional:normal, well developed, well nourished, alert, in no distress and non-toxic and no overt pain behavior    Eyes:anicteric  HEENT:grossly intact  Neck:supple, symmetric, trachea midline and no masses   Pulmonary:even and unlabored  Cardiovascular:No edema or pitting edema present  Skin:Normal without rashes or lesions and well hydrated  Psychiatric:Mood and affect appropriate  Neurologic:Cranial Nerves II-XII grossly intact  Musculoskeletal:Gait is slow and guarded  Imaging  No orders to display       No orders of the defined types were placed in this encounter

## 2023-01-17 ENCOUNTER — OFFICE VISIT (OUTPATIENT)
Dept: URGENT CARE | Facility: CLINIC | Age: 68
End: 2023-01-17

## 2023-01-17 VITALS
DIASTOLIC BLOOD PRESSURE: 90 MMHG | HEART RATE: 64 BPM | BODY MASS INDEX: 29.06 KG/M2 | WEIGHT: 164 LBS | HEIGHT: 63 IN | TEMPERATURE: 98.6 F | RESPIRATION RATE: 18 BRPM | OXYGEN SATURATION: 96 % | SYSTOLIC BLOOD PRESSURE: 157 MMHG

## 2023-01-17 DIAGNOSIS — M54.50 ACUTE BILATERAL LOW BACK PAIN WITHOUT SCIATICA: Primary | ICD-10-CM

## 2023-01-17 LAB
SL AMB  POCT GLUCOSE, UA: NEGATIVE
SL AMB LEUKOCYTE ESTERASE,UA: NEGATIVE
SL AMB POCT BILIRUBIN,UA: NEGATIVE
SL AMB POCT BLOOD,UA: NEGATIVE
SL AMB POCT CLARITY,UA: CLEAR
SL AMB POCT COLOR,UA: YELLOW
SL AMB POCT KETONES,UA: NEGATIVE
SL AMB POCT NITRITE,UA: NEGATIVE
SL AMB POCT PH,UA: 6.5
SL AMB POCT SPECIFIC GRAVITY,UA: 1.01
SL AMB POCT URINE PROTEIN: NEGATIVE
SL AMB POCT UROBILINOGEN: 0.2

## 2023-01-17 RX ORDER — LIDOCAINE 50 MG/G
1 PATCH TOPICAL DAILY
Qty: 30 PATCH | Refills: 0 | Status: SHIPPED | OUTPATIENT
Start: 2023-01-17 | End: 2023-01-23 | Stop reason: SDUPTHER

## 2023-01-17 RX ORDER — METHYLPREDNISOLONE 4 MG/1
TABLET ORAL
Qty: 21 TABLET | Refills: 0 | Status: SHIPPED | OUTPATIENT
Start: 2023-01-17

## 2023-01-17 NOTE — PATIENT INSTRUCTIONS
Take medication as directed  Apply heat when patch is off  If you develop any increased pain, numbness, tingling, weakness of extremities, incontinence, or any new or concerning symptoms please return or proceed to ER   Follow up with pcp in 3-5 days

## 2023-01-17 NOTE — PROGRESS NOTES
[address]        NAME: Luli George is a 74 y o  female  : 1955    MRN: [id number]  DATE: 2023  TIME: 12:49 PM    Assessment and Plan   Acute bilateral low back pain without sciatica [M54 50]  1  Acute bilateral low back pain without sciatica  POCT urine dip    methylPREDNISolone 4 MG tablet therapy pack    lidocaine (Lidoderm) 5 %        Urine dip WNL    Patient Instructions     Patient Instructions   Take medication as directed  Apply heat when patch is off  If you develop any increased pain, numbness, tingling, weakness of extremities, incontinence, or any new or concerning symptoms please return or proceed to ER  Follow up with pcp in 3-5 days      Chief Complaint   No chief complaint on file  History of Present Illness       Back Pain  This is a new problem  Episode onset: 2 days ago  The problem occurs constantly  The problem is unchanged  The pain is present in the lumbar spine  The quality of the pain is described as aching (tight)  The pain is moderate  The symptoms are aggravated by bending and position  Stiffness is present all day  Pertinent negatives include no abdominal pain, bladder incontinence, bowel incontinence, chest pain, dysuria, fever, headaches, leg pain, numbness, paresthesias, tingling or weakness  Risk factors: hx of chronic pain  She has tried analgesics (robaxin) for the symptoms  The treatment provided mild relief  Review of Systems   Review of Systems   Constitutional: Negative for chills, diaphoresis, fatigue and fever  Respiratory: Negative for cough, chest tightness, shortness of breath, wheezing and stridor  Cardiovascular: Negative for chest pain and palpitations  Gastrointestinal: Negative for abdominal pain, blood in stool, bowel incontinence, diarrhea, nausea and vomiting  Genitourinary: Negative for bladder incontinence, decreased urine volume, difficulty urinating, dysuria, flank pain, frequency, hematuria and urgency  Musculoskeletal: Positive for back pain  Negative for arthralgias, gait problem, joint swelling, myalgias, neck pain and neck stiffness  Skin: Negative for rash  Neurological: Negative for dizziness, tingling, syncope, weakness, light-headedness, numbness, headaches and paresthesias           Current Medications       Current Outpatient Medications:   •  atorvastatin (LIPITOR) 10 mg tablet, take 1 tablet by mouth once daily, Disp: 90 tablet, Rfl: 0  •  CALCIUM PO, Take by mouth, Disp: , Rfl:   •  famotidine (PEPCID) 20 mg tablet, Take 1 tablet (20 mg total) by mouth 2 (two) times a day, Disp: 180 tablet, Rfl: 0  •  hydrOXYzine HCL (ATARAX) 25 mg tablet, TAKE 1 TO 2 TABLETS BY MOUTH DAILY AT BEDTIME AS NEEDED, Disp: 60 tablet, Rfl: 2  •  lidocaine (Lidoderm) 5 %, Apply 1 patch topically over 12 hours daily Remove & Discard patch within 12 hours or as directed by MD, Disp: 30 patch, Rfl: 0  •  losartan (COZAAR) 100 MG tablet, take 1 tablet by mouth once daily, Disp: 90 tablet, Rfl: 1  •  methocarbamol (Robaxin-750) 750 mg tablet, Take 1 tablet (750 mg total) by mouth every 8 (eight) hours, Disp: 90 tablet, Rfl: 1  •  methylPREDNISolone 4 MG tablet therapy pack, Use as directed on package, Disp: 21 tablet, Rfl: 0  •  metoclopramide (REGLAN) 10 mg tablet, Take 1 tablet (10 mg total) by mouth 4 (four) times a day, Disp: 120 tablet, Rfl: 6  •  meloxicam (MOBIC) 15 mg tablet, Take 1 tablet (15 mg total) by mouth daily (Patient not taking: Reported on 1/17/2023), Disp: 30 tablet, Rfl: 0  •  valACYclovir (VALTREX) 1,000 mg tablet, take 1 tablet by mouth twice a day for 3 days (Patient not taking: Reported on 1/17/2023), Disp: 6 tablet, Rfl: 2    Current Allergies     Allergies as of 01/17/2023 - Reviewed 01/17/2023   Allergen Reaction Noted   • Hydrocodone Other (See Comments) and Irritability 06/15/2022            The following portions of the patient's history were reviewed and updated as appropriate: allergies, current medications, past family history, past medical history, past social history, past surgical history and problem list      Past Medical History:   Diagnosis Date   • Anemia    • Arthritis    • Chronic pain disorder     back and hip pain   • Disease of thyroid gland     nodules   • GERD (gastroesophageal reflux disease)    • Heart murmur    • Heartburn    • Hiatal hernia    • Hyperlipidemia    • Hypertension    • Overactive bladder    • Rotator cuff tear, left    • Rotator cuff tear, right    • Vitamin D deficiency     unsure   • Wears dentures     upper   • Wears glasses    • Wears partial dentures     upper       Past Surgical History:   Procedure Laterality Date   •  SECTION      Last Assessed: 2017   • COLONOSCOPY     • ELBOW SURGERY      Last Assessed: 2017   • ESOPHAGOGASTRODUODENOSCOPY N/A 2017    Procedure: ESOPHAGOGASTRODUODENOSCOPY (EGD); Surgeon: José Miguel Rosario DO;  Location: [facility]; Service: Gastroenterology   • HERNIA REPAIR  2020    Hiatal hernia repair   • HIP SURGERY Left 2018    glut medius/minimus repair  and 18   • HYSTERECTOMY         • KNEE SURGERY      x2   • PARAESOPHAGEAL HERNIA REPAIR N/A 2020    Procedure: LAPAROSCOPIC PARAESOPHAGEAL HERNIA REPAIR WITH MESH AND NISSEN FUNDOPLICATION WITH ROBOTICS;  Surgeon: Elzbieta Matthews MD;  Location: [facility];  Service: General   • GA ARTHRS [address] W/MENISCECTOMY MED/LAT W/SHVG Left 06/15/2022    Procedure: ;left knee arthroscopy, meniscectomy,synevectomy,chondroplasty, loose body removal, injection;  Surgeon: Daphne Swift DO;  Location: [facility];  Service: Orthopedics   • GA COLONOSCOPY FLX DX W/COLLJ SPEC WHEN PFRMD N/A 2017    Procedure: EGD AND COLONOSCOPY;  Surgeon: José Miguel Rosario DO;  Location: [facility];   Service: Gastroenterology   • GA EXCISON TUMOR SOFT TISSUE THIGH/KNEE SUBQ 3 CM/> Bilateral 2022    Procedure: EXCISION BIOPSY TISSUE LESION/MASS LOWER EXTREMITY;  Surgeon: Aneta Caldera MD;  Location: [facility];  Service: General   • RI EXCISON TUMOR SOFT TISSUE THIGH/KNEE SUBQ 3 CM/> Bilateral 8/25/2022    Procedure: EXCISION BIOPSY TISSUE LESION/MASS LOWER EXTREMITY;  Surgeon: Aneta Caldera MD;  Location: [facility];  Service: General   • SHOULDER ARTHROCENTESIS     • SHOULDER ARTHROSCOPY Right 06/23/2021    Procedure: SHOULDER ARTHROSCOPY WITH acromioplasty, debridment, and ROTATOR CUFF REPAIR;  Surgeon: Marialuisa Sweeney DO;  Location: [address] [place] [address] MAIN OR;  Service: Orthopedics       Family History   Problem Relation Age of Onset   • Colon cancer Mother    • Heart attack Father    • Other Father         Cardiac Disorder   • Diabetes type II Father    • Colon cancer Sister    • No Known Problems Sister    • No Known Problems Sister    • No Known Problems Sister    • No Known Problems Sister    • No Known Problems Daughter    • No Known Problems Maternal Aunt    • No Known Problems Maternal Aunt    • No Known Problems Maternal Aunt    • Cancer Maternal Grandmother    • No Known Problems Paternal Grandmother          Medications have been verified  Objective   /90   Pulse 64   Temp 98 6 °F (37 °C) (Temporal)   Resp 18   Ht 5' 3" (1 6 m)   Wt 74 4 kg (164 lb)   SpO2 96%   BMI 29 05 kg/m²   No LMP recorded  Patient has had a hysterectomy  Physical Exam     Physical Exam  Constitutional:       General: She is not in acute distress  Appearance: Normal appearance  She is well-developed  She is not diaphoretic  Cardiovascular:      Rate and Rhythm: Normal rate and regular rhythm  Heart sounds: Normal heart sounds  Pulmonary:      Effort: Pulmonary effort is normal       Breath sounds: Normal breath sounds  Abdominal:      General: Bowel sounds are normal  There is no distension  Palpations: Abdomen is soft  Abdomen is not rigid  There is no mass  Tenderness: There is no abdominal tenderness   There is no right CVA tenderness, left CVA tenderness, guarding or rebound  Negative signs include Rdz's sign and McBurney's sign  Musculoskeletal:      Cervical back: Normal       Thoracic back: Normal       Lumbar back: Tenderness (TTP over bilateral lumbar paraspinal muscles  no crepitus  no spinous process tenderness) present  No swelling, lacerations, spasms or bony tenderness  Normal range of motion  Negative right straight leg raise test and negative left straight leg raise test    Skin:     General: Skin is warm and dry  Capillary Refill: Capillary refill takes less than 2 seconds  Neurological:      Mental Status: She is alert and oriented to person, place, and time

## 2023-01-19 ENCOUNTER — OFFICE VISIT (OUTPATIENT)
Dept: BARIATRICS | Facility: CLINIC | Age: 68
End: 2023-01-19

## 2023-01-19 VITALS
HEART RATE: 65 BPM | BODY MASS INDEX: 28.97 KG/M2 | HEIGHT: 63 IN | SYSTOLIC BLOOD PRESSURE: 146 MMHG | TEMPERATURE: 98 F | DIASTOLIC BLOOD PRESSURE: 90 MMHG | WEIGHT: 163.5 LBS

## 2023-01-19 DIAGNOSIS — R13.19 ESOPHAGEAL DYSPHAGIA: Primary | ICD-10-CM

## 2023-01-19 NOTE — PROGRESS NOTES
OFFICE VISIT - BARIATRIC SURGERY  Terri Pitts 74 y o  female MRN: [id number]  Unit/Bed#:  Encounter: [id number]      HPI:  Terri Pitts is a 74 y o  female status post PEH repair with Nissen Fundoplication by Dr Garcia January on 5/19/20  Comes to the office today for follow up  She had last been seen about 6 months ago and had complaints of dysphagia, bloating, hiccups and gas  These issues persist       Subjective     Patient has had symptoms of dysphagia and food getting stuck for the past several months  She notes these symptoms occur with solid food, but do not occur with fluids  She was having bloating, hiccups and gas  She is having this constantly  She has no heartburn  No regurgitation  She has no constipation/diarrhea        She then underwent an endoscopy in April 2022 which was essentially unremarkable  There was evidence of prior paraesophageal hernia repair  Dilation was performed up to [address] [place]  No mucosal tear was seen and biopsies were unremarkable  Patient notes that dilation did not help at all  Barium UGI : Postoperative distal esophagus narrowed lumen; thick barium mild passage delay and barium tablet marked delayed passage      She had a gastric emptying study done which was normal     GI recommended to take small bites, chew food well, and moisten her food  They also recommended dairy free diet, probiotics, gas-x  If no improvement, GI plans to consider doing a manometry  Patient not taking gas x, not taking probiotics, not eating dairy free diet  Review of Systems   Constitutional: Negative for chills and fever  HENT: Negative for ear pain and sore throat  Eyes: Negative for pain and visual disturbance  Respiratory: Negative for cough and shortness of breath  Cardiovascular: Negative for chest pain and palpitations  Gastrointestinal: Negative for abdominal pain and vomiting  Dysphagia, flatulence    Genitourinary: Negative for dysuria and hematuria  Musculoskeletal: Negative for arthralgias and back pain  Skin: Negative for color change and rash  Neurological: Negative for seizures and syncope  All other systems reviewed and are negative  Historical Information   Past Medical History:   Diagnosis Date   • Anemia    • Arthritis    • Chronic pain disorder     back and hip pain   • Disease of thyroid gland     nodules   • GERD (gastroesophageal reflux disease)    • Heart murmur    • Heartburn    • Hiatal hernia    • Hyperlipidemia    • Hypertension    • Overactive bladder    • Rotator cuff tear, left    • Rotator cuff tear, right    • Vitamin D deficiency     unsure   • Wears dentures     upper   • Wears glasses    • Wears partial dentures     upper     Past Surgical History:   Procedure Laterality Date   •  SECTION      Last Assessed: 2017   • COLONOSCOPY     • ELBOW SURGERY      Last Assessed: 2017   • ESOPHAGOGASTRODUODENOSCOPY N/A 2017    Procedure: ESOPHAGOGASTRODUODENOSCOPY (EGD); Surgeon: Jean Claude Bae DO;  Location: [facility]; Service: Gastroenterology   • HERNIA REPAIR  2020    Hiatal hernia repair   • HIP SURGERY Left 2018    glut medius/minimus repair  and 18   • HYSTERECTOMY         • KNEE SURGERY      x2   • PARAESOPHAGEAL HERNIA REPAIR N/A 2020    Procedure: LAPAROSCOPIC PARAESOPHAGEAL HERNIA REPAIR WITH MESH AND NISSEN FUNDOPLICATION WITH ROBOTICS;  Surgeon: Cely Martinez MD;  [facility];  Service: General   • WA ARTHRS [address] W/MENISCECTOMY MED/LAT W/SHVG Left 06/15/2022    Procedure: ;left knee arthroscopy, meniscectomy,synevectomy,chondroplasty, loose body removal, injection;  Surgeon: Alta Toro DO;  [facility];  Service: Orthopedics   • WA COLONOSCOPY FLX DX W/COLLJ SPEC WHEN PFRMD N/A 2017    Procedure: EGD AND COLONOSCOPY;  Surgeon: Jean Claude Bae DO;  Location: [facility];   Service: Gastroenterology   • WA EXCISON TUMOR SOFT TISSUE THIGH/KNEE SUBQ 3 CM/> Bilateral 2022    Procedure: EXCISION BIOPSY TISSUE LESION/MASS LOWER EXTREMITY;  Surgeon: Ben Steinberg MD;  [facility];  Service: General   • MT EXCISON TUMOR SOFT TISSUE THIGH/KNEE SUBQ 3 CM/> Bilateral 2022    Procedure: EXCISION BIOPSY TISSUE LESION/MASS LOWER EXTREMITY;  Surgeon: Ben Steinberg MD;  [facility];  Service: General   • SHOULDER ARTHROCENTESIS     • SHOULDER ARTHROSCOPY Right 2021    Procedure: SHOULDER ARTHROSCOPY WITH acromioplasty, debridment, and ROTATOR CUFF REPAIR;  Surgeon: Tobin Sorensen DO;  Location: [facility] MAIN OR;  Service: Orthopedics     Social History   Social History     Substance and Sexual Activity   Alcohol Use Not Currently     Social History     Substance and Sexual Activity   Drug Use No     Social History     Tobacco Use   Smoking Status Former   • Packs/day: 1 00   • Types: Cigarettes   • Quit date:    • Years since quittin 0   Smokeless Tobacco Never   Tobacco Comments    quit 25 yrs ago       Objective       Current Vitals:   Blood Pressure: 146/90 (23 1009)  Pulse: 65 (23 1009)  Temperature: 98 °F (36 7 °C) (23 1009)  Temp Source: Tympanic (23 1009)  Height: 5' 3" (160 cm) (23 1009)  Weight - Scale: 74 2 kg (163 lb 8 oz) (23 1009)    Invasive Devices     None                 Physical Exam  Constitutional:       Appearance: Normal appearance  She is obese  HENT:      Head: Normocephalic and atraumatic  Nose: Nose normal       Mouth/Throat:      Mouth: Mucous membranes are moist    Eyes:      Extraocular Movements: Extraocular movements intact  Pupils: Pupils are equal, round, and reactive to light  Cardiovascular:      Rate and Rhythm: Normal rate and regular rhythm  Pulses: Normal pulses  Heart sounds: Normal heart sounds  Pulmonary:      Effort: Pulmonary effort is normal       Breath sounds: Normal breath sounds     Abdominal: Palpations: Abdomen is soft  Comments: Well-healed incisions   Musculoskeletal:      Cervical back: Normal range of motion  Skin:     General: Skin is warm  Neurological:      General: No focal deficit present  Mental Status: She is alert and oriented to person, place, and time  Psychiatric:         Mood and Affect: Mood normal          Behavior: Behavior normal            Pathology, and Other Studies: I have personally reviewed pertinent reports  Assessment/PLAN:    Terri Pitts is a 74 y o  female status post PEH repair with Nissen Fundoplication by Dr Garcia January on 5/19/20  Comes to the office for follow up of complaints of dysphagia, hiccups, and flatulence  Workup thus far reviewed and discussed with patient  Workup includes:       Upper GI (12/10/21): FINDINGS:     The esophagus is normal in caliber   Esophageal motility is normal and emptying of contrast from the esophagus is prompt   There is no mucosal mass, ulceration or fold thickening identified      Distal esophagus GE junction postoperative presentation compatible with history of Niessen Fundoplication   Narrowed lumen with slight delayed thick barium passage      Administered barium tablet; marked delayed passage      No gastroesophageal reflux      The stomach is otherwise unremarkable in size   The gastric mucosa is normal   No penetrating ulcers or masses      Contrast empties promptly into the duodenum   The duodenum is normal in caliber   The ligament of Treitz/duodenojejunal junction lies in a normal position      IMPRESSION:     Postoperative distal esophagus narrowed lumen; thick barium mild passage delay and barium tablet marked delayed passage        The study was marked in EPIC for significant notification      EGD     [facility]  [address] [place] [address]  [phone]        DATE OF SERVICE:  4/20/22     PHYSICIAN(S):  Attending:   Luis Morgan MD      Fellow:   No Staff Documented         INDICATION:  Paraesophageal hernia, Dysphagia, unspecified type     POST-OP DIAGNOSIS:  See the impression below      PREPROCEDURE:  Informed consent was obtained for the procedure, including sedation  Risks of perforation, hemorrhage, adverse drug reaction and aspiration were discussed  The patient was placed in the left lateral decubitus position      Patient was explained about the risks and benefits of the procedure  Risks including but not limited to bleeding, infection, and perforation were explained in detail  Also explained about less than 100% sensitivity with the exam and other alternatives      DETAILS OF PROCEDURE:  Patient was taken to the procedure room where a time out was performed to confirm correct patient and correct procedure  The patient underwent monitored anesthesia care, which was administered by an anesthesia professional  The patient's blood pressure, heart rate, level of consciousness, respirations and oxygen were monitored throughout the procedure  The scope was advanced to the second part of the duodenum  Retroflexion was performed in the fundus  The patient experienced no blood loss  The procedure was not difficult  The patient tolerated the procedure well  There were no apparent complications       ANESTHESIA INFORMATION:  ASA: II  Anesthesia Type: IV Sedation with Anesthesia     MEDICATIONS:  No administrations occurring from 1244 to 1307 on 04/20/22         FINDINGS:  · The esophagus appeared normal  Performed random biopsy  · Dilated in the GE junction with Savabdulkadir-Naheed dilator from 54 Fr starting size to 60 Fr ending size  · Z-line 36 cm from the incisors  · Normal stomach  Retroflexion showed evidence of hernia repair  Gastric biopsies were done  · The duodenum appeared normal  Performed random biopsy   The bulb and 2nd portion were visualized         SPECIMENS:  ID Type Source Tests Collected by Time Destination   1 : cold bx - random  Tissue Esophagus TISSUE EXAM Teresa Lopez MD 2022  1:00 PM     2 : cold bx - r/o H pylori Tissue Stomach TISSUE EXAM Teresa Lopez MD 2022  1:02 PM     3 : cold bx - r/o celiac Tissue Duodenum TISSUE EXAM Teresa Lopez MD 2022  1:03 PM              IMPRESSION:  Normal post paraesophageal hernia repair anatomy   Esophageal, gastric and duodenal biopsies were done       RECOMMENDATION:  Await pathology results  Follow up with me in clinic       Teresa Lopez MD     --------------------------------------------------------------------      Plan:  UGI to assess esophageal function and wrap    Follow up after the UGI              Michelle Santiago MD  Bariatric Surgery Fellow   2023  10:12 AM

## 2023-01-20 ENCOUNTER — TELEPHONE (OUTPATIENT)
Dept: FAMILY MEDICINE CLINIC | Facility: CLINIC | Age: 68
End: 2023-01-20

## 2023-01-20 NOTE — TELEPHONE ENCOUNTER
[facility] called for a prior auth for the patient informed that this is not the office the patient gets seen at, they also mentioned the provider was Dr Thomas Bending

## 2023-01-23 ENCOUNTER — TELEPHONE (OUTPATIENT)
Dept: OTHER | Facility: OTHER | Age: 68
End: 2023-01-23

## 2023-01-23 DIAGNOSIS — M54.50 ACUTE BILATERAL LOW BACK PAIN WITHOUT SCIATICA: ICD-10-CM

## 2023-01-23 RX ORDER — LIDOCAINE 50 MG/G
1 PATCH TOPICAL DAILY
Qty: 30 PATCH | Refills: 0 | Status: SHIPPED | OUTPATIENT
Start: 2023-01-23

## 2023-01-23 NOTE — TELEPHONE ENCOUNTER
Attempted to complete prior authorization for patches on covermymeds  Received message that insurance needed to be contacted at [phone]  Called number provided and spoke with [place]  [facility] states that authorization was denied due to patient calling for a coverage determination  Authorization needs a re determination  Information given and authorization will be sent for review  Case number is [id number]  Response can take up to 7 days

## 2023-01-23 NOTE — TELEPHONE ENCOUNTER
Lidocaine patches sent to pharmacy  Please let her know they are also available over-the-counter if insurance does not cover my prescription as well

## 2023-01-23 NOTE — TELEPHONE ENCOUNTER
Patient called stating she went to a [facility] last Monday for back pain and was prescribed Lidocaine patches  However, her insurance will not approve it because it's from an urgent care  Patient is wondering if her doctor can send her prescription to the [facility] in [facility]  Please call patient back today to discuss

## 2023-01-24 ENCOUNTER — PREP FOR PROCEDURE (OUTPATIENT)
Dept: OBGYN CLINIC | Facility: CLINIC | Age: 68
End: 2023-01-24

## 2023-01-24 ENCOUNTER — APPOINTMENT (OUTPATIENT)
Dept: RADIOLOGY | Facility: CLINIC | Age: 68
End: 2023-01-24

## 2023-01-24 ENCOUNTER — TELEPHONE (OUTPATIENT)
Dept: FAMILY MEDICINE CLINIC | Facility: CLINIC | Age: 68
End: 2023-01-24

## 2023-01-24 ENCOUNTER — OFFICE VISIT (OUTPATIENT)
Dept: OBGYN CLINIC | Facility: CLINIC | Age: 68
End: 2023-01-24

## 2023-01-24 VITALS
HEART RATE: 83 BPM | SYSTOLIC BLOOD PRESSURE: 142 MMHG | BODY MASS INDEX: 28.88 KG/M2 | HEIGHT: 63 IN | DIASTOLIC BLOOD PRESSURE: 77 MMHG | WEIGHT: 163 LBS

## 2023-01-24 DIAGNOSIS — Z01.812 PRE-OPERATIVE LABORATORY EXAMINATION: ICD-10-CM

## 2023-01-24 DIAGNOSIS — M17.12 PRIMARY OSTEOARTHRITIS OF LEFT KNEE: Primary | ICD-10-CM

## 2023-01-24 DIAGNOSIS — M17.0 PRIMARY OSTEOARTHRITIS OF BOTH KNEES: ICD-10-CM

## 2023-01-24 RX ORDER — CHLORHEXIDINE GLUCONATE 0.12 MG/ML
15 RINSE ORAL ONCE
OUTPATIENT
Start: 2023-01-24 | End: 2023-01-24

## 2023-01-24 RX ORDER — CEFAZOLIN SODIUM 2 G/50ML
2000 SOLUTION INTRAVENOUS ONCE
OUTPATIENT
Start: 2023-01-24 | End: 2023-01-24

## 2023-01-24 RX ORDER — FOLIC ACID 1 MG/1
1 TABLET ORAL DAILY
Qty: 30 TABLET | Refills: 1 | Status: SHIPPED | OUTPATIENT
Start: 2023-04-17 | End: 2023-06-16

## 2023-01-24 RX ORDER — CHLORHEXIDINE GLUCONATE 4 G/100ML
SOLUTION TOPICAL DAILY PRN
OUTPATIENT
Start: 2023-01-24

## 2023-01-24 RX ORDER — MULTIVIT-MIN/IRON FUM/FOLIC AC 7.5 MG-4
1 TABLET ORAL DAILY
Qty: 30 TABLET | Refills: 1 | Status: SHIPPED | OUTPATIENT
Start: 2023-04-17 | End: 2023-06-16

## 2023-01-24 RX ORDER — ASCORBIC ACID 500 MG
500 TABLET ORAL 2 TIMES DAILY
Qty: 60 TABLET | Refills: 1 | Status: SHIPPED | OUTPATIENT
Start: 2023-04-17 | End: 2023-06-16

## 2023-01-24 RX ORDER — FERROUS SULFATE TAB EC 324 MG (65 MG FE EQUIVALENT) 324 (65 FE) MG
324 TABLET DELAYED RESPONSE ORAL
Qty: 60 TABLET | Refills: 1 | Status: SHIPPED | OUTPATIENT
Start: 2023-04-17 | End: 2023-06-16

## 2023-01-24 NOTE — PROGRESS NOTES
Assessment:   Diagnosis ICD-10-CM Associated Orders   1  Primary osteoarthritis of left knee  M17 12 XR knee 3 vw left non injury          Plan:  • Explained to the patient that her x rays reveals moderate tricompartmental osteoarthritis  As she has tried and failed all non operative treatments up to this point, the next step in her treatment process would be to perform a total knee arthroplasty  She was in agreement with this treatment plan and wished to proceed  • A thorough discussion was performed with the patient reviewing all operative and nonoperative options as well as the risks of the procedure  Risks discussed include but not limited to persistent pain, dislocation, loosening of the prosthesis, infection, need for further surgery including revision, neurovascular injury, as well as the risk of anesthesia  After this discussion all questions were answered and informed consent was obtained for Total Knee Arthroplasty of the left knee     the patient has advanced arthritic changes along her left knee  Treatment options were discussed  She has opted for elective left total knee replacement  All risks, complications, benefits were discussed with the patient in great detail including bleeding, infection, blood clots, pain, stiffness, neurovascular damage, fractures, dislocations, the possibility of loss of life from surgery, heart attack, stroke, etcetera  Her surgery scheduled for May 17, 2023    To do next visit:  Return for post operative visit after left TKA  The above stated was discussed in layman's terms and the patient expressed understanding  All questions were answered to the patient's satisfaction         Scribe Attestation    I,:  Yenifer Rascon am acting as a scribe while in the presence of the attending physician :       I,:  Mary Jo Buckner DO personally performed the services described in this documentation    as scribed in my presence :             Subjective:   Kellen Thapa is a 74 y o  female who presents today for a follow up visit for her left knee  Patient has known osteoarthritis of the left knee  She underwent visco injections with the last injection being on 22  Patient reports no benefit from the visco injections  She continues to c/o anteromedial and laterally based knee pain  Pain is worse with ambulating and standing  No numbness or tingling  No fevers or chills  Review of systems negative unless otherwise specified in HPI  Review of Systems   Constitutional: Negative for chills, fever and unexpected weight change  HENT: Negative for hearing loss, nosebleeds and sore throat  Eyes: Negative for pain, redness and visual disturbance  Respiratory: Negative for cough, shortness of breath and wheezing  Cardiovascular: Negative for chest pain, palpitations and leg swelling  Gastrointestinal: Negative for abdominal distention, nausea and vomiting  Endocrine: Negative for polydipsia and polyuria  Genitourinary: Negative for dysuria and hematuria  Skin: Negative for rash and wound  Neurological: Negative for dizziness, numbness and headaches  Psychiatric/Behavioral: Negative for decreased concentration and suicidal ideas         Past Medical History:   Diagnosis Date   • Anemia    • Arthritis    • Chronic pain disorder     back and hip pain   • Disease of thyroid gland     nodules   • GERD (gastroesophageal reflux disease)    • Heart murmur    • Heartburn    • Hiatal hernia    • Hyperlipidemia    • Hypertension    • Overactive bladder    • Rotator cuff tear, left    • Rotator cuff tear, right    • Vitamin D deficiency     unsure   • Wears dentures     upper   • Wears glasses    • Wears partial dentures     upper       Past Surgical History:   Procedure Laterality Date   •  SECTION      Last Assessed: 2017   • COLONOSCOPY     • ELBOW SURGERY      Last Assessed: 2017   • ESOPHAGOGASTRODUODENOSCOPY N/A 2017    Procedure: ESOPHAGOGASTRODUODENOSCOPY (EGD); Surgeon: Regi Booth DO;  Location: [facility] GI [facility]; Service: Gastroenterology   • HERNIA REPAIR  05/19/2020    Hiatal hernia repair   • HIP SURGERY Left 02/08/2018    glut medius/minimus repair  and 8/8/18   • HYSTERECTOMY      2011   • KNEE SURGERY      x2   • PARAESOPHAGEAL HERNIA REPAIR N/A 05/19/2020    Procedure: LAPAROSCOPIC PARAESOPHAGEAL HERNIA REPAIR WITH MESH AND NISSEN FUNDOPLICATION WITH ROBOTICS;  Surgeon: Rubens Pickard MD;  Location: [facility];  Service: General   • MS ARTHRS [address] W/MENISCECTOMY MED/LAT W/SHVG Left 06/15/2022    Procedure: ;left knee arthroscopy, meniscectomy,synevectomy,chondroplasty, loose body removal, injection;  Surgeon: Yareli Nieto DO;  Location: [facility];  Service: Orthopedics   • MS COLONOSCOPY FLX DX W/COLLJ SPEC WHEN PFRMD N/A 06/26/2017    Procedure: EGD AND COLONOSCOPY;  Surgeon: Regi Booth DO;  Location: [facility];   Service: Gastroenterology   • MS EXCISON TUMOR SOFT TISSUE THIGH/KNEE SUBQ 3 CM/> Bilateral 05/26/2022    Procedure: EXCISION BIOPSY TISSUE LESION/MASS LOWER EXTREMITY;  Surgeon: David Ann MD;  Location: [facility];  Service: General   • MS EXCISON TUMOR SOFT TISSUE THIGH/KNEE SUBQ 3 CM/> Bilateral 8/25/2022    Procedure: EXCISION BIOPSY TISSUE LESION/MASS LOWER EXTREMITY;  Surgeon: David Ann MD;  Location: [facility];  Service: General   • SHOULDER ARTHROCENTESIS     • SHOULDER ARTHROSCOPY Right 06/23/2021    Procedure: SHOULDER ARTHROSCOPY WITH acromioplasty, debridment, and ROTATOR CUFF REPAIR;  Surgeon: Lily Pereira DO;  Location: [facility] MAIN OR;  Service: Orthopedics       Family History   Problem Relation Age of Onset   • Colon cancer Mother    • Heart attack Father    • Other Father         Cardiac Disorder   • Diabetes type II Father    • Colon cancer Sister    • No Known Problems Sister    • No Known Problems Sister    • No Known Problems Sister    • No Known Problems Sister    • No Known Problems Daughter    • No Known Problems Maternal Aunt    • No Known Problems Maternal Aunt    • No Known Problems Maternal Aunt    • Cancer Maternal Grandmother    • No Known Problems Paternal Grandmother        Social History     Occupational History   • Not on file   Tobacco Use   • Smoking status: Former     Packs/day: 1 00     Types: Cigarettes     Quit date:      Years since quittin 0   • Smokeless tobacco: Never   • Tobacco comments:     quit 25 yrs ago   Vaping Use   • Vaping Use: Never used   Substance and Sexual Activity   • Alcohol use: Not Currently   • Drug use: No   • Sexual activity: Yes     Partners: Male         Current Outpatient Medications:   •  atorvastatin (LIPITOR) 10 mg tablet, take 1 tablet by mouth once daily, Disp: 90 tablet, Rfl: 0  •  CALCIUM PO, Take by mouth, Disp: , Rfl:   •  famotidine (PEPCID) 20 mg tablet, Take 1 tablet (20 mg total) by mouth 2 (two) times a day, Disp: 180 tablet, Rfl: 0  •  hydrOXYzine HCL (ATARAX) 25 mg tablet, TAKE 1 TO 2 TABLETS BY MOUTH DAILY AT BEDTIME AS NEEDED, Disp: 60 tablet, Rfl: 2  •  lidocaine (Lidoderm) 5 %, Apply 1 patch topically over 12 hours daily Remove & Discard patch within 12 hours or as directed by MD, Disp: 30 patch, Rfl: 0  •  losartan (COZAAR) 100 MG tablet, take 1 tablet by mouth once daily, Disp: 90 tablet, Rfl: 1  •  meloxicam (MOBIC) 15 mg tablet, Take 1 tablet (15 mg total) by mouth daily, Disp: 30 tablet, Rfl: 0  •  methocarbamol (Robaxin-750) 750 mg tablet, Take 1 tablet (750 mg total) by mouth every 8 (eight) hours, Disp: 90 tablet, Rfl: 1  •  methylPREDNISolone 4 MG tablet therapy pack, Use as directed on package, Disp: 21 tablet, Rfl: 0  •  metoclopramide (REGLAN) 10 mg tablet, Take 1 tablet (10 mg total) by mouth 4 (four) times a day, Disp: 120 tablet, Rfl: 6  •  valACYclovir (VALTREX) 1,000 mg tablet, take 1 tablet by mouth twice a day for 3 days, Disp: 6 tablet, Rfl: 2    Allergies   Allergen Reactions   • Hydrocodone Other (See Comments) and Irritability     Patient reports insomnia when taking             Vitals:    01/24/23 1430   BP: 142/77   Pulse: 83       Objective:                    Ortho Exam   Left lower extremity is neurovascularly intact  Toes are pink and mobile   Compartments are soft  No warmth, erythema or ecchymosis  ROM of knee is from 5-115 degrees  Negative Lachman, drawer or pivot shift  No medial instability  Medial joint line tenderness, slight lateral joint line tenderness  Patellofemoral crepitation    Diagnostics, reviewed and taken today if performed as documented: The attending physician has personally reviewed the pertinent films in PACS and interpretation is as follows:    X Ray Left Knee 1/24/23: Moderate tricompartmental osteoarthritis  No other acute osseous abnormalities      Procedures, if performed today:    Procedures    None performed      Portions of the record may have been created with voice recognition software  Occasional wrong word or "sound a like" substitutions may have occurred due to the inherent limitations of voice recognition software  Read the chart carefully and recognize, using context, where substitutions have occurred

## 2023-01-24 NOTE — TELEPHONE ENCOUNTER
1/24/23  [facility] Called Pt is having LTotal knee replacement 5/17/23 blood work to be completed on April 17th  Left message for the patient to call to set up an appointment for medical clearance after April 17th  Wilmer Santos

## 2023-01-26 ENCOUNTER — PREP FOR PROCEDURE (OUTPATIENT)
Dept: OBGYN CLINIC | Facility: CLINIC | Age: 68
End: 2023-01-26

## 2023-02-02 ENCOUNTER — TELEPHONE (OUTPATIENT)
Dept: PODIATRY | Facility: CLINIC | Age: 68
End: 2023-02-02

## 2023-02-07 ENCOUNTER — RA CDI HCC (OUTPATIENT)
Dept: OTHER | Facility: HOSPITAL | Age: 68
End: 2023-02-07

## 2023-02-07 NOTE — PROGRESS NOTES
[facility]  coding opportunities       Chart reviewed, no opportunity found:   [address]        Patients Insurance     Medicare Insurance: [employer] Advantage

## 2023-02-09 ENCOUNTER — HOSPITAL ENCOUNTER (OUTPATIENT)
Dept: RADIOLOGY | Facility: HOSPITAL | Age: 68
Discharge: HOME | End: 2023-02-09

## 2023-02-09 DIAGNOSIS — R13.19 ESOPHAGEAL DYSPHAGIA: ICD-10-CM

## 2023-02-13 ENCOUNTER — OFFICE VISIT (OUTPATIENT)
Dept: FAMILY MEDICINE CLINIC | Facility: CLINIC | Age: 68
End: 2023-02-13

## 2023-02-13 ENCOUNTER — TELEPHONE (OUTPATIENT)
Dept: FAMILY MEDICINE CLINIC | Facility: CLINIC | Age: 68
End: 2023-02-13

## 2023-02-13 VITALS
DIASTOLIC BLOOD PRESSURE: 78 MMHG | HEIGHT: 63 IN | BODY MASS INDEX: 28.1 KG/M2 | TEMPERATURE: 98.7 F | WEIGHT: 158.6 LBS | SYSTOLIC BLOOD PRESSURE: 165 MMHG | HEART RATE: 68 BPM | OXYGEN SATURATION: 99 %

## 2023-02-13 DIAGNOSIS — E06.3 HASHIMOTO'S THYROIDITIS: ICD-10-CM

## 2023-02-13 DIAGNOSIS — M46.1 SACROILIITIS (HCC): ICD-10-CM

## 2023-02-13 DIAGNOSIS — E04.2 MULTINODULAR GOITER: ICD-10-CM

## 2023-02-13 DIAGNOSIS — R42 VERTIGO: ICD-10-CM

## 2023-02-13 DIAGNOSIS — Z12.31 ENCOUNTER FOR SCREENING MAMMOGRAM FOR MALIGNANT NEOPLASM OF BREAST: ICD-10-CM

## 2023-02-13 DIAGNOSIS — N63.22 MASS OF UPPER INNER QUADRANT OF LEFT BREAST: Primary | ICD-10-CM

## 2023-02-13 DIAGNOSIS — I10 ESSENTIAL HYPERTENSION: ICD-10-CM

## 2023-02-13 DIAGNOSIS — E78.5 HYPERLIPIDEMIA, UNSPECIFIED HYPERLIPIDEMIA TYPE: ICD-10-CM

## 2023-02-13 RX ORDER — MECLIZINE HYDROCHLORIDE 25 MG/1
25 TABLET ORAL 3 TIMES DAILY PRN
Qty: 30 TABLET | Refills: 0 | Status: SHIPPED | OUTPATIENT
Start: 2023-02-13

## 2023-02-13 NOTE — TELEPHONE ENCOUNTER
Pt was here earlier for an appt  She is asking where she can go for Physical Therapy for vertigo? Asking if someone can call her back

## 2023-02-13 NOTE — PROGRESS NOTES
Name: Sammie Thomason      : 1955      MRN: [id number]  Encounter Provider: Eloisa Landis DO  Encounter Date: 2023   Encounter department: FAMILY [facility]ACTICE AT [address] [facility]     1  Mass of upper inner quadrant of left breast  Comments:  new problem, pt states has been present >6 months, requires workup; her Last mammo was 2021, though mammo order had been given 2022 per chart review  Orders:  -     Mammo diagnostic left w cad; Future; Expected date: 2023  -     US breast left limited (diagnostic); Future; Expected date: 2023    2  Encounter for screening mammogram for malignant neoplasm of breast  -     Mammo screening right w 3d & cad; Future; Expected date: 2023    3  Vertigo  Comments:  new problem, suspect BPPV; trial meclizine PRN plus Vestibular PT  Orders:  -     meclizine (ANTIVERT) 25 mg tablet; Take 1 tablet (25 mg total) by mouth 3 (three) times a day as needed for dizziness  -     Ambulatory Referral to Physical Therapy; Future    4  Essential hypertension  Comments:  elevated today, but pt is a bit nervous about her vertigo sx; resume home bp checks; obtain labs and return for recheck with Medicare AWV in approx 4 weeks  Orders:  -     Basic metabolic panel; Future  -     Microalbumin / creatinine urine ratio    5  Hyperlipidemia, unspecified hyperlipidemia type  Comments:  overdue for lipid recheck  Orders:  -     Lipid panel; Future    6  Hashimoto's thyroiditis  Comments:  overdue for thyroid labs- last were 10/2021 per chart review  Orders:  -     TSH, 3rd generation; Future  -     T4, free; Future    7  Multinodular goiter  Comments:  stable on last US and ENT eval 2022 per chart review; due for re-eval/repeat US   Orders:  -     TSH, 3rd generation; Future  -     T4, free; Future    8   Sacroiliitis ([facility] [address] )  Comments:  chronic problem, cpm           Subjective      Chief Complaint   Patient presents with   • Follow-up     Pt refused flu vaccine       Here for scheduled f/u visit- pt's usual PCP here has left practice  Chart review shows that she is overdue for Medicare AWV- last was 10/2021- pt states she thought today's visit was for this Medicare AWV, but I advise pt that today's appt was scheduled as short routine f/u visit; last appt here was problem visit 05/2022 for insomnia, last eval of her chronic conditions was >1 year ago - at 01/2022 preop clearance visit prior to cataract surgery  Then pt states, "I have a few complaints" points to left midsternal line upper left breast area and states "this thing" - "Feels like a lump and hurts when I press it"- admits has been getting bigger  "And I'm always dizzy- bend down and am dizzy the worst is laying down, everything starts spinning"- onset was about a month ago per pt and admits sx getting worse- "intially was just when lying down" -this position change would trigger room-spinning vertigo per pt, and then progressed to when she would bend forward and stand up would trigger sx, and now just tilting her head back triggers sx  No syncope or falling assoc; she denies any weakness, numbness, HA's or vision changes    I ask pt when she first noticed the left chest lump- "It's been awhile, definitely more than 6 months,"  Last mammo was 04/2021 per chart review; mammo order had been given by her PCP here at 01/2022 visit  denies any nipple d/c or bleeding, no breast pains    Denies any ear noise or ear pain, admits intermittent right ear "feels clogged, have to hold nose and blow and it opens up"  Pt has known multinodular goiter/Hashimoto's and per 01/2022 visit note: "Multinodular thyroid - function stable, overdue for biopsy" and ENT order was given- chart review shows pt did see ENT 02/2022, had repeat US, and ENT advised her of result "No significant interval change in size of the bilateral thyroid nodules  No nodule meets current ACR criteria for requiring biopsy but followup ultrasound is recommended in 2 years "       1/24/2022  [facility] At [address] of Chronic Conditions:   HTN - stable on ACEI  HLD - stable on lipid   Multinodular thyroid - function stable, overdue for biopsy  Iron anemia - supplement  Heart burn - currently stable, using pepcid intermittently  Assessment & Recommendations:  1  Preoperative clearance     2  Breast cancer screening by mammogram  Mammo screening bilateral w 3d & cad  3  Multinodular goiter  Ambulatory Referral to Otolaryngology        Review of Systems    Current Outpatient Medications on File Prior to Visit   Medication Sig   • [START ON 4/17/2023] ascorbic acid (VITAMIN C) 500 MG tablet Take 1 tablet (500 mg total) by mouth 2 (two) times a day Do not start before April 17, 2023  • atorvastatin (LIPITOR) 10 mg tablet take 1 tablet by mouth once daily   • CALCIUM PO Take by mouth   • famotidine (PEPCID) 20 mg tablet Take 1 tablet (20 mg total) by mouth 2 (two) times a day   • hydrOXYzine HCL (ATARAX) 25 mg tablet TAKE 1 TO 2 TABLETS BY MOUTH DAILY AT BEDTIME AS NEEDED   • losartan (COZAAR) 100 MG tablet take 1 tablet by mouth once daily   • meloxicam (MOBIC) 15 mg tablet Take 1 tablet (15 mg total) by mouth daily   • methocarbamol (Robaxin-750) 750 mg tablet Take 1 tablet (750 mg total) by mouth every 8 (eight) hours   • metoclopramide (REGLAN) 10 mg tablet Take 1 tablet (10 mg total) by mouth 4 (four) times a day   • [DISCONTINUED] ferrous sulfate 324 (65 Fe) mg Take 1 tablet (324 mg total) by mouth 2 (two) times a day before meals Do not start before April 17, 2023  (Patient not taking: Reported on 2/13/2023 Do not start before April 17, 2023 )   • [DISCONTINUED] folic acid (FOLVITE) 1 mg tablet Take 1 tablet (1 mg total) by mouth daily Do not start before April 17, 2023   (Patient not taking: Reported on 2/13/2023 Do not start before April 17, 2023 )   • [DISCONTINUED] Multiple Vitamins-Minerals (multivitamin with minerals) tablet Take 1 tablet by mouth daily Do not start before April 17, 2023  (Patient not taking: Reported on 2/13/2023 Do not start before April 17, 2023 )       Objective     /78 (BP Location: Left arm, Patient Position: Sitting, Cuff Size: Standard)   Pulse 68   Temp 98 7 °F (37 1 °C) (Tympanic)   Ht 5' 3" (1 6 m)   Wt 71 9 kg (158 lb 9 6 oz)   SpO2 99%   BMI 28 09 kg/m²     Physical Exam  Vitals and nursing note reviewed  Constitutional:       General: She is not in acute distress  Appearance: She is well-developed  She is not ill-appearing, toxic-appearing or diaphoretic  HENT:      Head: Normocephalic and atraumatic  Right Ear: Tympanic membrane and ear canal normal       Left Ear: Tympanic membrane and ear canal normal       Nose: Nose normal       Mouth/Throat:      Lips: Pink  Mouth: Mucous membranes are moist       Pharynx: Oropharynx is clear  Uvula midline  Eyes:      General: Lids are normal       Extraocular Movements: Extraocular movements intact  Right eye: No nystagmus  Left eye: No nystagmus  Conjunctiva/sclera: Conjunctivae normal    Neck:      Thyroid: Thyromegaly present  No thyroid tenderness  Trachea: Trachea and phonation normal    Cardiovascular:      Rate and Rhythm: Normal rate and regular rhythm  Pulses: Normal pulses  Heart sounds: Normal heart sounds  Pulmonary:      Effort: Pulmonary effort is normal       Breath sounds: Normal breath sounds and air entry  Chest:       Abdominal:      Palpations: Abdomen is soft  Tenderness: There is no abdominal tenderness  Musculoskeletal:      Cervical back: Neck supple  Right lower leg: No edema  Left lower leg: No edema  Lymphadenopathy:      Cervical: No cervical adenopathy  Skin:     General: Skin is warm and dry  Coloration: Skin is not pale  Neurological:      Mental Status: She is alert and oriented to person, place, and time        Cranial Nerves: Cranial nerves 2-12 are intact  Sensory: Sensation is intact  Motor: Motor function is intact  Coordination: Coordination is intact  Gait: Gait and tandem walk normal       Deep Tendon Reflexes: Reflexes are normal and symmetric  Psychiatric:         Mood and Affect: Mood is anxious (mildly)  Behavior: Behavior is cooperative         Rea Lawson, DO

## 2023-02-16 NOTE — TELEPHONE ENCOUNTER
Message left for patient provided contact information for physical therapy at [facility] as they are two locations closer to patients home  Asked patient to contact office if any questions or concerns

## 2023-02-16 NOTE — TELEPHONE ENCOUNTER
[facility] Physical Therapy/OT location list that is supposed to be given to a patient at check out from their visit when PT/OT is ordered has 2-letter codes that correspond to which services are offered at each location  Vestibular Therapy was ordered by me per visit note     Please review the PT/OT forms we have at the  for your future reference and to be able to address patients' needs

## 2023-02-17 ENCOUNTER — TELEPHONE (OUTPATIENT)
Dept: OBGYN CLINIC | Facility: HOSPITAL | Age: 68
End: 2023-02-17

## 2023-02-17 NOTE — TELEPHONE ENCOUNTER
Caller: Seda Meng    Doctor: [place]ico Pinedo    Reason for call: Patient is looking for a sooner surgery date    Call back#: [phone]

## 2023-02-20 ENCOUNTER — APPOINTMENT (OUTPATIENT)
Dept: LAB | Facility: CLINIC | Age: 68
End: 2023-02-20

## 2023-02-20 DIAGNOSIS — E78.5 HYPERLIPIDEMIA, UNSPECIFIED HYPERLIPIDEMIA TYPE: ICD-10-CM

## 2023-02-20 DIAGNOSIS — I10 ESSENTIAL HYPERTENSION: ICD-10-CM

## 2023-02-20 DIAGNOSIS — E04.2 MULTINODULAR GOITER: ICD-10-CM

## 2023-02-20 DIAGNOSIS — E06.3 HASHIMOTO'S THYROIDITIS: ICD-10-CM

## 2023-02-20 LAB
ANION GAP SERPL CALCULATED.3IONS-SCNC: 5 MMOL/L (ref 4–13)
BUN SERPL-MCNC: 19 MG/DL (ref 5–25)
CALCIUM SERPL-MCNC: 9.4 MG/DL (ref 8.3–10.1)
CHLORIDE SERPL-SCNC: 105 MMOL/L (ref 96–108)
CHOLEST SERPL-MCNC: 202 MG/DL
CO2 SERPL-SCNC: 26 MMOL/L (ref 21–32)
CREAT SERPL-MCNC: 0.68 MG/DL (ref 0.6–1.3)
GFR SERPL CREATININE-BSD FRML MDRD: 91 ML/MIN/1.73SQ M
GLUCOSE P FAST SERPL-MCNC: 90 MG/DL (ref 65–99)
HDLC SERPL-MCNC: 52 MG/DL
LDLC SERPL CALC-MCNC: 131 MG/DL (ref 0–100)
NONHDLC SERPL-MCNC: 150 MG/DL
POTASSIUM SERPL-SCNC: 4.3 MMOL/L (ref 3.5–5.3)
SODIUM SERPL-SCNC: 136 MMOL/L (ref 135–147)
T4 FREE SERPL-MCNC: 1.09 NG/DL (ref 0.76–1.46)
TRIGL SERPL-MCNC: 95 MG/DL
TSH SERPL DL<=0.05 MIU/L-ACNC: 1.15 UIU/ML (ref 0.45–4.5)

## 2023-02-21 LAB
CREAT UR-MCNC: 60.8 MG/DL
MICROALBUMIN UR-MCNC: <5 MG/L (ref 0–20)
MICROALBUMIN/CREAT 24H UR: <8 MG/G CREATININE (ref 0–30)

## 2023-02-23 ENCOUNTER — OFFICE VISIT (OUTPATIENT)
Dept: PAIN MEDICINE | Facility: CLINIC | Age: 68
End: 2023-02-23

## 2023-02-23 ENCOUNTER — EVALUATION (OUTPATIENT)
Dept: PHYSICAL THERAPY | Facility: CLINIC | Age: 68
End: 2023-02-23

## 2023-02-23 VITALS
DIASTOLIC BLOOD PRESSURE: 77 MMHG | SYSTOLIC BLOOD PRESSURE: 126 MMHG | BODY MASS INDEX: 28 KG/M2 | WEIGHT: 158 LBS | HEART RATE: 86 BPM | HEIGHT: 63 IN

## 2023-02-23 DIAGNOSIS — M67.951 TENDINOPATHY OF RIGHT GLUTEUS MEDIUS: ICD-10-CM

## 2023-02-23 DIAGNOSIS — R42 VERTIGO: Primary | ICD-10-CM

## 2023-02-23 DIAGNOSIS — M79.18 LEFT BUTTOCK PAIN: ICD-10-CM

## 2023-02-23 DIAGNOSIS — G89.4 CHRONIC PAIN SYNDROME: Primary | ICD-10-CM

## 2023-02-23 DIAGNOSIS — H81.12 BPPV (BENIGN PAROXYSMAL POSITIONAL VERTIGO), LEFT: ICD-10-CM

## 2023-02-23 RX ORDER — LIDOCAINE 50 MG/G
OINTMENT TOPICAL 3 TIMES DAILY PRN
Qty: 50 G | Refills: 5 | Status: SHIPPED | OUTPATIENT
Start: 2023-02-23

## 2023-02-23 NOTE — PROGRESS NOTES
Assessment:  1  Chronic pain syndrome    2  Left buttock pain    3  Tendinopathy of right gluteus medius        Plan:  While the patient was in the office today, I did have a thorough conversation regarding their chronic pain syndrome, medication management, and treatment plan options  Patient is being seen for a follow-up visit  She was last seen here on 1/11/2023 at which time she was started on Robaxin 750 mg every 8 hours as needed for spasms  She is reporting some benefit in pain relief with the use of Robaxin  Patient has participated in physical therapy and aqua therapy in the past without significant improvement in her symptoms  She has failed multiple injections with ultrasound guidance  Continue Robaxin-750 milligrams 3 times daily if needed for spasms  She did not require refill of this medication during today's visit  Prescription for lidocaine ointment 5% sent to patient's pharmacy  Patient is scheduled for left total knee replacement on 5/17/2023  The patient will follow-up in 2 months for medication prescription refill and reevaluation  The patient was advised to contact the office should their symptoms worsen in the interim  The patient was agreeable and verbalized an understanding  History of Present Illness: The patient is a 79 y o  female who presents for a follow up office visit in regards to Hip Pain (Follow-up)  The patient’s current symptoms include complaints of left buttock pain  Also, complaints of burning pain on the scar on the lateral aspect of her right hip/thigh  Current pain level is an 8/10  Quality pain is described as sharp and burning  Current pain medications includes: Robaxin 750 mg every 8 hours as needed for spasms  The patient reports that this regimen is providing 25% pain relief  The patient is reporting no side effects from this pain medication regimen      I have personally reviewed and/or updated the patient's past medical history, past surgical history, family history, social history, current medications, allergies, and vital signs today  Review of Systems  Review of Systems   Constitutional: Negative for chills and fever  HENT: Negative for ear pain and sore throat  Eyes: Negative for pain and visual disturbance  Respiratory: Negative for cough and shortness of breath  Cardiovascular: Negative for chest pain and palpitations  Gastrointestinal: Negative for abdominal pain and vomiting  Genitourinary: Negative for dysuria and hematuria  Musculoskeletal: Positive for gait problem  Negative for arthralgias and back pain  Pain in hip   Skin: Negative for color change and rash  Neurological: Positive for dizziness  Negative for seizures and syncope  All other systems reviewed and are negative  Past Medical History:   Diagnosis Date   • Anemia    • Arthritis    • Chronic pain disorder     back and hip pain   • Disease of thyroid gland     nodules   • GERD (gastroesophageal reflux disease)    • Heart murmur    • Heartburn    • Hiatal hernia    • Hyperlipidemia    • Hypertension    • Overactive bladder    • Rotator cuff tear, left    • Rotator cuff tear, right    • Vitamin D deficiency     unsure   • Wears dentures     upper   • Wears glasses    • Wears partial dentures     upper       Past Surgical History:   Procedure Laterality Date   •  SECTION      Last Assessed: 2017   • COLONOSCOPY     • ELBOW SURGERY      Last Assessed: 2017   • ESOPHAGOGASTRODUODENOSCOPY N/A 2017    Procedure: ESOPHAGOGASTRODUODENOSCOPY (EGD); Surgeon: Jennie Jewell DO;  Location: [facility];   Service: Gastroenterology   • HERNIA REPAIR  2020    Hiatal hernia repair   • HIP SURGERY Left 2018    glut medius/minimus repair  and 18   • HYSTERECTOMY         • KNEE SURGERY      x2   • PARAESOPHAGEAL HERNIA REPAIR N/A 2020    Procedure: LAPAROSCOPIC PARAESOPHAGEAL HERNIA REPAIR WITH MESH AND NISSEN FUNDOPLICATION WITH ROBOTICS;  Surgeon: Noel Cooper MD;  Location: [facility];  Service: General   • ND ARTHRS [address] W/MENISCECTOMY MED/LAT W/SHVG Left 06/15/2022    Procedure: ;left knee arthroscopy, meniscectomy,synevectomy,chondroplasty, loose body removal, injection;  Surgeon: Obdulia Carlos DO;  Location: [facility];  Service: Orthopedics   • ND COLONOSCOPY FLX DX W/COLLJ SPEC WHEN PFRMD N/A 06/26/2017    Procedure: EGD AND COLONOSCOPY;  Surgeon: Vanesa Machuca DO;  Location: [facility];   Service: Gastroenterology   • ND EXCISON TUMOR SOFT TISSUE THIGH/KNEE SUBQ 3 CM/> Bilateral 05/26/2022    Procedure: EXCISION BIOPSY TISSUE LESION/MASS LOWER EXTREMITY;  Surgeon: Nikos Ley MD;  Location: [facility];  Service: General   • ND EXCISON TUMOR SOFT TISSUE THIGH/KNEE SUBQ 3 CM/> Bilateral 8/25/2022    Procedure: EXCISION BIOPSY TISSUE LESION/MASS LOWER EXTREMITY;  Surgeon: Nikos Ley MD;  Location: [facility];  Service: General   • SHOULDER ARTHROCENTESIS     • SHOULDER ARTHROSCOPY Right 06/23/2021    Procedure: SHOULDER ARTHROSCOPY WITH acromioplasty, debridment, and ROTATOR CUFF REPAIR;  Surgeon: Shabnam Price DO;  Location: [address] [place] [address] MAIN OR;  Service: Orthopedics       Family History   Problem Relation Age of Onset   • Colon cancer Mother    • Heart attack Father    • Other Father         Cardiac Disorder   • Diabetes type II Father    • Colon cancer Sister    • No Known Problems Sister    • No Known Problems Sister    • No Known Problems Sister    • No Known Problems Sister    • No Known Problems Daughter    • No Known Problems Maternal Aunt    • No Known Problems Maternal Aunt    • No Known Problems Maternal Aunt    • Cancer Maternal Grandmother    • No Known Problems Paternal Grandmother        Social History     Occupational History   • Not on file   Tobacco Use   • Smoking status: Former     Packs/day: 1 00     Types: Cigarettes     Quit date: 1991     Years since quitting: 32 1   • Smokeless tobacco: Never   • Tobacco comments:     quit 25 yrs ago   Vaping Use   • Vaping Use: Never used   Substance and Sexual Activity   • Alcohol use: Not Currently   • Drug use: No   • Sexual activity: Yes     Partners: Male         Current Outpatient Medications:   •  [START ON 4/17/2023] ascorbic acid (VITAMIN C) 500 MG tablet, Take 1 tablet (500 mg total) by mouth 2 (two) times a day Do not start before April 17, 2023 , Disp: 60 tablet, Rfl: 1  •  atorvastatin (LIPITOR) 10 mg tablet, take 1 tablet by mouth once daily, Disp: 90 tablet, Rfl: 0  •  CALCIUM PO, Take by mouth, Disp: , Rfl:   •  famotidine (PEPCID) 20 mg tablet, Take 1 tablet (20 mg total) by mouth 2 (two) times a day, Disp: 180 tablet, Rfl: 0  •  hydrOXYzine HCL (ATARAX) 25 mg tablet, TAKE 1 TO 2 TABLETS BY MOUTH DAILY AT BEDTIME AS NEEDED, Disp: 60 tablet, Rfl: 2  •  lidocaine (XYLOCAINE) 5 % ointment, Apply topically 3 (three) times a day as needed for mild pain, Disp: 50 g, Rfl: 5  •  losartan (COZAAR) 100 MG tablet, take 1 tablet by mouth once daily, Disp: 90 tablet, Rfl: 1  •  meclizine (ANTIVERT) 25 mg tablet, Take 1 tablet (25 mg total) by mouth 3 (three) times a day as needed for dizziness, Disp: 30 tablet, Rfl: 0  •  meloxicam (MOBIC) 15 mg tablet, Take 1 tablet (15 mg total) by mouth daily, Disp: 30 tablet, Rfl: 0  •  methocarbamol (Robaxin-750) 750 mg tablet, Take 1 tablet (750 mg total) by mouth every 8 (eight) hours, Disp: 90 tablet, Rfl: 1  •  metoclopramide (REGLAN) 10 mg tablet, Take 1 tablet (10 mg total) by mouth 4 (four) times a day, Disp: 120 tablet, Rfl: 6    Allergies   Allergen Reactions   • Hydrocodone Other (See Comments) and Irritability     Patient reports insomnia when taking        Physical Exam:    /77 (BP Location: Left arm, Patient Position: Sitting, Cuff Size: Standard)   Pulse 86   Ht 5' 3" (1 6 m)   Wt 71 7 kg (158 lb)   BMI 27 99 kg/m²     Constitutional:normal, well developed, well nourished, alert, in no distress and non-toxic and no overt pain behavior  Eyes:anicteric  HEENT:grossly intact  Neck:supple, symmetric, trachea midline and no masses   Pulmonary:even and unlabored  Cardiovascular:No edema or pitting edema present  Skin:Normal without rashes or lesions and well hydrated  Psychiatric:Mood and affect appropriate  Neurologic:Cranial Nerves II-XII grossly intact  Musculoskeletal:normal    Imaging  No orders to display       No orders of the defined types were placed in this encounter

## 2023-02-23 NOTE — PROGRESS NOTES
PT Evaluation     Today's date: 2023  Patient name: Juan Potts  : 1955  MRN: [id number]  Referring provider: Wen Torres DO  Dx:   Encounter Diagnosis     ICD-10-CM    1  Vertigo  R42 Ambulatory Referral to Physical Therapy    new problem, suspect BPPV; trial meclizine PRN plus Vestibular PT      2  BPPV (benign paroxysmal positional vertigo), left  H81 12           Start Time: 1315  Stop Time: 1355  Total time in clinic (min): 40 minutes    Assessment  Assessment details: Juan Potts was seen for an initial PT evaluation today  Patient is a 79 y o  female with diagnosis of dizziness and past medical history significant for OA, chronic pain, thyroid disease, GERD, hiatal hernia, hyperlipidemia, HTN, rotator cuff tear with repair, L buttock pain with history of surgery, lipomas with removal, lumbar radiculopathy  Moderate complexity evaluation  due to number of participation restrictions, functional outcome measure of 51% limitation, and evolving clinical presentation  Findings today show symptom provocation with positional testing indicating left BPPV with poor balance/proprioception impacting their ability to perform dynamic functional activities including walking, transfers, and bed mobility tasks  No significant cervical or oculomotor dysfunction was found  Skilled PT indicated to treat at this time to address above stated deficits and return patient to PLOF  Impairments: abnormal gait, abnormal movement, activity intolerance, impaired balance, lacks appropriate home exercise program, safety issue and poor posture     Goals  STG (6 weeks)  1  Reduction in dizziness symptoms by 75% for increased independence with daily tasks  2  Patient will be able to verbalize understanding mechanical dysfunction of vertigo and be demonstrate home canalith repositioning as needed  LTG (12 weeks)  1  Reduction in dizziness symptoms by 100% for full return to PLOF     2  Patient will meet FOTO prediction score for return to full function  3  Patient will be independent with advanced home exercise program for continued maintenance post discharge  Plan  Plan details: Progress note in 4 weeks or upon symptom resolution  (-) re-test of L nadeen-halpike post eply maneuver  Patient was instructed that prior to next session in 1 week is to put herself in position of provocation and determine need for continued PT services  Patient would benefit from: skilled physical therapy  Planned therapy interventions: manual therapy, neuromuscular re-education, canalith repositioning, self care, therapeutic activities, therapeutic exercise and home exercise program  Frequency: 2x week  Duration in weeks: 12  Plan of Care beginning date: 2/23/2023  Plan of Care expiration date: 5/23/2023  Treatment plan discussed with: patient        Subjective Evaluation    History of Present Illness  Date of onset: 1/23/2023  Mechanism of injury: Joie Sotelo is a 79 y o  female who presents to outpatient Physical Therapy today with complaints of dizziness  States dizziness began about 1 month ago when getting into bed, but has worsened to when bending down or looking up  Has taken Meclozine prn  History of chronic neck pain  Pain  No pain reported    Social Support  Steps to enter house: no  Stairs in house: yes   Patient lives at: bi-level  Lives with: spouse    Employment status: not working  Hand dominance: right      Diagnostic Tests  No diagnostic tests performed  Patient Goals  Patient goal: Getting in and out of bed without dizziness        Objective     Concurrent Complaints  Positive for headaches (3x/wk) and visual change (floaters; bi-focals)  Negative for nausea/motion sickness, tinnitus, hearing loss, memory loss and aural fullness    Active Range of Motion   Cervical/Thoracic Spine       Cervical    Flexion: Neck active flexion: WNL  Extension: Neck active extension: WNL        Left lateral flexion: Neck active lateral bend left: WNL  Right lateral flexion: Neck active lateral bend right: WNL  Left rotation: Neck active rotation left: min restriction  Right rotation: Neck active rotation right: WNL  Neuro Exam:     Dizziness  Positive for disequilibrium and vertigo  Negative for oscillopsia and diplopia  Exacerbating factors  Positive for bending over, rolling in bed, looking up and supine to/from sitting  Negative for walking, turning head and walking in busy environment  Headaches   Patient reports headaches: Yes (3x/wk)  Cervical exam   Ligament Laxity Testing   Alar ligament: WNL  Sharp Marni:  WNL  Modified VBI   Left: asymptomatic  Right: asymptomatic  Seated posture: forward head posture    Oculomotor exam   Oculomotor ROM: WNL  Resting nystagmus: not present   Gaze holding nystagmus: not present left  and not present right  Smooth pursuits: saccadic smooth pursuit  Vertical saccades: normal  Horizontal saccades: normal  Convergence: normal (without glasses)  Cover test: normal  Crossover test: normal  Head thrust: left normal and right normal  Dynamic head: VOR x1 asymptomatic    Positional testing   Barrington-Hallpike   Left posterior canal: symptomatic and torsional  Right posterior canal: symptomatic             Precautions: none noted  Access code:   Progress note: 3/23  POC: 5/23    Manuals 2/23       eply Lx1 - not symptomatic on re-test                                                Pt  ed Vertigo etiology, diagnosis, prognosis and treatment with post treatment precautions       Neuro Re-Ed                                                                                                                                                                Ther Ex                                                                                                                                        Ther Activity                        Gait Training                        Modalities

## 2023-02-24 NOTE — TELEPHONE ENCOUNTER
Caller: Patient    Doctor: Lina Bo    Reason for call:     Patient is follow up to check if any cancelations for surgery, her surgery is 5/17/23, she would like to be on a cancelation list   ARTHROPLASTY KNEE TOTAL (Left: Knee)    Call back#: [phone]

## 2023-02-27 ENCOUNTER — TELEPHONE (OUTPATIENT)
Dept: OBGYN CLINIC | Facility: HOSPITAL | Age: 68
End: 2023-02-27

## 2023-02-27 DIAGNOSIS — G47.09 OTHER INSOMNIA: ICD-10-CM

## 2023-02-27 RX ORDER — HYDROXYZINE HYDROCHLORIDE 25 MG/1
TABLET, FILM COATED ORAL
Qty: 60 TABLET | Refills: 2 | Status: SHIPPED | OUTPATIENT
Start: 2023-02-27

## 2023-02-27 NOTE — TELEPHONE ENCOUNTER
Lvm for patient regarding sooner surgery date opportunity for 3/17 but I will have to work out details and call her back to finalize

## 2023-02-27 NOTE — TELEPHONE ENCOUNTER
Caller: Patient     Doctor: Che Payne    Reason for call: Patient would like to see if there is a sooner date for her surgery with Dr Che Payne than 5/17       Call back#: [phone]

## 2023-02-28 ENCOUNTER — HOSPITAL ENCOUNTER (OUTPATIENT)
Dept: MAMMOGRAPHY | Facility: HOSPITAL | Age: 68
Discharge: HOME | End: 2023-02-28

## 2023-02-28 ENCOUNTER — HOSPITAL ENCOUNTER (OUTPATIENT)
Dept: ULTRASOUND IMAGING | Facility: HOSPITAL | Age: 68
Discharge: HOME | End: 2023-02-28

## 2023-02-28 VITALS — WEIGHT: 158 LBS | HEIGHT: 63 IN | BODY MASS INDEX: 28 KG/M2

## 2023-02-28 DIAGNOSIS — N63.22 MASS OF UPPER INNER QUADRANT OF LEFT BREAST: ICD-10-CM

## 2023-02-28 NOTE — TELEPHONE ENCOUNTER
Caller: Patient    Doctor: Dr Joss Moraes    Reason for call: Patient calling back stating she will take the sooner date if it can be arranged  Patient asking for call back from surgery scheduler      Call back#: [phone]

## 2023-03-01 ENCOUNTER — PREP FOR PROCEDURE (OUTPATIENT)
Dept: PODIATRY | Facility: CLINIC | Age: 68
End: 2023-03-01

## 2023-03-01 DIAGNOSIS — E78.5 HYPERLIPIDEMIA, UNSPECIFIED HYPERLIPIDEMIA TYPE: ICD-10-CM

## 2023-03-01 RX ORDER — ATORVASTATIN CALCIUM 10 MG/1
TABLET, FILM COATED ORAL
Qty: 90 TABLET | Refills: 0 | Status: SHIPPED | OUTPATIENT
Start: 2023-03-01

## 2023-03-02 ENCOUNTER — OFFICE VISIT (OUTPATIENT)
Dept: LAB | Facility: HOSPITAL | Age: 68
End: 2023-03-02

## 2023-03-02 ENCOUNTER — TELEPHONE (OUTPATIENT)
Dept: FAMILY MEDICINE CLINIC | Facility: CLINIC | Age: 68
End: 2023-03-02

## 2023-03-02 ENCOUNTER — APPOINTMENT (OUTPATIENT)
Dept: PHYSICAL THERAPY | Facility: CLINIC | Age: 68
End: 2023-03-02

## 2023-03-02 ENCOUNTER — APPOINTMENT (OUTPATIENT)
Dept: LAB | Facility: HOSPITAL | Age: 68
End: 2023-03-02

## 2023-03-02 DIAGNOSIS — Z01.812 PRE-OPERATIVE LABORATORY EXAMINATION: ICD-10-CM

## 2023-03-02 DIAGNOSIS — Z01.812 ENCOUNTER FOR PRE-OPERATIVE LABORATORY TESTING: ICD-10-CM

## 2023-03-02 LAB
ABO GROUP BLD: NORMAL
ALBUMIN SERPL BCP-MCNC: 4.2 G/DL (ref 3.5–5)
ALP SERPL-CCNC: 83 U/L (ref 34–104)
ALT SERPL W P-5'-P-CCNC: 9 U/L (ref 7–52)
ANION GAP SERPL CALCULATED.3IONS-SCNC: 7 MMOL/L (ref 4–13)
APTT PPP: 28 SECONDS (ref 23–37)
AST SERPL W P-5'-P-CCNC: 15 U/L (ref 13–39)
ATRIAL RATE: 61 BPM
BASOPHILS # BLD AUTO: 0.02 THOUSANDS/ÂΜL (ref 0–0.1)
BASOPHILS NFR BLD AUTO: 0 % (ref 0–1)
BILIRUB SERPL-MCNC: 0.54 MG/DL (ref 0.2–1)
BLD GP AB SCN SERPL QL: NEGATIVE
BUN SERPL-MCNC: 22 MG/DL (ref 5–25)
CALCIUM SERPL-MCNC: 10 MG/DL (ref 8.4–10.2)
CHLORIDE SERPL-SCNC: 103 MMOL/L (ref 96–108)
CO2 SERPL-SCNC: 29 MMOL/L (ref 21–32)
CREAT SERPL-MCNC: 0.77 MG/DL (ref 0.6–1.3)
EOSINOPHIL # BLD AUTO: 0.42 THOUSAND/ÂΜL (ref 0–0.61)
EOSINOPHIL NFR BLD AUTO: 8 % (ref 0–6)
ERYTHROCYTE [DISTWIDTH] IN BLOOD BY AUTOMATED COUNT: 12.9 % (ref 11.6–15.1)
EST. AVERAGE GLUCOSE BLD GHB EST-MCNC: 114 MG/DL
GFR SERPL CREATININE-BSD FRML MDRD: 80 ML/MIN/1.73SQ M
GLUCOSE P FAST SERPL-MCNC: 93 MG/DL (ref 65–99)
HBA1C MFR BLD: 5.6 %
HCT VFR BLD AUTO: 46.2 % (ref 34.8–46.1)
HGB BLD-MCNC: 14.8 G/DL (ref 11.5–15.4)
IMM GRANULOCYTES # BLD AUTO: 0.01 THOUSAND/UL (ref 0–0.2)
IMM GRANULOCYTES NFR BLD AUTO: 0 % (ref 0–2)
INR PPP: 0.93 (ref 0.84–1.19)
LYMPHOCYTES # BLD AUTO: 1.78 THOUSANDS/ÂΜL (ref 0.6–4.47)
LYMPHOCYTES NFR BLD AUTO: 33 % (ref 14–44)
MCH RBC QN AUTO: 28.7 PG (ref 26.8–34.3)
MCHC RBC AUTO-ENTMCNC: 32 G/DL (ref 31.4–37.4)
MCV RBC AUTO: 90 FL (ref 82–98)
MONOCYTES # BLD AUTO: 0.46 THOUSAND/ÂΜL (ref 0.17–1.22)
MONOCYTES NFR BLD AUTO: 9 % (ref 4–12)
NEUTROPHILS # BLD AUTO: 2.65 THOUSANDS/ÂΜL (ref 1.85–7.62)
NEUTS SEG NFR BLD AUTO: 50 % (ref 43–75)
NRBC BLD AUTO-RTO: 0 /100 WBCS
P AXIS: 17 DEGREES
PLATELET # BLD AUTO: 292 THOUSANDS/UL (ref 149–390)
PMV BLD AUTO: 10.2 FL (ref 8.9–12.7)
POTASSIUM SERPL-SCNC: 4.4 MMOL/L (ref 3.5–5.3)
PR INTERVAL: 170 MS
PROT SERPL-MCNC: 7.1 G/DL (ref 6.4–8.4)
PROTHROMBIN TIME: 12.5 SECONDS (ref 11.6–14.5)
QRS AXIS: -30 DEGREES
QRSD INTERVAL: 92 MS
QT INTERVAL: 436 MS
QTC INTERVAL: 438 MS
RBC # BLD AUTO: 5.16 MILLION/UL (ref 3.81–5.12)
RH BLD: POSITIVE
SODIUM SERPL-SCNC: 139 MMOL/L (ref 135–147)
SPECIMEN EXPIRATION DATE: NORMAL
T WAVE AXIS: 1 DEGREES
VENTRICULAR RATE: 61 BPM
WBC # BLD AUTO: 5.34 THOUSAND/UL (ref 4.31–10.16)

## 2023-03-02 NOTE — TELEPHONE ENCOUNTER
Pt called informing us that her surgery was moved from May to March 15th and she needs a medical clearance appointment  Dr Khalil Hands schedule is full and cannot accommodate the pt  Would this pt be able to see you instead? There is an opening 3/2 at 1pm in your schedule if that is ok

## 2023-03-03 ENCOUNTER — TELEPHONE (OUTPATIENT)
Dept: FAMILY MEDICINE CLINIC | Facility: CLINIC | Age: 68
End: 2023-03-03

## 2023-03-03 ENCOUNTER — TELEPHONE (OUTPATIENT)
Dept: OBGYN CLINIC | Facility: HOSPITAL | Age: 68
End: 2023-03-03

## 2023-03-03 DIAGNOSIS — M17.12 PRIMARY OSTEOARTHRITIS OF LEFT KNEE: Primary | ICD-10-CM

## 2023-03-03 LAB
DME PARACHUTE DELIVERY DATE REQUESTED: NORMAL
DME PARACHUTE ITEM DESCRIPTION: NORMAL
DME PARACHUTE ORDER STATUS: NORMAL
DME PARACHUTE SUPPLIER NAME: NORMAL
DME PARACHUTE SUPPLIER PHONE: NORMAL

## 2023-03-03 NOTE — TELEPHONE ENCOUNTER
Preoperative Elective Admission Assessment- spoke to patient    navya DURGA TENORIO    Living Situation:    Who does pt live with: spouse  What kind of home: multi-level, [place]   How do they enter the home: garage  How many levels in home: 2    # of steps to enter home: 0 to enter   # of steps to second floor: 13 to 2nd floor   Are there handrails: Yes  Are there landings: No  Sleeping arrangement: first/entry floor  Where is Bathroom: First floor half bathroom  Where is the tub or shower: Step in tub on 2nd floor   First Floor Setup:   Is there a bathroom: Yes  Where would pt sleep: bed     DME: cane     Patient's Current Level of Function: Ambulates: Independently and ADLs: Independent    Post-op Caregiver: spouse  Caregiver Name and phone number for Inpatient discharge needs: Spouse, [phone]  Currently receive any HHC/aides/community supports: No     Post-op Transport: spouse  To/from hospital: spouse  To/from PT 2-3x/week: spouse  Uses community transport now: No     Outpatient Physical Therapy Site:  Site:[place]   pre and post-op appts scheduled? Yes     Medication Management: self  Preferred Pharmacy for Post-op Medications: [facility]   Blood Management Vitamin Regimen: Pt confirms taking as prescribed  Post-op anticoagulant: to be determined by surgical team postoperatively     DC Plan: Pt plans to be discharged home  Pt educated that our goal, if at all possible, is to appropriately discharge patient based off their post-op function while striving to maintain maximal independence  If possible, the goal is to discharge patient to home and for them to attend outpatient physical therapy      Barriers to DC identified preoperatively: none identified    BMI: 27 99     Enrolled in [facility] [employer]     Patient Education:  Pt educated on post-op pain, early mobilization (POD0), Average inpt LOS, OP PT goal   Patient educated that our goal is to appropriately discharge patient based off their post-op function while striving to maintain maximal independence  The goal is to discharge patient to home and for them to attend outpatient physical therapy

## 2023-03-03 NOTE — TELEPHONE ENCOUNTER
Pt called states to please cancel all her appointment she will transfer to a new provider that will see her for an upcoming surgery appointment that is scheduled for March 15th

## 2023-03-03 NOTE — TELEPHONE ENCOUNTER
Caller: Self    Doctor: Augie Torres    Reason for call: Patient was told by PCP that she needed to bring a packet with her for clearance appt  She did not receive one, was it faxed to the office   She also updated her PCP to Ashley Aguilera in [place]  Fax     Call back#: [phone]

## 2023-03-03 NOTE — TELEPHONE ENCOUNTER
Caller: Patient    Doctor: Caron Apo    Reason for call: Calling back because she forgot that she loaned her cane to her father  Spoke with nurse about this and advised patient that nurse said it is okay  That a cane is not required immediately after surgery and we can order one if needed  Patient verbalized understanding and appreciation       Call back#: n/a

## 2023-03-07 ENCOUNTER — EVALUATION (OUTPATIENT)
Dept: PHYSICAL THERAPY | Facility: CLINIC | Age: 68
End: 2023-03-07

## 2023-03-07 DIAGNOSIS — M17.12 PRIMARY OSTEOARTHRITIS OF LEFT KNEE: Primary | ICD-10-CM

## 2023-03-07 RX ORDER — IBUPROFEN 200 MG
TABLET ORAL EVERY 6 HOURS PRN
COMMUNITY
End: 2023-03-16

## 2023-03-07 RX ORDER — MULTIVITAMIN
1 TABLET ORAL DAILY
COMMUNITY

## 2023-03-07 RX ORDER — ACETAMINOPHEN 325 MG/1
650 TABLET ORAL EVERY 6 HOURS PRN
COMMUNITY

## 2023-03-07 RX ORDER — FOLIC ACID 1 MG/1
TABLET ORAL DAILY
COMMUNITY

## 2023-03-07 NOTE — PROGRESS NOTES
PT Evaluation     Today's date: 3/7/2023  Patient name: Jessica Montalvo  : 1955  MRN: [id number]  Referring provider: Zaida Barnett  Dx:   Encounter Diagnosis     ICD-10-CM    1  Primary osteoarthritis of left knee  M17 12 Ambulatory referral to Physical Therapy          Start Time: 1345  Stop Time: 1415  Total time in clinic (min): 30 minutes    Assessment  Assessment details: Jessica Montalvo was seen for an initial PT evaluation today  Patient is a 79 y o  female with diagnosis of left knee pain and past medical history significant for OA, chronic pain, thyroid disease, GERD, hiatal hernia, hyperlipidemia, HTN, rotator cuff tear with repair, L buttock pain with history of surgery, lipomas with removal, lumbar radiculopathy, vertigo  Moderate complexity evaluation  due to number of participation restrictions, functional outcome measures, and evolving clinical presentation  Findings today show limitation in left knee strength, balance/proprioception, range of motion, and mobility with pain and decreased knowledge of s/p treatment, precautions, and functional adaptations of movement and home set up  Skilled PT indicated to treat at this time to address any current issues, knowledge deficits and review post op home exercise program as well as setting up first f/u session post surgery  Impairments: abnormal gait, abnormal muscle firing, abnormal muscle tone, abnormal or restricted ROM, abnormal movement, activity intolerance, impaired balance, impaired physical strength, lacks appropriate home exercise program, pain with function and safety issue  Functional limitations: walking, stairs, lifting, prolonged weightbearing, squatting, lunging  Goals  Pre-Op Goals:  1  Patient will be able to identify hazards around home from review of home preparation checklist/virtual home assessment and be able to verbalize an action plan to help reduce fall risk and increase comfort    2  Patient will be able to demonstrate understanding of post-op home exercise program, as well as ambulating with AD that will likely be required at initial s/p  STG (6 weeks s/p)  1  Patient will have reported 0/10 pain in left knee at rest    2  Improve patient's left knee flexion to 100 degrees for increased ability to take proper strides during ambulation  3  Increase patient's TUG test by 5 seconds for increased mobility and decreased fall risk  LTG (12 weeks s/p)  1  Patient's LE strength will be equal bilaterally for ability to ambulate and return to functional activities at OF  2  Patient will be able to walk in community with 0/10 pain in left knee  3  Patient will be independent with home exercise program for continued maintenance post PT discharge  Plan  Plan details: Re-evaluation at first s/p follow up visit  Patient would benefit from: skilled physical therapy  Planned modality interventions: unattended electrical stimulation, thermotherapy: hydrocollator packs and cryotherapy  Planned therapy interventions: manual therapy, neuromuscular re-education, self care, home exercise program, gait training, therapeutic exercise and therapeutic activities  Frequency: 2-3x/week  Duration in weeks: 12  Plan of Care beginning date: 3/7/2023  Plan of Care expiration date: 2023  Treatment plan discussed with: patient and PTA        Subjective Evaluation    History of Present Illness  Mechanism of injury: Bhavik Samayoa is a 79 y o  female who presents to outpatient Physical Therapy today with complaints of L knee pain  Chronic left knee pain with meniscal surgery in 2022  Pain persisted with swelling and decreased function  Did attempt injections with no improvement  To have TKA 3/15/23       Pain  Current pain ratin  At best pain ratin  At worst pain rating: 10  Location: L anterior knee  Quality: dull ache    Social Support  Steps to enter house: yes  Stairs in house: yes   13  Lives in: multiple-level home  Lives with: spouse    Employment status: not working  Patient Goals  Patient goal: Walk normal without AD  Objective     Observations   Left Knee   Positive for edema and effusion  Neurological Testing     Sensation     Knee   Left Knee   Intact: light touch    Right Knee   Intact: light touch     Active Range of Motion   Left Knee   Flexion: 85 degrees   Extension: -5 degrees     Right Knee   Flexion: 140 degrees   Extension: 5 degrees     Mobility   Patellar Mobility:   Left Knee   Hypomobile: left medial, left lateral, left superior and left inferior    Right Knee   WFL: medial, lateral, superior and inferior    Strength/Myotome Testing     Left Knee   Flexion: 3-  Extension: 2+  Quadriceps contraction: poor    Right Knee   Flexion: 5  Extension: 5  Quadriceps contraction: good    Tests     Additional Tests Details  Gait: Antalgic, no AD  Decreased stance on LLE with shortened stride length    TUG Test= 19 sec (>14sec= fall risk)  30 second sit to stand= 6    knee disability and osteoarthritis outcome score survey jr:  I  Stiffness= 3  II  Pain= 10  III Function, daily living= 4  =45      General Comments:      Knee Comments  Virtual home assessment: Received and reviewed, moderate barriers (stairs)       Pre-op Paitient Instruction:  Pain management  Gait training  Stair climbing  Initial post-op HEP             Precautions: none noted  Access code: [email or web address]  Progress note: 4/7  POC: 6/7    Manuals 3/7       PROM         stretching        Patellar mobs                Neuro Re-Ed        QS reviewed       SLB        Tandem stance        Fitter board        steamboat                        Ther Ex        Bike        Ankle pumps reviewed       SAQ        LAQ reviewed       bridging        Leg raises        Heel slides reviewed       mikala ALCANTAR  Knee ext  Knee flex          Leg press S=  Squat  HR        Standing HR/TR        TKE        Standing hamstring curls Calf stretch        Hip flexor stretch        Hamstring stretch        Ther Activity        Step ups        Sit to stand        Mini squats                        Gait Training                        Modalities                        Collette Adu was given a handout and verbal instruction of customized home exercise program  Patient accepted program and was able to demonstrate exercises

## 2023-03-07 NOTE — PRE-PROCEDURE INSTRUCTIONS
Pre-Surgery Instructions:   Medication Instructions   • acetaminophen (TYLENOL) 325 mg tablet Uses PRN- OK to take day of surgery   • [START ON 4/17/2023] ascorbic acid (VITAMIN C) 500 MG tablet Hold day of surgery  • atorvastatin (LIPITOR) 10 mg tablet Take day of surgery  • Ferrous Sulfate (IRON PO) Hold day of surgery  • folic acid (FOLVITE) 1 mg tablet Hold day of surgery  • hydrOXYzine HCL (ATARAX) 25 mg tablet Take night before surgery   • ibuprofen (Advil) 200 mg tablet Stop taking 3 days prior to surgery  • losartan (COZAAR) 100 MG tablet Hold day of surgery  • methocarbamol (Robaxin-750) 750 mg tablet Uses PRN- OK to take day of surgery   • metoclopramide (REGLAN) 10 mg tablet Uses PRN- DO NOT take day of surgerypt reports has not been taking  • Multiple Vitamin (multivitamin) tablet Hold day of surgery  Medication instructions for day surgery reviewed  Please use only a sip of water to take your instructed medications  Avoid all over the counter vitamins, supplements and NSAIDS for one week prior to surgery per anesthesia guidelines  Tylenol is ok to take as needed  You will receive a call one business day prior to surgery with an arrival time and hospital directions  If your surgery is scheduled on a Monday, the hospital will be calling you on the Friday prior to your surgery  If you have not heard from anyone by 8pm, please call the hospital supervisor through the hospital  at [phone]  Pippa Astorga [phone])  Do not eat or drink anything after midnight the night before your surgery, including candy, mints, lifesavers, or chewing gum  Do not drink alcohol 24hrs before your surgery  Try not to smoke at least 24hrs before your surgery  Follow the pre surgery showering instructions as listed in the [facility] Surgical Experience Booklet” or otherwise provided by your surgeon's office  Do not shave the surgical area 24 hours before surgery   Do not apply any lotions, creams, including makeup, cologne, deodorant, or perfumes after showering on the day of your surgery  No contact lenses, eye make-up, or artificial eyelashes  Remove nail polish, including gel polish, and any artificial, gel, or acrylic nails if possible  Remove all jewelry including rings and body piercing jewelry  Wear causal clothing that is easy to take on and off  Consider your type of surgery  Keep any valuables, jewelry, piercings at home  Please bring any specially ordered equipment (sling, braces) if indicated  Arrange for a responsible person to drive you to and from the hospital on the day of your surgery  Visitor Guidelines discussed  Call the surgeon's office with any new illnesses, exposures, or additional questions prior to surgery  Please reference your [facility] Surgical Experience Booklet” for additional information to prepare for your upcoming surgery

## 2023-03-08 NOTE — TELEPHONE ENCOUNTER
Caller: Self    Doctor: Carrie Chow    Reason for call: Calling to confirm clearance paperwork was sent to office, transferred to surgery coordinator in  [facility] pass    Call back#: [phone]

## 2023-03-09 ENCOUNTER — CONSULT (OUTPATIENT)
Dept: FAMILY MEDICINE CLINIC | Facility: CLINIC | Age: 68
End: 2023-03-09

## 2023-03-09 VITALS
SYSTOLIC BLOOD PRESSURE: 122 MMHG | HEART RATE: 89 BPM | BODY MASS INDEX: 27.11 KG/M2 | WEIGHT: 153 LBS | TEMPERATURE: 99.2 F | HEIGHT: 63 IN | OXYGEN SATURATION: 99 % | DIASTOLIC BLOOD PRESSURE: 68 MMHG | RESPIRATION RATE: 20 BRPM

## 2023-03-09 DIAGNOSIS — I10 ESSENTIAL HYPERTENSION: ICD-10-CM

## 2023-03-09 DIAGNOSIS — M17.12 PRIMARY OSTEOARTHRITIS OF LEFT KNEE: Primary | ICD-10-CM

## 2023-03-09 DIAGNOSIS — E06.3 HASHIMOTO'S THYROIDITIS: ICD-10-CM

## 2023-03-09 DIAGNOSIS — E55.9 VITAMIN D DEFICIENCY: ICD-10-CM

## 2023-03-09 DIAGNOSIS — E78.5 HYPERLIPIDEMIA, UNSPECIFIED HYPERLIPIDEMIA TYPE: ICD-10-CM

## 2023-03-09 DIAGNOSIS — Z00.00 MEDICARE ANNUAL WELLNESS VISIT, SUBSEQUENT: ICD-10-CM

## 2023-03-09 DIAGNOSIS — Z01.818 PRE-OP EXAMINATION: ICD-10-CM

## 2023-03-09 LAB
DME PARACHUTE DELIVERY DATE ACTUAL: NORMAL
DME PARACHUTE DELIVERY DATE EXPECTED: NORMAL
DME PARACHUTE DELIVERY DATE REQUESTED: NORMAL
DME PARACHUTE ITEM DESCRIPTION: NORMAL
DME PARACHUTE ORDER STATUS: NORMAL
DME PARACHUTE SUPPLIER NAME: NORMAL
DME PARACHUTE SUPPLIER PHONE: NORMAL

## 2023-03-09 NOTE — PATIENT INSTRUCTIONS
Medicare Preventive Visit Patient Instructions  Thank you for completing your Welcome to Medicare Visit or Medicare Annual Wellness Visit today  Your next wellness visit will be due in one year (3/9/2024)  The screening/preventive services that you may require over the next 5-10 years are detailed below  Some tests may not apply to you based off risk factors and/or age  Screening tests ordered at today's visit but not completed yet may show as past due  Also, please note that scanned in results may not display below  Preventive Screenings:  Service Recommendations Previous Testing/Comments   Colorectal Cancer Screening  * Colonoscopy    * Fecal Occult Blood Test (FOBT)/Fecal Immunochemical Test (FIT)  * Fecal DNA/Cologuard Test  * Flexible Sigmoidoscopy Age: 39-70 years old   Colonoscopy: every 10 years (may be performed more frequently if at higher risk)  OR  FOBT/FIT: every 1 year  OR  Cologuard: every 3 years  OR  Sigmoidoscopy: every 5 years  Screening may be recommended earlier than age 39 if at higher risk for colorectal cancer  Also, an individualized decision between you and your healthcare provider will decide whether screening between the ages of 74-80 would be appropriate  Colonoscopy: 06/26/2017  FOBT/FIT: Not on file  Cologuard: Not on file  Sigmoidoscopy: Not on file    Screening Current     Breast Cancer Screening Age: 36 years old  Frequency: every 1-2 years  Not required if history of left and right mastectomy Mammogram: 02/28/2023    Screening Current   Cervical Cancer Screening Between the ages of 21-29, pap smear recommended once every 3 years  Between the ages of 33-67, can perform pap smear with HPV co-testing every 5 years     Recommendations may differ for women with a history of total hysterectomy, cervical cancer, or abnormal pap smears in past  Pap Smear: 06/20/2019    Screening Not Indicated   Hepatitis C Screening Once for adults born between 1945 and 1965  More frequently in patients at high risk for Hepatitis C Hep C Antibody: 05/15/2018    Screening Current   Diabetes Screening 1-2 times per year if you're at risk for diabetes or have pre-diabetes Fasting glucose: 93 mg/dL (3/2/2023)  A1C: 5 6 % (3/2/2023)  Screening Current   Cholesterol Screening Once every 5 years if you don't have a lipid disorder  May order more often based on risk factors  Lipid panel: 02/20/2023    Screening Not Indicated  History Lipid Disorder     Other Preventive Screenings Covered by Medicare:  1  Abdominal Aortic Aneurysm (AAA) Screening: covered once if your at risk  You're considered to be at risk if you have a family history of AAA  2  Lung Cancer Screening: covers low dose CT scan once per year if you meet all of the following conditions: (1) Age 50-69; (2) No signs or symptoms of lung cancer; (3) Current smoker or have quit smoking within the last 15 years; (4) You have a tobacco smoking history of at least 20 pack years (packs per day multiplied by number of years you smoked); (5) You get a written order from a healthcare provider  3  Glaucoma Screening: covered annually if you're considered high risk: (1) You have diabetes OR (2) Family history of glaucoma OR (3)  aged 48 and older OR (3)  American aged 72 and older  3  Osteoporosis Screening: covered every 2 years if you meet one of the following conditions: (1) You're estrogen deficient and at risk for osteoporosis based off medical history and other findings; (2) Have a vertebral abnormality; (3) On glucocorticoid therapy for more than 3 months; (4) Have primary hyperparathyroidism; (5) On osteoporosis medications and need to assess response to drug therapy  · Last bone density test (DXA Scan): 10/27/2021   5  HIV Screening: covered annually if you're between the age of 15-65  Also covered annually if you are younger than 13 and older than 72 with risk factors for HIV infection   For pregnant patients, it is covered up to 3 times per pregnancy  Immunizations:  Immunization Recommendations   Influenza Vaccine Annual influenza vaccination during flu season is recommended for all persons aged >= 6 months who do not have contraindications   Pneumococcal Vaccine   * Pneumococcal conjugate vaccine = PCV13 (Prevnar 13), PCV15 (Vaxneuvance), PCV20 (Prevnar 20)  * Pneumococcal polysaccharide vaccine = PPSV23 (Pneumovax) Adults 25-60 years old: 1-3 doses may be recommended based on certain risk factors  Adults 72 years old: 1-2 doses may be recommended based off what pneumonia vaccine you previously received   Hepatitis B Vaccine 3 dose series if at intermediate or high risk (ex: diabetes, end stage renal disease, liver disease)   Tetanus (Td) Vaccine - COST NOT COVERED BY MEDICARE PART B Following completion of primary series, a booster dose should be given every 10 years to maintain immunity against tetanus  Td may also be given as tetanus wound prophylaxis  Tdap Vaccine - COST NOT COVERED BY MEDICARE PART B Recommended at least once for all adults  For pregnant patients, recommended with each pregnancy  Shingles Vaccine (Shingrix) - COST NOT COVERED BY MEDICARE PART B  2 shot series recommended in those aged 48 and above     Health Maintenance Due:      Topic Date Due   • Breast Cancer Screening: Mammogram  02/28/2024   • Colorectal Cancer Screening  06/26/2027   • Hepatitis C Screening  Completed     Immunizations Due:      Topic Date Due   • COVID-19 Vaccine (3 - Booster for Pfizer series) 07/17/2021   • Pneumococcal Vaccine: 65+ Years (1 - PCV) Never done     Advance Directives   What are advance directives? Advance directives are legal documents that state your wishes and plans for medical care  These plans are made ahead of time in case you lose your ability to make decisions for yourself  Advance directives can apply to any medical decision, such as the treatments you want, and if you want to donate organs     What are the types of advance directives? There are many types of advance directives, and each state has rules about how to use them  You may choose a combination of any of the following:  · Living will: This is a written record of the treatment you want  You can also choose which treatments you do not want, which to limit, and which to stop at a certain time  This includes surgery, medicine, IV fluid, and tube feedings  · Durable power of  for healthcare [facility]): This is a written record that states who you want to make healthcare choices for you when you are unable to make them for yourself  This person, called a proxy, is usually a family member or a friend  You may choose more than 1 proxy  · Do not resuscitate (DNR) order:  A DNR order is used in case your heart stops beating or you stop breathing  It is a request not to have certain forms of treatment, such as CPR  A DNR order may be included in other types of advance directives  · Medical directive: This covers the care that you want if you are in a coma, near death, or unable to make decisions for yourself  You can list the treatments you want for each condition  Treatment may include pain medicine, surgery, blood transfusions, dialysis, IV or tube feedings, and a ventilator (breathing machine)  · Values history: This document has questions about your views, beliefs, and how you feel and think about life  This information can help others choose the care that you would choose  Why are advance directives important? An advance directive helps you control your care  Although spoken wishes may be used, it is better to have your wishes written down  Spoken wishes can be misunderstood, or not followed  Treatments may be given even if you do not want them  An advance directive may make it easier for your family to make difficult choices about your care     Weight Management   Why it is important to manage your weight:  Being overweight increases your risk of health conditions such as heart disease, high blood pressure, type 2 diabetes, and certain types of cancer  It can also increase your risk for osteoarthritis, sleep apnea, and other respiratory problems  Aim for a slow, steady weight loss  Even a small amount of weight loss can lower your risk of health problems  How to lose weight safely:  A safe and healthy way to lose weight is to eat fewer calories and get regular exercise  You can lose up about 1 pound a week by decreasing the number of calories you eat by 500 calories each day  Healthy meal plan for weight management:  A healthy meal plan includes a variety of foods, contains fewer calories, and helps you stay healthy  A healthy meal plan includes the following:  · Eat whole-grain foods more often  A healthy meal plan should contain fiber  Fiber is the part of grains, fruits, and vegetables that is not broken down by your body  Whole-grain foods are healthy and provide extra fiber in your diet  Some examples of whole-grain foods are whole-wheat breads and pastas, oatmeal, brown rice, and bulgur  · Eat a variety of vegetables every day  Include dark, leafy greens such as spinach, kale, karson greens, and mustard greens  Eat yellow and orange vegetables such as carrots, sweet potatoes, and winter squash  · Eat a variety of fruits every day  Choose fresh or canned fruit (canned in its own juice or light syrup) instead of juice  Fruit juice has very little or no fiber  · Eat low-fat dairy foods  Drink fat-free (skim) milk or 1% milk  Eat fat-free yogurt and low-fat cottage cheese  Try low-fat cheeses such as mozzarella and other reduced-fat cheeses  · Choose meat and other protein foods that are low in fat  Choose beans or other legumes such as split peas or lentils  Choose fish, skinless poultry (chicken or turkey), or lean cuts of red meat (beef or pork)  Before you cook meat or poultry, cut off any visible fat  · Use less fat and oil    Try baking foods instead of frying them  Add less fat, such as margarine, sour cream, regular salad dressing and mayonnaise to foods  Eat fewer high-fat foods  Some examples of high-fat foods include french fries, doughnuts, ice cream, and cakes  · Eat fewer sweets  Limit foods and drinks that are high in sugar  This includes candy, cookies, regular soda, and sweetened drinks  Exercise:  Exercise at least 30 minutes per day on most days of the week  Some examples of exercise include walking, biking, dancing, and swimming  You can also fit in more physical activity by taking the stairs instead of the elevator or parking farther away from stores  Ask your healthcare provider about the best exercise plan for you  © Copyright [employer] 2018 Information is for End User's use only and may not be sold, redistributed or otherwise used for commercial purposes   All illustrations and images included in CareNotes® are the copyrighted property of A D [employer] M [employer]  or [address] [place] [address]

## 2023-03-09 NOTE — PROGRESS NOTES
Assessment and Plan:     Preop Medical clearance completed- low cardiovascular risk    Problem List Items Addressed This Visit        Cardiovascular and Mediastinum    Essential hypertension    Relevant Orders    Comprehensive metabolic panel    CBC and differential       Other    Vitamin D deficiency    Relevant Orders    Vitamin D 25 hydroxy    Hyperlipidemia    Relevant Orders    Lipid panel   Other Visit Diagnoses     Primary osteoarthritis of left knee    -  Primary    Medicare annual wellness visit, subsequent        Pre-op examination        Hashimoto's thyroiditis        Relevant Orders    TSH, 3rd generation with Free T4 reflex            Depression Screening and Follow-up Plan: Patient was screened for depression during today's encounter  They screened negative with a PHQ-2 score of 0  Preventive health issues were discussed with patient, and age appropriate screening tests were ordered as noted in patient's After Visit Summary  Personalized health advice and appropriate referrals for health education or preventive services given if needed, as noted in patient's After Visit Summary  History of Present Illness:     Patient presents for a Medicare Wellness Visit    Presurgical Evaluation    Subjective:     Patient ID: Gricelda Urias is a 79 y o  female  No chief complaint on file  [unfilled]    The following portions of the patient's history were reviewed and updated as appropriate: allergies, current medications, past family history, past medical history, past social history, past surgical history and problem list     Procedure date: 3 15 23    Surgeon:  Dr Cris Saldaña  Planned procedure:   Total knee replacement- left knee  Diagnosis for procedure:  Left knee osteoarthritis    Prior anesthesia: Yes   General; Complications:  None / Tolerated well    CAD History: None      Pulmonary History: None    Renal history: None    Diabetes History:  None     Neurological History: None     On Immunosuppressant meds/biologics: No      @[email or web address]@     Current Outpatient Medications:  acetaminophen (TYLENOL) 325 mg tablet, Take 650 mg by mouth every 6 (six) hours as needed for mild pain, Disp: , Rfl:   (START ON 4/17/2023) ascorbic acid (VITAMIN C) 500 MG tablet, Take 1 tablet (500 mg total) by mouth 2 (two) times a day Do not start before April 17, 2023 , Disp: 60 tablet, Rfl: 1  atorvastatin (LIPITOR) 10 mg tablet, take 1 tablet by mouth once daily, Disp: 90 tablet, Rfl: 0  CALCIUM PO, Take by mouth, Disp: , Rfl:   famotidine (PEPCID) 20 mg tablet, Take 1 tablet (20 mg total) by mouth 2 (two) times a day, Disp: 180 tablet, Rfl: 0  Ferrous Sulfate (IRON PO), Take by mouth daily, Disp: , Rfl:   folic acid (FOLVITE) 1 mg tablet, Take by mouth daily, Disp: , Rfl:   ibuprofen (MOTRIN) 200 mg tablet, Take by mouth every 6 (six) hours as needed for mild pain, Disp: , Rfl:   losartan (COZAAR) 100 MG tablet, take 1 tablet by mouth once daily, Disp: 90 tablet, Rfl: 1  meloxicam (MOBIC) 15 mg tablet, Take 1 tablet (15 mg total) by mouth daily, Disp: 30 tablet, Rfl: 0  methocarbamol (Robaxin-750) 750 mg tablet, Take 1 tablet (750 mg total) by mouth every 8 (eight) hours, Disp: 90 tablet, Rfl: 1  metoclopramide (REGLAN) 10 mg tablet, Take 1 tablet (10 mg total) by mouth 4 (four) times a day, Disp: 120 tablet, Rfl: 6  Multiple Vitamin (multivitamin) tablet, Take 1 tablet by mouth daily, Disp: , Rfl:   hydrOXYzine HCL (ATARAX) 25 mg tablet, TAKE 1 TO 2 TABLETS BY MOUTH DAILY AT BEDTIME AS NEEDED (Patient taking differently: daily at bedtime), Disp: 60 tablet, Rfl: 2  lidocaine (XYLOCAINE) 5 % ointment, Apply topically 3 (three) times a day as needed for mild pain, Disp: 50 g, Rfl: 5    No current facility-administered medications for this visit        Allergies on file:   Hydrocodone    Patient Active Problem List:     OAB (overactive bladder)     Vitamin D deficiency     Essential hypertension     Hyperlipidemia Sacroiliitis (HCC)     Myofascial pain     Mixed incontinence     Primary osteoarthritis of both knees     Chronic pain of left knee     BMI 32 0-32 9,adult     GERD (gastroesophageal reflux disease)     Dysphagia     Pain of upper abdomen     Paraesophageal hernia     Gastroesophageal reflux disease     Heartburn     Anti-TPO antibodies present     Multinodular goiter     Nausea     Esophageal obstruction due to food impaction     Benign lipomatous neoplasm of skin and subcutaneous tissue of left arm     Tear of right rotator cuff     Rupture of gluteus minimus tendon, left, subsequent encounter     Tendinopathy of left gluteus medius     Family history of colon cancer     Bloating     Chronic pain syndrome     Lumbar radiculopathy     Left buttock pain     Piriformis muscle pain     Lipoma of thigh     Flatulence     Tear of medial meniscus of left knee, current     Tendinopathy of right gluteus medius       Past Medical History:  No date: Anemia  No date: Arthritis  No date: Chronic pain disorder      Comment:  back and hip pain  No date: Disease of thyroid gland      Comment:  nodules  No date: History of gastroesophageal reflux (GERD)      Comment:  "had hiatal hernia surgery"  No date: History of heart murmur in childhood  No date: Hyperlipidemia  No date: Hypertension  No date: Overactive bladder  No date: Rotator cuff tear, left      Comment:  pt unsure  No date: Rotator cuff tear, right      Comment:  had surgery  No date: Vitamin D deficiency      Comment:  unsure  No date: Wears glasses  No date: Wears partial dentures      Comment:  upper    Past Surgical History:  No date:  SECTION      Comment:  Last Assessed: 2017  No date: COLONOSCOPY  No date: ELBOW SURGERY      Comment:  Last Assessed: 2017: ESOPHAGOGASTRODUODENOSCOPY; N/A      Comment:  Procedure: ESOPHAGOGASTRODUODENOSCOPY (EGD); Surgeon:                Tim Meyer DO;  Location: [facility];   Service: Gastroenterology  05/19/2020: HERNIA REPAIR      Comment:  Hiatal hernia repair  02/08/2018: HIP SURGERY; Left      Comment:  glut medius/minimus repair  and 8/8/18--with pin                implanted  No date: HYSTERECTOMY      Comment:  2011  No date: KNEE SURGERY      Comment:  x2  05/19/2020: PARAESOPHAGEAL HERNIA REPAIR; N/A      Comment:  Procedure: LAPAROSCOPIC PARAESOPHAGEAL HERNIA REPAIR                WITH MESH AND NISSEN FUNDOPLICATION WITH ROBOTICS;                 Surgeon: Sharon Meyer MD;  Location: [facility];                 Service: General  06/15/2022: MN ARTHRS KNE SURG W/MENISCECTOMY MED/LAT W/SHVG; Left      Comment:  Procedure: ;left knee arthroscopy,                meniscectomy,synevectomy,chondroplasty, loose body                removal, injection;  Surgeon: Elmer Robison DO;                 Location: [facility];  Service: Orthopedics  06/26/2017: MN COLONOSCOPY FLX DX W/COLLJ SPEC WHEN PFRMD; N/A      Comment:  Procedure: EGD AND COLONOSCOPY;  Surgeon: Noe Grigsby DO;  Location: [facility]; Service:                Gastroenterology  05/26/2022: MN EXCISON TUMOR SOFT TISSUE THIGH/KNEE SUBQ 3 CM/>;   Bilateral      Comment:  Procedure: EXCISION BIOPSY TISSUE LESION/MASS LOWER                EXTREMITY;  Surgeon: Asia See MD;  Location: [facility];  Service: General  08/25/2022: MN EXCISON TUMOR SOFT TISSUE THIGH/KNEE SUBQ 3 CM/>;   Bilateral      Comment:  Procedure: EXCISION BIOPSY TISSUE LESION/MASS LOWER                EXTREMITY;  Surgeon: Asia See MD;  Location: [facility];  Service: General  No date: SHOULDER ARTHROCENTESIS  06/23/2021: SHOULDER ARTHROSCOPY;  Right      Comment:  Procedure: SHOULDER ARTHROSCOPY WITH acromioplasty,                debridment, and ROTATOR CUFF REPAIR;  Surgeon: Israel Cuello DO;  Location: [address] [place] [address] MAIN OR;  Service: Orthopedics  No date: TUBAL LIGATION    Review of patient's family history indicates:      Social History    Tobacco Use      Smoking status: Former        Packs/day: 1 00        Types: Cigarettes        Quit date:         Years since quittin 2      Smokeless tobacco: Never      Tobacco comments: quit 25 yrs ago    Vaping Use      Vaping Use: Never used    Alcohol use: Not Currently    Drug use: No      Objective:    ---------------------------               23                      0957         ---------------------------   BP:          122/68         Pulse:         89           Resp:          20           Temp:   99 2 °F (37 3 °C)   TempSrc:    Tympanic        SpO2:          99%          Weight: 69 4 kg (153 lb)    Height:   5' 3" (1 6 m)    ---------------------------    [unfilled]     Preop labs/testing available and reviewed: yes    eGFR       Date                     Value               Ref Range           Status                2023               80                  ml/min/1 73sq m     Final            ----------  WBC       Date                     Value               Ref Range           Status                2023               5 34                4 31 - 10 16 T*     Final            ----------  INR       Date                     Value               Ref Range           Status                2023               0 93                0 84 - 1 19         Final            ----------    EKG yes    Echo no    Stress test/cath no    PFT/[place] no    Functional capacity: Shovel snow                          6 Mets   Pick the highest level patient can comfortably perform   4 mets or greater for surgery    RCRI  High Risk surgery? 1 Point  CAD History:         1 Point   MI; Positive Stress Test; CP due to Mi;  Nitrate Usage to control Angina;  Pathologic Q wave on EKG  CHF Active:         1 Point   Pulm Edema; Paroxysmal Nocturnal Dyspnea;  Bibasilar Rales (crackles);S3; CHF on CXR  Cerebrovascular Disease (TIA or CVA):     1 Point  DM on Insulin:        1 Point  Serum Creat >2 0 mg/dl:       1 Point          Total Points: 0     Scorin: Class I, Very Low Risk (0 4%)     1: Class II, Low risk (0 9%)     2: Class III Moderate (6 6%)     3: Class IV High (>11%)      RADHA Risk:  GFR: eGFR       Date                     Value               Ref Range           Status                2023               80                  ml/min/1 73sq m     Final            ----------      For PCP:  If GFR>60, Hold ACE/ARB/Diuretic on the day of surgery, and NSAIDS 10 days before  If GFR<45, Consider PRE and POST op Nephrology Consult  If 46 <GFR> 59 : Has Patient had RADHA in last 6 Months? no   If YES: Preop Nephrology consult   If No:  Adalberto 26 Nephrology consult  Assessment/Plan:    Patient is medically optimized (cleared) for the planned procedure  Further testing/evaluation is not required      Postop concerns: no    Problem List Items Addressed This Visit    None  Visit Diagnoses     Medicare annual wellness visit, subsequent    -  Primary        Diagnoses and associated orders for this visit:    • Primary osteoarthritis of left knee    • Medicare annual wellness visit, subsequent    • Pre-op examination       Outpatient Medications Marked as Taking for the 3/9/23 encounter (Consult) with GUS Wilhelm:  acetaminophen (TYLENOL) 325 mg tablet, per anesthesia guidelines   (START ON 2023) ascorbic acid (VITAMIN C) 500 MG tablet, per anesthesia guidelines   atorvastatin (LIPITOR) 10 mg tablet, per anesthesia guidelines   CALCIUM PO, per anesthesia guidelines   famotidine (PEPCID) 20 mg tablet, per anesthesia guidelines   Ferrous Sulfate (IRON PO), per anesthesia guidelines   folic acid (FOLVITE) 1 mg tablet, per anesthesia guidelines   ibuprofen (MOTRIN) 200 mg tablet, Stop taking 1 week prior to surgery  losartan (COZAAR) 100 MG tablet, per anesthesia guidelines   meloxicam (MOBIC) 15 mg tablet, Stop taking 1 week prior to surgery  methocarbamol (Robaxin-750) 750 mg tablet, per anesthesia guidelines   metoclopramide (REGLAN) 10 mg tablet, per anesthesia guidelines   Multiple Vitamin (multivitamin) tablet, per anesthesia guidelines          NOTE: Please use the above to review important meds for your specialty, the remainder "per anesthesia Guidelines "    NOTE: Please place an Inbasket message for "[facility]" pool for complicated patients  Patient Care Team:  Wily Cornejo as PCP - General (Family Medicine)  Wily Cornejo DO as PCP - [address] [place] [address] (RTE)  Vanita Arango, DO  [facility], DO  [facility] as Endoscopist  Lethia Paget, RN as Nurse Navigator (Orthopedics)     Review of Systems:     Review of Systems   Constitutional: Negative for activity change, diaphoresis, fatigue and fever  HENT: Negative for congestion, facial swelling, hearing loss, rhinorrhea, sinus pressure, sinus pain, sneezing, sore throat and voice change  Eyes: Negative for discharge and visual disturbance  Respiratory: Negative for cough, choking, chest tightness, shortness of breath, wheezing and stridor  Cardiovascular: Negative for chest pain, palpitations and leg swelling  Gastrointestinal: Negative for abdominal distention, abdominal pain, constipation, diarrhea, nausea and vomiting  Endocrine: Negative for polydipsia, polyphagia and polyuria  Genitourinary: Negative for difficulty urinating, dysuria, frequency and urgency  Musculoskeletal: Positive for arthralgias and gait problem  Skin: Negative for color change, rash and wound  Neurological: Negative for dizziness, syncope, speech difficulty, weakness, light-headedness and headaches  Hematological: Negative for adenopathy  Does not bruise/bleed easily     Psychiatric/Behavioral: Negative for agitation, behavioral problems, confusion, hallucinations, sleep disturbance and suicidal ideas  The patient is not nervous/anxious           Problem List:     Patient Active Problem List   Diagnosis   • OAB (overactive bladder)   • Vitamin D deficiency   • Essential hypertension   • Hyperlipidemia   • Sacroiliitis (HCC)   • Myofascial pain   • Mixed incontinence   • Primary osteoarthritis of both knees   • Chronic pain of left knee   • BMI 32 0-32 9,adult   • GERD (gastroesophageal reflux disease)   • Dysphagia   • Pain of upper abdomen   • Paraesophageal hernia   • Gastroesophageal reflux disease   • Heartburn   • Anti-TPO antibodies present   • Multinodular goiter   • Nausea   • Esophageal obstruction due to food impaction   • Benign lipomatous neoplasm of skin and subcutaneous tissue of left arm   • Tear of right rotator cuff   • Rupture of gluteus minimus tendon, left, subsequent encounter   • Tendinopathy of left gluteus medius   • Family history of colon cancer   • Bloating   • Chronic pain syndrome   • Lumbar radiculopathy   • Left buttock pain   • Piriformis muscle pain   • Lipoma of thigh   • Flatulence   • Tear of medial meniscus of left knee, current   • Tendinopathy of right gluteus medius      Past Medical and Surgical History:     Past Medical History:   Diagnosis Date   • Anemia    • Arthritis    • Chronic pain disorder     back and hip pain   • Disease of thyroid gland     nodules   • History of gastroesophageal reflux (GERD)     "had hiatal hernia surgery"   • History of heart murmur in childhood    • Hyperlipidemia    • Hypertension    • Overactive bladder    • Rotator cuff tear, left     pt unsure   • Rotator cuff tear, right     had surgery   • Vitamin D deficiency     unsure   • Wears glasses    • Wears partial dentures     upper     Past Surgical History:   Procedure Laterality Date   •  SECTION      Last Assessed: 2017   • COLONOSCOPY     • ELBOW SURGERY      Last Assessed: 2017   • ESOPHAGOGASTRODUODENOSCOPY N/A 2017    Procedure: ESOPHAGOGASTRODUODENOSCOPY (EGD); Surgeon: Kelsey Meraz DO;  Location: [facility]; Service: Gastroenterology   • HERNIA REPAIR  05/19/2020    Hiatal hernia repair   • HIP SURGERY Left 02/08/2018    glut medius/minimus repair  and 8/8/18--with pin implanted   • HYSTERECTOMY      2011   • KNEE SURGERY      x2   • PARAESOPHAGEAL HERNIA REPAIR N/A 05/19/2020    Procedure: LAPAROSCOPIC PARAESOPHAGEAL HERNIA REPAIR WITH MESH AND NISSEN FUNDOPLICATION WITH ROBOTICS;  Surgeon: Asha Antonio MD;  [facility]: [facility];  Service: General   • WA ARTHRS KNE SURG W/MENISCECTOMY MED/LAT W/SHVG Left 06/15/2022    Procedure: ;left knee arthroscopy, meniscectomy,synevectomy,chondroplasty, loose body removal, injection;  Surgeon: Tobi Lindsey DO;  [facility]: [facility];  Service: Orthopedics   • WA COLONOSCOPY FLX DX W/COLLJ SPEC WHEN PFRMD N/A 06/26/2017    Procedure: EGD AND COLONOSCOPY;  Surgeon: Kelsey Meraz DO;  Location: [facility];   Service: Gastroenterology   • WA EXCISON TUMOR SOFT TISSUE THIGH/KNEE SUBQ 3 CM/> Bilateral 05/26/2022    Procedure: EXCISION BIOPSY TISSUE LESION/MASS LOWER EXTREMITY;  Surgeon: Jessica Gleason MD;  [facility];  Service: General   • WA EXCISON TUMOR SOFT TISSUE THIGH/KNEE SUBQ 3 CM/> Bilateral 08/25/2022    Procedure: EXCISION BIOPSY TISSUE LESION/MASS LOWER EXTREMITY;  Surgeon: Jessica Gleason MD;  [facility]: [facility];  Service: General   • SHOULDER ARTHROCENTESIS     • SHOULDER ARTHROSCOPY Right 06/23/2021    Procedure: SHOULDER ARTHROSCOPY WITH acromioplasty, debridment, and ROTATOR CUFF REPAIR;  Surgeon: Jenny Crum DO;  Location: [address] [place] [address] MAIN OR;  Service: Orthopedics   • TUBAL LIGATION        Family History:     Family History   Problem Relation Age of Onset   • Colon cancer Mother    • Heart attack Father    • Other Father         Cardiac Disorder   • Diabetes type II Father    • Colon cancer Sister    • No Known Problems Sister    • No Known Problems Sister    • No Known Problems Sister    • No Known Problems Sister    • No Known Problems Daughter    • No Known Problems Maternal Aunt    • No Known Problems Maternal Aunt    • No Known Problems Maternal Aunt    • Cancer Maternal Grandmother    • No Known Problems Paternal Grandmother       Social History:     Social History     Socioeconomic History   • Marital status: /Civil Union     Spouse name: None   • Number of children: None   • Years of education: None   • Highest education level: None   Occupational History   • None   Tobacco Use   • Smoking status: Former     Packs/day: 1 00     Types: Cigarettes     Quit date:      Years since quittin 2   • Smokeless tobacco: Never   • Tobacco comments:     quit 25 yrs ago   Vaping Use   • Vaping Use: Never used   Substance and Sexual Activity   • Alcohol use: Not Currently   • Drug use: No   • Sexual activity: None     Comment: defer   Other Topics Concern   • None   Social History Narrative    Caffeine use    , 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[employer], now at [employer] for OR    2 grown children     Social Determinants of Health     Financial Resource Strain: Low Risk    • Difficulty of Paying Living Expenses: Not very hard   Food Insecurity: Not on file   Transportation Needs: No Transportation Needs   • Lack of Transportation (Medical): No   • Lack of Transportation (Non-Medical):  No   Physical Activity: Not on file   Stress: Not on file   Social Connections: Not on file   Intimate Partner Violence: Not on file   Housing Stability: Not on file      Medications and Allergies:     Current Outpatient Medications   Medication Sig Dispense Refill   • acetaminophen (TYLENOL) 325 mg tablet Take 650 mg by mouth every 6 (six) hours as needed for mild pain     • [START ON 2023] ascorbic acid (VITAMIN C) 500 MG tablet Take 1 tablet (500 mg total) by mouth 2 (two) times a day Do not start before 2023  60 tablet 1   • atorvastatin (LIPITOR) 10 mg tablet take 1 tablet by mouth once daily 90 tablet 0   • CALCIUM PO Take by mouth     • famotidine (PEPCID) 20 mg tablet Take 1 tablet (20 mg total) by mouth 2 (two) times a day 180 tablet 0   • Ferrous Sulfate (IRON PO) Take by mouth daily     • folic acid (FOLVITE) 1 mg tablet Take by mouth daily     • ibuprofen (MOTRIN) 200 mg tablet Take by mouth every 6 (six) hours as needed for mild pain     • losartan (COZAAR) 100 MG tablet take 1 tablet by mouth once daily 90 tablet 1   • meloxicam (MOBIC) 15 mg tablet Take 1 tablet (15 mg total) by mouth daily 30 tablet 0   • methocarbamol (Robaxin-750) 750 mg tablet Take 1 tablet (750 mg total) by mouth every 8 (eight) hours 90 tablet 1   • Multiple Vitamin (multivitamin) tablet Take 1 tablet by mouth daily     • hydrOXYzine HCL (ATARAX) 25 mg tablet TAKE 1 TO 2 TABLETS BY MOUTH DAILY AT BEDTIME AS NEEDED (Patient taking differently: daily at bedtime) 60 tablet 2   • lidocaine (XYLOCAINE) 5 % ointment Apply topically 3 (three) times a day as needed for mild pain 50 g 5     No current facility-administered medications for this visit       Allergies   Allergen Reactions   • Hydrocodone Other (See Comments) and Irritability     Patient reports insomnia when taking       Immunizations:     Immunization History   Administered Date(s) Administered   • COVID-19 PFIZER VACCINE 0 3 ML IM 05/01/2021, 05/22/2021   • Influenza, injectable, quadrivalent, preservative free 0 5 mL 12/28/2018   • Influenza, recombinant, quadrivalent,injectable, preservative free 12/02/2020   • Tuberculin Skin Test-PPD Intradermal 01/16/2017      Health Maintenance:         Topic Date Due   • Breast Cancer Screening: Mammogram  02/28/2024   • Colorectal Cancer Screening  06/26/2027   • Hepatitis C Screening  Completed         Topic Date Due   • COVID-19 Vaccine (3 - Booster for Pfizer series) 07/17/2021   • Pneumococcal Vaccine: 65+ Years (1 - PCV) Never done      Medicare Screening Tests and Risk Assessments:     Gauri Walters is here for her Subsequent Wellness visit  Health Risk Assessment:   Patient rates overall health as good  Patient feels that their physical health rating is same  Patient is satisfied with their life  Eyesight was rated as same  Hearing was rated as same  Patient feels that their emotional and mental health rating is same  Patients states they are never, rarely angry  Patient states they are never, rarely unusually tired/fatigued  Pain experienced in the last 7 days has been some  Patient's pain rating has been 8/10  Patient states that she has experienced no weight loss or gain in last 6 months  Depression Screening:   PHQ-2 Score: 0      Fall Risk Screening: In the past year, patient has experienced: no history of falling in past year      Urinary Incontinence Screening:   Patient has not leaked urine accidently in the last six months  Home Safety:  Patient does not have trouble with stairs inside or outside of their home  Patient has working smoke alarms and has working carbon monoxide detector  Home safety hazards include: none  Nutrition:   Current diet is Regular  Medications:   Patient is currently taking over-the-counter supplements  OTC medications include: see medication list  Patient is able to manage medications  Activities of Daily Living (ADLs)/Instrumental Activities of Daily Living (IADLs):   Walk and transfer into and out of bed and chair?: Yes  Dress and groom yourself?: Yes    Bathe or shower yourself?: Yes    Feed yourself? Yes  Do your laundry/housekeeping?: Yes  Manage your money, pay your bills and track your expenses?: Yes  Make your own meals?: Yes    Do your own shopping?: Yes    Previous Hospitalizations:   Any hospitalizations or ED visits within the last 12 months?: No      Advance Care Planning:   Living will: No    Durable POA for healthcare: No    Advanced directive: No    Advanced directive counseling given:  Yes PREVENTIVE SCREENINGS      Cardiovascular Screening:    General: Screening Not Indicated and History Lipid Disorder      Diabetes Screening:     General: Screening Current      Colorectal Cancer Screening:     General: Screening Current      Breast Cancer Screening:     General: Screening Current      Cervical Cancer Screening:    General: Screening Not Indicated and Screening Current      Osteoporosis Screening:    General: Screening Current      Lung Cancer Screening:     General: Screening Not Indicated      Hepatitis C Screening:    General: Screening Current    Screening, Brief Intervention, and Referral to Treatment (SBIRT)    Screening  Typical number of drinks in a day: 0  Typical number of drinks in a week: 0  Interpretation: Low risk drinking behavior  Single Item Drug Screening:  How often have you used an illegal drug (including marijuana) or a prescription medication for non-medical reasons in the past year? never    Single Item Drug Screen Score: 0  Interpretation: Negative screen for possible drug use disorder    No results found  Physical Exam:     /68   Pulse 89   Temp 99 2 °F (37 3 °C) (Tympanic)   Resp 20   Ht 5' 3" (1 6 m)   Wt 69 4 kg (153 lb)   SpO2 99%   BMI 27 10 kg/m²     Physical Exam  Vitals and nursing note reviewed  Constitutional:       General: She is not in acute distress  Appearance: She is well-developed  She is not diaphoretic  HENT:      Right Ear: Tympanic membrane, ear canal and external ear normal       Left Ear: Tympanic membrane, ear canal and external ear normal       Nose: Nose normal       Mouth/Throat:      Mouth: Mucous membranes are moist       Pharynx: Oropharynx is clear  Eyes:      General:         Right eye: No discharge  Left eye: No discharge  Extraocular Movements: Extraocular movements intact  Conjunctiva/sclera: Conjunctivae normal       Pupils: Pupils are equal, round, and reactive to light     Cardiovascular: Rate and Rhythm: Normal rate and regular rhythm  Heart sounds: Murmur heard  Systolic murmur is present with a grade of 1/6  Pulmonary:      Effort: Pulmonary effort is normal  No respiratory distress  Breath sounds: Normal breath sounds  No wheezing  Abdominal:      General: There is no distension  Musculoskeletal:         General: Tenderness (left knee) present  Cervical back: Normal range of motion and neck supple  Skin:     General: Skin is warm and dry  Findings: No erythema or rash  Neurological:      Mental Status: She is alert and oriented to person, place, and time  Psychiatric:         Mood and Affect: Mood normal          Behavior: Behavior normal          Thought Content:  Thought content normal          Judgment: Judgment normal           GUS Palmer

## 2023-03-13 ENCOUNTER — TELEPHONE (OUTPATIENT)
Dept: PAIN MEDICINE | Facility: CLINIC | Age: 68
End: 2023-03-13

## 2023-03-13 NOTE — TELEPHONE ENCOUNTER
Received fax from [facility] aid that rx for lidocaine 5% ointment requires prior auth

## 2023-03-14 NOTE — H&P
H&P Exam - Orthopedics   Minesh Alarcon 79 y o  female MRN: [id number]  Unit/Bed#:  Encounter: [id number]    Assessment/Plan     Assessment:  Primary osteoarthritis of left knee  Plan:  Elective left total knee replacement    History of Present Illness   HPI:  Minesh Alarcon is a 79 y o  female who initially presented to our office complaining of left knee pain  The patient stated that her knee pain was continuing to worsen and starting to affect her quality of life  X-rays of her left knee were performed which were consistent with advanced arthritic changes with no joint space remaining  After exhausting conservative treatment, including corticosteroid injections and viscosupplementation, the risks and benefits of surgery were discussed with the patient  She decided on an elective left total knee replacement  Review of Systems   Constitutional: Negative for chills, fever and unexpected weight change  HENT: Negative for nosebleeds and sore throat  Eyes: Negative for pain, redness and visual disturbance  Respiratory: Negative for cough, shortness of breath and wheezing  Cardiovascular: Negative for chest pain, palpitations and leg swelling  Gastrointestinal: Negative for abdominal pain, nausea and vomiting  Endocrine: Negative for polydipsia and polyuria  Genitourinary: Negative for dysuria and hematuria  Musculoskeletal: Positive for arthralgias, gait problem and myalgias  As noted in HPI   Skin: Negative for rash and wound  Neurological: Negative for dizziness, numbness and headaches  Psychiatric/Behavioral: Negative for decreased concentration and suicidal ideas  The patient is not nervous/anxious          Historical Information   Past Medical History:   Diagnosis Date   • Anemia    • Arthritis    • Chronic pain disorder     back and hip pain   • Disease of thyroid gland     nodules   • History of gastroesophageal reflux (GERD)     "had hiatal hernia surgery"   • History of heart murmur in childhood    • Hyperlipidemia    • Hypertension    • Overactive bladder    • Rotator cuff tear, left     pt unsure   • Rotator cuff tear, right     had surgery   • Vitamin D deficiency     unsure   • Wears glasses    • Wears partial dentures     upper     Past Surgical History:   Procedure Laterality Date   •  SECTION      Last Assessed: 2017   • COLONOSCOPY     • ELBOW SURGERY      Last Assessed: 2017   • ESOPHAGOGASTRODUODENOSCOPY N/A 2017    Procedure: ESOPHAGOGASTRODUODENOSCOPY (EGD); Surgeon: Teresa Braga DO;  Location: [facility]; Service: Gastroenterology   • HERNIA REPAIR  2020    Hiatal hernia repair   • HIP SURGERY Left 2018    glut medius/minimus repair  and 18--with pin implanted   • HYSTERECTOMY         • KNEE SURGERY      x2   • PARAESOPHAGEAL HERNIA REPAIR N/A 2020    Procedure: LAPAROSCOPIC PARAESOPHAGEAL HERNIA REPAIR WITH MESH AND NISSEN FUNDOPLICATION WITH ROBOTICS;  Surgeon: Brian Hammond MD;  Location: [facility];  Service: General   • IL ARTHRS KNE SURG W/MENISCECTOMY MED/LAT W/SHVG Left 06/15/2022    Procedure: ;left knee arthroscopy, meniscectomy,synevectomy,chondroplasty, loose body removal, injection;  Surgeon: Elle Broussard DO;  Location: [facility];  Service: Orthopedics   • IL COLONOSCOPY FLX DX W/COLLJ SPEC WHEN PFRMD N/A 2017    Procedure: EGD AND COLONOSCOPY;  Surgeon: Teresa Braga DO;  Location: [facility];   Service: Gastroenterology   • IL EXCISON TUMOR SOFT TISSUE THIGH/KNEE SUBQ 3 CM/> Bilateral 2022    Procedure: EXCISION BIOPSY TISSUE LESION/MASS LOWER EXTREMITY;  Surgeon: Jihan Washington MD;  Location: [facility];  Service: General   • IL EXCISON TUMOR SOFT TISSUE THIGH/KNEE SUBQ 3 CM/> Bilateral 2022    Procedure: EXCISION BIOPSY TISSUE LESION/MASS LOWER EXTREMITY;  Surgeon: Jihan Washington MD;  Location: [facility];  Service: General   • SHOULDER ARTHROCENTESIS • SHOULDER ARTHROSCOPY Right 2021    Procedure: SHOULDER ARTHROSCOPY WITH acromioplasty, debridment, and ROTATOR CUFF REPAIR;  Surgeon: Lorenzo Leyden, DO;  Location: [facility];  Service: Orthopedics   • TUBAL LIGATION       Social History   Social History     Substance and Sexual Activity   Alcohol Use Not Currently     Social History     Substance and Sexual Activity   Drug Use No     Social History     Tobacco Use   Smoking Status Former   • Packs/day: 1 00   • Types: Cigarettes   • Quit date:    • Years since quittin 2   Smokeless Tobacco Never   Tobacco Comments    quit 25 yrs ago     Family History: non-contributory    Meds/Allergies   all medications and allergies reviewed  Allergies   Allergen Reactions   • Hydrocodone Other (See Comments) and Irritability     Patient reports insomnia when taking        Objective   Vitals: Height 5' 3" (1 6 m), weight 68 kg (150 lb)  ,Body mass index is 26 57 kg/m²  No intake or output data in the 24 hours ending 23 1437    No intake/output data recorded  Invasive Devices     None                 Physical Exam  Ortho Exam  Left lower extremity is neurovascularly intact  Toes are pink and mobile   Compartments are soft  No warmth, erythema or ecchymosis  ROM of knee is from 5-115 degrees  Negative Lachman, drawer or pivot shift  No medial instability  Medial joint line tenderness, slight lateral joint line tenderness  Patellofemoral crepitation  Lungs CTA  Heart RRR  Abdomen Soft, nontender, nondistended  Lab Results: I have personally reviewed pertinent lab results  Imaging: I have personally reviewed pertinent films in PACS  EKG, Pathology, and Other Studies: I have personally reviewed pertinent films in PACS    Code Status: Prior  Advance Directive and Living Will:      Power of :    POLST:      Counseling / Coordination of Care  Total floor / unit time spent today 30 minutes    Greater than 50% of total time was spent with the patient and / or family counseling and / or coordination of care

## 2023-03-15 ENCOUNTER — APPOINTMENT (OUTPATIENT)
Dept: RADIOLOGY | Facility: HOSPITAL | Age: 68
End: 2023-03-15

## 2023-03-15 ENCOUNTER — TELEPHONE (OUTPATIENT)
Dept: PAIN MEDICINE | Facility: CLINIC | Age: 68
End: 2023-03-15

## 2023-03-15 ENCOUNTER — ANESTHESIA EVENT (OUTPATIENT)
Dept: PERIOP | Facility: HOSPITAL | Age: 68
End: 2023-03-15

## 2023-03-15 ENCOUNTER — HOSPITAL ENCOUNTER (OUTPATIENT)
Facility: HOSPITAL | Age: 68
Setting detail: OUTPATIENT SURGERY
Discharge: HOME | End: 2023-03-16
Attending: ORTHOPAEDIC SURGERY | Admitting: ORTHOPAEDIC SURGERY

## 2023-03-15 ENCOUNTER — ANESTHESIA (OUTPATIENT)
Dept: PERIOP | Facility: HOSPITAL | Age: 68
End: 2023-03-15

## 2023-03-15 DIAGNOSIS — M17.12 PRIMARY OSTEOARTHRITIS OF LEFT KNEE: Primary | ICD-10-CM

## 2023-03-15 DIAGNOSIS — R11.0 NAUSEA: ICD-10-CM

## 2023-03-15 DIAGNOSIS — Z47.89 AFTERCARE FOLLOWING SURGERY OF THE MUSCULOSKELETAL SYSTEM: ICD-10-CM

## 2023-03-15 PROBLEM — G47.00 INSOMNIA: Status: ACTIVE | Noted: 2023-03-15

## 2023-03-15 DEVICE — PLUG STEM NEXGEN KNEE TAPER: Type: IMPLANTABLE DEVICE | Site: KNEE | Status: FUNCTIONAL

## 2023-03-15 DEVICE — IMPLANTABLE DEVICE
Type: IMPLANTABLE DEVICE | Site: KNEE | Status: FUNCTIONAL
Brand: NEXGEN®

## 2023-03-15 DEVICE — COMPONENT PATELLAR 8.5MM SZ 32 NEXGEN: Type: IMPLANTABLE DEVICE | Site: KNEE | Status: FUNCTIONAL

## 2023-03-15 DEVICE — IMPLANTABLE DEVICE: Type: IMPLANTABLE DEVICE | Site: KNEE | Status: FUNCTIONAL

## 2023-03-15 DEVICE — PALACOS® R IS A FAST-CURING, RADIOPAQUE, POLY(METHYL METHACRYLATE)-BASED BONE CEMENT.PALACOS ® R CONTAINS THE X-RAY CONTRAST MEDIUM ZIRCONIUM DIOXIDE. TO IMPROVE VISIBILITY IN THE SURGICAL FIELD PALACOS ® R HAS BEEN COLOURED WITH CHLOROPHYLL (E141). THE BONE CEMENT IS PREPARED DIRECTLY BEFORE USE BY MIXING A POLYMER POWDER COMPONENT WITH A LIQUID MONOMER COMPONENT. A DUCTILE DOUGH FORMS WHICH CURES WITHIN A FEW MINUTES.
Type: IMPLANTABLE DEVICE | Site: KNEE | Status: FUNCTIONAL
Brand: PALACOS®

## 2023-03-15 DEVICE — PALACOS® R+G IS A FAST SETTING POLYMER CONTAINING GENTAMICIN, FOR USE IN BONE SURGERY. MIXING OF THE TWO COMPONENT SYSTEM, CONSISTING OF A POWDER AND A LIQUID, INITIALLY PRODUCES A LIQUID AND THEN A PASTE, WHICH IS USED TO ANCHOR THE PROSTHESIS TO THE BONE. THE HARDENED BONE CEMENT ALLOWS STABLE FIXATION OF THE PROSTHESIS AND TRANSFERS ALL STRESSES PRODUCED IN A MOVEMENT TO THE BONE VIA THE LARGE INTERFACE. INSOLUBLE ZIRCONIUM DIOXIDE IS INCLUDED IN THE CEMENT POWDER AS AN X RAY CONTRAST MEDIUM. THE CHLOROPHYLL ADDITIVE SERVES AS OPTICAL MARKING OF THE BONE CEMENT AT THE SITE OF THE OPERATION.
Type: IMPLANTABLE DEVICE | Site: KNEE | Status: FUNCTIONAL
Brand: PALACOS®

## 2023-03-15 DEVICE — IMPLANTABLE DEVICE
Type: IMPLANTABLE DEVICE | Site: KNEE | Status: FUNCTIONAL
Brand: NEXGEN® LEGACY®

## 2023-03-15 RX ORDER — ASCORBIC ACID 500 MG
500 TABLET ORAL 2 TIMES DAILY
Status: DISCONTINUED | OUTPATIENT
Start: 2023-03-15 | End: 2023-03-16 | Stop reason: HOSPADM

## 2023-03-15 RX ORDER — DEXAMETHASONE SODIUM PHOSPHATE 4 MG/ML
INJECTION, SOLUTION INTRA-ARTICULAR; INTRALESIONAL; INTRAMUSCULAR; INTRAVENOUS; SOFT TISSUE
Status: DISCONTINUED
Start: 2023-03-15 | End: 2023-03-15 | Stop reason: WASHOUT

## 2023-03-15 RX ORDER — CHLORHEXIDINE GLUCONATE 0.12 MG/ML
15 RINSE ORAL ONCE
Status: COMPLETED | OUTPATIENT
Start: 2023-03-15 | End: 2023-03-15

## 2023-03-15 RX ORDER — HYDROMORPHONE HCL/PF 1 MG/ML
SYRINGE (ML) INJECTION
Status: COMPLETED
Start: 2023-03-15 | End: 2023-03-15

## 2023-03-15 RX ORDER — METOCLOPRAMIDE HYDROCHLORIDE 5 MG/ML
10 INJECTION INTRAMUSCULAR; INTRAVENOUS EVERY 6 HOURS SCHEDULED
Status: DISCONTINUED | OUTPATIENT
Start: 2023-03-15 | End: 2023-03-16 | Stop reason: HOSPADM

## 2023-03-15 RX ORDER — BUPIVACAINE HYDROCHLORIDE 5 MG/ML
INJECTION, SOLUTION EPIDURAL; INTRACAUDAL AS NEEDED
Status: DISCONTINUED | OUTPATIENT
Start: 2023-03-15 | End: 2023-03-15

## 2023-03-15 RX ORDER — GLYCOPYRROLATE 0.2 MG/ML
INJECTION INTRAMUSCULAR; INTRAVENOUS AS NEEDED
Status: DISCONTINUED | OUTPATIENT
Start: 2023-03-15 | End: 2023-03-15

## 2023-03-15 RX ORDER — CEFAZOLIN SODIUM 1 G/50ML
1000 SOLUTION INTRAVENOUS EVERY 8 HOURS
Status: COMPLETED | OUTPATIENT
Start: 2023-03-15 | End: 2023-03-16

## 2023-03-15 RX ORDER — HYDROMORPHONE HCL/PF 1 MG/ML
0.5 SYRINGE (ML) INJECTION
Status: DISCONTINUED | OUTPATIENT
Start: 2023-03-15 | End: 2023-03-16 | Stop reason: HOSPADM

## 2023-03-15 RX ORDER — FOLIC ACID 1 MG/1
1 TABLET ORAL DAILY
Status: DISCONTINUED | OUTPATIENT
Start: 2023-03-15 | End: 2023-03-16 | Stop reason: HOSPADM

## 2023-03-15 RX ORDER — CEFAZOLIN SODIUM 2 G/50ML
2000 SOLUTION INTRAVENOUS ONCE
Status: COMPLETED | OUTPATIENT
Start: 2023-03-15 | End: 2023-03-15

## 2023-03-15 RX ORDER — ONDANSETRON 2 MG/ML
4 INJECTION INTRAMUSCULAR; INTRAVENOUS EVERY 6 HOURS PRN
Status: DISCONTINUED | OUTPATIENT
Start: 2023-03-15 | End: 2023-03-16 | Stop reason: HOSPADM

## 2023-03-15 RX ORDER — PROPOFOL 10 MG/ML
INJECTION, EMULSION INTRAVENOUS AS NEEDED
Status: DISCONTINUED | OUTPATIENT
Start: 2023-03-15 | End: 2023-03-15

## 2023-03-15 RX ORDER — SODIUM CHLORIDE, SODIUM LACTATE, POTASSIUM CHLORIDE, CALCIUM CHLORIDE 600; 310; 30; 20 MG/100ML; MG/100ML; MG/100ML; MG/100ML
INJECTION, SOLUTION INTRAVENOUS CONTINUOUS PRN
Status: DISCONTINUED | OUTPATIENT
Start: 2023-03-15 | End: 2023-03-15

## 2023-03-15 RX ORDER — LOSARTAN POTASSIUM 50 MG/1
100 TABLET ORAL DAILY
Status: DISCONTINUED | OUTPATIENT
Start: 2023-03-15 | End: 2023-03-16 | Stop reason: HOSPADM

## 2023-03-15 RX ORDER — DEXAMETHASONE SODIUM PHOSPHATE 10 MG/ML
8 INJECTION, SOLUTION INTRAMUSCULAR; INTRAVENOUS ONCE
Status: COMPLETED | OUTPATIENT
Start: 2023-03-15 | End: 2023-03-15

## 2023-03-15 RX ORDER — POVIDONE-IODINE 10 MG/G
OINTMENT TOPICAL AS NEEDED
Status: DISCONTINUED | OUTPATIENT
Start: 2023-03-15 | End: 2023-03-15 | Stop reason: HOSPADM

## 2023-03-15 RX ORDER — DOCUSATE SODIUM 100 MG/1
100 CAPSULE, LIQUID FILLED ORAL 2 TIMES DAILY
Status: DISCONTINUED | OUTPATIENT
Start: 2023-03-15 | End: 2023-03-16 | Stop reason: HOSPADM

## 2023-03-15 RX ORDER — DEXAMETHASONE SODIUM PHOSPHATE 4 MG/ML
INJECTION, SOLUTION INTRA-ARTICULAR; INTRALESIONAL; INTRAMUSCULAR; INTRAVENOUS; SOFT TISSUE
Status: COMPLETED
Start: 2023-03-15 | End: 2023-03-15

## 2023-03-15 RX ORDER — PROPOFOL 10 MG/ML
INJECTION, EMULSION INTRAVENOUS CONTINUOUS PRN
Status: DISCONTINUED | OUTPATIENT
Start: 2023-03-15 | End: 2023-03-15

## 2023-03-15 RX ORDER — HYDROXYZINE HYDROCHLORIDE 25 MG/1
25 TABLET, FILM COATED ORAL
Status: DISCONTINUED | OUTPATIENT
Start: 2023-03-15 | End: 2023-03-16 | Stop reason: HOSPADM

## 2023-03-15 RX ORDER — FONDAPARINUX SODIUM 2.5 MG/.5ML
2.5 INJECTION SUBCUTANEOUS DAILY
Status: DISCONTINUED | OUTPATIENT
Start: 2023-03-16 | End: 2023-03-16 | Stop reason: HOSPADM

## 2023-03-15 RX ORDER — CHLORHEXIDINE GLUCONATE 4 G/100ML
SOLUTION TOPICAL DAILY PRN
Status: DISCONTINUED | OUTPATIENT
Start: 2023-03-15 | End: 2023-03-15 | Stop reason: HOSPADM

## 2023-03-15 RX ORDER — OXYCODONE HYDROCHLORIDE AND ACETAMINOPHEN 5; 325 MG/1; MG/1
2 TABLET ORAL EVERY 6 HOURS PRN
Status: DISCONTINUED | OUTPATIENT
Start: 2023-03-15 | End: 2023-03-16 | Stop reason: HOSPADM

## 2023-03-15 RX ORDER — ONDANSETRON 2 MG/ML
4 INJECTION INTRAMUSCULAR; INTRAVENOUS EVERY 6 HOURS PRN
Status: DISCONTINUED | OUTPATIENT
Start: 2023-03-15 | End: 2023-03-15 | Stop reason: HOSPADM

## 2023-03-15 RX ORDER — OXYCODONE HYDROCHLORIDE AND ACETAMINOPHEN 5; 325 MG/1; MG/1
1 TABLET ORAL EVERY 4 HOURS PRN
Status: DISCONTINUED | OUTPATIENT
Start: 2023-03-15 | End: 2023-03-16 | Stop reason: HOSPADM

## 2023-03-15 RX ORDER — MIDAZOLAM HYDROCHLORIDE 2 MG/2ML
INJECTION, SOLUTION INTRAMUSCULAR; INTRAVENOUS AS NEEDED
Status: DISCONTINUED | OUTPATIENT
Start: 2023-03-15 | End: 2023-03-15

## 2023-03-15 RX ORDER — HYDROMORPHONE HCL/PF 1 MG/ML
0.2 SYRINGE (ML) INJECTION
Status: DISCONTINUED | OUTPATIENT
Start: 2023-03-15 | End: 2023-03-15 | Stop reason: HOSPADM

## 2023-03-15 RX ORDER — ACETAMINOPHEN 325 MG/1
650 TABLET ORAL EVERY 4 HOURS PRN
Status: DISCONTINUED | OUTPATIENT
Start: 2023-03-15 | End: 2023-03-16 | Stop reason: HOSPADM

## 2023-03-15 RX ORDER — ATORVASTATIN CALCIUM 10 MG/1
10 TABLET, FILM COATED ORAL DAILY
Status: DISCONTINUED | OUTPATIENT
Start: 2023-03-15 | End: 2023-03-16 | Stop reason: HOSPADM

## 2023-03-15 RX ORDER — MAGNESIUM HYDROXIDE/ALUMINUM HYDROXICE/SIMETHICONE 120; 1200; 1200 MG/30ML; MG/30ML; MG/30ML
30 SUSPENSION ORAL EVERY 6 HOURS PRN
Status: DISCONTINUED | OUTPATIENT
Start: 2023-03-15 | End: 2023-03-16 | Stop reason: HOSPADM

## 2023-03-15 RX ORDER — FERROUS SULFATE 325(65) MG
325 TABLET ORAL 2 TIMES DAILY WITH MEALS
Status: DISCONTINUED | OUTPATIENT
Start: 2023-03-15 | End: 2023-03-16 | Stop reason: HOSPADM

## 2023-03-15 RX ORDER — FONDAPARINUX SODIUM 2.5 MG/.5ML
2.5 INJECTION SUBCUTANEOUS EVERY 24 HOURS
Qty: 7 ML | Refills: 0 | Status: SHIPPED | OUTPATIENT
Start: 2023-03-15 | End: 2023-06-02 | Stop reason: ALTCHOICE

## 2023-03-15 RX ORDER — FAMOTIDINE 20 MG/1
20 TABLET, FILM COATED ORAL 2 TIMES DAILY
Status: DISCONTINUED | OUTPATIENT
Start: 2023-03-15 | End: 2023-03-16 | Stop reason: HOSPADM

## 2023-03-15 RX ADMIN — DOCUSATE SODIUM 100 MG: 100 CAPSULE, LIQUID FILLED ORAL at 17:12

## 2023-03-15 RX ADMIN — SODIUM CHLORIDE, SODIUM LACTATE, POTASSIUM CHLORIDE, AND CALCIUM CHLORIDE: .6; .31; .03; .02 INJECTION, SOLUTION INTRAVENOUS at 10:14

## 2023-03-15 RX ADMIN — ONDANSETRON 4 MG: 2 INJECTION INTRAMUSCULAR; INTRAVENOUS at 22:06

## 2023-03-15 RX ADMIN — Medication 1 TABLET: at 16:06

## 2023-03-15 RX ADMIN — CEFAZOLIN SODIUM 1000 MG: 1 SOLUTION INTRAVENOUS at 20:26

## 2023-03-15 RX ADMIN — BUPIVACAINE 20 ML: 13.3 INJECTION, SUSPENSION, LIPOSOMAL INFILTRATION at 10:25

## 2023-03-15 RX ADMIN — OXYCODONE HYDROCHLORIDE AND ACETAMINOPHEN 2 TABLET: 5; 325 TABLET ORAL at 20:11

## 2023-03-15 RX ADMIN — HYDROMORPHONE HYDROCHLORIDE 0.2 MG: 1 INJECTION, SOLUTION INTRAMUSCULAR; INTRAVENOUS; SUBCUTANEOUS at 14:26

## 2023-03-15 RX ADMIN — OXYCODONE HYDROCHLORIDE AND ACETAMINOPHEN 500 MG: 500 TABLET ORAL at 17:12

## 2023-03-15 RX ADMIN — HYDROXYZINE HYDROCHLORIDE 25 MG: 25 TABLET, FILM COATED ORAL at 22:04

## 2023-03-15 RX ADMIN — BUPIVACAINE HYDROCHLORIDE 5 ML: 5 INJECTION, SOLUTION EPIDURAL; INTRACAUDAL; PERINEURAL at 10:25

## 2023-03-15 RX ADMIN — FERROUS SULFATE TAB 325 MG (65 MG ELEMENTAL FE) 325 MG: 325 (65 FE) TAB at 16:06

## 2023-03-15 RX ADMIN — LOSARTAN POTASSIUM 100 MG: 50 TABLET, FILM COATED ORAL at 16:06

## 2023-03-15 RX ADMIN — METOCLOPRAMIDE 10 MG: 5 INJECTION, SOLUTION INTRAMUSCULAR; INTRAVENOUS at 23:27

## 2023-03-15 RX ADMIN — GLYCOPYRROLATE 0.4 MG: 0.2 INJECTION INTRAMUSCULAR; INTRAVENOUS at 13:18

## 2023-03-15 RX ADMIN — HYDROMORPHONE HYDROCHLORIDE 0.2 MG: 1 INJECTION, SOLUTION INTRAMUSCULAR; INTRAVENOUS; SUBCUTANEOUS at 14:32

## 2023-03-15 RX ADMIN — HYDROMORPHONE HYDROCHLORIDE 0.2 MG: 1 INJECTION, SOLUTION INTRAMUSCULAR; INTRAVENOUS; SUBCUTANEOUS at 14:42

## 2023-03-15 RX ADMIN — CEFAZOLIN SODIUM 2000 MG: 2 SOLUTION INTRAVENOUS at 11:27

## 2023-03-15 RX ADMIN — ATORVASTATIN CALCIUM 10 MG: 10 TABLET, FILM COATED ORAL at 16:06

## 2023-03-15 RX ADMIN — DEXAMETHASONE SODIUM PHOSPHATE 8 MG: 10 INJECTION, SOLUTION INTRAMUSCULAR; INTRAVENOUS at 15:06

## 2023-03-15 RX ADMIN — BUPIVACAINE HYDROCHLORIDE 20 ML: 5 INJECTION, SOLUTION EPIDURAL; INTRACAUDAL; PERINEURAL at 10:28

## 2023-03-15 RX ADMIN — PROPOFOL 70 MCG/KG/MIN: 10 INJECTION, EMULSION INTRAVENOUS at 11:45

## 2023-03-15 RX ADMIN — FOLIC ACID 1 MG: 1 TABLET ORAL at 16:06

## 2023-03-15 RX ADMIN — DEXAMETHASONE SODIUM PHOSPHATE: 4 INJECTION, SOLUTION INTRA-ARTICULAR; INTRALESIONAL; INTRAMUSCULAR; INTRAVENOUS; SOFT TISSUE at 15:42

## 2023-03-15 RX ADMIN — OXYCODONE HYDROCHLORIDE AND ACETAMINOPHEN 1 TABLET: 5; 325 TABLET ORAL at 15:07

## 2023-03-15 RX ADMIN — MIDAZOLAM HYDROCHLORIDE 2 MG: 1 INJECTION, SOLUTION INTRAMUSCULAR; INTRAVENOUS at 11:26

## 2023-03-15 RX ADMIN — PHENYLEPHRINE HYDROCHLORIDE 20 MCG/MIN: 10 INJECTION INTRAVENOUS at 12:17

## 2023-03-15 RX ADMIN — HYDROMORPHONE HYDROCHLORIDE 0.2 MG: 1 INJECTION, SOLUTION INTRAMUSCULAR; INTRAVENOUS; SUBCUTANEOUS at 14:37

## 2023-03-15 RX ADMIN — HYDROMORPHONE HYDROCHLORIDE 0.5 MG: 1 INJECTION, SOLUTION INTRAMUSCULAR; INTRAVENOUS; SUBCUTANEOUS at 22:08

## 2023-03-15 RX ADMIN — ONDANSETRON 4 MG: 2 INJECTION INTRAMUSCULAR; INTRAVENOUS at 14:53

## 2023-03-15 RX ADMIN — CHLORHEXIDINE GLUCONATE 0.12% ORAL RINSE 15 ML: 1.2 LIQUID ORAL at 09:58

## 2023-03-15 RX ADMIN — PROPOFOL 50 MG: 10 INJECTION, EMULSION INTRAVENOUS at 11:45

## 2023-03-15 NOTE — ANESTHESIA PREPROCEDURE EVALUATION
Procedure:  ARTHROPLASTY KNEE TOTAL (Left: Knee)    Relevant Problems   CARDIO   (+) Essential hypertension   (+) Hyperlipidemia      GI/HEPATIC   (+) Dysphagia   (+) GERD (gastroesophageal reflux disease)   (+) Gastroesophageal reflux disease   (+) Paraesophageal hernia      MUSCULOSKELETAL   (+) Primary osteoarthritis of both knees   (+) Sacroiliitis (HCC)      NEURO/PSYCH   (+) Chronic pain syndrome        Physical Exam    Airway    Mallampati score: II  TM Distance: >3 FB  Neck ROM: full     Dental       Cardiovascular      Pulmonary      Other Findings        Anesthesia Plan  ASA Score- 2     Anesthesia Type- spinal with ASA Monitors  Additional Monitors:   Airway Plan:           Plan Factors-Exercise tolerance (METS): >4 METS  Chart reviewed  Patient is not a current smoker  Patient did not smoke on day of surgery  Obstructive sleep apnea risk education given perioperatively  Induction- intravenous  Postoperative Plan-     Informed Consent- Anesthetic plan and risks discussed with patient  I personally reviewed this patient with the CRNA  Discussed and agreed on the Anesthesia Plan with the CRNA           NPO appropriate  Discussed benefits/risks of monitored anesthetic care and discussed providing a dynamic level of mild to deep sedation  Risks include awareness, airway obstruction, aspiration which may necessitate conversion to general anesthesia  All questions answered  Patient understands and wishes to proceed  Anesthesia plan and consent discussed with Arlyce Friends who expressed understanding and agreement  Risks/benefits and alternatives discussed with patient including possible PONV, sore throat, damage to teeth/lips/gums and possibility of rare anesthetic and surgical emergencies

## 2023-03-15 NOTE — OP NOTE
OPERATIVE REPORT  PATIENT NAME: Ottoniel Montemayor    :  1955  MRN: [id number]  Pt Location: [facility] ROOM 01    SURGERY DATE: 3/15/2023    Surgeon(s) and Role:     * Nahid Concepcion DO - Primary    Assistant: Oseas Kwon PA-C    Preop Diagnosis:  Primary osteoarthritis of left knee [M17 12]    Post-Op Diagnosis Codes:     * Primary osteoarthritis of left knee [M17 12]    Procedure(s):  Left - ARTHROPLASTY KNEE TOTAL in the [employer] system the following sizes: E LPS GSF femoral component, #3 tibial tray, 14 mm polyarticular surface, and a 32 mm patella button    Specimen(s):  Bone/synovium    Estimated Blood Loss:   50 cc    Drains:  solcotrans    Anesthesia Type:   Spinal with adductor canal and iPAK blocks    Operative Indications:  Primary osteoarthritis of left knee [M17 12]      Operative Findings:  TT: 87    Complications:   None    Procedure and Technique:      The patient was properly identified and brought to the operative suite  After successful induction of the anesthetic, a tourniquet was placed on the patient's left proximal thigh  The left lower extremity was prepped and draped in the usual usual sterile fashion  It was medically necessary that the physician's assistant be in the room to aid in positioning placing the appropriate amount of retraction the patient  A qualified resident was not available  The physician assistant's role was very integral to the success of this case  He assisted on positioning, draping, retraction soft tissue, retraction neurovascular bundles, assisted with implantation of prosthesis, assisted with reduction of prosthesis, and assisted with closure of a complex wound      She was given a dose of intravenous antibiotics  Surgical landmarks were outlined  With  a skin marker  An Esmarch was used to examine the left lower extremity and the tourniquet was then inflated  Using a #10  Scalpel  Blade, an anterior incision was made on patient's left knee  Hemostasis was achieved with the use of electrocautery  Through a  significant amount of dissection through adipose tissue, the capsule and  quadriceps tendon  Was  then located  Using a 2nd #10  Scalpel blade a medial parapatellar arthrotomy did occur with rush of clear synovial fluid  A posteriormedial sleeve was developed to the level of the semi membranous  tendon  The patella then everted and  knee was flexed  The retro and  superior fat pads were excised  The cruciate ligaments were  divided  At this point the proximal tibia could  be anteriorly subluxed  An intramedullary drill hole was placed in the proximal tibia, followed by the external cutting jig  The  smallest portion of deficient medial side bone was to  be removed, with its corresponding lateral side  Varus and  valgus angulation was also checked  The collateral ligaments were  protected  The neurovascular bundle was then protected  The proximal tibia was then osteotomized  Attention then paid to the preparation of distal femur  The intramedullary denis was placed with a 6 degree valgus cut placed approximately 3° of external rotation  The external  cutting jig was placed on top of this  An additional 2 mm cut the because of a flexion contracture  The collateral ligaments were  protected  The  distal femur was then osteotomized  The femur was then sized  A size “D” did have the  best fit  Both gender and  standard cutting blocks were checked and a gender specific  cutting block did  Have the best fit  Without any excessive notching of the anterior condyle  The collaterals were then protected, the distal femur was an osteotomized in the following sequence 1 anterior condyle 2  posterior condyle 3 posterior chamfer and 4 anterior chamfer  At this point all the bone fragments and then removed  A lamina  was placed both the medial and lateral sides and the redundant menisci and posterior osteophytes were then removed  Flexion-extension blocks were placed next and a 14 mm block gave good symmetric balancing  The femur was then finished with the trochlear recess and notch block placed in a slightly lateral position to facilitate tracking patella  The tibial was sized next  A size #3 did have  best fit  The size D LPS LPS femoral component placed, followed by a #3  Tibial tray, followed by several polyarticular services and a 14 mm tray did the best fit  The rotation of the  tibia was then marked  The patella was inspected next  There was a tremendous amount of arthrosis  The  peripheral osteophytes were removed  It was confirmed with a caliper that there is adequate bone stock  And then using the [employer] reaming system approximately 9 mm of undersurface patella then reamed  The patella sized to a 32 mm patella button  This guide was drilled in a slightly superior medial position to  facilitate tracking of the patella  The tibia was then finished with a drill hole drill hole and keel punch  At this point there was good stability and range of motion in the knee  All the trial components removed  The wound was copiously irrigated sterile antibiotic solution  Cement  was being mixed on the back table was used 3rd generation cementing techniques  The proximal tibia was cemented in placed followed by the distal femur  A trial poly articular surface was placed till the cement fully cured  The patella was  cemented in place and held with a patellar clamp till the cement fully cured as well  Once the cement fully cured, it was irrigated  The polyarticular surfaces were  tried once again and a 14 mm tray to the best fit  The final 14 mm tray was placed reduced 1 final time and found to be quite stable  After it was irrigated,  a quarter-inch solcotrans  Was  placed near the superior aspect  Of the incision  The quadriceps  and capsule were closed with #1  Vicryl  Deep layer fascia closed multiple layers of 0 Vicryl  Superficial was closed with 2-0 Vicryl  The skin was approximated  With staples  The wounds were dressed with ointment Xeroform 4x4s ABDs sterile Webril and an Ace bandage  There was no complication of procedure  She was successfully awoken,  transferred  To the hospital bed,  And went to the recovery room stable  In condition          I was present for the entire procedure, A qualified resident physician was not available and A physician assistant was required during the procedure for retraction tissue handling,dissection and suturing    Patient Disposition:  PACU         SIGNATURE: Pawan Burgess DO  DATE: March 15, 2023  TIME: 11:26 AM

## 2023-03-15 NOTE — PLAN OF CARE
Problem: MOBILITY - ADULT  Goal: Maintain or return to baseline ADL function  Description: INTERVENTIONS:  -  Assess patient's ability to carry out ADLs; assess patient's baseline for ADL function and identify physical deficits which impact ability to perform ADLs (bathing, care of mouth/teeth, toileting, grooming, dressing, etc )  - Assess/evaluate cause of self-care deficits   - Assess range of motion  - Assess patient's mobility; develop plan if impaired  - Assess patient's need for assistive devices and provide as appropriate  - Encourage maximum independence but intervene and supervise when necessary  - Involve family in performance of ADLs  - Assess for home care needs following discharge   - Consider OT consult to assist with ADL evaluation and planning for discharge  - Provide patient education as appropriate  Outcome: Progressing  Goal: Maintains/Returns to pre admission functional level  Description: INTERVENTIONS:  - Perform BMAT or MOVE assessment daily    - Set and communicate daily mobility goal to care team and patient/family/caregiver  - Collaborate with rehabilitation services on mobility goals if consulted  - Reposition patient every 2 hours    - Dangle patient 3 times a day  - Stand patient 3 times a day  - Ambulate patient 3 times a day  - Out of bed to chair 2 times a day   - Out of bed for meals 3 times a day  - Out of bed for toileting  - Record patient progress and toleration of activity level   Outcome: Progressing     Problem: PAIN - ADULT  Goal: Verbalizes/displays adequate comfort level or baseline comfort level  Description: Interventions:  - Encourage patient to monitor pain and request assistance  - Assess pain using appropriate pain scale  - Administer analgesics based on type and severity of pain and evaluate response  - Implement non-pharmacological measures as appropriate and evaluate response  - Consider cultural and social influences on pain and pain management  - Notify physician/advanced practitioner if interventions unsuccessful or patient reports new pain  Outcome: Progressing     Problem: INFECTION - ADULT  Goal: Absence or prevention of progression during hospitalization  Description: INTERVENTIONS:  - Assess and monitor for signs and symptoms of infection  - Monitor lab/diagnostic results  - Monitor all insertion sites, i e  indwelling lines, tubes, and drains  - Monitor endotracheal if appropriate and nasal secretions for changes in amount and color  - [place] appropriate cooling/warming therapies per order  - Administer medications as ordered  - Instruct and encourage patient and family to use good hand hygiene technique  - Identify and instruct in appropriate isolation precautions for identified infection/condition  Outcome: Progressing     Problem: SAFETY ADULT  Goal: Maintain or return to baseline ADL function  Description: INTERVENTIONS:  -  Assess patient's ability to carry out ADLs; assess patient's baseline for ADL function and identify physical deficits which impact ability to perform ADLs (bathing, care of mouth/teeth, toileting, grooming, dressing, etc )  - Assess/evaluate cause of self-care deficits   - Assess range of motion  - Assess patient's mobility; develop plan if impaired  - Assess patient's need for assistive devices and provide as appropriate  - Encourage maximum independence but intervene and supervise when necessary  - Involve family in performance of ADLs  - Assess for home care needs following discharge   - Consider OT consult to assist with ADL evaluation and planning for discharge  - Provide patient education as appropriate  Outcome: Progressing  Goal: Maintains/Returns to pre admission functional level  Description: INTERVENTIONS:  - Perform BMAT or MOVE assessment daily    - Set and communicate daily mobility goal to care team and patient/family/caregiver     - Collaborate with rehabilitation services on mobility goals if consulted  - Reposition patient every 2 hours  - Dangle patient 3 times a day  - Stand patient 3 times a day  - Ambulate patient 3 times a day  - Out of bed to chair 2 times a day   - Out of bed for meals 3 times a day  - Out of bed for toileting  - Record patient progress and toleration of activity level   Outcome: Progressing  Goal: Patient will remain free of falls  Description: INTERVENTIONS:  - Educate patient/family on patient safety including physical limitations  - Instruct patient to call for assistance with activity   - Consult OT/PT to assist with strengthening/mobility   - Keep Call bell within reach  - Keep bed low and locked with side rails adjusted as appropriate  - Keep care items and personal belongings within reach  - Initiate and maintain comfort rounds  - Make Fall Risk Sign visible to staff  - Offer Toileting every 2 Hours, in advance of need  - Apply yellow socks and bracelet for high fall risk patients  - Consider moving patient to room near nurses station  Outcome: Progressing     Problem: DISCHARGE PLANNING  Goal: Discharge to home or other facility with appropriate resources  Description: INTERVENTIONS:  - Identify barriers to discharge w/patient and caregiver  - Arrange for needed discharge resources and transportation as appropriate  - Identify discharge learning needs (meds, wound care, etc )  - Arrange for interpretive services to assist at discharge as needed  - Refer to Case Management Department for coordinating discharge planning if the patient needs post-hospital services based on physician/advanced practitioner order or complex needs related to functional status, cognitive ability, or social support system  Outcome: Progressing     Problem: Knowledge Deficit  Goal: Patient/family/caregiver demonstrates understanding of disease process, treatment plan, medications, and discharge instructions  Description: Complete learning assessment and assess knowledge base    Interventions:  - Provide teaching at level of understanding  - Provide teaching via preferred learning methods  Outcome: Progressing     Problem: MUSCULOSKELETAL - ADULT  Goal: Maintain or return mobility to safest level of function  Description: INTERVENTIONS:  - Assess patient's ability to carry out ADLs; assess patient's baseline for ADL function and identify physical deficits which impact ability to perform ADLs (bathing, care of mouth/teeth, toileting, grooming, dressing, etc )  - Assess/evaluate cause of self-care deficits   - Assess range of motion  - Assess patient's mobility  - Assess patient's need for assistive devices and provide as appropriate  - Encourage maximum independence but intervene and supervise when necessary  - Involve family in performance of ADLs  - Assess for home care needs following discharge   - Consider OT consult to assist with ADL evaluation and planning for discharge  - Provide patient education as appropriate  Outcome: Progressing  Goal: Maintain proper alignment of affected body part  Description: INTERVENTIONS:  - Support, maintain and protect limb and body alignment  - Provide patient/ family with appropriate education  Outcome: Progressing

## 2023-03-15 NOTE — ANESTHESIA POSTPROCEDURE EVALUATION
Post-Op Assessment Note    CV Status:  Stable  Pain Score: 0    Pain management: adequate     Mental Status:  Alert   Hydration Status:  Stable   PONV Controlled:  Controlled   Airway Patency:  Patent      Post Op Vitals Reviewed: Yes      Staff: CRNA         No notable events documented      BP   107/55   Temp   97 9   Pulse  90   Resp   20   SpO2   97

## 2023-03-15 NOTE — DISCHARGE INSTR - AVS FIRST PAGE
TOTAL KNEE/TOTAL HIP REPLACEMENT  POST OPERATIVE INFORMATION    1  You should use a walker or crutches, but you may put as much weight on the leg as is comfortable unless instructed otherwise  2  You may use ice packs as needed for pain and swelling  20 minutes is required to allow the cold to penetrate deep enough  Cover skin with a layer of cloth before applying the ice pack  3  Pump your ankle up and down frequently throughout the day to facilitate circulation, maintain muscle tone, and to aid in reduction of swelling  4  You may shower after 5 days, but remember to keep the dressings dry  5  You should call our office if you experience pain not controlled by your medication, develop a fever, and experience increased drainage or redness around the incision  6  Do not drive a vehicle until you see your physician at the follow-up appointment  7  Refer to your total joint card regarding the need for antibiotics for the following procedures:  Any dental procedures, any infection, especially skin infections, vaginal or GYN exams or surgery, and colonoscopy  8  If you have had a total hip replacement following instructions below to prevent the hip from sliding out a position:   -DO NOT bend your hip more than 90°  This means no squatting, no bending over to tie your shoes, and no bending from the waist to  things from the floor, even if seated  -DO NOT cross your operated leg/foot inward ([place]-toed)   -ALWAYS sleep with pillow or cushion between your legs, as instructed by your doctor   -After surgery these precautions will be again covered by your therapists on a daily basis  9  Call our office for an appointment to see Dr Radha Mckinley in about 14 days  10  You should start outpatient therapy as soon as possible after discharge  11   The patient was instructed to paint incision with betadine two times per day

## 2023-03-15 NOTE — INTERVAL H&P NOTE
H&P reviewed  After examining the patient I find no changes in the patients condition since the H&P had been written      Vitals:    03/15/23 1002   BP: 140/84   Pulse: 68   Resp: 18   Temp: 99 °F (37 2 °C)   SpO2: 100%

## 2023-03-15 NOTE — ANESTHESIA PROCEDURE NOTES
Peripheral Block    Patient location during procedure: holding area  Start time: 3/15/2023 10:28 AM  Reason for block: at surgeon's request and post-op pain management  Staffing  Performed: Anesthesiologist   Preanesthetic Checklist  Completed: patient identified, IV checked, site marked, risks and benefits discussed, surgical consent, monitors and equipment checked, pre-op evaluation and timeout performed  Peripheral Block  Patient position: supine  Prep: ChloraPrep  Patient monitoring: continuous pulse ox and frequent blood pressure checks  Block type: adductor canal block  Laterality: left  Injection technique: single-shot  Procedures: ultrasound guided, Ultrasound guidance required for the procedure to increase accuracy and safety of medication placement and decrease risk of complications  Ultrasound permanent image saved  Needle  Needle type: pencil-tip   Needle gauge: 20 G  Needle length: 10 cm  Needle localization: anatomical landmarks  Test dose: negative  Assessment  Injection assessment: incremental injection and local visualized surrounding nerve on ultrasound  Paresthesia pain: none  Heart rate change: no  Slow fractionated injection: yes  Post-procedure:  site cleaned  patient tolerated the procedure well with no immediate complications  Additional Notes  Uncomplicated single shot adductor canal with exparel

## 2023-03-15 NOTE — TELEPHONE ENCOUNTER
Pharmacy called and pt needs prior auth on Lidocaine 5% ointment. Prior auth req scanner into chart, prior auth not started. thanks

## 2023-03-15 NOTE — ANESTHESIA PROCEDURE NOTES
Peripheral Block    Patient location during procedure: holding area  Start time: 3/15/2023 10:28 AM  Reason for block: at surgeon's request and post-op pain management  Staffing  Performed: Anesthesiologist   Preanesthetic Checklist  Completed: patient identified, IV checked, site marked, risks and benefits discussed, surgical consent, monitors and equipment checked, pre-op evaluation and timeout performed  Peripheral Block  Patient position: supine  Prep: ChloraPrep  Patient monitoring: continuous pulse ox and frequent blood pressure checks  Block type: ipack block  Laterality: left  Injection technique: single-shot  Procedures: ultrasound guided, Ultrasound guidance required for the procedure to increase accuracy and safety of medication placement and decrease risk of complications  Needle  Needle type: pencil-tip   Needle gauge: 20 G  Needle length: 10 cm  Needle localization: ultrasound guidance and anatomical landmarks  Test dose: negative  Assessment  Injection assessment: incremental injection and local visualized surrounding nerve on ultrasound  Paresthesia pain: none  Heart rate change: no  Slow fractionated injection: yes  Post-procedure:  site cleaned  patient tolerated the procedure well with no immediate complications  Additional Notes  Uncomplicated single shot IPACK

## 2023-03-15 NOTE — CONSULTS
[address] [facility] 1955, 79 y o  female MRN: [id number]  Unit/Bed#: -01 Encounter: [id number]  Primary Care Provider: Gali Street   Date and time admitted to hospital: 3/15/2023  9:26 AM    Inpatient consult to Internal Medicine  Consult performed by: GUS Wong  Consult ordered by: Fernandez Hall PA-C          Insomnia  Assessment & Plan  · Continue Atarax at bedtime    Essential hypertension  Assessment & Plan  · BP reviewed  · Continue losartan  · Monitor blood pressure    * Primary osteoarthritis of left knee  Assessment & Plan  · Status post left knee arthroplasty 3/15/2023  · Care per primary team which is orthopedic surgery      VTE Prophylaxis: Fondaparinux (Arixtra)  / sequential compression device     Recommendations for Discharge:  · Continue outpatient follow-up with orthopedic surgery and PCP    Counseling / Coordination of Care Time: 30 minutes  Greater than 50% of total time spent on patient counseling and coordination of care  Collaboration of Care: Were Recommendations Directly Discussed with Primary Treatment Team? - Yes     History of Present Illness:    Camron Velasquez is a 79 y o  female who is originally admitted to the orthopedic surgery service due to left knee arthroplasty  We are consulted for medical management of comorbid conditions  SLIM will sign off  Please contact us with any questions or new concerns  Review of Systems:    Review of Systems   Constitutional: Negative for chills and fever  HENT: Negative for ear pain and sore throat  Eyes: Negative for pain and visual disturbance  Respiratory: Negative for cough and shortness of breath  Cardiovascular: Negative for chest pain  Gastrointestinal: Negative for abdominal pain, nausea and vomiting  Genitourinary: Negative for dysuria  Musculoskeletal: Positive for arthralgias and gait problem  Negative for back pain     Skin: Negative for color change  Neurological: Negative for dizziness and syncope  Psychiatric/Behavioral: Negative for confusion  All other systems reviewed and are negative  Past Medical and Surgical History:     Past Medical History:   Diagnosis Date   • Anemia    • Arthritis    • Chronic pain disorder     back and hip pain   • Disease of thyroid gland     nodules   • History of gastroesophageal reflux (GERD)     "had hiatal hernia surgery"   • History of heart murmur in childhood    • Hyperlipidemia    • Hypertension    • Overactive bladder    • Rotator cuff tear, left     pt unsure   • Rotator cuff tear, right     had surgery   • Vitamin D deficiency     unsure   • Wears glasses    • Wears partial dentures     upper       Past Surgical History:   Procedure Laterality Date   •  SECTION      Last Assessed: 2017   • COLONOSCOPY     • ELBOW SURGERY      Last Assessed: 2017   • ESOPHAGOGASTRODUODENOSCOPY N/A 2017    Procedure: ESOPHAGOGASTRODUODENOSCOPY (EGD); Surgeon: Ayleen Hirsch DO;  Location: [facility]; Service: Gastroenterology   • HERNIA REPAIR  2020    Hiatal hernia repair   • HIP SURGERY Left 2018    glut medius/minimus repair  and 18--with pin implanted   • HYSTERECTOMY         • KNEE SURGERY      x2   • PARAESOPHAGEAL HERNIA REPAIR N/A 2020    Procedure: LAPAROSCOPIC PARAESOPHAGEAL HERNIA REPAIR WITH MESH AND NISSEN FUNDOPLICATION WITH ROBOTICS;  Surgeon: Abdon Cortez MD;  Location: [facility];  Service: General   • MA ARTHRS KNE SURG W/MENISCECTOMY MED/LAT W/SHVG Left 06/15/2022    Procedure: ;left knee arthroscopy, meniscectomy,synevectomy,chondroplasty, loose body removal, injection;  Surgeon: Rip Abdi DO;  Location: [facility];  Service: Orthopedics   • MA COLONOSCOPY FLX DX W/COLLJ SPEC WHEN PFRMD N/A 2017    Procedure: EGD AND COLONOSCOPY;  Surgeon: Ayleen Hirsch DO;  Location: [facility];   Service: Gastroenterology • CO EXCISON TUMOR SOFT TISSUE THIGH/KNEE SUBQ 3 CM/> Bilateral 2022    Procedure: EXCISION BIOPSY TISSUE LESION/MASS LOWER EXTREMITY;  Surgeon: Farida Diamond MD;  Location: [facility];  Service: General   • CO EXCISON TUMOR SOFT TISSUE THIGH/KNEE SUBQ 3 CM/> Bilateral 2022    Procedure: EXCISION BIOPSY TISSUE LESION/MASS LOWER EXTREMITY;  Surgeon: Farida Diamond MD;  Location: [facility];  Service: General   • SHOULDER ARTHROCENTESIS     • SHOULDER ARTHROSCOPY Right 2021    Procedure: SHOULDER ARTHROSCOPY WITH acromioplasty, debridment, and ROTATOR CUFF REPAIR;  Surgeon: Lisa Tellez DO;  [facility];  Service: Orthopedics   • TUBAL LIGATION         Meds/Allergies:    all medications and allergies reviewed    Allergies:    Allergies   Allergen Reactions   • Hydrocodone Other (See Comments) and Irritability     Patient reports insomnia when taking        Social History:     Marital Status: /Civil Union    Substance Use History:   Social History     Substance and Sexual Activity   Alcohol Use Not Currently     Social History     Tobacco Use   Smoking Status Former   • Packs/day: 1 00   • Types: Cigarettes   • Quit date: 12   • Years since quittin 2   Smokeless Tobacco Never   Tobacco Comments    quit 25 yrs ago     Social History     Substance and Sexual Activity   Drug Use No       Family History:    Family History   Problem Relation Age of Onset   • Colon cancer Mother    • Heart attack Father    • Other Father         Cardiac Disorder   • Diabetes type II Father    • Colon cancer Sister    • No Known Problems Sister    • No Known Problems Sister    • No Known Problems Sister    • No Known Problems Sister    • No Known Problems Daughter    • No Known Problems Maternal Aunt    • No Known Problems Maternal Aunt    • No Known Problems Maternal Aunt    • Cancer Maternal Grandmother    • No Known Problems Paternal Grandmother        Physical Exam:     Vitals:   Blood Pressure: 134/88 (03/15/23 1646)  Pulse: 72 (03/15/23 1646)  Temperature: 97 8 °F (36 6 °C) (03/15/23 1646)  Temp Source: Oral (03/15/23 1532)  Respirations: 18 (03/15/23 1646)  Height: 5' 3" (160 cm) (03/15/23 3432)  Weight - Scale: 68 kg (150 lb) (03/15/23 0969)  SpO2: 97 % (03/15/23 1646)    Physical Exam  Vitals and nursing note reviewed  Constitutional:       General: She is not in acute distress  HENT:      Head: Normocephalic and atraumatic  Mouth/Throat:      Mouth: Mucous membranes are moist       Pharynx: Oropharynx is clear  Eyes:      Pupils: Pupils are equal, round, and reactive to light  Cardiovascular:      Rate and Rhythm: Normal rate and regular rhythm  Pulmonary:      Effort: Pulmonary effort is normal  No respiratory distress  Breath sounds: Normal breath sounds  Abdominal:      General: Bowel sounds are normal       Palpations: Abdomen is soft  Tenderness: There is no abdominal tenderness  Musculoskeletal:      Cervical back: Neck supple  Right lower leg: No edema  Left lower leg: No edema  Comments: CPM in place to left knee with autotransfusion drain in place   Skin:     General: Skin is warm and dry  Capillary Refill: Capillary refill takes less than 2 seconds  Neurological:      General: No focal deficit present  Mental Status: She is alert and oriented to person, place, and time  Additional Data:     Lab Results: I have personally reviewed pertinent reports  Lab Results   Component Value Date/Time    HGBA1C 5 6 03/02/2023 06:55 AM               Imaging: I have personally reviewed pertinent reports  XR knee left 1 or 2 views    (Results Pending)       ** Please Note: This note has been constructed using a voice recognition system   **

## 2023-03-16 VITALS
BODY MASS INDEX: 26.58 KG/M2 | RESPIRATION RATE: 18 BRPM | SYSTOLIC BLOOD PRESSURE: 149 MMHG | WEIGHT: 150 LBS | DIASTOLIC BLOOD PRESSURE: 93 MMHG | HEART RATE: 72 BPM | TEMPERATURE: 99 F | OXYGEN SATURATION: 96 % | HEIGHT: 63 IN

## 2023-03-16 DIAGNOSIS — R11.0 NAUSEA: ICD-10-CM

## 2023-03-16 LAB
ANION GAP SERPL CALCULATED.3IONS-SCNC: 6 MMOL/L (ref 4–13)
BUN SERPL-MCNC: 12 MG/DL (ref 5–25)
CALCIUM SERPL-MCNC: 9 MG/DL (ref 8.4–10.2)
CHLORIDE SERPL-SCNC: 103 MMOL/L (ref 96–108)
CO2 SERPL-SCNC: 27 MMOL/L (ref 21–32)
CREAT SERPL-MCNC: 0.59 MG/DL (ref 0.6–1.3)
ERYTHROCYTE [DISTWIDTH] IN BLOOD BY AUTOMATED COUNT: 13.1 % (ref 11.6–15.1)
GFR SERPL CREATININE-BSD FRML MDRD: 95 ML/MIN/1.73SQ M
GLUCOSE P FAST SERPL-MCNC: 111 MG/DL (ref 65–99)
GLUCOSE SERPL-MCNC: 111 MG/DL (ref 65–140)
HCT VFR BLD AUTO: 37.6 % (ref 34.8–46.1)
HGB BLD-MCNC: 12.2 G/DL (ref 11.5–15.4)
MCH RBC QN AUTO: 28.4 PG (ref 26.8–34.3)
MCHC RBC AUTO-ENTMCNC: 32.4 G/DL (ref 31.4–37.4)
MCV RBC AUTO: 87 FL (ref 82–98)
PLATELET # BLD AUTO: 226 THOUSANDS/UL (ref 149–390)
PMV BLD AUTO: 10.3 FL (ref 8.9–12.7)
POTASSIUM SERPL-SCNC: 4.3 MMOL/L (ref 3.5–5.3)
RBC # BLD AUTO: 4.3 MILLION/UL (ref 3.81–5.12)
SODIUM SERPL-SCNC: 136 MMOL/L (ref 135–147)
WBC # BLD AUTO: 8.96 THOUSAND/UL (ref 4.31–10.16)

## 2023-03-16 RX ORDER — CEPHALEXIN 500 MG/1
500 CAPSULE ORAL EVERY 8 HOURS SCHEDULED
Qty: 15 CAPSULE | Refills: 0 | Status: SHIPPED | OUTPATIENT
Start: 2023-03-16 | End: 2023-03-21

## 2023-03-16 RX ORDER — ONDANSETRON 4 MG/1
4 TABLET, FILM COATED ORAL EVERY 8 HOURS PRN
Qty: 10 TABLET | Refills: 0 | Status: SHIPPED | OUTPATIENT
Start: 2023-03-16 | End: 2023-03-19 | Stop reason: SDUPTHER

## 2023-03-16 RX ORDER — OXYCODONE HYDROCHLORIDE AND ACETAMINOPHEN 5; 325 MG/1; MG/1
1 TABLET ORAL EVERY 4 HOURS PRN
Qty: 28 TABLET | Refills: 0 | Status: SHIPPED | OUTPATIENT
Start: 2023-03-16 | End: 2023-03-26

## 2023-03-16 RX ADMIN — ONDANSETRON 4 MG: 2 INJECTION INTRAMUSCULAR; INTRAVENOUS at 09:04

## 2023-03-16 RX ADMIN — OXYCODONE HYDROCHLORIDE AND ACETAMINOPHEN 2 TABLET: 5; 325 TABLET ORAL at 08:11

## 2023-03-16 RX ADMIN — FOLIC ACID 1 MG: 1 TABLET ORAL at 08:03

## 2023-03-16 RX ADMIN — OXYCODONE HYDROCHLORIDE AND ACETAMINOPHEN 500 MG: 500 TABLET ORAL at 08:04

## 2023-03-16 RX ADMIN — METOCLOPRAMIDE 10 MG: 5 INJECTION, SOLUTION INTRAMUSCULAR; INTRAVENOUS at 11:10

## 2023-03-16 RX ADMIN — FAMOTIDINE 20 MG: 20 TABLET, FILM COATED ORAL at 08:03

## 2023-03-16 RX ADMIN — DOCUSATE SODIUM 100 MG: 100 CAPSULE, LIQUID FILLED ORAL at 08:03

## 2023-03-16 RX ADMIN — OXYCODONE HYDROCHLORIDE AND ACETAMINOPHEN 2 TABLET: 5; 325 TABLET ORAL at 02:11

## 2023-03-16 RX ADMIN — Medication 1 TABLET: at 08:03

## 2023-03-16 RX ADMIN — ACETAMINOPHEN 650 MG: 325 TABLET ORAL at 15:19

## 2023-03-16 RX ADMIN — LOSARTAN POTASSIUM 100 MG: 50 TABLET, FILM COATED ORAL at 08:04

## 2023-03-16 RX ADMIN — FONDAPARINUX SODIUM 2.5 MG: 2.5 INJECTION, SOLUTION SUBCUTANEOUS at 09:09

## 2023-03-16 RX ADMIN — ATORVASTATIN CALCIUM 10 MG: 10 TABLET, FILM COATED ORAL at 08:04

## 2023-03-16 RX ADMIN — CEFAZOLIN SODIUM 1000 MG: 1 SOLUTION INTRAVENOUS at 02:00

## 2023-03-16 RX ADMIN — FERROUS SULFATE TAB 325 MG (65 MG ELEMENTAL FE) 325 MG: 325 (65 FE) TAB at 08:04

## 2023-03-16 RX ADMIN — CEFAZOLIN SODIUM 1000 MG: 1 SOLUTION INTRAVENOUS at 11:09

## 2023-03-16 RX ADMIN — METOCLOPRAMIDE 10 MG: 5 INJECTION, SOLUTION INTRAMUSCULAR; INTRAVENOUS at 05:52

## 2023-03-16 NOTE — NURSING NOTE
Discharge Instructions reviewed with patient at bedside, all questions answered regarding post-op care instructions, follow up appointments, and medications  Patients escorted via wheelchair to car by RN

## 2023-03-16 NOTE — CASE MANAGEMENT
Case Management Assessment & Discharge Planning Note    Patient name Terri Pitts  Location [address] 221/-55 MRN [id number]  : 1955 Date 3/16/2023       Current Admission Date: 3/15/2023  Current Admission Diagnosis:Primary osteoarthritis of left knee   Patient Active Problem List    Diagnosis Date Noted   • Primary osteoarthritis of left knee 03/15/2023   • Insomnia 03/15/2023   • Tendinopathy of right gluteus medius 10/03/2022   • Tear of medial meniscus of left knee, current 06/15/2022   • Flatulence 2022   • Lipoma of thigh 2022   • Left buttock pain 2022   • Piriformis muscle pain 2022   • Chronic pain syndrome 2022   • Lumbar radiculopathy 2022   • Family history of colon cancer 2022   • Bloating 2022   • Rupture of gluteus minimus tendon, left, subsequent encounter 2021   • Tendinopathy of left gluteus medius 2021   • Tear of right rotator cuff 2021   • Benign lipomatous neoplasm of skin and subcutaneous tissue of left arm 2021   • Nausea 2020   • Esophageal obstruction due to food impaction 2020   • Paraesophageal hernia 2020   • Gastroesophageal reflux disease 2020   • Heartburn 2020   • GERD (gastroesophageal reflux disease) 2019   • Dysphagia 2019   • Pain of upper abdomen 2019   • Anti-TPO antibodies present 2019   • Multinodular goiter 2019   • BMI 32 0-32 9,adult 12/10/2019   • Primary osteoarthritis of both knees 2019   • Chronic pain of left knee 2019   • Myofascial pain 07/15/2019   • Mixed incontinence 07/15/2019   • Sacroiliitis ([address] )    • Hyperlipidemia 2019   • Essential hypertension 2018   • OAB (overactive bladder) 2017   • Vitamin D deficiency 2017      LOS (days): 0  Geometric Mean LOS (GMLOS) (days):   Days to GMLOS:     OBJECTIVE:        Current admission status: Outpatient Surgery  Referral Reason: Other (OP therapy)    Preferred Pharmacy:   [address] [place] [address] #[id number] - [place], [address]  [address]  [place] [address]  Phone: [phone] Fax: [phone]    Primary Care Provider: Phong Stevenson    Primary Insurance: Scott Simms [facility]  Secondary Insurance:     ASSESSMENT:  [address] Proxies    There are no active Health Care Proxies on file  Advance Directives  Does patient have a [address]?: No  Was patient offered paperwork?: Yes (has paperwork from PCP office)  Does patient have Advance Directives?: Yes  Advance Directives: Living will, Power of  for health care  Primary Contact: Shoaib Shaikh (Spouse)   [phone] (Mobile)    Readmission Root Cause  30 Day Readmission: No    Patient Information  Admitted from[de-identified] Home  Mental Status: Alert  During Assessment patient was accompanied by: Not accompanied during assessment  Assessment information provided by[de-identified] Patient  Primary Caregiver: Self  Support Systems: Spouse/significant other, Children, Self,   [address] of Residence: [address] do you live in?: [address] entry access options   Select all that apply : Stairs  Number of steps to enter home : One Flight  Do the steps have railings?: Yes  Type of Current Residence: Bi-level  Upon entering residence, is there a bedroom on the main floor (no further steps)?: Yes  Upon entering residence, is there a bathroom on the main floor (no further steps)?: Yes  In the last 12 months, was there a time when you were not able to pay the mortgage or rent on time?: No  In the last 12 months, how many places have you lived?: 1  In the last 12 months, was there a time when you did not have a steady place to sleep or slept in a shelter (including now)?: No  Homeless/housing insecurity resource given?: N/A  Living Arrangements: Lives w/ Spouse/significant other  Is patient a ?: No    Activities of Daily Living Prior to Admission  Functional Status: Independent  Completes ADLs independently?: Yes  Ambulates independently?: Yes  Does patient use assisted devices?: Yes  Assisted Devices (DME) used: [employer]  Does patient currently own DME?: Yes  What DME does the patient currently own?: [employer]  Does patient have a history of Outpatient Therapy (PT/OT)?: Yes  Does the patient have a history of Short-Term Rehab?: No  Does patient have a history of HHC?: No  Does patient currently have [facility]?: No    Patient Information Continued  Income Source: Pension/[employer]  Does patient have prescription coverage?: Yes  Within the past 12 months, you worried that your food would run out before you got the money to buy more : Never true  Within the past 12 months, the food you bought just didn't last and you didn't have money to get more : Never true  Food insecurity resource given?: N/A  Does patient receive dialysis treatments?: No  Does patient have a history of substance abuse?: No  Does patient have a history of Mental Health Diagnosis?: No    Means of Transportation  Means of Transport to Appts[de-identified] Drives Self  In the past 12 months, has lack of transportation kept you from medical appointments or from getting medications?: No  In the past 12 months, has lack of transportation kept you from meetings, work, or from getting things needed for daily living?: No  Was application for public transport provided?: N/A    DISCHARGE DETAILS:    Discharge planning discussed with[de-identified] Patient  Freedom of Choice: Yes     CM contacted family/caregiver?: No- see comments  Were Treatment Team discharge recommendations reviewed with patient/caregiver?: Yes  Did patient/caregiver verbalize understanding of patient care needs?: Yes  Were patient/caregiver advised of the risks associated with not following Treatment Team discharge recommendations?: Yes    Contacts  Patient Contacts: Ene Guerrero (Spouse)   [phone] (Mobile)  Relationship to Patient[de-identified] Family  Contact Method: Phone  Phone Number: Bard Savage (Spouse)   [phone] (Mobile)  Reason/Outcome: Emergency [address]         Is the patient interested in Odin Fontaine at discharge?: No    DME Referral Provided  Referral made for DME?: No    Other Referral/Resources/Interventions Provided:  Interventions: None Indicated    Treatment Team Recommendation: Home  Discharge Destination Plan[de-identified] Home  Transport at Discharge : Family        Additional Comments: Met with patient at bedside to discuss discharge planning  Patient has an appointment for OP therapy 3/17 at [facility]  WW was delivered to home    No discharge needs

## 2023-03-16 NOTE — PLAN OF CARE
Problem: MOBILITY - ADULT  Goal: Maintain or return to baseline ADL function  Description: INTERVENTIONS:  -  Assess patient's ability to carry out ADLs; assess patient's baseline for ADL function and identify physical deficits which impact ability to perform ADLs (bathing, care of mouth/teeth, toileting, grooming, dressing, etc )  - Assess/evaluate cause of self-care deficits   - Assess range of motion  - Assess patient's mobility; develop plan if impaired  - Assess patient's need for assistive devices and provide as appropriate  - Encourage maximum independence but intervene and supervise when necessary  - Involve family in performance of ADLs  - Assess for home care needs following discharge   - Consider OT consult to assist with ADL evaluation and planning for discharge  - Provide patient education as appropriate  Outcome: Progressing  Goal: Maintains/Returns to pre admission functional level  Description: INTERVENTIONS:  - Perform BMAT or MOVE assessment daily    - Set and communicate daily mobility goal to care team and patient/family/caregiver  - Collaborate with rehabilitation services on mobility goals if consulted  - Reposition patient every 2 hours    - Dangle patient 3 times a day  - Stand patient 3 times a day  - Ambulate patient 3 times a day  - Out of bed to chair 2 times a day   - Out of bed for meals 3 times a day  - Out of bed for toileting  - Record patient progress and toleration of activity level   Outcome: Progressing     Problem: PAIN - ADULT  Goal: Verbalizes/displays adequate comfort level or baseline comfort level  Description: Interventions:  - Encourage patient to monitor pain and request assistance  - Assess pain using appropriate pain scale  - Administer analgesics based on type and severity of pain and evaluate response  - Implement non-pharmacological measures as appropriate and evaluate response  - Consider cultural and social influences on pain and pain management  - Notify physician/advanced practitioner if interventions unsuccessful or patient reports new pain  Outcome: Progressing     Problem: INFECTION - ADULT  Goal: Absence or prevention of progression during hospitalization  Description: INTERVENTIONS:  - Assess and monitor for signs and symptoms of infection  - Monitor lab/diagnostic results  - Monitor all insertion sites, i e  indwelling lines, tubes, and drains  - Monitor endotracheal if appropriate and nasal secretions for changes in amount and color  - [place] appropriate cooling/warming therapies per order  - Administer medications as ordered  - Instruct and encourage patient and family to use good hand hygiene technique  - Identify and instruct in appropriate isolation precautions for identified infection/condition  Outcome: Progressing     Problem: SAFETY ADULT  Goal: Maintain or return to baseline ADL function  Description: INTERVENTIONS:  -  Assess patient's ability to carry out ADLs; assess patient's baseline for ADL function and identify physical deficits which impact ability to perform ADLs (bathing, care of mouth/teeth, toileting, grooming, dressing, etc )  - Assess/evaluate cause of self-care deficits   - Assess range of motion  - Assess patient's mobility; develop plan if impaired  - Assess patient's need for assistive devices and provide as appropriate  - Encourage maximum independence but intervene and supervise when necessary  - Involve family in performance of ADLs  - Assess for home care needs following discharge   - Consider OT consult to assist with ADL evaluation and planning for discharge  - Provide patient education as appropriate  Outcome: Progressing  Goal: Maintains/Returns to pre admission functional level  Description: INTERVENTIONS:  - Perform BMAT or MOVE assessment daily    - Set and communicate daily mobility goal to care team and patient/family/caregiver     - Collaborate with rehabilitation services on mobility goals if consulted  - Reposition patient every 2 hours  - Dangle patient 3 times a day  - Stand patient 3 times a day  - Ambulate patient 3 times a day  - Out of bed to chair 2 times a day   - Out of bed for meals 3 times a day  - Out of bed for toileting  - Record patient progress and toleration of activity level   Outcome: Progressing  Goal: Patient will remain free of falls  Description: INTERVENTIONS:  - Educate patient/family on patient safety including physical limitations  - Instruct patient to call for assistance with activity   - Consult OT/PT to assist with strengthening/mobility   - Keep Call bell within reach  - Keep bed low and locked with side rails adjusted as appropriate  - Keep care items and personal belongings within reach  - Initiate and maintain comfort rounds  - Make Fall Risk Sign visible to staff  - Offer Toileting every 2 Hours, in advance of need  - Apply yellow socks and bracelet for high fall risk patients  - Consider moving patient to room near nurses station  Outcome: Progressing     Problem: DISCHARGE PLANNING  Goal: Discharge to home or other facility with appropriate resources  Description: INTERVENTIONS:  - Identify barriers to discharge w/patient and caregiver  - Arrange for needed discharge resources and transportation as appropriate  - Identify discharge learning needs (meds, wound care, etc )  - Arrange for interpretive services to assist at discharge as needed  - Refer to Case Management Department for coordinating discharge planning if the patient needs post-hospital services based on physician/advanced practitioner order or complex needs related to functional status, cognitive ability, or social support system  Outcome: Progressing     Problem: Knowledge Deficit  Goal: Patient/family/caregiver demonstrates understanding of disease process, treatment plan, medications, and discharge instructions  Description: Complete learning assessment and assess knowledge base    Interventions:  - Provide teaching at level of understanding  - Provide teaching via preferred learning methods  Outcome: Progressing     Problem: MUSCULOSKELETAL - ADULT  Goal: Maintain or return mobility to safest level of function  Description: INTERVENTIONS:  - Assess patient's ability to carry out ADLs; assess patient's baseline for ADL function and identify physical deficits which impact ability to perform ADLs (bathing, care of mouth/teeth, toileting, grooming, dressing, etc )  - Assess/evaluate cause of self-care deficits   - Assess range of motion  - Assess patient's mobility  - Assess patient's need for assistive devices and provide as appropriate  - Encourage maximum independence but intervene and supervise when necessary  - Involve family in performance of ADLs  - Assess for home care needs following discharge   - Consider OT consult to assist with ADL evaluation and planning for discharge  - Provide patient education as appropriate  Outcome: Progressing  Goal: Maintain proper alignment of affected body part  Description: INTERVENTIONS:  - Support, maintain and protect limb and body alignment  - Provide patient/ family with appropriate education  Outcome: Progressing     Problem: Prexisting or High Potential for Compromised Skin Integrity  Goal: Skin integrity is maintained or improved  Description: INTERVENTIONS:  - Identify patients at risk for skin breakdown  - Assess and monitor skin integrity  - Assess and monitor nutrition and hydration status  - Monitor labs   - Assess for incontinence   - Turn and reposition patient  - Assist with mobility/ambulation  - Relieve pressure over bony prominences  - Avoid friction and shearing  - Provide appropriate hygiene as needed including keeping skin clean and dry  - Evaluate need for skin moisturizer/barrier cream  - Collaborate with interdisciplinary team   - Patient/family teaching  - Consider wound care consult   Outcome: Progressing

## 2023-03-16 NOTE — DISCHARGE SUMMARY
Discharge Summary - Scott Kelley 79 y o  female MRN: [id number]    Unit/Bed#: -01 Encounter: [id number]    Admission Date: 3/15/2023    Admitting Diagnosis: Primary osteoarthritis of left knee [M17 12]    HPI: Pt was admitted following left TKA performed by Dr Monica Milan  Pt reports adequate pain control following surgery  PT was consulted for evaluation and recommendations regarding discharge  Procedures Performed: No orders of the defined types were placed in this encounter  Summary of Hospital Course: left TKA was performed by Dr Monica Milan on 3/48/88 without complications  Pt was admitted for observation and evaluation by PT  Pt's drain was removed on POD #1 and dressings were changed  She worked with PT without any issues and was cleared for discharge to home with outpatient rehab  She will plan to follow up with Dr Monica Milan in 2 weeks  Significant Findings, Care, Treatment and Services Provided: drain removed and appropriate dressings applied  PT sessions with clearance for discharge  Hb within normal limits  Complications: none    Discharge Diagnosis: left total knee arthroplasty    Medical Problems     Resolved Problems  Date Reviewed: 3/9/2023   None         Condition at Discharge: good         Discharge instructions/Information to patient and family:   See after visit summary for information provided to patient and family  Please read Dr Kristine Ann post-op instructions thoroughly  Provisions for Follow-Up Care:  See after visit summary for information related to follow-up care and any pertinent home health orders  Appointment with Dr Monica Milan in 2 weeks  PCP: GUS Odom    Disposition: See After Visit Summary for discharge disposition information  Planned Readmission: No      Discharge Statement   I spent 30 minutes discharging the patient  This time was spent on the day of discharge  I had direct contact with the patient on the day of discharge   Additional documentation is required if more than 30 minutes were spent on discharge  Discharge Medications:  See after visit summary for reconciled discharge medications provided to patient and family

## 2023-03-16 NOTE — PLAN OF CARE
Problem: OCCUPATIONAL THERAPY ADULT  Goal: Performs self-care activities at highest level of function for planned discharge setting  See evaluation for individualized goals  Description: Treatment Interventions: ADL retraining, Functional transfer training, Endurance training, Patient/family training, Equipment evaluation/education, Compensatory technique education, Energy conservation, Activityengagement    See flowsheet documentation for full assessment, interventions and recommendations  Note: Limitation: Decreased ADL status, Decreased Safe judgement during ADL, Decreased endurance, Decreased self-care trans, Decreased high-level ADLs  Prognosis: Good  Assessment: Pt is a 79 y o  female seen for OT evaluation s/p admit to [facility] on 3/15/2023 w/ Primary osteoarthritis of left knee  Comorbidities affecting pt's functional performance at time of assessment include: HTN, Insomnia  Personal factors affecting pt at time of IE include:steps to enter environment and difficulty performing ADLS  Prior to admission, pt was Mod I with ADLs  Upon evaluation: the following deficits impact occupational performance: decreased balance and decreased tolerance  Pt to benefit from continued skilled OT tx while in the hospital to address deficits as defined above and maximize level of functional independence w ADL's and functional mobility  Occupational Performance areas to address include: bathing/shower, toilet hygiene, dressing, functional mobility and clothing management  From OT standpoint, recommendation at time of d/c would be Out-patient OT       OT Discharge Recommendation: Home with outpatient rehabilitation     [address] [place] [address] Road, [place], OTR/L

## 2023-03-16 NOTE — PLAN OF CARE
Problem: PHYSICAL THERAPY ADULT  Goal: Performs mobility at highest level of function for planned discharge setting  See evaluation for individualized goals  Description: Treatment/Interventions: LE strengthening/ROM, Functional transfer training, Elevations, Therapeutic exercise, Endurance training, Patient/family training, Equipment eval/education, Bed mobility, Compensatory technique education, Spoke to nursing, Spoke to case management, Spoke to advanced practitioner, OT  Equipment Recommended: Fabio Farias (has own)       See flowsheet documentation for full assessment, interventions and recommendations  Note: Prognosis: Excellent  Problem List: Decreased strength, Decreased range of motion, Impaired balance, Decreased endurance, Decreased mobility, Decreased coordination, Decreased skin integrity, Orthopedic restrictions, Pain  Assessment: Pt is 79 y o  female seen for PT evaluation s/p admit to [facility] on 3/15/2023 w/ Primary osteoarthritis of left knee  PT consulted to assess pt's functional mobility and d/c needs  Order placed for PT eval and tx, w/ FWB LLE, activity-beginning POD#0 order  Comorbidities affecting pt's physical performance at time of assessment include: weakness,primary OA L knee s/p TKA 3/15/23,vitamin D deficiency, myofascial pain, insomnia, sacroiliitis, HTN    PTA, pt was independent w/ all functional mobility w/ AD usage  Personal factors affecting pt at time of IE include: stairs to enter home, inability to navigate community distances, inability to navigate level surfaces w/o external assistance, unable to perform dynamic tasks in community, inability to perform IADLs and inability to perform ADLs   Please find objective findings from PT assessment regarding body systems outlined above with impairments and limitations including weakness, decreased ROM, impaired balance, decreased endurance, impaired coordination, gait deviations, pain, decreased activity tolerance, decreased functional mobility tolerance, fall risk, orthopedic restrictions and decreased skin integrity  From PT/mobility standpoint, recommendation at time of d/c would be home with outpatient rehabilitation pending progress in order to facilitate return to PLOF  Barriers to Discharge: Inaccessible home environment  Barriers to Discharge Comments: LAZ home, to be trialed during future session when appropriate  PT Discharge Recommendation: Home with outpatient rehabilitation    See flowsheet documentation for full assessment

## 2023-03-16 NOTE — PHYSICAL THERAPY NOTE
Physical Therapy Evaluation     Patient's Name: Yoel Hubbard    Admitting Diagnosis  Primary osteoarthritis of left knee [M17 12]    Problem List  Patient Active Problem List   Diagnosis    OAB (overactive bladder)    Vitamin D deficiency    Essential hypertension    Hyperlipidemia    Sacroiliitis (HCC)    Myofascial pain    Mixed incontinence    Primary osteoarthritis of both knees    Chronic pain of left knee    BMI 32 0-32 9,adult    GERD (gastroesophageal reflux disease)    Dysphagia    Pain of upper abdomen    Paraesophageal hernia    Gastroesophageal reflux disease    Heartburn    Anti-TPO antibodies present    Multinodular goiter    Nausea    Esophageal obstruction due to food impaction    Benign lipomatous neoplasm of skin and subcutaneous tissue of left arm    Tear of right rotator cuff    Rupture of gluteus minimus tendon, left, subsequent encounter    Tendinopathy of left gluteus medius    Family history of colon cancer    Bloating    Chronic pain syndrome    Lumbar radiculopathy    Left buttock pain    Piriformis muscle pain    Lipoma of thigh    Flatulence    Tear of medial meniscus of left knee, current    Tendinopathy of right gluteus medius    Primary osteoarthritis of left knee    Insomnia       Past Medical History  Past Medical History:   Diagnosis Date    Anemia     Arthritis     Chronic pain disorder     back and hip pain    Disease of thyroid gland     nodules    History of gastroesophageal reflux (GERD)     "had hiatal hernia surgery"    History of heart murmur in childhood     Hyperlipidemia     Hypertension     Overactive bladder     Rotator cuff tear, left     pt unsure    Rotator cuff tear, right     had surgery    Vitamin D deficiency     unsure    Wears glasses     Wears partial dentures     upper       Past Surgical History  Past Surgical History:   Procedure Laterality Date     SECTION      Last Assessed: 2017    COLONOSCOPY      ELBOW SURGERY      Last Assessed: 5/31/2017    ESOPHAGOGASTRODUODENOSCOPY N/A 12/18/2017    Procedure: ESOPHAGOGASTRODUODENOSCOPY (EGD); Surgeon: Page Grimes DO;  Location: [facility]; Service: Gastroenterology    HERNIA REPAIR  05/19/2020    Hiatal hernia repair    HIP SURGERY Left 02/08/2018    glut medius/minimus repair  and 8/8/18--with pin implanted    HYSTERECTOMY      2011    KNEE SURGERY      x2    PARAESOPHAGEAL HERNIA REPAIR N/A 05/19/2020    Procedure: LAPAROSCOPIC PARAESOPHAGEAL HERNIA REPAIR WITH MESH AND NISSEN FUNDOPLICATION WITH ROBOTICS;  Surgeon: Winifred Ya MD;  Location: [facility];  Service: General    NM ARTHRS KNE SURG W/MENISCECTOMY MED/LAT W/SHVG Left 06/15/2022    Procedure: ;left knee arthroscopy, meniscectomy,synevectomy,chondroplasty, loose body removal, injection;  Surgeon: Omkar Chavarria DO;  Location: [facility];  Service: Orthopedics    NM COLONOSCOPY FLX DX W/COLLJ SPEC WHEN PFRMD N/A 06/26/2017    Procedure: EGD AND COLONOSCOPY;  Surgeon: Page Grimes DO;  Location: [facility]; Service: Gastroenterology    NM EXCISON TUMOR SOFT TISSUE THIGH/KNEE SUBQ 3 CM/> Bilateral 05/26/2022    Procedure: EXCISION BIOPSY TISSUE LESION/MASS LOWER EXTREMITY;  Surgeon: Amaris Oconnell MD;  Location: [facility];  Service: General    NM EXCISON TUMOR SOFT TISSUE THIGH/KNEE SUBQ 3 CM/> Bilateral 08/25/2022    Procedure: EXCISION BIOPSY TISSUE LESION/MASS LOWER EXTREMITY;  Surgeon: Amaris Oconnell MD;  Location: [facility];  Service: General    SHOULDER ARTHROCENTESIS      SHOULDER ARTHROSCOPY Right 06/23/2021    Procedure: SHOULDER ARTHROSCOPY WITH acromioplasty, debridment, and ROTATOR CUFF REPAIR;  Surgeon: Jenn Whitney DO;  [facility];  Service: Orthopedics    TUBAL LIGATION          03/16/23 0850   PT Last Visit   PT Visit Date 03/16/23   Note Type   Note type Evaluation   Pain Assessment   Pain Assessment Tool 0-10   Pain Score 5   Pain Location/Orientation Orientation: Left; Location: Knee   Pain Onset/Description Onset: Ongoing;Frequency: Constant/Continuous; Descriptor: Aching;Descriptor: Sore   Effect of Pain on Daily Activities yes   Patient's Stated Pain Goal No pain   Hospital Pain Intervention(s) Repositioned; Ambulation/increased activity; Elevated; Emotional support; Environmental changes; Medication (See MAR)  Breana Moore RN provided pain medication, Percocet approximately 30 minutes prior to arrival )   Multiple Pain Sites No   Restrictions/Precautions   Weight Bearing Precautions Per Order Yes   LLE Weight Bearing Per Order FWB   Braces or Orthoses Other (Comment)  (CPM usage in place)   Other Precautions Chair Alarm; Bed Alarm;WBS;Multiple lines; Fall Risk;Pain  ([place] drain, surgical site L knee)   Home Living   Type of [address] [place] [address] Ave Two level;1/2 bath on main level;Performs ADLs on one level; Able to live on main level with bedroom/bathroom;Stairs to enter with rails  (1 LAZ, full flight to 2nd floor)   Bathroom Shower/Tub Tub/shower unit   Bathroom Toilet Standard   Bathroom Accessibility Accessible   Home Equipment Walker;Cane  (utilized RW prior to arrival)   Prior Function   Level of [place] Independent with ADLs; Independent with functional mobility; Independent with IADLS   Lives With Spouse; Son   Justin Kent Help From Family  (prn)   IADLs Independent with driving; Independent with medication management; Independent with meal prep   Falls in the last 6 months 0  (denies)   Vocational Retired   General   Additional Pertinent History Myrisa OT present for co-assessment due to medical complexity, required skilled interventions of 2 clinicians for care delivery  Family/Caregiver Present No   Cognition   Overall Cognitive Status WFL   Arousal/Participation Alert   Orientation Level Oriented X4   Memory Within functional limits   Following Commands Follows all commands and directions without difficulty   Comments Yamila Starkey was agreeable to PT assessment,pleasant     Subjective   Subjective "I feel ok"   RLE Assessment   RLE Assessment WFL   LLE Assessment   LLE Assessment X   LLE Overall AROM   L Knee Flexion decreased due to pain severity, post operative limitation   L Knee Extension lacking terminal knee extension   Strength LLE   L Hip Flexion 3/5   L Hip ABduction 3+/5   L Hip ADduction 3+/5   L Ankle Dorsiflexion 3/5   L Ankle Plantar Flexion 3/5   Vision-Basic Assessment   Current Vision Wears glasses all the time   Vestibular   Spontaneous Nystagmus (-) no evidence of nystagmus at rest in room light   Coordination   Movements are Fluid and Coordinated 0   Coordination and Movement Description Incremental, antalgic mobility requiring increased time and verbal cues  Sharp/Dull   RLE Sharp/Dull Grossly intact   LLE Sharp/Dull Grossly intact   Bed Mobility   Supine to Sit 4  Minimal assistance   Additional items Assist x 1;HOB elevated; Bedrails; Increased time required;Verbal cues;LE management  (LLE assistance to initiate task only, completed latter ROM CGA )   Sit to Supine   (DNT pt  was sitting out of bed on the recliner upon conclusion )   Additional Comments BP supine head of bed elevated 117/71-> sitting 117/74  Verbal cues provided for BUE placement and proper body mechanics  Transfers   Sit to Stand   (CGA)   Additional items Assist x 1;Bedrails; Increased time required;Verbal cues   Stand to Sit   (CGA)   Additional items Assist x 1;Bedrails; Increased time required;Verbal cues   Stand pivot   (CGA)   Additional items Assist x 1; Increased time required;Verbal cues   Additional Comments Upon initially standing, verbal cues for base of support widening and mediolateral weight shifting prior to gait activities  Ambulation/Elevation   Gait pattern Improper Weight shift; Forward Flexion; Antalgic;Decreased L stance; Short stride; Step to   Gait Assistance   (CGA)   Additional items Assist x 1;Verbal cues; Tactile cues   Assistive Device Rolling walker   Distance 50 feet  (into hallway)   Stair Management Assistance Not tested  (could not assess at this time due to medical complications)   Ambulation/Elevation Additional Comments Verbal cues for proper gait pattern and RW usage  Additional verbal cues for proper pedal placement with directional changes  Balance   Static Sitting Good   Dynamic Sitting Fair +   Static Standing Fair +   Dynamic Standing Fair   Ambulatory Fair   Endurance Deficit   Endurance Deficit Yes   Endurance Deficit Description During gait activities, Jeane Angelo began feeling lightheaded and nauseous  Recliner chair provided with BLEs elevated and Zofran for symptoms  Jeane Angelo was assisted back to room and repositioned accordingly  Activity Tolerance   Activity Tolerance Patient limited by fatigue;Treatment limited secondary to medical complications (Comment)   Medical Staff Made Aware Yes, Lourdes Galeas ortho PA-C and Montserrat CM were informed of assessment outcome  Nurse Made Aware yes, Yvan Alegria RN   Assessment   Prognosis Excellent   Problem List Decreased strength;Decreased range of motion; Impaired balance;Decreased endurance;Decreased mobility; Decreased coordination;Decreased skin integrity;Orthopedic restrictions;Pain   Assessment Pt is 79 y o  female seen for PT evaluation s/p admit to [facility] on 3/15/2023 w/ Primary osteoarthritis of left knee  PT consulted to assess pt's functional mobility and d/c needs  Order placed for PT eval and tx, w/ FWB LLE, activity-beginning POD#0 order  Comorbidities affecting pt's physical performance at time of assessment include: weakness,primary OA L knee s/p TKA 3/15/23,vitamin D deficiency, myofascial pain, insomnia, sacroiliitis, HTN  PTA, pt was independent w/ all functional mobility w/ AD usage   Personal factors affecting pt at time of IE include: stairs to enter home, inability to navigate community distances, inability to navigate level surfaces w/o external assistance, unable to perform dynamic tasks in community, inability to perform IADLs and inability to perform ADLs  Please find objective findings from PT assessment regarding body systems outlined above with impairments and limitations including weakness, decreased ROM, impaired balance, decreased endurance, impaired coordination, gait deviations, pain, decreased activity tolerance, decreased functional mobility tolerance, fall risk, orthopedic restrictions and decreased skin integrity  From PT/mobility standpoint, recommendation at time of d/c would be home with outpatient rehabilitation pending progress in order to facilitate return to PLOF  Barriers to Discharge Inaccessible home environment   Barriers to Discharge Comments LAZ home, to be trialed during future session when appropriate  Goals   Patient Goals to get better soon   LTG Expiration Date 03/26/23   Long Term Goal #1 1 )Patient will complete bed mobility supervision of 1 for decrease need for caregiver assistance, decrease burden of care  2 ) Patient will complete transfers supervision of 1 to decrease risk of falls, facilitate upright standing posture  3 ) LLE strength to greater than/equal to 3+/5 gross musculature to increase ability to safely transfer, control descent to chair  4 ) Patient will exhibit increase dynamic standing to Good 4-5 minutes without LOB supervision of 1 to improve activity tolerance  5 ) Patient will exhibit increase dynamic ambulatory balance to Fair > 100 feet w/AD supervision of 1 to improve ability to mobilize to toilet, chair and decrease risk for additional medical complications  6 ) Patient will ascend/descend > 2 steps with supervision of 1 to access home environment  PT Treatment Day 0   Plan   Treatment/Interventions LE strengthening/ROM; Functional transfer training;Elevations; Therapeutic exercise; Endurance training;Patient/family training;Equipment eval/education; Bed mobility; Compensatory technique education;Spoke to nursing;Spoke to case management;Spoke to advanced practitioner;OT   PT Frequency Twice a day   Recommendation   PT Discharge Recommendation Home with outpatient rehabilitation   Equipment Recommended Ras Betts  (has own)   Rizwana Deleon Recommended Wheeled walker   Change/add to Rizwana Deleon? No   Additional Comments Upon conclusion, Edis Church was sitting out of bed on the recliner  All of her needs were within reach and cold pack was placed     AM-PAC Basic Mobility Inpatient   Turning in Flat Bed Without Bedrails 3   Lying on Back to Sitting on Edge of Flat Bed Without Bedrails 3   Moving Bed to Chair 3   Standing Up From Chair Using Arms 3   Walk in Room 3   Climb 3-5 Stairs With Railing 3   Basic Mobility Inpatient Raw Score 18   Basic Mobility Standardized Score 41 05   Highest Level Of Mobility   JH-HLM Goal 6: Walk 10 steps or more   JH-HLM Achieved 7: Walk 25 feet or more     History/Personal Factors/Comorbidities: weakness,primary OA L knee s/p TKA 3/15/23,vitamin D deficiency, myofascial pain, insomnia, sacroiliitis, HTN    # of body structures/limitations: muscle weakness, activity intolerance,decreased endurance, impaired balance, gait deviations,pain, decreased ROM    Clinical presentation: unstable as seen in constant pain, fall risk, progressive symptoms prior to hospitalization, nausea, lightheadedness    Initial Assessment Time: [id number]    Isidro Dave, PT

## 2023-03-16 NOTE — PROGRESS NOTES
Orthopedics   Ksenia Serrano 79 y o  female MRN: [id number]  Unit/Bed#:       Subjective:  79 y  o female POD #1 left total knee arthroplasty  Pt doing well  Pain controlled with Percocet, but noting nausea related to medication that is being well-controlled with Zofran  Pt reports no issues with working with PT  No other complaints at this time      Labs:  0   Lab Value Date/Time    HCT 37 6 03/16/2023 0638    HCT 46 2 (H) 03/02/2023 0655    HCT 40 8 06/01/2022 0735    HCT 40 7 05/14/2018 0652    HGB 12 2 03/16/2023 0638    HGB 14 8 03/02/2023 0655    HGB 13 4 06/01/2022 0735    HGB 12 8 05/14/2018 0652    INR 0 93 03/02/2023 0655    WBC 8 96 03/16/2023 0638    WBC 5 34 03/02/2023 0655    WBC 6 61 06/01/2022 0735    WBC 5 4 05/14/2018 0652    ESR 21 (H) 02/18/2019 1036    CRP 3 8 (H) 02/18/2019 1036       Meds:    Current Facility-Administered Medications:   •  acetaminophen (TYLENOL) tablet 650 mg, 650 mg, Oral, Q4H PRN, Jeanette Vishal Duque PA-C  •  aluminum-magnesium hydroxide-simethicone (MYLANTA) oral suspension 30 mL, 30 mL, Oral, Q6H PRN, Jeanette WHIT GarciasC  •  ascorbic acid (VITAMIN C) tablet 500 mg, 500 mg, Oral, BID, Jeanette Vishal Duque PA-C, 500 mg at 03/16/23 9630  •  atorvastatin (LIPITOR) tablet 10 mg, 10 mg, Oral, Daily, Jeanette ANTONIO Garcias-C, 10 mg at 03/16/23 7915  •  ceFAZolin (ANCEF) 1,000 mg in sodium chloride 0 9 % 1,000 mL irrigation bottle, , Irrigation, Once, Laroy Fears, DO  •  ceFAZolin (ANCEF) 3,000 mg in sodium chloride 0 9 % 3,000 mL irrigation bag, , Irrigation, Once, Laroy Fears, DO  •  docusate sodium (COLACE) capsule 100 mg, 100 mg, Oral, BID, Jeanette Vishal Odierno, PA-C, 100 mg at 03/16/23 3859  •  famotidine (PEPCID) tablet 20 mg, 20 mg, Oral, BID, Jeanette Mokane Odierno, PA-C, 20 mg at 03/16/23 1529  •  ferrous sulfate tablet 325 mg, 325 mg, Oral, BID With Meals, Jeanette Mokane Odierno, PA-C, 325 mg at 04/74/25 9600  •  folic acid (Daron Clonra) tablet 1 mg, 1 mg, Oral, Daily, Hamlet Schmid, PA-C, 1 mg at 03/16/23 8708  •  fondaparinux (ARIXTRA) subcutaneous injection 2 5 mg, 2 5 mg, Subcutaneous, Daily, Elle Duque, PA-C, 2 5 mg at 03/16/23 0939  •  HYDROmorphone (DILAUDID) injection 0 5 mg, 0 5 mg, Intravenous, Q3H PRN, Elle Duque PA-C, 0 5 mg at 03/15/23 2208  •  hydrOXYzine HCL (ATARAX) tablet 25 mg, 25 mg, Oral, HS, Elle Duque, PA-C, 25 mg at 03/15/23 2204  •  lactated ringers bolus 1,000 mL, 1,000 mL, Intravenous, Once PRN **AND** lactated ringers bolus 1,000 mL, 1,000 mL, Intravenous, Once PRN, Hamlet Schmid PA-C  •  losartan (COZAAR) tablet 100 mg, 100 mg, Oral, Daily, Hamlettiffanie Schmid, PA-C, 100 mg at 03/16/23 4255  •  metoclopramide (REGLAN) injection 10 mg, 10 mg, Intravenous, Q6H [address], Theresa Lynn, PA-C, 10 mg at 03/16/23 1110  •  multivitamin-minerals (CENTRUM) tablet 1 tablet, 1 tablet, Oral, Daily, Hamlet Schmid PA-C, 1 tablet at 03/16/23 5645  •  [facility]) injection 4 mg, 4 mg, Intravenous, Q6H PRN, Elle Duque, PA-C, 4 mg at 03/16/23 1871  •  oxyCODONE-acetaminophen (PERCOCET) 5-325 mg per tablet 1 tablet, 1 tablet, Oral, Q4H PRN, Hamlet Schmid PA-C, 1 tablet at 03/15/23 1507  •  oxyCODONE-acetaminophen (PERCOCET) 5-325 mg per tablet 2 tablet, 2 tablet, Oral, Q6H PRN, Hamlet Schmid PA-C, 2 tablet at 03/16/23 3027  •  sodium chloride 0 9 % bolus 1,000 mL, 1,000 mL, Intravenous, Once PRN **AND** sodium chloride 0 9 % bolus 1,000 mL, 1,000 mL, Intravenous, Once PRN, Hamlet Schmid PA-C    Blood Culture:   No results found for: BLOODCX    Wound Culture:   No results found for: WOUNDCULT    Ins and Outs:  I/O last 24 hours: In: 2343 3 [P O :240;  I V :1578 3; Blood:425; IV Piggyback:100]  Out: 7313 [Urine:2275; Drains:500]          Physical:  Vitals:    03/16/23 1154   BP: 121/67   Pulse: 65   Resp: 18   Temp: 98 4 °F (36 9 °C)   SpO2: 97%     left lower extremity:  · Drain removed today  No eccymosis or drainage  New dressing placed  · Dressings C/D/I  · Brisk cap refill  · Compartments soft and compressible  · Neurovascularly intact distally    _*_*_*_*_*_*_*_*_*_*_*_*_*_*_*_*_*_*_*_*_*_*_*_*_*_*_*_*_*_*_*_*_*_*_*_*_*_*_*_*_*    Assessment: 79 y  o female POD #1 left total knee arthroplasty   Doing well    Plan:  · Weight Bearing as tolerated  · Up and out of bed  · Continue DVT prophylaxis with Arixtra  · Continue pain control   · Continue Zofran as needed for nausea  · PT/OT  · PT recommending discharge home with outpatient rehab likely later today or tomorrow  · Plan to follow up with Dr Zehra Ott in outpatient setting in 2 weeks    Sudha García PA-C

## 2023-03-16 NOTE — OCCUPATIONAL THERAPY NOTE
Occupational Therapy Evaluation      [place]    3/16/2023    Principal Problem:    Primary osteoarthritis of left knee  Active Problems:    Essential hypertension    Insomnia      Past Medical History:   Diagnosis Date    Anemia     Arthritis     Chronic pain disorder     back and hip pain    Disease of thyroid gland     nodules    History of gastroesophageal reflux (GERD)     "had hiatal hernia surgery"    History of heart murmur in childhood     Hyperlipidemia     Hypertension     Overactive bladder     Rotator cuff tear, left     pt unsure    Rotator cuff tear, right     had surgery    Vitamin D deficiency     unsure    Wears glasses     Wears partial dentures     upper       Past Surgical History:   Procedure Laterality Date     SECTION      Last Assessed: 2017    COLONOSCOPY      ELBOW SURGERY      Last Assessed: 2017    ESOPHAGOGASTRODUODENOSCOPY N/A 2017    Procedure: ESOPHAGOGASTRODUODENOSCOPY (EGD); Surgeon: Oz Zayas DO;  Location: [facility] GI LAB; Service: Gastroenterology    HERNIA REPAIR  2020    Hiatal hernia repair    HIP SURGERY Left 2018    glut medius/minimus repair  and 18--with pin implanted    HYSTERECTOMY          KNEE SURGERY      x2    PARAESOPHAGEAL HERNIA REPAIR N/A 2020    Procedure: LAPAROSCOPIC PARAESOPHAGEAL HERNIA REPAIR WITH MESH AND NISSEN FUNDOPLICATION WITH ROBOTICS;  Surgeon: Sacha Talamantes MD;  Location: [facility];  Service: General    WV ARTHRS KNE SURG W/MENISCECTOMY MED/LAT W/SHVG Left 06/15/2022    Procedure: ;left knee arthroscopy, meniscectomy,synevectomy,chondroplasty, loose body removal, injection;  Surgeon: Goldie Choi DO;  Location: [facility];  Service: Orthopedics    WV COLONOSCOPY FLX DX W/COLLJ SPEC WHEN PFRMD N/A 2017    Procedure: EGD AND COLONOSCOPY;  Surgeon: Oz Zayas DO;  Location: [facility] GI LAB;   Service: Gastroenterology    WV EXCISON TUMOR SOFT TISSUE THIGH/KNEE SUBQ 3 CM/> Bilateral 05/26/2022    Procedure: EXCISION BIOPSY TISSUE LESION/MASS LOWER EXTREMITY;  Surgeon: Jihan Washington MD;  Location: [facility];  Service: General    DE EXCISON TUMOR SOFT TISSUE THIGH/KNEE SUBQ 3 CM/> Bilateral 08/25/2022    Procedure: EXCISION BIOPSY TISSUE LESION/MASS LOWER EXTREMITY;  Surgeon: Jihan Washington MD;  Location: [facility];  Service: General    SHOULDER ARTHROCENTESIS      SHOULDER ARTHROSCOPY Right 06/23/2021    Procedure: SHOULDER ARTHROSCOPY WITH acromioplasty, debridment, and ROTATOR CUFF REPAIR;  Surgeon: Yvan Dyer DO;  Location: [facility] MAIN OR;  Service: Orthopedics    TUBAL LIGATION          03/16/23 0911   OT Last Visit   OT Visit Date 03/16/23   Note Type   Note type Evaluation   Pain Assessment   Pain Assessment Tool 0-10   Pain Score 5   Pain Location/Orientation Orientation: Left; Location: Knee   Pain Onset/Description Onset: Ongoing;Frequency: Constant/Continuous; Descriptor: Aching;Descriptor: Sore   Effect of Pain on Daily Activities yes   Patient's Stated Pain Goal No pain   Hospital Pain Intervention(s) Repositioned; Ambulation/increased activity; Elevated; Emotional support; Environmental changes; Medication (See MAR)  Barbara Terry RN provided pain medication approx  30 minutes prior to arrival)   Multiple Pain Sites No   Restrictions/Precautions   Weight Bearing Precautions Per Order Yes   LLE Weight Bearing Per Order FWB   Braces or Orthoses Other (Comment)  (CPM usage in place)   Other Precautions Chair Alarm; Bed Alarm;WBS;Multiple lines; Fall Risk;Pain  ([place] drain, surgical site L knee)   Home Living   Type of [address] [place] [address] Two level;1/2 bath on main level;Performs ADLs on one level; Able to live on main level with bedroom/bathroom;Stairs to enter with rails  (1 LAZ, full flight to 2nd floor)   Bathroom Shower/Tub Tub/shower unit   Bathroom Toilet Standard   Bathroom Accessibility Accessible   Home Equipment Walker;Cane  (utilized RW prior to arrival)   Prior Function   Level of [place] Independent with ADLs; Independent with functional mobility; Independent with IADLS   Lives With Spouse; Son   Kem Help From Family  (prn)   IADLs Independent with driving; Independent with medication management; Independent with meal prep   Falls in the last 6 months 0  (denies)   Vocational Retired   ADL   UB Bathing Assistance 5  [address] [place] [address]  4  [address] 5  Supervision/Setup    [place] 4  Minimal Assistance   Bed Mobility   Supine to Sit 4  Minimal assistance   Additional items Assist x 1;HOB elevated; Bedrails; Increased time required;Verbal cues;LE management  (L LE assistance to initiate task only)   Additional Comments Pt remained OOB in recliner upon conclusion   Transfers   Sit to Stand   (CGA)   Additional items Assist x 1;Bedrails; Increased time required;Verbal cues   Stand to Sit   (CGA)   Additional items Assist x 1;Bedrails; Increased time required;Verbal cues   Stand pivot   (CGA)   Additional items Assist x 1; Increased time required;Verbal cues   Additional Comments BP supine head of bed elevated: 117/71, sittin/74   Balance   Static Sitting Good   Dynamic Sitting Fair +   Static Standing Fair +   Dynamic Standing Fair   Ambulatory Fair   Activity Tolerance   Activity Tolerance Patient limited by fatigue;Patient limited by pain;Treatment limited secondary to medical complications (Comment)  (Pt became very nauseous)   Medical Staff Made Aware Sharon Lim [facility] [place]   Nurse Made Aware CHELSIE Martel Assessment   RUE Assessment WFL   LUE Assessment   LUE Assessment WFL   Vision-Basic Assessment   Current Vision Wears glasses all the time   Cognition   Overall Cognitive Status [facility]   Arousal/Participation Alert; Cooperative   Attention Within functional limits   Orientation Level Oriented X4   Memory Within functional limits   Following Commands Follows all commands and directions without difficulty Assessment   Limitation Decreased ADL status; Decreased Safe judgement during ADL;Decreased endurance;Decreased self-care trans;Decreased high-level ADLs   Prognosis Good   Assessment Pt is a 79 y o  female seen for OT evaluation s/p admit to [facility] on 3/15/2023 w/ Primary osteoarthritis of left knee  Comorbidities affecting pt's functional performance at time of assessment include: HTN, Insomnia  Personal factors affecting pt at time of IE include:steps to enter environment and difficulty performing ADLS  Prior to admission, pt was Mod I with ADLs  Upon evaluation: the following deficits impact occupational performance: decreased balance and decreased tolerance  Pt to benefit from continued skilled OT tx while in the hospital to address deficits as defined above and maximize level of functional independence w ADL's and functional mobility  Occupational Performance areas to address include: bathing/shower, toilet hygiene, dressing, functional mobility and clothing management  From OT standpoint, recommendation at time of d/c would be Out-patient OT  Goals   Patient Goals to get better soon   Plan   Treatment Interventions ADL retraining;Functional transfer training; Endurance training;Patient/family training;Equipment evaluation/education; Compensatory technique education; Energy conservation; Activityengagement   Goal Expiration Date 03/26/23   OT Treatment Day 0   OT Frequency 3-5x/wk   Recommendation   OT Discharge Recommendation Home with outpatient rehabilitation   Additional Comments  Pt seen as a co-eval with PT due to the patient's co-morbidities, clinically unstable presentation, and present impairments which are a regression from the patient's baseline  Additional Comments 2 The patient's raw score on the AM-PAC Daily Activity Inpatient Short Form is 20  A raw score of greater than or equal to 19 suggests the patient may benefit from discharge to home   Please refer to the recommendation of the Occupational Therapist for safe discharge planning     AM-PAC Daily Activity Inpatient   Lower Body Dressing 3   Bathing 3   Toileting 3   Upper Body Dressing 3   Grooming 4   Eating 4   Daily Activity Raw Score 20   Daily Activity Standardized Score (Calc for Raw Score >=11) 42 03   AM-PAC Applied Cognition Inpatient   Following a Speech/Presentation 4   Understanding Ordinary Conversation 4   Taking Medications 4   Remembering Where Things Are Placed or Put Away 4   Remembering List of 4-5 Errands 4   Taking Care of Complicated Tasks 4   Applied Cognition Raw Score 24   Applied Cognition Standardized Score 62 21     GOALS:    Pt will achieve the following within specified time frame: STG  Pt will achieve the following goals within 5 days    *ADL transfers with (S) for inc'd independence with ADLs/purposeful tasks    *UB ADL with (I) for inc'd independence with self cares    *LB ADL with (S) using AE prn for inc'd independence with self cares    *Toileting with (S) for clothing management and hygiene for return to PLOF with personal care    *Increase static stand balance to G- and dyn stand balance to F+ for inc'd safety with standing purposeful tasks    *Increase stand tolerance x3 m for inc'd tolerance with standing purposeful tasks    *Participate in 10m UE therex to increase overall stamina/activity tolerance for purposeful tasks    *Bed mobility- (S) for inc'd independence to manage own comfort and initiate EOB & OOB purposeful tasks    Pt will achieve the following within specified time frame: LTG  Pt will achieve the following goals within 10 days    *ADL transfers with (I) for inc'd independence with ADLs/purposeful tasks    *LB ADL with (I) using AE prn for inc'd independence with self cares    *Toileting with (I) for clothing management and hygiene for return to PLOF with personal care    *Increase static stand balance to G and dyn stand balance to G- for inc'd safety with standing purposeful tasks    *Increase stand tolerance x5 m for inc'd tolerance with standing purposeful tasks    *Bed mobility- (I) for inc'd independence to manage own comfort and initiate EOB & OOB purposeful tasks      Ronni Fields MS, OTR/L

## 2023-03-16 NOTE — PHYSICAL THERAPY NOTE
PHYSICAL THERAPY TREATMENT NOTE  NAME:  Rell Hirsch  DATE: 03/16/23    Length Of Stay: 0  Performed at least 2 patient identifiers during session: Name and ID bracelet    TREATMENT FLOWSHEET:    03/16/23 1359   PT Last Visit   PT Visit Date 03/16/23   Note Type   Note Type Treatment   Pain Assessment   Pain Assessment Tool 0-10   Pain Score No Pain   Restrictions/Precautions   Weight Bearing Precautions Per Order Yes   LLE Weight Bearing Per Order FWB   Other Precautions Chair Alarm; Bed Alarm; Fall Risk;WBS   General   Chart Reviewed Yes   Response to Previous Treatment Patient with no complaints from previous session  Family/Caregiver Present No   Cognition   Overall Cognitive Status WFL   Arousal/Participation Alert; Responsive   Attention Within functional limits   Orientation Level Oriented X4   Memory Within functional limits   Following Commands Follows all commands and directions without difficulty   Comments Pt  was feeling nauseated' but when made aware she needed to be cleared on the stairs was willing to participate due to wanting to be DC'ed to home  Subjective   Subjective "I want to go home so let's go do it "   Bed Mobility   Rolling R 5  Supervision   Additional items Assist x 1;HOB elevated; Bedrails; Increased time required;Verbal cues   Rolling L 5  Supervision   Additional items Assist x 1;HOB elevated; Bedrails; Increased time required;Verbal cues   Supine to Sit 5  Supervision   Additional items Assist x 1;HOB elevated; Bedrails; Increased time required;Verbal cues   Sit to Supine 5  Supervision   Additional items Assist x 1;HOB elevated; Bedrails; Increased time required;Verbal cues   Transfers   Sit to Stand 5  Supervision   Additional items Assist x 1;Bedrails; Increased time required;Verbal cues  (RW)   Stand to Sit 5  Supervision   Additional items Assist x 1; Armrests; Increased time required;Verbal cues   Stand pivot 5  Supervision   Additional items Assist x 1; Armrests; Increased time required;Verbal cues  (RW)   Additional Comments VC's provided for proper hand placement during transitions and to kick LLE forward prior to sitting   Ambulation/Elevation   Gait pattern Improper Weight shift;Decreased foot clearance;Decreased L stance; Wide SUSAN; Short stride; Excessively slow; Step to;Decreased toe off;Decreased heel strike;Knees flexed  (Flexed L knee)   Gait Assistance 5  Supervision   Additional items Assist x 1;Verbal cues   Assistive Device Rolling walker   Distance 15 ftx2   Stair Management Assistance   (CG)   Additional items Assist x 1;Verbal cues   Stair Management Technique One rail R;Step to pattern; Foreward   Number of Stairs 15  (B directions performed)   Ambulation/Elevation Additional Comments Pt  provided with visual demonstration and VC's for proper stair climbing technique   Balance   Static Sitting Good   Dynamic Sitting Fair +   Static Standing Fair +   Dynamic Standing Fair   Ambulatory Fair   Endurance Deficit   Endurance Deficit Yes   Activity Tolerance   Activity Tolerance Patient limited by fatigue   Medical Staff Made Aware PA-C and CM made aware   Nurse Made Aware RN aware   Exercises   Quad Sets Supine;5 reps;AROM; Bilateral   Neuro re-ed dynamic standing balance activities performed including multidirectional weight shifting and B directional turns and balancing on unilateral LE supported by RW and HR  and close S/ CG   Assessment   Prognosis Excellent   Problem List Decreased strength;Decreased range of motion;Decreased endurance; Impaired balance;Decreased mobility; Decreased skin integrity;Orthopedic restrictions   Goals   Patient Goals to go home   PT Treatment Day 1   Plan   Treatment/Interventions Functional transfer training;LE strengthening/ROM; Elevations; Therapeutic exercise; Endurance training;Patient/family training;Equipment eval/education;Gait training;Bed mobility; Compensatory technique education;Spoke to nursing;Spoke to case management;Spoke to advanced practitioner   Progress Progressing toward goals   PT Frequency Twice a day   Recommendation   PT Discharge Recommendation Home with outpatient rehabilitation   Additional Comments Pt  is cleared for DC by PT  JOSE MANUEL , AMARILIS aware, PT, and RN all made aware   AM-PAC Basic Mobility Inpatient   Turning in Flat Bed Without Bedrails 3   Lying on Back to Sitting on Edge of Flat Bed Without Bedrails 3   Moving Bed to Chair 3   Standing Up From Chair Using Arms 3   Walk in Room 3   Climb 3-5 Stairs With Railing 3   Basic Mobility Inpatient Raw Score 18   Basic Mobility Standardized Score 41 05   Highest Level Of Mobility   JH-HLM Goal 6: Walk 10 steps or more   JH-HLM Achieved 7: Walk 25 feet or more       The patient's AM-PAC Basic Mobility Inpatient Short Form Raw Score is 18  A raw score greater than 16 suggests the patient may benefit from discharge to home  Please also refer to the recommendation of the Physical Therapist for safe discharge planning  Pt seen for PT treatment session this date with interventions consisting of bed mobility tasks, supine TE, transfer training, gait training, stair training, neuromuscular re-education of movement performed to improve dynamic standing balance, and education provided as needed for safety and direction to improve functional mobility, safety awareness, and activity tolerance  Pt agreeable to PT treatment session upon arrival, pt found resting in bed  At end of session, pt left in bed positioned for comfort with all needs in reach  In comparison to previous session, pt with improvements in ability to perform stair training   Continue to recommend OP PT at time of d/c in order to maximize pt's functional independence and safety w/ mobility  Pt continues to be functioning below baseline level   Pt  Has been cleared for DC to home with out patient PT but if patient should remain, PT will continue to see pt while here in order to address the deficits listed above and provide interventions consistent w/ POC in effort to achieve STGs      Ashly [place], [place]

## 2023-03-17 ENCOUNTER — OFFICE VISIT (OUTPATIENT)
Dept: PHYSICAL THERAPY | Facility: CLINIC | Age: 68
End: 2023-03-17

## 2023-03-17 ENCOUNTER — TELEPHONE (OUTPATIENT)
Dept: OBGYN CLINIC | Facility: HOSPITAL | Age: 68
End: 2023-03-17

## 2023-03-17 DIAGNOSIS — M17.12 PRIMARY OSTEOARTHRITIS OF LEFT KNEE: Primary | ICD-10-CM

## 2023-03-17 NOTE — PROGRESS NOTES
PT Re-Evaluation     Today's date: 3/17/2023  Patient name: Karrie Ramos  : 1955  MRN: [id number]  Referring provider: Trey Frazier  Dx:   Encounter Diagnosis     ICD-10-CM    1  Primary osteoarthritis of left knee  M17 12           Start Time: 0745  Stop Time: 0830  Total time in clinic (min): 45 minutes    Assessment  Assessment details: Karrie Ramos was seen for an initial PT evaluation and now presents for first s/p re-evaluation  Patient is a 79 y o  female with diagnosis of left knee pain and past medical history significant for OA, chronic pain, thyroid disease, GERD, hiatal hernia, hyperlipidemia, HTN, rotator cuff tear with repair, L buttock pain with history of surgery, lipomas with removal, lumbar radiculopathy, vertigo  Findings of examination today show limitation in LLE strength and stability with decreased active and passive range of motion with pain consistent with s/p diagnosis impacting transfers, gait, bed mobility indicating the need for skilled PT services  Impairments: abnormal gait, abnormal muscle firing, abnormal muscle tone, abnormal or restricted ROM, abnormal movement, activity intolerance, impaired balance, impaired physical strength, lacks appropriate home exercise program, pain with function and safety issue  Functional limitations: walking, stairs, lifting, prolonged weightbearing, squatting, lunging  Goals  Pre-Op Goals: - MET POST INITIAL EVALUATION  1  Patient will be able to identify hazards around home from review of home preparation checklist/virtual home assessment and be able to verbalize an action plan to help reduce fall risk and increase comfort  2  Patient will be able to demonstrate understanding of post-op home exercise program, as well as ambulating with AD that will likely be required at initial s/p  STG (6 weeks s/p)  1   Patient will have reported 0/10 pain in left knee at rest    2  Improve patient's left knee flexion to 100 degrees for increased ability to take proper strides during ambulation  3  Increase patient's TUG test by 5 seconds for increased mobility and decreased fall risk  LTG (12 weeks s/p)  1  Patient's LE strength will be equal bilaterally for ability to ambulate and return to functional activities at OF  2  Patient will be able to walk in community with 0/10 pain in left knee  3  Patient will be independent with home exercise program for continued maintenance post PT discharge  Plan  Plan details: Re-evaluation in 4 weeks  Patient would benefit from: skilled physical therapy  Planned modality interventions: unattended electrical stimulation, thermotherapy: hydrocollator packs and cryotherapy  Planned therapy interventions: manual therapy, neuromuscular re-education, self care, home exercise program, gait training, therapeutic exercise and therapeutic activities  Frequency: 2-3x/week  Duration in weeks: 12  Plan of Care beginning date: 3/7/2023  Plan of Care expiration date: 2023  Treatment plan discussed with: patient and PTA        Subjective Evaluation    History of Present Illness  Subjective 3/17: Patient underwent L TKA 3/15/23  Was DC from hospital last night, was able to sleep relatively comfortably  Continues to take medication as Rx  Did take percocet prior to today's PT session  To f/u with surgeon in 2 weeks  Mechanism of injury: Cesilia Jaime is a 79 y o  female who presents to outpatient Physical Therapy today with complaints of L knee pain  Chronic left knee pain with meniscal surgery in 2022  Pain persisted with swelling and decreased function  Did attempt injections with no improvement  To have TKA 3/15/23       Pain    3/17  Current pain ratin  7  At best pain ratin  5  At worst pain rating: 10 9  Location: L anterior knee  Quality: dull ache    Social Support  Steps to enter house: yes  Stairs in house: yes   13  Lives in: multiple-level home  Lives with: spouse    Employment status: not working  Patient Goals  Patient goal: Walk normal without AD  Objective     Observations   Left Knee   Positive for edema and effusion  3/17: linear incision closed with staples  Minimal redness along incision border, drainage WNL  Patient was educated on wound care and precautions  Neurological Testing     Sensation     Knee   Left Knee   Intact: light touch    Right Knee   Intact: light touch     Active Range of Motion  s/p  Left Knee   Flexion: 85 degrees   80  Extension: -5 degrees  -28    Right Knee   Flexion: 140 degrees   Extension: 5 degrees     Passive Range of Motion  s/p  Left Knee   Flexion: NT   90  Extension: NT    -25    Mobility   Patellar Mobility:   Left Knee   Hypomobile: left medial, left lateral, left superior and left inferior  3/17: Hypomobile: left medial, left lateral, left superior and left inferior    Right Knee   WFL: medial, lateral, superior and inferior    Strength/Myotome Testing  s/p    Left Knee   Flexion: 3-    2+  Extension: 2+    2-  Quadriceps contraction: poor  trace    Right Knee   Flexion: 5  Extension: 5  Quadriceps contraction: good    Tests     Additional Tests Details  Gait: Antalgic, no AD  Decreased stance on LLE with shortened stride length  3/17: step to, antalgic, utilizing 2WW  Foot flat landing on R    TUG Test= 19 sec (>14sec= fall risk)  3/17: 50 sec with 2WW  30 second sit to stand= 6  3/17: 6x with UE assist    knee disability and osteoarthritis outcome score survey jr:  I  Stiffness= 3  II  Pain= 10  III Function, daily living= 4  =45      FOTO s/p: 31% function, 62% predicted function       General Comments:      Knee Comments  Virtual home assessment: Received and reviewed, moderate barriers (stairs)       Pre-op Paitient Instruction:  Pain management  Gait training  Stair climbing  Initial post-op HEP           Precautions: none noted  Access code: [email or web address]  Progress note: 4/7  POC: 6/7    Manuals 3/7 3/17 PROM   Flex/ext      stretching        Patellar mobs  Grade II all               Neuro Re-Ed        QS reviewed :05x10              Tandem stance                                        Ther Ex        nustep  *      Bike        Ankle pumps reviewed 30x      SAQ        LAQ reviewed       bridging        Leg raises        Heel slides reviewed With strap 10x (reviewed seated)      clachristinel                HOIST  Knee ext  Knee flex          Leg press S=  Squat  HR        Standing HR/TR        TKE        Standing hamstring curls                                        Calf stretch        Hip flexor stretch        Hamstring stretch        Ther Activity        Step ups        Sit to stand        Mini squats        Equal WB    *     Weight shifting   *     Gait Training        Gait education   8 min              Modalities                        Scott Kelley was given a handout and verbal instruction of customized home exercise program  Patient accepted program and was able to demonstrate exercises

## 2023-03-20 ENCOUNTER — OFFICE VISIT (OUTPATIENT)
Dept: PHYSICAL THERAPY | Facility: CLINIC | Age: 68
End: 2023-03-20

## 2023-03-20 DIAGNOSIS — M17.12 PRIMARY OSTEOARTHRITIS OF LEFT KNEE: Primary | ICD-10-CM

## 2023-03-20 NOTE — PROGRESS NOTES
Daily Note     Today's date: 3/20/2023  Patient name: Juan Potts  : 1955  MRN: [id number]  Referring provider: Cristy Rivera  Dx:   Encounter Diagnosis     ICD-10-CM    1  Primary osteoarthritis of left knee  M17 12           Start Time: 0810  Stop Time: 0850  Total time in clinic (min): 40 minutes    Subjective: Patient states knee has been feeling stiffer, c/o increased swelling  Objective: See treatment diary below      Assessment: Tolerated treatment well  Decreased knee flexion ROM today vs IE due to guarding and increased pain/swelling  Reviewed home exercises with emphasis on quad activation and knee flexion/extension range of motion  Also reviewed edema control with elevation, compression and continued icing  Patient would benefit from continued PT      Plan: Continue per plan of care  Progress treatment as tolerated         Precautions: none noted  Access code: [email or web address]  Progress note:   POC:     Manuals 3/7 3/17 3/20     PROM   Flex/ext Flex/ext     stretching        Patellar mobs  Grade II all  Grade II all              Neuro Re-Ed        QS reviewed :05x10 :05X15             Tandem stance                                        Ther Ex        nustep  * L1 5 min     Bike        Ankle pumps reviewed 30x      SAQ        LAQ reviewed  10x     bridging        Leg raises        Heel slides reviewed With strap 10x (reviewed seated) With strap 20x     clamshell                HOIST  Knee ext  Knee flex          Leg press S=  Squat  HR        Standing HR/TR        TKE   Green 10x     Standing hamstring curls   10x                                     Calf stretch        Hip flexor stretch        Hamstring stretch        Ther Activity        Step ups        Sit to stand        Mini squats        Equal WB    CSMi 50/50 1 min     Weight shifting   2 min L3     Gait Training        Gait education   8 min Hurdles in // bars, mirror walking 10 min             Modalities Marc Sorenson was given a handout and verbal instruction of customized home exercise program  Patient accepted program and was able to demonstrate exercises

## 2023-03-21 DIAGNOSIS — Z96.652 STATUS POST TOTAL LEFT KNEE REPLACEMENT: Primary | ICD-10-CM

## 2023-03-21 RX ORDER — OXYCODONE HYDROCHLORIDE AND ACETAMINOPHEN 5; 325 MG/1; MG/1
1 TABLET ORAL EVERY 6 HOURS PRN
Qty: 28 TABLET | Refills: 0 | Status: SHIPPED | OUTPATIENT
Start: 2023-03-21 | End: 2023-06-16 | Stop reason: ALTCHOICE

## 2023-03-22 ENCOUNTER — OFFICE VISIT (OUTPATIENT)
Dept: PHYSICAL THERAPY | Facility: CLINIC | Age: 68
End: 2023-03-22

## 2023-03-22 DIAGNOSIS — M17.12 PRIMARY OSTEOARTHRITIS OF LEFT KNEE: Primary | ICD-10-CM

## 2023-03-22 NOTE — PROGRESS NOTES
Daily Note     Today's date: 3/22/2023  Patient name: Sammie Thomason  : 1955  MRN: [id number]  Referring provider: Maria C Du  Dx:   Encounter Diagnosis     ICD-10-CM    1  Primary osteoarthritis of left knee  M17 12           Start Time: 0900  Stop Time: 945  Total time in clinic (min): 45 minutes    Subjective: States she is stiff and sore in am, but sleeping relatively well  Continues to work on knee home exercises  Using 2WW but would like to progress to [facility]  Objective: See treatment diary below      Assessment: Tolerated treatment well  Continues to have restriction of left knee flexion impacting gait mechanics causing hip hike and LE circumduction  Overall showing improvement in quad strength, able to perform SLR but with ext lag  Patient would benefit from continued PT      Plan: Continue per plan of care  Progress treatment as tolerated         Precautions: none noted  Access code: [email or web address]  Progress note:   POC:     Manuals 3/7 3/17 3/20 3/22    PROM   Flex/ext Flex/ext Flex/ext    stretching        Patellar mobs  Grade II all  Grade II all  Grade III all            Neuro Re-Ed        QS reviewed :05x10 :05X15 :05x20            Tandem stance                                        Ther Ex        nustep  * L1 5 min L1 8 min    Bike        Ankle pumps reviewed 30x      SAQ    20x    LAQ reviewed  10x 20x    bridging        Leg raises    2x5    Heel slides reviewed With strap 10x (reviewed seated) With strap 20x With strap 20x    clamshell                HOIST  Knee ext  Knee flex          Leg press S=  Squat  HR        Standing HR/TR        TKE   Green 10x Green 10x    Standing hamstring curls   10x 20x                                    Calf stretch        Hip flexor stretch        Hamstring stretch    nv    Ther Activity        Step ups    4" 10x    Sit to stand        Mini squats    CSMi 50/50 10x    Equal WB    CSMi 50/50 1 min CSMi 50/50 1 min    Weight shifting   2 min L3 2 min L3    Gait Training        Gait education   8 min Hurdles in // bars, mirror walking 10 min Hurdles in // bars, mirror walking     Tandem/back walking    // bars 6x    Side steps    6x // bars            Modalities                        Gini Padilla was given a handout and verbal instruction of customized home exercise program  Patient accepted program and was able to demonstrate exercises

## 2023-03-22 NOTE — TELEPHONE ENCOUNTER
Patient contacted for a postoperative follow up assessment  Patient reports 5/10 pain when sitting and 5/10 when walking with RW  Patient states "Its painful when I bend my knee "  Patient confirmed post-op PT appointment, 3/22 at [facility]       Patient is taking Percocet every 4 hours, Keflex 500mg every 8 hours, Arixtra daily, Zofran 4mg PRN  Patient is taking OTC stool softeners  Patient has not yet had a BM but is passing gas  Patient denies increase in swelling and dressing is clean, dry and intact  Patient is icing the site regularly  Patient denies nausea, vomiting, abdominal pain, chest pain, shortness of breath, fever, dizziness and calf pain   Patient confirmed post-op appointment with surgeon on 3/31 at 8:30AM  Patient does not have any other questions or concerns at this time

## 2023-03-27 ENCOUNTER — TELEPHONE (OUTPATIENT)
Dept: BARIATRICS | Facility: CLINIC | Age: 68
End: 2023-03-27

## 2023-03-27 ENCOUNTER — OFFICE VISIT (OUTPATIENT)
Dept: PHYSICAL THERAPY | Facility: CLINIC | Age: 68
End: 2023-03-27

## 2023-03-27 DIAGNOSIS — M17.12 PRIMARY OSTEOARTHRITIS OF LEFT KNEE: Primary | ICD-10-CM

## 2023-03-27 NOTE — PROGRESS NOTES
"Daily Note     Today's date: 3/27/2023  Patient name: Eleuterio Alas  : 1955  MRN: [id number]  Referring provider: Yobani Phillips  Dx:   Encounter Diagnosis     ICD-10-CM    1  Primary osteoarthritis of left knee  M17 12                      Subjective: Patient states 6/10 pain today  Thinks drain incision is red, would like it checked  Objective: See treatment diary below      Assessment: Tolerated treatment well  Incision healing well and appropriately, no concerning redness or drainage  Able to progress patient to [facility] today with cueing on knee flexion during swing  Good overall stability and mechanics with cane  Patient would benefit from continued PT      Plan: Continue per plan of care  Progress treatment as tolerated         Precautions: none noted  Access code: [email or web address]  Progress note:   POC:     Manuals 3/7 3/17 3/20 3/22 3/27   PROM   Flex/ext Flex/ext Flex/ext Flex/ext   stretching        Patellar mobs  Grade II all  Grade II all  Grade III all Grade III all           Neuro Re-Ed        QS reviewed :05x10 :05X15 :05x20 :05x20           Tandem stance                                        Ther Ex        nustep  * L1 5 min L1 8 min L1 10 min   Bike        Ankle pumps reviewed 30x      SAQ    20x 20x   LAQ reviewed  10x 20x 3x10   bridging        Leg raises    2x5 2x5   Heel slides reviewed With strap 10x (reviewed seated) With strap 20x With strap 20x With strap 20x   clamshell                HOIST  Knee ext  Knee flex          Leg press S=  Squat  HR        Standing HR/TR        TKE   Green 10x Green 10x Green 15x   Standing hamstring curls   10x 20x 20x                                   Calf stretch        Hip flexor stretch        Hamstring stretch    nv    Ther Activity        Step ups    4\" 10x 4\" 20x   Sit to stand        Mini squats    CSMi 50/50 10x CSMi 50/50 15x   Equal WB    CSMi 50/50 1 min CSMi 50/50 1 min CSMi 50/50 :30   Weight shifting   2 min L3 2 min L3 2 min " L3   Gait Training        Gait education   8 min Hurdles in // bars, mirror walking 10 min Hurdles in // bars, mirror walking  With SPC x10 min   Tandem/back walking    // bars 6x    Side steps    6x // bars            Modalities                        Hanny Melvin was given a handout and verbal instruction of customized home exercise program  Patient accepted program and was able to demonstrate exercises

## 2023-03-29 ENCOUNTER — OFFICE VISIT (OUTPATIENT)
Dept: PHYSICAL THERAPY | Facility: CLINIC | Age: 68
End: 2023-03-29

## 2023-03-29 DIAGNOSIS — M17.12 PRIMARY OSTEOARTHRITIS OF LEFT KNEE: Primary | ICD-10-CM

## 2023-03-29 DIAGNOSIS — K21.9 GASTROESOPHAGEAL REFLUX DISEASE WITHOUT ESOPHAGITIS: ICD-10-CM

## 2023-03-29 NOTE — PROGRESS NOTES
Daily Note     Today's date: 3/29/2023  Patient name: Horacio Saucedo  : 1955  MRN: [id number]  Refererring provider: Misty Hathaway  Dx:   Encounter Diagnosis     ICD-10-CM    1  Primary osteoarthritis of left knee  M17 12           Start Time: 0900  Stop Time: 945  Total time in clinic (min): 45 minutes    Subjective: Patient has no new complaints  States some discomfort due to stiffness of knee  Did return to using walker due to feeling unsteady with cane  Staples to be removed later this week  Objective: See treatment diary below      Assessment: Tolerated treatment well  Pain at end range knee flexion and extension, showing improvement with knee flexion today  Included balance and SL balance activities into program today to promote gait and standing stability, tolerated well  Patient would benefit from continued PT      Plan: Continue per plan of care  Progress treatment as tolerated         Precautions: none noted  Access code: [email or web address]  Progress note:   POC:     Manuals 3/29 3/17 3/20 3/22 3/27   PROM  Flex/ext Flex/ext Flex/ext Flex/ext Flex/ext   stretching        Patellar mobs Grade III all Grade II all  Grade II all  Grade III all Grade III all           Neuro Re-Ed        QS :05x10 :05x10 :05X15 :05x20 :05x20           Tandem stance        SLS :30x3                               Ther Ex        nustep L2 10 min * L1 5 min L1 8 min L1 10 min   Bike        Ankle pumps  30x      SAQ    20x 20x   LAQ 3x10  10x 20x 3x10   bridging        Leg raises 3x5   2x5 2x5   Heel slides With strap 20x With strap 10x (reviewed seated) With strap 20x With strap 20x With strap 20x   clamshell                HOIST  Knee ext  Knee flex          Leg press S=7  Squat  HR   55# 2x10       Standing HR/TR HR 20x       TKE   Green 10x Green 10x Green 15x   Standing hamstring curls 20x  10x 20x 20x                                   Calf stretch        Hip flexor stretch        Hamstring stretch    nv "  Ther Activity        Step ups 4\" 20x   4\" 10x 4\" 20x   Sit to stand        Mini squats CSMi 50/50 15x   CSMi 50/50 10x CSMi 50/50 15x   Equal WB  CSMi 50/50 :30  CSMi 50/50 1 min CSMi 50/50 1 min CSMi 50/50 :30   Weight shifting 2 min L3  2 min L3 2 min L3 2 min L3   Gait Training        Gait education   8 min Hurdles in // bars, mirror walking 10 min Hurdles in // bars, mirror walking  With SPC x10 min   Tandem/back walking    // bars 6x    Side steps    6x // bars            Modalities                        Rkon Renato was given a handout and verbal instruction of customized home exercise program  Patient accepted program and was able to demonstrate exercises               "

## 2023-03-30 RX ORDER — FAMOTIDINE 20 MG/1
TABLET, FILM COATED ORAL
Qty: 180 TABLET | Refills: 0 | Status: SHIPPED | OUTPATIENT
Start: 2023-03-30

## 2023-03-31 ENCOUNTER — OFFICE VISIT (OUTPATIENT)
Dept: OBGYN CLINIC | Facility: CLINIC | Age: 68
End: 2023-03-31

## 2023-03-31 VITALS
HEIGHT: 63 IN | HEART RATE: 86 BPM | BODY MASS INDEX: 26.57 KG/M2 | DIASTOLIC BLOOD PRESSURE: 89 MMHG | SYSTOLIC BLOOD PRESSURE: 152 MMHG

## 2023-03-31 DIAGNOSIS — Z96.652 STATUS POST TOTAL LEFT KNEE REPLACEMENT: Primary | ICD-10-CM

## 2023-03-31 NOTE — PROGRESS NOTES
ASSESSMENT/PLAN:    Diagnoses and all orders for this visit:    Status post total left knee replacement  -     XR knee 3 vw left non injury; Future      Francisco Hardin is a 79 y o  female approximately 2 weeks status post left total knee arthroplasty performed on 3/15/2023  The patient is doing well overall    The patient's staples were removed today  They were advised the steri-strips can remain in place until they fall off and was instructed not to soak or vigorously scrub left lower extremity  I have decided to continue to prescribe physical therapy in an effort to decrease pain, improve strength, and improve flexibility  The patient completed her Lovenox shots this week  She was advised to begin to utilize baby aspirin 325 mg for continued DVT prophylaxis  The patient may begin to drive as long as she is not on narcotics  I will see the patient back in approximately 4 weeks for a range of motion check  Return in about 4 weeks (around 4/28/2023) for Recheck  The patient is doing quite well from her left total knee replacement  Range of motion and strength improved  The pain is now gone  X-rays show anatomic placement the prosthesis  Continue home exercise program   Start aspirin for DVT prophylaxis  Continue therapy  Follow-up in 1 month for reevaluation      _____________________________________________________  CHIEF COMPLAINT:  Chief Complaint   Patient presents with   • Left Knee - Post-op         SUBJECTIVE:  Francisco Hardin is a 79 y o  female who presents approximately 2 weeks status post left total knee arthroplasty performed on 3/15/2023  The patient is doing quite well overall  She notes difficulty with knee flexion  She has completed her Lovenox shots  She is attending physical therapy      The following portions of the patient's history were reviewed and updated as appropriate: allergies, current medications, past family history, past medical history, past social history, past "surgical history and problem list     PAST MEDICAL HISTORY:  Past Medical History:   Diagnosis Date   • Anemia    • Arthritis    • Chronic pain disorder     back and hip pain   • Disease of thyroid gland     nodules   • History of gastroesophageal reflux (GERD)     \"had hiatal hernia surgery\"   • History of heart murmur in childhood    • Hyperlipidemia    • Hypertension    • Overactive bladder    • Rotator cuff tear, left     pt unsure   • Rotator cuff tear, right     had surgery   • Vitamin D deficiency     unsure   • Wears glasses    • Wears partial dentures     upper       PAST SURGICAL HISTORY:  Past Surgical History:   Procedure Laterality Date   •  SECTION      Last Assessed: 2017   • COLONOSCOPY     • ELBOW SURGERY      Last Assessed: 2017   • ESOPHAGOGASTRODUODENOSCOPY N/A 2017    Procedure: ESOPHAGOGASTRODUODENOSCOPY (EGD); Surgeon: Lina Daugherty DO;  Location: [facility] GI LAB;   Service: Gastroenterology   • HERNIA REPAIR  2020    Hiatal hernia repair   • HIP SURGERY Left 2018    glut medius/minimus repair  and 18--with pin implanted   • HYSTERECTOMY         • KNEE SURGERY      x2   • PARAESOPHAGEAL HERNIA REPAIR N/A 2020    Procedure: LAPAROSCOPIC PARAESOPHAGEAL HERNIA REPAIR WITH MESH AND NISSEN FUNDOPLICATION WITH ROBOTICS;  Surgeon: Sanjiv Odom MD;  Location: [facility];  Service: General   • MT ARTHRP KNE CONDYLE&PLATU MEDIAL&LAT COMPARTMENTS Left 3/15/2023    Procedure: ARTHROPLASTY KNEE TOTAL;  Surgeon: Lawrence Brink DO;  Location: [facility];  Service: Orthopedics   • MT ARTHRS KNE SURG W/MENISCECTOMY MED/LAT W/SHVG Left 06/15/2022    Procedure: ;left knee arthroscopy, meniscectomy,synevectomy,chondroplasty, loose body removal, injection;  Surgeon: Lawrence Brink DO;  Location: [facility];  Service: Orthopedics   • MT COLONOSCOPY FLX DX W/COLLJ SPEC WHEN PFRMD N/A 2017    Procedure: EGD AND COLONOSCOPY;  Surgeon: Jeffrey Irene " DO Delia;  Location: [facility] GI LAB;   Service: Gastroenterology   • OR EXCISON TUMOR SOFT TISSUE THIGH/KNEE SUBQ 3 CM/> Bilateral 2022    Procedure: EXCISION BIOPSY TISSUE LESION/MASS LOWER EXTREMITY;  Surgeon: Lata Garcia MD;  Location: [facility];  Service: General   • OR EXCISON TUMOR SOFT TISSUE THIGH/KNEE SUBQ 3 CM/> Bilateral 2022    Procedure: EXCISION BIOPSY TISSUE LESION/MASS LOWER EXTREMITY;  Surgeon: Lata Garcia MD;  Location: [facility];  Service: General   • SHOULDER ARTHROCENTESIS     • SHOULDER ARTHROSCOPY Right 2021    Procedure: SHOULDER ARTHROSCOPY WITH acromioplasty, debridment, and ROTATOR CUFF REPAIR;  Surgeon: Edilma Almodovar DO;  Location: [facility] MAIN OR;  Service: Orthopedics   • TUBAL LIGATION         FAMILY HISTORY:  Family History   Problem Relation Age of Onset   • Colon cancer Mother    • Heart attack Father    • Other Father         Cardiac Disorder   • Diabetes type II Father    • Colon cancer Sister    • No Known Problems Sister    • No Known Problems Sister    • No Known Problems Sister    • No Known Problems Sister    • No Known Problems Daughter    • No Known Problems Maternal Aunt    • No Known Problems Maternal Aunt    • No Known Problems Maternal Aunt    • Cancer Maternal Grandmother    • No Known Problems Paternal Grandmother        SOCIAL HISTORY:  Social History     Tobacco Use   • Smoking status: Former     Packs/day: 1 00     Types: Cigarettes     Quit date:      Years since quittin 2   • Smokeless tobacco: Never   • Tobacco comments:     quit 25 yrs ago   Vaping Use   • Vaping Use: Never used   Substance Use Topics   • Alcohol use: Not Currently   • Drug use: No       MEDICATIONS:    Current Outpatient Medications:   •  acetaminophen (TYLENOL) 325 mg tablet, Take 650 mg by mouth every 6 (six) hours as needed for mild pain, Disp: , Rfl:   •  [START ON 2023] ascorbic acid (VITAMIN C) 500 MG tablet, Take 1 tablet (500 mg total) by mouth 2 (two) times a day Do not start before April 17, 2023 , Disp: 60 tablet, Rfl: 1  •  atorvastatin (LIPITOR) 10 mg tablet, take 1 tablet by mouth once daily, Disp: 90 tablet, Rfl: 0  •  CALCIUM PO, Take by mouth, Disp: , Rfl:   •  famotidine (PEPCID) 20 mg tablet, take 1 tablet by mouth twice a day, Disp: 180 tablet, Rfl: 0  •  Ferrous Sulfate (IRON PO), Take by mouth daily, Disp: , Rfl:   •  folic acid (FOLVITE) 1 mg tablet, Take by mouth daily, Disp: , Rfl:   •  hydrOXYzine HCL (ATARAX) 25 mg tablet, TAKE 1 TO 2 TABLETS BY MOUTH DAILY AT BEDTIME AS NEEDED (Patient taking differently: daily at bedtime), Disp: 60 tablet, Rfl: 2  •  losartan (COZAAR) 100 MG tablet, take 1 tablet by mouth once daily, Disp: 90 tablet, Rfl: 1  •  Multiple Vitamin (multivitamin) tablet, Take 1 tablet by mouth daily, Disp: , Rfl:   •  ondansetron (ZOFRAN) 4 mg tablet, Take 1 tablet (4 mg total) by mouth every 8 (eight) hours as needed for nausea or vomiting, Disp: 30 tablet, Rfl: 0  •  oxyCODONE-acetaminophen (Percocet) 5-325 mg per tablet, Take 1 tablet by mouth every 6 (six) hours as needed for moderate pain Max Daily Amount: 4 tablets, Disp: 28 tablet, Rfl: 0  •  fondaparinux (ARIXTRA) 2 5 mg/0 5 mL, Inject 2 5 mg under the skin every 24 hours for 14 days, Disp: 7 mL, Rfl: 0  •  lidocaine (XYLOCAINE) 5 % ointment, Apply topically 3 (three) times a day as needed for mild pain (Patient not taking: Reported on 3/31/2023), Disp: 50 g, Rfl: 5  •  methocarbamol (Robaxin-750) 750 mg tablet, Take 1 tablet (750 mg total) by mouth every 8 (eight) hours (Patient not taking: Reported on 3/15/2023), Disp: 90 tablet, Rfl: 1    ALLERGIES:  Allergies   Allergen Reactions   • Hydrocodone Other (See Comments) and Irritability     Patient reports insomnia when taking        ROS:  Review of Systems   Constitutional: Negative for appetite change and unexpected weight change  HENT: Negative for congestion and trouble swallowing      Eyes: Negative for "visual disturbance  Respiratory: Negative for cough and shortness of breath  Cardiovascular: Negative for chest pain and palpitations  Gastrointestinal: Negative for nausea and vomiting  Endocrine: Negative for cold intolerance and heat intolerance  Musculoskeletal: Negative for joint swelling and myalgias  Skin: Positive for wound  Negative for rash  Neurological: Negative for numbness  Constitutional: Negative for fatigue, fever or loss of appetite  HENT: Negative  Respiratory: Negative for shortness of breath, dyspnea  Cardiovascular: Negative for chest pain/tightness  Gastrointestinal: Negative for abdominal pain, N/V  Endocrine: Negative for cold/heat intolerance, unexplained weight loss/gain  Genitourinary: Negative for flank pain, dysuria, hematuria  Musculoskeletal: Positive for arthralgia   Skin: Negative for rash  Neurological: Negative for numbness or tingling  Psychiatric/Behavioral: Negative for agitation  _____________________________________________________  PHYSICAL EXAMINATION:    Blood pressure 152/89, pulse 86, height 5' 3\" (1 6 m)  Constitutional: Oriented to person, place, and time  Appears well-developed and well-nourished  No distress  HENT:   Head: Normocephalic  Eyes: Conjunctivae are normal  Right eye exhibits no discharge  Left eye exhibits no discharge  No scleral icterus  Cardiovascular: Normal rate  Pulmonary/Chest: Effort normal    Neurological: Alert and oriented to person, place, and time  Skin: Skin is warm and dry  No rash noted  Not diaphoretic  No erythema  No pallor  Psychiatric: Normal mood and affect  Behavior is normal  Judgment and thought content normal       MUSCULOSKELETAL EXAMINATION:   Physical Exam  Vitals and nursing note reviewed  HENT:      Head: Normocephalic and atraumatic  Eyes:      General: No scleral icterus       Conjunctiva/sclera: Conjunctivae normal    Cardiovascular:      Rate and Rhythm: Normal " "rate    Pulmonary:      Effort: Pulmonary effort is normal  No respiratory distress  Musculoskeletal:         General: Swelling present  Cervical back: Normal range of motion and neck supple  Skin:     General: Skin is warm and dry  Neurological:      Mental Status: She is alert and oriented to person, place, and time  Psychiatric:         Behavior: Behavior normal        Ortho Exam    Left Knee:   Surgical incision is healing well for the postoperative course  Staples removed in the office today  There is no erythema or drainage present  There is a normal postoperative effusion  Range of motion from 3 to 95  The patient is able to perform a straight leg raise  Calf is soft and nontender  Extremity is warm and well perfused  Toes are pink and mobile  Sensation is intact  Brisk capillary refill  The patient is neurovascular intact distally  The patient ambulates with single-point cane  Objective:  BP Readings from Last 1 Encounters:   03/31/23 152/89      Wt Readings from Last 1 Encounters:   03/15/23 68 kg (150 lb)        BMI:   Estimated body mass index is 26 57 kg/m² as calculated from the following:    Height as of this encounter: 5' 3\" (1 6 m)  Weight as of 3/15/23: 68 kg (150 lb)  PROCEDURES PERFORMED:  Procedures      Scribe Attestation    I,:  Carla Montaño am acting as a scribe while in the presence of the attending physician :       I,:  Reza Foster, DO personally performed the services described in this documentation    as scribed in my presence  :         "

## 2023-04-03 ENCOUNTER — OFFICE VISIT (OUTPATIENT)
Dept: PHYSICAL THERAPY | Facility: CLINIC | Age: 68
End: 2023-04-03

## 2023-04-03 DIAGNOSIS — M17.12 PRIMARY OSTEOARTHRITIS OF LEFT KNEE: Primary | ICD-10-CM

## 2023-04-03 NOTE — PROGRESS NOTES
Daily Note     Today's date: 4/3/2023  Patient name: Magui Anthony  : 1955  MRN: [id number]  Referring provider: La Solis  Dx:   Encounter Diagnosis     ICD-10-CM    1  Primary osteoarthritis of left knee  M17 12           Start Time: 0900  Stop Time: 945  Total time in clinic (min): 45 minutes    Subjective: Patient states she has been trying to use [facility] as much as possible, will occasionally walk without AD around the house  Had staples removed last week and covered with steri-strips  Removed steri-strips after 3 days as instructed and is concerned about wound  Objective: See treatment diary below      Assessment: Tolerated treatment well  Progressing well with tolerance to exercise and improvement in exercise mechanics  Does display hip hike with decreased push off and limited knee flexion during swing impacting gait  Inspected wound which showed appropriate heeling for this phase s/p  Added steri strips at distal portion of wound due to excessive opening  Did have minimal drainage, but no signs of infection  Patient would benefit from continued PT      Plan: Continue per plan of care  Progress treatment as tolerated         Precautions: none noted  Access code: [email or web address]  Progress note:   POC:     Manuals 3/29 4/3 3/20 3/22 3/27   PROM  Flex/ext Seated flex, ext Flex/ext Flex/ext Flex/ext   stretching        Patellar mobs Grade III all Grade III all Grade II all  Grade III all Grade III all           Neuro Re-Ed        QS :05x10 :05x10 :05X15 :05x20 :05x20           Tandem stance        SLS :30x3 CSMi xy :30x3                              Ther Ex        nustep L2 10 min L2 10 min L1 5 min L1 8 min L1 10 min   Bike        Ankle pumps        SAQ    20x 20x   LAQ 3x10 3x10 10x 20x 3x10   bridging        Leg raises 3x5 2x10  2x5 2x5   Heel slides With strap 20x With strap 10x With strap 20x With strap 20x With strap 20x   clamshell                HOIST  Knee ext  Knee flex "   Leg press S=7  Squat  HR   55# 2x10       Standing HR/TR HR 20x CSMi 50/50 HR 20x      TKE   Green 10x Green 10x Green 15x   Standing hamstring curls 20x 20x 10x 20x 20x                                   Calf stretch        Hip flexor stretch        Hamstring stretch    nv    Ther Activity        Step ups 4\" 20x 6\" 20x  4\" 10x 4\" 20x   Sit to stand        Mini squats CSMi 50/50 15x CSMi 50/50 20x  CSMi 50/50 10x CSMi 50/50 15x   Equal WB  CSMi 50/50 :30 prn CSMi 50/50 1 min CSMi 50/50 1 min CSMi 50/50 :30   Weight shifting 2 min L3 prn 2 min L3 2 min L3 2 min L3   Gait Training        Gait education   Mirror and SPC Hurdles in // bars, mirror walking 10 min Hurdles in // bars, mirror walking  With SPC x10 min   Tandem/back walking  // bars 6x  // bars 6x    Side steps    6x // bars            Modalities                        Mynor Guevara was given a handout and verbal instruction of customized home exercise program  Patient accepted program and was able to demonstrate exercises                 "

## 2023-04-05 ENCOUNTER — OFFICE VISIT (OUTPATIENT)
Dept: PHYSICAL THERAPY | Facility: CLINIC | Age: 68
End: 2023-04-05

## 2023-04-05 DIAGNOSIS — M17.12 PRIMARY OSTEOARTHRITIS OF LEFT KNEE: Primary | ICD-10-CM

## 2023-04-05 NOTE — PROGRESS NOTES
Daily Note     Today's date: 2023  Patient name: Mason Bella  : 1955  MRN: [id number]  Referring provider: Viji Mckeon  Dx:   Encounter Diagnosis     ICD-10-CM    1  Primary osteoarthritis of left knee  M17 12           Start Time: 0900  Stop Time: 09  Total time in clinic (min): 45 minutes    Subjective: Patient states she had difficulty sleeping last night  Feels like she is going backwards in recovery, but has been able to maintain use of SPC in community and no AD at home  Objective: See treatment diary below      Assessment: Tolerated treatment well  Noted progression with ease of active knee flexion during ambulation, still with gait abnormality  Gradual improvement in knee extension range of motion with improved quad activation during quad set  Patient would benefit from continued PT      Plan: Continue per plan of care  Progress treatment as tolerated         Precautions: none noted  Access code: [email or web address]  Progress note:   POC:     Manuals 3/29 4/3 4/5 3/22 3/27   PROM  Flex/ext Seated flex, ext Seated flex, ext Flex/ext Flex/ext   stretching        Patellar mobs Grade III all Grade III all Grade III all Grade III all Grade III all           Neuro Re-Ed        QS :05x10 :05x10 :05x10 :05x20 :05x20           Tandem stance        SLS :30x3 CSMi xy :30x3 CSMi xy :30x3                             Ther Ex        nustep L2 10 min L2 10 min L2 10 min L1 8 min L1 10 min   Bike        Ankle pumps        SAQ    20x 20x   LAQ 3x10 3x10 3x10 20x 3x10   bridging        Leg raises 3x5 2x10 2x10 2x5 2x5   Heel slides With strap 20x With strap 10x With strap 10x With strap 20x With strap 20x   clamshell                HOIST  Knee ext  Knee flex          Leg press S=7  Squat  HR   55# 2x10       Standing HR/TR HR 20x CSMi 50/50 HR 20x CSMi 50/50 HR 20x     TKE    Green 10x Green 15x   Standing hamstring curls 20x 20x 20x 20x 20x                                   Calf stretch "  Hip flexor stretch        Hamstring stretch    nv    Ther Activity        Step ups 4\" 20x 6\" 20x Fwd,lat  6\" 20x 4\" 10x 4\" 20x   Sit to stand        Mini squats CSMi 50/50 15x CSMi 50/50 20x CSMi 50/50 20x CSMi 50/50 10x CSMi 50/50 15x   Equal WB  CSMi 50/50 :30 prn  CSMi 50/50 1 min CSMi 50/50 :30   Weight shifting 2 min L3 prn  2 min L3 2 min L3   Gait Training        Gait education   Mirror and SPC  Hurdles in // bars, mirror walking  With SPC x10 min   Tandem/back walking  // bars 6x // bars 6x // bars 6x    Side steps   // bars 6x 6x // bars            Modalities                        Jairo Martinez was given a handout and verbal instruction of customized home exercise program  Patient accepted program and was able to demonstrate exercises                   "

## 2023-04-21 ENCOUNTER — TELEPHONE (OUTPATIENT)
Dept: PAIN MEDICINE | Facility: CLINIC | Age: 68
End: 2023-04-21

## 2023-04-21 NOTE — TELEPHONE ENCOUNTER
Caller: patient     Doctor: Barbara Kocher, CRNP    Reason for call: pt states her pharmacy sent our office information in reference to medication listed below & prior authorization    Call back#: [phone]

## 2023-04-24 ENCOUNTER — OFFICE VISIT (OUTPATIENT)
Dept: PHYSICAL THERAPY | Facility: CLINIC | Age: 68
End: 2023-04-24

## 2023-04-24 DIAGNOSIS — M17.12 PRIMARY OSTEOARTHRITIS OF LEFT KNEE: Primary | ICD-10-CM

## 2023-04-24 NOTE — TELEPHONE ENCOUNTER
Colposcopy Procedure Note    Indications: Pap smear 1 months ago showed: low-grade squamous intraepithelial neoplasia (LGSIL - encompassing HPV,mild dysplasia,STEPHANIE I). The prior pap showed no abnormalities.  Prior cervical/vaginal disease: normal exam without visible pathology. Prior cervical treatment: no treatment.    Procedure Details   The risks and benefits of the procedure and Verbal informed consent obtained.    Speculum placed in vagina and excellent visualization of cervix achieved, cervix swabbed x 3 with acetic acid solution.    Findings:  Cervix: acetowhite lesion(s) noted at 9 o'clock; cervical biopsies taken at 9 o'clock.  Vaginal inspection: vaginal colposcopy not performed.  Vulvar colposcopy: vulvar colposcopy not performed.    Specimens: 2    Complications: none.  Patient tolerated the procedure well without complications.    Plan:  Specimens labelled and sent to Pathology.  Will base further treatment on Pathology findings.   Methocarbamol and Lidocaine Patches approved through 12/31/23

## 2023-04-24 NOTE — PROGRESS NOTES
Daily Note     Today's date: 2023  Patient name: Juan Tellez  : 1955  MRN: [id number]  Referring provider: Purnima Huerta  Dx:   Encounter Diagnosis     ICD-10-CM    1  Primary osteoarthritis of left knee  M17 12           Start Time: 0815  Stop Time: 0900  Total time in clinic (min): 45 minutes    Subjective: Patient states no pain, only stiffness in knee increasing after prolonged rest  To f/u with ortho later this week  Objective: See treatment diary below      Assessment: Tolerated treatment well  Showing improvement with knee flexion active range of motion as well as improved gait mechanics, although still limited  Worked on proper gait mechanics in depth today with proper knee flexion during swing, heel strike and toe off  Noted decreased hip circumduction compensatory pattern today  Patient would benefit from continued PT      Plan: Continue per plan of care  Progress treatment as tolerated         Precautions: none noted  Access code: [email or web address]  Progress note:   POC:     Manuals 4/17 4/19 4/24 4/10 4/12   PROM  Seated flex, supine ext Seated flex, supine ext Seated flex, supine ext Seated flex, supine ext Seated flex, supine ext   stretching        Patellar mobs Grade III- IV all Grade III- IV all Grade III- IV all Grade III all Grade III all           Neuro Re-Ed        QS :05x10 :05x10 :05x10 :05x10 :05x10           Tandem stance  2x:30 bilat      SLS    CSMi xy :30x3 CSMi xy :30x3   CSMi balance     L1-3                   Ther Ex        nustep L3 10 min L3 10 min L3 5 min L2 10 min L2 10 min   Bike 5 min 1/2 rot 5 min 1/2 rot 5 min timed (able to get full rotations after 3 min)  nv   Ankle pumps        SAQ    1# 3x10    LAQ  1# 3x10  1# 3x10 1# 3x10   bridging        Leg raises 2x10 2x10 2x10 2x10 2x10   Heel slides  With strap 10x With strap 10x With strap 20x 10x with strap   Knee flex at step   :10x10  :10x10   Prone knee flex   *             Leg press "S=7  Squat  HR   55# 3x10  nv    55# 2x10   Standing HR/TR   30x CSMi 50/50 HR 20x CSMi 50/50 HR 20x   TKE nv       Standing hamstring curls nv  30x 20x 20x   HOIST  Knee flexion  Knee extension nv  nv   pl2 3x10  pl2 10x     pl2 3x10  pl2 10x                   Knee extensor   3 min     Calf stretch        Hip flexor stretch        Hamstring stretch nv 3x:30 Standing 3x:30     Ther Activity        Step ups nv 6\" 20x fwd, 10x lat 1st step 20x ea Fwd,lat  6\" 20x Fwd,lat  @ steps 20x   Sit to stand 12x 12x with UE support      Mini squats CSMi 50/50 20x 20x  CSMi 50/50 20x CSMi 50/50 20x   Equal WB         Weight shifting        Gait Training        Gait education         Tandem/back walking Along cabinets 4x    // bars 6x    Side steps Along cabinets 4x    // bars 6x    Delvin step overs Along cabinets 4x bilat and side stepping  Along cabinets 4x bilat and side stepping  Along cabinets 4x bilat   airex step over   bilat 15x     Modalities                        Hilario Claudio was given a handout and verbal instruction of customized home exercise program  Patient accepted program and was able to demonstrate exercises           "

## 2023-04-26 ENCOUNTER — APPOINTMENT (OUTPATIENT)
Dept: PHYSICAL THERAPY | Facility: CLINIC | Age: 68
End: 2023-04-26

## 2023-04-27 ENCOUNTER — OFFICE VISIT (OUTPATIENT)
Dept: PHYSICAL THERAPY | Facility: CLINIC | Age: 68
End: 2023-04-27

## 2023-04-27 DIAGNOSIS — M17.12 PRIMARY OSTEOARTHRITIS OF LEFT KNEE: Primary | ICD-10-CM

## 2023-04-27 NOTE — PROGRESS NOTES
Daily Note     Today's date: 2023  Patient name: Cisco Tolentino  : 1955  MRN: [id number]  Referring provider: Amor Rothman  Dx:   Encounter Diagnosis     ICD-10-CM    1  Primary osteoarthritis of left knee  M17 12           Start Time: 730  Stop Time: 815  Total time in clinic (min): 45 minutes    Subjective: No new complaints  To f/u with ortho tomorrow  Continues to c/o knee stiffness  Objective: See treatment diary below      Assessment: Tolerated treatment well  Noted tightness of quad restricting flexion range of motion  Was shown multiple ways to stretch at home in standing and sitting  Overall progressing well towards goals  Needs to continue to work on hamstring strength and knee flexion with walking to normalize gait  Patient would benefit from continued PT      Plan: Continue per plan of care  Progress treatment as tolerated         Precautions: none noted  Access code: [email or web address]  Progress note:   POC:     Manuals     PROM  Seated flex, supine ext Seated flex, supine ext Seated flex, supine ext Prone flex, supine ext    stretching        Patellar mobs Grade III- IV all Grade III- IV all Grade III- IV all Grade III- IV all            Neuro Re-Ed        QS :05x10 :05x10 :05x10 :05x10            Tandem stance  2x:30 bilat      SLS        CSMi balance                        Ther Ex        nustep L3 10 min L3 10 min L3 5 min L3 5 min prn   Bike 5 min 1/2 rot 5 min 1/2 rot 5 min timed (able to get full rotations after 3 min) 5 min timed (able to get full rotations after 1 min)    Ankle pumps        SAQ        LAQ  1# 3x10  1# 3x10    bridging        Leg raises 2x10 2x10 2x10 3x10    Heel slides  With strap 10x With strap 10x     Knee flex at step   :10x10     Prone knee flex   * nv            Leg press S=7  Squat  HR   55# 3x10  nv     Standing HR/TR   30x 30x    TKE nv       Standing hamstring curls nv  30x 30x    HOIST  Knee flexion  Knee extension nv " nv   Pl 2 2x10  Pl 2 2x10                    Knee extensor   3 min     Calf stretch        Hip flexor stretch    Supine :30x3; shown at step for HEP    Hamstring stretch nv 3x:30 Standing 3x:30     Ther Activity        Step ups nv 6\" 20x fwd, 10x lat 1st step 20x ea 1st step 20x ea    Sit to stand 12x 12x with UE support      Mini squats CSMi 50/50 20x 20x      Equal WB         Weight shifting        Gait Training        Gait education         Tandem/back walking Along cabinets 4x        Side steps Along cabinets 4x        Delvin step overs Along cabinets 4x bilat and side stepping  Along cabinets 4x bilat and side stepping     airex step over   bilat 15x bilat 20x     Walking butt kicks    // bars 6x    Modalities                        Estee Mckeon was given a handout and verbal instruction of customized home exercise program  Patient accepted program and was able to demonstrate exercises             "

## 2023-04-28 ENCOUNTER — OFFICE VISIT (OUTPATIENT)
Dept: OBGYN CLINIC | Facility: CLINIC | Age: 68
End: 2023-04-28

## 2023-04-28 ENCOUNTER — TELEPHONE (OUTPATIENT)
Dept: FAMILY MEDICINE CLINIC | Facility: CLINIC | Age: 68
End: 2023-04-28

## 2023-04-28 VITALS
BODY MASS INDEX: 26.22 KG/M2 | HEIGHT: 63 IN | SYSTOLIC BLOOD PRESSURE: 172 MMHG | DIASTOLIC BLOOD PRESSURE: 69 MMHG | HEART RATE: 71 BPM

## 2023-04-28 DIAGNOSIS — Z96.652 STATUS POST TOTAL LEFT KNEE REPLACEMENT: Primary | ICD-10-CM

## 2023-04-28 NOTE — PROGRESS NOTES
Assessment/Plan:   Diagnoses and all orders for this visit:    Status post total left knee replacement  -     Splint  Discussed with patient that she is progressing as expected postoperatively  She should continue to work aggressively on regaining flexion range of motion  Prescription for flexion Dynasplint was placed to work on improving flexion range of motion  She should continue with formal physical therapy as per protocol  At this time, she can discontinue aspirin for DVT prophylaxis, however she is encouraged to contact her family doctor in regards to continuing baby aspirin for general cardiac health based on her age  She will be seen for follow-up in 6 weeks, at which time we will repeat x-rays, 3 views left knee  Patient expresses understanding and is in agreement with this treatment plan  The patient is doing quite well from her left total knee replacement  There is a slight lack of terminal flexion  Would recommend obtaining a Dynasplint  Continue therapy  Follow-up 6 weeks evaluation with new x-rays of left knee-3 views    Subjective:   Patient ID: Sarah Faust  1955     HPI  Patient is a 79 y o  female who presents for follow-up evaluation 6 weeks s/p left TKA performed 3/15/2023  Patient states that she is progressing well with formal physical therapy  She continues to walk with the use of a cane, but states that the deep knee pain experienced prior to surgery has been resolved  Patient states that she generally rates her soreness at 4-5/10, with subtle increase following therapy  She denies any new onset bruising, swelling, numbness, or tingling  She also denies any recent fever, chills, headache, nausea, dizziness, malaise      The following portions of the patient's history were reviewed and updated as appropriate:  Past medical history, past surgical history, Family history, social history, current medications and allergies    Past Medical History:   Diagnosis Date   • Anemia    • "Arthritis    • Chronic pain disorder     back and hip pain   • Disease of thyroid gland     nodules   • History of gastroesophageal reflux (GERD)     \"had hiatal hernia surgery\"   • History of heart murmur in childhood    • Hyperlipidemia    • Hypertension    • Overactive bladder    • Rotator cuff tear, left     pt unsure   • Rotator cuff tear, right     had surgery   • Vitamin D deficiency     unsure   • Wears glasses    • Wears partial dentures     upper       Past Surgical History:   Procedure Laterality Date   •  SECTION      Last Assessed: 2017   • COLONOSCOPY     • ELBOW SURGERY      Last Assessed: 2017   • ESOPHAGOGASTRODUODENOSCOPY N/A 2017    Procedure: ESOPHAGOGASTRODUODENOSCOPY (EGD); Surgeon: Mone Cruz DO;  Location: [facility] GI [facility]; Service: Gastroenterology   • HERNIA REPAIR  2020    Hiatal hernia repair   • HIP SURGERY Left 2018    glut medius/minimus repair  and 18--with pin implanted   • HYSTERECTOMY         • KNEE SURGERY      x2   • PARAESOPHAGEAL HERNIA REPAIR N/A 2020    Procedure: LAPAROSCOPIC PARAESOPHAGEAL HERNIA REPAIR WITH MESH AND NISSEN FUNDOPLICATION WITH ROBOTICS;  Surgeon: Mora Prater MD;  Location: [facility];  Service: General   • WY ARTHRP KNE CONDYLE&PLATU MEDIAL&LAT COMPARTMENTS Left 3/15/2023    Procedure: ARTHROPLASTY KNEE TOTAL;  Surgeon: Jarvis Hubbard DO;  Location: [facility];  Service: Orthopedics   • WY ARTHRS KNE SURG W/MENISCECTOMY MED/LAT W/SHVG Left 06/15/2022    Procedure: ;left knee arthroscopy, meniscectomy,synevectomy,chondroplasty, loose body removal, injection;  Surgeon: Jarvis Hubbard DO;  Location: [facility];  Service: Orthopedics   • WY COLONOSCOPY FLX DX W/COLLJ SPEC WHEN PFRMD N/A 2017    Procedure: EGD AND COLONOSCOPY;  Surgeon: Mone Cruz DO;  Location: [facility];   Service: Gastroenterology   • WY EXCISON TUMOR SOFT TISSUE THIGH/KNEE SUBQ 3 CM/> Bilateral 2022    " Procedure: EXCISION BIOPSY TISSUE LESION/MASS LOWER EXTREMITY;  Surgeon: Oskar Deng MD;  Location: [facility];  Service: General   • NJ EXCISON TUMOR SOFT TISSUE THIGH/KNEE SUBQ 3 CM/> Bilateral 2022    Procedure: EXCISION BIOPSY TISSUE LESION/MASS LOWER EXTREMITY;  Surgeon: Oskar Deng MD;  Location: [facility];  Service: General   • SHOULDER ARTHROCENTESIS     • SHOULDER ARTHROSCOPY Right 2021    Procedure: SHOULDER ARTHROSCOPY WITH acromioplasty, debridment, and ROTATOR CUFF REPAIR;  Surgeon: Muriel Workman DO;  Location: [address] [place] [address] MAIN OR;  Service: Orthopedics   • TUBAL LIGATION         Family History   Problem Relation Age of Onset   • Colon cancer Mother    • Heart attack Father    • Other Father         Cardiac Disorder   • Diabetes type II Father    • Colon cancer Sister    • No Known Problems Sister    • No Known Problems Sister    • No Known Problems Sister    • No Known Problems Sister    • No Known Problems Daughter    • No Known Problems Maternal Aunt    • No Known Problems Maternal Aunt    • No Known Problems Maternal Aunt    • Cancer Maternal Grandmother    • No Known Problems Paternal Grandmother        Social History     Socioeconomic History   • Marital status: /Civil Union     Spouse name: None   • Number of children: None   • Years of education: None   • Highest education level: None   Occupational History   • None   Tobacco Use   • Smoking status: Former     Packs/day: 1 00     Types: Cigarettes     Quit date:      Years since quittin 3   • Smokeless tobacco: Never   • Tobacco comments:     quit 25 yrs ago   Vaping Use   • Vaping Use: Never used   Substance and Sexual Activity   • Alcohol use: Not Currently   • Drug use: No   • Sexual activity: Not Currently     Comment: defer   Other Topics Concern   • None   Social History Narrative    Caffeine use    , 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[employer], now at [employer] and [address] [place] [address] for OR    2 grown children     Social Determinants of Health     Financial Resource Strain: Low Risk    • Difficulty of Paying Living Expenses: Not very hard   Food Insecurity: No Food Insecurity   • Worried About Running Out of Food in the Last Year: Never true   • Ran Out of Food in the Last Year: Never true   Transportation Needs: No Transportation Needs   • Lack of Transportation (Medical): No   • Lack of Transportation (Non-Medical):  No   Physical Activity: Not on file   Stress: Not on file   Social Connections: Not on file   Intimate Partner Violence: Not on file   Housing Stability: Low Risk    • Unable to Pay for Housing in the Last Year: No   • Number of Places Lived in the Last Year: 1   • Unstable Housing in the Last Year: No         Current Outpatient Medications:   •  atorvastatin (LIPITOR) 10 mg tablet, take 1 tablet by mouth once daily, Disp: 90 tablet, Rfl: 0  •  CALCIUM PO, Take by mouth, Disp: , Rfl:   •  famotidine (PEPCID) 20 mg tablet, take 1 tablet by mouth twice a day, Disp: 180 tablet, Rfl: 0  •  hydrOXYzine HCL (ATARAX) 25 mg tablet, TAKE 1 TO 2 TABLETS BY MOUTH DAILY AT BEDTIME AS NEEDED (Patient taking differently: daily at bedtime), Disp: 60 tablet, Rfl: 2  •  lidocaine (Lidoderm) 5 %, Apply 2 patches topically over 12 hours daily Remove & Discard patch within 12 hours or as directed by MD, Disp: 60 patch, Rfl: 5  •  losartan (COZAAR) 100 MG tablet, take 1 tablet by mouth once daily, Disp: 90 tablet, Rfl: 1  •  methocarbamol (Robaxin-750) 750 mg tablet, Take 1 tablet (750 mg total) by mouth every 8 (eight) hours, Disp: 90 tablet, Rfl: 5  •  ondansetron (ZOFRAN) 4 mg tablet, take 1 tablet by mouth every 8 hours if needed for nausea and vomiting, Disp: 30 tablet, Rfl: 0  •  fondaparinux (ARIXTRA) 2 5 mg/0 5 mL, Inject 2 5 mg under the skin every 24 hours for 14 days (Patient not taking: Reported on 4/20/2023), Disp: 7 mL, Rfl: 0  •  oxyCODONE-acetaminophen (Percocet) 5-325 mg per tablet, Take 1 tablet "by mouth every 6 (six) hours as needed for moderate pain Max Daily Amount: 4 tablets (Patient not taking: Reported on 4/20/2023), Disp: 28 tablet, Rfl: 0    Allergies   Allergen Reactions   • Hydrocodone Other (See Comments) and Irritability     Patient reports insomnia when taking        Review of Systems   Constitutional: Negative for chills, fatigue and fever  Gastrointestinal: Negative for nausea  Musculoskeletal:        As noted in HPI   Neurological: Negative for dizziness, numbness and headaches  All other systems reviewed and are negative  Objective:  BP (!) 172/69 (BP Location: Left arm, Patient Position: Sitting, Cuff Size: Standard)   Pulse 71   Ht 5' 3\" (1 6 m)   BMI 26 22 kg/m²     Ortho Exam  Left knee -   Weight-bearing status: Full WBAT, single-point cane as assistive device  Incision(s): Pain, dry, healing -signs of drainage or dehiscence  Skin is warm and dry with no signs of erythema, ecchymosis, or infection  Mild generalized soft tissue swelling, no effusion  ROM: 0° - 90°  Strength: Is able to perform active knee flexion and extension, is able to perform active SLR  Knee is stable to varus and valgus stress  Knee is stable to anterior and posterior stress  Calf compartments are soft  - Yany's sign  2+ TP and DP pulses with brisk capillary refill to the toes  Sural, saphenous, tibial, superficial and deep peroneal motor and sensory distributions intact  Sensation light touch intact distally    Physical Exam  Vitals and nursing note reviewed  Constitutional:       General: She is not in acute distress  Appearance: She is well-developed  HENT:      Head: Normocephalic and atraumatic  Eyes:      Conjunctiva/sclera: Conjunctivae normal    Cardiovascular:      Rate and Rhythm: Normal rate  Pulmonary:      Effort: Pulmonary effort is normal    Musculoskeletal:      Cervical back: Neck supple  Skin:     General: Skin is warm and dry        Capillary Refill: Capillary refill " takes less than 2 seconds  Neurological:      Mental Status: She is alert and oriented to person, place, and time     Psychiatric:         Mood and Affect: Mood normal          Behavior: Behavior normal           Diagnostic Test Review:  No new imaging reviewed this visit    Procedures   No procedures performed this visit    Scribe Attestation    I,:  Gage Aldana am acting as a scribe while in the presence of the attending physician :       I,:  Naina Green, DO personally performed the services described in this documentation    as scribed in my presence :

## 2023-04-28 NOTE — TELEPHONE ENCOUNTER
Pt called office stating she had a knee replacement about 6 weeks ago through Dr Juan Araujo, and was taking ASA, but Dr Juan Araujo advised her to call and ask PCP if she should do ASA 81 permanently  Please advise

## 2023-04-28 NOTE — TELEPHONE ENCOUNTER
If she has had a upset stomach/GERD symptoms from taking it then she should stop  If she feels fine taking an 81mg ASA every day then she should continue- there is no great research done to prove it is or isn't helpful but the Phillip Lob is it will help prevent stroke and heart attack

## 2023-05-01 ENCOUNTER — OFFICE VISIT (OUTPATIENT)
Dept: PHYSICAL THERAPY | Facility: CLINIC | Age: 68
End: 2023-05-01

## 2023-05-01 ENCOUNTER — TELEPHONE (OUTPATIENT)
Dept: OBGYN CLINIC | Facility: HOSPITAL | Age: 68
End: 2023-05-01

## 2023-05-01 DIAGNOSIS — M17.12 PRIMARY OSTEOARTHRITIS OF LEFT KNEE: Primary | ICD-10-CM

## 2023-05-01 NOTE — TELEPHONE ENCOUNTER
Caller: patient    Doctor: Gauri Arnold    Reason for call: Patient is calling to check the status of the DynaSplint    Call back#: [phone]

## 2023-05-01 NOTE — TELEPHONE ENCOUNTER
Spoke with patient and she was notified that it takes several days for insurance approval  Rep will reach out to patient once insurance approval is obtained

## 2023-05-01 NOTE — PROGRESS NOTES
Daily Note     Today's date: 2023  Patient name: Valentine Benavides  : 1955  MRN: [id number]  Referring provider: Ernesto Suarez  Dx:   Encounter Diagnosis     ICD-10-CM    1  Primary osteoarthritis of left knee  M17 12           Start Time: 0815  Stop Time: 0900  Total time in clinic (min): 45 minutes    Subjective: Patient states stiffness today  Saw ortho last week who Rx dynasplint for flexion  She has not heard from rep yet  To return for f/u in 4 weeks  Objective: See treatment diary below      Assessment: Tolerated treatment well  Tightness of left quad restricting flexion motion  Did note improvement with active knee flexion in standing  Continues to utilize [facility] clinic prn  Patient would benefit from continued PT      Plan: Continue per plan of care  Progress treatment as tolerated         Precautions: none noted  Access code: [email or web address]  Progress note:   POC:     Manuals    PROM  Seated flex, supine ext Seated flex, supine ext Seated flex, supine ext Prone flex, supine ext Seated flex, prone flex, supine ext   stretching        Patellar mobs Grade III- IV all Grade III- IV all Grade III- IV all Grade III- IV all Grade III- IV all           Neuro Re-Ed        QS :05x10 :05x10 :05x10 :05x10 :05x10           Tandem stance  2x:30 bilat   2x:30 bilat   SLS        CSMi balance                        Ther Ex        nustep L3 10 min L3 10 min L3 5 min L3 5 min prn   Bike 5 min 1/2 rot 5 min 1/2 rot 5 min timed (able to get full rotations after 3 min) 5 min timed (able to get full rotations after 1 min) 10min  L0    Ankle pumps        SAQ        LAQ  1# 3x10  1# 3x10    bridging        Leg raises 2x10 2x10 2x10 3x10 3x10   Heel slides  With strap 10x With strap 10x     Knee flex at step   :10x10     Prone knee flex   * nv            Leg press S=7  Squat  HR   55# 3x10  nv    62 5# 3x10   Standing HR/TR   30x 30x 30x   TKE nv       Standing hamstring curls "nv  30x 30x 30x   HOIST  Knee flexion  Knee extension nv  nv   Pl 2 2x10  Pl 2 2x10   Pl 2 2x10  Pl 2 2x10                   Knee extensor   3 min     Calf stretch        Hip flexor stretch    Supine :30x3; shown at step for HEP    Hamstring stretch nv 3x:30 Standing 3x:30     Ther Activity        Step ups nv 6\" 20x fwd, 10x lat 1st step 20x ea 1st step 20x ea 6\" 20x fwd, 20x lat   Sit to stand 12x 12x with UE support      Mini squats CSMi 50/50 20x 20x      Equal WB         Weight shifting        Gait Training        Gait education         Tandem/back walking Along cabinets 4x        Side steps Along cabinets 4x        Delvin step overs Along cabinets 4x bilat and side stepping  Along cabinets 4x bilat and side stepping     airex step over   bilat 15x bilat 20x  bilat 20x    Walking butt kicks    // bars 6x // bars 10x   Modalities                        Lindsay Naranjo was given a handout and verbal instruction of customized home exercise program  Patient accepted program and was able to demonstrate exercises               "

## 2023-05-03 ENCOUNTER — OFFICE VISIT (OUTPATIENT)
Dept: PHYSICAL THERAPY | Facility: CLINIC | Age: 68
End: 2023-05-03

## 2023-05-03 DIAGNOSIS — M17.12 PRIMARY OSTEOARTHRITIS OF LEFT KNEE: Primary | ICD-10-CM

## 2023-05-03 NOTE — PROGRESS NOTES
Daily Note     Today's date: 5/3/2023  Patient name: Mauricio Freeman  : 1955  MRN: [id number]  Referring provider: Ashlyn Cueva  Dx:   Encounter Diagnosis     ICD-10-CM    1  Primary osteoarthritis of left knee  M17 12           Start Time: 0900  Stop Time: 0955  Total time in clinic (min): 55 minutes    Subjective: States throbbing in knee at the end of the day  Working on getting the dynasplint for flexion  Objective: See treatment diary below      Assessment: Tolerated treatment well  Progressing with left quad flexibility, ely's at 100 deg, started at 90  Gradual improvement with hamstring strength increasing ability to flex knee during swing to normalize gait  Does lack terminal knee extension leaving knee in slight flexion during heel strike  Patient would benefit from continued PT      Plan: Continue per plan of care  Progress treatment as tolerated  Precautions: none noted  Access code: [email or web address]  Progress note:   POC:     Manuals 5/3 4/19 4/24 4/27 5/1   PROM  Seated flex, prone flex, supine ext Seated flex, supine ext Seated flex, supine ext Prone flex, supine ext Seated flex, prone flex, supine ext   stretching        Patellar mobs Grade III- IV all Grade III- IV all Grade III- IV all Grade III- IV all Grade III- IV all           Neuro Re-Ed        QS :05x10 :05x10 :05x10 :05x10 :05x10           Tandem stance 2x:30 bilat 2x:30 bilat   2x:30 bilat   SLS        CSMi balance                        Ther Ex        nustep  L3 10 min L3 5 min L3 5 min prn   Bike L1 10 min 5 min 1/2 rot 5 min timed (able to get full rotations after 3 min) 5 min timed (able to get full rotations after 1 min) 10min  L0    Ankle pumps        SAQ        LAQ  1# 3x10  1# 3x10    bridging        Leg raises 3x10 2x10 2x10 3x10 3x10   Heel slides  With strap 10x With strap 10x     Knee flex at step   :10x10     Prone knee flex nv  * nv            Leg press S=7  Squat  HR   62 5# 3x10  62  5# x10 "nv    62 5# 3x10   Standing HR/TR   30x 30x 30x   TKE        Standing hamstring curls 30x  30x 30x 30x   HOIST  Knee flexion  Knee extension   Pl 2 3x10  Pl 2 3x10  nv   Pl 2 2x10  Pl 2 2x10   Pl 2 2x10  Pl 2 2x10                   Knee extensor 4min  3 min     Calf stretch        Hip flexor stretch    Supine :30x3; shown at step for HEP    Hamstring stretch  3x:30 Standing 3x:30     Ther Activity        Step ups 6\" 20x ea 6\" 20x fwd, 10x lat 1st step 20x ea 1st step 20x ea 6\" 20x fwd, 20x lat   Sit to stand  12x with UE support      Mini squats 20x 20x      Equal WB         Weight shifting        Gait Training        Gait education         Tandem/back walking        Side steps        Delvin step overs   Along cabinets 4x bilat and side stepping     airex step over bilat 20x   bilat 15x bilat 20x  bilat 20x    Walking butt kicks // bars 10x   // bars 6x // bars 10x   Modalities                        Odell Manjarrez was given a handout and verbal instruction of customized home exercise program  Patient accepted program and was able to demonstrate exercises                 "

## 2023-05-03 NOTE — LETTER
May 3, 2023    Naresh Farnsworth PA-C  [address] [facility]    Patient: Ace Kirkpatrick   YOB: 1955   Date of Visit: 5/3/2023     Encounter Diagnosis     ICD-10-CM    1  Primary osteoarthritis of left knee  M17 12           Dear Dr Blanca Shah: Thank you for your recent referral of Ace Kirkpatrick  Please review the attached evaluation summary from Jamee's recent visit  Please verify that you agree with the plan of care by signing the attached order  If you have any questions or concerns, please do not hesitate to call  I sincerely appreciate the opportunity to share in the care of one of your patients and hope to have another opportunity to work with you in the near future  Sincerely,    Marshal Adler, PT      Referring Provider:      I certify that I have read the below Plan of Care and certify the need for these services furnished under this plan of treatment while under my care  Naresh Farnsworth PA-C  [address]  [facility]  [address] [place]          Daily Note     Today's date: 5/3/2023  Patient name: Ace Kirkpatrick  : 1955  MRN: [id number]  Referring provider: Ray Oneil  Dx:   Encounter Diagnosis     ICD-10-CM    1  Primary osteoarthritis of left knee  M17 12           Start Time: 0900  Stop Time: 0955  Total time in clinic (min): 55 minutes    Subjective: States throbbing in knee at the end of the day  Working on getting the dynasplint for flexion  Objective: See treatment diary below      Assessment: Tolerated treatment well  Progressing with left quad flexibility, ely's at 100 deg, started at 90  Gradual improvement with hamstring strength increasing ability to flex knee during swing to normalize gait  Does lack terminal knee extension leaving knee in slight flexion during heel strike  Patient would benefit from continued PT      Plan: Continue per plan of care  " Progress treatment as tolerated  Precautions: none noted  Access code: [email or web address]  Progress note: 5/17  POC: 6/17    Manuals 5/3 4/19 4/24 4/27 5/1   PROM  Seated flex, prone flex, supine ext Seated flex, supine ext Seated flex, supine ext Prone flex, supine ext Seated flex, prone flex, supine ext   stretching        Patellar mobs Grade III- IV all Grade III- IV all Grade III- IV all Grade III- IV all Grade III- IV all           Neuro Re-Ed        QS :05x10 :05x10 :05x10 :05x10 :05x10           Tandem stance 2x:30 bilat 2x:30 bilat   2x:30 bilat   SLS        CSMi balance                        Ther Ex        nustep  L3 10 min L3 5 min L3 5 min prn   Bike L1 10 min 5 min 1/2 rot 5 min timed (able to get full rotations after 3 min) 5 min timed (able to get full rotations after 1 min) 10min  L0    Ankle pumps        SAQ        LAQ  1# 3x10  1# 3x10    bridging        Leg raises 3x10 2x10 2x10 3x10 3x10   Heel slides  With strap 10x With strap 10x     Knee flex at step   :10x10     Prone knee flex nv  * nv            Leg press S=7  Squat  HR   62 5# 3x10  62  5# x10  nv    62 5# 3x10   Standing HR/TR   30x 30x 30x   TKE        Standing hamstring curls 30x  30x 30x 30x   HOIST  Knee flexion  Knee extension   Pl 2 3x10  Pl 2 3x10  nv   Pl 2 2x10  Pl 2 2x10   Pl 2 2x10  Pl 2 2x10                   Knee extensor 4min  3 min     Calf stretch        Hip flexor stretch    Supine :30x3; shown at step for HEP    Hamstring stretch  3x:30 Standing 3x:30     Ther Activity        Step ups 6\" 20x ea 6\" 20x fwd, 10x lat 1st step 20x ea 1st step 20x ea 6\" 20x fwd, 20x lat   Sit to stand  12x with UE support      Mini squats 20x 20x      Equal WB         Weight shifting        Gait Training        Gait education         Tandem/back walking        Side steps        Delvin step overs   Along cabinets 4x bilat and side stepping     airex step over bilat 20x   bilat 15x bilat 20x  bilat 20x    Walking butt kicks // bars 10x   // bars 6x " // bars 10x   Modalities                        Génesis Arrington was given a handout and verbal instruction of customized home exercise program  Patient accepted program and was able to demonstrate exercises

## 2023-05-08 ENCOUNTER — OFFICE VISIT (OUTPATIENT)
Dept: PHYSICAL THERAPY | Facility: CLINIC | Age: 68
End: 2023-05-08

## 2023-05-08 DIAGNOSIS — M17.12 PRIMARY OSTEOARTHRITIS OF LEFT KNEE: Primary | ICD-10-CM

## 2023-05-08 NOTE — PROGRESS NOTES
"Daily Note     Today's date: 2023  Patient name: Shari John  : 1955  MRN: [id number]  Referring provider: Isabella Frausto  Dx:   Encounter Diagnosis     ICD-10-CM    1  Primary osteoarthritis of left knee  M17 12           Start Time: 0900  Stop Time: 09  Total time in clinic (min): 45 minutes    Subjective: States she feels she is walking better, has been working on mechanics at home  Continues to use cane as needed especially when outdoors on uneven surfaces  States she was watching [employer] game last week sitting on bleachers and when she got up and stepped down with left leg first had pain and felt like something in knee \"came loose\"  Has improved since episode, but still doesn't feel right  Objective: See treatment diary below      Assessment: Tolerated treatment well  Knee alignment WNL with no noted abnormalities  Progressing well with passive knee flexion, should continue to work on weakness of hamstring to improve gait  Lacking terminal knee extension which was addressed in program today  Patient would benefit from continued PT      Plan: Continue per plan of care  Progress treatment as tolerated         Precautions: none noted  Access code: [email or web address]  Progress note:   POC:     Manuals 5/3 5/8 4/24 4/27 5/1   PROM  Seated flex, prone flex, supine ext Seated flex, prone flex, supine ext Seated flex, supine ext Prone flex, supine ext Seated flex, prone flex, supine ext   stretching        Patellar mobs Grade III- IV all Grade III- IV all Grade III- IV all Grade III- IV all Grade III- IV all           Neuro Re-Ed        QS :05x10  :05x10 :05x10 :05x10           Tandem stance 2x:30 bilat Foam 2x:30   2x:30 bilat   SLS        CSMi balance        Fitter with stops  10x ea              Ther Ex        nustep  L3 5 min L3 5 min L3 5 min prn   Bike L1 10 min L1 5 min 5 min timed (able to get full rotations after 3 min) 5 min timed (able to get full rotations after 1 " "min) 10min  L0    Ankle pumps        SAQ        LAQ  1# 3x10  1# 3x10    bridging        Leg raises 3x10  2x10 3x10 3x10   Heel slides   With strap 10x     Knee flex at step   :10x10     Prone knee flex nv 10x * nv            Leg press S=7  Squat  HR   62 5# 3x10  62  5# x10   75# 3x10 nv    62 5# 3x10   Standing HR/TR   30x 30x 30x   TKE        Standing hamstring curls 30x  30x 30x 30x   HOIST  Knee flexion  Knee extension   Pl 2 3x10  Pl 2 3x10 nv nv   Pl 2 2x10  Pl 2 2x10   Pl 2 2x10  Pl 2 2x10                   Knee extensor 4min 5 min 3 min     Calf stretch        Hip flexor stretch    Supine :30x3; shown at step for HEP    Hamstring stretch   Standing 3x:30     Ther Activity        Step ups 6\" 20x ea 6\" 20x ea 1st step 20x ea 1st step 20x ea 6\" 20x fwd, 20x lat   Sit to stand        Mini squats 20x       Equal WB         Weight shifting        Gait Training        Gait education         Tandem/back walking        Side steps        Delvin step overs   Along cabinets 4x bilat and side stepping     airex step over bilat 20x   bilat 15x bilat 20x  bilat 20x    Walking butt kicks // bars 10x   // bars 6x // bars 10x   Modalities                        María Connolly was given a handout and verbal instruction of customized home exercise program  Patient accepted program and was able to demonstrate exercises                   "

## 2023-05-10 ENCOUNTER — OFFICE VISIT (OUTPATIENT)
Dept: PHYSICAL THERAPY | Facility: CLINIC | Age: 68
End: 2023-05-10

## 2023-05-10 DIAGNOSIS — M17.12 PRIMARY OSTEOARTHRITIS OF LEFT KNEE: Primary | ICD-10-CM

## 2023-05-10 NOTE — PROGRESS NOTES
Daily Note     Today's date: 5/10/2023  Patient name: Laura Foster  : 1955  MRN: [id number]  Referring provider: Garcia Arch  Dx:   Encounter Diagnosis     ICD-10-CM    1  Primary osteoarthritis of left knee  M17 12           Start Time: 0815  Stop Time: 0900  Total time in clinic (min): 45 minutes    Subjective: No new complaints  Would like to know if she can go into the hot tub yet  C/o difficulty descending stairs, needs to go step to  Ascending is able to go step over step  Objective: See treatment diary below      Assessment: Tolerated treatment well  Inspected incision today, fully closed with good healing  Instructed patient she may submerge it now, but should inspect after  No increased symptoms throughout session  Patient would benefit from continued PT working on increasing LLE strength, stability, end range extension, and quad flexibility  Plan: Continue per plan of care  Progress treatment as tolerated         Precautions: none noted  Access code: [email or web address]  Progress note:   POC:     Manuals 5/3 5/8 5/10 4/27 5/1   PROM  Seated flex, prone flex, supine ext Seated flex, prone flex, supine ext Seated flex, prone flex, supine ext Prone flex, supine ext Seated flex, prone flex, supine ext   stretching        Patellar mobs Grade III- IV all Grade III- IV all Grade III- IV all Grade III- IV all Grade III- IV all           Neuro Re-Ed        QS :05x10  :05x10 :05x10 :05x10           Tandem stance 2x:30 bilat Foam 2x:30 Foam 2x:30  2x:30 bilat   SLS        CSMi balance        Fitter with stops  10x ea 10x ea             Ther Ex        nustep  L3 5 min  L3 5 min prn   Bike L1 10 min L1 5 min L1 10 min 5 min timed (able to get full rotations after 1 min) 10min  L0    Ankle pumps        SAQ        LAQ  1# 3x10  1# 3x10    bridging        Leg raises 3x10  3x10 3x10 3x10   Heel slides        Knee flex at step        Prone knee flex nv 10x nv nv            Leg press "S=7  Squat  HR   62 5# 3x10  62  5# x10   75# 3x10   75# 3x10    62 5# 3x10   Standing HR/TR    30x 30x   TKE        Standing hamstring curls 30x   30x 30x   HOIST  Knee flexion  Knee extension   Pl 2 3x10  Pl 2 3x10 nv   Pl 2 3x10  Pl 2 3x10   Pl 2 2x10  Pl 2 2x10   Pl 2 2x10  Pl 2 2x10                   Knee extensor 4min 5 min      Calf stretch        Hip flexor stretch    Supine :30x3; shown at step for HEP    Hamstring stretch        Ther Activity        Step ups 6\" 20x ea 6\" 20x ea 6\" 20x ea 1st step 20x ea 6\" 20x fwd, 20x lat   Sit to stand        Mini squats 20x       Equal WB         Weight shifting        Gait Training        Gait education         Tandem/back walking        Side steps        Delvin step overs        airex step over bilat 20x    bilat 20x  bilat 20x    Walking butt kicks // bars 10x   // bars 6x // bars 10x   Keith Markham was given a handout and verbal instruction of customized home exercise program  Patient accepted program and was able to demonstrate exercises                     "

## 2023-05-15 ENCOUNTER — EVALUATION (OUTPATIENT)
Dept: PHYSICAL THERAPY | Facility: CLINIC | Age: 68
End: 2023-05-15

## 2023-05-15 DIAGNOSIS — M17.12 PRIMARY OSTEOARTHRITIS OF LEFT KNEE: Primary | ICD-10-CM

## 2023-05-15 NOTE — PROGRESS NOTES
PT Re-Evaluation     Today's date: 5/15/2023  Patient name: Pro Cortez  : 1955  MRN: [id number]  Referring provider: Angel Presley  Dx:   Encounter Diagnosis     ICD-10-CM    1  Primary osteoarthritis of left knee  M17 12           Start Time: 815  Stop Time: 52  Total time in clinic (min): 40 minutes    Assessment  Assessment details: Pro Cortez was seen for an initial PT evaluation and re-evaluation at s/p  She is now presenting for re-evaluation after 17 f/u sessions  Patient is a 79 y o  female with diagnosis of left knee pain and past medical history significant for OA, chronic pain, thyroid disease, GERD, hiatal hernia, hyperlipidemia, HTN, rotator cuff tear with repair, L buttock pain with history of surgery, lipomas with removal, lumbar radiculopathy, vertigo  FOTO functional score shows improvement from 31% at intake to 67% today surpassing predicted value of 62%  Findings of examination shows return to baseline with left knee flexion range of motion, strength and stability  Noted continued lack of terminal knee extension impacting gait mechanics  Sherry Huerta has met the goals set at initial evaluation and is independent with her home exercise program  Reviewed deficits with patient today and is able to continue to progress without the need for skilled PT services  Plan to discharge to home exercise program at this time  Impairments: abnormal gait, abnormal muscle firing, abnormal muscle tone, abnormal or restricted ROM, abnormal movement, activity intolerance, impaired balance, impaired physical strength, lacks appropriate home exercise program, pain with function and safety issue  Functional limitations: walking, stairs, lifting, prolonged weightbearing, squatting, lunging  Goals  Pre-Op Goals: - MET POST INITIAL EVALUATION  1   Patient will be able to identify hazards around home from review of home preparation checklist/virtual home assessment and be able to verbalize an action plan to help reduce fall risk and increase comfort  2  Patient will be able to demonstrate understanding of post-op home exercise program, as well as ambulating with AD that will likely be required at initial s/p  STG (6 weeks s/p)  1  Patient will have reported 0/10 pain in left knee at rest  - MET 5/15  2  Improve patient's left knee flexion to 100 degrees for increased ability to take proper strides during ambulation  - MET 4/17  3  Increase patient's TUG test by 5 seconds for increased mobility and decreased fall risk  - MET 4/17  LTG (12 weeks s/p)  1  Patient's LE strength will be equal bilaterally for ability to ambulate and return to functional activities at PLOF  - MET 5/15  2  Patient will be able to walk in community with 0/10 pain in left knee  - MET 5/15  3  Patient will be independent with home exercise program for continued maintenance post PT discharge  - MET 5/15      Plan  Plan details: DC to HEP  Plan of Care beginning date: 3/7/2023  Treatment plan discussed with: patient and PTA        Subjective Evaluation    History of Present Illness  5/15: Patient states 85% improvement since the start of PT  States she is not able to navigate stairs, slowly, step over step  Minimal to no complaints of pain  Has returned to prior level of function going out in community with [facility] prn  Feels comfortable with progressing independently  Did recently receive knee flexion dynasplint which she is sceptical of wearing, but will attempt  To f/u with ortho in 1 month  Subjective 4/17: Patient states 75% improvement since the start of PT  Has been able to progress to walking with [facility] in community, but does not use as much at home  Continues to have complaints of difficulty bending knee  Not having a lot of pain, more stiffness and pulling  To f/u with referring surgeon 4/28  Subjective 3/17: Patient underwent L TKA 3/15/23  Was DC from hospital last night, was able to sleep relatively comfortably  Continues to take medication as Rx  Did take percocet prior to today's PT session  To f/u with surgeon in 2 weeks  Mechanism of injury: Lian Toure is a 79 y o  female who presents to outpatient Physical Therapy today with complaints of L knee pain  Chronic left knee pain with meniscal surgery in June 2022  Pain persisted with swelling and decreased function  Did attempt injections with no improvement  To have TKA 3/15/23  Pain    3/17 4/17    Pain Rating (0-10) Pre-op Post-op 4/17 5/15   Current 8 7 Pulling 2 0   At least 7 5 2 0   At worst 10 9 5 2   Location L anterior knee      Quality Dull ache            Social Support  Steps to enter house: yes  Stairs in house: yes   13  Lives in: multiple-level home  Lives with: spouse    Employment status: not working  Patient Goals  Patient goal: Walk normal without AD  Objective     Observations   Left Knee   Positive for edema and effusion  3/17: linear incision closed with staples  Minimal redness along incision border, drainage WNL  Patient was educated on wound care and precautions  4/17: steri strips at mid incision intact  Healing appropriately     5/15: scared, healed appropriately     Neurological Testing     Sensation     Knee   Left Knee   Intact: light touch    Right Knee   Intact: light touch     Active Range of Motion      Knee- AROM Pre-op Post-op 4/17 5/15   Left       Flexion 85 80 100 120   Extension -5 -28 -9 -10   Right       Flexion 140      Extension 5            Passive Range of Motion      Knee- PROM Post-op 4/17 5/15   Left      Flexion 90 105 WFL   Extension -25 -8 -8       Mobility   Patellar Mobility:   Left Knee   Hypomobile: left medial, left lateral, left superior and left inferior  3/17: Hypomobile: left medial, left lateral, left superior and left inferior  4/17: hypomobile all  5/15: Med/lat WNL; sup/inf hypomobile    Right Knee   WFL: medial, lateral, superior and inferior    Strength/Myotome Testing S/p 4/12  Knee - MMT Pre-op Post-op 4/12 5/15   Left       Flexion 3- 2+ 4+ 5   Extension 2+ 2- 4+ 5   Quadriceps contraction poor trace Min-mod good   Right       Flexion 5      Extension 5      Quadriceps contraction good            Tests     Additional Tests Details  Gait: Antalgic, no AD  Decreased stance on LLE with shortened stride length  3/17: step to, antalgic, utilizing 2WW  Foot flat landing on R  4/17: decreased L knee extension in swing causing circumduction and hip hiking    5/15: absent TKE at heel strike  Improved knee flexion during swing    TUG Test= 19 sec (>14sec= fall risk)  3/17: 50 sec with 2WW  4/17: 12 sec no AD  5/15: 9 no AD  30 second sit to stand= 6  3/17: 6x with UE assist  4/17: 12x without UE support  5/15: 15x without UE support    (pre-op) knee disability and osteoarthritis outcome score survey jr:  I  Stiffness= 3  II  Pain= 10  III Function, daily living= 4  =45      FOTO s/p: 31% function, 62% predicted function   5/15: 67%        General Comments:      Knee Comments  Virtual home assessment: Received and reviewed, moderate barriers (stairs)       Pre-op Paitient Instruction:  Pain management  Gait training  Stair climbing  Initial post-op HEP               Precautions: none noted  Access code: [email or web address]      Manuals 5/3 5/8 5/10 5/15 5/1   PROM  Seated flex, prone flex, supine ext Seated flex, prone flex, supine ext Seated flex, prone flex, supine ext Seated flex, prone flex, supine ext Seated flex, prone flex, supine ext   stretching        Patellar mobs Grade III- IV all Grade III- IV all Grade III- IV all Grade III- IV all Grade III- IV all           Neuro Re-Ed        QS :05x10  :05x10 :05x10 :05x10           Tandem stance 2x:30 bilat Foam 2x:30 Foam 2x:30  2x:30 bilat   SLS        CSMi balance        Fitter with stops  10x ea 10x ea             Ther Ex        nustep  L3 5 min   prn   Bike L1 10 min L1 5 min L1 10 min L1 10 min 10min  L0    Ankle pumps        SAQ        LAQ "1# 3x10      bridging        Leg raises 3x10  3x10 3x10 3x10   Heel slides        Knee flex at step        Prone knee flex nv 10x nv             Leg press S=7  Squat  HR   62 5# 3x10  62  5# x10   75# 3x10   75# 3x10    62 5# 3x10   Standing HR/TR     30x   TKE        Standing hamstring curls 30x    30x   HOIST  Knee flexion  Knee extension   Pl 2 3x10  Pl 2 3x10 nv   Pl 2 3x10  Pl 2 3x10    Pl 2 2x10  Pl 2 2x10                   Knee extensor 4min 5 min      Calf stretch        Hip flexor stretch        Hamstring stretch        Ther Activity        Step ups 6\" 20x ea 6\" 20x ea 6\" 20x ea  6\" 20x fwd, 20x lat   Sit to stand        Mini squats 20x       Equal WB         Weight shifting        Gait Training        Gait education         Tandem/back walking        Side steps        Delvin step overs        airex step over bilat 20x     bilat 20x    Walking butt kicks // bars 10x    // bars 10x   Modalities                        Julio Cesar Medellin was given a handout and verbal instruction of customized home exercise program  Patient accepted program and was able to demonstrate exercises         "

## 2023-05-17 ENCOUNTER — APPOINTMENT (OUTPATIENT)
Dept: PHYSICAL THERAPY | Facility: CLINIC | Age: 68
End: 2023-05-17
Payer: COMMERCIAL

## 2023-05-18 ENCOUNTER — APPOINTMENT (OUTPATIENT)
Dept: PHYSICAL THERAPY | Facility: CLINIC | Age: 68
End: 2023-05-18
Payer: COMMERCIAL

## 2023-05-25 DIAGNOSIS — G47.09 OTHER INSOMNIA: ICD-10-CM

## 2023-05-25 RX ORDER — HYDROXYZINE HYDROCHLORIDE 25 MG/1
TABLET, FILM COATED ORAL
Qty: 60 TABLET | Refills: 2 | Status: SHIPPED | OUTPATIENT
Start: 2023-05-25

## 2023-06-05 ENCOUNTER — OFFICE VISIT (OUTPATIENT)
Dept: SURGERY | Facility: CLINIC | Age: 68
End: 2023-06-05

## 2023-06-05 VITALS
HEART RATE: 68 BPM | SYSTOLIC BLOOD PRESSURE: 140 MMHG | TEMPERATURE: 97.9 F | OXYGEN SATURATION: 100 % | DIASTOLIC BLOOD PRESSURE: 80 MMHG | HEIGHT: 63 IN | BODY MASS INDEX: 25.52 KG/M2 | RESPIRATION RATE: 16 BRPM | WEIGHT: 144 LBS

## 2023-06-05 DIAGNOSIS — L72.0 EPIDERMOID CYST OF SKIN OF LEFT BREAST: Primary | ICD-10-CM

## 2023-06-05 PROCEDURE — 88305 TISSUE EXAM BY PATHOLOGIST: CPT | Performed by: PATHOLOGY

## 2023-06-05 NOTE — PROGRESS NOTES
"Assessment/Plan:    Epidermoid cyst of skin of left breast  Patient presents with a symptomatic left breast epidermoid cyst at the 10 o'clock position for which definitive treatment by excisional biopsy is now indicated  The technical details of the procedure as well as the benefits and risks reviewed and informed verbal consent obtained to proceed  Subjective:      Patient ID: Chelsie Coleman is a 79 y o  female  Patient came in today for a lesion on her left breast  Patient states she has had this lesion for about 6 months and it was being monitor by pcp but now its hurting and it causing an annoyance  Patient also has a lump on both feet located by the pinky toe  Patient denies drainage, fevers/chills or med hx or family hx of skin cancer  Preet KEE MA      The following portions of the patient's history were reviewed and updated as appropriate: allergies, current medications, past family history, past medical history, past surgical history and problem list     Review of Systems   Constitutional: Negative for chills and fever  HENT: Negative for ear pain and sore throat  Eyes: Negative for pain and visual disturbance  Respiratory: Negative for cough and shortness of breath  Cardiovascular: Negative for chest pain and palpitations  Gastrointestinal: Negative for abdominal pain and vomiting  Genitourinary: Negative for dysuria and hematuria  Musculoskeletal: Negative for arthralgias and back pain  Skin: Negative for color change and rash  Neurological: Negative for seizures and syncope  All other systems reviewed and are negative  Objective:      /80 (BP Location: Left arm, Patient Position: Sitting, Cuff Size: Standard)   Pulse 68   Temp 97 9 °F (36 6 °C) (Temporal)   Resp 16   Ht 5' 3\" (1 6 m)   Wt 65 3 kg (144 lb)   SpO2 100%   BMI 25 51 kg/m²            Physical Exam  Constitutional:       Appearance: She is well-developed     HENT:      Head: Normocephalic " "and atraumatic  Eyes:      Conjunctiva/sclera: Conjunctivae normal       Pupils: Pupils are equal, round, and reactive to light  Cardiovascular:      Rate and Rhythm: Normal rate and regular rhythm  Pulmonary:      Effort: Pulmonary effort is normal       Breath sounds: Normal breath sounds  Abdominal:      General: Bowel sounds are normal       Palpations: Abdomen is soft  Musculoskeletal:         General: Normal range of motion  Cervical back: Normal range of motion and neck supple  Skin:     General: Skin is warm and dry  Comments: 8 mm left breast epidermoid cyst   Neurological:      Mental Status: She is alert and oriented to person, place, and time  Psychiatric:         Behavior: Behavior normal          Thought Content: Thought content normal          Judgment: Judgment normal        Skin excision    Date/Time: 6/5/2023 11:00 AM    Performed by: Clement Lyman MD  Authorized by: Clement Lyman MD  Universal Protocol:  Consent: Verbal consent obtained  Risks and benefits: risks, benefits and alternatives were discussed  Consent given by: patient  Time out: Immediately prior to procedure a \"time out\" was called to verify the correct patient, procedure, equipment, support staff and site/side marked as required  Timeout called at: 6/5/2023 12:00 PM   Patient understanding: patient states understanding of the procedure being performed  Patient consent: the patient's understanding of the procedure matches consent given  Site marked: the operative site was marked  Patient identity confirmed: verbally with patient      Procedure Details - Skin Excision:     Number of Lesions:  1  Lesion 1:     Body area:  Trunk    Trunk location:  L breast    Malignancy: benign lesion            Final defect size (mm):  8    Repair type:  Linear closure    Closure complexity: simple       Repair Comments: Procedure note:  With informed verbal consent under sterile conditions using aseptic technique and " 0 75% bupivacaine the left breast epidermoid cyst was sharply excised with a 15 blade and the wound closed primarily with 3 vertical mattress sutures of 3-0 nylon

## 2023-06-05 NOTE — ASSESSMENT & PLAN NOTE
Patient presents with a symptomatic left breast epidermoid cyst at the 10 o'clock position for which definitive treatment by excisional biopsy is now indicated  The technical details of the procedure as well as the benefits and risks reviewed and informed verbal consent obtained to proceed

## 2023-06-08 PROCEDURE — 88305 TISSUE EXAM BY PATHOLOGIST: CPT | Performed by: PATHOLOGY

## 2023-06-12 ENCOUNTER — OFFICE VISIT (OUTPATIENT)
Dept: OBGYN CLINIC | Facility: CLINIC | Age: 68
End: 2023-06-12

## 2023-06-12 ENCOUNTER — TELEPHONE (OUTPATIENT)
Dept: SURGERY | Facility: CLINIC | Age: 68
End: 2023-06-12

## 2023-06-12 VITALS
WEIGHT: 144 LBS | HEART RATE: 89 BPM | HEIGHT: 63 IN | SYSTOLIC BLOOD PRESSURE: 189 MMHG | DIASTOLIC BLOOD PRESSURE: 101 MMHG | BODY MASS INDEX: 25.52 KG/M2

## 2023-06-12 DIAGNOSIS — Z96.652 STATUS POST TOTAL LEFT KNEE REPLACEMENT: Primary | ICD-10-CM

## 2023-06-12 PROCEDURE — 99024 POSTOP FOLLOW-UP VISIT: CPT | Performed by: ORTHOPAEDIC SURGERY

## 2023-06-12 NOTE — PROGRESS NOTES
Assessment/Plan:    No problem-specific Assessment & Plan notes found for this encounter  Diagnoses and all orders for this visit:    Status post total left knee replacement  -     Cancel: XR knee 3 vw right non injury; Future  -     XR knee 3 vw left non injury; Future          The patient is doing quite well from her left total knee replacement  Strength and motion intact  No instability  X-rays show anatomic placed in the prosthesis  Continue home exercise program   Follow-up 3 months evaluation with new x-rays of the left knee-3 views  If her condition changes, she would not hesitate to let us know    Subjective:      Patient ID: Paris Lynch is a 79 y o  female  HPI    The patient is nearly 3-month status post left total knee replacement  She offers no major complaints of pain  She is walking in the office without any assistive devices and without an antalgic gait  She is pleased with her results    The following portions of the patient's history were reviewed and updated as appropriate: allergies, current medications, past family history, past medical history, past social history, past surgical history and problem list     Review of Systems   Constitutional: Negative for chills, fever and unexpected weight change  HENT: Negative for hearing loss, nosebleeds and sore throat  Eyes: Negative for pain, redness and visual disturbance  Respiratory: Negative for cough, shortness of breath and wheezing  Cardiovascular: Negative for chest pain, palpitations and leg swelling  Gastrointestinal: Negative for abdominal pain, nausea and vomiting  Endocrine: Negative for polydipsia and polyuria  Genitourinary: Negative for dysuria and hematuria  Musculoskeletal: Negative for arthralgias, back pain, gait problem, joint swelling, myalgias, neck pain and neck stiffness  As noted in HPI   Skin: Negative for rash and wound  Neurological: Negative for dizziness, numbness and headaches  "  Psychiatric/Behavioral: Negative for decreased concentration and suicidal ideas  The patient is not nervous/anxious  Objective:      BP (!) 189/101 (BP Location: Left arm, Patient Position: Sitting, Cuff Size: Standard)   Pulse 89   Ht 5' 3\" (1 6 m)   Wt 65 3 kg (144 lb)   BMI 25 51 kg/m²          Physical Exam      Left lower extremity is neurovascular intact  Toes are pink and mobile  Compartments are soft  Incision is clean, dry, intact  Flexion past 120 degrees  No ligament dysfunction  No warmth or erythema      X-rays show a well-seated left total knee replacement  "

## 2023-06-19 ENCOUNTER — OFFICE VISIT (OUTPATIENT)
Dept: SURGERY | Facility: CLINIC | Age: 68
End: 2023-06-19

## 2023-06-19 VITALS — TEMPERATURE: 97.5 F

## 2023-06-19 DIAGNOSIS — D23.5: Primary | ICD-10-CM

## 2023-06-19 PROCEDURE — 99024 POSTOP FOLLOW-UP VISIT: CPT | Performed by: PHYSICIAN ASSISTANT

## 2023-06-19 NOTE — PROGRESS NOTES
Post-Op Note - General Surgery   Didi Pryor 79 y o  female MRN: [id number]  Encounter: [id number]    Assessment/Plan    Dermatofibroma of female breast  Doing well after excision  Sutures removed  Pathology reviewed showing benign dermatofibroma  All questions answered, we will see back as needed  Diagnoses and all orders for this visit:    Dermatofibroma of female breast        Subjective      Chief Complaint   Patient presents with   • Post-op     S/p exc of cyst on left breast 6/5/23     Patient came in today for a S/p exc of cyst on left breast 6/5/23  Patient states the inc is healed and denies drainage or pain  Patient also states she hasn't notice any new lumps/bumps, denies nipple discharge, redness/ irritation or fever/chills  She does have her yearly mammograms and follow up with her pcp for breast exams and results  Preet KEE MA       Review of Systems   All other systems reviewed and are negative  The following portions of the patient's history were reviewed and updated as appropriate: allergies, current medications, past family history, past medical history, past social history, past surgical history and problem list     Objective      Temperature 97 5 °F (36 4 °C), temperature source Temporal    Physical Exam  Vitals and nursing note reviewed  Constitutional:       General: She is not in acute distress  Appearance: She is well-developed  She is not diaphoretic  HENT:      Head: Normocephalic and atraumatic  Eyes:      Conjunctiva/sclera: Conjunctivae normal       Pupils: Pupils are equal, round, and reactive to light  Pulmonary:      Effort: No respiratory distress  Musculoskeletal:         General: Normal range of motion  Cervical back: Normal range of motion  Skin:     General: Skin is warm and dry  Capillary Refill: Capillary refill takes less than 2 seconds  Comments: Incision clean dry and intact  Sutures removed     Neurological:      Mental Status: She is alert and oriented to person, place, and time     Psychiatric:         Behavior: Behavior normal          Signature:  Chilo Layton PA-C  Date: 6/19/2023 Time: 12:43 PM

## 2023-06-19 NOTE — ASSESSMENT & PLAN NOTE
Doing well after excision  Sutures removed  Pathology reviewed showing benign dermatofibroma  All questions answered, we will see back as needed

## 2023-06-30 ENCOUNTER — APPOINTMENT (OUTPATIENT)
Dept: RADIOLOGY | Facility: CLINIC | Age: 68
End: 2023-06-30
Payer: COMMERCIAL

## 2023-06-30 ENCOUNTER — OFFICE VISIT (OUTPATIENT)
Dept: PODIATRY | Facility: CLINIC | Age: 68
End: 2023-06-30
Payer: COMMERCIAL

## 2023-06-30 VITALS
HEIGHT: 63 IN | SYSTOLIC BLOOD PRESSURE: 181 MMHG | HEART RATE: 69 BPM | WEIGHT: 144 LBS | BODY MASS INDEX: 25.52 KG/M2 | DIASTOLIC BLOOD PRESSURE: 90 MMHG

## 2023-06-30 DIAGNOSIS — M79.672 LEFT FOOT PAIN: ICD-10-CM

## 2023-06-30 DIAGNOSIS — M79.671 RIGHT FOOT PAIN: Primary | ICD-10-CM

## 2023-06-30 DIAGNOSIS — M21.622 TAILOR'S BUNION OF BOTH FEET: ICD-10-CM

## 2023-06-30 DIAGNOSIS — M79.671 RIGHT FOOT PAIN: ICD-10-CM

## 2023-06-30 DIAGNOSIS — M21.621 TAILOR'S BUNION OF BOTH FEET: ICD-10-CM

## 2023-06-30 DIAGNOSIS — Z01.818 PREOP TESTING: ICD-10-CM

## 2023-06-30 PROCEDURE — 99204 OFFICE O/P NEW MOD 45 MIN: CPT | Performed by: STUDENT IN AN ORGANIZED HEALTH CARE EDUCATION/TRAINING PROGRAM

## 2023-06-30 PROCEDURE — 73630 X-RAY EXAM OF FOOT: CPT

## 2023-06-30 RX ORDER — CHLORHEXIDINE GLUCONATE 0.12 MG/ML
15 RINSE ORAL ONCE
OUTPATIENT
Start: 2023-06-30 | End: 2023-06-30

## 2023-06-30 NOTE — PROGRESS NOTES
Assessment/Plan:    No problem-specific Assessment & Plan notes found for this encounter  Diagnoses and all orders for this visit:    Right foot pain  -     Cancel: X-ray foot right 3+ views; Future  -     Case request operating room: BUNIONECTOMY TAILOR WITHOUT OSTEOTOMY; Standing  -     Case request operating room: BUNIONECTOMY TAILOR WITHOUT OSTEOTOMY    Left foot pain  -     Cancel: X-ray foot left 3+ views; Future  -     Case request operating room: BUNIONECTOMY TAILOR WITHOUT OSTEOTOMY; Standing  -     Case request operating room: BUNIONECTOMY TAILOR WITHOUT OSTEOTOMY    Tailor's bunion of both feet  -     Case request operating room: BUNIONECTOMY TAILOR WITHOUT OSTEOTOMY; Standing  -     Case request operating room: BUNIONECTOMY TAILOR WITHOUT OSTEOTOMY    Preop testing  -     CBC and differential; Future  -     Comprehensive metabolic panel; Future    Other orders  -     Nursing Communication Warmimg Interventions Implemented; Standing  -     Nursing Communication CHG bath, have staff wash entire body (neck down) per pre-op bathing protocol  Routine, evening prior to, and day of surgery ; Standing  -     Nursing Communication Swab both nares with Povidone-Iodine solution, EXCLUDE if patient has shellfish/Iodine allergy, and replace with nasal alcohol swabstick  Routine, day of surgery, on call to OR; Standing  -     chlorhexidine (PERIDEX) 0 12 % oral rinse 15 mL  -     Void on call to OR; Standing  -     Insert peripheral IV; Standing  -     ceFAZolin (ANCEF) 2,000 mg in dextrose 5 % 100 mL IVPB      Plan:     -  Patient was counseled and educated on the condition and the diagnosis  The diagnosis, treatment options and prognosis were discussed in detail    -Bilateral foot x-rays reviewed, I presented with x-ray finding with patient which is consistent with left foot metatarsal adductus with prominent fifth metatarsal head and fifth digit adductovarus deformity    Right foot with lateral fifth metatarsal head exostosis/prominence with mild fifth adductovarus deformity   -Plan for bilateral foot tailor's bunionectomy without osteotomy  I discussed risks benefits and alternative to the procedure  Patient reports she would like to proceed with surgical treatment as she has not done well conservatively  -Postop course discussed in detail with patient which will be 3 to 4 weeks weight-bear as tolerated in surgical shoe    -Pain medication will be prescribed on the day of surgery    I was very clear at the beginning of the discussion about alternatives to this surgery including benign neglect, bracing, and second surgical opinions  I spent time to discuss with the patient the surgical procedure(s) as listed above, pre-op testing, and post-op course required to properly heal the surgery  I discussed risks as infection, scar, swelling, chronic pain, painful or prominent hardware, possible need to remove hardware, poor healing of incision or bone that could require more surgery, incomplete correction of deformities or recurrence of deformities, change in shape of foot, toe, or walking and function, numbness, neuritis/RSD, blood clots in the leg or lung, and even death from anesthesia complications  No guarantees were given and the possibility of recurrent deformity or incomplete correction were discussed before patient signed the consent form  We also discussed the need for possible anticoagulation  The offloading device necessary after the surgery will be Surgical shoe  The surgery, history and physical and PATS will be scheduled  Subjective:      Patient ID: Woodrow Medina is a 79 y o  female  80-year-old female with past medical history as below presents for an evaluation of bilateral foot pain left worse than right  Patient reports her pain is localized on the lateral aspect of her fifth metatarsals  Pain is usually with wearing shoes and socks and from pressure    When she is wearing foot care she is constantly an aching type of pain  This has been going on for years but now the pain is gotten worse  She denies any trauma  Patient reports that she is interested in surgical treatment as she has tried wearing supportive shoe gear with wider toebox without much relief  She denies any other complaints at this time  The following portions of the patient's history were reviewed and updated as appropriate:   She  has a past medical history of Anemia, Arthritis, Chronic pain disorder, Disease of thyroid gland, History of gastroesophageal reflux (GERD), History of heart murmur in childhood, Hyperlipidemia, Hypertension, Overactive bladder, Rotator cuff tear, left, Rotator cuff tear, right, Vitamin D deficiency, Wears glasses, and Wears partial dentures    She   Patient Active Problem List    Diagnosis Date Noted   • Dermatofibroma of female breast 06/05/2023   • Status post total left knee replacement 04/28/2023   • Primary osteoarthritis of left knee 03/15/2023   • Insomnia 03/15/2023   • Tendinopathy of right gluteus medius 10/03/2022   • Tear of medial meniscus of left knee, current 06/15/2022   • Flatulence 06/03/2022   • Lipoma of thigh 05/13/2022   • Left buttock pain 05/06/2022   • Piriformis muscle pain 05/06/2022   • Chronic pain syndrome 03/18/2022   • Lumbar radiculopathy 03/18/2022   • Family history of colon cancer 02/03/2022   • Bloating 02/03/2022   • Rupture of gluteus minimus tendon, left, subsequent encounter 12/20/2021   • Tendinopathy of left gluteus medius 12/20/2021   • Tear of right rotator cuff 06/23/2021   • Benign lipomatous neoplasm of skin and subcutaneous tissue of left arm 04/16/2021   • Nausea 06/09/2020   • Esophageal obstruction due to food impaction 06/09/2020   • Paraesophageal hernia 02/28/2020   • Gastroesophageal reflux disease 02/28/2020   • Heartburn 02/28/2020   • GERD (gastroesophageal reflux disease) 12/30/2019   • Dysphagia 12/30/2019   • Pain of upper abdomen "2019   • Anti-TPO antibodies present 2019   • Multinodular goiter 2019   • BMI 32 0-32 9,adult 12/10/2019   • Primary osteoarthritis of both knees 2019   • Chronic pain of left knee 2019   • Myofascial pain 07/15/2019   • Mixed incontinence 07/15/2019   • Sacroiliitis ([address] )    • Hyperlipidemia 2019   • Essential hypertension 2018   • OAB (overactive bladder) 2017   • Vitamin D deficiency 2017     She  has a past surgical history that includes  section; Elbow surgery; pr colonoscopy flx dx w/collj spec when pfrmd (N/A, 2017); Esophagogastroduodenoscopy (N/A, 2017); Knee surgery; Hip surgery (Left, 2018); Hysterectomy; Paraesophageal hernia repair (N/A, 2020); Hernia repair (2020); Shoulder arthrocentesis; Shoulder arthroscopy (Right, 2021); pr excison tumor soft tissue thigh/knee subq 3 cm/> (Bilateral, 2022); pr arthrs kne surg w/meniscectomy med/lat w/shvg (Left, 06/15/2022); Colonoscopy; pr excison tumor soft tissue thigh/knee subq 3 cm/> (Bilateral, 2022); Tubal ligation; and pr arthrp kne condyle&platu medial&lat compartments (Left, 3/15/2023)       Review of Systems   Constitutional: Negative for chills  Respiratory: Negative for shortness of breath  Gastrointestinal: Negative for diarrhea, nausea and vomiting  Musculoskeletal: Positive for arthralgias  Skin: Negative for color change  Neurological: Negative for dizziness  Psychiatric/Behavioral: Negative for agitation  Objective:      BP (!) 181/90   Pulse 69   Ht 5' 3\" (1 6 m)   Wt 65 3 kg (144 lb)   BMI 25 51 kg/m²          Physical Exam  Vitals reviewed  Cardiovascular:      Rate and Rhythm: Normal rate  Pulses: Normal pulses  Dorsalis pedis pulses are 2+ on the right side and 2+ on the left side  Posterior tibial pulses are 2+ on the right side and 2+ on the left side     Pulmonary:      Effort: " Pulmonary effort is normal    Musculoskeletal:         General: Tenderness present  Right foot: Deformity and bony tenderness present  Left foot: Deformity and bony tenderness present  Comments: Prominent bilateral fifth metatarsal dorsal lateral exostosis noted  Pain with palpation on the prominences bilaterally  No pain with fifth metatarsals phalangeal joint range of motion  No pain with palpation to the fifth metatarsal head plantarly  Feet:      Right foot:      Protective Sensation: 10 sites tested  10 sites sensed  Skin integrity: Skin integrity normal       Left foot:      Protective Sensation: 10 sites tested  10 sites sensed  Skin integrity: Skin integrity normal    Skin:     General: Skin is warm  Neurological:      General: No focal deficit present  Mental Status: She is alert     Psychiatric:         Mood and Affect: Mood normal

## 2023-07-01 DIAGNOSIS — K21.9 GASTROESOPHAGEAL REFLUX DISEASE WITHOUT ESOPHAGITIS: ICD-10-CM

## 2023-07-03 RX ORDER — FAMOTIDINE 20 MG/1
TABLET, FILM COATED ORAL
Qty: 180 TABLET | Refills: 0 | Status: SHIPPED | OUTPATIENT
Start: 2023-07-03

## 2023-07-06 ENCOUNTER — PREP FOR PROCEDURE (OUTPATIENT)
Dept: OBGYN CLINIC | Facility: CLINIC | Age: 68
End: 2023-07-06

## 2023-07-24 ENCOUNTER — TELEPHONE (OUTPATIENT)
Dept: OBGYN CLINIC | Facility: HOSPITAL | Age: 68
End: 2023-07-24

## 2023-07-24 ENCOUNTER — APPOINTMENT (OUTPATIENT)
Dept: LAB | Facility: CLINIC | Age: 68
End: 2023-07-24
Payer: COMMERCIAL

## 2023-07-24 DIAGNOSIS — E55.9 VITAMIN D DEFICIENCY: ICD-10-CM

## 2023-07-24 DIAGNOSIS — I10 ESSENTIAL HYPERTENSION: ICD-10-CM

## 2023-07-24 DIAGNOSIS — Z01.818 PREOP TESTING: ICD-10-CM

## 2023-07-24 DIAGNOSIS — E78.5 HYPERLIPIDEMIA, UNSPECIFIED HYPERLIPIDEMIA TYPE: ICD-10-CM

## 2023-07-24 DIAGNOSIS — E06.3 HASHIMOTO'S THYROIDITIS: ICD-10-CM

## 2023-07-24 LAB
25(OH)D3 SERPL-MCNC: 31 NG/ML (ref 30–100)
ALBUMIN SERPL BCP-MCNC: 3.6 G/DL (ref 3.5–5)
ALP SERPL-CCNC: 98 U/L (ref 46–116)
ALT SERPL W P-5'-P-CCNC: 24 U/L (ref 12–78)
ANION GAP SERPL CALCULATED.3IONS-SCNC: 7 MMOL/L
AST SERPL W P-5'-P-CCNC: 18 U/L (ref 5–45)
BASOPHILS # BLD AUTO: 0.04 THOUSANDS/ÂΜL (ref 0–0.1)
BASOPHILS NFR BLD AUTO: 1 % (ref 0–1)
BILIRUB SERPL-MCNC: 0.53 MG/DL (ref 0.2–1)
BUN SERPL-MCNC: 22 MG/DL (ref 5–25)
CALCIUM SERPL-MCNC: 9.7 MG/DL (ref 8.3–10.1)
CHLORIDE SERPL-SCNC: 108 MMOL/L (ref 96–108)
CHOLEST SERPL-MCNC: 264 MG/DL
CO2 SERPL-SCNC: 27 MMOL/L (ref 21–32)
CREAT SERPL-MCNC: 0.71 MG/DL (ref 0.6–1.3)
EOSINOPHIL # BLD AUTO: 0.43 THOUSAND/ÂΜL (ref 0–0.61)
EOSINOPHIL NFR BLD AUTO: 8 % (ref 0–6)
ERYTHROCYTE [DISTWIDTH] IN BLOOD BY AUTOMATED COUNT: 14 % (ref 11.6–15.1)
GFR SERPL CREATININE-BSD FRML MDRD: 89 ML/MIN/1.73SQ M
GLUCOSE P FAST SERPL-MCNC: 95 MG/DL (ref 65–99)
HCT VFR BLD AUTO: 45 % (ref 34.8–46.1)
HDLC SERPL-MCNC: 58 MG/DL
HGB BLD-MCNC: 14.1 G/DL (ref 11.5–15.4)
IMM GRANULOCYTES # BLD AUTO: 0.01 THOUSAND/UL (ref 0–0.2)
IMM GRANULOCYTES NFR BLD AUTO: 0 % (ref 0–2)
LDLC SERPL CALC-MCNC: 184 MG/DL (ref 0–100)
LYMPHOCYTES # BLD AUTO: 1.84 THOUSANDS/ÂΜL (ref 0.6–4.47)
LYMPHOCYTES NFR BLD AUTO: 33 % (ref 14–44)
MCH RBC QN AUTO: 27.1 PG (ref 26.8–34.3)
MCHC RBC AUTO-ENTMCNC: 31.3 G/DL (ref 31.4–37.4)
MCV RBC AUTO: 87 FL (ref 82–98)
MONOCYTES # BLD AUTO: 0.47 THOUSAND/ÂΜL (ref 0.17–1.22)
MONOCYTES NFR BLD AUTO: 8 % (ref 4–12)
NEUTROPHILS # BLD AUTO: 2.83 THOUSANDS/ÂΜL (ref 1.85–7.62)
NEUTS SEG NFR BLD AUTO: 50 % (ref 43–75)
NONHDLC SERPL-MCNC: 206 MG/DL
NRBC BLD AUTO-RTO: 0 /100 WBCS
PLATELET # BLD AUTO: 274 THOUSANDS/UL (ref 149–390)
PMV BLD AUTO: 11.3 FL (ref 8.9–12.7)
POTASSIUM SERPL-SCNC: 4.5 MMOL/L (ref 3.5–5.3)
PROT SERPL-MCNC: 7.2 G/DL (ref 6.4–8.4)
RBC # BLD AUTO: 5.2 MILLION/UL (ref 3.81–5.12)
SODIUM SERPL-SCNC: 142 MMOL/L (ref 135–147)
TRIGL SERPL-MCNC: 108 MG/DL
TSH SERPL DL<=0.05 MIU/L-ACNC: 1.51 UIU/ML (ref 0.45–4.5)
WBC # BLD AUTO: 5.62 THOUSAND/UL (ref 4.31–10.16)

## 2023-07-24 PROCEDURE — 84443 ASSAY THYROID STIM HORMONE: CPT

## 2023-07-24 PROCEDURE — 36415 COLL VENOUS BLD VENIPUNCTURE: CPT

## 2023-07-24 PROCEDURE — 82306 VITAMIN D 25 HYDROXY: CPT

## 2023-07-24 PROCEDURE — 85025 COMPLETE CBC W/AUTO DIFF WBC: CPT

## 2023-07-24 PROCEDURE — 80053 COMPREHEN METABOLIC PANEL: CPT

## 2023-07-24 PROCEDURE — 80061 LIPID PANEL: CPT

## 2023-07-24 NOTE — TELEPHONE ENCOUNTER
Caller: Pt     Doctor: Hao Ely     Reason for call: Pt last seen in the office on 6/12 for the L knee (sx 3/15 TKA) . Recently reinjured her knee and would like to know if she can be seen sooner. No sooner appt available. Pt will be going out of state on [place]9.  Able to go to [facility] AT [place] or [email or web address]     Call back#: [phone]

## 2023-07-30 DIAGNOSIS — K21.9 GASTROESOPHAGEAL REFLUX DISEASE WITHOUT ESOPHAGITIS: ICD-10-CM

## 2023-07-31 ENCOUNTER — OFFICE VISIT (OUTPATIENT)
Dept: OBGYN CLINIC | Facility: CLINIC | Age: 68
End: 2023-07-31
Payer: COMMERCIAL

## 2023-07-31 ENCOUNTER — APPOINTMENT (OUTPATIENT)
Dept: RADIOLOGY | Facility: CLINIC | Age: 68
End: 2023-07-31
Payer: COMMERCIAL

## 2023-07-31 VITALS
HEART RATE: 67 BPM | SYSTOLIC BLOOD PRESSURE: 163 MMHG | HEIGHT: 63 IN | DIASTOLIC BLOOD PRESSURE: 124 MMHG | BODY MASS INDEX: 25.52 KG/M2 | WEIGHT: 144 LBS

## 2023-07-31 DIAGNOSIS — Z96.652 STATUS POST TOTAL LEFT KNEE REPLACEMENT: Primary | ICD-10-CM

## 2023-07-31 DIAGNOSIS — Z96.652 STATUS POST TOTAL LEFT KNEE REPLACEMENT: ICD-10-CM

## 2023-07-31 PROCEDURE — 99213 OFFICE O/P EST LOW 20 MIN: CPT | Performed by: PHYSICIAN ASSISTANT

## 2023-07-31 PROCEDURE — 73562 X-RAY EXAM OF KNEE 3: CPT

## 2023-07-31 RX ORDER — FAMOTIDINE 20 MG/1
TABLET, FILM COATED ORAL
Qty: 180 TABLET | Refills: 0 | OUTPATIENT
Start: 2023-07-31

## 2023-07-31 NOTE — PROGRESS NOTES
ASSESSMENT/PLAN:    Diagnoses and all orders for this visit:    Status post total left knee replacement  -     XR knee 3 vw left non injury; Future        X-rays of the patient's left knee are consistent with a well-seated left total knee replacement. The prosthesis is in good alignment. There are no signs of loosening. There are no fractures or dislocations. The patient has a stable left total knee replacement. She may continue home exercise program for strengthening, stretching and range of motion. She will follow-up with our office in approximately 6 months. We will get new x-rays of her left knee 3 views. She is acceptable to this plan. Return in about 6 months (around 1/31/2024). _____________________________________________________  CHIEF COMPLAINT:  Chief Complaint   Patient presents with   • Left Knee - Follow-up         SUBJECTIVE:  Agatha Mata is a 79 y.o. female who presents to our office for a follow-up visit. The patient is status post left total knee replaced from 3/15/2023. She is pleased with the results of surgery, however, she states she recently fell. She states she has increased "clicking" along her knee. She denies any fever or chills. She denies any increased pain.     The following portions of the patient's history were reviewed and updated as appropriate: allergies, current medications, past family history, past medical history, past social history, past surgical history and problem list.    PAST MEDICAL HISTORY:  Past Medical History:   Diagnosis Date   • Anemia    • Arthritis    • Chronic pain disorder     back and hip pain   • Disease of thyroid gland     nodules   • History of gastroesophageal reflux (GERD)     "had hiatal hernia surgery"   • History of heart murmur in childhood    • Hyperlipidemia    • Hypertension    • Overactive bladder    • Rotator cuff tear, left     pt unsure   • Rotator cuff tear, right     had surgery   • Vitamin D deficiency     unsure   • Wears glasses    • Wears partial dentures     upper       PAST SURGICAL HISTORY:  Past Surgical History:   Procedure Laterality Date   •  SECTION      Last Assessed: 2017   • COLONOSCOPY     • ELBOW SURGERY      Last Assessed: 2017   • ESOPHAGOGASTRODUODENOSCOPY N/A 2017    Procedure: ESOPHAGOGASTRODUODENOSCOPY (EGD); Surgeon: Blake Doshi DO;  Location: [facility] GI LAB; Service: Gastroenterology   • HERNIA REPAIR  2020    Hiatal hernia repair   • HIP SURGERY Left 2018    glut medius/minimus repair  and 18--with pin implanted   • HYSTERECTOMY         • KNEE SURGERY      x2   • PARAESOPHAGEAL HERNIA REPAIR N/A 2020    Procedure: LAPAROSCOPIC PARAESOPHAGEAL HERNIA REPAIR WITH MESH AND NISSEN FUNDOPLICATION WITH ROBOTICS;  Surgeon: Derek Prather MD;  Location: [facility];  Service: General   • AZ ARTHRP KNE CONDYLE&PLATU MEDIAL&LAT COMPARTMENTS Left 3/15/2023    Procedure: ARTHROPLASTY KNEE TOTAL;  Surgeon: Dar Muñiz DO;  Location: [facility];  Service: Orthopedics   • AZ ARTHRS KNE SURG W/MENISCECTOMY MED/LAT W/SHVG Left 06/15/2022    Procedure: ;left knee arthroscopy, meniscectomy,synevectomy,chondroplasty, loose body removal, injection;  Surgeon: Dar Muñiz DO;  Location: [facility];  Service: Orthopedics   • AZ COLONOSCOPY FLX DX W/COLLJ SPEC WHEN PFRMD N/A 2017    Procedure: EGD AND COLONOSCOPY;  Surgeon: Blake Doshi DO;  Location: [facility] GI LAB;   Service: Gastroenterology   • AZ EXCISON TUMOR SOFT TISSUE THIGH/KNEE SUBQ 3 CM/> Bilateral 2022    Procedure: EXCISION BIOPSY TISSUE LESION/MASS LOWER EXTREMITY;  Surgeon: Broderick Crouch MD;  Location: [facility];  Service: General   • AZ EXCISON TUMOR SOFT TISSUE THIGH/KNEE SUBQ 3 CM/> Bilateral 2022    Procedure: EXCISION BIOPSY TISSUE LESION/MASS LOWER EXTREMITY;  Surgeon: Broderick Crouch MD;  Location: [facility];  Service: General   • SHOULDER ARTHROCENTESIS     • SHOULDER ARTHROSCOPY Right 2021    Procedure: SHOULDER ARTHROSCOPY WITH acromioplasty, debridment, and ROTATOR CUFF REPAIR;  Surgeon: Chago Bridges DO;  Location: [address] [place] [address];  Service: Orthopedics   • TUBAL LIGATION         FAMILY HISTORY:  Family History   Problem Relation Age of Onset   • Colon cancer Mother    • Heart attack Father    • Other Father         Cardiac Disorder   • Diabetes type II Father    • Colon cancer Sister    • No Known Problems Sister    • No Known Problems Sister    • No Known Problems Sister    • No Known Problems Sister    • No Known Problems Daughter    • No Known Problems Maternal Aunt    • No Known Problems Maternal Aunt    • No Known Problems Maternal Aunt    • Cancer Maternal Grandmother    • No Known Problems Paternal Grandmother        SOCIAL HISTORY:  Social History     Tobacco Use   • Smoking status: Former     Packs/day: 1.00     Types: Cigarettes     Quit date:      Years since quittin.6   • Smokeless tobacco: Never   • Tobacco comments:     quit 25 yrs ago   Vaping Use   • Vaping Use: Never used   Substance Use Topics   • Alcohol use: Not Currently   • Drug use: No       MEDICATIONS:    Current Outpatient Medications:   •  atorvastatin (LIPITOR) 10 mg tablet, take 1 tablet by mouth once daily, Disp: 90 tablet, Rfl: 0  •  famotidine (PEPCID) 20 mg tablet, take 1 tablet by mouth twice a day, Disp: 180 tablet, Rfl: 0  •  hydrOXYzine HCL (ATARAX) 25 mg tablet, TAKE 1 TO 2 TABLETS BY MOUTH DAILY AT BEDTIME AS NEEDED, Disp: 60 tablet, Rfl: 2  •  lidocaine (Lidoderm) 5 %, Apply 2 patches topically over 12 hours daily Remove & Discard patch within 12 hours or as directed by MD, Disp: 60 patch, Rfl: 5  •  losartan (COZAAR) 100 MG tablet, take 1 tablet by mouth once daily, Disp: 90 tablet, Rfl: 1  •  methocarbamol (Robaxin-750) 750 mg tablet, Take 1 tablet (750 mg total) by mouth every 8 (eight) hours, Disp: 90 tablet, Rfl: 5  •  ondansetron (ZOFRAN) 4 mg tablet, take 1 tablet by mouth every 8 hours if needed for nausea and vomiting, Disp: 30 tablet, Rfl: 0  •  CALCIUM PO, Take by mouth, Disp: , Rfl:     ALLERGIES:  Allergies   Allergen Reactions   • Hydrocodone Other (See Comments) and Irritability     Patient reports insomnia when taking        ROS:  Review of Systems     Constitutional: Negative for fatigue, fever or loss of appetite. HENT: Negative. Respiratory: Negative for shortness of breath, dyspnea. Cardiovascular: Negative for chest pain/tightness. Gastrointestinal: Negative for abdominal pain, N/V. Endocrine: Negative for cold/heat intolerance, unexplained weight loss/gain. Genitourinary: Negative for flank pain, dysuria, hematuria. Musculoskeletal: Negative for arthralgia   Skin: Negative for rash. Neurological: Negative for numbness or tingling  Psychiatric/Behavioral: Negative for agitation. _____________________________________________________  PHYSICAL EXAMINATION:    Blood pressure (!) 163/124, pulse 67, height 5' 3" (1.6 m), weight 65.3 kg (144 lb). Constitutional: Oriented to person, place, and time. Appears well-developed and well-nourished. No distress. HENT:   Head: Normocephalic. Eyes: Conjunctivae are normal. Right eye exhibits no discharge. Left eye exhibits no discharge. No scleral icterus. Cardiovascular: Normal rate. Pulmonary/Chest: Effort normal.   Neurological: Alert and oriented to person, place, and time. Skin: Skin is warm and dry. No rash noted. Not diaphoretic. No erythema. No pallor. Psychiatric: Normal mood and affect.  Behavior is normal. Judgment and thought content normal.      MUSCULOSKELETAL EXAMINATION:   Physical Exam  Ortho Exam    Left lower extremity is neurovascularly intact  Toes are pink and mobile  Compartments are soft  Incision is well-healed  Range of motion of the knee is from 0 to 120 degrees  No ligament laxity  No tenderness to palpation  Brisk cap refill  Sensation intact  No warmth or erythema  No significant effusion present  Objective:  BP Readings from Last 1 Encounters:   07/31/23 (!) 163/124      Wt Readings from Last 1 Encounters:   07/31/23 65.3 kg (144 lb)        BMI:   Estimated body mass index is 25.51 kg/m² as calculated from the following:    Height as of this encounter: 5' 3" (1.6 m). Weight as of this encounter: 65.3 kg (144 lb).

## 2023-08-03 ENCOUNTER — CONSULT (OUTPATIENT)
Dept: FAMILY MEDICINE CLINIC | Facility: CLINIC | Age: 68
End: 2023-08-03
Payer: COMMERCIAL

## 2023-08-03 ENCOUNTER — APPOINTMENT (OUTPATIENT)
Dept: LAB | Facility: CLINIC | Age: 68
End: 2023-08-03
Payer: COMMERCIAL

## 2023-08-03 VITALS
SYSTOLIC BLOOD PRESSURE: 124 MMHG | DIASTOLIC BLOOD PRESSURE: 84 MMHG | BODY MASS INDEX: 25.52 KG/M2 | WEIGHT: 144 LBS | HEART RATE: 65 BPM | TEMPERATURE: 99.8 F | OXYGEN SATURATION: 98 % | HEIGHT: 63 IN

## 2023-08-03 DIAGNOSIS — M21.611 BILATERAL BUNIONS: Primary | ICD-10-CM

## 2023-08-03 DIAGNOSIS — E78.5 HYPERLIPIDEMIA, UNSPECIFIED HYPERLIPIDEMIA TYPE: ICD-10-CM

## 2023-08-03 DIAGNOSIS — M21.612 BILATERAL BUNIONS: Primary | ICD-10-CM

## 2023-08-03 DIAGNOSIS — E55.9 VITAMIN D DEFICIENCY: ICD-10-CM

## 2023-08-03 DIAGNOSIS — J32.9 CHRONIC CONGESTION OF PARANASAL SINUS: ICD-10-CM

## 2023-08-03 DIAGNOSIS — E06.3 HASHIMOTO'S THYROIDITIS: ICD-10-CM

## 2023-08-03 DIAGNOSIS — I10 ESSENTIAL HYPERTENSION: ICD-10-CM

## 2023-08-03 DIAGNOSIS — Z01.818 PRE-OPERATIVE CLEARANCE: ICD-10-CM

## 2023-08-03 DIAGNOSIS — R89.8 EOSINOPHIL COUNT RAISED: ICD-10-CM

## 2023-08-03 PROCEDURE — 86003 ALLG SPEC IGE CRUDE XTRC EA: CPT

## 2023-08-03 PROCEDURE — 82785 ASSAY OF IGE: CPT

## 2023-08-03 PROCEDURE — 36415 COLL VENOUS BLD VENIPUNCTURE: CPT

## 2023-08-03 PROCEDURE — 99214 OFFICE O/P EST MOD 30 MIN: CPT | Performed by: NURSE PRACTITIONER

## 2023-08-03 NOTE — PATIENT INSTRUCTIONS
Continue same medications  Reviewed recent labs  Get bloodwork done before next visit  Pre-op clearance completed- low cardiovascular risk

## 2023-08-03 NOTE — PROGRESS NOTES
[facility]        NAME: Dontae Byers is a 79 y.o. female  : 1955    MRN: [id number]  DATE: August 3, 2023  TIME: 10:12 AM    Assessment and Plan   Bilateral bunions [M21.611, M21.612]  1. Bilateral bunions        2. Eosinophil count raised  [facility]ergy Panel, Adult      3. Chronic congestion of paranasal sinus  [facility] Allergy Panel, Adult      4. Essential hypertension  Comprehensive metabolic panel    CBC and differential      5. Hyperlipidemia, unspecified hyperlipidemia type  Lipid panel      6. Vitamin D deficiency  Vitamin D 25 hydroxy      7. Hashimoto's thyroiditis  TSH, 3rd generation with Free T4 reflex      8. Pre-operative clearance              Patient Instructions     Patient Instructions   Continue same medications  Reviewed recent labs  Get bloodwork done before next visit  Pre-op clearance completed- low cardiovascular risk          Chief Complaint     Chief Complaint   Patient presents with   • Pre-op Exam     Surgery on 08/15. Labs on . History of Present Illness       Here for preop exam.  Reviewed recent labs- Cholesterol is higher- she was not taking her Atorvastatin for a few months- has since restarted since seeing her results    Presurgical Evaluation    Subjective:     Patient ID: Dontae Byers is a 79 y.o. female. Patient presents with:  Pre-op Exam: Surgery on 08/15. Labs on .       [unfilled]    The following portions of the patient's history were reviewed and updated as appropriate: allergies, current medications, past family history, past medical history, past social history, past surgical history and problem list.    Procedure date: 8.15.23    Surgeon:  Dr. Pallavi Tello  Planned procedure:  Bunionectomy bilateral feet  Diagnosis for procedure:  Bunions of both feet    Prior anesthesia: Yes   General; Complications:  None / Tolerated well    CAD History: None       Pulmonary History: None    Renal history: None    Diabetes History: None     Neurological History: None     On Immunosuppressant meds/biologics: No      [unfilled]     Current Outpatient Medications:  atorvastatin (LIPITOR) 10 mg tablet, take 1 tablet by mouth once daily, Disp: 90 tablet, Rfl: 0  famotidine (PEPCID) 20 mg tablet, take 1 tablet by mouth twice a day, Disp: 180 tablet, Rfl: 0  hydrOXYzine HCL (ATARAX) 25 mg tablet, TAKE 1 TO 2 TABLETS BY MOUTH DAILY AT BEDTIME AS NEEDED, Disp: 60 tablet, Rfl: 2  lidocaine (Lidoderm) 5 %, Apply 2 patches topically over 12 hours daily Remove & Discard patch within 12 hours or as directed by MD, Disp: 60 patch, Rfl: 5  losartan (COZAAR) 100 MG tablet, take 1 tablet by mouth once daily, Disp: 90 tablet, Rfl: 1  methocarbamol (Robaxin-750) 750 mg tablet, Take 1 tablet (750 mg total) by mouth every 8 (eight) hours, Disp: 90 tablet, Rfl: 5  ondansetron (ZOFRAN) 4 mg tablet, take 1 tablet by mouth every 8 hours if needed for nausea and vomiting, Disp: 30 tablet, Rfl: 0    No current facility-administered medications for this visit.       Allergies on file:   Hydrocodone    Patient Active Problem List:     OAB (overactive bladder)     Vitamin D deficiency     Essential hypertension     Hyperlipidemia     Sacroiliitis (HCC)     Myofascial pain     Mixed incontinence     Primary osteoarthritis of both knees     Chronic pain of left knee     BMI 32.0-32.9,adult     GERD (gastroesophageal reflux disease)     Dysphagia     Pain of upper abdomen     Paraesophageal hernia     Gastroesophageal reflux disease     Heartburn     Anti-TPO antibodies present     Multinodular goiter     Nausea     Esophageal obstruction due to food impaction     Benign lipomatous neoplasm of skin and subcutaneous tissue of left arm     Tear of right rotator cuff     Rupture of gluteus minimus tendon, left, subsequent encounter     Tendinopathy of left gluteus medius     Family history of colon cancer     Bloating     Chronic pain syndrome     Lumbar radiculopathy     Left buttock pain     Piriformis muscle pain     Lipoma of thigh     Flatulence     Tear of medial meniscus of left knee, current     Tendinopathy of right gluteus medius     Primary osteoarthritis of left knee     Insomnia     Status post total left knee replacement     Dermatofibroma of female breast       Past Medical History:  No date: Anemia  No date: Arthritis  No date: Chronic pain disorder      Comment:  back and hip pain  No date: Disease of thyroid gland      Comment:  nodules  6-12 months: GERD (gastroesophageal reflux disease)  No date: Heart murmur  No date: History of gastroesophageal reflux (GERD)      Comment:  "had hiatal hernia surgery"  No date: History of heart murmur in childhood  No date: Hyperlipidemia  No date: Hypertension  No date: Overactive bladder  No date: Rotator cuff tear, left      Comment:  pt unsure  No date: Rotator cuff tear, right      Comment:  had surgery  No date: Vitamin D deficiency      Comment:  unsure  No date: Wears glasses  No date: Wears partial dentures      Comment:  upper    Past Surgical History:  No date:  SECTION      Comment:  Last Assessed: 2017  No date: COLONOSCOPY  No date: ELBOW SURGERY      Comment:  Last Assessed: 2017: ESOPHAGOGASTRODUODENOSCOPY; N/A      Comment:  Procedure: ESOPHAGOGASTRODUODENOSCOPY (EGD); Surgeon:                Yina Salazar DO;  Location: [facility] GI LAB; Service:               Gastroenterology  2020: HERNIA REPAIR      Comment:  Hiatal hernia repair  2018: HIP SURGERY;  Left      Comment:  glut medius/minimus repair  and 18--with pin                implanted  No date: HYSTERECTOMY      Comment:    No date: KNEE SURGERY      Comment:  x2  2020: PARAESOPHAGEAL HERNIA REPAIR; N/A      Comment:  Procedure: LAPAROSCOPIC PARAESOPHAGEAL HERNIA REPAIR                WITH MESH AND NISSEN FUNDOPLICATION WITH ROBOTICS;                 Surgeon: Leeann Kumar MD;  Location: [facility] OR; Service: General  3/15/2023: CT ARTHRP KNE CONDYLE&PLATU MEDIAL&LAT COMPARTMENTS; Left      Comment:  Procedure: ARTHROPLASTY KNEE TOTAL;  Surgeon: Justo Oliveira DO;  Location: [facility];  Service:                Orthopedics  06/15/2022: CT ARTHRS KNE SURG W/MENISCECTOMY MED/LAT W/SHVG; Left      Comment:  Procedure: ;left knee arthroscopy,                meniscectomy,synevectomy,chondroplasty, loose body                removal, injection;  Surgeon: Justo Oliveira DO;                 Location: [facility];  Service: Orthopedics  06/26/2017: CT COLONOSCOPY FLX DX W/COLLJ SPEC WHEN PFRMD; N/A      Comment:  Procedure: EGD AND COLONOSCOPY;  Surgeon: Delano Hickman DO;  Location: [facility] LAB; Service:                Gastroenterology  05/26/2022: CT EXCISON TUMOR SOFT TISSUE THIGH/KNEE SUBQ 3 CM/>;   Bilateral      Comment:  Procedure: EXCISION BIOPSY TISSUE LESION/MASS LOWER                EXTREMITY;  Surgeon: Farida Montana MD;  Location: [facility]                MAIN OR;  Service: General  08/25/2022: CT EXCISON TUMOR SOFT TISSUE THIGH/KNEE SUBQ 3 CM/>;   Bilateral      Comment:  Procedure: EXCISION BIOPSY TISSUE LESION/MASS LOWER                EXTREMITY;  Surgeon: Farida Montana MD;  Location: [facility]                MAIN OR;  Service: General  No date: SHOULDER ARTHROCENTESIS  06/23/2021: SHOULDER ARTHROSCOPY;  Right      Comment:  Procedure: SHOULDER ARTHROSCOPY WITH acromioplasty,                debridment, and ROTATOR CUFF REPAIR;  Surgeon: Janeth Garrido DO;  Location: [address] [place] [address] MAIN OR;  Service: Orthopedics  No date: TUBAL LIGATION    Review of patient's family history indicates:  Problem: Colon cancer      Relation: Mother          Age of Onset: (Not Specified)  Problem: Heart attack      Relation: Father          Age of Onset: (Not Specified)  Problem: Other      Relation: Father          Age of Onset: (Not Specified)          Comment: Cardiac Disorder  Problem: Diabetes type II      Relation: Father          Age of Onset: (Not Specified)  Problem: Colon cancer      Relation: Sister          Age of Onset: (Not Specified)  Problem: No Known Problems      Relation: Sister          Age of Onset: (Not Specified)  Problem: No Known Problems      Relation: Sister          Age of Onset: (Not Specified)  Problem: No Known Problems      Relation: Sister          Age of Onset: (Not Specified)  Problem: No Known Problems      Relation: Sister          Age of Onset: (Not Specified)  Problem: No Known Problems      Relation: Daughter          Age of Onset: (Not Specified)  Problem: No Known Problems      Relation: Maternal Aunt          Age of Onset: (Not Specified)  Problem: No Known Problems      Relation: Maternal Aunt          Age of Onset: (Not Specified)  Problem: No Known Problems      Relation: Maternal Aunt          Age of Onset: (Not Specified)  Problem: Cancer      Relation: Maternal Grandmother          Age of Onset: (Not Specified)  Problem: No Known Problems      Relation: Paternal Grandmother          Age of Onset: (Not Specified)      Social History    Tobacco Use      Smoking status: Former        Packs/day: 1.50        Years: 30.00        Total pack years: 45        Types: Cigarettes        Quit date: 1991        Years since quittin.6      Smokeless tobacco: Never      Tobacco comments: quit 25 yrs ago    Vaping Use      Vaping Use: Never used    Alcohol use: Not Currently    Drug use: No      Objective:    ---------------------------               23                      0948         ---------------------------   BP:          124/84         Pulse:         65           Temp:   99.8 °F (37.7 °C)   TempSrc:    Tympanic        SpO2:          98%          Weight: 65.3 kg (144 lb)    Height:   5' 3" (1.6 m)    ---------------------------    [unfilled]     [facility] labs/testing available and reviewed: yes    eGFR       Date                     Value               Ref Range           Status                2023               89                  ml/min/1.73sq m     Final            ----------  WBC       Date                     Value               Ref Range           Status                2023               5.62                4.31 - 10.16 T*     Final            ----------  Hemoglobin       Date                     Value               Ref Range           Status                2023               14.1                11.5 - 15.4 g/*     Final            ----------  Hematocrit       Date                     Value               Ref Range           Status                2023               45.0                34.8 - 46.1 %       Final            ----------  Platelets       Date                     Value               Ref Range           Status                2023               274                 149 - 390 Thou*     Final            ----------       EKG yes    Echo no    Stress test/cath no    PFT/Deonte no    Functional capacity: Shovel snow                          6 Mets   Pick the highest level patient can comfortably perform   4 mets or greater for surgery    RCRI  High Risk surgery? 1 Point  CAD History:         1 Point   MI; Positive Stress Test; CP due to Mi;  Nitrate Usage to control Angina;  Pathologic Q wave on EKG  CHF Active:         1 Point   Pulm Edema; Paroxysmal Nocturnal Dyspnea;  Bibasilar Rales (crackles);S3; CHF on CXR  Cerebrovascular Disease (TIA or CVA):     1 Point  DM on Insulin:        1 Point  Serum Creat >2.0 mg/dl:       1 Point          Total Points: 0     Scorin: Class I, Very Low Risk (0.4%)     1: Class II, Low risk (0.9%)     2: Class III Moderate (6.6%)     3: Class IV High (>11%)      RADHA Risk:  GFR: eGFR       Date                     Value               Ref Range           Status                2023               89 ml/min/1.73sq m     Final            ----------      For PCP:  If GFR>60, Hold ACE/ARB/Diuretic on the day of surgery, and NSAIDS 10 days before. If GFR<45, Consider PRE and POST op Nephrology Consult. If 46 <GFR> 59 : Has Patient had RADHA in last 6 Months? no   If YES: Preop Nephrology consult   If No:  [id number] [address] [facility] Nephrology consult. Assessment/Plan:    Patient is medically optimized (cleared) for the planned procedure. Further testing/evaluation is not required. Postop concerns: no    Problem List Items Addressed This Visit    None    Diagnoses and associated orders for this visit:    • Bilateral bunions    • Eosinophil count raised   [facility] Panel, Adult; Future    • Chronic congestion of paranasal sinus   [facility] Allergy Panel, Adult; Future    • Essential hypertension   Comprehensive metabolic panel; Future   CBC and differential; Future    • Hyperlipidemia, unspecified hyperlipidemia type   Lipid panel; Future    • Vitamin D deficiency   Vitamin D 25 hydroxy; Future    • Hashimoto's thyroiditis   TSH, 3rd generation with Free T4 reflex; Future    • Pre-operative clearance       Outpatient Medications Marked as Taking for the 8/3/23 encounter (Consult) with GUS Vega:  atorvastatin (LIPITOR) 10 mg tablet, per anesthesia guidelines   famotidine (PEPCID) 20 mg tablet, per anesthesia guidelines   hydrOXYzine HCL (ATARAX) 25 mg tablet, per anesthesia guidelines   lidocaine (Lidoderm) 5 %, per anesthesia guidelines   losartan (COZAAR) 100 MG tablet, per anesthesia guidelines   methocarbamol (Robaxin-750) 750 mg tablet, per anesthesia guidelines   ondansetron (ZOFRAN) 4 mg tablet, per anesthesia guidelines          NOTE: Please use the above to review important meds for your specialty, the remainder "per anesthesia Guidelines."    NOTE: Please place an Inbasket message for "[place]" pool for complicated patients.             Review of Systems   Review of Systems   Constitutional: Negative for activity change, diaphoresis, fatigue and fever. HENT: Positive for congestion and rhinorrhea. Negative for facial swelling, hearing loss, sinus pressure, sinus pain, sneezing, sore throat and voice change. Eyes: Negative for discharge and visual disturbance. Respiratory: Negative for cough, choking, chest tightness, shortness of breath, wheezing and stridor. Cardiovascular: Negative for chest pain, palpitations and leg swelling. Gastrointestinal: Negative for abdominal distention, abdominal pain, constipation, diarrhea, nausea and vomiting. Endocrine: Negative for polydipsia, polyphagia and polyuria. Genitourinary: Negative for difficulty urinating, dysuria, frequency and urgency. Musculoskeletal: Positive for arthralgias and gait problem. Skin: Negative for color change, rash and wound. Neurological: Negative for dizziness, syncope, speech difficulty, weakness, light-headedness and headaches. Hematological: Negative for adenopathy. Does not bruise/bleed easily. Psychiatric/Behavioral: Negative for agitation, behavioral problems, confusion, hallucinations, sleep disturbance and suicidal ideas. The patient is not nervous/anxious.           Current Medications       Current Outpatient Medications:   •  atorvastatin (LIPITOR) 10 mg tablet, take 1 tablet by mouth once daily, Disp: 90 tablet, Rfl: 0  •  famotidine (PEPCID) 20 mg tablet, take 1 tablet by mouth twice a day, Disp: 180 tablet, Rfl: 0  •  hydrOXYzine HCL (ATARAX) 25 mg tablet, TAKE 1 TO 2 TABLETS BY MOUTH DAILY AT BEDTIME AS NEEDED, Disp: 60 tablet, Rfl: 2  •  lidocaine (Lidoderm) 5 %, Apply 2 patches topically over 12 hours daily Remove & Discard patch within 12 hours or as directed by MD, Disp: 60 patch, Rfl: 5  •  losartan (COZAAR) 100 MG tablet, take 1 tablet by mouth once daily, Disp: 90 tablet, Rfl: 1  •  methocarbamol (Robaxin-750) 750 mg tablet, Take 1 tablet (750 mg total) by mouth every 8 (eight) hours, Disp: 90 tablet, Rfl: 5  •  ondansetron (ZOFRAN) 4 mg tablet, take 1 tablet by mouth every 8 hours if needed for nausea and vomiting, Disp: 30 tablet, Rfl: 0    Current Allergies     Allergies as of 2023 - Reviewed 2023   Allergen Reaction Noted   • Hydrocodone Other (See Comments) and Irritability 06/15/2022            The following portions of the patient's history were reviewed and updated as appropriate: allergies, current medications, past family history, past medical history, past social history, past surgical history and problem list.     Past Medical History:   Diagnosis Date   • Anemia    • Arthritis    • Chronic pain disorder     back and hip pain   • Disease of thyroid gland     nodules   • GERD (gastroesophageal reflux disease) 6-12 months   • Heart murmur    • History of gastroesophageal reflux (GERD)     "had hiatal hernia surgery"   • History of heart murmur in childhood    • Hyperlipidemia    • Hypertension    • Overactive bladder    • Rotator cuff tear, left     pt unsure   • Rotator cuff tear, right     had surgery   • Vitamin D deficiency     unsure   • Wears glasses    • Wears partial dentures     upper       Past Surgical History:   Procedure Laterality Date   •  SECTION      Last Assessed: 2017   • COLONOSCOPY     • ELBOW SURGERY      Last Assessed: 2017   • ESOPHAGOGASTRODUODENOSCOPY N/A 2017    Procedure: ESOPHAGOGASTRODUODENOSCOPY (EGD); Surgeon: Broderick Sotelo DO;  Location: [facility] GI LAB;   Service: Gastroenterology   • HERNIA REPAIR  2020    Hiatal hernia repair   • HIP SURGERY Left 2018    glut medius/minimus repair  and 18--with pin implanted   • HYSTERECTOMY         • KNEE SURGERY      x2   • PARAESOPHAGEAL HERNIA REPAIR N/A 2020    Procedure: LAPAROSCOPIC PARAESOPHAGEAL HERNIA REPAIR WITH MESH AND NISSEN FUNDOPLICATION WITH ROBOTICS;  Surgeon: Bora Tinsley MD;  Location: [facility] OR;  Service: General   • [place] ARTHRP [facility] [facility] CONDYLE&PLATU MEDIAL&LAT COMPARTMENTS Left 3/15/2023    Procedure: ARTHROPLASTY KNEE TOTAL;  Surgeon: Malcom Solano DO;  [facility]: [facility];  Service: Orthopedics   • AL ARTHRS KNE SURG W/MENISCECTOMY MED/LAT W/SHVG Left 06/15/2022    Procedure: ;left knee arthroscopy, meniscectomy,synevectomy,chondroplasty, loose body removal, injection;  Surgeon: Malcom Solano DO;  [facility]: [facility];  Service: Orthopedics   • AL COLONOSCOPY FLX DX W/COLLJ SPEC WHEN PFRMD N/A 06/26/2017    Procedure: EGD AND COLONOSCOPY;  Surgeon: Dylan Brown DO;  Location: [facility] GI LAB; Service: Gastroenterology   • AL EXCISON TUMOR SOFT TISSUE THIGH/KNEE SUBQ 3 CM/> Bilateral 05/26/2022    Procedure: EXCISION BIOPSY TISSUE LESION/MASS LOWER EXTREMITY;  Surgeon: Oleg Álvarez MD;  Location: [facility];  Service: General   • AL EXCISON TUMOR SOFT TISSUE THIGH/KNEE SUBQ 3 CM/> Bilateral 08/25/2022    Procedure: EXCISION BIOPSY TISSUE LESION/MASS LOWER EXTREMITY;  Surgeon: Oleg Álvarez MD;  Location: [facility];  Service: General   • SHOULDER ARTHROCENTESIS     • SHOULDER ARTHROSCOPY Right 06/23/2021    Procedure: SHOULDER ARTHROSCOPY WITH acromioplasty, debridment, and ROTATOR CUFF REPAIR;  Surgeon: Luis Eduardo Brandon DO;  Location: [address] [place] [address] MAIN OR;  Service: Orthopedics   • TUBAL LIGATION         Family History   Problem Relation Age of Onset   • Colon cancer Mother    • Heart attack Father    • Other Father         Cardiac Disorder   • Diabetes type II Father    • Colon cancer Sister    • No Known Problems Sister    • No Known Problems Sister    • No Known Problems Sister    • No Known Problems Sister    • No Known Problems Daughter    • No Known Problems Maternal Aunt    • No Known Problems Maternal Aunt    • No Known Problems Maternal Aunt    • Cancer Maternal Grandmother    • No Known Problems Paternal Grandmother          Medications have been verified.         Objective   /84   Pulse 65   Temp 99.8 °F (37.7 °C) (Tympanic)   Ht 5' 3" (1.6 m)   Wt 65.3 kg (144 lb)   SpO2 98%   BMI 25.51 kg/m²        Physical Exam     Physical Exam  Vitals and nursing note reviewed. Constitutional:       General: She is not in acute distress. Appearance: She is well-developed. She is not diaphoretic. HENT:      Mouth/Throat:      Mouth: Mucous membranes are moist.      Pharynx: Oropharynx is clear. No oropharyngeal exudate or posterior oropharyngeal erythema. Neck:      Thyroid: No thyromegaly. Trachea: No tracheal deviation. Cardiovascular:      Rate and Rhythm: Normal rate and regular rhythm. Heart sounds: Normal heart sounds. No murmur heard. Pulmonary:      Effort: Pulmonary effort is normal. No respiratory distress. Breath sounds: Normal breath sounds. No wheezing. Musculoskeletal:         General: No tenderness or deformity. Normal range of motion. Cervical back: Normal range of motion and neck supple. Lymphadenopathy:      Cervical: No cervical adenopathy. Skin:     General: Skin is warm and dry. Findings: No erythema or rash. Neurological:      Mental Status: She is alert and oriented to person, place, and time. Psychiatric:         Mood and Affect: Mood normal.         Behavior: Behavior normal. Behavior is cooperative. Thought Content:  Thought content normal.         Judgment: Judgment normal.

## 2023-08-07 LAB

## 2023-08-14 NOTE — PRE-PROCEDURE INSTRUCTIONS
Pre-Surgery Instructions:   Medication Instructions   • atorvastatin (LIPITOR) 10 mg tablet Take day of surgery. • famotidine (PEPCID) 20 mg tablet Take day of surgery. • hydrOXYzine HCL (ATARAX) 25 mg tablet Uses PRN- OK to take day of surgery   • lidocaine (Lidoderm) 5 % Hold day of surgery. • losartan (COZAAR) 100 MG tablet Hold day of surgery. • methocarbamol (Robaxin-750) 750 mg tablet Uses PRN- OK to take day of surgery   • ondansetron (ZOFRAN) 4 mg tablet Uses PRN- OK to take day of surgery    Medication instructions for day surgery reviewed. Please use only a sip of water to take your instructed medications. Avoid all over the counter vitamins, supplements and NSAIDS for one week prior to surgery per anesthesia guidelines. Tylenol is ok to take as needed. You will receive a call one business day prior to surgery with an arrival time and hospital directions. If your surgery is scheduled on a Monday, the hospital will be calling you on the Friday prior to your surgery. If you have not heard from anyone by 8pm, please call the hospital supervisor through the hospital  at [phone]. Berta Alex [phone]). Do not eat or drink anything after midnight the night before your surgery, including candy, mints, lifesavers, or chewing gum. Do not drink alcohol 24hrs before your surgery. Try not to smoke at least 24hrs before your surgery. Follow the pre surgery showering instructions as listed in the [facility] Surgical Experience Booklet” or otherwise provided by your surgeon's office. Do not shave the surgical area 24 hours before surgery. Do not apply any lotions, creams, including makeup, cologne, deodorant, or perfumes after showering on the day of your surgery. No contact lenses, eye make-up, or artificial eyelashes. Remove nail polish, including gel polish, and any artificial, gel, or acrylic nails if possible. Remove all jewelry including rings and body piercing jewelry.      Wear causal clothing that is easy to take on and off. Consider your type of surgery. Keep any valuables, jewelry, piercings at home. Please bring any specially ordered equipment (sling, braces) if indicated. Arrange for a responsible person to drive you to and from the hospital on the day of your surgery. Visitor Guidelines discussed. Call the surgeon's office with any new illnesses, exposures, or additional questions prior to surgery. Please reference your [facility] Surgical Experience Booklet” for additional information to prepare for your upcoming surgery.

## 2023-08-15 ENCOUNTER — APPOINTMENT (OUTPATIENT)
Dept: RADIOLOGY | Facility: HOSPITAL | Age: 68
End: 2023-08-15
Payer: COMMERCIAL

## 2023-08-15 ENCOUNTER — HOSPITAL ENCOUNTER (OUTPATIENT)
Facility: HOSPITAL | Age: 68
Setting detail: OUTPATIENT SURGERY
Discharge: HOME | End: 2023-08-15
Attending: STUDENT IN AN ORGANIZED HEALTH CARE EDUCATION/TRAINING PROGRAM | Admitting: STUDENT IN AN ORGANIZED HEALTH CARE EDUCATION/TRAINING PROGRAM
Payer: COMMERCIAL

## 2023-08-15 ENCOUNTER — ANESTHESIA EVENT (OUTPATIENT)
Dept: PERIOP | Facility: HOSPITAL | Age: 68
End: 2023-08-15
Payer: COMMERCIAL

## 2023-08-15 ENCOUNTER — ANESTHESIA (OUTPATIENT)
Dept: PERIOP | Facility: HOSPITAL | Age: 68
End: 2023-08-15
Payer: COMMERCIAL

## 2023-08-15 ENCOUNTER — TELEPHONE (OUTPATIENT)
Dept: OBGYN CLINIC | Facility: HOSPITAL | Age: 68
End: 2023-08-15

## 2023-08-15 VITALS
OXYGEN SATURATION: 99 % | SYSTOLIC BLOOD PRESSURE: 148 MMHG | HEART RATE: 69 BPM | BODY MASS INDEX: 25.52 KG/M2 | DIASTOLIC BLOOD PRESSURE: 82 MMHG | WEIGHT: 144 LBS | RESPIRATION RATE: 20 BRPM | TEMPERATURE: 98.7 F | HEIGHT: 63 IN

## 2023-08-15 DIAGNOSIS — Z98.890 STATUS POST SURGERY: Primary | ICD-10-CM

## 2023-08-15 DIAGNOSIS — M79.671 FOOT PAIN, BILATERAL: ICD-10-CM

## 2023-08-15 DIAGNOSIS — M79.672 FOOT PAIN, BILATERAL: ICD-10-CM

## 2023-08-15 PROCEDURE — 99024 POSTOP FOLLOW-UP VISIT: CPT | Performed by: STUDENT IN AN ORGANIZED HEALTH CARE EDUCATION/TRAINING PROGRAM

## 2023-08-15 PROCEDURE — 73630 X-RAY EXAM OF FOOT: CPT

## 2023-08-15 PROCEDURE — 28110 PART REMOVAL OF METATARSAL: CPT | Performed by: STUDENT IN AN ORGANIZED HEALTH CARE EDUCATION/TRAINING PROGRAM

## 2023-08-15 PROCEDURE — NC001 PR NO CHARGE: Performed by: STUDENT IN AN ORGANIZED HEALTH CARE EDUCATION/TRAINING PROGRAM

## 2023-08-15 RX ORDER — CEFAZOLIN SODIUM 2 G/50ML
2000 SOLUTION INTRAVENOUS ONCE
Status: DISCONTINUED | OUTPATIENT
Start: 2023-08-15 | End: 2023-08-15 | Stop reason: HOSPADM

## 2023-08-15 RX ORDER — PROPOFOL 10 MG/ML
INJECTION, EMULSION INTRAVENOUS AS NEEDED
Status: DISCONTINUED | OUTPATIENT
Start: 2023-08-15 | End: 2023-08-15

## 2023-08-15 RX ORDER — ONDANSETRON 2 MG/ML
INJECTION INTRAMUSCULAR; INTRAVENOUS AS NEEDED
Status: DISCONTINUED | OUTPATIENT
Start: 2023-08-15 | End: 2023-08-15

## 2023-08-15 RX ORDER — MAGNESIUM HYDROXIDE 1200 MG/15ML
LIQUID ORAL AS NEEDED
Status: DISCONTINUED | OUTPATIENT
Start: 2023-08-15 | End: 2023-08-15 | Stop reason: HOSPADM

## 2023-08-15 RX ORDER — SODIUM CHLORIDE, SODIUM LACTATE, POTASSIUM CHLORIDE, CALCIUM CHLORIDE 600; 310; 30; 20 MG/100ML; MG/100ML; MG/100ML; MG/100ML
125 INJECTION, SOLUTION INTRAVENOUS CONTINUOUS
Status: DISCONTINUED | OUTPATIENT
Start: 2023-08-15 | End: 2023-08-15 | Stop reason: HOSPADM

## 2023-08-15 RX ORDER — BUPIVACAINE HYDROCHLORIDE 2.5 MG/ML
INJECTION, SOLUTION EPIDURAL; INFILTRATION; INTRACAUDAL AS NEEDED
Status: DISCONTINUED | OUTPATIENT
Start: 2023-08-15 | End: 2023-08-15 | Stop reason: HOSPADM

## 2023-08-15 RX ORDER — DEXAMETHASONE SODIUM PHOSPHATE 10 MG/ML
INJECTION, SOLUTION INTRAMUSCULAR; INTRAVENOUS AS NEEDED
Status: DISCONTINUED | OUTPATIENT
Start: 2023-08-15 | End: 2023-08-15

## 2023-08-15 RX ORDER — EPHEDRINE SULFATE 50 MG/ML
INJECTION INTRAVENOUS AS NEEDED
Status: DISCONTINUED | OUTPATIENT
Start: 2023-08-15 | End: 2023-08-15

## 2023-08-15 RX ORDER — CHLORHEXIDINE GLUCONATE 0.12 MG/ML
15 RINSE ORAL ONCE
Status: COMPLETED | OUTPATIENT
Start: 2023-08-15 | End: 2023-08-15

## 2023-08-15 RX ORDER — FENTANYL CITRATE 50 UG/ML
INJECTION, SOLUTION INTRAMUSCULAR; INTRAVENOUS AS NEEDED
Status: DISCONTINUED | OUTPATIENT
Start: 2023-08-15 | End: 2023-08-15

## 2023-08-15 RX ORDER — OXYCODONE HYDROCHLORIDE AND ACETAMINOPHEN 5; 325 MG/1; MG/1
1 TABLET ORAL EVERY 6 HOURS PRN
Qty: 20 TABLET | Refills: 0 | Status: SHIPPED | OUTPATIENT
Start: 2023-08-15 | End: 2023-08-25

## 2023-08-15 RX ORDER — ONDANSETRON 2 MG/ML
4 INJECTION INTRAMUSCULAR; INTRAVENOUS ONCE AS NEEDED
Status: DISCONTINUED | OUTPATIENT
Start: 2023-08-15 | End: 2023-08-15 | Stop reason: HOSPADM

## 2023-08-15 RX ORDER — FENTANYL CITRATE/PF 50 MCG/ML
25 SYRINGE (ML) INJECTION
Status: DISCONTINUED | OUTPATIENT
Start: 2023-08-15 | End: 2023-08-15 | Stop reason: HOSPADM

## 2023-08-15 RX ORDER — GLYCOPYRROLATE 0.2 MG/ML
INJECTION INTRAMUSCULAR; INTRAVENOUS AS NEEDED
Status: DISCONTINUED | OUTPATIENT
Start: 2023-08-15 | End: 2023-08-15

## 2023-08-15 RX ORDER — LIDOCAINE HYDROCHLORIDE 10 MG/ML
INJECTION, SOLUTION EPIDURAL; INFILTRATION; INTRACAUDAL; PERINEURAL AS NEEDED
Status: DISCONTINUED | OUTPATIENT
Start: 2023-08-15 | End: 2023-08-15 | Stop reason: HOSPADM

## 2023-08-15 RX ORDER — LIDOCAINE HYDROCHLORIDE 20 MG/ML
INJECTION, SOLUTION EPIDURAL; INFILTRATION; INTRACAUDAL; PERINEURAL AS NEEDED
Status: DISCONTINUED | OUTPATIENT
Start: 2023-08-15 | End: 2023-08-15

## 2023-08-15 RX ORDER — PHENYLEPHRINE HCL IN 0.9% NACL 1 MG/10 ML
SYRINGE (ML) INTRAVENOUS AS NEEDED
Status: DISCONTINUED | OUTPATIENT
Start: 2023-08-15 | End: 2023-08-15

## 2023-08-15 RX ORDER — MIDAZOLAM HYDROCHLORIDE 2 MG/2ML
INJECTION, SOLUTION INTRAMUSCULAR; INTRAVENOUS AS NEEDED
Status: DISCONTINUED | OUTPATIENT
Start: 2023-08-15 | End: 2023-08-15

## 2023-08-15 RX ADMIN — ONDANSETRON 4 MG: 2 INJECTION INTRAMUSCULAR; INTRAVENOUS at 07:25

## 2023-08-15 RX ADMIN — LIDOCAINE HYDROCHLORIDE 60 MG: 20 INJECTION, SOLUTION EPIDURAL; INFILTRATION; INTRACAUDAL; PERINEURAL at 07:33

## 2023-08-15 RX ADMIN — PROPOFOL 130 MCG/KG/MIN: 10 INJECTION, EMULSION INTRAVENOUS at 07:38

## 2023-08-15 RX ADMIN — PROPOFOL 50 MG: 10 INJECTION, EMULSION INTRAVENOUS at 07:39

## 2023-08-15 RX ADMIN — EPHEDRINE SULFATE 10 MG: 50 INJECTION, SOLUTION INTRAVENOUS at 08:08

## 2023-08-15 RX ADMIN — FENTANYL CITRATE 25 MCG: 50 INJECTION, SOLUTION INTRAMUSCULAR; INTRAVENOUS at 07:45

## 2023-08-15 RX ADMIN — CHLORHEXIDINE GLUCONATE 0.12% ORAL RINSE 15 ML: 1.2 LIQUID ORAL at 07:19

## 2023-08-15 RX ADMIN — FENTANYL CITRATE 50 MCG: 50 INJECTION, SOLUTION INTRAMUSCULAR; INTRAVENOUS at 07:31

## 2023-08-15 RX ADMIN — Medication 50 MCG: at 08:13

## 2023-08-15 RX ADMIN — DEXAMETHASONE SODIUM PHOSPHATE 6 MG: 10 INJECTION, SOLUTION INTRAMUSCULAR; INTRAVENOUS at 07:42

## 2023-08-15 RX ADMIN — MIDAZOLAM 2 MG: 1 INJECTION INTRAMUSCULAR; INTRAVENOUS at 07:26

## 2023-08-15 RX ADMIN — Medication 50 MCG: at 08:21

## 2023-08-15 RX ADMIN — LIDOCAINE HYDROCHLORIDE 20 MG: 20 INJECTION, SOLUTION EPIDURAL; INFILTRATION; INTRACAUDAL; PERINEURAL at 07:45

## 2023-08-15 RX ADMIN — SODIUM CHLORIDE, SODIUM LACTATE, POTASSIUM CHLORIDE, AND CALCIUM CHLORIDE 125 ML/HR: .6; .31; .03; .02 INJECTION, SOLUTION INTRAVENOUS at 07:19

## 2023-08-15 RX ADMIN — EPHEDRINE SULFATE 10 MG: 50 INJECTION, SOLUTION INTRAVENOUS at 08:01

## 2023-08-15 RX ADMIN — Medication 50 MCG: at 08:07

## 2023-08-15 RX ADMIN — EPHEDRINE SULFATE 5 MG: 50 INJECTION, SOLUTION INTRAVENOUS at 08:21

## 2023-08-15 RX ADMIN — GLYCOPYRROLATE 0.1 MCG: 0.2 INJECTION, SOLUTION INTRAMUSCULAR; INTRAVENOUS at 07:25

## 2023-08-15 RX ADMIN — FENTANYL CITRATE 25 MCG: 50 INJECTION, SOLUTION INTRAMUSCULAR; INTRAVENOUS at 07:43

## 2023-08-15 RX ADMIN — Medication 50 MCG: at 08:32

## 2023-08-15 NOTE — DISCHARGE SUMMARY
Discharge Summary Outpatient Procedure Podiatry -   Santos Ariza 79 y.o. female MRN: [id number]  Unit/Bed#: OR POOL Encounter: [id number]    Admission Date: 8/15/2023     Admitting Diagnosis: Right foot pain [M79.671]  Left foot pain [M79.672]  Tailor's bunion of both feet [M21.621, M21.622]    Discharge Diagnosis: same    Procedures Performed: BUNIONECTOMY TAILOR WITHOUT OSTEOTOMY: 55102 (CPT®)    Complications: none    Condition at Discharge: stable    Discharge instructions/Information to patient and family:   See after visit summary for information provided to patient and family. Provisions for Follow-Up Care/Important appointments:  See after visit summary for information related to follow-up care and any pertinent home health orders. Discharge Medications:  See after visit summary for reconciled discharge medications provided to patient and family.

## 2023-08-15 NOTE — ANESTHESIA PREPROCEDURE EVALUATION
Procedure:  BUNIONECTOMY TAILOR WITHOUT OSTEOTOMY (Bilateral: Foot)    STRESS (03/16/20):  BASELINE: There were no regional wall motion abnormalities. Left ventricular size was normal. Overall left ventricular systolic function was normal.     PEAK STRESS: There were no regional wall motion abnormalities. There was an appropriate reduction in left ventricular size. There was an appropriate augmentation in LV function.     ECHO CONCLUSIONS: There was no echocardiographic evidence for stress-induced ischemia.     Relevant Problems   ANESTHESIA (within normal limits)      CARDIO   (+) Essential hypertension   (+) Hyperlipidemia      ENDO (within normal limits)      GI/HEPATIC   (+) Dysphagia   (+) GERD (gastroesophageal reflux disease)   (+) Gastroesophageal reflux disease   (+) Paraesophageal hernia      /RENAL (within normal limits)      GYN (within normal limits)      HEMATOLOGY (within normal limits)      MUSCULOSKELETAL   (+) Paraesophageal hernia   (+) Primary osteoarthritis of both knees   (+) Primary osteoarthritis of left knee   (+) Sacroiliitis (HCC)      NEURO/PSYCH   (+) Chronic pain syndrome      PULMONARY (within normal limits)      Nervous and Auditory   (+) Lumbar radiculopathy      Lab Results   Component Value Date    WBC 5.62 07/24/2023    HGB 14.1 07/24/2023    HCT 45.0 07/24/2023    MCV 87 07/24/2023     07/24/2023     Lab Results   Component Value Date     05/14/2018    SODIUM 142 07/24/2023    K 4.5 07/24/2023     07/24/2023    CO2 27 07/24/2023    ANIONGAP 12.0 05/14/2018    AGAP 7 07/24/2023    BUN 22 07/24/2023    CREATININE 0.71 07/24/2023    GLUC 111 03/16/2023    GLUF 95 07/24/2023    CALCIUM 9.7 07/24/2023    AST 18 07/24/2023    ALT 24 07/24/2023    ALKPHOS 98 07/24/2023    PROT 7.3 05/14/2018    TP 7.2 07/24/2023    BILITOT 0.4 05/14/2018    TBILI 0.53 07/24/2023    EGFR 89 07/24/2023     Lab Results   Component Value Date    PTT 28 03/02/2023     Lab Results Component Value Date    INR 0.93 03/02/2023    PROTIME 12.5 03/02/2023       Physical Exam    Airway    Mallampati score: II  TM Distance: >3 FB  Neck ROM: full     Dental   upper dentures,     Cardiovascular  Rhythm: regular, Rate: normal, Cardiovascular exam normal    Pulmonary  Pulmonary exam normal     Other Findings        Anesthesia Plan  ASA Score- 2     Anesthesia Type- general with ASA Monitors. Additional Monitors:   Airway Plan: LMA. Plan Factors-Exercise tolerance (METS): >4 METS. Chart reviewed. EKG reviewed. Existing labs reviewed. Patient summary reviewed. Patient is not a current smoker. Patient did not smoke on day of surgery. Obstructive sleep apnea risk education given perioperatively. Induction- intravenous. Postoperative Plan- Plan for postoperative opioid use. Informed Consent- Anesthetic plan and risks discussed with patient. I personally reviewed this patient with the CRNA. Discussed and agreed on the Anesthesia Plan with the CRNA. Dung Encarnacion

## 2023-08-15 NOTE — OP NOTE
OPERATIVE REPORT - Podiatry  PATIENT NAME: Javier Brown    :  1955  MRN: [id number]  Pt Location: [facility] OR ROOM 02    SURGERY DATE: 8/15/2023    Surgeon(s) and Role:     * Zuly Dawkins DPM - Primary    Pre-op Diagnosis:  Right foot pain [M79.671]  Left foot pain [M79.672]  Tailor's bunion of both feet [M21.621, M21.622]    Post-Op Diagnosis Codes:     * Right foot pain [M79.671]     * Left foot pain [M79.672]     * Tailor's bunion of both feet [M21.621, M21.622]    Procedure(s) (LRB):  BUNIONECTOMY TAILOR WITHOUT OSTEOTOMY (Bilateral)    Specimen(s):  * No specimens in log *    Estimated Blood Loss:   Minimal    Drains:  Autologus Reinfusion/Drain Device 1 Left Knee (Active)   Number of days: 153       Anesthesia Type:   Choice with 20 ml of 1% Lidocaine and 0.25% Bupivacaine in a 1:1 mixture, 10cc in each foot  Postoperatively total of 10 cc of 0.25% Marcaine plain 5cc in each foot. Hemostasis:  Right pneumatic ankle tourniquet at 250 mmHg for 19 minutes  Left pneumatic ankle tourniquet at 250 mmHg for 16 minutes    Materials:  * No implants in log *  3-0 Vicryl, 4-0 Vicryl, 4-0 nylon    Operative Findings:  Consistent with the diagnosis. Fifth metatarsal lateral head exostosis noted to bilateral metatarsal.    Complications:   None    Procedure and Technique:     Under mild sedation, the patient was brought into the operating room and placed on the operating room table in the supine position. IV sedation was achieved by anesthesia team and a universal timeout was performed where all parties are in agreement of correct patient, correct procedure and correct site. A pneumatic tourniquet was then placed over the patient's bilateral lower extremity with ample padding. A reverse ortiz block was performed consisting of 20 ml of 1% Lidocaine and 0.25% Bupivacaine in a 1:1 mixture, 10 cc in each foot. Both feet were then prepped and draped in the usual aseptic manner.  An esmarch bandage was used to [employer] the right foot and the pneumatic tourniquet was then inflated to 250mmHg. Procedure 1: Attention was then directed to the right dorsal lateral aspect of the fifth metatarsal where an approximately 3 cm linear incision was made. The incision was deepened through the subcutaneous tissues using sharp and blunt dissection. Care was taken to identify and retract all vital neural and vascular structures. All bleeders were cauterized and ligated as necessary. A capsuloptomy was performed over the dorsal aspect of the MPJ and distal metatarsal shaft. The periosteal and capsular structures were then carefully dissected free of their osseous attachments, thus exposing the head and distal shaft of the fifth metatarsal at the operative site. There was a large exostosis noted at the level of lateral aspect of the fifth metatarsal head and neck. Next utilizing a sagittal saw stenosis of the fifth metatarsal was resected. Next utilizing a rongeur the rough edges were further smoothed and down. The operative site was then irrigated with normal sterile saline. At this time appropriate correction was achieved. The periosteal structures were then reapproximated utilizing a 3-0 Vicryl. Subcutaneous tissue was then reapproximated utilizing a 4-0 Vicryl. The skin edges were then reapproximated utilizing a 4-0 nylon in horizontal mattress fashion. The Tourniquet was then deflated and prompt hyperemic response was noted to the all digits of the right foot. Procedure 2: Attention was then directed to the left dorsal lateral aspect of the fifth metatarsal where an approximately 3 cm linear incision was made. The incision was deepened through the subcutaneous tissues using sharp and blunt dissection. Care was taken to identify and retract all vital neural and vascular structures. All bleeders were cauterized and ligated as necessary. A capsuloptomy was performed over the dorsal aspect of the MPJ and distal metatarsal shaft.  The periosteal and capsular structures were then carefully dissected free of their osseous attachments, thus exposing the head and distal shaft of the fifth metatarsal at the operative site. There was a large exostosis noted at the level of lateral aspect of the fifth metatarsal head and neck. Next utilizing a sagittal saw stenosis of the fifth metatarsal was resected. Next utilizing a rongeur the rough edges were further smoothed and down. The operative site was then irrigated with normal sterile saline. At this time appropriate correction was achieved. The periosteal structures were then reapproximated utilizing a 3-0 Vicryl. Subcutaneous tissue was then reapproximated utilizing a 4-0 Vicryl. The skin edges were then reapproximated utilizing a 4-0 nylon in horizontal mattress fashion. The Tourniquet was then deflated and prompt hyperemic response was noted to the all digits of the left foot. Both feet incision sites were then dressed with Adaptic, 4 x 4 gauze Scar and an Ace wrap. The patient tolerated the procedure and anesthesia well without immediate complications and transferred to PACU with vital signs stable. I was present for the entire procedure. Marilee Daley DPM  DATE: August 15, 2023  TIME: 9:01 AM      Portions of the record may have been created with voice recognition software. Occasional wrong word or "sound a like" substitutions may have occurred due to the inherent limitations of voice recognition software. Read the chart carefully and recognize, using context, where substitutions have occurred.

## 2023-08-15 NOTE — DISCHARGE INSTR - AVS FIRST PAGE
Dr. Judd Peaches surgery Instructions    Pain / Swelling  There is expected to be some discomfort, swelling and bruising of the foot. You might see some blood on the bandage. This is not a cause for alarm. However, if there is active or persistent bleeding (blood running out of the bandage while at rest) - call the office at once (or) go to a [address] [place] [address] ER and ask them to page the podiatry residents. Apply an ice bag to the top of your ankle for 30 minutes for each waking hour, for the first 72 hours. This should be discontinued when sleeping. This will also work through your cast if you have one. Ice must not leak and wet the dressings. Also, using the ice inappropriately can cause permanent nerve damage. Your foot should be elevated as much as possible for the first 72 hours. The foot should be above heart level. If your foot is below heart level, throbbing and pain will increase. When sleeping, elevation can be accomplished by putting a small hard suitcase between the box spring and mattress at the foot of the bed. Walking and standing will increase pain, throbbing and bleeding. Persistent pain despite elevation and your pain meds can many times be relieved by removing the tight brown compression layer (called the ACE wrap) that is over the white gauze dressing. If you are elevating and taking your pain meds and pain is still severe, remove this brown stretchy layer but leave the gauze intact. Wait 30 minutes. If the pain subsides, reapply the ACE so it’s not so tight. If pain doesn’t get better, call your doctor. Dressings / Casts  Do not remove your surgical dressings - they will be changed at your doctor appointment. Do not allow surgical dressings to get wet. Sponge baths should be used until the sutures are removed. Do not try to keep the foot dry using a garbage bag and tape - this rarely works. If you get your dressings or cast wet - call your doctor immediately.   If your cast or dressings feel tight - elevate your foot for 30 minutes. If this doesn’t help and you feel tingling or see toe discoloration - call your doctor or go to a [facility] ER and ask them to page the podiatry residents. Do not put things in your cast such as powder, coat hangers to scratch, etc.. This can cause skin damage and infections. Infection  If you have a fever at or above 100 degrees, chills, sweats, or see red streaks rising above the dressing or smell odor / see pus (creamy white drainage), call your doctor immediately or go to a [facility] ER and ask them to page the podiatry residents. Constipation  If you have severe constipation after surgery, this can be due to the pain medication. Notify your doctor and special medication will be prescribed to deal with this. Blood Clots  If you had surgery and are in a cast, have an external fixation device, or are non-weightbearing using crutches, a knee scooter, a wheelchair, a walker, or an iWalk device - you need to be on a blood thinner. Your doctor will prescribe one of the regimens below. If you run out of the blood thinner checked off below before you are walking normally on your foot and out of your cast - notify your doctor immediately so you can get a refill. Not doing so can lead to blood clots and serious complications including death. Numbness  It is normal for your foot to be numb until about dinner time. If you’ve had a popliteal block procedure, you might be numb until the following day. When you start to feel pins and needles in the foot - this means the block is wearing off. That is the appropriate time to take your pain medication. Pain Medication  Do not supplement your pain medication with over the counter drugs, old leftover pain medications, or extra Tylenol. You must discuss any additional medications with your doctor prior to taking them for pain.    Driving  No driving is allowed without discussion with the doctor  Ambulation  Weight bear as tolerated to surgical foot  If given a flat, stiff shoe / darco wedge shoe, Do not walk at all without it. Use a device (cane, walker, crutches) to take some weight off of the foot when walking  If instructed not to put weight on the surgical foot, use the following:  Surgical shoe    Putting weight on the foot will lead to complications.

## 2023-08-15 NOTE — ANESTHESIA POSTPROCEDURE EVALUATION
Post-Op Assessment Note    CV Status:  Stable  Pain Score: 0    Pain management: adequate     Mental Status:  Alert and awake   Hydration Status:  Euvolemic   PONV Controlled:  Controlled   Airway Patency:  Patent      Post Op Vitals Reviewed: Yes      Staff: Anesthesiologist, CRNA         There were no known notable events for this encounter.     BP  125/69   Temp 98   Pulse 63   Resp 18   SpO2 99

## 2023-08-23 ENCOUNTER — OFFICE VISIT (OUTPATIENT)
Dept: PODIATRY | Facility: CLINIC | Age: 68
End: 2023-08-23

## 2023-08-23 VITALS
HEIGHT: 63 IN | SYSTOLIC BLOOD PRESSURE: 177 MMHG | BODY MASS INDEX: 25.34 KG/M2 | WEIGHT: 143 LBS | DIASTOLIC BLOOD PRESSURE: 93 MMHG | HEART RATE: 68 BPM

## 2023-08-23 DIAGNOSIS — M21.622 TAILOR'S BUNION OF BOTH FEET: Primary | ICD-10-CM

## 2023-08-23 DIAGNOSIS — M21.621 TAILOR'S BUNION OF BOTH FEET: Primary | ICD-10-CM

## 2023-08-23 DIAGNOSIS — Z98.890 STATUS POST SURGERY: ICD-10-CM

## 2023-08-23 PROCEDURE — 99024 POSTOP FOLLOW-UP VISIT: CPT | Performed by: STUDENT IN AN ORGANIZED HEALTH CARE EDUCATION/TRAINING PROGRAM

## 2023-08-23 NOTE — PROGRESS NOTES
PATIENT:  Fredy Ayers      1955    ASSESSMENT     1. Tailor's bunion of both feet        2. Status post surgery               PLAN  Patient is doing well post-operatively. Sutures left intact. Incision was cleaned with betadine and DSD applied to be kept C/D/I. Continue post-op care as instructed. Stressed on patient compliance about proper off-loading, staying off of feet, and proper dressing care. Call if any increase in pain, fevers, calf pain, shortness of breath, or general distress is noted. Patient instructed to go to ER if call is not returned immediately. Return in 1 week for suture removal      HISTORY OF PRESENT ILLNESS  Patient presents for post-op appointment S/p bilateral tailor's bunionectomy DOS: 8/15/2023. Post-op pain is under control and resolving well. The patient is feeling well and in good spirits. Patient reported no post-op concern. Patient relates compliance with surgical shoe, elevation, and keeping dressing clean, dry and intact. REVIEW OF SYSTEMS  GENERAL: No fever or chills. HEART: No chest pain, or palpitation  RESPIRATORY:  No SOB or cough  GI: No Nausea, vomit or diarrhea  NEUROLOGIC: No syncope or acute weakness  MUSCULOSKELETAL: No calf pain or edema. PHYSICAL EXAMINATION  GENERAL  The patient appears in NAD / non-toxic. Afebrile. VSS    VASCULAR EXAM  Pedal pulses and vascular status are intact. No calf pain or edema bilaterally. No cyanosis. DERMATOLOGIC EXAM  Incision is coapted and healing well. No signs of infection. No active drainage. Normal post-op edema and ecchymosis. No necrosis or dehiscence. NEUROLOGIC EXAM  AAO X 3. No focal neurologic deficit. Neurologic status is intact BLE. MUSCULOSKELETAL EXAM  Good surgical correction. Normal post-op findings. ROM intact. No fluctuation or crepitus.

## 2023-08-24 DIAGNOSIS — K21.9 GASTROESOPHAGEAL REFLUX DISEASE WITHOUT ESOPHAGITIS: ICD-10-CM

## 2023-08-24 DIAGNOSIS — G47.09 OTHER INSOMNIA: ICD-10-CM

## 2023-08-25 RX ORDER — HYDROXYZINE HYDROCHLORIDE 25 MG/1
TABLET, FILM COATED ORAL
Qty: 60 TABLET | Refills: 2 | OUTPATIENT
Start: 2023-08-25

## 2023-08-25 RX ORDER — FAMOTIDINE 20 MG/1
TABLET, FILM COATED ORAL
Qty: 180 TABLET | Refills: 0 | OUTPATIENT
Start: 2023-08-25

## 2023-08-31 DIAGNOSIS — G47.09 OTHER INSOMNIA: ICD-10-CM

## 2023-08-31 DIAGNOSIS — K21.9 GASTROESOPHAGEAL REFLUX DISEASE WITHOUT ESOPHAGITIS: ICD-10-CM

## 2023-08-31 RX ORDER — FAMOTIDINE 20 MG/1
TABLET, FILM COATED ORAL
Qty: 180 TABLET | Refills: 0 | OUTPATIENT
Start: 2023-08-31

## 2023-08-31 RX ORDER — HYDROXYZINE HYDROCHLORIDE 25 MG/1
TABLET, FILM COATED ORAL
Qty: 60 TABLET | Refills: 2 | OUTPATIENT
Start: 2023-08-31

## 2023-09-01 ENCOUNTER — OFFICE VISIT (OUTPATIENT)
Dept: PODIATRY | Facility: CLINIC | Age: 68
End: 2023-09-01

## 2023-09-01 VITALS
SYSTOLIC BLOOD PRESSURE: 176 MMHG | HEART RATE: 68 BPM | DIASTOLIC BLOOD PRESSURE: 88 MMHG | HEIGHT: 63 IN | BODY MASS INDEX: 25.34 KG/M2 | WEIGHT: 143 LBS

## 2023-09-01 DIAGNOSIS — Z98.890 STATUS POST SURGERY: ICD-10-CM

## 2023-09-01 DIAGNOSIS — M21.622 TAILOR'S BUNION OF BOTH FEET: Primary | ICD-10-CM

## 2023-09-01 DIAGNOSIS — M21.621 TAILOR'S BUNION OF BOTH FEET: Primary | ICD-10-CM

## 2023-09-01 PROCEDURE — 99024 POSTOP FOLLOW-UP VISIT: CPT | Performed by: STUDENT IN AN ORGANIZED HEALTH CARE EDUCATION/TRAINING PROGRAM

## 2023-09-01 NOTE — PROGRESS NOTES
Assessment/Plan:    No problem-specific Assessment & Plan notes found for this encounter. Diagnoses and all orders for this visit:    Tailor's bunion of both feet    Status post surgery      Plan:     -  Patient was counseled and educated on the condition and the diagnosis. The diagnosis, treatment options and prognosis were discussed in detail.   -Sutures removed from both feet incision sites have healed. Recommended patient to transition to sneakers with weight-bear as tolerated  -Addressed all questions and concerns  -Return in 2 to 3 weeks for follow-up we will obtain new set of both feet x-rays    Subjective:      Patient ID: Nahid Edge is a 79 y.o. female. Patient presents for post-op appointment S/p bilateral tailor's bunionectomy DOS: 8/15/2023. Patient presents with bilateral surgical shoes. Reports she is doing well without any discomfort. No other complaints at this visit. The following portions of the patient's history were reviewed and updated as appropriate:   She  has a past medical history of Anemia, Arthritis, Bilateral bunions, Chronic pain disorder, Disease of thyroid gland, History of COVID-19 (2020), History of gastroesophageal reflux (GERD), History of heart murmur in childhood, Hyperlipidemia, Hypertension, Overactive bladder, PONV (postoperative nausea and vomiting), Rotator cuff tear, right, Vitamin D deficiency, Wears glasses, and Wears partial dentures.   She   Patient Active Problem List    Diagnosis Date Noted   • Dermatofibroma of female breast 06/05/2023   • Status post total left knee replacement 04/28/2023   • Primary osteoarthritis of left knee 03/15/2023   • Insomnia 03/15/2023   • Tendinopathy of right gluteus medius 10/03/2022   • Tear of medial meniscus of left knee, current 06/15/2022   • Flatulence 06/03/2022   • Lipoma of thigh 05/13/2022   • Left buttock pain 05/06/2022   • Piriformis muscle pain 05/06/2022   • Chronic pain syndrome 03/18/2022   • Lumbar radiculopathy 2022   • Family history of colon cancer 2022   • Bloating 2022   • Rupture of gluteus minimus tendon, left, subsequent encounter 2021   • Tendinopathy of left gluteus medius 2021   • Tear of right rotator cuff 2021   • Benign lipomatous neoplasm of skin and subcutaneous tissue of left arm 2021   • Nausea 2020   • Esophageal obstruction due to food impaction 2020   • Paraesophageal hernia 2020   • Gastroesophageal reflux disease 2020   • Heartburn 2020   • GERD (gastroesophageal reflux disease) 2019   • Dysphagia 2019   • Pain of upper abdomen 2019   • Anti-TPO antibodies present 2019   • Multinodular goiter 2019   • BMI 32.0-32.9,adult 12/10/2019   • Primary osteoarthritis of both knees 2019   • Chronic pain of left knee 2019   • Myofascial pain 07/15/2019   • Mixed incontinence 07/15/2019   • Sacroiliitis ([address])    • Hyperlipidemia 2019   • Essential hypertension 2018   • OAB (overactive bladder) 2017   • Vitamin D deficiency 2017     She  has a past surgical history that includes  section; Elbow surgery; pr colonoscopy flx dx w/collj spec when pfrmd (N/A, 2017); Esophagogastroduodenoscopy (N/A, 2017); Hip surgery (Left, 2018); Hysterectomy; Paraesophageal hernia repair (N/A, 2020); Shoulder arthrocentesis; Shoulder arthroscopy (Right, 2021); pr excison tumor soft tissue thigh/knee subq 3 cm/> (Bilateral, 2022); pr arthrs kne surg w/meniscectomy med/lat w/shvg (Left, 06/15/2022); Colonoscopy; pr excison tumor soft tissue thigh/knee subq 3 cm/> (Bilateral, 2022); Tubal ligation; pr arthrp kne condyle&platu medial&lat compartments (Left, 03/15/2023); and pr ostectomy prtl 5th metar head spx (Bilateral, 8/15/2023). .    Review of Systems   All other systems reviewed and are negative.         Objective:      BP (!) 176/88 (BP Location: Left arm, Patient Position: Sitting, Cuff Size: Standard)   Pulse 68   Ht 5' 3" (1.6 m)   Wt 64.9 kg (143 lb)   BMI 25.33 kg/m²          Physical Exam  Vitals reviewed. Feet:      Comments: Bilateral foot incision sites have healed. Mild edema noted. Incisions. Mild discomfort with palpation. Range of motion of fifth MPJ intact and within normal limits without any discomfort.

## 2023-09-07 DIAGNOSIS — G47.09 OTHER INSOMNIA: ICD-10-CM

## 2023-09-07 DIAGNOSIS — K21.9 GASTROESOPHAGEAL REFLUX DISEASE WITHOUT ESOPHAGITIS: ICD-10-CM

## 2023-09-07 RX ORDER — FAMOTIDINE 20 MG/1
TABLET, FILM COATED ORAL
Qty: 180 TABLET | Refills: 0 | OUTPATIENT
Start: 2023-09-07

## 2023-09-07 RX ORDER — HYDROXYZINE HYDROCHLORIDE 25 MG/1
TABLET, FILM COATED ORAL
Qty: 60 TABLET | Refills: 2 | OUTPATIENT
Start: 2023-09-07

## 2023-09-09 DIAGNOSIS — K21.9 GASTROESOPHAGEAL REFLUX DISEASE WITHOUT ESOPHAGITIS: ICD-10-CM

## 2023-09-09 DIAGNOSIS — G47.09 OTHER INSOMNIA: ICD-10-CM

## 2023-09-11 DIAGNOSIS — K21.9 GASTROESOPHAGEAL REFLUX DISEASE WITHOUT ESOPHAGITIS: ICD-10-CM

## 2023-09-11 DIAGNOSIS — E78.5 HYPERLIPIDEMIA, UNSPECIFIED HYPERLIPIDEMIA TYPE: ICD-10-CM

## 2023-09-11 DIAGNOSIS — I10 ESSENTIAL HYPERTENSION: ICD-10-CM

## 2023-09-11 DIAGNOSIS — G47.09 OTHER INSOMNIA: ICD-10-CM

## 2023-09-11 RX ORDER — HYDROXYZINE HYDROCHLORIDE 25 MG/1
25 TABLET, FILM COATED ORAL EVERY 6 HOURS PRN
Qty: 60 TABLET | Refills: 2 | Status: SHIPPED | OUTPATIENT
Start: 2023-09-11

## 2023-09-11 RX ORDER — HYDROXYZINE HYDROCHLORIDE 25 MG/1
TABLET, FILM COATED ORAL
Qty: 60 TABLET | Refills: 2 | OUTPATIENT
Start: 2023-09-11

## 2023-09-11 RX ORDER — FAMOTIDINE 20 MG/1
20 TABLET, FILM COATED ORAL 2 TIMES DAILY
Qty: 180 TABLET | Refills: 1 | Status: SHIPPED | OUTPATIENT
Start: 2023-09-11

## 2023-09-11 RX ORDER — FAMOTIDINE 20 MG/1
TABLET, FILM COATED ORAL
Qty: 180 TABLET | Refills: 0 | OUTPATIENT
Start: 2023-09-11

## 2023-09-11 RX ORDER — ATORVASTATIN CALCIUM 10 MG/1
10 TABLET, FILM COATED ORAL DAILY
Qty: 90 TABLET | Refills: 1 | Status: SHIPPED | OUTPATIENT
Start: 2023-09-11

## 2023-09-11 RX ORDER — LOSARTAN POTASSIUM 100 MG/1
100 TABLET ORAL DAILY
Qty: 90 TABLET | Refills: 1 | Status: SHIPPED | OUTPATIENT
Start: 2023-09-11

## 2023-09-11 NOTE — TELEPHONE ENCOUNTER
Patient requesting refill(s) of: Hydroxyzine     Last filled: 5/25/23  Last appt:   Next appt:   Pharmacy: [address] [place] [address]       Patient requesting refill(s) of: Losartan     Last filled: 10/6/22      Patient requesting refill(s) of: Famotidine     Last filled: 7/3/23      Patient requesting refill(s) of: Atorvastatin     Last filled: 3/1/23

## 2023-09-15 ENCOUNTER — OFFICE VISIT (OUTPATIENT)
Dept: PODIATRY | Facility: CLINIC | Age: 68
End: 2023-09-15

## 2023-09-15 ENCOUNTER — APPOINTMENT (OUTPATIENT)
Dept: RADIOLOGY | Facility: CLINIC | Age: 68
End: 2023-09-15
Payer: COMMERCIAL

## 2023-09-15 VITALS
HEART RATE: 71 BPM | HEIGHT: 63 IN | SYSTOLIC BLOOD PRESSURE: 134 MMHG | WEIGHT: 143.08 LBS | DIASTOLIC BLOOD PRESSURE: 72 MMHG | BODY MASS INDEX: 25.35 KG/M2

## 2023-09-15 DIAGNOSIS — Z98.890 STATUS POST FOOT SURGERY: Primary | ICD-10-CM

## 2023-09-15 DIAGNOSIS — M21.621 TAILOR'S BUNION OF BOTH FEET: ICD-10-CM

## 2023-09-15 DIAGNOSIS — M21.622 TAILOR'S BUNION OF BOTH FEET: ICD-10-CM

## 2023-09-15 PROCEDURE — 99024 POSTOP FOLLOW-UP VISIT: CPT | Performed by: STUDENT IN AN ORGANIZED HEALTH CARE EDUCATION/TRAINING PROGRAM

## 2023-09-15 PROCEDURE — 73630 X-RAY EXAM OF FOOT: CPT

## 2023-09-15 NOTE — PROGRESS NOTES
Assessment/Plan:    No problem-specific Assessment & Plan notes found for this encounter. Diagnoses and all orders for this visit:    Status post foot surgery  -     X-ray foot right 3+ views; Future  -     X-ray foot left 3+ views; Future    Tailor's bunion of both feet      Plan:     -  Patient was counseled and educated on the condition and the diagnosis. The diagnosis, treatment options and prognosis were discussed in detail.   -Bilateral foot x-rays reviewed, I personally reviewed x-ray finding with patient which is consistent with postoperative changes to bilateral fifth metatarsal heads without any complications.  -Continue home massaging the scar with vitamin E lotion. Recommended to wear compression stockings for edema control. Discussed leg elevation. Continued sneakers with weight-bear as tolerated  -Addressed all questions and concerns  -Return in 6 to 8 weeks for reevaluation    Subjective:      Patient ID: Sanjiv Burdick is a 79 y.o. female. Patient presents for post-op appointment S/p bilateral tailor's bunionectomy DOS: 8/15/2023. Patient reports overall she is doing. At times she will have discomfort with rubbing of her shoes to the incision areas. She has been massaging the area with lotion. Offers no other complaints at this visit. The following portions of the patient's history were reviewed and updated as appropriate:   She  has a past medical history of Anemia, Arthritis, Bilateral bunions, Chronic pain disorder, Disease of thyroid gland, History of COVID-19 (2020), History of gastroesophageal reflux (GERD), History of heart murmur in childhood, Hyperlipidemia, Hypertension, Overactive bladder, PONV (postoperative nausea and vomiting), Rotator cuff tear, right, Vitamin D deficiency, Wears glasses, and Wears partial dentures.   She   Patient Active Problem List    Diagnosis Date Noted   • Dermatofibroma of female breast 06/05/2023   • Status post total left knee replacement 2023   • Primary osteoarthritis of left knee 03/15/2023   • Insomnia 03/15/2023   • Tendinopathy of right gluteus medius 10/03/2022   • Tear of medial meniscus of left knee, current 06/15/2022   • Flatulence 2022   • Lipoma of thigh 2022   • Left buttock pain 2022   • Piriformis muscle pain 2022   • Chronic pain syndrome 2022   • Lumbar radiculopathy 2022   • Family history of colon cancer 2022   • Bloating 2022   • Rupture of gluteus minimus tendon, left, subsequent encounter 2021   • Tendinopathy of left gluteus medius 2021   • Tear of right rotator cuff 2021   • Benign lipomatous neoplasm of skin and subcutaneous tissue of left arm 2021   • Nausea 2020   • Esophageal obstruction due to food impaction 2020   • Paraesophageal hernia 2020   • Gastroesophageal reflux disease 2020   • Heartburn 2020   • GERD (gastroesophageal reflux disease) 2019   • Dysphagia 2019   • Pain of upper abdomen 2019   • Anti-TPO antibodies present 2019   • Multinodular goiter 2019   • BMI 32.0-32.9,adult 12/10/2019   • Primary osteoarthritis of both knees 2019   • Chronic pain of left knee 2019   • Myofascial pain 07/15/2019   • Mixed incontinence 07/15/2019   • Sacroiliitis ([address])    • Hyperlipidemia 2019   • Essential hypertension 2018   • OAB (overactive bladder) 2017   • Vitamin D deficiency 2017     She  has a past surgical history that includes  section; Elbow surgery; pr colonoscopy flx dx w/collj spec when pfrmd (N/A, 2017); Esophagogastroduodenoscopy (N/A, 2017); Hip surgery (Left, 2018); Hysterectomy; Paraesophageal hernia repair (N/A, 2020); Shoulder arthrocentesis;  Shoulder arthroscopy (Right, 2021); pr excison tumor soft tissue thigh/knee subq 3 cm/> (Bilateral, 2022); pr arthrs kne surg w/meniscectomy med/lat w/shvg (Left, 06/15/2022); Colonoscopy; pr excison tumor soft tissue thigh/knee subq 3 cm/> (Bilateral, 08/25/2022); Tubal ligation; pr arthrp kne condyle&platu medial&lat compartments (Left, 03/15/2023); and pr ostectomy prtl 5th metar head spx (Bilateral, 8/15/2023). .    Review of Systems   All other systems reviewed and are negative. Objective:      /72   Pulse 71   Ht 5' 3" (1.6 m)   Wt 64.9 kg (143 lb 1.3 oz)   BMI 25.35 kg/m²          Physical Exam  Vitals reviewed. Musculoskeletal:      Right foot: Normal range of motion. Tenderness present. No crepitus. Left foot: Normal range of motion. No tenderness or crepitus. Comments: Mild discomfort with palpation noted on the right lateral incision site. No pain with palpation on the left side. Range of motion of fifth metatarsal phalangeal joint intact and within normal limits without any discomfort. Patient able to wiggle her toes. Incision sites have healed.

## 2023-09-18 ENCOUNTER — OFFICE VISIT (OUTPATIENT)
Dept: URGENT CARE | Facility: CLINIC | Age: 68
End: 2023-09-18
Payer: COMMERCIAL

## 2023-09-18 ENCOUNTER — HOSPITAL ENCOUNTER (EMERGENCY)
Facility: HOSPITAL | Age: 68
Discharge: HOME | End: 2023-09-18
Attending: EMERGENCY MEDICINE | Admitting: EMERGENCY MEDICINE
Payer: COMMERCIAL

## 2023-09-18 ENCOUNTER — APPOINTMENT (EMERGENCY)
Dept: CT IMAGING | Facility: HOSPITAL | Age: 68
End: 2023-09-18
Payer: COMMERCIAL

## 2023-09-18 VITALS
HEART RATE: 80 BPM | TEMPERATURE: 98.3 F | OXYGEN SATURATION: 99 % | BODY MASS INDEX: 25.34 KG/M2 | SYSTOLIC BLOOD PRESSURE: 160 MMHG | DIASTOLIC BLOOD PRESSURE: 69 MMHG | WEIGHT: 143 LBS | RESPIRATION RATE: 16 BRPM | HEIGHT: 63 IN

## 2023-09-18 VITALS
OXYGEN SATURATION: 99 % | RESPIRATION RATE: 18 BRPM | TEMPERATURE: 99.2 F | WEIGHT: 143 LBS | SYSTOLIC BLOOD PRESSURE: 166 MMHG | DIASTOLIC BLOOD PRESSURE: 74 MMHG | HEART RATE: 70 BPM | BODY MASS INDEX: 25.33 KG/M2

## 2023-09-18 DIAGNOSIS — K57.92 DIVERTICULITIS: Primary | ICD-10-CM

## 2023-09-18 DIAGNOSIS — R10.32 LEFT LOWER QUADRANT ABDOMINAL PAIN: Primary | ICD-10-CM

## 2023-09-18 DIAGNOSIS — R11.0 NAUSEA: ICD-10-CM

## 2023-09-18 LAB
ALBUMIN SERPL BCP-MCNC: 4 G/DL (ref 3.5–5)
ALP SERPL-CCNC: 98 U/L (ref 34–104)
ALT SERPL W P-5'-P-CCNC: 11 U/L (ref 7–52)
ANION GAP SERPL CALCULATED.3IONS-SCNC: 7 MMOL/L
AST SERPL W P-5'-P-CCNC: 16 U/L (ref 13–39)
BACTERIA UR QL AUTO: NORMAL /HPF
BASOPHILS # BLD AUTO: 0.02 THOUSANDS/ÂΜL (ref 0–0.1)
BASOPHILS NFR BLD AUTO: 0 % (ref 0–1)
BILIRUB SERPL-MCNC: 0.48 MG/DL (ref 0.2–1)
BILIRUB UR QL STRIP: NEGATIVE
BUN SERPL-MCNC: 17 MG/DL (ref 5–25)
CALCIUM SERPL-MCNC: 9.2 MG/DL (ref 8.4–10.2)
CHLORIDE SERPL-SCNC: 104 MMOL/L (ref 96–108)
CLARITY UR: CLEAR
CO2 SERPL-SCNC: 30 MMOL/L (ref 21–32)
COLOR UR: ABNORMAL
CREAT SERPL-MCNC: 0.61 MG/DL (ref 0.6–1.3)
EOSINOPHIL # BLD AUTO: 0.23 THOUSAND/ÂΜL (ref 0–0.61)
EOSINOPHIL NFR BLD AUTO: 2 % (ref 0–6)
ERYTHROCYTE [DISTWIDTH] IN BLOOD BY AUTOMATED COUNT: 13.7 % (ref 11.6–15.1)
GFR SERPL CREATININE-BSD FRML MDRD: 94 ML/MIN/1.73SQ M
GLUCOSE SERPL-MCNC: 98 MG/DL (ref 65–140)
GLUCOSE UR STRIP-MCNC: NEGATIVE MG/DL
HCT VFR BLD AUTO: 42.2 % (ref 34.8–46.1)
HGB BLD-MCNC: 13.5 G/DL (ref 11.5–15.4)
HGB UR QL STRIP.AUTO: ABNORMAL
IMM GRANULOCYTES # BLD AUTO: 0.01 THOUSAND/UL (ref 0–0.2)
IMM GRANULOCYTES NFR BLD AUTO: 0 % (ref 0–2)
KETONES UR STRIP-MCNC: NEGATIVE MG/DL
LEUKOCYTE ESTERASE UR QL STRIP: NEGATIVE
LIPASE SERPL-CCNC: 14 U/L (ref 11–82)
LYMPHOCYTES # BLD AUTO: 1.38 THOUSANDS/ÂΜL (ref 0.6–4.47)
LYMPHOCYTES NFR BLD AUTO: 15 % (ref 14–44)
MCH RBC QN AUTO: 28.2 PG (ref 26.8–34.3)
MCHC RBC AUTO-ENTMCNC: 32 G/DL (ref 31.4–37.4)
MCV RBC AUTO: 88 FL (ref 82–98)
MONOCYTES # BLD AUTO: 0.82 THOUSAND/ÂΜL (ref 0.17–1.22)
MONOCYTES NFR BLD AUTO: 9 % (ref 4–12)
NEUTROPHILS # BLD AUTO: 6.93 THOUSANDS/ÂΜL (ref 1.85–7.62)
NEUTS SEG NFR BLD AUTO: 74 % (ref 43–75)
NITRITE UR QL STRIP: NEGATIVE
NON-SQ EPI CELLS URNS QL MICRO: NORMAL /HPF
NRBC BLD AUTO-RTO: 0 /100 WBCS
PH UR STRIP.AUTO: 5.5 [PH]
PLATELET # BLD AUTO: 245 THOUSANDS/UL (ref 149–390)
PMV BLD AUTO: 10.2 FL (ref 8.9–12.7)
POTASSIUM SERPL-SCNC: 3.5 MMOL/L (ref 3.5–5.3)
PROT SERPL-MCNC: 6.9 G/DL (ref 6.4–8.4)
PROT UR STRIP-MCNC: NEGATIVE MG/DL
RBC # BLD AUTO: 4.79 MILLION/UL (ref 3.81–5.12)
RBC #/AREA URNS AUTO: NORMAL /HPF
SL AMB  POCT GLUCOSE, UA: NEGATIVE
SL AMB LEUKOCYTE ESTERASE,UA: NEGATIVE
SL AMB POCT BILIRUBIN,UA: NEGATIVE
SL AMB POCT BLOOD,UA: ABNORMAL
SL AMB POCT CLARITY,UA: CLEAR
SL AMB POCT COLOR,UA: ABNORMAL
SL AMB POCT KETONES,UA: NEGATIVE
SL AMB POCT NITRITE,UA: NEGATIVE
SL AMB POCT PH,UA: 6
SL AMB POCT SPECIFIC GRAVITY,UA: 1.01
SL AMB POCT URINE PROTEIN: NEGATIVE
SL AMB POCT UROBILINOGEN: 0.2
SODIUM SERPL-SCNC: 141 MMOL/L (ref 135–147)
SP GR UR STRIP.AUTO: <=1.005
UROBILINOGEN UR QL STRIP.AUTO: 0.2 E.U./DL
WBC # BLD AUTO: 9.39 THOUSAND/UL (ref 4.31–10.16)
WBC #/AREA URNS AUTO: NORMAL /HPF

## 2023-09-18 PROCEDURE — 83690 ASSAY OF LIPASE: CPT | Performed by: EMERGENCY MEDICINE

## 2023-09-18 PROCEDURE — 99213 OFFICE O/P EST LOW 20 MIN: CPT | Performed by: PHYSICIAN ASSISTANT

## 2023-09-18 PROCEDURE — 96375 TX/PRO/DX INJ NEW DRUG ADDON: CPT

## 2023-09-18 PROCEDURE — G1004 CDSM NDSC: HCPCS

## 2023-09-18 PROCEDURE — 74177 CT ABD & PELVIS W/CONTRAST: CPT

## 2023-09-18 PROCEDURE — 81001 URINALYSIS AUTO W/SCOPE: CPT | Performed by: EMERGENCY MEDICINE

## 2023-09-18 PROCEDURE — 81002 URINALYSIS NONAUTO W/O SCOPE: CPT | Performed by: PHYSICIAN ASSISTANT

## 2023-09-18 PROCEDURE — 80053 COMPREHEN METABOLIC PANEL: CPT | Performed by: EMERGENCY MEDICINE

## 2023-09-18 PROCEDURE — S9083 URGENT CARE CENTER GLOBAL: HCPCS | Performed by: PHYSICIAN ASSISTANT

## 2023-09-18 PROCEDURE — 99285 EMERGENCY DEPT VISIT HI MDM: CPT | Performed by: EMERGENCY MEDICINE

## 2023-09-18 PROCEDURE — 85025 COMPLETE CBC W/AUTO DIFF WBC: CPT | Performed by: EMERGENCY MEDICINE

## 2023-09-18 PROCEDURE — 96361 HYDRATE IV INFUSION ADD-ON: CPT

## 2023-09-18 PROCEDURE — 36415 COLL VENOUS BLD VENIPUNCTURE: CPT | Performed by: EMERGENCY MEDICINE

## 2023-09-18 PROCEDURE — 99284 EMERGENCY DEPT VISIT MOD MDM: CPT

## 2023-09-18 PROCEDURE — 96374 THER/PROPH/DIAG INJ IV PUSH: CPT

## 2023-09-18 RX ORDER — ONDANSETRON 2 MG/ML
4 INJECTION INTRAMUSCULAR; INTRAVENOUS ONCE
Status: COMPLETED | OUTPATIENT
Start: 2023-09-18 | End: 2023-09-18

## 2023-09-18 RX ORDER — AMOXICILLIN AND CLAVULANATE POTASSIUM 875; 125 MG/1; MG/1
1 TABLET, FILM COATED ORAL ONCE
Status: COMPLETED | OUTPATIENT
Start: 2023-09-18 | End: 2023-09-18

## 2023-09-18 RX ORDER — AMOXICILLIN AND CLAVULANATE POTASSIUM 875; 125 MG/1; MG/1
1 TABLET, FILM COATED ORAL EVERY 12 HOURS
Qty: 14 TABLET | Refills: 0 | Status: SHIPPED | OUTPATIENT
Start: 2023-09-18 | End: 2023-09-25

## 2023-09-18 RX ORDER — ONDANSETRON 4 MG/1
4 TABLET, FILM COATED ORAL EVERY 8 HOURS PRN
Qty: 20 TABLET | Refills: 0 | Status: SHIPPED | OUTPATIENT
Start: 2023-09-18 | End: 2023-09-22

## 2023-09-18 RX ORDER — ONDANSETRON 4 MG/1
4 TABLET, ORALLY DISINTEGRATING ORAL EVERY 6 HOURS PRN
Qty: 20 TABLET | Refills: 0 | Status: SHIPPED | OUTPATIENT
Start: 2023-09-18

## 2023-09-18 RX ORDER — KETOROLAC TROMETHAMINE 30 MG/ML
15 INJECTION, SOLUTION INTRAMUSCULAR; INTRAVENOUS ONCE
Status: COMPLETED | OUTPATIENT
Start: 2023-09-18 | End: 2023-09-18

## 2023-09-18 RX ORDER — OXYCODONE HYDROCHLORIDE 5 MG/1
5 TABLET ORAL EVERY 6 HOURS PRN
Qty: 8 TABLET | Refills: 0 | Status: SHIPPED | OUTPATIENT
Start: 2023-09-18 | End: 2023-09-22

## 2023-09-18 RX ADMIN — SODIUM CHLORIDE 1000 ML: 0.9 INJECTION, SOLUTION INTRAVENOUS at 12:09

## 2023-09-18 RX ADMIN — AMOXICILLIN AND CLAVULANATE POTASSIUM 1 TABLET: 875; 125 TABLET, FILM COATED ORAL at 13:19

## 2023-09-18 RX ADMIN — KETOROLAC TROMETHAMINE 15 MG: 30 INJECTION, SOLUTION INTRAMUSCULAR; INTRAVENOUS at 12:12

## 2023-09-18 RX ADMIN — ONDANSETRON 4 MG: 2 INJECTION INTRAMUSCULAR; INTRAVENOUS at 12:11

## 2023-09-18 RX ADMIN — IOHEXOL 100 ML: 350 INJECTION, SOLUTION INTRAVENOUS at 12:20

## 2023-09-18 NOTE — ED PROVIDER NOTES
History  Chief Complaint   Patient presents with   • Flank Pain     Patient arrives with complaints of left sided flank pain that wraps to front since yesterday. Patient is a 62-year-old female with history of diverticulosis that presents for evaluation of left lower quadrant abdominal pain. She says over the past 24 hours she has had worsening left lower quadrant abdominal pain that seems to radiate from her back. She endorses some nausea without vomiting. She denies diarrhea melena or hematochezia. No urinary complaints. She has never had pain like this before in the past.  Tylenol has not helped the pain much. Prior to Admission Medications   Prescriptions Last Dose Informant Patient Reported?  Taking?   atorvastatin (LIPITOR) 10 mg tablet   No No   Sig: Take 1 tablet (10 mg total) by mouth daily   famotidine (PEPCID) 20 mg tablet   No No   Sig: Take 1 tablet (20 mg total) by mouth 2 (two) times a day   hydrOXYzine HCL (ATARAX) 25 mg tablet   No No   Sig: Take 1 tablet (25 mg total) by mouth every 6 (six) hours as needed for itching   lidocaine (Lidoderm) 5 %  Self No No   Sig: Apply 2 patches topically over 12 hours daily Remove & Discard patch within 12 hours or as directed by MD   losartan (COZAAR) 100 MG tablet   No No   Sig: Take 1 tablet (100 mg total) by mouth daily   methocarbamol (Robaxin-750) 750 mg tablet  Self No No   Sig: Take 1 tablet (750 mg total) by mouth every 8 (eight) hours   ondansetron (ZOFRAN) 4 mg tablet  Self No No   Sig: take 1 tablet by mouth every 8 hours if needed for nausea and vomiting   ondansetron (ZOFRAN) 4 mg tablet   No No   Sig: Take 1 tablet (4 mg total) by mouth every 8 (eight) hours as needed for nausea or vomiting      Facility-Administered Medications: None       Past Medical History:   Diagnosis Date   • Anemia    • Arthritis    • Bilateral bunions    • Chronic pain disorder     back and hip pain   • Disease of thyroid gland     nodules   • History of COVID-2020    mild s/s   • History of gastroesophageal reflux (GERD)     "had hiatal hernia surgery"   • History of heart murmur in childhood    • Hyperlipidemia    • Hypertension    • Overactive bladder    • PONV (postoperative nausea and vomiting)     once after knee surgery   • Rotator cuff tear, right     had surgery   • Vitamin D deficiency     unsure   • Wears glasses    • Wears partial dentures     upper       Past Surgical History:   Procedure Laterality Date   •  SECTION      Last Assessed: 2017   • COLONOSCOPY     • ELBOW SURGERY      Last Assessed: 2017   • ESOPHAGOGASTRODUODENOSCOPY N/A 2017    Procedure: ESOPHAGOGASTRODUODENOSCOPY (EGD); Surgeon: Noemi Saenz DO;  Location: [facility]; Service: Gastroenterology   • HIP SURGERY Left 2018    glut medius/minimus repair  and 18--with pin implanted   • HYSTERECTOMY         • PARAESOPHAGEAL HERNIA REPAIR N/A 2020    Procedure: LAPAROSCOPIC PARAESOPHAGEAL HERNIA REPAIR WITH MESH AND NISSEN FUNDOPLICATION WITH ROBOTICS;  Surgeon: Kelli Gaines MD;  Location: [facility];  Service: General   • DC ARTHRP KNE CONDYLE&PLATU MEDIAL&LAT COMPARTMENTS Left 03/15/2023    Procedure: ARTHROPLASTY KNEE TOTAL;  Surgeon: Rob West DO;  Location: [facility];  Service: Orthopedics   • DC ARTHRS KNE SURG W/MENISCECTOMY MED/LAT W/SHVG Left 06/15/2022    Procedure: ;left knee arthroscopy, meniscectomy,synevectomy,chondroplasty, loose body removal, injection;  Surgeon: Rob West DO;  Location: [facility];  Service: Orthopedics   • DC COLONOSCOPY FLX DX W/COLLJ SPEC WHEN PFRMD N/A 2017    Procedure: EGD AND COLONOSCOPY;  Surgeon: Noemi Saenz DO;  Location: [facility];   Service: Gastroenterology   • DC EXCISON TUMOR SOFT TISSUE THIGH/KNEE SUBQ 3 CM/> Bilateral 2022    Procedure: EXCISION BIOPSY TISSUE LESION/MASS LOWER EXTREMITY;  Surgeon: Christoph Escobar MD;  Location: [facility]; Service: General   • KS EXCISON TUMOR SOFT TISSUE THIGH/KNEE SUBQ 3 CM/> Bilateral 2022    Procedure: EXCISION BIOPSY TISSUE LESION/MASS LOWER EXTREMITY;  Surgeon: Briana Malave MD;  Location: [facility];  Service: General   • KS OSTECTOMY PRTL [address] Connector HEAD SPX Bilateral 8/15/2023    Procedure: BUNIONECTOMY TAILOR WITHOUT OSTEOTOMY;  Surgeon: Emelyn Erwin DPM;  Location: [facility];  Service: Podiatry   • SHOULDER ARTHROCENTESIS     • SHOULDER ARTHROSCOPY Right 2021    Procedure: SHOULDER ARTHROSCOPY WITH acromioplasty, debridment, and ROTATOR CUFF REPAIR;  Surgeon: Angela Lynn DO;  Location: [address] [place] [address] MAIN OR;  Service: Orthopedics   • TUBAL LIGATION         Family History   Problem Relation Age of Onset   • Colon cancer Mother    • Heart attack Father    • Other Father         Cardiac Disorder   • Diabetes type II Father    • Colon cancer Sister    • No Known Problems Sister    • No Known Problems Sister    • No Known Problems Sister    • No Known Problems Sister    • No Known Problems Daughter    • No Known Problems Maternal Aunt    • No Known Problems Maternal Aunt    • No Known Problems Maternal Aunt    • Cancer Maternal Grandmother    • No Known Problems Paternal Grandmother      I have reviewed and agree with the history as documented. E-Cigarette/Vaping   • E-Cigarette Use Never User      E-Cigarette/Vaping Substances   • Nicotine No    • THC No    • CBD No    • Flavoring No    • Other No    • Unknown No      Social History     Tobacco Use   • Smoking status: Former     Packs/day: 1.50     Years: 30.00     Total pack years: 45.00     Types: Cigarettes     Quit date: 1991     Years since quittin.7   • Smokeless tobacco: Never   • Tobacco comments:     quit 25 yrs ago   Vaping Use   • Vaping Use: Never used   Substance Use Topics   • Alcohol use: Never   • Drug use: Never       Review of Systems   Constitutional: Negative for fever. HENT: Negative for sore throat.     Respiratory: Negative for shortness of breath. Cardiovascular: Negative for chest pain. Gastrointestinal: Positive for abdominal pain and nausea. Genitourinary: Negative for dysuria. Musculoskeletal: Negative for back pain. Skin: Negative for rash. Neurological: Negative for light-headedness. Psychiatric/Behavioral: Negative for agitation. All other systems reviewed and are negative. Physical Exam  Physical Exam  Vitals reviewed. Constitutional:       General: She is not in acute distress. Appearance: She is well-developed. HENT:      Head: Normocephalic. Eyes:      Pupils: Pupils are equal, round, and reactive to light. Cardiovascular:      Rate and Rhythm: Normal rate and regular rhythm. Heart sounds: Normal heart sounds. Pulmonary:      Effort: Pulmonary effort is normal.      Breath sounds: Normal breath sounds. Abdominal:      General: Bowel sounds are normal. There is no distension. Palpations: Abdomen is soft. Tenderness: There is abdominal tenderness. There is no guarding. Comments: Moderate left lower quadrant abdominal tenderness   Musculoskeletal:         General: No tenderness or deformity. Normal range of motion. Cervical back: Normal range of motion and neck supple. Skin:     General: Skin is warm and dry. Capillary Refill: Capillary refill takes less than 2 seconds. Neurological:      Mental Status: She is alert and oriented to person, place, and time. Cranial Nerves: No cranial nerve deficit. Sensory: No sensory deficit. Psychiatric:         Behavior: Behavior normal.         Thought Content:  Thought content normal.         Judgment: Judgment normal.         Vital Signs  ED Triage Vitals   Temperature Pulse Respirations Blood Pressure SpO2   09/18/23 1108 09/18/23 1110 09/18/23 1108 09/18/23 1110 09/18/23 1108   99.2 °F (37.3 °C) 70 18 166/74 99 %      Temp Source Heart Rate Source Patient Position - Orthostatic VS BP Location FiO2 (%)   09/18/23 1108 09/18/23 1108 -- 09/18/23 1108 --   Temporal Monitor  Left arm       Pain Score       09/18/23 1108       8           Vitals:    09/18/23 1110   BP: 166/74   Pulse: 70         Visual Acuity      ED Medications  Medications   sodium chloride 0.9 % bolus 1,000 mL (1,000 mL Intravenous New Bag 9/18/23 1209)   amoxicillin-clavulanate (AUGMENTIN) 875-125 mg per tablet 1 tablet (has no administration in time range)   ketorolac (TORADOL) injection 15 mg (15 mg Intravenous Given 9/18/23 1212)   ondansetron (ZOFRAN) injection 4 mg (4 mg Intravenous Given 9/18/23 1211)   iohexol (OMNIPAQUE) 350 MG/ML injection (MULTI-DOSE) 100 mL (100 mL Intravenous Given 9/18/23 1220)       Diagnostic Studies  Results Reviewed     Procedure Component Value Units Date/Time    Urine Microscopic [[id number]]  (Normal) Collected: 09/18/23 1213    Lab Status: Final result Specimen: Urine, Clean Catch Updated: 09/18/23 1259     RBC, UA 0-1 /hpf      WBC, UA 0-1 /hpf      Epithelial Cells Occasional /hpf      Bacteria, UA None Seen /hpf     UA w Reflex to Microscopic w Reflex to Culture [[id number]]  (Abnormal) Collected: 09/18/23 1213    Lab Status: Final result Specimen: Urine, Clean Catch Updated: 09/18/23 1252     Color, UA Straw     Clarity, UA Clear     Specific Gravity, UA <=1.005     pH, UA 5.5     Leukocytes, UA Negative     Nitrite, UA Negative     Protein, UA Negative mg/dl      Glucose, UA Negative mg/dl      Ketones, UA Negative mg/dl      Urobilinogen, UA 0.2 E.U./dl      Bilirubin, UA Negative     Occult Blood, UA Trace-Intact    CMP [[id number]] Collected: 09/18/23 1149    Lab Status: Final result Specimen: Blood from Arm, Left Updated: 09/18/23 1211     Sodium 141 mmol/L      Potassium 3.5 mmol/L      Chloride 104 mmol/L      CO2 30 mmol/L      ANION GAP 7 mmol/L      BUN 17 mg/dL      Creatinine 0.61 mg/dL      Glucose 98 mg/dL      Calcium 9.2 mg/dL      AST 16 U/L      ALT 11 U/L      Alkaline Phosphatase 98 U/L      Total Protein 6.9 g/dL      Albumin 4.0 g/dL      Total Bilirubin 0.48 mg/dL      eGFR 94 ml/min/1.73sq m     Narrative:      [place] guidelines for Chronic Kidney Disease (CKD):   •  Stage 1 with normal or high GFR (GFR > 90 mL/min/1.73 square meters)  •  Stage 2 Mild CKD (GFR = 60-89 mL/min/1.73 square meters)  •  Stage 3A Moderate CKD (GFR = 45-59 mL/min/1.73 square meters)  •  Stage 3B Moderate CKD (GFR = 30-44 mL/min/1.73 square meters)  •  Stage 4 Severe CKD (GFR = 15-29 mL/min/1.73 square meters)  •  Stage 5 End Stage CKD (GFR <15 mL/min/1.73 square meters)  Note: GFR calculation is accurate only with a steady state creatinine    Lipase [[id number]]  (Normal) Collected: 09/18/23 1149    Lab Status: Final result Specimen: Blood from Arm, Left Updated: 09/18/23 1211     Lipase 14 u/L     CBC and differential [[id number]] Collected: 09/18/23 1149    Lab Status: Final result Specimen: Blood from Arm, Left Updated: 09/18/23 1155     WBC 9.39 Thousand/uL      RBC 4.79 Million/uL      Hemoglobin 13.5 g/dL      Hematocrit 42.2 %      MCV 88 fL      MCH 28.2 pg      MCHC 32.0 g/dL      RDW 13.7 %      MPV 10.2 fL      Platelets 807 Thousands/uL      nRBC 0 /100 WBCs      Neutrophils Relative 74 %      Immat GRANS % 0 %      Lymphocytes Relative 15 %      Monocytes Relative 9 %      Eosinophils Relative 2 %      Basophils Relative 0 %      Neutrophils Absolute 6.93 Thousands/µL      Immature Grans Absolute 0.01 Thousand/uL      Lymphocytes Absolute 1.38 Thousands/µL      Monocytes Absolute 0.82 Thousand/µL      Eosinophils Absolute 0.23 Thousand/µL      Basophils Absolute 0.02 Thousands/µL                  CT Abdomen pelvis with contrast   Final Result by Margaux Easton MD (09/18 1302)      Acute uncomplicated sigmoid diverticulitis.       This study demonstrates a significant  finding and was documented as such in [facility] for liaison and referring practitioner notification. Workstation performed: [id number]                    Procedures  Procedures         ED Course                               SBIRT 22yo+    Flowsheet Row Most Recent Value   Initial Alcohol Screen: US AUDIT-C     1. How often do you have a drink containing alcohol? 0 Filed at: 09/18/2023 1110   2. How many drinks containing alcohol do you have on a typical day you are drinking? 0 Filed at: 09/18/2023 1110   3a. Male UNDER 65: How often do you have five or more drinks on one occasion? 0 Filed at: 09/18/2023 1110   3b. FEMALE Any Age, or MALE 65+: How often do you have 4 or more drinks on one occassion? 0 Filed at: 09/18/2023 1110   Audit-C Score 0 Filed at: 09/18/2023 1110                    Medical Decision Making  Patient is a 61-year-old female presents for evaluation of left lower quad abdominal pain. Primary concern for diverticulitis versus pyelonephritis versus ureterolithiasis. Imaging shows diverticulitis that is uncomplicated. Imaging and blood work reviewed. Discussed with patient need for outpatient follow-up with GI and started on Augmentin here. Also given oxycodone with instructions on how to use it appropriately. Amount and/or Complexity of Data Reviewed  Labs: ordered. Radiology: ordered. Risk  Prescription drug management. Disposition  Final diagnoses:   Diverticulitis     Time reflects when diagnosis was documented in both MDM as applicable and the Disposition within this note     Time User Action Codes Description Comment    9/18/2023  1:07 PM Jodie Comment Add [K57.92] Diverticulitis       ED Disposition     ED Disposition   Discharge    Condition   Stable    Date/Time   Mon Sep 18, 2023 [address] discharge to home/self care.                Follow-up Information     Follow up With Specialties Details Why [address] [facility] Emergency Medicine  If symptoms worsen [address] [place] [id number]  [address] [place] [address] Emergency Department, [address], [address] [facility] Specialists [facility] Gastroenterology Schedule an appointment as soon as possible for a visit   [address] [facility] [address] [address] [facility] Specialists [facility], [address], [address] [place], [place], [address], [phone]          Patient's Medications   Discharge Prescriptions    AMOXICILLIN-CLAVULANATE (AUGMENTIN) 875-125 MG PER TABLET    Take 1 tablet by mouth every 12 (twelve) hours for 7 days       Start Date: 9/18/2023 End Date: 9/25/2023       Order Dose: 1 tablet       Quantity: 14 tablet    Refills: 0           PDMP Review       Value Time User    PDMP Reviewed  Yes 3/15/2023  4:27 PM Marta Santoyo PA-C          ED Provider  Electronically Signed by           Katharina Rider MD  09/18/23 5405

## 2023-09-18 NOTE — PROGRESS NOTES
[facility]      NAME: Bev Bo is a 79 y.o. female  : 1955    MRN: [id number]  DATE: 2023  TIME: 9:40 AM    Assessment and Plan   Left lower quadrant abdominal pain [R10.32]  1. Left lower quadrant abdominal pain  POCT urine dip    ondansetron (ZOFRAN) 4 mg tablet      2. Nausea            Patient Instructions   Urine dip not impressive for UTI. Zofran for nausea. Call PCP for apt in next 1-2 days as may need abdominal imaging if this pain persists. Can alternate tylenol/ibu for pain. If the pain begins to localize especially in the RLQ or if the pain worsens, to present to the ER for further evaluation. Any worsening symptoms to present to ER. Patient agreeable to plan. To present to the ER if symptoms worsen. Chief Complaint     Chief Complaint   Patient presents with   • Abdominal Pain     Patient c/o lower left abdominal pain that started yesterday. She also c/o nausea. • Back Pain     Patient c/o bilateral lower back pain that started yesterday. History of Present Illness   Bev Bo presents to the clinic c/o    Normal BM today. Abdominal Pain  This is a new problem. The current episode started yesterday. The problem occurs constantly. The problem has been unchanged. The pain is located in the LLQ. The pain is at a severity of 8/10. The pain is moderate. The quality of the pain is sharp. Pain radiation: lower back R>L. Associated symptoms include flatus and nausea. Pertinent negatives include no belching, constipation, diarrhea, hematuria or vomiting. Nothing aggravates the pain. The pain is relieved by nothing. She has tried acetaminophen for the symptoms. The treatment provided no relief. Her past medical history is significant for abdominal surgery (hiatal hernia, C section, hysterectomy). There is no history of Crohn's disease or ulcerative colitis. Review of Systems   Review of Systems   Constitutional: Positive for chills.  Negative for diaphoresis and fatigue. HENT: Negative for congestion, ear discharge, ear pain and facial swelling. Eyes: Negative for photophobia, pain, discharge, redness, itching and visual disturbance. Respiratory: Negative for apnea, cough, chest tightness, shortness of breath and wheezing. Cardiovascular: Negative for palpitations. Gastrointestinal: Positive for flatus and nausea. Negative for constipation, diarrhea and vomiting. Genitourinary: Negative for hematuria and urgency. Skin: Negative for color change, rash and wound. Neurological: Negative for dizziness. Hematological: Negative for adenopathy.          Current Medications     Long-Term Medications   Medication Sig Dispense Refill   • atorvastatin (LIPITOR) 10 mg tablet Take 1 tablet (10 mg total) by mouth daily 90 tablet 1   • famotidine (PEPCID) 20 mg tablet Take 1 tablet (20 mg total) by mouth 2 (two) times a day 180 tablet 1   • hydrOXYzine HCL (ATARAX) 25 mg tablet Take 1 tablet (25 mg total) by mouth every 6 (six) hours as needed for itching 60 tablet 2   • lidocaine (Lidoderm) 5 % Apply 2 patches topically over 12 hours daily Remove & Discard patch within 12 hours or as directed by MD 60 patch 5   • losartan (COZAAR) 100 MG tablet Take 1 tablet (100 mg total) by mouth daily 90 tablet 1   • methocarbamol (Robaxin-750) 750 mg tablet Take 1 tablet (750 mg total) by mouth every 8 (eight) hours 90 tablet 5   • ondansetron (ZOFRAN) 4 mg tablet take 1 tablet by mouth every 8 hours if needed for nausea and vomiting 30 tablet 0   • ondansetron (ZOFRAN) 4 mg tablet Take 1 tablet (4 mg total) by mouth every 8 (eight) hours as needed for nausea or vomiting 20 tablet 0       Current Allergies     Allergies as of 09/18/2023 - Reviewed 09/18/2023   Allergen Reaction Noted   • Hydrocodone Irritability 06/15/2022            The following portions of the patient's history were reviewed and updated as appropriate: allergies, current medications, past family history, past medical history, past social history, past surgical history and problem list.  Past Medical History:   Diagnosis Date   • Anemia    • Arthritis    • Bilateral bunions    • Chronic pain disorder     back and hip pain   • Disease of thyroid gland     nodules   • History of COVID-19 2020    mild s/s   • History of gastroesophageal reflux (GERD)     "had hiatal hernia surgery"   • History of heart murmur in childhood    • Hyperlipidemia    • Hypertension    • Overactive bladder    • PONV (postoperative nausea and vomiting)     once after knee surgery   • Rotator cuff tear, right     had surgery   • Vitamin D deficiency     unsure   • Wears glasses    • Wears partial dentures     upper     Past Surgical History:   Procedure Laterality Date   •  SECTION      Last Assessed: 2017   • COLONOSCOPY     • ELBOW SURGERY      Last Assessed: 2017   • ESOPHAGOGASTRODUODENOSCOPY N/A 2017    Procedure: ESOPHAGOGASTRODUODENOSCOPY (EGD); Surgeon: Max Castaneda DO;  Location: [facility] LAB;   Service: Gastroenterology   • HIP SURGERY Left 2018    glut medius/minimus repair  and 18--with pin implanted   • HYSTERECTOMY         • PARAESOPHAGEAL HERNIA REPAIR N/A 2020    Procedure: LAPAROSCOPIC PARAESOPHAGEAL HERNIA REPAIR WITH MESH AND NISSEN FUNDOPLICATION WITH ROBOTICS;  Surgeon: Astrid Ly MD;  Location: [facility];  Service: General   • TX ARTHRP KNE CONDYLE&PLATU MEDIAL&LAT COMPARTMENTS Left 03/15/2023    Procedure: ARTHROPLASTY KNEE TOTAL;  Surgeon: Katya Harley DO;  Location: [facility];  Service: Orthopedics   • TX ARTHRS KNE SURG W/MENISCECTOMY MED/LAT W/SHVG Left 06/15/2022    Procedure: ;left knee arthroscopy, meniscectomy,synevectomy,chondroplasty, loose body removal, injection;  Surgeon: Katya Harley DO;  Location: [facility];  Service: Orthopedics   • TX COLONOSCOPY FLX DX W/COLLJ SPEC WHEN PFRMD N/A 2017    Procedure: EGD AND COLONOSCOPY; Surgeon: Owen Rosario DO;  Location: [facility] GI LAB;   Service: Gastroenterology   • AR EXCISON TUMOR SOFT TISSUE THIGH/KNEE SUBQ 3 CM/> Bilateral 2022    Procedure: EXCISION BIOPSY TISSUE LESION/MASS LOWER EXTREMITY;  Surgeon: John Paul Weir MD;  Location: [facility];  Service: General   • AR EXCISON TUMOR SOFT TISSUE THIGH/KNEE SUBQ 3 CM/> Bilateral 2022    Procedure: EXCISION BIOPSY TISSUE LESION/MASS LOWER EXTREMITY;  Surgeon: John Paul Weir MD;  Location: [facility];  Service: General   • AR OSTECTOMY PRTL 5TH METAR HEAD SPX Bilateral 8/15/2023    Procedure: BUNIONECTOMY TAILOR WITHOUT OSTEOTOMY;  Surgeon: Juan Palomino DPM;  Location: [facility];  Service: Podiatry   • SHOULDER ARTHROCENTESIS     • SHOULDER ARTHROSCOPY Right 2021    Procedure: SHOULDER ARTHROSCOPY WITH acromioplasty, debridment, and ROTATOR CUFF REPAIR;  Surgeon: Bryn Pritchard DO;  Location: [address] [place] [address] MAIN OR;  Service: Orthopedics   • TUBAL LIGATION       Social History     Socioeconomic History   • Marital status: /Civil Union     Spouse name: Not on file   • Number of children: Not on file   • Years of education: Not on file   • Highest education level: Not on file   Occupational History   • Not on file   Tobacco Use   • Smoking status: Former     Packs/day: 1.50     Years: 30.00     Total pack years: 45.00     Types: Cigarettes     Quit date: 1991     Years since quittin.7   • Smokeless tobacco: Never   • Tobacco comments:     quit 25 yrs ago   Vaping Use   • Vaping Use: Never used   Substance and Sexual Activity   • Alcohol use: Never   • Drug use: Never   • Sexual activity: Not Currently     Comment: defer   Other Topics Concern   • Not on file   Social History Narrative    Caffeine use    , 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[employer], now at [employer] and [address] for [email or web address]    2 grown children     Social Determinants of [address] Strain: Low Risk  (3/9/2023) Overall Financial Resource Strain (CARDIA)    • Difficulty of Paying Living Expenses: Not very hard   Food Insecurity: No Food Insecurity (3/16/2023)    Hunger Vital Sign    • Worried About Running Out of Food in the Last Year: Never true    • Ran Out of Food in the Last Year: Never true   Transportation Needs: No Transportation Needs (3/16/2023)    PRAPARE - Transportation    • Lack of Transportation (Medical): No    • Lack of Transportation (Non-Medical): No   Physical Activity: Inactive (12/2/2020)    Exercise Vital Sign    • Days of Exercise per Week: 0 days    • Minutes of Exercise per Session: 0 min   Stress: Not on file   Social Connections: Not on file   Intimate Partner Violence: Not on file   Housing Stability: Low Risk  (3/16/2023)    Housing Stability Vital Sign    • Unable to Pay for Housing in the Last Year: No    • Number of Places Lived in the Last Year: 1    • Unstable Housing in the Last Year: No       Objective   /69   Pulse 80   Temp 98.3 °F (36.8 °C) (Temporal)   Resp 16   Ht 5' 3" (1.6 m)   Wt 64.9 kg (143 lb)   SpO2 99%   BMI 25.33 kg/m²      Physical Exam     Physical Exam  Vitals and nursing note reviewed. Constitutional:       General: She is not in acute distress. Appearance: She is well-developed. She is not diaphoretic. HENT:      Head: Normocephalic and atraumatic. Right Ear: Tympanic membrane and external ear normal.      Left Ear: Tympanic membrane and external ear normal.      Nose: Nose normal.      Mouth/Throat:      Mouth: Mucous membranes are moist.      Pharynx: No oropharyngeal exudate or posterior oropharyngeal erythema. Eyes:      General: No scleral icterus. Right eye: No discharge. Left eye: No discharge. Conjunctiva/sclera: Conjunctivae normal.   Cardiovascular:      Rate and Rhythm: Normal rate and regular rhythm. Heart sounds: Normal heart sounds. No murmur heard. No friction rub. No gallop.    Pulmonary: Effort: Pulmonary effort is normal. No respiratory distress. Breath sounds: Normal breath sounds. No decreased breath sounds, wheezing, rhonchi or rales. Abdominal:      General: Bowel sounds are normal.      Palpations: Abdomen is soft. Tenderness: There is abdominal tenderness in the left lower quadrant. There is guarding (to deep palpation LLQ). There is no right CVA tenderness, left CVA tenderness or rebound. Negative signs include Rdz's sign and McBurney's sign. Musculoskeletal:      Lumbar back: Tenderness (right lower lumbar) present. No swelling, signs of trauma, spasms or bony tenderness. Negative right straight leg raise test and negative left straight leg raise test.   Skin:     General: Skin is warm and dry. Coloration: Skin is not pale. Findings: No erythema or rash. Neurological:      Mental Status: She is alert and oriented to person, place, and time. Psychiatric:         Behavior: Behavior normal.         Thought Content:  Thought content normal.         Judgment: Judgment normal.         Caesar Soto PA-C

## 2023-09-19 ENCOUNTER — TELEPHONE (OUTPATIENT)
Dept: FAMILY MEDICINE CLINIC | Facility: CLINIC | Age: 68
End: 2023-09-19

## 2023-09-19 NOTE — TELEPHONE ENCOUNTER
Hired for a new job needs physical form filled out and a TB test Last physical was on 03/09/2023 wants to know if PCP will fill out the form.  [phone],  Patient coming tomorrow for TB Test and will bring along her physical form

## 2023-09-20 ENCOUNTER — CLINICAL SUPPORT (OUTPATIENT)
Dept: FAMILY MEDICINE CLINIC | Facility: CLINIC | Age: 68
End: 2023-09-20
Payer: COMMERCIAL

## 2023-09-20 DIAGNOSIS — Z11.1 SCREENING FOR TUBERCULOSIS: Primary | ICD-10-CM

## 2023-09-20 PROCEDURE — 99211 OFF/OP EST MAY X REQ PHY/QHP: CPT

## 2023-09-20 PROCEDURE — 86580 TB INTRADERMAL TEST: CPT

## 2023-09-22 ENCOUNTER — OFFICE VISIT (OUTPATIENT)
Dept: GASTROENTEROLOGY | Facility: CLINIC | Age: 68
End: 2023-09-22
Payer: COMMERCIAL

## 2023-09-22 VITALS
SYSTOLIC BLOOD PRESSURE: 138 MMHG | WEIGHT: 148.8 LBS | TEMPERATURE: 98.2 F | DIASTOLIC BLOOD PRESSURE: 78 MMHG | HEART RATE: 82 BPM | OXYGEN SATURATION: 98 % | HEIGHT: 63 IN | BODY MASS INDEX: 26.36 KG/M2

## 2023-09-22 DIAGNOSIS — R19.8 ALTERNATING CONSTIPATION AND DIARRHEA: ICD-10-CM

## 2023-09-22 DIAGNOSIS — K21.9 GASTROESOPHAGEAL REFLUX DISEASE WITHOUT ESOPHAGITIS: ICD-10-CM

## 2023-09-22 DIAGNOSIS — K57.92 DIVERTICULITIS: Primary | ICD-10-CM

## 2023-09-22 DIAGNOSIS — R13.19 ESOPHAGEAL DYSPHAGIA: ICD-10-CM

## 2023-09-22 DIAGNOSIS — Z98.890 HISTORY OF NISSEN FUNDOPLICATION: ICD-10-CM

## 2023-09-22 DIAGNOSIS — Z80.0 FAMILY HISTORY OF COLON CANCER: ICD-10-CM

## 2023-09-22 LAB
INDURATION: 0 MM
TB SKIN TEST: NEGATIVE

## 2023-09-22 PROCEDURE — 99214 OFFICE O/P EST MOD 30 MIN: CPT | Performed by: PHYSICIAN ASSISTANT

## 2023-09-22 NOTE — PATIENT INSTRUCTIONS
You did have what looks like acute diverticulitis, which is inflammation and at times infection of the pockets in your colon. Please complete the Augmentin antibiotic that you are prescribed. Ensure you are getting probiotic rich foods in your diet. After about a week or so, I would start to add more fiber into your diet. You may benefit from a dedicated fiber supplement given your baseline alternating bowel habits. The gold standard when this happens is to check a colonoscopy in 6 to 8 weeks to make sure there is no chronic inflammation occurring in the colon. You are also due for a colonoscopy given your family history. I will reach out to Dr. Rd Juarez about your upper GI tract.

## 2023-09-22 NOTE — PROGRESS NOTES
[facility] - Outpatient Consultation  Nahid Billingsley 79 y.o. female MRN: [id number]  Encounter: [id number]    ASSESSMENT AND PLAN:      1. Diverticulitis  2. Alternating constipation and diarrhea    Pt with acute onset of abd pain, nausea, evaluated in ER in 09/2023 and diagnosed with uncomplicated diverticulitis based upon CT findings. She was treated with Augmentin. This was her first bout of diverticulitis. We did review recommendations for follow-up colonoscopy in 6 to 8 weeks to evaluate for any underlying intraluminal pathology. Moreover, she is overdue for colonoscopy for screening purposes given her family history. We reviewed that within the next week or so, it would likely be beneficial for patient to start on a dedicated fiber supplement to help regulate her stool output and promote soft easy bowel movements regularly. She may have some irritable bowel syndrome at baseline as she comments on alternating bowel habits chronically. Risks associated with endoscopic evaluation discussed with patient today, including but not limited to bleeding, infection, perforation, and organ injury, all of which are low. Pt demonstrates understanding and is agreeable to the plan. - Ambulatory Referral to Gastroenterology  - Colonoscopy; Future  - polyethylene glycol (GOLYTELY) 4000 mL solution; Take 4,000 mL by mouth once for 1 dose  Dispense: 4000 mL; Refill: 0    3. Family history of colon cancer    Patient notes family history of colon cancer in mother and sister. She is overdue for colonoscopy for screening purposes. We will proceed in approximately 6 to 8 weeks given her recent bout of diverticulitis. - polyethylene glycol (GOLYTELY) 4000 mL solution; Take 4,000 mL by mouth once for 1 dose  Dispense: 4000 mL; Refill: 0    4. Esophageal dysphagia  5. Gastroesophageal reflux disease without esophagitis  6.  History of Nissen fundoplication    Pt has hx of hiatal hernia repair for large hiatal hernia and GERD in 2020. She has been dealing with solid food dysphagia intermittently since that time and has followed with Dr. Mago Mark. [place] in [place] [facility] No evidence of EoE on esophageal bx. Given pt's persistent symptoms, we did review considering repeat manometry at this time for additional investigation. At present, pt defers testing as she previously underwent this study and found it very uncomfortable. Continue with dysphagia precautions at present. Continue with small bites, chewing food well, alternating between solids and liquids, etc.   Will discuss with Dr. Mago Mark to determine any additional recommendations. We will follow up after colonoscopy.   ______________________________________________________________________    HPI: Patient is a 79 y.o. female who presents today for a consultation regarding diverticulitis. Pmhx sig for GERD, hx of hiatal hernia repair, HTN, sacroiliitis, HLD, Vit D deficiency. Pt is new to me. Last evaluated by GI in 06/2022 by Dr. Mago Mark. She was having dysphagia to solids following her Nissen repair in 2020. She had EGD in 2022 with empiric dilation. More recently, in 09/2023, developed symptoms of generalized fatigue, diaphoresis and sweating, and significant lower abdominal pain. Felt bloated and nauseous. Went to ER and was diagnosed with diverticulitis. Treated with augmentin and anti-emetics. 09/22/23:     Pt notes her abdominal pain has resolved since ER evaluation. More chronically speaking, her bowels alternate between firm and loose. Has some abdominal cramping and urgent loose stools depending on oral intake. No BRBPR or melena. Pt has family hx of CRC in mother and sister. She has been having some increased belching and bloating since diverticulitis. Also notes solid food dysphagia, which is occurring a few days per week and has been a chronic issue for pt.  Last EGD with dilation approx 1 year ago. No significant heartburn symptoms. No abnormal weight loss in past 6-12 months. 2023: CT A/P:  Acute uncomplicated sigmoid diverticulitis. 2023: Hb 13.5, MCV 88, Plt 245, AST 16, ALT 11, ALP 98, t bili 0.48, albumin 4.0     Endoscopic history:   EGD: 2022: Normal esophagus, dilated GEJ with savory dilator; normal stomach, retroflexion showed hernia repair  A. Esophagus, random (biopsy): Reactive esophageal mucosa; Eosinophils are inconspicuous; No intestinal metaplasia   B. Stomach (biopsy): Reactive gastropathy; No H pylori identified (H&E); No intestinal metaplasia   C. Duodenum (biopsy): Duodenal mucosa with no diagnostic abnormality; No Marsh lesion; Negative for dysplasia   Colon: 2017: Mild diverticulosis in the sigmoid  Manometry: 2020: Normal motility with large hiatal hernia    Review of Systems   Constitutional: Negative for fever. Gastrointestinal: Positive for diarrhea and nausea. Negative for vomiting. Genitourinary: Positive for frequency. Negative for dysuria and hematuria. Musculoskeletal: Positive for arthralgias and myalgias. Neurological: Positive for headaches.    Otherwise Per HPI    Historical Information   Past Medical History:   Diagnosis Date   • Anemia    • Arthritis    • Bilateral bunions    • Chronic pain disorder     back and hip pain   • Disease of thyroid gland     nodules   • History of COVID-19 2020    mild s/s   • History of gastroesophageal reflux (GERD)     "had hiatal hernia surgery"   • History of heart murmur in childhood    • Hyperlipidemia    • Hypertension    • Overactive bladder    • PONV (postoperative nausea and vomiting)     once after knee surgery   • Rotator cuff tear, right     had surgery   • Vitamin D deficiency     unsure   • Wears glasses    • Wears partial dentures     upper     Past Surgical History:   Procedure Laterality Date   •  SECTION      Last Assessed: 2017   • COLONOSCOPY     • ELBOW SURGERY      Last Assessed: 5/31/2017   • ESOPHAGOGASTRODUODENOSCOPY N/A 12/18/2017    Procedure: ESOPHAGOGASTRODUODENOSCOPY (EGD); Surgeon: Sheryl Willis DO;  Location: [facility]; Service: Gastroenterology   • HIP SURGERY Left 02/08/2018    glut medius/minimus repair  and 8/8/18--with pin implanted   • HYSTERECTOMY      2011   • PARAESOPHAGEAL HERNIA REPAIR N/A 05/19/2020    Procedure: LAPAROSCOPIC PARAESOPHAGEAL HERNIA REPAIR WITH MESH AND NISSEN FUNDOPLICATION WITH ROBOTICS;  Surgeon: Luis Whitaker MD;  Location: [facility];  Service: General   • MD ARTHRP KNE CONDYLE&PLATU MEDIAL&LAT COMPARTMENTS Left 03/15/2023    Procedure: ARTHROPLASTY KNEE TOTAL;  Surgeon: Sushma Ribeiro DO;  Location: [facility];  Service: Orthopedics   • MD ARTHRS KNE SURG W/MENISCECTOMY MED/LAT W/SHVG Left 06/15/2022    Procedure: ;left knee arthroscopy, meniscectomy,synevectomy,chondroplasty, loose body removal, injection;  Surgeon: Sushma Ribeiro DO;  Location: [facility];  Service: Orthopedics   • MD COLONOSCOPY FLX DX W/COLLJ SPEC WHEN PFRMD N/A 06/26/2017    Procedure: EGD AND COLONOSCOPY;  Surgeon: Sheryl Willis DO;  Location: [facility];   Service: Gastroenterology   • MD EXCISON TUMOR SOFT TISSUE THIGH/KNEE SUBQ 3 CM/> Bilateral 05/26/2022    Procedure: EXCISION BIOPSY TISSUE LESION/MASS LOWER EXTREMITY;  Surgeon: Cash Navarrete MD;  Location: [facility];  Service: General   • MD EXCISON TUMOR SOFT TISSUE THIGH/KNEE SUBQ 3 CM/> Bilateral 08/25/2022    Procedure: EXCISION BIOPSY TISSUE LESION/MASS LOWER EXTREMITY;  Surgeon: Cash Navarrete MD;  Location: [facility];  Service: General   • MD OSTECTOMY PRTL 5TH METAR HEAD SPX Bilateral 8/15/2023    Procedure: BUNIONECTOMY TAILOR WITHOUT OSTEOTOMY;  Surgeon: Leslee Giles DPM;  Location: [facility];  Service: Podiatry   • SHOULDER ARTHROCENTESIS     • SHOULDER ARTHROSCOPY Right 06/23/2021    Procedure: SHOULDER ARTHROSCOPY WITH acromioplasty, debridment, and ROTATOR CUFF REPAIR;  Surgeon: Duane Caffey, DO;  Location: [facility] MAIN OR;  Service: Orthopedics   • TUBAL LIGATION       Social History   Social History     Substance and Sexual Activity   Alcohol Use Never     Social History     Substance and Sexual Activity   Drug Use Never     Social History     Tobacco Use   Smoking Status Former   • Packs/day: 1.50   • Years: 30.00   • Total pack years: 45.00   • Types: Cigarettes   • Quit date: 1991   • Years since quittin.7   Smokeless Tobacco Never   Tobacco Comments    quit 25 yrs ago     Family History   Problem Relation Age of Onset   • Colon cancer Mother    • Heart attack Father    • Other Father         Cardiac Disorder   • Diabetes type II Father    • Colon cancer Sister    • No Known Problems Sister    • No Known Problems Sister    • No Known Problems Sister    • No Known Problems Sister    • No Known Problems Daughter    • No Known Problems Maternal Aunt    • No Known Problems Maternal Aunt    • No Known Problems Maternal Aunt    • Cancer Maternal Grandmother    • No Known Problems Paternal Grandmother        Meds/Allergies       Current Outpatient Medications:   •  amoxicillin-clavulanate (AUGMENTIN) 875-125 mg per tablet  •  atorvastatin (LIPITOR) 10 mg tablet  •  famotidine (PEPCID) 20 mg tablet  •  hydrOXYzine HCL (ATARAX) 25 mg tablet  •  lidocaine (Lidoderm) 5 %  •  losartan (COZAAR) 100 MG tablet  •  ondansetron (Zofran ODT) 4 mg disintegrating tablet  •  methocarbamol (Robaxin-750) 750 mg tablet  •  ondansetron (ZOFRAN) 4 mg tablet  •  ondansetron (ZOFRAN) 4 mg tablet  •  oxyCODONE (Roxicodone) 5 immediate release tablet    Allergies   Allergen Reactions   • Hydrocodone Irritability     Objective     Blood pressure 138/78, pulse 82, temperature 98.2 °F (36.8 °C), temperature source Temporal, height 5' 3" (1.6 m), weight 67.5 kg (148 lb 12.8 oz), SpO2 98 %. Body mass index is 26.36 kg/m². Physical Exam  Vitals and nursing note reviewed.    Constitutional: Appearance: Normal appearance. HENT:      Head: Normocephalic and atraumatic. Eyes:      General: No scleral icterus. Conjunctiva/sclera: Conjunctivae normal.   Cardiovascular:      Rate and Rhythm: Normal rate. Pulmonary:      Effort: Pulmonary effort is normal. No respiratory distress. Abdominal:      General: Bowel sounds are normal. There is no distension. Palpations: Abdomen is soft. Tenderness: There is no abdominal tenderness. There is no guarding or rebound. Skin:     General: Skin is warm and dry. Coloration: Skin is not jaundiced. Neurological:      General: No focal deficit present. Mental Status: She is alert and oriented to person, place, and time. Psychiatric:         Mood and Affect: Mood normal.         Behavior: Behavior normal.        Lab Results:   No visits with results within 1 Day(s) from this visit. Latest known visit with results is:   Clinical Support on 09/20/2023   Component Date Value   • TB Skin Test 09/22/2023 Negative    • Induration 09/22/2023 0      Radiology Results:   CT Abdomen pelvis with contrast    Result Date: 9/18/2023  Narrative: CT ABDOMEN AND PELVIS WITH IV CONTRAST INDICATION:   LLQ pain concern for diverticulitis. COMPARISON:  None. TECHNIQUE:  CT examination of the abdomen and pelvis was performed. Multiplanar 2D reformatted images were created from the source data. This examination, like all CT scans performed in the [address] [place] [address], was performed utilizing techniques to minimize radiation dose exposure, including the use of iterative reconstruction and automated exposure control. Radiation dose length product (DLP) for this visit:  563.42 mGy-cm IV Contrast:  100 mL of iohexol (OMNIPAQUE) 350 Enteric Contrast:  Enteric contrast was not administered. FINDINGS: ABDOMEN LOWER CHEST: Evidence of prior fundoplication at the GE junction.  LIVER/BILIARY TREE: One or more subcentimeter sharply circumscribed low-density hepatic lesion(s) are noted, too small to accurately characterize, but statistically most likely to represent subcentimeter hepatic cysts. Stable 1.1 cm right hepatic cyst. No suspicious solid hepatic lesion is identified. Hepatic contours are normal.  No biliary dilatation. GALLBLADDER:  No calcified gallstones. No pericholecystic inflammatory change. SPLEEN:  Unremarkable. PANCREAS: Diffuse fatty infiltration of the pancreas. ADRENAL GLANDS:  Unremarkable. KIDNEYS/URETERS: One or more sharply circumscribed subcentimeter renal hypodensities are noted. These lesions are too small to accurately characterize, but are statistically most likely to represent benign cortical renal cyst(s). According to the guidelines published in the [facility] Paper of the ACR Incidental Findings Committee (Radiology 2010), no further workup of these lesions is recommended. . No perinephric collection. STOMACH AND BOWEL: Colonic diverticulosis. Inflamed sigmoid diverticulum with adjacent fat stranding. Prior Nissen fundoplication. Right anterior perihepatic colonic interposition. No bowel obstruction. APPENDIX: A normal appendix was visualized. ABDOMINOPELVIC CAVITY: Trace free fluid in the dependent pelvis and inferior left paracolic gutter. No abscess. No pneumoperitoneum. No lymphadenopathy. VESSELS: Aortoiliac calcification. No aneurysm. Retroaortic left renal vein, normal variant. PELVIS REPRODUCTIVE ORGANS: Prior hysterectomy. URINARY BLADDER:  Unremarkable. ABDOMINAL WALL/INGUINAL REGIONS: Tiny fat-containing umbilical hernia. 1.5 cm lower abdominal wall superficial subcutaneous sebaceous cyst. OSSEOUS STRUCTURES: No acute fracture or osseous destructive lesion identified. Degenerative changes of the spine, pubic symphysis, and multiple joints. Minimal grade 1 degenerative anterolisthesis of L4 on L5. Minimal multilevel thoracolumbar spondylosis and paravertebral ossification suggestive of diffuse idiopathic skeletal hyperostosis. Impression: Acute uncomplicated sigmoid diverticulitis. This study demonstrates a significant  finding and was documented as such in [facility] for liaison and referring practitioner notification. Workstation performed: [id number]     X-ray foot right 3+ views    Result Date: 9/18/2023  Narrative: RIGHT FOOT INDICATION:   M79.671: Pain in right foot M79.672: Pain in left foot. COMPARISON:  None VIEWS:  XR FOOT 3+ VW RIGHT FINDINGS: There is no acute fracture or dislocation. The bunionectomy site of the fifth metatarsal head has a slightly ill-defined margin, laterally. This is evident on the frontal view of the foot. This may just be some postoperative reparative changes of the bone although infection is not entirely excluded. Correlate for any pain or tenderness or swelling in that region. Slight degenerative changes are seen medially in the midfoot. No discrete lytic or blastic bone lesion appreciated. Calcaneal spurs are noted. No soft tissue gas. Impression: Slightly ill-defined appearance of the bony margin along the fifth metatarsal head, laterally. This might be postoperative reparative change of the bone although infection is not entirely excluded. Correlate clinically. (Provider's note in the electronic  medical record suggests expected changes) The study was marked in EPIC for significant notification. Workstation performed: [id number]     X-ray foot left 3+ views    Result Date: 9/18/2023  Narrative: LEFT FOOT INDICATION:   M79.671: Pain in right foot M79.672: Pain in left foot. COMPARISON: August 15, 2023 VIEWS:  XR FOOT 3+ VW LEFT FINDINGS: There is no acute fracture or dislocation. No acute joint pathology seen. Postop changes fifth metatarsal appears satisfactory. Calcaneal spur is redemonstrated No soft tissue gas. No radiopaque foreign body. No evidence of osteomyelitis. Impression: No acute findings. Postop changes of the fifth metatarsal appears satisfactory.  Workstation performed: [id number] Kraft PA-C    **Please note:  Dictation voice to text software may have been used in the creation of this record. Occasional wrong word or “sound alike” substitutions may have occurred due to the inherent limitations of voice recognition software. Read the chart carefully and recognize, using context, where substitutions have occurred. **

## 2023-09-25 ENCOUNTER — TELEPHONE (OUTPATIENT)
Dept: FAMILY MEDICINE CLINIC | Facility: CLINIC | Age: 68
End: 2023-09-25

## 2023-09-26 ENCOUNTER — TELEPHONE (OUTPATIENT)
Age: 68
End: 2023-09-26

## 2023-09-26 NOTE — TELEPHONE ENCOUNTER
Scheduled date of colonoscopy (as of today): 10-6-2023   Physician performing colonoscopy: Dr. Del Chamberlain   Location of colonoscopy: [facility]  Bowel prep reviewed with patient: reviewed from 9-   Instructions reviewed with patient by: 9-

## 2023-09-26 NOTE — TELEPHONE ENCOUNTER
Pt called in with concern that procedure was scheduled too soon. Based on OV note, pt had diverticulitis and is to await to have colon in 6-8 weeks.

## 2023-10-25 ENCOUNTER — OFFICE VISIT (OUTPATIENT)
Dept: PODIATRY | Facility: CLINIC | Age: 68
End: 2023-10-25

## 2023-10-25 ENCOUNTER — OFFICE VISIT (OUTPATIENT)
Age: 68
End: 2023-10-25
Payer: COMMERCIAL

## 2023-10-25 VITALS
HEIGHT: 63 IN | WEIGHT: 150 LBS | BODY MASS INDEX: 26.58 KG/M2 | DIASTOLIC BLOOD PRESSURE: 77 MMHG | HEART RATE: 67 BPM | SYSTOLIC BLOOD PRESSURE: 168 MMHG

## 2023-10-25 VITALS
HEIGHT: 63 IN | HEART RATE: 73 BPM | DIASTOLIC BLOOD PRESSURE: 83 MMHG | SYSTOLIC BLOOD PRESSURE: 150 MMHG | BODY MASS INDEX: 26.58 KG/M2 | WEIGHT: 150 LBS

## 2023-10-25 DIAGNOSIS — M21.621 TAILOR'S BUNION OF BOTH FEET: ICD-10-CM

## 2023-10-25 DIAGNOSIS — Z98.890 STATUS POST FOOT SURGERY: Primary | ICD-10-CM

## 2023-10-25 DIAGNOSIS — S76.012D: ICD-10-CM

## 2023-10-25 DIAGNOSIS — M21.622 TAILOR'S BUNION OF BOTH FEET: ICD-10-CM

## 2023-10-25 DIAGNOSIS — M79.18 LEFT BUTTOCK PAIN: ICD-10-CM

## 2023-10-25 DIAGNOSIS — G89.4 CHRONIC PAIN SYNDROME: Primary | ICD-10-CM

## 2023-10-25 DIAGNOSIS — M79.18 PIRIFORMIS MUSCLE PAIN: ICD-10-CM

## 2023-10-25 PROCEDURE — 99024 POSTOP FOLLOW-UP VISIT: CPT | Performed by: STUDENT IN AN ORGANIZED HEALTH CARE EDUCATION/TRAINING PROGRAM

## 2023-10-25 PROCEDURE — 99213 OFFICE O/P EST LOW 20 MIN: CPT | Performed by: NURSE PRACTITIONER

## 2023-10-25 RX ORDER — METHOCARBAMOL 750 MG/1
750 TABLET, FILM COATED ORAL EVERY 8 HOURS SCHEDULED
Qty: 90 TABLET | Refills: 5
Start: 2023-10-25

## 2023-10-25 NOTE — PROGRESS NOTES
Assessment:  1. Chronic pain syndrome    2. Left buttock pain    3. Rupture of gluteus minimus tendon, left, subsequent encounter    4. Piriformis muscle pain        Plan:  While the patient was in the office today, I did have a thorough conversation regarding their chronic pain syndrome, medication management, and treatment plan options. Patient is being seen for a follow-up visit. She continues with left buttock pain. She was previously treated with physical therapy and aqua therapy which failed to provide her with relief. She has undergone multiple injections without relief. She does report some improvement with the use of Robaxin which she uses as needed as well as Lidoderm patches. She is planning to obtain future prescriptions for Robaxin and Lidoderm patches from her PCP. If her PCP is okay taking over prescriptions for Robaxin and Lidoderm patches, we will follow-up with her as needed. Otherwise, we will follow-up in 6 months. History of Present Illness: The patient is a 79 y.o. female who presents for a follow up office visit in regards to Hip Pain. The patient’s current symptoms include left buttock pain. Current pain level is a 6-7/10. Pain can go up to an 5/72 with certain activities. Quality pain is described as dull and aching. Current pain medications includes: Robaxin 750 mg as needed, Lidoderm patch 5%. The patient reports that this regimen is providing up to 50% pain relief. The patient is reporting no side effects from this pain medication regimen. I have personally reviewed and/or updated the patient's past medical history, past surgical history, family history, social history, current medications, allergies, and vital signs today. Review of Systems  Review of Systems   Constitutional:  Negative for diaphoresis and fever. HENT:  Negative for ear pain, hearing loss and sinus pain. Eyes:  Positive for pain. Negative for visual disturbance.    Respiratory: Negative for shortness of breath and wheezing. Cardiovascular:  Negative for leg swelling. Gastrointestinal:  Negative for abdominal pain, nausea and vomiting. Endocrine: Positive for cold intolerance and heat intolerance. Genitourinary:  Negative for genital sores and urgency. Musculoskeletal:  Positive for gait problem. Negative for joint swelling and myalgias. Skin:  Negative for rash. Neurological:  Negative for dizziness, weakness and numbness. Hematological:  Does not bruise/bleed easily. Psychiatric/Behavioral:  Negative for decreased concentration and suicidal ideas. The patient is not nervous/anxious. Past Medical History:   Diagnosis Date    Anemia     Arthritis     Bilateral bunions     Chronic pain disorder     back and hip pain    Disease of thyroid gland     nodules    History of COVID-2020    mild s/s    History of gastroesophageal reflux (GERD)     "had hiatal hernia surgery"    History of heart murmur in childhood     Hyperlipidemia     Hypertension     Overactive bladder     PONV (postoperative nausea and vomiting)     once after knee surgery    Rotator cuff tear, right     had surgery    Vitamin D deficiency     unsure    Wears glasses     Wears partial dentures     upper       Past Surgical History:   Procedure Laterality Date     SECTION      Last Assessed: 2017    COLONOSCOPY      ELBOW SURGERY      Last Assessed: 2017    ESOPHAGOGASTRODUODENOSCOPY N/A 2017    Procedure: ESOPHAGOGASTRODUODENOSCOPY (EGD); Surgeon: Billye Gosselin, DO;  Location: [facility];   Service: Gastroenterology    HIP SURGERY Left 2018    glut medius/minimus repair  and 18--with pin implanted    HYSTERECTOMY          PARAESOPHAGEAL HERNIA REPAIR N/A 2020    Procedure: LAPAROSCOPIC PARAESOPHAGEAL HERNIA REPAIR WITH MESH AND NISSEN FUNDOPLICATION WITH ROBOTICS;  Surgeon: Jeff Pike MD;  Location: [facility] OR;  Service: General    RI ARTHRP KNE CONDYLE&PLATU MEDIAL&LAT COMPARTMENTS Left 03/15/2023    Procedure: ARTHROPLASTY KNEE TOTAL;  Surgeon: Ismael Lopez DO;  Location: [facility];  Service: Orthopedics    FL ARTHRS KNE SURG W/MENISCECTOMY MED/LAT W/SHVG Left 06/15/2022    Procedure: ;left knee arthroscopy, meniscectomy,synevectomy,chondroplasty, loose body removal, injection;  Surgeon: Ismael Lopez DO;  Location: [facility];  Service: Orthopedics    FL COLONOSCOPY FLX DX W/COLLJ SPEC WHEN PFRMD N/A 06/26/2017    Procedure: EGD AND COLONOSCOPY;  Surgeon: Billye Gosselin, DO;  Location: [facility];   Service: Gastroenterology    FL EXCISON TUMOR SOFT TISSUE THIGH/KNEE SUBQ 3 CM/> Bilateral 05/26/2022    Procedure: EXCISION BIOPSY TISSUE LESION/MASS LOWER EXTREMITY;  Surgeon: Ron Cooper MD;  Location: [facility];  Service: General    FL EXCISON TUMOR SOFT TISSUE THIGH/KNEE SUBQ 3 CM/> Bilateral 08/25/2022    Procedure: EXCISION BIOPSY TISSUE LESION/MASS LOWER EXTREMITY;  Surgeon: Ron Cooper MD;  Location: [facility];  Service: General    FL OSTECTOMY PRTL 5TH METAR HEAD SPX Bilateral 8/15/2023    Procedure: BUNIONECTOMY TAILOR WITHOUT OSTEOTOMY;  Surgeon: Ramila Dowell DPM;  Location: [facility];  Service: Podiatry    SHOULDER ARTHROCENTESIS      SHOULDER ARTHROSCOPY Right 06/23/2021    Procedure: SHOULDER ARTHROSCOPY WITH acromioplasty, debridment, and ROTATOR CUFF REPAIR;  Surgeon: Ritesh Chew DO;  Location: [facility] OR;  Service: Orthopedics    TUBAL LIGATION         Family History   Problem Relation Age of Onset    Colon cancer Mother     Heart attack Father     Other Father         Cardiac Disorder    Diabetes type II Father     Colon cancer Sister     No Known Problems Sister     No Known Problems Sister     No Known Problems Sister     No Known Problems Sister     No Known Problems Daughter     No Known Problems Maternal Aunt     No Known Problems Maternal Aunt     No Known Problems Maternal Aunt     Cancer Maternal Grandmother     No Known Problems Paternal Grandmother        Social History     Occupational History    Not on file   Tobacco Use    Smoking status: Former     Packs/day: 1.50     Years: 30.00     Total pack years: 45.00     Types: Cigarettes     Quit date: 1991     Years since quittin.8    Smokeless tobacco: Never    Tobacco comments:     quit 25 yrs ago   Vaping Use    Vaping Use: Never used   Substance and Sexual Activity    Alcohol use: Never    Drug use: Never    Sexual activity: Not Currently     Comment: defer         Current Outpatient Medications:     atorvastatin (LIPITOR) 10 mg tablet, Take 1 tablet (10 mg total) by mouth daily, Disp: 90 tablet, Rfl: 1    famotidine (PEPCID) 20 mg tablet, Take 1 tablet (20 mg total) by mouth 2 (two) times a day, Disp: 180 tablet, Rfl: 1    hydrOXYzine HCL (ATARAX) 25 mg tablet, Take 1 tablet (25 mg total) by mouth every 6 (six) hours as needed for itching, Disp: 60 tablet, Rfl: 2    lidocaine (Lidoderm) 5 %, Apply 2 patches topically over 12 hours daily Remove & Discard patch within 12 hours or as directed by MD, Disp: 60 patch, Rfl: 5    losartan (COZAAR) 100 MG tablet, Take 1 tablet (100 mg total) by mouth daily, Disp: 90 tablet, Rfl: 1    methocarbamol (Robaxin-750) 750 mg tablet, Take 1 tablet (750 mg total) by mouth every 8 (eight) hours, Disp: 90 tablet, Rfl: 5    ondansetron (Zofran ODT) 4 mg disintegrating tablet, Take 1 tablet (4 mg total) by mouth every 6 (six) hours as needed for nausea or vomiting, Disp: 20 tablet, Rfl: 0    polyethylene glycol (GOLYTELY) 4000 mL solution, Take 4,000 mL by mouth once for 1 dose, Disp: 4000 mL, Rfl: 0    Allergies   Allergen Reactions    Hydrocodone Irritability       Physical Exam:    /83 (BP Location: Left arm, Patient Position: Sitting, Cuff Size: Standard)   Pulse 73   Ht 5' 3" (1.6 m)   Wt 68 kg (150 lb)   BMI 26.57 kg/m²     Constitutional:normal, well developed, well nourished, alert, in no distress and non-toxic and no overt pain behavior. Eyes:anicteric  HEENT:grossly intact  Neck:supple, symmetric, trachea midline and no masses   Pulmonary:even and unlabored  Cardiovascular:No edema or pitting edema present  Skin:Normal without rashes or lesions and well hydrated  Psychiatric:Mood and affect appropriate  Neurologic:Cranial Nerves II-XII grossly intact  Musculoskeletal:normal    Imaging  No orders to display       No orders of the defined types were placed in this encounter.

## 2023-10-25 NOTE — PROGRESS NOTES
Assessment/Plan:    No problem-specific Assessment & Plan notes found for this encounter. Diagnoses and all orders for this visit:    Status post foot surgery    Tailor's bunion of both feet      Plan:     - Patient was counseled and educated on the condition and the diagnosis. The diagnosis, treatment options and prognosis were discussed in detail.   -Recommended patient to wear supportive shoes with wider toebox such as sneakers new balance  -Addressed all questions and concerns  -Return as needed    Subjective:      Patient ID: Bee Catalan is a 79 y.o. female. Patient presents for post-op appointment S/p bilateral tailor's bunionectomy DOS: 8/15/2023. Patient reports she is doing excellent she has no pain or any complaints related to both of her foot procedures. She is very happy with overall outcome of the procedure. No other complaints at this visit. The following portions of the patient's history were reviewed and updated as appropriate: She  has a past medical history of Anemia, Arthritis, Bilateral bunions, Chronic pain disorder, Disease of thyroid gland, History of COVID-19 (2020), History of gastroesophageal reflux (GERD), History of heart murmur in childhood, Hyperlipidemia, Hypertension, Overactive bladder, PONV (postoperative nausea and vomiting), Rotator cuff tear, right, Vitamin D deficiency, Wears glasses, and Wears partial dentures.   She   Patient Active Problem List    Diagnosis Date Noted    Dermatofibroma of female breast 06/05/2023    Status post total left knee replacement 04/28/2023    Primary osteoarthritis of left knee 03/15/2023    Insomnia 03/15/2023    Tendinopathy of right gluteus medius 10/03/2022    Tear of medial meniscus of left knee, current 06/15/2022    Flatulence 06/03/2022    Lipoma of thigh 05/13/2022    Left buttock pain 05/06/2022    Piriformis muscle pain 05/06/2022    Chronic pain syndrome 03/18/2022    Lumbar radiculopathy 03/18/2022    Family history of colon cancer 2022    Bloating 2022    Rupture of gluteus minimus tendon, left, subsequent encounter 2021    Tendinopathy of left gluteus medius 2021    Tear of right rotator cuff 2021    Benign lipomatous neoplasm of skin and subcutaneous tissue of left arm 2021    Nausea 2020    Esophageal obstruction due to food impaction 2020    Paraesophageal hernia 2020    Gastroesophageal reflux disease 2020    Heartburn 2020    GERD (gastroesophageal reflux disease) 2019    Dysphagia 2019    Pain of upper abdomen 2019    Anti-TPO antibodies present 2019    Multinodular goiter 2019    BMI 32.0-32.9,adult 12/10/2019    Primary osteoarthritis of both knees 2019    Chronic pain of left knee 2019    Myofascial pain 07/15/2019    Mixed incontinence 07/15/2019    Sacroiliitis ([address])     Hyperlipidemia 2019    Essential hypertension 2018    OAB (overactive bladder) 2017    Vitamin D deficiency 2017     She  has a past surgical history that includes  section; Elbow surgery; pr colonoscopy flx dx w/collj spec when pfrmd (N/A, 2017); Esophagogastroduodenoscopy (N/A, 2017); Hip surgery (Left, 2018); Hysterectomy; Paraesophageal hernia repair (N/A, 2020); Shoulder arthrocentesis; Shoulder arthroscopy (Right, 2021); pr excison tumor soft tissue thigh/knee subq 3 cm/> (Bilateral, 2022); pr arthrs kne surg w/meniscectomy med/lat w/shvg (Left, 06/15/2022); Colonoscopy; pr excison tumor soft tissue thigh/knee subq 3 cm/> (Bilateral, 2022); Tubal ligation; pr arthrp kne condyle&platu medial&lat compartments (Left, 03/15/2023); and pr ostectomy prtl 5th metar head spx (Bilateral, 8/15/2023).   Current Outpatient Medications   Medication Sig Dispense Refill    atorvastatin (LIPITOR) 10 mg tablet Take 1 tablet (10 mg total) by mouth daily 90 tablet 1 famotidine (PEPCID) 20 mg tablet Take 1 tablet (20 mg total) by mouth 2 (two) times a day 180 tablet 1    hydrOXYzine HCL (ATARAX) 25 mg tablet Take 1 tablet (25 mg total) by mouth every 6 (six) hours as needed for itching 60 tablet 2    lidocaine (Lidoderm) 5 % Apply 2 patches topically over 12 hours daily Remove & Discard patch within 12 hours or as directed by MD 60 patch 5    losartan (COZAAR) 100 MG tablet Take 1 tablet (100 mg total) by mouth daily 90 tablet 1    methocarbamol (Robaxin-750) 750 mg tablet Take 1 tablet (750 mg total) by mouth every 8 (eight) hours 90 tablet 5    ondansetron (Zofran ODT) 4 mg disintegrating tablet Take 1 tablet (4 mg total) by mouth every 6 (six) hours as needed for nausea or vomiting 20 tablet 0    polyethylene glycol (GOLYTELY) 4000 mL solution Take 4,000 mL by mouth once for 1 dose 4000 mL 0     No current facility-administered medications for this visit. .    Review of Systems   All other systems reviewed and are negative. Objective:      /77   Pulse 67   Ht 5' 3" (1.6 m)   Wt 68 kg (150 lb)   BMI 26.57 kg/m²          Physical Exam  Vitals reviewed. Musculoskeletal:      Right foot: No deformity or tenderness. Left foot: No deformity or tenderness. Comments: No pain with palpation or range of motion to the fifth metatarsophalangeal joint. Incision sites have healed beautifully. Resolution of tailor's bunion b/l.

## 2023-11-03 ENCOUNTER — OFFICE VISIT (OUTPATIENT)
Dept: OBGYN CLINIC | Facility: CLINIC | Age: 68
End: 2023-11-03
Payer: COMMERCIAL

## 2023-11-03 VITALS
WEIGHT: 150 LBS | HEIGHT: 63 IN | SYSTOLIC BLOOD PRESSURE: 169 MMHG | BODY MASS INDEX: 26.58 KG/M2 | DIASTOLIC BLOOD PRESSURE: 91 MMHG | HEART RATE: 82 BPM

## 2023-11-03 DIAGNOSIS — M75.102 TEAR OF LEFT ROTATOR CUFF, UNSPECIFIED TEAR EXTENT, UNSPECIFIED WHETHER TRAUMATIC: Primary | ICD-10-CM

## 2023-11-03 DIAGNOSIS — M25.512 LEFT SHOULDER PAIN, UNSPECIFIED CHRONICITY: ICD-10-CM

## 2023-11-03 PROCEDURE — 99213 OFFICE O/P EST LOW 20 MIN: CPT | Performed by: ORTHOPAEDIC SURGERY

## 2023-11-03 NOTE — PROGRESS NOTES
ASSESSMENT/PLAN:    Diagnoses and all orders for this visit:    Tear of left rotator cuff, unspecified tear extent, unspecified whether traumatic  -     MRI shoulder left wo contrast; Future    Left shoulder pain, unspecified chronicity  -     XR shoulder 2+ vw left; Future        X-rays the patient's left shoulder are negative for any fractures or dislocations. There is a subacromial spur present. I am concerned, that the patient has a tear of her rotator cuff. We will order an MRI to rule this out. The patient will follow-up with our office once the MRI is complete. She is acceptable to this plan. Return for MRI results. The patient has marked rotator cuff weakness. Would recommend obtaining an MRI of her left shoulder. Return back once MRI is complete      _____________________________________________________  CHIEF COMPLAINT:  Chief Complaint   Patient presents with    Left Shoulder - Pain         SUBJECTIVE:  Dc Kapoor is a 79 y.o. female who presents to our office complaining of left shoulder pain with decreased range of motion and strength. The patient has had the symptoms for some time, however, they have been worsening recently. She denies any numbness or tingling. She denies any fever or chills.     The following portions of the patient's history were reviewed and updated as appropriate: allergies, current medications, past family history, past medical history, past social history, past surgical history and problem list.    PAST MEDICAL HISTORY:  Past Medical History:   Diagnosis Date    Anemia     Arthritis     Bilateral bunions     Chronic pain disorder     back and hip pain    Disease of thyroid gland     nodules    History of COVID-19 2020    mild s/s    History of gastroesophageal reflux (GERD)     "had hiatal hernia surgery"    History of heart murmur in childhood     Hyperlipidemia     Hypertension     Overactive bladder     PONV (postoperative nausea and vomiting)     once after knee surgery    Rotator cuff tear, right     had surgery    Vitamin D deficiency     unsure    Wears glasses     Wears partial dentures     upper       PAST SURGICAL HISTORY:  Past Surgical History:   Procedure Laterality Date     SECTION      Last Assessed: 2017    COLONOSCOPY      ELBOW SURGERY      Last Assessed: 2017    ESOPHAGOGASTRODUODENOSCOPY N/A 2017    Procedure: ESOPHAGOGASTRODUODENOSCOPY (EGD); Surgeon: Valentine Mitchell DO;  Location: [facility]; Service: Gastroenterology    HIP SURGERY Left 2018    glut medius/minimus repair  and 18--with pin implanted    HYSTERECTOMY          PARAESOPHAGEAL HERNIA REPAIR N/A 2020    Procedure: LAPAROSCOPIC PARAESOPHAGEAL HERNIA REPAIR WITH MESH AND NISSEN FUNDOPLICATION WITH ROBOTICS;  Surgeon: Dawna Fletcher MD;  Location: [facility];  Service: General    OR ARTHRP KNE CONDYLE&PLATU MEDIAL&LAT COMPARTMENTS Left 03/15/2023    Procedure: ARTHROPLASTY KNEE TOTAL;  Surgeon: Doyle Witt DO;  Location: [facility];  Service: Orthopedics    OR ARTHRS KNE SURG W/MENISCECTOMY MED/LAT W/SHVG Left 06/15/2022    Procedure: ;left knee arthroscopy, meniscectomy,synevectomy,chondroplasty, loose body removal, injection;  Surgeon: Doyle Witt DO;  Location: [facility];  Service: Orthopedics    OR COLONOSCOPY FLX DX W/COLLJ SPEC WHEN PFRMD N/A 2017    Procedure: EGD AND COLONOSCOPY;  Surgeon: Valentine Mitchell DO;  Location: [facility];   Service: Gastroenterology    OR EXCISON TUMOR SOFT TISSUE THIGH/KNEE SUBQ 3 CM/> Bilateral 2022    Procedure: EXCISION BIOPSY TISSUE LESION/MASS LOWER EXTREMITY;  Surgeon: Jed Brown MD;  Location: [facility];  Service: General    OR EXCISON TUMOR SOFT TISSUE THIGH/KNEE SUBQ 3 CM/> Bilateral 2022    Procedure: EXCISION BIOPSY TISSUE LESION/MASS LOWER EXTREMITY;  Surgeon: Jed Brown MD;  Location: [facility];  Service: General    OR OSTECTOMY PRTL 5TH METAR HEAD SPX Bilateral 8/15/2023    Procedure: BUNIONECTOMY TAILOR WITHOUT OSTEOTOMY;  Surgeon: Redmond Lundborg, DPM;  Location: [facility];  Service: Podiatry    SHOULDER ARTHROCENTESIS      SHOULDER ARTHROSCOPY Right 2021    Procedure: SHOULDER ARTHROSCOPY WITH acromioplasty, debridment, and ROTATOR CUFF REPAIR;  Surgeon: Wilfred Rojo DO;  Location: [address] [place] [address] MAIN OR;  Service: Orthopedics    TUBAL LIGATION         FAMILY HISTORY:  Family History   Problem Relation Age of Onset    Colon cancer Mother     Heart attack Father     Other Father         Cardiac Disorder    Diabetes type II Father     Colon cancer Sister     No Known Problems Sister     No Known Problems Sister     No Known Problems Sister     No Known Problems Sister     No Known Problems Daughter     No Known Problems Maternal Aunt     No Known Problems Maternal Aunt     No Known Problems Maternal Aunt     Cancer Maternal Grandmother     No Known Problems Paternal Grandmother        SOCIAL HISTORY:  Social History     Tobacco Use    Smoking status: Former     Packs/day: 1.50     Years: 30.00     Total pack years: 45.00     Types: Cigarettes     Quit date: 1991     Years since quittin.8    Smokeless tobacco: Never    Tobacco comments:     quit 25 yrs ago   Vaping Use    Vaping Use: Never used   Substance Use Topics    Alcohol use: Never    Drug use: Never       MEDICATIONS:    Current Outpatient Medications:     atorvastatin (LIPITOR) 10 mg tablet, Take 1 tablet (10 mg total) by mouth daily, Disp: 90 tablet, Rfl: 1    famotidine (PEPCID) 20 mg tablet, Take 1 tablet (20 mg total) by mouth 2 (two) times a day, Disp: 180 tablet, Rfl: 1    hydrOXYzine HCL (ATARAX) 25 mg tablet, Take 1 tablet (25 mg total) by mouth every 6 (six) hours as needed for itching, Disp: 60 tablet, Rfl: 2    lidocaine (Lidoderm) 5 %, Apply 2 patches topically over 12 hours daily Remove & Discard patch within 12 hours or as directed by MD, Disp: 60 patch, Rfl: 5    losartan (COZAAR) 100 MG tablet, Take 1 tablet (100 mg total) by mouth daily, Disp: 90 tablet, Rfl: 1    methocarbamol (Robaxin-750) 750 mg tablet, Take 1 tablet (750 mg total) by mouth every 8 (eight) hours, Disp: 90 tablet, Rfl: 5    ondansetron (Zofran ODT) 4 mg disintegrating tablet, Take 1 tablet (4 mg total) by mouth every 6 (six) hours as needed for nausea or vomiting, Disp: 20 tablet, Rfl: 0    polyethylene glycol (GOLYTELY) 4000 mL solution, Take 4,000 mL by mouth once for 1 dose, Disp: 4000 mL, Rfl: 0    ALLERGIES:  Allergies   Allergen Reactions    Hydrocodone Irritability       ROS:  Review of Systems     Constitutional: Negative for fatigue, fever or loss of appetite. HENT: Negative. Respiratory: Negative for shortness of breath, dyspnea. Cardiovascular: Negative for chest pain/tightness. Gastrointestinal: Negative for abdominal pain, N/V. Endocrine: Negative for cold/heat intolerance, unexplained weight loss/gain. Genitourinary: Negative for flank pain, dysuria, hematuria. Musculoskeletal: Positive for arthralgia   Skin: Negative for rash. Neurological: Negative for numbness or tingling  Psychiatric/Behavioral: Negative for agitation. _____________________________________________________  PHYSICAL EXAMINATION:    Blood pressure 169/91, pulse 82, height 5' 3" (1.6 m), weight 68 kg (150 lb). Constitutional: Oriented to person, place, and time. Appears well-developed and well-nourished. No distress. HENT:   Head: Normocephalic. Eyes: Conjunctivae are normal. Right eye exhibits no discharge. Left eye exhibits no discharge. No scleral icterus. Cardiovascular: Normal rate. Pulmonary/Chest: Effort normal.   Neurological: Alert and oriented to person, place, and time. Skin: Skin is warm and dry. No rash noted. Not diaphoretic. No erythema. No pallor. Psychiatric: Normal mood and affect.  Behavior is normal. Judgment and thought content normal.      MUSCULOSKELETAL EXAMINATION: Physical Exam  Ortho Exam    Left upper extremity is neurovascularly intact  Fingers are pink and mobile  Compartments are soft  Range of motion of the shoulder is from 0 to 85 degrees  Rotator cuff strength 3 and half out of 5  Positive drop arm  Brisk cap refill and sensation intact  Mild signs of impingement  Objective:  BP Readings from Last 1 Encounters:   11/03/23 169/91      Wt Readings from Last 1 Encounters:   11/03/23 68 kg (150 lb)        BMI:   Estimated body mass index is 26.57 kg/m² as calculated from the following:    Height as of this encounter: 5' 3" (1.6 m). Weight as of this encounter: 68 kg (150 lb).           Scribe Attestation      I,:  Stevie Fischer PA-C am acting as a scribe while in the presence of the attending physician.:       I,:  Christiano Maza, DO personally performed the services described in this documentation    as scribed in my presence.:

## 2023-11-04 DIAGNOSIS — I10 ESSENTIAL HYPERTENSION: ICD-10-CM

## 2023-11-04 DIAGNOSIS — K21.9 GASTROESOPHAGEAL REFLUX DISEASE WITHOUT ESOPHAGITIS: ICD-10-CM

## 2023-11-06 ENCOUNTER — ANESTHESIA (OUTPATIENT)
Dept: GASTROENTEROLOGY | Facility: HOSPITAL | Age: 68
End: 2023-11-06

## 2023-11-06 ENCOUNTER — HOSPITAL ENCOUNTER (OUTPATIENT)
Dept: MRI IMAGING | Facility: HOSPITAL | Age: 68
Discharge: HOME | End: 2023-11-06
Attending: PHYSICIAN ASSISTANT
Payer: COMMERCIAL

## 2023-11-06 ENCOUNTER — ANESTHESIA EVENT (OUTPATIENT)
Dept: GASTROENTEROLOGY | Facility: HOSPITAL | Age: 68
End: 2023-11-06

## 2023-11-06 ENCOUNTER — HOSPITAL ENCOUNTER (OUTPATIENT)
Dept: GASTROENTEROLOGY | Facility: HOSPITAL | Age: 68
Setting detail: OUTPATIENT SURGERY
Discharge: HOME | End: 2023-11-06
Payer: COMMERCIAL

## 2023-11-06 VITALS
WEIGHT: 143 LBS | BODY MASS INDEX: 25.34 KG/M2 | DIASTOLIC BLOOD PRESSURE: 86 MMHG | HEIGHT: 63 IN | TEMPERATURE: 97.6 F | SYSTOLIC BLOOD PRESSURE: 137 MMHG | HEART RATE: 76 BPM | RESPIRATION RATE: 18 BRPM | OXYGEN SATURATION: 100 %

## 2023-11-06 DIAGNOSIS — K57.92 DIVERTICULITIS: ICD-10-CM

## 2023-11-06 DIAGNOSIS — M75.102 TEAR OF LEFT ROTATOR CUFF, UNSPECIFIED TEAR EXTENT, UNSPECIFIED WHETHER TRAUMATIC: ICD-10-CM

## 2023-11-06 PROCEDURE — 45385 COLONOSCOPY W/LESION REMOVAL: CPT | Performed by: STUDENT IN AN ORGANIZED HEALTH CARE EDUCATION/TRAINING PROGRAM

## 2023-11-06 PROCEDURE — G1004 CDSM NDSC: HCPCS

## 2023-11-06 PROCEDURE — 73221 MRI JOINT UPR EXTREM W/O DYE: CPT

## 2023-11-06 PROCEDURE — 88305 TISSUE EXAM BY PATHOLOGIST: CPT | Performed by: PATHOLOGY

## 2023-11-06 RX ORDER — SODIUM CHLORIDE, SODIUM LACTATE, POTASSIUM CHLORIDE, CALCIUM CHLORIDE 600; 310; 30; 20 MG/100ML; MG/100ML; MG/100ML; MG/100ML
20 INJECTION, SOLUTION INTRAVENOUS CONTINUOUS
Status: DISCONTINUED | OUTPATIENT
Start: 2023-11-06 | End: 2023-11-10 | Stop reason: HOSPADM

## 2023-11-06 RX ORDER — PROPOFOL 10 MG/ML
INJECTION, EMULSION INTRAVENOUS CONTINUOUS PRN
Status: DISCONTINUED | OUTPATIENT
Start: 2023-11-06 | End: 2023-11-06

## 2023-11-06 RX ORDER — PROPOFOL 10 MG/ML
INJECTION, EMULSION INTRAVENOUS AS NEEDED
Status: DISCONTINUED | OUTPATIENT
Start: 2023-11-06 | End: 2023-11-06

## 2023-11-06 RX ORDER — FAMOTIDINE 20 MG/1
20 TABLET, FILM COATED ORAL 2 TIMES DAILY
Qty: 180 TABLET | Refills: 0 | Status: SHIPPED | OUTPATIENT
Start: 2023-11-06

## 2023-11-06 RX ORDER — LOSARTAN POTASSIUM 100 MG/1
100 TABLET ORAL DAILY
Qty: 90 TABLET | Refills: 0 | Status: SHIPPED | OUTPATIENT
Start: 2023-11-06

## 2023-11-06 RX ORDER — SODIUM CHLORIDE, SODIUM LACTATE, POTASSIUM CHLORIDE, CALCIUM CHLORIDE 600; 310; 30; 20 MG/100ML; MG/100ML; MG/100ML; MG/100ML
INJECTION, SOLUTION INTRAVENOUS CONTINUOUS PRN
Status: DISCONTINUED | OUTPATIENT
Start: 2023-11-06 | End: 2023-11-06

## 2023-11-06 RX ORDER — SODIUM CHLORIDE, SODIUM LACTATE, POTASSIUM CHLORIDE, CALCIUM CHLORIDE 600; 310; 30; 20 MG/100ML; MG/100ML; MG/100ML; MG/100ML
100 INJECTION, SOLUTION INTRAVENOUS CONTINUOUS
Status: DISCONTINUED | OUTPATIENT
Start: 2023-11-06 | End: 2023-11-10 | Stop reason: HOSPADM

## 2023-11-06 RX ADMIN — PROPOFOL 50 MG: 10 INJECTION, EMULSION INTRAVENOUS at 08:59

## 2023-11-06 RX ADMIN — SODIUM CHLORIDE, SODIUM LACTATE, POTASSIUM CHLORIDE, AND CALCIUM CHLORIDE: .6; .31; .03; .02 INJECTION, SOLUTION INTRAVENOUS at 08:52

## 2023-11-06 RX ADMIN — PROPOFOL 100 MCG/KG/MIN: 10 INJECTION, EMULSION INTRAVENOUS at 08:58

## 2023-11-06 RX ADMIN — PROPOFOL 50 MG: 10 INJECTION, EMULSION INTRAVENOUS at 08:57

## 2023-11-06 NOTE — ANESTHESIA PREPROCEDURE EVALUATION
Procedure:  COLONOSCOPY    Relevant Problems   CARDIO   (+) Essential hypertension   (+) Hyperlipidemia      GI/HEPATIC   (+) Dysphagia   (+) GERD (gastroesophageal reflux disease)   (+) Gastroesophageal reflux disease   (+) Paraesophageal hernia      MUSCULOSKELETAL   (+) Paraesophageal hernia   (+) Primary osteoarthritis of both knees   (+) Primary osteoarthritis of left knee   (+) Sacroiliitis (HCC)      NEURO/PSYCH   (+) Chronic pain syndrome      PONV - once very long ago    Physical Exam    Airway    Mallampati score: II         Dental   No notable dental hx upper dentures    Cardiovascular  Cardiovascular exam normal    Pulmonary  Pulmonary exam normal     Other Findings        Anesthesia Plan  ASA Score- 2     Anesthesia Type- IV sedation with anesthesia with ASA Monitors. Additional Monitors:     Airway Plan:            Plan Factors-    Chart reviewed. EKG reviewed. Existing labs reviewed. Patient summary reviewed. Induction- intravenous. Postoperative Plan-     Informed Consent- Anesthetic plan and risks discussed with patient. I personally reviewed this patient with the CRNA. Discussed and agreed on the Anesthesia Plan with the CRNA. Garrett Waldrop

## 2023-11-06 NOTE — H&P
History and Physical - [facility]  Yee Lund 79 y.o. female MRN: [id number]                  HPI: Yee Lund is a 79y.o. year old female who presents for diverticulitis, family history of colon cancer      REVIEW OF SYSTEMS: Per the HPI, and otherwise unremarkable. Historical Information   Past Medical History:   Diagnosis Date    Anemia     Arthritis     Bilateral bunions     Chronic pain disorder     back and hip pain    Colon polyp     Disease of thyroid gland     nodules    History of COVID-2020    mild s/s    History of gastroesophageal reflux (GERD)     "had hiatal hernia surgery"    History of heart murmur in childhood     Hyperlipidemia     Hypertension     Overactive bladder     PONV (postoperative nausea and vomiting)     once after knee surgery    Rotator cuff tear, right     had surgery    Vitamin D deficiency     unsure    Wears glasses     Wears partial dentures     upper     Past Surgical History:   Procedure Laterality Date     SECTION      Last Assessed: 2017    COLONOSCOPY      ELBOW SURGERY      Last Assessed: 2017    ESOPHAGOGASTRODUODENOSCOPY N/A 2017    Procedure: ESOPHAGOGASTRODUODENOSCOPY (EGD); Surgeon: Yvonne Robb DO;  Location: [facility];   Service: Gastroenterology    HIP SURGERY Left 2018    glut medius/minimus repair  and 18--with pin implanted    HYSTERECTOMY          PARAESOPHAGEAL HERNIA REPAIR N/A 2020    Procedure: LAPAROSCOPIC PARAESOPHAGEAL HERNIA REPAIR WITH MESH AND NISSEN FUNDOPLICATION WITH ROBOTICS;  Surgeon: Jason Sotelo MD;  Location: [facility];  Service: General    TX ARTHRP KNE CONDYLE&PLATU MEDIAL&LAT COMPARTMENTS Left 03/15/2023    Procedure: ARTHROPLASTY KNEE TOTAL;  Surgeon: Drea Muñoz DO;  Location: [facility];  Service: Orthopedics    TX ARTHRS KNE SURG W/MENISCECTOMY MED/LAT W/SHVG Left 06/15/2022    Procedure: ;left knee arthroscopy, meniscectomy,synevectomy,chondroplasty, loose body removal, injection;  Surgeon: Ramya Kwon DO;  Location: [facility];  Service: Orthopedics    UT COLONOSCOPY FLX DX W/COLLJ SPEC WHEN PFRMD N/A 2017    Procedure: EGD AND COLONOSCOPY;  Surgeon: Claudette Kc DO;  Location: [facility];   Service: Gastroenterology    UT EXCISON TUMOR SOFT TISSUE THIGH/KNEE SUBQ 3 CM/> Bilateral 2022    Procedure: EXCISION BIOPSY TISSUE LESION/MASS LOWER EXTREMITY;  Surgeon: Margaret Josue MD;  [facility]: [facility];  Service: General    UT EXCISON TUMOR SOFT TISSUE THIGH/KNEE SUBQ 3 CM/> Bilateral 2022    Procedure: EXCISION BIOPSY TISSUE LESION/MASS LOWER EXTREMITY;  Surgeon: Margaret Josue MD;  [facility];  Service: General    UT OSTECTOMY PRTL 5TH METAR HEAD SPX Bilateral 8/15/2023    Procedure: BUNIONECTOMY TAILOR WITHOUT OSTEOTOMY;  Surgeon: Saige Bhagat DPM;  [facility]: [facility];  Service: Podiatry    SHOULDER ARTHROCENTESIS      SHOULDER ARTHROSCOPY Right 2021    Procedure: SHOULDER ARTHROSCOPY WITH acromioplasty, debridment, and ROTATOR CUFF REPAIR;  Surgeon: Maxine Simon DO;  Location: [facility] OR;  Service: Orthopedics    TUBAL LIGATION       Social History   Social History     Substance and Sexual Activity   Alcohol Use Never     Social History     Substance and Sexual Activity   Drug Use Never     Social History     Tobacco Use   Smoking Status Former    Packs/day: 1.50    Years: 30.00    Total pack years: 45.00    Types: Cigarettes    Quit date: 1991    Years since quittin.8   Smokeless Tobacco Never   Tobacco Comments    quit 25 yrs ago     Family History   Problem Relation Age of Onset    Colon cancer Mother     Heart attack Father     Other Father         Cardiac Disorder    Diabetes type II Father     Colon cancer Sister     No Known Problems Sister     No Known Problems Sister     No Known Problems Sister     No Known Problems Sister     No Known Problems Daughter     No Known Problems Maternal Aunt     No Known Problems Maternal Aunt     No Known Problems Maternal Aunt     Cancer Maternal Grandmother     No Known Problems Paternal Grandmother        Meds/Allergies       Current Outpatient Medications:     atorvastatin (LIPITOR) 10 mg tablet    hydrOXYzine HCL (ATARAX) 25 mg tablet    lidocaine (Lidoderm) 5 %    losartan (COZAAR) 100 MG tablet    famotidine (PEPCID) 20 mg tablet    methocarbamol (Robaxin-750) 750 mg tablet    Current Facility-Administered Medications:     lactated ringers infusion, 100 mL/hr, Intravenous, Continuous    Allergies   Allergen Reactions    Hydrocodone Irritability       Objective     BP (!) 200/96   Pulse 69   Temp 98 °F (36.7 °C) (Temporal)   Resp 18   Ht 5' 3" (1.6 m)   Wt 64.9 kg (143 lb)   SpO2 100%   BMI 25.33 kg/m²       PHYSICAL EXAM    Gen: NAD  Head: NCAT  CV: RRR  CHEST: Clear  ABD: soft, NT/ND  EXT: no edema      ASSESSMENT/PLAN:  This is a 79y.o. year old female here for colonoscopy, and she is stable and optimized for her procedure.

## 2023-11-06 NOTE — ANESTHESIA POSTPROCEDURE EVALUATION
Post-Op Assessment Note    CV Status:  Stable  Pain Score: 0    Pain management: adequate     Mental Status:  Alert and awake   Hydration Status:  Euvolemic   PONV Controlled:  Controlled   Airway Patency:  Patent      Post Op Vitals Reviewed: Yes      Staff: CRNA         No notable events documented.     BP  122/56   Temp 97   Pulse 78   Resp 12   SpO2 99

## 2023-11-06 NOTE — TELEPHONE ENCOUNTER
Patient requesting refill(s) of: pepcid     Last filled: 9/11/2023  Last appt:3/2023  Next appt:3/11/2024  Pharmacy:    [facility]

## 2023-11-06 NOTE — TELEPHONE ENCOUNTER
Patient requesting refill(s) of: losartan     Last filled: 9/11/2023  Last appt: 2/13/2023  Next appt: 3/11/2024  Pharmacy:    [facility]

## 2023-11-08 PROCEDURE — 88305 TISSUE EXAM BY PATHOLOGIST: CPT | Performed by: PATHOLOGY

## 2023-11-13 ENCOUNTER — PREP FOR PROCEDURE (OUTPATIENT)
Dept: OBGYN CLINIC | Facility: CLINIC | Age: 68
End: 2023-11-13

## 2023-11-13 ENCOUNTER — OFFICE VISIT (OUTPATIENT)
Dept: OBGYN CLINIC | Facility: CLINIC | Age: 68
End: 2023-11-13
Payer: COMMERCIAL

## 2023-11-13 VITALS
DIASTOLIC BLOOD PRESSURE: 98 MMHG | WEIGHT: 143 LBS | SYSTOLIC BLOOD PRESSURE: 179 MMHG | BODY MASS INDEX: 25.34 KG/M2 | HEIGHT: 63 IN | HEART RATE: 75 BPM

## 2023-11-13 DIAGNOSIS — Z01.812 PRE-OPERATIVE LABORATORY EXAMINATION: ICD-10-CM

## 2023-11-13 DIAGNOSIS — M75.42 IMPINGEMENT SYNDROME OF LEFT SHOULDER: Primary | ICD-10-CM

## 2023-11-13 PROCEDURE — 99213 OFFICE O/P EST LOW 20 MIN: CPT | Performed by: ORTHOPAEDIC SURGERY

## 2023-11-13 RX ORDER — CHLORHEXIDINE GLUCONATE ORAL RINSE 1.2 MG/ML
15 SOLUTION DENTAL ONCE
OUTPATIENT
Start: 2023-11-13 | End: 2023-11-13

## 2023-11-13 RX ORDER — CHLORHEXIDINE GLUCONATE 4 G/100ML
SOLUTION TOPICAL DAILY PRN
OUTPATIENT
Start: 2023-11-13

## 2023-11-13 NOTE — PROGRESS NOTES
ASSESSMENT/PLAN:    Diagnoses and all orders for this visit:    Impingement syndrome of left shoulder  -     Case request operating room: ARTHROSCOPY SHOULDER WITH POSSIBLE ACROMIOPLASTY; Standing  -     Ambulatory Referral to Physical Therapy; Future  -     Ambulatory Referral to Occupational Therapy; Future  -     Case request operating room: ARTHROSCOPY SHOULDER WITH POSSIBLE ACROMIOPLASTY    Pre-operative laboratory examination  -     EKG 12 lead; Future    Other orders  -     Nursing Communication Warmimg Interventions Implemented; Standing  -     Nursing Communication CHG bath, have staff wash entire body (neck down) per pre-op bathing protocol. Routine, evening prior to, and day of surgery.; Standing  -     Nursing Communication Swab both nares with Povidone-Iodine solution, EXCLUDE if patient has shellfish/Iodine allergy, and replace with nasal alcohol swabstick. Routine, day of surgery, on call to OR; Standing  -     chlorhexidine (PERIDEX) 0.12 % oral rinse 15 mL  -     Void on call to OR; Standing  -     Insert peripheral IV; Standing  -     chlorhexidine (HIBICLENS) 4 % topical liquid  -     ceFAZolin (ANCEF) 2,000 mg in dextrose 5 % 100 mL IVPB        An MRI of the patient's left shoulder is consistent with rotator cuff tendinosis. The scan was negative for any rotator cuff tears. The patient stated that her symptoms were continuing to worsen and starting to affect her quality of life. After exhausting conservative treatment, the risks and benefits of surgery were discussed with the patient. She decided on an elective left shoulder arthroscopy with possible acromioplasty. The patient surgery attending scheduled for January 10, 2024. She is acceptable to this plan. Return for surgery. The patient has impingement of her left shoulder. Treatment options were discussed. She does not want any more injection therapy. She wants proceed with elective arthroscopy of left shoulder.   All risk, complications, benefits were discussed with the patient in great detail including bleeding, infection, blood clots, pain, stiffness, neurovascular damage, fractures, dislocations, the possibility loss of life from surgery, heart attack, stroke, etc.  Her surgery scheduled for January 10, 2024      _____________________________________________________  CHIEF COMPLAINT:  Chief Complaint   Patient presents with    Left Shoulder - Follow-up     MRI report         SUBJECTIVE:  Sanjiv Burdick is a 79 y.o. female who presents to our office to review MRI results of her left shoulder. She is still complaining of left shoulder pain with decreased strength and range of motion. She denies any numbness and tingling. She denies any fever or chills. The following portions of the patient's history were reviewed and updated as appropriate: allergies, current medications, past family history, past medical history, past social history, past surgical history and problem list.    PAST MEDICAL HISTORY:  Past Medical History:   Diagnosis Date    Anemia     Arthritis     Bilateral bunions     Chronic pain disorder     back and hip pain    Colon polyp     Disease of thyroid gland     nodules    History of COVID-19     mild s/s    History of gastroesophageal reflux (GERD)     "had hiatal hernia surgery"    History of heart murmur in childhood     Hyperlipidemia     Hypertension     Overactive bladder     PONV (postoperative nausea and vomiting)     once after knee surgery    Rotator cuff tear, right     had surgery    Vitamin D deficiency     unsure    Wears glasses     Wears partial dentures     upper       PAST SURGICAL HISTORY:  Past Surgical History:   Procedure Laterality Date     SECTION      Last Assessed: 2017    COLONOSCOPY      ELBOW SURGERY      Last Assessed: 2017    ESOPHAGOGASTRODUODENOSCOPY N/A 2017    Procedure: ESOPHAGOGASTRODUODENOSCOPY (EGD);   Surgeon: Michell Santiago DO;  [facility]; Service: Gastroenterology    HIP SURGERY Left 02/08/2018    glut medius/minimus repair  and 8/8/18--with pin implanted    HYSTERECTOMY      2011    PARAESOPHAGEAL HERNIA REPAIR N/A 05/19/2020    Procedure: LAPAROSCOPIC PARAESOPHAGEAL HERNIA REPAIR WITH MESH AND NISSEN FUNDOPLICATION WITH ROBOTICS;  Surgeon: Zarina Middleton MD;  Location: [facility];  Service: General    CT ARTHRP KNE CONDYLE&PLATU MEDIAL&LAT COMPARTMENTS Left 03/15/2023    Procedure: ARTHROPLASTY KNEE TOTAL;  Surgeon: Marley Stein DO;  [facility];  Service: Orthopedics    CT ARTHRS KNE SURG W/MENISCECTOMY MED/LAT W/SHVG Left 06/15/2022    Procedure: ;left knee arthroscopy, meniscectomy,synevectomy,chondroplasty, loose body removal, injection;  Surgeon: Marley Stein DO;  [facility];  Service: Orthopedics    CT COLONOSCOPY FLX DX W/COLLJ SPEC WHEN PFRMD N/A 06/26/2017    Procedure: EGD AND COLONOSCOPY;  Surgeon: Angelina Killian DO;  Location: [facility];   Service: Gastroenterology    CT EXCISON TUMOR SOFT TISSUE THIGH/KNEE SUBQ 3 CM/> Bilateral 05/26/2022    Procedure: EXCISION BIOPSY TISSUE LESION/MASS LOWER EXTREMITY;  Surgeon: Nakita Pickard MD;  [facility];  Service: General    CT EXCISON TUMOR SOFT TISSUE THIGH/KNEE SUBQ 3 CM/> Bilateral 08/25/2022    Procedure: EXCISION BIOPSY TISSUE LESION/MASS LOWER EXTREMITY;  Surgeon: Nakita Pickard MD;  [facility];  Service: General    CT OSTECTOMY PRTL 5TH METAR HEAD SPX Bilateral 8/15/2023    Procedure: BUNIONECTOMY TAILOR WITHOUT OSTEOTOMY;  Surgeon: Maximino German DPM;  [facility]: [facility];  Service: Podiatry    SHOULDER ARTHROCENTESIS      SHOULDER ARTHROSCOPY Right 06/23/2021    Procedure: SHOULDER ARTHROSCOPY WITH acromioplasty, debridment, and ROTATOR CUFF REPAIR;  Surgeon: Checo Hall DO;  Location: [address] [place] [address] MAIN OR;  Service: Orthopedics    TUBAL LIGATION         FAMILY HISTORY:  Family History   Problem Relation Age of Onset Colon cancer Mother     Heart attack Father     Other Father         Cardiac Disorder    Diabetes type II Father     Colon cancer Sister     No Known Problems Sister     No Known Problems Sister     No Known Problems Sister     No Known Problems Sister     No Known Problems Daughter     No Known Problems Maternal Aunt     No Known Problems Maternal Aunt     No Known Problems Maternal Aunt     Cancer Maternal Grandmother     No Known Problems Paternal Grandmother        SOCIAL HISTORY:  Social History     Tobacco Use    Smoking status: Former     Packs/day: 1.50     Years: 30.00     Total pack years: 45.00     Types: Cigarettes     Quit date: 1991     Years since quittin.8    Smokeless tobacco: Never    Tobacco comments:     quit 25 yrs ago   Vaping Use    Vaping Use: Never used   Substance Use Topics    Alcohol use: Never    Drug use: Never       MEDICATIONS:    Current Outpatient Medications:     atorvastatin (LIPITOR) 10 mg tablet, Take 1 tablet (10 mg total) by mouth daily, Disp: 90 tablet, Rfl: 1    famotidine (PEPCID) 20 mg tablet, Take 1 tablet (20 mg total) by mouth 2 (two) times a day, Disp: 180 tablet, Rfl: 0    hydrOXYzine HCL (ATARAX) 25 mg tablet, Take 1 tablet (25 mg total) by mouth every 6 (six) hours as needed for itching, Disp: 60 tablet, Rfl: 2    lidocaine (Lidoderm) 5 %, Apply 2 patches topically over 12 hours daily Remove & Discard patch within 12 hours or as directed by MD, Disp: 60 patch, Rfl: 5    losartan (COZAAR) 100 MG tablet, Take 1 tablet (100 mg total) by mouth daily, Disp: 90 tablet, Rfl: 0    methocarbamol (Robaxin-750) 750 mg tablet, Take 1 tablet (750 mg total) by mouth every 8 (eight) hours, Disp: 90 tablet, Rfl: 5    ALLERGIES:  Allergies   Allergen Reactions    Hydrocodone Irritability       ROS:  Review of Systems     Constitutional: Negative for fatigue, fever or loss of appetite. HENT: Negative. Respiratory: Negative for shortness of breath, dyspnea. Cardiovascular: Negative for chest pain/tightness. Gastrointestinal: Negative for abdominal pain, N/V. Endocrine: Negative for cold/heat intolerance, unexplained weight loss/gain. Genitourinary: Negative for flank pain, dysuria, hematuria. Musculoskeletal: Positive for arthralgia   Skin: Negative for rash. Neurological: Negative for numbness or tingling  Psychiatric/Behavioral: Negative for agitation. _____________________________________________________  PHYSICAL EXAMINATION:    Blood pressure (!) 179/98, pulse 75, height 5' 3" (1.6 m), weight 64.9 kg (143 lb). Constitutional: Oriented to person, place, and time. Appears well-developed and well-nourished. No distress. HENT:   Head: Normocephalic. Eyes: Conjunctivae are normal. Right eye exhibits no discharge. Left eye exhibits no discharge. No scleral icterus. Cardiovascular: Normal rate. Pulmonary/Chest: Effort normal.   Neurological: Alert and oriented to person, place, and time. Skin: Skin is warm and dry. No rash noted. Not diaphoretic. No erythema. No pallor. Psychiatric: Normal mood and affect. Behavior is normal. Judgment and thought content normal.      MUSCULOSKELETAL EXAMINATION:   Physical Exam  Ortho Exam    Left upper extremity is neurovascularly intact  Fingers are pink and mobile  Compartments are soft  Range of motion of the shoulder from 0 to 90 degrees of forward flexion and abduction  Rotator cuff strength 3 and half out of 5  Signs impingement  Tenderness to palpation along biceps  Brisk cap refill and sensation intact  Objective:  BP Readings from Last 1 Encounters:   11/13/23 (!) 179/98      Wt Readings from Last 1 Encounters:   11/13/23 64.9 kg (143 lb)        BMI:   Estimated body mass index is 25.33 kg/m² as calculated from the following:    Height as of this encounter: 5' 3" (1.6 m). Weight as of this encounter: 64.9 kg (143 lb).         Scribe Attestation      I,:  Louise Dubin, PA-C am acting as a scribe while in the presence of the attending physician.:       I,:  Jeane Bower, DO personally performed the services described in this documentation    as scribed in my presence.:

## 2023-11-20 ENCOUNTER — TELEPHONE (OUTPATIENT)
Dept: OBGYN CLINIC | Facility: CLINIC | Age: 68
End: 2023-11-20

## 2023-11-20 ENCOUNTER — OFFICE VISIT (OUTPATIENT)
Dept: GASTROENTEROLOGY | Facility: CLINIC | Age: 68
End: 2023-11-20
Payer: COMMERCIAL

## 2023-11-20 VITALS
SYSTOLIC BLOOD PRESSURE: 142 MMHG | RESPIRATION RATE: 18 BRPM | DIASTOLIC BLOOD PRESSURE: 88 MMHG | OXYGEN SATURATION: 98 % | BODY MASS INDEX: 25.33 KG/M2 | HEART RATE: 67 BPM | HEIGHT: 63 IN

## 2023-11-20 DIAGNOSIS — Z80.0 FAMILY HISTORY OF COLON CANCER: ICD-10-CM

## 2023-11-20 DIAGNOSIS — R13.10 DYSPHAGIA, UNSPECIFIED TYPE: ICD-10-CM

## 2023-11-20 DIAGNOSIS — R14.0 ABDOMINAL BLOATING: ICD-10-CM

## 2023-11-20 DIAGNOSIS — Z98.890 HISTORY OF NISSEN FUNDOPLICATION: ICD-10-CM

## 2023-11-20 DIAGNOSIS — K21.9 GASTROESOPHAGEAL REFLUX DISEASE WITHOUT ESOPHAGITIS: ICD-10-CM

## 2023-11-20 DIAGNOSIS — Z86.010 PERSONAL HISTORY OF COLONIC POLYPS: Primary | ICD-10-CM

## 2023-11-20 DIAGNOSIS — R19.8 ALTERNATING CONSTIPATION AND DIARRHEA: ICD-10-CM

## 2023-11-20 PROCEDURE — 99214 OFFICE O/P EST MOD 30 MIN: CPT | Performed by: PHYSICIAN ASSISTANT

## 2023-11-20 RX ORDER — FAMOTIDINE 20 MG/1
20 TABLET, FILM COATED ORAL 2 TIMES DAILY PRN
Qty: 180 TABLET | Refills: 0 | Status: SHIPPED | OUTPATIENT
Start: 2023-11-20

## 2023-11-20 NOTE — TELEPHONE ENCOUNTER
Patient called to check if there was a cancellation for surgery with Dr Dee Carlson. I returned call with no opportunity of moving her surgery up but I will continue to look for any changes and will contact her at that point.

## 2023-11-20 NOTE — PROGRESS NOTES
[facility] - Outpatient Follow-up Note  Ivelisse Santiago 79 y.o. female MRN: [id number]  Encounter: [id number]    ASSESSMENT AND PLAN:      1. Personal history of colonic polyps  2. Family history of colon cancer    Pt with first bout of diverticulitis in 09/2023. Subsequent colonoscopy in 11/2023 with one adenomatous polyp removed from cecum. Given personal history of colon polyps and family hx of colon cancer, recommendation for repeat colonoscopy for surveillance purposes in 3 years. 3. Abdominal bloating  4. Alternating constipation and diarrhea    Patient has bowel habits at baseline. However, recently, she has tended more toward constipation. She has been complaining of generalized abdominal bloating for the past couple of months. We did review this may be related to her stool burden. We will plan to start her on daily MiraLAX at this time which can be titrated/taper to effect. We also reviewed bacterial balance and intestines particularly concern for development of SIBO. She may also have a component of irritable bowel syndrome. We reviewed low FODMAP diet, trial of Beano, IBgard, and SIBO breath test.     - Small intestinal bacterial overgrowth    5. Gastroesophageal reflux disease without esophagitis  6. Dysphagia, unspecified type  7. History of Nissen fundoplication    Patient who historically underwent a Nissen fundoplication for large hiatal hernia and treatment refractory GERD. She has had intermittent solid food dysphagia since that time. Last EGD in 04/2022 with empiric dilation of the GEJ. Upper GI series in 02/2023 with mild delayed passage of contrast through the fundoplication into the stomach. She was previously following with Dr. Wally Cardenas, and his notes indicated consideration of repeat manometry. At present pt declines repeat testing as she shares test was very uncomfortable.      We did review at this time to continue with dysphagia precautions, including  sitting upright during and after meals, chewing thoroughly, alternating between small bites of solids and sips of liquids, only eating when fully alert, not reclining close to bed, etc.    We reviewed additional options to consider:   - If there is a concern for some underlying esophageal motility disorder, could consider trial of peppermint supplement which may relax the smooth muscle of the GI tract and/or IBgard  - If concern for oropharyngeal component, would recommend video swallow study with speech and language pathologist  - If dysphagia worsens, consider repeat EGD with repeat empiric dilation  though suspect improvement would be transient as previous   - Will plan to reach out to Dr. Ellen Desai for additional recs    - famotidine (PEPCID) 20 mg tablet; Take 1 tablet (20 mg total) by mouth 2 (two) times a day as needed for heartburn  Dispense: 180 tablet; Refill: 0    We will follow up in approx 3 months.   ______________________________________________________________________    SUBJECTIVE: Patient is a 79 y.o. female who presents today for follow-up regarding colonoscopy. Pmhx sig for GERD, hx of hiatal hernia repair, HTN, sacroiliitis, HLD, Vit D deficiency. Patient was last evaluated in 09/2023. At that time, she was following up from an ER evaluation in 24/7652 for uncomplicated sigmoid diverticulitis. She chronically noted alternating bowel habits between firm and loose stool. She had a family history of colon cancer and ultimately underwent a colonoscopy in 11/2023. This demonstrated a 5 mm adenoma in the cecum and diverticula in the sigmoid and rectosigmoid as well as small internal hemorrhoids. She was recommended to start on a fiber supplement to help regulate her bowel habits. 11/20/23:     Patient shares that since last office visit she has been dealing with more constipation if anything.   She will need to strain to have a bowel movement and at times feels a sense of incomplete evacuation. She denies any BRBPR, melena, or nocturnal Bms. No urgent loose stools at present. She is continuing to feel abdominal bloating and discomfort, "I always feel bloated". Does not seem to matter what she eats. She shares that she eats fairly clean and she is on a protein shake type diet for weight loss. Has lost about 25 pounds intentionally in the past 6 months. She denies any nausea, emesis. No significant heartburn or indigestion. She still continues to have intermittent dysphagia to solids, she feels that this gets stuck at the level of her throat, she will need to drink lots of water to help allow solids to pass.  states she is eating too quickly. Gives an example recently of sweet potatoes getting stuck. 09/2023: CT A/P:  Acute uncomplicated sigmoid diverticulitis. 09/2023: Hb 13.5, MCV 88, Plt 245, AST 16, ALT 11, ALP 98, t bili 0.48, albumin 4.0      Endoscopic history:   EGD: 04/2022: Normal esophagus, dilated GEJ with savory dilator; normal stomach, retroflexion showed hernia repair  A. Esophagus, random (biopsy): Reactive esophageal mucosa; Eosinophils are inconspicuous; No intestinal metaplasia   B. Stomach (biopsy): Reactive gastropathy; No H pylori identified (H&E); No intestinal metaplasia   C. Duodenum (biopsy): Duodenal mucosa with no diagnostic abnormality; No Marsh lesion; Negative for dysplasia   Colon: 06/2017: Mild diverticulosis in the sigmoid  Manometry: 02/2020: Normal motility with large hiatal hernia  Colon: 11/2023: Polyp measuring smaller than 5 mm in the cecum; Diverticula in the sigmoid colon and rectosigmoid; Internal small hemorrhoids   A. Large Intestine, Cecum, polyp: Tubular adenoma. Negative for high grade dysplasia/ carcinoma    Review of Systems   Constitutional:  Negative for fever. Gastrointestinal:  Positive for abdominal pain, constipation, diarrhea and nausea. Negative for vomiting. Genitourinary:  Positive for frequency.  Negative for dysuria and hematuria. Musculoskeletal:  Negative for arthralgias and myalgias. Neurological:  Negative for headaches. Otherwise Per HPI    Historical Information   Past Medical History:   Diagnosis Date    Anemia     Arthritis     Bilateral bunions     Chronic pain disorder     back and hip pain    Colon polyp     Disease of thyroid gland     nodules    History of COVID-2020    mild s/s    History of gastroesophageal reflux (GERD)     "had hiatal hernia surgery"    History of heart murmur in childhood     Hyperlipidemia     Hypertension     Overactive bladder     PONV (postoperative nausea and vomiting)     once after knee surgery    Rotator cuff tear, right     had surgery    Vitamin D deficiency     unsure    Wears glasses     Wears partial dentures     upper     Past Surgical History:   Procedure Laterality Date     SECTION      Last Assessed: 2017    COLONOSCOPY      ELBOW SURGERY      Last Assessed: 2017    ESOPHAGOGASTRODUODENOSCOPY N/A 2017    Procedure: ESOPHAGOGASTRODUODENOSCOPY (EGD); Surgeon: Llai Nguyen DO;  Location: [facility] GI LAB;   Service: Gastroenterology    HIP SURGERY Left 2018    glut medius/minimus repair  and 18--with pin implanted    HYSTERECTOMY          PARAESOPHAGEAL HERNIA REPAIR N/A 2020    Procedure: LAPAROSCOPIC PARAESOPHAGEAL HERNIA REPAIR WITH MESH AND NISSEN FUNDOPLICATION WITH ROBOTICS;  Surgeon: Marian Wilkes MD;  Location: [facility];  Service: General    MD ARTHRP KNE CONDYLE&PLATU MEDIAL&LAT COMPARTMENTS Left 03/15/2023    Procedure: ARTHROPLASTY KNEE TOTAL;  Surgeon: Missie Seip, DO;  Location: [facility];  Service: Orthopedics    MD ARTHRS KNE SURG W/MENISCECTOMY MED/LAT W/SHVG Left 06/15/2022    Procedure: ;left knee arthroscopy, meniscectomy,synevectomy,chondroplasty, loose body removal, injection;  Surgeon: Missie Seip, DO;  Location: [facility];  Service: Orthopedics    MD COLONOSCOPY FLX DX W/COLLJ SPEC WHEN PFRMD N/A 2017    Procedure: EGD AND COLONOSCOPY;  Surgeon: Valentine Mitchell DO;  Location: [facility] GI LAB;   Service: Gastroenterology    MT EXCISON TUMOR SOFT TISSUE THIGH/KNEE SUBQ 3 CM/> Bilateral 2022    Procedure: EXCISION BIOPSY TISSUE LESION/MASS LOWER EXTREMITY;  Surgeon: Jed Brown MD;  Location: [facility];  Service: General    MT EXCISON TUMOR SOFT TISSUE THIGH/KNEE SUBQ 3 CM/> Bilateral 2022    Procedure: EXCISION BIOPSY TISSUE LESION/MASS LOWER EXTREMITY;  Surgeon: Jed Brown MD;  Location: [facility];  Service: General    MT OSTECTOMY PRTL 5TH METAR HEAD SPX Bilateral 8/15/2023    Procedure: BUNIONECTOMY TAILOR WITHOUT OSTEOTOMY;  Surgeon: Lisa Colvin DPM;  Location: [facility];  Service: Podiatry    SHOULDER ARTHROCENTESIS      SHOULDER ARTHROSCOPY Right 2021    Procedure: SHOULDER ARTHROSCOPY WITH acromioplasty, debridment, and ROTATOR CUFF REPAIR;  Surgeon: Duane Caffey, DO;  Location: [address] [place] [address] MAIN OR;  Service: Orthopedics    TUBAL LIGATION       Social History   Social History     Substance and Sexual Activity   Alcohol Use Never     Social History     Substance and Sexual Activity   Drug Use Never     Social History     Tobacco Use   Smoking Status Former    Packs/day: 1.50    Years: 30.00    Total pack years: 45.00    Types: Cigarettes    Quit date: 1991    Years since quittin.9   Smokeless Tobacco Never   Tobacco Comments    quit 25 yrs ago     Family History   Problem Relation Age of Onset    Colon cancer Mother     Heart attack Father     Other Father         Cardiac Disorder    Diabetes type II Father     Colon cancer Sister     No Known Problems Sister     No Known Problems Sister     No Known Problems Sister     No Known Problems Sister     No Known Problems Daughter     No Known Problems Maternal Aunt     No Known Problems Maternal Aunt     No Known Problems Maternal Aunt     Cancer Maternal Grandmother     No Known Problems Paternal Grandmother      Meds/Allergies       Current Outpatient Medications:     atorvastatin (LIPITOR) 10 mg tablet    famotidine (PEPCID) 20 mg tablet    hydrOXYzine HCL (ATARAX) 25 mg tablet    lidocaine (Lidoderm) 5 %    losartan (COZAAR) 100 MG tablet    methocarbamol (Robaxin-750) 750 mg tablet    Allergies   Allergen Reactions    Hydrocodone Irritability     Objective     Blood pressure 142/88, pulse 67, resp. rate 18, height 5' 3" (1.6 m), SpO2 98 %. Body mass index is 25.33 kg/m². Physical Exam  Vitals and nursing note reviewed. Constitutional:       Appearance: Normal appearance. HENT:      Head: Normocephalic and atraumatic. Eyes:      General: No scleral icterus. Conjunctiva/sclera: Conjunctivae normal.   Cardiovascular:      Rate and Rhythm: Normal rate. Pulmonary:      Effort: Pulmonary effort is normal. No respiratory distress. Abdominal:      General: Bowel sounds are normal. There is no distension. Palpations: Abdomen is soft. Tenderness: There is no abdominal tenderness. There is no guarding or rebound. Skin:     General: Skin is warm and dry. Coloration: Skin is not jaundiced. Neurological:      General: No focal deficit present. Mental Status: She is alert and oriented to person, place, and time. Psychiatric:         Mood and Affect: Mood normal.         Behavior: Behavior normal.       Lab Results:   No visits with results within 1 Day(s) from this visit.    Latest known visit with results is:   Hospital Outpatient Visit on 11/06/2023   Component Date Value    Case Report 11/06/2023                      Value:Surgical Pathology Report                         Case: [id number]                                   Authorizing Provider:  Harris Reed MD          Collected:           11/06/2023 0905              Ordering Location:     [address] [place] [address] Received:            11/06/2023 1036                                     Endoscopy Pathologist:           Neha Bryan MD                                                    Specimen:    Large Intestine, Cecum, polyp                                                              Final Diagnosis 11/06/2023                      Value: This result contains rich text formatting which cannot be displayed here. Additional Information 11/06/2023                      Value: This result contains rich text formatting which cannot be displayed here. Synoptic Checklist 11/06/2023                      Value:                            COLON/RECTUM POLYP FORM - GI - All Specimens                                                                                     :    Adenoma(s)      Gross Description 11/06/2023                      Value: This result contains rich text formatting which cannot be displayed here. Radiology Results:   MRI shoulder left wo contrast    Result Date: 11/9/2023  Narrative: MRI LEFT SHOULDER INDICATION:   M75.102: Unspecified rotator cuff tear or rupture of left shoulder, not specified as traumatic. COMPARISON: Multiple priors most recently 11/3/2023 TECHNIQUE:   Multiplanar/multisequence MR of the left shoulder was performed. FINDINGS: SUBCUTANEOUS TISSUES: Normal JOINT EFFUSION: None. ACROMION PROCESS: Normal. ROTATOR CUFF: Moderate supraspinatus and subscapularis insertional tendinosis. No high-grade rotator cuff tendon tears. SUBACROMIAL/SUBDELTOID BURSA: There is mild subacromial/subdeltoid bursitis. LONG HEAD OF BICEPS TENDON: There is moderate tendinosis without tear. GLENOID LABRUM: Intact. GLENOHUMERAL JOINT: Intact. ACROMIOCLAVICULAR JOINT: There is mild osteoarthritis. BONES: No acute fracture or marrow infiltrative process. Impression: Moderate supraspinatus and subscapularis insertional tendinosis. Moderate long head biceps tendinosis. Mild subacromial/subdeltoid bursitis.  Workstation performed: [id number] Colonoscopy    Result Date: 11/6/2023  Narrative: Table formatting from the original result was not included. [address] [facility] [address] [place] [address] [phone] DATE OF SERVICE: 11/06/23 PHYSICIAN(S): Attending: Kaushik Grigsby MD Fellow: No Staff Documented INDICATION: Diverticulitis POST-OP DIAGNOSIS: See the impression below. HISTORY: Prior colonoscopy: 6 years ago. BOWEL PREPARATION: Golytely/Colyte/Trilyte PREPROCEDURE: Informed consent was obtained for the procedure, including sedation. Risks including but not limited to bleeding, infection, perforation, adverse drug reaction and aspiration were explained in detail. Also explained about less than 100% sensitivity with the exam and other alternatives. The patient was placed in the left lateral decubitus position. Procedure: Colonoscopy DETAILS OF PROCEDURE: Patient was taken to the procedure room where a time out was performed to confirm correct patient and correct procedure. The patient underwent monitored anesthesia care, which was administered by an anesthesia professional. The patient's blood pressure, heart rate, level of consciousness, oxygen, respirations, ECG and ETCO2 were monitored throughout the procedure. A digital rectal exam was performed. The scope was introduced through the anus and advanced to the cecum. Retroflexion was performed in the rectum. The quality of bowel preparation was evaluated using the [facility] Bowel Preparation Scale with scores of: right colon = 2, transverse colon = 2, left colon = 2. The total BBPS score was 6. Bowel prep was adequate. The patient experienced no blood loss. The procedure was not difficult. The patient tolerated the procedure well. There were no apparent adverse events.  ANESTHESIA INFORMATION: ASA: II Anesthesia Type: IV Sedation with Anesthesia MEDICATIONS: No administrations occurring from 0852 to 0917 on 11/06/23 FINDINGS: Polyp measuring smaller than 5 mm in the cecum; completely removed en bloc by cold snare and retrieved specimen Diverticula in the sigmoid colon and rectosigmoid Internal small hemorrhoids EVENTS: Procedure Events Event Event Time ENDO CECUM REACHED 11/6/2023  9:04 AM ENDO SCOPE OUT TIME 11/6/2023  9:16 AM SPECIMENS: ID Type Source Tests Collected by Time Destination 1 : polyp Tissue Large Intestine, Cecum TISSUE EXAM Lincoln Aly MD 11/6/2023  9:05 AM  EQUIPMENT: Colonoscope - [id number]     Impression: Subcentimeter polyp in the cecum was removed with cold snare Diverticulosis in the sigmoid colon and rectosigmoid Small hemorrhoids RECOMMENDATION:  Repeat colonoscopy in 3 years  Personal history of colon polyps Family history of colon cancer    Lincoln Aly MD     XR shoulder 2+ vw left    Result Date: 11/4/2023  Narrative: LEFT SHOULDER INDICATION:   Pain in left shoulder. COMPARISON:  Comparison made with previous examination(s) dated (DX) 09-Jul-2021,(MR) 21-Jun-2021,(DX) 11-Jun-2021,(MR) 19-May-2021,(DX) 14-May-2021. VIEWS:  XR SHOULDER 2+ VW LEFT Images: 3 FINDINGS: There is no acute fracture or dislocation. Mild osteoarthritis acromioclavicular joint. No lytic or blastic osseous lesion. Soft tissues are unremarkable. Impression: No acute osseous abnormality. Degenerative changes as described. Electronically signed: 11/04/2023 06:04 PM Eber Skiff, MPH,MD Manuela Flynn PA-C    **Please note:  Dictation voice to text software may have been used in the creation of this record. Occasional wrong word or “sound alike” substitutions may have occurred due to the inherent limitations of voice recognition software. Read the chart carefully and recognize, using context, where substitutions have occurred. **

## 2023-11-20 NOTE — PATIENT INSTRUCTIONS
I do think a component of your abdominal bloating is related to incompletely evacuating her bowels and excess stool burden, thus we will start you on MiraLAX. You will take one half capful daily mixed into 8 ounces of liquid of your choice. You can increase this to a full capful daily after a couple of days if you still are feeling constipated. You can take this up to twice daily if needed. If the abdominal bloating persists despite regulating her bowels, I would recommend you complete the small intestinal bacterial overgrowth breath test.  Sometimes an imbalance of the good and bad gut bacteria particularly in the small intestine resulted in excess bloating. Low FODMAP foods tend to be better tolerated and result in less bloating as well. You can try safe supplements like Beano or IBgard to help with the discomfort. For the esophagus, keep working on small bites, alternating solids and liquids, chewing thoroughly, taking breaks, etc.  If you wanted to try a peppermint before meals like an Altoid and sometimes this helps to relax the musculature, or the IBgard supplement may help. If you are interested in additional evaluation for an oral or pharyngeal source of swallowing, a video swallow study with a speech pathologist would be recommended.

## 2023-11-30 DIAGNOSIS — G47.09 OTHER INSOMNIA: ICD-10-CM

## 2023-11-30 RX ORDER — HYDROXYZINE HYDROCHLORIDE 25 MG/1
TABLET, FILM COATED ORAL
Qty: 60 TABLET | Refills: 2 | Status: SHIPPED | OUTPATIENT
Start: 2023-11-30

## 2023-12-01 ENCOUNTER — APPOINTMENT (OUTPATIENT)
Dept: LAB | Facility: HOSPITAL | Age: 68
End: 2023-12-01
Payer: COMMERCIAL

## 2023-12-01 ENCOUNTER — TELEPHONE (OUTPATIENT)
Age: 68
End: 2023-12-01

## 2023-12-01 ENCOUNTER — OFFICE VISIT (OUTPATIENT)
Dept: LAB | Facility: HOSPITAL | Age: 68
End: 2023-12-01
Payer: COMMERCIAL

## 2023-12-01 ENCOUNTER — PREP FOR PROCEDURE (OUTPATIENT)
Dept: OBGYN CLINIC | Facility: CLINIC | Age: 68
End: 2023-12-01

## 2023-12-01 DIAGNOSIS — Z01.812 PRE-OPERATIVE LABORATORY EXAMINATION: ICD-10-CM

## 2023-12-01 DIAGNOSIS — Z01.812 PRE-OPERATIVE LABORATORY EXAMINATION: Primary | ICD-10-CM

## 2023-12-01 LAB
ANION GAP SERPL CALCULATED.3IONS-SCNC: 11 MMOL/L
ATRIAL RATE: 66 BPM
BASOPHILS # BLD AUTO: 0.03 THOUSANDS/ÂΜL (ref 0–0.1)
BASOPHILS NFR BLD AUTO: 1 % (ref 0–1)
BUN SERPL-MCNC: 22 MG/DL (ref 5–25)
CALCIUM SERPL-MCNC: 9.7 MG/DL (ref 8.4–10.2)
CHLORIDE SERPL-SCNC: 103 MMOL/L (ref 96–108)
CO2 SERPL-SCNC: 26 MMOL/L (ref 21–32)
CREAT SERPL-MCNC: 0.72 MG/DL (ref 0.6–1.3)
EOSINOPHIL # BLD AUTO: 0.38 THOUSAND/ÂΜL (ref 0–0.61)
EOSINOPHIL NFR BLD AUTO: 7 % (ref 0–6)
ERYTHROCYTE [DISTWIDTH] IN BLOOD BY AUTOMATED COUNT: 12.8 % (ref 11.6–15.1)
GFR SERPL CREATININE-BSD FRML MDRD: 87 ML/MIN/1.73SQ M
GLUCOSE SERPL-MCNC: 52 MG/DL (ref 65–140)
HCT VFR BLD AUTO: 45.1 % (ref 34.8–46.1)
HGB BLD-MCNC: 14.4 G/DL (ref 11.5–15.4)
IMM GRANULOCYTES # BLD AUTO: 0.01 THOUSAND/UL (ref 0–0.2)
IMM GRANULOCYTES NFR BLD AUTO: 0 % (ref 0–2)
LYMPHOCYTES # BLD AUTO: 1.68 THOUSANDS/ÂΜL (ref 0.6–4.47)
LYMPHOCYTES NFR BLD AUTO: 30 % (ref 14–44)
MCH RBC QN AUTO: 28.3 PG (ref 26.8–34.3)
MCHC RBC AUTO-ENTMCNC: 31.9 G/DL (ref 31.4–37.4)
MCV RBC AUTO: 89 FL (ref 82–98)
MONOCYTES # BLD AUTO: 0.46 THOUSAND/ÂΜL (ref 0.17–1.22)
MONOCYTES NFR BLD AUTO: 8 % (ref 4–12)
NEUTROPHILS # BLD AUTO: 3.12 THOUSANDS/ÂΜL (ref 1.85–7.62)
NEUTS SEG NFR BLD AUTO: 54 % (ref 43–75)
NRBC BLD AUTO-RTO: 0 /100 WBCS
P AXIS: 33 DEGREES
PLATELET # BLD AUTO: 325 THOUSANDS/UL (ref 149–390)
PMV BLD AUTO: 10.2 FL (ref 8.9–12.7)
POTASSIUM SERPL-SCNC: 3.9 MMOL/L (ref 3.5–5.3)
PR INTERVAL: 156 MS
QRS AXIS: -32 DEGREES
QRSD INTERVAL: 94 MS
QT INTERVAL: 420 MS
QTC INTERVAL: 440 MS
RBC # BLD AUTO: 5.08 MILLION/UL (ref 3.81–5.12)
SODIUM SERPL-SCNC: 140 MMOL/L (ref 135–147)
T WAVE AXIS: 0 DEGREES
VENTRICULAR RATE: 66 BPM
WBC # BLD AUTO: 5.68 THOUSAND/UL (ref 4.31–10.16)

## 2023-12-01 PROCEDURE — 80048 BASIC METABOLIC PNL TOTAL CA: CPT

## 2023-12-01 PROCEDURE — 85025 COMPLETE CBC W/AUTO DIFF WBC: CPT

## 2023-12-01 PROCEDURE — 36415 COLL VENOUS BLD VENIPUNCTURE: CPT

## 2023-12-01 PROCEDURE — 93005 ELECTROCARDIOGRAM TRACING: CPT

## 2023-12-01 NOTE — PRE-PROCEDURE INSTRUCTIONS
Pre-Surgery Instructions:   Medication Instructions    atorvastatin (LIPITOR) 10 mg tablet Take day of surgery. famotidine (PEPCID) 20 mg tablet Uses PRN- OK to take day of surgery    hydrOXYzine HCL (ATARAX) 25 mg tablet Take night before surgery    lidocaine (Lidoderm) 5 % Hold day of surgery. losartan (COZAAR) 100 MG tablet Hold day of surgery. methocarbamol (Robaxin-750) 750 mg tablet Uses PRN- OK to take day of surgery      Medication instructions for day surgery reviewed. Please use only a sip of water to take your instructed medications. Avoid all over the counter vitamins, supplements and NSAIDS for one week prior to surgery per anesthesia guidelines. Tylenol is ok to take as needed. You will receive a call one business day prior to surgery with an arrival time and hospital directions. If your surgery is scheduled on a Monday, the hospital will be calling you on the Friday prior to your surgery. If you have not heard from anyone by 8pm, please call the hospital supervisor through the hospital  at [phone]. Edita Gallegos [phone]). Do not eat or drink anything after midnight the night before your surgery, including candy, mints, lifesavers, or chewing gum. Do not drink alcohol 24hrs before your surgery. Try not to smoke at least 24hrs before your surgery. Follow the pre surgery showering instructions as listed in the [facility] Surgical Experience Booklet” or otherwise provided by your surgeon's office. Do not use a blade to shave the surgical area 1 week before surgery. It is okay to use a clean electric clippers up to 24 hours before surgery. Do not apply any lotions, creams, including makeup, cologne, deodorant, or perfumes after showering on the day of your surgery. Do not use dry shampoo, hair spray, hair gel, or any type of hair products. No contact lenses, eye make-up, or artificial eyelashes.  Remove nail polish, including gel polish, and any artificial, gel, or acrylic nails if possible. Remove all jewelry including rings and body piercing jewelry. Wear causal clothing that is easy to take on and off. Consider your type of surgery. Keep any valuables, jewelry, piercings at home. Please bring any specially ordered equipment (sling, braces) if indicated. Arrange for a responsible person to drive you to and from the hospital on the day of your surgery. Visitor Guidelines discussed. Call the surgeon's office with any new illnesses, exposures, or additional questions prior to surgery. Please reference your [facility] Surgical Experience Booklet” for additional information to prepare for your upcoming surgery.

## 2023-12-01 NOTE — TELEPHONE ENCOUNTER
Caller: [facility]    Doctor: Leighton Mcburney    Reason for call: Patient is at the lab right now and Stephane Hancock stated no scripts are in her chart.   Surgery on 12/13  ARTHROSCOPY SHOULDER WITH POSSIBLE ACROMIOPLASTY (Left: Shoulder)     Call back#: [phone]

## 2023-12-12 ENCOUNTER — ANESTHESIA EVENT (OUTPATIENT)
Dept: PERIOP | Facility: HOSPITAL | Age: 68
End: 2023-12-12
Payer: COMMERCIAL

## 2023-12-12 NOTE — ANESTHESIA PREPROCEDURE EVALUATION
Procedure:  ARTHROSCOPY SHOULDER WITH POSSIBLE ACROMIOPLASTY (Left: Shoulder)    Relevant Problems   CARDIO   (+) Essential hypertension   (+) Hyperlipidemia      GI/HEPATIC   (+) Dysphagia   (+) GERD (gastroesophageal reflux disease)   (+) Gastroesophageal reflux disease   (+) Paraesophageal hernia      MUSCULOSKELETAL   (+) Paraesophageal hernia   (+) Primary osteoarthritis of both knees   (+) Primary osteoarthritis of left knee   (+) Sacroiliitis (HCC)      NEURO/PSYCH   (+) Chronic pain syndrome        Physical Exam    Airway    Mallampati score: II  TM Distance: >3 FB  Neck ROM: full     Dental    upper dentures,     Cardiovascular  Rhythm: regular, Rate: normal, Cardiovascular exam normal    Pulmonary  Pulmonary exam normal Breath sounds clear to auscultation    Other Findings  post-pubertal.      Anesthesia Plan  ASA Score- 2     Anesthesia Type- general with ASA Monitors. Additional Monitors:     Airway Plan: ETT. Comment: Risks of general anesthesia discussed including likely possibility of PONV and sore throat, as well as the rare possibilities of aspiration, dental/oropharyngeal/ocular injuries, or grave/life threatening anesthetic and surgical emergencies. .       Plan Factors-Exercise tolerance (METS): >4 METS. Chart reviewed. Patient summary reviewed. Patient instructed to abstain from smoking on day of procedure. Patient did not smoke on day of surgery. Induction- intravenous. Postoperative Plan- Plan for postoperative opioid use. Planned trial extubation    Informed Consent- Anesthetic plan and risks discussed with patient. I personally reviewed this patient with the CRNA. Discussed and agreed on the Anesthesia Plan with the CRNA. Anurag Strickland

## 2023-12-13 ENCOUNTER — ANESTHESIA (OUTPATIENT)
Dept: PERIOP | Facility: HOSPITAL | Age: 68
End: 2023-12-13
Payer: COMMERCIAL

## 2023-12-13 ENCOUNTER — HOSPITAL ENCOUNTER (OUTPATIENT)
Facility: HOSPITAL | Age: 68
Setting detail: OUTPATIENT SURGERY
Discharge: HOME | End: 2023-12-13
Attending: ORTHOPAEDIC SURGERY | Admitting: ORTHOPAEDIC SURGERY
Payer: COMMERCIAL

## 2023-12-13 VITALS
HEIGHT: 63 IN | OXYGEN SATURATION: 96 % | HEART RATE: 85 BPM | BODY MASS INDEX: 25.34 KG/M2 | WEIGHT: 143 LBS | RESPIRATION RATE: 25 BRPM | TEMPERATURE: 96.1 F | SYSTOLIC BLOOD PRESSURE: 167 MMHG | DIASTOLIC BLOOD PRESSURE: 77 MMHG

## 2023-12-13 DIAGNOSIS — Z98.890 S/P LEFT ROTATOR CUFF REPAIR: Primary | ICD-10-CM

## 2023-12-13 PROCEDURE — NC001 PR NO CHARGE: Performed by: ORTHOPAEDIC SURGERY

## 2023-12-13 PROCEDURE — 29827 SHO ARTHRS SRG RT8TR CUF RPR: CPT | Performed by: ORTHOPAEDIC SURGERY

## 2023-12-13 PROCEDURE — 29827 SHO ARTHRS SRG RT8TR CUF RPR: CPT | Performed by: PHYSICIAN ASSISTANT

## 2023-12-13 PROCEDURE — 29826 SHO ARTHRS SRG DECOMPRESSION: CPT | Performed by: PHYSICIAN ASSISTANT

## 2023-12-13 PROCEDURE — C1713 ANCHOR/SCREW BN/BN,TIS/BN: HCPCS | Performed by: ORTHOPAEDIC SURGERY

## 2023-12-13 PROCEDURE — 29823 SHO ARTHRS SRG XTNSV DBRDMT: CPT | Performed by: PHYSICIAN ASSISTANT

## 2023-12-13 PROCEDURE — 29823 SHO ARTHRS SRG XTNSV DBRDMT: CPT | Performed by: ORTHOPAEDIC SURGERY

## 2023-12-13 PROCEDURE — 29826 SHO ARTHRS SRG DECOMPRESSION: CPT | Performed by: ORTHOPAEDIC SURGERY

## 2023-12-13 PROCEDURE — C9290 INJ, BUPIVACAINE LIPOSOME: HCPCS | Performed by: STUDENT IN AN ORGANIZED HEALTH CARE EDUCATION/TRAINING PROGRAM

## 2023-12-13 DEVICE — 4.75MM BC KNOTLESS SWIVELOCK
Type: IMPLANTABLE DEVICE | Site: SHOULDER | Status: FUNCTIONAL
Brand: ARTHREX®

## 2023-12-13 RX ORDER — ONDANSETRON 2 MG/ML
INJECTION INTRAMUSCULAR; INTRAVENOUS AS NEEDED
Status: DISCONTINUED | OUTPATIENT
Start: 2023-12-13 | End: 2023-12-13

## 2023-12-13 RX ORDER — MELOXICAM 15 MG/1
15 TABLET ORAL DAILY
Qty: 30 TABLET | Refills: 0 | Status: SHIPPED | OUTPATIENT
Start: 2023-12-13

## 2023-12-13 RX ORDER — SODIUM CHLORIDE, SODIUM LACTATE, POTASSIUM CHLORIDE, CALCIUM CHLORIDE 600; 310; 30; 20 MG/100ML; MG/100ML; MG/100ML; MG/100ML
INJECTION, SOLUTION INTRAVENOUS CONTINUOUS PRN
Status: DISCONTINUED | OUTPATIENT
Start: 2023-12-13 | End: 2023-12-13

## 2023-12-13 RX ORDER — CHLORHEXIDINE GLUCONATE 4 G/100ML
SOLUTION TOPICAL DAILY PRN
Status: DISCONTINUED | OUTPATIENT
Start: 2023-12-13 | End: 2023-12-13 | Stop reason: HOSPADM

## 2023-12-13 RX ORDER — DEXAMETHASONE SODIUM PHOSPHATE 10 MG/ML
INJECTION, SOLUTION INTRAMUSCULAR; INTRAVENOUS AS NEEDED
Status: DISCONTINUED | OUTPATIENT
Start: 2023-12-13 | End: 2023-12-13

## 2023-12-13 RX ORDER — PROPOFOL 10 MG/ML
INJECTION, EMULSION INTRAVENOUS CONTINUOUS PRN
Status: DISCONTINUED | OUTPATIENT
Start: 2023-12-13 | End: 2023-12-13

## 2023-12-13 RX ORDER — FENTANYL CITRATE 50 UG/ML
50 INJECTION, SOLUTION INTRAMUSCULAR; INTRAVENOUS
Status: DISCONTINUED | OUTPATIENT
Start: 2023-12-13 | End: 2023-12-13 | Stop reason: HOSPADM

## 2023-12-13 RX ORDER — EPHEDRINE SULFATE 50 MG/ML
INJECTION INTRAVENOUS AS NEEDED
Status: DISCONTINUED | OUTPATIENT
Start: 2023-12-13 | End: 2023-12-13

## 2023-12-13 RX ORDER — ROCURONIUM BROMIDE 10 MG/ML
INJECTION, SOLUTION INTRAVENOUS AS NEEDED
Status: DISCONTINUED | OUTPATIENT
Start: 2023-12-13 | End: 2023-12-13

## 2023-12-13 RX ORDER — ONDANSETRON 2 MG/ML
4 INJECTION INTRAMUSCULAR; INTRAVENOUS EVERY 6 HOURS PRN
Status: CANCELLED | OUTPATIENT
Start: 2023-12-13

## 2023-12-13 RX ORDER — LIDOCAINE HYDROCHLORIDE 10 MG/ML
INJECTION, SOLUTION EPIDURAL; INFILTRATION; INTRACAUDAL; PERINEURAL AS NEEDED
Status: DISCONTINUED | OUTPATIENT
Start: 2023-12-13 | End: 2023-12-13

## 2023-12-13 RX ORDER — OXYCODONE HYDROCHLORIDE AND ACETAMINOPHEN 5; 325 MG/1; MG/1
1 TABLET ORAL EVERY 4 HOURS PRN
Status: DISCONTINUED | OUTPATIENT
Start: 2023-12-13 | End: 2023-12-13 | Stop reason: HOSPADM

## 2023-12-13 RX ORDER — BUPIVACAINE HYDROCHLORIDE 5 MG/ML
INJECTION, SOLUTION EPIDURAL; INTRACAUDAL
Status: COMPLETED | OUTPATIENT
Start: 2023-12-13 | End: 2023-12-13

## 2023-12-13 RX ORDER — OXYCODONE HYDROCHLORIDE AND ACETAMINOPHEN 5; 325 MG/1; MG/1
1 TABLET ORAL EVERY 4 HOURS PRN
Qty: 21 TABLET | Refills: 0 | Status: SHIPPED | OUTPATIENT
Start: 2023-12-13 | End: 2023-12-23

## 2023-12-13 RX ORDER — PROPOFOL 10 MG/ML
INJECTION, EMULSION INTRAVENOUS AS NEEDED
Status: DISCONTINUED | OUTPATIENT
Start: 2023-12-13 | End: 2023-12-13

## 2023-12-13 RX ORDER — CEFAZOLIN SODIUM 2 G/50ML
2000 SOLUTION INTRAVENOUS ONCE
Status: COMPLETED | OUTPATIENT
Start: 2023-12-13 | End: 2023-12-13

## 2023-12-13 RX ORDER — ONDANSETRON 2 MG/ML
4 INJECTION INTRAMUSCULAR; INTRAVENOUS ONCE AS NEEDED
Status: DISCONTINUED | OUTPATIENT
Start: 2023-12-13 | End: 2023-12-13 | Stop reason: HOSPADM

## 2023-12-13 RX ORDER — HYDROMORPHONE HCL/PF 1 MG/ML
0.4 SYRINGE (ML) INJECTION
Status: DISCONTINUED | OUTPATIENT
Start: 2023-12-13 | End: 2023-12-13 | Stop reason: HOSPADM

## 2023-12-13 RX ORDER — FENTANYL CITRATE 50 UG/ML
INJECTION, SOLUTION INTRAMUSCULAR; INTRAVENOUS AS NEEDED
Status: DISCONTINUED | OUTPATIENT
Start: 2023-12-13 | End: 2023-12-13

## 2023-12-13 RX ORDER — MIDAZOLAM HYDROCHLORIDE 2 MG/2ML
INJECTION, SOLUTION INTRAMUSCULAR; INTRAVENOUS AS NEEDED
Status: DISCONTINUED | OUTPATIENT
Start: 2023-12-13 | End: 2023-12-13

## 2023-12-13 RX ORDER — POVIDONE-IODINE 10 MG/G
OINTMENT TOPICAL AS NEEDED
Status: DISCONTINUED | OUTPATIENT
Start: 2023-12-13 | End: 2023-12-13 | Stop reason: HOSPADM

## 2023-12-13 RX ORDER — CHLORHEXIDINE GLUCONATE ORAL RINSE 1.2 MG/ML
15 SOLUTION DENTAL ONCE
Status: COMPLETED | OUTPATIENT
Start: 2023-12-13 | End: 2023-12-13

## 2023-12-13 RX ORDER — BUPIVACAINE HYDROCHLORIDE AND EPINEPHRINE 5; 5 MG/ML; UG/ML
INJECTION, SOLUTION PERINEURAL AS NEEDED
Status: DISCONTINUED | OUTPATIENT
Start: 2023-12-13 | End: 2023-12-13 | Stop reason: HOSPADM

## 2023-12-13 RX ADMIN — DEXAMETHASONE SODIUM PHOSPHATE 10 MG: 10 INJECTION, SOLUTION INTRAMUSCULAR; INTRAVENOUS at 12:05

## 2023-12-13 RX ADMIN — PHENYLEPHRINE HYDROCHLORIDE 50 MCG/MIN: 10 INJECTION INTRAVENOUS at 12:01

## 2023-12-13 RX ADMIN — FENTANYL CITRATE 50 MCG: 50 INJECTION, SOLUTION INTRAMUSCULAR; INTRAVENOUS at 12:50

## 2023-12-13 RX ADMIN — CEFAZOLIN SODIUM 2000 MG: 2 SOLUTION INTRAVENOUS at 11:50

## 2023-12-13 RX ADMIN — SUGAMMADEX 200 MG: 100 INJECTION, SOLUTION INTRAVENOUS at 13:02

## 2023-12-13 RX ADMIN — FENTANYL CITRATE 50 MCG: 50 INJECTION, SOLUTION INTRAMUSCULAR; INTRAVENOUS at 10:47

## 2023-12-13 RX ADMIN — SODIUM CHLORIDE, SODIUM LACTATE, POTASSIUM CHLORIDE, AND CALCIUM CHLORIDE: .6; .31; .03; .02 INJECTION, SOLUTION INTRAVENOUS at 11:48

## 2023-12-13 RX ADMIN — CHLORHEXIDINE GLUCONATE 15 ML: 1.2 RINSE ORAL at 10:22

## 2023-12-13 RX ADMIN — EPHEDRINE SULFATE 10 MG: 50 INJECTION, SOLUTION INTRAVENOUS at 12:37

## 2023-12-13 RX ADMIN — BUPIVACAINE 20 ML: 13.3 INJECTION, SUSPENSION, LIPOSOMAL INFILTRATION at 10:53

## 2023-12-13 RX ADMIN — ROCURONIUM BROMIDE 10 MG: 10 INJECTION, SOLUTION INTRAVENOUS at 12:50

## 2023-12-13 RX ADMIN — PROPOFOL 150 MG: 10 INJECTION, EMULSION INTRAVENOUS at 11:48

## 2023-12-13 RX ADMIN — MIDAZOLAM HYDROCHLORIDE 1 MG: 1 INJECTION, SOLUTION INTRAMUSCULAR; INTRAVENOUS at 11:45

## 2023-12-13 RX ADMIN — BUPIVACAINE HYDROCHLORIDE 10 ML: 5 INJECTION, SOLUTION EPIDURAL; INTRACAUDAL; PERINEURAL at 10:53

## 2023-12-13 RX ADMIN — EPHEDRINE SULFATE 10 MG: 50 INJECTION, SOLUTION INTRAVENOUS at 12:57

## 2023-12-13 RX ADMIN — MIDAZOLAM HYDROCHLORIDE 1 MG: 1 INJECTION, SOLUTION INTRAMUSCULAR; INTRAVENOUS at 10:47

## 2023-12-13 RX ADMIN — LIDOCAINE HYDROCHLORIDE 100 MG: 10 INJECTION, SOLUTION EPIDURAL; INFILTRATION; INTRACAUDAL; PERINEURAL at 11:48

## 2023-12-13 RX ADMIN — ROCURONIUM BROMIDE 50 MG: 10 INJECTION, SOLUTION INTRAVENOUS at 11:49

## 2023-12-13 RX ADMIN — ONDANSETRON 4 MG: 2 INJECTION INTRAMUSCULAR; INTRAVENOUS at 12:58

## 2023-12-13 NOTE — ANESTHESIA PROCEDURE NOTES
Peripheral Block    Patient location during procedure: pre-op  Start time: 12/13/2023 10:53 AM  Reason for block: at surgeon's request and post-op pain management  Staffing  Performed by: Tre Mckeon MD  Authorized by: Tre Mckeon MD    Preanesthetic Checklist  Completed: patient identified, IV checked, site marked, risks and benefits discussed, surgical consent, monitors and equipment checked, pre-op evaluation and timeout performed  Peripheral Block  Patient position: sitting  Prep: ChloraPrep  Patient monitoring: heart rate, cardiac monitor, frequent blood pressure checks and continuous pulse oximetry  Block type: Interscalene  Laterality: left  Injection technique: single-shot  Procedures: ultrasound guided, Ultrasound guidance required for the procedure to increase accuracy and safety of medication placement and decrease risk of complications.   Ultrasound permanent image savedbupivacaine (PF) (MARCAINE) 0.5 % injection 20 mL - Perineural   10 mL - 12/13/2023 10:53:00 AM  bupivacaine liposomal (EXPAREL) 1.3 % injection 20 mL - Perineural   20 mL - 12/13/2023 10:53:00 AM  Needle  Needle type: Stimuplex   Needle gauge: 22 G  Needle length: 4 in  Needle localization: ultrasound guidance and anatomical landmarks  Assessment  Injection assessment: incremental injection, local visualized surrounding nerve on ultrasound, negative aspiration for heme and no paresthesia on injection  Paresthesia pain: none  Post-procedure:  site cleaned  patient tolerated the procedure well with no immediate complications  Additional Notes  Unremarkable interscalene nerve block

## 2023-12-13 NOTE — OP NOTE
OPERATIVE REPORT  PATIENT NAME: Zari Hendrix    :  1955  MRN: [id number]  Pt Location: [facility] ROOM 01    SURGERY DATE: 2023    Surgeons and Role:     * Rob West,  - Primary     * Helga Funes PA-C - Assisting    Preop Diagnosis:  Impingement syndrome of left shoulder [M75.42]    Post-Op Diagnosis Codes:  Impingement syndrome left shoulder  Tear of glenoid labrum  Degenerative joint disease  Synovitis  Rotator cuff tear    Procedure(s):  Left - ARTHROSCOPY SHOULDER   Synovectomy  Debridement/chondroplasty  Acromioplasty  Arthroscopic rotator cuff repair  Post arthroscopic injection of intra-articular joint space and peripheral portals    Specimen(s):  shavings    Estimated Blood Loss:   Minimal    Drains:  none    Anesthesia Type:   General w/ Interscalene Block    Operative Indications:  Impingement syndrome of left shoulder [M75.42]      Operative Findings:  TT: 0    Complications:   None    Procedure and Technique:         The patient was properly identified and brought into the operative suite. After successful induction of the general anesthetic the patient was brought in the Bard chair” position. The left upper extremity was then prepped and draped in the usual fashion. It was medically necessary that the physician's assistant be in the room to aid in positioning in the and placing the appropriate amount of  retraction on the patient. Laith Shabazz PA-C-assisting necessary for the procedure for assistance with minimally invasive arthroscopic techniques for shoulder arthroscopy as well as assistance with utilization of the camera and shaver and the biter and sorin. The surgical landmarks were outlined with the skin marker. All the arthroscopic portal areas were infiltrated with 0.5% Marcaine with epinephrine. Using  a #11. Scalpel blade a posterior portal was made approximately  11/2 cm medial and inferior to the posterior lateral corner of the acromion. The arthroscope was introduced into the glenohumeral joint space. There was a tremendous amount of synovial tissue present. Using a “outside in” technique an accessory anterior inferior portal was made and a synovectomy was performed. The biceps tendon was inspected and found to have minimal degenerative tearing. A small debridement occurred. This encompassed less than 10% of the cross-sectional anatomy. The subscapularis tendon was inspected found to be intact. There was a degenerative tear along the glenoid labrum which was debrided with a shaver. The articular surface showed grade 3 arthritic changes in which a small chondroplasty occurred. the supra and infraspinatus showed tearing along the undersurface. This will be further inspected through the superior viewing portal       At this point the arthroscope was then withdrawn from the glenohumeral joint space and introduced into the subacromial joint space. There was a  tremendous amount of bursal tissue present. Using an arthroscopic shaver,  a bursectomy occurred. The superior aspect of rotator cuff was inspected. It appeared that there was a full-thickness tear present and this was repaired with 1 suture anchor. Once this occurred, the rotator cuff was inspected again and found to be intact. Using the [employer] heat wand,  the soft tissue along the undersurface of the acromion was then removed as well as the coracoacromial ligament was then divided. There was a very large subacromial spur present. Using a motorized bur,  an acromioplasty 1st occur with the bur in the lateral portal then switch the posterior portal to remove any spur that was remaining. At this point, there was adequate decompression of the subacromial joint space. The shoulder was then copiously irrigated with sterile arthroscopic solution. The portals then closed with 3 O nylon. The shoulder was injected with 0.25% Marcaine with epinephrine and Depo-Medrol.   The wounds were then dressed with ointment,  Xeroform, 4x4s, and ABDs tape. A sling was applied. There was no complications during procedure. She was successfully awoken, transferred to her hospital bed, and went to recovery room in stable condition. I was present for the entire procedure., A qualified resident physician was not available. , and A physician assistant was required during the procedure for retraction, tissue handling, dissection and suturing.     Patient Disposition:  PACU         SIGNATURE: Aakash Casiano DO  DATE: December 13, 2023  TIME: 11:43 AM

## 2023-12-13 NOTE — H&P
H&P Exam - Orthopedics   Ivelisse Santiago 79 y.o. female MRN: [id number]  Unit/Bed#:  Encounter: [id number]    Assessment/Plan     Assessment:  Impingement syndrome of left shoulder  Plan:  Elective left shoulder arthroscopy with possible acromioplasty    History of Present Illness   HPI:  Ivelisse Santiago is a 79 y.o. female who presented to our office complaining of left shoulder pain with decreased range of motion and strength. X-rays of the patient's shoulder performed which are consistent with a large subacromial spur. An MRI of the patient's left shoulder was performed which was consistent with rotator cuff tendinosis. The patient stated that her symptoms are continuing to worsen and starting to affect her quality of life. After exhausting conservative treatment, the risks and benefits of surgery were discussed with the patient. She decided on an elective left shoulder arthroscopy with possible acromioplasty. Review of Systems   Constitutional:  Negative for chills, fever and unexpected weight change. HENT:  Negative for nosebleeds and sore throat. Eyes:  Negative for pain, redness and visual disturbance. Respiratory:  Negative for cough, shortness of breath and wheezing. Cardiovascular:  Negative for chest pain, palpitations and leg swelling. Gastrointestinal:  Negative for abdominal pain, nausea and vomiting. Endocrine: Negative for polydipsia and polyuria. Genitourinary:  Negative for dysuria and hematuria. Musculoskeletal:  Positive for arthralgias and myalgias. As noted in HPI   Skin:  Negative for rash and wound. Neurological:  Negative for dizziness, numbness and headaches. Psychiatric/Behavioral:  Negative for decreased concentration and suicidal ideas. The patient is not nervous/anxious.         Historical Information   Past Medical History:   Diagnosis Date    Anemia     Arthritis     Bilateral bunions     Chronic pain disorder     back and hip pain    Colon polyp Disease of thyroid gland     nodules    History of COVID-2020    mild s/s    History of gastroesophageal reflux (GERD)     "had hiatal hernia surgery"    History of heart murmur in childhood     Hyperlipidemia     Hypertension     Overactive bladder     PONV (postoperative nausea and vomiting)     once after knee surgery    Rotator cuff tear, right     had surgery    Vitamin D deficiency     unsure    Wears glasses     Wears partial dentures     upper     Past Surgical History:   Procedure Laterality Date     SECTION      Last Assessed: 2017    COLONOSCOPY      ELBOW SURGERY      Last Assessed: 2017    ESOPHAGOGASTRODUODENOSCOPY N/A 2017    Procedure: ESOPHAGOGASTRODUODENOSCOPY (EGD); Surgeon: Michell Santiago DO;  Location: [facility]; Service: Gastroenterology    HIP SURGERY Left 2018    glut medius/minimus repair  and 18--with pin implanted    HYSTERECTOMY          PARAESOPHAGEAL HERNIA REPAIR N/A 2020    Procedure: LAPAROSCOPIC PARAESOPHAGEAL HERNIA REPAIR WITH MESH AND NISSEN FUNDOPLICATION WITH ROBOTICS;  Surgeon: Nahid Jefferson MD;  Location: [facility];  Service: General    OR ARTHRP KNE CONDYLE&PLATU MEDIAL&LAT COMPARTMENTS Left 03/15/2023    Procedure: ARTHROPLASTY KNEE TOTAL;  Surgeon: Sofía Martin DO;  Location: [facility];  Service: Orthopedics    OR ARTHRS KNE SURG W/MENISCECTOMY MED/LAT W/SHVG Left 06/15/2022    Procedure: ;left knee arthroscopy, meniscectomy,synevectomy,chondroplasty, loose body removal, injection;  Surgeon: Sofía Martin DO;  Location: [facility];  Service: Orthopedics    OR COLONOSCOPY FLX DX W/COLLJ SPEC WHEN PFRMD N/A 2017    Procedure: EGD AND COLONOSCOPY;  Surgeon: Michell Santiago DO;  Location: [facility];   Service: Gastroenterology    OR EXCISON TUMOR SOFT TISSUE THIGH/KNEE SUBQ 3 CM/> Bilateral 2022    Procedure: EXCISION BIOPSY TISSUE LESION/MASS LOWER EXTREMITY;  Surgeon: Maria C Burton, MD;  Location: [facility];  Service: General    TX EXCISON TUMOR SOFT TISSUE THIGH/KNEE SUBQ 3 CM/> Bilateral 2022    Procedure: EXCISION BIOPSY TISSUE LESION/MASS LOWER EXTREMITY;  Surgeon: Nakita Pickard MD;  Location: [facility];  Service: General    TX OSTECTOMY PRTL 5TH METAR HEAD SPX Bilateral 8/15/2023    Procedure: BUNIONECTOMY TAILOR WITHOUT OSTEOTOMY;  Surgeon: Maximino German DPM;  Location: [facility];  Service: Podiatry    SHOULDER ARTHROCENTESIS      SHOULDER ARTHROSCOPY Right 2021    Procedure: SHOULDER ARTHROSCOPY WITH acromioplasty, debridment, and ROTATOR CUFF REPAIR;  Surgeon: Checo Hall DO;  Location: [address] [place] [address] MAIN OR;  Service: Orthopedics    TUBAL LIGATION       Social History   Social History     Substance and Sexual Activity   Alcohol Use Never     Social History     Substance and Sexual Activity   Drug Use Never     Social History     Tobacco Use   Smoking Status Former    Current packs/day: 0.00    Average packs/day: 1.5 packs/day for 30.0 years (45.0 ttl pk-yrs)    Types: Cigarettes    Start date: 1961    Quit date: 1991    Years since quittin.9   Smokeless Tobacco Never   Tobacco Comments    quit 25 yrs ago     Family History: non-contributory    Meds/Allergies   all medications and allergies reviewed  Allergies   Allergen Reactions    Hydrocodone Irritability       Objective   Vitals: There were no vitals taken for this visit. ,There is no height or weight on file to calculate BMI. No intake or output data in the 24 hours ending 23 0941    No intake/output data recorded.     Invasive Devices       None                   Physical Exam  Ortho Exam  Left upper extremity is neurovascularly intact  Fingers are pink and mobile  Compartments are soft  Signs of impingement  Brisk cap refill  Sensation intact  Rotator cuff strength 4-5  Range of motion of the shoulder is from 0 to 90 degrees of forward flexion and abduction  Cardiovascular: Normal rate    Pulmonary: Effort normal  Abdomen: Soft, nontender, nondistended   Lab Results: I have personally reviewed pertinent lab results. Imaging: I have personally reviewed pertinent films in PACS  EKG, Pathology, and Other Studies: I have personally reviewed pertinent films in PACS    Code Status: Prior  Advance Directive and Living Will:      Power of :    POLST:      Counseling / Coordination of Care  Total floor / unit time spent today 30 minutes. Greater than 50% of total time was spent with the patient and / or family counseling and / or coordination of care.

## 2023-12-13 NOTE — ANESTHESIA POSTPROCEDURE EVALUATION
Post-Op Assessment Note    CV Status:  Stable  Pain Score: 0    Pain management: adequate       Mental Status:  Awake   Hydration Status:  Euvolemic   PONV Controlled:  Controlled   Airway Patency:  Patent     Post Op Vitals Reviewed: Yes      Staff: CRNA               BP   137/87   Temp   37.4   Pulse  90   Resp   14   SpO2   100

## 2023-12-14 NOTE — ANESTHESIA PROCEDURE NOTES
Anesthesia Notable Event  No anethesia notable event occurred.     Date/Time: 12/14/2023 7:28 AM    Performed by: Deanna Chamberlain MD  Authorized by: Deanna Chamberlain MD

## 2023-12-15 ENCOUNTER — EVALUATION (OUTPATIENT)
Dept: PHYSICAL THERAPY | Facility: CLINIC | Age: 68
End: 2023-12-15
Payer: COMMERCIAL

## 2023-12-15 DIAGNOSIS — M75.42 IMPINGEMENT SYNDROME OF LEFT SHOULDER: Primary | ICD-10-CM

## 2023-12-15 DIAGNOSIS — R29.3 POSTURE ABNORMALITY: ICD-10-CM

## 2023-12-15 PROCEDURE — 97110 THERAPEUTIC EXERCISES: CPT | Performed by: PHYSICAL THERAPIST

## 2023-12-15 PROCEDURE — 97162 PT EVAL MOD COMPLEX 30 MIN: CPT | Performed by: PHYSICAL THERAPIST

## 2023-12-15 NOTE — PROGRESS NOTES
PT Evaluation     Today's date: 12/15/2023  Patient name: Yee Lund  : 1955  MRN: [id number]  Referring provider: Nano Pizano  Dx:   Encounter Diagnosis     ICD-10-CM    1. Impingement syndrome of left shoulder  M75.42 Ambulatory Referral to Physical Therapy      2. Posture abnormality  R29.3           Start Time: 08  Stop Time:   Total time in clinic (min): 38 minutes    Assessment  Assessment details: Yee Lund was seen for an initial PT evaluation today. Patient is a 79 y.o. female with diagnosis of s/p shoulder rotator cuff repair and past medical history significant for OA, chronic pain, thyroid disease, GERD, hiatal hernia, hyperlipidemia, HTN, rotator cuff tear with repair, L buttock pain with history of surgery, lipomas with removal, lumbar radiculopathy, vertigo. Moderate complexity evaluation  due to number of participation restrictions, functional outcome measure of 74% limitation, and evolving clinical presentation. Findings today show limitation in left shoulder strength range of motion and stability with pain consistent with s/p diagnosis impacting overall functional mobility including ability to lift, reach, carry. Skilled PT indicated to treat at this time to address above stated deficits and return patient to PLOF. Impairments: abnormal muscle firing, abnormal or restricted ROM, abnormal movement, activity intolerance, impaired physical strength, lacks appropriate home exercise program, pain with function, scapular dyskinesis, poor posture  and poor body mechanics    Goals  STG (6 weeks)  1. Patient's left shoulder flexion AROM will increase to 90 with 2/10 pain for increased ability to perform overhead ADLs. 2. Patient will have 0/10 pain in left shoulder at rest.  3. Patient's left shoulder strength will increase to 2+/5 for increased ability to lift. LTG (12 weeks)  1.  Patient's UE AROM equal bilaterally for ability to complete hair hygiene, overhead, and behind the back ADLs. 2. Patient's UE strength will be equal bilaterally for ability to lift and carry at PLOF. 3. Patient will be independent with home exercise program for continued maintenance post PT discharge. Plan  Plan details: Progress note in 4 weeks. Patient would benefit from: skilled physical therapy  Planned modality interventions: unattended electrical stimulation, cryotherapy and thermotherapy: hydrocollator packs  Planned therapy interventions: neuromuscular re-education, manual therapy, therapeutic exercise, therapeutic activities, self care and home exercise program  Frequency: 2x week  Duration in weeks: 12  Plan of Care beginning date: 12/15/2023  Plan of Care expiration date: 3/15/2024  Treatment plan discussed with: patient and PTA      Subjective Evaluation    History of Present Illness  Date of surgery: 12/13/2023  Mechanism of injury: Kim Sanabria is a 79 y.o. female who presents to outpatient Physical Therapy today with complaints of left shoulder pain. Underwent left shoulder rotator cuff repair 12/13/23. Social Support  Steps to enter house: yes  Stairs in house: yes   Lives in: multiple-level home  Lives with: spouse    Employment status: not working  Hand dominance: right          Objective     Observations   Left Shoulder   Positive for edema and incision. Additional Observation Details  4 scope incisions healing appropriately. Dressing removed today and replaced with band aids. Patient instructed on wound care and signs of infection.      Cervical/Thoracic Screen   Cervical range of motion within normal limits    Neurological Testing     Sensation     Shoulder     Right Shoulder   Intact: Light touch    Comments   Left light touch: nerve block    Active Range of Motion     Right Shoulder   Flexion: 155 degrees   Abduction: 125 degrees   External rotation BTH: T4   Internal rotation BTB: T10     Passive Range of Motion   Left Shoulder   Flexion: 90 degrees Abduction: 90 degrees   External rotation 0°: 30 degrees   Internal rotation 0°: Left shoulder passive internal rotation at 0 degrees: to belly.      Strength/Myotome Testing     Right Shoulder   Normal muscle strength    Additional Strength Details  LUE strength testing not performed at intake due to restrictions    General Comments:      Shoulder Comments       FOTO: 26% function, 60% predicted function                    Protocol:S/p left shoulder arthroscopy with rotator cuff repair   No active motion x 6 weeks   Passive assist, active assist   Precautions: none noted  Access Code: [email or web address]  Progress note: 1/15  POC: 3/15    Manuals 12/15       Stretching with active release        GH jt mobs        Scapular PNF        IASTM        Neuro Re-Ed                scap retraction 10x       Shoulder rolls 10x       Chin tucks 10x                       Ther Ex                UT stretch        LS stretch        Table slides 10x flex       pendulums 10x ea       Supine flexion ROM        Supine ER ROM                                                                                                        Ther Activity                        Gait Training                        Modalities

## 2023-12-20 ENCOUNTER — OFFICE VISIT (OUTPATIENT)
Dept: PHYSICAL THERAPY | Facility: CLINIC | Age: 68
End: 2023-12-20
Payer: COMMERCIAL

## 2023-12-20 DIAGNOSIS — R29.3 POSTURE ABNORMALITY: ICD-10-CM

## 2023-12-20 DIAGNOSIS — M75.42 IMPINGEMENT SYNDROME OF LEFT SHOULDER: Primary | ICD-10-CM

## 2023-12-20 PROCEDURE — 97110 THERAPEUTIC EXERCISES: CPT | Performed by: PHYSICAL THERAPIST

## 2023-12-20 PROCEDURE — 97112 NEUROMUSCULAR REEDUCATION: CPT | Performed by: PHYSICAL THERAPIST

## 2023-12-20 PROCEDURE — 97140 MANUAL THERAPY 1/> REGIONS: CPT | Performed by: PHYSICAL THERAPIST

## 2023-12-20 NOTE — PROGRESS NOTES
Daily Note     Today's date: 2023  Patient name: Jamee Shaikh  : 1955  MRN: [id number]  Referring provider: Nazario Duque,*  Dx:   Encounter Diagnosis     ICD-10-CM    1. Impingement syndrome of left shoulder  M75.42       2. Posture abnormality  R29.3           Start Time: 0850  Stop Time: 0930  Total time in clinic (min): 40 minutes    Subjective: States most difficulty sleeping, has trouble finding a comfortable position. Nerve block lasted 4 days, began exercises yesterday for HEP. PQ today 7/10.      Objective: See treatment diary below      Assessment: Tolerated treatment well. Decreased passive movement today due to absence of nerve block and increased pain today. ROM still within guidelines and expected values at s/p. Reviewed HEP today with good understanding and proper mechanics. Patient exhibited good technique with therapeutic exercises and would benefit from continued PT      Plan: Continue per plan of care.  Progress treatment as tolerated.       Protocol:S/p left shoulder arthroscopy with rotator cuff repair   No active motion x 6 weeks   Passive assist, active assist   Precautions: none noted  Access Code: [email or web address]  Progress note: 1/15  POC: 3/15    Manuals 12/15 12/20      Stretching with active release  KB      GH jt mobs  Grade II      Scapular PNF        IASTM        Neuro Re-Ed                scap retraction 10x 10x      Shoulder rolls 10x 10x      Chin tucks 10x :05x10                      Ther Ex                UT stretch  :30x3      LS stretch  :30x3      Table slides 10x flex       pendulums 10x ea 10x ea      Supine flexion ROM  nv      Supine ER ROM  nv                                                                                                      Ther Activity                        Gait Training                        Modalities

## 2023-12-22 ENCOUNTER — APPOINTMENT (OUTPATIENT)
Dept: PHYSICAL THERAPY | Facility: CLINIC | Age: 68
End: 2023-12-22
Payer: COMMERCIAL

## 2023-12-27 DIAGNOSIS — Z86.19 H/O COLD SORES: ICD-10-CM

## 2023-12-27 NOTE — TELEPHONE ENCOUNTER
Pt requesting refill on Valacyclovir 1000 mg tablet, discontinued in 02/23. Not previously provided by this office.    Pt voicemail:  Diana Hightower, [phone]. I need a refill. It's not on my chart and the medication is. I'm going to spell it. It's VALACYCLOVIR 1G Milligram 1G tablet. It says 2 refills on my chart but it's not letting me refill it. My pharmacy is [facility]. Thank you.

## 2023-12-28 ENCOUNTER — OFFICE VISIT (OUTPATIENT)
Dept: PHYSICAL THERAPY | Facility: CLINIC | Age: 68
End: 2023-12-28
Payer: COMMERCIAL

## 2023-12-28 ENCOUNTER — TELEPHONE (OUTPATIENT)
Dept: OBGYN CLINIC | Facility: HOSPITAL | Age: 68
End: 2023-12-28

## 2023-12-28 DIAGNOSIS — M75.42 IMPINGEMENT SYNDROME OF LEFT SHOULDER: Primary | ICD-10-CM

## 2023-12-28 DIAGNOSIS — R29.3 POSTURE ABNORMALITY: ICD-10-CM

## 2023-12-28 PROCEDURE — 97110 THERAPEUTIC EXERCISES: CPT | Performed by: PHYSICAL THERAPIST

## 2023-12-28 PROCEDURE — 97112 NEUROMUSCULAR REEDUCATION: CPT | Performed by: PHYSICAL THERAPIST

## 2023-12-28 PROCEDURE — 97140 MANUAL THERAPY 1/> REGIONS: CPT | Performed by: PHYSICAL THERAPIST

## 2023-12-28 RX ORDER — VALACYCLOVIR HYDROCHLORIDE 1 G/1
1000 TABLET, FILM COATED ORAL 2 TIMES DAILY
Qty: 6 TABLET | Refills: 2 | Status: SHIPPED | OUTPATIENT
Start: 2023-12-28 | End: 2023-12-31

## 2023-12-28 NOTE — TELEPHONE ENCOUNTER
Caller: Patient    Doctor: Katlyn    Reason for call: patient had surgery with Dr. Potts on 12/13/2023, RTC Repair, and she does not have her post op till 01/02/24. States she still has sutures in and is asking if it is okay to wait that long to have them out?    Call back#: [phone]

## 2023-12-28 NOTE — PROGRESS NOTES
Daily Note     Today's date: 2023  Patient name: Jamee Shaikh  : 1955  MRN: [id number]  Referring provider: Nazario Duque,*  Dx:   Encounter Diagnosis     ICD-10-CM    1. Impingement syndrome of left shoulder  M75.42       2. Posture abnormality  R29.3           Start Time: 0900  Stop Time: 0945  Total time in clinic (min): 45 minutes    Subjective: Patient states shoulder is achy with swelling and bruising in medial arm. Having difficulty sleeping due to pain. Sleeping in bed that does incline.       Objective: See treatment diary below      Assessment: Tolerated treatment well. Tightness of bicep muscle noted today, patient instructed to attempt to decrease use of R UE with no active movement. Added exercises into program today per flow sheet. Progressing appropriately per protocol. Patient exhibited good technique with therapeutic exercises and would benefit from continued PT      Plan: Continue per plan of care.  Progress treatment as tolerated.       Protocol:S/p left shoulder arthroscopy with rotator cuff repair   No active motion x 6 weeks   Passive assist, active assist   Precautions: none noted  Access Code: [email or web address]  Progress note: 1/15  POC: 3/15    Manuals 12/15 12/20 12/28     Stretching with active release  KB KB     GH jt mobs  Grade II Grade II     Scapular PNF        IASTM        Neuro Re-Ed                scap retraction 10x 10x 10x     Shoulder rolls 10x 10x 10x     Chin tucks 10x :05x10 :05x10                     Ther Ex                UT stretch  :30x3 :30x3     LS stretch  :30x3 :30x3     Table slides 10x flex  10x flex     pendulums 10x ea 10x ea 10x ea     Supine flexion ROM  nv      Supine ER ROM  nv      Wrist AROM   10x      strength   Red 10x                                                                             Ther Activity                        Gait Training                        Modalities

## 2024-01-02 ENCOUNTER — OFFICE VISIT (OUTPATIENT)
Dept: OBGYN CLINIC | Facility: CLINIC | Age: 69
End: 2024-01-02

## 2024-01-02 ENCOUNTER — APPOINTMENT (OUTPATIENT)
Dept: RADIOLOGY | Facility: CLINIC | Age: 69
End: 2024-01-02
Payer: COMMERCIAL

## 2024-01-02 VITALS
WEIGHT: 143 LBS | HEIGHT: 63 IN | SYSTOLIC BLOOD PRESSURE: 179 MMHG | HEART RATE: 70 BPM | DIASTOLIC BLOOD PRESSURE: 100 MMHG | BODY MASS INDEX: 25.34 KG/M2

## 2024-01-02 DIAGNOSIS — Z98.890 S/P ARTHROSCOPY OF LEFT SHOULDER: ICD-10-CM

## 2024-01-02 DIAGNOSIS — Z98.890 S/P ARTHROSCOPY OF LEFT SHOULDER: Primary | ICD-10-CM

## 2024-01-02 PROCEDURE — 73030 X-RAY EXAM OF SHOULDER: CPT

## 2024-01-02 PROCEDURE — 99024 POSTOP FOLLOW-UP VISIT: CPT | Performed by: ORTHOPAEDIC SURGERY

## 2024-01-02 NOTE — PROGRESS NOTES
"ASSESSMENT/PLAN:    Diagnoses and all orders for this visit:    S/P arthroscopy of left shoulder  -     XR shoulder 2+ vw left; Future        X-rays of the patient's left shoulder are negative for any fractures or dislocations.  There is adequate decompression of the subacromial joint space.  Physical therapy and Occupational Therapy.  She should not participate in active range of motion for the next 3 weeks.  She is to continue to use the shoulder immobilizer.  The patient will follow-up with our office in 1 month.  She is acceptable to this plan.    Return in about 1 month (around 2/2/2024).      The patient is doing quite well with regards to her rotator cuff repair of her left shoulder.  Continue mobilizer.  Passive range of motion program for now.  Follow-up 1 month for strength and motion check    _____________________________________________________  CHIEF COMPLAINT:  Chief Complaint   Patient presents with    Left Shoulder - Post-op         SUBJECTIVE:  Jamee Shaikh is a 68 y.o. female who presents the office for a postop visit.  The patient is status post left shoulder arthroscopy with rotator cuff repair from 12/13/2023.  She is very pleased with the results of surgery.  She has been wearing the shoulder immobilizer.  She denies any active range of motion.  She has been participating in physical therapy.  She denies any numbness or tingling.  She denies any fever or chills.    The following portions of the patient's history were reviewed and updated as appropriate: allergies, current medications, past family history, past medical history, past social history, past surgical history and problem list.    PAST MEDICAL HISTORY:  Past Medical History:   Diagnosis Date    Anemia     Arthritis     Bilateral bunions     Chronic pain disorder     back and hip pain    Colon polyp     Disease of thyroid gland     nodules    History of COVID-19 2020    mild s/s    History of gastroesophageal reflux (GERD)     \"had " "hiatal hernia surgery\"    History of heart murmur in childhood     Hyperlipidemia     Hypertension     Overactive bladder     PONV (postoperative nausea and vomiting)     once after knee surgery    Rotator cuff tear, right     had surgery    Vitamin D deficiency     unsure    Wears glasses     Wears partial dentures     upper       PAST SURGICAL HISTORY:  Past Surgical History:   Procedure Laterality Date     SECTION      Last Assessed: 2017    COLONOSCOPY      ELBOW SURGERY      Last Assessed: 2017    ESOPHAGOGASTRODUODENOSCOPY N/A 2017    Procedure: ESOPHAGOGASTRODUODENOSCOPY (EGD);  Surgeon: Jolly Lin DO;  Location: [facility];  Service: Gastroenterology    HIP SURGERY Left 2018    glut medius/minimus repair  and 18--with pin implanted    HYSTERECTOMY          PARAESOPHAGEAL HERNIA REPAIR N/A 2020    Procedure: LAPAROSCOPIC PARAESOPHAGEAL HERNIA REPAIR WITH MESH AND NISSEN FUNDOPLICATION WITH ROBOTICS;  Surgeon: Darrin Leon MD;  Location: [facility];  Service: General    KS ARTHRP KNE CONDYLE&PLATU MEDIAL&LAT COMPARTMENTS Left 03/15/2023    Procedure: ARTHROPLASTY KNEE TOTAL;  Surgeon: Bart Potts DO;  Location: [facility];  Service: Orthopedics    KS ARTHRS KNE SURG W/MENISCECTOMY MED/LAT W/SHVG Left 06/15/2022    Procedure: ;left knee arthroscopy, meniscectomy,synevectomy,chondroplasty, loose body removal, injection;  Surgeon: Bart Potts DO;  Location: [facility];  Service: Orthopedics    KS COLONOSCOPY FLX DX W/COLLJ SPEC WHEN PFRMD N/A 2017    Procedure: EGD AND COLONOSCOPY;  Surgeon: Jolly Lin DO;  Location: [facility];  Service: Gastroenterology    KS EXCISON TUMOR SOFT TISSUE THIGH/KNEE SUBQ 3 CM/> Bilateral 2022    Procedure: EXCISION BIOPSY TISSUE LESION/MASS LOWER EXTREMITY;  Surgeon: Kit Tavera MD;  [facility]: [facility];  Service: General    KS EXCISON TUMOR SOFT TISSUE THIGH/KNEE SUBQ 3 CM/> " Bilateral 2022    Procedure: EXCISION BIOPSY TISSUE LESION/MASS LOWER EXTREMITY;  Surgeon: Kit Tavera MD;  Location: [facility];  Service: General    ME OSTECTOMY PRTL 5TH METAR HEAD SPX Bilateral 8/15/2023    Procedure: BUNIONECTOMY TAILOR WITHOUT OSTEOTOMY;  Surgeon: Monet Pradhan DPM;  Location: [facility];  Service: Podiatry    ME SURGICAL ARTHROSCOPY JENNIFER W/CORACOACRM LIGM RLS Left 2023    Procedure: Left - ARTHROSCOPY SHOULDER  Synovectomy Debridement/chondroplasty Acromioplasty Arthroscopic rotator cuff repair Post arthroscopic injection of intra-articular joint space and peripheral portals;  Surgeon: Bart Potts DO;  Location: [facility];  Service: Orthopedics    SHOULDER ARTHROCENTESIS      SHOULDER ARTHROSCOPY Right 2021    Procedure: SHOULDER ARTHROSCOPY WITH acromioplasty, debridment, and ROTATOR CUFF REPAIR;  Surgeon: Bart Potts DO;  Location:  MAIN OR;  Service: Orthopedics    TUBAL LIGATION         FAMILY HISTORY:  Family History   Problem Relation Age of Onset    Colon cancer Mother     Heart attack Father     Other Father         Cardiac Disorder    Diabetes type II Father     Colon cancer Sister     No Known Problems Sister     No Known Problems Sister     No Known Problems Sister     No Known Problems Sister     No Known Problems Daughter     No Known Problems Maternal Aunt     No Known Problems Maternal Aunt     No Known Problems Maternal Aunt     Cancer Maternal Grandmother     No Known Problems Paternal Grandmother        SOCIAL HISTORY:  Social History     Tobacco Use    Smoking status: Former     Current packs/day: 0.00     Average packs/day: 1.5 packs/day for 30.0 years (45.0 ttl pk-yrs)     Types: Cigarettes     Start date: 1961     Quit date: 1991     Years since quittin.0    Smokeless tobacco: Never    Tobacco comments:     quit 25 yrs ago   Vaping Use    Vaping status: Never Used   Substance Use Topics    Alcohol use: Never    Drug use:  "Never       MEDICATIONS:    Current Outpatient Medications:     atorvastatin (LIPITOR) 10 mg tablet, Take 1 tablet (10 mg total) by mouth daily, Disp: 90 tablet, Rfl: 1    famotidine (PEPCID) 20 mg tablet, Take 1 tablet (20 mg total) by mouth 2 (two) times a day as needed for heartburn, Disp: 180 tablet, Rfl: 0    hydrOXYzine HCL (ATARAX) 25 mg tablet, take 1 tablet by mouth every 6 hours if needed for itching, Disp: 60 tablet, Rfl: 2    lidocaine (Lidoderm) 5 %, Apply 2 patches topically over 12 hours daily Remove & Discard patch within 12 hours or as directed by MD, Disp: 60 patch, Rfl: 5    losartan (COZAAR) 100 MG tablet, Take 1 tablet (100 mg total) by mouth daily, Disp: 90 tablet, Rfl: 0    meloxicam (MOBIC) 15 mg tablet, Take 1 tablet (15 mg total) by mouth daily, Disp: 30 tablet, Rfl: 0    methocarbamol (Robaxin-750) 750 mg tablet, Take 1 tablet (750 mg total) by mouth every 8 (eight) hours, Disp: 90 tablet, Rfl: 5    valACYclovir (VALTREX) 1,000 mg tablet, Take 1 tablet (1,000 mg total) by mouth 2 (two) times a day for 3 days, Disp: 6 tablet, Rfl: 2    ALLERGIES:  Allergies   Allergen Reactions    Hydrocodone Irritability       ROS:  Review of Systems     Constitutional: Negative for fatigue, fever or loss of appetite.   HENT: Negative.    Respiratory: Negative for shortness of breath, dyspnea.    Cardiovascular: Negative for chest pain/tightness.   Gastrointestinal: Negative for abdominal pain, N/V.   Endocrine: Negative for cold/heat intolerance, unexplained weight loss/gain.   Genitourinary: Negative for flank pain, dysuria, hematuria.   Musculoskeletal: Positive for arthralgia   Skin: Negative for rash.    Neurological: Negative for numbness or tingling  Psychiatric/Behavioral: Negative for agitation.  _____________________________________________________  PHYSICAL EXAMINATION:    Blood pressure (!) 179/100, pulse 70, height 5' 3\" (1.6 m), weight 64.9 kg (143 lb).    Constitutional: Oriented to person, " "place, and time. Appears well-developed and well-nourished. No distress.   HENT:   Head: Normocephalic.   Eyes: Conjunctivae are normal. Right eye exhibits no discharge. Left eye exhibits no discharge. No scleral icterus.   Cardiovascular: Normal rate.    Pulmonary/Chest: Effort normal.   Neurological: Alert and oriented to person, place, and time.   Skin: Skin is warm and dry. No rash noted. Not diaphoretic. No erythema. No pallor.   Psychiatric: Normal mood and affect. Behavior is normal. Judgment and thought content normal.      MUSCULOSKELETAL EXAMINATION:   Physical Exam  Ortho Exam    Left upper extremity is neurovascularly intact  Fingers are pink and mobile  Compartments are soft  Incisions are clean, dry and intact  Brisk cap refill  Sensation intact  No warmth or erythema  Good passive range of motion of shoulder  Objective:  BP Readings from Last 1 Encounters:   01/02/24 (!) 179/100      Wt Readings from Last 1 Encounters:   01/02/24 64.9 kg (143 lb)        BMI:   Estimated body mass index is 25.33 kg/m² as calculated from the following:    Height as of this encounter: 5' 3\" (1.6 m).    Weight as of this encounter: 64.9 kg (143 lb).        Scribe Attestation      I,:  Nazario Duque PA-C am acting as a scribe while in the presence of the attending physician.:       I,:  Bart Potts DO personally performed the services described in this documentation    as scribed in my presence.:            "

## 2024-01-03 ENCOUNTER — OFFICE VISIT (OUTPATIENT)
Dept: PHYSICAL THERAPY | Facility: CLINIC | Age: 69
End: 2024-01-03
Payer: COMMERCIAL

## 2024-01-03 DIAGNOSIS — R29.3 POSTURE ABNORMALITY: ICD-10-CM

## 2024-01-03 DIAGNOSIS — M75.42 IMPINGEMENT SYNDROME OF LEFT SHOULDER: Primary | ICD-10-CM

## 2024-01-03 PROCEDURE — 97112 NEUROMUSCULAR REEDUCATION: CPT | Performed by: PHYSICAL THERAPIST

## 2024-01-03 PROCEDURE — 97110 THERAPEUTIC EXERCISES: CPT | Performed by: PHYSICAL THERAPIST

## 2024-01-03 PROCEDURE — 97140 MANUAL THERAPY 1/> REGIONS: CPT | Performed by: PHYSICAL THERAPIST

## 2024-01-03 NOTE — PROGRESS NOTES
Daily Note     Today's date: 1/3/2024  Patient name: Jamee Shaikh  : 1955  MRN: [id number]  Referring provider: Nazario Duque,*  Dx:   Encounter Diagnosis     ICD-10-CM    1. Impingement syndrome of left shoulder  M75.42       2. Posture abnormality  R29.3           Start Time: 0730  Stop Time: 0815  Total time in clinic (min): 45 minutes    Subjective: Had stitches taken out yesterday. Surgeon stated she had 90% tear in RC, x-ray taken showing good alignment s/p.       Objective: See treatment diary below      Assessment: Tolerated treatment well. Passive shoulder ROM at appropriate degree for time s/p. Exercises added per flowsheet.  Patient exhibited good technique with therapeutic exercises and would benefit from continued PT      Plan: Continue per plan of care.  Progress treatment as tolerated.       Protocol:S/p left shoulder arthroscopy with rotator cuff repair   No active motion x 6 weeks   Passive assist, active assist   Precautions: none noted  Access Code: [email or web address]  Progress note: 1/15  POC: 3/15    Manuals 12/15 12/20 12/28 1/3    Stretching with active release  KB KB KB    GH jt mobs  Grade II Grade II Grade II    Scapular PNF        IASTM        Neuro Re-Ed                scap retraction 10x 10x 10x 10x    Shoulder rolls 10x 10x 10x 10x    Chin tucks 10x :05x10 :05x10 :05x10                    Ther Ex                UT stretch  :30x3 :30x3 30x3    LS stretch  :30x3 :30x3 30x3    Table slides 10x flex  10x flex 10x flex    pendulums 10x ea 10x ea 10x ea 10x ea    Supine flexion ROM  nv  nv    Supine ER ROM  nv  10x to 30 deg    Wrist AROM   10x 15x ea     strength   Red 10x Red 15x ea                                                                            Ther Activity                        Gait Training                        Modalities

## 2024-01-04 ENCOUNTER — OFFICE VISIT (OUTPATIENT)
Dept: PHYSICAL THERAPY | Facility: CLINIC | Age: 69
End: 2024-01-04
Payer: COMMERCIAL

## 2024-01-04 DIAGNOSIS — M75.42 IMPINGEMENT SYNDROME OF LEFT SHOULDER: Primary | ICD-10-CM

## 2024-01-04 DIAGNOSIS — R29.3 POSTURE ABNORMALITY: ICD-10-CM

## 2024-01-04 PROCEDURE — 97140 MANUAL THERAPY 1/> REGIONS: CPT | Performed by: PHYSICAL THERAPIST

## 2024-01-04 PROCEDURE — 97110 THERAPEUTIC EXERCISES: CPT | Performed by: PHYSICAL THERAPIST

## 2024-01-04 PROCEDURE — 97112 NEUROMUSCULAR REEDUCATION: CPT | Performed by: PHYSICAL THERAPIST

## 2024-01-04 NOTE — PROGRESS NOTES
Daily Note     Today's date: 2024  Patient name: Jamee Shaikh  : 1955  MRN: [id number]  Referring provider: Nazario Duque,*  Dx:   Encounter Diagnosis     ICD-10-CM    1. Impingement syndrome of left shoulder  M75.42       2. Posture abnormality  R29.3           Start Time: 0900  Stop Time: 0945  Total time in clinic (min): 45 minutes    Subjective: States shoulder is sore, not painful today.       Objective: See treatment diary below      Assessment: Tolerated treatment well. Increased pain into proximal arm at the end of session today, no significant changes in shoulder. Continues to display appropriate passive L shoulder range of motion per protocol. May continue to progress as tolerated and in parameters of protocol.  Patient exhibited good technique with therapeutic exercises and would benefit from continued PT      Plan: Continue per plan of care.  Progress treatment as tolerated.       Protocol:S/p left shoulder arthroscopy with rotator cuff repair   No active motion x 6 weeks   Passive assist, active assist   Precautions: none noted  Access Code: [email or web address]  Progress note: 1/15  POC: 3/15    Manuals 12/15 12/20 12/28 1/3 1/4   Stretching with active release  KB KB KB KB   GH jt mobs  Grade II Grade II Grade II Grade II   Scapular PNF        IASTM        Neuro Re-Ed                scap retraction 10x 10x 10x 10x 10x   Shoulder rolls 10x 10x 10x 10x 10x   Chin tucks 10x :05x10 :05x10 :05x10 :05x10                   Ther Ex                UT stretch  :30x3 :30x3 30x3 :30x3   LS stretch  :30x3 :30x3 30x3 :30x3   Table slides 10x flex  10x flex 10x flex 10x flex, scap   pendulums 10x ea 10x ea 10x ea 10x ea 10x ea   Supine flexion ROM  nv  nv nv   Supine ER ROM  nv  10x to 30 deg 10x to 30 deg   Wrist AROM   10x 15x ea 20x ea    strength   Red 10x Red 15x ea Red 20x                                                                           Ther Activity                        Gait  Training                        Modalities

## 2024-01-09 ENCOUNTER — OFFICE VISIT (OUTPATIENT)
Dept: PHYSICAL THERAPY | Facility: CLINIC | Age: 69
End: 2024-01-09
Payer: COMMERCIAL

## 2024-01-09 DIAGNOSIS — R29.3 POSTURE ABNORMALITY: ICD-10-CM

## 2024-01-09 DIAGNOSIS — M75.42 IMPINGEMENT SYNDROME OF LEFT SHOULDER: Primary | ICD-10-CM

## 2024-01-09 PROCEDURE — 97110 THERAPEUTIC EXERCISES: CPT | Performed by: PHYSICAL THERAPIST

## 2024-01-09 PROCEDURE — 97112 NEUROMUSCULAR REEDUCATION: CPT | Performed by: PHYSICAL THERAPIST

## 2024-01-09 PROCEDURE — 97140 MANUAL THERAPY 1/> REGIONS: CPT | Performed by: PHYSICAL THERAPIST

## 2024-01-09 NOTE — PROGRESS NOTES
Daily Note     Today's date: 2024  Patient name: Jamee Shaikh  : 1955  MRN: [id number]  Referring provider: Nazario Duque,*  Dx:   Encounter Diagnosis     ICD-10-CM    1. Impingement syndrome of left shoulder  M75.42       2. Posture abnormality  R29.3           Start Time: 0845  Stop Time: 0930  Total time in clinic (min): 45 minutes    Subjective: Patient states shoulder has been more sore than normal. /10 pain today. Has been icing a lot which does seem to help give her some relief.       Objective: See treatment diary below      Assessment: Tolerated treatment well. Guarding with stretching OH, did well with addition of posterior rocks to promote passive shoulder flexion with decreased pain and tensing. Tightness noted in bicep with limited elbow extension. Patient exhibited good technique with therapeutic exercises and would benefit from continued PT      Plan: Continue per plan of care.  Progress treatment as tolerated.       Protocol:S/p left shoulder arthroscopy with rotator cuff repair   No active motion x 6 weeks   Passive assist, active assist   Precautions: none noted  Access Code: [email or web address]  Progress note: 1/15  POC: 3/15    Manuals 1/9 12/20 12/28 1/3 1/4   Stretching with active release KB NARESH INFANTE    jt mobs Grade II Grade II Grade II Grade II Grade II   Scapular PNF        IASTM        Neuro Re-Ed                scap retraction 10x 10x 10x 10x 10x   Shoulder rolls 10x 10x 10x 10x 10x   Chin tucks :05x10 :05x10 :05x10 :05x10 :05x10                   Ther Ex                UT stretch :30x3 :30x3 :30x3 30x3 :30x3   LS stretch :30x3 :30x3 :30x3 30x3 :30x3   Table slides   10x flex 10x flex 10x flex, scap   pendulums 10x ea 10x ea 10x ea 10x ea 10x ea   Supine flexion ROM  nv  nv nv   Supine ER ROM 10x to 30 deg nv  10x to 30 deg 10x to 30 deg   Wrist AROM 20x ea  10x 15x ea 20x ea    strength Red 30x  Red 10x Red 15x ea Red 20x   Shoulder flexion rock backs 10x                                                                        Ther Activity                        Gait Training                        Modalities

## 2024-01-10 ENCOUNTER — APPOINTMENT (OUTPATIENT)
Dept: PHYSICAL THERAPY | Facility: CLINIC | Age: 69
End: 2024-01-10
Payer: COMMERCIAL

## 2024-01-11 ENCOUNTER — OFFICE VISIT (OUTPATIENT)
Dept: PHYSICAL THERAPY | Facility: CLINIC | Age: 69
End: 2024-01-11
Payer: COMMERCIAL

## 2024-01-11 DIAGNOSIS — M75.42 IMPINGEMENT SYNDROME OF LEFT SHOULDER: Primary | ICD-10-CM

## 2024-01-11 DIAGNOSIS — R29.3 POSTURE ABNORMALITY: ICD-10-CM

## 2024-01-11 PROCEDURE — 97112 NEUROMUSCULAR REEDUCATION: CPT | Performed by: PHYSICAL THERAPIST

## 2024-01-11 PROCEDURE — 97110 THERAPEUTIC EXERCISES: CPT | Performed by: PHYSICAL THERAPIST

## 2024-01-11 PROCEDURE — 97140 MANUAL THERAPY 1/> REGIONS: CPT | Performed by: PHYSICAL THERAPIST

## 2024-01-11 NOTE — PROGRESS NOTES
Daily Note     Today's date: 2024  Patient name: Jamee Shaikh  : 1955  MRN: [id number]  Referring provider: Nazario Duque,*  Dx:   Encounter Diagnosis     ICD-10-CM    1. Impingement syndrome of left shoulder  M75.42       2. Posture abnormality  R29.3           Start Time: 0810  Stop Time: 0850  Total time in clinic (min): 40 minutes    Subjective: States a little less pain today than at last visit at a 6/10.       Objective: See treatment diary below      Assessment: Tolerated treatment fair. Muscle guarding with passive range of motion causing increased pain. Tolerated rock backs better than table slides or passive movement. Did not tightness and trigger point at proximal bicep likely due to overuse. Patient exhibited good technique with therapeutic exercises and would benefit from continued PT      Plan: Continue per plan of care.  Progress treatment as tolerated.       Protocol:S/p left shoulder arthroscopy with rotator cuff repair   No active motion x 6 weeks   Passive assist, active assist   Precautions: none noted  Access Code: [email or web address]  Progress note: 1/15  POC: 3/15    Manuals 1/9 1/11 12/28 1/3 1/4   Stretching with active release KB KB KB KB KB    jt mobs Grade II Grade II Grade II Grade II Grade II   Scapular PNF        IASTM        Neuro Re-Ed                scap retraction 10x 10x 10x 10x 10x   Shoulder rolls 10x 10x 10x 10x 10x   Chin tucks :05x10 :05x10 :05x10 :05x10 :05x10                   Ther Ex                UT stretch :30x3 :30x3 :30x3 30x3 :30x3   LS stretch :30x3 :30x3 :30x3 30x3 :30x3   Table slides  10x flex, scap 10x flex 10x flex 10x flex, scap   pendulums 10x ea 10x ea 10x ea 10x ea 10x ea   Supine flexion ROM    nv nv   Supine ER ROM 10x to 30 deg 10x to 30 deg  10x to 30 deg 10x to 30 deg   Wrist AROM 20x ea 20x ea 10x 15x ea 20x ea    strength Red 30x Red 30x Red 10x Red 15x ea Red 20x   Shoulder flexion rock backs 10x 10x      Elbow flexion  20x                                                               Ther Activity                        Gait Training                        Modalities

## 2024-01-15 ENCOUNTER — CLINICAL SUPPORT (OUTPATIENT)
Dept: GASTROENTEROLOGY | Facility: CLINIC | Age: 69
End: 2024-01-15
Payer: COMMERCIAL

## 2024-01-15 DIAGNOSIS — R14.0 ABDOMINAL BLOATING: Primary | ICD-10-CM

## 2024-01-15 PROCEDURE — 91065 BREATH HYDROGEN/METHANE TEST: CPT | Performed by: PHYSICIAN ASSISTANT

## 2024-01-15 NOTE — Clinical Note
Campbell Mancuso's results are attached.  The baseline levels are falsely low due to high exhaled CO2, but she still meets criteria for SIBO and IMO with increases from the 20-minute delicia to later in the exam.  I'd offer treatment if symptoms persist.  Please let me know if you have any questions. Samra - Maximilian

## 2024-01-16 ENCOUNTER — TELEPHONE (OUTPATIENT)
Dept: GASTROENTEROLOGY | Facility: CLINIC | Age: 69
End: 2024-01-16

## 2024-01-16 ENCOUNTER — TELEPHONE (OUTPATIENT)
Dept: FAMILY MEDICINE CLINIC | Facility: CLINIC | Age: 69
End: 2024-01-16

## 2024-01-16 DIAGNOSIS — K63.8219 SMALL INTESTINAL BACTERIAL OVERGROWTH (SIBO): Primary | ICD-10-CM

## 2024-01-16 NOTE — PROGRESS NOTES
[facility]       Bacterial Overgrowth Analytical Record    Jamee Shaikh 68 y.o. female MRN: [id number]      Date of Test: 1/12/24    Substrate Given: Lactulose    Ordering Provider: Lorena Webb PA-C    Medical Assistant: Vilma Pelaez    Symptoms: bloating    The patient presents for bacterial overgrowth testing.    Patient fasted overnight. Baseline readings obtained.   Breath test performed every 20 min for a total of 3 hr    Sample Clock Time ppmH2 ppmCH4 Co2% Ezio   Baseline 07:00am   1 3 0.1 TOO MUCH ROOM AIR   #1  20 minutes 07:20am 21 8 4.0 1.37   #2  40 minutes 07:40am 23 8 4.4 1.25   #3  60 minutes 08:00am 23 10 3.4 1.61   #4  80 minutes 08:20am 104 16 3.8 1.44   #5  100 minutes 08:40am 124 16 3.9 1.41   #6  120 minutes 09:00am 200 18 4.0 1.37   #7  140 minutes 09:20am 168 17 3.5 1.57   #8  160 minutes 09:40am 147 27 1.6 3.43   #9  180 minutes 10:00am 73 13 3.9 1.41       Physician interpretation:   High CO2 on baseline sample complicates interpretation    Meets criteria for SIBO with h2 increase >20 at the 80-minute delicia  Also meets criteria for intestinal methanogen overgrowth with methane increase >10 at 80 minute delicia.   Recommend treatment for bacterial overgrowth.  Results forwarded to ordering provider

## 2024-01-16 NOTE — TELEPHONE ENCOUNTER
Campbell Shaikh February 13th 1955. My phone number is [phone]. I mean, I see Brigitte and I've been on disability since 2018 and I called my primary on my pain management doctor and they said to call my primary care doctor that I need a letter stating that I am disabled. If Brigitte can do that, I appreciate it. Thank you so much. Bye.    Tried calling patient to get more information but got busy tone. Is this something you would do?

## 2024-01-16 NOTE — TELEPHONE ENCOUNTER
Pt's breath test was completed and this does demonstrate an abnormality suggestive of intestinal bacterial overgrowth/imbalance. I think this may be contributing to patient's bloating. I would recommend treatment with an antibiotic for 2 weeks that primarily is active the gut called xifaxan. I will send to pharmacy though it may require prior authorization.     I would also encourage pt to ensure she is evacuating her bowels completely. Continue on miralax and increase/decrease to effect so she is having soft easy bowel movements and feels completely evacuated.

## 2024-01-17 NOTE — TELEPHONE ENCOUNTER
Patient Voicemail:  Campbell Contrerashareshadrian, [id number] on My phone number is [phone]. I'm a patient of Brigitte's and I've been in pain management since 2018. I just needed documents stating that I'm 100% disabled on pain. Management told me to get a hold of my primary care doctor for this letter. The document. Thank you. Bye.

## 2024-01-18 ENCOUNTER — TELEPHONE (OUTPATIENT)
Dept: FAMILY MEDICINE CLINIC | Facility: CLINIC | Age: 69
End: 2024-01-18

## 2024-01-18 ENCOUNTER — OFFICE VISIT (OUTPATIENT)
Dept: PHYSICAL THERAPY | Facility: CLINIC | Age: 69
End: 2024-01-18
Payer: COMMERCIAL

## 2024-01-18 ENCOUNTER — TELEPHONE (OUTPATIENT)
Age: 69
End: 2024-01-18

## 2024-01-18 ENCOUNTER — OFFICE VISIT (OUTPATIENT)
Dept: FAMILY MEDICINE CLINIC | Facility: CLINIC | Age: 69
End: 2024-01-18

## 2024-01-18 VITALS
OXYGEN SATURATION: 99 % | DIASTOLIC BLOOD PRESSURE: 98 MMHG | SYSTOLIC BLOOD PRESSURE: 160 MMHG | HEIGHT: 63 IN | BODY MASS INDEX: 25.34 KG/M2 | HEART RATE: 76 BPM | WEIGHT: 143 LBS

## 2024-01-18 DIAGNOSIS — R29.3 POSTURE ABNORMALITY: ICD-10-CM

## 2024-01-18 DIAGNOSIS — M46.1 SACROILIITIS (HCC): ICD-10-CM

## 2024-01-18 DIAGNOSIS — I10 ESSENTIAL HYPERTENSION: Primary | ICD-10-CM

## 2024-01-18 DIAGNOSIS — M75.42 IMPINGEMENT SYNDROME OF LEFT SHOULDER: Primary | ICD-10-CM

## 2024-01-18 PROCEDURE — 97112 NEUROMUSCULAR REEDUCATION: CPT | Performed by: PHYSICAL THERAPIST

## 2024-01-18 PROCEDURE — 97140 MANUAL THERAPY 1/> REGIONS: CPT | Performed by: PHYSICAL THERAPIST

## 2024-01-18 PROCEDURE — 97110 THERAPEUTIC EXERCISES: CPT | Performed by: PHYSICAL THERAPIST

## 2024-01-18 RX ORDER — AMLODIPINE BESYLATE 5 MG/1
5 TABLET ORAL DAILY
Qty: 90 TABLET | Refills: 3 | Status: SHIPPED | OUTPATIENT
Start: 2024-01-18

## 2024-01-18 NOTE — TELEPHONE ENCOUNTER
I am not aware of her disability. If she has documentation from a specialist that she is disabled then we can write a letter simply stating that she is in fact disabled.   Also, her BP was extremely high at her last visit with Dr. Potts- there is no mention in the note that patient was made aware it is high. Can you ask her to come in to get it rechecked or ask at PT today to have it rechecked? She can not have a BP of 179/100 and not get treatment.

## 2024-01-18 NOTE — PROGRESS NOTES
Daily Note     Today's date: 2024  Patient name: Jamee Shaikh  : 1955  MRN: [id number]  Referring provider: Nazario Duque,*  Dx:   Encounter Diagnosis     ICD-10-CM    1. Impingement syndrome of left shoulder  M75.42       2. Posture abnormality  R29.3           Start Time: 0855  Stop Time: 09  Total time in clinic (min): 46 minutes    Subjective: Patient states she got a msg via phone that stated she was 6 weeks out and could remove sling. Discussed with patient that 6 weeks s/p will be on . May take sling off when sitting or completing exercises at home, but should still wear when moving around especially in community. No lifting until next week. Also discussed concern of high BP. States nurse at Dr. Potts's office would like her to have BP checked today in PT.       Objective: See treatment diary below  Vitals: seated pre-session 181/93 mmhg, 73 bpm  Seated post-session 181/111 mmhg, 64 bpm    Assessment: Tolerated treatment well. Noted high BP today. Discussed with patient she should notify her doctor as levels would prevent any type of exercise and are elevating risk of stroke. Has not taken BP medication yet today, will usually take around lunch time.  She is to stop by PCP office on her way home. Shoulder progressing appropriately, able to progress to isometrics. Did increase AAROM today with some soreness. Patient exhibited good technique with therapeutic exercises and would benefit from continued PT      Plan: Continue per plan of care.  Progress treatment as tolerated.       Protocol:S/p left shoulder arthroscopy with rotator cuff repair   No active motion x 6 weeks   Passive assist, active assist   Precautions: none noted  Access Code: [email or web address]  Progress note: 1/15  POC: 3/15    Manuals 1/9 1/11 1/18 1/3 1/4   Stretching with active release NARESH BEARD jt mobs Grade II Grade II Grade II-III Grade II Grade II   Scapular PNF        IASTM        Neuro Re-Ed                 scap retraction 10x 10x 10x 10x 10x   Shoulder rolls 10x 10x 10x 10x 10x   Chin tucks :05x10 :05x10 :05x10 :05x10 :05x10                   Ther Ex        pulleys   10x flex     UT stretch :30x3 :30x3 :30x3 L 30x3 :30x3   LS stretch :30x3 :30x3 :30x3 L 30x3 :30x3   Table slides  10x flex, scap  10x flex 10x flex, scap   pendulums 10x ea 10x ea  10x ea 10x ea   Supine flexion ROM    nv nv   Supine ER ROM 10x to 30 deg 10x to 30 deg 10x to 30 deg 10x to 30 deg 10x to 30 deg   Wrist AROM 20x ea 20x ea 20x ea 15x ea 20x ea    strength Red 30x Red 30x Green 20x Red 15x ea Red 20x   Shoulder flexion rock backs 10x 10x 10x     Elbow flexion  20x 30x                                                             Ther Activity                        Gait Training                        Modalities

## 2024-01-18 NOTE — TELEPHONE ENCOUNTER
Spoke to patient. Said she will call Pain Management again to discuss.     On her way to PT now and will ask them to recheck. If they cannot, she will come in for a nurse visit.

## 2024-01-18 NOTE — PROGRESS NOTES
Name: Jamee Shaikh      : 1955      MRN: [id number]  Encounter Provider: GUS Sosa  Encounter Date: 2024   Encounter department: [facility] PRIMARY CARE    Assessment & Plan     1. Essential hypertension  -     amLODIPine (NORVASC) 5 mg tablet; Take 1 tablet (5 mg total) by mouth daily    2. Sacroiliitis (HCC)        Depression Screening and Follow-up Plan: Patient was screened for depression during today's encounter. They screened negative with a PHQ-2 score of 0.        Subjective      Patient is a 69y/o female, presenting for a BP check. Patient states she takes her medication everyday and her blood pressure still remains high. Reports an occasional headache but is unsure if this is related. Discussed elevated readings at last several visits and the need to add additional medication. Patient was on amlodipine in the past prior to weight loss. Patient agreeable to adding this to her current regimen.     - patient requesting letter for disability.      Review of Systems   Constitutional:  Negative for activity change, diaphoresis, fatigue and fever.   HENT:  Negative for congestion, facial swelling, hearing loss, rhinorrhea, sinus pressure, sinus pain, sneezing, sore throat and voice change.    Eyes:  Negative for discharge and visual disturbance.   Respiratory:  Negative for cough, choking, chest tightness, shortness of breath, wheezing and stridor.    Cardiovascular:  Negative for chest pain, palpitations and leg swelling.   Gastrointestinal:  Negative for abdominal distention, abdominal pain, constipation, diarrhea, nausea and vomiting.   Endocrine: Negative for polydipsia, polyphagia and polyuria.   Genitourinary:  Negative for difficulty urinating, dysuria, frequency and urgency.   Musculoskeletal:  Negative for arthralgias, back pain, gait problem, joint swelling, myalgias, neck pain and neck stiffness.   Skin:  Negative for color change, rash and wound.   Neurological:   "Positive for headaches. Negative for dizziness, syncope, speech difficulty, weakness and light-headedness.   Hematological:  Negative for adenopathy. Does not bruise/bleed easily.   Psychiatric/Behavioral:  Negative for agitation, behavioral problems, confusion, hallucinations, sleep disturbance and suicidal ideas. The patient is not nervous/anxious.    All other systems reviewed and are negative.      Current Outpatient Medications on File Prior to Visit   Medication Sig    atorvastatin (LIPITOR) 10 mg tablet Take 1 tablet (10 mg total) by mouth daily    famotidine (PEPCID) 20 mg tablet Take 1 tablet (20 mg total) by mouth 2 (two) times a day as needed for heartburn    hydrOXYzine HCL (ATARAX) 25 mg tablet take 1 tablet by mouth every 6 hours if needed for itching    lidocaine (Lidoderm) 5 % Apply 2 patches topically over 12 hours daily Remove & Discard patch within 12 hours or as directed by MD    losartan (COZAAR) 100 MG tablet Take 1 tablet (100 mg total) by mouth daily    meloxicam (MOBIC) 15 mg tablet Take 1 tablet (15 mg total) by mouth daily    methocarbamol (Robaxin-750) 750 mg tablet Take 1 tablet (750 mg total) by mouth every 8 (eight) hours    rifaximin (XIFAXAN) 550 mg tablet Take 1 tablet (550 mg total) by mouth 3 (three) times a day for 14 days    valACYclovir (VALTREX) 1,000 mg tablet Take 1 tablet (1,000 mg total) by mouth 2 (two) times a day for 3 days       Objective     /98   Pulse 76   Ht 5' 3\" (1.6 m)   Wt 64.9 kg (143 lb)   SpO2 99%   BMI 25.33 kg/m²     Physical Exam  Vitals and nursing note reviewed.   Constitutional:       General: She is not in acute distress.     Appearance: She is well-developed. She is not diaphoretic.   Neck:      Thyroid: No thyromegaly.      Trachea: No tracheal deviation.   Cardiovascular:      Rate and Rhythm: Normal rate and regular rhythm.      Heart sounds: Murmur heard.      Systolic murmur is present with a grade of 1/6.   Pulmonary:      Effort: " Pulmonary effort is normal. No respiratory distress.      Breath sounds: Normal breath sounds. No wheezing.   Musculoskeletal:         General: No tenderness or deformity. Normal range of motion.      Cervical back: Normal range of motion and neck supple.   Skin:     General: Skin is warm and dry.      Findings: No erythema or rash.   Neurological:      Mental Status: She is alert and oriented to person, place, and time.   Psychiatric:         Mood and Affect: Mood normal.         Behavior: Behavior normal. Behavior is cooperative.         Thought Content: Thought content normal.         Judgment: Judgment normal.       GUS Sosa

## 2024-01-18 NOTE — TELEPHONE ENCOUNTER
Caller: Jamee Shaikh    Doctor: Dr Bradford     Reason for call: Patient calling asking to see if Dr Bradford can write her a letter stating she is disabled please advise     Call back#: [phone]

## 2024-01-18 NOTE — PATIENT INSTRUCTIONS
Amlodipine as prescribed   Continue medications as directed   Monitor blood pressures   Disability letter printed   Return with issues/concerns

## 2024-01-18 NOTE — LETTER
To whom it concerns,     Jamee Shaikh is under my medical/professional care and has been permanently disabled since 2018. If there are any questions or concerns please call office number listed above.                         GUS Sosa

## 2024-01-18 NOTE — TELEPHONE ENCOUNTER
"Patient was at PT today and had them check BP. Their note states - \"Vitals: seated pre-session 181/93 mmhg, 73 bpm  Seated post-session 181/111 mmhg, 64 bpm\"  Was told to come to our office to have BP rechecked.     BP was taken on right arm after patient was sitting for about 5 minutes. BP was 160/98.     Spoke to Brigitte and she will be squeezing in patient for an appt today.   "

## 2024-01-19 ENCOUNTER — APPOINTMENT (OUTPATIENT)
Dept: PHYSICAL THERAPY | Facility: CLINIC | Age: 69
End: 2024-01-19
Payer: COMMERCIAL

## 2024-01-19 ENCOUNTER — TELEPHONE (OUTPATIENT)
Dept: FAMILY MEDICINE CLINIC | Facility: CLINIC | Age: 69
End: 2024-01-19

## 2024-01-22 ENCOUNTER — TELEPHONE (OUTPATIENT)
Age: 69
End: 2024-01-22

## 2024-01-22 ENCOUNTER — TRANSCRIBE ORDERS (OUTPATIENT)
Dept: GASTROENTEROLOGY | Facility: CLINIC | Age: 69
End: 2024-01-22

## 2024-01-22 NOTE — TELEPHONE ENCOUNTER
PA for XIFAXAN Approved   Date(s) approved UNTIL 02/05/2024      Patient advised by [x] PhoneTellt Message                      [] Phone call       Pharmacy advised by [x]Fax                                     []Phone call    Approval letter scanned into Media Yes

## 2024-01-22 NOTE — TELEPHONE ENCOUNTER
PA for XIFAXAN    Submitted via  [x]CMM-KEY [id number]      Office notes sent, clinical questions answered. Awaiting determination

## 2024-01-22 NOTE — TELEPHONE ENCOUNTER
Patient called office. She reached out to her insurance and initiated appeal. They will be faxing paperwork to the office.

## 2024-01-23 NOTE — TELEPHONE ENCOUNTER
PA for Xifaxan    Submitted via  []CMM-KEY   [x]Surescripts-Case ID # Case ID:[id number]   []Faxed to plan   []Other website   []Phone call Case ID #     Office notes sent, clinical questions answered. Awaiting determination

## 2024-01-24 ENCOUNTER — OFFICE VISIT (OUTPATIENT)
Dept: PHYSICAL THERAPY | Facility: CLINIC | Age: 69
End: 2024-01-24
Payer: COMMERCIAL

## 2024-01-24 DIAGNOSIS — R29.3 POSTURE ABNORMALITY: ICD-10-CM

## 2024-01-24 DIAGNOSIS — M75.42 IMPINGEMENT SYNDROME OF LEFT SHOULDER: Primary | ICD-10-CM

## 2024-01-24 PROCEDURE — 97014 ELECTRIC STIMULATION THERAPY: CPT | Performed by: PHYSICAL THERAPIST

## 2024-01-24 PROCEDURE — 97140 MANUAL THERAPY 1/> REGIONS: CPT | Performed by: PHYSICAL THERAPIST

## 2024-01-24 NOTE — PROGRESS NOTES
PT Re-Evaluation     Today's date: 2024  Patient name: Jamee Shaikh  : 1955  MRN: [id number]  Referring provider: Nazario Duque,*  Dx:   Encounter Diagnosis     ICD-10-CM    1. Impingement syndrome of left shoulder  M75.42       2. Posture abnormality  R29.3           Start Time: 0815  Stop Time: 0900  Total time in clinic (min): 45 minutes    Assessment  Assessment details: Jamee Shaikh was seen for an initial PT evaluation and 8 f/u sessions. Patient is a 68 y.o. female with diagnosis of s/p shoulder rotator cuff repair and past medical history significant for OA, chronic pain, thyroid disease, GERD, hiatal hernia, hyperlipidemia, HTN, rotator cuff tear with repair, L buttock pain with history of surgery, lipomas with removal, lumbar radiculopathy, vertigo. Findings of examination today shows limitation in left shoulder strength, range of motion, and stability with pain likely increased due to absence of sling support and start of next phase of protocol. Will continue to assess as needed for gains and reduction of pain. Continuation of skilled PT is indicated for continued progression per protocol at 2x/week for an additional 4 weeks until next re-assessment.     Impairments: abnormal muscle firing, abnormal or restricted ROM, abnormal movement, activity intolerance, impaired physical strength, lacks appropriate home exercise program, pain with function, scapular dyskinesis, poor posture  and poor body mechanics    Goals  STG (6 weeks)  1. Patient's left shoulder flexion AROM will increase to 90 with 2/10 pain for increased ability to perform overhead ADLs. - NOT MET  2. Patient will have 0/10 pain in left shoulder at rest. - NOT MET  3. Patient's left shoulder strength will increase to 2+/5 for increased ability to lift. - PROGRESSING  LTG (12 weeks)  1. Patient's UE AROM equal bilaterally for ability to complete hair hygiene, overhead, and behind the back ADLs. - NOT MET  2. Patient's  UE strength will be equal bilaterally for ability to lift and carry at PLOF. - NOT MET  3. Patient will be independent with home exercise program for continued maintenance post PT discharge. - PROGRESSING      Plan  Plan details: Progress note in 4 weeks.   Patient would benefit from: skilled physical therapy  Planned modality interventions: unattended electrical stimulation, cryotherapy and thermotherapy: hydrocollator packs  Planned therapy interventions: neuromuscular re-education, manual therapy, therapeutic exercise, therapeutic activities, self care and home exercise program  Frequency: 2x week  Duration in weeks: 12  Plan of Care beginning date: 12/15/2023  Plan of Care expiration date: 3/15/2024  Treatment plan discussed with: patient and PTA      Subjective Evaluation    History of Present Illness  Date of surgery: 12/13/2023  Subjective 1/24: Patient states she has been in a lot of pain. Having difficulty sleeping due to pain waking 3-4x/night. Has removed sling per instructions, but will have increased pain with UE movements.     Mechanism of injury: Jamee Shaikh is a 68 y.o. female who presents to outpatient Physical Therapy today with complaints of left shoulder pain. Underwent left shoulder rotator cuff repair 12/13/23.      Pain Rating (0-10) 1/24   Current 6/10   At least 6/10   At worst 8/10   Location Proximal arm   Quality Throbbing sharp pain    Alleviate Ice   Aggravate  LUE movement         Social Support  Steps to enter house: yes  Stairs in house: yes   Lives in: multiple-level home  Lives with: spouse    Employment status: not working  Hand dominance: right          Objective     Observations   Left Shoulder   Positive for edema and incision.     Additional Observation Details  4 scope incisions healing appropriately. Dressing removed today and replaced with band aids. Patient instructed on wound care and signs of infection.   1/24: Incisions healed and scarred, good mobility.      Cervical/Thoracic Screen   Cervical range of motion within normal limits    Neurological Testing     Sensation     Shoulder     Right Shoulder   Intact: Light touch    Comments   Left light touch: nerve block    Active Range of Motion   Shoulder - AROM IE 1/24   LEFT      Flexion NT 50   Abduction NT 30   External Rotation NT @0: 25   Internal Rotation NT @0: 25   RIGHT     Flexion 155    Abduction 125    External Rotation BTH: T4    Internal Rotation BTB: T10        Passive Range of Motion     Shoulder - PROM IE 1/24   LEFT      Flexion 90 30   Abduction 90 20   External Rotation @ 0: 30 @0: 25   Internal Rotation @0: belly @0: 25         Strength/Myotome Testing     Shoulder - MMT IE 1/24   LEFT      Flexion NT 2   Abduction NT 2   External Rotation NT 2   Internal Rotation NT 2   bicep NT 3-   tricep NT 2+   RIGHT     Flexion 5    Abduction 5    External Rotation 5    Internal Rotation 5    Bicep 5    Tricep 5        Additional Strength Details  LUE strength testing not performed at intake due to restrictions    General Comments:      Shoulder Comments       FOTO: 26% function, 60% predicted function   1/24: 24%      Seated BP R arm: 150/91mmhg, 66bpm         Protocol:S/p left shoulder arthroscopy with rotator cuff repair   No active motion x 6 weeks   Passive assist, active assist   Precautions: none noted  Access Code: [email or web address]  Progress note: 2/24  POC: 3/15    Manuals 1/9 1/11 1/18 1/24 1/4   Stretching with active release KB KB KB KB KB    jt mobs Grade II Grade II Grade II-III Grade II-III Grade II   Scapular PNF        IASTM        Neuro Re-Ed                scap retraction 10x 10x 10x 10x 10x   Shoulder rolls 10x 10x 10x 10x 10x   Chin tucks :05x10 :05x10 :05x10  :05x10                   Ther Ex    HEP review    pulleys   10x flex     UT stretch :30x3 :30x3 :30x3 L  :30x3   LS stretch :30x3 :30x3 :30x3 L  :30x3   Table slides  10x flex, scap   10x flex, scap   pendulums 10x ea 10x ea   10x ea    Supine flexion ROM     nv   Supine ER ROM 10x to 30 deg 10x to 30 deg 10x to 30 deg  10x to 30 deg   Wrist AROM 20x ea 20x ea 20x ea  20x ea    strength Red 30x Red 30x Green 20x  Red 20x   Shoulder flexion rock backs 10x 10x 10x     Elbow flexion  20x 30x                                                             Ther Activity                        Gait Training                        Modalities        IFC with CP    10min seated, post

## 2024-01-25 ENCOUNTER — APPOINTMENT (OUTPATIENT)
Dept: PHYSICAL THERAPY | Facility: CLINIC | Age: 69
End: 2024-01-25
Payer: COMMERCIAL

## 2024-01-26 ENCOUNTER — APPOINTMENT (OUTPATIENT)
Dept: PHYSICAL THERAPY | Facility: CLINIC | Age: 69
End: 2024-01-26
Payer: COMMERCIAL

## 2024-01-29 ENCOUNTER — APPOINTMENT (OUTPATIENT)
Dept: PHYSICAL THERAPY | Facility: CLINIC | Age: 69
End: 2024-01-29
Payer: COMMERCIAL

## 2024-02-01 ENCOUNTER — OFFICE VISIT (OUTPATIENT)
Dept: PHYSICAL THERAPY | Facility: CLINIC | Age: 69
End: 2024-02-01
Payer: COMMERCIAL

## 2024-02-01 DIAGNOSIS — K21.9 GASTROESOPHAGEAL REFLUX DISEASE WITHOUT ESOPHAGITIS: ICD-10-CM

## 2024-02-01 DIAGNOSIS — I10 ESSENTIAL HYPERTENSION: ICD-10-CM

## 2024-02-01 DIAGNOSIS — R29.3 POSTURE ABNORMALITY: ICD-10-CM

## 2024-02-01 DIAGNOSIS — M75.42 IMPINGEMENT SYNDROME OF LEFT SHOULDER: Primary | ICD-10-CM

## 2024-02-01 PROCEDURE — 97112 NEUROMUSCULAR REEDUCATION: CPT | Performed by: PHYSICAL THERAPIST

## 2024-02-01 PROCEDURE — 97140 MANUAL THERAPY 1/> REGIONS: CPT | Performed by: PHYSICAL THERAPIST

## 2024-02-01 PROCEDURE — 97110 THERAPEUTIC EXERCISES: CPT | Performed by: PHYSICAL THERAPIST

## 2024-02-01 RX ORDER — FAMOTIDINE 20 MG/1
20 TABLET, FILM COATED ORAL 2 TIMES DAILY
Qty: 180 TABLET | Refills: 3 | Status: SHIPPED | OUTPATIENT
Start: 2024-02-01 | End: 2024-02-05 | Stop reason: SDUPTHER

## 2024-02-01 RX ORDER — LOSARTAN POTASSIUM 100 MG/1
100 TABLET ORAL DAILY
Qty: 90 TABLET | Refills: 3 | Status: SHIPPED | OUTPATIENT
Start: 2024-02-01

## 2024-02-01 NOTE — PROGRESS NOTES
Daily Note     Today's date: 2024  Patient name: Jamee Shaikh  : 1955  MRN: [id number]  Referring provider: Nazario Duque,*  Dx:   Encounter Diagnosis     ICD-10-CM    1. Impingement syndrome of left shoulder  M75.42       2. Posture abnormality  R29.3           Start Time: 0815  Stop Time: 0855  Total time in clinic (min): 40 minutes    Subjective: Patient states continued high levels of left shoulder pain for 3 weeks. Increased with shoulder active movement and shoulder extension/scap retraction. Most pain located at anterior proximal arm into axilla. Will also have bouts of shoulder pain randomly when sitting with arm unsupported.       Objective: See treatment diary below      Assessment: Tolerated treatment fair. Significant muscle guarding with MT and passive movement due to onset of pain. Noted tightness of bicep with trigger point, no pain with bicep active elbow flexion. Increased anterior shoulder/proximal arm pain with extension. Patient demonstrated fatigue post treatment and would benefit from continued PT      Plan: Continue per plan of care.  Progress treatment as tolerated.       Protocol:S/p left shoulder arthroscopy with rotator cuff repair   No active motion x 6 weeks   Passive assist, active assist   Precautions: none noted  Access Code: [email or web address]  Progress note:   POC: 3/15    Manuals    Stretching with active release NARESH INFANTE    jt mobs Grade II Grade II Grade II-III Grade II-III Grade I-II   Scapular PNF        IASTM        Neuro Re-Ed                scap retraction 10x 10x 10x 10x 10x   Shoulder rolls 10x 10x 10x 10x 10x   Chin tucks :05x10 :05x10 :05x10     Shoulder isometrics     :05x5 ea           Ther Ex    HEP review    pulleys   10x flex  2 min   UT stretch :30x3 :30x3 :30x3 L     LS stretch :30x3 :30x3 :30x3 L     Table slides  10x flex, scap      pendulums 10x ea 10x ea      Supine flexion ROM        Supine ER ROM 10x to 30 deg  10x to 30 deg 10x to 30 deg     Wrist AROM 20x ea 20x ea 20x ea      strength Red 30x Red 30x Green 20x     Shoulder flexion rock backs 10x 10x 10x  10x   Elbow flexion  20x 30x  30x                                                           Ther Activity                        Gait Training                        Modalities        IFC with CP    10min seated, post

## 2024-02-01 NOTE — TELEPHONE ENCOUNTER
Patient requesting refill(s) of: Cozaar     Last filled: 11/6/23    Patient requesting refill(s) of: Pepcid    Last filled: 11/20/23  Last appt: 1/18/24  Next appt: 3/11/24  Pharmacy: [facility], [place]

## 2024-02-02 ENCOUNTER — OFFICE VISIT (OUTPATIENT)
Dept: OBGYN CLINIC | Facility: CLINIC | Age: 69
End: 2024-02-02

## 2024-02-02 ENCOUNTER — OFFICE VISIT (OUTPATIENT)
Dept: PHYSICAL THERAPY | Facility: CLINIC | Age: 69
End: 2024-02-02
Payer: COMMERCIAL

## 2024-02-02 VITALS
WEIGHT: 143 LBS | BODY MASS INDEX: 25.34 KG/M2 | HEART RATE: 84 BPM | DIASTOLIC BLOOD PRESSURE: 99 MMHG | HEIGHT: 63 IN | SYSTOLIC BLOOD PRESSURE: 160 MMHG

## 2024-02-02 DIAGNOSIS — R29.3 POSTURE ABNORMALITY: ICD-10-CM

## 2024-02-02 DIAGNOSIS — Z98.890 S/P ARTHROSCOPY OF LEFT SHOULDER: Primary | ICD-10-CM

## 2024-02-02 DIAGNOSIS — M75.42 IMPINGEMENT SYNDROME OF LEFT SHOULDER: Primary | ICD-10-CM

## 2024-02-02 PROCEDURE — 97112 NEUROMUSCULAR REEDUCATION: CPT | Performed by: PHYSICAL THERAPIST

## 2024-02-02 PROCEDURE — 97110 THERAPEUTIC EXERCISES: CPT | Performed by: PHYSICAL THERAPIST

## 2024-02-02 PROCEDURE — 99024 POSTOP FOLLOW-UP VISIT: CPT | Performed by: ORTHOPAEDIC SURGERY

## 2024-02-02 NOTE — PROGRESS NOTES
Daily Note     Today's date: 2024  Patient name: Jamee Shaikh  : 1955  MRN: [id number]  Referring provider: Nazario Duque,*  Dx:   Encounter Diagnosis     ICD-10-CM    1. Impingement syndrome of left shoulder  M75.42       2. Posture abnormality  R29.3           Start Time: 915  Stop Time: 955  Total time in clinic (min): 40 minutes    Subjective: Saw surgeon earlier today who stated she was progressing, RC intact. Changed medication dosage/times to help with pain. F/u in 6 weeks. Patient states no new complaints since yesterday's PT f/u.       Objective: See treatment diary below      Assessment: Tolerated treatment well. Less guarding in left shoulder during passive/AAROM today, still limited. Trigger point and tightness as anterior proximal arm. Added AAROM wall walks today to be added to HEP. Patient would benefit from continued PT      Plan: Continue per plan of care.  Progress treatment as tolerated.       Protocol:S/p left shoulder arthroscopy with rotator cuff repair   No active motion x 6 weeks   Passive assist, active assist   Precautions: none noted  Access Code: [email or web address]  Progress note:   POC: 3/15    Manuals    Stretching with active release KB KB KB KB KB    jt mobs Grade I-II Grade II Grade II-III Grade II-III Grade I-II   Scapular PNF        IASTM        Neuro Re-Ed                scap retraction 10x 10x 10x 10x 10x   Shoulder rolls 10x 10x 10x 10x 10x   Chin tucks  :05x10 :05x10     Shoulder isometrics :05x5 ea    :05x5 ea           Ther Ex    HEP review    pulleys 2 min  10x flex  2 min   UT stretch  :30x3 :30x3 L     LS stretch  :30x3 :30x3 L     Table slides  10x flex, scap      pendulums  10x ea      Supine flexion ROM        Supine ER ROM  10x to 30 deg 10x to 30 deg     Wrist AROM  20x ea 20x ea      strength  Red 30x Green 20x     Shoulder flexion rock backs 10x 10x 10x  10x   Elbow flexion 3x10 20x 30x  30x   Wall walk Flex 5x                                                        Ther Activity                        Gait Training                        Modalities        IFC with CP    10min seated, post

## 2024-02-02 NOTE — PROGRESS NOTES
"ASSESSMENT/PLAN:    Diagnoses and all orders for this visit:    S/P arthroscopy of left shoulder      The patient was seen and examined.  She is continuing to progress in surgery.  She should continue physical therapy and Occupational Therapy to work on strengthening, stretching and range of motion of her shoulder.  She may now participate in active range of motion.  She will follow-up with our office in 6 weeks.  She is acceptable to this plan.    Return in about 6 weeks (around 3/15/2024).    Functionally, the rotator cuff is intact.  There is diffuse tenderness.  Would recommend continuation of home exercise program, therapy and anti-inflammatories.  It a little too early to do a cortisone injection.  Return back in 6 weeks  _____________________________________________________  CHIEF COMPLAINT:  Chief Complaint   Patient presents with    Left Shoulder - Post-op, Follow-up         SUBJECTIVE:  Jamee Shaikh is a 68 y.o. female who presents to our office for a postop visit.  The patient is status post left shoulder arthroscopy with rotator cuff repair from 12/13/2023.  She has been using the shoulder immobilizer.  She does complain of left shoulder discomfort.  She denies any numbness or tingling.  She denies any fever or chills.    The following portions of the patient's history were reviewed and updated as appropriate: allergies, current medications, past family history, past medical history, past social history, past surgical history and problem list.    PAST MEDICAL HISTORY:  Past Medical History:   Diagnosis Date    Anemia     Arthritis     Bilateral bunions     Chronic pain disorder     back and hip pain    Colon polyp     Disease of thyroid gland     nodules    History of COVID-19 2020    mild s/s    History of gastroesophageal reflux (GERD)     \"had hiatal hernia surgery\"    History of heart murmur in childhood     Hyperlipidemia     Hypertension     Overactive bladder     PONV (postoperative nausea and " vomiting)     once after knee surgery    Rotator cuff tear, right     had surgery    Vitamin D deficiency     unsure    Wears glasses     Wears partial dentures     upper       PAST SURGICAL HISTORY:  Past Surgical History:   Procedure Laterality Date     SECTION      Last Assessed: 2017    COLONOSCOPY      ELBOW SURGERY      Last Assessed: 2017    ESOPHAGOGASTRODUODENOSCOPY N/A 2017    Procedure: ESOPHAGOGASTRODUODENOSCOPY (EGD);  Surgeon: Jolly Lin DO;  Location: [facility];  Service: Gastroenterology    HIP SURGERY Left 2018    glut medius/minimus repair  and 18--with pin implanted    HYSTERECTOMY          PARAESOPHAGEAL HERNIA REPAIR N/A 2020    Procedure: LAPAROSCOPIC PARAESOPHAGEAL HERNIA REPAIR WITH MESH AND NISSEN FUNDOPLICATION WITH ROBOTICS;  Surgeon: Darrin Leon MD;  Location: [facility];  Service: General    UT ARTHRP KNE CONDYLE&PLATU MEDIAL&LAT COMPARTMENTS Left 03/15/2023    Procedure: ARTHROPLASTY KNEE TOTAL;  Surgeon: Bart Potts DO;  Location: [facility];  Service: Orthopedics    UT ARTHRS KNE SURG W/MENISCECTOMY MED/LAT W/SHVG Left 06/15/2022    Procedure: ;left knee arthroscopy, meniscectomy,synevectomy,chondroplasty, loose body removal, injection;  Surgeon: Bart Potts DO;  Location: [facility];  Service: Orthopedics    UT COLONOSCOPY FLX DX W/COLLJ SPEC WHEN PFRMD N/A 2017    Procedure: EGD AND COLONOSCOPY;  Surgeon: Jolly Lin DO;  Location: [facility];  Service: Gastroenterology    UT EXCISON TUMOR SOFT TISSUE THIGH/KNEE SUBQ 3 CM/> Bilateral 2022    Procedure: EXCISION BIOPSY TISSUE LESION/MASS LOWER EXTREMITY;  Surgeon: Kit Tavera MD;  Location: [facility];  Service: General    UT EXCISON TUMOR SOFT TISSUE THIGH/KNEE SUBQ 3 CM/> Bilateral 2022    Procedure: EXCISION BIOPSY TISSUE LESION/MASS LOWER EXTREMITY;  Surgeon: Kit Tavera MD;  Location: [facility];  Service: General     FL OSTECTOMY PRTL 5TH METAR HEAD SPX Bilateral 8/15/2023    Procedure: BUNIONECTOMY TAILOR WITHOUT OSTEOTOMY;  Surgeon: Monet Pradhan DPM;  Location: [facility];  Service: Podiatry    FL SURGICAL ARTHROSCOPY JENNIFER W/CORACOACRM LIGM RLS Left 2023    Procedure: Left - ARTHROSCOPY SHOULDER  Synovectomy Debridement/chondroplasty Acromioplasty Arthroscopic rotator cuff repair Post arthroscopic injection of intra-articular joint space and peripheral portals;  Surgeon: Bart Potts DO;  Location: [facility];  Service: Orthopedics    SHOULDER ARTHROCENTESIS      SHOULDER ARTHROSCOPY Right 2021    Procedure: SHOULDER ARTHROSCOPY WITH acromioplasty, debridment, and ROTATOR CUFF REPAIR;  Surgeon: Bart Potts DO;  Location:  MAIN OR;  Service: Orthopedics    TUBAL LIGATION         FAMILY HISTORY:  Family History   Problem Relation Age of Onset    Colon cancer Mother     Heart attack Father     Other Father         Cardiac Disorder    Diabetes type II Father     Colon cancer Sister     No Known Problems Sister     No Known Problems Sister     No Known Problems Sister     No Known Problems Sister     No Known Problems Daughter     No Known Problems Maternal Aunt     No Known Problems Maternal Aunt     No Known Problems Maternal Aunt     Cancer Maternal Grandmother     No Known Problems Paternal Grandmother        SOCIAL HISTORY:  Social History     Tobacco Use    Smoking status: Former     Current packs/day: 0.00     Average packs/day: 1.5 packs/day for 30.0 years (45.0 ttl pk-yrs)     Types: Cigarettes     Start date: 1961     Quit date: 1991     Years since quittin.1    Smokeless tobacco: Never    Tobacco comments:     quit 25 yrs ago   Vaping Use    Vaping status: Never Used   Substance Use Topics    Alcohol use: Never    Drug use: Never       MEDICATIONS:    Current Outpatient Medications:     amLODIPine (NORVASC) 5 mg tablet, Take 1 tablet (5 mg total) by mouth daily, Disp: 90 tablet, Rfl: 3     "atorvastatin (LIPITOR) 10 mg tablet, Take 1 tablet (10 mg total) by mouth daily, Disp: 90 tablet, Rfl: 1    famotidine (PEPCID) 20 mg tablet, take 1 tablet by mouth twice a day, Disp: 180 tablet, Rfl: 3    hydrOXYzine HCL (ATARAX) 25 mg tablet, take 1 tablet by mouth every 6 hours if needed for itching, Disp: 60 tablet, Rfl: 2    lidocaine (Lidoderm) 5 %, Apply 2 patches topically over 12 hours daily Remove & Discard patch within 12 hours or as directed by MD, Disp: 60 patch, Rfl: 5    losartan (COZAAR) 100 MG tablet, take 1 tablet by mouth once daily, Disp: 90 tablet, Rfl: 3    meloxicam (MOBIC) 15 mg tablet, Take 1 tablet (15 mg total) by mouth daily, Disp: 30 tablet, Rfl: 0    methocarbamol (Robaxin-750) 750 mg tablet, Take 1 tablet (750 mg total) by mouth every 8 (eight) hours, Disp: 90 tablet, Rfl: 5    valACYclovir (VALTREX) 1,000 mg tablet, Take 1 tablet (1,000 mg total) by mouth 2 (two) times a day for 3 days, Disp: 6 tablet, Rfl: 2    ALLERGIES:  Allergies   Allergen Reactions    Hydrocodone Irritability       ROS:  Review of Systems     Constitutional: Negative for fatigue, fever or loss of appetite.   HENT: Negative.    Respiratory: Negative for shortness of breath, dyspnea.    Cardiovascular: Negative for chest pain/tightness.   Gastrointestinal: Negative for abdominal pain, N/V.   Endocrine: Negative for cold/heat intolerance, unexplained weight loss/gain.   Genitourinary: Negative for flank pain, dysuria, hematuria.   Musculoskeletal: Positive for arthralgia   Skin: Negative for rash.    Neurological: Negative for numbness or tingling  Psychiatric/Behavioral: Negative for agitation.  _____________________________________________________  PHYSICAL EXAMINATION:    Blood pressure 160/99, pulse 84, height 5' 3\" (1.6 m), weight 64.9 kg (143 lb).    Constitutional: Oriented to person, place, and time. Appears well-developed and well-nourished. No distress.   HENT:   Head: Normocephalic.   Eyes: Conjunctivae " "are normal. Right eye exhibits no discharge. Left eye exhibits no discharge. No scleral icterus.   Cardiovascular: Normal rate.    Pulmonary/Chest: Effort normal.   Neurological: Alert and oriented to person, place, and time.   Skin: Skin is warm and dry. No rash noted. Not diaphoretic. No erythema. No pallor.   Psychiatric: Normal mood and affect. Behavior is normal. Judgment and thought content normal.      MUSCULOSKELETAL EXAMINATION:   Physical Exam  Ortho Exam    Left upper extremity is neurovascularly intact  Fingers are pink and mobile  Compartments are soft  Range of motion of the shoulder is from 0 to 80 degrees of forward flexion and abduction  Rotator cuff strength improving  Brisk cap refill  Sensation intact  Incisions are well-healed  Objective:  BP Readings from Last 1 Encounters:   02/02/24 160/99      Wt Readings from Last 1 Encounters:   02/02/24 64.9 kg (143 lb)        BMI:   Estimated body mass index is 25.33 kg/m² as calculated from the following:    Height as of this encounter: 5' 3\" (1.6 m).    Weight as of this encounter: 64.9 kg (143 lb).          Scribe Attestation      I,:  Nazario Duque PA-C am acting as a scribe while in the presence of the attending physician.:       I,:  Bart Potts, DO personally performed the services described in this documentation    as scribed in my presence.:            "

## 2024-02-05 DIAGNOSIS — Z12.31 ENCOUNTER FOR SCREENING MAMMOGRAM FOR MALIGNANT NEOPLASM OF BREAST: Primary | ICD-10-CM

## 2024-02-05 DIAGNOSIS — G47.09 OTHER INSOMNIA: ICD-10-CM

## 2024-02-05 DIAGNOSIS — E78.5 HYPERLIPIDEMIA, UNSPECIFIED HYPERLIPIDEMIA TYPE: ICD-10-CM

## 2024-02-05 DIAGNOSIS — K21.9 GASTROESOPHAGEAL REFLUX DISEASE WITHOUT ESOPHAGITIS: ICD-10-CM

## 2024-02-05 RX ORDER — HYDROXYZINE HYDROCHLORIDE 25 MG/1
25 TABLET, FILM COATED ORAL EVERY 6 HOURS PRN
Qty: 60 TABLET | Refills: 1 | Status: SHIPPED | OUTPATIENT
Start: 2024-02-05

## 2024-02-05 RX ORDER — FAMOTIDINE 20 MG/1
20 TABLET, FILM COATED ORAL 2 TIMES DAILY
Qty: 180 TABLET | Refills: 0 | Status: SHIPPED | OUTPATIENT
Start: 2024-02-05

## 2024-02-05 RX ORDER — ATORVASTATIN CALCIUM 10 MG/1
10 TABLET, FILM COATED ORAL DAILY
Qty: 90 TABLET | Refills: 3 | Status: SHIPPED | OUTPATIENT
Start: 2024-02-05 | End: 2024-02-15 | Stop reason: SDUPTHER

## 2024-02-05 NOTE — TELEPHONE ENCOUNTER
Patient requesting refill(s) of: hydroxyzine     Last filled: 11/30/24  Last appt: 1/18/24  Next appt: 3/11/24  Pharmacy: [facility]       Patient requesting refill(s) of: Pepcid     Last filled: 2/1/24    Pepcid is a duplicate request. Was sent 2/1/24.

## 2024-02-05 NOTE — TELEPHONE ENCOUNTER
Patient requesting refill(s) of: Atorvastatin     Last filled: 9/11/23  Last appt: 1/18/24  Next appt: 3/11/24  Pharmacy: [facility]

## 2024-02-07 ENCOUNTER — HOSPITAL ENCOUNTER (OUTPATIENT)
Dept: MAMMOGRAPHY | Facility: HOSPITAL | Age: 69
Discharge: HOME | End: 2024-02-07
Payer: COMMERCIAL

## 2024-02-07 ENCOUNTER — OFFICE VISIT (OUTPATIENT)
Dept: PHYSICAL THERAPY | Facility: CLINIC | Age: 69
End: 2024-02-07
Payer: COMMERCIAL

## 2024-02-07 VITALS — BODY MASS INDEX: 25.34 KG/M2 | WEIGHT: 143 LBS | HEIGHT: 63 IN

## 2024-02-07 DIAGNOSIS — M75.42 IMPINGEMENT SYNDROME OF LEFT SHOULDER: Primary | ICD-10-CM

## 2024-02-07 DIAGNOSIS — R29.3 POSTURE ABNORMALITY: ICD-10-CM

## 2024-02-07 DIAGNOSIS — Z12.31 ENCOUNTER FOR SCREENING MAMMOGRAM FOR MALIGNANT NEOPLASM OF BREAST: ICD-10-CM

## 2024-02-07 PROCEDURE — 77063 BREAST TOMOSYNTHESIS BI: CPT

## 2024-02-07 PROCEDURE — 97140 MANUAL THERAPY 1/> REGIONS: CPT | Performed by: PHYSICAL THERAPIST

## 2024-02-07 PROCEDURE — 97112 NEUROMUSCULAR REEDUCATION: CPT | Performed by: PHYSICAL THERAPIST

## 2024-02-07 PROCEDURE — 77067 SCR MAMMO BI INCL CAD: CPT

## 2024-02-07 PROCEDURE — 97110 THERAPEUTIC EXERCISES: CPT | Performed by: PHYSICAL THERAPIST

## 2024-02-07 NOTE — PROGRESS NOTES
Daily Note     Today's date: 2024  Patient name: Jamee Shaikh  : 1955  MRN: [id number]  Referring provider: Nazario Duque,*  Dx:   Encounter Diagnosis     ICD-10-CM    1. Impingement syndrome of left shoulder  M75.42       2. Posture abnormality  R29.3           Start Time: 0820  Stop Time: 0900  Total time in clinic (min): 40 minutes    Subjective: Patient states no significant change. Is having a lot of trouble sleeping due to pain levels.       Objective: See treatment diary below      Assessment: Tolerated treatment well. Able to passively get to 100 degrees flexion today with minimal guarding. Continues to have function limiting pain with active movement impacting her ability to reach overhead and behind back. Discussed use of TENs unit to help with comfort throughout the day or at night prior to bed to help improve sleep. Patient exhibited good technique with therapeutic exercises and would benefit from continued PT      Plan: Continue per plan of care.  Progress treatment as tolerated.       Protocol:S/p left shoulder arthroscopy with rotator cuff repair   No active motion x 6 weeks   Passive assist, active assist   Precautions: none noted  Access Code: [email or web address]  Progress note:   POC: 3/15    Manuals    Stretching with active release KB KB KB NARESH INFANTE    jt mobs Grade I-II Grade II-III Grade II-III Grade II-III Grade I-II   Scapular PNF        IASTM        Neuro Re-Ed                scap retraction 10x 10x 10x 10x 10x   Shoulder rolls 10x 10x 10x 10x 10x   Chin tucks   :05x10     Shoulder isometrics :05x5 ea :05x5   :05x5 ea           Ther Ex    HEP review    pulleys 2 min 2 min 10x flex  2 min   UT stretch   :30x3 L     LS stretch   :30x3 L     Table slides        pendulums        Supine flexion ROM        Supine ER ROM   10x to 30 deg     Wrist AROM   20x ea      strength   Green 20x     Shoulder flexion rock backs 10x 10x 10x  10x   Elbow flexion 3x10  3x10 30x  30x   Wall walk Flex 5x Flex 5x                                                      Ther Activity                        Gait Training                        Modalities        IFC with CP    10min seated, post

## 2024-02-08 ENCOUNTER — OFFICE VISIT (OUTPATIENT)
Dept: PHYSICAL THERAPY | Facility: CLINIC | Age: 69
End: 2024-02-08
Payer: COMMERCIAL

## 2024-02-08 DIAGNOSIS — R29.3 POSTURE ABNORMALITY: ICD-10-CM

## 2024-02-08 DIAGNOSIS — M75.42 IMPINGEMENT SYNDROME OF LEFT SHOULDER: Primary | ICD-10-CM

## 2024-02-08 PROCEDURE — 97112 NEUROMUSCULAR REEDUCATION: CPT | Performed by: PHYSICAL THERAPIST

## 2024-02-08 PROCEDURE — 97140 MANUAL THERAPY 1/> REGIONS: CPT | Performed by: PHYSICAL THERAPIST

## 2024-02-08 PROCEDURE — 97110 THERAPEUTIC EXERCISES: CPT | Performed by: PHYSICAL THERAPIST

## 2024-02-08 NOTE — PROGRESS NOTES
Daily Note     Today's date: 2024  Patient name: Jmaee Shaikh  : 1955  MRN: [id number]  Referring provider: Nazario Duque,*  Dx:   Encounter Diagnosis     ICD-10-CM    1. Impingement syndrome of left shoulder  M75.42       2. Posture abnormality  R29.3           Start Time: 0900  Stop Time: 0940  Total time in clinic (min): 40 minutes    Subjective: No change since yesterday. Did start using TENs unit which has helped a little with pain.       Objective: See treatment diary below      Assessment: Tolerated treatment well. Able to increase PROM and include AAROM without significant patient complaints. Doing well this week continuing progression although most limited due to pain.  Patient exhibited good technique with therapeutic exercises and would benefit from continued PT      Plan: Continue per plan of care.  Progress treatment as tolerated.       Protocol:S/p left shoulder arthroscopy with rotator cuff repair   No active motion x 6 weeks   Passive assist, active assist   Precautions: none noted  Access Code: [email or web address]  Progress note:   POC: 3/15    Manuals    Stretching with active release KB KB KB KB KB    jt mobs Grade I-II Grade II-III Grade II-III Grade II-III Grade I-II   Scapular PNF        IASTM        Neuro Re-Ed                scap retraction 10x 10x  10x 10x   Shoulder rolls 10x 10x  10x 10x   Chin tucks        Shoulder isometrics :05x5 ea :05x5 :05x5 ea  :05x5 ea   IR/ER step outs   nv     Ther Ex    HEP review    pulleys 2 min 2 min 2 min  2 min   UT stretch        LS stretch        Table slides        pendulums        Supine flexion ROM        Supine ER ROM        Wrist AROM         strength        Shoulder flexion rock backs 10x 10x 10x  10x   Elbow flexion 3x10 3x10 3x10  30x   Wall walk Flex 5x Flex 5x Flex 5x     AAROM   -flexion  -IR  - ext   Cane  -10x  -10x  -10x                                             Ther Activity                         Gait Training                        Modalities        IFC with CP    10min seated, post

## 2024-02-09 ENCOUNTER — APPOINTMENT (OUTPATIENT)
Dept: PHYSICAL THERAPY | Facility: CLINIC | Age: 69
End: 2024-02-09
Payer: COMMERCIAL

## 2024-02-12 ENCOUNTER — OFFICE VISIT (OUTPATIENT)
Dept: OBGYN CLINIC | Facility: CLINIC | Age: 69
End: 2024-02-12
Payer: COMMERCIAL

## 2024-02-12 VITALS
HEIGHT: 63 IN | BODY MASS INDEX: 27.96 KG/M2 | WEIGHT: 157.8 LBS | SYSTOLIC BLOOD PRESSURE: 144 MMHG | DIASTOLIC BLOOD PRESSURE: 92 MMHG

## 2024-02-12 DIAGNOSIS — N64.59 INVERTED NIPPLE: Primary | ICD-10-CM

## 2024-02-12 DIAGNOSIS — R92.8 ABNORMAL MAMMOGRAM: ICD-10-CM

## 2024-02-12 PROCEDURE — 99203 OFFICE O/P NEW LOW 30 MIN: CPT | Performed by: STUDENT IN AN ORGANIZED HEALTH CARE EDUCATION/TRAINING PROGRAM

## 2024-02-12 NOTE — PROGRESS NOTES
"[facility] [address] [facility] [place] PA    Assessment/Plan:  Jamee Shaikh is a 68 y.o.  who presents with concerns of abnormal mammogram and inverted nipple.    Inverted nipple  - Breast exam otherwise unremarkable. We reviewed her recent screening mammogram BIRADS 0 and recommend the follow up diagnostic studies.    Diagnoses and all orders for this visit:    Inverted nipple    Abnormal mammogram          Subjective:   Jamee Shaikh is a 68 y.o.  female.  CC: establish care, breast concerns    HPI: Jamee presents to establish care and for breast concerns of left inverted nipple.        ROS: Review of Systems   Constitutional:  Negative for chills and fever.   Respiratory:  Negative for cough and shortness of breath.    Cardiovascular:  Negative for chest pain and leg swelling.   Gastrointestinal:  Negative for abdominal pain, nausea and vomiting.   Genitourinary:  Negative for dysuria, frequency and urgency.   Neurological:  Negative for dizziness, light-headedness and headaches.       PFSH: The following portions of the patient's history were reviewed and updated as appropriate: allergies, current medications, past family history, past medical history, obstetric history, gynecologic history, past social history, past surgical history and problem list.       Objective:  /92   Ht 5' 3\" (1.6 m)   Wt 71.6 kg (157 lb 12.8 oz)   BMI 27.95 kg/m²    Physical Exam  Constitutional:       Appearance: Normal appearance.   HENT:      Head: Normocephalic and atraumatic.   Cardiovascular:      Rate and Rhythm: Normal rate.   Pulmonary:      Effort: Pulmonary effort is normal.   Chest:   Breasts:     Breasts are symmetrical.      Right: Normal. No swelling, bleeding, inverted nipple, mass, nipple discharge, skin change or tenderness.      Left: Inverted nipple present. No swelling, bleeding, mass, nipple discharge, skin change or tenderness.   Abdominal:      General: There " is no distension.      Tenderness: There is no abdominal tenderness. There is no guarding.   Lymphadenopathy:      Upper Body:      Right upper body: No axillary adenopathy.      Left upper body: No axillary adenopathy.   Neurological:      Mental Status: She is alert.           Lorena Whitehead MD  [facility]  [facility]  2/12/2024 11:26 AM

## 2024-02-12 NOTE — ASSESSMENT & PLAN NOTE
- Breast exam otherwise unremarkable. We reviewed her recent screening mammogram BIRADS 0 and recommend the follow up diagnostic studies.

## 2024-02-13 ENCOUNTER — APPOINTMENT (OUTPATIENT)
Dept: PHYSICAL THERAPY | Facility: CLINIC | Age: 69
End: 2024-02-13
Payer: COMMERCIAL

## 2024-02-15 ENCOUNTER — OFFICE VISIT (OUTPATIENT)
Dept: PHYSICAL THERAPY | Facility: CLINIC | Age: 69
End: 2024-02-15
Payer: COMMERCIAL

## 2024-02-15 DIAGNOSIS — M75.42 IMPINGEMENT SYNDROME OF LEFT SHOULDER: Primary | ICD-10-CM

## 2024-02-15 DIAGNOSIS — E78.5 HYPERLIPIDEMIA, UNSPECIFIED HYPERLIPIDEMIA TYPE: ICD-10-CM

## 2024-02-15 DIAGNOSIS — R29.3 POSTURE ABNORMALITY: ICD-10-CM

## 2024-02-15 PROCEDURE — 97112 NEUROMUSCULAR REEDUCATION: CPT | Performed by: PHYSICAL THERAPIST

## 2024-02-15 PROCEDURE — 97110 THERAPEUTIC EXERCISES: CPT | Performed by: PHYSICAL THERAPIST

## 2024-02-15 PROCEDURE — 97140 MANUAL THERAPY 1/> REGIONS: CPT | Performed by: PHYSICAL THERAPIST

## 2024-02-15 RX ORDER — ATORVASTATIN CALCIUM 10 MG/1
10 TABLET, FILM COATED ORAL DAILY
Qty: 90 TABLET | Refills: 3 | Status: SHIPPED | OUTPATIENT
Start: 2024-02-15

## 2024-02-15 NOTE — PROGRESS NOTES
Daily Note     Today's date: 2/15/2024  Patient name: Jamee Shaikh  : 1955  MRN: [id number]  Referring provider: Nazario Duque,*  Dx:   Encounter Diagnosis     ICD-10-CM    1. Impingement syndrome of left shoulder  M75.42       2. Posture abnormality  R29.3           Start Time: 0815  Stop Time: 0855  Total time in clinic (min): 40 minutes    Subjective: Patient states she is still getting a lot of pain in shoulder with active movement and having difficulty sleeping waking 3-4x/night due to shoulder discomfort.        Objective: See treatment diary below      Assessment: Tolerated treatment fair+. Capsular tightness noted with tenderness at proximal shoulder into bicep tendon. Pain with active movement increased with long axis. Pain at end range L shoulder PROM, able to tolerate to approximately 100 degrees flexion and 90 abduction. Patient exhibited good technique with therapeutic exercises and would benefit from continued PT      Plan: Continue per plan of care.  Progress treatment as tolerated.       Protocol:S/p left shoulder arthroscopy with rotator cuff repair   No active motion x 6 weeks   Passive assist, active assist   Precautions: none noted  Access Code: [email or web address]  Progress note:   POC: 3/15    Manuals 2/2 2/7 2/8 2/15 2/1   Stretching with active release KB KB KB KB KB    jt mobs Grade I-II Grade II-III Grade II-III Grade II-III Grade I-II   Scapular PNF        IASTM        Neuro Re-Ed                scap retraction 10x 10x  10x 10x   Shoulder rolls 10x 10x  10x 10x   Chin tucks        Shoulder isometrics :05x5 ea :05x5 :05x5 ea :05x5 ea :05x5 ea   IR/ER step outs   nv IR red 10x, ER yellow 10x    Ther Ex        pulleys 2 min 2 min 2 min 2 min 2 min   UT stretch        LS stretch        Table slides        pendulums        Supine flexion ROM        Supine ER ROM        Wrist AROM         strength        Shoulder flexion rock backs 10x 10x 10x 10x 10x   Elbow flexion 3x10 3x10  3x10 3x10 30x   Wall walk Flex 5x Flex 5x Flex 5x Flex 5x    AAROM   -flexion  -IR  - ext   Cane  -10x  -10x  -10x Cane  -10x  -10x  -10x                                            Ther Activity                        Gait Training                        Modalities        IFC with CP

## 2024-02-15 NOTE — TELEPHONE ENCOUNTER
Patient requesting refill(s) of: Lipitor    Last filled: 2/5/24  Last appt: 1/18/24  Next appt: 3/11/24  Pharmacy: [facility]

## 2024-02-20 ENCOUNTER — OFFICE VISIT (OUTPATIENT)
Dept: PHYSICAL THERAPY | Facility: CLINIC | Age: 69
End: 2024-02-20
Payer: COMMERCIAL

## 2024-02-20 ENCOUNTER — OFFICE VISIT (OUTPATIENT)
Dept: GASTROENTEROLOGY | Facility: CLINIC | Age: 69
End: 2024-02-20
Payer: COMMERCIAL

## 2024-02-20 VITALS
SYSTOLIC BLOOD PRESSURE: 134 MMHG | RESPIRATION RATE: 18 BRPM | HEART RATE: 78 BPM | WEIGHT: 158 LBS | BODY MASS INDEX: 28 KG/M2 | DIASTOLIC BLOOD PRESSURE: 78 MMHG | HEIGHT: 63 IN | OXYGEN SATURATION: 98 %

## 2024-02-20 DIAGNOSIS — M75.42 IMPINGEMENT SYNDROME OF LEFT SHOULDER: Primary | ICD-10-CM

## 2024-02-20 DIAGNOSIS — K21.9 GASTROESOPHAGEAL REFLUX DISEASE WITHOUT ESOPHAGITIS: ICD-10-CM

## 2024-02-20 DIAGNOSIS — R19.8 ALTERNATING CONSTIPATION AND DIARRHEA: ICD-10-CM

## 2024-02-20 DIAGNOSIS — Z86.19 H/O COLD SORES: ICD-10-CM

## 2024-02-20 DIAGNOSIS — R14.0 ABDOMINAL BLOATING: ICD-10-CM

## 2024-02-20 DIAGNOSIS — Z98.890 HISTORY OF NISSEN FUNDOPLICATION: ICD-10-CM

## 2024-02-20 DIAGNOSIS — Z86.010 PERSONAL HISTORY OF COLONIC POLYPS: ICD-10-CM

## 2024-02-20 DIAGNOSIS — K63.829 INTESTINAL METHANOGEN OVERGROWTH: ICD-10-CM

## 2024-02-20 DIAGNOSIS — K63.8219 SMALL INTESTINAL BACTERIAL OVERGROWTH (SIBO): Primary | ICD-10-CM

## 2024-02-20 DIAGNOSIS — R29.3 POSTURE ABNORMALITY: ICD-10-CM

## 2024-02-20 DIAGNOSIS — R13.10 DYSPHAGIA, UNSPECIFIED TYPE: ICD-10-CM

## 2024-02-20 DIAGNOSIS — Z80.0 FAMILY HISTORY OF COLON CANCER: ICD-10-CM

## 2024-02-20 PROCEDURE — 97140 MANUAL THERAPY 1/> REGIONS: CPT | Performed by: PHYSICAL THERAPIST

## 2024-02-20 PROCEDURE — 97110 THERAPEUTIC EXERCISES: CPT | Performed by: PHYSICAL THERAPIST

## 2024-02-20 PROCEDURE — 99214 OFFICE O/P EST MOD 30 MIN: CPT | Performed by: PHYSICIAN ASSISTANT

## 2024-02-20 PROCEDURE — 97112 NEUROMUSCULAR REEDUCATION: CPT | Performed by: PHYSICAL THERAPIST

## 2024-02-20 NOTE — PATIENT INSTRUCTIONS
Let's try low dose linzess 72 mcg   Take this every morning 30 minutes before the first meal of the day  This may cause diarrhea at first though in about a week or so your body should adjust to the medication  If you find that this is helpful in producing more complete bowel movements and helping with some of the gas and discomfort, I think it would be good to continue on.    Continue working on the low FODMAP diet trying to avoid foods that may worsen gas production  Try to avoid any type of lozenges and mints to see if this may also be contributing to your symptoms  You can also safely use over-the-counter's like Gas-X and Beano which are not systemically absorbed    We can always consider in the future treating you once again for small intestinal bacterial overgrowth, though I would hold off at this time

## 2024-02-20 NOTE — PROGRESS NOTES
Daily Note     Today's date: 2024  Patient name: Jamee Shaikh  : 1955  MRN: [id number]  Referring provider: Nazario Duque,*  Dx:   Encounter Diagnosis     ICD-10-CM    1. Impingement syndrome of left shoulder  M75.42       2. Posture abnormality  R29.3           Start Time: 1100  Stop Time: 1145  Total time in clinic (min): 45 minutes    Subjective: States she continues to have pain, feels the same. Pain /10, will just throb in proximal anterior arm with sitting.       Objective: See treatment diary below      Assessment: Tolerated treatment fair+. Pain with L arm active and passive range of motion above 90 degrees. Increased shoulder strength and scapular stability exercises into program today to help decreased strain on arm for reduction of pain. HEP was updated appropriately.  Patient would benefit from continued PT      Plan: Continue per plan of care.  Progress treatment as tolerated.       Protocol:S/p left shoulder arthroscopy with rotator cuff repair   No active motion x 6 weeks   Passive assist, active assist   Precautions: none noted  Access Code: [email or web address]  Progress note:   POC: 3/15    Manuals 2/2 2/7 2/8 2/15 2/20   Stretching with active release KB KB KB KB KB   GH jt mobs Grade I-II Grade II-III Grade II-III Grade II-III Grade II-III   Scapular PNF        IASTM        Neuro Re-Ed        UBE     L1 4 min   scap retraction 10x 10x  10x 10x   Shoulder rolls 10x 10x  10x 10x   Chin tucks        Shoulder isometrics :05x5 ea :05x5 :05x5 ea :05x5 ea :05x5 ea   IR/ER step outs   nv IR red 10x, ER yellow 10x IR red 10x, ER yellow 10x   Bent over row     10x   MTP     Red 10x   Rhythmic stab     :15x4   Ther Ex        pulleys 2 min 2 min 2 min 2 min 2 min   UT stretch        LS stretch        Table slides        pendulums        Supine flexion ROM        Supine ER ROM        Serratus punch     unable   Side lying ER     10x   Shoulder flexion rock backs 10x 10x 10x 10x 10x:10    Elbow flexion 3x10 3x10 3x10 3x10 1# 3x10   Wall walk Flex 5x Flex 5x Flex 5x Flex 5x Flex, ecc lower 5x   AAROM   -flexion  -IR  - ext   Cane  -10x  -10x  -10x Cane  -10x  -10x  -10x Cane  -10x  -10x  -10x                                           Ther Activity                        Gait Training                        Modalities        IFC with CP

## 2024-02-20 NOTE — PROGRESS NOTES
[facility] - Outpatient Follow-up Note  Jamee Shaikh 69 y.o. female MRN: [id number]  Encounter: [id number]    ASSESSMENT AND PLAN:      1. Small intestinal bacterial overgrowth (SIBO)  2. Intestinal methanogen overgrowth  3. Abdominal bloating    Etiology is not clear at this time.  She has undergone bidirectional endoscopic evaluation within the past couple of years as well as 3D imaging at the end of 2023. H pylori gastric bx negative in 2022.  She did test positive for SIBO and IMO, though this was in the setting of constipation and she was treated with Xifaxan.  Despite this her symptoms continue.    Her symptoms may be multifactorial, secondary to dietary intake, gut dysbiosis, dysmotility, recurrent SIBO/IMO.      Recommend diligently looking into the low FODMAP diet.  Recommended trial of low-dose linaclotide as perhaps more completely evacuating bowels will help with this complaint.  Okay for Gas-X/Beano as needed.  Consider retreating for SIBO and adding neomycin for IMO so long as constipation is eliminated in future.     4. Alternating constipation and diarrhea    More recently, has been endorsing abdominal bloating, excess flatus, and incomplete evacuation of stool. Did not find benefit from trial of fiber supplement or miralax. Recommend trial low dose linaclotide 72 mcg at this time. Reviewed SE, MOA, dosing recs etc.     5. Gastroesophageal reflux disease without esophagitis  6. History of Nissen fundoplication  7. Dysphagia    Pt with hx GERD, hiatal hernia, underwent Nissen fundoplication, postoperatively had solid food dysphagia.  Last EGD in 04/2022 with empiric dilation at [facility].  Upper GI series in 02/2023 with mild delayed passage of contrast through the fundoplication into the stomach.  Previous notes indicate repeating a manometry, the patient absolutely declines repeating this test.    At present, with dysphagia precautions, she notes minimal dysphagia issues.     She would like to continue with conservative monitoring at this time.  Should symptoms worsen, consider repeat EGD with empiric dilation, possibly evaluation for an oropharyngeal component with a video swallow study with SLP, and/or returning to Dr. Layton.  Okay to use famotidine as needed for any symptoms of heartburn.  Continue diet and lifestyle modifications for GERD.    8. Personal history of colonic polyps  9. Family history of colon cancer    Subcentimeter adenomatous colon polyp in the cecum in 11/2023, as well as a family history of CRC.  Recall colonoscopy for surveillance purposes in 3 years, due in 11/2026, sooner if clinically indicated.    We will follow up in 3 months to reassess symptoms.   ______________________________________________________________________    SUBJECTIVE: Patient is a 69 y.o. female who presents today for follow-up regarding abdominal pain and dysphagia. Pmhx sig for GERD, hx of hiatal hernia repair, HTN, sacroiliitis, HLD, Vit D deficiency.      Pt was last evaluated in 11/2023. At that time, she was complaining of constipation, excess abdominal bloating irregardless of oral intake. Pt was instructed to start on Miralax, and she had SIBO testing completed which was positive for SIBO and methanogen overgrowth. She was treated with Xifaxan.    02/20/24:     Patient does not feel the Xifaxan helped with her gas and bloating.  She complains of excess hiccuping, and loud grumbling in her stomach that is not painful.  No heartburn, indigestion, nausea, vomiting.  She is not having much of any dysphagia at present, she notes that she is very cautious with oral intake and chewing thoroughly.  No odynophagia.  No unintentional weight loss.  No early satiety.    Patient is having a bowel movement just about once daily.  She does not necessarily need to strain, though does feel a sense of incomplete evacuation.  She tried taking MiraLAX, though did not find this helpful.  She shares that  "she eats a healthy diet, oatmeal, cereal with almond milk, fish and sweet potatoes with vegetables.  She does suck on butterscotch lozenges to help quell her appetite. No BRBPR or melena.     09/2023: CT A/P:  Acute uncomplicated sigmoid diverticulitis.  09/2023: Hb 13.5, MCV 88, Plt 245, AST 16, ALT 11, ALP 98, t bili 0.48, albumin 4.0      Endoscopic history:   EGD: 04/2022: Normal esophagus, dilated GEJ with savory dilator; normal stomach, retroflexion showed hernia repair  A. Esophagus, random (biopsy): Reactive esophageal mucosa; Eosinophils are inconspicuous; No intestinal metaplasia   B. Stomach (biopsy): Reactive gastropathy; No H pylori identified (H&E); No intestinal metaplasia   C. Duodenum (biopsy): Duodenal mucosa with no diagnostic abnormality; No Marsh lesion; Negative for dysplasia   Colon: 06/2017: Mild diverticulosis in the sigmoid  Manometry: 02/2020: Normal motility with large hiatal hernia  Colon: 11/2023: Polyp measuring smaller than 5 mm in the cecum; Diverticula in the sigmoid colon and rectosigmoid; Internal small hemorrhoids   A. Large Intestine, Cecum, polyp: Tubular adenoma. Negative for high grade dysplasia/ carcinoma    Review of Systems   Constitutional:  Negative for fever.   Gastrointestinal:  Positive for abdominal pain, constipation and diarrhea. Negative for nausea and vomiting.   Genitourinary:  Positive for frequency. Negative for dysuria and hematuria.   Musculoskeletal:  Negative for arthralgias and myalgias.   Neurological:  Positive for headaches.   Otherwise Per HPI    Historical Information   Past Medical History:   Diagnosis Date    Anemia     Arthritis     Bilateral bunions     Chronic pain disorder     back and hip pain    Colon polyp     Disease of thyroid gland     nodules    History of COVID-19 2020    mild s/s    History of gastroesophageal reflux (GERD)     \"had hiatal hernia surgery\"    History of heart murmur in childhood     Hyperlipidemia     Hypertension     " Overactive bladder     PONV (postoperative nausea and vomiting)     once after knee surgery    Rotator cuff tear, right     had surgery    Vitamin D deficiency     unsure    Wears glasses     Wears partial dentures     upper     Past Surgical History:   Procedure Laterality Date     SECTION      Last Assessed: 2017    COLONOSCOPY      ELBOW SURGERY      Last Assessed: 2017    ESOPHAGOGASTRODUODENOSCOPY N/A 2017    Procedure: ESOPHAGOGASTRODUODENOSCOPY (EGD);  Surgeon: Jolly Lin DO;  Location: [facility];  Service: Gastroenterology    HIP SURGERY Left 2018    glut medius/minimus repair  and 18--with pin implanted    HYSTERECTOMY          PARAESOPHAGEAL HERNIA REPAIR N/A 2020    Procedure: LAPAROSCOPIC PARAESOPHAGEAL HERNIA REPAIR WITH MESH AND NISSEN FUNDOPLICATION WITH ROBOTICS;  Surgeon: Darrin Leon MD;  Location: [facility];  Service: General    NE ARTHRP KNE CONDYLE&PLATU MEDIAL&LAT COMPARTMENTS Left 03/15/2023    Procedure: ARTHROPLASTY KNEE TOTAL;  Surgeon: Bart Potts DO;  Location: [facility];  Service: Orthopedics    NE ARTHRS KNE SURG W/MENISCECTOMY MED/LAT W/SHVG Left 06/15/2022    Procedure: ;left knee arthroscopy, meniscectomy,synevectomy,chondroplasty, loose body removal, injection;  Surgeon: Bart Potts DO;  Location: [facility];  Service: Orthopedics    NE COLONOSCOPY FLX DX W/COLLJ SPEC WHEN PFRMD N/A 2017    Procedure: EGD AND COLONOSCOPY;  Surgeon: Jolly Lin DO;  Location: [facility];  Service: Gastroenterology    NE EXCISON TUMOR SOFT TISSUE THIGH/KNEE SUBQ 3 CM/> Bilateral 2022    Procedure: EXCISION BIOPSY TISSUE LESION/MASS LOWER EXTREMITY;  Surgeon: Kit Tavera MD;  Location: [facility];  Service: General    NE EXCISON TUMOR SOFT TISSUE THIGH/KNEE SUBQ 3 CM/> Bilateral 2022    Procedure: EXCISION BIOPSY TISSUE LESION/MASS LOWER EXTREMITY;  Surgeon: Kit Tavera MD;  Location: [facility];  Service: General    VT OSTECTOMY PRTL 5TH METAR HEAD SPX Bilateral 8/15/2023    Procedure: BUNIONECTOMY TAILOR WITHOUT OSTEOTOMY;  Surgeon: Monet Pradhan DPM;  Location: [facility];  Service: Podiatry    VT SURGICAL ARTHROSCOPY JENNIFER W/CORACOACRM LIGM RLS Left 2023    Procedure: Left - ARTHROSCOPY SHOULDER  Synovectomy Debridement/chondroplasty Acromioplasty Arthroscopic rotator cuff repair Post arthroscopic injection of intra-articular joint space and peripheral portals;  Surgeon: Bart Potts DO;  Location: [facility];  Service: Orthopedics    SHOULDER ARTHROCENTESIS      SHOULDER ARTHROSCOPY Right 2021    Procedure: SHOULDER ARTHROSCOPY WITH acromioplasty, debridment, and ROTATOR CUFF REPAIR;  Surgeon: Bart Potts DO;  Location:  MAIN OR;  Service: Orthopedics    TUBAL LIGATION       Social History   Social History     Substance and Sexual Activity   Alcohol Use Never     Social History     Substance and Sexual Activity   Drug Use Never     Social History     Tobacco Use   Smoking Status Former    Current packs/day: 0.00    Average packs/day: 1.5 packs/day for 30.0 years (45.0 ttl pk-yrs)    Types: Cigarettes    Start date: 1961    Quit date: 1991    Years since quittin.1   Smokeless Tobacco Never   Tobacco Comments    quit 25 yrs ago     Family History   Problem Relation Age of Onset    Colon cancer Mother     Heart attack Father     Other Father         Cardiac Disorder    Diabetes type II Father     Colon cancer Sister     No Known Problems Sister     No Known Problems Sister     No Known Problems Sister     No Known Problems Sister     No Known Problems Daughter     No Known Problems Maternal Aunt     No Known Problems Maternal Aunt     No Known Problems Maternal Aunt     Cancer Maternal Grandmother     No Known Problems Paternal Grandmother      Meds/Allergies       Current Outpatient Medications:     amLODIPine (NORVASC) 5 mg tablet    atorvastatin (LIPITOR) 10 mg  "tablet    famotidine (PEPCID) 20 mg tablet    hydrOXYzine HCL (ATARAX) 25 mg tablet    lidocaine (Lidoderm) 5 %    losartan (COZAAR) 100 MG tablet    meloxicam (MOBIC) 15 mg tablet    methocarbamol (Robaxin-750) 750 mg tablet    valACYclovir (VALTREX) 1,000 mg tablet    Allergies   Allergen Reactions    Hydrocodone Irritability     Objective     Blood pressure 134/78, pulse 78, resp. rate 18, height 5' 3\" (1.6 m), weight 71.7 kg (158 lb), SpO2 98%. Body mass index is 27.99 kg/m².    Physical Exam  Vitals and nursing note reviewed.   Constitutional:       General: She is not in acute distress.     Appearance: She is well-developed.   HENT:      Head: Normocephalic and atraumatic.   Eyes:      General: No scleral icterus.     Conjunctiva/sclera: Conjunctivae normal.   Cardiovascular:      Rate and Rhythm: Normal rate and regular rhythm.      Heart sounds: No murmur heard.  Pulmonary:      Effort: Pulmonary effort is normal. No respiratory distress.      Breath sounds: Normal breath sounds.   Abdominal:      General: Bowel sounds are normal. There is no distension.      Palpations: Abdomen is soft.      Tenderness: There is no abdominal tenderness. There is no guarding or rebound.   Skin:     General: Skin is warm and dry.      Coloration: Skin is not jaundiced.   Neurological:      General: No focal deficit present.      Mental Status: She is alert and oriented to person, place, and time.   Psychiatric:         Mood and Affect: Mood normal.       Lab Results:   No visits with results within 1 Day(s) from this visit.   Latest known visit with results is:   Appointment on 12/01/2023   Component Date Value    WBC 12/01/2023 5.68     RBC 12/01/2023 5.08     Hemoglobin 12/01/2023 14.4     Hematocrit 12/01/2023 45.1     MCV 12/01/2023 89     MCH 12/01/2023 28.3     MCHC 12/01/2023 31.9     RDW 12/01/2023 12.8     MPV 12/01/2023 10.2     Platelets 12/01/2023 325     nRBC 12/01/2023 0     Neutrophils Relative 12/01/2023 54  "    Immat GRANS % 12/01/2023 0     Lymphocytes Relative 12/01/2023 30     Monocytes Relative 12/01/2023 8     Eosinophils Relative 12/01/2023 7 (H)     Basophils Relative 12/01/2023 1     Neutrophils Absolute 12/01/2023 3.12     Immature Grans Absolute 12/01/2023 0.01     Lymphocytes Absolute 12/01/2023 1.68     Monocytes Absolute 12/01/2023 0.46     Eosinophils Absolute 12/01/2023 0.38     Basophils Absolute 12/01/2023 0.03     Sodium 12/01/2023 140     Potassium 12/01/2023 3.9     Chloride 12/01/2023 103     CO2 12/01/2023 26     ANION GAP 12/01/2023 11     BUN 12/01/2023 22     Creatinine 12/01/2023 0.72     Glucose 12/01/2023 52 (L)     Calcium 12/01/2023 9.7     eGFR 12/01/2023 87      Radiology Results:   Mammo screening bilateral w 3d & cad    Result Date: 2/9/2024  Narrative: DIAGNOSIS: Encounter for screening mammogram for malignant neoplasm of breast TECHNIQUE: Digital screening mammography was performed. Computer Aided Detection (CAD) analyzed all applicable images. COMPARISONS: Prior breast imaging dated: 02/28/2023, 02/28/2023, 04/23/2021, 01/13/2020, 01/05/2019, 06/10/2016, and 06/25/2014 RELEVANT HISTORY: Family Breast Cancer History: No known family history of breast cancer. Family Medical History: Family medical history includes colon cancer in 2 relatives (mother, sister). Personal History: No known relevant hormone history. Surgical history includes hysterectomy. No known relevant medical history. The patient is scheduled in a reminder system for screening mammography. 8-10% of cancers will be missed on mammography. Management of a palpable abnormality must be based on clinical grounds.  Patients will be notified of their results via letter from our facility. Accredited by [employer] and FDA. RISK ASSESSMENT: 5 Year Tyrer-Cuzick: 0.94% 10 Year Tyrer-Cuzick: 1.79% Lifetime Tyrer-Cuzick: 3.65% TISSUE DENSITY: There are scattered areas of fibroglandular density. INDICATION: Jamee  Hawa is a 68 y.o. female presenting for screening mammography. FINDINGS: LEFT 1) ASYMMETRY [B]: There is an asymmetry seen in the central region of the left breast in the posterior depth on the CC (slice 36) view. RIGHT 2) ARCHITECTURAL DISTORTION [C]: There is architectural distortion seen in the retroareolar region of the right breast in the middle depth on the CC (slice 34) view.      Impression: Additional imaging required. A breast health care nurse from our facility will be contacting the patient regarding the need for additional imaging. ASSESSMENT/BI-RADS CATEGORY: Left: 0 - Incomplete: Needs Additional Imaging Evaluation Right: 0 - Incomplete: Needs Additional Imaging Evaluation Overall: 0 - Incomplete: Needs Additional Imaging Evaluation RECOMMENDATION:      - Diagnostic mammogram at the current time for both breasts.      - Ultrasound at the current time for both breasts. Workstation ID: [id number]     Lorena Webb PA-C    **Please note:  Dictation voice to text software may have been used in the creation of this record.  Occasional wrong word or “sound alike” substitutions may have occurred due to the inherent limitations of voice recognition software.  Read the chart carefully and recognize, using context, where substitutions have occurred.**

## 2024-02-21 NOTE — TELEPHONE ENCOUNTER
I recommend that she decrease her creon to 1 capsule with meals  Can start fibercon or citrucel once daily.     Thanks,  C Patient requesting refill(s) of: Valtrex    Last filled: 12/28/23  Last appt: 1/18/24  Next appt: 3/11/24  Pharmacy: [facility] [place]

## 2024-02-22 ENCOUNTER — OFFICE VISIT (OUTPATIENT)
Dept: PHYSICAL THERAPY | Facility: CLINIC | Age: 69
End: 2024-02-22
Payer: COMMERCIAL

## 2024-02-22 DIAGNOSIS — R29.3 POSTURE ABNORMALITY: ICD-10-CM

## 2024-02-22 DIAGNOSIS — M75.42 IMPINGEMENT SYNDROME OF LEFT SHOULDER: Primary | ICD-10-CM

## 2024-02-22 PROCEDURE — 97112 NEUROMUSCULAR REEDUCATION: CPT | Performed by: PHYSICAL THERAPIST

## 2024-02-22 PROCEDURE — 97110 THERAPEUTIC EXERCISES: CPT | Performed by: PHYSICAL THERAPIST

## 2024-02-22 PROCEDURE — 97140 MANUAL THERAPY 1/> REGIONS: CPT | Performed by: PHYSICAL THERAPIST

## 2024-02-22 RX ORDER — VALACYCLOVIR HYDROCHLORIDE 1 G/1
1000 TABLET, FILM COATED ORAL 2 TIMES DAILY
Qty: 6 TABLET | Refills: 0 | Status: SHIPPED | OUTPATIENT
Start: 2024-02-22 | End: 2024-02-25

## 2024-02-22 NOTE — PROGRESS NOTES
"Daily Note     Today's date: 2024  Patient name: Jamee Shaikh  : 1955  MRN: [id number]  Referring provider: Nazario Duque,*  Dx:   Encounter Diagnosis     ICD-10-CM    1. Impingement syndrome of left shoulder  M75.42       2. Posture abnormality  R29.3           Start Time: 0815  Stop Time: 0855  Total time in clinic (min): 40 minutes    Subjective: No change since last session. States arm feels \"heavy\".      Objective: See treatment diary below      Assessment: Tolerated treatment fair+.Pain with isometric exercises with difficulty raising arm away from body. Focused on improving strength today to help stabilize during active movement. Continued guarding during passive movement due to pain.  Patient demonstrated fatigue post treatment and would benefit from continued PT      Plan: Continue per plan of care.  Progress treatment as tolerated.       Protocol:S/p left shoulder arthroscopy with rotator cuff repair   No active motion x 6 weeks   Passive assist, active assist   Precautions: none noted  Access Code: [email or web address]  Progress note:   POC: 3/15    Manuals 2/22 2/7 2/8 2/15 2/20   Stretching with active release KB KB KB KB KB   GH jt mobs Grade II-III Grade II-III Grade II-III Grade II-III Grade II-III   Scapular PNF        IASTM        Neuro Re-Ed        UBE L1 4 min    L1 4 min   scap retraction  10x  10x 10x   Shoulder rolls  10x  10x 10x   Chin tucks        Shoulder isometrics :05x5 ea :05x5 :05x5 ea :05x5 ea :05x5 ea   IR/ER step outs IR red 15x, ER yellow 15x  nv IR red 10x, ER yellow 10x IR red 10x, ER yellow 10x   Bent over row     10x   MTP Red 15x    Red 10x   Wall ABC A-j        Rhythmic stab :30x3    :15x4   Ther Ex        pulleys 2 min 2 min 2 min 2 min 2 min   UT stretch        LS stretch        Table slides        pendulums        Supine flexion ROM        Supine ER ROM        Serratus punch     unable   Side lying ER 10x    10x   Shoulder flexion rock backs :10x10 10x 10x " 10x 10x:10   Elbow flexion # 3x10 3x10 3x10 3x10 1# 3x10   Wall walk Flex, ecc lower 5x Flex 5x Flex 5x Flex 5x Flex, ecc lower 5x   AAROM   -flexion  -IR  - ext Cane  -10x  -10x  -10x  Cane  -10x  -10x  -10x Cane  -10x  -10x  -10x Cane  -10x  -10x  -10x                                           Ther Activity                        Gait Training                        Modalities        IFC with CP

## 2024-02-27 ENCOUNTER — OFFICE VISIT (OUTPATIENT)
Dept: PHYSICAL THERAPY | Facility: CLINIC | Age: 69
End: 2024-02-27
Payer: COMMERCIAL

## 2024-02-27 DIAGNOSIS — R29.3 POSTURE ABNORMALITY: ICD-10-CM

## 2024-02-27 DIAGNOSIS — M75.42 IMPINGEMENT SYNDROME OF LEFT SHOULDER: Primary | ICD-10-CM

## 2024-02-27 PROCEDURE — 97110 THERAPEUTIC EXERCISES: CPT | Performed by: PHYSICAL THERAPIST

## 2024-02-27 PROCEDURE — 97112 NEUROMUSCULAR REEDUCATION: CPT | Performed by: PHYSICAL THERAPIST

## 2024-02-27 PROCEDURE — 97140 MANUAL THERAPY 1/> REGIONS: CPT | Performed by: PHYSICAL THERAPIST

## 2024-02-27 NOTE — PROGRESS NOTES
Daily Note     Today's date: 2024  Patient name: Jamee Shaikh  : 1955  MRN: [id number]  Referring provider: Nazario Duque,*  Dx:   Encounter Diagnosis     ICD-10-CM    1. Impingement syndrome of left shoulder  M75.42       2. Posture abnormality  R29.3           Start Time: 0845  Stop Time: 0930  Total time in clinic (min): 45 minutes    Subjective: States no change in shoulder since last visit. Has been taking tylenol PM to get to sleep at night due to continued pain.       Objective: See treatment diary below      Assessment: Tolerated treatment well. VC to decrease use of UT during retraction of scapula. Tolerated strength exercises well today, should progress at next session with focus on RC and scapular strength/stability to help progress active OH movement. Patient would benefit from continued PT      Plan: Continue per plan of care.  Progress treatment as tolerated.       Protocol:S/p left shoulder arthroscopy with rotator cuff repair   No active motion x 6 weeks   Passive assist, active assist   Precautions: none noted  Access Code: [email or web address]  Progress note:   POC: 3/15    Manuals 2/22 2/27 2/8 2/15 2/20   Stretching with active release KB KB KB KB KB   GH jt mobs Grade II-III Grade II-III Grade II-III Grade II-III Grade II-III   Scapular PNF        IASTM        Neuro Re-Ed        UBE L1 4 min L1 5 min   L1 4 min   scap retraction    10x 10x   Shoulder rolls    10x 10x   Chin tucks        Shoulder isometrics :05x5 ea :05x5 ea :05x5 ea :05x5 ea :05x5 ea   IR/ER step outs IR red 15x, ER yellow 15x R red 15x, ER yellow 15x nv IR red 10x, ER yellow 10x IR red 10x, ER yellow 10x   Bent over row     10x   MTP Red 15x Red 2x10   Red 10x   Wall ABC A-j  1x      Rhythmic stab :30x3 3x:30   :15x4   Ther Ex        pulleys 2 min 2 min 2 min 2 min 2 min   UT stretch        LS stretch        Table slides        pendulums        Supine flexion ROM        Supine ER ROM        Serratus punch      unable   Side lying ER 10x 2x10   10x   Shoulder flexion rock backs :10x10 :10x10 10x 10x 10x:10   Elbow flexion # 3x10 1# 3x10 3x10 3x10 1# 3x10   Wall walk Flex, ecc lower 5x Flex, ecc lower 5x Flex 5x Flex 5x Flex, ecc lower 5x   AAROM   -flexion  -IR  - ext Cane  -10x  -10x  -10x Cane  -10x  -10x  -10x Cane  -10x  -10x  -10x Cane  -10x  -10x  -10x Cane  -10x  -10x  -10x                                           Ther Activity                        Gait Training                        Modalities        IFC with CP

## 2024-02-27 NOTE — PROGRESS NOTES
98.8 Assessment/Plan:     Diagnoses and all orders for this visit:    Gastroesophageal reflux disease without esophagitis  Dysphagia, unspecified type  Paraesophageal hiatal hernia     Her workup today includes:     Upper GI series:   Large fixed hiatal hernia with partially intrathoracic stomach and free flowing gastroesophageal reflux       EGD:  · Large hiatal hernia - GE junction 34 cm from the incisors, diaphragmatic impression 40 cm from the incisors  · The esophagus, stomach and duodenum appeared normal  Other than large hiatal hernia EGD was within normal limits  · No evidence of intestinal metaplasia, dysplasia, Helicobacter organisms, nor other abnormalities seen  Manometry:   Esophageal manometry  Esophageal motility- 10/10 swallows abnormal esophageal contractibility pattern with mean DCI 2288 mmHg  s cm  LES- median IRP is 9 mmHg  Impedance- complete clearance of liquid bolus swallows for all 10 swallows  Rapid swallow index is less than 0 8     Gastroesophageal junction relative to LES measures 5 8 cm     Smithburg classification- normal esophageal motility with large hiatal hernia    Plan:  Patient has lengthy discussion with Dr Maria L Carter today regarding study results  Due to her results from her manometry testing she is not restricted in surgical options  She has opted to surgically fix her large hiatal hernia and perform a Nissen Fundoplication to aid her her severe symptomatology in reflux  Patient reports that she has frequent MRIs due to hip pain and therefore is uninterested at this time in the Linx sphincter augmentation device  Patient to have full medical clearance from her medical doctor  She will return in 1 month for a final H&P  The  will reach out to her to plan for her surgical date  Patient agrees with plan  Subjective:      Patient ID: Loni Powell is a 63 y o  female      HPI: Patient presenting to clinic today to review results of manometry and discuss surgical options for symptomatology of reflux, regurgitation, and bloating  The following portions of the patient's history were reviewed and updated as appropriate: allergies, current medications, past family history, past medical history, past social history, past surgical history and problem list     Review of Systems   Constitutional: Negative for fever and unexpected weight change  HENT: Positive for trouble swallowing          + heartburn, +regurgitation, + bloating    Gastrointestinal: Positive for abdominal distention  Negative for abdominal pain, nausea and vomiting  Objective:      /70 (BP Location: Left arm, Patient Position: Sitting)   Pulse 75   Temp 98 9 °F (37 2 °C) (Tympanic)   Ht 5' 3" (1 6 m)   Wt 83 9 kg (185 lb)   BMI 32 77 kg/m²        Physical Exam   Constitutional: She is oriented to person, place, and time  She appears well-developed and well-nourished  No distress  HENT:   Head: Normocephalic and atraumatic  Eyes: Conjunctivae and EOM are normal  No scleral icterus  Neck: Normal range of motion  Neck supple  Cardiovascular: Normal rate  Pulmonary/Chest: Effort normal  No respiratory distress  Abdominal: Soft  She exhibits no distension  There is no tenderness  Musculoskeletal: Normal range of motion  Neurological: She is alert and oriented to person, place, and time  Skin: Skin is warm and dry  Psychiatric: She has a normal mood and affect  Her behavior is normal    Vitals reviewed          Scribe Attestation    I,:   Abril Tristan PA-C am acting as a scribe while in the presence of the attending physician :        I,:   Toi Mac MD personally performed the services described in this documentation    as scribed in my presence :

## 2024-02-29 ENCOUNTER — APPOINTMENT (OUTPATIENT)
Dept: PHYSICAL THERAPY | Facility: CLINIC | Age: 69
End: 2024-02-29
Payer: COMMERCIAL

## 2024-03-04 ENCOUNTER — APPOINTMENT (OUTPATIENT)
Dept: LAB | Facility: CLINIC | Age: 69
End: 2024-03-04
Payer: COMMERCIAL

## 2024-03-04 DIAGNOSIS — E06.3 HASHIMOTO'S THYROIDITIS: ICD-10-CM

## 2024-03-04 DIAGNOSIS — E78.5 HYPERLIPIDEMIA, UNSPECIFIED HYPERLIPIDEMIA TYPE: ICD-10-CM

## 2024-03-04 DIAGNOSIS — E55.9 VITAMIN D DEFICIENCY: ICD-10-CM

## 2024-03-04 DIAGNOSIS — I10 ESSENTIAL HYPERTENSION: ICD-10-CM

## 2024-03-04 LAB
25(OH)D3 SERPL-MCNC: 27.5 NG/ML (ref 30–100)
ALBUMIN SERPL BCP-MCNC: 4.2 G/DL (ref 3.5–5)
ALP SERPL-CCNC: 93 U/L (ref 34–104)
ALT SERPL W P-5'-P-CCNC: 16 U/L (ref 7–52)
ANION GAP SERPL CALCULATED.3IONS-SCNC: 10 MMOL/L
AST SERPL W P-5'-P-CCNC: 22 U/L (ref 13–39)
BASOPHILS # BLD AUTO: 0.03 THOUSANDS/ÂΜL (ref 0–0.1)
BASOPHILS NFR BLD AUTO: 1 % (ref 0–1)
BILIRUB SERPL-MCNC: 0.6 MG/DL (ref 0.2–1)
BUN SERPL-MCNC: 23 MG/DL (ref 5–25)
CALCIUM SERPL-MCNC: 9.6 MG/DL (ref 8.4–10.2)
CHLORIDE SERPL-SCNC: 107 MMOL/L (ref 96–108)
CHOLEST SERPL-MCNC: 218 MG/DL
CO2 SERPL-SCNC: 26 MMOL/L (ref 21–32)
CREAT SERPL-MCNC: 0.64 MG/DL (ref 0.6–1.3)
EOSINOPHIL # BLD AUTO: 0.49 THOUSAND/ÂΜL (ref 0–0.61)
EOSINOPHIL NFR BLD AUTO: 8 % (ref 0–6)
ERYTHROCYTE [DISTWIDTH] IN BLOOD BY AUTOMATED COUNT: 12.7 % (ref 11.6–15.1)
GFR SERPL CREATININE-BSD FRML MDRD: 92 ML/MIN/1.73SQ M
GLUCOSE P FAST SERPL-MCNC: 108 MG/DL (ref 65–99)
HCT VFR BLD AUTO: 42.7 % (ref 34.8–46.1)
HDLC SERPL-MCNC: 62 MG/DL
HGB BLD-MCNC: 13.6 G/DL (ref 11.5–15.4)
IMM GRANULOCYTES # BLD AUTO: 0.01 THOUSAND/UL (ref 0–0.2)
IMM GRANULOCYTES NFR BLD AUTO: 0 % (ref 0–2)
LDLC SERPL CALC-MCNC: 128 MG/DL (ref 0–100)
LYMPHOCYTES # BLD AUTO: 1.91 THOUSANDS/ÂΜL (ref 0.6–4.47)
LYMPHOCYTES NFR BLD AUTO: 33 % (ref 14–44)
MCH RBC QN AUTO: 27.9 PG (ref 26.8–34.3)
MCHC RBC AUTO-ENTMCNC: 31.9 G/DL (ref 31.4–37.4)
MCV RBC AUTO: 88 FL (ref 82–98)
MONOCYTES # BLD AUTO: 0.52 THOUSAND/ÂΜL (ref 0.17–1.22)
MONOCYTES NFR BLD AUTO: 9 % (ref 4–12)
NEUTROPHILS # BLD AUTO: 2.86 THOUSANDS/ÂΜL (ref 1.85–7.62)
NEUTS SEG NFR BLD AUTO: 49 % (ref 43–75)
NONHDLC SERPL-MCNC: 156 MG/DL
NRBC BLD AUTO-RTO: 0 /100 WBCS
PLATELET # BLD AUTO: 336 THOUSANDS/UL (ref 149–390)
PMV BLD AUTO: 10.4 FL (ref 8.9–12.7)
POTASSIUM SERPL-SCNC: 4.5 MMOL/L (ref 3.5–5.3)
PROT SERPL-MCNC: 7.1 G/DL (ref 6.4–8.4)
RBC # BLD AUTO: 4.87 MILLION/UL (ref 3.81–5.12)
SODIUM SERPL-SCNC: 143 MMOL/L (ref 135–147)
TRIGL SERPL-MCNC: 140 MG/DL
TSH SERPL DL<=0.05 MIU/L-ACNC: 1.87 UIU/ML (ref 0.45–4.5)
WBC # BLD AUTO: 5.82 THOUSAND/UL (ref 4.31–10.16)

## 2024-03-04 PROCEDURE — 80053 COMPREHEN METABOLIC PANEL: CPT

## 2024-03-04 PROCEDURE — 85025 COMPLETE CBC W/AUTO DIFF WBC: CPT

## 2024-03-04 PROCEDURE — 82306 VITAMIN D 25 HYDROXY: CPT

## 2024-03-04 PROCEDURE — 80061 LIPID PANEL: CPT

## 2024-03-04 PROCEDURE — 84443 ASSAY THYROID STIM HORMONE: CPT

## 2024-03-04 PROCEDURE — 36415 COLL VENOUS BLD VENIPUNCTURE: CPT

## 2024-03-05 ENCOUNTER — OFFICE VISIT (OUTPATIENT)
Dept: PHYSICAL THERAPY | Facility: CLINIC | Age: 69
End: 2024-03-05
Payer: COMMERCIAL

## 2024-03-05 DIAGNOSIS — M75.42 IMPINGEMENT SYNDROME OF LEFT SHOULDER: Primary | ICD-10-CM

## 2024-03-05 DIAGNOSIS — R29.3 POSTURE ABNORMALITY: ICD-10-CM

## 2024-03-05 PROCEDURE — 97112 NEUROMUSCULAR REEDUCATION: CPT | Performed by: PHYSICAL THERAPIST

## 2024-03-05 PROCEDURE — 97140 MANUAL THERAPY 1/> REGIONS: CPT | Performed by: PHYSICAL THERAPIST

## 2024-03-05 PROCEDURE — 97110 THERAPEUTIC EXERCISES: CPT | Performed by: PHYSICAL THERAPIST

## 2024-03-05 NOTE — PROGRESS NOTES
Daily Note     Today's date: 3/5/2024  Patient name: Jamee Shaikh  : 1955  MRN: [id number]  Referring provider: Nazario Duque,*  Dx:   Encounter Diagnosis     ICD-10-CM    1. Impingement syndrome of left shoulder  M75.42       2. Posture abnormality  R29.3           Start Time: 0845  Stop Time: 0930  Total time in clinic (min): 45 minutes    Subjective: Patient states no change in left shoulder symptoms. Continues to have pain with lifting arm at proximal anterior arm. F/u with ortho at the end of the week.       Objective: See treatment diary below      Assessment: Tolerated treatment fair+. Continued difficulty with AROM and PROM >90 degrees due to proximal arm pain. Attempting to progress scapular and GH jt stability and strengthening as tolerated for further functional gains. Patient exhibited good technique with therapeutic exercises and would benefit from continued PT      Plan: Continue per plan of care.  Progress treatment as tolerated.       Protocol:S/p left shoulder arthroscopy with rotator cuff repair   No active motion x 6 weeks   Passive assist, active assist   Precautions: none noted  Access Code: [email or web address]  Progress note:   POC: 3/15    Manuals 2/22 2/27 3/5 2/15 2/20   Stretching with active release KB KB KB KB KB   GH jt mobs Grade II-III Grade II-III Grade II-III Grade II-III Grade II-III   Scapular PNF        IASTM        Neuro Re-Ed        UBE L1 4 min L1 5 min L1 6 min  L1 4 min   scap retraction    10x 10x   Shoulder rolls    10x 10x   Chin tucks        Shoulder isometrics :05x5 ea :05x5 ea :05x5 ea :05x5 ea :05x5 ea   IR/ER step outs IR red 15x, ER yellow 15x R red 15x, ER yellow 15x R red 15x, ER yellow 15x IR red 10x, ER yellow 10x IR red 10x, ER yellow 10x   Bent over row     10x   MTP Red 15x Red 2x10 Red 2x10  Red 10x   Wall ABC A-j  1x 1x     Rhythmic stab :30x3 3x:30   :15x4   Ther Ex        pulleys 2 min 2 min 2 min 2 min 2 min   UT stretch        LS stretch         Table slides        pendulums        Supine flexion ROM        Supine ER ROM        Serratus punch     unable   Side lying ER 10x 2x10 2x10  10x   Shoulder flexion rock backs :10x10 :10x10 :10x10 10x 10x:10   Elbow flexion # 3x10 1# 3x10 2# 3x10 1# 3x10   Wall walk Flex, ecc lower 5x Flex, ecc lower 5x Flex, ecc lower 5x Flex 5x Flex, ecc lower 5x   AAROM   -flexion  -IR  - ext Cane  -10x  -10x  -10x Cane  -10x  -10x  -10x Cane  -10x  -10x  -10x Cane  -10x  -10x  -10x Cane  -10x  -10x  -10x   AROM  -scaption  - abd to 90     -10x  -10x                                     Ther Activity                        Gait Training                        Modalities        IFC with CP

## 2024-03-06 DIAGNOSIS — G47.09 OTHER INSOMNIA: ICD-10-CM

## 2024-03-06 NOTE — TELEPHONE ENCOUNTER
Patient requesting refill(s) of: hydroxyzine 25 mg Q6H PRN    Last filled: 2/5/2024 #60 x 1  Last appt: 1/18/2024  Next appt: 3/11/2024  Pharmacy: [facility]

## 2024-03-07 ENCOUNTER — APPOINTMENT (OUTPATIENT)
Dept: PHYSICAL THERAPY | Facility: CLINIC | Age: 69
End: 2024-03-07
Payer: COMMERCIAL

## 2024-03-07 RX ORDER — HYDROXYZINE HYDROCHLORIDE 25 MG/1
TABLET, FILM COATED ORAL
Qty: 60 TABLET | Refills: 1 | Status: SHIPPED | OUTPATIENT
Start: 2024-03-07

## 2024-03-08 ENCOUNTER — OFFICE VISIT (OUTPATIENT)
Dept: OBGYN CLINIC | Facility: CLINIC | Age: 69
End: 2024-03-08
Payer: COMMERCIAL

## 2024-03-08 ENCOUNTER — EVALUATION (OUTPATIENT)
Dept: PHYSICAL THERAPY | Facility: CLINIC | Age: 69
End: 2024-03-08
Payer: COMMERCIAL

## 2024-03-08 VITALS
DIASTOLIC BLOOD PRESSURE: 96 MMHG | BODY MASS INDEX: 28 KG/M2 | WEIGHT: 158 LBS | HEIGHT: 63 IN | SYSTOLIC BLOOD PRESSURE: 161 MMHG | HEART RATE: 69 BPM

## 2024-03-08 DIAGNOSIS — R29.3 POSTURE ABNORMALITY: ICD-10-CM

## 2024-03-08 DIAGNOSIS — Z98.890 S/P ARTHROSCOPY OF LEFT SHOULDER: Primary | ICD-10-CM

## 2024-03-08 DIAGNOSIS — G89.29 CHRONIC LEFT SHOULDER PAIN: ICD-10-CM

## 2024-03-08 DIAGNOSIS — M75.42 IMPINGEMENT SYNDROME OF LEFT SHOULDER: Primary | ICD-10-CM

## 2024-03-08 DIAGNOSIS — M25.512 CHRONIC LEFT SHOULDER PAIN: ICD-10-CM

## 2024-03-08 PROCEDURE — 97112 NEUROMUSCULAR REEDUCATION: CPT | Performed by: PHYSICAL THERAPIST

## 2024-03-08 PROCEDURE — 97140 MANUAL THERAPY 1/> REGIONS: CPT | Performed by: PHYSICAL THERAPIST

## 2024-03-08 PROCEDURE — 20610 DRAIN/INJ JOINT/BURSA W/O US: CPT | Performed by: ORTHOPAEDIC SURGERY

## 2024-03-08 PROCEDURE — 97164 PT RE-EVAL EST PLAN CARE: CPT | Performed by: PHYSICAL THERAPIST

## 2024-03-08 PROCEDURE — 99024 POSTOP FOLLOW-UP VISIT: CPT | Performed by: ORTHOPAEDIC SURGERY

## 2024-03-08 RX ORDER — BUPIVACAINE HYDROCHLORIDE 2.5 MG/ML
4 INJECTION, SOLUTION INFILTRATION; PERINEURAL
Status: COMPLETED | OUTPATIENT
Start: 2024-03-08 | End: 2024-03-08

## 2024-03-08 RX ORDER — TRIAMCINOLONE ACETONIDE 40 MG/ML
80 INJECTION, SUSPENSION INTRA-ARTICULAR; INTRAMUSCULAR
Status: COMPLETED | OUTPATIENT
Start: 2024-03-08 | End: 2024-03-08

## 2024-03-08 RX ADMIN — TRIAMCINOLONE ACETONIDE 80 MG: 40 INJECTION, SUSPENSION INTRA-ARTICULAR; INTRAMUSCULAR at 08:15

## 2024-03-08 RX ADMIN — BUPIVACAINE HYDROCHLORIDE 4 ML: 2.5 INJECTION, SOLUTION INFILTRATION; PERINEURAL at 08:15

## 2024-03-08 NOTE — PROGRESS NOTES
PT Re-Evaluation     Today's date: 3/8/2024  Patient name: Jamee Shaikh  : 1955  MRN: [id number]  Referring provider: Nazario Duque,*  Dx:   Encounter Diagnosis     ICD-10-CM    1. Impingement syndrome of left shoulder  M75.42       2. Posture abnormality  R29.3           Start Time: 0740  Stop Time: 0810  Total time in clinic (min): 30 minutes    Assessment  Assessment details: Jamee Shaikh was seen for an initial PT evaluation and 18 f/u sessions. Patient is a 68 y.o. female with diagnosis of s/p shoulder rotator cuff repair and past medical history significant for OA, chronic pain, thyroid disease, GERD, hiatal hernia, hyperlipidemia, HTN, rotator cuff tear with repair, L buttock pain with history of surgery, lipomas with removal, lumbar radiculopathy, vertigo. Findings of examination today show minimal changes in left shoulder strength and passive or active range of motion. Continues to be very guarded with motion due to activity/movement provoking pain. Recommended at this time Jamee f/u with surgeon who she is scheduled to see later today for possible imaging studies vs injection to determine steps in continued treatment.     Impairments: abnormal muscle firing, abnormal or restricted ROM, abnormal movement, activity intolerance, impaired physical strength, lacks appropriate home exercise program, pain with function, scapular dyskinesis, poor posture  and poor body mechanics    Goals  STG (6 weeks)  1. Patient's left shoulder flexion AROM will increase to 90 with 2/10 pain for increased ability to perform overhead ADLs. - NOT MET  2. Patient will have 0/10 pain in left shoulder at rest. - NOT MET  3. Patient's left shoulder strength will increase to 2+/5 for increased ability to lift. - PROGRESSING  LTG (12 weeks)  1. Patient's UE AROM equal bilaterally for ability to complete hair hygiene, overhead, and behind the back ADLs. - NOT MET  2. Patient's UE strength will be equal bilaterally  for ability to lift and carry at PLOF. - NOT MET  3. Patient will be independent with home exercise program for continued maintenance post PT discharge. - PROGRESSING      Plan  Plan details: Progress note in 4 weeks.   Patient would benefit from: skilled physical therapy  Planned modality interventions: unattended electrical stimulation, cryotherapy and thermotherapy: hydrocollator packs  Planned therapy interventions: neuromuscular re-education, manual therapy, therapeutic exercise, therapeutic activities, self care and home exercise program  Frequency: 2x week  Duration in weeks: 12  Plan of Care beginning date: 12/15/2023  Plan of Care expiration date: 6/8/2024  Treatment plan discussed with: patient and PTA      Subjective Evaluation    History of Present Illness  Date of surgery: 12/13/2023  Subjective 3/8: Patient states continued issues with left shoulder. Feels worse now with less movement than prior to surgery. Pain is mostly in anterior proximal arm increased with LUE movements. Continued difficulty sleeping due to pain and difficulty getting comfortable. States she woke at least 3x last night. To f/u with surgeon later today.     Subjective 1/24: Patient states she has been in a lot of pain. Having difficulty sleeping due to pain waking 3-4x/night. Has removed sling per instructions, but will have increased pain with UE movements.     Mechanism of injury: Jamee Shaikh is a 68 y.o. female who presents to outpatient Physical Therapy today with complaints of left shoulder pain. Underwent left shoulder rotator cuff repair 12/13/23.      Pain Rating (0-10) 1/24 3/8   Current 6/10 7/10   At least 6/10 5/10   At worst 8/10 9/10   Location Proximal arm Anterior proximal arm   Quality Throbbing sharp pain  Throbbing, sharp   Alleviate Ice    Aggravate  LUE movement LUE movement         Social Support  Steps to enter house: yes  Stairs in house: yes   Lives in: multiple-level home  Lives with:  spouse    Employment status: not working  Hand dominance: right          Objective     Observations   Left Shoulder   Positive for edema and incision.     Additional Observation Details  4 scope incisions healing appropriately. Dressing removed today and replaced with band aids. Patient instructed on wound care and signs of infection.   1/24: Incisions healed and scarred, good mobility.   3/8: Incision mobility WNL    Cervical/Thoracic Screen   Cervical range of motion within normal limits    Neurological Testing     Sensation     Shoulder     Right Shoulder   Intact: Light touch    Comments   Left light touch: nerve block    Active Range of Motion   Shoulder - AROM IE 1/24 3/8   LEFT       Flexion NT 50 50   Abduction NT 30 50   External Rotation NT @0: 25 @ 45: 25   Internal Rotation NT @0: 25 @45: 45   RIGHT      Flexion 155     Abduction 125     External Rotation BTH: T4     Internal Rotation BTB: T10         Passive Range of Motion     Shoulder - PROM IE 1/24 3/8   LEFT       Flexion 90 30 50   Abduction 90 20 50   External Rotation @ 0: 30 @0: 25 @45: 40   Internal Rotation @0: belly @0: 25 @45: 45         Strength/Myotome Testing     Shoulder - MMT IE 1/24 3/8   LEFT       Flexion NT 2 2   Abduction NT 2 2   External Rotation NT 2 2   Internal Rotation NT 2 2   bicep NT 3- 3   tricep NT 2+ 3-   RIGHT      Flexion 5     Abduction 5     External Rotation 5     Internal Rotation 5     Bicep 5     Tricep 5         Additional Strength Details  LUE strength testing not performed at intake due to restrictions    General Comments:      Shoulder Comments       FOTO: 26% function, 60% predicted function   1/24: 24%             Protocol:S/p left shoulder arthroscopy with rotator cuff repair   No active motion x 6 weeks   Passive assist, active assist   Precautions: none noted  Access Code: [email or web address]  Progress note: 4/8  POC: 3/15    Manuals 2/22 2/27 3/5 3/8 2/20   Stretching with active release KB KB KB KB KB   CHIRAG guadarrama  mobs Grade II-III Grade II-III Grade II-III Grade II-III Grade II-III   Scapular PNF        IASTM        Neuro Re-Ed        UBE L1 4 min L1 5 min L1 6 min L1 6 min L1 4 min   scap retraction     10x   Shoulder rolls     10x   Chin tucks        Shoulder isometrics :05x5 ea :05x5 ea :05x5 ea  :05x5 ea   IR/ER step outs IR red 15x, ER yellow 15x R red 15x, ER yellow 15x R red 15x, ER yellow 15x  IR red 10x, ER yellow 10x   Bent over row     10x   MTP Red 15x Red 2x10 Red 2x10  Red 10x   Wall ABC A-j  1x 1x     Rhythmic stab :30x3 3x:30   :15x4   Ther Ex        pulleys 2 min 2 min 2 min 2 min 2 min   UT stretch        LS stretch        Table slides        pendulums        Supine flexion ROM        Supine ER ROM        Serratus punch     unable   Side lying ER 10x 2x10 2x10  10x   Shoulder flexion rock backs :10x10 :10x10 :10x10  10x:10   Elbow flexion # 3x10 1# 3x10 2#  1# 3x10   Wall walk Flex, ecc lower 5x Flex, ecc lower 5x Flex, ecc lower 5x  Flex, ecc lower 5x   AAROM   -flexion  -IR  - ext Cane  -10x  -10x  -10x Cane  -10x  -10x  -10x Cane  -10x  -10x  -10x  Cane  -10x  -10x  -10x   AROM  -scaption  - abd to 90     -10x  -10x                                     Ther Activity                        Gait Training                        Modalities        IFC with CP

## 2024-03-08 NOTE — PROGRESS NOTES
ASSESSMENT/PLAN:    Diagnoses and all orders for this visit:    S/P arthroscopy of left shoulder    Chronic left shoulder pain  -     Large joint arthrocentesis: L subacromial bursa      Patient was seen and examined.  Possible treatment options were discussed with the patient.  She will continue physical therapy and Occupational Therapy to work on strengthening, stretching and range of motion.  Left shoulder was injected with Kenalog and Marcaine.  She tolerated the injection quite well.  She will follow-up with our office next week.  The patient is acceptable to this plan.  Return in about 1 week (around 3/15/2024).    The patient has diffuse tenderness along her left shoulder, mostly isolated along the biceps muscle.  She was not moving her shoulder that well.  After I performed a subacromial injection, she can flex and abduct past 135 degrees with good rotator cuff strength.  Would recommend continuation of home exercise program.  Follow-up on next appointment for her knee and we will evaluate the shoulder at the same time  _____________________________________________________  CHIEF COMPLAINT:  Chief Complaint   Patient presents with    Left Shoulder - Post-op, Follow-up         SUBJECTIVE:  Jamee Shaikh is a 69 y.o. female who presents to our office for a postop visit.  The patient is status post left shoulder arthroscopy with rotator cuff repair from 12/13/2023.  She has been participating in physical therapy.  She complains of left shoulder pain with continued decreased range of motion.  She denies any numbness or tingling.  She denies any fever or chills.    The following portions of the patient's history were reviewed and updated as appropriate: allergies, current medications, past family history, past medical history, past social history, past surgical history and problem list.    PAST MEDICAL HISTORY:  Past Medical History:   Diagnosis Date    Anemia     Arthritis     Bilateral bunions     Chronic pain  "disorder     back and hip pain    Colon polyp     Disease of thyroid gland     nodules    History of COVID-2020    mild s/s    History of gastroesophageal reflux (GERD)     \"had hiatal hernia surgery\"    History of heart murmur in childhood     Hyperlipidemia     Hypertension     Overactive bladder     PONV (postoperative nausea and vomiting)     once after knee surgery    Rotator cuff tear, right     had surgery    Vitamin D deficiency     unsure    Wears glasses     Wears partial dentures     upper       PAST SURGICAL HISTORY:  Past Surgical History:   Procedure Laterality Date     SECTION      Last Assessed: 2017    COLONOSCOPY      ELBOW SURGERY      Last Assessed: 2017    ESOPHAGOGASTRODUODENOSCOPY N/A 2017    Procedure: ESOPHAGOGASTRODUODENOSCOPY (EGD);  Surgeon: Jolly Lin DO;  Location: [facility];  Service: Gastroenterology    HIP SURGERY Left 2018    glut medius/minimus repair  and 18--with pin implanted    HYSTERECTOMY          PARAESOPHAGEAL HERNIA REPAIR N/A 2020    Procedure: LAPAROSCOPIC PARAESOPHAGEAL HERNIA REPAIR WITH MESH AND NISSEN FUNDOPLICATION WITH ROBOTICS;  Surgeon: Darrin Leon MD;  Location: [facility];  Service: General    NC ARTHRP KNE CONDYLE&PLATU MEDIAL&LAT COMPARTMENTS Left 03/15/2023    Procedure: ARTHROPLASTY KNEE TOTAL;  Surgeon: Bart Potts DO;  Location: [facility];  Service: Orthopedics    NC ARTHRS KNE SURG W/MENISCECTOMY MED/LAT W/SHVG Left 06/15/2022    Procedure: ;left knee arthroscopy, meniscectomy,synevectomy,chondroplasty, loose body removal, injection;  Surgeon: Bart Potts DO;  Location: [facility];  Service: Orthopedics    NC COLONOSCOPY FLX DX W/COLLJ SPEC WHEN PFRMD N/A 2017    Procedure: EGD AND COLONOSCOPY;  Surgeon: Jolly Lin DO;  Location: [facility];  Service: Gastroenterology    NC EXCISON TUMOR SOFT TISSUE THIGH/KNEE SUBQ 3 CM/> Bilateral 2022    Procedure: " EXCISION BIOPSY TISSUE LESION/MASS LOWER EXTREMITY;  Surgeon: Kit Tavera MD;  Location: [facility];  Service: General    NJ EXCISON TUMOR SOFT TISSUE THIGH/KNEE SUBQ 3 CM/> Bilateral 08/25/2022    Procedure: EXCISION BIOPSY TISSUE LESION/MASS LOWER EXTREMITY;  Surgeon: Kit Tavera MD;  Location: [facility];  Service: General    NJ OSTECTOMY PRTL 5TH METAR HEAD SPX Bilateral 8/15/2023    Procedure: BUNIONECTOMY TAILOR WITHOUT OSTEOTOMY;  Surgeon: Monet Pradhan DPM;  Location: [facility];  Service: Podiatry    NJ SURGICAL ARTHROSCOPY JENNIFER W/CORACOACRM LIGM RLS Left 12/13/2023    Procedure: Left - ARTHROSCOPY SHOULDER  Synovectomy Debridement/chondroplasty Acromioplasty Arthroscopic rotator cuff repair Post arthroscopic injection of intra-articular joint space and peripheral portals;  Surgeon: Bart Potts DO;  Location: [facility];  Service: Orthopedics    SHOULDER ARTHROCENTESIS      SHOULDER ARTHROSCOPY Right 06/23/2021    Procedure: SHOULDER ARTHROSCOPY WITH acromioplasty, debridment, and ROTATOR CUFF REPAIR;  Surgeon: Brat Potts DO;  Location:  MAIN OR;  Service: Orthopedics    TUBAL LIGATION         FAMILY HISTORY:  Family History   Problem Relation Age of Onset    Colon cancer Mother     Heart attack Father     Other Father         Cardiac Disorder    Diabetes type II Father     Colon cancer Sister     No Known Problems Sister     No Known Problems Sister     No Known Problems Sister     No Known Problems Sister     No Known Problems Daughter     No Known Problems Maternal Aunt     No Known Problems Maternal Aunt     No Known Problems Maternal Aunt     Cancer Maternal Grandmother     No Known Problems Paternal Grandmother        SOCIAL HISTORY:  Social History     Tobacco Use    Smoking status: Former     Current packs/day: 0.00     Average packs/day: 1.5 packs/day for 30.0 years (45.0 ttl pk-yrs)     Types: Cigarettes     Start date: 1/1/1961     Quit date: 1/1/1991     Years since  quittin.2    Smokeless tobacco: Never    Tobacco comments:     quit 25 yrs ago   Vaping Use    Vaping status: Never Used   Substance Use Topics    Alcohol use: Never    Drug use: Never       MEDICATIONS:    Current Outpatient Medications:     amLODIPine (NORVASC) 5 mg tablet, Take 1 tablet (5 mg total) by mouth daily, Disp: 90 tablet, Rfl: 3    atorvastatin (LIPITOR) 10 mg tablet, Take 1 tablet (10 mg total) by mouth daily, Disp: 90 tablet, Rfl: 3    famotidine (PEPCID) 20 mg tablet, Take 1 tablet (20 mg total) by mouth 2 (two) times a day, Disp: 180 tablet, Rfl: 0    hydrOXYzine HCL (ATARAX) 25 mg tablet, take 1 tablet by mouth every 6 hours if needed for itching, Disp: 60 tablet, Rfl: 1    lidocaine (Lidoderm) 5 %, Apply 2 patches topically over 12 hours daily Remove & Discard patch within 12 hours or as directed by MD, Disp: 60 patch, Rfl: 5    losartan (COZAAR) 100 MG tablet, take 1 tablet by mouth once daily, Disp: 90 tablet, Rfl: 3    meloxicam (MOBIC) 15 mg tablet, Take 1 tablet (15 mg total) by mouth daily, Disp: 30 tablet, Rfl: 0    methocarbamol (Robaxin-750) 750 mg tablet, Take 1 tablet (750 mg total) by mouth every 8 (eight) hours (Patient not taking: Reported on 2024), Disp: 90 tablet, Rfl: 5    valACYclovir (VALTREX) 1,000 mg tablet, Take 1 tablet (1,000 mg total) by mouth 2 (two) times a day for 3 days, Disp: 6 tablet, Rfl: 0    ALLERGIES:  Allergies   Allergen Reactions    Hydrocodone Irritability       ROS:  Review of Systems     Constitutional: Negative for fatigue, fever or loss of appetite.   HENT: Negative.    Respiratory: Negative for shortness of breath, dyspnea.    Cardiovascular: Negative for chest pain/tightness.   Gastrointestinal: Negative for abdominal pain, N/V.   Endocrine: Negative for cold/heat intolerance, unexplained weight loss/gain.   Genitourinary: Negative for flank pain, dysuria, hematuria.   Musculoskeletal: Positive for arthralgia   Skin: Negative for rash.   "  Neurological: Negative for numbness or tingling  Psychiatric/Behavioral: Negative for agitation.  _____________________________________________________  PHYSICAL EXAMINATION:    Blood pressure 161/96, pulse 69, height 5' 3\" (1.6 m), weight 71.7 kg (158 lb).    Constitutional: Oriented to person, place, and time. Appears well-developed and well-nourished. No distress.   HENT:   Head: Normocephalic.   Eyes: Conjunctivae are normal. Right eye exhibits no discharge. Left eye exhibits no discharge. No scleral icterus.   Cardiovascular: Normal rate.    Pulmonary/Chest: Effort normal.   Neurological: Alert and oriented to person, place, and time.   Skin: Skin is warm and dry. No rash noted. Not diaphoretic. No erythema. No pallor.   Psychiatric: Normal mood and affect. Behavior is normal. Judgment and thought content normal.      MUSCULOSKELETAL EXAMINATION:   Physical Exam  Ortho Exam    Left upper extremity is neurovascularly intact  Fingers are pink and mobile  Compartments are soft  Limited range of motion secondary to pain  Rotator cuff strength 4 and half out of 5  Brisk cap refill  Sensation intact  Incisions are well-healed  Objective:  BP Readings from Last 1 Encounters:   03/08/24 161/96      Wt Readings from Last 1 Encounters:   03/08/24 71.7 kg (158 lb)        BMI:   Estimated body mass index is 27.99 kg/m² as calculated from the following:    Height as of this encounter: 5' 3\" (1.6 m).    Weight as of this encounter: 71.7 kg (158 lb).      PROCEDURES PERFORMED:  Large joint arthrocentesis: L subacromial bursa  Universal Protocol:  Risks and benefits: risks, benefits and alternatives were discussed  Consent given by: patient  Patient understanding: patient states understanding of the procedure being performed  Site marked: the operative site was marked  Patient identity confirmed: verbally with patient  Supporting Documentation  Indications: pain   Procedure Details  Location: shoulder - L subacromial " bursa  Preparation: Patient was prepped and draped in the usual sterile fashion  Needle size: 22 G  Ultrasound guidance: no  Approach: lateral  Medications administered: 4 mL bupivacaine 0.25 %; 80 mg triamcinolone acetonide 40 mg/mL    Patient tolerance: patient tolerated the procedure well with no immediate complications  Dressing:  Sterile dressing applied            Scribe Attestation      I,:  Nazario Duque PA-C am acting as a scribe while in the presence of the attending physician.:       I,:  Bart Potts DO personally performed the services described in this documentation    as scribed in my presence.:

## 2024-03-11 ENCOUNTER — OFFICE VISIT (OUTPATIENT)
Dept: FAMILY MEDICINE CLINIC | Facility: CLINIC | Age: 69
End: 2024-03-11
Payer: COMMERCIAL

## 2024-03-11 VITALS
TEMPERATURE: 98.9 F | SYSTOLIC BLOOD PRESSURE: 138 MMHG | DIASTOLIC BLOOD PRESSURE: 84 MMHG | HEART RATE: 73 BPM | BODY MASS INDEX: 28.07 KG/M2 | OXYGEN SATURATION: 98 % | WEIGHT: 158.4 LBS | HEIGHT: 63 IN

## 2024-03-11 DIAGNOSIS — E55.9 VITAMIN D DEFICIENCY: ICD-10-CM

## 2024-03-11 DIAGNOSIS — K21.9 GASTROESOPHAGEAL REFLUX DISEASE WITHOUT ESOPHAGITIS: ICD-10-CM

## 2024-03-11 DIAGNOSIS — M85.88 OSTEOPENIA OF OTHER SITE: ICD-10-CM

## 2024-03-11 DIAGNOSIS — E78.5 HYPERLIPIDEMIA, UNSPECIFIED HYPERLIPIDEMIA TYPE: ICD-10-CM

## 2024-03-11 DIAGNOSIS — Z86.19 H/O COLD SORES: ICD-10-CM

## 2024-03-11 DIAGNOSIS — I10 ESSENTIAL HYPERTENSION: ICD-10-CM

## 2024-03-11 DIAGNOSIS — Z00.00 ENCOUNTER FOR MEDICARE ANNUAL WELLNESS EXAM: Primary | ICD-10-CM

## 2024-03-11 DIAGNOSIS — L50.9 URTICARIA: ICD-10-CM

## 2024-03-11 DIAGNOSIS — R73.01 IMPAIRED FASTING GLUCOSE: ICD-10-CM

## 2024-03-11 PROCEDURE — G0439 PPPS, SUBSEQ VISIT: HCPCS | Performed by: NURSE PRACTITIONER

## 2024-03-11 PROCEDURE — 99214 OFFICE O/P EST MOD 30 MIN: CPT | Performed by: NURSE PRACTITIONER

## 2024-03-11 RX ORDER — VALACYCLOVIR HYDROCHLORIDE 1 G/1
1000 TABLET, FILM COATED ORAL 2 TIMES DAILY
Qty: 6 TABLET | Refills: 0 | Status: SHIPPED | OUTPATIENT
Start: 2024-03-11 | End: 2024-03-14

## 2024-03-11 RX ORDER — TRIAMCINOLONE ACETONIDE 1 MG/G
CREAM TOPICAL 2 TIMES DAILY PRN
Qty: 30 G | Refills: 1 | Status: SHIPPED | OUTPATIENT
Start: 2024-03-11

## 2024-03-11 NOTE — PATIENT INSTRUCTIONS
Medicare Preventive Visit Patient Instructions  Thank you for completing your Welcome to Medicare Visit or Medicare Annual Wellness Visit today. Your next wellness visit will be due in one year (3/12/2025).  The screening/preventive services that you may require over the next 5-10 years are detailed below. Some tests may not apply to you based off risk factors and/or age. Screening tests ordered at today's visit but not completed yet may show as past due. Also, please note that scanned in results may not display below.  Preventive Screenings:  Service Recommendations Previous Testing/Comments   Colorectal Cancer Screening  * Colonoscopy    * Fecal Occult Blood Test (FOBT)/Fecal Immunochemical Test (FIT)  * Fecal DNA/Cologuard Test  * Flexible Sigmoidoscopy Age: 45-75 years old   Colonoscopy: every 10 years (may be performed more frequently if at higher risk)  OR  FOBT/FIT: every 1 year  OR  Cologuard: every 3 years  OR  Sigmoidoscopy: every 5 years  Screening may be recommended earlier than age 45 if at higher risk for colorectal cancer. Also, an individualized decision between you and your healthcare provider will decide whether screening between the ages of 76-85 would be appropriate. Colonoscopy: 11/06/2023  FOBT/FIT: Not on file  Cologuard: Not on file  Sigmoidoscopy: Not on file    Screening Current     Breast Cancer Screening Age: 40+ years old  Frequency: every 1-2 years  Not required if history of left and right mastectomy Mammogram: 02/07/2024    Screening Current   Cervical Cancer Screening Between the ages of 21-29, pap smear recommended once every 3 years.   Between the ages of 30-65, can perform pap smear with HPV co-testing every 5 years.   Recommendations may differ for women with a history of total hysterectomy, cervical cancer, or abnormal pap smears in past. Pap Smear: 06/20/2019    Screening Not Indicated   Hepatitis C Screening Once for adults born between 1945 and 1965  More frequently in  patients at high risk for Hepatitis C Hep C Antibody: 05/15/2018    Screening Current   Diabetes Screening 1-2 times per year if you're at risk for diabetes or have pre-diabetes Fasting glucose: 108 mg/dL (3/4/2024)  A1C: 5.6 % (3/2/2023)  Screening Current   Cholesterol Screening Once every 5 years if you don't have a lipid disorder. May order more often based on risk factors. Lipid panel: 03/04/2024    Screening Not Indicated  History Lipid Disorder     Other Preventive Screenings Covered by Medicare:  Abdominal Aortic Aneurysm (AAA) Screening: covered once if your at risk. You're considered to be at risk if you have a family history of AAA.  Lung Cancer Screening: covers low dose CT scan once per year if you meet all of the following conditions: (1) Age 55-77; (2) No signs or symptoms of lung cancer; (3) Current smoker or have quit smoking within the last 15 years; (4) You have a tobacco smoking history of at least 20 pack years (packs per day multiplied by number of years you smoked); (5) You get a written order from a healthcare provider.  Glaucoma Screening: covered annually if you're considered high risk: (1) You have diabetes OR (2) Family history of glaucoma OR (3)  aged 50 and older OR (4)  American aged 65 and older  Osteoporosis Screening: covered every 2 years if you meet one of the following conditions: (1) You're estrogen deficient and at risk for osteoporosis based off medical history and other findings; (2) Have a vertebral abnormality; (3) On glucocorticoid therapy for more than 3 months; (4) Have primary hyperparathyroidism; (5) On osteoporosis medications and need to assess response to drug therapy.   Last bone density test (DXA Scan): 10/27/2021.  HIV Screening: covered annually if you're between the age of 15-65. Also covered annually if you are younger than 15 and older than 65 with risk factors for HIV infection. For pregnant patients, it is covered up to 3 times per  pregnancy.    Immunizations:  Immunization Recommendations   Influenza Vaccine Annual influenza vaccination during flu season is recommended for all persons aged >= 6 months who do not have contraindications   Pneumococcal Vaccine   * Pneumococcal conjugate vaccine = PCV13 (Prevnar 13), PCV15 (Vaxneuvance), PCV20 (Prevnar 20)  * Pneumococcal polysaccharide vaccine = PPSV23 (Pneumovax) Adults 19-63 yo with certain risk factors or if 65+ yo  If never received any pneumonia vaccine: recommend Prevnar 20 (PCV20)  Give PCV20 if previously received 1 dose of PCV13 or PPSV23   Hepatitis B Vaccine 3 dose series if at intermediate or high risk (ex: diabetes, end stage renal disease, liver disease)   Respiratory syncytial virus (RSV) Vaccine - COVERED BY MEDICARE PART D  * RSVPreF3 (Arexvy) CDC recommends that adults 60 years of age and older may receive a single dose of RSV vaccine using shared clinical decision-making (SCDM)   Tetanus (Td) Vaccine - COST NOT COVERED BY MEDICARE PART B Following completion of primary series, a booster dose should be given every 10 years to maintain immunity against tetanus. Td may also be given as tetanus wound prophylaxis.   Tdap Vaccine - COST NOT COVERED BY MEDICARE PART B Recommended at least once for all adults. For pregnant patients, recommended with each pregnancy.   Shingles Vaccine (Shingrix) - COST NOT COVERED BY MEDICARE PART B  2 shot series recommended in those 19 years and older who have or will have weakened immune systems or those 50 years and older     Health Maintenance Due:      Topic Date Due   • Breast Cancer Screening: Mammogram  02/07/2025   • Colorectal Cancer Screening  11/05/2026   • Hepatitis C Screening  Completed     Immunizations Due:      Topic Date Due   • Pneumococcal Vaccine: 65+ Years (1 of 1 - PCV) Never done   • Influenza Vaccine (1) 09/01/2023   • COVID-19 Vaccine (3 - 2023-24 season) 09/01/2023     Advance Directives   What are advance directives?   Advance directives are legal documents that state your wishes and plans for medical care. These plans are made ahead of time in case you lose your ability to make decisions for yourself. Advance directives can apply to any medical decision, such as the treatments you want, and if you want to donate organs.   What are the types of advance directives?  There are many types of advance directives, and each state has rules about how to use them. You may choose a combination of any of the following:  Living will:  This is a written record of the treatment you want. You can also choose which treatments you do not want, which to limit, and which to stop at a certain time. This includes surgery, medicine, IV fluid, and tube feedings.   Durable power of  for healthcare (DPAHC):  This is a written record that states who you want to make healthcare choices for you when you are unable to make them for yourself. This person, called a proxy, is usually a family member or a friend. You may choose more than 1 proxy.  Do not resuscitate (DNR) order:  A DNR order is used in case your heart stops beating or you stop breathing. It is a request not to have certain forms of treatment, such as CPR. A DNR order may be included in other types of advance directives.  Medical directive:  This covers the care that you want if you are in a coma, near death, or unable to make decisions for yourself. You can list the treatments you want for each condition. Treatment may include pain medicine, surgery, blood transfusions, dialysis, IV or tube feedings, and a ventilator (breathing machine).  Values history:  This document has questions about your views, beliefs, and how you feel and think about life. This information can help others choose the care that you would choose.  Why are advance directives important?  An advance directive helps you control your care. Although spoken wishes may be used, it is better to have your wishes written down.  Spoken wishes can be misunderstood, or not followed. Treatments may be given even if you do not want them. An advance directive may make it easier for your family to make difficult choices about your care.   Weight Management   Why it is important to manage your weight:  Being overweight increases your risk of health conditions such as heart disease, high blood pressure, type 2 diabetes, and certain types of cancer. It can also increase your risk for osteoarthritis, sleep apnea, and other respiratory problems. Aim for a slow, steady weight loss. Even a small amount of weight loss can lower your risk of health problems.  How to lose weight safely:  A safe and healthy way to lose weight is to eat fewer calories and get regular exercise. You can lose up about 1 pound a week by decreasing the number of calories you eat by 500 calories each day.   Healthy meal plan for weight management:  A healthy meal plan includes a variety of foods, contains fewer calories, and helps you stay healthy. A healthy meal plan includes the following:  Eat whole-grain foods more often.  A healthy meal plan should contain fiber. Fiber is the part of grains, fruits, and vegetables that is not broken down by your body. Whole-grain foods are healthy and provide extra fiber in your diet. Some examples of whole-grain foods are whole-wheat breads and pastas, oatmeal, brown rice, and bulgur.  Eat a variety of vegetables every day.  Include dark, leafy greens such as spinach, kale, karson greens, and mustard greens. Eat yellow and orange vegetables such as carrots, sweet potatoes, and winter squash.   Eat a variety of fruits every day.  Choose fresh or canned fruit (canned in its own juice or light syrup) instead of juice. Fruit juice has very little or no fiber.  Eat low-fat dairy foods.  Drink fat-free (skim) milk or 1% milk. Eat fat-free yogurt and low-fat cottage cheese. Try low-fat cheeses such as mozzarella and other reduced-fat  cheeses.  Choose meat and other protein foods that are low in fat.  Choose beans or other legumes such as split peas or lentils. Choose fish, skinless poultry (chicken or turkey), or lean cuts of red meat (beef or pork). Before you cook meat or poultry, cut off any visible fat.   Use less fat and oil.  Try baking foods instead of frying them. Add less fat, such as margarine, sour cream, regular salad dressing and mayonnaise to foods. Eat fewer high-fat foods. Some examples of high-fat foods include french fries, doughnuts, ice cream, and cakes.  Eat fewer sweets.  Limit foods and drinks that are high in sugar. This includes candy, cookies, regular soda, and sweetened drinks.  Exercise:  Exercise at least 30 minutes per day on most days of the week. Some examples of exercise include walking, biking, dancing, and swimming. You can also fit in more physical activity by taking the stairs instead of the elevator or parking farther away from stores. Ask your healthcare provider about the best exercise plan for you.      © Copyright [employer] 2018 Information is for End User's use only and may not be sold, redistributed or otherwise used for commercial purposes. All illustrations and images included in CareNotes® are the copyrighted property of [employer] or [employer]

## 2024-03-11 NOTE — PROGRESS NOTES
Assessment and Plan:     Problem List Items Addressed This Visit          Digestive    Gastroesophageal reflux disease    Relevant Orders    CBC and differential       Cardiovascular and Mediastinum    Essential hypertension    Relevant Orders    Comprehensive metabolic panel       Other    Vitamin D deficiency    Relevant Orders    Vitamin D 25 hydroxy    Hyperlipidemia    Relevant Orders    Lipid panel     Other Visit Diagnoses       Encounter for Medicare annual wellness exam    -  Primary    Osteopenia of other site        Relevant Orders    DXA bone density spine hip and pelvis    H/O cold sores        Relevant Medications    valACYclovir (VALTREX) 1,000 mg tablet    Urticaria        Relevant Medications    triamcinolone (KENALOG) 0.1 % cream    Impaired fasting glucose        Relevant Orders    Hemoglobin A1C            Depression Screening and Follow-up Plan: Patient was screened for depression during today's encounter. They screened negative with a PHQ-2 score of 0.      Preventive health issues were discussed with patient, and age appropriate screening tests were ordered as noted in patient's After Visit Summary.  Personalized health advice and appropriate referrals for health education or preventive services given if needed, as noted in patient's After Visit Summary.     History of Present Illness:     Patient presents for a Medicare Wellness Visit    Here for medicare wellness, lab review, and medcheck-  She is taking her medications as directed- Amlodipine and Losartan  Had a lipoma removed on her right thigh 1 1/2 years ago- continues to cause itching sensation         Patient Care Team:  GUS Sosa as PCP - General (Family Medicine)  Rebekah Rey DO as PCP - [facility]  DO Jolly Malcolm DO Kimberly Jegel Chaput, DO as Endoscopist  Lorena Webb PA-C as Physician Assistant (Physician Assistant)     Review of Systems:     Review of Systems    Constitutional:  Negative for activity change, diaphoresis, fatigue and fever.   HENT:  Negative for congestion, facial swelling, hearing loss, rhinorrhea, sinus pressure, sinus pain, sneezing, sore throat and voice change.    Eyes:  Negative for discharge and visual disturbance.   Respiratory:  Negative for cough, choking, chest tightness, shortness of breath, wheezing and stridor.    Cardiovascular:  Negative for chest pain, palpitations and leg swelling.   Gastrointestinal:  Negative for abdominal distention, abdominal pain, constipation, diarrhea, nausea and vomiting.   Endocrine: Negative for polydipsia, polyphagia and polyuria.   Genitourinary:  Negative for difficulty urinating, dysuria, frequency and urgency.   Musculoskeletal:  Negative for arthralgias, back pain, gait problem, joint swelling, myalgias, neck pain and neck stiffness.   Skin:  Negative for color change, rash and wound.   Neurological:  Negative for dizziness, syncope, speech difficulty, weakness, light-headedness and headaches.   Hematological:  Negative for adenopathy. Does not bruise/bleed easily.   Psychiatric/Behavioral:  Negative for agitation, behavioral problems, confusion, hallucinations, sleep disturbance and suicidal ideas. The patient is not nervous/anxious.         Problem List:     Patient Active Problem List   Diagnosis    OAB (overactive bladder)    Vitamin D deficiency    Essential hypertension    Hyperlipidemia    Sacroiliitis (HCC)    Myofascial pain    Mixed incontinence    Primary osteoarthritis of both knees    Chronic pain of left knee    BMI 32.0-32.9,adult    GERD (gastroesophageal reflux disease)    Dysphagia    Pain of upper abdomen    Paraesophageal hernia    Gastroesophageal reflux disease    Heartburn    Anti-TPO antibodies present    Multinodular goiter    Nausea    Esophageal obstruction due to food impaction    Benign lipomatous neoplasm of skin and subcutaneous tissue of left arm    Tear of right rotator  "cuff    Rupture of gluteus minimus tendon, left, subsequent encounter    Tendinopathy of left gluteus medius    Family history of colon cancer    Bloating    Chronic pain syndrome    Lumbar radiculopathy    Left buttock pain    Piriformis muscle pain    Lipoma of thigh    Flatulence    Tear of medial meniscus of left knee, current    Tendinopathy of right gluteus medius    Primary osteoarthritis of left knee    Insomnia    Status post total left knee replacement    Dermatofibroma of female breast    S/P left rotator cuff repair    Inverted nipple      Past Medical and Surgical History:     Past Medical History:   Diagnosis Date    Anemia     Arthritis     Bilateral bunions     Chronic pain disorder     back and hip pain    Colon polyp     Disease of thyroid gland     nodules    History of COVID-2020    mild s/s    History of gastroesophageal reflux (GERD)     \"had hiatal hernia surgery\"    History of heart murmur in childhood     Hyperlipidemia     Hypertension     Overactive bladder     PONV (postoperative nausea and vomiting)     once after knee surgery    Rotator cuff tear, right     had surgery    Vitamin D deficiency     unsure    Wears glasses     Wears partial dentures     upper     Past Surgical History:   Procedure Laterality Date     SECTION      Last Assessed: 2017    COLONOSCOPY      ELBOW SURGERY      Last Assessed: 2017    ESOPHAGOGASTRODUODENOSCOPY N/A 2017    Procedure: ESOPHAGOGASTRODUODENOSCOPY (EGD);  Surgeon: Jolly Lin DO;  Location: [facility];  Service: Gastroenterology    HIP SURGERY Left 2018    glut medius/minimus repair  and 18--with pin implanted    HYSTERECTOMY      2011    PARAESOPHAGEAL HERNIA REPAIR N/A 2020    Procedure: LAPAROSCOPIC PARAESOPHAGEAL HERNIA REPAIR WITH MESH AND NISSEN FUNDOPLICATION WITH ROBOTICS;  Surgeon: Darrin Leon MD;  Location: [facility] OR;  Service: General    WV ARTHRP KNE CONDYLE&PLATU MEDIAL&LAT " COMPARTMENTS Left 03/15/2023    Procedure: ARTHROPLASTY KNEE TOTAL;  Surgeon: Bart Potts DO;  Location: [facility];  Service: Orthopedics    VT ARTHRS KNE SURG W/MENISCECTOMY MED/LAT W/SHVG Left 06/15/2022    Procedure: ;left knee arthroscopy, meniscectomy,synevectomy,chondroplasty, loose body removal, injection;  Surgeon: Bart Potts DO;  Location: [facility];  Service: Orthopedics    VT COLONOSCOPY FLX DX W/COLLJ SPEC WHEN PFRMD N/A 06/26/2017    Procedure: EGD AND COLONOSCOPY;  Surgeon: Jolly Lin DO;  Location: [facility];  Service: Gastroenterology    VT EXCISON TUMOR SOFT TISSUE THIGH/KNEE SUBQ 3 CM/> Bilateral 05/26/2022    Procedure: EXCISION BIOPSY TISSUE LESION/MASS LOWER EXTREMITY;  Surgeon: Kit Tavera MD;  Location: [facility];  Service: General    VT EXCISON TUMOR SOFT TISSUE THIGH/KNEE SUBQ 3 CM/> Bilateral 08/25/2022    Procedure: EXCISION BIOPSY TISSUE LESION/MASS LOWER EXTREMITY;  Surgeon: Kit Tavera MD;  Location: [facility];  Service: General    VT OSTECTOMY PRTL 5TH METAR HEAD SPX Bilateral 8/15/2023    Procedure: BUNIONECTOMY TAILOR WITHOUT OSTEOTOMY;  Surgeon: Monet Pradhan DPM;  Location: [facility];  Service: Podiatry    VT SURGICAL ARTHROSCOPY JENNIFER W/CORACOACRM LIGM RLS Left 12/13/2023    Procedure: Left - ARTHROSCOPY SHOULDER  Synovectomy Debridement/chondroplasty Acromioplasty Arthroscopic rotator cuff repair Post arthroscopic injection of intra-articular joint space and peripheral portals;  Surgeon: Bart Potts DO;  Location: [facility];  Service: Orthopedics    SHOULDER ARTHROCENTESIS      SHOULDER ARTHROSCOPY Right 06/23/2021    Procedure: SHOULDER ARTHROSCOPY WITH acromioplasty, debridment, and ROTATOR CUFF REPAIR;  Surgeon: Bart Potts DO;  Location:  MAIN OR;  Service: Orthopedics    TUBAL LIGATION        Family History:     Family History   Problem Relation Age of Onset    Colon cancer Mother     Heart attack Father     Other Father          Cardiac Disorder    Diabetes type II Father     Colon cancer Sister     No Known Problems Sister     No Known Problems Sister     No Known Problems Sister     No Known Problems Sister     No Known Problems Daughter     No Known Problems Maternal Aunt     No Known Problems Maternal Aunt     No Known Problems Maternal Aunt     Cancer Maternal Grandmother     No Known Problems Paternal Grandmother       Social History:     Social History     Socioeconomic History    Marital status: /Civil Union     Spouse name: None    Number of children: None    Years of education: None    Highest education level: None   Occupational History    None   Tobacco Use    Smoking status: Former     Current packs/day: 0.00     Average packs/day: 1.5 packs/day for 30.0 years (45.0 ttl pk-yrs)     Types: Cigarettes     Start date: 1961     Quit date: 1991     Years since quittin.2    Smokeless tobacco: Never    Tobacco comments:     quit 25 yrs ago   Vaping Use    Vaping status: Never Used   Substance and Sexual Activity    Alcohol use: Never    Drug use: Never    Sexual activity: Not Currently     Comment: defer   Other Topics Concern    None   Social History Narrative    Caffeine use    , 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[employer], now at [employer] for OR    2 grown children     Social Determinants of Health     Financial Resource Strain: Medium Risk (3/4/2024)    Overall Financial Resource Strain (CARDIA)     Difficulty of Paying Living Expenses: Somewhat hard   Food Insecurity: No Food Insecurity (3/16/2023)    Hunger Vital Sign     Worried About Running Out of Food in the Last Year: Never true     Ran Out of Food in the Last Year: Never true   Transportation Needs: No Transportation Needs (3/4/2024)    PRAPARE - Transportation     Lack of Transportation (Medical): No     Lack of Transportation (Non-Medical): No   Physical Activity: Inactive (2020)    Exercise Vital Sign      Days of Exercise per Week: 0 days     Minutes of Exercise per Session: 0 min   Stress: Not on file   Social Connections: Not on file   Intimate Partner Violence: Not on file   Housing Stability: Low Risk  (3/16/2023)    Housing Stability Vital Sign     Unable to Pay for Housing in the Last Year: No     Number of Places Lived in the Last Year: 1     Unstable Housing in the Last Year: No      Medications and Allergies:     Current Outpatient Medications   Medication Sig Dispense Refill    amLODIPine (NORVASC) 5 mg tablet Take 1 tablet (5 mg total) by mouth daily 90 tablet 3    atorvastatin (LIPITOR) 10 mg tablet Take 1 tablet (10 mg total) by mouth daily 90 tablet 3    famotidine (PEPCID) 20 mg tablet Take 1 tablet (20 mg total) by mouth 2 (two) times a day 180 tablet 0    hydrOXYzine HCL (ATARAX) 25 mg tablet take 1 tablet by mouth every 6 hours if needed for itching 60 tablet 1    lidocaine (Lidoderm) 5 % Apply 2 patches topically over 12 hours daily Remove & Discard patch within 12 hours or as directed by MD 60 patch 5    losartan (COZAAR) 100 MG tablet take 1 tablet by mouth once daily 90 tablet 3    meloxicam (MOBIC) 15 mg tablet Take 1 tablet (15 mg total) by mouth daily 30 tablet 0    triamcinolone (KENALOG) 0.1 % cream Apply topically 2 (two) times a day as needed (itching) 30 g 1    valACYclovir (VALTREX) 1,000 mg tablet Take 1 tablet (1,000 mg total) by mouth 2 (two) times a day for 3 days 6 tablet 0    methocarbamol (Robaxin-750) 750 mg tablet Take 1 tablet (750 mg total) by mouth every 8 (eight) hours (Patient not taking: Reported on 2/12/2024) 90 tablet 5     No current facility-administered medications for this visit.     Allergies   Allergen Reactions    Hydrocodone Irritability      Immunizations:     Immunization History   Administered Date(s) Administered    COVID-19 PFIZER VACCINE 0.3 ML IM 05/01/2021, 05/22/2021    Influenza, injectable, quadrivalent, preservative free 0.5 mL 12/28/2018     Influenza, recombinant, quadrivalent,injectable, preservative free 12/02/2020    Tuberculin Skin Test-PPD Intradermal 01/16/2017, 09/20/2023      Health Maintenance:         Topic Date Due    Breast Cancer Screening: Mammogram  02/07/2025    Colorectal Cancer Screening  11/05/2026    Hepatitis C Screening  Completed         Topic Date Due    Influenza Vaccine (1) 09/01/2023    COVID-19 Vaccine (3 - 2023-24 season) 09/01/2023      Medicare Screening Tests and Risk Assessments:     Jamee is here for her Subsequent Wellness visit.     Health Risk Assessment:   Patient rates overall health as good. Patient feels that their physical health rating is slightly better. Patient is very satisfied with their life. Eyesight was rated as slightly worse. Hearing was rated as same. Patient feels that their emotional and mental health rating is same. Patients states they are never, rarely angry. Patient states they are sometimes unusually tired/fatigued. Pain experienced in the last 7 days has been a lot. Patient's pain rating has been 7/10. Patient states that she has experienced weight loss or gain in last 6 months. Gained 15 lbs    Depression Screening:   PHQ-2 Score: 0      Fall Risk Screening:   In the past year, patient has experienced: no history of falling in past year      Urinary Incontinence Screening:   Patient has not leaked urine accidently in the last six months.     Home Safety:  Patient has trouble with stairs inside or outside of their home. Patient has working smoke alarms and has working carbon monoxide detector. Home safety hazards include: none.     Nutrition:   Current diet is Regular.     Medications:   Patient is not currently taking any over-the-counter supplements. Patient is able to manage medications.     Activities of Daily Living (ADLs)/Instrumental Activities of Daily Living (IADLs):   Walk and transfer into and out of bed and chair?: Yes  Dress and groom yourself?: Yes    Bathe or shower yourself?:  Yes    Feed yourself? Yes  Do your laundry/housekeeping?: Yes  Manage your money, pay your bills and track your expenses?: Yes  Make your own meals?: Yes    Do your own shopping?: Yes    Previous Hospitalizations:   Any hospitalizations or ED visits within the last 12 months?: No      Advance Care Planning:   Living will: No    Durable POA for healthcare: No    Advanced directive: No    Advanced directive counseling given: Yes    ACP document given: Yes      PREVENTIVE SCREENINGS      Cardiovascular Screening:    General: Screening Not Indicated and History Lipid Disorder    Due for: Lipid Panel      Diabetes Screening:     General: Screening Current    Due for: Blood Glucose      Colorectal Cancer Screening:     General: Screening Current      Breast Cancer Screening:     General: Screening Current      Cervical Cancer Screening:    General: Screening Not Indicated      Osteoporosis Screening:      Due for: DXA Axial      Lung Cancer Screening:     General: Screening Not Indicated      Hepatitis C Screening:    General: Screening Current    Screening, Brief Intervention, and Referral to Treatment (SBIRT)    Screening  Typical number of drinks in a day: 0  Typical number of drinks in a week: 0  Interpretation: Low risk drinking behavior.    AUDIT-C Screenin) How often did you have a drink containing alcohol in the past year? never  2) How many drinks did you have on a typical day when you were drinking in the past year? 0  3) How often did you have 6 or more drinks on one occasion in the past year? never    AUDIT-C Score: 0  Interpretation: Score 0-2 (female): Negative screen for alcohol misuse    Single Item Drug Screening:  How often have you used an illegal drug (including marijuana) or a prescription medication for non-medical reasons in the past year? never    Single Item Drug Screen Score: 0  Interpretation: Negative screen for possible drug use disorder    No results found.     Physical Exam:     BP  "138/84   Pulse 73   Temp 98.9 °F (37.2 °C)   Ht 5' 3\" (1.6 m)   Wt 71.8 kg (158 lb 6.4 oz)   SpO2 98%   BMI 28.06 kg/m²     Physical Exam  Vitals and nursing note reviewed.   Constitutional:       General: She is not in acute distress.     Appearance: Normal appearance. She is well-developed. She is not diaphoretic.   HENT:      Right Ear: Tympanic membrane, ear canal and external ear normal.      Left Ear: Tympanic membrane, ear canal and external ear normal.      Mouth/Throat:      Mouth: Mucous membranes are moist.   Eyes:      General:         Right eye: No discharge.         Left eye: No discharge.      Conjunctiva/sclera: Conjunctivae normal.   Neck:      Thyroid: No thyromegaly.   Cardiovascular:      Rate and Rhythm: Normal rate and regular rhythm.      Heart sounds: Murmur heard.      Systolic murmur is present with a grade of 1/6.   Pulmonary:      Effort: Pulmonary effort is normal. No respiratory distress.      Breath sounds: Normal breath sounds. No wheezing.   Abdominal:      General: Abdomen is flat. Bowel sounds are normal. There is no distension.      Palpations: Abdomen is soft. There is no mass.      Tenderness: There is no abdominal tenderness.   Musculoskeletal:         General: Normal range of motion.      Cervical back: Normal range of motion and neck supple.      Right lower leg: No edema.      Left lower leg: No edema.   Skin:     General: Skin is warm and dry.      Findings: No erythema or rash.             Comments: Healed scar- no signs of infection- will prescribe steroid cream for itching sensation   Neurological:      Mental Status: She is alert and oriented to person, place, and time.   Psychiatric:         Mood and Affect: Mood normal.         Behavior: Behavior normal.         Thought Content: Thought content normal.         Judgment: Judgment normal.          GUS Sosa  "

## 2024-03-12 ENCOUNTER — OFFICE VISIT (OUTPATIENT)
Dept: PHYSICAL THERAPY | Facility: CLINIC | Age: 69
End: 2024-03-12
Payer: COMMERCIAL

## 2024-03-12 DIAGNOSIS — M75.42 IMPINGEMENT SYNDROME OF LEFT SHOULDER: Primary | ICD-10-CM

## 2024-03-12 PROCEDURE — 97112 NEUROMUSCULAR REEDUCATION: CPT | Performed by: PHYSICAL THERAPIST

## 2024-03-12 PROCEDURE — 97110 THERAPEUTIC EXERCISES: CPT | Performed by: PHYSICAL THERAPIST

## 2024-03-12 PROCEDURE — 97140 MANUAL THERAPY 1/> REGIONS: CPT | Performed by: PHYSICAL THERAPIST

## 2024-03-12 NOTE — PROGRESS NOTES
Daily Note     Today's date: 3/12/2024  Patient name: Jamee Shaikh  : 1955  MRN: [id number]  Referring provider: Nazario Duque,*  Dx:   Encounter Diagnosis     ICD-10-CM    1. Impingement syndrome of left shoulder  M75.42           Start Time: 0845  Stop Time: 09  Total time in clinic (min): 40 minutes    Subjective: Patient states shoulder feels a little better after the injection last week, may be getting a little more movement.       Objective: See treatment diary below      Assessment: Tolerated treatment fair+. Decreased guarding today with manual therapy, but continues to have restriction around 90 degrees elevation. Progressing strength and stability exercises as tolerated, noted in flowsheet.  Patient would benefit from continued PT      Plan: Continue per plan of care.  Progress treatment as tolerated.       Protocol:S/p left shoulder arthroscopy with rotator cuff repair   No active motion x 6 weeks   Passive assist, active assist   Precautions: none noted  Access Code: [email or web address]  Progress note:   POC: 3/15    Manuals 2/22 2/27 3/5 3/8 3/12   Stretching with active release KB KB KB KB KB    jt mobs Grade II-III Grade II-III Grade II-III Grade II-III Grade II-III   Scapular PNF        IASTM        Neuro Re-Ed        UBE L1 4 min L1 5 min L1 6 min L1 6 min L1 6 min   scap retraction        Shoulder rolls        Chin tucks        Shoulder isometrics :05x5 ea :05x5 ea :05x5 ea     IR/ER step outs IR red 15x, ER yellow 15x IR red 15x, ER yellow 15x IR red 15x, ER yellow 15x  ER step out YTB 15x  IR PRE red 15x   Bent over row        MTP Red 15x Red 2x10 Red 2x10  Red 3x10   Wall ABC A-j  1x 1x  1x   Rhythmic stab :30x3 3x:30      Ther Ex        pulleys 2 min 2 min 2 min 2 min 2 min   UT stretch        LS stretch        Table slides        pendulums        Supine flexion ROM        Supine ER ROM        Serratus punch        Side lying ER 10x 2x10 2x10  3x10   Shoulder flexion rock backs  :10x10 :10x10 :10x10     Elbow flexion # 3x10 1# 3x10 2#     Wall walk Flex, ecc lower 5x Flex, ecc lower 5x Flex, ecc lower 5x  Flex, ecc lower 5x   AAROM   -flexion  -IR  - ext Cane  -10x  -10x  -10x Cane  -10x  -10x  -10x Cane  -10x  -10x  -10x  Cane  -10x  -10x  -10x   AROM  -scaption  - abd to 90     -10x  -10x    -10x  -10x                                   Ther Activity                        Gait Training                        Modalities        IFC with CP

## 2024-03-14 ENCOUNTER — OFFICE VISIT (OUTPATIENT)
Dept: PHYSICAL THERAPY | Facility: CLINIC | Age: 69
End: 2024-03-14
Payer: COMMERCIAL

## 2024-03-14 DIAGNOSIS — M75.42 IMPINGEMENT SYNDROME OF LEFT SHOULDER: Primary | ICD-10-CM

## 2024-03-14 DIAGNOSIS — R29.3 POSTURE ABNORMALITY: ICD-10-CM

## 2024-03-14 PROCEDURE — 97112 NEUROMUSCULAR REEDUCATION: CPT | Performed by: PHYSICAL THERAPIST

## 2024-03-14 PROCEDURE — 97140 MANUAL THERAPY 1/> REGIONS: CPT | Performed by: PHYSICAL THERAPIST

## 2024-03-14 PROCEDURE — 97110 THERAPEUTIC EXERCISES: CPT | Performed by: PHYSICAL THERAPIST

## 2024-03-14 NOTE — PROGRESS NOTES
Daily Note     Today's date: 3/14/2024  Patient name: Jamee Shaikh  : 1955  MRN: [id number]  Referring provider: Nazario Duque,*  Dx:   Encounter Diagnosis     ICD-10-CM    1. Impingement syndrome of left shoulder  M75.42       2. Posture abnormality  R29.3           Start Time: 0730  Stop Time: 0815  Total time in clinic (min): 45 minutes    Subjective: No new complaints with left shoulder, continues to have discomfort with lifting and reaching activities. Knee is feeling a little better, but continues to feel like it will give out with pain at medial jt line. F/u with ortho for knee and shoulder tomorrow.       Objective: See treatment diary below      Assessment: Tolerated treatment fair+. Continues to guard with overhead movements, empty/painful end feel during ER, flexion and abduction stretching. Patient exhibited good technique with therapeutic exercises and would benefit from continued PT attempting to continue to progress strength and stability of left shoulder and scapula as well as increasing range of motion for functional gains.       Plan: Continue per plan of care.      Protocol:S/p left shoulder arthroscopy with rotator cuff repair   No active motion x 6 weeks   Passive assist, active assist   Precautions: none noted  Access Code: [email or web address]  Progress note:   POC: 3/15    Manuals 3/14 2/27 3/5 3/8 3/12   Stretching with active release KB NARESH INFANTE    jt mobs Grade II-III Grade II-III Grade II-III Grade II-III Grade II-III   Scapular PNF        IASTM        Neuro Re-Ed        UBE L1 6 min L1 5 min L1 6 min L1 6 min L1 6 min   scap retraction        Shoulder rolls        Chin tucks        Shoulder isometrics  :05x5 ea :05x5 ea     IR/ER step outs ER step out YTB 15x  IR PRE red 15x IR red 15x, ER yellow 15x IR red 15x, ER yellow 15x  ER step out YTB 15x  IR PRE red 15x   Bent over row        MTP Red 3x10 Red 2x10 Red 2x10  Red 3x10   Wall ABC 1x 1x 1x  1x   Rhythmic stab  3x:30       Ther Ex        pulleys 2 min 2 min 2 min 2 min 2 min   UT stretch        LS stretch        Table slides        pendulums        Supine flexion ROM        Supine ER ROM        Serratus punch        Side lying ER 3x10 2x10 2x10  3x10   Shoulder flexion rock backs  :10x10 :10x10     Elbow flexion nv 1# 3x10 2#     Wall walk Flex, ecc lower 5x Flex, ecc lower 5x Flex, ecc lower 5x  Flex, ecc lower 5x   AAROM   -flexion  -IR  - ext Cane  -15x  -15x  -15x Cane  -10x  -10x  -10x Cane  -10x  -10x  -10x  Cane  -10x  -10x  -10x   AROM  -scaption  - abd to 90   -15x  -15x    -10x  -10x    -10x  -10x   Standing ITY 5x                               Ther Activity                        Gait Training                        Modalities        IFC with CP

## 2024-03-15 ENCOUNTER — OFFICE VISIT (OUTPATIENT)
Dept: OBGYN CLINIC | Facility: CLINIC | Age: 69
End: 2024-03-15
Payer: COMMERCIAL

## 2024-03-15 VITALS
DIASTOLIC BLOOD PRESSURE: 89 MMHG | BODY MASS INDEX: 28 KG/M2 | HEIGHT: 63 IN | HEART RATE: 68 BPM | WEIGHT: 158 LBS | SYSTOLIC BLOOD PRESSURE: 124 MMHG

## 2024-03-15 DIAGNOSIS — Z98.890 S/P ROTATOR CUFF REPAIR: ICD-10-CM

## 2024-03-15 DIAGNOSIS — Z96.652 STATUS POST TOTAL LEFT KNEE REPLACEMENT: Primary | ICD-10-CM

## 2024-03-15 PROCEDURE — 99213 OFFICE O/P EST LOW 20 MIN: CPT | Performed by: ORTHOPAEDIC SURGERY

## 2024-03-15 NOTE — PROGRESS NOTES
"Assessment/Plan:   Diagnoses and all orders for this visit:    Status post total left knee replacement  -     XR knee 3 vw left non injury; Future    S/P rotator cuff repair         Reviewed physical exam and imaging with patient at time of visit. The patient is approximately 3 months s/p left rotator cuff repair. She is progressing as expected post-operatively. Continue physical therapy, focus on range of motion. She is also 1 year s/p Left total knee arthroplasty. Radiographic findings demonstrate a well-seated prosthesis, with no signs of loosening. Continue weightbearing activities. She will follow-up in 6 weeks for re-evaluation of her left shoulder. She will follow-up in 1 year for an annual appointment, with repeat XR of her Left knee. The patient expresses understanding and is in agreement with today's treatment plan.       The patient is doing quite well in regards to her left total knee replacement.  There is full strength full motion.  She states she might of strained it 3 days ago.  There is no structure abnormalities.  In regards to her shoulder which was injected last week, her range of motion has improved.  There is tenderness more along the biceps muscle than the tendon.  Rotator cuff strength testing is intact.  Continue therapy.  Follow-up 6 weeks evaluation for left shoulder and 1 year for her knee with new x-ray    Subjective:   Patient ID: Jamee Shaikh  1955     HPI  Patient is a 69 y.o. female who presents for post-operative evaluation of her left shoulder and left knee. She is approximately 3 months s/p left shoulder arthroscopy with rotator cuff repair, completed on 12/13/2023. She reports soreness in her shoulder with activity. She states that she has continued physical therapy. She is approximately 1 year s/p left TKA, completed on 3/15/2024. She states that her knee has been \"perfect.\" However, she reports a single incident approximately 3 days ago, where her knee gave out on her " "after showering. She states that her pain is well controlled. She denies any other incident of instability or weakness.     The following portions of the patient's history were reviewed and updated as appropriate:  Past medical history, past surgical history, Family history, social history, current medications and allergies    Past Medical History:   Diagnosis Date    Anemia     Arthritis     Bilateral bunions     Chronic pain disorder     back and hip pain    Colon polyp     Disease of thyroid gland     nodules    History of COVID-2020    mild s/s    History of gastroesophageal reflux (GERD)     \"had hiatal hernia surgery\"    History of heart murmur in childhood     Hyperlipidemia     Hypertension     Overactive bladder     PONV (postoperative nausea and vomiting)     once after knee surgery    Rotator cuff tear, right     had surgery    Vitamin D deficiency     unsure    Wears glasses     Wears partial dentures     upper       Past Surgical History:   Procedure Laterality Date     SECTION      Last Assessed: 2017    COLONOSCOPY      ELBOW SURGERY      Last Assessed: 2017    ESOPHAGOGASTRODUODENOSCOPY N/A 2017    Procedure: ESOPHAGOGASTRODUODENOSCOPY (EGD);  Surgeon: Jolly Lin DO;  Location: [facility];  Service: Gastroenterology    HIP SURGERY Left 2018    glut medius/minimus repair  and 18--with pin implanted    HYSTERECTOMY          PARAESOPHAGEAL HERNIA REPAIR N/A 2020    Procedure: LAPAROSCOPIC PARAESOPHAGEAL HERNIA REPAIR WITH MESH AND NISSEN FUNDOPLICATION WITH ROBOTICS;  Surgeon: Darrin Leon MD;  Location: [facility];  Service: General    SD ARTHRP KNE CONDYLE&PLATU MEDIAL&LAT COMPARTMENTS Left 03/15/2023    Procedure: ARTHROPLASTY KNEE TOTAL;  Surgeon: Bart Potts DO;  Location: [facility];  Service: Orthopedics    SD ARTHRS KNE SURG W/MENISCECTOMY MED/LAT W/SHVG Left 06/15/2022    Procedure: ;left knee arthroscopy, " meniscectomy,synevectomy,chondroplasty, loose body removal, injection;  Surgeon: Bart Potts DO;  Location: [facility];  Service: Orthopedics    AZ COLONOSCOPY FLX DX W/COLLJ SPEC WHEN PFRMD N/A 06/26/2017    Procedure: EGD AND COLONOSCOPY;  Surgeon: Jolly Lin DO;  Location: [facility];  Service: Gastroenterology    AZ EXCISON TUMOR SOFT TISSUE THIGH/KNEE SUBQ 3 CM/> Bilateral 05/26/2022    Procedure: EXCISION BIOPSY TISSUE LESION/MASS LOWER EXTREMITY;  Surgeon: Kit Tavera MD;  Location: [facility];  Service: General    AZ EXCISON TUMOR SOFT TISSUE THIGH/KNEE SUBQ 3 CM/> Bilateral 08/25/2022    Procedure: EXCISION BIOPSY TISSUE LESION/MASS LOWER EXTREMITY;  Surgeon: Kit Tavera MD;  Location: [facility];  Service: General    AZ OSTECTOMY PRTL 5TH METAR HEAD SPX Bilateral 8/15/2023    Procedure: BUNIONECTOMY TAILOR WITHOUT OSTEOTOMY;  Surgeon: Monet Pradhan DPM;  Location: [facility];  Service: Podiatry    AZ SURGICAL ARTHROSCOPY JENNIFER W/CORACOACRM LIGM RLS Left 12/13/2023    Procedure: Left - ARTHROSCOPY SHOULDER  Synovectomy Debridement/chondroplasty Acromioplasty Arthroscopic rotator cuff repair Post arthroscopic injection of intra-articular joint space and peripheral portals;  Surgeon: Bart Potts DO;  Location: [facility];  Service: Orthopedics    SHOULDER ARTHROCENTESIS      SHOULDER ARTHROSCOPY Right 06/23/2021    Procedure: SHOULDER ARTHROSCOPY WITH acromioplasty, debridment, and ROTATOR CUFF REPAIR;  Surgeon: Bart Ptots DO;  Location:  MAIN OR;  Service: Orthopedics    TUBAL LIGATION         Family History   Problem Relation Age of Onset    Colon cancer Mother     Heart attack Father     Other Father         Cardiac Disorder    Diabetes type II Father     Colon cancer Sister     No Known Problems Sister     No Known Problems Sister     No Known Problems Sister     No Known Problems Sister     No Known Problems Daughter     No Known Problems Maternal Aunt     No Known  Problems Maternal Aunt     No Known Problems Maternal Aunt     Cancer Maternal Grandmother     No Known Problems Paternal Grandmother        Social History     Socioeconomic History    Marital status: /Civil Union     Spouse name: None    Number of children: None    Years of education: None    Highest education level: None   Occupational History    None   Tobacco Use    Smoking status: Former     Current packs/day: 0.00     Average packs/day: 1.5 packs/day for 30.0 years (45.0 ttl pk-yrs)     Types: Cigarettes     Start date: 1961     Quit date: 1991     Years since quittin.2    Smokeless tobacco: Never    Tobacco comments:     quit 25 yrs ago   Vaping Use    Vaping status: Never Used   Substance and Sexual Activity    Alcohol use: Never    Drug use: Never    Sexual activity: Not Currently     Comment: defer   Other Topics Concern    None   Social History Narrative    Caffeine use    , 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[employer], now at [employer] for OR    2 grown children     Social Determinants of Health     Financial Resource Strain: Medium Risk (3/4/2024)    Overall Financial Resource Strain (CARDIA)     Difficulty of Paying Living Expenses: Somewhat hard   Food Insecurity: No Food Insecurity (3/16/2023)    Hunger Vital Sign     Worried About Running Out of Food in the Last Year: Never true     Ran Out of Food in the Last Year: Never true   Transportation Needs: No Transportation Needs (3/4/2024)    PRAPARE - Transportation     Lack of Transportation (Medical): No     Lack of Transportation (Non-Medical): No   Physical Activity: Inactive (2020)    Exercise Vital Sign     Days of Exercise per Week: 0 days     Minutes of Exercise per Session: 0 min   Stress: Not on file   Social Connections: Not on file   Intimate Partner Violence: Not on file   Housing Stability: Low Risk  (3/16/2023)    Housing Stability Vital Sign     Unable to Pay for Housing in the  Last Year: No     Number of Places Lived in the Last Year: 1     Unstable Housing in the Last Year: No         Current Outpatient Medications:     amLODIPine (NORVASC) 5 mg tablet, Take 1 tablet (5 mg total) by mouth daily, Disp: 90 tablet, Rfl: 3    atorvastatin (LIPITOR) 10 mg tablet, Take 1 tablet (10 mg total) by mouth daily, Disp: 90 tablet, Rfl: 3    famotidine (PEPCID) 20 mg tablet, Take 1 tablet (20 mg total) by mouth 2 (two) times a day, Disp: 180 tablet, Rfl: 0    hydrOXYzine HCL (ATARAX) 25 mg tablet, take 1 tablet by mouth every 6 hours if needed for itching, Disp: 60 tablet, Rfl: 1    lidocaine (Lidoderm) 5 %, Apply 2 patches topically over 12 hours daily Remove & Discard patch within 12 hours or as directed by MD, Disp: 60 patch, Rfl: 5    losartan (COZAAR) 100 MG tablet, take 1 tablet by mouth once daily, Disp: 90 tablet, Rfl: 3    meloxicam (MOBIC) 15 mg tablet, Take 1 tablet (15 mg total) by mouth daily, Disp: 30 tablet, Rfl: 0    triamcinolone (KENALOG) 0.1 % cream, Apply topically 2 (two) times a day as needed (itching), Disp: 30 g, Rfl: 1    methocarbamol (Robaxin-750) 750 mg tablet, Take 1 tablet (750 mg total) by mouth every 8 (eight) hours (Patient not taking: Reported on 2/12/2024), Disp: 90 tablet, Rfl: 5    valACYclovir (VALTREX) 1,000 mg tablet, Take 1 tablet (1,000 mg total) by mouth 2 (two) times a day for 3 days, Disp: 6 tablet, Rfl: 0    Allergies   Allergen Reactions    Hydrocodone Irritability       Review of Systems   Constitutional:  Negative for chills, fever and unexpected weight change.   HENT:  Negative for hearing loss, nosebleeds and sore throat.    Eyes:  Negative for pain, redness and visual disturbance.   Respiratory:  Negative for cough, shortness of breath and wheezing.    Cardiovascular:  Negative for chest pain, palpitations and leg swelling.   Gastrointestinal:  Negative for abdominal pain, nausea and vomiting.   Endocrine: Negative for polydipsia and polyuria.  "  Genitourinary:  Negative for dysuria and hematuria.   Skin:  Negative for rash and wound.   Neurological:  Negative for dizziness, numbness and headaches.   Psychiatric/Behavioral:  Negative for decreased concentration and suicidal ideas. The patient is not nervous/anxious.    All other systems reviewed and are negative.       Objective:  /89 (BP Location: Left arm, Patient Position: Sitting, Cuff Size: Standard)   Pulse 68   Ht 5' 3\" (1.6 m)   Wt 71.7 kg (158 lb)   BMI 27.99 kg/m²     Ortho Exam  left Shoulder -   No anatomical deformity  Incision is well-healed with no signs of erythema, dehiscence, or infection  No soft tissue swelling or effusion noted  No Palpable crepitus with passive motion  ROM °, °  Strength: 4+/5 throughout   No glenohumeral instability appreciated on exam  - empty can, - drop-arm, - resisted external rotation, - belly press, - lift-off   Demonstrates normal elbow, wrist, and finger motion  2+  distal radial pulse with brisk capillary refill to the fingers  Radial, median, ulnar and motor  and sensory distribution intact  Sensation to light touch intact distally    left knee(s) -   Patient ambulates with steady gait pattern  Uses No assistive device  No anatomical deformity  Skin is warm and dry to touch with no signs of erythema, ecchymosis, or infection   No soft tissue swelling or effusion noted  ROM (0° - 120°)   Strength: 5/5 throughout  No tenderness to palpation on exam  Flexor and extensor mechanisms are intact   Knee is stable to varus and valgus stress  - Lachman's  - Anterior Drawer, - Posterior Drawer  Patella tracks centrally without palpable crepitus  Calf compartments are soft and supple  - Yany's sign  2+ DP and PT pulses with brisk capillary refill to the toes  Sural, saphenous, tibial, superficial, and deep peroneal motor and sensory distributions intact  Sensation light touch intact distally      Physical Exam  HENT:      Head: Normocephalic and " atraumatic.      Nose: Nose normal.   Eyes:      Conjunctiva/sclera: Conjunctivae normal.   Cardiovascular:      Rate and Rhythm: Normal rate.   Pulmonary:      Effort: Pulmonary effort is normal.   Musculoskeletal:      Cervical back: Neck supple.   Skin:     General: Skin is warm and dry.      Capillary Refill: Capillary refill takes less than 2 seconds.   Neurological:      Mental Status: She is alert and oriented to person, place, and time.   Psychiatric:         Mood and Affect: Mood normal.         Behavior: Behavior normal.          Diagnostic Test Review:  The attending physician has personally reviewed the pertinent films in PACS and the interpretation is as follows:    X-Ray of left knee taken on 3/15/2024 were reviewed and showed well-seated total knee prosthesis with maintained anatomical alignment and no signs of loosening.      Procedures   None performed.     Scribe Attestation      I,:  Brendon Reina am acting as a scribe while in the presence of the attending physician.:       I,:  Bart Potts, DO personally performed the services described in this documentation    as scribed in my presence.:

## 2024-03-18 ENCOUNTER — OFFICE VISIT (OUTPATIENT)
Dept: PHYSICAL THERAPY | Facility: CLINIC | Age: 69
End: 2024-03-18
Payer: COMMERCIAL

## 2024-03-18 DIAGNOSIS — R29.3 POSTURE ABNORMALITY: ICD-10-CM

## 2024-03-18 DIAGNOSIS — M75.42 IMPINGEMENT SYNDROME OF LEFT SHOULDER: Primary | ICD-10-CM

## 2024-03-18 PROCEDURE — 97112 NEUROMUSCULAR REEDUCATION: CPT | Performed by: PHYSICAL THERAPIST

## 2024-03-18 PROCEDURE — 97110 THERAPEUTIC EXERCISES: CPT | Performed by: PHYSICAL THERAPIST

## 2024-03-18 PROCEDURE — 97140 MANUAL THERAPY 1/> REGIONS: CPT | Performed by: PHYSICAL THERAPIST

## 2024-03-18 NOTE — PROGRESS NOTES
Daily Note     Today's date: 3/18/2024  Patient name: Jamee Shaikh  : 1955  MRN: [id number]  Referring provider: Nazario Duque,*  Dx:   Encounter Diagnosis     ICD-10-CM    1. Impingement syndrome of left shoulder  M75.42       2. Posture abnormality  R29.3           Start Time: 0815  Stop Time: 0900  Total time in clinic (min): 45 minutes    Subjective: Patient f/u with ortho last week for knee and shoulder. Stated x-rays of knee looked good, likely strained it. Will f/u again in 6 weeks. Patient c/o pain in proximal arm, ortho stated bicep looked good during surgery, no concerns.       Objective: See treatment diary below      Assessment: Tolerated treatment well.  Improved tolerance to treatment today with increased ability to actively and passively reach overhead. Patient exhibited good technique with therapeutic exercises and would benefit from continued PT working to improve overall L shoulder range of motion, GH jt strength/mobility and scapular stability.       Plan: Continue per plan of care.  Progress treatment as tolerated.       Protocol:S/p left shoulder arthroscopy with rotator cuff repair   No active motion x 6 weeks   Passive assist, active assist   Precautions: none noted  Access Code: [email or web address]  Progress note:   POC: 3/15    Manuals 3/14 3/18 3/5 3/8 3/12   Stretching with active release KB KB KB KB KB   GH jt mobs Grade II-III Grade II-III Grade II-III Grade II-III Grade II-III   Scapular PNF        IASTM        Neuro Re-Ed        UBE L1 6 min L1 7 min L1 6 min L1 6 min L1 6 min   PNF  nv              Chin tucks        Shoulder isometrics   :05x5 ea     IR/ER step outs ER step out YTB 15x  IR PRE red 15x ER step out YTB 20x  IR PRE red 20x IR red 15x, ER yellow 15x  ER step out YTB 15x  IR PRE red 15x   Bent over row        MTP Red 3x10 Red 3x10 Red 2x10  Red 3x10   Wall ABC 1x 1x 1x  1x   Rhythmic stab        Ther Ex        pulleys 2 min 2 min 2 min 2 min 2 min   Serratus  punch        Side lying ER 3x10 3x10 2x10  3x10   Shoulder flexion rock backs   :10x10     Elbow flexion nv 2# 30x 2#     Wall walk Flex, ecc lower 5x Flex, ecc lower 5x Flex, ecc lower 5x  Flex, ecc lower 5x   AAROM   -flexion  -IR  - ext Cane  -15x  -15x  -15x Cane  -15x  -15x  -15x Cane  -10x  -10x  -10x  Cane  -10x  -10x  -10x   AROM  -scaption  - abd to 90   -15x  -15x   -20x  -20x   -10x  -10x    -10x  -10x   Standing ITY 5x 5x                              Ther Activity                        Gait Training                        Modalities        IFC with CP

## 2024-03-19 ENCOUNTER — HOSPITAL ENCOUNTER (OUTPATIENT)
Dept: BONE DENSITY | Facility: HOSPITAL | Age: 69
Discharge: HOME | End: 2024-03-19
Payer: COMMERCIAL

## 2024-03-19 ENCOUNTER — HOSPITAL ENCOUNTER (OUTPATIENT)
Dept: ULTRASOUND IMAGING | Facility: HOSPITAL | Age: 69
Discharge: HOME | End: 2024-03-19
Payer: COMMERCIAL

## 2024-03-19 ENCOUNTER — HOSPITAL ENCOUNTER (OUTPATIENT)
Dept: MAMMOGRAPHY | Facility: HOSPITAL | Age: 69
Discharge: HOME | End: 2024-03-19
Payer: COMMERCIAL

## 2024-03-19 VITALS — WEIGHT: 150 LBS | HEIGHT: 63 IN | BODY MASS INDEX: 26.58 KG/M2

## 2024-03-19 DIAGNOSIS — M85.88 OSTEOPENIA OF OTHER SITE: ICD-10-CM

## 2024-03-19 DIAGNOSIS — R92.8 ABNORMAL MAMMOGRAM: ICD-10-CM

## 2024-03-19 PROCEDURE — 77080 DXA BONE DENSITY AXIAL: CPT

## 2024-03-19 PROCEDURE — 76642 ULTRASOUND BREAST LIMITED: CPT

## 2024-03-19 PROCEDURE — G0279 TOMOSYNTHESIS, MAMMO: HCPCS

## 2024-03-19 PROCEDURE — 77066 DX MAMMO INCL CAD BI: CPT

## 2024-03-21 ENCOUNTER — APPOINTMENT (OUTPATIENT)
Dept: LAB | Facility: CLINIC | Age: 69
End: 2024-03-21

## 2024-03-21 ENCOUNTER — OCCMED (OUTPATIENT)
Dept: URGENT CARE | Facility: CLINIC | Age: 69
End: 2024-03-21

## 2024-03-21 ENCOUNTER — APPOINTMENT (OUTPATIENT)
Dept: PHYSICAL THERAPY | Facility: CLINIC | Age: 69
End: 2024-03-21
Payer: COMMERCIAL

## 2024-03-21 DIAGNOSIS — Z02.1 PRE-EMPLOYMENT HEALTH SCREENING EXAMINATION: ICD-10-CM

## 2024-03-21 DIAGNOSIS — Z02.1 PRE-EMPLOYMENT HEALTH SCREENING EXAMINATION: Primary | ICD-10-CM

## 2024-03-21 LAB
MEV IGG SER QL IA: NORMAL
MUV IGG SER QL IA: NORMAL
RUBV IGG SERPL IA-ACNC: 26.3 IU/ML
VZV IGG SER QL IA: NORMAL

## 2024-03-21 PROCEDURE — 86480 TB TEST CELL IMMUN MEASURE: CPT

## 2024-03-21 PROCEDURE — 36415 COLL VENOUS BLD VENIPUNCTURE: CPT

## 2024-03-21 PROCEDURE — 86765 RUBEOLA ANTIBODY: CPT

## 2024-03-21 PROCEDURE — 86787 VARICELLA-ZOSTER ANTIBODY: CPT

## 2024-03-21 PROCEDURE — 86762 RUBELLA ANTIBODY: CPT

## 2024-03-21 PROCEDURE — 86735 MUMPS ANTIBODY: CPT

## 2024-03-22 ENCOUNTER — TELEPHONE (OUTPATIENT)
Dept: OBGYN CLINIC | Facility: MEDICAL CENTER | Age: 69
End: 2024-03-22

## 2024-03-22 LAB
GAMMA INTERFERON BACKGROUND BLD IA-ACNC: 0.05 IU/ML
M TB IFN-G BLD-IMP: NEGATIVE
M TB IFN-G CD4+ BCKGRND COR BLD-ACNC: 0 IU/ML
M TB IFN-G CD4+ BCKGRND COR BLD-ACNC: 0.01 IU/ML
MITOGEN IGNF BCKGRD COR BLD-ACNC: 9.95 IU/ML

## 2024-03-22 NOTE — TELEPHONE ENCOUNTER
Caller: Jamee    Doctor: Dr Potts    Reason for call: Jamee dropped off a form to be filled by Dr Potts and faxed to [facility] now. Attn : Radhika . As soon as possible please     The fax is on the form Jamee brought in yesterday to the [place].      Call back#: [phone]

## 2024-03-26 ENCOUNTER — OFFICE VISIT (OUTPATIENT)
Dept: PHYSICAL THERAPY | Facility: CLINIC | Age: 69
End: 2024-03-26
Payer: COMMERCIAL

## 2024-03-26 DIAGNOSIS — E78.5 HYPERLIPIDEMIA, UNSPECIFIED HYPERLIPIDEMIA TYPE: ICD-10-CM

## 2024-03-26 DIAGNOSIS — I10 ESSENTIAL HYPERTENSION: ICD-10-CM

## 2024-03-26 DIAGNOSIS — R29.3 POSTURE ABNORMALITY: ICD-10-CM

## 2024-03-26 DIAGNOSIS — M75.42 IMPINGEMENT SYNDROME OF LEFT SHOULDER: Primary | ICD-10-CM

## 2024-03-26 DIAGNOSIS — K21.9 GASTROESOPHAGEAL REFLUX DISEASE WITHOUT ESOPHAGITIS: ICD-10-CM

## 2024-03-26 PROCEDURE — 97112 NEUROMUSCULAR REEDUCATION: CPT | Performed by: PHYSICAL THERAPIST

## 2024-03-26 PROCEDURE — 97110 THERAPEUTIC EXERCISES: CPT | Performed by: PHYSICAL THERAPIST

## 2024-03-26 RX ORDER — ATORVASTATIN CALCIUM 10 MG/1
10 TABLET, FILM COATED ORAL DAILY
Qty: 90 TABLET | Refills: 3 | Status: SHIPPED | OUTPATIENT
Start: 2024-03-26

## 2024-03-26 RX ORDER — LOSARTAN POTASSIUM 100 MG/1
100 TABLET ORAL DAILY
Qty: 90 TABLET | Refills: 3 | Status: SHIPPED | OUTPATIENT
Start: 2024-03-26

## 2024-03-26 RX ORDER — AMLODIPINE BESYLATE 5 MG/1
5 TABLET ORAL DAILY
Qty: 90 TABLET | Refills: 3 | Status: SHIPPED | OUTPATIENT
Start: 2024-03-26

## 2024-03-26 RX ORDER — FAMOTIDINE 20 MG/1
20 TABLET, FILM COATED ORAL 2 TIMES DAILY
Qty: 180 TABLET | Refills: 3 | Status: SHIPPED | OUTPATIENT
Start: 2024-03-26

## 2024-03-26 RX ORDER — ATORVASTATIN CALCIUM 10 MG/1
10 TABLET, FILM COATED ORAL DAILY
Qty: 90 TABLET | Refills: 0 | Status: SHIPPED | OUTPATIENT
Start: 2024-03-26 | End: 2024-03-26 | Stop reason: SDUPTHER

## 2024-03-26 NOTE — TELEPHONE ENCOUNTER
Patient requesting refill(s) of: atorvastatin 10 mg daily     Last filled: 2/15/2024 #90 x 3  Last appt: 3/11/2024  Next appt: 9/16/2024  Pharmacy: [facility] [place]

## 2024-03-26 NOTE — TELEPHONE ENCOUNTER
Patient requesting refill(s) of: amlodipine    Last filled: 1/18/24 #90 x3  Last appt: 3/11/24  Next appt: 9/16/24  Pharm[facility]: [facility]

## 2024-03-26 NOTE — TELEPHONE ENCOUNTER
Patient requesting refill(s) of: pepcid 20 mg     Last filled: 2/5/2024 #180 x 0  Last appt: 3/11/2024  Next appt: 9/16/2024  Pharmacy: [facility] [place]

## 2024-03-26 NOTE — PROGRESS NOTES
PT Re-Evaluation  and PT Discharge    Today's date: 3/26/2024  Patient name: Jamee Shaikh  : 1955  MRN: [id number]  Referring provider: Nazario Duque,*  Dx:   Encounter Diagnosis     ICD-10-CM    1. Impingement syndrome of left shoulder  M75.42       2. Posture abnormality  R29.3           Start Time: 0800  Stop Time: 0840  Total time in clinic (min): 40 minutes    Assessment  Assessment details: Jamee Shaikh was seen for an initial PT evaluation and 22 f/u sessions. Patient is a 68 y.o. female with diagnosis of s/p shoulder rotator cuff repair and past medical history significant for OA, chronic pain, thyroid disease, GERD, hiatal hernia, hyperlipidemia, HTN, rotator cuff tear with repair, L buttock pain with history of surgery, lipomas with removal, lumbar radiculopathy, vertigo. FOTO functional score shows improvement from 26% at intake to 74% today surpassing predicted value of 60%. Findings of examination today show significant improvement over the last 2 weeks with increased L shoulder range of motion, strength, stability and with decreased pain intensity and occurrence improving overall functional mobility. At this time Jamee has met the majority of goals set at initial evaluation and is independent with her home exercise program. Plan to discharge at this time.       Impairments: abnormal muscle firing, abnormal or restricted ROM, abnormal movement, activity intolerance, impaired physical strength, lacks appropriate home exercise program, pain with function, scapular dyskinesis, poor posture  and poor body mechanics    Goals  STG (6 weeks)  1. Patient's left shoulder flexion AROM will increase to 90 with 2/10 pain for increased ability to perform overhead ADLs. - MET 3/26  2. Patient will have 0/10 pain in left shoulder at rest. - MET 3/26  3. Patient's left shoulder strength will increase to 2+/5 for increased ability to lift. - MET 3/26  LTG (12 weeks)  1. Patient's UE AROM equal  bilaterally for ability to complete hair hygiene, overhead, and behind the back ADLs. - PROGRESSING  2. Patient's UE strength will be equal bilaterally for ability to lift and carry at PLOF. - PROGRESSING  3. Patient will be independent with home exercise program for continued maintenance post PT discharge. - MET 3/26      Plan  Plan details: DC to [facility]   Plan of Care beginning date: 12/15/2023  Treatment plan discussed with: patient and PTA      Subjective Evaluation    History of Present Illness  Date of surgery: 12/13/2023  Subjective 3/26: Patient states she is feeling much better with increased ease of movement and decreased pain since injection a few weeks ago. Is now able to raise arm overhead, dress and complete daily tasks with minimal to no difficulty. Has been hired by  for environmental services and will start in 2 weeks. Has been cleared by surgeon with  no current restrictions. Patient is opting to be discharged at this time due to overall improvement and RTW.     Subjective 3/8: Patient states continued issues with left shoulder. Feels worse now with less movement than prior to surgery. Pain is mostly in anterior proximal arm increased with LUE movements. Continued difficulty sleeping due to pain and difficulty getting comfortable. States she woke at least 3x last night. To f/u with surgeon later today.     Subjective 1/24: Patient states she has been in a lot of pain. Having difficulty sleeping due to pain waking 3-4x/night. Has removed sling per instructions, but will have increased pain with UE movements.     Mechanism of injury: Jamee Shaikh is a 68 y.o. female who presents to outpatient Physical Therapy today with complaints of left shoulder pain. Underwent left shoulder rotator cuff repair 12/13/23.      Pain Rating (0-10) 1/24 3/8 3/26   Current 6/10 7/10 0/10   At least 6/10 5/10 0/10   At worst 8/10 9/10 6/10   Location Proximal arm Anterior proximal arm Anterior shoulder   Quality  Throbbing sharp pain  Throbbing, sharp    Alleviate Ice     Aggravate  LUE movement LUE movement          Social Support  Steps to enter house: yes  Stairs in house: yes   Lives in: multiple-level home  Lives with: spouse    Employment status: not working  Hand dominance: right          Objective     Observations   Left Shoulder   Positive for edema and incision.     Additional Observation Details  4 scope incisions healing appropriately. Dressing removed today and replaced with band aids. Patient instructed on wound care and signs of infection.   1/24: Incisions healed and scarred, good mobility.   3/8: Incision mobility WNL    Cervical/Thoracic Screen   Cervical range of motion within normal limits    Neurological Testing     Sensation     Shoulder     Right Shoulder   Intact: Light touch    Comments   Left light touch: nerve block    Active Range of Motion   Shoulder - AROM IE 1/24 3/8 3/26   LEFT        Flexion NT 50 50 135   Abduction NT 30 50 135   External Rotation NT @0: 25 @ 45: 25 @ 90: 70   Internal Rotation NT @0: 25 @ 45: 45 @ 90: 55   RIGHT       Flexion 155      Abduction 125      External Rotation BTH: T4      Internal Rotation BTB: T10          Passive Range of Motion     Shoulder - PROM IE 1/24 3/8 3/26   LEFT        Flexion 90 30 50 145   Abduction 90 20 50 160   External Rotation @ 0: 30 @0: 25 @45: 40 @ 90: 80   Internal Rotation @0: belly @0: 25 @45: 45 @ 90: 65         Strength/Myotome Testing     Shoulder - MMT IE 1/24 3/8 3/26   LEFT        Flexion NT 2 2 4+   Abduction NT 2 2 4   External Rotation NT 2 2 4   Internal Rotation NT 2 2 5   bicep NT 3- 3 5   tricep NT 2+ 3- 5   RIGHT       Flexion 5      Abduction 5      External Rotation 5      Internal Rotation 5      Bicep 5      Tricep 5          Additional Strength Details  LUE strength testing not performed at intake due to restrictions    General Comments:      Shoulder Comments       FOTO: 26% function, 60% predicted function   1/24:  24%  3/26: 74%           Protocol:S/p left shoulder arthroscopy with rotator cuff repair   No active motion x 6 weeks   Passive assist, active assist   Precautions: none noted  Access Code: [email or web address]      Manuals 3/14 3/18 3/26 3/8 3/12   Stretching with active release KB KB  KB KB   GH jt mobs Grade II-III Grade II-III  Grade II-III Grade II-III   Scapular PNF        IASTM        Neuro Re-Ed   HEP REVIEW     UBE L1 6 min L1 7 min L1 7 MIN L1 6 min L1 6 min   PNF  nv              Chin tucks        Shoulder isometrics        IR/ER step outs ER step out YTB 15x  IR PRE red 15x ER step out YTB 20x  IR PRE red 20x   ER step out YTB 15x  IR PRE red 15x   Bent over row        MTP Red 3x10 Red 3x10   Red 3x10   Wall ABC 1x 1x   1x   Rhythmic stab        Ther Ex   HEP REVIEW     pulleys 2 min 2 min 2 min 2 min 2 min   Serratus punch        Side lying ER 3x10 3x10   3x10   Shoulder flexion rock backs        Elbow flexion nv 2# 30x      Wall walk Flex, ecc lower 5x Flex, ecc lower 5x   Flex, ecc lower 5x   AAROM   -flexion  -IR  - ext Cane  -15x  -15x  -15x Cane  -15x  -15x  -15x   Cane  -10x  -10x  -10x   AROM  -scaption  - abd to 90   -15x  -15x   -20x  -20x     -10x  -10x   Standing ITY 5x 5x                              Ther Activity                        Gait Training                        Modalities        IFC with CP

## 2024-03-28 ENCOUNTER — APPOINTMENT (OUTPATIENT)
Dept: PHYSICAL THERAPY | Facility: CLINIC | Age: 69
End: 2024-03-28
Payer: COMMERCIAL

## 2024-04-12 ENCOUNTER — OFFICE VISIT (OUTPATIENT)
Dept: URGENT CARE | Facility: CLINIC | Age: 69
End: 2024-04-12
Payer: COMMERCIAL

## 2024-04-12 VITALS
DIASTOLIC BLOOD PRESSURE: 81 MMHG | OXYGEN SATURATION: 98 % | TEMPERATURE: 98.9 F | RESPIRATION RATE: 16 BRPM | BODY MASS INDEX: 28.56 KG/M2 | HEART RATE: 71 BPM | WEIGHT: 161.2 LBS | HEIGHT: 63 IN | SYSTOLIC BLOOD PRESSURE: 133 MMHG

## 2024-04-12 DIAGNOSIS — M79.602 PAIN OF LEFT UPPER EXTREMITY: Primary | ICD-10-CM

## 2024-04-12 PROCEDURE — 99213 OFFICE O/P EST LOW 20 MIN: CPT | Performed by: NURSE PRACTITIONER

## 2024-04-12 PROCEDURE — S9083 URGENT CARE CENTER GLOBAL: HCPCS | Performed by: NURSE PRACTITIONER

## 2024-04-12 NOTE — PATIENT INSTRUCTIONS
Rest, ice, elevate. Voltaren gel as needed. Tylenol as needed for pain. If you develop any increased pain, swelling, numbness, tingling, or any new or concerning symptoms please return or proceed to ER. Follow up with pcp in 3-5 days.    Follow up with orthopedics

## 2024-04-12 NOTE — PROGRESS NOTES
[facility]        NAME: Jamee Shaikh is a 69 y.o. female  : 1955    MRN: [id number]  DATE: 2024  TIME: 5:58 PM    Assessment and Plan   Pain of left upper extremity [M79.602]  1. Pain of left upper extremity  Diclofenac Sodium (VOLTAREN) 1 %            Patient Instructions     Patient Instructions   Rest, ice, elevate. Voltaren gel as needed. Tylenol as needed for pain. If you develop any increased pain, swelling, numbness, tingling, or any new or concerning symptoms please return or proceed to ER. Follow up with pcp in 3-5 days.    Follow up with orthopedics      If tests have been performed at [facility], our office will contact you with results if changes need to be made to the care plan discussed with you at the visit.  You can review your full results on [facility] MyChart.    Chief Complaint     Chief Complaint   Patient presents with    Arm Pain     Lump on her left bicep noticed it last week . No known injury         History of Present Illness       Arm Pain   The incident occurred 5 to 7 days ago. [place]. Injury mechanism: pain in left bicep after handing her grandson a water bottle over a fence. The pain is present in the upper left arm. The quality of the pain is described as aching. The pain does not radiate. The pain is moderate. The pain has been Constant since the incident. Pertinent negatives include no muscle weakness, numbness or tingling. The symptoms are aggravated by movement and palpation. She has tried acetaminophen for the symptoms. The treatment provided mild relief.       Review of Systems   Review of Systems   Constitutional:  Negative for chills, diaphoresis, fatigue and fever.   Respiratory: Negative.     Cardiovascular: Negative.    Musculoskeletal:  Positive for myalgias. Negative for arthralgias, back pain, joint swelling, neck pain and neck stiffness.   Skin:  Negative for rash and wound.   Neurological:  Negative for tingling,  weakness and numbness.         Current Medications       Current Outpatient Medications:     amLODIPine (NORVASC) 5 mg tablet, Take 1 tablet (5 mg total) by mouth daily, Disp: 90 tablet, Rfl: 3    atorvastatin (LIPITOR) 10 mg tablet, Take 1 tablet (10 mg total) by mouth daily, Disp: 90 tablet, Rfl: 3    Diclofenac Sodium (VOLTAREN) 1 %, Apply 2 g topically 4 (four) times a day, Disp: 100 g, Rfl: 0    famotidine (PEPCID) 20 mg tablet, take 1 tablet by mouth twice a day, Disp: 180 tablet, Rfl: 3    hydrOXYzine HCL (ATARAX) 25 mg tablet, take 1 tablet by mouth every 6 hours if needed for itching, Disp: 60 tablet, Rfl: 1    lidocaine (Lidoderm) 5 %, Apply 2 patches topically over 12 hours daily Remove & Discard patch within 12 hours or as directed by MD, Disp: 60 patch, Rfl: 5    losartan (COZAAR) 100 MG tablet, Take 1 tablet (100 mg total) by mouth daily, Disp: 90 tablet, Rfl: 3    meloxicam (MOBIC) 15 mg tablet, Take 1 tablet (15 mg total) by mouth daily, Disp: 30 tablet, Rfl: 0    triamcinolone (KENALOG) 0.1 % cream, Apply topically 2 (two) times a day as needed (itching), Disp: 30 g, Rfl: 1    methocarbamol (Robaxin-750) 750 mg tablet, Take 1 tablet (750 mg total) by mouth every 8 (eight) hours (Patient not taking: Reported on 2/12/2024), Disp: 90 tablet, Rfl: 5    valACYclovir (VALTREX) 1,000 mg tablet, Take 1 tablet (1,000 mg total) by mouth 2 (two) times a day for 3 days, Disp: 6 tablet, Rfl: 0    Current Allergies     Allergies as of 04/12/2024 - Reviewed 04/12/2024   Allergen Reaction Noted    Hydrocodone Irritability 06/15/2022            The following portions of the patient's history were reviewed and updated as appropriate: allergies, current medications, past family history, past medical history, past social history, past surgical history and problem list.     Past Medical History:   Diagnosis Date    Anemia     Arthritis     Bilateral bunions     Chronic pain disorder     back and hip pain    Colon polyp   "   Disease of thyroid gland     nodules    History of COVID-2020    mild s/s    History of gastroesophageal reflux (GERD)     \"had hiatal hernia surgery\"    History of heart murmur in childhood     Hyperlipidemia     Hypertension     Overactive bladder     PONV (postoperative nausea and vomiting)     once after knee surgery    Rotator cuff tear, right     had surgery    Vitamin D deficiency     unsure    Wears glasses     Wears partial dentures     upper       Past Surgical History:   Procedure Laterality Date     SECTION      Last Assessed: 2017    COLONOSCOPY      ELBOW SURGERY      Last Assessed: 2017    ESOPHAGOGASTRODUODENOSCOPY N/A 2017    Procedure: ESOPHAGOGASTRODUODENOSCOPY (EGD);  Surgeon: Jolly Lin DO;  Location: [facility];  Service: Gastroenterology    HIP SURGERY Left 2018    glut medius/minimus repair  and 18--with pin implanted    HYSTERECTOMY          PARAESOPHAGEAL HERNIA REPAIR N/A 2020    Procedure: LAPAROSCOPIC PARAESOPHAGEAL HERNIA REPAIR WITH MESH AND NISSEN FUNDOPLICATION WITH ROBOTICS;  Surgeon: Darrin Leon MD;  Location: [facility];  Service: General    AK ARTHRP KNE CONDYLE&PLATU MEDIAL&LAT COMPARTMENTS Left 03/15/2023    Procedure: ARTHROPLASTY KNEE TOTAL;  Surgeon: Bart Potts DO;  Location: [facility];  Service: Orthopedics    AK ARTHRS KNE SURG W/MENISCECTOMY MED/LAT W/SHVG Left 06/15/2022    Procedure: ;left knee arthroscopy, meniscectomy,synevectomy,chondroplasty, loose body removal, injection;  Surgeon: Bart Potts DO;  [facility];  Service: Orthopedics    AK COLONOSCOPY FLX DX W/COLLJ SPEC WHEN PFRMD N/A 2017    Procedure: EGD AND COLONOSCOPY;  Surgeon: Jolly Lin DO;  Location: [facility];  Service: Gastroenterology    AK EXCISON TUMOR SOFT TISSUE THIGH/KNEE SUBQ 3 CM/> Bilateral 2022    Procedure: EXCISION BIOPSY TISSUE LESION/MASS LOWER EXTREMITY;  Surgeon: Kit" "MD Liang;  [facility]: [facility];  Service: General    PA EXCISON TUMOR SOFT TISSUE THIGH/KNEE SUBQ 3 CM/> Bilateral 08/25/2022    Procedure: EXCISION BIOPSY TISSUE LESION/MASS LOWER EXTREMITY;  Surgeon: Kit Tavera MD;  [facility]: [facility];  Service: General    PA OSTECTOMY PRTL 5TH METAR HEAD SPX Bilateral 8/15/2023    Procedure: BUNIONECTOMY TAILOR WITHOUT OSTEOTOMY;  Surgeon: Monet Pradhan DPM;  [facility]: [facility];  Service: Podiatry    PA SURGICAL ARTHROSCOPY JENNIFER W/CORACOACRM LIGM RLS Left 12/13/2023    Procedure: Left - ARTHROSCOPY SHOULDER  Synovectomy Debridement/chondroplasty Acromioplasty Arthroscopic rotator cuff repair Post arthroscopic injection of intra-articular joint space and peripheral portals;  Surgeon: Bart Potts DO;  [facility]: [facility];  Service: Orthopedics    SHOULDER ARTHROCENTESIS      SHOULDER ARTHROSCOPY Right 06/23/2021    Procedure: SHOULDER ARTHROSCOPY WITH acromioplasty, debridment, and ROTATOR CUFF REPAIR;  Surgeon: Bart Potts DO;  Location:  MAIN OR;  Service: Orthopedics    TUBAL LIGATION         Family History   Problem Relation Age of Onset    Colon cancer Mother     Heart attack Father     Other Father         Cardiac Disorder    Diabetes type II Father     Colon cancer Sister     No Known Problems Sister     No Known Problems Sister     No Known Problems Sister     No Known Problems Sister     No Known Problems Daughter     No Known Problems Maternal Aunt     No Known Problems Maternal Aunt     No Known Problems Maternal Aunt     Cancer Maternal Grandmother     No Known Problems Paternal Grandmother          Medications have been verified.        Objective   /81   Pulse 71   Temp 98.9 °F (37.2 °C)   Resp 16   Ht 5' 3\" (1.6 m)   Wt 73.1 kg (161 lb 3.2 oz)   SpO2 98%   BMI 28.56 kg/m²   No LMP recorded. Patient has had a hysterectomy.       Physical Exam     Physical Exam  Constitutional:       General: She is not in acute distress.     " Appearance: Normal appearance. She is not diaphoretic.   HENT:      Head: Normocephalic and atraumatic.   Cardiovascular:      Rate and Rhythm: Normal rate and regular rhythm.      Heart sounds: Normal heart sounds, S1 normal and S2 normal.   Pulmonary:      Effort: Pulmonary effort is normal.      Breath sounds: Normal breath sounds and air entry.   Musculoskeletal:      Left shoulder: Normal.      Right upper arm: Normal.      Left upper arm: Swelling (swelling noted over left bicep.full ROM noted. nv intact. cap refill <2 seconds.) and tenderness present. No edema, lacerations or bony tenderness.      Left elbow: Normal.   Neurological:      Mental Status: She is alert.

## 2024-04-16 ENCOUNTER — OFFICE VISIT (OUTPATIENT)
Dept: OBGYN CLINIC | Facility: CLINIC | Age: 69
End: 2024-04-16
Payer: COMMERCIAL

## 2024-04-16 VITALS
WEIGHT: 161 LBS | DIASTOLIC BLOOD PRESSURE: 92 MMHG | SYSTOLIC BLOOD PRESSURE: 120 MMHG | HEIGHT: 63 IN | HEART RATE: 94 BPM | BODY MASS INDEX: 28.53 KG/M2

## 2024-04-16 DIAGNOSIS — S46.212A BICEPS MUSCLE TEAR, LEFT, INITIAL ENCOUNTER: Primary | ICD-10-CM

## 2024-04-16 PROCEDURE — 99213 OFFICE O/P EST LOW 20 MIN: CPT | Performed by: ORTHOPAEDIC SURGERY

## 2024-04-16 NOTE — PROGRESS NOTES
"Assessment/Plan:   Diagnoses and all orders for this visit:    Biceps muscle tear, left, initial encounter  -     MRI shoulder left wo contrast; Future         Discussed with patient that her physical exam is consistent with a tear of the proximal biceps tendon of her left shoulder. The patient did have a left shoulder arthroscopy approximately 4 years ago, however, she sustained a new injury approximately 2 weeks ago. An MRI was ordered for further evaluation of her symptoms. A work note was provided with limitations that were discussed in office. She will follow-up once the MRI is complete. The patient expresses understanding and is in agreement with today's treatment plan.     The patient has acute trauma to her left shoulder.  Appears she tore her biceps tendon.  Would recommend obtaining an MRI of her left shoulder to look at the remaining cuff in further detail.  Return back once MRI is complete.  She was given work restrictions.  Physical exam shows a positive \"Chris\" deformity.  There is decreased range of motion and weakness of her shoulder indicative of rotator cuff retearing      Subjective:   Patient ID: Jamee Shaikh  1955     HPI  Patient is a 69 y.o. female who presents for initial evaluation of her left shoulder. The patient states that approximately 2 weeks ago, she reached over a fence to hand her grandson a water bottle when she experienced a pop in her left biceps muscle. The pain continued to worsen, therefore she reported to her local Urgent care on 4/12/2024. She was provided Voltaren gel at that time, which has provided little to no relief. She reports a lump in the middle of her upper arm. The patient states that her pain is exacerbated by activity such as overhead motion and lifting. The patient is approximately 4 months s/p left shoulder arthroscopy, however, she sustained a new injury.     The following portions of the patient's history were reviewed and updated as appropriate:  " "Past medical history, past surgical history, Family history, social history, current medications and allergies    Past Medical History:   Diagnosis Date    Anemia     Arthritis     Bilateral bunions     Chronic pain disorder     back and hip pain    Colon polyp     Disease of thyroid gland     nodules    History of COVID-2020    mild s/s    History of gastroesophageal reflux (GERD)     \"had hiatal hernia surgery\"    History of heart murmur in childhood     Hyperlipidemia     Hypertension     Overactive bladder     PONV (postoperative nausea and vomiting)     once after knee surgery    Rotator cuff tear, right     had surgery    Vitamin D deficiency     unsure    Wears glasses     Wears partial dentures     upper       Past Surgical History:   Procedure Laterality Date     SECTION      Last Assessed: 2017    COLONOSCOPY      ELBOW SURGERY      Last Assessed: 2017    ESOPHAGOGASTRODUODENOSCOPY N/A 2017    Procedure: ESOPHAGOGASTRODUODENOSCOPY (EGD);  Surgeon: Jolly Lin DO;  Location: [facility];  Service: Gastroenterology    HIP SURGERY Left 2018    glut medius/minimus repair  and 18--with pin implanted    HYSTERECTOMY          PARAESOPHAGEAL HERNIA REPAIR N/A 2020    Procedure: LAPAROSCOPIC PARAESOPHAGEAL HERNIA REPAIR WITH MESH AND NISSEN FUNDOPLICATION WITH ROBOTICS;  Surgeon: Darrin Leon MD;  Location: [facility];  Service: General    DC ARTHRP KNE CONDYLE&PLATU MEDIAL&LAT COMPARTMENTS Left 03/15/2023    Procedure: ARTHROPLASTY KNEE TOTAL;  Surgeon: Bart Potts DO;  [facility];  Service: Orthopedics    DC ARTHRS KNE SURG W/MENISCECTOMY MED/LAT W/SHVG Left 06/15/2022    Procedure: ;left knee arthroscopy, meniscectomy,synevectomy,chondroplasty, loose body removal, injection;  Surgeon: Bart Potts DO;  [facility];  Service: Orthopedics    DC COLONOSCOPY FLX DX W/COLLJ SPEC WHEN PFRMD N/A 2017    Procedure: EGD AND " COLONOSCOPY;  Surgeon: Jolly Lin DO;  Location: [facility];  Service: Gastroenterology    VA EXCISON TUMOR SOFT TISSUE THIGH/KNEE SUBQ 3 CM/> Bilateral 05/26/2022    Procedure: EXCISION BIOPSY TISSUE LESION/MASS LOWER EXTREMITY;  Surgeon: Kit Tavera MD;  [facility];  Service: General    VA EXCISON TUMOR SOFT TISSUE THIGH/KNEE SUBQ 3 CM/> Bilateral 08/25/2022    Procedure: EXCISION BIOPSY TISSUE LESION/MASS LOWER EXTREMITY;  Surgeon: Kit Tavera MD;  [facility];  Service: General    VA OSTECTOMY PRTL 5TH METAR HEAD SPX Bilateral 8/15/2023    Procedure: BUNIONECTOMY TAILOR WITHOUT OSTEOTOMY;  Surgeon: Monet Pradhan DPM;  [facility];  Service: Podiatry    VA SURGICAL ARTHROSCOPY JENNIFER W/CORACOACRM LIGM RLS Left 12/13/2023    Procedure: Left - ARTHROSCOPY SHOULDER  Synovectomy Debridement/chondroplasty Acromioplasty Arthroscopic rotator cuff repair Post arthroscopic injection of intra-articular joint space and peripheral portals;  Surgeon: Bart Potts DO;  [facility];  Service: Orthopedics    SHOULDER ARTHROCENTESIS      SHOULDER ARTHROSCOPY Right 06/23/2021    Procedure: SHOULDER ARTHROSCOPY WITH acromioplasty, debridment, and ROTATOR CUFF REPAIR;  Surgeon: Bart Potts DO;  Location:  MAIN OR;  Service: Orthopedics    TUBAL LIGATION         Family History   Problem Relation Age of Onset    Colon cancer Mother     Heart attack Father     Other Father         Cardiac Disorder    Diabetes type II Father     Colon cancer Sister     No Known Problems Sister     No Known Problems Sister     No Known Problems Sister     No Known Problems Sister     No Known Problems Daughter     No Known Problems Maternal Aunt     No Known Problems Maternal Aunt     No Known Problems Maternal Aunt     Cancer Maternal Grandmother     No Known Problems Paternal Grandmother        Social History     Socioeconomic History    Marital status: /Civil Union     Spouse name:  None    Number of children: None    Years of education: None    Highest education level: None   Occupational History    None   Tobacco Use    Smoking status: Former     Current packs/day: 0.00     Average packs/day: 1.5 packs/day for 30.0 years (45.0 ttl pk-yrs)     Types: Cigarettes     Start date: 1961     Quit date: 1991     Years since quittin.3    Smokeless tobacco: Never    Tobacco comments:     quit 25 yrs ago   Vaping Use    Vaping status: Never Used   Substance and Sexual Activity    Alcohol use: Never    Drug use: Never    Sexual activity: Not Currently     Comment: defer   Other Topics Concern    None   Social History Narrative    Caffeine use    , 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[employer], now at [employer] for OR    2 grown children     Social Determinants of Health     Financial Resource Strain: Medium Risk (3/4/2024)    Overall Financial Resource Strain (CARDIA)     Difficulty of Paying Living Expenses: Somewhat hard   Food Insecurity: No Food Insecurity (3/16/2023)    Hunger Vital Sign     Worried About Running Out of Food in the Last Year: Never true     Ran Out of Food in the Last Year: Never true   Transportation Needs: No Transportation Needs (3/4/2024)    PRAPARE - Transportation     Lack of Transportation (Medical): No     Lack of Transportation (Non-Medical): No   Physical Activity: Inactive (2020)    Exercise Vital Sign     Days of Exercise per Week: 0 days     Minutes of Exercise per Session: 0 min   Stress: Not on file   Social Connections: Not on file   Intimate Partner Violence: Not on file   Housing Stability: Low Risk  (3/16/2023)    Housing Stability Vital Sign     Unable to Pay for Housing in the Last Year: No     Number of Places Lived in the Last Year: 1     Unstable Housing in the Last Year: No         Current Outpatient Medications:     amLODIPine (NORVASC) 5 mg tablet, Take 1 tablet (5 mg total) by mouth daily, Disp: 90  tablet, Rfl: 3    atorvastatin (LIPITOR) 10 mg tablet, Take 1 tablet (10 mg total) by mouth daily, Disp: 90 tablet, Rfl: 3    Diclofenac Sodium (VOLTAREN) 1 %, Apply 2 g topically 4 (four) times a day, Disp: 100 g, Rfl: 0    famotidine (PEPCID) 20 mg tablet, take 1 tablet by mouth twice a day, Disp: 180 tablet, Rfl: 3    hydrOXYzine HCL (ATARAX) 25 mg tablet, take 1 tablet by mouth every 6 hours if needed for itching, Disp: 60 tablet, Rfl: 1    lidocaine (Lidoderm) 5 %, Apply 2 patches topically over 12 hours daily Remove & Discard patch within 12 hours or as directed by MD, Disp: 60 patch, Rfl: 5    losartan (COZAAR) 100 MG tablet, Take 1 tablet (100 mg total) by mouth daily, Disp: 90 tablet, Rfl: 3    meloxicam (MOBIC) 15 mg tablet, Take 1 tablet (15 mg total) by mouth daily, Disp: 30 tablet, Rfl: 0    triamcinolone (KENALOG) 0.1 % cream, Apply topically 2 (two) times a day as needed (itching), Disp: 30 g, Rfl: 1    methocarbamol (Robaxin-750) 750 mg tablet, Take 1 tablet (750 mg total) by mouth every 8 (eight) hours (Patient not taking: Reported on 2/12/2024), Disp: 90 tablet, Rfl: 5    valACYclovir (VALTREX) 1,000 mg tablet, Take 1 tablet (1,000 mg total) by mouth 2 (two) times a day for 3 days, Disp: 6 tablet, Rfl: 0    Allergies   Allergen Reactions    Hydrocodone Irritability       Review of Systems   Constitutional:  Negative for chills, fever and unexpected weight change.   HENT:  Negative for hearing loss, nosebleeds and sore throat.    Eyes:  Negative for pain, redness and visual disturbance.   Respiratory:  Negative for cough, shortness of breath and wheezing.    Cardiovascular:  Negative for chest pain, palpitations and leg swelling.   Gastrointestinal:  Negative for abdominal pain, nausea and vomiting.   Endocrine: Negative for polydipsia and polyuria.   Genitourinary:  Negative for dysuria and hematuria.   Skin:  Negative for rash and wound.   Neurological:  Negative for dizziness, numbness and  "headaches.   Psychiatric/Behavioral:  Negative for decreased concentration and suicidal ideas. The patient is not nervous/anxious.    All other systems reviewed and are negative.       Objective:  /92 (BP Location: Right arm, Patient Position: Sitting, Cuff Size: Standard)   Pulse 94   Ht 5' 3\" (1.6 m)   Wt 73 kg (161 lb)   BMI 28.52 kg/m²     Ortho Exam  left Shoulder -   Chris deformity  Skin is warm and dry to touch with no signs of erythema, ecchymosis, or infection  Mild soft tissue swelling or effusion noted  No palpable crepitus with passive motion  TTP over biceps tendon and bicipital groove  ROM ° with pain, ° with pain, ER 90°, IR 90°  Strength: 4/5 elbow flexion and supination   No glenohumeral instability appreciated on exam  - empty can, - drop-arm, - resisted external rotation, - belly press, - lift-off   Demonstrates normal elbow, wrist, and finger motion  2+  distal radial pulse with brisk capillary refill to the fingers  Radial, median, ulnar and motor  and sensory distribution intact  Sensation to light touch intact distally      Physical Exam  HENT:      Head: Normocephalic and atraumatic.      Nose: Nose normal.   Eyes:      Conjunctiva/sclera: Conjunctivae normal.   Cardiovascular:      Rate and Rhythm: Normal rate.   Pulmonary:      Effort: Pulmonary effort is normal.   Musculoskeletal:      Cervical back: Neck supple.   Skin:     General: Skin is warm and dry.      Capillary Refill: Capillary refill takes less than 2 seconds.   Neurological:      Mental Status: She is alert and oriented to person, place, and time.   Psychiatric:         Mood and Affect: Mood normal.         Behavior: Behavior normal.          Diagnostic Test Review:  No new imaging at time of visit.       Procedures   None performed.      Scribe Attestation      I,:  Brendon Reina am acting as a scribe while in the presence of the attending physician.:       I,:  Bart Potts, DO personally " performed the services described in this documentation    as scribed in my presence.:

## 2024-04-16 NOTE — LETTER
April 16, 2024     Patient: Jamee Shaikh  YOB: 1955  Date of Visit: 4/16/2024      To Whom it May Concern:    Jamee Shaikh is under my professional care. Jamee was seen in my office on 4/16/2024. Jamee may return to work with limitations including right-handed work only, until MRI is complete .    If you have any questions or concerns, please don't hesitate to call.         Sincerely,          Bart Potts,         CC: No Recipients

## 2024-04-21 ENCOUNTER — HOSPITAL ENCOUNTER (OUTPATIENT)
Dept: MRI IMAGING | Facility: HOSPITAL | Age: 69
Discharge: HOME | End: 2024-04-21
Attending: ORTHOPAEDIC SURGERY
Payer: COMMERCIAL

## 2024-04-21 DIAGNOSIS — S46.212A BICEPS MUSCLE TEAR, LEFT, INITIAL ENCOUNTER: ICD-10-CM

## 2024-04-21 PROCEDURE — 73221 MRI JOINT UPR EXTREM W/O DYE: CPT

## 2024-04-26 ENCOUNTER — OFFICE VISIT (OUTPATIENT)
Dept: OBGYN CLINIC | Facility: CLINIC | Age: 69
End: 2024-04-26
Payer: COMMERCIAL

## 2024-04-26 VITALS
WEIGHT: 161 LBS | DIASTOLIC BLOOD PRESSURE: 81 MMHG | SYSTOLIC BLOOD PRESSURE: 146 MMHG | BODY MASS INDEX: 28.53 KG/M2 | HEART RATE: 80 BPM | HEIGHT: 63 IN

## 2024-04-26 DIAGNOSIS — S46.212A STRAIN OF BICEPS TENDON, LEFT, INITIAL ENCOUNTER: Primary | ICD-10-CM

## 2024-04-26 PROCEDURE — 99213 OFFICE O/P EST LOW 20 MIN: CPT | Performed by: ORTHOPAEDIC SURGERY

## 2024-04-26 NOTE — PROGRESS NOTES
"ASSESSMENT/PLAN:    Diagnoses and all orders for this visit:    Strain of biceps tendon, left, initial encounter  -     Ambulatory Referral to Physical Therapy; Future        The patient was seen and examined.  The MRI of the patient's left shoulder was negative for any rotator cuff tears.  The patient has a left biceps strain.  Possible treatment options were discussed with the patient.  She was given a referral for formal physical therapy.  She will follow-up with our office in 8 weeks.  She is acceptable to this plan.    Return in about 2 months (around 6/26/2024).    The patient is doing better in regards to her shoulder.  After personally reviewing the MRI, the rotator cuff is intact.  She is having more pain along the biceps muscle..  She might of strained this when she was grabbing something above.  She was sent for physical therapy.  Continue home exercise program.  Follow-up in 2 months for reevaluation      _____________________________________________________  CHIEF COMPLAINT:  Chief Complaint   Patient presents with    Left Shoulder - Follow-up         SUBJECTIVE:  Jamee Shaikh is a 69 y.o. female who presents to our office to review MRI results of her left shoulder.  She still complains of left shoulder pain.  She denies any numbness or tingling.  She denies any fever or chills.    The following portions of the patient's history were reviewed and updated as appropriate: allergies, current medications, past family history, past medical history, past social history, past surgical history and problem list.    PAST MEDICAL HISTORY:  Past Medical History:   Diagnosis Date    Anemia     Arthritis     Bilateral bunions     Chronic pain disorder     back and hip pain    Colon polyp     Disease of thyroid gland     nodules    History of COVID-19 2020    mild s/s    History of gastroesophageal reflux (GERD)     \"had hiatal hernia surgery\"    History of heart murmur in childhood     Hyperlipidemia     " Hypertension     Overactive bladder     PONV (postoperative nausea and vomiting)     once after knee surgery    Rotator cuff tear, right     had surgery    Vitamin D deficiency     unsure    Wears glasses     Wears partial dentures     upper       PAST SURGICAL HISTORY:  Past Surgical History:   Procedure Laterality Date     SECTION      Last Assessed: 2017    COLONOSCOPY      ELBOW SURGERY      Last Assessed: 2017    ESOPHAGOGASTRODUODENOSCOPY N/A 2017    Procedure: ESOPHAGOGASTRODUODENOSCOPY (EGD);  Surgeon: Jolly Lin DO;  Location: [facility];  Service: Gastroenterology    HIP SURGERY Left 2018    glut medius/minimus repair  and 18--with pin implanted    HYSTERECTOMY          PARAESOPHAGEAL HERNIA REPAIR N/A 2020    Procedure: LAPAROSCOPIC PARAESOPHAGEAL HERNIA REPAIR WITH MESH AND NISSEN FUNDOPLICATION WITH ROBOTICS;  Surgeon: Darrin Leon MD;  Location: [facility];  Service: General    VA ARTHRP KNE CONDYLE&PLATU MEDIAL&LAT COMPARTMENTS Left 03/15/2023    Procedure: ARTHROPLASTY KNEE TOTAL;  Surgeon: Bart Potts DO;  Location: [facility];  Service: Orthopedics    VA ARTHRS KNE SURG W/MENISCECTOMY MED/LAT W/SHVG Left 06/15/2022    Procedure: ;left knee arthroscopy, meniscectomy,synevectomy,chondroplasty, loose body removal, injection;  Surgeon: Bart Potts DO;  Location: [facility];  Service: Orthopedics    VA COLONOSCOPY FLX DX W/COLLJ SPEC WHEN PFRMD N/A 2017    Procedure: EGD AND COLONOSCOPY;  Surgeon: Jolly Lin DO;  Location: [facility];  Service: Gastroenterology    VA EXCISON TUMOR SOFT TISSUE THIGH/KNEE SUBQ 3 CM/> Bilateral 2022    Procedure: EXCISION BIOPSY TISSUE LESION/MASS LOWER EXTREMITY;  Surgeon: Kit Tavera MD;  Location: [facility];  Service: General    VA EXCISON TUMOR SOFT TISSUE THIGH/KNEE SUBQ 3 CM/> Bilateral 2022    Procedure: EXCISION BIOPSY TISSUE LESION/MASS LOWER EXTREMITY;   Surgeon: Kit Tavera MD;  Location: [facility];  Service: General    MN OSTECTOMY PRTL 5TH METAR HEAD SPX Bilateral 8/15/2023    Procedure: BUNIONECTOMY TAILOR WITHOUT OSTEOTOMY;  Surgeon: Monet Pradhan DPM;  Location: [facility];  Service: Podiatry    MN SURGICAL ARTHROSCOPY JENNIFER W/CORACOACRM LIGM RLS Left 2023    Procedure: Left - ARTHROSCOPY SHOULDER  Synovectomy Debridement/chondroplasty Acromioplasty Arthroscopic rotator cuff repair Post arthroscopic injection of intra-articular joint space and peripheral portals;  Surgeon: Bart Potts DO;  Location: [facility];  Service: Orthopedics    SHOULDER ARTHROCENTESIS      SHOULDER ARTHROSCOPY Right 2021    Procedure: SHOULDER ARTHROSCOPY WITH acromioplasty, debridment, and ROTATOR CUFF REPAIR;  Surgeon: Brat Potts DO;  Location:  MAIN OR;  Service: Orthopedics    TUBAL LIGATION         FAMILY HISTORY:  Family History   Problem Relation Age of Onset    Colon cancer Mother     Heart attack Father     Other Father         Cardiac Disorder    Diabetes type II Father     Colon cancer Sister     No Known Problems Sister     No Known Problems Sister     No Known Problems Sister     No Known Problems Sister     No Known Problems Daughter     No Known Problems Maternal Aunt     No Known Problems Maternal Aunt     No Known Problems Maternal Aunt     Cancer Maternal Grandmother     No Known Problems Paternal Grandmother        SOCIAL HISTORY:  Social History     Tobacco Use    Smoking status: Former     Current packs/day: 0.00     Average packs/day: 1.5 packs/day for 30.0 years (45.0 ttl pk-yrs)     Types: Cigarettes     Start date: 1961     Quit date: 1991     Years since quittin.3    Smokeless tobacco: Never    Tobacco comments:     quit 25 yrs ago   Vaping Use    Vaping status: Never Used   Substance Use Topics    Alcohol use: Never    Drug use: Never       MEDICATIONS:    Current Outpatient Medications:     amLODIPine (NORVASC) 5 mg  tablet, Take 1 tablet (5 mg total) by mouth daily, Disp: 90 tablet, Rfl: 3    atorvastatin (LIPITOR) 10 mg tablet, Take 1 tablet (10 mg total) by mouth daily, Disp: 90 tablet, Rfl: 3    Diclofenac Sodium (VOLTAREN) 1 %, Apply 2 g topically 4 (four) times a day, Disp: 100 g, Rfl: 0    famotidine (PEPCID) 20 mg tablet, take 1 tablet by mouth twice a day, Disp: 180 tablet, Rfl: 3    hydrOXYzine HCL (ATARAX) 25 mg tablet, take 1 tablet by mouth every 6 hours if needed for itching, Disp: 60 tablet, Rfl: 1    lidocaine (Lidoderm) 5 %, Apply 2 patches topically over 12 hours daily Remove & Discard patch within 12 hours or as directed by MD, Disp: 60 patch, Rfl: 5    losartan (COZAAR) 100 MG tablet, Take 1 tablet (100 mg total) by mouth daily, Disp: 90 tablet, Rfl: 3    meloxicam (MOBIC) 15 mg tablet, Take 1 tablet (15 mg total) by mouth daily, Disp: 30 tablet, Rfl: 0    triamcinolone (KENALOG) 0.1 % cream, Apply topically 2 (two) times a day as needed (itching), Disp: 30 g, Rfl: 1    methocarbamol (Robaxin-750) 750 mg tablet, Take 1 tablet (750 mg total) by mouth every 8 (eight) hours (Patient not taking: Reported on 2/12/2024), Disp: 90 tablet, Rfl: 5    valACYclovir (VALTREX) 1,000 mg tablet, Take 1 tablet (1,000 mg total) by mouth 2 (two) times a day for 3 days, Disp: 6 tablet, Rfl: 0    ALLERGIES:  Allergies   Allergen Reactions    Hydrocodone Irritability       ROS:  Review of Systems     Constitutional: Negative for fatigue, fever or loss of appetite.   HENT: Negative.    Respiratory: Negative for shortness of breath, dyspnea.    Cardiovascular: Negative for chest pain/tightness.   Gastrointestinal: Negative for abdominal pain, N/V.   Endocrine: Negative for cold/heat intolerance, unexplained weight loss/gain.   Genitourinary: Negative for flank pain, dysuria, hematuria.   Musculoskeletal: Positive for arthralgia   Skin: Negative for rash.    Neurological: Negative for numbness or tingling  Psychiatric/Behavioral:  "Negative for agitation.  _____________________________________________________  PHYSICAL EXAMINATION:    Blood pressure 146/81, pulse 80, height 5' 3\" (1.6 m), weight 73 kg (161 lb).    Constitutional: Oriented to person, place, and time. Appears well-developed and well-nourished. No distress.   HENT:   Head: Normocephalic.   Eyes: Conjunctivae are normal. Right eye exhibits no discharge. Left eye exhibits no discharge. No scleral icterus.   Cardiovascular: Normal rate.    Pulmonary/Chest: Effort normal.   Neurological: Alert and oriented to person, place, and time.   Skin: Skin is warm and dry. No rash noted. Not diaphoretic. No erythema. No pallor.   Psychiatric: Normal mood and affect. Behavior is normal. Judgment and thought content normal.      MUSCULOSKELETAL EXAMINATION:   Physical Exam  Ortho Exam    Left upper extremity is neurovascularly intact  Fingers are pink and mobile  Compartments are soft  Range of motion of the shoulder is from 0 to 130 degrees  Brisk cap refill and sensation intact  Objective:  BP Readings from Last 1 Encounters:   04/26/24 146/81      Wt Readings from Last 1 Encounters:   04/26/24 73 kg (161 lb)        BMI:   Estimated body mass index is 28.52 kg/m² as calculated from the following:    Height as of this encounter: 5' 3\" (1.6 m).    Weight as of this encounter: 73 kg (161 lb).        Scribe Attestation      I,:  Nazario Duque PA-C am acting as a scribe while in the presence of the attending physician.:       I,:  Bart Potts, DO personally performed the services described in this documentation    as scribed in my presence.:            "

## 2024-05-03 ENCOUNTER — EVALUATION (OUTPATIENT)
Dept: PHYSICAL THERAPY | Facility: CLINIC | Age: 69
End: 2024-05-03
Payer: COMMERCIAL

## 2024-05-03 DIAGNOSIS — S46.212D STRAIN OF LEFT BICEPS, SUBSEQUENT ENCOUNTER: Primary | ICD-10-CM

## 2024-05-03 DIAGNOSIS — M79.602 PAIN OF LEFT UPPER EXTREMITY: ICD-10-CM

## 2024-05-03 PROCEDURE — 97162 PT EVAL MOD COMPLEX 30 MIN: CPT | Performed by: PHYSICAL THERAPIST

## 2024-05-03 PROCEDURE — 97110 THERAPEUTIC EXERCISES: CPT | Performed by: PHYSICAL THERAPIST

## 2024-05-03 NOTE — PROGRESS NOTES
PT Evaluation     Today's date: 5/3/2024  Patient name: Jamee Shaikh  : 1955  MRN: [id number]  Referring provider: Nazario Duque,*  Dx:   Encounter Diagnosis     ICD-10-CM    1. Strain of left biceps, subsequent encounter  S46.212D       2. Pain of left upper extremity  M79.602           Start Time: 0830  Stop Time: 0915  Total time in clinic (min): 45 minutes    Assessment  Assessment details: Jamee Shaikh was seen for an initial PT evaluation today. Patient is a 69 y.o. female with diagnosis of L bicep strain and past medical history significant for OA, chronic pain, thyroid disease, GERD, hiatal hernia, hyperlipidemia, HTN, rotator cuff tear with repair bilaterally, L buttock pain with history of surgery, lipomas with removal, lumbar radiculopathy, vertigo. Moderate complexity evaluation  due to number of participation restrictions, functional outcome measure of 63% limitation, and evolving clinical presentation. Findings today show limitation in left shoulder and elbow range of motion, strength and stability with edema and pain impacting overall functional mobility including ability to lift, reach, carry. Skilled PT indicated to treat at this time to address above stated deficits and return patient to PLOF.     Impairments: abnormal muscle firing, abnormal or restricted ROM, abnormal movement, activity intolerance, impaired physical strength, lacks appropriate home exercise program, pain with function, scapular dyskinesis, poor posture  and poor body mechanics    Goals  STG (6 weeks)  1. Patient's left shoulder flexion AROM will increase to 120 with 0/10 pain for increased ability to perform overhead ADLs.   2. Patient will have 0/10 pain in left shoulder at rest.  3. Patient's left shoulder strength will increase to 4/5 for increased ability to lift.   LTG (12 weeks)  1. Patient's UE AROM equal bilaterally for ability to complete hair hygiene, overhead, and behind the back ADLs.   2.  Patient's UE strength will be equal bilaterally for ability to lift and carry at PLOF.   3. Patient will be independent with home exercise program for continued maintenance post PT discharge.      Plan  Plan details: Progress note in 4 weeks.   Patient would benefit from: skilled physical therapy  Planned modality interventions: unattended electrical stimulation, cryotherapy and thermotherapy: hydrocollator packs  Planned therapy interventions: neuromuscular re-education, manual therapy, therapeutic exercise, therapeutic activities, self care and home exercise program  Frequency: 2x week  Duration in weeks: 12  Plan of Care beginning date: 5/3/2024  Plan of Care expiration date: 8/3/2024  Treatment plan discussed with: patient and PTA        Subjective Evaluation    History of Present Illness  Date of onset: 4/6/2024  Mechanism of injury: Jamee Shaikh is a 69 y.o. female who presents to outpatient Physical Therapy today with complaints of left arm pain. History of left rotator cuff surgery 12/13/23 with post op rehab, discharge from PT 3/26/24. New episode of pain in left shoulder/proximal arm after reaching over fence to hand Grandson a water. Immediate pain in shoulder with and soreness post. Started a new job 2 days later which continued to cause increased pain. Saw ortho the following week with MRI as noted below. Was instructed to hold with work and begin PT. To f/u with ortho again in 2 months.     MRI IMPRESSION:  1. Subscapularis, supraspinatus and infraspinatus insertional tendinosis without rotator cuff tear status post supraspinatus tendon repair which remains intact. Mild associated subacromial-subdeltoid bursitis.  2. Nonvisualization of the long head biceps tendon which is likely torn.  3. Status post acromioplasty without residual or recurrent spur.  4. Glenohumeral degenerative change with degenerative fraying at the superior glenoid labrum. Remainder of the labrum appears intact.    Patient  Goals  Patient goals for therapy: decreased pain  Patient goal: be able to increase use of LUE  Pain  Current pain ratin  At best pain ratin  At worst pain ratin  Location: anterior left shoulder and proximal arm  Quality: dull ache  Relieving factors: rest  Aggravating factors: lifting and overhead activity    Social Support  Steps to enter house: yes  Stairs in house: yes   Lives in: multiple-level home  Lives with: spouse    Employment status: not working (Was working [employer])  Hand dominance: right      Diagnostic Tests  MRI studies: abnormal        Objective     Postural Observations    Additional Postural Observation Details  Rounded shoulders, LUE resting in guarding position     Observations   Left Shoulder   Positive for edema.     Cervical/Thoracic Screen   Cervical range of motion within normal limits    Neurological Testing     Sensation     Shoulder   Left Shoulder   Intact: light touch    Right Shoulder   Intact: Light touch    Active Range of Motion   Left Shoulder   Flexion: 80 degrees with pain  Abduction: 35 degrees with pain  External rotation 45°: 45 degrees   Internal rotation 45°: 30 degrees     Right Shoulder   Flexion: 155 degrees   Abduction: 160 degrees   External rotation BTH: T4   Internal rotation BTB: T9     Left Elbow   Flexion: 150 degrees   Extension: -3 degrees     Passive Range of Motion   Left Shoulder   Flexion: 80 degrees   Abduction: 45 degrees   External rotation 45°: 45 degrees   Internal rotation 45°: 35 degrees     Strength/Myotome Testing     Left Shoulder     Planes of Motion   Flexion: 2   Abduction: 2   External rotation at 0°: 5   Internal rotation at 0°: 5     Isolated Muscles   Biceps: 4+   Triceps: 5     Right Shoulder     Planes of Motion   Flexion: 4+   Abduction: 4+   External rotation at 0°: 5   Internal rotation at 0°: 5     Isolated Muscles   Biceps: 5   Triceps: 5     General Comments:      Shoulder Comments       FOTO: 37%  function, 63% predicted function                  Precautions: none noted  Access Code: [email or web address]  Progress note: 6/3  POC: 8/3    Manuals 5/2       Stretching with active release *       GH jt mobs *       Scapular PNF        IASTM        Neuro Re-Ed        UBE ?       scap retraction *       Shoulder rolls *       Chin tucks        MTP/LTP *       Elbow isometrics *       Ther Ex        Pulleys *                       Table slides Flex/scap 10x ea       pendulums 10x ea       Supine flexion ROM        Supine ER ROM        AAROM L elbow flex/ext 10x       Sup/pron *                       Standing YTI *                       Wall walks *                                       Ther Activity                        Gait Training                        Modalities

## 2024-05-06 ENCOUNTER — TELEPHONE (OUTPATIENT)
Age: 69
End: 2024-05-06

## 2024-05-06 DIAGNOSIS — G47.09 OTHER INSOMNIA: ICD-10-CM

## 2024-05-06 NOTE — TELEPHONE ENCOUNTER
Caller: Patient     Doctor: Katlyn     Reason for call: Patient asked for a letter for her job stating she can not return until upcoming appointment     Call back#: [phone]

## 2024-05-07 ENCOUNTER — OFFICE VISIT (OUTPATIENT)
Dept: PHYSICAL THERAPY | Facility: CLINIC | Age: 69
End: 2024-05-07
Payer: COMMERCIAL

## 2024-05-07 DIAGNOSIS — S46.212D STRAIN OF BICEPS TENDON, LEFT, SUBSEQUENT ENCOUNTER: Primary | ICD-10-CM

## 2024-05-07 DIAGNOSIS — M79.602 PAIN OF LEFT UPPER EXTREMITY: ICD-10-CM

## 2024-05-07 PROCEDURE — 97112 NEUROMUSCULAR REEDUCATION: CPT | Performed by: PHYSICAL THERAPIST

## 2024-05-07 PROCEDURE — 97140 MANUAL THERAPY 1/> REGIONS: CPT | Performed by: PHYSICAL THERAPIST

## 2024-05-07 PROCEDURE — 97110 THERAPEUTIC EXERCISES: CPT | Performed by: PHYSICAL THERAPIST

## 2024-05-07 RX ORDER — HYDROXYZINE HYDROCHLORIDE 25 MG/1
TABLET, FILM COATED ORAL
Qty: 180 TABLET | Refills: 1 | Status: SHIPPED | OUTPATIENT
Start: 2024-05-07

## 2024-05-07 NOTE — PROGRESS NOTES
Daily Note     Today's date: 2024  Patient name: Jamee Shaikh  : 1955  MRN: [id number]  Referring provider: Nazario Duque,*  Dx:   Encounter Diagnosis     ICD-10-CM    1. Strain of biceps tendon, left, subsequent encounter  S46.212D Ambulatory Referral to Physical Therapy      2. Pain of left upper extremity  M79.602           Start Time: 1055  Stop Time: 1135  Total time in clinic (min): 40 minutes    Subjective: Patient states increased pain to 8/10 noted at anterior arm into shoulder. C/o increased swelling and difficulty reaching arm behind back. Has been using ice and TENS.       Objective: See treatment diary below      Assessment: Tolerated treatment fair. Continued pain with active and active assisted range of motion exercises. Home exercise program was adjusted as needed to decrease stress on LUE and decrease pain. Patient exhibited good technique with therapeutic exercises and would benefit from continued PT      Plan: Continue per plan of care.  Progress treatment as tolerated.       Precautions: none noted  Access Code: [email or web address]  Progress note: 6/3  POC: 8/3    Manuals       Stretching with active release * L shoulder and elbow      GH jt mobs *       Scapular PNF        IASTM        Neuro Re-Ed        UBE ? np      scap retraction * 20x      Shoulder rolls * 20x      Chin tucks  :03x20      MTP/LTP *       Elbow isometrics * :05x5  elbow flex and ext      Ther Ex        Pulleys * Flex 10x              Shoulder flexion rock backs  10x      Table slides Flex/scap 10x ea       pendulums 10x ea 10x ea      Supine flexion ROM        Supine ER ROM        AAROM L elbow flex/ext 10x 10x      Sup/pron * 30x      Wrist AROM  30x ea              Standing YTI *                       Wall walks *                                       Ther Activity                        Gait Training                        Modalities

## 2024-05-09 ENCOUNTER — OFFICE VISIT (OUTPATIENT)
Dept: PHYSICAL THERAPY | Facility: CLINIC | Age: 69
End: 2024-05-09
Payer: COMMERCIAL

## 2024-05-09 DIAGNOSIS — S46.212D STRAIN OF BICEPS TENDON, LEFT, SUBSEQUENT ENCOUNTER: Primary | ICD-10-CM

## 2024-05-09 DIAGNOSIS — M79.602 PAIN OF LEFT UPPER EXTREMITY: ICD-10-CM

## 2024-05-09 PROCEDURE — 97110 THERAPEUTIC EXERCISES: CPT | Performed by: PHYSICAL THERAPIST

## 2024-05-09 PROCEDURE — 97112 NEUROMUSCULAR REEDUCATION: CPT | Performed by: PHYSICAL THERAPIST

## 2024-05-09 PROCEDURE — 97140 MANUAL THERAPY 1/> REGIONS: CPT | Performed by: PHYSICAL THERAPIST

## 2024-05-09 NOTE — PROGRESS NOTES
Daily Note     Today's date: 2024  Patient name: Jamee Shaikh  : 1955  MRN: [id number]  Referring provider: Nazario Duque,*  Dx:   Encounter Diagnosis     ICD-10-CM    1. Strain of biceps tendon, left, subsequent encounter  S46.212D       2. Pain of left upper extremity  M79.602           Start Time: 0730  Stop Time: 0810  Total time in clinic (min): 40 minutes    Subjective: Continued pain in L anterior proximal arm. C/o pain with general LUE movement involving elbow and shoulder.       Objective: See treatment diary below      Assessment: Tolerated treatment fair. Guarding with passive stretching attempting to bring arm into flexion or abduction. Doing well with elbow passive range into flexion/extension, sup/pron. Noted palpable mass into anterior proximal arm likely due to retraction of bicep/tightness. Discussed increasing pendulum exercises and flexion rock backs to maintain current L shoulder ROM and prevent further GH jt restriction. Patient demonstrated fatigue post treatment and would benefit from continued PT      Plan: Continue per plan of care.  Progress treatment as tolerated.       Precautions: none noted  Access Code: [email or web address]  Progress note: 6/3  POC: 8/3    Manuals      Stretching with active release * L shoulder and elbow L shoulder and elbow     GH jt mobs *       Scapular PNF        IASTM        Neuro Re-Ed        UBE ? np      scap retraction * 20x 20x     Shoulder rolls * 20x 20x     Chin tucks  :03x20 :03x20     MTP/LTP *       Elbow isometrics * :05x5  elbow flex and ext :05x10 elbow flex and ext     Ther Ex        Pulleys * Flex 10x Flex 10x             Shoulder flexion rock backs  10x 10x     Table slides Flex/scap 10x ea       pendulums 10x ea 10x ea 10x ea     Supine flexion ROM        Supine ER ROM        AAROM L elbow flex/ext 10x 10x 15x     Sup/pron * 30x 30x ea 1#    Wrist AROM  30x ea 30x ea 1#            Standing YTI *                       Wall  walks *                                       Ther Activity                        Gait Training                        Modalities

## 2024-05-13 ENCOUNTER — TELEPHONE (OUTPATIENT)
Dept: OBGYN CLINIC | Facility: HOSPITAL | Age: 69
End: 2024-05-13

## 2024-05-13 ENCOUNTER — OFFICE VISIT (OUTPATIENT)
Dept: PHYSICAL THERAPY | Facility: CLINIC | Age: 69
End: 2024-05-13
Payer: COMMERCIAL

## 2024-05-13 DIAGNOSIS — M79.602 PAIN OF LEFT UPPER EXTREMITY: ICD-10-CM

## 2024-05-13 DIAGNOSIS — S46.212D STRAIN OF BICEPS TENDON, LEFT, SUBSEQUENT ENCOUNTER: Primary | ICD-10-CM

## 2024-05-13 PROCEDURE — 97112 NEUROMUSCULAR REEDUCATION: CPT | Performed by: PHYSICAL THERAPIST

## 2024-05-13 PROCEDURE — 97110 THERAPEUTIC EXERCISES: CPT | Performed by: PHYSICAL THERAPIST

## 2024-05-13 PROCEDURE — 97140 MANUAL THERAPY 1/> REGIONS: CPT | Performed by: PHYSICAL THERAPIST

## 2024-05-13 NOTE — PROGRESS NOTES
Daily Note     Today's date: 2024  Patient name: Jamee Shaikh  : 1955  MRN: [id number]  Referring provider: Nazario Duque,*  Dx:   Encounter Diagnosis     ICD-10-CM    1. Strain of biceps tendon, left, subsequent encounter  S46.212D       2. Pain of left upper extremity  M79.602           Start Time: 08  Stop Time: 08  Total time in clinic (min): 38 minutes    Subjective: States she continues to have high levels of pain with difficulty raising L arm OH and dressing. Has been attempted to keep arm moving as much as she can, but difficult due to pain. Utilizes TENs and ice throughout the day.       Objective: See treatment diary below      Assessment: Tolerated treatment fair. Guarding with passive L shoulder and elbow range of motion. Difficulty with long and short axis L shoulder flexion/abduction. Does have more tolerance with supported LUE range of motion vs AAROM in [place]. Patient would benefit from continued PT      Plan: Continue per plan of care.  Progress treatment as tolerated.       Precautions: none noted  Access Code: [email or web address]  Progress note: 6/3  POC: 8/3    Manuals     Stretching with active release * L shoulder and elbow L shoulder and elbow L shoulder and elbow    GH jt mobs *       Scapular PNF        IASTM        Neuro Re-Ed        UBE ? np      scap retraction * 20x 20x 20x    Shoulder rolls * 20x 20x 20x    Chin tucks  :03x20 :03x20 :03x20    MTP/LTP *       Elbow isometrics * :05x5  elbow flex and ext :05x10 elbow flex and ext :05x10 elbow flex and ext    Ther Ex        Pulleys * Flex 10x Flex 10x Flex 10x            Shoulder flexion rock backs  10x 10x 10x    Table slides Flex/scap 10x ea       pendulums 10x ea 10x ea 10x ea 10x ea    Supine flexion ROM        Supine ER ROM        AAROM L elbow flex/ext 10x 10x 15x 10x    Sup/pron * 30x 30x ea 1# 30x ea    Wrist AROM  30x ea 30x ea 1# 30x ea            Standing YTI *                       Wall walks *                                        Ther Activity                        Gait Training                        Modalities

## 2024-05-13 NOTE — TELEPHONE ENCOUNTER
Caller: Patient    Doctor: Katlyn    Reason for call: Patient calling asking for a letter stating that her 2 RTR and TKR were caused by her fall.  I informed her that she would probably need to be seen in the office and discuss this with the doctor.  She has an appt in June.    Call back#: [phone]

## 2024-05-15 ENCOUNTER — OFFICE VISIT (OUTPATIENT)
Dept: PHYSICAL THERAPY | Facility: CLINIC | Age: 69
End: 2024-05-15
Payer: COMMERCIAL

## 2024-05-15 DIAGNOSIS — M79.602 PAIN OF LEFT UPPER EXTREMITY: ICD-10-CM

## 2024-05-15 DIAGNOSIS — S46.212D STRAIN OF LEFT BICEPS, SUBSEQUENT ENCOUNTER: ICD-10-CM

## 2024-05-15 DIAGNOSIS — S46.212D STRAIN OF BICEPS TENDON, LEFT, SUBSEQUENT ENCOUNTER: Primary | ICD-10-CM

## 2024-05-15 PROCEDURE — 97140 MANUAL THERAPY 1/> REGIONS: CPT | Performed by: PHYSICAL THERAPIST

## 2024-05-15 PROCEDURE — 97112 NEUROMUSCULAR REEDUCATION: CPT | Performed by: PHYSICAL THERAPIST

## 2024-05-15 PROCEDURE — 97110 THERAPEUTIC EXERCISES: CPT | Performed by: PHYSICAL THERAPIST

## 2024-05-15 NOTE — PROGRESS NOTES
Daily Note     Today's date: 5/15/2024  Patient name: Jamee Shaikh  : 1955  MRN: [id number]  Referring provider: Nazario Duque,*  Dx:   Encounter Diagnosis     ICD-10-CM    1. Strain of biceps tendon, left, subsequent encounter  S46.212D       2. Pain of left upper extremity  M79.602       3. Strain of left biceps, subsequent encounter  S46.212D           Start Time: 815  Stop Time: 855  Total time in clinic (min): 40 minutes    Subjective: No changes in symptoms since last visit, continues to have difficulty with LUE AROM impacting lifting, reaching, carrying, dressing.       Objective: See treatment diary below      Assessment: Tolerated treatment fair. Continued pain and guarding with movement off table surface during MT. Also with tenderness to palpation along anterior proximal arm and into anterior shoulder consistent with bicep into bicipital groove. Discussed POC with patient today and dysfunction etiology. Patient would benefit from continued PT      Plan: Continue per plan of care.  Progress treatment as tolerated.       Precautions: none noted  Access Code: [email or web address]  Progress note: 6/3  POC: 8/3    Manuals 5/2 5/7 5/9 5/13 5/15   Stretching with active release * L shoulder and elbow L shoulder and elbow L shoulder and elbow L shoulder and elbow   GH jt mobs *    Grade II distraction and PA   Scapular PNF        IASTM        Neuro Re-Ed        UBE ? np      scap retraction * 20x 20x 20x 20x   Shoulder rolls * 20x 20x 20x 20x   Chin tucks  :03x20 :03x20 :03x20 :03x20   MTP/LTP *       Elbow isometrics * :05x5  elbow flex and ext :05x10 elbow flex and ext :05x10 elbow flex and ext :05x10 elbow flex and ext   Ther Ex        Pulleys * Flex 10x Flex 10x Flex 10x Flex 10x           Shoulder flexion rock backs  10x 10x 10x 10x   Table slides Flex/scap 10x ea       pendulums 10x ea 10x ea 10x ea 10x ea 10x   Supine flexion ROM        Supine ER ROM        AAROM L elbow flex/ext 10x 10x 15x 10x  30x   Sup/pron * 30x 30x ea 1# 30x ea 1# 30x ea   Wrist AROM  30x ea 30x ea 1# 30x ea 1# 30x ea           Standing YTI *                       Wall walks *                                       Ther Activity                        Gait Training                        Modalities

## 2024-05-16 NOTE — TELEPHONE ENCOUNTER
Spoke with the patient and she was notified with the Dr's response. She stated that another Dr had said it was related to her fall with her shoulder. I did offer her an appt to come in and speaik with Dr Potts.

## 2024-05-22 ENCOUNTER — APPOINTMENT (OUTPATIENT)
Dept: PHYSICAL THERAPY | Facility: CLINIC | Age: 69
End: 2024-05-22
Payer: COMMERCIAL

## 2024-05-24 ENCOUNTER — OFFICE VISIT (OUTPATIENT)
Dept: PHYSICAL THERAPY | Facility: CLINIC | Age: 69
End: 2024-05-24
Payer: COMMERCIAL

## 2024-05-24 DIAGNOSIS — S46.212D STRAIN OF BICEPS TENDON, LEFT, SUBSEQUENT ENCOUNTER: Primary | ICD-10-CM

## 2024-05-24 DIAGNOSIS — S46.212D STRAIN OF LEFT BICEPS, SUBSEQUENT ENCOUNTER: ICD-10-CM

## 2024-05-24 DIAGNOSIS — Z86.19 H/O COLD SORES: ICD-10-CM

## 2024-05-24 DIAGNOSIS — M79.602 PAIN OF LEFT UPPER EXTREMITY: ICD-10-CM

## 2024-05-24 PROCEDURE — 97110 THERAPEUTIC EXERCISES: CPT | Performed by: PHYSICAL THERAPIST

## 2024-05-24 PROCEDURE — 97112 NEUROMUSCULAR REEDUCATION: CPT | Performed by: PHYSICAL THERAPIST

## 2024-05-24 PROCEDURE — 97140 MANUAL THERAPY 1/> REGIONS: CPT | Performed by: PHYSICAL THERAPIST

## 2024-05-24 RX ORDER — VALACYCLOVIR HYDROCHLORIDE 1 G/1
1000 TABLET, FILM COATED ORAL 2 TIMES DAILY
Qty: 6 TABLET | Refills: 0 | Status: SHIPPED | OUTPATIENT
Start: 2024-05-24 | End: 2024-05-27

## 2024-05-24 NOTE — PROGRESS NOTES
Daily Note     Today's date: 2024  Patient name: Jamee Shaikh  : 1955  MRN: [id number]  Referring provider: Nazario Duque,*  Dx:   Encounter Diagnosis     ICD-10-CM    1. Strain of biceps tendon, left, subsequent encounter  S46.212D       2. Pain of left upper extremity  M79.602       3. Strain of left biceps, subsequent encounter  S46.212D           Start Time: 907  Stop Time: 945  Total time in clinic (min): 38 minutes    Subjective: No change since last session. C/o difficulty with lifting arm making dressing and ADLs difficult and painful.       Objective: See treatment diary below      Assessment: Tolerated treatment fair+. Continued difficulty with passive elevation of LUE with significant guarding due to pain during MT. Educated patient on continuing to attempt to utilize LUE throughout the day and working on supported LUE elevation to avoid freezing of shoulder and maintain current mobility.  Patient would benefit from continued PT      Plan: Continue per plan of care.  Progress treatment as tolerated.       Precautions: none noted  Access Code: [email or web address]  Progress note: 6/3  POC: 8/3    Manuals 5/24 5/7 5/9 5/13 5/15   Stretching with active release L shoulder and elbow L shoulder and elbow L shoulder and elbow L shoulder and elbow L shoulder and elbow   GH jt mobs Grade II distraction and PA    Grade II distraction and PA   Scapular PNF        IASTM        Neuro Re-Ed        UBE  np      scap retraction 20x 20x 20x 20x 20x   Shoulder rolls 20x 20x 20x 20x 20x   Chin tucks :03x20 :03x20 :03x20 :03x20 :03x20   MTP/LTP        Elbow isometrics :05x10 elbow flex and ext :05x5  elbow flex and ext :05x10 elbow flex and ext :05x10 elbow flex and ext :05x10 elbow flex and ext   Ther Ex        Pulleys Flex 10x Flex 10x Flex 10x Flex 10x Flex 10x           Shoulder flexion rock backs 10x 10x 10x 10x 10x   Table slides        pendulums 10xea 10x ea 10x ea 10x ea 10x   Supine flexion ROM 10x        Supine ER ROM        AAROM L elbow flex/ext 30 10x 15x 10x 30x   Sup/pron 1# 30x 30x 30x ea 1# 30x ea 1# 30x ea   Wrist AROM 1# 30x ea  2# 20x flex 30x ea 30x ea 1# 30x ea 1# 30x ea           Standing YTI                        Wall walks                                        Ther Activity                        Gait Training                        Modalities

## 2024-05-24 NOTE — PROGRESS NOTES
PT Re-Evaluation     Today's date: 2024  Patient name: Jamee Shaikh  : 1955  MRN: [id number]  Referring provider: Nazario Duque,*  Dx: No diagnosis found.               Assessment  Assessment details: Jamee Shaikh was seen for an initial PT evaluation and 6 f/u session. Patient is a 69 y.o. female with diagnosis of L bicep strain and past medical history significant for OA, chronic pain, thyroid disease, GERD, hiatal hernia, hyperlipidemia, HTN, rotator cuff tear with repair bilaterally, L buttock pain with history of surgery, lipomas with removal, lumbar radiculopathy, vertigo. ***    Impairments: abnormal muscle firing, abnormal or restricted ROM, abnormal movement, activity intolerance, impaired physical strength, lacks appropriate home exercise program, pain with function, scapular dyskinesis, poor posture  and poor body mechanics    Goals  STG (6 weeks)  1. Patient's left shoulder flexion AROM will increase to 120 with 0/10 pain for increased ability to perform overhead ADLs.   2. Patient will have 0/10 pain in left shoulder at rest.  3. Patient's left shoulder strength will increase to 4/5 for increased ability to lift.   LTG (12 weeks)  1. Patient's UE AROM equal bilaterally for ability to complete hair hygiene, overhead, and behind the back ADLs.   2. Patient's UE strength will be equal bilaterally for ability to lift and carry at PLOF.   3. Patient will be independent with home exercise program for continued maintenance post PT discharge.      Plan  Plan details: Progress note in 4 weeks.   Patient would benefit from: skilled physical therapy  Planned modality interventions: unattended electrical stimulation, cryotherapy and thermotherapy: hydrocollator packs  Planned therapy interventions: neuromuscular re-education, manual therapy, therapeutic exercise, therapeutic activities, self care and home exercise program  Frequency: 2x week  Duration in weeks: 12  Plan of Care beginning  date: 5/3/2024  Plan of Care expiration date: 8/3/2024  Treatment plan discussed with: patient and PTA        Subjective Evaluation    History of Present Illness  Date of onset: 2024  Subjective : ***    Mechanism of injury: Jamee Shaikh is a 69 y.o. female who presents to outpatient Physical Therapy today with complaints of left arm pain. History of left rotator cuff surgery 23 with post op rehab, discharge from PT 3/26/24. New episode of pain in left shoulder/proximal arm after reaching over fence to hand Grandson a water. Immediate pain in shoulder with and soreness post. Started a new job 2 days later which continued to cause increased pain. Saw ortho the following week with MRI as noted below. Was instructed to hold with work and begin PT. To f/u with ortho again in 2 months.     MRI IMPRESSION:  1. Subscapularis, supraspinatus and infraspinatus insertional tendinosis without rotator cuff tear status post supraspinatus tendon repair which remains intact. Mild associated subacromial-subdeltoid bursitis.  2. Nonvisualization of the long head biceps tendon which is likely torn.  3. Status post acromioplasty without residual or recurrent spur.  4. Glenohumeral degenerative change with degenerative fraying at the superior glenoid labrum. Remainder of the labrum appears intact.    Patient Goals  Patient goals for therapy: decreased pain  Patient goal: be able to increase use of LUE  Pain      Current pain ratin  ***  At best pain ratin  At worst pain ratin  Location: anterior left shoulder and proximal arm  Quality: dull ache  Relieving factors: rest  Aggravating factors: lifting and overhead activity    Social Support  Steps to enter house: yes  Stairs in house: yes   Lives in: multiple-level home  Lives with: spouse    Employment status: not working (Was working [employer])  Hand dominance: right      Diagnostic Tests  MRI studies: abnormal        Objective      Postural Observations    Additional Postural Observation Details  Rounded shoulders, LUE resting in guarding position     Observations   Left Shoulder   Positive for edema.     Cervical/Thoracic Screen   Cervical range of motion within normal limits    Neurological Testing     Sensation     Shoulder   Left Shoulder   Intact: light touch    Right Shoulder   Intact: Light touch    Active Range of Motion   5/28  Left Shoulder   Flexion: 80 degrees with pain  ***  Abduction: 35 degrees with pain  External rotation 45°: 45 degrees   Internal rotation 45°: 30 degrees     Right Shoulder   Flexion: 155 degrees   Abduction: 160 degrees   External rotation BTH: T4   Internal rotation BTB: T9     Left Elbow   Flexion: 150 degrees   Extension: -3 degrees     Passive Range of Motion    5/28  Left Shoulder   Flexion: 80 degrees     ***  Abduction: 45 degrees   External rotation 45°: 45 degrees   Internal rotation 45°: 35 degrees     Strength/Myotome Testing   5/28    Left Shoulder     Planes of Motion   Flexion: 2     ***  Abduction: 2   External rotation at 0°: 5   Internal rotation at 0°: 5     Isolated Muscles   Biceps: 4+   Triceps: 5     Right Shoulder     Planes of Motion   Flexion: 4+   Abduction: 4+   External rotation at 0°: 5   Internal rotation at 0°: 5     Isolated Muscles   Biceps: 5   Triceps: 5     General Comments:      Shoulder Comments       FOTO: 37% function, 63% predicted function   5/28: ***             Precautions: none noted  Access Code: [email or web address]  Progress note: 6/3  POC: 8/3    Manuals 5/24 5/7 5/9 5/13 5/15   Stretching with active release L shoulder and elbow L shoulder and elbow L shoulder and elbow L shoulder and elbow L shoulder and elbow   GH jt mobs Grade II distraction and PA    Grade II distraction and PA   Scapular PNF        IASTM        Neuro Re-Ed        UBE  np      scap retraction 20x 20x 20x 20x 20x   Shoulder rolls 20x 20x 20x 20x 20x   Chin tucks :03x20 :03x20 :03x20 :03x20 :03x20    MTP/LTP        Elbow isometrics :05x10 elbow flex and ext :05x5  elbow flex and ext :05x10 elbow flex and ext :05x10 elbow flex and ext :05x10 elbow flex and ext   Ther Ex        Pulleys Flex 10x Flex 10x Flex 10x Flex 10x Flex 10x           Shoulder flexion rock backs 10x 10x 10x 10x 10x   Table slides        pendulums 10xea 10x ea 10x ea 10x ea 10x   Supine flexion ROM 10x       Supine ER ROM        AAROM L elbow flex/ext 30 10x 15x 10x 30x   Sup/pron 1# 30x 30x 30x ea 1# 30x ea 1# 30x ea   Wrist AROM 1# 30x ea  2# 20x flex 30x ea 30x ea 1# 30x ea 1# 30x ea           Standing YTI                        Wall walks                                        Ther Activity                        Gait Training                        Modalities

## 2024-05-24 NOTE — PROGRESS NOTES
Daily Note     Today's date: 2024  Patient name: Jamee Shaikh  : 1955  MRN: [id number]  Referring provider: Nazario Duque,*  Dx: No diagnosis found.               Subjective: ***      Objective: See treatment diary below      Assessment: Tolerated treatment {Tolerated treatment :8424167137}. Patient {assessment:8989903367}      Plan: {PLAN:0077233974}     Precautions: none noted  Access Code: [email or web address]  Progress note: 6/3  POC: 8/3    Manuals 5/24 5/7 5/9 5/13 5/15   Stretching with active release L shoulder and elbow L shoulder and elbow L shoulder and elbow L shoulder and elbow L shoulder and elbow   GH jt mobs Grade II distraction and PA    Grade II distraction and PA   Scapular PNF        IASTM        Neuro Re-Ed        UBE  np      scap retraction 20x 20x 20x 20x 20x   Shoulder rolls 20x 20x 20x 20x 20x   Chin tucks :03x20 :03x20 :03x20 :03x20 :03x20   MTP/LTP        Elbow isometrics :05x10 elbow flex and ext :05x5  elbow flex and ext :05x10 elbow flex and ext :05x10 elbow flex and ext :05x10 elbow flex and ext   Ther Ex        Pulleys Flex 10x Flex 10x Flex 10x Flex 10x Flex 10x           Shoulder flexion rock backs 10x 10x 10x 10x 10x   Table slides        pendulums 10xea 10x ea 10x ea 10x ea 10x   Supine flexion ROM 10x       Supine ER ROM        AAROM L elbow flex/ext 30 10x 15x 10x 30x   Sup/pron 1# 30x 30x 30x ea 1# 30x ea 1# 30x ea   Wrist AROM 1# 30x ea  2# 20x flex 30x ea 30x ea 1# 30x ea 1# 30x ea           Standing YTI                        Wall walks                                        Ther Activity                        Gait Training                        Modalities

## 2024-05-28 ENCOUNTER — EVALUATION (OUTPATIENT)
Dept: PHYSICAL THERAPY | Facility: CLINIC | Age: 69
End: 2024-05-28
Payer: COMMERCIAL

## 2024-05-28 ENCOUNTER — TELEPHONE (OUTPATIENT)
Dept: OBGYN CLINIC | Facility: CLINIC | Age: 69
End: 2024-05-28

## 2024-05-28 DIAGNOSIS — M79.602 PAIN OF LEFT UPPER EXTREMITY: ICD-10-CM

## 2024-05-28 DIAGNOSIS — S46.212D STRAIN OF BICEPS TENDON, LEFT, SUBSEQUENT ENCOUNTER: Primary | ICD-10-CM

## 2024-05-28 DIAGNOSIS — S46.212D STRAIN OF LEFT BICEPS, SUBSEQUENT ENCOUNTER: ICD-10-CM

## 2024-05-28 PROCEDURE — 97140 MANUAL THERAPY 1/> REGIONS: CPT | Performed by: PHYSICAL THERAPIST

## 2024-05-28 PROCEDURE — 97110 THERAPEUTIC EXERCISES: CPT | Performed by: PHYSICAL THERAPIST

## 2024-05-28 PROCEDURE — 97112 NEUROMUSCULAR REEDUCATION: CPT | Performed by: PHYSICAL THERAPIST

## 2024-05-28 NOTE — PROGRESS NOTES
Daily Note     Today's date: 2024  Patient name: Jamee Shaikh  : 1955  MRN: [id number]  Referring provider: Nazario Duque,*  Dx:   Encounter Diagnosis     ICD-10-CM    1. Strain of biceps tendon, left, subsequent encounter  S46.212D       2. Pain of left upper extremity  M79.602       3. Strain of left biceps, subsequent encounter  S46.212D           Start Time: 1005  Stop Time: 1043  Total time in clinic (min): 38 minutes    Subjective: Patient states minimal change over the past few weeks. Continues to have pain limiting ability to lift arm and perform ADL tasks with LUE. States bicep feels hard and will have random bruising along anterior proximal arm.       Objective: See treatment diary below      Assessment: Tolerated treatment fair. Increased pain with palpation of anterior L shoulder and into proximal arm with difficulty tolerating GH jt. Mobilizations. Guarding of LUE limiting passive pain free movement during manual therapy. Patient would benefit from continued PT. Patient planning to call ortho due to continued high levels of pain.       Plan: Continue per plan of care.  Progress treatment as tolerated.  Progress note next session.      Precautions: none noted  Access Code: [email or web address]  Progress note: 6/3  POC: 8/3    Manuals 5/24 5/28 5/9 5/13 5/15   Stretching with active release L shoulder and elbow L shoulder and elbow L shoulder and elbow L shoulder and elbow L shoulder and elbow   GH jt mobs Grade II distraction and PA Grade II distraction and PA   Grade II distraction and PA   Scapular PNF        IASTM        Neuro Re-Ed        UBE        scap retraction 20x 20x 20x 20x 20x   Shoulder rolls 20x 20x 20x 20x 20x   Chin tucks :03x20 20x :03x20 :03x20 :03x20   MTP/LTP        Elbow isometrics :05x10 elbow flex and ext :05x10 elbow flex and ext :05x10 elbow flex and ext :05x10 elbow flex and ext :05x10 elbow flex and ext   Ther Ex        Pulleys Flex 10x Flex 10x Flex 10x Flex 10x  Flex 10x           Shoulder flexion rock backs 10x 10x 10x 10x 10x   Table slides        pendulums 10xea 10x ea 10x ea 10x ea 10x   Supine flexion ROM 10x       Supine ER ROM        AAROM L elbow flex/ext 30 30x AROM 15x 10x 30x   Sup/pron 1# 30x 1# 30x 30x ea 1# 30x ea 1# 30x ea   Wrist AROM 1# 30x ea  2# 20x flex 1# 30x ea 30x ea 1# 30x ea 1# 30x ea           Standing YTI                        Wall walks                                        Ther Activity                        Gait Training                        Modalities

## 2024-05-28 NOTE — TELEPHONE ENCOUNTER
Left message for patient to call back to reschedule 7/5/24 appointment with Dr. Potts. Dr will not be in office that day due to schedule change.

## 2024-05-31 ENCOUNTER — OFFICE VISIT (OUTPATIENT)
Dept: GASTROENTEROLOGY | Facility: CLINIC | Age: 69
End: 2024-05-31
Payer: COMMERCIAL

## 2024-05-31 VITALS
RESPIRATION RATE: 18 BRPM | WEIGHT: 160 LBS | SYSTOLIC BLOOD PRESSURE: 108 MMHG | BODY MASS INDEX: 28.35 KG/M2 | DIASTOLIC BLOOD PRESSURE: 66 MMHG | HEART RATE: 74 BPM | HEIGHT: 63 IN | OXYGEN SATURATION: 98 %

## 2024-05-31 DIAGNOSIS — R14.0 ABDOMINAL BLOATING: Primary | ICD-10-CM

## 2024-05-31 DIAGNOSIS — Z98.890 HISTORY OF NISSEN FUNDOPLICATION: ICD-10-CM

## 2024-05-31 DIAGNOSIS — K63.8219 SMALL INTESTINAL BACTERIAL OVERGROWTH (SIBO): ICD-10-CM

## 2024-05-31 DIAGNOSIS — Z80.0 FAMILY HISTORY OF COLON CANCER: ICD-10-CM

## 2024-05-31 DIAGNOSIS — K21.9 GASTROESOPHAGEAL REFLUX DISEASE WITHOUT ESOPHAGITIS: ICD-10-CM

## 2024-05-31 DIAGNOSIS — R19.8 ALTERNATING CONSTIPATION AND DIARRHEA: ICD-10-CM

## 2024-05-31 DIAGNOSIS — K63.829 INTESTINAL METHANOGEN OVERGROWTH: ICD-10-CM

## 2024-05-31 DIAGNOSIS — Z86.010 PERSONAL HISTORY OF COLONIC POLYPS: ICD-10-CM

## 2024-05-31 PROCEDURE — 99214 OFFICE O/P EST MOD 30 MIN: CPT | Performed by: PHYSICIAN ASSISTANT

## 2024-05-31 NOTE — PROGRESS NOTES
[facility] - Outpatient Follow-up Note  Jamee Shaikh 69 y.o. female MRN: [id number]  Encounter: [id number]    ASSESSMENT AND PLAN:      1. Abdominal bloating  2. Small intestinal bacterial overgrowth (SIBO)  3. Intestinal methanogen overgrowth  4. Alternating constipation and diarrhea    Patient with chronic abdominal bloating, excess flatus and gas production.  Thus far has had three-dimensional imaging, bidirectional endoscopic evaluation, SIBO testing which was positive for SIBO and IMO (treated with xifaxan only).     She has been recommended to partake in low FODMAP diet.  Use Gas-X/Beano as needed.  Trial linaclotide 72 mcg to see if this helps to alleviate abdominal discomfort, reviewed dosing, MOA, side effect.  Samples given today.   If despite the above symptoms persist, consider retreating for SIBO and adding neomycin for IMO so long as constipation is absolutely eliminated.    5. Gastroesophageal reflux disease without esophagitis  6. History of Nissen fundoplication    History of GERD, hiatal hernia, history of Nissen fundoplication, last EGD in 04/2022 with empiric dilation of GEJ.  Upper GI series in 2023 with mild delayed passage of contrast through the fundoplication into the stomach.  Patient has declined repeat EGD and absolutely declines repeat manometry.  Upper GI symptoms are largely quiescent at this time.  I am recommending.  Trial of famotidine twice daily to see if this helps with her abdominal bloating complaint.  If her symptoms worsen or dysphagia recurs, consider repeat EGD with empiric dilation.  Consider evaluation for oropharyngeal swallow study with SLP in the future.     7. Personal history of colon polyps  8. Family hx of colon cancer    Subcentimeter adenomatous colon polyp in the cecum in 11/2023, as well as family history of CRC.  Recall colonoscopy for surveillance purposes in 3 years, due in 11/2026, sooner if clinically indicated.    We will  follow up in 3-6 months to reassess symptoms.  ______________________________________________________________________    SUBJECTIVE: Patient is a 69 y.o. female who presents today for follow-up regarding abdominal pain and dysphagia.  Pmhx sig for GERD, hx of hiatal hernia repair, HTN, sacroiliitis, HLD, Vit D deficiency.       Patient was last evaluated in 02/2024.  She persistent bloating and gas production despite trial of Xifaxan.  She was recommended to look into the low FODMAP diet.  She was having a sense of incomplete evacuation of stool and Linzess was recommended though samples were not available at time of the office visit.    05/31/24:     Any gas production.  Feels tension in her abdomen.  Notes excess belching at times.  Notes gurgling in her abdomen that is quite loud, though not painful.  Denies heartburn, indigestion, nausea, emesis, dysphagia or odynophagia.   No early satiety or unintentional weight loss.     Pt denies constipation, diarrhea. No BRBPR or melena.     NSAIDs: no regular use   Etoh: rare  Tobacco: none   Cannabis: none     09/2023: CT A/P:  Acute uncomplicated sigmoid diverticulitis.  09/2023: Hb 13.5, MCV 88, Plt 245, AST 16, ALT 11, ALP 98, t bili 0.48, albumin 4.0   03/2024: Hb 13.6, MCV 88, Plt 336, BUN 23, Cr 0.64, AST 22, ALT 16, ALP 93, albumin 4.2, t bili 0.60, TSH 1.866     Endoscopic history:   EGD: 04/2022: Normal esophagus, dilated GEJ with savory dilator; normal stomach, retroflexion showed hernia repair  A. Esophagus, random (biopsy): Reactive esophageal mucosa; Eosinophils are inconspicuous; No intestinal metaplasia   B. Stomach (biopsy): Reactive gastropathy; No H pylori identified (H&E); No intestinal metaplasia   C. Duodenum (biopsy): Duodenal mucosa with no diagnostic abnormality; No Marsh lesion; Negative for dysplasia   Colon: 06/2017: Mild diverticulosis in the sigmoid  Manometry: 02/2020: Normal motility with large hiatal hernia  Colon: 11/2023: Polyp measuring  "smaller than 5 mm in the cecum; Diverticula in the sigmoid colon and rectosigmoid; Internal small hemorrhoids   A. Large Intestine, Cecum, polyp: Tubular adenoma. Negative for high grade dysplasia/ carcinoma    Review of Systems   Constitutional:  Negative for fever.   HENT:  Positive for trouble swallowing. Negative for mouth sores.    Gastrointestinal:  Positive for abdominal pain. Negative for constipation, diarrhea and nausea.   Endocrine: Negative.    Genitourinary:  Positive for frequency. Negative for dysuria and hematuria.   Musculoskeletal:  Positive for myalgias. Negative for arthralgias.   Allergic/Immunologic: Negative.    Neurological:  Positive for headaches.   Otherwise Per HPI    Historical Information   Past Medical History:   Diagnosis Date    Anemia     Arthritis     Bilateral bunions     Chronic pain disorder     back and hip pain    Colon polyp     Disease of thyroid gland     nodules    History of COVID-2020    mild s/s    History of gastroesophageal reflux (GERD)     \"had hiatal hernia surgery\"    History of heart murmur in childhood     Hyperlipidemia     Hypertension     Overactive bladder     PONV (postoperative nausea and vomiting)     once after knee surgery    Rotator cuff tear, right     had surgery    Vitamin D deficiency     unsure    Wears glasses     Wears partial dentures     upper     Past Surgical History:   Procedure Laterality Date     SECTION      Last Assessed: 2017    COLONOSCOPY      ELBOW SURGERY      Last Assessed: 2017    ESOPHAGOGASTRODUODENOSCOPY N/A 2017    Procedure: ESOPHAGOGASTRODUODENOSCOPY (EGD);  Surgeon: Jolly Lin DO;  Location: [facility] GI [facility];  Service: Gastroenterology    HIP SURGERY Left 2018    glut medius/minimus repair  and 18--with pin implanted    HYSTERECTOMY          PARAESOPHAGEAL HERNIA REPAIR N/A 2020    Procedure: LAPAROSCOPIC PARAESOPHAGEAL HERNIA REPAIR WITH MESH AND NISSEN FUNDOPLICATION " WITH ROBOTICS;  Surgeon: Darrin Leon MD;  Location: [facility];  Service: General    VA ARTHRP KNE CONDYLE&PLATU MEDIAL&LAT COMPARTMENTS Left 03/15/2023    Procedure: ARTHROPLASTY KNEE TOTAL;  Surgeon: Bart Potts DO;  Location: [facility];  Service: Orthopedics    VA ARTHRS KNE SURG W/MENISCECTOMY MED/LAT W/SHVG Left 06/15/2022    Procedure: ;left knee arthroscopy, meniscectomy,synevectomy,chondroplasty, loose body removal, injection;  Surgeon: Bart Potts DO;  Location: [facility];  Service: Orthopedics    VA COLONOSCOPY FLX DX W/COLLJ SPEC WHEN PFRMD N/A 06/26/2017    Procedure: EGD AND COLONOSCOPY;  Surgeon: Jolly Lin DO;  Location: [facility] GI LAB;  Service: Gastroenterology    VA EXCISON TUMOR SOFT TISSUE THIGH/KNEE SUBQ 3 CM/> Bilateral 05/26/2022    Procedure: EXCISION BIOPSY TISSUE LESION/MASS LOWER EXTREMITY;  Surgeon: Kit Tavera MD;  Location: [facility];  Service: General    VA EXCISON TUMOR SOFT TISSUE THIGH/KNEE SUBQ 3 CM/> Bilateral 08/25/2022    Procedure: EXCISION BIOPSY TISSUE LESION/MASS LOWER EXTREMITY;  Surgeon: Kit Tavera MD;  Location: [facility];  Service: General    VA OSTECTOMY PRTL 5TH METAR HEAD SPX Bilateral 8/15/2023    Procedure: BUNIONECTOMY TAILOR WITHOUT OSTEOTOMY;  Surgeon: Monet Pradhan DPM;  Location: [facility];  Service: Podiatry    VA SURGICAL ARTHROSCOPY JENNIFER W/CORACOACRM LIGM RLS Left 12/13/2023    Procedure: Left - ARTHROSCOPY SHOULDER  Synovectomy Debridement/chondroplasty Acromioplasty Arthroscopic rotator cuff repair Post arthroscopic injection of intra-articular joint space and peripheral portals;  Surgeon: Bart Potts DO;  Location: [facility];  Service: Orthopedics    SHOULDER ARTHROCENTESIS      SHOULDER ARTHROSCOPY Right 06/23/2021    Procedure: SHOULDER ARTHROSCOPY WITH acromioplasty, debridment, and ROTATOR CUFF REPAIR;  Surgeon: Bart Potts DO;  Location:  MAIN OR;  Service: Orthopedics    TUBAL LIGATION       Social  "History   Social History     Substance and Sexual Activity   Alcohol Use Never     Social History     Substance and Sexual Activity   Drug Use Never     Social History     Tobacco Use   Smoking Status Former    Current packs/day: 0.00    Average packs/day: 1.5 packs/day for 30.0 years (45.0 ttl pk-yrs)    Types: Cigarettes    Start date: 1961    Quit date: 1991    Years since quittin.4   Smokeless Tobacco Never   Tobacco Comments    quit 25 yrs ago     Family History   Problem Relation Age of Onset    Colon cancer Mother     Heart attack Father     Other Father         Cardiac Disorder    Diabetes type II Father     Colon cancer Sister     No Known Problems Sister     No Known Problems Sister     No Known Problems Sister     No Known Problems Sister     No Known Problems Daughter     No Known Problems Maternal Aunt     No Known Problems Maternal Aunt     No Known Problems Maternal Aunt     Cancer Maternal Grandmother     No Known Problems Paternal Grandmother      Meds/Allergies       Current Outpatient Medications:     amLODIPine (NORVASC) 5 mg tablet    atorvastatin (LIPITOR) 10 mg tablet    Diclofenac Sodium (VOLTAREN) 1 %    famotidine (PEPCID) 20 mg tablet    hydrOXYzine HCL (ATARAX) 25 mg tablet    lidocaine (Lidoderm) 5 %    losartan (COZAAR) 100 MG tablet    meloxicam (MOBIC) 15 mg tablet    triamcinolone (KENALOG) 0.1 % cream    methocarbamol (Robaxin-750) 750 mg tablet    valACYclovir (VALTREX) 1,000 mg tablet    Allergies   Allergen Reactions    Hydrocodone Irritability       Objective     Blood pressure 108/66, pulse 74, resp. rate 18, height 5' 3\" (1.6 m), weight 72.6 kg (160 lb), SpO2 98%. Body mass index is 28.34 kg/m².    Physical Exam  Vitals and nursing note reviewed.   Constitutional:       General: She is not in acute distress.     Appearance: She is well-developed.   HENT:      Head: Normocephalic and atraumatic.   Eyes:      General: No scleral icterus.     Conjunctiva/sclera: " Conjunctivae normal.   Cardiovascular:      Rate and Rhythm: Normal rate.   Pulmonary:      Effort: Pulmonary effort is normal. No respiratory distress.   Abdominal:      General: There is no distension.      Palpations: Abdomen is soft.      Tenderness: There is no abdominal tenderness. There is no guarding or rebound.   Skin:     General: Skin is warm and dry.      Coloration: Skin is not jaundiced.   Neurological:      General: No focal deficit present.      Mental Status: She is alert.   Psychiatric:         Mood and Affect: Mood normal.         Behavior: Behavior normal.       Lab Results:   No visits with results within 1 Day(s) from this visit.   Latest known visit with results is:   Appointment on 03/21/2024   Component Date Value    Rubeola IgG 03/21/2024 IMMUNE     Mumps IgG 03/21/2024 IMMUNE     Rubella IgG Quant 03/21/2024 26.3     Varicella IgG 03/21/2024 IMMUNE     QFT Nil 03/21/2024 0.05     QFT TB1-NIL 03/21/2024 0.00     QFT TB2-NIL 03/21/2024 0.01     QFT Mitogen-NIL 03/21/2024 9.95     QFT Final Interpretation 03/21/2024 Negative      Radiology Results:   No results found.    Lorena Webb PA-C    **Please note:  Dictation voice to text software may have been used in the creation of this record.  Occasional wrong word or “sound alike” substitutions may have occurred due to the inherent limitations of voice recognition software.  Read the chart carefully and recognize, using context, where substitutions have occurred.**

## 2024-05-31 NOTE — PATIENT INSTRUCTIONS
Try Linzess 72 mcg every morning.  The recommendation is to take this 30 minutes before the first meal of your day.  This may cause diarrhea at first though the body typically adjust to this after 7 to 10 days.  Studies show that this helps with abdominal discomfort and bloating, and I think it would be worth trying given this remains your predominant symptom.  Continue to look into the low FODMAP diet.  Avoid a fiber supplement which I think will worsen your bloating.  Take the famotidine/Pepcid twice a day on a scheduled basis.

## 2024-06-04 ENCOUNTER — EVALUATION (OUTPATIENT)
Dept: PHYSICAL THERAPY | Facility: CLINIC | Age: 69
End: 2024-06-04
Payer: COMMERCIAL

## 2024-06-04 DIAGNOSIS — S46.212D STRAIN OF LEFT BICEPS, SUBSEQUENT ENCOUNTER: ICD-10-CM

## 2024-06-04 DIAGNOSIS — M79.602 PAIN OF LEFT UPPER EXTREMITY: ICD-10-CM

## 2024-06-04 DIAGNOSIS — S46.212D STRAIN OF BICEPS TENDON, LEFT, SUBSEQUENT ENCOUNTER: Primary | ICD-10-CM

## 2024-06-04 PROCEDURE — 97110 THERAPEUTIC EXERCISES: CPT | Performed by: PHYSICAL THERAPIST

## 2024-06-04 NOTE — PROGRESS NOTES
PT Re-Evaluation     Today's date: 2024  Patient name: Jamee Shaikh  : 1955  MRN: [id number]  Referring provider: Nazario Duque,*  Dx:   Encounter Diagnosis     ICD-10-CM    1. Strain of biceps tendon, left, subsequent encounter  S46.212D       2. Pain of left upper extremity  M79.602       3. Strain of left biceps, subsequent encounter  S46.212D           Start Time: 839  Stop Time: 903  Total time in clinic (min): 24 minutes    Assessment  Assessment details: Jamee Shaikh was seen for an initial PT evaluation and 7 f/u sessions. Patient is a 69 y.o. female with diagnosis of L bicep strain and past medical history significant for OA, chronic pain, thyroid disease, GERD, hiatal hernia, hyperlipidemia, HTN, rotator cuff tear with repair bilaterally, L buttock pain with history of surgery, lipomas with removal, lumbar radiculopathy, vertigo. Findings of examination today show decline in left shoulder strength and active/passive range of motion. Continued high levels of pain limiting function and ability to use LUE during ADL activities. Due to decline in function it is recommended at this time patient f/u with ortho and be put on hold with PT as no progress has been made over the last 4 weeks.     Impairments: abnormal muscle firing, abnormal or restricted ROM, abnormal movement, activity intolerance, impaired physical strength, lacks appropriate home exercise program, pain with function, scapular dyskinesis, poor posture  and poor body mechanics    Goals  STG (6 weeks)  1. Patient's left shoulder flexion AROM will increase to 120 with 0/10 pain for increased ability to perform overhead ADLs. - NOT MET  2. Patient will have 0/10 pain in left shoulder at rest. - NOT MET  3. Patient's left shoulder strength will increase to 4/5 for increased ability to lift. - NOT MET  LTG (12 weeks)  1. Patient's UE AROM equal bilaterally for ability to complete hair hygiene, overhead, and behind the back  ADLs. - NOT MET  2. Patient's UE strength will be equal bilaterally for ability to lift and carry at PLOF. - NOT MET  3. Patient will be independent with home exercise program for continued maintenance post PT discharge. - PROGRESSING      Plan  Plan details: Hold until f/u with ortho  Patient would benefit from: skilled physical therapy  Planned modality interventions: unattended electrical stimulation, cryotherapy and thermotherapy: hydrocollator packs  Planned therapy interventions: neuromuscular re-education, manual therapy, therapeutic exercise, therapeutic activities, self care and home exercise program  Frequency: 2x week  Duration in weeks: 12  Plan of Care beginning date: 5/3/2024  Plan of Care expiration date: 8/3/2024  Treatment plan discussed with: patient and PTA        Subjective Evaluation    History of Present Illness  Date of onset: 4/6/2024  Subjective 6/4: Patient states minimal change over the past 4 weeks of PT. States she is in relatively constant pain making it difficulty to lift arm. Immediate pain with raising arm up. Unable to reach overhead or lift coffee cup. Did speak to ortho's office and had f/u moved up to next week.     Mechanism of injury: Jamee Shaikh is a 69 y.o. female who presents to outpatient Physical Therapy today with complaints of left arm pain. History of left rotator cuff surgery 12/13/23 with post op rehab, discharge from PT 3/26/24. New episode of pain in left shoulder/proximal arm after reaching over fence to hand Grandson a water. Immediate pain in shoulder with and soreness post. Started a new job 2 days later which continued to cause increased pain. Saw ortho the following week with MRI as noted below. Was instructed to hold with work and begin PT. To f/u with ortho again in 2 months.     MRI IMPRESSION:  1. Subscapularis, supraspinatus and infraspinatus insertional tendinosis without rotator cuff tear status post supraspinatus tendon repair which remains intact.  Mild associated subacromial-subdeltoid bursitis.  2. Nonvisualization of the long head biceps tendon which is likely torn.  3. Status post acromioplasty without residual or recurrent spur.  4. Glenohumeral degenerative change with degenerative fraying at the superior glenoid labrum. Remainder of the labrum appears intact.    Patient Goals  Patient goals for therapy: decreased pain  Patient goal: be able to increase use of LUE  Pain    6/4  Current pain ratin  8  At best pain ratin  8  At worst pain ratin  8  Location: anterior left shoulder and proximal arm  Quality: dull ache  Relieving factors: rest  Aggravating factors: lifting and overhead activity    Social Support  Steps to enter house: yes  Stairs in house: yes   Lives in: multiple-level home  Lives with: spouse    Employment status: not working (Was working [employer])  Hand dominance: right      Diagnostic Tests  MRI studies: abnormal        Objective     Postural Observations    Additional Postural Observation Details  Rounded shoulders, LUE resting in guarding position     Observations   Left Shoulder   Positive for edema.     Cervical/Thoracic Screen   Cervical range of motion within normal limits    Neurological Testing     Sensation     Shoulder   Left Shoulder   Intact: light touch    Right Shoulder   Intact: Light touch    Active Range of Motion    6/4  Left Shoulder   Flexion: 80 degrees with pain   65 pain  Abduction: 35 degrees with pain  40 pain  External rotation 45°: 45 degrees   40 pain  Internal rotation 45°: 30 degrees   45     Right Shoulder   Flexion: 155 degrees   Abduction: 160 degrees   External rotation BTH: T4   Internal rotation BTB: T9     Left Elbow   Flexion: 150 degrees   Extension: -3 degrees     Passive Range of Motion   Left Shoulder   Flexion: 80 degrees     45  Abduction: 45 degrees    30  External rotation 45°: 45 degrees   30 pain  Internal rotation 45°: 35 degrees   45    Strength/Myotome  Testing   6/4    Left Shoulder     Planes of Motion   Flexion: 2     2  Abduction: 2     2  External rotation at 0°: 5   3-  Internal rotation at 0°: 5   3-    Isolated Muscles   Biceps: 4+     3-  Triceps: 5     3-    Right Shoulder     Planes of Motion   Flexion: 4+   Abduction: 4+   External rotation at 0°: 5   Internal rotation at 0°: 5     Isolated Muscles   Biceps: 5   Triceps: 5     General Comments:      Shoulder Comments       FOTO: 37% function, 63% predicted function            Precautions: none noted  Access Code: [email or web address]  Progress note: 6/3  POC: 8/3    Manuals 5/24 5/28 6/4 5/13 5/15   Stretching with active release L shoulder and elbow L shoulder and elbow  L shoulder and elbow L shoulder and elbow   GH jt mobs Grade II distraction and PA Grade II distraction and PA   Grade II distraction and PA   Scapular PNF        IASTM        Neuro Re-Ed        UBE        scap retraction 20x 20x  20x 20x   Shoulder rolls 20x 20x  20x 20x   Chin tucks :03x20 20x  :03x20 :03x20   MTP/LTP        Elbow isometrics :05x10 elbow flex and ext :05x10 elbow flex and ext  :05x10 elbow flex and ext :05x10 elbow flex and ext   Ther Ex   HEP review and ROM/strength testing     Pulleys Flex 10x Flex 10x Flex 10x Flex 10x Flex 10x           Shoulder flexion rock backs 10x 10x  10x 10x   Table slides        pendulums 10xea 10x ea  10x ea 10x   Supine flexion ROM 10x       Supine ER ROM        AAROM L elbow flex/ext 30 30x AROM  10x 30x   Sup/pron 1# 30x 1# 30x  1# 30x ea 1# 30x ea   Wrist AROM 1# 30x ea  2# 20x flex 1# 30x ea  1# 30x ea 1# 30x ea           Standing YTI                        Wall walks                                        Ther Activity                        Gait Training                        Modalities

## 2024-06-14 ENCOUNTER — OFFICE VISIT (OUTPATIENT)
Dept: OBGYN CLINIC | Facility: CLINIC | Age: 69
End: 2024-06-14
Payer: COMMERCIAL

## 2024-06-14 VITALS
DIASTOLIC BLOOD PRESSURE: 101 MMHG | BODY MASS INDEX: 28.35 KG/M2 | HEART RATE: 79 BPM | HEIGHT: 63 IN | WEIGHT: 160 LBS | SYSTOLIC BLOOD PRESSURE: 177 MMHG

## 2024-06-14 DIAGNOSIS — S46.212D STRAIN OF BICEPS TENDON, LEFT, SUBSEQUENT ENCOUNTER: Primary | ICD-10-CM

## 2024-06-14 PROCEDURE — 99213 OFFICE O/P EST LOW 20 MIN: CPT | Performed by: ORTHOPAEDIC SURGERY

## 2024-06-14 NOTE — PROGRESS NOTES
ASSESSMENT/PLAN:    Diagnoses and all orders for this visit:    Strain of biceps tendon, left, subsequent encounter          The patient was seen and examined. She continues with pain in mid biceps region-strain. She did attend physical therapy with no improvement. MRI reviewed at last appt showing no recurrent rotator cuff tear. At this time recommend maintaining HEP as shown by PT, gentle stretching, activity modification, anti inflammatories for pain. See back if symptoms worsen.     Return if symptoms worsen or fail to improve.    The patient is still having tenderness along her biceps muscle.  There is contractibility.  No evidence of nerve dysfunction distally.  There is no pain along the shoulder where she had a previous rotator cuff repair.  Would recommend continuation of home exercise program.  Follow-up on as-needed basis for her biceps muscle.  If her condition changes, she would not hesitate to let us know          _____________________________________________________  CHIEF COMPLAINT:  Chief Complaint   Patient presents with    Left Shoulder - Follow-up         SUBJECTIVE:      69 yr old female in for f/u of left biceps strain. MRI reviewed at last appt showing no recurrent tear. Status post left shoulder arthroscopy with rotator cuff repair from 12/13/2023. She did attend physical therapy but was discharged as she was seeing no improvement. Still pain mid biceps region worse when lifting items. No change in pain from previous appt.     The following portions of the  patient's history were reviewed and updated as appropriate: allergies, current medications, past family history, past medical history, past social history, past surgical history and problem list.    PAST MEDICAL HISTORY:  Past Medical History:   Diagnosis Date    Anemia     Arthritis     Bilateral bunions     Chronic pain disorder     back and hip pain    Colon polyp     Disease of thyroid gland     nodules    History of COVID-19 2020     "mild s/s    History of gastroesophageal reflux (GERD)     \"had hiatal hernia surgery\"    History of heart murmur in childhood     Hyperlipidemia     Hypertension     Overactive bladder     PONV (postoperative nausea and vomiting)     once after knee surgery    Rotator cuff tear, right     had surgery    Vitamin D deficiency     unsure    Wears glasses     Wears partial dentures     upper       PAST SURGICAL HISTORY:  Past Surgical History:   Procedure Laterality Date     SECTION      Last Assessed: 2017    COLONOSCOPY      ELBOW SURGERY      Last Assessed: 2017    ESOPHAGOGASTRODUODENOSCOPY N/A 2017    Procedure: ESOPHAGOGASTRODUODENOSCOPY (EGD);  Surgeon: Jolly Lin DO;  Location: [facility];  Service: Gastroenterology    HIP SURGERY Left 2018    glut medius/minimus repair  and 18--with pin implanted    HYSTERECTOMY          PARAESOPHAGEAL HERNIA REPAIR N/A 2020    Procedure: LAPAROSCOPIC PARAESOPHAGEAL HERNIA REPAIR WITH MESH AND NISSEN FUNDOPLICATION WITH ROBOTICS;  Surgeon: Darrin Leon MD;  Location: [facility];  Service: General    OK ARTHRP KNE CONDYLE&PLATU MEDIAL&LAT COMPARTMENTS Left 03/15/2023    Procedure: ARTHROPLASTY KNEE TOTAL;  Surgeon: Bart Potts DO;  Location: [facility];  Service: Orthopedics    OK ARTHRS KNE SURG W/MENISCECTOMY MED/LAT W/SHVG Left 06/15/2022    Procedure: ;left knee arthroscopy, meniscectomy,synevectomy,chondroplasty, loose body removal, injection;  Surgeon: Bart Potts DO;  Location: [facility];  Service: Orthopedics    OK COLONOSCOPY FLX DX W/COLLJ SPEC WHEN PFRMD N/A 2017    Procedure: EGD AND COLONOSCOPY;  Surgeon: Jolly Lin DO;  Location: [facility];  Service: Gastroenterology    OK EXCISON TUMOR SOFT TISSUE THIGH/KNEE SUBQ 3 CM/> Bilateral 2022    Procedure: EXCISION BIOPSY TISSUE LESION/MASS LOWER EXTREMITY;  Surgeon: Kit Tavera MD;  Location: [facility];  Service: General "    ID EXCISON TUMOR SOFT TISSUE THIGH/KNEE SUBQ 3 CM/> Bilateral 2022    Procedure: EXCISION BIOPSY TISSUE LESION/MASS LOWER EXTREMITY;  Surgeon: Kit Tavera MD;  Location: [facility];  Service: General    ID OSTECTOMY PRTL 5TH METAR HEAD SPX Bilateral 8/15/2023    Procedure: BUNIONECTOMY TAILOR WITHOUT OSTEOTOMY;  Surgeon: Monet Pradhan DPM;  Location: [facility];  Service: Podiatry    ID SURGICAL ARTHROSCOPY JENNIFER W/CORACOACRM LIGM RLS Left 2023    Procedure: Left - ARTHROSCOPY SHOULDER  Synovectomy Debridement/chondroplasty Acromioplasty Arthroscopic rotator cuff repair Post arthroscopic injection of intra-articular joint space and peripheral portals;  Surgeon: Bart Potts DO;  Location: [facility];  Service: Orthopedics    SHOULDER ARTHROCENTESIS      SHOULDER ARTHROSCOPY Right 2021    Procedure: SHOULDER ARTHROSCOPY WITH acromioplasty, debridment, and ROTATOR CUFF REPAIR;  Surgeon: Bart Potts DO;  Location:  MAIN OR;  Service: Orthopedics    TUBAL LIGATION         FAMILY HISTORY:  Family History   Problem Relation Age of Onset    Colon cancer Mother     Heart attack Father     Other Father         Cardiac Disorder    Diabetes type II Father     Colon cancer Sister     No Known Problems Sister     No Known Problems Sister     No Known Problems Sister     No Known Problems Sister     No Known Problems Daughter     No Known Problems Maternal Aunt     No Known Problems Maternal Aunt     No Known Problems Maternal Aunt     Cancer Maternal Grandmother     No Known Problems Paternal Grandmother        SOCIAL HISTORY:  Social History     Tobacco Use    Smoking status: Former     Current packs/day: 0.00     Average packs/day: 1.5 packs/day for 30.0 years (45.0 ttl pk-yrs)     Types: Cigarettes     Start date: 1961     Quit date: 1991     Years since quittin.4    Smokeless tobacco: Never    Tobacco comments:     quit 25 yrs ago   Vaping Use    Vaping status: Never Used    Substance Use Topics    Alcohol use: Never    Drug use: Never       MEDICATIONS:    Current Outpatient Medications:     amLODIPine (NORVASC) 5 mg tablet, Take 1 tablet (5 mg total) by mouth daily, Disp: 90 tablet, Rfl: 3    atorvastatin (LIPITOR) 10 mg tablet, Take 1 tablet (10 mg total) by mouth daily, Disp: 90 tablet, Rfl: 3    Diclofenac Sodium (VOLTAREN) 1 %, Apply 2 g topically 4 (four) times a day, Disp: 100 g, Rfl: 0    famotidine (PEPCID) 20 mg tablet, take 1 tablet by mouth twice a day, Disp: 180 tablet, Rfl: 3    hydrOXYzine HCL (ATARAX) 25 mg tablet, take 1 tablet by mouth every 6 hours if needed for itching, Disp: 180 tablet, Rfl: 1    lidocaine (Lidoderm) 5 %, Apply 2 patches topically over 12 hours daily Remove & Discard patch within 12 hours or as directed by MD, Disp: 60 patch, Rfl: 5    losartan (COZAAR) 100 MG tablet, Take 1 tablet (100 mg total) by mouth daily, Disp: 90 tablet, Rfl: 3    meloxicam (MOBIC) 15 mg tablet, Take 1 tablet (15 mg total) by mouth daily, Disp: 30 tablet, Rfl: 0    triamcinolone (KENALOG) 0.1 % cream, Apply topically 2 (two) times a day as needed (itching), Disp: 30 g, Rfl: 1    methocarbamol (Robaxin-750) 750 mg tablet, Take 1 tablet (750 mg total) by mouth every 8 (eight) hours (Patient not taking: Reported on 2/12/2024), Disp: 90 tablet, Rfl: 5    valACYclovir (VALTREX) 1,000 mg tablet, Take 1 tablet (1,000 mg total) by mouth 2 (two) times a day for 3 days, Disp: 6 tablet, Rfl: 0    ALLERGIES:  Allergies   Allergen Reactions    Hydrocodone Irritability       ROS:  Review of Systems   Constitutional:  Negative for chills and fever.   HENT:  Negative for ear pain and sore throat.    Eyes:  Negative for pain and visual disturbance.   Respiratory:  Negative for cough and shortness of breath.    Cardiovascular:  Negative for chest pain and palpitations.   Gastrointestinal:  Negative for abdominal pain and vomiting.   Genitourinary:  Negative for dysuria and hematuria.  "  Musculoskeletal:  Positive for arthralgias (left biceps). Negative for back pain.   Skin:  Negative for color change and rash.   Neurological:  Negative for seizures and syncope.   All other systems reviewed and are negative.       Constitutional: Negative for fatigue, fever or loss of appetite.   HENT: Negative.    Respiratory: Negative for shortness of breath, dyspnea.    Cardiovascular: Negative for chest pain/tightness.   Gastrointestinal: Negative for abdominal pain, N/V.   Endocrine: Negative for cold/heat intolerance, unexplained weight loss/gain.   Genitourinary: Negative for flank pain, dysuria, hematuria.   Musculoskeletal: Positive for arthralgia   Skin: Negative for rash.    Neurological: Negative for numbness or tingling  Psychiatric/Behavioral: Negative for agitation.  _____________________________________________________  PHYSICAL EXAMINATION:    Blood pressure (!) 177/101, pulse 79, height 5' 3\" (1.6 m), weight 72.6 kg (160 lb).    Constitutional: Oriented to person, place, and time. Appears well-developed and well-nourished. No distress.   HENT:   Head: Normocephalic.   Eyes: Conjunctivae are normal. Right eye exhibits no discharge. Left eye exhibits no discharge. No scleral icterus.   Cardiovascular: Normal rate.    Pulmonary/Chest: Effort normal.   Neurological: Alert and oriented to person, place, and time.   Skin: Skin is warm and dry. No rash noted. Not diaphoretic. No erythema. No pallor.   Psychiatric: Normal mood and affect. Behavior is normal. Judgment and thought content normal.      MUSCULOSKELETAL EXAMINATION:   Physical Exam  Constitutional:       Appearance: She is well-developed.   Eyes:      Pupils: Pupils are equal, round, and reactive to light.   Pulmonary:      Effort: Pulmonary effort is normal.      Breath sounds: Normal breath sounds.   Musculoskeletal:         General: Tenderness (left biceps arthralgia) present.   Skin:     General: Skin is warm and dry.   Neurological:     " " Mental Status: She is alert and oriented to person, place, and time.      Deep Tendon Reflexes: Reflexes are normal and symmetric.   Psychiatric:         Behavior: Behavior normal.         Thought Content: Thought content normal.         Judgment: Judgment normal.       Ortho Exam    Left upper extremity is neurovascularly intact  Fingers are pink and mobile  Compartments are soft  Range of motion of the shoulder is from 0 to 130 degrees  TTP mid biceps region, no subluxation of biceps tendon proximally   5/5 RTC strength   Brisk cap refill and sensation intact  Objective:  BP Readings from Last 1 Encounters:   06/14/24 (!) 177/101      Wt Readings from Last 1 Encounters:   06/14/24 72.6 kg (160 lb)        BMI:   Estimated body mass index is 28.34 kg/m² as calculated from the following:    Height as of this encounter: 5' 3\" (1.6 m).    Weight as of this encounter: 72.6 kg (160 lb).    No new imaging to review     Scribe Attestation      I,:  Joni Hubbard am acting as a scribe while in the presence of the attending physician.:       I,:  Bart Potts, DO personally performed the services described in this documentation    as scribed in my presence.:            "

## 2024-06-25 ENCOUNTER — TELEPHONE (OUTPATIENT)
Dept: OBGYN CLINIC | Facility: HOSPITAL | Age: 69
End: 2024-06-25

## 2024-06-25 DIAGNOSIS — S46.212D STRAIN OF BICEPS TENDON, LEFT, SUBSEQUENT ENCOUNTER: Primary | ICD-10-CM

## 2024-06-25 NOTE — TELEPHONE ENCOUNTER
Caller: Patient    Doctor: Katlyn    Reason for call: Patient asking for a new order for PT.  Strain of biceps tendon, left,     Call back#: [phone]

## 2024-06-26 ENCOUNTER — NURSE TRIAGE (OUTPATIENT)
Age: 69
End: 2024-06-26

## 2024-06-26 ENCOUNTER — TELEPHONE (OUTPATIENT)
Age: 69
End: 2024-06-26

## 2024-06-26 DIAGNOSIS — K59.00 CONSTIPATION, UNSPECIFIED CONSTIPATION TYPE: Primary | ICD-10-CM

## 2024-06-26 NOTE — TELEPHONE ENCOUNTER
"Reason for Disposition   Caller has URGENT medicine question about med that PCP or specialist prescribed and triager unable to answer question    Answer Assessment - Initial Assessment Questions  1. NAME of MEDICATION: \"What medicine are you calling about?\"      Pt. Calling with update, pt. Did a trial of  Linzess 72 mcg and has had good outcomes and is asking for script to be sent to pharmacy on file    Protocols used: Medication Question Call-ADULT-[place]    "

## 2024-06-27 DIAGNOSIS — K59.00 CONSTIPATION, UNSPECIFIED CONSTIPATION TYPE: ICD-10-CM

## 2024-06-27 DIAGNOSIS — Z86.19 H/O COLD SORES: ICD-10-CM

## 2024-06-27 DIAGNOSIS — I10 ESSENTIAL HYPERTENSION: ICD-10-CM

## 2024-06-27 RX ORDER — LOSARTAN POTASSIUM 100 MG/1
100 TABLET ORAL DAILY
Qty: 90 TABLET | Refills: 1 | Status: SHIPPED | OUTPATIENT
Start: 2024-06-27

## 2024-06-27 RX ORDER — VALACYCLOVIR HYDROCHLORIDE 1 G/1
TABLET, FILM COATED ORAL
Qty: 6 TABLET | Refills: 0 | Status: SHIPPED | OUTPATIENT
Start: 2024-06-27 | End: 2024-06-30

## 2024-06-27 RX ORDER — VALACYCLOVIR HYDROCHLORIDE 1 G/1
TABLET, FILM COATED ORAL
Qty: 6 TABLET | Refills: 0 | OUTPATIENT
Start: 2024-06-27 | End: 2024-06-30

## 2024-07-01 ENCOUNTER — EVALUATION (OUTPATIENT)
Dept: PHYSICAL THERAPY | Facility: CLINIC | Age: 69
End: 2024-07-01
Payer: COMMERCIAL

## 2024-07-01 ENCOUNTER — TELEPHONE (OUTPATIENT)
Age: 69
End: 2024-07-01

## 2024-07-01 DIAGNOSIS — I10 ESSENTIAL HYPERTENSION: ICD-10-CM

## 2024-07-01 DIAGNOSIS — S46.212D STRAIN OF LEFT BICEPS, SUBSEQUENT ENCOUNTER: ICD-10-CM

## 2024-07-01 DIAGNOSIS — S46.212D STRAIN OF BICEPS TENDON, LEFT, SUBSEQUENT ENCOUNTER: Primary | ICD-10-CM

## 2024-07-01 DIAGNOSIS — M79.602 PAIN OF LEFT UPPER EXTREMITY: ICD-10-CM

## 2024-07-01 PROCEDURE — 97110 THERAPEUTIC EXERCISES: CPT | Performed by: PHYSICAL THERAPIST

## 2024-07-01 PROCEDURE — 97112 NEUROMUSCULAR REEDUCATION: CPT | Performed by: PHYSICAL THERAPIST

## 2024-07-01 NOTE — PROGRESS NOTES
PT Re-Evaluation     Today's date: 2024  Patient name: Jamee Shaikh  : 1955  MRN: [id number]  Referring provider: Nazario Duque,*  Dx:   Encounter Diagnosis     ICD-10-CM    1. Strain of biceps tendon, left, subsequent encounter  S46.212D       2. Pain of left upper extremity  M79.602       3. Strain of left biceps, subsequent encounter  S46.212D           Start Time: 0945  Stop Time: 1025  Total time in clinic (min): 40 minutes    Assessment  Assessment details: Jamee Shaikh was seen for an initial PT evaluation and 8 f/u sessions. Patient is a 69 y.o. female with diagnosis of L bicep strain and past medical history significant for OA, chronic pain, thyroid disease, GERD, hiatal hernia, hyperlipidemia, HTN, rotator cuff tear with repair bilaterally, L buttock pain with history of surgery, lipomas with removal, lumbar radiculopathy, vertigo. Patient has been on hold for the past month attempting HEP with no progress and was instructed to return to PT with physician order per patient request. Continued decline in left shoulder range of motion with increased guarding impacting ability to take accurate passive range of motion measurements. Will attempt continuation of skilled PT 2x/week for an additional 4 weeks until next re-evaluation.     Impairments: abnormal muscle firing, abnormal or restricted ROM, abnormal movement, activity intolerance, impaired physical strength, lacks appropriate home exercise program, pain with function, scapular dyskinesis, poor posture  and poor body mechanics    Goals  STG (6 weeks)  1. Patient's left shoulder flexion AROM will increase to 120 with 0/10 pain for increased ability to perform overhead ADLs. - NOT MET  2. Patient will have 0/10 pain in left shoulder at rest. - NOT MET  3. Patient's left shoulder strength will increase to 4/5 for increased ability to lift. - NOT MET  LTG (12 weeks)  1. Patient's UE AROM equal bilaterally for ability to complete hair  hygiene, overhead, and behind the back ADLs. - NOT MET  2. Patient's UE strength will be equal bilaterally for ability to lift and carry at PLOF. - NOT MET  3. Patient will be independent with home exercise program for continued maintenance post PT discharge. - PROGRESSING      Plan  Plan details: continue 2x/week  Patient would benefit from: skilled physical therapy  Planned modality interventions: unattended electrical stimulation, cryotherapy and thermotherapy: hydrocollator packs  Planned therapy interventions: neuromuscular re-education, manual therapy, therapeutic exercise, therapeutic activities, self care and home exercise program  Frequency: 2x week  Duration in weeks: 12  Plan of Care beginning date: 5/3/2024  Plan of Care expiration date: 8/3/2024  Treatment plan discussed with: patient and PTA        Subjective Evaluation    History of Present Illness  Date of onset: 4/6/2024  Subjective 7/1: Patient states she continues to have constant pain in left proximal arm, now going into anterior shoulder. Was seen by ortho 6/14 who instructed her to continue with HEP and f/u as needed. Patient recently called to return to PT and ortho sent in script for continuation. C/o difficulty reaching up or behind back, does ok with flexing elbow. Unable to lift. Does not feel like it has been improving at home. No f/u with ortho scheduled, prn.      Subjective 6/4: Patient states minimal change over the past 4 weeks of PT. States she is in relatively constant pain making it difficulty to lift arm. Immediate pain with raising arm up. Unable to reach overhead or lift coffee cup. Did speak to ortho's office and had f/u moved up to next week.     Mechanism of injury: Jamee Shaikh is a 69 y.o. female who presents to outpatient Physical Therapy today with complaints of left arm pain. History of left rotator cuff surgery 12/13/23 with post op rehab, discharge from PT 3/26/24. New episode of pain in left shoulder/proximal arm  after reaching over fence to hand [place] a water. Immediate pain in shoulder with and soreness post. Started a new job 2 days later which continued to cause increased pain. Saw ortho the following week with MRI as noted below. Was instructed to hold with work and begin PT. To f/u with ortho again in 2 months.     MRI IMPRESSION:  1. Subscapularis, supraspinatus and infraspinatus insertional tendinosis without rotator cuff tear status post supraspinatus tendon repair which remains intact. Mild associated subacromial-subdeltoid bursitis.  2. Nonvisualization of the long head biceps tendon which is likely torn.  3. Status post acromioplasty without residual or recurrent spur.  4. Glenohumeral degenerative change with degenerative fraying at the superior glenoid labrum. Remainder of the labrum appears intact.    Patient Goals  Patient goals for therapy: decreased pain  Patient goal: be able to increase use of LUE  Pain      Current pain ratin  8 8  At best pain ratin  8 8  At worst pain ratin  8 8  Location: anterior left shoulder and proximal arm  Quality: dull ache  Relieving factors: rest  Aggravating factors: lifting and overhead activity    Social Support  Steps to enter house: yes  Stairs in house: yes   Lives in: multiple-level home  Lives with: spouse    Employment status: not working (Was working [employer])  Hand dominance: right      Diagnostic Tests  MRI studies: abnormal        Objective     Postural Observations    Additional Postural Observation Details  Rounded shoulders, LUE resting in guarding position     Observations   Left Shoulder   Positive for edema.     Cervical/Thoracic Screen   Cervical range of motion within normal limits    Neurological Testing     Sensation     Shoulder   Left Shoulder   Intact: light touch    Right Shoulder   Intact: Light touch    Active Range of Motion      Left Shoulder   Flexion: 80 degrees with pain   65 pain 35  pain  Abduction: 35 degrees with pain  40 pain 30 pain  External rotation 45°: 45 degrees   40 pain 35 pain  Internal rotation 45°: 30 degrees   45   45     Right Shoulder   Flexion: 155 degrees   Abduction: 160 degrees   External rotation BTH: T4   Internal rotation BTB: T9     Left Elbow   Flexion: 150 degrees   Extension: -3 degrees     Passive Range of Motion   Left Shoulder   Flexion: 80 degrees     45  guarded  Abduction: 45 degrees    30  guarded  External rotation 45°: 45 degrees   30 pain guarded  Internal rotation 45°: 35 degrees   45  guarded    Strength/Myotome Testing   6/4 7/1    Left Shoulder     Planes of Motion   Flexion: 2     2 2  Abduction: 2     2 2  External rotation at 0°: 5   3- 3-  Internal rotation at 0°: 5   3- 3-    Isolated Muscles   Biceps: 4+     3- 3-  Triceps: 5     3- 3-    Right Shoulder     Planes of Motion   Flexion: 4+   Abduction: 4+   External rotation at 0°: 5   Internal rotation at 0°: 5     Isolated Muscles   Biceps: 5   Triceps: 5     General Comments:    Vitals  7/1: seated LUE start of session- 168/98 mmhg, 65bpm, 99%  Seated LUE end of session- 157/96 mmhg                       Precautions: none noted  Access Code: [email or web address]  Progress note: 8/1  POC: 8/3    Manuals 5/24 5/28 6/4 7/1    Vitals:     *   Stretching with active release L shoulder and elbow L shoulder and elbow  guarded    GH jt mobs Grade II distraction and PA Grade II distraction and PA  guarded    Scapular PNF        IASTM        Neuro Re-Ed        UBE        scap retraction 20x 20x  10x    Shoulder rolls 20x 20x  10x    Chin tucks :03x20 20x  10x    MTP/LTP        Elbow isometrics :05x10 elbow flex and ext :05x10 elbow flex and ext      Ther Ex   HEP review and ROM/strength testing     Pulleys Flex 10x Flex 10x Flex 10x Flex 10x            Shoulder flexion rock backs 10x 10x      Table slides        pendulums 10xea 10x ea  10x ea    Supine flexion ROM 10x       Supine ER ROM        AAROM L elbow flex/ext  30 30x AROM      Sup/pron 1# 30x 1# 30x      Wrist AROM 1# 30x ea  2# 20x flex 1# 30x ea              Standing YTI                        Wall walks                                        Ther Activity                        Gait Training                        Modalities

## 2024-07-01 NOTE — TELEPHONE ENCOUNTER
Spoke with patient. She would like to double up on amlodipine for now and follow up on 7/8. She is not having any symptoms at this time.

## 2024-07-01 NOTE — TELEPHONE ENCOUNTER
Schedule appt 7/8/24 at 12 concern her b/p is high June 14 177/101 today at physical therapy was 164/98

## 2024-07-01 NOTE — TELEPHONE ENCOUNTER
7/8 is a week away! Ask her to come in today OR she can increase her Amlodipine from 5mg to 10mg daily and keep the appointment in 1 week to follow up on the dose change. Any symptoms?

## 2024-07-02 RX ORDER — AMLODIPINE BESYLATE 5 MG/1
5 TABLET ORAL DAILY
Qty: 90 TABLET | Refills: 1 | Status: SHIPPED | OUTPATIENT
Start: 2024-07-02

## 2024-07-03 ENCOUNTER — OFFICE VISIT (OUTPATIENT)
Dept: PHYSICAL THERAPY | Facility: CLINIC | Age: 69
End: 2024-07-03
Payer: COMMERCIAL

## 2024-07-03 DIAGNOSIS — M79.602 PAIN OF LEFT UPPER EXTREMITY: ICD-10-CM

## 2024-07-03 DIAGNOSIS — S46.212D STRAIN OF BICEPS TENDON, LEFT, SUBSEQUENT ENCOUNTER: Primary | ICD-10-CM

## 2024-07-03 DIAGNOSIS — S46.212D STRAIN OF LEFT BICEPS, SUBSEQUENT ENCOUNTER: ICD-10-CM

## 2024-07-03 PROCEDURE — 97140 MANUAL THERAPY 1/> REGIONS: CPT | Performed by: PHYSICAL THERAPIST

## 2024-07-03 PROCEDURE — 97112 NEUROMUSCULAR REEDUCATION: CPT | Performed by: PHYSICAL THERAPIST

## 2024-07-03 PROCEDURE — 97110 THERAPEUTIC EXERCISES: CPT | Performed by: PHYSICAL THERAPIST

## 2024-07-03 NOTE — PROGRESS NOTES
Daily Note     Today's date: 7/3/2024  Patient name: Jamee Shaikh  : 1955  MRN: [id number]  Referring provider: Nazario Duque,*  Dx:   Encounter Diagnosis     ICD-10-CM    1. Strain of biceps tendon, left, subsequent encounter  S46.212D       2. Pain of left upper extremity  M79.602       3. Strain of left biceps, subsequent encounter  S46.212D           Start Time: 730  Stop Time: 815  Total time in clinic (min): 45 minutes    Subjective: No reported change since last session. Scheduled second opinion with ortho 7/15. Does have an appointment  with PCP for BP.       Objective: See treatment diary below      Assessment: Tolerated treatment fair+. Added exercises today per flowsheet with some increased anterior shoulder and proximal arm pain. Left clinic at 8/10 pain level today.  Patient would benefit from continued PT      Plan: Continue per plan of care.  Progress treatment as tolerated.       Precautions: none noted  Access Code: [email or web address]  Progress note:   POC: 8/3    Manuals 5/24 5/28 6/4 7/1 7/3   Vitals:     Seated /93    Stretching with active release L shoulder and elbow L shoulder and elbow  guarded L bicep   GH jt mobs Grade II distraction and PA Grade II distraction and PA  guarded Grade II   Scapular PNF        IASTM        Neuro Re-Ed        UBE     L1 seated 5min   scap retraction 20x 20x  10x 10x   Shoulder rolls 20x 20x  10x 10x   Chin tucks :03x20 20x  10x :03x10   MTP/LTP     Red 10x2   Elbow isometrics :05x10 elbow flex and ext :05x10 elbow flex and ext   :05x10 elbow flex and ext   Ther Ex   HEP review and ROM/strength testing     Pulleys Flex 10x Flex 10x Flex 10x Flex 10x Flex 10x           Shoulder flexion rock backs 10x 10x   10x   Table slides        pendulums 10xea 10x ea  10x ea 10x ea    Supine flexion ROM 10x       Supine ER ROM        AAROM L elbow flex/ext 30 30x AROM   10x AAROM   Sup/pron 1# 30x 1# 30x   1# 30x   Wrist AROM 1# 30x ea  2# 20x flex 1#  30x ea   1# 30x ea           Standing YTI                        Wall walks                                        Ther Activity                        Gait Training                        Modalities

## 2024-07-08 ENCOUNTER — OFFICE VISIT (OUTPATIENT)
Dept: FAMILY MEDICINE CLINIC | Facility: CLINIC | Age: 69
End: 2024-07-08

## 2024-07-08 ENCOUNTER — OFFICE VISIT (OUTPATIENT)
Dept: PHYSICAL THERAPY | Facility: CLINIC | Age: 69
End: 2024-07-08
Payer: COMMERCIAL

## 2024-07-08 VITALS
SYSTOLIC BLOOD PRESSURE: 122 MMHG | BODY MASS INDEX: 28 KG/M2 | HEIGHT: 63 IN | TEMPERATURE: 98 F | DIASTOLIC BLOOD PRESSURE: 72 MMHG | WEIGHT: 158 LBS | RESPIRATION RATE: 18 BRPM | OXYGEN SATURATION: 98 % | HEART RATE: 78 BPM

## 2024-07-08 DIAGNOSIS — I10 ESSENTIAL HYPERTENSION: Primary | ICD-10-CM

## 2024-07-08 DIAGNOSIS — S46.212D STRAIN OF BICEPS TENDON, LEFT, SUBSEQUENT ENCOUNTER: Primary | ICD-10-CM

## 2024-07-08 DIAGNOSIS — M79.602 PAIN OF LEFT UPPER EXTREMITY: ICD-10-CM

## 2024-07-08 DIAGNOSIS — S46.212D STRAIN OF LEFT BICEPS, SUBSEQUENT ENCOUNTER: ICD-10-CM

## 2024-07-08 PROCEDURE — 97110 THERAPEUTIC EXERCISES: CPT | Performed by: PHYSICAL THERAPIST

## 2024-07-08 PROCEDURE — 97112 NEUROMUSCULAR REEDUCATION: CPT | Performed by: PHYSICAL THERAPIST

## 2024-07-08 PROCEDURE — 97140 MANUAL THERAPY 1/> REGIONS: CPT | Performed by: PHYSICAL THERAPIST

## 2024-07-08 RX ORDER — AMLODIPINE BESYLATE 5 MG/1
10 TABLET ORAL DAILY
Qty: 180 TABLET | Refills: 1 | Status: SHIPPED | OUTPATIENT
Start: 2024-07-08

## 2024-07-08 NOTE — PROGRESS NOTES
Daily Note     Today's date: 2024  Patient name: Jamee Shaikh  : 1955  MRN: [id number]  Referring provider: Nazario Duque,*  Dx:   Encounter Diagnosis     ICD-10-CM    1. Strain of biceps tendon, left, subsequent encounter  S46.212D       2. Pain of left upper extremity  M79.602       3. Strain of left biceps, subsequent encounter  S46.212D           Start Time: 0810  Stop Time: 48  Total time in clinic (min): 38 minutes    Subjective: No change.       Objective: See treatment diary below      Assessment: Tolerated treatment fair+. Continues to have difficulty and pain lifting arm away from body. Atttempted new ex per flowsheet, unable due to increased pain. Should continue with pendulums and AAROM/PROM as well as isometrics for shoulder range and strength. Patient exhibited good technique with therapeutic exercises and would benefit from continued PT      Plan: Continue per plan of care.  Progress treatment as tolerated.       Precautions: none noted  Access Code: [email or web address]  Progress note:   POC: 8/3    Manuals 7/8 5/28 6/4 7/1 7/3   Vitals: Seated RUE post /78 mmhg    Seated /93    Stretching with active release  L shoulder and elbow  guarded L bicep   GH jt mobs  Grade II distraction and PA  guarded Grade II   Scapular PNF        IASTM        Neuro Re-Ed        UBE L1 seated 5 min    L1 seated 5min   scap retraction 10x 20x  10x 10x   Shoulder rolls 10x 20x  10x 10x   Chin tucks :03x10 20x  10x :03x10   MTP/LTP Red 10x    Red 10x2   Elbow isometrics :05x10 elbow flex and ext :05x10 elbow flex and ext   :05x10 elbow flex and ext   Ther Ex   HEP review and ROM/strength testing     Pulleys Flex 10 x Flex 10x Flex 10x Flex 10x Flex 10x           Shoulder flexion rock backs 10x 10x   10x   Table slides        pendulums 10x ea 10x ea  10x ea 10x ea    Supine flexion ROM        Supine ER ROM        AAROM L elbow flex/ext 10x AAROM 30x AROM   10x AAROM   Sup/pron 1# 10x3 1# 30x    1# 30x   Wrist AROM 1# 10x3ea 1# 30x ea   1# 30x ea           Standing YTI                Assisted flex, ecc abd Pain!       Wall walks 5x flex                                       Ther Activity                        Gait Training                        Modalities

## 2024-07-08 NOTE — PROGRESS NOTES
Ambulatory Visit  Name: Jamee Shaikh      : 1955      MRN: [id number]  Encounter Provider: GUS Sosa  Encounter Date: 2024   Encounter department: [facility] PRIMARY CARE    Assessment & Plan   1. Essential hypertension  -     amLODIPine (NORVASC) 5 mg tablet; Take 2 tablets (10 mg total) by mouth daily  2. Pain of left upper extremity  -     Diclofenac Sodium (VOLTAREN) 1 %; Apply 2 g topically 4 (four) times a day         History of Present Illness     Here for BP check- her BP at home has been much better since increasing Amlodipine to 10mg daily. She reports she has had pain in her left shoulder and is questioning if this would have affected her BP. Her pain continues- would like Voltaren gel refilled.   She is willing to continue 10mg daily, did not notice any lower leg swelling. She will continue to monitor- if she needs 1 daily she will only take 1, will increase to 2 if BP is high      Review of Systems   Constitutional:  Negative for activity change, diaphoresis, fatigue and fever.   HENT:  Negative for congestion, facial swelling, hearing loss, rhinorrhea, sinus pressure, sinus pain, sneezing, sore throat and voice change.    Eyes:  Negative for discharge and visual disturbance.   Respiratory:  Negative for cough, choking, chest tightness, shortness of breath, wheezing and stridor.    Cardiovascular:  Negative for chest pain, palpitations and leg swelling.   Gastrointestinal:  Negative for abdominal distention, abdominal pain, constipation, diarrhea, nausea and vomiting.   Endocrine: Negative for polydipsia, polyphagia and polyuria.   Genitourinary:  Negative for difficulty urinating, dysuria, frequency and urgency.   Musculoskeletal:  Positive for arthralgias. Negative for back pain, gait problem, joint swelling, neck pain and neck stiffness.   Skin:  Negative for color change, rash and wound.   Neurological:  Negative for dizziness, syncope, speech difficulty,  "weakness, light-headedness and headaches.   Hematological:  Negative for adenopathy. Does not bruise/bleed easily.   Psychiatric/Behavioral:  Negative for agitation, behavioral problems, confusion, hallucinations, sleep disturbance and suicidal ideas. The patient is not nervous/anxious.      Past Medical History:   Diagnosis Date    Anemia     Arthritis     Bilateral bunions     Chronic pain disorder     back and hip pain    Colon polyp     Disease of thyroid gland     nodules    History of COVID-2020    mild s/s    History of gastroesophageal reflux (GERD)     \"had hiatal hernia surgery\"    History of heart murmur in childhood     Hyperlipidemia     Hypertension     Overactive bladder     PONV (postoperative nausea and vomiting)     once after knee surgery    Rotator cuff tear, right     had surgery    Vitamin D deficiency     unsure    Wears glasses     Wears partial dentures     upper     Past Surgical History:   Procedure Laterality Date     SECTION      Last Assessed: 2017    COLONOSCOPY      ELBOW SURGERY      Last Assessed: 2017    ESOPHAGOGASTRODUODENOSCOPY N/A 2017    Procedure: ESOPHAGOGASTRODUODENOSCOPY (EGD);  Surgeon: Jolly Lin DO;  Location: [facility] GI LAB;  Service: Gastroenterology    HIP SURGERY Left 2018    glut medius/minimus repair  and 18--with pin implanted    HYSTERECTOMY          PARAESOPHAGEAL HERNIA REPAIR N/A 2020    Procedure: LAPAROSCOPIC PARAESOPHAGEAL HERNIA REPAIR WITH MESH AND NISSEN FUNDOPLICATION WITH ROBOTICS;  Surgeon: Darrin Leon MD;  Location: [facility];  Service: General    IL ARTHRP KNE CONDYLE&PLATU MEDIAL&LAT COMPARTMENTS Left 03/15/2023    Procedure: ARTHROPLASTY KNEE TOTAL;  Surgeon: Bart Potts DO;  Location: [facility];  Service: Orthopedics    IL ARTHRS KNE SURG W/MENISCECTOMY MED/LAT W/SHVG Left 06/15/2022    Procedure: ;left knee arthroscopy, meniscectomy,synevectomy,chondroplasty, loose body removal, " injection;  Surgeon: Bart Potts DO;  Location: [facility];  Service: Orthopedics    HI COLONOSCOPY FLX DX W/COLLJ SPEC WHEN PFRMD N/A 06/26/2017    Procedure: EGD AND COLONOSCOPY;  Surgeon: Jolly Lin DO;  Location: [facility] GI LAB;  Service: Gastroenterology    HI EXCISON TUMOR SOFT TISSUE THIGH/KNEE SUBQ 3 CM/> Bilateral 05/26/2022    Procedure: EXCISION BIOPSY TISSUE LESION/MASS LOWER EXTREMITY;  Surgeon: Kit Tavera MD;  Location: [facility];  Service: General    HI EXCISON TUMOR SOFT TISSUE THIGH/KNEE SUBQ 3 CM/> Bilateral 08/25/2022    Procedure: EXCISION BIOPSY TISSUE LESION/MASS LOWER EXTREMITY;  Surgeon: Kit Tavera MD;  Location: [facility];  Service: General    HI OSTECTOMY PRTL 5TH METAR HEAD SPX Bilateral 8/15/2023    Procedure: BUNIONECTOMY TAILOR WITHOUT OSTEOTOMY;  Surgeon: Monet Pradhan DPM;  Location: [facility];  Service: Podiatry    HI SURGICAL ARTHROSCOPY JENNIFER W/CORACOACRM LIGM RLS Left 12/13/2023    Procedure: Left - ARTHROSCOPY SHOULDER  Synovectomy Debridement/chondroplasty Acromioplasty Arthroscopic rotator cuff repair Post arthroscopic injection of intra-articular joint space and peripheral portals;  Surgeon: Bart Potts DO;  Location: [facility];  Service: Orthopedics    SHOULDER ARTHROCENTESIS      SHOULDER ARTHROSCOPY Right 06/23/2021    Procedure: SHOULDER ARTHROSCOPY WITH acromioplasty, debridment, and ROTATOR CUFF REPAIR;  Surgeon: Bart Potts DO;  Location:  MAIN OR;  Service: Orthopedics    TUBAL LIGATION       Family History   Problem Relation Age of Onset    Colon cancer Mother     Heart attack Father     Other Father         Cardiac Disorder    Diabetes type II Father     Colon cancer Sister     No Known Problems Sister     No Known Problems Sister     No Known Problems Sister     No Known Problems Sister     No Known Problems Daughter     No Known Problems Maternal Aunt     No Known Problems Maternal Aunt     No Known Problems Maternal Aunt      Cancer Maternal Grandmother     No Known Problems Paternal Grandmother      Social History     Tobacco Use    Smoking status: Former     Current packs/day: 0.00     Average packs/day: 1.5 packs/day for 30.0 years (45.0 ttl pk-yrs)     Types: Cigarettes     Start date: 1961     Quit date: 1991     Years since quittin.5    Smokeless tobacco: Never    Tobacco comments:     quit 25 yrs ago   Vaping Use    Vaping status: Never Used   Substance and Sexual Activity    Alcohol use: Never    Drug use: Never    Sexual activity: Not Currently     Comment: defer     Current Outpatient Medications on File Prior to Visit   Medication Sig    atorvastatin (LIPITOR) 10 mg tablet Take 1 tablet (10 mg total) by mouth daily    famotidine (PEPCID) 20 mg tablet take 1 tablet by mouth twice a day (Patient taking differently: Take 20 mg by mouth if needed)    hydrOXYzine HCL (ATARAX) 25 mg tablet take 1 tablet by mouth every 6 hours if needed for itching    lidocaine (Lidoderm) 5 % Apply 2 patches topically over 12 hours daily Remove & Discard patch within 12 hours or as directed by MD (Patient taking differently: Apply 2 patches topically if needed Remove & Discard patch within 12 hours or as directed by MD)    losartan (COZAAR) 100 MG tablet Take 1 tablet (100 mg total) by mouth daily    valACYclovir (VALTREX) 1,000 mg tablet take 1 tablet by mouth twice a day for 3 days (Patient taking differently: if needed)    [DISCONTINUED] amLODIPine (NORVASC) 5 mg tablet Take 1 tablet (5 mg total) by mouth daily (Patient taking differently: Take 10 mg by mouth daily)    linaCLOtide 72 MCG CAPS Take 72 mcg by mouth in the morning (Patient not taking: Reported on 2024)    triamcinolone (KENALOG) 0.1 % cream Apply topically 2 (two) times a day as needed (itching) (Patient not taking: Reported on 2024)    [DISCONTINUED] Diclofenac Sodium (VOLTAREN) 1 % Apply 2 g topically 4 (four) times a day (Patient not taking: Reported on  "7/8/2024)    [DISCONTINUED] meloxicam (MOBIC) 15 mg tablet Take 1 tablet (15 mg total) by mouth daily (Patient not taking: Reported on 7/8/2024)    [DISCONTINUED] methocarbamol (Robaxin-750) 750 mg tablet Take 1 tablet (750 mg total) by mouth every 8 (eight) hours (Patient not taking: Reported on 2/12/2024)     Allergies   Allergen Reactions    Hydrocodone Irritability     Immunization History   Administered Date(s) Administered    COVID-19 PFIZER VACCINE 0.3 ML IM 05/01/2021, 05/22/2021    Influenza, injectable, quadrivalent, preservative free 0.5 mL 12/28/2018    Influenza, recombinant, quadrivalent,injectable, preservative free 12/02/2020    Tuberculin Skin Test-PPD Intradermal 01/16/2017, 09/20/2023     Objective     /72   Pulse 78   Temp 98 °F (36.7 °C)   Resp 18   Ht 5' 3\" (1.6 m)   Wt 71.7 kg (158 lb)   SpO2 98%   BMI 27.99 kg/m²     Physical Exam  Vitals and nursing note reviewed.   Constitutional:       General: She is not in acute distress.     Appearance: She is well-developed. She is not diaphoretic.   Neck:      Thyroid: No thyromegaly.      Trachea: No tracheal deviation.   Cardiovascular:      Rate and Rhythm: Normal rate and regular rhythm.      Heart sounds: Murmur heard.   Pulmonary:      Effort: Pulmonary effort is normal. No respiratory distress.      Breath sounds: Normal breath sounds. No wheezing.   Musculoskeletal:         General: Tenderness (left shoulder- following with ortho) present. No deformity. Normal range of motion.      Cervical back: Normal range of motion and neck supple.      Right lower leg: No edema.      Left lower leg: No edema.   Skin:     General: Skin is warm and dry.      Findings: No erythema or rash.   Neurological:      Mental Status: She is alert and oriented to person, place, and time.   Psychiatric:         Mood and Affect: Mood normal.         Behavior: Behavior normal. Behavior is cooperative.         Thought Content: Thought content normal.         " Judgment: Judgment normal.       Administrative Statements

## 2024-07-09 ENCOUNTER — TELEPHONE (OUTPATIENT)
Age: 69
End: 2024-07-09

## 2024-07-09 NOTE — TELEPHONE ENCOUNTER
Caller: Patient    Doctor: Katlyn    Reason for call:     Patient checking on status of her [place] [employer] paperwork, submitted on 7/1/24, advised in process and takes about 10-14 business days for processing.  acknowledged    Call back#: n/a

## 2024-07-11 ENCOUNTER — OFFICE VISIT (OUTPATIENT)
Dept: PHYSICAL THERAPY | Facility: CLINIC | Age: 69
End: 2024-07-11
Payer: COMMERCIAL

## 2024-07-11 DIAGNOSIS — S46.212D STRAIN OF BICEPS TENDON, LEFT, SUBSEQUENT ENCOUNTER: Primary | ICD-10-CM

## 2024-07-11 DIAGNOSIS — S46.212D STRAIN OF LEFT BICEPS, SUBSEQUENT ENCOUNTER: ICD-10-CM

## 2024-07-11 DIAGNOSIS — M79.602 PAIN OF LEFT UPPER EXTREMITY: ICD-10-CM

## 2024-07-11 PROCEDURE — 97110 THERAPEUTIC EXERCISES: CPT | Performed by: PHYSICAL THERAPIST

## 2024-07-11 PROCEDURE — 97112 NEUROMUSCULAR REEDUCATION: CPT | Performed by: PHYSICAL THERAPIST

## 2024-07-11 NOTE — PROGRESS NOTES
Daily Note     Today's date: 2024  Patient name: Jamee Shaikh  : 1955  MRN: [id number]  Referring provider: Nazario Duque,*  Dx:   Encounter Diagnosis     ICD-10-CM    1. Strain of biceps tendon, left, subsequent encounter  S46.212D       2. Pain of left upper extremity  M79.602       3. Strain of left biceps, subsequent encounter  S46.212D           Start Time: 0815  Stop Time: 855  Total time in clinic (min): 40 minutes    Subjective: No change. Ortho consult next week       Objective: See treatment diary below      Assessment: Tolerated treatment fair. No MT today due to guarding and limited benefit. Pain with active movement and isometrics of LUE including wrist and elbow. Patient exhibited good technique with therapeutic exercises and would benefit from continued PT      Plan: Continue per plan of care.  Progress treatment as tolerated.       Precautions: none noted  Access Code: [email or web address]  Progress note:   POC: 8/3    Manuals 7/8 7/11 6/4 7/1 7/3   Vitals: Seated RUE post /78 mmhg 132/82mmhg, 73bpm   Seated /93    Stretching with active release    guarded L bicep   GH jt mobs    guarded Grade II   Scapular PNF        IASTM        Neuro Re-Ed        UBE L1 seated 5 min L1 seated 5 min   L1 seated 5min   scap retraction 10x 10x  10x 10x   Shoulder rolls 10x 10x  10x 10x   Chin tucks :03x10 :03x10  10x :03x10   MTP/LTP Red 10x Red 15x   Red 10x2   Elbow isometrics :05x10 elbow flex and ext :05x10 elbow flex and ext   :05x10 elbow flex and ext   Ther Ex   HEP review and ROM/strength testing     Pulleys Flex 10 x Flex 10x Flex 10x Flex 10x Flex 10x           Shoulder flexion rock backs 10x 10x   10x   Table slides        pendulums 10x ea 10x  10x ea 10x ea    Supine flexion ROM        Supine ER ROM        AAROM L elbow flex/ext 10x AAROM 10x AAROM   10x AAROM   Sup/pron 1# 10x3 1# 10x3   1# 30x   Wrist AROM 1# 10x3ea 1# 10x3ea   1# 30x ea           Standing YTI                 Assisted flex, ecc abd Pain!       Wall walks 5x flex 5x                                      Ther Activity                        Gait Training                        Modalities

## 2024-07-15 ENCOUNTER — OFFICE VISIT (OUTPATIENT)
Dept: OBGYN CLINIC | Facility: CLINIC | Age: 69
End: 2024-07-15
Payer: COMMERCIAL

## 2024-07-15 ENCOUNTER — OFFICE VISIT (OUTPATIENT)
Dept: PHYSICAL THERAPY | Facility: CLINIC | Age: 69
End: 2024-07-15
Payer: COMMERCIAL

## 2024-07-15 VITALS
DIASTOLIC BLOOD PRESSURE: 80 MMHG | HEART RATE: 75 BPM | WEIGHT: 158 LBS | HEIGHT: 63 IN | BODY MASS INDEX: 28 KG/M2 | SYSTOLIC BLOOD PRESSURE: 120 MMHG

## 2024-07-15 DIAGNOSIS — S46.212D STRAIN OF BICEPS TENDON, LEFT, SUBSEQUENT ENCOUNTER: Primary | ICD-10-CM

## 2024-07-15 DIAGNOSIS — S46.212D STRAIN OF LEFT BICEPS, SUBSEQUENT ENCOUNTER: ICD-10-CM

## 2024-07-15 DIAGNOSIS — M79.602 PAIN OF LEFT UPPER EXTREMITY: ICD-10-CM

## 2024-07-15 PROCEDURE — 97112 NEUROMUSCULAR REEDUCATION: CPT | Performed by: PHYSICAL THERAPIST

## 2024-07-15 PROCEDURE — 97110 THERAPEUTIC EXERCISES: CPT | Performed by: PHYSICAL THERAPIST

## 2024-07-15 PROCEDURE — 99214 OFFICE O/P EST MOD 30 MIN: CPT | Performed by: STUDENT IN AN ORGANIZED HEALTH CARE EDUCATION/TRAINING PROGRAM

## 2024-07-15 RX ORDER — MELOXICAM 15 MG/1
15 TABLET ORAL DAILY
Qty: 28 TABLET | Refills: 0 | Status: SHIPPED | OUTPATIENT
Start: 2024-07-15

## 2024-07-15 NOTE — PROGRESS NOTES
"Daily Note     Today's date: 7/15/2024  Patient name: Jamee Shaikh  : 1955  MRN: [id number]  Referring provider: Nazario Duque,*  Dx:   Encounter Diagnosis     ICD-10-CM    1. Strain of biceps tendon, left, subsequent encounter  S46.212D       2. Pain of left upper extremity  M79.602       3. Strain of left biceps, subsequent encounter  S46.212D           Start Time: 0940  Stop Time: 1010  Total time in clinic (min): 30 minutes    Subjective: No change. PQ 8/10 today \"as always, it doesn't go away\". To have second opinion later today. Noticed bilateral feet swelling after increasing BP medication. Spoke to doctor who instructed her to take 1 pill, check BP and take 1/2 pill later in the day if high.       Objective: See treatment diary below      Assessment: Tolerated treatment fair. Continues to have increased left shoulder pain with active UE movement including wrist and elbow. Does not tolerate passive shoulder movement in MT due to increased shoulder pain and guarding. Patient would benefit from continued PT      Plan: Continue per plan of care.  Progress treatment as tolerated.       Precautions: none noted  Access Code: [email or web address]  Progress note:   POC: 8/3    Manuals 7/8 7/11 7/15 7/1 7/3   Vitals: Seated RUE post /78 mmhg 132/82mmhg, 73bpm   Seated /93    Stretching with active release    guarded L bicep   GH jt mobs    guarded Grade II   Scapular PNF        IASTM        Neuro Re-Ed        UBE L1 seated 5 min L1 seated 5 min L1 seated 5 min  L1 seated 5min   scap retraction 10x 10x 10x 10x 10x   Shoulder rolls 10x 10x 10x 10x 10x   Chin tucks :03x10 :03x10 :03x10 10x :03x10   MTP/LTP Red 10x Red 15x Red 15x  Red 10x2   Elbow isometrics :05x10 elbow flex and ext :05x10 elbow flex and ext :05x10 elbow flex and ext  :05x10 elbow flex and ext   Ther Ex        Pulleys Flex 10 x Flex 10x Flex 10x Flex 10x Flex 10x           Shoulder flexion rock backs 10x 10x 10x  10x   Table " slides        pendulums 10x ea 10x 10x 10x ea 10x ea    Supine flexion ROM        Supine ER ROM        AAROM L elbow flex/ext 10x AAROM 10x AAROM 10x AAROM  10x AAROM   Sup/pron 1# 10x3 1# 10x3 1# 10x3  1# 30x   Wrist AROM 1# 10x3ea 1# 10x3ea 1# 10x3ea  1# 30x ea           Standing YTI                Assisted flex, ecc abd Pain!       Wall walks 5x flex 5x 5x                                     Ther Activity                        Gait Training                        Modalities

## 2024-07-15 NOTE — PROGRESS NOTES
Ortho Sports Medicine Shoulder New Patient Visit     Assesment:   69 y.o. female with left biceps pain for 3 months after rotator cuff repair in December 2023 with MRI indicating long head of the biceps tendon rupture    Plan:  I reviewed the history, exam, imaging with the patient in clinic today.  I did review the patient's MRI which shows healing rotator cuff repair with no evidence of rerupture.  There does appear to be evidence of long head of the biceps tendon rupture.  On exam, patient has significant tenderness to palpation over the biceps muscle belly with possible subtle Chris deformity and weakness with elbow flexion and shoulder forward flexion.  I discussed with the patient that symptoms from a long head of the biceps tendon rupture tend to resolve quickly without residual pain or significant weakness.  I discussed that given that she has had persistent pain and weakness for several months after the injury I recommend getting an MRI of the left humerus to evaluate the biceps muscle belly as well as the position of the tendon in the arm.  I discussed that further treatment would depend on the findings of the MRI.  I did prescribe her meloxicam to help with inflammation and pain control.  I will see the patient back after the MRI to review the results and discuss further treatment options. The patient demonstrated understanding of the discussion and was in agreement with the plan.  All of the questions were answered.  Patient can reach out to clinic with any questions or concerns at any time.        Conservative treatment:  Ice to shoulder 1-2 times daily, for 20 minutes at a time.  Home exercise program for shoulder, including ROM and strenthening.  Instructions given to patient of what exercises to perform.  Prescription for meloxicam provided    Imaging:  All imaging from today was reviewed by myself and explained to the patient.   We will obtain an MRI of the humerus to evaluate for LHB rupture and  "bicep muscle strain    Injection:  No Injection planned at this time.    Surgery:   No surgery is recommended at this point, continue with conservative treatment plan as noted.    Follow up:  Return for MRI review.      Chief Complaint   Patient presents with    Left Shoulder - Follow-up, Pain         History of Present Illness:  The patient is a 69 y.o. female seen in clinic for left shoulder pain.  The patient previously had a rotator cuff repair with Dr. Potts in 2023.  She was doing well until April when she was reaching up over a fence and she felt a sharp pain in her anterior shoulder radiating down to her bicep.  She followed up with Dr. Potts who had an MRI of the left shoulder which showed possible long head of the biceps rupture.  She is continue to do physical therapy since injury but states that her symptoms are getting worse.  She localizes the pain over the bicep muscle belly.  She states that the pain is worse with forward flexion of the shoulder and elbow flexion.  She has not been taking pain medications.  She denies any numbness or tingling radiating down into the fingers.  She denies any neck pain.      Past Medical, Social and Family History:  Past Medical History:   Diagnosis Date    Anemia     Arthritis     Bilateral bunions     Chronic pain disorder     back and hip pain    Colon polyp     Disease of thyroid gland     nodules    History of COVID-19     mild s/s    History of gastroesophageal reflux (GERD)     \"had hiatal hernia surgery\"    History of heart murmur in childhood     Hyperlipidemia     Hypertension     Overactive bladder     PONV (postoperative nausea and vomiting)     once after knee surgery    Rotator cuff tear, right     had surgery    Vitamin D deficiency     unsure    Wears glasses     Wears partial dentures     upper     Past Surgical History:   Procedure Laterality Date     SECTION      Last Assessed: 2017    COLONOSCOPY      ELBOW SURGERY      Last " Assessed: 5/31/2017    ESOPHAGOGASTRODUODENOSCOPY N/A 12/18/2017    Procedure: ESOPHAGOGASTRODUODENOSCOPY (EGD);  Surgeon: Jolly Lin DO;  Location: [facility];  Service: Gastroenterology    HIP SURGERY Left 02/08/2018    glut medius/minimus repair  and 8/8/18--with pin implanted    HYSTERECTOMY      2011    PARAESOPHAGEAL HERNIA REPAIR N/A 05/19/2020    Procedure: LAPAROSCOPIC PARAESOPHAGEAL HERNIA REPAIR WITH MESH AND NISSEN FUNDOPLICATION WITH ROBOTICS;  Surgeon: Darrin Leon MD;  Location: [facility];  Service: General    NJ ARTHRP KNE CONDYLE&PLATU MEDIAL&LAT COMPARTMENTS Left 03/15/2023    Procedure: ARTHROPLASTY KNEE TOTAL;  Surgeon: Bart Potts DO;  Location: [facility];  Service: Orthopedics    NJ ARTHRS KNE SURG W/MENISCECTOMY MED/LAT W/SHVG Left 06/15/2022    Procedure: ;left knee arthroscopy, meniscectomy,synevectomy,chondroplasty, loose body removal, injection;  Surgeon: Bart Potts DO;  Location: [facility];  Service: Orthopedics    NJ COLONOSCOPY FLX DX W/COLLJ SPEC WHEN PFRMD N/A 06/26/2017    Procedure: EGD AND COLONOSCOPY;  Surgeon: Jolly Lin DO;  Location: [facility];  Service: Gastroenterology    NJ EXCISON TUMOR SOFT TISSUE THIGH/KNEE SUBQ 3 CM/> Bilateral 05/26/2022    Procedure: EXCISION BIOPSY TISSUE LESION/MASS LOWER EXTREMITY;  Surgeon: Kit Tavera MD;  Location: [facility];  Service: General    NJ EXCISON TUMOR SOFT TISSUE THIGH/KNEE SUBQ 3 CM/> Bilateral 08/25/2022    Procedure: EXCISION BIOPSY TISSUE LESION/MASS LOWER EXTREMITY;  Surgeon: Kit Tavera MD;  Location: [facility];  Service: General    NJ OSTECTOMY PRTL 5TH METAR HEAD SPX Bilateral 8/15/2023    Procedure: BUNIONECTOMY TAILOR WITHOUT OSTEOTOMY;  Surgeon: Monet Pradhan DPM;  Location: [facility];  Service: Podiatry    NJ SURGICAL ARTHROSCOPY JENNIFER W/CORACOACRM LIGM RLS Left 12/13/2023    Procedure: Left - ARTHROSCOPY SHOULDER  Synovectomy Debridement/chondroplasty Acromioplasty  Arthroscopic rotator cuff repair Post arthroscopic injection of intra-articular joint space and peripheral portals;  Surgeon: Bart Potts DO;  Location: [facility];  Service: Orthopedics    SHOULDER ARTHROCENTESIS      SHOULDER ARTHROSCOPY Right 06/23/2021    Procedure: SHOULDER ARTHROSCOPY WITH acromioplasty, debridment, and ROTATOR CUFF REPAIR;  Surgeon: Bart Potts DO;  Location:  MAIN OR;  Service: Orthopedics    TUBAL LIGATION       Allergies   Allergen Reactions    Hydrocodone Irritability     Current Outpatient Medications on File Prior to Visit   Medication Sig Dispense Refill    amLODIPine (NORVASC) 5 mg tablet Take 2 tablets (10 mg total) by mouth daily 180 tablet 1    atorvastatin (LIPITOR) 10 mg tablet Take 1 tablet (10 mg total) by mouth daily 90 tablet 3    famotidine (PEPCID) 20 mg tablet take 1 tablet by mouth twice a day (Patient taking differently: Take 20 mg by mouth if needed) 180 tablet 3    hydrOXYzine HCL (ATARAX) 25 mg tablet take 1 tablet by mouth every 6 hours if needed for itching 180 tablet 1    lidocaine (Lidoderm) 5 % Apply 2 patches topically over 12 hours daily Remove & Discard patch within 12 hours or as directed by MD (Patient taking differently: Apply 2 patches topically if needed Remove & Discard patch within 12 hours or as directed by MD) 60 patch 5    losartan (COZAAR) 100 MG tablet Take 1 tablet (100 mg total) by mouth daily 90 tablet 1    [DISCONTINUED] Diclofenac Sodium (VOLTAREN) 1 % Apply 2 g topically 4 (four) times a day 350 g 2    linaCLOtide 72 MCG CAPS Take 72 mcg by mouth in the morning (Patient not taking: Reported on 7/8/2024) 90 capsule 1    triamcinolone (KENALOG) 0.1 % cream Apply topically 2 (two) times a day as needed (itching) (Patient not taking: Reported on 7/8/2024) 30 g 1    valACYclovir (VALTREX) 1,000 mg tablet take 1 tablet by mouth twice a day for 3 days (Patient taking differently: if needed) 6 tablet 0     No current facility-administered  medications on file prior to visit.     Social History     Socioeconomic History    Marital status: /Civil Union     Spouse name: Not on file    Number of children: Not on file    Years of education: Not on file    Highest education level: Not on file   Occupational History    Not on file   Tobacco Use    Smoking status: Former     Current packs/day: 0.00     Average packs/day: 1.5 packs/day for 30.0 years (45.0 ttl pk-yrs)     Types: Cigarettes     Start date: 1961     Quit date: 1991     Years since quittin.5    Smokeless tobacco: Never    Tobacco comments:     quit 25 yrs ago   Vaping Use    Vaping status: Never Used   Substance and Sexual Activity    Alcohol use: Never    Drug use: Never    Sexual activity: Not Currently     Comment: defer   Other Topics Concern    Not on file   Social History Narrative    Caffeine use    , 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[employer], 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    2 grown children     Social Determinants of Health     Financial Resource Strain: Medium Risk (3/4/2024)    Overall Financial Resource Strain (CARDIA)     Difficulty of Paying Living Expenses: Somewhat hard   Food Insecurity: No Food Insecurity (3/16/2023)    Hunger Vital Sign     Worried About Running Out of Food in the Last Year: Never true     Ran Out of Food in the Last Year: Never true   Transportation Needs: No Transportation Needs (3/4/2024)    PRAPARE - Transportation     Lack of Transportation (Medical): No     Lack of Transportation (Non-Medical): No   Physical Activity: Inactive (2020)    Exercise Vital Sign     Days of Exercise per Week: 0 days     Minutes of Exercise per Session: 0 min   Stress: Not on file   Social Connections: Not on file   Intimate Partner Violence: Not on file   Housing Stability: Low Risk  (3/16/2023)    Housing Stability Vital Sign     Unable to Pay for Housing in the Last Year: No     Number of Places Lived in the Last Year:  "1     Unstable Housing in the Last Year: No       I have reviewed the past medical, surgical, social and family history, medications and allergies as documented in the EMR.    Review of systems: ROS is negative other than that noted in the HPI.  Constitutional: Negative for fatigue and fever.      Physical Exam:    Blood pressure 120/80, pulse 75, height 5' 3\" (1.6 m), weight 71.7 kg (158 lb).    General/Constitutional: NAD, well developed, well nourished  HENT: Normocephalic, atraumatic  CV: Intact distal pulses, regular rate  Resp: No respiratory distress or labored breathing  Neuro: Alert and Oriented x 3  Psych: Normal mood, normal affect, normal judgement, normal behavior  Skin: Warm, dry, no rashes, no erythema      Focused left shoulder exam:  No paracervical tenderness.   No cervical tenderness.   No pain with neck flexion, extension, side-to-side bending, or rotation.   Negative Spurling's bilaterally.    The skin is intact without evidence of erythema or ecchymosis. Symmetric shoulders with no evidence of supraspinatus or infraspinatus muscle atrophy. There is no evidence neurologic medial or lateral scapular winging. Normal scapular positioning.    Palpation demonstrates no tenderness over the SC joint, bilateral clavicle, lateral aspect of the acromion or posterior joint line, AC joint, or bicipital groove.      Patient has tenderness over the biceps muscle belly.  Patient has a subtle Chris deformity compared to the contralateral side.  Negative hook test.    Shoulder ROM demonstrates 170 degrees of active with pain and 170 degrees of passive forward elevation. External rotation with the arms at the side demonstrates 60 degree of active and 60 degrees of passive motion.  Internal rotation is limited due to pain.        Strength testing demonstrates 4/5 strength in empty can position, 4+/5 with resisted external rotation with the arm at the side.  5/5 strength of the subscapularis and a negative belly " press.     Provocative testing for the following demonstrate-  Impingement- negative Hawkin's impingement test, negative Neer impingement sign.  Biceps-positive Yeagerson, positive Speeds test.      UE NV Exam: +2 Radial pulses bilaterally. Fingers are warm and well-perfused.  Sensation intact to light touch C5-T1 bilaterally, Radial/median/ulnar nerve motor intact      Shoulder Imaging    MRI of the left shoulder was obtained on 4/21/2020 and reviewed with the patient.  Based on my independent evaluation, the imaging shows healing rotator cuff repair with hardware in appropriate position.  There does appear to be rupture of the long head of the biceps.

## 2024-07-16 ENCOUNTER — HOSPITAL ENCOUNTER (OUTPATIENT)
Dept: MRI IMAGING | Facility: HOSPITAL | Age: 69
Discharge: HOME | End: 2024-07-16
Attending: STUDENT IN AN ORGANIZED HEALTH CARE EDUCATION/TRAINING PROGRAM
Payer: COMMERCIAL

## 2024-07-16 DIAGNOSIS — S46.212D STRAIN OF BICEPS TENDON, LEFT, SUBSEQUENT ENCOUNTER: ICD-10-CM

## 2024-07-16 PROCEDURE — 73218 MRI UPPER EXTREMITY W/O DYE: CPT

## 2024-07-17 ENCOUNTER — TELEPHONE (OUTPATIENT)
Age: 69
End: 2024-07-17

## 2024-07-17 NOTE — TELEPHONE ENCOUNTER
Caller: Self    Doctor/Office: Katlyn    CB#: [phone]      What needs to be faxed: AVS from 4/16, 4/26, 6/14, 7/15    ATTN to: Claim #[id number]     Fax#: [phone] [facility] [place]      Documents were successfully e-faxed

## 2024-07-18 ENCOUNTER — APPOINTMENT (OUTPATIENT)
Dept: PHYSICAL THERAPY | Facility: CLINIC | Age: 69
End: 2024-07-18
Payer: COMMERCIAL

## 2024-07-22 ENCOUNTER — APPOINTMENT (OUTPATIENT)
Dept: PHYSICAL THERAPY | Facility: CLINIC | Age: 69
End: 2024-07-22
Payer: COMMERCIAL

## 2024-07-22 ENCOUNTER — OFFICE VISIT (OUTPATIENT)
Dept: OBGYN CLINIC | Facility: CLINIC | Age: 69
End: 2024-07-22
Payer: COMMERCIAL

## 2024-07-22 VITALS
BODY MASS INDEX: 28.35 KG/M2 | HEIGHT: 63 IN | HEART RATE: 64 BPM | DIASTOLIC BLOOD PRESSURE: 91 MMHG | WEIGHT: 160 LBS | SYSTOLIC BLOOD PRESSURE: 157 MMHG

## 2024-07-22 DIAGNOSIS — M75.42 IMPINGEMENT SYNDROME OF LEFT SHOULDER: Primary | ICD-10-CM

## 2024-07-22 PROCEDURE — 99214 OFFICE O/P EST MOD 30 MIN: CPT | Performed by: STUDENT IN AN ORGANIZED HEALTH CARE EDUCATION/TRAINING PROGRAM

## 2024-07-22 RX ORDER — GABAPENTIN 100 MG/1
100 CAPSULE ORAL
Qty: 60 CAPSULE | Refills: 0 | Status: SHIPPED | OUTPATIENT
Start: 2024-07-22

## 2024-07-22 NOTE — PROGRESS NOTES
Ortho Sports Medicine Shoulder New Patient Visit     Assesment:   69 y.o. female with left biceps pain for 3 months after rotator cuff repair in December 2023 with MRI indicating long head of the biceps tendon rupture    Plan:  I reviewed the history, exam, imaging with the patient in clinic today.  I did review the patient's MRI which shows healing rotator cuff repair with no evidence of rerupture.  There does appear to be evidence of long head of the biceps tendon rupture.  On exam, patient has significant tenderness to palpation over the biceps muscle belly with possible subtle Chris deformity and weakness with elbow flexion and shoulder forward flexion.  I discussed with the patient that symptoms from a long head of the biceps tendon rupture tend to resolve quickly without residual pain or significant weakness. Given MRI results and exam, I discussed trial of Gabapentin for patient's nerve pain associated with her left bicep muscle belly.   I will see the patient back in 4 weeks for repeat evaluation. The patient demonstrated understanding of the discussion and was in agreement with the plan.  All of the questions were answered.  Patient can reach out to clinic with any questions or concerns at any time.        Conservative treatment:  Ice to shoulder 1-2 times daily, for 20 minutes at a time.  Home exercise program for shoulder, including ROM and strenthening.  Instructions given to patient of what exercises to perform.  Prescription for gabapentin provided.    Imaging:  All imaging from today was reviewed by myself and explained to the patient.     Injection:  No Injection planned at this time.    Surgery:   No surgery is recommended at this point, continue with conservative treatment plan as noted.    Follow up:  Return in about 4 weeks (around 8/19/2024).      Chief Complaint   Patient presents with    Left Shoulder - Follow-up, Pain         History of Present Illness:  The patient is a 69 y.o. female seen  "in clinic for left shoulder pain.  The patient previously had a rotator cuff repair with Dr. Potts in 2023.  She was doing well until April when she was reaching up over a fence and she felt a sharp pain in her anterior shoulder radiating down to her bicep.  She followed up with Dr. Potts who had an MRI of the left shoulder which showed possible long head of the biceps rupture.  She is continue to do physical therapy since injury but states that her symptoms are getting worse.  She localizes the pain over the bicep muscle belly.  She states that the pain is worse with forward flexion of the shoulder and elbow flexion.  She has not been taking pain medications.  She denies any numbness or tingling radiating down into the fingers.  She denies any neck pain.      Past Medical, Social and Family History:  Past Medical History:   Diagnosis Date    Anemia     Arthritis     Bilateral bunions     Chronic pain disorder     back and hip pain    Colon polyp     Disease of thyroid gland     nodules    History of COVID-2020    mild s/s    History of gastroesophageal reflux (GERD)     \"had hiatal hernia surgery\"    History of heart murmur in childhood     Hyperlipidemia     Hypertension     Overactive bladder     PONV (postoperative nausea and vomiting)     once after knee surgery    Rotator cuff tear, right     had surgery    Vitamin D deficiency     unsure    Wears glasses     Wears partial dentures     upper     Past Surgical History:   Procedure Laterality Date     SECTION      Last Assessed: 2017    COLONOSCOPY      ELBOW SURGERY      Last Assessed: 2017    ESOPHAGOGASTRODUODENOSCOPY N/A 2017    Procedure: ESOPHAGOGASTRODUODENOSCOPY (EGD);  Surgeon: Jolly Lin DO;  Location: [facility];  Service: Gastroenterology    HIP SURGERY Left 2018    glut medius/minimus repair  and 18--with pin implanted    HYSTERECTOMY          PARAESOPHAGEAL HERNIA REPAIR N/A 2020    " Procedure: LAPAROSCOPIC PARAESOPHAGEAL HERNIA REPAIR WITH MESH AND NISSEN FUNDOPLICATION WITH ROBOTICS;  Surgeon: Darrin Leon MD;  Location: [facility];  Service: General    HI ARTHRP KNE CONDYLE&PLATU MEDIAL&LAT COMPARTMENTS Left 03/15/2023    Procedure: ARTHROPLASTY KNEE TOTAL;  Surgeon: Bart Potts DO;  [facility]: [facility];  Service: Orthopedics    HI ARTHRS KNE SURG W/MENISCECTOMY MED/LAT W/SHVG Left 06/15/2022    Procedure: ;left knee arthroscopy, meniscectomy,synevectomy,chondroplasty, loose body removal, injection;  Surgeon: Bart Potts DO;  [facility];  Service: Orthopedics    HI COLONOSCOPY FLX DX W/COLLJ SPEC WHEN PFRMD N/A 06/26/2017    Procedure: EGD AND COLONOSCOPY;  Surgeon: Jolly Lin DO;  Location: [facility];  Service: Gastroenterology    HI EXCISON TUMOR SOFT TISSUE THIGH/KNEE SUBQ 3 CM/> Bilateral 05/26/2022    Procedure: EXCISION BIOPSY TISSUE LESION/MASS LOWER EXTREMITY;  Surgeon: Kit Tavera MD;  [facility]: [facility];  Service: General    HI EXCISON TUMOR SOFT TISSUE THIGH/KNEE SUBQ 3 CM/> Bilateral 08/25/2022    Procedure: EXCISION BIOPSY TISSUE LESION/MASS LOWER EXTREMITY;  Surgeon: Kit Tavera MD;  [facility];  Service: General    HI OSTECTOMY PRTL 5TH METAR HEAD SPX Bilateral 8/15/2023    Procedure: BUNIONECTOMY TAILOR WITHOUT OSTEOTOMY;  Surgeon: Monet Pradhan DPM;  [facility];  Service: Podiatry    HI SURGICAL ARTHROSCOPY JENNIFER W/CORACOACRM LIGM RLS Left 12/13/2023    Procedure: Left - ARTHROSCOPY SHOULDER  Synovectomy Debridement/chondroplasty Acromioplasty Arthroscopic rotator cuff repair Post arthroscopic injection of intra-articular joint space and peripheral portals;  Surgeon: Bart Potts DO;  [facility];  Service: Orthopedics    SHOULDER ARTHROCENTESIS      SHOULDER ARTHROSCOPY Right 06/23/2021    Procedure: SHOULDER ARTHROSCOPY WITH acromioplasty, debridment, and ROTATOR CUFF REPAIR;  Surgeon:  Bart Potts DO;  Location:  MAIN OR;  Service: Orthopedics    TUBAL LIGATION       Allergies   Allergen Reactions    Hydrocodone Irritability     Current Outpatient Medications on File Prior to Visit   Medication Sig Dispense Refill    amLODIPine (NORVASC) 5 mg tablet Take 2 tablets (10 mg total) by mouth daily 180 tablet 1    atorvastatin (LIPITOR) 10 mg tablet Take 1 tablet (10 mg total) by mouth daily 90 tablet 3    famotidine (PEPCID) 20 mg tablet take 1 tablet by mouth twice a day (Patient taking differently: Take 20 mg by mouth if needed) 180 tablet 3    hydrOXYzine HCL (ATARAX) 25 mg tablet take 1 tablet by mouth every 6 hours if needed for itching 180 tablet 1    lidocaine (Lidoderm) 5 % Apply 2 patches topically over 12 hours daily Remove & Discard patch within 12 hours or as directed by MD (Patient taking differently: Apply 2 patches topically if needed Remove & Discard patch within 12 hours or as directed by MD) 60 patch 5    losartan (COZAAR) 100 MG tablet Take 1 tablet (100 mg total) by mouth daily 90 tablet 1    meloxicam (Mobic) 15 mg tablet Take 1 tablet (15 mg total) by mouth daily 28 tablet 0    linaCLOtide 72 MCG CAPS Take 72 mcg by mouth in the morning (Patient not taking: Reported on 7/8/2024) 90 capsule 1    triamcinolone (KENALOG) 0.1 % cream Apply topically 2 (two) times a day as needed (itching) (Patient not taking: Reported on 7/22/2024) 30 g 1    valACYclovir (VALTREX) 1,000 mg tablet take 1 tablet by mouth twice a day for 3 days (Patient taking differently: if needed) 6 tablet 0     No current facility-administered medications on file prior to visit.     Social History     Socioeconomic History    Marital status: /Civil Union     Spouse name: Not on file    Number of children: Not on file    Years of education: Not on file    Highest education level: Not on file   Occupational History    Not on file   Tobacco Use    Smoking status: Former     Current packs/day: 0.00     Average  "packs/day: 1.5 packs/day for 30.0 years (45.0 ttl pk-yrs)     Types: Cigarettes     Start date: 1961     Quit date: 1991     Years since quittin.5    Smokeless tobacco: Never    Tobacco comments:     quit 25 yrs ago   Vaping Use    Vaping status: Never Used   Substance and Sexual Activity    Alcohol use: Never    Drug use: Never    Sexual activity: Not Currently     Comment: defer   Other Topics Concern    Not on file   Social History Narrative    Caffeine use    , 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[employer], now at [employer] for OR    2 grown children     Social Determinants of Health     Financial Resource Strain: Medium Risk (3/4/2024)    Overall Financial Resource Strain (CARDIA)     Difficulty of Paying Living Expenses: Somewhat hard   Food Insecurity: No Food Insecurity (3/16/2023)    Hunger Vital Sign     Worried About Running Out of Food in the Last Year: Never true     Ran Out of Food in the Last Year: Never true   Transportation Needs: No Transportation Needs (3/4/2024)    PRAPARE - Transportation     Lack of Transportation (Medical): No     Lack of Transportation (Non-Medical): No   Physical Activity: Inactive (2020)    Exercise Vital Sign     Days of Exercise per Week: 0 days     Minutes of Exercise per Session: 0 min   Stress: Not on file   Social Connections: Not on file   Intimate Partner Violence: Not on file   Housing Stability: Low Risk  (3/16/2023)    Housing Stability Vital Sign     Unable to Pay for Housing in the Last Year: No     Number of Places Lived in the Last Year: 1     Unstable Housing in the Last Year: No       I have reviewed the past medical, surgical, social and family history, medications and allergies as documented in the EMR.    Review of systems: ROS is negative other than that noted in the HPI.  Constitutional: Negative for fatigue and fever.      Physical Exam:    Blood pressure 157/91, pulse 64, height 5' 3\" (1.6 m), " weight 72.6 kg (160 lb).    General/Constitutional: NAD, well developed, well nourished  HENT: Normocephalic, atraumatic  CV: Intact distal pulses, regular rate  Resp: No respiratory distress or labored breathing  Neuro: Alert and Oriented x 3  Psych: Normal mood, normal affect, normal judgement, normal behavior  Skin: Warm, dry, no rashes, no erythema      Focused left shoulder exam:  No paracervical tenderness.   No cervical tenderness.   No pain with neck flexion, extension, side-to-side bending, or rotation.   Negative Spurling's bilaterally.    The skin is intact without evidence of erythema or ecchymosis. Symmetric shoulders with no evidence of supraspinatus or infraspinatus muscle atrophy. There is no evidence neurologic medial or lateral scapular winging. Normal scapular positioning.    Palpation demonstrates no tenderness over the SC joint, bilateral clavicle, lateral aspect of the acromion or posterior joint line, AC joint, or bicipital groove.      Patient has tenderness over the biceps muscle belly.  Patient has a subtle Chris deformity compared to the contralateral side.  Negative hook test.    Shoulder ROM demonstrates 170 degrees of active with pain and 170 degrees of passive forward elevation. External rotation with the arms at the side demonstrates 60 degree of active and 60 degrees of passive motion.  Internal rotation is limited due to pain.        Strength testing demonstrates 4/5 strength in empty can position, 4+/5 with resisted external rotation with the arm at the side.  5/5 strength of the subscapularis and a negative belly press.     Provocative testing for the following demonstrate-  Impingement- negative Hawkin's impingement test, negative Neer impingement sign.  Biceps-positive Yeagerson, positive Speeds test.      UE NV Exam: +2 Radial pulses bilaterally. Fingers are warm and well-perfused.  Sensation intact to light touch C5-T1 bilaterally, Radial/median/ulnar nerve motor  intact      Shoulder Imaging    MRI of the left shoulder was obtained on 4/21/2020 and reviewed with the patient.  Based on my independent evaluation, the imaging shows healing rotator cuff repair with hardware in appropriate position.  There does appear to be rupture of the long head of the biceps.

## 2024-07-25 ENCOUNTER — APPOINTMENT (OUTPATIENT)
Dept: PHYSICAL THERAPY | Facility: CLINIC | Age: 69
End: 2024-07-25
Payer: COMMERCIAL

## 2024-07-29 ENCOUNTER — APPOINTMENT (OUTPATIENT)
Dept: PHYSICAL THERAPY | Facility: CLINIC | Age: 69
End: 2024-07-29
Payer: COMMERCIAL

## 2024-07-29 ENCOUNTER — OFFICE VISIT (OUTPATIENT)
Dept: OBGYN CLINIC | Facility: CLINIC | Age: 69
End: 2024-07-29
Payer: COMMERCIAL

## 2024-07-29 VITALS
SYSTOLIC BLOOD PRESSURE: 175 MMHG | HEIGHT: 63 IN | DIASTOLIC BLOOD PRESSURE: 90 MMHG | HEART RATE: 73 BPM | BODY MASS INDEX: 28.35 KG/M2 | WEIGHT: 160 LBS

## 2024-07-29 DIAGNOSIS — M25.512 CHRONIC LEFT SHOULDER PAIN: Primary | ICD-10-CM

## 2024-07-29 DIAGNOSIS — G89.29 CHRONIC LEFT SHOULDER PAIN: Primary | ICD-10-CM

## 2024-07-29 PROCEDURE — 99213 OFFICE O/P EST LOW 20 MIN: CPT | Performed by: ORTHOPAEDIC SURGERY

## 2024-07-29 PROCEDURE — 1159F MED LIST DOCD IN RCRD: CPT | Performed by: ORTHOPAEDIC SURGERY

## 2024-07-29 PROCEDURE — 3080F DIAST BP >= 90 MM HG: CPT | Performed by: ORTHOPAEDIC SURGERY

## 2024-07-29 PROCEDURE — 3077F SYST BP >= 140 MM HG: CPT | Performed by: ORTHOPAEDIC SURGERY

## 2024-07-29 PROCEDURE — 1160F RVW MEDS BY RX/DR IN RCRD: CPT | Performed by: ORTHOPAEDIC SURGERY

## 2024-07-29 PROCEDURE — 20610 DRAIN/INJ JOINT/BURSA W/O US: CPT | Performed by: ORTHOPAEDIC SURGERY

## 2024-07-29 RX ORDER — BUPIVACAINE HYDROCHLORIDE 2.5 MG/ML
4 INJECTION, SOLUTION INFILTRATION; PERINEURAL
Status: COMPLETED | OUTPATIENT
Start: 2024-07-29 | End: 2024-07-29

## 2024-07-29 RX ORDER — TRIAMCINOLONE ACETONIDE 40 MG/ML
80 INJECTION, SUSPENSION INTRA-ARTICULAR; INTRAMUSCULAR
Status: COMPLETED | OUTPATIENT
Start: 2024-07-29 | End: 2024-07-29

## 2024-07-29 RX ADMIN — TRIAMCINOLONE ACETONIDE 80 MG: 40 INJECTION, SUSPENSION INTRA-ARTICULAR; INTRAMUSCULAR at 08:45

## 2024-07-29 RX ADMIN — BUPIVACAINE HYDROCHLORIDE 4 ML: 2.5 INJECTION, SOLUTION INFILTRATION; PERINEURAL at 08:45

## 2024-07-29 NOTE — PROGRESS NOTES
"ASSESSMENT/PLAN:    Diagnoses and all orders for this visit:    Chronic left shoulder pain    Other orders  -     Large joint arthrocentesis        The patient was seen and examined.  Possible treatment options were discussed with the patient.  Her biceps tendon sheath was injected with Kenalog and Marcaine.  She tolerated injection quite well.  She will follow-up with our office in 3 months.  The patient is acceptable to this plan.    Return in about 3 months (around 10/29/2024).    Clinically, the patient has bicipital tendinitis around a chronically torn biceps tendon.  The biceps tendon sheath was injected with Kenalog and Marcaine.  She tolerated procedure quite well.  Return back in 3 months for reevaluation      _____________________________________________________  CHIEF COMPLAINT:  Chief Complaint   Patient presents with    Left Shoulder - Follow-up         SUBJECTIVE:  Jamee Shaikh is a 69 y.o. female who presents to our office for a follow-up visit.  The patient is still complaining of left shoulder pain with decreased range of motion.  She has began treatment with gabapentin by Dr. Acevedo.  She denies any fever or chills.    The following portions of the patient's history were reviewed and updated as appropriate: allergies, current medications, past family history, past medical history, past social history, past surgical history and problem list.    PAST MEDICAL HISTORY:  Past Medical History:   Diagnosis Date    Anemia     Arthritis     Bilateral bunions     Chronic pain disorder     back and hip pain    Colon polyp     Disease of thyroid gland     nodules    History of COVID-19 2020    mild s/s    History of gastroesophageal reflux (GERD)     \"had hiatal hernia surgery\"    History of heart murmur in childhood     Hyperlipidemia     Hypertension     Overactive bladder     PONV (postoperative nausea and vomiting)     once after knee surgery    Rotator cuff tear, right     had surgery    Vitamin D " deficiency     unsure    Wears glasses     Wears partial dentures     upper       PAST SURGICAL HISTORY:  Past Surgical History:   Procedure Laterality Date     SECTION      Last Assessed: 2017    COLONOSCOPY      ELBOW SURGERY      Last Assessed: 2017    ESOPHAGOGASTRODUODENOSCOPY N/A 2017    Procedure: ESOPHAGOGASTRODUODENOSCOPY (EGD);  Surgeon: Jolly Lin DO;  Location: [facility];  Service: Gastroenterology    HIP SURGERY Left 2018    glut medius/minimus repair  and 18--with pin implanted    HYSTERECTOMY          PARAESOPHAGEAL HERNIA REPAIR N/A 2020    Procedure: LAPAROSCOPIC PARAESOPHAGEAL HERNIA REPAIR WITH MESH AND NISSEN FUNDOPLICATION WITH ROBOTICS;  Surgeon: Darrin Leon MD;  [facility];  Service: General    OH ARTHRP KNE CONDYLE&PLATU MEDIAL&LAT COMPARTMENTS Left 03/15/2023    Procedure: ARTHROPLASTY KNEE TOTAL;  Surgeon: Bart Potts DO;  [facility];  Service: Orthopedics    OH ARTHRS KNE SURG W/MENISCECTOMY MED/LAT W/SHVG Left 06/15/2022    Procedure: ;left knee arthroscopy, meniscectomy,synevectomy,chondroplasty, loose body removal, injection;  Surgeon: Bart Potts DO;  [facility];  Service: Orthopedics    OH COLONOSCOPY FLX DX W/COLLJ SPEC WHEN PFRMD N/A 2017    Procedure: EGD AND COLONOSCOPY;  Surgeon: Jolly Lin DO;  Location: [facility];  Service: Gastroenterology    OH EXCISON TUMOR SOFT TISSUE THIGH/KNEE SUBQ 3 CM/> Bilateral 2022    Procedure: EXCISION BIOPSY TISSUE LESION/MASS LOWER EXTREMITY;  Surgeon: Kit Tavera MD;  [facility];  Service: General    OH EXCISON TUMOR SOFT TISSUE THIGH/KNEE SUBQ 3 CM/> Bilateral 2022    Procedure: EXCISION BIOPSY TISSUE LESION/MASS LOWER EXTREMITY;  Surgeon: Kit Tavera MD;  [facility];  Service: General    OH OSTECTOMY PRTL 5TH METAR HEAD SPX Bilateral 8/15/2023    Procedure: BUNIONECTOMY TAILOR  WITHOUT OSTEOTOMY;  Surgeon: Monet Pradhan DPM;  [facility];  Service: Podiatry    ND SURGICAL ARTHROSCOPY JENNIFER W/CORACOACRM LIGM RLS Left 2023    Procedure: Left - ARTHROSCOPY SHOULDER  Synovectomy Debridement/chondroplasty Acromioplasty Arthroscopic rotator cuff repair Post arthroscopic injection of intra-articular joint space and peripheral portals;  Surgeon: Bart Potts DO;  [facility];  Service: Orthopedics    SHOULDER ARTHROCENTESIS      SHOULDER ARTHROSCOPY Right 2021    Procedure: SHOULDER ARTHROSCOPY WITH acromioplasty, debridment, and ROTATOR CUFF REPAIR;  Surgeon: Bart Potts DO;  Location:  MAIN OR;  Service: Orthopedics    TUBAL LIGATION         FAMILY HISTORY:  Family History   Problem Relation Age of Onset    Colon cancer Mother     Heart attack Father     Other Father         Cardiac Disorder    Diabetes type II Father     Colon cancer Sister     No Known Problems Sister     No Known Problems Sister     No Known Problems Sister     No Known Problems Sister     No Known Problems Daughter     No Known Problems Maternal Aunt     No Known Problems Maternal Aunt     No Known Problems Maternal Aunt     Cancer Maternal Grandmother     No Known Problems Paternal Grandmother        SOCIAL HISTORY:  Social History     Tobacco Use    Smoking status: Former     Current packs/day: 0.00     Average packs/day: 1.5 packs/day for 30.0 years (45.0 ttl pk-yrs)     Types: Cigarettes     Start date: 1961     Quit date: 1991     Years since quittin.5    Smokeless tobacco: Never    Tobacco comments:     quit 25 yrs ago   Vaping Use    Vaping status: Never Used   Substance Use Topics    Alcohol use: Never    Drug use: Never       MEDICATIONS:    Current Outpatient Medications:     amLODIPine (NORVASC) 5 mg tablet, Take 2 tablets (10 mg total) by mouth daily, Disp: 180 tablet, Rfl: 1    atorvastatin (LIPITOR) 10 mg tablet, Take 1 tablet (10 mg total) by mouth daily, Disp:  90 tablet, Rfl: 3    famotidine (PEPCID) 20 mg tablet, take 1 tablet by mouth twice a day (Patient taking differently: Take 20 mg by mouth if needed), Disp: 180 tablet, Rfl: 3    gabapentin (Neurontin) 100 mg capsule, Take 1 capsule (100 mg total) by mouth daily at bedtime, Disp: 60 capsule, Rfl: 0    hydrOXYzine HCL (ATARAX) 25 mg tablet, take 1 tablet by mouth every 6 hours if needed for itching, Disp: 180 tablet, Rfl: 1    lidocaine (Lidoderm) 5 %, Apply 2 patches topically over 12 hours daily Remove & Discard patch within 12 hours or as directed by MD (Patient taking differently: Apply 2 patches topically if needed Remove & Discard patch within 12 hours or as directed by MD), Disp: 60 patch, Rfl: 5    losartan (COZAAR) 100 MG tablet, Take 1 tablet (100 mg total) by mouth daily, Disp: 90 tablet, Rfl: 1    meloxicam (Mobic) 15 mg tablet, Take 1 tablet (15 mg total) by mouth daily, Disp: 28 tablet, Rfl: 0    triamcinolone (KENALOG) 0.1 % cream, Apply topically 2 (two) times a day as needed (itching), Disp: 30 g, Rfl: 1    linaCLOtide 72 MCG CAPS, Take 72 mcg by mouth in the morning (Patient not taking: Reported on 7/8/2024), Disp: 90 capsule, Rfl: 1    valACYclovir (VALTREX) 1,000 mg tablet, take 1 tablet by mouth twice a day for 3 days (Patient taking differently: if needed), Disp: 6 tablet, Rfl: 0    ALLERGIES:  Allergies   Allergen Reactions    Hydrocodone Irritability       ROS:  Review of Systems     Constitutional: Negative for fatigue, fever or loss of appetite.   HENT: Negative.    Respiratory: Negative for shortness of breath, dyspnea.    Cardiovascular: Negative for chest pain/tightness.   Gastrointestinal: Negative for abdominal pain, N/V.   Endocrine: Negative for cold/heat intolerance, unexplained weight loss/gain.   Genitourinary: Negative for flank pain, dysuria, hematuria.   Musculoskeletal: Positive for arthralgia   Skin: Negative for rash.    Neurological: Negative for numbness or  "tingling  Psychiatric/Behavioral: Negative for agitation.  _____________________________________________________  PHYSICAL EXAMINATION:    Blood pressure (!) 175/90, pulse 73, height 5' 3\" (1.6 m), weight 72.6 kg (160 lb).    Constitutional: Oriented to person, place, and time. Appears well-developed and well-nourished. No distress.   HENT:   Head: Normocephalic.   Eyes: Conjunctivae are normal. Right eye exhibits no discharge. Left eye exhibits no discharge. No scleral icterus.   Cardiovascular: Normal rate.    Pulmonary/Chest: Effort normal.   Neurological: Alert and oriented to person, place, and time.   Skin: Skin is warm and dry. No rash noted. Not diaphoretic. No erythema. No pallor.   Psychiatric: Normal mood and affect. Behavior is normal. Judgment and thought content normal.      MUSCULOSKELETAL EXAMINATION:   Physical Exam  Ortho Exam    Left upper extremity is neurovascularly intact  Fingers are pink and mobile  Compartments are soft  Range of motion of the shoulder is  from 0 to 90 degrees  Generalized tenderness to palpation along bicipital groove  Brisk cap refill  Sensation intact  Objective:  BP Readings from Last 1 Encounters:   07/29/24 (!) 175/90      Wt Readings from Last 1 Encounters:   07/29/24 72.6 kg (160 lb)        BMI:   Estimated body mass index is 28.34 kg/m² as calculated from the following:    Height as of this encounter: 5' 3\" (1.6 m).    Weight as of this encounter: 72.6 kg (160 lb).      PROCEDURES PERFORMED:  Large joint arthrocentesis  Universal Protocol:  Risks and benefits: risks, benefits and alternatives were discussed  Consent given by: patient  Patient understanding: patient states understanding of the procedure being performed  Site marked: the operative site was marked  Patient identity confirmed: verbally with patient  Supporting Documentation  Indications: pain   Procedure Details  Location: shoulder - Shoulder joint: Left biceps tendon sheath.  Preparation: Patient was " prepped and draped in the usual sterile fashion  Needle size: 22 G  Ultrasound guidance: no  Approach: lateral  Medications administered: 4 mL bupivacaine 0.25 %; 80 mg triamcinolone acetonide 40 mg/mL    Patient tolerance: patient tolerated the procedure well with no immediate complications  Dressing:  Sterile dressing applied            Scribe Attestation      I,:  Nazario Duque PA-C am acting as a scribe while in the presence of the attending physician.:       I,:  Bart Potts DO personally performed the services described in this documentation    as scribed in my presence.:

## 2024-08-09 ENCOUNTER — TELEPHONE (OUTPATIENT)
Dept: OBGYN CLINIC | Facility: MEDICAL CENTER | Age: 69
End: 2024-08-09

## 2024-08-09 NOTE — TELEPHONE ENCOUNTER
Caller: Jamee    Doctor:  Dr Potts     Reason for call: I did let Jamee know Her [employer] paperwork was faxed to [facility]. She would like to confirm the date of her being off. She is not seeing Dr oPtts until 10/28.  Thank you     Call back#: [phone]

## 2024-08-13 ENCOUNTER — TELEPHONE (OUTPATIENT)
Age: 69
End: 2024-08-13

## 2024-08-13 NOTE — TELEPHONE ENCOUNTER
Caller: patient    Doctor: Katlyn    Reason for call: pt called the forms that were sent to her work stated that she has no restrictions. She is asking if she has any job restrictions? She is having a hard time moving her arm performing her job duties.     Call back#: [phone]

## 2024-08-13 NOTE — TELEPHONE ENCOUNTER
Caller: patient    Doctor: Katlyn    Reason for call: pt called back, she did receive the message that was left for her, she still has questions work restrictions    Call back#: [phone]

## 2024-08-14 ENCOUNTER — TELEPHONE (OUTPATIENT)
Dept: FAMILY MEDICINE CLINIC | Facility: CLINIC | Age: 69
End: 2024-08-14

## 2024-08-14 NOTE — TELEPHONE ENCOUNTER
Pt dropped off physical form to be filled out    Scanned to chart and put in folder     Call when ready

## 2024-08-15 ENCOUNTER — OFFICE VISIT (OUTPATIENT)
Dept: FAMILY MEDICINE CLINIC | Facility: CLINIC | Age: 69
End: 2024-08-15
Payer: COMMERCIAL

## 2024-08-15 VITALS
OXYGEN SATURATION: 98 % | TEMPERATURE: 98.5 F | WEIGHT: 165 LBS | SYSTOLIC BLOOD PRESSURE: 132 MMHG | HEIGHT: 63 IN | DIASTOLIC BLOOD PRESSURE: 82 MMHG | BODY MASS INDEX: 29.23 KG/M2 | HEART RATE: 88 BPM | RESPIRATION RATE: 18 BRPM

## 2024-08-15 DIAGNOSIS — M54.50 ACUTE LEFT-SIDED LOW BACK PAIN WITHOUT SCIATICA: ICD-10-CM

## 2024-08-15 DIAGNOSIS — S46.219A BICEPS TENDON TEAR: Primary | ICD-10-CM

## 2024-08-15 DIAGNOSIS — S46.212D STRAIN OF BICEPS TENDON, LEFT, SUBSEQUENT ENCOUNTER: ICD-10-CM

## 2024-08-15 DIAGNOSIS — M25.512 ACUTE PAIN OF LEFT SHOULDER: ICD-10-CM

## 2024-08-15 PROCEDURE — 99213 OFFICE O/P EST LOW 20 MIN: CPT | Performed by: NURSE PRACTITIONER

## 2024-08-15 PROCEDURE — G2211 COMPLEX E/M VISIT ADD ON: HCPCS | Performed by: NURSE PRACTITIONER

## 2024-08-15 RX ORDER — CYCLOBENZAPRINE HCL 10 MG
10 TABLET ORAL 3 TIMES DAILY PRN
Qty: 30 TABLET | Refills: 1 | Status: SHIPPED | OUTPATIENT
Start: 2024-08-15

## 2024-08-15 RX ORDER — MELOXICAM 15 MG/1
15 TABLET ORAL DAILY
Qty: 90 TABLET | Refills: 1 | Status: SHIPPED | OUTPATIENT
Start: 2024-08-15

## 2024-08-15 NOTE — PROGRESS NOTES
"Ambulatory Visit  Name: Jamee Shaikh      : 1955      MRN: [id number]  Encounter Provider: GUS Sosa  Encounter Date: 8/15/2024   Encounter department: [facility] PRIMARY CARE    Assessment & Plan   1. Biceps tendon tear  -     Ambulatory Referral to Orthopedic Surgery; Future  2. Acute pain of left shoulder  -     Ambulatory Referral to Orthopedic Surgery; Future  3. Acute left-sided low back pain without sciatica  -     cyclobenzaprine (FLEXERIL) 10 mg tablet; Take 1 tablet (10 mg total) by mouth 3 (three) times a day as needed for muscle spasms  4. Strain of biceps tendon, left, subsequent encounter  -     meloxicam (Mobic) 15 mg tablet; Take 1 tablet (15 mg total) by mouth daily      Depression Screening and Follow-up Plan: Patient was screened for depression during today's encounter. They screened negative with a PHQ-2 score of 0.        History of Present Illness     Had rotator cuff surgery 2023 with Dr. Potts. In 2024 she reached above her head and felt shoulder pain. Went to Urgent Care, told to return to Dr. Potts- he did an MRI and was told she had a torn tendon. Was told by Dr. Potts she strained her bicep, went to physical therapy for 8-12 weeks. In July PT told her to get a second opinion because her pain was not improving in 8 weeks of PT. Second opinion by Dr. Acevedo and had another MRI- told she has a complete tear of long head bicep tendon. She kept appointment with Dr. Potts that week and was given a cortisone shot, told to return in 3 months. Told she should be able to work with her       Review of Systems   Musculoskeletal:  Positive for arthralgias.     Past Medical History:   Diagnosis Date    Anemia     Arthritis     Bilateral bunions     Chronic pain disorder     back and hip pain    Colon polyp     Disease of thyroid gland     nodules    History of COVID-19     mild s/s    History of gastroesophageal reflux (GERD)     \"had hiatal hernia surgery\" "    History of heart murmur in childhood     Hyperlipidemia     Hypertension     Overactive bladder     PONV (postoperative nausea and vomiting)     once after knee surgery    Rotator cuff tear, right     had surgery    Vitamin D deficiency     unsure    Wears glasses     Wears partial dentures     upper     Past Surgical History:   Procedure Laterality Date     SECTION      Last Assessed: 2017    COLONOSCOPY      ELBOW SURGERY      Last Assessed: 2017    ESOPHAGOGASTRODUODENOSCOPY N/A 2017    Procedure: ESOPHAGOGASTRODUODENOSCOPY (EGD);  Surgeon: Jolly Lin DO;  Location: [facility];  Service: Gastroenterology    HIP SURGERY Left 2018    glut medius/minimus repair  and 18--with pin implanted    HYSTERECTOMY          PARAESOPHAGEAL HERNIA REPAIR N/A 2020    Procedure: LAPAROSCOPIC PARAESOPHAGEAL HERNIA REPAIR WITH MESH AND NISSEN FUNDOPLICATION WITH ROBOTICS;  Surgeon: Darrin Leon MD;  Location: [facility];  Service: General    KS ARTHRP KNE CONDYLE&PLATU MEDIAL&LAT COMPARTMENTS Left 03/15/2023    Procedure: ARTHROPLASTY KNEE TOTAL;  Surgeon: Bart Potts DO;  Location: [facility];  Service: Orthopedics    KS ARTHRS KNE SURG W/MENISCECTOMY MED/LAT W/SHVG Left 06/15/2022    Procedure: ;left knee arthroscopy, meniscectomy,synevectomy,chondroplasty, loose body removal, injection;  Surgeon: Bart Potts DO;  Location: [facility];  Service: Orthopedics    KS COLONOSCOPY FLX DX W/COLLJ SPEC WHEN PFRMD N/A 2017    Procedure: EGD AND COLONOSCOPY;  Surgeon: Jolly Lin DO;  Location: [facility];  Service: Gastroenterology    KS EXCISON TUMOR SOFT TISSUE THIGH/KNEE SUBQ 3 CM/> Bilateral 2022    Procedure: EXCISION BIOPSY TISSUE LESION/MASS LOWER EXTREMITY;  Surgeon: Kit Tavera MD;  Location: [facility];  Service: General    KS EXCISON TUMOR SOFT TISSUE THIGH/KNEE SUBQ 3 CM/> Bilateral 2022    Procedure: EXCISION BIOPSY  TISSUE LESION/MASS LOWER EXTREMITY;  Surgeon: Kit Tavera MD;  Location: [facility];  Service: General    NC OSTECTOMY PRTL 5TH METAR HEAD SPX Bilateral 8/15/2023    Procedure: BUNIONECTOMY TAILOR WITHOUT OSTEOTOMY;  Surgeon: Monet Pradhan DPM;  Location: [facility];  Service: Podiatry    NC SURGICAL ARTHROSCOPY JENNIFER W/CORACOACRM LIGM RLS Left 2023    Procedure: Left - ARTHROSCOPY SHOULDER  Synovectomy Debridement/chondroplasty Acromioplasty Arthroscopic rotator cuff repair Post arthroscopic injection of intra-articular joint space and peripheral portals;  Surgeon: Bart Potts DO;  Location: [facility];  Service: Orthopedics    SHOULDER ARTHROCENTESIS      SHOULDER ARTHROSCOPY Right 2021    Procedure: SHOULDER ARTHROSCOPY WITH acromioplasty, debridment, and ROTATOR CUFF REPAIR;  Surgeon: Bart Potts DO;  Location:  MAIN OR;  Service: Orthopedics    TUBAL LIGATION       Family History   Problem Relation Age of Onset    Colon cancer Mother     Heart attack Father     Other Father         Cardiac Disorder    Diabetes type II Father     Colon cancer Sister     No Known Problems Sister     No Known Problems Sister     No Known Problems Sister     No Known Problems Sister     No Known Problems Daughter     No Known Problems Maternal Aunt     No Known Problems Maternal Aunt     No Known Problems Maternal Aunt     Cancer Maternal Grandmother     No Known Problems Paternal Grandmother      Social History     Tobacco Use    Smoking status: Former     Current packs/day: 0.00     Average packs/day: 1.5 packs/day for 30.0 years (45.0 ttl pk-yrs)     Types: Cigarettes     Start date: 1961     Quit date: 1991     Years since quittin.6    Smokeless tobacco: Never    Tobacco comments:     quit 25 yrs ago   Vaping Use    Vaping status: Never Used   Substance and Sexual Activity    Alcohol use: Never    Drug use: Never    Sexual activity: Not Currently     Comment: defer     Current Outpatient  "Medications on File Prior to Visit   Medication Sig    amLODIPine (NORVASC) 5 mg tablet Take 2 tablets (10 mg total) by mouth daily    atorvastatin (LIPITOR) 10 mg tablet Take 1 tablet (10 mg total) by mouth daily    famotidine (PEPCID) 20 mg tablet take 1 tablet by mouth twice a day (Patient taking differently: Take 20 mg by mouth if needed)    gabapentin (Neurontin) 100 mg capsule Take 1 capsule (100 mg total) by mouth daily at bedtime    hydrOXYzine HCL (ATARAX) 25 mg tablet take 1 tablet by mouth every 6 hours if needed for itching    lidocaine (Lidoderm) 5 % Apply 2 patches topically over 12 hours daily Remove & Discard patch within 12 hours or as directed by MD (Patient taking differently: Apply 2 patches topically if needed Remove & Discard patch within 12 hours or as directed by MD)    losartan (COZAAR) 100 MG tablet Take 1 tablet (100 mg total) by mouth daily    triamcinolone (KENALOG) 0.1 % cream Apply topically 2 (two) times a day as needed (itching)    [DISCONTINUED] meloxicam (Mobic) 15 mg tablet Take 1 tablet (15 mg total) by mouth daily    linaCLOtide 72 MCG CAPS Take 72 mcg by mouth in the morning (Patient not taking: Reported on 7/8/2024)    valACYclovir (VALTREX) 1,000 mg tablet take 1 tablet by mouth twice a day for 3 days (Patient taking differently: if needed)     Allergies   Allergen Reactions    Hydrocodone Irritability     Immunization History   Administered Date(s) Administered    COVID-19 PFIZER VACCINE 0.3 ML IM 05/01/2021, 05/22/2021    Influenza, injectable, quadrivalent, preservative free 0.5 mL 12/28/2018    Influenza, recombinant, quadrivalent,injectable, preservative free 12/02/2020    Tuberculin Skin Test-PPD Intradermal 01/16/2017, 09/20/2023     Objective     /82   Pulse 88   Temp 98.5 °F (36.9 °C)   Resp 18   Ht 5' 3\" (1.6 m)   Wt 74.8 kg (165 lb)   SpO2 98%   BMI 29.23 kg/m²     Physical Exam  Vitals and nursing note reviewed.   Constitutional:       General: She is " not in acute distress.     Appearance: She is well-developed. She is not diaphoretic.   Neck:      Thyroid: No thyromegaly.      Trachea: No tracheal deviation.   Cardiovascular:      Rate and Rhythm: Normal rate and regular rhythm.      Heart sounds: Murmur heard.   Pulmonary:      Effort: Pulmonary effort is normal. No respiratory distress.      Breath sounds: Normal breath sounds. No wheezing.   Musculoskeletal:         General: Tenderness present.      Left shoulder: Tenderness present. No swelling. Decreased range of motion (unable to lift or perform any ROM except for at elbow.).      Cervical back: Normal range of motion and neck supple.   Skin:     General: Skin is warm and dry.      Findings: No erythema or rash.   Neurological:      Mental Status: She is alert and oriented to person, place, and time.   Psychiatric:         Mood and Affect: Mood normal.         Behavior: Behavior normal. Behavior is cooperative.         Thought Content: Thought content normal.         Judgment: Judgment normal.

## 2024-08-20 ENCOUNTER — TELEPHONE (OUTPATIENT)
Dept: FAMILY MEDICINE CLINIC | Facility: CLINIC | Age: 69
End: 2024-08-20

## 2024-08-29 NOTE — TELEPHONE ENCOUNTER
Pt called back stating the disability forms have been received that were filled out, however they also need the office visit notes from 8/15/24.  Asking for it to be faxed to ATTN: Vincenzo at [phone]. Please put the claim number on as well, [id number]

## 2024-08-31 DIAGNOSIS — G47.09 OTHER INSOMNIA: ICD-10-CM

## 2024-09-01 RX ORDER — HYDROXYZINE HYDROCHLORIDE 25 MG/1
TABLET, FILM COATED ORAL
Qty: 180 TABLET | Refills: 1 | Status: SHIPPED | OUTPATIENT
Start: 2024-09-01

## 2024-09-06 DIAGNOSIS — M54.50 ACUTE LEFT-SIDED LOW BACK PAIN WITHOUT SCIATICA: ICD-10-CM

## 2024-09-06 RX ORDER — CYCLOBENZAPRINE HCL 10 MG
10 TABLET ORAL 3 TIMES DAILY PRN
Qty: 90 TABLET | Refills: 1 | Status: SHIPPED | OUTPATIENT
Start: 2024-09-06

## 2024-09-06 NOTE — TELEPHONE ENCOUNTER
Patient requesting refill(s) of: Flexeril 10 mg     Last filled: 8/15/24  Last appt: 8/15/24  Next appt:3/17/25  Pharmacy: [facility]

## 2024-09-09 ENCOUNTER — OFFICE VISIT (OUTPATIENT)
Dept: OBGYN CLINIC | Facility: OTHER | Age: 69
End: 2024-09-09
Payer: COMMERCIAL

## 2024-09-09 VITALS
BODY MASS INDEX: 28 KG/M2 | SYSTOLIC BLOOD PRESSURE: 170 MMHG | HEIGHT: 63 IN | WEIGHT: 158 LBS | HEART RATE: 81 BPM | DIASTOLIC BLOOD PRESSURE: 97 MMHG

## 2024-09-09 DIAGNOSIS — M24.812 INTERNAL DERANGEMENT OF LEFT SHOULDER: ICD-10-CM

## 2024-09-09 DIAGNOSIS — Z98.890 S/P LEFT ROTATOR CUFF REPAIR: ICD-10-CM

## 2024-09-09 DIAGNOSIS — S46.112A RUPTURE OF LEFT LONG HEAD BICEPS TENDON, INITIAL ENCOUNTER: Primary | ICD-10-CM

## 2024-09-09 PROCEDURE — 99214 OFFICE O/P EST MOD 30 MIN: CPT | Performed by: ORTHOPAEDIC SURGERY

## 2024-09-09 RX ORDER — TOBRAMYCIN AND DEXAMETHASONE 3; 1 MG/ML; MG/ML
SUSPENSION/ DROPS OPHTHALMIC
COMMUNITY
Start: 2024-07-23

## 2024-09-09 NOTE — PROGRESS NOTES
Assessment  Diagnoses and all orders for this visit:    Rupture of left long head biceps tendon, initial encounter    Internal derangement of left shoulder    S/P left rotator cuff repair      Discussion and Plan:    Explained to the patient that her examination and symptoms are worrisome for a possible continued occult rotator cuff pathology/re tear. She is too painful to rule out a component of adhesive capsulitis but she does have full passive external rotation leading me to believe she does not have adhesive capsulitis is a primary issue  She has tried and failed the continuation of PT/HEP and a cortisone injections after her acute injury post operatively. At this time, she is a candidate to perform a diagnostic arthroscopy to further evaluate the integrity of the rotator cuff as they have suspicion that the repair has failed and or she has recurrently torn  Instructed the patient that I am unable to  her on the appropriate post operative time frame but it may be the 4.5-6 month recovery of a rotator cuff repair. She understood and all questions were answered.   She also has some mild glenohumeral joint osteoarthritis which could be contributing to some of her symptoms and would not be helped with an arthroscopic surgery.  Patient expressed understanding of this  There was also a lot of discussion from her previous opinions regarding her long head biceps tendon, I assured her that it is not likely that is continuing to cause her symptoms and I do not recommend a subpectoral biceps tenodesis as I do not see that improving the symptoms in her shoulder that she is describing and she did express understanding of this as well.  A thorough discussion was performed with the patient regarding the risks and benefit of operative and nonoperative treatment of their continued pain about the shoulder despite non operative treatments.  Risks discussed include but were not limited to infection, neurovascular injury,  "need for rotator cuff repair, stiffness, need for prolonged rehabilitation, as well as the risk of anesthesia.  After this discussion all questions were answered and informed consent was obtained in the office for diagnostic arthroscopy of the left shoulder.  The patient will be scheduled for this procedure accordingly.  Follow up post operatively     Subjective:   Patient ID: Jamee Shaikh is a 69 y.o. female presents with a chief complaint of left shoulder pain.   The pain began 5 month(s) ago and is associated with an acute injury.  Patient reports that she was doing well s/p rotator cuff until she reached overhead and felt a sharp pain in her shoulder. The patient describes the pain as aching and sharp in intensity,  constant, awakening patient from sleep in timing, and localizes the pain to the  left subacromial joint, deltoid, biceps tendon.  The pain is worse with movement and raising arm over head and relieved by rest, ice, avoiding the painful activities.  The pain is not associated with numbness and tingling.  The pain is not associated with constitutional symptoms. The patient is awoken at night by the pain.    The patient has had prior treatment in the form of a Rotator cuff repair on 12/13/23 with Dr Potts.        The following portions of the patient's history were reviewed and updated as appropriate: allergies, current medications, past family history, past medical history, past social history, past surgical history and problem list.    Objective:  /97 Comment: pt to monitor and contact pcp  Pulse 81   Ht 5' 3\" (1.6 m)   Wt 71.7 kg (158 lb)   BMI 27.99 kg/m²       Left Shoulder Exam     Range of Motion   External rotation:  50   Left shoulder forward flexion: AROM: 50. PROM was too painful to perform.     Muscle Strength   Abduction: 4/5     Other   Erythema: absent  Sensation: normal  Pulse: present             Physical Exam  Vitals and nursing note reviewed.   Constitutional:       " Appearance: She is well-developed.   HENT:      Head: Normocephalic and atraumatic.   Eyes:      Pupils: Pupils are equal, round, and reactive to light.   Cardiovascular:      Rate and Rhythm: Normal rate and regular rhythm.      Pulses: Normal pulses.      Heart sounds: Normal heart sounds.   Pulmonary:      Effort: Pulmonary effort is normal. No respiratory distress.      Breath sounds: Normal breath sounds.   Abdominal:      General: There is no distension.      Palpations: Abdomen is soft.   Musculoskeletal:      Cervical back: Normal range of motion and neck supple.   Skin:     General: Skin is warm and dry.   Neurological:      Mental Status: She is alert and oriented to person, place, and time.   Psychiatric:         Mood and Affect: Mood normal.         Behavior: Behavior normal.         Thought Content: Thought content normal.         Judgment: Judgment normal.         I have personally reviewed pertinent films in PACS and my interpretation is as follows.    Post-Op MRI Left Shoulder 4/21/24: Rotator cuff tendinitis without tear s/p supraspinatus tendon repair. Long head biceps tendon rupture. Mild glenohumeral joint osteoarthritis    External Records Reviewed: historical medical records, office notes (Catalina Potts and Curtis, Physical Therapist), operative reports, intra-op photos from prior arthroscopy (Katlyn) and radiology reports    Scribe Attestation      I,:  Fabricio Stone am acting as a scribe while in the presence of the attending physician.:       I,:  Abdullahi Cortez MD personally performed the services described in this documentation    as scribed in my presence.:

## 2024-09-11 ENCOUNTER — ANESTHESIA EVENT (OUTPATIENT)
Dept: PERIOP | Facility: AMBULARY SURGERY CENTER | Age: 69
End: 2024-09-11
Payer: COMMERCIAL

## 2024-09-16 ENCOUNTER — OFFICE VISIT (OUTPATIENT)
Dept: DENTISTRY | Facility: CLINIC | Age: 69
End: 2024-09-16

## 2024-09-16 DIAGNOSIS — Z01.20 ENCOUNTER FOR DENTAL EXAMINATION AND CLEANING WITHOUT ABNORMAL FINDINGS: Primary | ICD-10-CM

## 2024-09-16 PROCEDURE — D0330 PANORAMIC RADIOGRAPHIC IMAGE: HCPCS

## 2024-09-16 PROCEDURE — D0150 COMPREHENSIVE ORAL EVALUATION - NEW OR ESTABLISHED PATIENT: HCPCS

## 2024-09-17 ENCOUNTER — TELEPHONE (OUTPATIENT)
Age: 69
End: 2024-09-17

## 2024-09-17 NOTE — TELEPHONE ENCOUNTER
Caller: Patient    Doctor: Dr. Cortez    Reason for call: Patient calling stating that she is going to be having surgery tomorrow with Dr. Cortez for Left shoulder and patient is asking if a note with estimated time out from work.  Patient stating that it was discussed to possibly be about 12 weeks.  Patient asking if the note can be faxed to the number below.     Attn: [facility]  Fax: [phone]    Call back#: [phone]

## 2024-09-17 NOTE — PRE-PROCEDURE INSTRUCTIONS
Pre-Surgery Instructions:   Medication Instructions    amLODIPine (NORVASC) 5 mg tablet Take day of surgery.    atorvastatin (LIPITOR) 10 mg tablet Take day of surgery.    hydrOXYzine HCL (ATARAX) 25 mg tablet Take night before surgery    losartan (COZAAR) 100 MG tablet Hold day of surgery.    meloxicam (Mobic) 15 mg tablet Stop taking 7 days prior to surgery.  Stopped over 2 weeks ago   Medication instructions for day surgery reviewed. Please use only a sip of water to take your instructed medications. Avoid all over the counter vitamins, supplements and NSAIDS for one week prior to surgery per anesthesia guidelines. Tylenol is ok to take as needed.     You will receive a call one business day prior to surgery with an arrival time and hospital directions. If your surgery is scheduled on a Monday, the hospital will be calling you on the Friday prior to your surgery. If you have not heard from anyone by 8pm, please call the hospital supervisor through the hospital  at [phone]. ([place] [phone] or [place] [phone]).    Do not eat or drink anything after midnight the night before your surgery, including candy, mints, lifesavers, or chewing gum. Do not drink alcohol 24hrs before your surgery. Try not to smoke at least 24hrs before your surgery.       Follow the pre surgery showering instructions as listed in the “My Surgical Experience Booklet” or otherwise provided by your surgeon's office. Do not use a blade to shave the surgical area 1 week before surgery. It is okay to use a clean electric clippers up to 24 hours before surgery. Do not apply any lotions, creams, including makeup, cologne, deodorant, or perfumes after showering on the day of your surgery. Do not use dry shampoo, hair spray, hair gel, or any type of hair products.     No contact lenses, eye make-up, or artificial eyelashes. Remove nail polish, including gel polish, and any artificial, gel, or acrylic nails if possible. Remove  all jewelry including rings and body piercing jewelry.     Wear causal clothing that is easy to take on and off. Consider your type of surgery.    Keep any valuables, jewelry, piercings at home. Please bring any specially ordered equipment (sling, braces) if indicated.    Arrange for a responsible person to drive you to and from the hospital on the day of your surgery. Please confirm the visitor policy for the day of your procedure when you receive your phone call with an arrival time.     Call the surgeon's office with any new illnesses, exposures, or additional questions prior to surgery.    Please reference your “My Surgical Experience Booklet” for additional information to prepare for your upcoming surgery. Bring sling

## 2024-09-17 NOTE — ANESTHESIA PREPROCEDURE EVALUATION
Procedure:  DIAGNOSTIC ARTHROSCOPY SHOULDER POSSIBLE REVISION ROTATOR CUFF REPAIR (Left: Shoulder)    Relevant Problems   CARDIO   (+) Essential hypertension   (+) Hyperlipidemia      GI/HEPATIC   (+) Dysphagia   (+) GERD (gastroesophageal reflux disease)   (+) Gastroesophageal reflux disease   (+) Paraesophageal hernia      MUSCULOSKELETAL   (+) Paraesophageal hernia   (+) Primary osteoarthritis of both knees   (+) Primary osteoarthritis of left knee   (+) Sacroiliitis (HCC)      NEURO/PSYCH   (+) Chronic pain syndrome        Physical Exam    Airway    Mallampati score: II  TM Distance: >3 FB  Neck ROM: full     Dental    upper dentures    Cardiovascular  Cardiovascular exam normal    Pulmonary  Pulmonary exam normal     Other Findings  post-pubertal.      Anesthesia Plan  ASA Score- 2     Anesthesia Type- general with ASA Monitors.         Additional Monitors:     Airway Plan: LMA.    Comment: + PNB.       Plan Factors-    Chart reviewed. EKG reviewed.  Existing labs reviewed. Patient summary reviewed.                  Induction- intravenous.    Postoperative Plan-         Informed Consent- Anesthetic plan and risks discussed with patient.  I personally reviewed this patient with the CRNA. Discussed and agreed on the Anesthesia Plan with the CRNA..

## 2024-09-18 ENCOUNTER — HOSPITAL ENCOUNTER (OUTPATIENT)
Facility: AMBULARY SURGERY CENTER | Age: 69
Setting detail: OUTPATIENT SURGERY
Discharge: HOME | End: 2024-09-18
Attending: ORTHOPAEDIC SURGERY | Admitting: ORTHOPAEDIC SURGERY
Payer: COMMERCIAL

## 2024-09-18 ENCOUNTER — ANESTHESIA (OUTPATIENT)
Dept: PERIOP | Facility: AMBULARY SURGERY CENTER | Age: 69
End: 2024-09-18
Payer: COMMERCIAL

## 2024-09-18 VITALS
BODY MASS INDEX: 27.28 KG/M2 | RESPIRATION RATE: 16 BRPM | SYSTOLIC BLOOD PRESSURE: 141 MMHG | TEMPERATURE: 96.8 F | HEART RATE: 73 BPM | DIASTOLIC BLOOD PRESSURE: 81 MMHG | WEIGHT: 154 LBS | OXYGEN SATURATION: 94 %

## 2024-09-18 DIAGNOSIS — S46.112D RUPTURE OF LEFT LONG HEAD BICEPS TENDON, SUBSEQUENT ENCOUNTER: ICD-10-CM

## 2024-09-18 DIAGNOSIS — M24.812 INTERNAL DERANGEMENT OF LEFT SHOULDER: Primary | ICD-10-CM

## 2024-09-18 DIAGNOSIS — Z98.890 S/P LEFT ROTATOR CUFF REPAIR: ICD-10-CM

## 2024-09-18 PROCEDURE — 29827 SHO ARTHRS SRG RT8TR CUF RPR: CPT | Performed by: ORTHOPAEDIC SURGERY

## 2024-09-18 PROCEDURE — C1713 ANCHOR/SCREW BN/BN,TIS/BN: HCPCS | Performed by: ORTHOPAEDIC SURGERY

## 2024-09-18 PROCEDURE — 29823 SHO ARTHRS SRG XTNSV DBRDMT: CPT | Performed by: ORTHOPAEDIC SURGERY

## 2024-09-18 PROCEDURE — 29827 SHO ARTHRS SRG RT8TR CUF RPR: CPT | Performed by: PHYSICIAN ASSISTANT

## 2024-09-18 PROCEDURE — 29826 SHO ARTHRS SRG DECOMPRESSION: CPT | Performed by: PHYSICIAN ASSISTANT

## 2024-09-18 PROCEDURE — NC001 PR NO CHARGE: Performed by: ORTHOPAEDIC SURGERY

## 2024-09-18 PROCEDURE — 29823 SHO ARTHRS SRG XTNSV DBRDMT: CPT | Performed by: PHYSICIAN ASSISTANT

## 2024-09-18 PROCEDURE — 29826 SHO ARTHRS SRG DECOMPRESSION: CPT | Performed by: ORTHOPAEDIC SURGERY

## 2024-09-18 PROCEDURE — C9290 INJ, BUPIVACAINE LIPOSOME: HCPCS | Performed by: ANESTHESIOLOGY

## 2024-09-18 DEVICE — BIO-COMP SWVLK C, CLD 4.75X19.1MM
Type: IMPLANTABLE DEVICE | Site: SHOULDER | Status: FUNCTIONAL
Brand: ARTHREX®

## 2024-09-18 DEVICE — SP FIBERTAK RC, DBLOAD TAPE BL/W, BLK/W
Type: IMPLANTABLE DEVICE | Site: SHOULDER | Status: FUNCTIONAL
Brand: ARTHREX®

## 2024-09-18 RX ORDER — CEFAZOLIN SODIUM 2 G/50ML
2000 SOLUTION INTRAVENOUS ONCE
Status: COMPLETED | OUTPATIENT
Start: 2024-09-18 | End: 2024-09-18

## 2024-09-18 RX ORDER — ONDANSETRON 2 MG/ML
INJECTION INTRAMUSCULAR; INTRAVENOUS AS NEEDED
Status: DISCONTINUED | OUTPATIENT
Start: 2024-09-18 | End: 2024-09-18

## 2024-09-18 RX ORDER — ONDANSETRON 2 MG/ML
4 INJECTION INTRAMUSCULAR; INTRAVENOUS EVERY 6 HOURS PRN
Status: CANCELLED | OUTPATIENT
Start: 2024-09-18

## 2024-09-18 RX ORDER — BUPIVACAINE HYDROCHLORIDE 5 MG/ML
INJECTION, SOLUTION EPIDURAL; INTRACAUDAL AS NEEDED
Status: DISCONTINUED | OUTPATIENT
Start: 2024-09-18 | End: 2024-09-18

## 2024-09-18 RX ORDER — DEXAMETHASONE SODIUM PHOSPHATE 10 MG/ML
INJECTION, SOLUTION INTRAMUSCULAR; INTRAVENOUS AS NEEDED
Status: DISCONTINUED | OUTPATIENT
Start: 2024-09-18 | End: 2024-09-18

## 2024-09-18 RX ORDER — LIDOCAINE HYDROCHLORIDE 10 MG/ML
INJECTION, SOLUTION EPIDURAL; INFILTRATION; INTRACAUDAL; PERINEURAL AS NEEDED
Status: DISCONTINUED | OUTPATIENT
Start: 2024-09-18 | End: 2024-09-18

## 2024-09-18 RX ORDER — SODIUM CHLORIDE, SODIUM LACTATE, POTASSIUM CHLORIDE, CALCIUM CHLORIDE 600; 310; 30; 20 MG/100ML; MG/100ML; MG/100ML; MG/100ML
INJECTION, SOLUTION INTRAVENOUS CONTINUOUS PRN
Status: DISCONTINUED | OUTPATIENT
Start: 2024-09-18 | End: 2024-09-18

## 2024-09-18 RX ORDER — FENTANYL CITRATE/PF 50 MCG/ML
25 SYRINGE (ML) INJECTION
Status: DISCONTINUED | OUTPATIENT
Start: 2024-09-18 | End: 2024-09-18 | Stop reason: HOSPADM

## 2024-09-18 RX ORDER — MIDAZOLAM HYDROCHLORIDE 2 MG/2ML
INJECTION, SOLUTION INTRAMUSCULAR; INTRAVENOUS AS NEEDED
Status: DISCONTINUED | OUTPATIENT
Start: 2024-09-18 | End: 2024-09-18

## 2024-09-18 RX ORDER — EPHEDRINE SULFATE 50 MG/ML
INJECTION INTRAVENOUS AS NEEDED
Status: DISCONTINUED | OUTPATIENT
Start: 2024-09-18 | End: 2024-09-18

## 2024-09-18 RX ORDER — ONDANSETRON 2 MG/ML
4 INJECTION INTRAMUSCULAR; INTRAVENOUS ONCE AS NEEDED
Status: DISCONTINUED | OUTPATIENT
Start: 2024-09-18 | End: 2024-09-18 | Stop reason: HOSPADM

## 2024-09-18 RX ORDER — OXYCODONE HYDROCHLORIDE 5 MG/1
5 TABLET ORAL EVERY 4 HOURS PRN
Status: DISCONTINUED | OUTPATIENT
Start: 2024-09-18 | End: 2024-09-18 | Stop reason: HOSPADM

## 2024-09-18 RX ORDER — OXYCODONE HYDROCHLORIDE 5 MG/1
5 TABLET ORAL EVERY 6 HOURS PRN
Qty: 13 TABLET | Refills: 0 | Status: SHIPPED | OUTPATIENT
Start: 2024-09-18

## 2024-09-18 RX ORDER — ACETAMINOPHEN 325 MG/1
650 TABLET ORAL EVERY 6 HOURS PRN
Status: CANCELLED | OUTPATIENT
Start: 2024-09-18

## 2024-09-18 RX ORDER — FENTANYL CITRATE 50 UG/ML
INJECTION, SOLUTION INTRAMUSCULAR; INTRAVENOUS AS NEEDED
Status: DISCONTINUED | OUTPATIENT
Start: 2024-09-18 | End: 2024-09-18

## 2024-09-18 RX ORDER — PROPOFOL 10 MG/ML
INJECTION, EMULSION INTRAVENOUS AS NEEDED
Status: DISCONTINUED | OUTPATIENT
Start: 2024-09-18 | End: 2024-09-18

## 2024-09-18 RX ADMIN — SODIUM CHLORIDE, SODIUM LACTATE, POTASSIUM CHLORIDE, AND CALCIUM CHLORIDE: .6; .31; .03; .02 INJECTION, SOLUTION INTRAVENOUS at 12:40

## 2024-09-18 RX ADMIN — EPHEDRINE SULFATE 5 MG: 50 INJECTION INTRAVENOUS at 13:34

## 2024-09-18 RX ADMIN — CEFAZOLIN SODIUM 2000 MG: 2 SOLUTION INTRAVENOUS at 12:40

## 2024-09-18 RX ADMIN — EPHEDRINE SULFATE 10 MG: 50 INJECTION INTRAVENOUS at 13:05

## 2024-09-18 RX ADMIN — LIDOCAINE HYDROCHLORIDE 50 MG: 10 INJECTION, SOLUTION EPIDURAL; INFILTRATION; INTRACAUDAL; PERINEURAL at 12:47

## 2024-09-18 RX ADMIN — PROPOFOL 50 MG: 10 INJECTION, EMULSION INTRAVENOUS at 13:40

## 2024-09-18 RX ADMIN — MIDAZOLAM 2 MG: 1 INJECTION INTRAMUSCULAR; INTRAVENOUS at 11:59

## 2024-09-18 RX ADMIN — ONDANSETRON 4 MG: 2 INJECTION INTRAMUSCULAR; INTRAVENOUS at 12:47

## 2024-09-18 RX ADMIN — DEXAMETHASONE SODIUM PHOSPHATE 10 MG: 10 INJECTION INTRAMUSCULAR; INTRAVENOUS at 12:47

## 2024-09-18 RX ADMIN — PROPOFOL 200 MG: 10 INJECTION, EMULSION INTRAVENOUS at 12:47

## 2024-09-18 RX ADMIN — BUPIVACAINE 20 ML: 13.3 INJECTION, SUSPENSION, LIPOSOMAL INFILTRATION at 12:05

## 2024-09-18 RX ADMIN — FENTANYL CITRATE 100 MCG: 50 INJECTION INTRAMUSCULAR; INTRAVENOUS at 11:59

## 2024-09-18 RX ADMIN — BUPIVACAINE HYDROCHLORIDE 10 ML: 5 INJECTION, SOLUTION EPIDURAL; INTRACAUDAL at 12:05

## 2024-09-18 NOTE — OP NOTE
OPERATIVE REPORT  PATIENT NAME: Jamee Shaikh    :  1955  MRN: [id number]  Pt Location: [facility] OR ROOM 06    SURGERY DATE: 2024     SURGEON: Abdullahi Cortez MD     ASSISTANT: Alyssa Moe PA-C     NOTE: Alyssa Moe PA-C was present throughout the entire procedure and performed essential assistance with patient prepping, draping, positioning, suture management, wound closure, sterile dressing application and sling application, all under my direct supervision.     NOTE: No qualified resident physician was available for assistance    PREOPERATIVE DIAGNOSIS:  Left Shoulder Suspected Recurrent Supraspinatus Tear    POSTOPERATIVE DIAGNOSIS: Left Shoulder Recurrent Supraspinatus Tear and Moderate Glenohumeral Osteoarthritis    PROCEDURES: Surgical Arthroscopy Left Shoulder with Revision Rotator Cuff Repair, Extensive Debridement, and Revision Subacromial Decompression    ANESTHESIA STAFF: John Jasso MD     ANESTHESIA TYPE: General LMA with ultrasound guided interscalene block (Exparel). The interscalene block was provided by the anesthesia staff per my request for postoperative pain control and to decrease the use of postoperative narcotic medication for pain control.    COMPLICATIONS: None    FINDINGS: Recurrent Supraspinatus Tear and Moderate Glenohumeral Osteoarthritis    SPECIMEN(S):  None    ESTIMATED BLOOD LOSS: Minimal    INDICATION:  Briefly, the patient is a 69 y.o.  female with the new onset of left shoulder pain following an arthroscopic rotator cuff repair.  Postoperative MRI scan did not clearly identify persistent pathology given the patient's continued pain, the effect the pain was having upon her daily activities and her inability to return to functional activities following her procedure she did elect for diagnostic arthroscopy with evaluation and possible revision rotator cuff repair.  Her clinical examination was consistent with recurrent rotator cuff pathology.  Informed  consent was obtained after a thorough discussion of the risks and benefits of the procedure, as well as alternatives to the procedure.     OPERATIVE TECHNIQUE:  On the day of surgery, I identified the patient’s left shoulder and marked it with my initials. The patient was taken to the operating room where anesthesia was induced and 2 grams of IV Cefazolin were given. The patient was examined in the supine position and was found to have full range of motion of the left shoulder with no instability. The patient was then positioned in the semilateral [facility] chair position. All bony prominences were padded. The shoulder was prepped and draped in normal sterile fashion. After a time-out for safety, a standard posterior arthroscopic portal was made. Glenohumeral evaluation revealed moderate degenerative changes of the humeral head and the glenoid with areas of full-thickness cartilage loss over the superior and anterior humeral head.  No loose bodies were seen on x-ray pouch or subscap recess.  The long head biceps tendon rupture was confirmed with a stump remaining in the glenohumeral joint and there was indeed a full-thickness recurrent tear of the anterior supraspinatus in the area of the previous repair.  This was confirmed to be the area of the previous repair as there were sutures still in the tendon and extending from the tuberosity in this location.  An anterior cannula was established and extensive debridement of the glenohumeral joint was performed using a shaving device to a stable border.  Structures debrided include the humeral head articular cartilage, the glenoid articular cartilage, the circumferential glenoid labrum, the long head biceps tendon anchor stump (this was debrided to the superior labrum).   After the extensive debridement the recurrent supraspinatus tear was tagged with a PDS stitch for evaluation of the subacromial side.   After the intra-articular work was completed, the scope was  then placed in the subacromial space through posterolateral portal where a thorough bursectomy was performed. The full thickness, recurrent supraspinatus tear was found to measure 1.2 cm from anterior to posterior and was able to be easily reduced to the tuberosity however the tendon itself did have significant degenerative change and still held suture from the prior repair, all suture that was easily removed was but we left suture that would have resulted in destruction of the tendon if I try to remove all of it, the retained suture did not impede our ability to repair the tendon.  The tuberosity was prepared in routine fashion and a double row suture bridge configuration repair with one medial 2.6 mm double loaded Arthrex All-Suture anchor and two lateral 4.75 mm Arthrex BioComposite SwiveLock anchors using four stiches through the tendon in horizontal mattress fashion was performed achieving anatomic reduction of the rotator cuff tendon to the tuberosity.  The CA ligament was recurrently scarred and frayed so a revision decompression was performed, the CA ligament was released off the anterolateral edge of acromion and a gentle revision acromioplasty was performed. The area was then irrigated. Scope was withdrawn. Wounds were closed with 4-0 Monocryl and Histoacryl. Sterile dressings and a sling with an abduction pillow was placed. The patient was awoken without complication and returned to the recovery room in good condition. We will see the patient back in the office next week to initiate therapy following standard rotator cuff repair rehabilitation protocol. At the end of procedure, the counts were correct.     PATIENT DISPOSITION:  Stable to PACU      SIGNATURE: Abdullahi Cortez MD  DATE: September 18, 2024  TIME: 1:46 PM

## 2024-09-18 NOTE — ANESTHESIA PROCEDURE NOTES
Peripheral Block    Patient location during procedure: holding area  Start time: 9/18/2024 12:05 PM  Reason for block: at surgeon's request and post-op pain management  Staffing  Performed by: John Jasso MD  Authorized by: John Jasso MD    Preanesthetic Checklist  Completed: patient identified, IV checked, site marked, risks and benefits discussed, surgical consent, monitors and equipment checked, pre-op evaluation and timeout performed  Peripheral Block  Patient position: sitting  Prep: ChloraPrep  Patient monitoring: frequent blood pressure checks, continuous pulse oximetry and heart rate  Block type: Interscalene  Laterality: left  Injection technique: single-shot  Procedures: ultrasound guided, Ultrasound guidance required for the procedure to increase accuracy and safety of medication placement and decrease risk of complications.  Ultrasound permanent image saved    Needle  Needle type: Stimuplex   Needle gauge: 20 G  Needle length: 4 in  Needle localization: anatomical landmarks and ultrasound guidance  Assessment  Injection assessment: incremental injection, frequent aspiration, injected with ease, negative aspiration, negative for heart rate change, no paresthesia on injection, no symptoms of intraneural/intravenous injection and needle tip visualized at all times  Paresthesia pain: none  Post-procedure:  site cleaned  patient tolerated the procedure well with no immediate complications

## 2024-09-18 NOTE — ANESTHESIA POSTPROCEDURE EVALUATION
Post-Op Assessment Note    CV Status:  Stable  Pain Score: 0    Pain management: adequate       Mental Status:  Alert and awake   Hydration Status:  Euvolemic   PONV Controlled:  Controlled   Airway Patency:  Patent     Post Op Vitals Reviewed: Yes    No anethesia notable event occurred.    Staff: CRNA               BP   129/64   Temp   97.5   Pulse  78   Resp   18   SpO2   97

## 2024-09-18 NOTE — H&P
I identified and marked the patient in the pre-op holding area after confirming the surgical consent.  No changes to medical health since the H&P was preformed.     The patient's [facility] to ensure compliance with applicable state laws.       Assessment  Diagnoses and all orders for this visit:     Rupture of left long head biceps tendon, initial encounter     Internal derangement of left shoulder     S/P left rotator cuff repair        Discussion and Plan:     Explained to the patient that her examination and symptoms are worrisome for a possible continued occult rotator cuff pathology/re tear. She is too painful to rule out a component of adhesive capsulitis but she does have full passive external rotation leading me to believe she does not have adhesive capsulitis is a primary issue  She has tried and failed the continuation of PT/HEP and a cortisone injections after her acute injury post operatively. At this time, she is a candidate to perform a diagnostic arthroscopy to further evaluate the integrity of the rotator cuff as they have suspicion that the repair has failed and or she has recurrently torn  Instructed the patient that I am unable to  her on the appropriate post operative time frame but it may be the 4.5-6 month recovery of a rotator cuff repair. She understood and all questions were answered.   She also has some mild glenohumeral joint osteoarthritis which could be contributing to some of her symptoms and would not be helped with an arthroscopic surgery.  Patient expressed understanding of this  There was also a lot of discussion from her previous opinions regarding her long head biceps tendon, I assured her that it is not likely that is continuing to cause her symptoms and I do not recommend a subpectoral biceps tenodesis as I do not see that improving the symptoms in her shoulder that she is describing and she did express  "understanding of this as well.  A thorough discussion was performed with the patient regarding the risks and benefit of operative and nonoperative treatment of their continued pain about the shoulder despite non operative treatments.  Risks discussed include but were not limited to infection, neurovascular injury, need for rotator cuff repair, stiffness, need for prolonged rehabilitation, as well as the risk of anesthesia.  After this discussion all questions were answered and informed consent was obtained in the office for diagnostic arthroscopy of the left shoulder.  The patient will be scheduled for this procedure accordingly.  Follow up post operatively      Subjective:   Patient ID: Jamee Shaikh is a 69 y.o. female presents with a chief complaint of left shoulder pain.   The pain began 5 month(s) ago and is associated with an acute injury.  Patient reports that she was doing well s/p rotator cuff until she reached overhead and felt a sharp pain in her shoulder. The patient describes the pain as aching and sharp in intensity,  constant, awakening patient from sleep in timing, and localizes the pain to the  left subacromial joint, deltoid, biceps tendon.  The pain is worse with movement and raising arm over head and relieved by rest, ice, avoiding the painful activities.  The pain is not associated with numbness and tingling.  The pain is not associated with constitutional symptoms. The patient is awoken at night by the pain.    The patient has had prior treatment in the form of a Rotator cuff repair on 12/13/23 with Dr Potts.           The following portions of the patient's history were reviewed and updated as appropriate: allergies, current medications, past family history, past medical history, past social history, past surgical history and problem list.     Objective:  /97 Comment: pt to monitor and contact pcp  Pulse 81   Ht 5' 3\" (1.6 m)   Wt 71.7 kg (158 lb)   BMI 27.99 kg/m²         Left " Shoulder Exam      Range of Motion   External rotation:  50   Left shoulder forward flexion: AROM: 50. PROM was too painful to perform.      Muscle Strength   Abduction: 4/5      Other   Erythema: absent  Sensation: normal  Pulse: present                  Physical Exam  Vitals and nursing note reviewed.   Constitutional:       Appearance: She is well-developed.   HENT:      Head: Normocephalic and atraumatic.   Eyes:      Pupils: Pupils are equal, round, and reactive to light.   Cardiovascular:      Rate and Rhythm: Normal rate and regular rhythm.      Pulses: Normal pulses.      Heart sounds: Normal heart sounds.   Pulmonary:      Effort: Pulmonary effort is normal. No respiratory distress.      Breath sounds: Normal breath sounds.   Abdominal:      General: There is no distension.      Palpations: Abdomen is soft.   Musculoskeletal:      Cervical back: Normal range of motion and neck supple.   Skin:     General: Skin is warm and dry.   Neurological:      Mental Status: She is alert and oriented to person, place, and time.   Psychiatric:         Mood and Affect: Mood normal.         Behavior: Behavior normal.         Thought Content: Thought content normal.         Judgment: Judgment normal.            I have personally reviewed pertinent films in PACS and my interpretation is as follows.     Post-Op MRI Left Shoulder 4/21/24: Rotator cuff tendinitis without tear s/p supraspinatus tendon repair. Long head biceps tendon rupture. Mild glenohumeral joint osteoarthritis     External Records Reviewed: historical medical records, office notes (Catalina Potts and Curtis, Physical Therapist), operative reports, intra-op photos from prior arthroscopy (Katlyn) and radiology reports

## 2024-09-18 NOTE — DISCHARGE INSTR - AVS FIRST PAGE
You are being scheduled for a shoulder arthroscopy to treat your symptoms.  Below are some instructions and information on what to expect before and after your surgery.        Pre-Surgical Preparation for Arthroscopic Shoulder Surgery:     You will be contacted the evening prior to your surgery to confirm the scheduled time of the procedure and when to arrive at the hospital.   Do not eat or drink anything after midnight the night before your surgery.  Since you are having out-patient surgery, make sure that you have someone who can drive you home later in the day.  Also, prepare that person for a long day, as the process of safely preparing for and recovering from the procedure is more time consuming than the actual procedure!      As you will be in a sling after surgery, please wear or bring a loose fitting button-down shirt so that you can easily place this over the sling when you leave the surgical suite.  This avoids having to place the operative arm in a sleeve.  Most patients find that this is the easiest outfit to wear for the first week or so after surgery so you may want to plan accordingly.    Most patients find that lying down in bed after shoulder surgery accentuates their discomfort.  This is likely related to the effect of gravity on the swelling in the shoulder.  As a result, most patients sleep better in a recliner or in bed with pillows propped up behind their back for the first few days or weeks after surgery.  It is a good idea to plan for this ahead of time so there will be less hassle getting things set up the night after surgery.       What to Expect After Arthroscopic Shoulder Surgery:    It is normal to have swelling and discomfort in the shoulder for several days or a week after surgery.  It is also normal to have a small amount of drainage from the surgical wounds (especially the first few days after surgery), as we put fluid into the shoulder to visualize the structures during surgery.   "It is NOT normal to have foul smelling, purulent drainage and if this is noted, please contact the office immediately or proceed to the emergency room for evaluation as this may indicate an infection.     Applying ice bags to the shoulder may help with pain that is not controlled by the regional block.  Ice should be applied 20-30 minutes at a time, every hour or two.  Make sure to put a thin towel or T-shirt next to your skin to avoid direct contact of the ice with the skin. Icing is most helpful in the first 48 hours, although many people find that continuing past this time frame lessens their postoperative pain.  Please note that your post-operative dressing is not conductive to ice, so if you need to, it is okay to remove that dressing even the night after surgery and place band-aids over the wounds in order for the ice to take effect.     Pain Control    Most patients will receive a nerve block, the local anesthetic may keep your whole arm numb for up to 4 days.  You will be given a prescription for narcotic pain medication when you are discharged from the hospital.  With the newer nerve block that is being utilized, patients are rarely requiring the use of this narcotic pain medication.  If you find you do not tolerate that type of pain medicine well, call our office and we will try another one.     In addition to the narcotic pain medication, it is safe to use an anti-inflammatory (unless the patient has a medical condition that would not allow safe use of this mediation).  This includes the Advil, Motrin, Ibuprofen and Alleve category of medications.  Simply follow the over the counter dosing on the package and use as indicated as another adjunct.  Importantly since these medications are all very similar, use only one of them.  Tylenol is a separate medication that can be utilized as well and can be taken at the same time as the other medication or given in a \"staggered\" manner.  Just make sure that you " follow the dosing on the over the counter bottle instructions.  Also make sure that the pain medication prescribed by Dr Cortez's team does not contain acetaminophen (this is found in Percocet and Vicodin).   Typically we do not prescribe those types of pain medications but if for some reason that has been prescribed DO NOT add more Tylenol (acetaminophen) as you could end up taking too much of that medication.    As mentioned above, most patients find that lying down accentuates their discomfort. You might sleep better in a recliner, or propped up in bed.     Dr. Cortez encourages patients to safely ambulate around the house as much as possible in the first few days after the procedure as this can help with blood circulation in the legs. While the incidence of blood clots is very rare following shoulder surgery, early ambulation is a great way to help decrease the already low rate.    24 hours after the surgery you may remove the bandage and cover incisions with Band-Aids if needed. At that time you may shower, the wounds will have a surgical glue that will protect them from shower water but do not submerge your incisions directly (bathing or swimming) until at least 2 weeks post-operatively.  It is safe to let the arm hang at the side and take a shower and put on a shirt without the sling on.  Just make sure that you do not use the operative are to reach out and grab anything as that may damage the repair.  When you are done showering and getting dressed please return the operative arm to the sling.    Unless noted otherwise in your discharge paperwork, Dr. Cortez uses absorbable sutures so they do not need to be removed.    Dr. Cortez’s physician assistant (PA) will see you in the office a few days after the procedure to review the intra-operative findings and to initiate physical therapy if appropriate.  A post-operative appointment should have been scheduled for you already, but if for some reason this did  not happen, please call the office to make one.     Physical therapy is important after nearly all shoulder surgeries and a detailed rehabilitation plan based on the specific intra-operative findings and procedures will be provided to your therapist at the first post-operative office visit.  Most patients have post-operative therapy appointments scheduled pre-operatively, but if you do not, that will be handled at the first post-operative office visit.  Unless expressly directed otherwise it is safe to remove the sling even the first day after the surgery and let the arm hang by the side.  This allows patients to shower and even put a shirt on (bad arm in the sleeve first).  It is also safe to flex and extend their wrist, hand and fingers as much as possible when the block wears off.  These simple motions can serve to pump fluid out of the forearm and decrease swelling in the arm.

## 2024-09-19 DIAGNOSIS — Z86.19 H/O COLD SORES: ICD-10-CM

## 2024-09-19 RX ORDER — VALACYCLOVIR HYDROCHLORIDE 1 G/1
TABLET, FILM COATED ORAL
Qty: 6 TABLET | Refills: 1 | Status: SHIPPED | OUTPATIENT
Start: 2024-09-19 | End: 2024-09-20

## 2024-09-20 DIAGNOSIS — Z86.19 H/O COLD SORES: ICD-10-CM

## 2024-09-20 RX ORDER — VALACYCLOVIR HYDROCHLORIDE 1 G/1
TABLET, FILM COATED ORAL
Qty: 180 TABLET | Refills: 1 | Status: SHIPPED | OUTPATIENT
Start: 2024-09-20 | End: 2024-09-23

## 2024-09-23 ENCOUNTER — OFFICE VISIT (OUTPATIENT)
Dept: OBGYN CLINIC | Facility: OTHER | Age: 69
End: 2024-09-23

## 2024-09-23 VITALS
WEIGHT: 154 LBS | HEART RATE: 69 BPM | DIASTOLIC BLOOD PRESSURE: 100 MMHG | SYSTOLIC BLOOD PRESSURE: 172 MMHG | HEIGHT: 63 IN | BODY MASS INDEX: 27.29 KG/M2

## 2024-09-23 DIAGNOSIS — Z98.890 S/P LEFT ROTATOR CUFF REPAIR: Primary | ICD-10-CM

## 2024-09-23 PROCEDURE — 99024 POSTOP FOLLOW-UP VISIT: CPT | Performed by: PHYSICIAN ASSISTANT

## 2024-09-23 NOTE — PATIENT INSTRUCTIONS
Rotator Cuff Repair Rehabilitation Protocol  (adapted from Zachery LOWE et al. “Rehabilitation of the Rotator Cuff: An Evaluation Based Approach”. JAAOS 2006; 14:599-609)  Updated 10.14  Abdullahi Cortez MD  [facility]  [address]  [place] [address]  [phone]  Phase 1 (Weeks 0-6)   Immediate Postoperative Period   Goals:    Diminish Pain and Inflammation  Maintain and Protect Integrity of the Repair    Gradually Increase Passive ROM (NO Active or Active Assist until Week 6)    Become Independent with Modified ADLs   Precautions:  Maintain Arm in Abduction Sling, Remove Only for Directed Exercises (may remove Abduction Pillow after Day 21 for comfort)    Keep Incisions Clean & Dry (okay to shower in 48 hours, band-aids over incisions)  No Immersion (pool) until Wounds Totally Sealed (usually not prior to day #10)  Passive Shoulder Motion ONLY, No Lifting/Holding Objects, Reaching Behind Back  Okay to Type/Write at Desktop with Arm in Sling   Day 0-6    Elbow, Wrist, Hand AROM Exercises     Start Cervical AROM and Scapula Isometrics    Cryotherapy/Ice for Pain and Inflammation    Instruct in Hygiene, Posture, and Positioning    Day 7-28    Continue Above  May Start Pendulum Exercises    May Start Supine, Pain Free, PT assisted PROM  Forward Flexion to 90°, External Rotation to 35° (Elbow at side), Internal Rotation to Body, Abduction to 60°    Can Introduce light Cardio (Walking, Stationary Bike)    Aqua Therapy may begin at week 3 (day 21) as long as no wound problems   Day 29-42    Continue Above  Progress PROM to Goal of full PROM by Week 6.  May add Gentle Mobilizations (GH and Scapulothoracic) to Regain full PROM if Needed.  May add Heat prior to PROM Exercises, Ice after Exercises  May Begin AAROM at Day 29 if ROM is Appropriate in Anticipation of AROM Starting at Week 6   Criteria to Progress to Phase 2    Reasonable Passive Forward Flexion, Abduction ,  IR/ER    Time  Phase 2 (Weeks 6-12)   Protection and Active Motion   Goals of Phase:    Allow Healing of Soft Tissues    Decrease Pain and Inflammation  Add ADLs and Regain AROM by End of Phase   Precautions:    NO STRENGTHENING until Phase 3    Repair is Most Prone to Failure during this Phase!    No Lifting Objects > 2 lbs (Coffee Cup OK), no Sudden Motions    Avoid Upper Extremity Bike and Ergometer   Day 43-56    Discontinue Sling  Initiate AROM Exercises (forward flexion, ER, IR and abduction), Rotator Cuff Isometrics    Continue Periscapular Exercises, add Stretching if PROM Lacking   No Strengthening until Week 12 (Minimum Time Needed for Cuff Healing Sufficient to withstand Strengthening)     Phase 3 (Weeks 12-16)   Early Strengthening   Goal of Phase:    Gain full AROM, Maintain PROM    Gradual return of Shoulder Strength, Power and Endurance    Gradual return to Functional Activities   Precautions:    No Lifting > 10lbs, Sudden Lifting or Pushing activities, Overhead Lifting    No Upper Extremity Bike or Ergometer   Week 12    Initiate Strengthening Program (10 lb Maximum until Phase 4)      ER/IR with Bands (Standing)      ER in Lateral Decubitius Position      Lateral Raises      Full Can in Scapular Plane      Prone Rowing, Horizontal Abduction, Extension      Elbow Flexion/Extension   Week 14-16    Initiate light Functional Activities as Permitted  Progress to Fundamental Shoulder Exercises  Phase 4 (Variable but Weeks 16-24)   Aggressive Rehab  Sport Specific or Activity Specific    Goals of Phase:    Maintain Full Pain-free AROM    Advanced Conditioning Exercises for enhanced Functional use    Continue regaining Shoulder Strength, Power and Endurance    Eventual return to full Functional Activities   Precautions:    None   Week 16    Continue ROM and stretching if appropriate    Progress Strengthening, Proprioceptive and Neuromuscular Training    Light Sports if Progressing Well (Chipping/Putting,  easy ground strokes etc.)   Week 20    Continue Strengthening and Stretching    Initiate Interval Sports Program as Appropriate    Note:   This is a general program which may be modified based on intra-operative findings, additional procedures preformed, repair stability, and patient biological factors.  If in doubt, please check with my office for individual patient specifics.  The ultimate goal of the surgery and rehabilitation is to get the rotator cuff to heal with the least residual functional deficit due to stiffness.  That being said, I would rather have a stiff shoulder with a healed rotator cuff repair, than a loose shoulder with a failed repair!

## 2024-09-23 NOTE — PROGRESS NOTES
Orthopaedic Surgery - Office Note  Jamee Shaikh (69 y.o. female)   : 1955   MRN: [id number]  Encounter Date: 2024    Chief Complaint   Patient presents with    Left Shoulder - Post-op         Assessment/Plan  Diagnoses and all orders for this visit:    S/P left rotator cuff revision repair and subacromial decompression with debridement on 2024    Patient will follow all postoperative instructions.  Patient will wear the sling for 6 weeks, removing the pillow portion at 2 weeks postoperative.  Patient will attend physical therapy as scheduled following the standard rotator cuff protocol which was provided to the patient in the office today in the after visit summary.  Intraoperative pictures were reviewed with the patient in the office today.  Wound care instructions were again reviewed with the patient in the office today.  All question and concerns were reviewed with the patient in the office today.       Return for Recheck in 8 weeks with myself.        History of Present Illness  Patient is here for postop visit after revision rotator cuff and subacromial decompression on 2024.  Patient's pain is controlled.    Review of Systems  Pertinent items are noted in HPI.  All other systems were reviewed and are negative.    Physical Exam  There were no vitals taken for this visit.  Cons: Appears well.  No apparent distress.  Psych: Alert. Oriented x3.  Mood and affect normal.    Operative shoulder incisions are healing well.  There are no signs of infection.  Patient is neurovascular intact in the operative extremity with full active wrist extension and full range of motion of all digits.  Sling is fitting appropriately.              Studies Reviewed  Operative report was reviewed for today's visit    Procedures  No procedures today.    Medical, Surgical, Family, and Social History  The patient's medical history, family history, and social history, were reviewed and updated as  "appropriate.    Past Medical History:   Diagnosis Date    Anemia     Arthritis     Bilateral bunions     Chronic pain disorder     back and hip pain    Colon polyp     Disease of thyroid gland     nodules    History of COVID-2020    mild s/s    History of gastroesophageal reflux (GERD)     \"had hiatal hernia surgery\"    History of heart murmur in childhood     Hyperlipidemia     Hypertension     Overactive bladder     Rotator cuff tear, right     had surgery    Vitamin D deficiency     unsure    Wears glasses     Wears partial dentures     upper       Past Surgical History:   Procedure Laterality Date     SECTION      Last Assessed: 2017    COLONOSCOPY      ELBOW SURGERY      Last Assessed: 2017    ESOPHAGOGASTRODUODENOSCOPY N/A 2017    Procedure: ESOPHAGOGASTRODUODENOSCOPY (EGD);  Surgeon: Jolly Lin DO;  Location: [facility];  Service: Gastroenterology    HIP SURGERY Left 2018    glut medius/minimus repair  and 18--with pin implanted    HYSTERECTOMY          JOINT REPLACEMENT      PARAESOPHAGEAL HERNIA REPAIR N/A 2020    Procedure: LAPAROSCOPIC PARAESOPHAGEAL HERNIA REPAIR WITH MESH AND NISSEN FUNDOPLICATION WITH ROBOTICS;  Surgeon: Darrin Leon MD;  Location: [facility];  Service: General    MA ARTHRP KNE CONDYLE&PLATU MEDIAL&LAT COMPARTMENTS Left 03/15/2023    Procedure: ARTHROPLASTY KNEE TOTAL;  Surgeon: Bart Potts DO;  Location: [facility];  Service: Orthopedics    MA ARTHRS KNE SURG W/MENISCECTOMY MED/LAT W/SHVG Left 06/15/2022    Procedure: ;left knee arthroscopy, meniscectomy,synevectomy,chondroplasty, loose body removal, injection;  Surgeon: Bart Potts DO;  Location: [facility];  Service: Orthopedics    MA COLONOSCOPY FLX DX W/COLLJ SPEC WHEN PFRMD N/A 2017    Procedure: EGD AND COLONOSCOPY;  Surgeon: Jolly Lin DO;  Location: [facility];  Service: Gastroenterology    MA DIAGNOSTIC ARTHROSCOPY SHOULDER +- SYNOVIAL " BX Left 9/18/2024    Procedure: DIAGNOSTIC ARTHROSCOPY SHOULDER POSSIBLE REVISION ROTATOR CUFF REPAIR, EXTENSIVE DEBRIDEMENT;  Surgeon: Abdullahi Cortez MD;  [facility];  Service: Orthopedics    NM EXCISON TUMOR SOFT TISSUE THIGH/KNEE SUBQ 3 CM/> Bilateral 05/26/2022    Procedure: EXCISION BIOPSY TISSUE LESION/MASS LOWER EXTREMITY;  Surgeon: Kit Tavera MD;  [facility];  Service: General    NM EXCISON TUMOR SOFT TISSUE THIGH/KNEE SUBQ 3 CM/> Bilateral 08/25/2022    Procedure: EXCISION BIOPSY TISSUE LESION/MASS LOWER EXTREMITY;  Surgeon: Kit Tavera MD;  [facility]: [facility];  Service: General    NM OSTECTOMY PRTL 5TH METAR HEAD SPX Bilateral 08/15/2023    Procedure: BUNIONECTOMY TAILOR WITHOUT OSTEOTOMY;  Surgeon: Monet Pradhan DPM;  Location: [facility];  Service: Podiatry    NM SURGICAL ARTHROSCOPY JENNIFER W/CORACOACRM LIGM RLS Left 12/13/2023    Procedure: Left - ARTHROSCOPY SHOULDER  Synovectomy Debridement/chondroplasty Acromioplasty Arthroscopic rotator cuff repair Post arthroscopic injection of intra-articular joint space and peripheral portals;  Surgeon: Bart Potts DO;  Location: [facility];  Service: Orthopedics    SHOULDER ARTHROCENTESIS      SHOULDER ARTHROSCOPY Right 06/23/2021    Procedure: SHOULDER ARTHROSCOPY WITH acromioplasty, debridment, and ROTATOR CUFF REPAIR;  Surgeon: Bart Potts DO;  Location:  MAIN OR;  Service: Orthopedics    TUBAL LIGATION         Family History   Problem Relation Age of Onset    Colon cancer Mother     Heart attack Father     Other Father         Cardiac Disorder    Diabetes type II Father     Colon cancer Sister     No Known Problems Sister     No Known Problems Sister     No Known Problems Sister     No Known Problems Sister     No Known Problems Daughter     No Known Problems Maternal Aunt     No Known Problems Maternal Aunt     No Known Problems Maternal Aunt     Cancer Maternal Grandmother     No Known Problems  Paternal Grandmother        Social History     Occupational History    Not on file   Tobacco Use    Smoking status: Former     Current packs/day: 0.00     Average packs/day: 1.5 packs/day for 30.0 years (45.0 ttl pk-yrs)     Types: Cigarettes     Start date: 1961     Quit date: 1991     Years since quittin.7    Smokeless tobacco: Never    Tobacco comments:     quit 25 yrs ago   Vaping Use    Vaping status: Never Used   Substance and Sexual Activity    Alcohol use: Never    Drug use: Never    Sexual activity: Not Currently     Comment: defer       Allergies   Allergen Reactions    Hydrocodone Irritability         Current Outpatient Medications:     amLODIPine (NORVASC) 5 mg tablet, Take 2 tablets (10 mg total) by mouth daily, Disp: 180 tablet, Rfl: 1    atorvastatin (LIPITOR) 10 mg tablet, Take 1 tablet (10 mg total) by mouth daily, Disp: 90 tablet, Rfl: 3    cyclobenzaprine (FLEXERIL) 10 mg tablet, take 1 tablet by mouth three times a day if needed for muscle spasm, Disp: 90 tablet, Rfl: 1    famotidine (PEPCID) 20 mg tablet, take 1 tablet by mouth twice a day (Patient taking differently: Take 20 mg by mouth if needed), Disp: 180 tablet, Rfl: 3    gabapentin (Neurontin) 100 mg capsule, Take 1 capsule (100 mg total) by mouth daily at bedtime, Disp: 60 capsule, Rfl: 0    hydrOXYzine HCL (ATARAX) 25 mg tablet, take 1 tablet by mouth every 6 hours if needed for itching, Disp: 180 tablet, Rfl: 1    lidocaine (Lidoderm) 5 %, Apply 2 patches topically over 12 hours daily Remove & Discard patch within 12 hours or as directed by MD (Patient taking differently: Apply 2 patches topically if needed Remove & Discard patch within 12 hours or as directed by MD), Disp: 60 patch, Rfl: 5    linaCLOtide 72 MCG CAPS, Take 72 mcg by mouth in the morning (Patient not taking: Reported on 2024), Disp: 90 capsule, Rfl: 1    losartan (COZAAR) 100 MG tablet, Take 1 tablet (100 mg total) by mouth daily, Disp: 90 tablet, Rfl:  1    oxyCODONE (ROXICODONE) 5 immediate release tablet, Take 1 tablet (5 mg total) by mouth every 6 (six) hours as needed for severe pain for up to 13 doses Max Daily Amount: 20 mg, Disp: 13 tablet, Rfl: 0    tobramycin-dexamethasone (TOBRADEX) ophthalmic suspension, instill 1 drop into both eyes four times a day, Disp: , Rfl:     triamcinolone (KENALOG) 0.1 % cream, Apply topically 2 (two) times a day as needed (itching), Disp: 30 g, Rfl: 1    valACYclovir (VALTREX) 1,000 mg tablet, take 1 tablet by mouth twice a day for 3 days, Disp: 180 tablet, Rfl: 1      Jong Lassiter PA-C

## 2024-09-24 ENCOUNTER — EVALUATION (OUTPATIENT)
Dept: PHYSICAL THERAPY | Facility: CLINIC | Age: 69
End: 2024-09-24
Payer: COMMERCIAL

## 2024-09-24 DIAGNOSIS — M24.812 INTERNAL DERANGEMENT OF LEFT SHOULDER: Primary | ICD-10-CM

## 2024-09-24 DIAGNOSIS — Z98.890 S/P LEFT ROTATOR CUFF REPAIR: ICD-10-CM

## 2024-09-24 PROCEDURE — 97163 PT EVAL HIGH COMPLEX 45 MIN: CPT | Performed by: PHYSICAL THERAPIST

## 2024-09-24 PROCEDURE — 97110 THERAPEUTIC EXERCISES: CPT | Performed by: PHYSICAL THERAPIST

## 2024-09-24 NOTE — PROGRESS NOTES
PT Evaluation     Today's date: 2024  Patient name: Jamee Shaikh  : 1955  MRN: [id number]  Referring provider: Abdullahi Cortez*  Dx:   Encounter Diagnosis     ICD-10-CM    1. Internal derangement of left shoulder  M24.812 Ambulatory Referral to Physical Therapy      2. S/P left rotator cuff repair  Z98.890 Ambulatory Referral to Physical Therapy          Start Time: 0930  Stop Time: 1015  Total time in clinic (min): 45 minutes    Assessment  Impairments: abnormal muscle firing, abnormal or restricted ROM, abnormal movement, activity intolerance, impaired physical strength, lacks appropriate home exercise program, pain with function, scapular dyskinesis, poor posture  and poor body mechanics    Assessment details: Jamee Shaikh was seen for an initial PT evaluation today. Patient is a 69 y.o. female with diagnosis of L rotator cuff repair and past medical history significant for  OA, chronic pain, thyroid disease, GERD, hiatal hernia, hyperlipidemia, HTN, rotator cuff tear with repair bilaterally, L buttock pain with history of surgery, lipomas with removal, lumbar radiculopathy, vertigo. High complexity evaluation  due to number of participation restrictions, functional outcome measure of 96% limitation, and unstable clinical presentation. Findings today show limitation in left shoulder strength, range of motion, and stability with pain and restrictions consistent with s/p diagnosis impacting overall functional mobility including ability to lift, reach, carry. Skilled PT indicated to treat at this time to address above stated deficits and return patient to PLOF.       Goals  STG (6 weeks)  1. Patient's left shoulder flexion AROM will increase to 120 with 0/10 pain for increased ability to perform overhead ADLs.   2. Patient will have 0/10 pain in left shoulder at rest.  3. Patient's left shoulder strength will increase to 2+/5 for increased ability to lift.   LTG (12 weeks)  1. Patient's UE  "AROM equal bilaterally for ability to complete hair hygiene, overhead, and behind the back ADLs.   2. Patient's UE strength will be equal bilaterally for ability to lift and carry at PLOF.   3. Patient will be independent with home exercise program for continued maintenance post PT discharge.      Plan  Patient would benefit from: skilled physical therapy  Planned modality interventions: unattended electrical stimulation, cryotherapy and thermotherapy: hydrocollator packs    Planned therapy interventions: neuromuscular re-education, manual therapy, therapeutic exercise, therapeutic activities, self care and home exercise program    Frequency: 2x week  Duration in weeks: 12  Plan of Care beginning date: 2024  Plan of Care expiration date: 2024  Treatment plan discussed with: patient and PTA  Plan details: Progress note in 4 weeks.         Subjective Evaluation    History of Present Illness  Date of surgery: 2024  Mechanism of injury: Jamee Shaikh is a 69 y.o. female who presents to outpatient Physical Therapy today with complaints of left shoulder pain. Initial surgery for L RC 23, new episode of pain after DC from PT in April. Attempted PT post injury with no significant improvement. Seen over the course of several weeks by orthos. 3rd opinion by Dr. Cortez who recommended revision of RC due to likely re-tear. Patient is not s/p L supraspinatus revision with debridement as of 24. Also with bicep tear which was debrided, repair not recommended. Patient is now to utilize sling for 6 weeks, may remove pillow after 2. F/u with ortho at the middle of November.     Patient Goals  Patient goal: \"Feel better and get arm working\". Would like to return to work.  Pain  Pain scale: discomfort.  At best pain ratin  At worst pain ratin (nerve block)  Location: L shoulder  Quality: dull ache  Relieving factors: support  Exacerbated by: LUE movement.    Social Support  Steps to enter house: " yes  Stairs in house: yes   Lives in: multiple-level home  Lives with: spouse    Employment status: not working  Hand dominance: right          Objective     Postural Observations    Additional Postural Observation Details  Forward head. Wearing brace with adductor pillow    Observations   Left Shoulder   Positive for edema and incision.     Additional Observation Details  4 scope incisions, closed healing appropriately. No redness or drainage.    Cervical/Thoracic Screen   Cervical range of motion within normal limits  Cervical range of motion within normal limits with the following exceptions: Min restriction of cervical extension    Neurological Testing     Sensation     Shoulder   Left Shoulder   Intact: light touch    Right Shoulder   Intact: Light touch    General Comments:      Shoulder Comments       FOTO: 4% function, 54% predicted function         Shoulder - AROM IE   LEFT     Flexion NT   Abduction NT   External Rotation NT   Internal Rotation NT   RIGHT    Flexion 150   Abduction 150   External Rotation T2   Internal Rotation T11     Shoulder - PROM IE   LEFT     Flexion 15   Abduction 15   External Rotation -15   Internal Rotation To belly     Shoulder - MMT IE   LEFT     Flexion NT   Abduction NT   External Rotation NT   Internal Rotation NT   Bicep 2+   Tricep 2+   RIGHT    Flexion 4+   Abduction 4   External Rotation 4+   Internal Rotation 5   Bicep 5   Tricep 4              Precautions: protocol; PROM until week 6  Access Code: [email or web address]  Progress note: 10/24  POC: 12/24    Manuals 9/24       Stretching with active release  FF to 90. ER to 35, IR to body, abd to 60      GH jt mobs                        Neuro Re-Ed                scap retraction reviewed       Shoulder rolls reviewed       Chin tucks *                       Ther Ex                UT stretch *       LS stretch *       Table slides        pendulums *       Supine flexion ROM        Supine ER ROM                Elbow PROM 10x        Wrist AROM reviewed        reviewed                                                                       Ther Activity                        Gait Training                        Modalities

## 2024-09-26 ENCOUNTER — OFFICE VISIT (OUTPATIENT)
Dept: PHYSICAL THERAPY | Facility: CLINIC | Age: 69
End: 2024-09-26
Payer: COMMERCIAL

## 2024-09-26 DIAGNOSIS — Z98.890 S/P LEFT ROTATOR CUFF REPAIR: ICD-10-CM

## 2024-09-26 DIAGNOSIS — M24.812 INTERNAL DERANGEMENT OF LEFT SHOULDER: Primary | ICD-10-CM

## 2024-09-26 PROCEDURE — 97140 MANUAL THERAPY 1/> REGIONS: CPT | Performed by: PHYSICAL THERAPIST

## 2024-09-26 PROCEDURE — 97110 THERAPEUTIC EXERCISES: CPT | Performed by: PHYSICAL THERAPIST

## 2024-09-26 NOTE — PROGRESS NOTES
Daily Note     Today's date: 2024  Patient name: Jamee Shaikh  : 1955  MRN: [id number]  Referring provider: Abdullahi Cortez*  Dx:   Encounter Diagnosis     ICD-10-CM    1. Internal derangement of left shoulder  M24.812       2. S/P left rotator cuff repair  Z98.890           Start Time: 0730  Stop Time: 0800  Total time in clinic (min): 30 minutes    Subjective: States she woke today with increased anterior left shoulder pain. Doesn't know if she slept wrong or not causing the pain. Has been able to sleep in bed.       Objective: See treatment diary below      Assessment: Tolerated treatment fair. Exercises performed well. Added per flowsheet within protocol. MT today within restrictions, unable to achieve full range of motion allowed due to pain and guarding. Patient would benefit from continued PT      Plan: Continue per plan of care.  Progress treament per protocol.      Precautions: protocol; PROM until week 6  Access Code: [email or web address]  Progress note: 10/24  POC:     Manuals       Stretching with active release  FF to 90. ER to 35, IR to body, abd to 60- KB       GH jt mobs                        Neuro Re-Ed                scap retraction reviewed 10x      Shoulder rolls reviewed 10x      Chin tucks * :03x10                      Ther Ex                UT stretch * :30x3 L      LS stretch * :30x3 L      Table slides        pendulums * 10x ea      Supine flexion ROM        Supine ER ROM                Elbow PROM 10x 10x      Wrist AROM reviewed Flex, UD/RD, ext, sup/pron 10x ea       reviewed Red 10x                                                                      Ther Activity                        Gait Training                        Modalities

## 2024-09-30 NOTE — DENTAL PROCEDURE DETAILS
COMP EXAM, FMX, PROBE EXAM   REVIEWED MED HX: meds, allergies, health changes reviewed in EPIC  CHIEF CONCERN:     -Last dental visit was does not remember  PAIN SCALE:  0  ASA CLASS:  ASA 2 - Patient with mild systemic disease with no functional limitations  PLAQUE:  moderate  CALCULUS: Localized  BLEEDING:  moderate  STAIN :  Generalized  PERIO:     Visual and Tactile Intraoral/ Extraoral evaluation: Oral and Oropharyngeal cancer evaluation. No findings     Dr. Alonso Bell -  Reviewed with patient clinical and radiographic findings and patient verbalized understanding. All questions and concerns addressed.     REFERRALS: None    CARIES FINDINGS: active caries and recurrent decay found on remaining mandibular dentition. Poor prognosis on remaining teeth.    Pt expressed interest in getting new mandibular denture but does not want to be without lower teeth. Informed her that an interim complete denture can be fabricated at the time of the extractions of her remaining dentition. Also informed pt that a permanent denture would also need to be fabricated afterwards. Pt understood and was interested.        NEXT VISIT:   1) Diagnostics after preauth comes back     none

## 2024-10-01 ENCOUNTER — OFFICE VISIT (OUTPATIENT)
Dept: PHYSICAL THERAPY | Facility: CLINIC | Age: 69
End: 2024-10-01
Payer: COMMERCIAL

## 2024-10-01 DIAGNOSIS — M24.812 INTERNAL DERANGEMENT OF LEFT SHOULDER: Primary | ICD-10-CM

## 2024-10-01 DIAGNOSIS — Z98.890 S/P LEFT ROTATOR CUFF REPAIR: ICD-10-CM

## 2024-10-01 PROCEDURE — 97110 THERAPEUTIC EXERCISES: CPT | Performed by: PHYSICAL THERAPIST

## 2024-10-01 PROCEDURE — 97140 MANUAL THERAPY 1/> REGIONS: CPT | Performed by: PHYSICAL THERAPIST

## 2024-10-01 NOTE — PROGRESS NOTES
Daily Note     Today's date: 10/1/2024  Patient name: Jamee Shaikh  : 1955  MRN: [id number]  Referring provider: bAdullahi Cortez*  Dx:   Encounter Diagnosis     ICD-10-CM    1. Internal derangement of left shoulder  M24.812       2. S/P left rotator cuff repair  Z98.890           Start Time: 0930  Stop Time: 1000  Total time in clinic (min): 30 minutes    Subjective: States minimal change from last session. Continues to have bruising and pain in left shoulder. Able to remove abd pillow tomorrow.       Objective: See treatment diary below      Assessment: Tolerated treatment well. Decreased guarding today vs last session and prior to surgery. Patient exhibited good technique with therapeutic exercises and would benefit from continued PT      Plan: Continue per plan of care.  Progress treatment as tolerated.       Precautions: protocol; PROM until week 6  Access Code: [email or web address]  Progress note: 10/24  POC:     Manuals 9/24 9/26 10/1     Stretching with active release  FF to 90. ER to 35, IR to body, abd to 60- KB  FF to 90. ER to 35, IR to body, abd to 60- KB within tolerated range     GH jt mobs   Grade II                     Neuro Re-Ed                scap retraction reviewed 10x 10x     Shoulder rolls reviewed 10x 10x     Chin tucks * :03x10 :03x10                     Ther Ex                UT stretch * :30x3 L :30x3 L     LS stretch * :30x3 L :30x3 L     Table slides        pendulums * 10x ea 10x ea     Supine flexion ROM        Supine ER ROM                Elbow PROM 10x 10x 2x10     Wrist AROM reviewed Flex, UD/RD, ext, sup/pron 10x ea Flex, UD/RD, ext, sup/pron 15x ea      reviewed Red 10x Red 2x10                                                                     Ther Activity                        Gait Training                        Modalities

## 2024-10-03 ENCOUNTER — OFFICE VISIT (OUTPATIENT)
Dept: PHYSICAL THERAPY | Facility: CLINIC | Age: 69
End: 2024-10-03
Payer: COMMERCIAL

## 2024-10-03 DIAGNOSIS — M24.812 INTERNAL DERANGEMENT OF LEFT SHOULDER: Primary | ICD-10-CM

## 2024-10-03 DIAGNOSIS — Z98.890 S/P LEFT ROTATOR CUFF REPAIR: ICD-10-CM

## 2024-10-03 PROCEDURE — 97140 MANUAL THERAPY 1/> REGIONS: CPT | Performed by: PHYSICAL THERAPIST

## 2024-10-03 PROCEDURE — 97110 THERAPEUTIC EXERCISES: CPT | Performed by: PHYSICAL THERAPIST

## 2024-10-03 NOTE — PROGRESS NOTES
Daily Note     Today's date: 10/3/2024  Patient name: Jamee Shaikh  : 1955  MRN: [id number]  Referring provider: Abdullahi Cortez*  Dx:   Encounter Diagnosis     ICD-10-CM    1. Internal derangement of left shoulder  M24.812       2. S/P left rotator cuff repair  Z98.890           Start Time: 0900  Stop Time: 0930  Total time in clinic (min): 30 minutes    Subjective: Continued soreness and stiffness of left shoulder. Did take adductor pillow away as instructed at week 2 s/p.      Objective: See treatment diary below      Assessment: Tolerated treatment fair+. Increased guarding today with left shoulder passive range of motion stretching. Able to raise arm about 40-45 degrees in FF and scap. Patient exhibited good technique with therapeutic exercises and would benefit from continued PT      Plan: Continue per plan of care.  Progress treatment as tolerated.       Precautions: protocol; PROM until week 6  Access Code: [email or web address]  Progress note: 10/24  POC:     Manuals 9/24 9/26 10/1 10/3    Stretching with active release  FF to 90. ER to 35, IR to body, abd to 60- KB  FF to 90. ER to 35, IR to body, abd to 60- KB within tolerated range FF to 90. ER to 35, IR to body, abd to 60- KB within tolerated range    GH jt mobs   Grade II Grade II                    Neuro Re-Ed                scap retraction reviewed 10x 10x 10x    Shoulder rolls reviewed 10x 10x 10    Chin tucks * :03x10 :03x10 :03x10                    Ther Ex                UT stretch * :30x3 L :30x3 L :30x3 L    LS stretch * :30x3 L :30x3 L :30x3 L    Table slides        pendulums * 10x ea 10x ea 10x ea    Supine flexion ROM        Supine ER ROM                Elbow PROM 10x 10x 2x10 2x10    Wrist AROM reviewed Flex, UD/RD, ext, sup/pron 10x ea Flex, UD/RD, ext, sup/pron 15x ea Flex, UD/RD, ext, sup/pron 20x ea     reviewed Red 10x Red 2x10 Red 2x10                                                                    Ther Activity                         Gait Training                        Modalities

## 2024-10-07 ENCOUNTER — OFFICE VISIT (OUTPATIENT)
Dept: PHYSICAL THERAPY | Facility: CLINIC | Age: 69
End: 2024-10-07
Payer: COMMERCIAL

## 2024-10-07 DIAGNOSIS — Z98.890 S/P LEFT ROTATOR CUFF REPAIR: ICD-10-CM

## 2024-10-07 DIAGNOSIS — M24.812 INTERNAL DERANGEMENT OF LEFT SHOULDER: Primary | ICD-10-CM

## 2024-10-07 PROCEDURE — 97110 THERAPEUTIC EXERCISES: CPT | Performed by: PHYSICAL THERAPIST

## 2024-10-07 PROCEDURE — 97140 MANUAL THERAPY 1/> REGIONS: CPT | Performed by: PHYSICAL THERAPIST

## 2024-10-07 NOTE — PROGRESS NOTES
Daily Note     Today's date: 10/7/2024  Patient name: Jamee Shaikh  : 1955  MRN: [id number]  Referring provider: Abdullahi Cortez*  Dx:   Encounter Diagnosis     ICD-10-CM    1. Internal derangement of left shoulder  M24.812       2. S/P left rotator cuff repair  Z98.890           Start Time: 0815  Stop Time: 0845  Total time in clinic (min): 30 minutes    Subjective: States incisions are a little dry, still has bruising and is tender to the touch. Does ok with sleeping, but depends on the day. Some days are difficult.       Objective: See treatment diary below      Assessment: Tolerated treatment well. Improved ER today, still with guarding restricting passive overhead movement. Reviewed home program today without issue. Incisions inspected and look appropriate for time s/p. Patient exhibited good technique with therapeutic exercises and would benefit from continued PT      Plan: Continue per plan of care.  Progress treatment as tolerated.       Precautions: protocol; PROM until week 6  Access Code: [email or web address]  Progress note: 10/24  POC:     Manuals 9/24 9/26 10/1 10/3 10/7   Stretching with active release  FF to 90. ER to 35, IR to body, abd to 60- KB  FF to 90. ER to 35, IR to body, abd to 60- KB within tolerated range FF to 90. ER to 35, IR to body, abd to 60- KB within tolerated range FF to 90. ER to 35, IR to body, abd to 60- KB within tolerated range   GH jt mobs   Grade II Grade II Grade II                    Neuro Re-Ed                scap retraction reviewed 10x 10x 10x 10x   Shoulder rolls reviewed 10x 10x 10 10x   Chin tucks * :03x10 :03x10 :03x10 :03x10                   Ther Ex                UT stretch * :30x3 L :30x3 L :30x3 L :30x3 L   LS stretch * :30x3 L :30x3 L :30x3 L :30x3 L   Table slides        pendulums * 10x ea 10x ea 10x ea 10x ea   Supine flexion ROM        Supine ER ROM                Elbow PROM 10x 10x 2x10 2x10 2x10   Wrist AROM reviewed Flex, UD/RD, ext, sup/pron  10x ea Flex, UD/RD, ext, sup/pron 15x ea Flex, UD/RD, ext, sup/pron 20x ea Flex, UD/RD, ext, sup/pron 20x ea    reviewed Red 10x Red 2x10 Red 2x10 Red 25x                                                                   Ther Activity                        Gait Training                        Modalities

## 2024-10-10 ENCOUNTER — OFFICE VISIT (OUTPATIENT)
Dept: PHYSICAL THERAPY | Facility: CLINIC | Age: 69
End: 2024-10-10
Payer: COMMERCIAL

## 2024-10-10 DIAGNOSIS — M24.812 INTERNAL DERANGEMENT OF LEFT SHOULDER: Primary | ICD-10-CM

## 2024-10-10 DIAGNOSIS — Z98.890 S/P LEFT ROTATOR CUFF REPAIR: ICD-10-CM

## 2024-10-10 PROCEDURE — 97140 MANUAL THERAPY 1/> REGIONS: CPT | Performed by: PHYSICAL THERAPIST

## 2024-10-10 PROCEDURE — 97112 NEUROMUSCULAR REEDUCATION: CPT | Performed by: PHYSICAL THERAPIST

## 2024-10-10 PROCEDURE — 97110 THERAPEUTIC EXERCISES: CPT | Performed by: PHYSICAL THERAPIST

## 2024-10-10 NOTE — PROGRESS NOTES
Daily Note     Today's date: 10/10/2024  Patient name: Jamee Shaikh  : 1955  MRN: [id number]  Referring provider: Abdullahi Cortez*  Dx:   Encounter Diagnosis     ICD-10-CM    1. Internal derangement of left shoulder  M24.812       2. S/P left rotator cuff repair  Z98.890           Start Time: 0900  Stop Time: 09  Total time in clinic (min): 38 minutes    Subjective: States she is sore today. Had difficulty sleeping last night.       Objective: See treatment diary below      Assessment: Tolerated treatment fair. Significant guarding and mm tensing with passive ROM off of table. Able to achieve about 45 degrees abduction keeping arm supported on table. Approximately 30 degrees elevating arm into passive flexion. Added pendulum exercises to home program and will continue to progress as protocol allows. Patient exhibited good technique with therapeutic exercises and would benefit from continued PT      Plan: Continue per plan of care.  Progress treatment as tolerated.       Precautions: protocol; PROM until week 6  Access Code: [email or web address]  Progress note: 10/24  POC:     Manuals 10/10 9/26 10/1 10/3 10/7   Stretching with active release FF to 90. ER to 35, IR to body, abd to 60- KB within tolerated range FF to 90. ER to 35, IR to body, abd to 60- KB  FF to 90. ER to 35, IR to body, abd to 60- KB within tolerated range FF to 90. ER to 35, IR to body, abd to 60- KB within tolerated range FF to 90. ER to 35, IR to body, abd to 60- KB within tolerated range   GH jt mobs Grade II  Grade II Grade II Grade II                    Neuro Re-Ed                scap retraction 10x 10x 10x 10x 10x   Shoulder rolls 10x 10x 10x 10 10x   Chin tucks :03x10 :03x10 :03x10 :03x10 :03x10                   Ther Ex                UT stretch :30x3 L :30x3 L :30x3 L :30x3 L :30x3 L   LS stretch :30x3 L :30x3 L :30x3 L :30x3 L :30x3 L   Table slides        pendulums 10x ea 10x ea 10x ea 10x ea 10x ea   Supine flexion ROM         Supine ER ROM                Elbow PROM 2x10 10x 2x10 2x10 2x10   Wrist AROM Flex, UD/RD, ext, sup/pron 20x ea Flex, UD/RD, ext, sup/pron 10x ea Flex, UD/RD, ext, sup/pron 15x ea Flex, UD/RD, ext, sup/pron 20x ea Flex, UD/RD, ext, sup/pron 20x ea    Red 20x Red 10x Red 2x10 Red 2x10 Red 25x                                                                   Ther Activity                        Gait Training                        Modalities

## 2024-10-15 ENCOUNTER — OFFICE VISIT (OUTPATIENT)
Dept: PHYSICAL THERAPY | Facility: CLINIC | Age: 69
End: 2024-10-15
Payer: COMMERCIAL

## 2024-10-15 DIAGNOSIS — M24.812 INTERNAL DERANGEMENT OF LEFT SHOULDER: Primary | ICD-10-CM

## 2024-10-15 DIAGNOSIS — Z98.890 S/P LEFT ROTATOR CUFF REPAIR: ICD-10-CM

## 2024-10-15 PROCEDURE — 97112 NEUROMUSCULAR REEDUCATION: CPT | Performed by: PHYSICAL THERAPIST

## 2024-10-15 PROCEDURE — 97110 THERAPEUTIC EXERCISES: CPT | Performed by: PHYSICAL THERAPIST

## 2024-10-15 PROCEDURE — 97140 MANUAL THERAPY 1/> REGIONS: CPT | Performed by: PHYSICAL THERAPIST

## 2024-10-15 NOTE — PROGRESS NOTES
Daily Note     Today's date: 10/15/2024  Patient name: Jamee Shaikh  : 1955  MRN: [id number]  Referring provider: Abdullahi Cortez*  Dx:   Encounter Diagnosis     ICD-10-CM    1. Internal derangement of left shoulder  M24.812       2. S/P left rotator cuff repair  Z98.890           Start Time: 0845  Stop Time: 925  Total time in clinic (min): 40 minutes    Subjective: No change. Still with soreness and pain in left shoulder both ant and post.       Objective: See treatment diary below      Assessment: Tolerated treatment fair. Still with guarding of L UE during MT. Attempted gentle stretching to avoid pain and increased mm resistance.  Patient exhibited good technique with therapeutic exercises and would benefit from continued PT      Plan: Continue per plan of care.  Progress treatment as tolerated.  Able to increase per protocol at next session.     Precautions: protocol; PROM until week 6  Access Code: [email or web address]  Progress note: 10/24  POC:     Manuals 10/10 10/15 10/1 10/3 10/7   Stretching with active release FF to 90. ER to 35, IR to body, abd to 60- KB within tolerated range FF to 90. ER to 35, IR to body, abd to 60- KB within tolerated range FF to 90. ER to 35, IR to body, abd to 60- KB within tolerated range FF to 90. ER to 35, IR to body, abd to 60- KB within tolerated range FF to 90. ER to 35, IR to body, abd to 60- KB within tolerated range   GH jt mobs Grade II Grade II Grade II Grade II Grade II                    Neuro Re-Ed                scap retraction 10x 10x 10x 10x 10x   Shoulder rolls 10x 10x 10x 10 10x   Chin tucks :03x10 :03x10 :03x10 :03x10 :03x10                   Ther Ex                UT stretch :30x3 L :30x3 L :30x3 L :30x3 L :30x3 L   LS stretch :30x3 L :30x3 L :30x3 L :30x3 L :30x3 L   Table slides        pendulums 10x ea 10x ea 10x ea 10x ea 10x ea   Supine flexion ROM        Supine ER ROM                Elbow PROM 2x10  2x10 2x10 2x10   Wrist AROM Flex, UD/RD,  ext, sup/pron 20x ea Flex, UD/RD, ext, sup/pron 25x ea Flex, UD/RD, ext, sup/pron 15x ea Flex, UD/RD, ext, sup/pron 20x ea Flex, UD/RD, ext, sup/pron 20x ea    Red 20x Red 25x Red 2x10 Red 2x10 Red 25x                                                                   Ther Activity                        Gait Training                        Modalities

## 2024-10-16 ENCOUNTER — TELEPHONE (OUTPATIENT)
Age: 69
End: 2024-10-16

## 2024-10-16 ENCOUNTER — TELEPHONE (OUTPATIENT)
Dept: FAMILY MEDICINE CLINIC | Facility: CLINIC | Age: 69
End: 2024-10-16

## 2024-10-16 NOTE — TELEPHONE ENCOUNTER
Patient has appt on 12/5 wanted to see you sooner. She will be dropping off [place] papers today if you can see her before 12/5 call her at [phone]

## 2024-10-17 ENCOUNTER — OFFICE VISIT (OUTPATIENT)
Dept: PHYSICAL THERAPY | Facility: CLINIC | Age: 69
End: 2024-10-17
Payer: COMMERCIAL

## 2024-10-17 ENCOUNTER — TELEPHONE (OUTPATIENT)
Age: 69
End: 2024-10-17

## 2024-10-17 DIAGNOSIS — Z98.890 S/P LEFT ROTATOR CUFF REPAIR: ICD-10-CM

## 2024-10-17 DIAGNOSIS — M24.812 INTERNAL DERANGEMENT OF LEFT SHOULDER: Primary | ICD-10-CM

## 2024-10-17 PROCEDURE — 97110 THERAPEUTIC EXERCISES: CPT | Performed by: PHYSICAL THERAPIST

## 2024-10-17 PROCEDURE — 97112 NEUROMUSCULAR REEDUCATION: CPT | Performed by: PHYSICAL THERAPIST

## 2024-10-17 PROCEDURE — 97140 MANUAL THERAPY 1/> REGIONS: CPT | Performed by: PHYSICAL THERAPIST

## 2024-10-17 NOTE — PROGRESS NOTES
Daily Note     Today's date: 10/17/2024  Patient name: Jamee Shaikh  : 1955  MRN: [id number]  Referring provider: Abdullahi Cortze*  Dx:   Encounter Diagnosis     ICD-10-CM    1. Internal derangement of left shoulder  M24.812       2. S/P left rotator cuff repair  Z98.890           Start Time: 0900  Stop Time: 09  Total time in clinic (min): 38 minutes    Subjective: No new complaints. No recent changes.       Objective: See treatment diary below      Assessment: Tolerated treatment fair. Patient now at 29 days s/p and able to begin AAROM in preparation for removal of brace in 2 weeks. Updated home exercise program as appropriate. Patient exhibited good technique with therapeutic exercises and would benefit from continued PT      Plan: Continue per plan of care.  Progress treatment as tolerated.       Precautions: protocol; PROM until week 6  Access Code: [email or web address]  Progress note: 10/24  POC:     Manuals 10/10 10/15 10/17 10/3 10/7   Stretching with active release FF to 90. ER to 35, IR to body, abd to 60- KB within tolerated range FF to 90. ER to 35, IR to body, abd to 60- KB within tolerated range FF to 90. ER to 35, IR to body, abd to 60- KB within tolerated range FF to 90. ER to 35, IR to body, abd to 60- KB within tolerated range FF to 90. ER to 35, IR to body, abd to 60- KB within tolerated range   GH jt mobs Grade II Grade II Grade II Grade II Grade II                    Neuro Re-Ed                scap retraction 10x 10x 10x 10x 10x   Shoulder rolls 10x 10x 10x 10 10x   Chin tucks :03x10 :03x10 :03x10 :03x10 :03x10                   Ther Ex        pulleys   *     UT stretch :30x3 L :30x3 L :30x3 L :30x3 L :30x3 L   LS stretch :30x3 L :30x3 L :30x3 L :30x3 L :30x3 L   Table slides        pendulums 10x ea 10x ea 10x ea 10x ea 10x ea   Supine flexion ROM   With wedge*     Supine ER ROM   :05x10     Flexion rock back (standing)   :05x10     Elbow PROM 2x10   2x10 2x10   Wrist AROM Flex,  UD/RD, ext, sup/pron 20x ea Flex, UD/RD, ext, sup/pron 25x ea Flex, UD/RD, ext, sup/pron 25x ea Flex, UD/RD, ext, sup/pron 20x ea Flex, UD/RD, ext, sup/pron 20x ea    Red 20x Red 25x Red 25x Red 2x10 Red 25x                                                                   Ther Activity                        Gait Training                        Modalities

## 2024-10-17 NOTE — TELEPHONE ENCOUNTER
Did you offer her a same day with me tomorrow? I can see her sooner than next week unless that was all that worked with her schedule

## 2024-10-17 NOTE — TELEPHONE ENCOUNTER
Yes, patient said she was having problems with her back and was thinking she was going to have to see someone for this and wanted to talk about it

## 2024-10-18 ENCOUNTER — OFFICE VISIT (OUTPATIENT)
Dept: FAMILY MEDICINE CLINIC | Facility: CLINIC | Age: 69
End: 2024-10-18
Payer: COMMERCIAL

## 2024-10-18 VITALS
HEART RATE: 88 BPM | OXYGEN SATURATION: 98 % | SYSTOLIC BLOOD PRESSURE: 144 MMHG | RESPIRATION RATE: 18 BRPM | HEIGHT: 63 IN | BODY MASS INDEX: 28.35 KG/M2 | DIASTOLIC BLOOD PRESSURE: 88 MMHG | TEMPERATURE: 97.4 F | WEIGHT: 160 LBS

## 2024-10-18 DIAGNOSIS — M79.605 LOW BACK PAIN RADIATING TO LEFT LOWER EXTREMITY: Primary | ICD-10-CM

## 2024-10-18 DIAGNOSIS — M54.50 LOW BACK PAIN RADIATING TO LEFT LOWER EXTREMITY: Primary | ICD-10-CM

## 2024-10-18 PROCEDURE — 99213 OFFICE O/P EST LOW 20 MIN: CPT | Performed by: NURSE PRACTITIONER

## 2024-10-18 PROCEDURE — G2211 COMPLEX E/M VISIT ADD ON: HCPCS | Performed by: NURSE PRACTITIONER

## 2024-10-18 NOTE — PROGRESS NOTES
"Ambulatory Visit  Name: Jamee Shaikh      : 1955      MRN: [id number]  Encounter Provider: GUS Sosa  Encounter Date: 10/18/2024   Encounter department: [facility]    Assessment & Plan  Low back pain radiating to left lower extremity    Orders:    Ambulatory referral to Spine & Pain Management; Future       History of Present Illness     Here to discuss low back pain- worse since left shoulder surgery- will be able to get her sling off in 2 weeks. Is going to PT for her left shoulder    Does take Cyclobenzaprine with some improvement- radiation into left buttocks.Takes Advil and ice. Had back pain years ago, tore her \"glutes\" twice. Needs disability paperwork filled out          Review of Systems   Constitutional:  Negative for activity change, appetite change, fatigue and fever.   HENT:  Negative for congestion, sinus pressure and sore throat.    Eyes:  Negative for pain, discharge and visual disturbance.   Respiratory:  Negative for cough, chest tightness, shortness of breath and wheezing.    Cardiovascular:  Negative for chest pain, palpitations and leg swelling.   Gastrointestinal:  Negative for abdominal distention, abdominal pain, constipation, diarrhea and nausea.   Endocrine: Negative for polydipsia, polyphagia and polyuria.   Genitourinary:  Negative for difficulty urinating, dysuria, frequency and urgency.   Musculoskeletal:  Positive for arthralgias and back pain.   Skin:  Negative for color change.   Neurological:  Negative for dizziness, syncope, speech difficulty, weakness and headaches.   Hematological:  Negative for adenopathy. Does not bruise/bleed easily.   Psychiatric/Behavioral:  Negative for agitation, behavioral problems, confusion and hallucinations. The patient is not nervous/anxious.            Objective     /88   Pulse 88   Temp (!) 97.4 °F (36.3 °C) (Temporal)   Resp 18   Ht 5' 3\" (1.6 m)   Wt 72.6 kg (160 lb)   SpO2 98%   BMI " 28.34 kg/m²     Physical Exam  Vitals and nursing note reviewed.   Constitutional:       General: She is not in acute distress.     Appearance: She is well-developed. She is not diaphoretic.   Cardiovascular:      Rate and Rhythm: Normal rate and regular rhythm.      Heart sounds: Normal heart sounds.   Pulmonary:      Effort: Pulmonary effort is normal. No respiratory distress.      Breath sounds: Normal breath sounds. No wheezing.   Musculoskeletal:         General: Tenderness (low back and left shoulder (sling in place)) present. No deformity.      Lumbar back: Spasms and tenderness present. No swelling, deformity or bony tenderness.        Back:    Skin:     General: Skin is warm and dry.      Findings: No erythema or rash.   Neurological:      Mental Status: She is alert and oriented to person, place, and time.   Psychiatric:         Mood and Affect: Mood normal.         Behavior: Behavior normal. Behavior is cooperative.         Thought Content: Thought content normal.         Judgment: Judgment normal.

## 2024-10-22 ENCOUNTER — OFFICE VISIT (OUTPATIENT)
Dept: PHYSICAL THERAPY | Facility: CLINIC | Age: 69
End: 2024-10-22
Payer: COMMERCIAL

## 2024-10-22 ENCOUNTER — APPOINTMENT (OUTPATIENT)
Dept: LAB | Facility: CLINIC | Age: 69
End: 2024-10-22
Payer: COMMERCIAL

## 2024-10-22 DIAGNOSIS — E78.5 HYPERLIPIDEMIA, UNSPECIFIED HYPERLIPIDEMIA TYPE: ICD-10-CM

## 2024-10-22 DIAGNOSIS — M24.812 INTERNAL DERANGEMENT OF LEFT SHOULDER: Primary | ICD-10-CM

## 2024-10-22 DIAGNOSIS — E55.9 VITAMIN D DEFICIENCY: ICD-10-CM

## 2024-10-22 DIAGNOSIS — R73.01 IMPAIRED FASTING GLUCOSE: ICD-10-CM

## 2024-10-22 DIAGNOSIS — K21.9 GASTROESOPHAGEAL REFLUX DISEASE WITHOUT ESOPHAGITIS: ICD-10-CM

## 2024-10-22 DIAGNOSIS — Z98.890 S/P LEFT ROTATOR CUFF REPAIR: ICD-10-CM

## 2024-10-22 DIAGNOSIS — I10 ESSENTIAL HYPERTENSION: ICD-10-CM

## 2024-10-22 LAB
25(OH)D3 SERPL-MCNC: 35.1 NG/ML (ref 30–100)
ALBUMIN SERPL BCG-MCNC: 4.5 G/DL (ref 3.5–5)
ALP SERPL-CCNC: 78 U/L (ref 34–104)
ALT SERPL W P-5'-P-CCNC: 12 U/L (ref 7–52)
ANION GAP SERPL CALCULATED.3IONS-SCNC: 11 MMOL/L (ref 4–13)
AST SERPL W P-5'-P-CCNC: 20 U/L (ref 13–39)
BASOPHILS # BLD AUTO: 0.02 THOUSANDS/ΜL (ref 0–0.1)
BASOPHILS NFR BLD AUTO: 0 % (ref 0–1)
BILIRUB SERPL-MCNC: 0.56 MG/DL (ref 0.2–1)
BUN SERPL-MCNC: 21 MG/DL (ref 5–25)
CALCIUM SERPL-MCNC: 9.9 MG/DL (ref 8.4–10.2)
CHLORIDE SERPL-SCNC: 102 MMOL/L (ref 96–108)
CHOLEST SERPL-MCNC: 199 MG/DL
CO2 SERPL-SCNC: 27 MMOL/L (ref 21–32)
CREAT SERPL-MCNC: 0.71 MG/DL (ref 0.6–1.3)
EOSINOPHIL # BLD AUTO: 0.34 THOUSAND/ΜL (ref 0–0.61)
EOSINOPHIL NFR BLD AUTO: 6 % (ref 0–6)
ERYTHROCYTE [DISTWIDTH] IN BLOOD BY AUTOMATED COUNT: 13.2 % (ref 11.6–15.1)
GFR SERPL CREATININE-BSD FRML MDRD: 88 ML/MIN/1.73SQ M
GLUCOSE P FAST SERPL-MCNC: 77 MG/DL (ref 65–99)
HCT VFR BLD AUTO: 45.9 % (ref 34.8–46.1)
HDLC SERPL-MCNC: 60 MG/DL
HGB BLD-MCNC: 14.9 G/DL (ref 11.5–15.4)
IMM GRANULOCYTES # BLD AUTO: 0.01 THOUSAND/UL (ref 0–0.2)
IMM GRANULOCYTES NFR BLD AUTO: 0 % (ref 0–2)
LDLC SERPL CALC-MCNC: 123 MG/DL (ref 0–100)
LYMPHOCYTES # BLD AUTO: 1.87 THOUSANDS/ΜL (ref 0.6–4.47)
LYMPHOCYTES NFR BLD AUTO: 34 % (ref 14–44)
MCH RBC QN AUTO: 28.9 PG (ref 26.8–34.3)
MCHC RBC AUTO-ENTMCNC: 32.5 G/DL (ref 31.4–37.4)
MCV RBC AUTO: 89 FL (ref 82–98)
MONOCYTES # BLD AUTO: 0.6 THOUSAND/ΜL (ref 0.17–1.22)
MONOCYTES NFR BLD AUTO: 11 % (ref 4–12)
NEUTROPHILS # BLD AUTO: 2.75 THOUSANDS/ΜL (ref 1.85–7.62)
NEUTS SEG NFR BLD AUTO: 49 % (ref 43–75)
NONHDLC SERPL-MCNC: 139 MG/DL
NRBC BLD AUTO-RTO: 0 /100 WBCS
PLATELET # BLD AUTO: 324 THOUSANDS/UL (ref 149–390)
PMV BLD AUTO: 10.6 FL (ref 8.9–12.7)
POTASSIUM SERPL-SCNC: 4.8 MMOL/L (ref 3.5–5.3)
PROT SERPL-MCNC: 7.4 G/DL (ref 6.4–8.4)
RBC # BLD AUTO: 5.16 MILLION/UL (ref 3.81–5.12)
SODIUM SERPL-SCNC: 140 MMOL/L (ref 135–147)
TRIGL SERPL-MCNC: 80 MG/DL
WBC # BLD AUTO: 5.59 THOUSAND/UL (ref 4.31–10.16)

## 2024-10-22 PROCEDURE — 82306 VITAMIN D 25 HYDROXY: CPT

## 2024-10-22 PROCEDURE — 97110 THERAPEUTIC EXERCISES: CPT | Performed by: PHYSICAL THERAPIST

## 2024-10-22 PROCEDURE — 85025 COMPLETE CBC W/AUTO DIFF WBC: CPT

## 2024-10-22 PROCEDURE — 80061 LIPID PANEL: CPT

## 2024-10-22 PROCEDURE — 97140 MANUAL THERAPY 1/> REGIONS: CPT | Performed by: PHYSICAL THERAPIST

## 2024-10-22 PROCEDURE — 36415 COLL VENOUS BLD VENIPUNCTURE: CPT

## 2024-10-22 PROCEDURE — 83036 HEMOGLOBIN GLYCOSYLATED A1C: CPT

## 2024-10-22 PROCEDURE — 97112 NEUROMUSCULAR REEDUCATION: CPT | Performed by: PHYSICAL THERAPIST

## 2024-10-22 PROCEDURE — 80053 COMPREHEN METABOLIC PANEL: CPT

## 2024-10-22 NOTE — PROGRESS NOTES
Daily Note     Today's date: 10/22/2024  Patient name: Jamee Shaikh  : 1955  MRN: [id number]  Referring provider: Abdullahi Cortez*  Dx:   Encounter Diagnosis     ICD-10-CM    1. Internal derangement of left shoulder  M24.812       2. S/P left rotator cuff repair  Z98.890           Start Time: 845  Stop Time: 923  Total time in clinic (min): 38 minutes    Subjective: Patient states she is feeling a little better. Not as much pain the last couple days.       Objective: See treatment diary below      Assessment: Tolerated treatment well. Able to progress as per flowsheet today with minimal c/o discomfort. Improved tolerance to stretching/MT today, although still limited. Patient exhibited good technique with therapeutic exercises and would benefit from continued PT      Plan: Continue per plan of care.  Progress treatment as tolerated.       Precautions: protocol; PROM until week 6  Access Code: [email or web address]  Progress note: 10/24  POC:     Manuals 10/10 10/15 10/17 10/22 10/7   Stretching with active release FF to 90. ER to 35, IR to body, abd to 60- KB within tolerated range FF to 90. ER to 35, IR to body, abd to 60- KB within tolerated range FF to 90. ER to 35, IR to body, abd to 60- KB within tolerated range Per protocol as andria- KB FF to 90. ER to 35, IR to body, abd to 60- KB within tolerated range   GH jt mobs Grade II Grade II Grade II Grade II Grade II                    Neuro Re-Ed                scap retraction 10x 10x 10x 10x 10x   Shoulder rolls 10x 10x 10x 10x 10x   Chin tucks :03x10 :03x10 :03x10 :03x10 :03x10                   Ther Ex        pulleys   * Scaption 10x    UT stretch :30x3 L :30x3 L :30x3 L :30x3 L :30x3 L   LS stretch :30x3 L :30x3 L :30x3 L :30x3 L :30x3 L   Table slides        pendulums 10x ea 10x ea 10x ea 10x ea 10x ea   Supine flexion ROM   With wedge*     Supine ER ROM   :05x10 :05x10    Flexion rock back (standing)   :05x10 :05x10    Elbow PROM 2x10   2x10 2x10    Wrist AROM Flex, UD/RD, ext, sup/pron 20x ea Flex, UD/RD, ext, sup/pron 25x ea Flex, UD/RD, ext, sup/pron 25x ea Flex, UD/RD, ext, sup/pron 30x ea Flex, UD/RD, ext, sup/pron 20x ea    Red 20x Red 25x Red 25x Red 25x Red 25x                                                                   Ther Activity                        Gait Training                        Modalities

## 2024-10-23 LAB
EST. AVERAGE GLUCOSE BLD GHB EST-MCNC: 114 MG/DL
HBA1C MFR BLD: 5.6 %

## 2024-10-24 ENCOUNTER — OFFICE VISIT (OUTPATIENT)
Dept: PHYSICAL THERAPY | Facility: CLINIC | Age: 69
End: 2024-10-24
Payer: COMMERCIAL

## 2024-10-24 DIAGNOSIS — Z98.890 S/P LEFT ROTATOR CUFF REPAIR: ICD-10-CM

## 2024-10-24 DIAGNOSIS — M24.812 INTERNAL DERANGEMENT OF LEFT SHOULDER: Primary | ICD-10-CM

## 2024-10-24 PROCEDURE — 97112 NEUROMUSCULAR REEDUCATION: CPT | Performed by: PHYSICAL THERAPIST

## 2024-10-24 PROCEDURE — 97140 MANUAL THERAPY 1/> REGIONS: CPT | Performed by: PHYSICAL THERAPIST

## 2024-10-24 PROCEDURE — 97110 THERAPEUTIC EXERCISES: CPT | Performed by: PHYSICAL THERAPIST

## 2024-10-24 NOTE — PROGRESS NOTES
Daily Note     Today's date: 10/24/2024  Patient name: Jamee Shaikh  : 1955  MRN: [id number]  Referring provider: Abdullahi Cortez*  Dx:   Encounter Diagnosis     ICD-10-CM    1. Internal derangement of left shoulder  M24.812       2. S/P left rotator cuff repair  Z98.890           Start Time: 0900  Stop Time: 0940  Total time in clinic (min): 40 minutes    Subjective: Patient states soreness after last session.       Objective: See treatment diary below      Assessment: Tolerated treatment fair+. Increased ease with passive motion today into flexion. Difficulty relaxing during shoulder passive abduction. ER is at expected range for time s/p. Patient exhibited good technique with therapeutic exercises and would benefit from continued PT      Plan: Continue per plan of care.  Progress treatment as tolerated.       Precautions: protocol; PROM until week 6  Access Code: [email or web address]  Progress note: 10/24  POC:     Manuals 10/10 10/15 10/17 10/22 10/24   Stretching with active release FF to 90. ER to 35, IR to body, abd to 60- KB within tolerated range FF to 90. ER to 35, IR to body, abd to 60- KB within tolerated range FF to 90. ER to 35, IR to body, abd to 60- KB within tolerated range Per protocol as andria- KB Per protocol as andria- KB   GH jt mobs Grade II Grade II Grade II Grade II Grade II                   Neuro Re-Ed                scap retraction 10x 10x 10x 10x 10x   Shoulder rolls 10x 10x 10x 10x 10x   Chin tucks :03x10 :03x10 :03x10 :03x10 :03x10                   Ther Ex        pulleys   * Scaption 10x Scaption 10x   UT stretch :30x3 L :30x3 L :30x3 L :30x3 L :30x3 L   LS stretch :30x3 L :30x3 L :30x3 L :30x3 L :30x3 L   Table slides        pendulums 10x ea 10x ea 10x ea 10x ea 10x ea   Supine flexion ROM   With wedge*     Supine ER ROM   :05x10 :05x10 :05x10   Flexion rock back (standing)   :05x10 :05x10 :05x10   Elbow PROM 2x10   2x10 10x AROM   Wrist AROM Flex, UD/RD, ext, sup/pron 20x ea  Flex, UD/RD, ext, sup/pron 25x ea Flex, UD/RD, ext, sup/pron 25x ea Flex, UD/RD, ext, sup/pron 30x ea Flex, UD/RD, ext, sup/pron 30x ea    Red 20x Red 25x Red 25x Red 25x Red 25x                                                                   Ther Activity                        Gait Training                        Modalities

## 2024-10-29 ENCOUNTER — OFFICE VISIT (OUTPATIENT)
Dept: PHYSICAL THERAPY | Facility: CLINIC | Age: 69
End: 2024-10-29
Payer: COMMERCIAL

## 2024-10-29 DIAGNOSIS — M24.812 INTERNAL DERANGEMENT OF LEFT SHOULDER: Primary | ICD-10-CM

## 2024-10-29 DIAGNOSIS — Z98.890 S/P LEFT ROTATOR CUFF REPAIR: ICD-10-CM

## 2024-10-29 PROCEDURE — 97110 THERAPEUTIC EXERCISES: CPT | Performed by: PHYSICAL THERAPIST

## 2024-10-29 PROCEDURE — 97112 NEUROMUSCULAR REEDUCATION: CPT | Performed by: PHYSICAL THERAPIST

## 2024-10-29 PROCEDURE — 97140 MANUAL THERAPY 1/> REGIONS: CPT | Performed by: PHYSICAL THERAPIST

## 2024-10-29 NOTE — PROGRESS NOTES
Daily Note     Today's date: 10/29/2024  Patient name: Jamee Shaikh  : 1955  MRN: [id number]  Referring provider: Abdullahi Cortez*  Dx:   Encounter Diagnosis     ICD-10-CM    1. Internal derangement of left shoulder  M24.812       2. S/P left rotator cuff repair  Z98.890           Start Time: 0930  Stop Time: 1010  Total time in clinic (min): 40 minutes    Subjective: Patient states she fell down bleacher steps 2 days ago. Did not land on LUE, but thinks it may have flung up. Was wearing sling. C/o R knee pain where she did land with bruise and cut.       Objective: See treatment diary below      Assessment: Tolerated treatment well. Continued guarding at end range of motion with manual therapy. Will be at week 6 s/p tomorrow and able to DC brace. Educated patient on weight restriction and will have re-evaluation at next session. Patient exhibited good technique with therapeutic exercises and would benefit from continued PT      Plan: Continue per plan of care.  Progress treatment as tolerated.       Precautions: protocol; PROM until week 6  Access Code: [email or web address]  Progress note: 10/24  POC:     Manuals 10/29 10/15 10/17 10/22 10/24   Stretching with active release Per protocol as andria- KB FF to 90. ER to 35, IR to body, abd to 60- KB within tolerated range FF to 90. ER to 35, IR to body, abd to 60- KB within tolerated range Per protocol as andria- KB Per protocol as andria- KB   GH jt mobs Grade II Grade II Grade II Grade II Grade II                   Neuro Re-Ed                scap retraction 10x 10x 10x 10x 10x   Shoulder rolls 10x 10x 10x 10x 10x   Chin tucks :03x10 :03x10 :03x10 :03x10 :03x10                   Ther Ex        pulleys Scaption 10x  * Scaption 10x Scaption 10x   UT stretch :30x3 L :30x3 L :30x3 L :30x3 L :30x3 L   LS stretch :30x3 L :30x3 L :30x3 L :30x3 L :30x3 L   Table slides        pendulums 10x ea 10x ea 10x ea 10x ea 10x ea   Supine flexion ROM   With wedge*     Supine ER ROM  :05x10  :05x10 :05x10 :05x10   Flexion rock back (standing) :05x10  :05x10 :05x10 :05x10   Elbow PROM 10x AROM   2x10 10x AROM   Wrist AROM Flex, UD/RD, ext, sup/pron 30x ea Flex, UD/RD, ext, sup/pron 25x ea Flex, UD/RD, ext, sup/pron 25x ea Flex, UD/RD, ext, sup/pron 30x ea Flex, UD/RD, ext, sup/pron 30x ea    25x Red 25x Red 25x Red 25x Red 25x                                                                   Ther Activity                        Gait Training                        Modalities

## 2024-10-31 ENCOUNTER — APPOINTMENT (OUTPATIENT)
Dept: PHYSICAL THERAPY | Facility: CLINIC | Age: 69
End: 2024-10-31
Payer: COMMERCIAL

## 2024-11-05 ENCOUNTER — EVALUATION (OUTPATIENT)
Dept: PHYSICAL THERAPY | Facility: CLINIC | Age: 69
End: 2024-11-05
Payer: COMMERCIAL

## 2024-11-05 ENCOUNTER — TELEPHONE (OUTPATIENT)
Dept: OTHER | Facility: HOSPITAL | Age: 69
End: 2024-11-05

## 2024-11-05 ENCOUNTER — TELEPHONE (OUTPATIENT)
Age: 69
End: 2024-11-05

## 2024-11-05 DIAGNOSIS — Z98.890 S/P LEFT ROTATOR CUFF REPAIR: ICD-10-CM

## 2024-11-05 DIAGNOSIS — M24.812 INTERNAL DERANGEMENT OF LEFT SHOULDER: Primary | ICD-10-CM

## 2024-11-05 PROCEDURE — 97110 THERAPEUTIC EXERCISES: CPT | Performed by: PHYSICAL THERAPIST

## 2024-11-05 PROCEDURE — 97140 MANUAL THERAPY 1/> REGIONS: CPT | Performed by: PHYSICAL THERAPIST

## 2024-11-05 PROCEDURE — 97112 NEUROMUSCULAR REEDUCATION: CPT | Performed by: PHYSICAL THERAPIST

## 2024-11-05 NOTE — PROGRESS NOTES
PT Re-Evaluation     Today's date: 2024  Patient name: Jamee Shaikh  : 1955  MRN: [id number]  Referring provider: Abdullahi Cortez*  Dx:   Encounter Diagnosis     ICD-10-CM    1. Internal derangement of left shoulder  M24.812       2. S/P left rotator cuff repair  Z98.890           Start Time: 0845  Stop Time: 0930  Total time in clinic (min): 45 minutes    Assessment  Impairments: abnormal muscle firing, abnormal or restricted ROM, abnormal movement, activity intolerance, impaired physical strength, lacks appropriate home exercise program, pain with function, scapular dyskinesis, poor posture  and poor body mechanics    Assessment details: Jamee Shaikh was seen for an initial PT evaluation and 11 f/u sessions. Patient is a 69 y.o. female with diagnosis of L rotator cuff repair and past medical history significant for  OA, chronic pain, thyroid disease, GERD, hiatal hernia, hyperlipidemia, HTN, rotator cuff tear with repair bilaterally, L buttock pain with history of surgery, lipomas with removal, lumbar radiculopathy, vertigo. FOTO functional score shows improvement from 4% at intake to 46% today progressing towards predicted value of 54%. Findings of examination today show improvement towards goals with decreased reliance on brace and increased left upper extremity strength, range of motion and mobility, although still limited. Continuation of skilled PT is indicated to continue to progress patient per protocol back to PLOF. Plan to continue 2x/week for an additional 4 weeks until next re-evaluation.         Goals  STG (6 weeks)  1. Patient's left shoulder flexion AROM will increase to 120 with 0/10 pain for increased ability to perform overhead ADLs. - PROGRESSING  2. Patient will have 0/10 pain in left shoulder at rest. - NOT MET  3. Patient's left shoulder strength will increase to 2+/5 for increased ability to lift. - MET 11/5  LTG (12 weeks)  1. Patient's UE AROM equal bilaterally for  ability to complete hair hygiene, overhead, and behind the back ADLs. - PROGRESSING  2. Patient's UE strength will be equal bilaterally for ability to lift and carry at PLOF. - PROGRESSING  3. Patient will be independent with home exercise program for continued maintenance post PT discharge. - PROGRESSING      Plan  Patient would benefit from: skilled physical therapy  Planned modality interventions: unattended electrical stimulation, cryotherapy and thermotherapy: hydrocollator packs    Planned therapy interventions: neuromuscular re-education, manual therapy, therapeutic exercise, therapeutic activities, self care and home exercise program    Frequency: 2x week  Duration in weeks: 12  Plan of Care beginning date: 9/24/2024  Plan of Care expiration date: 12/24/2024  Treatment plan discussed with: patient and PTA  Plan details: Progress note in 4 weeks.         Subjective Evaluation    History of Present Illness  Date of surgery: 9/18/2024    Subjective 11/5: Patient was able to DC sling last week at week 6 s/p without difficulty. Still struggling with overhead reaching. No issues with dressing. Has not returned to driving, but is planning on attempting soon. Does continue to have anterior left shoulder pain traveling into proximal anterior arm, increased with lifting arm away from body. To f/u with ortho 11/18.     Mechanism of injury: Jamee Shaikh is a 69 y.o. female who presents to outpatient Physical Therapy today with complaints of left shoulder pain. Initial surgery for L RC 12/13/23, new episode of pain after DC from PT in April. Attempted PT post injury with no significant improvement. Seen over the course of several weeks by orthos. 3rd opinion by Dr. Cortez who recommended revision of RC due to likely re-tear. Patient is not s/p L supraspinatus revision with debridement as of 9/18/24. Also with bicep tear which was debrided, repair not recommended. Patient is now to utilize sling for 6 weeks, may remove  "pillow after 2. F/u with ortho at the middle of November.     Patient Goals  Patient goal: \"Feel better and get arm working\". Would like to return to work.  Pain       Pain scale: discomfort.  5  At best pain ratin   5  At worst pain ratin (nerve block) 7  Location: L shoulder  Quality: dull ache  Relieving factors: support  Exacerbated by: LUE movement.    Social Support  Steps to enter house: yes  Stairs in house: yes   Lives in: multiple-level home  Lives with: spouse    Employment status: not working  Hand dominance: right          Objective     Postural Observations    Additional Postural Observation Details  Forward head. Wearing brace with adductor pillow    Observations   Left Shoulder   Positive for edema and incision.     Additional Observation Details  4 scope incisions, closed healing appropriately. No redness or drainage.  : healing appropriately, closed, min scar mobility restriction    Cervical/Thoracic Screen   Cervical range of motion within normal limits  Cervical range of motion within normal limits with the following exceptions: Min restriction of cervical extension    Neurological Testing     Sensation     Shoulder   Left Shoulder   Intact: light touch    Right Shoulder   Intact: Light touch    General Comments:      Shoulder Comments       FOTO: 4% function, 54% predicted function   : 46%        Shoulder - AROM IE    LEFT      Flexion NT 90   Abduction NT 65   External Rotation NT 40   Internal Rotation NT 60   RIGHT     Flexion 150    Abduction 150    External Rotation T2    Internal Rotation T11      Shoulder - PROM IE    LEFT      Flexion 15 90   Abduction 15 60   External Rotation -15 50   Internal Rotation To belly 75     Shoulder - MMT IE    LEFT      Flexion NT 2-   Abduction NT 2-   External Rotation NT 2+   Internal Rotation NT 2+   Bicep 2+ 3-   Tricep 2+ 3-   RIGHT     Flexion 4+    Abduction 4    External Rotation 4+    Internal Rotation 5    Bicep 5  "   Tricep 4                 Precautions: protocol; PROM until week 6  Access Code: [email or web address]  Progress note: 11/5  POC: 12/24    Manuals 10/29 11/5 10/17 10/22 10/24   Stretching with active release Per protocol as andria- KB KB all FF to 90. ER to 35, IR to body, abd to 60- KB within tolerated range Per protocol as andria- KB Per protocol as andria- KB   GH jt mobs Grade II Grade II-III Grade II Grade II Grade II                   Neuro Re-Ed                scap retraction 10x  10x 10x 10x   Shoulder rolls 10x  10x 10x 10x   Chin tucks :03x10  :03x10 :03x10 :03x10           Shoulder iso  :05x5      Ther Ex        pulleys Scaption 10x Scap 10x * Scaption 10x Scaption 10x   UT stretch :30x3 L  :30x3 L :30x3 L :30x3 L   LS stretch :30x3 L  :30x3 L :30x3 L :30x3 L   Table slides        pendulums 10x ea  10x ea 10x ea 10x ea   Supine flexion ROM   With wedge*     Supine ER ROM :05x10 :05x10 :05x10 :05x10 :05x10   Flexion rock back (standing) :05x10 :05x10 :05x10 :05x10 :05x10   Elbow PROM 10x AROM   2x10 10x AROM   Wrist AROM Flex, UD/RD, ext, sup/pron 30x ea  Flex, UD/RD, ext, sup/pron 25x ea Flex, UD/RD, ext, sup/pron 30x ea Flex, UD/RD, ext, sup/pron 30x ea    25x  Red 25x Red 25x Red 25x           Wall walk  5x flex                                                      Ther Activity                        Gait Training                        Modalities

## 2024-11-05 NOTE — TELEPHONE ENCOUNTER
Caller: Patient     Doctor: Dana     Reason for call: Patient asking for work note to be changed to RTW date of 11/19 and that she has an appt on 11/18 and will be re-evaluated at time    Call back#: [phone]

## 2024-11-05 NOTE — TELEPHONE ENCOUNTER
Caller: Patient     Doctor: Dana     Reason for call: Patient asked for a letter for excusing her until 11/18    Call back#: [phone]

## 2024-11-06 ENCOUNTER — APPOINTMENT (OUTPATIENT)
Dept: RADIOLOGY | Facility: CLINIC | Age: 69
End: 2024-11-06
Payer: COMMERCIAL

## 2024-11-06 ENCOUNTER — OFFICE VISIT (OUTPATIENT)
Dept: URGENT CARE | Facility: CLINIC | Age: 69
End: 2024-11-06
Payer: COMMERCIAL

## 2024-11-06 VITALS
RESPIRATION RATE: 16 BRPM | WEIGHT: 160 LBS | TEMPERATURE: 98.1 F | HEART RATE: 75 BPM | HEIGHT: 63 IN | OXYGEN SATURATION: 98 % | SYSTOLIC BLOOD PRESSURE: 134 MMHG | DIASTOLIC BLOOD PRESSURE: 86 MMHG | BODY MASS INDEX: 28.35 KG/M2

## 2024-11-06 DIAGNOSIS — S89.91XA RIGHT KNEE INJURY, INITIAL ENCOUNTER: ICD-10-CM

## 2024-11-06 DIAGNOSIS — S83.411A SPRAIN OF MEDIAL COLLATERAL LIGAMENT OF RIGHT KNEE, INITIAL ENCOUNTER: Primary | ICD-10-CM

## 2024-11-06 PROCEDURE — 99214 OFFICE O/P EST MOD 30 MIN: CPT | Performed by: NURSE PRACTITIONER

## 2024-11-06 PROCEDURE — S9083 URGENT CARE CENTER GLOBAL: HCPCS | Performed by: NURSE PRACTITIONER

## 2024-11-06 PROCEDURE — 73564 X-RAY EXAM KNEE 4 OR MORE: CPT

## 2024-11-06 RX ORDER — MELOXICAM 15 MG/1
15 TABLET ORAL DAILY
Qty: 30 TABLET | Refills: 0 | Status: SHIPPED | OUTPATIENT
Start: 2024-11-06

## 2024-11-06 NOTE — PATIENT INSTRUCTIONS
"Patient Education     Knee sprain   The Basics   Written by the doctors and editors at [facility]   What causes a knee sprain? -- A knee sprain happens the knee is bent or twisted too far in 1 direction.  Inside the knee are tough bands of tissue called ligaments. These hold the different bones together (figure 1). During a sprain, 1 or more of those ligaments stretch too far or even tear. This can cause pain and swelling and might make the knee feel unsteady.  What are the symptoms of a knee sprain? -- The symptoms can include pain, tenderness, swelling, and bruising of the knee.  Some people with a knee sprain also find it hard to bend the knee or walk. Others might feel like their knee is unstable or \"gives out\" when they go up or down stairs. Also, some people cannot put weight on the leg with the injured knee.  Is there a test for a knee sprain? -- A doctor or nurse might be able to tell if you have a sprain by doing an exam and learning about what happened to your knee. They might bend and straighten your leg to see what hurts and check how loose your knee feels.  In some cases, a doctor might order an X-ray to check for broken bones. But this is not always needed. Some doctors might use an ultrasound to look at the ligaments. Ultrasound is an imaging test that creates pictures of the inside of the body. Later, you might need to have other tests like an MRI.  How is a knee sprain treated? -- Ask the doctor or nurse what you should do when you go home. Make sure that you understand exactly what you need to do to care for yourself. Ask questions if there is anything you do not understand.  You should also do the following. Think of the word \"PRICE\":   Protect - To protect your knee, the doctor might order a knee brace or splint for you. An elastic bandage can also keep your knee from moving too much. Wear your knee brace or splint as your doctor tells you to.   Rest - To rest your knee, use crutches and stay off " "of your feet. You might have to limit your activities and how much you walk or stand. This will help your knee rest while it heals.   Ice - Apply a cold gel pack, bag of ice, or bag of frozen vegetables on your knee every 1 to 2 hours, for 15 minutes each time. Put a thin towel between the ice (or other cold object) and your skin. Use the ice (or other cold object) for at least 6 hours after your injury. Some people find it helpful to ice longer, even up to 2 days after their injury.   Compression - \"Compression\" basically means pressure. You want to have your knee under slight pressure by having it wrapped in an elastic \"compression\" bandage. This helps reduce swelling and supports the knee. Your doctor or nurse will show you how to wrap your knee. Do not wrap it too tight or you might cut off the blood flow to your foot.   Elevation - \"Elevation\" means keeping your knee raised up above the level of your heart. To do this, you can put your leg on some pillows or blankets while you are lying down, or on a table or chair while you are sitting.  You can also take medicines to relieve pain, such as acetaminophen (sample brand name: Tylenol), ibuprofen (sample brand names: Advil, Motrin), or naproxen (sample brand name: Aleve).  After a few days, when you have less swelling and pain, you can start to gently stretch your knee. You can also start to do gentle activities again.  Your doctor or nurse might also give you exercises to do as your knee heals. They might also recommend working with a physical therapist (exercise expert).  What follow-up care do I need? -- Your doctor or nurse will tell you if you need to make a follow-up appointment. If so, make sure that you know when and where to go. If the injury is not healing as expected, your doctor might order an X-ray to check for a broken bone.  When should I call the doctor? -- Call for advice if:   Your pain or swelling is getting worse.   Your foot or toes are blue " "or gray, and numb.   You are unable to put weight on your knee, your knee \"locks\" in place, or your knee \"gives out.\"  All topics are updated as new evidence becomes available and our peer review process is complete.  This topic retrieved from [employer] on: Mar 13, 2024.  Topic 319290 Version 2.0  Release: 32.2.4 - C32.71  © 2024 UpToDate, Inc. and/or its affiliates. All rights reserved.  figure 1: Front view of the knee     This drawing shows the inner parts of the knee as seen from the front. A small bone (called the patella or the \"knee cap\") that sits in front of the knee has been removed so that you can see what is under that bone. The anterior cruciate ligament (ACL) is in the middle in white. It connects the thigh bone (called the \"femur\") to the shin bone (called the \"tibia\"). The meniscus is a cushion of rubbery material (cartilage) between the thigh bone and the shin bone.  Graphic 46704 Version 5.0  Consumer Information Use and Disclaimer   Disclaimer: This generalized information is a limited summary of diagnosis, treatment, and/or medication information. It is not meant to be comprehensive and should be used as a tool to help the user understand and/or assess potential diagnostic and treatment options. It does NOT include all information about conditions, treatments, medications, side effects, or risks that may apply to a specific patient. It is not intended to be medical advice or a substitute for the medical advice, diagnosis, or treatment of a health care provider based on the health care provider's examination and assessment of a patient's specific and unique circumstances. Patients must speak with a health care provider for complete information about their health, medical questions, and treatment options, including any risks or benefits regarding use of medications. This information does not endorse any treatments or medications as safe, effective, or approved for treating a specific patient. " UpToDate, Inc. and its affiliates disclaim any warranty or liability relating to this information or the use thereof.The use of this information is governed by the Terms of Use, available at https://www.Easy Eye[employer].com/en/know/clinical-effectiveness-terms. 2024© UpToDate, Inc. and its affiliates and/or licensors. All rights reserved.  Copyright   © 2024 Up[employer]Date, Inc. and/or its affiliates. All rights reserved.  Patient Education     Active Range of Motion Exercises, Knees and Ankles   About this topic   Active range of motion, or AROM, exercises are done when a person moves the muscles without help from another person or device. Using weights or exercise bands for these exercises can make them more difficult.  General   Before starting with a program, ask your doctor if you are healthy enough to do these exercises. Your doctor may have you work with a  or physical therapist to make a safe exercise program to meet your needs.  Strengthening Exercises   Strengthening exercises keep your muscles firm and strong. Sit while doing these exercises. Be sure to use good posture. Start by repeating each exercise 2 to 3 times. Work up to doing each exercise 10 times. Try to do the exercises 2 to 3 times each day. Do all exercises slowly.  Knee straightening and bending ? Lift your lower leg until your knee is straight. Now, bring it back to the floor and bend the knee as much as you can. Do this on your other leg.  Ankle pumps seated ? Move each foot up and down like you are pressing down and lifting up on a gas pedal.  Ankle circles seated ? Make circles with your ankles by moving your toes in one direction. Now, make circles in the other direction.   Ankle alphabet seated ? Act like you are writing the alphabet with each foot. Do not move your whole leg to do this, just move your ankle. Do all of the alphabet. Take short rests if you get tired.  Toe curls ? Curl your toes up and down.             What will the  results be?   Less pain and stiffness  Keep your muscles strong and flexible  Help you heal faster after an injury or surgery  Increase blood flow to a body part  Help you feel better and more relaxed  Give you more energy  More toned looking muscles  Easier to do daily activities  Increase joint function and mobility  Helpful tips   Stay active and work out to keep your muscles strong and flexible.  Be sure you do not hold your breath when exercising. This can raise your blood pressure. If you tend to hold your breath, try counting out loud when exercising. If any exercise bothers you, stop right away.  Try walking or cycling at an easy pace for a few minutes to warm up your muscles. Do this again after exercising.  Doing exercises before a meal may be a good way to get into a routine.  Exercise may be slightly uncomfortable, but you should not have sharp pains. If you do get sharp pains, stop what you are doing. If the sharp pains continue, call your doctor.  Last Reviewed Date   2021-03-17  Consumer Information Use and Disclaimer   This generalized information is a limited summary of diagnosis, treatment, and/or medication information. It is not meant to be comprehensive and should be used as a tool to help the user understand and/or assess potential diagnostic and treatment options. It does NOT include all information about conditions, treatments, medications, side effects, or risks that may apply to a specific patient. It is not intended to be medical advice or a substitute for the medical advice, diagnosis, or treatment of a health care provider based on the health care provider's examination and assessment of a patient’s specific and unique circumstances. Patients must speak with a health care provider for complete information about their health, medical questions, and treatment options, including any risks or benefits regarding use of medications. This information does not endorse any treatments or  medications as safe, effective, or approved for treating a specific patient. UpToDate, Inc. and its affiliates disclaim any warranty or liability relating to this information or the use thereof. The use of this information is governed by the Terms of Use, available at https://www.[employer].com/en/know/clinical-effectiveness-terms   Copyright   Copyright © 2024 Up[employer]Date, [employer]. and its affiliates and/or licensors. All rights reserved.

## 2024-11-06 NOTE — PROGRESS NOTES
"  [facility]        NAME: Jamee Shaikh is a 69 y.o. female  : 1955    MRN: [id number]  DATE: 2024  TIME: 9:31 AM      Assessment and Plan     Sprain of medial collateral ligament of right knee, initial encounter [S83.411A]  1. Sprain of medial collateral ligament of right knee, initial encounter  XR knee 4+ vw right injury    meloxicam (Mobic) 15 mg tablet    Ambulatory Referral to Orthopedic Surgery        Patient fitted for as needed hinged knee brace by myself, tolerated well.    Patient Instructions     Patient Instructions   Patient Education     Knee sprain   The Basics   Written by the doctors and editors at [facility]   What causes a knee sprain? -- A knee sprain happens the knee is bent or twisted too far in 1 direction.  Inside the knee are tough bands of tissue called ligaments. These hold the different bones together (figure 1). During a sprain, 1 or more of those ligaments stretch too far or even tear. This can cause pain and swelling and might make the knee feel unsteady.  What are the symptoms of a knee sprain? -- The symptoms can include pain, tenderness, swelling, and bruising of the knee.  Some people with a knee sprain also find it hard to bend the knee or walk. Others might feel like their knee is unstable or \"gives out\" when they go up or down stairs. Also, some people cannot put weight on the leg with the injured knee.  Is there a test for a knee sprain? -- A doctor or nurse might be able to tell if you have a sprain by doing an exam and learning about what happened to your knee. They might bend and straighten your leg to see what hurts and check how loose your knee feels.  In some cases, a doctor might order an X-ray to check for broken bones. But this is not always needed. Some doctors might use an ultrasound to look at the ligaments. Ultrasound is an imaging test that creates pictures of the inside of the body. Later, you might need to have other tests like an " "MRI.  How is a knee sprain treated? -- Ask the doctor or nurse what you should do when you go home. Make sure that you understand exactly what you need to do to care for yourself. Ask questions if there is anything you do not understand.  You should also do the following. Think of the word \"PRICE\":   Protect - To protect your knee, the doctor might order a knee brace or splint for you. An elastic bandage can also keep your knee from moving too much. Wear your knee brace or splint as your doctor tells you to.   Rest - To rest your knee, use crutches and stay off of your feet. You might have to limit your activities and how much you walk or stand. This will help your knee rest while it heals.   Ice - Apply a cold gel pack, bag of ice, or bag of frozen vegetables on your knee every 1 to 2 hours, for 15 minutes each time. Put a thin towel between the ice (or other cold object) and your skin. Use the ice (or other cold object) for at least 6 hours after your injury. Some people find it helpful to ice longer, even up to 2 days after their injury.   Compression - \"Compression\" basically means pressure. You want to have your knee under slight pressure by having it wrapped in an elastic \"compression\" bandage. This helps reduce swelling and supports the knee. Your doctor or nurse will show you how to wrap your knee. Do not wrap it too tight or you might cut off the blood flow to your foot.   Elevation - \"Elevation\" means keeping your knee raised up above the level of your heart. To do this, you can put your leg on some pillows or blankets while you are lying down, or on a table or chair while you are sitting.  You can also take medicines to relieve pain, such as acetaminophen (sample brand name: Tylenol), ibuprofen (sample brand names: Advil, Motrin), or naproxen (sample brand name: Aleve).  After a few days, when you have less swelling and pain, you can start to gently stretch your knee. You can also start to do gentle " "activities again.  Your doctor or nurse might also give you exercises to do as your knee heals. They might also recommend working with a physical therapist (exercise expert).  What follow-up care do I need? -- Your doctor or nurse will tell you if you need to make a follow-up appointment. If so, make sure that you know when and where to go. If the injury is not healing as expected, your doctor might order an X-ray to check for a broken bone.  When should I call the doctor? -- Call for advice if:   Your pain or swelling is getting worse.   Your foot or toes are blue or gray, and numb.   You are unable to put weight on your knee, your knee \"locks\" in place, or your knee \"gives out.\"  All topics are updated as new evidence becomes available and our peer review process is complete.  This topic retrieved from [employer] on: Mar 13, 2024.  Topic 118765 Version 2.0  Release: 32.2.4 - C32.71  © 2024 UpToDate, Inc. and/or its affiliates. All rights reserved.  figure 1: Front view of the knee     This drawing shows the inner parts of the knee as seen from the front. A small bone (called the patella or the \"knee cap\") that sits in front of the knee has been removed so that you can see what is under that bone. The anterior cruciate ligament (ACL) is in the middle in white. It connects the thigh bone (called the \"femur\") to the shin bone (called the \"tibia\"). The meniscus is a cushion of rubbery material (cartilage) between the thigh bone and the shin bone.  Graphic 17380 Version 5.0  Consumer Information Use and Disclaimer   Disclaimer: This generalized information is a limited summary of diagnosis, treatment, and/or medication information. It is not meant to be comprehensive and should be used as a tool to help the user understand and/or assess potential diagnostic and treatment options. It does NOT include all information about conditions, treatments, medications, side effects, or risks that may apply to a specific patient. It " is not intended to be medical advice or a substitute for the medical advice, diagnosis, or treatment of a health care provider based on the health care provider's examination and assessment of a patient's specific and unique circumstances. Patients must speak with a health care provider for complete information about their health, medical questions, and treatment options, including any risks or benefits regarding use of medications. This information does not endorse any treatments or medications as safe, effective, or approved for treating a specific patient. UpToDate, Inc. and its affiliates disclaim any warranty or liability relating to this information or the use thereof.The use of this information is governed by the Terms of Use, available at https://www.EasyQasa.com/en/know/clinical-effectiveness-terms. 2024© UpToDate, Inc. and its affiliates and/or licensors. All rights reserved.  Copyright   © 2024 UpToDate, Inc. and/or its affiliates. All rights reserved.  Patient Education     Active Range of Motion Exercises, Knees and Ankles   About this topic   Active range of motion, or AROM, exercises are done when a person moves the muscles without help from another person or device. Using weights or exercise bands for these exercises can make them more difficult.  General   Before starting with a program, ask your doctor if you are healthy enough to do these exercises. Your doctor may have you work with a  or physical therapist to make a safe exercise program to meet your needs.  Strengthening Exercises   Strengthening exercises keep your muscles firm and strong. Sit while doing these exercises. Be sure to use good posture. Start by repeating each exercise 2 to 3 times. Work up to doing each exercise 10 times. Try to do the exercises 2 to 3 times each day. Do all exercises slowly.  Knee straightening and bending ? Lift your lower leg until your knee is straight. Now, bring it back to the floor and bend  the knee as much as you can. Do this on your other leg.  Ankle pumps seated ? Move each foot up and down like you are pressing down and lifting up on a gas pedal.  Ankle circles seated ? Make circles with your ankles by moving your toes in one direction. Now, make circles in the other direction.   Ankle alphabet seated ? Act like you are writing the alphabet with each foot. Do not move your whole leg to do this, just move your ankle. Do all of the alphabet. Take short rests if you get tired.  Toe curls ? Curl your toes up and down.             What will the results be?   Less pain and stiffness  Keep your muscles strong and flexible  Help you heal faster after an injury or surgery  Increase blood flow to a body part  Help you feel better and more relaxed  Give you more energy  More toned looking muscles  Easier to do daily activities  Increase joint function and mobility  Helpful tips   Stay active and work out to keep your muscles strong and flexible.  Be sure you do not hold your breath when exercising. This can raise your blood pressure. If you tend to hold your breath, try counting out loud when exercising. If any exercise bothers you, stop right away.  Try walking or cycling at an easy pace for a few minutes to warm up your muscles. Do this again after exercising.  Doing exercises before a meal may be a good way to get into a routine.  Exercise may be slightly uncomfortable, but you should not have sharp pains. If you do get sharp pains, stop what you are doing. If the sharp pains continue, call your doctor.  Last Reviewed Date   2021-03-17  Consumer Information Use and Disclaimer   This generalized information is a limited summary of diagnosis, treatment, and/or medication information. It is not meant to be comprehensive and should be used as a tool to help the user understand and/or assess potential diagnostic and treatment options. It does NOT include all information about conditions, treatments,  medications, side effects, or risks that may apply to a specific patient. It is not intended to be medical advice or a substitute for the medical advice, diagnosis, or treatment of a health care provider based on the health care provider's examination and assessment of a patient’s specific and unique circumstances. Patients must speak with a health care provider for complete information about their health, medical questions, and treatment options, including any risks or benefits regarding use of medications. This information does not endorse any treatments or medications as safe, effective, or approved for treating a specific patient. UpToDate, Inc. and its affiliates disclaim any warranty or liability relating to this information or the use thereof. The use of this information is governed by the Terms of Use, available at https://www.Telesocial.disco volante/en/know/clinical-effectiveness-terms   Copyright   Copyright © 2024 UpToDate, Inc. and its affiliates and/or licensors. All rights reserved.        Follow up with PCP in 3-5 days.  Proceed to  ER if symptoms worsen.    Chief Complaint     Chief Complaint   Patient presents with    Knee Injury     Patient fell down [employer] 10 days ago injuring right knee.         History of Present Illness     Fell on the [place] steps 10 days ago.  Pain over medial aspect of knee extending to proximal part of the calf.  Bruising down front and medial aspect of the calf.  Scabbed abrasion just below knee or patient states that she likely caught it on the step since she had grabbed the railing to try to stop the fall--slightly numb right around the scab.  Notes that it seems a little swollen over the top of her kneecap.  Has been taking occasional ibuprofen which helps some.  When discussing injury management options, patient states that she has taken meloxicam in the past and tolerated it well.    Prior left knee replacement.  No prior right knee problems.  Has had prior DEXA scans.  " Denies history of osteopenia or osteoporosis.        Review of Systems     Review of Systems   Musculoskeletal:  Positive for arthralgias and joint swelling.   Skin:  Positive for color change and wound.   Neurological:  Positive for numbness.   All other systems reviewed and are negative.        Current Medications       Current Outpatient Medications:     amLODIPine (NORVASC) 5 mg tablet, Take 2 tablets (10 mg total) by mouth daily, Disp: 180 tablet, Rfl: 1    atorvastatin (LIPITOR) 10 mg tablet, Take 1 tablet (10 mg total) by mouth daily, Disp: 90 tablet, Rfl: 3    hydrOXYzine HCL (ATARAX) 25 mg tablet, take 1 tablet by mouth every 6 hours if needed for itching, Disp: 180 tablet, Rfl: 1    losartan (COZAAR) 100 MG tablet, Take 1 tablet (100 mg total) by mouth daily, Disp: 90 tablet, Rfl: 1    meloxicam (Mobic) 15 mg tablet, Take 1 tablet (15 mg total) by mouth daily, Disp: 30 tablet, Rfl: 0    famotidine (PEPCID) 20 mg tablet, take 1 tablet by mouth twice a day (Patient taking differently: Take 20 mg by mouth if needed), Disp: 180 tablet, Rfl: 3    valACYclovir (VALTREX) 1,000 mg tablet, take 1 tablet by mouth twice a day for 3 days, Disp: 180 tablet, Rfl: 1    Current Allergies     Allergies as of 11/06/2024 - Reviewed 11/06/2024   Allergen Reaction Noted    Hydrocodone Irritability 06/15/2022              The following portions of the patient's history were reviewed and updated as appropriate: allergies, current medications, past family history, past medical history, past social history, past surgical history and problem list.     Past Medical History:   Diagnosis Date    Anemia     Arthritis     Bilateral bunions     Chronic pain disorder     back and hip pain    Colon polyp     Disease of thyroid gland     nodules    History of COVID-19 2020    mild s/s    History of gastroesophageal reflux (GERD)     \"had hiatal hernia surgery\"    History of heart murmur in childhood     Hyperlipidemia     Hypertension     " Overactive bladder     Rotator cuff tear, right     had surgery    Vitamin D deficiency     unsure    Wears glasses     Wears partial dentures     upper       Past Surgical History:   Procedure Laterality Date     SECTION      Last Assessed: 2017    COLONOSCOPY      ELBOW SURGERY      Last Assessed: 2017    ESOPHAGOGASTRODUODENOSCOPY N/A 2017    Procedure: ESOPHAGOGASTRODUODENOSCOPY (EGD);  Surgeon: Jolly Lin DO;  Location: [facility];  Service: Gastroenterology    HIP SURGERY Left 2018    glut medius/minimus repair  and 18--with pin implanted    HYSTERECTOMY          JOINT REPLACEMENT      PARAESOPHAGEAL HERNIA REPAIR N/A 2020    Procedure: LAPAROSCOPIC PARAESOPHAGEAL HERNIA REPAIR WITH MESH AND NISSEN FUNDOPLICATION WITH ROBOTICS;  Surgeon: Darrin Leon MD;  Location: [facility];  Service: General    VT ARTHRP KNE CONDYLE&PLATU MEDIAL&LAT COMPARTMENTS Left 03/15/2023    Procedure: ARTHROPLASTY KNEE TOTAL;  Surgeon: Bart Potts DO;  Location: [facility];  Service: Orthopedics    VT ARTHRS KNE SURG W/MENISCECTOMY MED/LAT W/SHVG Left 06/15/2022    Procedure: ;left knee arthroscopy, meniscectomy,synevectomy,chondroplasty, loose body removal, injection;  Surgeon: Bart Potts DO;  Location: [facility];  Service: Orthopedics    VT COLONOSCOPY FLX DX W/COLLJ SPEC WHEN PFRMD N/A 2017    Procedure: EGD AND COLONOSCOPY;  Surgeon: Jolly Lin DO;  Location: [facility];  Service: Gastroenterology    VT DIAGNOSTIC ARTHROSCOPY SHOULDER +- SYNOVIAL BX Left 2024    Procedure: DIAGNOSTIC ARTHROSCOPY SHOULDER POSSIBLE REVISION ROTATOR CUFF REPAIR, EXTENSIVE DEBRIDEMENT;  Surgeon: Abdullahi Cortez MD;  [facility];  Service: Orthopedics    VT EXCISON TUMOR SOFT TISSUE THIGH/KNEE SUBQ 3 CM/> Bilateral 2022    Procedure: EXCISION BIOPSY TISSUE LESION/MASS LOWER EXTREMITY;  Surgeon: Kit Tavera MD;  Location: [facility];  Service: General    IN EXCISON TUMOR SOFT TISSUE THIGH/KNEE SUBQ 3 CM/> Bilateral 08/25/2022    Procedure: EXCISION BIOPSY TISSUE LESION/MASS LOWER EXTREMITY;  Surgeon: Kit Tavera MD;  Location: [facility];  Service: General    IN OSTECTOMY PRTL 5TH METAR HEAD SPX Bilateral 08/15/2023    Procedure: BUNIONECTOMY TAILOR WITHOUT OSTEOTOMY;  Surgeon: Monet Pradhan DPM;  Location: [facility];  Service: Podiatry    IN SURGICAL ARTHROSCOPY JENNIFER W/CORACOACRM LIGM RLS Left 12/13/2023    Procedure: Left - ARTHROSCOPY SHOULDER  Synovectomy Debridement/chondroplasty Acromioplasty Arthroscopic rotator cuff repair Post arthroscopic injection of intra-articular joint space and peripheral portals;  Surgeon: Bart Potts DO;  Location: [facility];  Service: Orthopedics    SHOULDER ARTHROCENTESIS      SHOULDER ARTHROSCOPY Right 06/23/2021    Procedure: SHOULDER ARTHROSCOPY WITH acromioplasty, debridment, and ROTATOR CUFF REPAIR;  Surgeon: Bart Potts DO;  Location:  MAIN OR;  Service: Orthopedics    TUBAL LIGATION         Family History   Problem Relation Age of Onset    Colon cancer Mother     Heart attack Father     Other Father         Cardiac Disorder    Diabetes type II Father     Colon cancer Sister     No Known Problems Sister     No Known Problems Sister     No Known Problems Sister     No Known Problems Sister     No Known Problems Daughter     No Known Problems Maternal Aunt     No Known Problems Maternal Aunt     No Known Problems Maternal Aunt     Cancer Maternal Grandmother     No Known Problems Paternal Grandmother          Medications have been verified.        Objective     /82   Pulse 75   Temp 98.1 °F (36.7 °C) (Temporal)   Resp 16   Ht 5' 3\" (1.6 m)   Wt 72.6 kg (160 lb)   SpO2 98%   BMI 28.34 kg/m²   No LMP recorded. Patient has had a hysterectomy.         Physical Exam     Physical Exam  Vitals and nursing note reviewed.   Constitutional:       General: She is not in acute " distress.     Appearance: Normal appearance. She is well-developed. She is not ill-appearing, toxic-appearing or diaphoretic.   HENT:      Head: Normocephalic and atraumatic.   Pulmonary:      Effort: Pulmonary effort is normal. No respiratory distress.   Musculoskeletal:         General: Swelling, tenderness and signs of injury present. No deformity. Normal range of motion.      Right knee: Swelling, effusion, ecchymosis and bony tenderness present. No deformity or crepitus. Tenderness present over the MCL. No LCL laxity, MCL laxity, ACL laxity or PCL laxity. Normal meniscus and normal patellar mobility.        Legs:    Skin:     General: Skin is warm and dry.      Capillary Refill: Capillary refill takes less than 2 seconds.      Findings: Abrasion (Right knee; scabbed and healing) present.   Neurological:      General: No focal deficit present.      Mental Status: She is alert and oriented to person, place, and time.   Psychiatric:         Mood and Affect: Mood and affect normal.         Behavior: Behavior normal. Behavior is cooperative.         Thought Content: Thought content normal.         Judgment: Judgment normal.

## 2024-11-07 ENCOUNTER — OFFICE VISIT (OUTPATIENT)
Dept: PHYSICAL THERAPY | Facility: CLINIC | Age: 69
End: 2024-11-07
Payer: COMMERCIAL

## 2024-11-07 DIAGNOSIS — Z98.890 S/P LEFT ROTATOR CUFF REPAIR: ICD-10-CM

## 2024-11-07 DIAGNOSIS — M24.812 INTERNAL DERANGEMENT OF LEFT SHOULDER: Primary | ICD-10-CM

## 2024-11-07 PROCEDURE — 97110 THERAPEUTIC EXERCISES: CPT | Performed by: PHYSICAL THERAPIST

## 2024-11-07 PROCEDURE — 97140 MANUAL THERAPY 1/> REGIONS: CPT | Performed by: PHYSICAL THERAPIST

## 2024-11-07 NOTE — PROGRESS NOTES
Daily Note     Today's date: 2024  Patient name: Jamee Shaikh  : 1955  MRN: [id number]  Referring provider: Abdullahi Cortez*  Dx:   Encounter Diagnosis     ICD-10-CM    1. Internal derangement of left shoulder  M24.812       2. S/P left rotator cuff repair  Z98.890           Start Time: 0815  Stop Time: 0845  Total time in clinic (min): 30 minutes    Subjective: States pain 7/10 today. Has had increased shoulder pain across lateral left shoulder since last session.       Objective: See treatment diary below      Assessment: Tolerated treatment fair. Adjusted program as noted in chart help reduce pain today, but encourage movement. Will attempt to increase per protocol next session.  Patient would benefit from continued PT      Plan: Continue per plan of care.  Progress treatment as tolerated.       Precautions: protocol; PROM until week 6  Access Code: [email or web address]  Progress note:   POC:     Manuals 10/29 11/5 11/7 10/22 10/24   Stretching with active release Per protocol as andria- KB KB all KB all  Per protocol as andria- KB Per protocol as andria- KB   GH jt mobs Grade II Grade II-III Grade II-III Grade II Grade II                   Neuro Re-Ed                scap retraction 10x  10x 10x 10x   Shoulder rolls 10x  10x 10x 10x   Chin tucks :03x10   :03x10 :03x10           Shoulder iso  :05x5 Flex, ER, IR, abd 5x     Ther Ex        pulleys Scaption 10x Scap 10x Scap 10x Scaption 10x Scaption 10x   UT stretch :30x3 L   :30x3 L :30x3 L   LS stretch :30x3 L   :30x3 L :30x3 L   Table slides        pendulums 10x ea   10x ea 10x ea   Supine flexion ROM        Supine ER ROM :05x10 :05x10 :05x10 :05x10 :05x10   Flexion rock back (standing) :05x10 :05x10 :05x10 :05x10 :05x10   Elbow PROM 10x AROM   2x10 10x AROM   Wrist AROM Flex, UD/RD, ext, sup/pron 30x ea   Flex, UD/RD, ext, sup/pron 30x ea Flex, UD/RD, ext, sup/pron 30x ea    25x   Red 25x Red 25x           Wall walk  5x flex 5x flex                                                      Ther Activity                        Gait Training                        Modalities

## 2024-11-12 ENCOUNTER — OFFICE VISIT (OUTPATIENT)
Dept: PHYSICAL THERAPY | Facility: CLINIC | Age: 69
End: 2024-11-12
Payer: COMMERCIAL

## 2024-11-12 DIAGNOSIS — M24.812 INTERNAL DERANGEMENT OF LEFT SHOULDER: Primary | ICD-10-CM

## 2024-11-12 DIAGNOSIS — Z98.890 S/P LEFT ROTATOR CUFF REPAIR: ICD-10-CM

## 2024-11-12 PROCEDURE — 97112 NEUROMUSCULAR REEDUCATION: CPT | Performed by: PHYSICAL THERAPIST

## 2024-11-12 PROCEDURE — 97140 MANUAL THERAPY 1/> REGIONS: CPT | Performed by: PHYSICAL THERAPIST

## 2024-11-12 PROCEDURE — 97110 THERAPEUTIC EXERCISES: CPT | Performed by: PHYSICAL THERAPIST

## 2024-11-12 NOTE — PROGRESS NOTES
Daily Note     Today's date: 2024  Patient name: Jamee Shaikh  : 1955  MRN: [id number]  Referring provider: Abdullahi Cortez*  Dx:   Encounter Diagnosis     ICD-10-CM    1. Internal derangement of left shoulder  M24.812       2. S/P left rotator cuff repair  Z98.890           Start Time: 08  Stop Time: 925  Total time in clinic (min): 40 minutes    Subjective: States she continues to have soreness in proximal L arm and shoulder. Pain level 7/10 today. Am is worse. Also having issues with right knee after fall 2 weeks ago. Unable to kneel and has pain with palpation.       Objective: See treatment diary below      Assessment: Tolerated treatment fair. Able to achieve approximately 90 degrees flexion with UE supported on surface. Some pain with isometric ER, instructed patient to decrease pressure. Home exercise program was updated and given to patient. Patient would benefit from continued PT      Plan: Continue per plan of care.  Progress treatment as tolerated.       Precautions: protocol; PROM until week 6  Access Code: [email or web address]  Progress note:   POC:     Manuals 10/29 11/5 11/7 11/12 10/24   Stretching with active release Per protocol as andria- KB KB all KB all  KB all Per protocol as andria- KB   GH jt mobs Grade II Grade II-III Grade II-III Grade II-III Grade II                   Neuro Re-Ed                scap retraction 10x  10x 10x 10x   Shoulder rolls 10x  10x 10x 10x   Chin tucks :03x10    :03x10           Shoulder iso  :05x5 Flex, ER, IR, abd 5x Flex, ER, IR, abd, ext 5x    Ther Ex        pulleys Scaption 10x Scap 10x Scap 10x Scap 10x Scaption 10x   UT stretch :30x3 L    :30x3 L   LS stretch :30x3 L    :30x3 L   Table slides    Flex 10x    pendulums 10x ea    10x ea   Supine flexion ROM        Supine ER ROM :05x10 :05x10 :05x10 nv :05x10   Flexion rock back (standing) :05x10 :05x10 :05x10 :05x10 :05x10   Elbow PROM 10x AROM   AROM nv 10x AROM   Wrist AROM Flex, UD/RD, ext,  sup/pron 30x ea   prn Flex, UD/RD, ext, sup/pron 30x ea    25x    Red 25x           Wall walk  5x flex 5x flex 5x flex                                                    Ther Activity                        Gait Training                        Modalities

## 2024-11-14 ENCOUNTER — APPOINTMENT (OUTPATIENT)
Dept: PHYSICAL THERAPY | Facility: CLINIC | Age: 69
End: 2024-11-14
Payer: COMMERCIAL

## 2024-11-18 ENCOUNTER — OFFICE VISIT (OUTPATIENT)
Dept: OBGYN CLINIC | Facility: OTHER | Age: 69
End: 2024-11-18

## 2024-11-18 VITALS
WEIGHT: 160 LBS | HEART RATE: 78 BPM | BODY MASS INDEX: 28.35 KG/M2 | HEIGHT: 63 IN | RESPIRATION RATE: 16 BRPM | DIASTOLIC BLOOD PRESSURE: 105 MMHG | SYSTOLIC BLOOD PRESSURE: 157 MMHG

## 2024-11-18 DIAGNOSIS — Z98.890 S/P LEFT ROTATOR CUFF REPAIR: Primary | ICD-10-CM

## 2024-11-18 PROCEDURE — 99024 POSTOP FOLLOW-UP VISIT: CPT | Performed by: PHYSICIAN ASSISTANT

## 2024-11-18 NOTE — PATIENT INSTRUCTIONS
Rotator Cuff Repair Rehabilitation Protocol  (adapted from Zachery LOWE et al. “Rehabilitation of the Rotator Cuff: An Evaluation Based Approach”. JAAOS 2006; 14:599-609)  Updated 10.14  Abdullahi Cortez MD  [facility]  [address]  [place] [address]  [phone]  Phase 1 (Weeks 0-6)   Immediate Postoperative Period   Goals:    Diminish Pain and Inflammation  Maintain and Protect Integrity of the Repair    Gradually Increase Passive ROM (NO Active or Active Assist until Week 6)    Become Independent with Modified ADLs   Precautions:  Maintain Arm in Abduction Sling, Remove Only for Directed Exercises (may remove Abduction Pillow after Day 21 for comfort)    Keep Incisions Clean & Dry (okay to shower in 48 hours, band-aids over incisions)  No Immersion (pool) until Wounds Totally Sealed (usually not prior to day #10)  Passive Shoulder Motion ONLY, No Lifting/Holding Objects, Reaching Behind Back  Okay to Type/Write at Desktop with Arm in Sling   Day 0-6    Elbow, Wrist, Hand AROM Exercises     Start Cervical AROM and Scapula Isometrics    Cryotherapy/Ice for Pain and Inflammation    Instruct in Hygiene, Posture, and Positioning    Day 7-28    Continue Above  May Start Pendulum Exercises    May Start Supine, Pain Free, PT assisted PROM  Forward Flexion to 90°, External Rotation to 35° (Elbow at side), Internal Rotation to Body, Abduction to 60°    Can Introduce light Cardio (Walking, Stationary Bike)    Aqua Therapy may begin at week 3 (day 21) as long as no wound problems   Day 29-42    Continue Above  Progress PROM to Goal of full PROM by Week 6.  May add Gentle Mobilizations (GH and Scapulothoracic) to Regain full PROM if Needed.  May add Heat prior to PROM Exercises, Ice after Exercises  May Begin AAROM at Day 29 if ROM is Appropriate in Anticipation of AROM Starting at Week 6   Criteria to Progress to Phase 2    Reasonable Passive Forward Flexion, Abduction ,  IR/ER    Time  Phase 2 (Weeks 6-12)   Protection and Active Motion   Goals of Phase:    Allow Healing of Soft Tissues    Decrease Pain and Inflammation  Add ADLs and Regain AROM by End of Phase   Precautions:    NO STRENGTHENING until Phase 3    Repair is Most Prone to Failure during this Phase!    No Lifting Objects > 2 lbs (Coffee Cup OK), no Sudden Motions    Avoid Upper Extremity Bike and Ergometer   Day 43-56    Discontinue Sling  Initiate AROM Exercises (forward flexion, ER, IR and abduction), Rotator Cuff Isometrics    Continue Periscapular Exercises, add Stretching if PROM Lacking   No Strengthening until Week 12 (Minimum Time Needed for Cuff Healing Sufficient to withstand Strengthening)     Phase 3 (Weeks 12-16)   Early Strengthening   Goal of Phase:    Gain full AROM, Maintain PROM    Gradual return of Shoulder Strength, Power and Endurance    Gradual return to Functional Activities   Precautions:    No Lifting > 10lbs, Sudden Lifting or Pushing activities, Overhead Lifting    No Upper Extremity Bike or Ergometer   Week 12    Initiate Strengthening Program (10 lb Maximum until Phase 4)      ER/IR with Bands (Standing)      ER in Lateral Decubitius Position      Lateral Raises      Full Can in Scapular Plane      Prone Rowing, Horizontal Abduction, Extension      Elbow Flexion/Extension   Week 14-16    Initiate light Functional Activities as Permitted  Progress to Fundamental Shoulder Exercises  Phase 4 (Variable but Weeks 16-24)   Aggressive Rehab  Sport Specific or Activity Specific    Goals of Phase:    Maintain Full Pain-free AROM    Advanced Conditioning Exercises for enhanced Functional use    Continue regaining Shoulder Strength, Power and Endurance    Eventual return to full Functional Activities   Precautions:    None   Week 16    Continue ROM and stretching if appropriate    Progress Strengthening, Proprioceptive and Neuromuscular Training    Light Sports if Progressing Well (Chipping/Putting,  easy ground strokes etc.)   Week 20    Continue Strengthening and Stretching    Initiate Interval Sports Program as Appropriate    Note:   This is a general program which may be modified based on intra-operative findings, additional procedures preformed, repair stability, and patient biological factors.  If in doubt, please check with my office for individual patient specifics.  The ultimate goal of the surgery and rehabilitation is to get the rotator cuff to heal with the least residual functional deficit due to stiffness.  That being said, I would rather have a stiff shoulder with a healed rotator cuff repair, than a loose shoulder with a failed repair!

## 2024-11-18 NOTE — LETTER
November 18, 2024     Patient: Jamee Shaikh  YOB: 1955  Date of Visit: 11/18/2024      To Whom it May Concern:    Jamee Shaikh is under my professional care. Jamee was seen in my office on 11/18/2024. Jamee is on light duty restrictions: No lifting greater than 2 pounds with left arm and no repetitive motion.  If light duty is unavailable patient would be out of work until the next evaluation in 8 weeks.    If you have any questions or concerns, please don't hesitate to call.         Sincerely,          Jong Lassiter PA-C        CC: No Recipients

## 2024-11-18 NOTE — PROGRESS NOTES
Orthopaedic Surgery - Office Note  Jamee Shaikh (69 y.o. female)   : 1955   MRN: [id number]  Encounter Date: 2024    Chief Complaint   Patient presents with    Left Shoulder - Post-op         Assessment/Plan  Diagnoses and all orders for this visit:    S/P left rotator cuff revision repair and subacromial decompression with debridement on 2024    The natural healing of the rotator cuff revision repair was reviewed.  Precautions were reviewed:  Precautions:    NO STRENGTHENING until Phase 3    Repair is Most Prone to Failure during this Phase!    No Lifting Objects > 2 lbs (Coffee Cup OK), no Sudden Motions    Avoid Upper Extremity Bike and Ergometer  Patient will continue physical therapy per the standard rotator cuff protocol.    Patient requests an updated work note.  I reviewed with the patient she could do light duty restrictions however if light duty is unavailable she would be out of work until the next evaluation.    Patient was advised she is a little behind on her motion and was encouraged to work on her home exercises.           Return for Recheck in 8 weeks with myself.        History of Present Illness  Patient is here for 2-month recheck after left shoulder arthroscopic revision rotator cuff repair and debridement on 2024.  Patient has been attending physical therapy and doing home exercise program.  She reports she has soreness in the proximal shoulder.  She has been out of work since the surgery.    Review of Systems  Pertinent items are noted in HPI.  All other systems were reviewed and are negative.    Physical Exam  There were no vitals taken for this visit.  Cons: Appears well.  No apparent distress.  Psych: Alert. Oriented x3.  Mood and affect normal.    Left shoulder active forward flexion is to 90 degrees.  Passively to 110 degrees.  She has pain at end range of motion.  Strength was not assessed by plan.  Neurovascular she is intact            Studies  "Reviewed      Procedures  No procedures today.    Medical, Surgical, Family, and Social History  The patient's medical history, family history, and social history, were reviewed and updated as appropriate.    Past Medical History:   Diagnosis Date    Anemia     Arthritis     Bilateral bunions     Chronic pain disorder     back and hip pain    Colon polyp     Disease of thyroid gland     nodules    History of COVID-2020    mild s/s    History of gastroesophageal reflux (GERD)     \"had hiatal hernia surgery\"    History of heart murmur in childhood     Hyperlipidemia     Hypertension     Overactive bladder     Rotator cuff tear, right     had surgery    Vitamin D deficiency     unsure    Wears glasses     Wears partial dentures     upper       Past Surgical History:   Procedure Laterality Date     SECTION      Last Assessed: 2017    COLONOSCOPY      ELBOW SURGERY      Last Assessed: 2017    ESOPHAGOGASTRODUODENOSCOPY N/A 2017    Procedure: ESOPHAGOGASTRODUODENOSCOPY (EGD);  Surgeon: Jolly Lin DO;  Location: [facility] GI LAB;  Service: Gastroenterology    HIP SURGERY Left 2018    glut medius/minimus repair  and 18--with pin implanted    HYSTERECTOMY          JOINT REPLACEMENT      PARAESOPHAGEAL HERNIA REPAIR N/A 2020    Procedure: LAPAROSCOPIC PARAESOPHAGEAL HERNIA REPAIR WITH MESH AND NISSEN FUNDOPLICATION WITH ROBOTICS;  Surgeon: Darrin Leon MD;  Location: [facility];  Service: General    SC ARTHRP KNE CONDYLE&PLATU MEDIAL&LAT COMPARTMENTS Left 03/15/2023    Procedure: ARTHROPLASTY KNEE TOTAL;  Surgeon: Bart Potts DO;  Location: [facility];  Service: Orthopedics    SC ARTHRS KNE SURG W/MENISCECTOMY MED/LAT W/SHVG Left 06/15/2022    Procedure: ;left knee arthroscopy, meniscectomy,synevectomy,chondroplasty, loose body removal, injection;  Surgeon: Bart Potts DO;  Location: [facility];  Service: Orthopedics    SC COLONOSCOPY FLX DX W/COLLJ SPEC WHEN " PFRMD N/A 06/26/2017    Procedure: EGD AND COLONOSCOPY;  Surgeon: Jolly Lin DO;  Location: [facility] LAB;  Service: Gastroenterology    FL DIAGNOSTIC ARTHROSCOPY SHOULDER +- SYNOVIAL BX Left 9/18/2024    Procedure: DIAGNOSTIC ARTHROSCOPY SHOULDER POSSIBLE REVISION ROTATOR CUFF REPAIR, EXTENSIVE DEBRIDEMENT;  Surgeon: Abdullahi Cortez MD;  [facility]: [facility];  Service: Orthopedics    FL EXCISON TUMOR SOFT TISSUE THIGH/KNEE SUBQ 3 CM/> Bilateral 05/26/2022    Procedure: EXCISION BIOPSY TISSUE LESION/MASS LOWER EXTREMITY;  Surgeon: Kit Tavera MD;  Location: [facility];  Service: General    FL EXCISON TUMOR SOFT TISSUE THIGH/KNEE SUBQ 3 CM/> Bilateral 08/25/2022    Procedure: EXCISION BIOPSY TISSUE LESION/MASS LOWER EXTREMITY;  Surgeon: Kit Tavera MD;  Location: [facility];  Service: General    FL OSTECTOMY PRTL 5TH METAR HEAD SPX Bilateral 08/15/2023    Procedure: BUNIONECTOMY TAILOR WITHOUT OSTEOTOMY;  Surgeon: Monet Pradhan DPM;  Location: [facility];  Service: Podiatry    FL SURGICAL ARTHROSCOPY JENNIFER W/CORACOACRM LIGM RLS Left 12/13/2023    Procedure: Left - ARTHROSCOPY SHOULDER  Synovectomy Debridement/chondroplasty Acromioplasty Arthroscopic rotator cuff repair Post arthroscopic injection of intra-articular joint space and peripheral portals;  Surgeon: Bart Potts DO;  Location: [facility];  Service: Orthopedics    SHOULDER ARTHROCENTESIS      SHOULDER ARTHROSCOPY Right 06/23/2021    Procedure: SHOULDER ARTHROSCOPY WITH acromioplasty, debridment, and ROTATOR CUFF REPAIR;  Surgeon: Bart Potts DO;  Location:  MAIN OR;  Service: Orthopedics    TUBAL LIGATION         Family History   Problem Relation Age of Onset    Colon cancer Mother     Heart attack Father     Other Father         Cardiac Disorder    Diabetes type II Father     Colon cancer Sister     No Known Problems Sister     No Known Problems Sister     No Known Problems Sister     No Known Problems Sister     No  Known Problems Daughter     No Known Problems Maternal Aunt     No Known Problems Maternal Aunt     No Known Problems Maternal Aunt     Cancer Maternal Grandmother     No Known Problems Paternal Grandmother        Social History     Occupational History    Not on file   Tobacco Use    Smoking status: Former     Current packs/day: 0.00     Average packs/day: 1.5 packs/day for 30.0 years (45.0 ttl pk-yrs)     Types: Cigarettes     Start date: 1961     Quit date: 1991     Years since quittin.9    Smokeless tobacco: Never    Tobacco comments:     quit 25 yrs ago   Vaping Use    Vaping status: Never Used   Substance and Sexual Activity    Alcohol use: Never    Drug use: Never    Sexual activity: Not Currently     Comment: defer       Allergies   Allergen Reactions    Hydrocodone Irritability         Current Outpatient Medications:     amLODIPine (NORVASC) 5 mg tablet, Take 2 tablets (10 mg total) by mouth daily, Disp: 180 tablet, Rfl: 1    atorvastatin (LIPITOR) 10 mg tablet, Take 1 tablet (10 mg total) by mouth daily, Disp: 90 tablet, Rfl: 3    famotidine (PEPCID) 20 mg tablet, take 1 tablet by mouth twice a day (Patient taking differently: Take 20 mg by mouth if needed), Disp: 180 tablet, Rfl: 3    hydrOXYzine HCL (ATARAX) 25 mg tablet, take 1 tablet by mouth every 6 hours if needed for itching, Disp: 180 tablet, Rfl: 1    losartan (COZAAR) 100 MG tablet, Take 1 tablet (100 mg total) by mouth daily, Disp: 90 tablet, Rfl: 1    meloxicam (Mobic) 15 mg tablet, Take 1 tablet (15 mg total) by mouth daily, Disp: 30 tablet, Rfl: 0    valACYclovir (VALTREX) 1,000 mg tablet, take 1 tablet by mouth twice a day for 3 days, Disp: 180 tablet, Rfl: 1      Jong Lassiter PA-C

## 2024-11-19 ENCOUNTER — OFFICE VISIT (OUTPATIENT)
Age: 69
End: 2024-11-19
Payer: COMMERCIAL

## 2024-11-19 ENCOUNTER — OFFICE VISIT (OUTPATIENT)
Dept: PHYSICAL THERAPY | Facility: CLINIC | Age: 69
End: 2024-11-19
Payer: COMMERCIAL

## 2024-11-19 VITALS
BODY MASS INDEX: 28.35 KG/M2 | SYSTOLIC BLOOD PRESSURE: 175 MMHG | HEART RATE: 66 BPM | WEIGHT: 160 LBS | HEIGHT: 63 IN | DIASTOLIC BLOOD PRESSURE: 95 MMHG

## 2024-11-19 DIAGNOSIS — M24.812 INTERNAL DERANGEMENT OF LEFT SHOULDER: Primary | ICD-10-CM

## 2024-11-19 DIAGNOSIS — M46.1 SACROILIITIS (HCC): Primary | ICD-10-CM

## 2024-11-19 DIAGNOSIS — G89.4 CHRONIC PAIN SYNDROME: ICD-10-CM

## 2024-11-19 DIAGNOSIS — Z98.890 S/P LEFT ROTATOR CUFF REPAIR: ICD-10-CM

## 2024-11-19 DIAGNOSIS — M54.50 LOW BACK PAIN RADIATING TO LEFT LOWER EXTREMITY: ICD-10-CM

## 2024-11-19 DIAGNOSIS — M79.605 LOW BACK PAIN RADIATING TO LEFT LOWER EXTREMITY: ICD-10-CM

## 2024-11-19 PROCEDURE — 97110 THERAPEUTIC EXERCISES: CPT | Performed by: PHYSICAL THERAPIST

## 2024-11-19 PROCEDURE — 97112 NEUROMUSCULAR REEDUCATION: CPT | Performed by: PHYSICAL THERAPIST

## 2024-11-19 PROCEDURE — 99214 OFFICE O/P EST MOD 30 MIN: CPT | Performed by: ANESTHESIOLOGY

## 2024-11-19 PROCEDURE — 97140 MANUAL THERAPY 1/> REGIONS: CPT | Performed by: PHYSICAL THERAPIST

## 2024-11-19 NOTE — PATIENT INSTRUCTIONS
Sacroiliac Joint Injection      What is the sacroiliac joint and why is a sacroiliac joint injection helpful?  The sacroiliac joint is a large joint in the region of your low back and buttocks. When the joint becomes painful it can cause pain in its immediate region or it can refer pain into your groin, abdomen or leg.    A sacroiliac joint injection serves several purposes. First, by placing numbing medicine into the joint, the amount of immediate pain relief you experience will help confirm or deny the joint as a source of your pain. Additionally, the temporary pain relief of the numbing medicine may better allow a physical therapist to treat the joint. Also, steroid will serve to reduce any presumed inflammation within your joint and further assist the physical therapist or chiropractor, if necessary.    The procedure serves both diagnostic and therapeutic purposes.    What happens during the procedure?  Lying on your stomach, the skin on your buttocks will be well cleaned. The physician will numb a small area of skin which stings for a few seconds. Next, the physician will use X-ray guidance to direct a very small needle into the joint, and then he will inject several drops of contrast dye to confirm that the medicine goes into the joint. Then, a small amount of numbing medicine and anti-inflammatory steroid will be slowly injected.    What happens after the procedure?  A dressing may be applied to the injection site. You will remain in the office for about 15-20 minutes and the nurse will monitor your blood pressure and pulse. The nurse will review your discharge instructions and you will be able to go home. You may experience numbness or weakness to the affected limb for a few hours after the procedure. If this happens do not walk without assistance. Your physician may refer you to a physical therapist while the anti-inflammatory steroid is still working.    General Pre/Post Instructions  Eating  You may eat a  light, but not full meal at least one hour before the procedure, unless receiving intravenous sedation. If you are an insulin dependent diabetic do not alter your normal food intake.   Medications  Take your routine medications before the procedure (such as high blood pressure and diabetes medications) except for those that need to be discontinued five days before the procedure such as aspirin and all anti-inflammatory medications (e.g. Motrin/Ibuprofen, Aleve, Relafen, Daypro). These medicines may be re-started the day after the procedure. You may take your regular pain medicine as needed before/after the procedure. If you are taking Coumadin, Heparin, Lovenox, Plavix or Ticlid you must notify the office so that the timing of stopping these medications can be explained.   Exercise  You must bring a  with you. You may return to your current level of activities the next day including return to work.     Things that may Delay the Procedure  If you are on antibiotics please notify our office; we may delay the procedure. If you have an active infection or fever we will not do the procedure.

## 2024-11-19 NOTE — PROGRESS NOTES
Daily Note     Today's date: 2024  Patient name: Jamee Shiakh  : 1955  MRN: [id number]  Referring provider: Abdullahi Cortez*  Dx:   Encounter Diagnosis     ICD-10-CM    1. Internal derangement of left shoulder  M24.812       2. S/P left rotator cuff repair  Z98.890           Start Time: 1035  Stop Time: 1115  Total time in clinic (min): 40 minutes    Subjective: Seen by ortho yesterday who stated she was behind with OH range of motion.        Objective: See treatment diary below      Assessment: Tolerated treatment well. Improvement with tolerance to overhead stretching today. Added exercises today per flowsheet with no increased c/o discomfort.  Patient would benefit from continued PT      Plan: Continue per plan of care.  Progress treatment as tolerated.       Precautions: protocol; PROM until week 6  Access Code: [email or web address]  Progress note:   POC:     Manuals 10/29 11/5 11/7 11/12 11/19   Stretching with active release Per protocol as [facility]- KB KB all KB all  KB all KB all   GH jt mobs Grade II Grade II-III Grade II-III Grade II-III Grade II-IV                   Neuro Re-Ed                scap retraction 10x  10x 10x 10x   Shoulder rolls 10x  10x 10x 10x   Chin tucks :03x10               Shoulder iso  :05x5 Flex, ER, IR, abd 5x Flex, ER, IR, abd, ext 5x Flex, ER, IR, abd, ext 5x   Ther Ex        pulleys Scaption 10x Scap 10x Scap 10x Scap 10x Scap 10x   UT stretch :30x3 L       LS stretch :30x3 L       Table slides    Flex 10x Flex 10x   pendulums 10x ea       Supine flexion ROM     10x   Supine ER ROM :05x10 :05x10 :05x10 nv 10x   Flexion rock back (standing) :05x10 :05x10 :05x10 :05x10 :05x10   Elbow PROM 10x AROM   AROM nv 15x   Wrist AROM Flex, UD/RD, ext, sup/pron 30x ea   prn     25x               Wall walk  5x flex 5x flex 5x flex 5x flex   Standing YTI     5x ea                                           Ther Activity                        Gait Training                         Modalities

## 2024-11-19 NOTE — PROGRESS NOTES
Assessment:  1. Sacroiliitis (HCC)    2. Low back pain radiating to left lower extremity    3. Chronic pain syndrome        Plan:  Patient is a 67-year-old female complains of left-sided low back and hip pain presents to office for initial consultation.  Patient has pain with standing from seated position consent for send position patient has to take pressure off of her left side when sitting secondary to sharp, stabbing pain.  Patient reports when bending forward to do dishes or to pick anything out of the  there is immense pain.  Patient presents with sacroiliitis.  1.  We will schedule patient for a left sacroiliac joint steroid injection  2.  We will follow-up 1 month after injection    Complete risks and benefits including bleeding, infection, tissue reaction, nerve injury and allergic reaction were discussed. The approach was demonstrated using models and literature was provided. Verbal and written consent was obtained.    History of Present Illness:  The patient is a 69 y.o. female who presents for a follow up office visit in regards to Buttocks Pain.   The patient’s current symptoms include 7 out of 10 constant dosaging gluteal region without particular time pattern.    Current pain medications includes:  None.      I have personally reviewed and/or updated the patient's past medical history, past surgical history, family history, social history, current medications, allergies, and vital signs today.         Review of Systems  Review of Systems   Constitutional:  Negative for unexpected weight change.   HENT:  Negative for hearing loss.    Eyes:  Negative for visual disturbance.   Respiratory:  Negative for shortness of breath.    Cardiovascular:  Negative for leg swelling.   Gastrointestinal:  Negative for constipation.   Endocrine: Negative for polyuria.   Genitourinary:  Negative for difficulty urinating.   Musculoskeletal:  Positive for gait problem. Negative for joint swelling and myalgias.  "  Skin:  Negative for rash.   Neurological:  Negative for weakness and headaches.   Psychiatric/Behavioral:  Negative for decreased concentration.    All other systems reviewed and are negative.      Past Medical History:   Diagnosis Date    Anemia     Arthritis     Bilateral bunions     Chronic pain disorder     back and hip pain    Colon polyp     Disease of thyroid gland     nodules    History of COVID-2020    mild s/s    History of gastroesophageal reflux (GERD)     \"had hiatal hernia surgery\"    History of heart murmur in childhood     Hyperlipidemia     Hypertension     Overactive bladder     Rotator cuff tear, right     had surgery    Vitamin D deficiency     unsure    Wears glasses     Wears partial dentures     upper       Past Surgical History:   Procedure Laterality Date     SECTION      Last Assessed: 2017    COLONOSCOPY      ELBOW SURGERY      Last Assessed: 2017    ESOPHAGOGASTRODUODENOSCOPY N/A 2017    Procedure: ESOPHAGOGASTRODUODENOSCOPY (EGD);  Surgeon: Jolly Lin DO;  Location: [facility];  Service: Gastroenterology    HIP SURGERY Left 2018    glut medius/minimus repair  and 18--with pin implanted    HYSTERECTOMY          JOINT REPLACEMENT      PARAESOPHAGEAL HERNIA REPAIR N/A 2020    Procedure: LAPAROSCOPIC PARAESOPHAGEAL HERNIA REPAIR WITH MESH AND NISSEN FUNDOPLICATION WITH ROBOTICS;  Surgeon: Darrin Leon MD;  Location: [facility];  Service: General    MD ARTHRP KNE CONDYLE&PLATU MEDIAL&LAT COMPARTMENTS Left 03/15/2023    Procedure: ARTHROPLASTY KNEE TOTAL;  Surgeon: Bart Potts DO;  Location: [facility];  Service: Orthopedics    MD ARTHRS KNE SURG W/MENISCECTOMY MED/LAT W/SHVG Left 06/15/2022    Procedure: ;left knee arthroscopy, meniscectomy,synevectomy,chondroplasty, loose body removal, injection;  Surgeon: Bart Potts DO;  Location: [facility];  Service: Orthopedics    MD COLONOSCOPY FLX DX W/COLLJ SPEC WHEN PFRMD " N/A 06/26/2017    Procedure: EGD AND COLONOSCOPY;  Surgeon: Jolly Lin DO;  Location: [facility];  Service: Gastroenterology    HI DIAGNOSTIC ARTHROSCOPY SHOULDER +- SYNOVIAL BX Left 9/18/2024    Procedure: DIAGNOSTIC ARTHROSCOPY SHOULDER POSSIBLE REVISION ROTATOR CUFF REPAIR, EXTENSIVE DEBRIDEMENT;  Surgeon: Abdullahi Cortez MD;  Location: [facility];  Service: Orthopedics    HI EXCISON TUMOR SOFT TISSUE THIGH/KNEE SUBQ 3 CM/> Bilateral 05/26/2022    Procedure: EXCISION BIOPSY TISSUE LESION/MASS LOWER EXTREMITY;  Surgeon: Kit Tavera MD;  Location: [facility];  Service: General    HI EXCISON TUMOR SOFT TISSUE THIGH/KNEE SUBQ 3 CM/> Bilateral 08/25/2022    Procedure: EXCISION BIOPSY TISSUE LESION/MASS LOWER EXTREMITY;  Surgeon: Kit Tavera MD;  Location: [facility];  Service: General    HI OSTECTOMY PRTL 5TH METAR HEAD SPX Bilateral 08/15/2023    Procedure: BUNIONECTOMY TAILOR WITHOUT OSTEOTOMY;  Surgeon: Monet Pradhan DPM;  Location: [facility];  Service: Podiatry    HI SURGICAL ARTHROSCOPY JENNIFER W/CORACOACRM LIGM RLS Left 12/13/2023    Procedure: Left - ARTHROSCOPY SHOULDER  Synovectomy Debridement/chondroplasty Acromioplasty Arthroscopic rotator cuff repair Post arthroscopic injection of intra-articular joint space and peripheral portals;  Surgeon: Bart Potts DO;  Location: [facility];  Service: Orthopedics    SHOULDER ARTHROCENTESIS      SHOULDER ARTHROSCOPY Right 06/23/2021    Procedure: SHOULDER ARTHROSCOPY WITH acromioplasty, debridment, and ROTATOR CUFF REPAIR;  Surgeon: Bart Potts DO;  Location:  MAIN OR;  Service: Orthopedics    TUBAL LIGATION         Family History   Problem Relation Age of Onset    Colon cancer Mother     Heart attack Father     Other Father         Cardiac Disorder    Diabetes type II Father     Colon cancer Sister     No Known Problems Sister     No Known Problems Sister     No Known Problems Sister     No Known Problems Sister     No Known  "Problems Daughter     No Known Problems Maternal Aunt     No Known Problems Maternal Aunt     No Known Problems Maternal Aunt     Cancer Maternal Grandmother     No Known Problems Paternal Grandmother        Social History     Occupational History    Not on file   Tobacco Use    Smoking status: Former     Current packs/day: 0.00     Average packs/day: 1.5 packs/day for 30.0 years (45.0 ttl pk-yrs)     Types: Cigarettes     Start date: 1961     Quit date: 1991     Years since quittin.9    Smokeless tobacco: Never    Tobacco comments:     quit 25 yrs ago   Vaping Use    Vaping status: Never Used   Substance and Sexual Activity    Alcohol use: Never    Drug use: Never    Sexual activity: Not Currently     Comment: defer         Current Outpatient Medications:     amLODIPine (NORVASC) 5 mg tablet, Take 2 tablets (10 mg total) by mouth daily, Disp: 180 tablet, Rfl: 1    atorvastatin (LIPITOR) 10 mg tablet, Take 1 tablet (10 mg total) by mouth daily, Disp: 90 tablet, Rfl: 3    hydrOXYzine HCL (ATARAX) 25 mg tablet, take 1 tablet by mouth every 6 hours if needed for itching, Disp: 180 tablet, Rfl: 1    losartan (COZAAR) 100 MG tablet, Take 1 tablet (100 mg total) by mouth daily, Disp: 90 tablet, Rfl: 1    meloxicam (Mobic) 15 mg tablet, Take 1 tablet (15 mg total) by mouth daily, Disp: 30 tablet, Rfl: 0    famotidine (PEPCID) 20 mg tablet, take 1 tablet by mouth twice a day (Patient not taking: Reported on 2024), Disp: 180 tablet, Rfl: 3    valACYclovir (VALTREX) 1,000 mg tablet, take 1 tablet by mouth twice a day for 3 days, Disp: 180 tablet, Rfl: 1    Allergies   Allergen Reactions    Hydrocodone Irritability       Physical Exam:    BP (!) 175/95   Pulse 66   Ht 5' 3\" (1.6 m)   Wt 72.6 kg (160 lb)   BMI 28.34 kg/m²     Constitutional:normal, well developed, well nourished, alert, in no distress and non-toxic and no overt pain behavior.  Eyes:anicteric  HEENT:grossly intact  Neck:supple, symmetric, " trachea midline and no masses   Pulmonary:even and unlabored  Cardiovascular:No edema or pitting edema present  Skin:Normal without rashes or lesions and well hydrated  Psychiatric:Mood and affect appropriate  Neurologic:Cranial Nerves II-XII grossly intact  Musculoskeletal:antalgic; positive tenderness palpation left sacroiliac joint, positive gazing, positive Charleen's, positive compression test    Imaging  No orders to display       No orders of the defined types were placed in this encounter.

## 2024-11-20 ENCOUNTER — OFFICE VISIT (OUTPATIENT)
Dept: OBGYN CLINIC | Facility: CLINIC | Age: 69
End: 2024-11-20
Payer: COMMERCIAL

## 2024-11-20 VITALS
SYSTOLIC BLOOD PRESSURE: 178 MMHG | HEART RATE: 91 BPM | WEIGHT: 163 LBS | BODY MASS INDEX: 28.88 KG/M2 | TEMPERATURE: 100.2 F | HEIGHT: 63 IN | DIASTOLIC BLOOD PRESSURE: 104 MMHG

## 2024-11-20 DIAGNOSIS — I10 ESSENTIAL HYPERTENSION: ICD-10-CM

## 2024-11-20 DIAGNOSIS — S89.91XA KNEE INJURY, RIGHT, INITIAL ENCOUNTER: Primary | ICD-10-CM
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
## 2024-11-20 DIAGNOSIS — E78.5 HYPERLIPIDEMIA, UNSPECIFIED HYPERLIPIDEMIA TYPE: ICD-10-CM

## 2024-11-20 DIAGNOSIS — K21.9 GASTROESOPHAGEAL REFLUX DISEASE WITHOUT ESOPHAGITIS: ICD-10-CM

## 2024-11-20 PROCEDURE — 99203 OFFICE O/P NEW LOW 30 MIN: CPT | Performed by: FAMILY MEDICINE

## 2024-11-20 NOTE — PATIENT INSTRUCTIONS
F/u as needed   Most likely contusion  Icing/heat/OTC pain meds as needed.  Dec advil due to HTN

## 2024-11-20 NOTE — PROGRESS NOTES
[facility] [address] [place]  [facility] [address] [phone]      Assessment:  1. Knee injury, right, initial encounter  -     Ambulatory Referral to Orthopedic Surgery  2. Fall, initial encounter      Plan:  Patient Instructions   F/u as needed   Most likely contusion  Icing/heat/OTC pain meds as needed.  Dec advil due to HTN     Return if symptoms worsen or fail to improve.    Chief Complaint:  Chief Complaint   Patient presents with    Right Knee - Numbness       Subjective:   HPI    Patient ID: Jamee Shaikh is a 69 y.o. female     Here c/o R knee pain  2 wks ago fell on R knee- tripped going down bleachers and landed on R knee  Seen in .  Note reviewed.  Pain with pressure  No pain walking/steps  Feels numb   Advil/tylenol PRN- helps      XR KNEE 4+ VW RIGHT INJURY     INDICATION: S89.91XA: Unspecified injury of right lower leg, initial encounter.     COMPARISON: 9/9/2022     FINDINGS:     No acute fracture or dislocation.     Probable very small joint effusion.     Tricompartmental osteoarthritis appears similar to prior imaging.     No lytic or blastic osseous lesion.     There is some soft tissue swelling anteriorly below the patella.     IMPRESSION:     Swelling. No acute fracture seen. Tricompartmental osteoarthritis and probable very small joint effusion                Imaging    XR knee 4+ vw right injury (O    Review of Systems   Constitutional:  Negative for fatigue and fever.   Respiratory:  Negative for shortness of breath.    Cardiovascular:  Negative for chest pain.   Gastrointestinal:  Negative for abdominal pain and nausea.   Genitourinary:  Negative for dysuria.   Musculoskeletal:  Positive for arthralgias.   Skin:  Negative for rash and wound.   Neurological:  Negative for weakness and headaches.       Objective:  Vitals:  BP (!) 178/104 (BP Location: Left arm, Patient Position: Sitting, Cuff Size: Standard)   Pulse 91   Temp 100.2  "°F (37.9 °C) (Tympanic)   Ht 5' 3\" (1.6 m)   Wt 73.9 kg (163 lb)   BMI 28.87 kg/m²     The following portions of the patient's history were reviewed and updated as appropriate: allergies, current medications, past family history, past medical history, past social history, past surgical history, and problem list.    Physical exam:  Physical Exam  Constitutional:       Appearance: Normal appearance. She is normal weight.   Eyes:      Extraocular Movements: Extraocular movements intact.   Pulmonary:      Effort: Pulmonary effort is normal.   Musculoskeletal:      Cervical back: Normal range of motion.      Right knee: No effusion.      Instability Tests: Medial Mago test negative and lateral Mago test negative.   Skin:     General: Skin is warm and dry.   Neurological:      General: No focal deficit present.      Mental Status: She is alert and oriented to person, place, and time. Mental status is at baseline.   Psychiatric:         Mood and Affect: Mood normal.         Behavior: Behavior normal.         Thought Content: Thought content normal.         Judgment: Judgment normal.       Right Knee Exam     Tenderness   The patient is experiencing tenderness in the medial joint line and lateral joint line.    Range of Motion   The patient has normal right knee ROM.    Tests   Mago:  Medial - negative Lateral - negative  Varus: negative Valgus: negative    Other   Swelling: none  Effusion: no effusion present          Observations     Right Knee   Negative for effusion.       I have personally reviewed pertinent films in PACS and my interpretation is XR  R knee- mild OA/effusion. No fx.      Apollo Mcnally MD   "

## 2024-11-21 ENCOUNTER — OFFICE VISIT (OUTPATIENT)
Dept: PHYSICAL THERAPY | Facility: CLINIC | Age: 69
End: 2024-11-21
Payer: COMMERCIAL

## 2024-11-21 DIAGNOSIS — Z98.890 S/P LEFT ROTATOR CUFF REPAIR: ICD-10-CM

## 2024-11-21 DIAGNOSIS — M24.812 INTERNAL DERANGEMENT OF LEFT SHOULDER: Primary | ICD-10-CM

## 2024-11-21 PROCEDURE — 97110 THERAPEUTIC EXERCISES: CPT | Performed by: PHYSICAL THERAPIST

## 2024-11-21 PROCEDURE — 97112 NEUROMUSCULAR REEDUCATION: CPT | Performed by: PHYSICAL THERAPIST

## 2024-11-21 PROCEDURE — 97140 MANUAL THERAPY 1/> REGIONS: CPT | Performed by: PHYSICAL THERAPIST

## 2024-11-21 RX ORDER — LOSARTAN POTASSIUM 100 MG/1
100 TABLET ORAL DAILY
Qty: 90 TABLET | Refills: 1 | Status: SHIPPED | OUTPATIENT
Start: 2024-11-21

## 2024-11-21 RX ORDER — FAMOTIDINE 20 MG/1
20 TABLET, FILM COATED ORAL 2 TIMES DAILY
Qty: 180 TABLET | Refills: 1 | Status: SHIPPED | OUTPATIENT
Start: 2024-11-21

## 2024-11-21 RX ORDER — AMLODIPINE BESYLATE 5 MG/1
10 TABLET ORAL DAILY
Qty: 180 TABLET | Refills: 1 | Status: SHIPPED | OUTPATIENT
Start: 2024-11-21

## 2024-11-21 RX ORDER — ATORVASTATIN CALCIUM 10 MG/1
10 TABLET, FILM COATED ORAL DAILY
Qty: 90 TABLET | Refills: 1 | Status: SHIPPED | OUTPATIENT
Start: 2024-11-21

## 2024-11-21 NOTE — PROGRESS NOTES
Daily Note     Today's date: 2024  Patient name: Jamee Shaikh  : 1955  MRN: [id number]  Referring provider: Abdullahi Cortez*  Dx:   Encounter Diagnosis     ICD-10-CM    1. Internal derangement of left shoulder  M24.812       2. S/P left rotator cuff repair  Z98.890           Start Time: 905  Stop Time: 945  Total time in clinic (min): 40 minutes    Subjective: Patient states continued pain in shoulder with use away from body. Pain mostly in anterior shoulder and proximal arm.       Objective: See treatment diary below      Assessment: Tolerated treatment well. Progressing with passive range of motion overhead, best with supported arm into flexion. Patient exhibited good technique with therapeutic exercises and would benefit from continued PT      Plan: Continue per plan of care.  Progress treatment as tolerated.       Precautions: protocol; PROM until week 6  Access Code: [email or web address]  Progress note:   POC:     Manuals    Stretching with active release KB all  KB all KB all  KB all KB all   GH jt mobs Grade II-III Grade II-III Grade II-III Grade II-III Grade II-IV                   Neuro Re-Ed                scap retraction   10x 10x 10x   Shoulder rolls   10x 10x 10x   Chin tucks                Shoulder iso Flex, ER, IR, abd, ext 5x :05x5 Flex, ER, IR, abd 5x Flex, ER, IR, abd, ext 5x Flex, ER, IR, abd, ext 5x   Ther Ex        pulleys Scap 10x Scap 10x Scap 10x Scap 10x Scap 10x   UT stretch        LS stretch        Table slides Flex 10x   Flex 10x Flex 10x   pendulums        Supine flexion ROM 10x    10x   Supine ER ROM 10x :05x10 :05x10 nv 10x   Flexion rock back (standing) :05x10 :05x10 :05x10 :05x10 :05x10   Elbow PROM 15x   AROM nv 15x   Wrist AROM    prn                    Wall walk 5x flex 5x flex 5x flex 5x flex 5x flex   Standing YTI 5x ea    5x ea                                           Ther Activity                        Gait Training                         Modalities

## 2024-11-22 DIAGNOSIS — G47.09 OTHER INSOMNIA: ICD-10-CM

## 2024-11-22 RX ORDER — HYDROXYZINE HYDROCHLORIDE 25 MG/1
TABLET, FILM COATED ORAL
Qty: 180 TABLET | Refills: 1 | Status: SHIPPED | OUTPATIENT
Start: 2024-11-22

## 2024-11-25 ENCOUNTER — TELEPHONE (OUTPATIENT)
Age: 69
End: 2024-11-25

## 2024-11-25 ENCOUNTER — TELEPHONE (OUTPATIENT)
Dept: PAIN MEDICINE | Facility: CLINIC | Age: 69
End: 2024-11-25

## 2024-11-25 NOTE — TELEPHONE ENCOUNTER
Caller: henrry    Doctor: delia    Reason for call: pt is calling about disability paperwork.    Call back#: [phone]

## 2024-11-26 ENCOUNTER — OFFICE VISIT (OUTPATIENT)
Dept: PHYSICAL THERAPY | Facility: CLINIC | Age: 69
End: 2024-11-26
Payer: COMMERCIAL

## 2024-11-26 DIAGNOSIS — Z98.890 S/P LEFT ROTATOR CUFF REPAIR: ICD-10-CM

## 2024-11-26 DIAGNOSIS — M24.812 INTERNAL DERANGEMENT OF LEFT SHOULDER: Primary | ICD-10-CM

## 2024-11-26 PROCEDURE — 97112 NEUROMUSCULAR REEDUCATION: CPT | Performed by: PHYSICAL THERAPIST

## 2024-11-26 PROCEDURE — 97110 THERAPEUTIC EXERCISES: CPT | Performed by: PHYSICAL THERAPIST

## 2024-11-26 PROCEDURE — 97140 MANUAL THERAPY 1/> REGIONS: CPT | Performed by: PHYSICAL THERAPIST

## 2024-11-26 NOTE — PROGRESS NOTES
Daily Note     Today's date: 2024  Patient name: Jamee Shaikh  : 1955  MRN: [id number]  Referring provider: Abdullahi Cortez*  Dx:   Encounter Diagnosis     ICD-10-CM    1. Internal derangement of left shoulder  M24.812       2. S/P left rotator cuff repair  Z98.890           Start Time: 845  Stop Time: 923  Total time in clinic (min): 38 minutes    Subjective: Patient has no new complaints today.       Objective: See treatment diary below      Assessment: Tolerated treatment fair+. Guarding with passive movement today, able to achieve greater range of motion with supported flexion/scaption exercises. Recommended patient focus on these at home for continued ROM progression. Patient exhibited good technique with therapeutic exercises and would benefit from continued PT      Plan: Continue per plan of care.  Progress treatment as tolerated.       Precautions: protocol; PROM until week 6  Access Code: [email or web address]  Progress note:   POC:     Manuals    Stretching with active release NARESH all  KB  NARESH all  KB all KB all   GH jt mobs Grade II-III Grade II-III Grade II-III Grade II-III Grade II-IV                   Neuro Re-Ed                scap retraction   10x 10x 10x   Shoulder rolls   10x 10x 10x   Chin tucks                Shoulder iso Flex, ER, IR, abd, ext 5x Flex, ER, IR, abd, ext 5x Flex, ER, IR, abd 5x Flex, ER, IR, abd, ext 5x Flex, ER, IR, abd, ext 5x   Ther Ex        pulleys Scap 10x Scap 10x Scap 10x Scap 10x Scap 10x   UT stretch        LS stretch        Table slides Flex 10x Flex, abd 10x  Flex 10x Flex 10x   pendulums        Supine flexion ROM 10x 10x   10x   Supine ER ROM 10x 10x :05x10 nv 10x   Flexion rock back (standing) :05x10 :05x10 :05x10 :05x10 :05x10   Elbow PROM 15x 15x  AROM nv 15x   Wrist AROM    prn                    Wall walk 5x flex 5x flex 5x flex 5x flex 5x flex   Standing YTI 5x ea 5x ea   5x ea                                            Ther Activity                        Gait Training                        Modalities

## 2024-12-02 ENCOUNTER — OFFICE VISIT (OUTPATIENT)
Dept: FAMILY MEDICINE CLINIC | Facility: CLINIC | Age: 69
End: 2024-12-02
Payer: COMMERCIAL

## 2024-12-02 VITALS
OXYGEN SATURATION: 98 % | HEIGHT: 63 IN | TEMPERATURE: 98.4 F | RESPIRATION RATE: 18 BRPM | BODY MASS INDEX: 29.06 KG/M2 | SYSTOLIC BLOOD PRESSURE: 124 MMHG | HEART RATE: 74 BPM | DIASTOLIC BLOOD PRESSURE: 72 MMHG | WEIGHT: 164 LBS

## 2024-12-02 DIAGNOSIS — M46.1 SACROILIITIS (HCC): Primary | ICD-10-CM

## 2024-12-02 PROCEDURE — 99213 OFFICE O/P EST LOW 20 MIN: CPT | Performed by: NURSE PRACTITIONER

## 2024-12-02 PROCEDURE — G2211 COMPLEX E/M VISIT ADD ON: HCPCS | Performed by: NURSE PRACTITIONER

## 2024-12-02 NOTE — PROGRESS NOTES
"Name: Jamee Shaikh      : 1955      MRN: [id number]  Encounter Provider: GUS Sosa  Encounter Date: 2024   Encounter department: [facility]  :  Assessment & Plan  Sacroiliitis (HCC)                History of Present Illness     Here to have her disability paperwork completed for her sacroiliitis- has injection with Dr. Bradford scheduled for 25, f/u appointment with him 25- hoping to return to work the following day.  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          Objective   /72   Pulse 74   Temp 98.4 °F (36.9 °C)   Resp 18   Ht 5' 3\" (1.6 m)   Wt 74.4 kg (164 lb)   SpO2 98%   BMI 29.05 kg/m²      Physical Exam  Vitals and nursing note reviewed.   Constitutional:       General: She is not in acute distress.     Appearance: She is well-developed. She is not diaphoretic.   Pulmonary:      Effort: Pulmonary effort is normal. No respiratory distress.   Neurological:      Mental Status: She is alert and oriented to person, place, and time.   Psychiatric:         Mood and Affect: Mood normal.         Speech: Speech normal.         Behavior: Behavior normal.         Thought Content: Thought content normal.         Judgment: Judgment normal.         "

## 2024-12-03 ENCOUNTER — TELEPHONE (OUTPATIENT)
Age: 69
End: 2024-12-03

## 2024-12-03 ENCOUNTER — EVALUATION (OUTPATIENT)
Dept: PHYSICAL THERAPY | Facility: CLINIC | Age: 69
End: 2024-12-03
Payer: COMMERCIAL

## 2024-12-03 DIAGNOSIS — M24.812 INTERNAL DERANGEMENT OF LEFT SHOULDER: Primary | ICD-10-CM

## 2024-12-03 DIAGNOSIS — Z98.890 S/P LEFT ROTATOR CUFF REPAIR: ICD-10-CM

## 2024-12-03 PROCEDURE — 97110 THERAPEUTIC EXERCISES: CPT | Performed by: PHYSICAL THERAPIST

## 2024-12-03 PROCEDURE — 97112 NEUROMUSCULAR REEDUCATION: CPT | Performed by: PHYSICAL THERAPIST

## 2024-12-03 PROCEDURE — 97140 MANUAL THERAPY 1/> REGIONS: CPT | Performed by: PHYSICAL THERAPIST

## 2024-12-03 PROCEDURE — 97164 PT RE-EVAL EST PLAN CARE: CPT | Performed by: PHYSICAL THERAPIST

## 2024-12-03 NOTE — TELEPHONE ENCOUNTER
Caller: Patient     Doctor: Dana     Reason for call: Patient called having issues in my chart, given phone number for technical support.    Call back#: [phone]

## 2024-12-03 NOTE — PROGRESS NOTES
PT Re-Evaluation     Today's date: 12/3/2024  Patient name: Jamee Shaikh  : 1955  MRN: [id number]  Referring provider: Abdullahi Cortez*  Dx:   Encounter Diagnosis     ICD-10-CM    1. Internal derangement of left shoulder  M24.812       2. S/P left rotator cuff repair  Z98.890           Start Time: 0845  Stop Time: 925  Total time in clinic (min): 40 minutes      Assessment  Impairments: abnormal muscle firing, abnormal or restricted ROM, abnormal movement, activity intolerance, impaired physical strength, lacks appropriate home exercise program, pain with function, scapular dyskinesis, poor posture  and poor body mechanics    Assessment details: Jamee Shaikh was seen for an initial PT evaluation and 17 f/u sessions. Patient is a 69 y.o. female with diagnosis of L rotator cuff repair and past medical history significant for  OA, chronic pain, thyroid disease, GERD, hiatal hernia, hyperlipidemia, HTN, rotator cuff tear with repair bilaterally, L buttock pain with history of surgery, lipomas with removal, lumbar radiculopathy, vertigo. Findings of examination today shows gradual gains over the last 4 weeks. Still with limitation in left shoulder strength and range of motion most limited due to pain levels and guarding. Attempts to utilize LUE during daily activities, but will keep to trunk utilizing shorter lever arm. Continuation of skilled PT is indicated to continue to progress per protocol to improve patient's QOL, increase LUE function and decrease overall pain.         Goals  STG (6 weeks)  1. Patient's left shoulder flexion AROM will increase to 120 with 0/10 pain for increased ability to perform overhead ADLs. - PROGRESSING  2. Patient will have 0/10 pain in left shoulder at rest. - NOT MET  3. Patient's left shoulder strength will increase to 2+/5 for increased ability to lift. - MET 11/5  LTG (12 weeks)  1. Patient's UE AROM equal bilaterally for ability to complete hair hygiene,  overhead, and behind the back ADLs. - PROGRESSING  2. Patient's UE strength will be equal bilaterally for ability to lift and carry at PLOF. - PROGRESSING  3. Patient will be independent with home exercise program for continued maintenance post PT discharge. - PROGRESSING      Plan  Patient would benefit from: skilled physical therapy  Planned modality interventions: unattended electrical stimulation, cryotherapy and thermotherapy: hydrocollator packs    Planned therapy interventions: neuromuscular re-education, manual therapy, therapeutic exercise, therapeutic activities, self care and home exercise program    Frequency: 2x week  Duration in weeks: 12  Plan of Care beginning date: 9/24/2024  Plan of Care expiration date: 3/3/2025  Treatment plan discussed with: patient and PTA  Plan details: Progress note in 4 weeks.         Subjective Evaluation    History of Present Illness  Date of surgery: 9/18/2024    Subjective 12/3: States shoulder feels heavy. Pain is worst in the am, thinks she is sleeping wrong on it. Is out of the sling and using arm throughout the day although with limited movement and within 2lb lifting restriction. Feels progress is slow. F/u with ortho in January.    Subjective 11/5: Patient was able to DC sling last week at week 6 s/p without difficulty. Still struggling with overhead reaching. No issues with dressing. Has not returned to driving, but is planning on attempting soon. Does continue to have anterior left shoulder pain traveling into proximal anterior arm, increased with lifting arm away from body. To f/u with ortho 11/18.     Mechanism of injury: Jamee Shaikh is a 69 y.o. female who presents to outpatient Physical Therapy today with complaints of left shoulder pain. Initial surgery for L RC 12/13/23, new episode of pain after DC from PT in April. Attempted PT post injury with no significant improvement. Seen over the course of several weeks by orthos. 3rd opinion by Dr. Cortez who  "recommended revision of RC due to likely re-tear. Patient is not s/p L supraspinatus revision with debridement as of 24. Also with bicep tear which was debrided, repair not recommended. Patient is now to utilize sling for 6 weeks, may remove pillow after 2. F/u with ortho at the middle of November.     Patient Goals  Patient goal: \"Feel better and get arm working\". Would like to return to work.  Pain     11/5 12/3  Pain scale: discomfort.  5 6  At best pain ratin   5 6  At worst pain ratin (nerve block) 7 8  Location: L shoulder  Quality: dull ache  Relieving factors: support  Exacerbated by: LUE movement.    Social Support  Steps to enter house: yes  Stairs in house: yes   Lives in: multiple-level home  Lives with: spouse    Employment status: not working  Hand dominance: right          Objective     Postural Observations    Additional Postural Observation Details  Forward head. Wearing brace with adductor pillow    Observations   Left Shoulder   Positive for edema and incision.     Additional Observation Details  4 scope incisions, closed healing appropriately. No redness or drainage.  : healing appropriately, closed, min scar mobility restriction  12/3: min scar restriction    Cervical/Thoracic Screen   Cervical range of motion within normal limits  Cervical range of motion within normal limits with the following exceptions: Min restriction of cervical extension    Neurological Testing     Sensation     Shoulder   Left Shoulder   Intact: light touch    Right Shoulder   Intact: Light touch    General Comments:      Shoulder Comments       FOTO: 4% function, 54% predicted function   : 46%  12/3: 38%        Shoulder - AROM IE 11/5 12/3   LEFT       Flexion NT 90 100   Abduction NT 65 80   External Rotation NT 40 50   Internal Rotation NT 60 60   RIGHT      Flexion 150     Abduction 150     External Rotation T2     Internal Rotation T11       Shoulder - PROM IE 11/5 12/3   LEFT       Flexion 15 " 90 110   Abduction 15 60 90   External Rotation -15 50 55   Internal Rotation To belly 75 75     Shoulder - MMT IE 11/5 12/3   LEFT       Flexion NT 2- 2-   Abduction NT 2- 2-   External Rotation NT 2+ 3-   Internal Rotation NT 2+ 3-   Bicep 2+ 3- 4   Tricep 2+ 3- 4   RIGHT      Flexion 4+     Abduction 4     External Rotation 4+     Internal Rotation 5     Bicep 5     Tricep 4                  Precautions: protocol; PROM until week 6  Access Code: [email or web address]  Progress note: 1/3  POC: 3/3    Manuals 11/21 11/26 12/3 11/12 11/19   Stretching with active release KB all  KB  KB KB all KB all   GH jt mobs Grade II-III Grade II-III Grade II-III Grade II-III Grade II-IV                   Neuro Re-Ed                scap retraction    10x 10x   Shoulder rolls    10x 10x   Chin tucks                Shoulder iso Flex, ER, IR, abd, ext 5x Flex, ER, IR, abd, ext 5x Flex, ER, IR, abd, ext 5x Flex, ER, IR, abd, ext 5x Flex, ER, IR, abd, ext 5x   Ther Ex        pulleys Scap 10x Scap 10x Scap 10x Scap 10x Scap 10x   UT stretch        LS stretch        Table slides Flex 10x Flex, abd 10x nv Flex 10x Flex 10x   pendulums        Supine flexion ROM 10x 10x 10x  10x   Supine ER ROM 10x 10x 10x nv 10x   Flexion rock back (standing) :05x10 :05x10 :05x10 :05x10 :05x10   Elbow PROM 15x 15x nv AROM nv 15x   Wrist AROM    prn                    Wall walk 5x flex 5x flex 5x flex 5x flex 5x flex   Standing YTI 5x ea 5x ea 5x ea  5x ea                                           Ther Activity                        Gait Training                        Modalities

## 2024-12-09 ENCOUNTER — OFFICE VISIT (OUTPATIENT)
Dept: PHYSICAL THERAPY | Facility: CLINIC | Age: 69
End: 2024-12-09
Payer: COMMERCIAL

## 2024-12-09 DIAGNOSIS — Z98.890 S/P LEFT ROTATOR CUFF REPAIR: ICD-10-CM

## 2024-12-09 DIAGNOSIS — M24.812 INTERNAL DERANGEMENT OF LEFT SHOULDER: Primary | ICD-10-CM

## 2024-12-09 PROCEDURE — 97112 NEUROMUSCULAR REEDUCATION: CPT | Performed by: PHYSICAL THERAPIST

## 2024-12-09 PROCEDURE — 97140 MANUAL THERAPY 1/> REGIONS: CPT | Performed by: PHYSICAL THERAPIST

## 2024-12-09 PROCEDURE — 97110 THERAPEUTIC EXERCISES: CPT | Performed by: PHYSICAL THERAPIST

## 2024-12-09 NOTE — PROGRESS NOTES
Daily Note     Today's date: 2024  Patient name: Jamee Shaikh  : 1955  MRN: [id number]  Referring provider: Abdullahi Cortez*  Dx:   Encounter Diagnosis     ICD-10-CM    1. Internal derangement of left shoulder  M24.812       2. S/P left rotator cuff repair  Z98.890           Start Time: 1030  Stop Time: 1110  Total time in clinic (min): 40 minutes    Subjective: States she still has significant soreness when moving LUE and has difficulty reaching OH.      Objective: See treatment diary below      Assessment: Tolerated treatment well. Continued guarding and pain with AAROM and MT stretching LUE into OH position, but improving with supported shoulder flexion with UE in closed chain and body is moved to promote shoulder elevation. Patient exhibited good technique with therapeutic exercises and would benefit from continued PT      Plan: Continue per plan of care.  Progress treatment as tolerated.       Precautions: protocol; PROM until week 6  Access Code: [email or web address]  Progress note: 1/3  POC: 3/3    Manuals 11/21 11/26 12/3 12/9 11/19   Stretching with active release KB all  KB  KB KB KB all   GH jt mobs Grade II-III Grade II-III Grade II-III Grade II-IV Grade II-IV                   Neuro Re-Ed                scap retraction     10x   Shoulder rolls     10x   Chin tucks                Shoulder iso Flex, ER, IR, abd, ext 5x Flex, ER, IR, abd, ext 5x Flex, ER, IR, abd, ext 5x Flex, ER, IR, abd, ext 5x Flex, ER, IR, abd, ext 5x   Ther Ex        pulleys Scap 10x Scap 10x Scap 10x Scap 10x Scap 10x   UT stretch        LS stretch        Table slides Flex 10x Flex, abd 10x nv Flex abd 10x Flex 10x   pendulums        Supine flexion ROM 10x 10x 10x 10x 10x   Supine ER ROM 10x 10x 10x 10x 10x   Flexion rock back (standing) :05x10 :05x10 :05x10 :05x10 :05x10   Elbow PROM 15x 15x nv AROM 30x 15x   Wrist AROM                        Wall walk 5x flex 5x flex 5x flex 5x flex 5x flex   Standing YTI 5x ea 5x  ea 5x ea 5x 5x ea                                           Ther Activity                        Gait Training                        Modalities

## 2024-12-11 ENCOUNTER — OFFICE VISIT (OUTPATIENT)
Dept: PHYSICAL THERAPY | Facility: CLINIC | Age: 69
End: 2024-12-11
Payer: COMMERCIAL

## 2024-12-11 DIAGNOSIS — Z98.890 S/P LEFT ROTATOR CUFF REPAIR: ICD-10-CM

## 2024-12-11 DIAGNOSIS — M24.812 INTERNAL DERANGEMENT OF LEFT SHOULDER: Primary | ICD-10-CM

## 2024-12-11 PROCEDURE — 97110 THERAPEUTIC EXERCISES: CPT | Performed by: PHYSICAL THERAPIST

## 2024-12-11 PROCEDURE — 97140 MANUAL THERAPY 1/> REGIONS: CPT | Performed by: PHYSICAL THERAPIST

## 2024-12-11 NOTE — PROGRESS NOTES
Daily Note     Today's date: 2024  Patient name: Jamee Shaikh  : 1955  MRN: [id number]  Referring provider: Abdullahi Cortez*  Dx:   Encounter Diagnosis     ICD-10-CM    1. Internal derangement of left shoulder  M24.812       2. S/P left rotator cuff repair  Z98.890           Start Time: 0900  Stop Time: 09  Total time in clinic (min): 33 minutes    Subjective: States patient attempted to lift cup of her tea and had increased shoulder pain.       Objective: See treatment diary below      Assessment: Tolerated treatment fair+. Continued guarding with AAROM, AROM and MT range stretching. Guarding due to perceptive pain, able to achieve OH range to >120 degrees with table slides and rock backs. Patient would benefit from continued PT      Plan: Continue per plan of care.  Progress treament per protocol.      Precautions: protocol; PROM until week 6  Access Code: [email or web address]  Progress note: 1/3  POC: 3/3    Manuals 11/21 11/26 12/3 12/9 12/11   Stretching with active release KB all  KB  KB KB KB   GH jt mobs Grade II-III Grade II-III Grade II-III Grade II-IV Grade II-IV                   Neuro Re-Ed        Rhythmic stab     :15x4   scap retraction        Shoulder rolls        Chin tucks                Shoulder iso Flex, ER, IR, abd, ext 5x Flex, ER, IR, abd, ext 5x Flex, ER, IR, abd, ext 5x Flex, ER, IR, abd, ext 5x Flex, ER, IR, abd, ext 5x   Ther Ex        pulleys Scap 10x Scap 10x Scap 10x Scap 10x Scap 10x   UT stretch        LS stretch        Table slides Flex 10x Flex, abd 10x nv Flex abd 10x Flex abd 10x2   pendulums        Supine flexion ROM 10x 10x 10x 10x 10x   Supine ER ROM 10x 10x 10x 10x 10x   Flexion rock back (standing) :05x10 :05x10 :05x10 :05x10 :05x10   Elbow PROM 15x 15x nv AROM 30x AROM 30x   Wrist AROM                        Wall walk 5x flex 5x flex 5x flex 5x flex 5x flex   Standing YTI 5x ea 5x ea 5x ea 5x 5x                                           Ther Activity                         Gait Training                        Modalities

## 2024-12-16 ENCOUNTER — APPOINTMENT (OUTPATIENT)
Dept: PHYSICAL THERAPY | Facility: CLINIC | Age: 69
End: 2024-12-16
Payer: COMMERCIAL

## 2024-12-18 ENCOUNTER — OFFICE VISIT (OUTPATIENT)
Dept: PHYSICAL THERAPY | Facility: CLINIC | Age: 69
End: 2024-12-18
Payer: COMMERCIAL

## 2024-12-18 DIAGNOSIS — Z98.890 S/P LEFT ROTATOR CUFF REPAIR: ICD-10-CM

## 2024-12-18 DIAGNOSIS — M24.812 INTERNAL DERANGEMENT OF LEFT SHOULDER: Primary | ICD-10-CM

## 2024-12-18 PROCEDURE — 97112 NEUROMUSCULAR REEDUCATION: CPT

## 2024-12-18 PROCEDURE — 97140 MANUAL THERAPY 1/> REGIONS: CPT

## 2024-12-18 PROCEDURE — 97110 THERAPEUTIC EXERCISES: CPT

## 2024-12-18 NOTE — PROGRESS NOTES
Daily Note     Today's date: 2024  Patient name: Jamee Shaikh  : 1955  MRN: [id number]  Referring provider: Abdullahi Cortez*  Dx:   Encounter Diagnosis     ICD-10-CM    1. Internal derangement of left shoulder  M24.812       2. S/P left rotator cuff repair  Z98.890           Start Time: 1020  Stop Time: 1058  Total time in clinic (min): 38 minutes    Subjective: Patient reports no change in status.      Objective: See treatment diary below      Assessment: Fair+ tolerance to AA/AROM exercises. Passive mobility continues to be limited secondary to muscle guarding and painful arc. Patient demonstrated fatigue post-tx and would benefit from continued PT to improve level of function.       Plan: Continue per POC. Increase reps/resistance as tolerated.      Precautions: protocol; PROM until week 6  Access Code: [email or web address]  Progress note: 1/3  POC: 3/3    Manuals 11/21 11/26 12/3 12/9 12/11 12/18   Stretching with active release KB all  KB  KB KB KB MS   GH jt mobs Grade II-III Grade II-III Grade II-III Grade II-IV Grade II-IV                      Neuro Re-Ed         Rhythmic stab     :15x4 :15x4   scap retraction         Shoulder rolls         Chin tucks                  Shoulder iso Flex, ER, IR, abd, ext 5x Flex, ER, IR, abd, ext 5x Flex, ER, IR, abd, ext 5x Flex, ER, IR, abd, ext 5x Flex, ER, IR, abd, ext 5x Flex, ER, IR, abd, ext 5x   Ther Ex         pulleys Scap 10x Scap 10x Scap 10x Scap 10x Scap 10x Scap 10x   UT stretch         LS stretch         Table slides Flex 10x Flex, abd 10x nv Flex abd 10x Flex abd 10x2 Flex abd 10x2   pendulums         Supine flexion ROM 10x 10x 10x 10x 10x 10x   Supine ER ROM 10x 10x 10x 10x 10x 10x   Flexion rock back (standing) :05x10 :05x10 :05x10 :05x10 :05x10 :05x10   Elbow PROM 15x 15x nv AROM 30x AROM 30x AROM 30x   Wrist AROM                           Wall walk 5x flex 5x flex 5x flex 5x flex 5x flex 5x flex   Standing YTI 5x ea 5x ea 5x ea 5x 5x 5x                                                 Ther Activity                           Gait Training                           Modalities

## 2024-12-19 NOTE — PROGRESS NOTES
"Name: Jamee Shaikh      : 1955      MRN: [id number]  Encounter Provider: Chilo Layton PA-C  Encounter Date: 2024   Encounter department: [facility] GENERAL SURGERY [place]  :  Assessment & Plan  Benign lipomatous neoplasm of skin and subcutaneous tissue of right arm  Presents with small lipoma x 3 of right forearm measuring 1, 1.5, and 2 cm in size. Amenable to excision in the office. Details of the procedure and risks reviewed including reaction to medications, bleeding, infection, recurrence. Informed consent obtained. Procedure completed x 3 with closure of skin using 4-0 Nylon simple interrupted sutures. Questions answered and follow-up arranged for suture removal in 2 weeks.           History of Present Illness   HPI  Jamee Shaikh is a 69 y.o. female who presents with three lipomas on right arm that she has been dealing with for a year, pt denies any drainage, pain, or redness/irritation. Pt states Dr. Kit Tavera removed a cyst on her left chest but the incision hasn't smoothen out. Pt states it red but denies any drainage or fevers/chills. Pt has allergies to Hydrocodone and denies any blood thinners. Amilia.O,MA  History obtained from: patient    Review of Systems   All other systems reviewed and are negative.    Past Medical History   Past Medical History:   Diagnosis Date    Anemia     Arthritis     Bilateral bunions     Chronic pain disorder     back and hip pain    Colon polyp     Disease of thyroid gland     nodules    History of COVID-19     mild s/s    History of gastroesophageal reflux (GERD)     \"had hiatal hernia surgery\"    History of heart murmur in childhood     Hyperlipidemia     Hypertension     Overactive bladder     Rotator cuff tear, right     had surgery    Vitamin D deficiency     unsure    Wears glasses     Wears partial dentures     upper     Past Surgical History:   Procedure Laterality Date     SECTION      Last Assessed: 2017    " COLONOSCOPY      ELBOW SURGERY      Last Assessed: 5/31/2017    ESOPHAGOGASTRODUODENOSCOPY N/A 12/18/2017    Procedure: ESOPHAGOGASTRODUODENOSCOPY (EGD);  Surgeon: Jolly Lin DO;  Location: [facility];  Service: Gastroenterology    HIP SURGERY Left 02/08/2018    glut medius/minimus repair  and 8/8/18--with pin implanted    HYSTERECTOMY      2011    JOINT REPLACEMENT      PARAESOPHAGEAL HERNIA REPAIR N/A 05/19/2020    Procedure: LAPAROSCOPIC PARAESOPHAGEAL HERNIA REPAIR WITH MESH AND NISSEN FUNDOPLICATION WITH ROBOTICS;  Surgeon: Darrin Leon MD;  Location: [facility];  Service: General    GA ARTHRP KNE CONDYLE&PLATU MEDIAL&LAT COMPARTMENTS Left 03/15/2023    Procedure: ARTHROPLASTY KNEE TOTAL;  Surgeon: Bart Potts DO;  Location: [facility];  Service: Orthopedics    GA ARTHRS KNE SURG W/MENISCECTOMY MED/LAT W/SHVG Left 06/15/2022    Procedure: ;left knee arthroscopy, meniscectomy,synevectomy,chondroplasty, loose body removal, injection;  Surgeon: Bart Potts DO;  Location: [facility];  Service: Orthopedics    GA COLONOSCOPY FLX DX W/COLLJ SPEC WHEN PFRMD N/A 06/26/2017    Procedure: EGD AND COLONOSCOPY;  Surgeon: Jolly Lin DO;  Location: [facility];  Service: Gastroenterology    GA DIAGNOSTIC ARTHROSCOPY SHOULDER +- SYNOVIAL BX Left 9/18/2024    Procedure: DIAGNOSTIC ARTHROSCOPY SHOULDER POSSIBLE REVISION ROTATOR CUFF REPAIR, EXTENSIVE DEBRIDEMENT;  Surgeon: Abdullahi Cortez MD;  [facility];  Service: Orthopedics    GA EXCISON TUMOR SOFT TISSUE THIGH/KNEE SUBQ 3 CM/> Bilateral 05/26/2022    Procedure: EXCISION BIOPSY TISSUE LESION/MASS LOWER EXTREMITY;  Surgeon: Kit Tavera MD;  Location: [facility];  Service: General    GA EXCISON TUMOR SOFT TISSUE THIGH/KNEE SUBQ 3 CM/> Bilateral 08/25/2022    Procedure: EXCISION BIOPSY TISSUE LESION/MASS LOWER EXTREMITY;  Surgeon: Kit Tavera MD;  [facility]: [facility];  Service: General    GA OSTECTOMY PRTL  5TH METAR HEAD SPX Bilateral 08/15/2023    Procedure: BUNIONECTOMY TAILOR WITHOUT OSTEOTOMY;  Surgeon: Monet Pradhan DPM;  Location: [facility];  Service: Podiatry    MN SURGICAL ARTHROSCOPY JENNIFER W/CORACOACRM SARATHM RLS Left 12/13/2023    Procedure: Left - ARTHROSCOPY SHOULDER  Synovectomy Debridement/chondroplasty Acromioplasty Arthroscopic rotator cuff repair Post arthroscopic injection of intra-articular joint space and peripheral portals;  Surgeon: Bart Potts DO;  Location: [facility];  Service: Orthopedics    SHOULDER ARTHROCENTESIS      SHOULDER ARTHROSCOPY Right 06/23/2021    Procedure: SHOULDER ARTHROSCOPY WITH acromioplasty, debridment, and ROTATOR CUFF REPAIR;  Surgeon: Bart Potts DO;  Location:  MAIN OR;  Service: Orthopedics    TUBAL LIGATION       Family History   Problem Relation Age of Onset    Colon cancer Mother     Heart attack Father     Other Father         Cardiac Disorder    Diabetes type II Father     Colon cancer Sister     No Known Problems Sister     No Known Problems Sister     No Known Problems Sister     No Known Problems Sister     No Known Problems Daughter     No Known Problems Maternal Aunt     No Known Problems Maternal Aunt     No Known Problems Maternal Aunt     Cancer Maternal Grandmother     No Known Problems Paternal Grandmother       reports that she quit smoking about 33 years ago. Her smoking use included cigarettes. She started smoking about 64 years ago. She has a 45 pack-year smoking history. She has never used smokeless tobacco. She reports that she does not drink alcohol and does not use drugs.  Current Outpatient Medications on File Prior to Visit   Medication Sig Dispense Refill    amLODIPine (NORVASC) 5 mg tablet Take 2 tablets (10 mg total) by mouth daily 180 tablet 1    atorvastatin (LIPITOR) 10 mg tablet Take 1 tablet (10 mg total) by mouth daily 90 tablet 1    famotidine (PEPCID) 20 mg tablet Take 1 tablet (20 mg total) by mouth 2 (two) times a day 180  "tablet 1    hydrOXYzine HCL (ATARAX) 25 mg tablet take 1 tablet by mouth every 6 hours if needed for itching 180 tablet 1    losartan (COZAAR) 100 MG tablet Take 1 tablet (100 mg total) by mouth daily 90 tablet 1    meloxicam (Mobic) 15 mg tablet Take 1 tablet (15 mg total) by mouth daily 30 tablet 0    valACYclovir (VALTREX) 1,000 mg tablet take 1 tablet by mouth twice a day for 3 days 180 tablet 1     No current facility-administered medications on file prior to visit.     Allergies   Allergen Reactions    Hydrocodone Irritability         Objective   /70 (BP Location: Left arm, Patient Position: Sitting, Cuff Size: Standard)   Pulse 87   Temp 97.9 °F (36.6 °C) (Temporal)   Ht 5' 3\" (1.6 m)   Wt 75.3 kg (166 lb)   SpO2 99%   BMI 29.41 kg/m²      Physical Exam  Vitals and nursing note reviewed.   Constitutional:       General: She is not in acute distress.     Appearance: She is well-developed. She is not diaphoretic.   HENT:      Head: Normocephalic and atraumatic.   Eyes:      Conjunctiva/sclera: Conjunctivae normal.      Pupils: Pupils are equal, round, and reactive to light.   Pulmonary:      Effort: No respiratory distress.   Musculoskeletal:         General: Normal range of motion.      Cervical back: Normal range of motion.   Skin:     General: Skin is warm and dry.      Capillary Refill: Capillary refill takes less than 2 seconds.      Comments: Small lipoma x 3 of right forearm.   Neurological:      Mental Status: She is alert and oriented to person, place, and time.   Psychiatric:         Behavior: Behavior normal.       Lipoma removal    Date/Time: 12/20/2024 8:30 AM    Performed by: Chilo Layton PA-C  Authorized by: Chilo Layton PA-C  [facility] Protocol:  procedure performed by consultantConsent: Verbal consent obtained.  Risks and benefits: risks, benefits and alternatives were discussed  Consent given by: patient  Patient understanding: patient states understanding of " the procedure being performed  Patient identity confirmed: verbally with patient    Procedure Details - Skin Excision:     Number of Lesions:  3  Lesion 1:     Body area:  Upper extremity    Upper extremity location:  R lower arm    Malignancy: benign lesion            Final defect size (mm):  15    Repair type:  Linear closure    Closure complexity: simple    Lesion 2:     Body area:  Upper extremity    Upper extremity location:  R lower arm                                                        Malignancy: benign lesion        Final defect size (mm):  20    Repair type:  Linear closure    Closure complexity: simple    Lesion 3:     Body area:  Upper extremity    Upper extremity location:  R lower arm    Malignancy: benign lesion            Final defect size (mm):  25    Repair type:  Linear closure    Closure complexity: simple

## 2024-12-20 ENCOUNTER — OFFICE VISIT (OUTPATIENT)
Dept: SURGERY | Facility: CLINIC | Age: 69
End: 2024-12-20
Payer: COMMERCIAL

## 2024-12-20 VITALS
BODY MASS INDEX: 29.41 KG/M2 | HEART RATE: 87 BPM | WEIGHT: 166 LBS | SYSTOLIC BLOOD PRESSURE: 120 MMHG | DIASTOLIC BLOOD PRESSURE: 70 MMHG | TEMPERATURE: 97.9 F | HEIGHT: 63 IN | OXYGEN SATURATION: 99 %

## 2024-12-20 DIAGNOSIS — D17.21 BENIGN LIPOMATOUS NEOPLASM OF SKIN AND SUBCUTANEOUS TISSUE OF RIGHT ARM: Primary | ICD-10-CM

## 2024-12-20 PROCEDURE — 88304 TISSUE EXAM BY PATHOLOGIST: CPT | Performed by: PATHOLOGY

## 2024-12-20 PROCEDURE — 11403 EXC TR-EXT B9+MARG 2.1-3CM: CPT | Performed by: PHYSICIAN ASSISTANT

## 2024-12-20 PROCEDURE — 99202 OFFICE O/P NEW SF 15 MIN: CPT | Performed by: PHYSICIAN ASSISTANT

## 2024-12-20 PROCEDURE — 11402 EXC TR-EXT B9+MARG 1.1-2 CM: CPT | Performed by: PHYSICIAN ASSISTANT

## 2024-12-20 NOTE — ASSESSMENT & PLAN NOTE
Presents with small lipoma x 3 of right forearm measuring 1, 1.5, and 2 cm in size. Amenable to excision in the office. Details of the procedure and risks reviewed including reaction to medications, bleeding, infection, recurrence. Informed consent obtained. Procedure completed x 3 with closure of skin using 4-0 Nylon simple interrupted sutures. Questions answered and follow-up arranged for suture removal in 2 weeks.

## 2024-12-23 ENCOUNTER — OFFICE VISIT (OUTPATIENT)
Dept: PHYSICAL THERAPY | Facility: CLINIC | Age: 69
End: 2024-12-23
Payer: COMMERCIAL

## 2024-12-23 DIAGNOSIS — Z98.890 S/P LEFT ROTATOR CUFF REPAIR: ICD-10-CM

## 2024-12-23 DIAGNOSIS — M24.812 INTERNAL DERANGEMENT OF LEFT SHOULDER: Primary | ICD-10-CM

## 2024-12-23 PROCEDURE — 97110 THERAPEUTIC EXERCISES: CPT | Performed by: PHYSICAL THERAPIST

## 2024-12-23 PROCEDURE — 97112 NEUROMUSCULAR REEDUCATION: CPT | Performed by: PHYSICAL THERAPIST

## 2024-12-23 PROCEDURE — 97140 MANUAL THERAPY 1/> REGIONS: CPT | Performed by: PHYSICAL THERAPIST

## 2024-12-23 NOTE — PROGRESS NOTES
Daily Note     Today's date: 2024  Patient name: Jamee Shaikh  : 1955  MRN: [id number]  Referring provider: Abdullahi Cortez*  Dx:   Encounter Diagnosis     ICD-10-CM    1. Internal derangement of left shoulder  M24.812       2. S/P left rotator cuff repair  Z98.890           Start Time: 0945  Stop Time: 1023  Total time in clinic (min): 38 minutes    Subjective: Patient states minimal change in pain over the past week. Still with shoulder pain at anterior shoulder.       Objective: See treatment diary below      Assessment: Tolerated treatment well. Patient is currently 13 weeks s/p. Progressed program appropriately today with some increases in pain. Patient exhibited good technique with therapeutic exercises and would benefit from continued PT working on continued progression towards goals within protocol.       Plan: Continue per plan of care.  Progress treatment as tolerated.       Precautions: protocol; PROM until week 6  Access Code: [email or web address]  Progress note: 1/3  POC: 3/3    Manuals 12/23  12/3 12/9 12/11 12/18   Stretching with active release NARESH INFANTE MS   GH jt mobs Grade II-IV  Grade II-III Grade II-IV Grade II-IV                      Neuro Re-Ed         Rhythmic stab     :15x4 :15x4   scap retraction         Shoulder rolls         Chin tucks         MTP Red 10x        Shoulder iso prn  Flex, ER, IR, abd, ext 5x Flex, ER, IR, abd, ext 5x Flex, ER, IR, abd, ext 5x Flex, ER, IR, abd, ext 5x   Ther Ex         pulleys Scap 10x  Scap 10x Scap 10x Scap 10x Scap 10x   UT stretch         LS stretch         Table slides Flex, abd 10x2  nv Flex abd 10x Flex abd 10x2 Flex abd 10x2   pendulums         Supine flexion ROM prn  10x 10x 10x 10x   Supine ER ROM prn  10x 10x 10x 10x   Flexion rock back (standing) :05x10  :05x10 :05x10 :05x10 :05x10   Elbow PROM AROM 30x  nv AROM 30x AROM 30x AROM 30x   Wrist AROM                           Wall walk 5x flex  5x flex 5x flex 5x flex 5x flex    Standing YTI 5x ea  5x ea 5x 5x 5x   TB IR/ER Yelllow 10x        Reactive TB ER Red 10x        AAROM  - scap  - ext  - IR   -10x  -10x  -10x        AROM  - flex  -scap  -abd to 90                  Ther Activity                           Gait Training                           Modalities

## 2024-12-24 PROCEDURE — 88304 TISSUE EXAM BY PATHOLOGIST: CPT | Performed by: PATHOLOGY

## 2024-12-30 ENCOUNTER — OFFICE VISIT (OUTPATIENT)
Dept: PHYSICAL THERAPY | Facility: CLINIC | Age: 69
End: 2024-12-30
Payer: COMMERCIAL

## 2024-12-30 DIAGNOSIS — Z98.890 S/P LEFT ROTATOR CUFF REPAIR: ICD-10-CM

## 2024-12-30 DIAGNOSIS — M24.812 INTERNAL DERANGEMENT OF LEFT SHOULDER: Primary | ICD-10-CM

## 2024-12-30 PROCEDURE — 97140 MANUAL THERAPY 1/> REGIONS: CPT | Performed by: PHYSICAL THERAPIST

## 2024-12-30 PROCEDURE — 97110 THERAPEUTIC EXERCISES: CPT | Performed by: PHYSICAL THERAPIST

## 2024-12-30 PROCEDURE — 97112 NEUROMUSCULAR REEDUCATION: CPT | Performed by: PHYSICAL THERAPIST

## 2024-12-30 NOTE — PROGRESS NOTES
Daily Note     Today's date: 2024  Patient name: Jamee Shaikh  : 1955  MRN: [id number]  Referring provider: Abdullahi Cortez*  Dx:   Encounter Diagnosis     ICD-10-CM    1. Internal derangement of left shoulder  M24.812       2. S/P left rotator cuff repair  Z98.890           Start Time: 0945  Stop Time: 1030  Total time in clinic (min): 45 minutes    Subjective: Patient states continued pain in proximal LUE into shoulder limiting function. Has been attempting to use arm more when able unless restricted by pain.      Objective: See treatment diary below      Assessment: Tolerated treatment well. Continued to increase active left shoulder strength as noted in home exercise program. Minimal increased c/o pain with exercises today. Does continue to display guarding with passive range of motion in MT. Patient exhibited good technique with therapeutic exercises and would benefit from continued PT      Plan: Continue per plan of care.  Progress treatment as tolerated.       Precautions: protocol; PROM until week 6  Access Code: [email or web address]  Progress note: 1/3  POC: 3/3    Manuals 12/23 12/30 12/3 12/9 12/11 12/18   Stretching with active release KB KB  KB KB KB MS   GH jt mobs Grade II-IV Grade II-IV Grade II-III Grade II-IV Grade II-IV                      Neuro Re-Ed         Rhythmic stab     :15x4 :15x4   scap retraction         Shoulder rolls         Chin tucks         MTP Red 10x Red 10x       Shoulder iso prn  Flex, ER, IR, abd, ext 5x Flex, ER, IR, abd, ext 5x Flex, ER, IR, abd, ext 5x Flex, ER, IR, abd, ext 5x   Ther Ex         pulleys Scap 10x Scap 10x Scap 10x Scap 10x Scap 10x Scap 10x   UT stretch         LS stretch         Table slides Flex, abd 10x2 Flex, abd 10x2 nv Flex abd 10x Flex abd 10x2 Flex abd 10x2   pendulums         Supine flexion ROM prn  10x 10x 10x 10x   Supine ER ROM prn  10x 10x 10x 10x   Flexion rock back (standing) :05x10 :05x10 :05x10 :05x10 :05x10 :05x10   Elbow PROM  AROM 30x AROM 30x 1# nv AROM 30x AROM 30x AROM 30x   Wrist AROM                           Wall walk 5x flex 5x flex 5x flex 5x flex 5x flex 5x flex   Standing YTI 5x ea 5x ea 5x ea 5x 5x 5x   TB IR/ER Yelllow 10x Yellow 10x ea       Reactive TB ER Red 10x        AAROM  - scap  - ext  - IR   -10x  -10x  -10x   -10x  -10x  -10x       AROM  - flex  -scap  -abd to 90      -10x  -5x                Ther Activity                           Gait Training                           Modalities

## 2025-01-02 NOTE — PROGRESS NOTES
"Name: Jamee Shaikh      : 1955      MRN: [id number]  Encounter Provider: Chilo Layton PA-C  Encounter Date: 1/3/2025   Encounter department: [facility] GENERAL SURGERY [place]  :  Assessment & Plan  Benign lipomatous neoplasm of skin and subcutaneous tissue of right arm  Doing well after excision of lipoma x 3 from the right forearm.  Pathology benign.  Incisions healing well sutures removed and Band-Aid applied.  Will plan to see back on as-needed basis.             History of Present Illness   HPI  Jamee Shaikh is a 69 y.o. female who presents for suture removal, s/p exc of lipomas on right arm x 3 24. Pt states the incisions is healed and denies any drainage, pain or fevers/chills. [email or web address]  History obtained from: patient    Review of Systems   All other systems reviewed and are negative.    Past Medical History   Past Medical History:   Diagnosis Date    Anemia     Arthritis     Bilateral bunions     Chronic pain disorder     back and hip pain    Colon polyp     Disease of thyroid gland     nodules    History of COVID-2020    mild s/s    History of gastroesophageal reflux (GERD)     \"had hiatal hernia surgery\"    History of heart murmur in childhood     Hyperlipidemia     Hypertension     Overactive bladder     Rotator cuff tear, right     had surgery    Vitamin D deficiency     unsure    Wears glasses     Wears partial dentures     upper     Past Surgical History:   Procedure Laterality Date     SECTION      Last Assessed: 2017    COLONOSCOPY      ELBOW SURGERY      Last Assessed: 2017    ESOPHAGOGASTRODUODENOSCOPY N/A 2017    Procedure: ESOPHAGOGASTRODUODENOSCOPY (EGD);  Surgeon: Jolly Lin DO;  Location: [facility];  Service: Gastroenterology    HIP SURGERY Left 2018    glut medius/minimus repair  and 18--with pin implanted    HYSTERECTOMY          JOINT REPLACEMENT      PARAESOPHAGEAL HERNIA REPAIR N/A 2020    " Procedure: LAPAROSCOPIC PARAESOPHAGEAL HERNIA REPAIR WITH MESH AND NISSEN FUNDOPLICATION WITH ROBOTICS;  Surgeon: Darrin Leon MD;  Location: [facility];  Service: General    MT ARTHRP KNE CONDYLE&PLATU MEDIAL&LAT COMPARTMENTS Left 03/15/2023    Procedure: ARTHROPLASTY KNEE TOTAL;  Surgeon: Bart Potts DO;  [facility]: [facility];  Service: Orthopedics    MT ARTHRS KNE SURG W/MENISCECTOMY MED/LAT W/SHVG Left 06/15/2022    Procedure: ;left knee arthroscopy, meniscectomy,synevectomy,chondroplasty, loose body removal, injection;  Surgeon: Bart Potts DO;  [facility];  Service: Orthopedics    MT COLONOSCOPY FLX DX W/COLLJ SPEC WHEN PFRMD N/A 06/26/2017    Procedure: EGD AND COLONOSCOPY;  Surgeon: Jolly Lin DO;  Location: [facility];  Service: Gastroenterology    MT DIAGNOSTIC ARTHROSCOPY SHOULDER +- SYNOVIAL BX Left 9/18/2024    Procedure: DIAGNOSTIC ARTHROSCOPY SHOULDER POSSIBLE REVISION ROTATOR CUFF REPAIR, EXTENSIVE DEBRIDEMENT;  Surgeon: Abdullahi Cortez MD;  [facility];  Service: Orthopedics    MT EXCISON TUMOR SOFT TISSUE THIGH/KNEE SUBQ 3 CM/> Bilateral 05/26/2022    Procedure: EXCISION BIOPSY TISSUE LESION/MASS LOWER EXTREMITY;  Surgeon: Kit Tavera MD;  [facility];  Service: General    MT EXCISON TUMOR SOFT TISSUE THIGH/KNEE SUBQ 3 CM/> Bilateral 08/25/2022    Procedure: EXCISION BIOPSY TISSUE LESION/MASS LOWER EXTREMITY;  Surgeon: Kit Tavera MD;  [facility];  Service: General    MT OSTECTOMY PRTL 5TH METAR HEAD SPX Bilateral 08/15/2023    Procedure: BUNIONECTOMY TAILOR WITHOUT OSTEOTOMY;  Surgeon: Monet Pradhan DPM;  [facility];  Service: Podiatry    MT SURGICAL ARTHROSCOPY JENNIFER W/CORACOACRM LIGM RLS Left 12/13/2023    Procedure: Left - ARTHROSCOPY SHOULDER  Synovectomy Debridement/chondroplasty Acromioplasty Arthroscopic rotator cuff repair Post arthroscopic injection of intra-articular joint space and  peripheral portals;  Surgeon: Bart Potts DO;  [facility];  Service: Orthopedics    SHOULDER ARTHROCENTESIS      SHOULDER ARTHROSCOPY Right 06/23/2021    Procedure: SHOULDER ARTHROSCOPY WITH acromioplasty, debridment, and ROTATOR CUFF REPAIR;  Surgeon: Bart Potts DO;  Location:  MAIN OR;  Service: Orthopedics    TUBAL LIGATION       Family History   Problem Relation Age of Onset    Colon cancer Mother     Heart attack Father     Other Father         Cardiac Disorder    Diabetes type II Father     Colon cancer Sister     No Known Problems Sister     No Known Problems Sister     No Known Problems Sister     No Known Problems Sister     No Known Problems Daughter     No Known Problems Maternal Aunt     No Known Problems Maternal Aunt     No Known Problems Maternal Aunt     Cancer Maternal Grandmother     No Known Problems Paternal Grandmother       reports that she quit smoking about 34 years ago. Her smoking use included cigarettes. She started smoking about 64 years ago. She has a 45 pack-year smoking history. She has never used smokeless tobacco. She reports that she does not drink alcohol and does not use drugs.  Current Outpatient Medications on File Prior to Visit   Medication Sig Dispense Refill    amLODIPine (NORVASC) 5 mg tablet Take 2 tablets (10 mg total) by mouth daily 180 tablet 1    atorvastatin (LIPITOR) 10 mg tablet Take 1 tablet (10 mg total) by mouth daily 90 tablet 1    famotidine (PEPCID) 20 mg tablet Take 1 tablet (20 mg total) by mouth 2 (two) times a day 180 tablet 1    hydrOXYzine HCL (ATARAX) 25 mg tablet take 1 tablet by mouth every 6 hours if needed for itching 180 tablet 1    losartan (COZAAR) 100 MG tablet Take 1 tablet (100 mg total) by mouth daily 90 tablet 1    meloxicam (Mobic) 15 mg tablet Take 1 tablet (15 mg total) by mouth daily 30 tablet 0    valACYclovir (VALTREX) 1,000 mg tablet take 1 tablet by mouth twice a day for 3 days 180 tablet 1     No current  facility-administered medications on file prior to visit.     Allergies   Allergen Reactions    Hydrocodone Irritability         Objective   Temp 98.5 °F (36.9 °C) (Temporal)      Physical Exam  Vitals and nursing note reviewed.   Constitutional:       General: She is not in acute distress.     Appearance: She is well-developed. She is not diaphoretic.   HENT:      Head: Normocephalic and atraumatic.   Eyes:      Conjunctiva/sclera: Conjunctivae normal.      Pupils: Pupils are equal, round, and reactive to light.   Pulmonary:      Effort: No respiratory distress.   Musculoskeletal:         General: Normal range of motion.      Cervical back: Normal range of motion.   Skin:     General: Skin is warm and dry.      Capillary Refill: Capillary refill takes less than 2 seconds.      Comments: Incision clean dry and intact x 3.   Neurological:      Mental Status: She is alert and oriented to person, place, and time.   Psychiatric:         Behavior: Behavior normal.

## 2025-01-03 ENCOUNTER — OFFICE VISIT (OUTPATIENT)
Dept: SURGERY | Facility: CLINIC | Age: 70
End: 2025-01-03

## 2025-01-03 VITALS — TEMPERATURE: 98.5 F

## 2025-01-03 DIAGNOSIS — D17.21 BENIGN LIPOMATOUS NEOPLASM OF SKIN AND SUBCUTANEOUS TISSUE OF RIGHT ARM: Primary | ICD-10-CM

## 2025-01-03 PROCEDURE — 99024 POSTOP FOLLOW-UP VISIT: CPT | Performed by: PHYSICIAN ASSISTANT

## 2025-01-03 NOTE — ASSESSMENT & PLAN NOTE
Doing well after excision of lipoma x 3 from the right forearm.  Pathology benign.  Incisions healing well sutures removed and Band-Aid applied.  Will plan to see back on as-needed basis.

## 2025-01-07 ENCOUNTER — HOSPITAL ENCOUNTER (OUTPATIENT)
Dept: RADIOLOGY | Facility: HOSPITAL | Age: 70
Discharge: HOME | End: 2025-01-07
Payer: COMMERCIAL

## 2025-01-07 VITALS
HEART RATE: 69 BPM | SYSTOLIC BLOOD PRESSURE: 135 MMHG | TEMPERATURE: 97 F | RESPIRATION RATE: 18 BRPM | DIASTOLIC BLOOD PRESSURE: 79 MMHG | OXYGEN SATURATION: 97 %

## 2025-01-07 DIAGNOSIS — M46.1 SACROILIITIS (HCC): ICD-10-CM

## 2025-01-07 DIAGNOSIS — G89.4 CHRONIC PAIN SYNDROME: ICD-10-CM

## 2025-01-07 PROCEDURE — 27096 INJECT SACROILIAC JOINT: CPT | Performed by: ANESTHESIOLOGY

## 2025-01-07 RX ORDER — ROPIVACAINE HYDROCHLORIDE 2 MG/ML
1 INJECTION, SOLUTION EPIDURAL; INFILTRATION; PERINEURAL ONCE
Status: COMPLETED | OUTPATIENT
Start: 2025-01-07 | End: 2025-01-07

## 2025-01-07 RX ORDER — METHYLPREDNISOLONE ACETATE 40 MG/ML
40 INJECTION, SUSPENSION INTRA-ARTICULAR; INTRALESIONAL; INTRAMUSCULAR; PARENTERAL; SOFT TISSUE ONCE
Status: COMPLETED | OUTPATIENT
Start: 2025-01-07 | End: 2025-01-07

## 2025-01-07 RX ADMIN — METHYLPREDNISOLONE ACETATE 40 MG: 40 INJECTION, SUSPENSION INTRA-ARTICULAR; INTRALESIONAL; INTRAMUSCULAR; PARENTERAL; SOFT TISSUE at 10:50

## 2025-01-07 RX ADMIN — ROPIVACAINE HYDROCHLORIDE 1 ML: 2 INJECTION, SOLUTION EPIDURAL; INFILTRATION; PERINEURAL at 10:50

## 2025-01-07 RX ADMIN — IOHEXOL 1 ML: 300 INJECTION, SOLUTION INTRAVENOUS at 10:50

## 2025-01-07 NOTE — H&P
Assessment:  1. Sacroiliitis (HCC)  FL spine and pain procedure    FL spine and pain procedure      2. Chronic pain syndrome  FL spine and pain procedure    FL spine and pain procedure          Plan:  Jamee Shaikh is a 69 y.o. female with complaints of low back and left hip pain presents to surgical center for procedure.  We will perform a left sacroiliac joint steroid injection  2. Follow-up 1 month after injection    Complete risks and benefits including bleeding, infection, tissue reaction, nerve injury and allergic reaction were discussed. The approach was demonstrated using models and literature was provided. Verbal and written consent was obtained.    My impressions and treatment recommendations were discussed in detail with the patient who verbalized understanding and had no further questions.  Discharge instructions were provided. I personally saw and examined the patient and I agree with the above discussed plan of care.    Orders Placed This Encounter   Procedures    FL spine and pain procedure     Left sacroiliac joint steroid injection     Standing Status:   Standing     Number of Occurrences:   1     Reason for Exam::   Sacroiliitis     Anticoagulant hold needed?:   No    Discharge Diet     Diet Type::   Regular    Activity as tolerated    Remove dressing in 24 hours    Call provider for:  persistent nausea or vomiting    Call provider for:  severe uncontrolled pain    Call provider for:  redness, tenderness, or signs of infection (pain, swelling, redness, odor or green/yellow discharge around incision site)    Call provider for: active or persistent bleeding    Call provider for:  difficulty breathing, headache or visual disturbances    Call provider for:  hives    Call provider for:  persistent dizziness or light-headedness    Call provider for:  extreme fatigue    Discharge patient     Standing Status:   Standing     Number of Occurrences:   1     Is this a planned readmission (within 30 days)?:    No     New Medications Ordered This Visit   Medications    iohexol (OMNIPAQUE) 300 mg/mL injection 1 mL    methylPREDNISolone acetate (DEPO-MEDROL) injection 40 mg    ropivacaine (NAROPIN) injection 1 mL       History of Present Illness:  Jamee Shaikh is a 69 y.o. female who presents for a follow up office visit in regards to low back and left hip pain.   The patient’s current symptoms include 8 out of 10 sharp and stabbing from pain time particular timeframe.      I have personally reviewed and/or updated the patient's past medical history, past surgical history, family history, social history, current medications, allergies, and vital signs today.     Review of Systems   Musculoskeletal:  Positive for arthralgias and back pain.   All other systems reviewed and are negative.      Patient Active Problem List   Diagnosis    OAB (overactive bladder)    Vitamin D deficiency    Essential hypertension    Hyperlipidemia    Sacroiliitis (HCC)    Myofascial pain    Mixed incontinence    Primary osteoarthritis of both knees    Chronic pain of left knee    BMI 32.0-32.9,adult    GERD (gastroesophageal reflux disease)    Dysphagia    Pain of upper abdomen    Paraesophageal hernia    Gastroesophageal reflux disease    Heartburn    Anti-TPO antibodies present    Multinodular goiter    Nausea    Esophageal obstruction due to food impaction    Benign lipomatous neoplasm of skin and subcutaneous tissue of right arm    Tear of right rotator cuff    Rupture of gluteus minimus tendon, left, subsequent encounter    Tendinopathy of left gluteus medius    Family history of colon cancer    Bloating    Chronic pain syndrome    Lumbar radiculopathy    Left buttock pain    Piriformis muscle pain    Lipoma of thigh    Flatulence    Tear of medial meniscus of left knee, current    Tendinopathy of right gluteus medius    Primary osteoarthritis of left knee    Insomnia    Status post total left knee replacement    Dermatofibroma of female  "breast    S/P left rotator cuff repair    Inverted nipple    Rupture of left long head biceps tendon    Internal derangement of left shoulder       Past Medical History:   Diagnosis Date    Anemia     Arthritis     Bilateral bunions     Chronic pain disorder     back and hip pain    Colon polyp     Disease of thyroid gland     nodules    History of COVID-2020    mild s/s    History of gastroesophageal reflux (GERD)     \"had hiatal hernia surgery\"    History of heart murmur in childhood     Hyperlipidemia     Hypertension     Overactive bladder     Rotator cuff tear, right     had surgery    Vitamin D deficiency     unsure    Wears glasses     Wears partial dentures     upper       Past Surgical History:   Procedure Laterality Date     SECTION      Last Assessed: 2017    COLONOSCOPY      ELBOW SURGERY      Last Assessed: 2017    ESOPHAGOGASTRODUODENOSCOPY N/A 2017    Procedure: ESOPHAGOGASTRODUODENOSCOPY (EGD);  Surgeon: Jolly Lin DO;  Location: [facility];  Service: Gastroenterology    HIP SURGERY Left 2018    glut medius/minimus repair  and 18--with pin implanted    HYSTERECTOMY          JOINT REPLACEMENT      PARAESOPHAGEAL HERNIA REPAIR N/A 2020    Procedure: LAPAROSCOPIC PARAESOPHAGEAL HERNIA REPAIR WITH MESH AND NISSEN FUNDOPLICATION WITH ROBOTICS;  Surgeon: Darrin Leon MD;  [facility];  Service: General    [place] ARTHRP KNE CONDYLE&PLATU MEDIAL&LAT COMPARTMENTS Left 03/15/2023    Procedure: ARTHROPLASTY KNEE TOTAL;  Surgeon: Bart Potts DO;  [facility];  Service: Orthopedics    [place] ARTHRS KNE SURG W/MENISCECTOMY MED/LAT W/SHVG Left 06/15/2022    Procedure: ;left knee arthroscopy, meniscectomy,synevectomy,chondroplasty, loose body removal, injection;  Surgeon: Bart Potts DO;  [facility];  Service: Orthopedics    [place] COLONOSCOPY FLX DX W/COLLJ SPEC WHEN PFRMD N/A 2017    Procedure: EGD AND COLONOSCOPY;  " Surgeon: Jolly Lin DO;  Location: [facility];  Service: Gastroenterology    NM DIAGNOSTIC ARTHROSCOPY SHOULDER +- SYNOVIAL BX Left 9/18/2024    Procedure: DIAGNOSTIC ARTHROSCOPY SHOULDER POSSIBLE REVISION ROTATOR CUFF REPAIR, EXTENSIVE DEBRIDEMENT;  Surgeon: Abdullahi Cortez MD;  [facility];  Service: Orthopedics    NM EXCISON TUMOR SOFT TISSUE THIGH/KNEE SUBQ 3 CM/> Bilateral 05/26/2022    Procedure: EXCISION BIOPSY TISSUE LESION/MASS LOWER EXTREMITY;  Surgeon: Kit Tavera MD;  [facility];  Service: General    NM EXCISON TUMOR SOFT TISSUE THIGH/KNEE SUBQ 3 CM/> Bilateral 08/25/2022    Procedure: EXCISION BIOPSY TISSUE LESION/MASS LOWER EXTREMITY;  Surgeon: Kit Tavera MD;  [facility];  Service: General    NM OSTECTOMY PRTL 5TH METAR HEAD SPX Bilateral 08/15/2023    Procedure: BUNIONECTOMY TAILOR WITHOUT OSTEOTOMY;  Surgeon: Monet Pradhan DPM;  [facility];  Service: Podiatry    NM SURGICAL ARTHROSCOPY JENNIFER W/CORACOACRM LIGM RLS Left 12/13/2023    Procedure: Left - ARTHROSCOPY SHOULDER  Synovectomy Debridement/chondroplasty Acromioplasty Arthroscopic rotator cuff repair Post arthroscopic injection of intra-articular joint space and peripheral portals;  Surgeon: Bart Potts DO;  [facility];  Service: Orthopedics    SHOULDER ARTHROCENTESIS      SHOULDER ARTHROSCOPY Right 06/23/2021    Procedure: SHOULDER ARTHROSCOPY WITH acromioplasty, debridment, and ROTATOR CUFF REPAIR;  Surgeon: Bart Potts DO;  Location:  MAIN OR;  Service: Orthopedics    TUBAL LIGATION         Family History   Problem Relation Age of Onset    Colon cancer Mother     Heart attack Father     Other Father         Cardiac Disorder    Diabetes type II Father     Colon cancer Sister     No Known Problems Sister     No Known Problems Sister     No Known Problems Sister     No Known Problems Sister     No Known Problems Daughter     No Known Problems Maternal Aunt      No Known Problems Maternal Aunt     No Known Problems Maternal Aunt     Cancer Maternal Grandmother     No Known Problems Paternal Grandmother        Social History     Occupational History    Not on file   Tobacco Use    Smoking status: Former     Current packs/day: 0.00     Average packs/day: 1.5 packs/day for 30.0 years (45.0 ttl pk-yrs)     Types: Cigarettes     Start date: 1961     Quit date: 1991     Years since quittin.0    Smokeless tobacco: Never    Tobacco comments:     quit 25 yrs ago   Vaping Use    Vaping status: Never Used   Substance and Sexual Activity    Alcohol use: Never    Drug use: Never    Sexual activity: Not Currently     Comment: defer       Current Outpatient Medications on File Prior to Encounter   Medication Sig    amLODIPine (NORVASC) 5 mg tablet Take 2 tablets (10 mg total) by mouth daily    atorvastatin (LIPITOR) 10 mg tablet Take 1 tablet (10 mg total) by mouth daily    famotidine (PEPCID) 20 mg tablet Take 1 tablet (20 mg total) by mouth 2 (two) times a day    hydrOXYzine HCL (ATARAX) 25 mg tablet take 1 tablet by mouth every 6 hours if needed for itching    losartan (COZAAR) 100 MG tablet Take 1 tablet (100 mg total) by mouth daily    meloxicam (Mobic) 15 mg tablet Take 1 tablet (15 mg total) by mouth daily    valACYclovir (VALTREX) 1,000 mg tablet take 1 tablet by mouth twice a day for 3 days     No current facility-administered medications on file prior to encounter.       Allergies   Allergen Reactions    Hydrocodone Irritability       Physical Exam:    /79 (BP Location: Right arm)   Pulse 69   Temp (!) 97 °F (36.1 °C) (Temporal)   Resp 18   SpO2 97%     Constitutional:normal, well developed, well nourished, alert, in no distress and non-toxic and no overt pain behavior.  Eyes:anicteric  HEENT:grossly intact  Neck:supple, symmetric, trachea midline and no masses   Pulmonary:even and unlabored  Cardiovascular:No edema or pitting edema  present  Skin:Normal without rashes or lesions and well hydrated  Psychiatric:Mood and affect appropriate  Neurologic:Cranial Nerves II-XII grossly intact  Musculoskeletal:normal

## 2025-01-07 NOTE — DISCHARGE INSTR - LAB

## 2025-01-08 ENCOUNTER — EVALUATION (OUTPATIENT)
Dept: PHYSICAL THERAPY | Facility: CLINIC | Age: 70
End: 2025-01-08
Payer: COMMERCIAL

## 2025-01-08 DIAGNOSIS — M24.812 INTERNAL DERANGEMENT OF LEFT SHOULDER: Primary | ICD-10-CM

## 2025-01-08 DIAGNOSIS — Z98.890 S/P LEFT ROTATOR CUFF REPAIR: ICD-10-CM

## 2025-01-08 PROCEDURE — 97110 THERAPEUTIC EXERCISES: CPT | Performed by: PHYSICAL THERAPIST

## 2025-01-08 PROCEDURE — 97112 NEUROMUSCULAR REEDUCATION: CPT | Performed by: PHYSICAL THERAPIST

## 2025-01-08 PROCEDURE — 97164 PT RE-EVAL EST PLAN CARE: CPT | Performed by: PHYSICAL THERAPIST

## 2025-01-08 PROCEDURE — 97140 MANUAL THERAPY 1/> REGIONS: CPT | Performed by: PHYSICAL THERAPIST

## 2025-01-08 NOTE — PROGRESS NOTES
PT Re-Evaluation     Today's date: 2025  Patient name: Jamee Shaikh  : 1955  MRN: [id number]  Referring provider: Abdullahi Cortez*  Dx:   Encounter Diagnosis     ICD-10-CM    1. Internal derangement of left shoulder  M24.812       2. S/P left rotator cuff repair  Z98.890           Start Time: 0815  Stop Time: 0855  Total time in clinic (min): 40 minutes    Assessment  Impairments: abnormal muscle firing, abnormal or restricted ROM, abnormal movement, activity intolerance, impaired physical strength, lacks appropriate home exercise program, pain with function, scapular dyskinesis, poor posture  and poor body mechanics    Assessment details: Jamee Shaikh was seen for an initial PT evaluation and 23f/u sessions. Patient is a 69 y.o. female with diagnosis of L rotator cuff repair and past medical history significant for  OA, chronic pain, thyroid disease, GERD, hiatal hernia, hyperlipidemia, HTN, rotator cuff tear with repair bilaterally, L buttock pain with history of surgery, lipomas with removal, lumbar radiculopathy, vertigo. Findings of examination today shows slowed gains with range of motion showing slight decline mostly due to pain and guarding. Some improvement in mm strength, but progression is slower than expected for this stage s/p. Patient was educated on findings and will continue for an additional 4 weeks until next re-evaluation working on above stated areas of deficit progressing as tolerated per protocol.         Goals  STG (6 weeks)  1. Patient's left shoulder flexion AROM will increase to 120 with 0/10 pain for increased ability to perform overhead ADLs. - PROGRESSING  2. Patient will have 0/10 pain in left shoulder at rest. - NOT MET  3. Patient's left shoulder strength will increase to 2+/5 for increased ability to lift. - MET 11/5  LTG (12 weeks)  1. Patient's UE AROM equal bilaterally for ability to complete hair hygiene, overhead, and behind the back ADLs. -  "PROGRESSING  2. Patient's UE strength will be equal bilaterally for ability to lift and carry at PLOF. - PROGRESSING  3. Patient will be independent with home exercise program for continued maintenance post PT discharge. - PROGRESSING      Plan  Patient would benefit from: skilled physical therapy  Planned modality interventions: unattended electrical stimulation, cryotherapy and thermotherapy: hydrocollator packs    Planned therapy interventions: neuromuscular re-education, manual therapy, therapeutic exercise, therapeutic activities, self care and home exercise program    Frequency: 2x week  Duration in weeks: 12  Plan of Care beginning date: 9/24/2024  Plan of Care expiration date: 3/3/2025  Treatment plan discussed with: patient and PTA  Plan details: Progress note in 4 weeks.         Subjective Evaluation    History of Present Illness  Date of surgery: 9/18/2024    Subjective 1/8: Patient states she continues to have pain in left shoulder making it difficult to lift/carry/reach. Went shopping over the weekend for a gown and had to do a lot of dressing which irritated her shoulder after. States she has been \"babying\" her shoulder even though she knows she shouldn't, but gets pain when she doesn't. Worst in am, thinks she is sleeping on it wrong. To f/u with ortho next week.     Subjective 12/3: States shoulder feels heavy. Pain is worst in the am, thinks she is sleeping wrong on it. Is out of the sling and using arm throughout the day although with limited movement and within 2lb lifting restriction. Feels progress is slow. F/u with ortho in January.    Subjective 11/5: Patient was able to DC sling last week at week 6 s/p without difficulty. Still struggling with overhead reaching. No issues with dressing. Has not returned to driving, but is planning on attempting soon. Does continue to have anterior left shoulder pain traveling into proximal anterior arm, increased with lifting arm away from body. To f/u with " "ortho .     Mechanism of injury: Jamee Shaikh is a 69 y.o. female who presents to outpatient Physical Therapy today with complaints of left shoulder pain. Initial surgery for L RC 23, new episode of pain after DC from PT in April. Attempted PT post injury with no significant improvement. Seen over the course of several weeks by orthos. 3rd opinion by Dr. Cortez who recommended revision of RC due to likely re-tear. Patient is not s/p L supraspinatus revision with debridement as of 24. Also with bicep tear which was debrided, repair not recommended. Patient is now to utilize sling for 6 weeks, may remove pillow after 2. F/u with ortho at the middle of November.     Patient Goals  Patient goal: \"Feel better and get arm working\". Would like to return to work.  Pain     11/5 12/3 1/8  Pain scale: discomfort.  5 6 7  At best pain ratin   5 6 6  At worst pain ratin (nerve block) 7 8 8  Location: L shoulder  Quality: dull ache  Relieving factors: support  Exacerbated by: LUE movement.    Social Support  Steps to enter house: yes  Stairs in house: yes   Lives in: multiple-level home  Lives with: spouse    Employment status: not working  Hand dominance: right          Objective     Postural Observations    Additional Postural Observation Details  Forward head. Wearing brace with adductor pillow  : forward head rounded shoulders, no brace    Observations   Left Shoulder   Positive for edema and incision.     Additional Observation Details  4 scope incisions, closed healing appropriately. No redness or drainage.  : healing appropriately, closed, min scar mobility restriction  12/3: min scar restriction  : WNL    Cervical/Thoracic Screen   Cervical range of motion within normal limits  Cervical range of motion within normal limits with the following exceptions: Min restriction of cervical extension    Neurological Testing     Sensation     Shoulder   Left Shoulder   Intact: light " touch    Right Shoulder   Intact: Light touch    General Comments:      Shoulder Comments       FOTO: 4% function, 54% predicted function   11/5: 46%  12/3: 38%  1/8: 35%        Shoulder - AROM IE 11/5 12/3 1/8   LEFT        Flexion NT 90 100 90   Abduction NT 65 80 80   External Rotation NT 40 50 45   Internal Rotation NT 60 60 65   RIGHT       Flexion 150      Abduction 150      External Rotation T2      Internal Rotation T11        Shoulder - PROM IE 11/5 12/3 1/8   LEFT        Flexion 15 90 110 70-guarding   Abduction 15 60 90 50-guarding   External Rotation -15 50 55 45-guarding   Internal Rotation To belly 75 75 65-guarding     Shoulder - MMT IE 11/5 12/3 1/8   LEFT        Flexion NT 2- 2- 2   Abduction NT 2- 2- 2   External Rotation NT 2+ 3- 3-   Internal Rotation NT 2+ 3- 3-   Bicep 2+ 3- 4 4   Tricep 2+ 3- 4 4   RIGHT       Flexion 4+      Abduction 4      External Rotation 4+      Internal Rotation 5      Bicep 5      Tricep 4                   Precautions: protocol; PROM until week 6  Access Code: [email or web address]  Progress note: 1/3  POC: 3/3    Manuals 12/23 12/30 1/8 12/9 12/11 12/18   Stretching with active release KB KB  KB KB KB MS   GH jt mobs Grade II-IV Grade II-IV Grade II-IV Grade II-IV Grade II-IV                      Neuro Re-Ed         Rhythmic stab     :15x4 :15x4   scap retraction         Shoulder rolls         Chin tucks         MTP Red 10x Red 10x       Shoulder iso prn   Flex, ER, IR, abd, ext 5x Flex, ER, IR, abd, ext 5x Flex, ER, IR, abd, ext 5x   Ther Ex         pulleys Scap 10x Scap 10x Scap 10x Scap 10x Scap 10x Scap 10x   UT stretch         LS stretch         Table slides Flex, abd 10x2 Flex, abd 10x2  Flex abd 10x Flex abd 10x2 Flex abd 10x2   pendulums         Supine flexion ROM prn   10x 10x 10x   Supine ER ROM prn   10x 10x 10x   Flexion rock back (standing) :05x10 :05x10  :05x10 :05x10 :05x10   Elbow PROM AROM 30x AROM 30x 1#  AROM 30x AROM 30x AROM 30x   Wrist AROM                            Wall walk 5x flex 5x flex  5x flex 5x flex 5x flex   Standing YTI 5x ea 5x ea  5x 5x 5x   TB IR/ER Yelllow 10x Yellow 10x ea       Reactive TB ER Red 10x  *      AAROM  - scap  - ext  - IR   -10x  -10x  -10x   -10x  -10x  -10x *      AROM  - flex  -scap  -abd to 90      -10x  -5x *               Ther Activity                           Gait Training                           Modalities

## 2025-01-13 ENCOUNTER — OFFICE VISIT (OUTPATIENT)
Dept: OBGYN CLINIC | Facility: OTHER | Age: 70
End: 2025-01-13
Payer: COMMERCIAL

## 2025-01-13 VITALS — HEIGHT: 63 IN | WEIGHT: 166 LBS | BODY MASS INDEX: 29.41 KG/M2

## 2025-01-13 DIAGNOSIS — Z98.890 S/P LEFT ROTATOR CUFF REPAIR: Primary | ICD-10-CM

## 2025-01-13 DIAGNOSIS — M19.012 OSTEOARTHRITIS OF GLENOHUMERAL JOINT, LEFT: ICD-10-CM

## 2025-01-13 DIAGNOSIS — M25.512 CHRONIC LEFT SHOULDER PAIN: ICD-10-CM

## 2025-01-13 DIAGNOSIS — G89.29 CHRONIC LEFT SHOULDER PAIN: ICD-10-CM

## 2025-01-13 PROCEDURE — 99213 OFFICE O/P EST LOW 20 MIN: CPT | Performed by: PHYSICIAN ASSISTANT

## 2025-01-13 NOTE — PROGRESS NOTES
Orthopaedic Surgery - Office Note  Jamee Shaikh (69 y.o. female)   : 1955   MRN: [id number]  Encounter Date: 2025    Chief Complaint   Patient presents with    Left Shoulder - Follow-up         Assessment/Plan  Diagnoses and all orders for this visit:    S/P left rotator cuff revision repair and subacromial decompression with debridement on 2024  -     MRI shoulder left wo contrast; Future    Chronic left shoulder pain  -     MRI shoulder left wo contrast; Future    Osteoarthritis of glenohumeral joint, left  -     MRI shoulder left wo contrast; Future    The diagnosis as well as treatment options were reviewed with the patient in the office today.  Due to the continued pain weakness and failure to progress in therapy while completing a dedicated home exercise program I would recommend getting an MRI to evaluate for the integrity of the rotator cuff revision repair.  I reviewed with the patient due to the amount of degenerative changes seen on the most recent surgery if there is a recurrent full-thickness tear consideration may be given towards a reverse total shoulder replacement-she may discuss this with surgical attending.    If repair is intact, she may just need more time in therapy with possible cortisone injection for symptomatic relief.  All question concerns were answered in the office today.  She will hold PT pending results of the MRI.  She will continue her home exercise program.     Return for Recheck with Dr. Cortez to discuss repeat mri left shoulder.        History of Present Illness  Patient is here for recheck of the left shoulder.  Patient underwent a revision rotator cuff repair with extensive debridement on 2024.  Patient notes continued pain at physical therapy without significant gains in motion or strength.  Patient localizes the pain to the lateral shoulder.  She denies any new injuries or trauma.  No paresthesias or neck discomfort are reported.    Review of  "Systems  Pertinent items are noted in HPI.  All other systems were reviewed and are negative.    Physical Exam  Ht 5' 3\" (1.6 m)   Wt 75.3 kg (166 lb)   BMI 29.41 kg/m²   Cons: Appears well.  No apparent distress.  Psych: Alert. Oriented x3.  Mood and affect normal.    On examination patient's left shoulder active forward flexion is to 100 degrees.  She has a positive drop arm test with pain and weakness.  Internal rotation and external rotation strength is decreased to 4 out of 5.  Internal rotation is to L5.  External Tatian is to 60 degrees.  She has no tenderness at the AC joint.  There are no skin breakdown lesions or signs of infection.          Studies Reviewed  Operative report, protocol, therapy notes, previous orthopedic notes were reviewed for today's visit    Procedures  No procedures today.    Medical, Surgical, Family, and Social History  The patient's medical history, family history, and social history, were reviewed and updated as appropriate.    Past Medical History:   Diagnosis Date    Anemia     Arthritis     Bilateral bunions     Chronic pain disorder     back and hip pain    Colon polyp     Disease of thyroid gland     nodules    History of COVID-19     mild s/s    History of gastroesophageal reflux (GERD)     \"had hiatal hernia surgery\"    History of heart murmur in childhood     Hyperlipidemia     Hypertension     Overactive bladder     Rotator cuff tear, right     had surgery    Vitamin D deficiency     unsure    Wears glasses     Wears partial dentures     upper       Past Surgical History:   Procedure Laterality Date     SECTION      Last Assessed: 2017    COLONOSCOPY      ELBOW SURGERY      Last Assessed: 2017    ESOPHAGOGASTRODUODENOSCOPY N/A 2017    Procedure: ESOPHAGOGASTRODUODENOSCOPY (EGD);  Surgeon: Jolly Lin DO;  Location: [facility];  Service: Gastroenterology    HIP SURGERY Left 2018    glut medius/minimus repair  and 18--with pin " implanted    HYSTERECTOMY      2011    JOINT REPLACEMENT      PARAESOPHAGEAL HERNIA REPAIR N/A 05/19/2020    Procedure: LAPAROSCOPIC PARAESOPHAGEAL HERNIA REPAIR WITH MESH AND NISSEN FUNDOPLICATION WITH ROBOTICS;  Surgeon: Darrin Leon MD;  Location: [facility];  Service: General    AZ ARTHRP KNE CONDYLE&PLATU MEDIAL&LAT COMPARTMENTS Left 03/15/2023    Procedure: ARTHROPLASTY KNEE TOTAL;  Surgeon: Bart Potts DO;  Location: [facility];  Service: Orthopedics    AZ ARTHRS KNE SURG W/MENISCECTOMY MED/LAT W/SHVG Left 06/15/2022    Procedure: ;left knee arthroscopy, meniscectomy,synevectomy,chondroplasty, loose body removal, injection;  Surgeon: Bart Potts DO;  Location: [facility];  Service: Orthopedics    AZ COLONOSCOPY FLX DX W/COLLJ SPEC WHEN PFRMD N/A 06/26/2017    Procedure: EGD AND COLONOSCOPY;  Surgeon: Jolly Lin DO;  Location: [facility];  Service: Gastroenterology    AZ DIAGNOSTIC ARTHROSCOPY SHOULDER +- SYNOVIAL BX Left 9/18/2024    Procedure: DIAGNOSTIC ARTHROSCOPY SHOULDER POSSIBLE REVISION ROTATOR CUFF REPAIR, EXTENSIVE DEBRIDEMENT;  Surgeon: Abdullahi Cortez MD;  [facility];  Service: Orthopedics    AZ EXCISON TUMOR SOFT TISSUE THIGH/KNEE SUBQ 3 CM/> Bilateral 05/26/2022    Procedure: EXCISION BIOPSY TISSUE LESION/MASS LOWER EXTREMITY;  Surgeon: Kit Tavera MD;  [facility];  Service: General    AZ EXCISON TUMOR SOFT TISSUE THIGH/KNEE SUBQ 3 CM/> Bilateral 08/25/2022    Procedure: EXCISION BIOPSY TISSUE LESION/MASS LOWER EXTREMITY;  Surgeon: Kit Tavera MD;  Location: [facility];  Service: General    AZ OSTECTOMY PRTL 5TH METAR HEAD SPX Bilateral 08/15/2023    Procedure: BUNIONECTOMY TAILOR WITHOUT OSTEOTOMY;  Surgeon: Monet Pradhan DPM;  Location: [facility];  Service: Podiatry    AZ SURGICAL ARTHROSCOPY JENNIFER W/CORACOACRM LIGM RLS Left 12/13/2023    Procedure: Left - ARTHROSCOPY SHOULDER  Synovectomy Debridement/chondroplasty  Acromioplasty Arthroscopic rotator cuff repair Post arthroscopic injection of intra-articular joint space and peripheral portals;  Surgeon: Bart Potts DO;  Location: [facility];  Service: Orthopedics    SHOULDER ARTHROCENTESIS      SHOULDER ARTHROSCOPY Right 2021    Procedure: SHOULDER ARTHROSCOPY WITH acromioplasty, debridment, and ROTATOR CUFF REPAIR;  Surgeon: Bart Potts DO;  Location:  MAIN OR;  Service: Orthopedics    TUBAL LIGATION         Family History   Problem Relation Age of Onset    Colon cancer Mother     Heart attack Father     Other Father         Cardiac Disorder    Diabetes type II Father     Colon cancer Sister     No Known Problems Sister     No Known Problems Sister     No Known Problems Sister     No Known Problems Sister     No Known Problems Daughter     No Known Problems Maternal Aunt     No Known Problems Maternal Aunt     No Known Problems Maternal Aunt     Cancer Maternal Grandmother     No Known Problems Paternal Grandmother        Social History     Occupational History    Not on file   Tobacco Use    Smoking status: Former     Current packs/day: 0.00     Average packs/day: 1.5 packs/day for 30.0 years (45.0 ttl pk-yrs)     Types: Cigarettes     Start date: 1961     Quit date: 1991     Years since quittin.0    Smokeless tobacco: Never    Tobacco comments:     quit 25 yrs ago   Vaping Use    Vaping status: Never Used   Substance and Sexual Activity    Alcohol use: Never    Drug use: Never    Sexual activity: Not Currently     Comment: defer       Allergies   Allergen Reactions    Hydrocodone Irritability         Current Outpatient Medications:     amLODIPine (NORVASC) 5 mg tablet, Take 2 tablets (10 mg total) by mouth daily, Disp: 180 tablet, Rfl: 1    atorvastatin (LIPITOR) 10 mg tablet, Take 1 tablet (10 mg total) by mouth daily, Disp: 90 tablet, Rfl: 1    famotidine (PEPCID) 20 mg tablet, Take 1 tablet (20 mg total) by mouth 2 (two) times a day, Disp: 180  tablet, Rfl: 1    hydrOXYzine HCL (ATARAX) 25 mg tablet, take 1 tablet by mouth every 6 hours if needed for itching, Disp: 180 tablet, Rfl: 1    losartan (COZAAR) 100 MG tablet, Take 1 tablet (100 mg total) by mouth daily, Disp: 90 tablet, Rfl: 1    meloxicam (Mobic) 15 mg tablet, Take 1 tablet (15 mg total) by mouth daily, Disp: 30 tablet, Rfl: 0    valACYclovir (VALTREX) 1,000 mg tablet, take 1 tablet by mouth twice a day for 3 days, Disp: 180 tablet, Rfl: 1      Jong Lassiter PA-C

## 2025-01-13 NOTE — PATIENT INSTRUCTIONS
Rotator Cuff Repair Rehabilitation Protocol  (adapted from Zachery LOWE et al. “Rehabilitation of the Rotator Cuff: An Evaluation Based Approach”. JAAOS 2006; 14:599-609)  Updated 10.14  Abdullahi Cortez MD  [facility]  [address]  [place] [address]  [phone]  Phase 1 (Weeks 0-6)   Immediate Postoperative Period   Goals:    Diminish Pain and Inflammation  Maintain and Protect Integrity of the Repair    Gradually Increase Passive ROM (NO Active or Active Assist until Week 6)    Become Independent with Modified ADLs   Precautions:  Maintain Arm in Abduction Sling, Remove Only for Directed Exercises (may remove Abduction Pillow after Day 21 for comfort)    Keep Incisions Clean & Dry (okay to shower in 48 hours, band-aids over incisions)  No Immersion (pool) until Wounds Totally Sealed (usually not prior to day #10)  Passive Shoulder Motion ONLY, No Lifting/Holding Objects, Reaching Behind Back  Okay to Type/Write at Desktop with Arm in Sling   Day 0-6    Elbow, Wrist, Hand AROM Exercises     Start Cervical AROM and Scapula Isometrics    Cryotherapy/Ice for Pain and Inflammation    Instruct in Hygiene, Posture, and Positioning    Day 7-28    Continue Above  May Start Pendulum Exercises    May Start Supine, Pain Free, PT assisted PROM  Forward Flexion to 90°, External Rotation to 35° (Elbow at side), Internal Rotation to Body, Abduction to 60°    Can Introduce light Cardio (Walking, Stationary Bike)    Aqua Therapy may begin at week 3 (day 21) as long as no wound problems   Day 29-42    Continue Above  Progress PROM to Goal of full PROM by Week 6.  May add Gentle Mobilizations (GH and Scapulothoracic) to Regain full PROM if Needed.  May add Heat prior to PROM Exercises, Ice after Exercises  May Begin AAROM at Day 29 if ROM is Appropriate in Anticipation of AROM Starting at Week 6   Criteria to Progress to Phase 2    Reasonable Passive Forward Flexion, Abduction ,  IR/ER    Time  Phase 2 (Weeks 6-12)   Protection and Active Motion   Goals of Phase:    Allow Healing of Soft Tissues    Decrease Pain and Inflammation  Add ADLs and Regain AROM by End of Phase   Precautions:    NO STRENGTHENING until Phase 3    Repair is Most Prone to Failure during this Phase!    No Lifting Objects > 2 lbs (Coffee Cup OK), no Sudden Motions    Avoid Upper Extremity Bike and Ergometer   Day 43-56    Discontinue Sling  Initiate AROM Exercises (forward flexion, ER, IR and abduction), Rotator Cuff Isometrics    Continue Periscapular Exercises, add Stretching if PROM Lacking   No Strengthening until Week 12 (Minimum Time Needed for Cuff Healing Sufficient to withstand Strengthening)     Phase 3 (Weeks 12-16)   Early Strengthening   Goal of Phase:    Gain full AROM, Maintain PROM    Gradual return of Shoulder Strength, Power and Endurance    Gradual return to Functional Activities   Precautions:    No Lifting > 10lbs, Sudden Lifting or Pushing activities, Overhead Lifting    No Upper Extremity Bike or Ergometer   Week 12    Initiate Strengthening Program (10 lb Maximum until Phase 4)      ER/IR with Bands (Standing)      ER in Lateral Decubitius Position      Lateral Raises      Full Can in Scapular Plane      Prone Rowing, Horizontal Abduction, Extension      Elbow Flexion/Extension   Week 14-16    Initiate light Functional Activities as Permitted  Progress to Fundamental Shoulder Exercises  Phase 4 (Variable but Weeks 16-24)   Aggressive Rehab  Sport Specific or Activity Specific    Goals of Phase:    Maintain Full Pain-free AROM    Advanced Conditioning Exercises for enhanced Functional use    Continue regaining Shoulder Strength, Power and Endurance    Eventual return to full Functional Activities   Precautions:    None   Week 16    Continue ROM and stretching if appropriate    Progress Strengthening, Proprioceptive and Neuromuscular Training    Light Sports if Progressing Well (Chipping/Putting,  easy ground strokes etc.)   Week 20    Continue Strengthening and Stretching    Initiate Interval Sports Program as Appropriate    Note:   This is a general program which may be modified based on intra-operative findings, additional procedures preformed, repair stability, and patient biological factors.  If in doubt, please check with my office for individual patient specifics.  The ultimate goal of the surgery and rehabilitation is to get the rotator cuff to heal with the least residual functional deficit due to stiffness.  That being said, I would rather have a stiff shoulder with a healed rotator cuff repair, than a loose shoulder with a failed repair!

## 2025-01-15 ENCOUNTER — HOSPITAL ENCOUNTER (OUTPATIENT)
Dept: MRI IMAGING | Facility: HOSPITAL | Age: 70
Discharge: HOME | End: 2025-01-15
Payer: COMMERCIAL

## 2025-01-15 DIAGNOSIS — Z98.890 S/P LEFT ROTATOR CUFF REPAIR: ICD-10-CM

## 2025-01-15 DIAGNOSIS — M19.012 OSTEOARTHRITIS OF GLENOHUMERAL JOINT, LEFT: ICD-10-CM

## 2025-01-15 DIAGNOSIS — G89.29 CHRONIC LEFT SHOULDER PAIN: ICD-10-CM

## 2025-01-15 DIAGNOSIS — M25.512 CHRONIC LEFT SHOULDER PAIN: ICD-10-CM

## 2025-01-15 PROCEDURE — 73221 MRI JOINT UPR EXTREM W/O DYE: CPT

## 2025-01-16 ENCOUNTER — APPOINTMENT (OUTPATIENT)
Dept: PHYSICAL THERAPY | Facility: CLINIC | Age: 70
End: 2025-01-16
Payer: COMMERCIAL

## 2025-01-20 ENCOUNTER — RESULTS FOLLOW-UP (OUTPATIENT)
Dept: OBGYN CLINIC | Facility: OTHER | Age: 70
End: 2025-01-20

## 2025-01-21 ENCOUNTER — TELEPHONE (OUTPATIENT)
Dept: PAIN MEDICINE | Facility: CLINIC | Age: 70
End: 2025-01-21

## 2025-01-21 NOTE — TELEPHONE ENCOUNTER
Pt reports no improvement post inj   Pain level 6-7/10     no dysuria, no frequency, and no hematuria.

## 2025-01-23 ENCOUNTER — APPOINTMENT (OUTPATIENT)
Dept: PHYSICAL THERAPY | Facility: CLINIC | Age: 70
End: 2025-01-23
Payer: COMMERCIAL

## 2025-01-23 ENCOUNTER — OFFICE VISIT (OUTPATIENT)
Dept: OBGYN CLINIC | Facility: OTHER | Age: 70
End: 2025-01-23
Payer: COMMERCIAL

## 2025-01-23 VITALS — HEIGHT: 63 IN | BODY MASS INDEX: 28.35 KG/M2 | WEIGHT: 160 LBS

## 2025-01-23 DIAGNOSIS — M19.012 OSTEOARTHRITIS OF GLENOHUMERAL JOINT, LEFT: ICD-10-CM

## 2025-01-23 DIAGNOSIS — M75.102 TEAR OF LEFT SUPRASPINATUS TENDON: Primary | ICD-10-CM

## 2025-01-23 PROCEDURE — 20610 DRAIN/INJ JOINT/BURSA W/O US: CPT | Performed by: ORTHOPAEDIC SURGERY

## 2025-01-23 PROCEDURE — 99214 OFFICE O/P EST MOD 30 MIN: CPT | Performed by: ORTHOPAEDIC SURGERY

## 2025-01-23 RX ORDER — BUPIVACAINE HYDROCHLORIDE 2.5 MG/ML
2 INJECTION, SOLUTION INFILTRATION; PERINEURAL
Status: COMPLETED | OUTPATIENT
Start: 2025-01-23 | End: 2025-01-23

## 2025-01-23 RX ORDER — BETAMETHASONE SODIUM PHOSPHATE AND BETAMETHASONE ACETATE 3; 3 MG/ML; MG/ML
6 INJECTION, SUSPENSION INTRA-ARTICULAR; INTRALESIONAL; INTRAMUSCULAR; SOFT TISSUE
Status: COMPLETED | OUTPATIENT
Start: 2025-01-23 | End: 2025-01-23

## 2025-01-23 RX ADMIN — BUPIVACAINE HYDROCHLORIDE 2 ML: 2.5 INJECTION, SOLUTION INFILTRATION; PERINEURAL at 13:30

## 2025-01-23 RX ADMIN — BETAMETHASONE SODIUM PHOSPHATE AND BETAMETHASONE ACETATE 6 MG: 3; 3 INJECTION, SUSPENSION INTRA-ARTICULAR; INTRALESIONAL; INTRAMUSCULAR; SOFT TISSUE at 13:30

## 2025-01-23 NOTE — PROGRESS NOTES
"  Assessment  Diagnoses and all orders for this visit:    Tear of left supraspinatus tendon    Osteoarthritis of glenohumeral joint, left      Discussion and Plan:    Discussed with the patient that the MRI does show a recurrent tear.  Discussed options include continuing conservative treatment in the form of CS injection and physical therapy vs reverse TSA.  Given the intraoperative appearance of the glenohumeral articular cartilage and the impact that she has had 2 separate rotator cuff repairs with failure of healing of the repair I do think that reverse is a much better option for her than any other revision arthroscopy.  Patient elected to continue conservative treatment at this time.  Patient provided with a CS injection today.  The patient's son is being  in 9 days and hopefully this injection will help her feel better for the wedding and enjoyed the wedding more.  Follow up in 2-3 mos if symptoms persist we can consider surgical intervention.    Subjective:   Patient ID: Jamee Shaikh is a 69 y.o. female      HPI    Patient presents today to discuss the findings of her left shoulder MRI.  She is 4 mos s/p left rotator cuff repair with extensive debridement 9/18/24.  Patient continues to complain of persistent pain and weakness.     The following portions of the patient's history were reviewed and updated as appropriate: allergies, current medications, past family history, past medical history, past social history, past surgical history and problem list.      Objective:  Ht 5' 3\" (1.6 m)   Wt 72.6 kg (160 lb)   BMI 28.34 kg/m²       Left Shoulder Exam     Range of Motion   External rotation:  60   Forward flexion:  110   Internal rotation 0 degrees:  Lumbar     Tests   Drop arm: positive    Other   Erythema: absent  Sensation: normal  Pulse: present             Physical Exam  Vitals reviewed.   Constitutional:       Appearance: She is well-developed.   Eyes:      Pupils: Pupils are equal, round, and " reactive to light.   Pulmonary:      Effort: Pulmonary effort is normal.      Breath sounds: Normal breath sounds.   Abdominal:      General: Abdomen is flat. There is no distension.   Skin:     General: Skin is warm and dry.   Neurological:      Mental Status: She is alert and oriented to person, place, and time.   Psychiatric:         Behavior: Behavior normal.         Thought Content: Thought content normal.         Judgment: Judgment normal.       Large joint arthrocentesis: L glenohumeral  Universal Protocol:  procedure performed by consultantConsent: Verbal consent obtained.  Consent given by: patient  Patient understanding: patient states understanding of the procedure being performed  Site marked: the operative site was marked  Patient identity confirmed: verbally with patient  Supporting Documentation  Indications: pain   Procedure Details  Location: shoulder - L glenohumeral  Needle size: 22 G  Ultrasound guidance: no  Approach: lateral  Medications administered: 2 mL bupivacaine 0.25 %; 6 mg betamethasone acetate-betamethasone sodium phosphate 6 (3-3) mg/mL    Patient tolerance: patient tolerated the procedure well with no immediate complications  Dressing:  Sterile dressing applied           I have personally reviewed pertinent films in PACS and my interpretation is as follows.    MRI left shoulder demonstrates a recurrent tear supraspinatus with mild atrophy and retraction to the humeral head apex    Scribe Attestation      I,:  Jolly Schumacher MA am acting as a scribe while in the presence of the attending physician.:       I,:  Abdullahi Cortez MD personally performed the services described in this documentation    as scribed in my presence.:

## 2025-01-24 ENCOUNTER — OFFICE VISIT (OUTPATIENT)
Dept: URGENT CARE | Facility: CLINIC | Age: 70
End: 2025-01-24
Payer: COMMERCIAL

## 2025-01-24 VITALS
BODY MASS INDEX: 27.46 KG/M2 | RESPIRATION RATE: 18 BRPM | HEIGHT: 63 IN | SYSTOLIC BLOOD PRESSURE: 129 MMHG | WEIGHT: 155 LBS | HEART RATE: 72 BPM | OXYGEN SATURATION: 99 % | TEMPERATURE: 98.7 F | DIASTOLIC BLOOD PRESSURE: 69 MMHG

## 2025-01-24 DIAGNOSIS — M23.51 RECURRENT RIGHT KNEE INSTABILITY: Primary | ICD-10-CM

## 2025-01-24 DIAGNOSIS — M25.561 CHRONIC PAIN OF RIGHT KNEE: ICD-10-CM

## 2025-01-24 DIAGNOSIS — G89.29 CHRONIC PAIN OF RIGHT KNEE: ICD-10-CM

## 2025-01-24 PROCEDURE — S9083 URGENT CARE CENTER GLOBAL: HCPCS | Performed by: ORTHOPAEDIC SURGERY

## 2025-01-24 PROCEDURE — 99213 OFFICE O/P EST LOW 20 MIN: CPT | Performed by: ORTHOPAEDIC SURGERY

## 2025-01-24 NOTE — PROGRESS NOTES
[facility]        NAME: Jamee Shaikh is a 69 y.o. female  : 1955    MRN: [id number]  DATE: 2025  TIME: 10:06 AM    Assessment and Plan   Recurrent right knee instability [M23.51]  1. Recurrent right knee instability        2. Chronic pain of right knee          Discussed with patient ongoing use of knee brace, Tylenol/NSAIDs. She does have a scheduled follow up with Dr. Mcnally next week.     Patient Instructions       Follow up with PCP in 3-5 days.  Proceed to  ER if symptoms worsen.    If tests are performed, our office will contact you with results only if changes need to made to the care plan discussed with you at the visit. [facility]    Chief Complaint     Chief Complaint   Patient presents with    Knee Pain     Patient c/o right knee pain that started a few months ago, did see ortho.  Patient reports pain did improve, now knee giving out the last 3 days.          History of Present Illness       69-year-old female presents to the urgent care for evaluation of chronic right knee pain.  Patient was seen a couple months ago for an injury to her right knee which she describes as a twisting fall from bleachers.  She was fitted with a knee brace, provided with meloxicam, and referred to Dr. Mcnally of orthopedics.  Patient states that her symptoms had resolved until about 3 days ago when she noticed an onset of pain along the front of her knee and multiple episodes of her knee giving out on her throughout the day.  The patient voices her concerns as in a week her son is going to be  and she does not wish to have these issues at the wedding.  She does report a bit of swelling of the knee as well.  For symptom relief the patient has been using Advil.  She does have an appointment scheduled with orthopedics next week.  The patient does note some tingling in her knee every so often.        Review of Systems   Review of Systems    Constitutional:  Negative for chills and fever.   HENT:  Negative for ear pain and sore throat.    Eyes:  Negative for pain and visual disturbance.   Respiratory:  Negative for cough and shortness of breath.    Cardiovascular:  Negative for chest pain and palpitations.   Gastrointestinal:  Negative for abdominal pain and vomiting.   Genitourinary:  Negative for dysuria and hematuria.   Musculoskeletal:  Positive for arthralgias, gait problem and joint swelling. Negative for back pain.   Skin:  Negative for color change and rash.   Neurological:  Negative for seizures and syncope.   All other systems reviewed and are negative.        Current Medications       Current Outpatient Medications:     amLODIPine (NORVASC) 5 mg tablet, Take 2 tablets (10 mg total) by mouth daily, Disp: 180 tablet, Rfl: 1    atorvastatin (LIPITOR) 10 mg tablet, Take 1 tablet (10 mg total) by mouth daily, Disp: 90 tablet, Rfl: 1    famotidine (PEPCID) 20 mg tablet, Take 1 tablet (20 mg total) by mouth 2 (two) times a day, Disp: 180 tablet, Rfl: 1    hydrOXYzine HCL (ATARAX) 25 mg tablet, take 1 tablet by mouth every 6 hours if needed for itching, Disp: 180 tablet, Rfl: 1    losartan (COZAAR) 100 MG tablet, Take 1 tablet (100 mg total) by mouth daily, Disp: 90 tablet, Rfl: 1    meloxicam (Mobic) 15 mg tablet, Take 1 tablet (15 mg total) by mouth daily (Patient not taking: Reported on 1/24/2025), Disp: 30 tablet, Rfl: 0    valACYclovir (VALTREX) 1,000 mg tablet, take 1 tablet by mouth twice a day for 3 days (Patient not taking: Reported on 1/24/2025), Disp: 180 tablet, Rfl: 1  No current facility-administered medications for this visit.    Current Allergies     Allergies as of 01/24/2025 - Reviewed 01/24/2025   Allergen Reaction Noted    Hydrocodone Irritability 06/15/2022            The following portions of the patient's history were reviewed and updated as appropriate: allergies, current medications, past family history, past medical  "history, past social history, past surgical history and problem list.     Past Medical History:   Diagnosis Date    Anemia     Arthritis     Bilateral bunions     Chronic pain disorder     back and hip pain    Colon polyp     Disease of thyroid gland     nodules    History of COVID-2020    mild s/s    History of gastroesophageal reflux (GERD)     \"had hiatal hernia surgery\"    History of heart murmur in childhood     Hyperlipidemia     Hypertension     Overactive bladder     Rotator cuff tear, right     had surgery    Vitamin D deficiency     unsure    Wears glasses     Wears partial dentures     upper       Past Surgical History:   Procedure Laterality Date     SECTION      Last Assessed: 2017    COLONOSCOPY      ELBOW SURGERY      Last Assessed: 2017    ESOPHAGOGASTRODUODENOSCOPY N/A 2017    Procedure: ESOPHAGOGASTRODUODENOSCOPY (EGD);  Surgeon: Jolly Lin DO;  Location: [facility];  Service: Gastroenterology    HIP SURGERY Left 2018    glut medius/minimus repair  and 18--with pin implanted    HYSTERECTOMY          JOINT REPLACEMENT      PARAESOPHAGEAL HERNIA REPAIR N/A 2020    Procedure: LAPAROSCOPIC PARAESOPHAGEAL HERNIA REPAIR WITH MESH AND NISSEN FUNDOPLICATION WITH ROBOTICS;  Surgeon: Darrin Leon MD;  Location: [facility];  Service: General    MN ARTHRP KNE CONDYLE&PLATU MEDIAL&LAT COMPARTMENTS Left 03/15/2023    Procedure: ARTHROPLASTY KNEE TOTAL;  Surgeon: Bart Potts DO;  Location: [facility];  Service: Orthopedics    MN ARTHRS KNE SURG W/MENISCECTOMY MED/LAT W/SHVG Left 06/15/2022    Procedure: ;left knee arthroscopy, meniscectomy,synevectomy,chondroplasty, loose body removal, injection;  Surgeon: Bart Potts DO;  Location: [facility];  Service: Orthopedics    MN COLONOSCOPY FLX DX W/COLLJ SPEC WHEN PFRMD N/A 2017    Procedure: EGD AND COLONOSCOPY;  Surgeon: Jolly Lin DO;  Location: [facility];  Service: " Gastroenterology    FL DIAGNOSTIC ARTHROSCOPY SHOULDER +- SYNOVIAL BX Left 9/18/2024    Procedure: DIAGNOSTIC ARTHROSCOPY SHOULDER POSSIBLE REVISION ROTATOR CUFF REPAIR, EXTENSIVE DEBRIDEMENT;  Surgeon: Abdullahi Cortez MD;  [facility];  Service: Orthopedics    FL EXCISON TUMOR SOFT TISSUE THIGH/KNEE SUBQ 3 CM/> Bilateral 05/26/2022    Procedure: EXCISION BIOPSY TISSUE LESION/MASS LOWER EXTREMITY;  Surgeon: Kit Tavera MD;  [facility];  Service: General    FL EXCISON TUMOR SOFT TISSUE THIGH/KNEE SUBQ 3 CM/> Bilateral 08/25/2022    Procedure: EXCISION BIOPSY TISSUE LESION/MASS LOWER EXTREMITY;  Surgeon: Kit Tavera MD;  [facility];  Service: General    FL OSTECTOMY PRTL 5TH METAR HEAD SPX Bilateral 08/15/2023    Procedure: BUNIONECTOMY TAILOR WITHOUT OSTEOTOMY;  Surgeon: Monet Pradhan DPM;  Location: [facility];  Service: Podiatry    FL SURGICAL ARTHROSCOPY JENNIFER W/CORACOACRM LIGM RLS Left 12/13/2023    Procedure: Left - ARTHROSCOPY SHOULDER  Synovectomy Debridement/chondroplasty Acromioplasty Arthroscopic rotator cuff repair Post arthroscopic injection of intra-articular joint space and peripheral portals;  Surgeon: Bart Potts DO;  Location: [facility];  Service: Orthopedics    SHOULDER ARTHROCENTESIS      SHOULDER ARTHROSCOPY Right 06/23/2021    Procedure: SHOULDER ARTHROSCOPY WITH acromioplasty, debridment, and ROTATOR CUFF REPAIR;  Surgeon: Bart Potts DO;  Location:  MAIN OR;  Service: Orthopedics    TUBAL LIGATION         Family History   Problem Relation Age of Onset    Colon cancer Mother     Heart attack Father     Other Father         Cardiac Disorder    Diabetes type II Father     Colon cancer Sister     No Known Problems Sister     No Known Problems Sister     No Known Problems Sister     No Known Problems Sister     No Known Problems Daughter     No Known Problems Maternal Aunt     No Known Problems Maternal Aunt     No Known Problems Maternal  "Aunt     Cancer Maternal Grandmother     No Known Problems Paternal Grandmother          Medications have been verified.        Objective   /69   Pulse 72   Temp 98.7 °F (37.1 °C) (Temporal)   Resp 18   Ht 5' 3\" (1.6 m)   Wt 70.3 kg (155 lb)   SpO2 99%   BMI 27.46 kg/m²        Physical Exam     Physical Exam  Vitals and nursing note reviewed.   Constitutional:       General: She is not in acute distress.     Appearance: Normal appearance. She is not ill-appearing.   HENT:      Head: Normocephalic and atraumatic.      Nose: Nose normal.      Mouth/Throat:      Mouth: Mucous membranes are moist.      Pharynx: Oropharynx is clear.   Eyes:      Extraocular Movements: Extraocular movements intact.      Pupils: Pupils are equal, round, and reactive to light.   Cardiovascular:      Rate and Rhythm: Normal rate.      Pulses: Normal pulses.   Pulmonary:      Effort: Pulmonary effort is normal. No respiratory distress.   Musculoskeletal:         General: Normal range of motion.      Cervical back: Normal range of motion.      Comments: Right knee:  Knee without effusion, ecchymosis, erythema, warmth, swelling.  The patient is able to stand and ambulate on her own, no abnormal gait.  There is tenderness along the anterior aspect of the knee at the lateral tibial plateau.  No tenderness along the lateral or medial joint space.  Patient has full active knee range of motion.  The knee is stable to varus and valgus stress.  She is able to perform Thessaly test, though does complain of some pain.  The lower extremity is neurovascularly intact and well-perfused.   Skin:     General: Skin is warm and dry.      Capillary Refill: Capillary refill takes less than 2 seconds.   Neurological:      General: No focal deficit present.      Mental Status: She is alert and oriented to person, place, and time.   Psychiatric:         Mood and Affect: Mood normal.         Behavior: Behavior normal.                   "

## 2025-01-28 ENCOUNTER — OFFICE VISIT (OUTPATIENT)
Dept: OBGYN CLINIC | Facility: CLINIC | Age: 70
End: 2025-01-28
Payer: COMMERCIAL

## 2025-01-28 VITALS
OXYGEN SATURATION: 99 % | WEIGHT: 163.8 LBS | HEART RATE: 75 BPM | BODY MASS INDEX: 29.02 KG/M2 | HEIGHT: 63 IN | TEMPERATURE: 98.4 F

## 2025-01-28 DIAGNOSIS — M17.11 PRIMARY OSTEOARTHRITIS OF ONE KNEE, RIGHT: Primary | ICD-10-CM

## 2025-01-28 PROCEDURE — 99214 OFFICE O/P EST MOD 30 MIN: CPT | Performed by: FAMILY MEDICINE

## 2025-01-28 NOTE — PROGRESS NOTES
"[facility] [address] [place]  [place] [place] [address] [phone]      Assessment:  1. Primary osteoarthritis of one knee, right  -     Ambulatory Referral to Physical Therapy; Future      Plan:  Patient Instructions   F/u as needed.  Icing/heat/OTC pain meds as needed.  R knee steroid injection  Physical therapy  Home exercises.   Return for Recheck.    Chief Complaint:  Chief Complaint   Patient presents with    Right Knee - Pain       Subjective:   HPI    Patient ID: Jamee Shaikh is a 69 y.o. female     Here c/o R knee pain  Knee gave a few days ago and doing this intermittently  Tingling/sharp pain  Advil PRN- helps sometimes  Swelling/locking  Fell in 11/24    Review of Systems   Constitutional:  Negative for fatigue and fever.   Respiratory:  Negative for shortness of breath.    Cardiovascular:  Negative for chest pain.   Gastrointestinal:  Negative for abdominal pain and nausea.   Genitourinary:  Negative for dysuria.   Musculoskeletal:  Positive for arthralgias.   Skin:  Negative for rash and wound.   Neurological:  Negative for weakness and headaches.       Objective:  Vitals:  Pulse 75   Temp 98.4 °F (36.9 °C) (Tympanic)   Ht 5' 3\" (1.6 m)   Wt 74.3 kg (163 lb 12.8 oz)   SpO2 99%   BMI 29.02 kg/m²     The following portions of the patient's history were reviewed and updated as appropriate: allergies, current medications, past family history, past medical history, past social history, past surgical history, and problem list.    Physical exam:  Physical Exam  Constitutional:       Appearance: Normal appearance. She is normal weight.   HENT:      Head: Normocephalic.   Eyes:      Extraocular Movements: Extraocular movements intact.   Pulmonary:      Effort: Pulmonary effort is normal.   Musculoskeletal:         General: Tenderness present.      Cervical back: Normal range of motion.      Left knee: No effusion.      Instability Tests: Medial Mago " test negative and lateral Mago test negative.   Skin:     General: Skin is warm and dry.   Neurological:      General: No focal deficit present.      Mental Status: She is alert and oriented to person, place, and time. Mental status is at baseline.   Psychiatric:         Mood and Affect: Mood normal.         Behavior: Behavior normal.         Thought Content: Thought content normal.         Judgment: Judgment normal.       Left Knee Exam     Tenderness   The patient is experiencing no tenderness.     Range of Motion   The patient has normal left knee ROM.    Tests   Mago:  Medial - negative Lateral - negative  Varus: negative Valgus: negative    Other   Swelling: none  Effusion: no effusion present          Observations   Left Knee   Negative for effusion.             Apollo Mcnally MD

## 2025-01-28 NOTE — PATIENT INSTRUCTIONS
F/u as needed.  Icing/heat/OTC pain meds as needed.  R knee steroid injection  Physical therapy  Home exercises.

## 2025-01-30 ENCOUNTER — APPOINTMENT (OUTPATIENT)
Dept: PHYSICAL THERAPY | Facility: CLINIC | Age: 70
End: 2025-01-30
Payer: COMMERCIAL

## 2025-02-06 DIAGNOSIS — S83.411A SPRAIN OF MEDIAL COLLATERAL LIGAMENT OF RIGHT KNEE, INITIAL ENCOUNTER: ICD-10-CM

## 2025-02-06 DIAGNOSIS — E78.5 HYPERLIPIDEMIA, UNSPECIFIED HYPERLIPIDEMIA TYPE: ICD-10-CM

## 2025-02-06 DIAGNOSIS — K21.9 GASTROESOPHAGEAL REFLUX DISEASE WITHOUT ESOPHAGITIS: ICD-10-CM

## 2025-02-06 DIAGNOSIS — I10 ESSENTIAL HYPERTENSION: ICD-10-CM

## 2025-02-06 RX ORDER — AMLODIPINE BESYLATE 5 MG/1
10 TABLET ORAL DAILY
Qty: 180 TABLET | Refills: 1 | Status: SHIPPED | OUTPATIENT
Start: 2025-02-06

## 2025-02-07 RX ORDER — ATORVASTATIN CALCIUM 10 MG/1
10 TABLET, FILM COATED ORAL DAILY
Qty: 90 TABLET | Refills: 1 | Status: SHIPPED | OUTPATIENT
Start: 2025-02-07

## 2025-02-07 RX ORDER — LOSARTAN POTASSIUM 100 MG/1
100 TABLET ORAL DAILY
Qty: 90 TABLET | Refills: 1 | Status: SHIPPED | OUTPATIENT
Start: 2025-02-07

## 2025-02-07 RX ORDER — FAMOTIDINE 20 MG/1
20 TABLET, FILM COATED ORAL 2 TIMES DAILY
Qty: 180 TABLET | Refills: 1 | Status: SHIPPED | OUTPATIENT
Start: 2025-02-07

## 2025-02-07 NOTE — TELEPHONE ENCOUNTER
Patient requesting refill(s) of: Mobic 15 mg     Last filled: 11/6/24  Last appt:12/2/24  Next appt: 3/17/25  Pharmacy: [facility]

## 2025-02-09 RX ORDER — MELOXICAM 15 MG/1
15 TABLET ORAL DAILY
Qty: 90 TABLET | Refills: 1 | Status: SHIPPED | OUTPATIENT
Start: 2025-02-09

## 2025-02-12 DIAGNOSIS — N39.0 URINARY TRACT INFECTION WITHOUT HEMATURIA, SITE UNSPECIFIED: Primary | ICD-10-CM

## 2025-02-12 NOTE — TELEPHONE ENCOUNTER
Patient called because she is having UTI symptoms and used an at home test that was positive for bacteria. She would like something called in for her symptoms. Please advise.

## 2025-02-13 ENCOUNTER — OFFICE VISIT (OUTPATIENT)
Age: 70
End: 2025-02-13
Payer: COMMERCIAL

## 2025-02-13 VITALS — HEIGHT: 63 IN | WEIGHT: 163.6 LBS | BODY MASS INDEX: 28.99 KG/M2

## 2025-02-13 DIAGNOSIS — M54.16 LUMBAR RADICULOPATHY: ICD-10-CM

## 2025-02-13 DIAGNOSIS — G89.4 CHRONIC PAIN SYNDROME: ICD-10-CM

## 2025-02-13 DIAGNOSIS — R29.898 LEFT LEG WEAKNESS: Primary | ICD-10-CM

## 2025-02-13 DIAGNOSIS — M51.16 LUMBAR DISC DISEASE WITH RADICULOPATHY: ICD-10-CM

## 2025-02-13 PROCEDURE — 99214 OFFICE O/P EST MOD 30 MIN: CPT | Performed by: ANESTHESIOLOGY

## 2025-02-13 PROCEDURE — G2211 COMPLEX E/M VISIT ADD ON: HCPCS | Performed by: ANESTHESIOLOGY

## 2025-02-13 RX ORDER — SULFAMETHOXAZOLE AND TRIMETHOPRIM 800; 160 MG/1; MG/1
1 TABLET ORAL 2 TIMES DAILY
Qty: 10 TABLET | Refills: 0 | Status: SHIPPED | OUTPATIENT
Start: 2025-02-13 | End: 2025-02-18

## 2025-02-13 NOTE — PATIENT INSTRUCTIONS
Patient Education     Core Strengthening Exercises on Back or on Hands and Knees   About this topic   Your core muscles are in your chest, back, buttock, and stomach area. They are your abdominal, back, and pelvis muscles. These muscles help keep your body stable when using your arms or legs. They also help with balance and posture. There are many exercises you can do to keep these muscles strong.  If you have back problems like a compression fracture or a ruptured disc, doing some of these exercises could make your problem worse. Some of these exercises may cause lower back pain.  General   Before starting with a program, ask your doctor if you are healthy enough to do these exercises. Your doctor may have you work with a , chiropractor, or physical therapist to make a safe exercise program to meet your needs.  Strengthening Exercises   Strengthening exercises keep your muscles firm and strong. Start by repeating each exercise 2 to 3 times. Work up to doing each exercise 10 times. Try to do the exercises 2 to 3 times each day. Hold each exercise for 3 to 5 seconds. Do all exercises slowly.  Hip lifts ? Lie on your back with your knees bent and feet flat on the floor. Tighten your stomach muscles and push your heels into the floor to lift your buttocks off the floor. Relax.  Pelvic tilts ? Lie on your back with your knees bent and feet flat on the floor. Tighten your stomach muscles and press your lower back down to the floor. Relax.  Straight leg raises lying down ? Lie on your back with one leg straight. Bend your other knee so the foot is flat on the bed. Keeping your leg straight, lift the leg up to the level of your other knee. Lower it back down. Repeat with the other leg.  Knee flex lying down ? Lie on your back with both knees bent and your feet flat on the floor. Tighten your belly muscles. Raise one leg up and back down as if you are marching in slow motion. Keep belly muscles tight while you move  your leg. Switch legs. To make this exercise harder, raise both arms straight up in the air. Tighten your belly muscles. When you raise one leg up, reach the opposite arm over your head. Switch, moving the opposite arm and leg until you have done 10 repetitions on each side.  Abdominal crunches ? Lie on your back with both knees bent. Keep your feet flat on the floor. Place your hands in one of these positions. Try starting with the first position since it is the easiest. As you get better, use the other positions to make it harder.  Crunches with arms at sides.  Crunches with arms across chest.  Crunches with arms behind head. Be careful not to interlock your fingers behind your neck or head while doing crunches. This may add tension to your neck and cause strain.  Look at the ceiling. Tighten your belly muscles and lift your shoulders and upper back off the floor. Breathe out while you are doing this. Lower your shoulders to the floor. Breathe in while you are doing this. Relax your belly muscles all the way before starting another crunch.  Arm and leg lifts on hands and knees ? Start on your hands and knees. With all of these exercises, keep your back as level as possible. If you are having trouble with this, you may want to put a small object on your back such as a book. If it falls off, you are not keeping your back level enough during the exercise.  Lift one arm up to shoulder level and hold. Lower it back down. Now, lift up the other arm and hold.  Lift one leg up and kick it straight out until it is in line with your back and hold. Lower it back down. Now, lift up the other leg and hold.  Lift one arm and the OPPOSITE leg up at the same time and hold. Lower them down. Now, repeat using the other arm and leg. This is a very hard exercise. It may take time to be able to do this.               What will the results be?   Stronger core  Better balance  More toned belly and back muscles  Easier to do daily  activities  Better sports performance  Less low back pain  Helpful tips   Stay active and work out to keep your muscles strong and flexible.  Keep a healthy weight to avoid putting too much stress on your spine. Eat a healthy diet to keep your muscles healthy.  Be sure you do not hold your breath when exercising. This can raise your blood pressure. If you tend to hold your breath, try counting out loud when exercising. If any exercise bothers you, stop right away.  Try walking or cycling at an easy pace for a few minutes to warm up your muscles. Do this again after exercising.  Exercise may be slightly uncomfortable, but you should not have sharp pains. If you do get sharp pains, stop what you are doing. If the sharp pains continue, call your doctor.  Last Reviewed Date   2021-03-18  Consumer Information Use and Disclaimer   This generalized information is a limited summary of diagnosis, treatment, and/or medication information. It is not meant to be comprehensive and should be used as a tool to help the user understand and/or assess potential diagnostic and treatment options. It does NOT include all information about conditions, treatments, medications, side effects, or risks that may apply to a specific patient. It is not intended to be medical advice or a substitute for the medical advice, diagnosis, or treatment of a health care provider based on the health care provider's examination and assessment of a patient’s specific and unique circumstances. Patients must speak with a health care provider for complete information about their health, medical questions, and treatment options, including any risks or benefits regarding use of medications. This information does not endorse any treatments or medications as safe, effective, or approved for treating a specific patient. [employer] and its affiliates disclaim any warranty or liability relating to this information or the use thereof. The use of this information  is governed by the Terms of Use, available at https://www.woltersAtoomauwer.com/en/know/clinical-effectiveness-terms   Copyright   Copyright © 2024 [employer] and its affiliates and/or licensors. All rights reserved.

## 2025-02-13 NOTE — PROGRESS NOTES
Assessment:  1. Left leg weakness    2. Lumbar disc disease with radiculopathy    3. Lumbar radiculopathy    4. Chronic pain syndrome        Plan:  Patient is a 67-year-old female complains of left-sided low back and hip pain and left leg weakness presents to office for follow-up visit. Patient is status post sacroiliac joint steroid injection performed on 1/7/2025 which she reported no alleviation of symptoms.  Upon further discussion and physical exam it appears that patient has severe weakness in the left lower extremity.  Patient reports history of falls especially when trying to step off the left foot.  At this time we need to get diagnostic imaging to better assess the cause of patient's left leg weakness.  1.  We will order an x-ray of the lumbar spine to better assess the degenerative change Wally patient current presentation.  2.  Will order an MRI of the lumbar spine to better assess the discogenic pathology by patient's left leg weakness  3.  Follow-up in 1 month to review diagnostic imaging plan interventional management       History of Present Illness:  The patient is a 70 y.o. female who presents for a follow up office visit in regards to Back Pain and Hip Pain.   The patient’s current symptoms include 7 out of 10 sharp, pressure-like pain during particular time pattern.    Current pain medications includes: None    I have personally reviewed and/or updated the patient's past medical history, past surgical history, family history, social history, current medications, allergies, and vital signs today.         Review of Systems  Review of Systems   Constitutional:  Negative for unexpected weight change.   HENT:  Negative for hearing loss.    Eyes:  Negative for visual disturbance.   Respiratory:  Negative for shortness of breath.    Cardiovascular:  Negative for leg swelling.   Gastrointestinal:  Negative for constipation.   Endocrine: Negative for polyuria.   Genitourinary:  Negative for difficulty  "urinating.   Musculoskeletal:  Positive for gait problem. Negative for joint swelling and myalgias.   Skin:  Negative for rash.   Neurological:  Negative for weakness and headaches.   Psychiatric/Behavioral:  Negative for decreased concentration.    All other systems reviewed and are negative.      Past Medical History:   Diagnosis Date    Anemia     Arthritis     Bilateral bunions     Chronic pain disorder     back and hip pain    Colon polyp     Disease of thyroid gland     nodules    History of COVID-2020    mild s/s    History of gastroesophageal reflux (GERD)     \"had hiatal hernia surgery\"    History of heart murmur in childhood     Hyperlipidemia     Hypertension     Overactive bladder     Rotator cuff tear, right     had surgery    Vitamin D deficiency     unsure    Wears glasses     Wears partial dentures     upper       Past Surgical History:   Procedure Laterality Date     SECTION      Last Assessed: 2017    COLONOSCOPY      ELBOW SURGERY      Last Assessed: 2017    ESOPHAGOGASTRODUODENOSCOPY N/A 2017    Procedure: ESOPHAGOGASTRODUODENOSCOPY (EGD);  Surgeon: Jolly Lin DO;  Location: [facility] GI LAB;  Service: Gastroenterology    HIP SURGERY Left 2018    glut medius/minimus repair  and 18--with pin implanted    HYSTERECTOMY          JOINT REPLACEMENT      PARAESOPHAGEAL HERNIA REPAIR N/A 2020    Procedure: LAPAROSCOPIC PARAESOPHAGEAL HERNIA REPAIR WITH MESH AND NISSEN FUNDOPLICATION WITH ROBOTICS;  Surgeon: Darrin Leon MD;  Location: [facility] OR;  Service: General    CA ARTHRP KNE CONDYLE&PLATU MEDIAL&LAT COMPARTMENTS Left 03/15/2023    Procedure: ARTHROPLASTY KNEE TOTAL;  Surgeon: Bart Potts DO;  Location: [facility];  Service: Orthopedics    CA ARTHRS KNE SURG W/MENISCECTOMY MED/LAT W/SHVG Left 06/15/2022    Procedure: ;left knee arthroscopy, meniscectomy,synevectomy,chondroplasty, loose body removal, injection;  Surgeon: Brat Potts, " ;  Location: [facility];  Service: Orthopedics    VT COLONOSCOPY FLX DX W/COLLJ SPEC WHEN PFRMD N/A 06/26/2017    Procedure: EGD AND COLONOSCOPY;  Surgeon: Jolly Lin DO;  Location: [facility] GI LAB;  Service: Gastroenterology    VT DIAGNOSTIC ARTHROSCOPY SHOULDER +- SYNOVIAL BX Left 9/18/2024    Procedure: DIAGNOSTIC ARTHROSCOPY SHOULDER POSSIBLE REVISION ROTATOR CUFF REPAIR, EXTENSIVE DEBRIDEMENT;  Surgeon: Abdullahi Cortez MD;  Location: [facility];  Service: Orthopedics    VT EXCISON TUMOR SOFT TISSUE THIGH/KNEE SUBQ 3 CM/> Bilateral 05/26/2022    Procedure: EXCISION BIOPSY TISSUE LESION/MASS LOWER EXTREMITY;  Surgeon: Kit Tavera MD;  [facility];  Service: General    VT EXCISON TUMOR SOFT TISSUE THIGH/KNEE SUBQ 3 CM/> Bilateral 08/25/2022    Procedure: EXCISION BIOPSY TISSUE LESION/MASS LOWER EXTREMITY;  Surgeon: Kit Tavera MD;  [facility]: [facility];  Service: General    VT OSTECTOMY PRTL 5TH METAR HEAD SPX Bilateral 08/15/2023    Procedure: BUNIONECTOMY TAILOR WITHOUT OSTEOTOMY;  Surgeon: Monet Pradhan DPM;  [facility];  Service: Podiatry    VT SURGICAL ARTHROSCOPY JENNIFER W/CORACOACRM LIGM RLS Left 12/13/2023    Procedure: Left - ARTHROSCOPY SHOULDER  Synovectomy Debridement/chondroplasty Acromioplasty Arthroscopic rotator cuff repair Post arthroscopic injection of intra-articular joint space and peripheral portals;  Surgeon: Bart Potts DO;  [facility]: [facility];  Service: Orthopedics    SHOULDER ARTHROCENTESIS      SHOULDER ARTHROSCOPY Right 06/23/2021    Procedure: SHOULDER ARTHROSCOPY WITH acromioplasty, debridment, and ROTATOR CUFF REPAIR;  Surgeon: Bart Potts DO;  Location:  MAIN OR;  Service: Orthopedics    TUBAL LIGATION         Family History   Problem Relation Age of Onset    Colon cancer Mother     Heart attack Father     Other Father         Cardiac Disorder    Diabetes type II Father     Colon cancer Sister     No Known Problems Sister      "No Known Problems Sister     No Known Problems Sister     No Known Problems Sister     No Known Problems Daughter     No Known Problems Maternal Aunt     No Known Problems Maternal Aunt     No Known Problems Maternal Aunt     Cancer Maternal Grandmother     No Known Problems Paternal Grandmother        Social History     Occupational History    Not on file   Tobacco Use    Smoking status: Former     Current packs/day: 0.00     Average packs/day: 1.5 packs/day for 30.0 years (45.0 ttl pk-yrs)     Types: Cigarettes     Start date: 1961     Quit date: 1991     Years since quittin.1    Smokeless tobacco: Never    Tobacco comments:     quit 25 yrs ago   Vaping Use    Vaping status: Never Used   Substance and Sexual Activity    Alcohol use: Never    Drug use: Never    Sexual activity: Not Currently     Comment: defer         Current Outpatient Medications:     amLODIPine (NORVASC) 5 mg tablet, Take 2 tablets (10 mg total) by mouth daily, Disp: 180 tablet, Rfl: 1    atorvastatin (LIPITOR) 10 mg tablet, Take 1 tablet (10 mg total) by mouth daily, Disp: 90 tablet, Rfl: 1    famotidine (PEPCID) 20 mg tablet, Take 1 tablet (20 mg total) by mouth 2 (two) times a day, Disp: 180 tablet, Rfl: 1    hydrOXYzine HCL (ATARAX) 25 mg tablet, take 1 tablet by mouth every 6 hours if needed for itching, Disp: 180 tablet, Rfl: 1    losartan (COZAAR) 100 MG tablet, Take 1 tablet (100 mg total) by mouth daily, Disp: 90 tablet, Rfl: 1    meloxicam (Mobic) 15 mg tablet, Take 1 tablet (15 mg total) by mouth daily, Disp: 90 tablet, Rfl: 1    sulfamethoxazole-trimethoprim (BACTRIM DS) 800-160 mg per tablet, Take 1 tablet by mouth 2 (two) times a day for 5 days, Disp: 10 tablet, Rfl: 0    valACYclovir (VALTREX) 1,000 mg tablet, take 1 tablet by mouth twice a day for 3 days (Patient not taking: No sig reported), Disp: 180 tablet, Rfl: 1    Allergies   Allergen Reactions    Hydrocodone Irritability       Physical Exam:    Ht 5' 3\" (1.6 " m)   Wt 74.2 kg (163 lb 9.6 oz)   BMI 28.98 kg/m²     Constitutional:normal, well developed, well nourished, alert, in no distress and non-toxic and no overt pain behavior.  Eyes:anicteric  HEENT:grossly intact  Neck:supple, symmetric, trachea midline and no masses   Pulmonary:even and unlabored  Cardiovascular:No edema or pitting edema present  Skin:Normal without rashes or lesions and well hydrated  Psychiatric:Mood and affect appropriate  Neurologic:Cranial Nerves II-XII grossly intact  Musculoskeletal:antalgic    Lumbar/Sacral Spine examination demonstrates.  Full range of motion lumbar spine with pain upon: flexion, lateral rotation to the left/right, and bending to the left/right.  Bilateral lumbar paraspinals tender to palpation. Muscle spasms noted in the lumbar area bilaterally. 3/5 left lower extremity strength in all muscle groups.  Positive seated straight leg raise for bilateral lower extremities.  Sensitivity to light touch intact bilateral lower extremities. 2+ reflexes in the patella and Achilles.  No ankle clonus    Imaging  No orders to display       No orders of the defined types were placed in this encounter.

## 2025-02-13 NOTE — TELEPHONE ENCOUNTER
You can offer Bactrim antibiotic- let her know the best care would be to collect a urine for culture to make sure she is on the right antibiotic- give her these options and send Bactrim to me for approval twice daily x 5 days if she wants this.

## 2025-02-23 ENCOUNTER — HOSPITAL ENCOUNTER (OUTPATIENT)
Dept: MRI IMAGING | Facility: HOSPITAL | Age: 70
Discharge: HOME | End: 2025-02-23
Payer: COMMERCIAL

## 2025-02-23 DIAGNOSIS — R29.898 LEFT LEG WEAKNESS: ICD-10-CM

## 2025-02-23 DIAGNOSIS — G89.4 CHRONIC PAIN SYNDROME: ICD-10-CM

## 2025-02-23 DIAGNOSIS — M51.16 LUMBAR DISC DISEASE WITH RADICULOPATHY: ICD-10-CM

## 2025-02-23 DIAGNOSIS — M54.16 LUMBAR RADICULOPATHY: ICD-10-CM

## 2025-02-23 PROCEDURE — 72148 MRI LUMBAR SPINE W/O DYE: CPT

## 2025-02-26 DIAGNOSIS — N39.0 URINARY TRACT INFECTION WITHOUT HEMATURIA, SITE UNSPECIFIED: ICD-10-CM

## 2025-02-27 RX ORDER — SULFAMETHOXAZOLE AND TRIMETHOPRIM 800; 160 MG/1; MG/1
TABLET ORAL
Qty: 10 TABLET | Refills: 0 | OUTPATIENT
Start: 2025-02-27

## 2025-02-27 NOTE — TELEPHONE ENCOUNTER
This medication was sent by PCP 2 weeks ago for UTI.     Called her to verify need for rx. She states she does need the refill. Patient reports she started the medication, and did not complete course as she was feeling better.     Advised patient she needs to stop down to the office to provide urine sample. She verbalized understanding, and will be down later today.

## 2025-03-10 ENCOUNTER — RA CDI HCC (OUTPATIENT)
Dept: OTHER | Facility: HOSPITAL | Age: 70
End: 2025-03-10

## 2025-03-17 ENCOUNTER — OFFICE VISIT (OUTPATIENT)
Dept: FAMILY MEDICINE CLINIC | Facility: CLINIC | Age: 70
End: 2025-03-17
Payer: COMMERCIAL

## 2025-03-17 VITALS
HEART RATE: 66 BPM | WEIGHT: 162 LBS | SYSTOLIC BLOOD PRESSURE: 122 MMHG | HEIGHT: 63 IN | BODY MASS INDEX: 28.7 KG/M2 | OXYGEN SATURATION: 98 % | RESPIRATION RATE: 18 BRPM | TEMPERATURE: 98.4 F | DIASTOLIC BLOOD PRESSURE: 70 MMHG

## 2025-03-17 DIAGNOSIS — E78.5 HYPERLIPIDEMIA, UNSPECIFIED HYPERLIPIDEMIA TYPE: ICD-10-CM

## 2025-03-17 DIAGNOSIS — G47.09 OTHER INSOMNIA: ICD-10-CM

## 2025-03-17 DIAGNOSIS — I10 ESSENTIAL HYPERTENSION: ICD-10-CM

## 2025-03-17 DIAGNOSIS — K21.9 GASTROESOPHAGEAL REFLUX DISEASE WITHOUT ESOPHAGITIS: ICD-10-CM

## 2025-03-17 DIAGNOSIS — E55.9 VITAMIN D DEFICIENCY: ICD-10-CM

## 2025-03-17 DIAGNOSIS — M79.671 BILATERAL FOOT PAIN: ICD-10-CM

## 2025-03-17 DIAGNOSIS — R73.01 IMPAIRED FASTING GLUCOSE: ICD-10-CM

## 2025-03-17 DIAGNOSIS — M79.672 BILATERAL FOOT PAIN: ICD-10-CM

## 2025-03-17 DIAGNOSIS — Z12.31 ENCOUNTER FOR SCREENING MAMMOGRAM FOR MALIGNANT NEOPLASM OF BREAST: ICD-10-CM

## 2025-03-17 DIAGNOSIS — Z00.00 ENCOUNTER FOR MEDICARE ANNUAL WELLNESS EXAM: Primary | ICD-10-CM

## 2025-03-17 PROCEDURE — G0439 PPPS, SUBSEQ VISIT: HCPCS | Performed by: NURSE PRACTITIONER

## 2025-03-17 PROCEDURE — G2211 COMPLEX E/M VISIT ADD ON: HCPCS | Performed by: NURSE PRACTITIONER

## 2025-03-17 PROCEDURE — 99214 OFFICE O/P EST MOD 30 MIN: CPT | Performed by: NURSE PRACTITIONER

## 2025-03-17 NOTE — PROGRESS NOTES
Name: Jamee Shaikh      : 1955      MRN: [id number]  Encounter Provider: GUS Sosa  Encounter Date: 3/17/2025   Encounter department: [facility] PRIMARY CARE    Assessment & Plan  Encounter for screening mammogram for malignant neoplasm of breast    Orders:    Mammo screening bilateral w 3d and cad; Future    Hyperlipidemia, unspecified hyperlipidemia type    Orders:    Lipid panel; Future    Vitamin D deficiency    Orders:    Vitamin D 25 hydroxy; Future    Impaired fasting glucose    Orders:    Hemoglobin A1C; Future    Encounter for Medicare annual wellness exam    Orders:    Comprehensive metabolic panel; Future    CBC and differential; Future    Bilateral foot pain    Orders:    Ambulatory Referral to Podiatry; Future    Essential hypertension         Gastroesophageal reflux disease without esophagitis         Other insomnia           Depression Screening and Follow-up Plan: Patient was screened for depression during today's encounter. They screened negative with a PHQ-2 score of 0.        Preventive health issues were discussed with patient, and age appropriate screening tests were ordered as noted in patient's After Visit Summary. Personalized health advice and appropriate referrals for health education or preventive services given if needed, as noted in patient's After Visit Summary.    History of Present Illness     Here for 6 month [facility] and medicare wellness visit  C/o large lumps/pain on top of her feet, bilateral are the same but are getting bigger- she would like a referral to podiatry  Questions about her weight. Was 158 1 year ago, she is 162 today. We discussed counting calories, portion control, exercise, medication options.        Patient Care Team:  GUS Sosa as PCP - General (Family Medicine)  Rebekah Rey DO as PCP - PCP-[facility] ([facility])  DO Jolly Malcolm DO Kimberly Jegel Chaput, DO as Endoscopist  Lorena  JOSE MANUEL Webb as Physician Assistant (Physician Assistant)    Review of Systems   Constitutional:  Negative for activity change, diaphoresis, fatigue and fever.   HENT:  Negative for congestion, facial swelling, hearing loss, rhinorrhea, sinus pressure, sinus pain, sneezing, sore throat and voice change.    Eyes:  Negative for discharge and visual disturbance.   Respiratory:  Negative for cough, choking, chest tightness, shortness of breath, wheezing and stridor.    Cardiovascular:  Negative for chest pain, palpitations and leg swelling.   Gastrointestinal:  Negative for abdominal distention, abdominal pain, constipation, diarrhea, nausea and vomiting.   Endocrine: Negative for polydipsia, polyphagia and polyuria.   Genitourinary:  Negative for difficulty urinating, dysuria, frequency and urgency.   Musculoskeletal:  Positive for arthralgias and back pain. Negative for joint swelling, myalgias, neck pain and neck stiffness.   Skin:  Negative for color change, rash and wound.   Neurological:  Negative for dizziness, syncope, speech difficulty, weakness, light-headedness and headaches.   Hematological:  Negative for adenopathy. Does not bruise/bleed easily.   Psychiatric/Behavioral:  Negative for agitation, behavioral problems, confusion, hallucinations, sleep disturbance and suicidal ideas. The patient is not nervous/anxious.      Medical History Reviewed by provider this encounter:  Tobacco  Allergies  Meds  Problems  Med Hx  Surg Hx  Fam Hx       Annual Wellness Visit Questionnaire   Jamee is here for her Subsequent Wellness visit. Last Medicare Wellness visit information reviewed, patient interviewed and updates made to the record today.      Health Risk Assessment:   Patient rates overall health as good. Patient feels that their physical health rating is same. Patient is very satisfied with their life. Eyesight was rated as same. Hearing was rated as same. Patient feels that their emotional and mental  health rating is same. Patients states they are never, rarely angry. Patient states they are never, rarely unusually tired/fatigued. Pain experienced in the last 7 days has been a lot. Patient's pain rating has been 7/10. Patient states that she has experienced weight loss or gain in last 6 months.     Depression Screening:   PHQ-2 Score: 0      Fall Risk Screening:   In the past year, patient has experienced: history of falling in past year    Number of falls: 1  Injured during fall?: Yes    Feels unsteady when standing or walking?: Yes    Worried about falling?: Yes      Urinary Incontinence Screening:   Patient has not leaked urine accidently in the last six months.     Home Safety:  Patient has trouble with stairs inside or outside of their home. Patient has working smoke alarms and has working carbon monoxide detector. Home safety hazards include: none.     Nutrition:   Current diet is Regular.     Medications:   Patient is currently taking over-the-counter supplements. OTC medications include: see medication list. Patient is able to manage medications.     Activities of Daily Living (ADLs)/Instrumental Activities of Daily Living (IADLs):   Walk and transfer into and out of bed and chair?: Yes  Dress and groom yourself?: Yes    Bathe or shower yourself?: Yes    Feed yourself? Yes  Do your laundry/housekeeping?: Yes  Manage your money, pay your bills and track your expenses?: Yes  Make your own meals?: Yes    Do your own shopping?: Yes    Durable Medical Equipment Suppliers  None    Previous Hospitalizations:   Any hospitalizations or ED visits within the last 12 months?: No      Advance Care Planning:   Living will: No    Durable POA for healthcare: No    Advanced directive: No    Advanced directive counseling given: Yes    ACP document given: Yes      PREVENTIVE SCREENINGS      Cardiovascular Screening:    General: Screening Not Indicated and History Lipid Disorder    Due for: Lipid Panel      Diabetes  Screening:     General: Screening Current    Due for: Blood Glucose      Colorectal Cancer Screening:     General: Screening Current      Breast Cancer Screening:     General: Screening Current      Cervical Cancer Screening:    General: Screening Not Indicated      Lung Cancer Screening:     General: Screening Not Indicated      Hepatitis C Screening:    General: Screening Current    Screening, Brief Intervention, and Referral to Treatment (SBIRT)     Screening  Typical number of drinks in a day: 0  Typical number of drinks in a week: 0  Interpretation: Low risk drinking behavior.    AUDIT-C Screenin) How often did you have a drink containing alcohol in the past year? never  2) How many drinks did you have on a typical day when you were drinking in the past year? 0  3) How often did you have 6 or more drinks on one occasion in the past year? never    AUDIT-C Score: 0  Interpretation: Score 0-2 (female): Negative screen for alcohol misuse    Single Item Drug Screening:  How often have you used an illegal drug (including marijuana) or a prescription medication for non-medical reasons in the past year? never    Single Item Drug Screen Score: 0  Interpretation: Negative screen for possible drug use disorder    Social Drivers of Health     Financial Resource Strain: Low Risk  (2024)    Overall Financial Resource Strain (CARDIA)     Difficulty of Paying Living Expenses: Not very hard   Food Insecurity: No Food Insecurity (3/12/2025)    Hunger Vital Sign     Worried About Running Out of Food in the Last Year: Never true     Ran Out of Food in the Last Year: Never true   Transportation Needs: No Transportation Needs (3/12/2025)    PRAPARE - Transportation     Lack of Transportation (Medical): No     Lack of Transportation (Non-Medical): No   Housing Stability: Low Risk  (3/12/2025)    Housing Stability Vital Sign     Unable to Pay for Housing in the Last Year: No     Number of Times Moved in the Last Year: 0      "Homeless in the Last Year: No   Utilities: Not At Risk (3/12/2025)    [facility] Utilities     Threatened with loss of utilities: No     No results found.    Objective   /70   Pulse 66   Temp 98.4 °F (36.9 °C)   Resp 18   Ht 5' 3\" (1.6 m)   Wt 73.5 kg (162 lb)   SpO2 98%   BMI 28.70 kg/m²     Physical Exam  Vitals and nursing note reviewed.   Constitutional:       General: She is not in acute distress.     Appearance: She is well-developed. She is not diaphoretic.   Neck:      Thyroid: No thyromegaly.      Vascular: No carotid bruit.      Trachea: No tracheal deviation.   Cardiovascular:      Rate and Rhythm: Normal rate and regular rhythm.      Heart sounds: Normal heart sounds. No murmur heard.  Pulmonary:      Effort: Pulmonary effort is normal. No respiratory distress.      Breath sounds: Normal breath sounds. No wheezing.   Musculoskeletal:         General: No tenderness or deformity. Normal range of motion.      Cervical back: Normal range of motion and neck supple.      Right lower leg: No edema.      Left lower leg: No edema.        Feet:    Feet:      Comments: Bony protrusion bilateral feet, nontender with palpation, no erythema  Lymphadenopathy:      Cervical: No cervical adenopathy.   Skin:     General: Skin is warm and dry.      Findings: No erythema or rash.   Neurological:      Mental Status: She is alert and oriented to person, place, and time.   Psychiatric:         Mood and Affect: Mood normal.         Behavior: Behavior normal. Behavior is cooperative.         Thought Content: Thought content normal.         Judgment: Judgment normal.         "

## 2025-03-17 NOTE — PATIENT INSTRUCTIONS
Medicare Preventive Visit Patient Instructions  Thank you for completing your Welcome to Medicare Visit or Medicare Annual Wellness Visit today. Your next wellness visit will be due in one year (3/18/2026).  The screening/preventive services that you may require over the next 5-10 years are detailed below. Some tests may not apply to you based off risk factors and/or age. Screening tests ordered at today's visit but not completed yet may show as past due. Also, please note that scanned in results may not display below.  Preventive Screenings:  Service Recommendations Previous Testing/Comments   Colorectal Cancer Screening  * Colonoscopy    * Fecal Occult Blood Test (FOBT)/Fecal Immunochemical Test (FIT)  * Fecal DNA/Cologuard Test  * Flexible Sigmoidoscopy Age: 45-75 years old   Colonoscopy: every 10 years (may be performed more frequently if at higher risk)  OR  FOBT/FIT: every 1 year  OR  Cologuard: every 3 years  OR  Sigmoidoscopy: every 5 years  Screening may be recommended earlier than age 45 if at higher risk for colorectal cancer. Also, an individualized decision between you and your healthcare provider will decide whether screening between the ages of 76-85 would be appropriate. Colonoscopy: 11/06/2023  FOBT/FIT: Not on file  Cologuard: Not on file  Sigmoidoscopy: Not on file    Screening Current     Breast Cancer Screening Age: 40+ years old  Frequency: every 1-2 years  Not required if history of left and right mastectomy Mammogram: 03/19/2024    Screening Current   Cervical Cancer Screening Between the ages of 21-29, pap smear recommended once every 3 years.   Between the ages of 30-65, can perform pap smear with HPV co-testing every 5 years.   Recommendations may differ for women with a history of total hysterectomy, cervical cancer, or abnormal pap smears in past. Pap Smear: 06/20/2019    Screening Not Indicated   Hepatitis C Screening Once for adults born between 1945 and 1965  More frequently in  patients at high risk for Hepatitis C Hep C Antibody: 05/15/2018    Screening Current   Diabetes Screening 1-2 times per year if you're at risk for diabetes or have pre-diabetes Fasting glucose: 77 mg/dL (10/22/2024)  A1C: 5.6 % (10/22/2024)  Screening Current   Cholesterol Screening Once every 5 years if you don't have a lipid disorder. May order more often based on risk factors. Lipid panel: 10/22/2024    Screening Not Indicated  History Lipid Disorder     Other Preventive Screenings Covered by Medicare:  Abdominal Aortic Aneurysm (AAA) Screening: covered once if your at risk. You're considered to be at risk if you have a family history of AAA.  Lung Cancer Screening: covers low dose CT scan once per year if you meet all of the following conditions: (1) Age 55-77; (2) No signs or symptoms of lung cancer; (3) Current smoker or have quit smoking within the last 15 years; (4) You have a tobacco smoking history of at least 20 pack years (packs per day multiplied by number of years you smoked); (5) You get a written order from a healthcare provider.  Glaucoma Screening: covered annually if you're considered high risk: (1) You have diabetes OR (2) Family history of glaucoma OR (3)  aged 50 and older OR (4)  American aged 65 and older  Osteoporosis Screening: covered every 2 years if you meet one of the following conditions: (1) You're estrogen deficient and at risk for osteoporosis based off medical history and other findings; (2) Have a vertebral abnormality; (3) On glucocorticoid therapy for more than 3 months; (4) Have primary hyperparathyroidism; (5) On osteoporosis medications and need to assess response to drug therapy.   Last bone density test (DXA Scan): 03/19/2024.  HIV Screening: covered annually if you're between the age of 15-65. Also covered annually if you are younger than 15 and older than 65 with risk factors for HIV infection. For pregnant patients, it is covered up to 3 times  per pregnancy.    Immunizations:  Immunization Recommendations   Influenza Vaccine Annual influenza vaccination during flu season is recommended for all persons aged >= 6 months who do not have contraindications   Pneumococcal Vaccine   * Pneumococcal conjugate vaccine = PCV13 (Prevnar 13), PCV15 (Vaxneuvance), PCV20 (Prevnar 20)  * Pneumococcal polysaccharide vaccine = PPSV23 (Pneumovax) Adults 19-63 yo with certain risk factors or if 65+ yo  If never received any pneumonia vaccine: recommend Prevnar 20 (PCV20)  Give PCV20 if previously received 1 dose of PCV13 or PPSV23   Hepatitis B Vaccine 3 dose series if at intermediate or high risk (ex: diabetes, end stage renal disease, liver disease)   Respiratory syncytial virus (RSV) Vaccine - COVERED BY MEDICARE PART D  * RSVPreF3 (Arexvy) CDC recommends that adults 60 years of age and older may receive a single dose of RSV vaccine using shared clinical decision-making (SCDM)   Tetanus (Td) Vaccine - COST NOT COVERED BY MEDICARE PART B Following completion of primary series, a booster dose should be given every 10 years to maintain immunity against tetanus. Td may also be given as tetanus wound prophylaxis.   Tdap Vaccine - COST NOT COVERED BY MEDICARE PART B Recommended at least once for all adults. For pregnant patients, recommended with each pregnancy.   Shingles Vaccine (Shingrix) - COST NOT COVERED BY MEDICARE PART B  2 shot series recommended in those 19 years and older who have or will have weakened immune systems or those 50 years and older     Health Maintenance Due:      Topic Date Due   • Breast Cancer Screening: Mammogram  03/19/2025   • Colorectal Cancer Screening  11/05/2026   • Hepatitis C Screening  Completed     Immunizations Due:      Topic Date Due   • Influenza Vaccine (1) 09/01/2024   • COVID-19 Vaccine (3 - 2024-25 season) 09/01/2024     Advance Directives   What are advance directives?  Advance directives are legal documents that state your wishes  and plans for medical care. These plans are made ahead of time in case you lose your ability to make decisions for yourself. Advance directives can apply to any medical decision, such as the treatments you want, and if you want to donate organs.   What are the types of advance directives?  There are many types of advance directives, and each state has rules about how to use them. You may choose a combination of any of the following:  Living will:  This is a written record of the treatment you want. You can also choose which treatments you do not want, which to limit, and which to stop at a certain time. This includes surgery, medicine, IV fluid, and tube feedings.   Durable power of  for healthcare (DPAHC):  This is a written record that states who you want to make healthcare choices for you when you are unable to make them for yourself. This person, called a proxy, is usually a family member or a friend. You may choose more than 1 proxy.  Do not resuscitate (DNR) order:  A DNR order is used in case your heart stops beating or you stop breathing. It is a request not to have certain forms of treatment, such as CPR. A DNR order may be included in other types of advance directives.  Medical directive:  This covers the care that you want if you are in a coma, near death, or unable to make decisions for yourself. You can list the treatments you want for each condition. Treatment may include pain medicine, surgery, blood transfusions, dialysis, IV or tube feedings, and a ventilator (breathing machine).  Values history:  This document has questions about your views, beliefs, and how you feel and think about life. This information can help others choose the care that you would choose.  Why are advance directives important?  An advance directive helps you control your care. Although spoken wishes may be used, it is better to have your wishes written down. Spoken wishes can be misunderstood, or not followed.  Treatments may be given even if you do not want them. An advance directive may make it easier for your family to make difficult choices about your care.   Fall Prevention    Fall prevention  includes ways to make your home and other areas safer. It also includes ways you can move more carefully to prevent a fall. Health conditions that cause changes in your blood pressure, vision, or muscle strength and coordination may increase your risk for falls. Medicines may also increase your risk for falls if they make you dizzy, weak, or sleepy.   Fall prevention tips:   Stand or sit up slowly.    Use assistive devices as directed.    Wear shoes that fit well and have soles that .    Wear a personal alarm.    Stay active.    Manage your medical conditions.    Home Safety Tips:  Add items to prevent falls in the bathroom.    Keep paths clear.    Install bright lights in your home.    Keep items you use often on shelves within reach.    Paint or place reflective tape on the edges of your stairs.    Weight Management   Why it is important to manage your weight:  Being overweight increases your risk of health conditions such as heart disease, high blood pressure, type 2 diabetes, and certain types of cancer. It can also increase your risk for osteoarthritis, sleep apnea, and other respiratory problems. Aim for a slow, steady weight loss. Even a small amount of weight loss can lower your risk of health problems.  How to lose weight safely:  A safe and healthy way to lose weight is to eat fewer calories and get regular exercise. You can lose up about 1 pound a week by decreasing the number of calories you eat by 500 calories each day.   Healthy meal plan for weight management:  A healthy meal plan includes a variety of foods, contains fewer calories, and helps you stay healthy. A healthy meal plan includes the following:  Eat whole-grain foods more often.  A healthy meal plan should contain fiber. Fiber is the part of grains,  fruits, and vegetables that is not broken down by your body. Whole-grain foods are healthy and provide extra fiber in your diet. Some examples of whole-grain foods are whole-wheat breads and pastas, oatmeal, brown rice, and bulgur.  Eat a variety of vegetables every day.  Include dark, leafy greens such as spinach, kale, karson greens, and mustard greens. Eat yellow and orange vegetables such as carrots, sweet potatoes, and winter squash.   Eat a variety of fruits every day.  Choose fresh or canned fruit (canned in its own juice or light syrup) instead of juice. Fruit juice has very little or no fiber.  Eat low-fat dairy foods.  Drink fat-free (skim) milk or 1% milk. Eat fat-free yogurt and low-fat cottage cheese. Try low-fat cheeses such as mozzarella and other reduced-fat cheeses.  Choose meat and other protein foods that are low in fat.  Choose beans or other legumes such as split peas or lentils. Choose fish, skinless poultry (chicken or turkey), or lean cuts of red meat (beef or pork). Before you cook meat or poultry, cut off any visible fat.   Use less fat and oil.  Try baking foods instead of frying them. Add less fat, such as margarine, sour cream, regular salad dressing and mayonnaise to foods. Eat fewer high-fat foods. Some examples of high-fat foods include french fries, doughnuts, ice cream, and cakes.  Eat fewer sweets.  Limit foods and drinks that are high in sugar. This includes candy, cookies, regular soda, and sweetened drinks.  Exercise:  Exercise at least 30 minutes per day on most days of the week. Some examples of exercise include walking, biking, dancing, and swimming. You can also fit in more physical activity by taking the stairs instead of the elevator or parking farther away from stores. Ask your healthcare provider about the best exercise plan for you.      © Copyright [employer] 2018 Information is for End User's use only and may not be sold, redistributed or otherwise used for  commercial purposes. All illustrations and images included in CareNotes® are the copyrighted property of [email or web address] or [employer]

## 2025-03-18 ENCOUNTER — OFFICE VISIT (OUTPATIENT)
Age: 70
End: 2025-03-18
Payer: COMMERCIAL

## 2025-03-18 VITALS — BODY MASS INDEX: 28.7 KG/M2 | HEIGHT: 63 IN | WEIGHT: 162 LBS

## 2025-03-18 DIAGNOSIS — G89.4 CHRONIC PAIN SYNDROME: ICD-10-CM

## 2025-03-18 DIAGNOSIS — M54.14 THORACIC RADICULOPATHY: Primary | ICD-10-CM

## 2025-03-18 DIAGNOSIS — M54.16 LUMBAR RADICULOPATHY: ICD-10-CM

## 2025-03-18 PROCEDURE — G2211 COMPLEX E/M VISIT ADD ON: HCPCS | Performed by: ANESTHESIOLOGY

## 2025-03-18 PROCEDURE — 99214 OFFICE O/P EST MOD 30 MIN: CPT | Performed by: ANESTHESIOLOGY

## 2025-03-18 RX ORDER — METHYLPREDNISOLONE 4 MG/1
TABLET ORAL
Qty: 21 TABLET | Refills: 0 | Status: SHIPPED | OUTPATIENT
Start: 2025-03-18

## 2025-03-18 NOTE — PROGRESS NOTES
Assessment:  1. Thoracic radiculopathy    2. Lumbar radiculopathy    3. Chronic pain syndrome        Plan:  Patient is a 70-year-old female complains of left-sided low back and hip pain and left leg weakness presents to office for follow-up visit.  MRI of lumbar spine was reviewed which showed multilevel mild spinal canal narrowing and neuroforaminal narrowing with degenerative disc changes with mild to moderate foraminal narrowing spinal canal narrowing at L5-S1.  There is some vertebral body compression at L1 and T12 with multilevel disc degeneration and facet arthropathy in addition to ligamentum flavum hypertrophy.  Patient also notes in addition to left lower extremity radicular symptoms into the buttocks and thigh she also has left thoracic radiculopathy into the flanks.  1.  We will trial a Medrol Dosepak to see if it alleviates patient's radicular symptoms if it does we will then consider an epidural steroid injection targeted towards patient's nerve root presentation  2.  We will follow-up 1 month          History of Present Illness:  The patient is a 70 y.o. female who presents for a follow up office visit in regards to Hip Pain.   The patient’s current symptoms include 7 out of 10 constant sharp, pressure-like pain without a particular time pattern.    Current pain medications includes:  none    I have personally reviewed and/or updated the patient's past medical history, past surgical history, family history, social history, current medications, allergies, and vital signs today.         Review of Systems  Review of Systems   Constitutional:  Negative for unexpected weight change.   HENT:  Negative for hearing loss.    Eyes:  Negative for visual disturbance.   Respiratory:  Negative for shortness of breath.    Cardiovascular:  Negative for leg swelling.   Gastrointestinal:  Negative for constipation.   Endocrine: Negative for polyuria.   Genitourinary:  Negative for difficulty urinating.  "  Musculoskeletal:  Positive for gait problem. Negative for joint swelling and myalgias.        Pain in extremity   Skin:  Negative for rash.   Neurological:  Negative for weakness and headaches.   Psychiatric/Behavioral:  Negative for decreased concentration.    All other systems reviewed and are negative.      Past Medical History:   Diagnosis Date    Anemia     Arthritis     Bilateral bunions     Chronic pain disorder     back and hip pain    Colon polyp     Disease of thyroid gland     nodules    History of COVID-2020    mild s/s    History of gastroesophageal reflux (GERD)     \"had hiatal hernia surgery\"    History of heart murmur in childhood     Hyperlipidemia     Hypertension     Overactive bladder     Rotator cuff tear, right     had surgery    Vitamin D deficiency     unsure    Wears glasses     Wears partial dentures     upper       Past Surgical History:   Procedure Laterality Date     SECTION      Last Assessed: 2017    COLONOSCOPY      ELBOW SURGERY      Last Assessed: 2017    ESOPHAGOGASTRODUODENOSCOPY N/A 2017    Procedure: ESOPHAGOGASTRODUODENOSCOPY (EGD);  Surgeon: Jolly Lin DO;  Location: [facility] GI LAB;  Service: Gastroenterology    HIP SURGERY Left 2018    glut medius/minimus repair  and 18--with pin implanted    HYSTERECTOMY          JOINT REPLACEMENT      PARAESOPHAGEAL HERNIA REPAIR N/A 2020    Procedure: LAPAROSCOPIC PARAESOPHAGEAL HERNIA REPAIR WITH MESH AND NISSEN FUNDOPLICATION WITH ROBOTICS;  Surgeon: Darrin Leon MD;  Location: [facility] OR;  Service: General    AL ARTHRP KNE CONDYLE&PLATU MEDIAL&LAT COMPARTMENTS Left 03/15/2023    Procedure: ARTHROPLASTY KNEE TOTAL;  Surgeon: Bart Potts DO;  Location: [facility];  Service: Orthopedics    AL ARTHRS KNE SURG W/MENISCECTOMY MED/LAT W/SHVG Left 06/15/2022    Procedure: ;left knee arthroscopy, meniscectomy,synevectomy,chondroplasty, loose body removal, injection;  Surgeon: " Bart Potts DO;  Location: [facility];  Service: Orthopedics    OK COLONOSCOPY FLX DX W/COLLJ SPEC WHEN PFRMD N/A 06/26/2017    Procedure: EGD AND COLONOSCOPY;  Surgeon: Jolly Lin DO;  Location: [facility] GI LAB;  Service: Gastroenterology    OK DIAGNOSTIC ARTHROSCOPY SHOULDER +- SYNOVIAL BX Left 9/18/2024    Procedure: DIAGNOSTIC ARTHROSCOPY SHOULDER POSSIBLE REVISION ROTATOR CUFF REPAIR, EXTENSIVE DEBRIDEMENT;  Surgeon: Abdullahi Cortez MD;  Location: [facility];  Service: Orthopedics    OK EXCISON TUMOR SOFT TISSUE THIGH/KNEE SUBQ 3 CM/> Bilateral 05/26/2022    Procedure: EXCISION BIOPSY TISSUE LESION/MASS LOWER EXTREMITY;  Surgeon: Kit Tavera MD;  Location: [facility];  Service: General    OK EXCISON TUMOR SOFT TISSUE THIGH/KNEE SUBQ 3 CM/> Bilateral 08/25/2022    Procedure: EXCISION BIOPSY TISSUE LESION/MASS LOWER EXTREMITY;  Surgeon: Kit Tavera MD;  Location: [facility];  Service: General    OK OSTECTOMY PRTL 5TH METAR HEAD SPX Bilateral 08/15/2023    Procedure: BUNIONECTOMY TAILOR WITHOUT OSTEOTOMY;  Surgeon: Monet Pradhan DPM;  Location: [facility];  Service: Podiatry    OK SURGICAL ARTHROSCOPY JENNIFER W/CORACOACRM LIGM RLS Left 12/13/2023    Procedure: Left - ARTHROSCOPY SHOULDER  Synovectomy Debridement/chondroplasty Acromioplasty Arthroscopic rotator cuff repair Post arthroscopic injection of intra-articular joint space and peripheral portals;  Surgeon: Bart Potts DO;  Location: [facility];  Service: Orthopedics    SHOULDER ARTHROCENTESIS      SHOULDER ARTHROSCOPY Right 06/23/2021    Procedure: SHOULDER ARTHROSCOPY WITH acromioplasty, debridment, and ROTATOR CUFF REPAIR;  Surgeon: Bart Potts DO;  Location:  MAIN OR;  Service: Orthopedics    TUBAL LIGATION         Family History   Problem Relation Age of Onset    Colon cancer Mother     Heart attack Father     Other Father         Cardiac Disorder    Diabetes type II Father     Colon cancer Sister     No Known  "Problems Sister     No Known Problems Sister     No Known Problems Sister     No Known Problems Sister     No Known Problems Daughter     No Known Problems Maternal Aunt     No Known Problems Maternal Aunt     No Known Problems Maternal Aunt     Cancer Maternal Grandmother     No Known Problems Paternal Grandmother        Social History     Occupational History    Not on file   Tobacco Use    Smoking status: Former     Current packs/day: 0.00     Average packs/day: 1.5 packs/day for 30.0 years (45.0 ttl pk-yrs)     Types: Cigarettes     Start date: 1961     Quit date: 1991     Years since quittin.2    Smokeless tobacco: Never    Tobacco comments:     quit 25 yrs ago   Vaping Use    Vaping status: Never Used   Substance and Sexual Activity    Alcohol use: Never    Drug use: Never    Sexual activity: Not Currently     Comment: defer         Current Outpatient Medications:     amLODIPine (NORVASC) 5 mg tablet, Take 2 tablets (10 mg total) by mouth daily, Disp: 180 tablet, Rfl: 1    atorvastatin (LIPITOR) 10 mg tablet, Take 1 tablet (10 mg total) by mouth daily, Disp: 90 tablet, Rfl: 1    famotidine (PEPCID) 20 mg tablet, Take 1 tablet (20 mg total) by mouth 2 (two) times a day, Disp: 180 tablet, Rfl: 1    hydrOXYzine HCL (ATARAX) 25 mg tablet, take 1 tablet by mouth every 6 hours if needed for itching, Disp: 180 tablet, Rfl: 1    losartan (COZAAR) 100 MG tablet, Take 1 tablet (100 mg total) by mouth daily, Disp: 90 tablet, Rfl: 1    meloxicam (Mobic) 15 mg tablet, Take 1 tablet (15 mg total) by mouth daily, Disp: 90 tablet, Rfl: 1    valACYclovir (VALTREX) 1,000 mg tablet, take 1 tablet by mouth twice a day for 3 days (Patient not taking: No sig reported), Disp: 180 tablet, Rfl: 1    Allergies   Allergen Reactions    Hydrocodone Irritability       Physical Exam:    Ht 5' 3\" (1.6 m)   Wt 73.5 kg (162 lb)   BMI 28.70 kg/m²     Constitutional:normal, well developed, well nourished, alert, in no distress and " non-toxic and no overt pain behavior.  Eyes:anicteric  HEENT:grossly intact  Neck:supple, symmetric, trachea midline and no masses   Pulmonary:even and unlabored  Cardiovascular:No edema or pitting edema present  Skin:Normal without rashes or lesions and well hydrated  Psychiatric:Mood and affect appropriate  Neurologic:Cranial Nerves II-XII grossly intact  Musculoskeletal:antalgic.        Imaging  No orders to display       No orders of the defined types were placed in this encounter.

## 2025-03-18 NOTE — PATIENT INSTRUCTIONS
Patient Education     Core Strengthening Exercises on Back or on Hands and Knees   About this topic   Your core muscles are in your chest, back, buttock, and stomach area. They are your abdominal, back, and pelvis muscles. These muscles help keep your body stable when using your arms or legs. They also help with balance and posture. There are many exercises you can do to keep these muscles strong.  If you have back problems like a compression fracture or a ruptured disc, doing some of these exercises could make your problem worse. Some of these exercises may cause lower back pain.  General   Before starting with a program, ask your doctor if you are healthy enough to do these exercises. Your doctor may have you work with a , chiropractor, or physical therapist to make a safe exercise program to meet your needs.  Strengthening Exercises   Strengthening exercises keep your muscles firm and strong. Start by repeating each exercise 2 to 3 times. Work up to doing each exercise 10 times. Try to do the exercises 2 to 3 times each day. Hold each exercise for 3 to 5 seconds. Do all exercises slowly.  Hip lifts ? Lie on your back with your knees bent and feet flat on the floor. Tighten your stomach muscles and push your heels into the floor to lift your buttocks off the floor. Relax.  Pelvic tilts ? Lie on your back with your knees bent and feet flat on the floor. Tighten your stomach muscles and press your lower back down to the floor. Relax.  Straight leg raises lying down ? Lie on your back with one leg straight. Bend your other knee so the foot is flat on the bed. Keeping your leg straight, lift the leg up to the level of your other knee. Lower it back down. Repeat with the other leg.  Knee flex lying down ? Lie on your back with both knees bent and your feet flat on the floor. Tighten your belly muscles. Raise one leg up and back down as if you are marching in slow motion. Keep belly muscles tight while you move  your leg. Switch legs. To make this exercise harder, raise both arms straight up in the air. Tighten your belly muscles. When you raise one leg up, reach the opposite arm over your head. Switch, moving the opposite arm and leg until you have done 10 repetitions on each side.  Abdominal crunches ? Lie on your back with both knees bent. Keep your feet flat on the floor. Place your hands in one of these positions. Try starting with the first position since it is the easiest. As you get better, use the other positions to make it harder.  Crunches with arms at sides.  Crunches with arms across chest.  Crunches with arms behind head. Be careful not to interlock your fingers behind your neck or head while doing crunches. This may add tension to your neck and cause strain.  Look at the ceiling. Tighten your belly muscles and lift your shoulders and upper back off the floor. Breathe out while you are doing this. Lower your shoulders to the floor. Breathe in while you are doing this. Relax your belly muscles all the way before starting another crunch.  Arm and leg lifts on hands and knees ? Start on your hands and knees. With all of these exercises, keep your back as level as possible. If you are having trouble with this, you may want to put a small object on your back such as a book. If it falls off, you are not keeping your back level enough during the exercise.  Lift one arm up to shoulder level and hold. Lower it back down. Now, lift up the other arm and hold.  Lift one leg up and kick it straight out until it is in line with your back and hold. Lower it back down. Now, lift up the other leg and hold.  Lift one arm and the OPPOSITE leg up at the same time and hold. Lower them down. Now, repeat using the other arm and leg. This is a very hard exercise. It may take time to be able to do this.               What will the results be?   Stronger core  Better balance  More toned belly and back muscles  Easier to do daily  activities  Better sports performance  Less low back pain  Helpful tips   Stay active and work out to keep your muscles strong and flexible.  Keep a healthy weight to avoid putting too much stress on your spine. Eat a healthy diet to keep your muscles healthy.  Be sure you do not hold your breath when exercising. This can raise your blood pressure. If you tend to hold your breath, try counting out loud when exercising. If any exercise bothers you, stop right away.  Try walking or cycling at an easy pace for a few minutes to warm up your muscles. Do this again after exercising.  Exercise may be slightly uncomfortable, but you should not have sharp pains. If you do get sharp pains, stop what you are doing. If the sharp pains continue, call your doctor.  Last Reviewed Date   2021-03-18  Consumer Information Use and Disclaimer   This generalized information is a limited summary of diagnosis, treatment, and/or medication information. It is not meant to be comprehensive and should be used as a tool to help the user understand and/or assess potential diagnostic and treatment options. It does NOT include all information about conditions, treatments, medications, side effects, or risks that may apply to a specific patient. It is not intended to be medical advice or a substitute for the medical advice, diagnosis, or treatment of a health care provider based on the health care provider's examination and assessment of a patient’s specific and unique circumstances. Patients must speak with a health care provider for complete information about their health, medical questions, and treatment options, including any risks or benefits regarding use of medications. This information does not endorse any treatments or medications as safe, effective, or approved for treating a specific patient. [employer] and its affiliates disclaim any warranty or liability relating to this information or the use thereof. The use of this information  is governed by the Terms of Use, available at https://www.woltersMaginuwer.com/en/know/clinical-effectiveness-terms   Copyright   Copyright © 2024 [employer] and its affiliates and/or licensors. All rights reserved.

## 2025-03-25 ENCOUNTER — OFFICE VISIT (OUTPATIENT)
Age: 70
End: 2025-03-25
Payer: COMMERCIAL

## 2025-03-25 VITALS — HEIGHT: 63 IN | BODY MASS INDEX: 28.7 KG/M2 | WEIGHT: 162 LBS

## 2025-03-25 DIAGNOSIS — M79.671 PAIN IN BOTH FEET: Primary | ICD-10-CM

## 2025-03-25 DIAGNOSIS — M79.672 PAIN IN BOTH FEET: Primary | ICD-10-CM

## 2025-03-25 DIAGNOSIS — M19.072 ARTHRITIS OF BOTH MIDFEET: ICD-10-CM

## 2025-03-25 DIAGNOSIS — M19.071 ARTHRITIS OF BOTH MIDFEET: ICD-10-CM

## 2025-03-25 DIAGNOSIS — M79.672 BILATERAL FOOT PAIN: ICD-10-CM

## 2025-03-25 DIAGNOSIS — M79.671 BILATERAL FOOT PAIN: ICD-10-CM

## 2025-03-25 PROCEDURE — 99203 OFFICE O/P NEW LOW 30 MIN: CPT

## 2025-03-25 NOTE — PROGRESS NOTES
Name: Jamee Shaikh      : 1955      MRN: [id number]  Encounter Provider: J Carlos Delong DPM  Encounter Date: 3/25/2025   Encounter department: [facility]  :  Assessment & Plan  Pain in both feet         Bilateral foot pain    Orders:    Ambulatory Referral to Podiatry    Arthritis of both midfeet         -Patient was educated regarding their condition.  -Recommend use of voltaren gel  -Patient instructed on how to relace shoes in a way to take pressure off of her arthritic midfoot.   -Recommend more supportive shoes with wide toe box, OTC orthotics  -RTC as needed    X-ray of left and right foot from 3/25/2025 interpreted independently: No acute osseous abnormalities, radiographic evidence of osteoarthritis bilateral midfoot    History of Present Illness   HPI  Jamee Shaikh is a 70 y.o. female who presents bilateral foot pain/swelling. She reports lump on dorsal feet.  Ongoing for several years.  She denies any pain to the feet today.    History obtained from: patient    Review of Systems  Current Outpatient Medications on File Prior to Visit   Medication Sig Dispense Refill    amLODIPine (NORVASC) 5 mg tablet Take 2 tablets (10 mg total) by mouth daily 180 tablet 1    atorvastatin (LIPITOR) 10 mg tablet Take 1 tablet (10 mg total) by mouth daily 90 tablet 1    famotidine (PEPCID) 20 mg tablet Take 1 tablet (20 mg total) by mouth 2 (two) times a day 180 tablet 1    hydrOXYzine HCL (ATARAX) 25 mg tablet take 1 tablet by mouth every 6 hours if needed for itching 180 tablet 1    losartan (COZAAR) 100 MG tablet Take 1 tablet (100 mg total) by mouth daily 90 tablet 1    meloxicam (Mobic) 15 mg tablet Take 1 tablet (15 mg total) by mouth daily 90 tablet 1    methylPREDNISolone 4 MG tablet therapy pack Use as directed on package 21 tablet 0    valACYclovir (VALTREX) 1,000 mg tablet take 1 tablet by mouth twice a day for 3 days (Patient not taking: No sig reported) 180 tablet 1      No current facility-administered medications on file prior to visit.         Objective   There were no vitals taken for this visit.     Physical Exam    Vascular:  -DP and PT pulses intact b/l  -Capillary refill time <2 sec b/l  -Digital hair growth: Present  -Skin temp: WNL    MSK:  -minimal pain on palpation of the 1,2,3,4,5 TMTJ(s)  -No gross deformities noted   -MMT is 5/5 to all muscle compartments of the lower extremity  -Ankle dorsiflexion >10 degrees with knee extended and knee flexed b/l  -No pain with dorsiflexion and plantarflexion of the metatarsals    Neuro:  -Light sensation intact bilaterally  -Protective sensation intact bilaterally with 10/10 points felt with [place] Camryn monofilament    Derm:  -No lesions, abrasions, or open wounds noted  -Erythema not present  -Mild edema dorsal midfoot bilateral  -No callus formation noted on exam

## 2025-03-27 ENCOUNTER — OFFICE VISIT (OUTPATIENT)
Dept: OBGYN CLINIC | Facility: OTHER | Age: 70
End: 2025-03-27
Payer: COMMERCIAL

## 2025-03-27 VITALS — BODY MASS INDEX: 28.7 KG/M2 | WEIGHT: 162 LBS | HEIGHT: 63 IN

## 2025-03-27 DIAGNOSIS — M75.102 TEAR OF LEFT SUPRASPINATUS TENDON: Primary | ICD-10-CM

## 2025-03-27 DIAGNOSIS — M19.012 OSTEOARTHRITIS OF GLENOHUMERAL JOINT, LEFT: ICD-10-CM

## 2025-03-27 PROBLEM — M17.11 PRIMARY OSTEOARTHRITIS OF ONE KNEE, RIGHT: Status: ACTIVE | Noted: 2025-03-27

## 2025-03-27 PROCEDURE — 99214 OFFICE O/P EST MOD 30 MIN: CPT | Performed by: ORTHOPAEDIC SURGERY

## 2025-03-27 RX ORDER — CHLORHEXIDINE GLUCONATE ORAL RINSE 1.2 MG/ML
15 SOLUTION DENTAL ONCE
OUTPATIENT
Start: 2025-03-27 | End: 2025-03-27

## 2025-03-27 NOTE — ASSESSMENT & PLAN NOTE
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 Discussed operative vs non operative treatments with the patient today. She has tried and failed non operative treatments (cortisone injection on 1/23/25, activity modifications and formal PT/HEP) up to this point and wishes to proceed forward with the previously mentioned reverse total shoulder arthroplasty due to continued pain/symptoms on a daily basis. She understood and all questions were answered.  Patient has tried and failed conservative treatment measures, including activity modification, time, steroid injections and physical therapy.  They are indicated for total shoulder arthroplasty.  Patient is experiencing severe pain and functional disability which interferes with ADLs from their advanced joint disease.  A thorough discussion was performed with the patient reviewing all operative and nonoperative options as well as the risks of the procedure.  Risks discussed include but not limited to persistent pain, dislocation, loosening of the prosthesis, infection, need for further surgery including revision to a reverse total shoulder, rotator cuff rupture , neurovascular injury, as well as the risk of anesthesia.  After this discussion all questions were answered and informed consent was obtained for Total Shoulder Arthroplasty of the *** shoulder.

## 2025-03-27 NOTE — PATIENT INSTRUCTIONS
What to Expect Before and After Shoulder Replacement Surgery  You are being scheduled for a shoulder replacement by Dr. Cortez to treat your shoulder condition.  Here is some information which may help to answer questions that you may have.  Please do not hesitate to reach out to our team to answer questions not addressed here.    Before Surgery  You will be contacted the evening prior to your surgery to confirm the scheduled time of the procedure and when to arrive at the hospital.   Do not eat or drink anything after midnight the night before your surgery so that the anesthesia can be performed safely.  If you have been fitted for a sling in the office prior to the surgery please remember to bring it to the hospital.  You will meet the anesthesiologist the morning of the surgery.  The surgery is performed under a general anesthetic but they will also offer you a regional block (shot to numb the arm) to help control your post-operative pain.  While everyone experiences that effect of the block differently, most patients find it very helpful at managing the post operative pain.    After Surgery  The shoulder replacement surgery typically takes approximately an hour.  When surgery is completed, your surgeon will update your family and friends on your condition and progress. You will remain in the recovery room for at least an hour or until the anesthesia has worn off and your blood pressure and pulse are stable. If you have pain, the nurses will give you medication.   Once out of surgery, your surgeon will decide on how long you will be using a sling in order to protect and position your shoulder. However, this won't keep you from starting physical therapy.  Exercises typically begin on the day after surgery with emphasis on moving the shoulder, wrist, and hand.  The physical therapist will be provided with a detailed protocol but typically the first 8 weeks are used to regain range of motion and then strengthening  is initiated. Starting strengthening exercises too early may lead to complications.    When You Are Discharged from the Orthopedic Hospital  You can expect to be released from the hospital the same day of the surgery as this has been defined as an outpatient surgery since 2023 (of course this may change if you have special needs or medical conditions). Before you are released, the treatment team (Orthopaedic surgery residents, physician assistants and physical therapists) will talk with you about the importance of limiting any sudden or stressful movements to the arm for several weeks or longer. Activities that involve pushing, pulling, and lifting should not be done until you are given permission from your surgeon.     Your First Day at Home  You may need help with your daily activities, so it is a good idea to have family and friends prepared to help you.   It is okay to remove the sling and let the arm hang at the side so that you can get cleaned and change your clothes.  To put on a shirt, place the bad arm in first and then the good arm.  Reverse to take it off.    Don't forget to wear the sling every night for at least the first month after surgery, and never use your arm to push yourself up in bed or from a chair. The added weight on your shoulder may cause you to re-injure the joint.    How to [place] in the First Week  You are encouraged to return to your normal eating and sleeping patterns as soon as possible. It is important for you to be active in order to control your weight and muscle tone.  It is ok to increase your activity level and even perform light aerobic exercise (like walking or riding a stationary bike) within the first week or so if you are feeling up to it.  If there is concern about these activates best to wait until the first post-operative visit and discuss this with the team.   You might be able to return to work within several days if you can perform your job while wearing a sling.  Consult with your doctor, as this differs from patient to patient. However, if your job requires heavy lifting or climbing, there may be a delay for several months.   Until you are seen for your first follow-up visit, please try and keep wound dry.   It is okay to shower but try your best to keep the incision out of direct contact with the water (or consider using a waterproof bandage).  If it does gets wet please dry as best as possible afterwards.  If you notice any drainage or a foul odor from your incision or your temperature goes above 101.5 degrees, please contact the office.     What You Can Expect in the First Month  Your first post-operative appointment will be with Dr Cortez’s physician assistant (PA) around 2 weeks after the surgery.  You skin staples will be removed and X-rays will be obtained at that visit.  Please understand that it is quite common to still experience pain at that time but the pain should be steadily improving.   Hopefully physical therapy has already begun but if not it will be initiated at this visit and will continue for the 8-12 weeks.   At about 12 weeks after surgery you will start a progressive strengthening program. Physical therapy is a deliberate process of not only strengthening your shoulder but also altering how you use your arm. It may be many months before your desired results are achieved, so do not get discouraged.  Your shoulder will generally continue to improve steadily up to 6-8 months after surgery. After that point further improvement is very slow; although it has been shown that even after a year or more, activity can increase as muscle strength continues to improve.    After the First Month at Home   Because each person heals differently, there are different recovery timelines. An average recovery period typically lasts about between 3-6 months.  Talk with your surgeon about which activities will be appropriate for you once you have recovered

## 2025-03-27 NOTE — ASSESSMENT & PLAN NOTE
Again, explained to the patient that her MRI showed a recurrent tear of the supraspinatus tendon of her left shoulder as well as significant glenohumeral joint osteoarthritis appreciated intra-operatively. Discussed operative vs non operative treatments with the patient today in the office. She has tried and failed non operative treatments up to this point (cortisone injections, last being on 1/23/25, activity modifications and formal PT/HEP). She wished to proceed forward with the previously mentioned reverse total shoulder arthroplasty procedure. She understood and all questions were answered  Patient has tried and failed conservative treatment measures, including activity modification, time, steroid injections and physical therapy.  They are indicated for total shoulder arthroplasty.  Patient is experiencing severe pain and functional disability which interferes with ADLs from their advanced joint disease.  A thorough discussion was performed with the patient reviewing all operative and nonoperative options as well as the risks of the procedure.  Risks discussed include but not limited to persistent pain, dislocation, loosening of the prosthesis, infection, need for further surgery including revision to a reverse total shoulder, rotator cuff rupture , neurovascular injury, as well as the risk of anesthesia.  After this discussion all questions were answered and informed consent was obtained for Reverse Total Shoulder Arthroplasty of the LEFT shoulder.

## 2025-03-27 NOTE — PROGRESS NOTES
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 Discussed operative vs non operative treatments with the patient today in the office. She has tried and failed non operative treatments up to this point (cortisone injections, last being on 1/23/25, activity modifications and formal PT/HEP). She wished to proceed forward with the previously mentioned reverse total shoulder arthroplasty procedure. She understood and all questions were answered  Patient has tried and failed conservative treatment measures, including activity modification, time, steroid injections and physical therapy.  They are indicated for total shoulder arthroplasty.  Patient is experiencing severe pain and functional disability which interferes with ADLs from their advanced joint disease.  A thorough discussion was performed with the patient reviewing all operative and nonoperative options as well as the risks of the procedure.  Risks discussed include but not limited to persistent pain, dislocation, loosening of the prosthesis, infection, need for further surgery including revision to a reverse total shoulder, rotator cuff rupture , neurovascular injury, as well as the risk of anesthesia.  After this discussion all questions were answered and informed consent was obtained for Reverse Total Shoulder Arthroplasty of the LEFT shoulder.      Subjective:   Patient ID: Jamee Shaikh is a 70 y.o. female presents today for a follow up visit for her left shoulder. She is now about 6 months s/p revision rotator cuff repair. Today, she continues to experience daily pain/symptoms about the shoulder, even with her daily routine activities. She had a cortisone injection on 1/23/25 with benefit for about 1  "week. She also continues to perform a home exercise program with limited benefit. Pain is worse with overhead and repetitive activities. She is now considering a surgical intervention due to the continued pain/symptoms. No numbness or tingling. No fevers or chills.     The following portions of the patient's history were reviewed and updated as appropriate: allergies, current medications, past family history, past medical history, past social history, past surgical history and problem list.    Objective:  Ht 5' 3\" (1.6 m)   Wt 73.5 kg (162 lb)   BMI 28.70 kg/m²       Left Shoulder Exam     Range of Motion   External rotation:  50   Forward flexion:  110     Muscle Strength   Abduction: 4/5   External rotation: 4/5     Other   Erythema: absent  Sensation: normal  Pulse: present             Physical Exam  Vitals and nursing note reviewed.   Constitutional:       Appearance: She is well-developed.   HENT:      Head: Normocephalic and atraumatic.   Eyes:      Pupils: Pupils are equal, round, and reactive to light.   Cardiovascular:      Rate and Rhythm: Normal rate and regular rhythm.      Pulses: Normal pulses.      Heart sounds: Normal heart sounds.   Pulmonary:      Effort: Pulmonary effort is normal. No respiratory distress.      Breath sounds: Normal breath sounds.   Abdominal:      General: There is no distension.      Palpations: Abdomen is soft.   Musculoskeletal:      Cervical back: Normal range of motion and neck supple.   Skin:     General: Skin is warm and dry.   Neurological:      Mental Status: She is alert and oriented to person, place, and time.   Psychiatric:         Mood and Affect: Mood normal.         Behavior: Behavior normal.         Thought Content: Thought content normal.         Judgment: Judgment normal.       I have personally reviewed pertinent films in PACS and my interpretation is as follows.    MRI left shoulder on 1/15/25 demonstrates a recurrent tear supraspinatus with mild atrophy and " retraction to the humeral head apex       Records Reviewed: office notes and operative photos    Scribe Attestation      I,:  Fabricio Stone am acting as a scribe while in the presence of the attending physician.:       I,:  Abdullahi Cortez MD personally performed the services described in this documentation    as scribed in my presence.:

## 2025-03-27 NOTE — H&P (VIEW-ONLY)
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 Discussed operative vs non operative treatments with the patient today in the office. She has tried and failed non operative treatments up to this point (cortisone injections, last being on 1/23/25, activity modifications and formal PT/HEP). She wished to proceed forward with the previously mentioned reverse total shoulder arthroplasty procedure. She understood and all questions were answered  Patient has tried and failed conservative treatment measures, including activity modification, time, steroid injections and physical therapy.  They are indicated for total shoulder arthroplasty.  Patient is experiencing severe pain and functional disability which interferes with ADLs from their advanced joint disease.  A thorough discussion was performed with the patient reviewing all operative and nonoperative options as well as the risks of the procedure.  Risks discussed include but not limited to persistent pain, dislocation, loosening of the prosthesis, infection, need for further surgery including revision to a reverse total shoulder, rotator cuff rupture , neurovascular injury, as well as the risk of anesthesia.  After this discussion all questions were answered and informed consent was obtained for Reverse Total Shoulder Arthroplasty of the LEFT shoulder.      Subjective:   Patient ID: Jamee Shaikh is a 70 y.o. female presents today for a follow up visit for her left shoulder. She is now about 6 months s/p revision rotator cuff repair. Today, she continues to experience daily pain/symptoms about the shoulder, even with her daily routine activities. She had a cortisone injection on 1/23/25 with benefit for about 1  "week. She also continues to perform a home exercise program with limited benefit. Pain is worse with overhead and repetitive activities. She is now considering a surgical intervention due to the continued pain/symptoms. No numbness or tingling. No fevers or chills.     The following portions of the patient's history were reviewed and updated as appropriate: allergies, current medications, past family history, past medical history, past social history, past surgical history and problem list.    Objective:  Ht 5' 3\" (1.6 m)   Wt 73.5 kg (162 lb)   BMI 28.70 kg/m²       Left Shoulder Exam     Range of Motion   External rotation:  50   Forward flexion:  110     Muscle Strength   Abduction: 4/5   External rotation: 4/5     Other   Erythema: absent  Sensation: normal  Pulse: present             Physical Exam  Vitals and nursing note reviewed.   Constitutional:       Appearance: She is well-developed.   HENT:      Head: Normocephalic and atraumatic.   Eyes:      Pupils: Pupils are equal, round, and reactive to light.   Cardiovascular:      Rate and Rhythm: Normal rate and regular rhythm.      Pulses: Normal pulses.      Heart sounds: Normal heart sounds.   Pulmonary:      Effort: Pulmonary effort is normal. No respiratory distress.      Breath sounds: Normal breath sounds.   Abdominal:      General: There is no distension.      Palpations: Abdomen is soft.   Musculoskeletal:      Cervical back: Normal range of motion and neck supple.   Skin:     General: Skin is warm and dry.   Neurological:      Mental Status: She is alert and oriented to person, place, and time.   Psychiatric:         Mood and Affect: Mood normal.         Behavior: Behavior normal.         Thought Content: Thought content normal.         Judgment: Judgment normal.       I have personally reviewed pertinent films in PACS and my interpretation is as follows.    MRI left shoulder on 1/15/25 demonstrates a recurrent tear supraspinatus with mild atrophy and " retraction to the humeral head apex       Records Reviewed: office notes and operative photos    Scribe Attestation      I,:  Fabricio Stone am acting as a scribe while in the presence of the attending physician.:       I,:  Abdullahi oCrtez MD personally performed the services described in this documentation    as scribed in my presence.:

## 2025-03-31 ENCOUNTER — APPOINTMENT (OUTPATIENT)
Dept: LAB | Facility: CLINIC | Age: 70
End: 2025-03-31
Payer: COMMERCIAL

## 2025-03-31 DIAGNOSIS — M19.012 OSTEOARTHRITIS OF GLENOHUMERAL JOINT, LEFT: ICD-10-CM

## 2025-03-31 DIAGNOSIS — M75.102 TEAR OF LEFT SUPRASPINATUS TENDON: ICD-10-CM

## 2025-03-31 LAB
ALBUMIN SERPL BCG-MCNC: 3.9 G/DL (ref 3.5–5)
ALP SERPL-CCNC: 73 U/L (ref 34–104)
ALT SERPL W P-5'-P-CCNC: 12 U/L (ref 7–52)
ANION GAP SERPL CALCULATED.3IONS-SCNC: 7 MMOL/L (ref 4–13)
AST SERPL W P-5'-P-CCNC: 16 U/L (ref 13–39)
BASOPHILS # BLD AUTO: 0.03 THOUSANDS/ÂΜL (ref 0–0.1)
BASOPHILS NFR BLD AUTO: 1 % (ref 0–1)
BILIRUB SERPL-MCNC: 0.42 MG/DL (ref 0.2–1)
BUN SERPL-MCNC: 21 MG/DL (ref 5–25)
CALCIUM SERPL-MCNC: 9.1 MG/DL (ref 8.4–10.2)
CHLORIDE SERPL-SCNC: 108 MMOL/L (ref 96–108)
CO2 SERPL-SCNC: 29 MMOL/L (ref 21–32)
CREAT SERPL-MCNC: 0.68 MG/DL (ref 0.6–1.3)
EOSINOPHIL # BLD AUTO: 0.38 THOUSAND/ÂΜL (ref 0–0.61)
EOSINOPHIL NFR BLD AUTO: 6 % (ref 0–6)
ERYTHROCYTE [DISTWIDTH] IN BLOOD BY AUTOMATED COUNT: 13.3 % (ref 11.6–15.1)
EST. AVERAGE GLUCOSE BLD GHB EST-MCNC: 123 MG/DL
GFR SERPL CREATININE-BSD FRML MDRD: 90 ML/MIN/1.73SQ M
GLUCOSE P FAST SERPL-MCNC: 97 MG/DL (ref 65–99)
HBA1C MFR BLD: 5.9 %
HCT VFR BLD AUTO: 43 % (ref 34.8–46.1)
HGB BLD-MCNC: 13.8 G/DL (ref 11.5–15.4)
IMM GRANULOCYTES # BLD AUTO: 0.02 THOUSAND/UL (ref 0–0.2)
IMM GRANULOCYTES NFR BLD AUTO: 0 % (ref 0–2)
LYMPHOCYTES # BLD AUTO: 1.87 THOUSANDS/ÂΜL (ref 0.6–4.47)
LYMPHOCYTES NFR BLD AUTO: 31 % (ref 14–44)
MCH RBC QN AUTO: 28.9 PG (ref 26.8–34.3)
MCHC RBC AUTO-ENTMCNC: 32.1 G/DL (ref 31.4–37.4)
MCV RBC AUTO: 90 FL (ref 82–98)
MONOCYTES # BLD AUTO: 0.45 THOUSAND/ÂΜL (ref 0.17–1.22)
MONOCYTES NFR BLD AUTO: 7 % (ref 4–12)
NEUTROPHILS # BLD AUTO: 3.32 THOUSANDS/ÂΜL (ref 1.85–7.62)
NEUTS SEG NFR BLD AUTO: 55 % (ref 43–75)
NRBC BLD AUTO-RTO: 0 /100 WBCS
PLATELET # BLD AUTO: 321 THOUSANDS/UL (ref 149–390)
PMV BLD AUTO: 10.6 FL (ref 8.9–12.7)
POTASSIUM SERPL-SCNC: 4.2 MMOL/L (ref 3.5–5.3)
PROT SERPL-MCNC: 6.4 G/DL (ref 6.4–8.4)
RBC # BLD AUTO: 4.78 MILLION/UL (ref 3.81–5.12)
SODIUM SERPL-SCNC: 144 MMOL/L (ref 135–147)
WBC # BLD AUTO: 6.07 THOUSAND/UL (ref 4.31–10.16)

## 2025-03-31 PROCEDURE — 80053 COMPREHEN METABOLIC PANEL: CPT

## 2025-03-31 PROCEDURE — 36415 COLL VENOUS BLD VENIPUNCTURE: CPT

## 2025-03-31 PROCEDURE — 85025 COMPLETE CBC W/AUTO DIFF WBC: CPT

## 2025-03-31 PROCEDURE — 86900 BLOOD TYPING SEROLOGIC ABO: CPT | Performed by: ORTHOPAEDIC SURGERY

## 2025-03-31 PROCEDURE — 86850 RBC ANTIBODY SCREEN: CPT | Performed by: ORTHOPAEDIC SURGERY

## 2025-03-31 PROCEDURE — 86901 BLOOD TYPING SEROLOGIC RH(D): CPT | Performed by: ORTHOPAEDIC SURGERY

## 2025-03-31 PROCEDURE — 87081 CULTURE SCREEN ONLY: CPT

## 2025-03-31 PROCEDURE — 83036 HEMOGLOBIN GLYCOSYLATED A1C: CPT

## 2025-04-01 ENCOUNTER — LAB REQUISITION (OUTPATIENT)
Dept: LAB | Facility: HOSPITAL | Age: 70
End: 2025-04-01
Payer: COMMERCIAL

## 2025-04-01 DIAGNOSIS — M75.102 UNSPECIFIED ROTATOR CUFF TEAR OR RUPTURE OF LEFT SHOULDER, NOT SPECIFIED AS TRAUMATIC: ICD-10-CM

## 2025-04-01 LAB
ABO GROUP BLD: NORMAL
BLD GP AB SCN SERPL QL: NEGATIVE
MRSA NOSE QL CULT: NORMAL
RH BLD: POSITIVE
SPECIMEN EXPIRATION DATE: NORMAL

## 2025-04-02 ENCOUNTER — HOSPITAL ENCOUNTER (OUTPATIENT)
Dept: CT IMAGING | Facility: HOSPITAL | Age: 70
Discharge: HOME | End: 2025-04-02
Attending: ORTHOPAEDIC SURGERY
Payer: COMMERCIAL

## 2025-04-02 ENCOUNTER — PREP FOR PROCEDURE (OUTPATIENT)
Dept: OBGYN CLINIC | Facility: CLINIC | Age: 70
End: 2025-04-02

## 2025-04-02 DIAGNOSIS — M19.012 OSTEOARTHRITIS OF GLENOHUMERAL JOINT, LEFT: ICD-10-CM

## 2025-04-02 DIAGNOSIS — M75.102 TEAR OF LEFT SUPRASPINATUS TENDON: ICD-10-CM

## 2025-04-02 PROCEDURE — 73200 CT UPPER EXTREMITY W/O DYE: CPT

## 2025-04-03 ENCOUNTER — ANESTHESIA EVENT (OUTPATIENT)
Age: 70
End: 2025-04-03

## 2025-04-03 ENCOUNTER — APPOINTMENT (OUTPATIENT)
Dept: LAB | Facility: HOSPITAL | Age: 70
End: 2025-04-03
Payer: COMMERCIAL

## 2025-04-03 ENCOUNTER — TELEPHONE (OUTPATIENT)
Dept: FAMILY MEDICINE CLINIC | Facility: CLINIC | Age: 70
End: 2025-04-03

## 2025-04-03 ENCOUNTER — ANESTHESIA (OUTPATIENT)
Age: 70
End: 2025-04-03

## 2025-04-03 DIAGNOSIS — Z01.818 PRE-OPERATIVE CLEARANCE: ICD-10-CM

## 2025-04-03 DIAGNOSIS — M25.512 CHRONIC LEFT SHOULDER PAIN: ICD-10-CM

## 2025-04-03 DIAGNOSIS — Z01.818 PRE-OPERATIVE CLEARANCE: Primary | ICD-10-CM

## 2025-04-03 DIAGNOSIS — G89.29 CHRONIC LEFT SHOULDER PAIN: ICD-10-CM

## 2025-04-03 PROCEDURE — 93005 ELECTROCARDIOGRAM TRACING: CPT

## 2025-04-03 NOTE — TELEPHONE ENCOUNTER
I can clear her from her visit on 3/17 but I don't see an EKG- did she have one done? If not, please order and ask her to get that ASAP at the Cardiology department at the hospital- when that is back I can put the clearance in her 3/17 note

## 2025-04-03 NOTE — PRE-PROCEDURE INSTRUCTIONS
Pre-Surgery Instructions:   Medication Instructions    amLODIPine (NORVASC) 5 mg tablet Take day of surgery.    atorvastatin (LIPITOR) 10 mg tablet Take night before surgery    famotidine (PEPCID) 20 mg tablet Uses PRN- OK to take day of surgery    hydrOXYzine HCL (ATARAX) 25 mg tablet Uses PRN- OK to take day of surgery    losartan (COZAAR) 100 MG tablet Hold day of surgery.    meloxicam (Mobic) 15 mg tablet Stop taking 3 days prior to surgery.    valACYclovir (VALTREX) 1,000 mg tablet Uses PRN- OK to take day of surgery    Medication instructions for day of surgery reviewed. Please take all instructed medications with only a sip of water.       You will receive a call one business day prior to surgery with an arrival time and hospital directions. If your surgery is scheduled on a Monday, the hospital will be calling you on the Friday prior to your surgery. If you have not heard from anyone by 8pm, please call the hospital supervisor through the hospital  at [phone]. ([place] [phone] or [place] [phone]).    Do not eat or drink anything after midnight the night before your surgery, including candy, mints, lifesavers, or chewing gum. Do not drink alcohol 24hrs before your surgery. Try not to smoke at least 24hrs before your surgery.       Follow the pre surgery showering instructions as listed in the “My Surgical Experience Booklet” or otherwise provided by your surgeon's office. Do not use a blade to shave the surgical area 1 week before surgery. It is okay to use a clean electric clippers up to 24 hours before surgery. Do not apply any lotions, creams, including makeup, cologne, deodorant, or perfumes after showering on the day of your surgery. Do not use dry shampoo, hair spray, hair gel, or any type of hair products.     No contact lenses, eye make-up, or artificial eyelashes. Remove nail polish, including gel polish, and any artificial, gel, or acrylic nails if possible. Remove all  jewelry including rings and body piercing jewelry.     Wear causal clothing that is easy to take on and off. Consider your type of surgery.    Keep any valuables, jewelry, piercings at home. Please bring any specially ordered equipment (sling, braces) if indicated.    Arrange for a responsible person to drive you to and from the hospital on the day of your surgery. Please confirm the visitor policy for the day of your procedure when you receive your phone call with an arrival time.     Call the surgeon's office with any new illnesses, exposures, or additional questions prior to surgery.    Please reference your “My Surgical Experience Booklet” for additional information to prepare for your upcoming surgery.

## 2025-04-03 NOTE — TELEPHONE ENCOUNTER
Patient advised Dr Miles with [facility]. They only asked for blood work & catscan. Please advise.

## 2025-04-03 NOTE — TELEPHONE ENCOUNTER
Patient called office. Her shoulder surgery is being moved to 4/8, rather than 5/6. She has all pre-op testing done.     Pt reports she was just here for awv 3/17, asking if PCP can clear her based off that visit. If not, where can she be fit in before 4/8.    Please advise.

## 2025-04-04 ENCOUNTER — TELEPHONE (OUTPATIENT)
Age: 70
End: 2025-04-04

## 2025-04-04 ENCOUNTER — HOSPITAL ENCOUNTER (OUTPATIENT)
Dept: MAMMOGRAPHY | Facility: HOSPITAL | Age: 70
End: 2025-04-04
Payer: COMMERCIAL

## 2025-04-04 DIAGNOSIS — Z12.31 ENCOUNTER FOR SCREENING MAMMOGRAM FOR MALIGNANT NEOPLASM OF BREAST: ICD-10-CM

## 2025-04-04 LAB
ATRIAL RATE: 65 BPM
P AXIS: 30 DEGREES
PR INTERVAL: 152 MS
QRS AXIS: -36 DEGREES
QRSD INTERVAL: 80 MS
QT INTERVAL: 416 MS
QTC INTERVAL: 433 MS
T WAVE AXIS: 2 DEGREES
VENTRICULAR RATE: 65 BPM

## 2025-04-04 PROCEDURE — 77067 SCR MAMMO BI INCL CAD: CPT

## 2025-04-04 PROCEDURE — 93010 ELECTROCARDIOGRAM REPORT: CPT | Performed by: INTERNAL MEDICINE

## 2025-04-04 PROCEDURE — 77063 BREAST TOMOSYNTHESIS BI: CPT

## 2025-04-04 NOTE — TELEPHONE ENCOUNTER
Addendum added to her note on 3/17- clearance done- you can let her ortho surgeon's office know to look at that visit note for this information
Patient called to advise Brigitte Francois the EKG report is now available.     Once Brigitte reviews the results please send the preop clearance information to the surgeon.   
Cigarettes

## 2025-04-08 ENCOUNTER — APPOINTMENT (OUTPATIENT)
Age: 70
End: 2025-04-08
Payer: COMMERCIAL

## 2025-04-08 ENCOUNTER — ANESTHESIA EVENT (OUTPATIENT)
Age: 70
End: 2025-04-08
Payer: COMMERCIAL

## 2025-04-08 ENCOUNTER — HOSPITAL ENCOUNTER (OUTPATIENT)
Age: 70
Setting detail: OUTPATIENT SURGERY
Discharge: HOME | End: 2025-04-08
Attending: ORTHOPAEDIC SURGERY | Admitting: ORTHOPAEDIC SURGERY
Payer: COMMERCIAL

## 2025-04-08 ENCOUNTER — ANESTHESIA (OUTPATIENT)
Age: 70
End: 2025-04-08
Payer: COMMERCIAL

## 2025-04-08 VITALS
RESPIRATION RATE: 16 BRPM | TEMPERATURE: 97.8 F | OXYGEN SATURATION: 92 % | SYSTOLIC BLOOD PRESSURE: 136 MMHG | WEIGHT: 162.6 LBS | HEART RATE: 68 BPM | HEIGHT: 63 IN | DIASTOLIC BLOOD PRESSURE: 68 MMHG | BODY MASS INDEX: 28.81 KG/M2

## 2025-04-08 DIAGNOSIS — Z96.612 S/P REVERSE TOTAL SHOULDER ARTHROPLASTY, LEFT: Primary | ICD-10-CM

## 2025-04-08 PROCEDURE — 73020 X-RAY EXAM OF SHOULDER: CPT

## 2025-04-08 PROCEDURE — C1713 ANCHOR/SCREW BN/BN,TIS/BN: HCPCS | Performed by: ORTHOPAEDIC SURGERY

## 2025-04-08 PROCEDURE — 23472 RECONSTRUCT SHOULDER JOINT: CPT | Performed by: ORTHOPAEDIC SURGERY

## 2025-04-08 PROCEDURE — C1776 JOINT DEVICE (IMPLANTABLE): HCPCS | Performed by: ORTHOPAEDIC SURGERY

## 2025-04-08 DEVICE — SCREW PERIPHERAL 5 X 26MM: Type: IMPLANTABLE DEVICE | Site: SCAPULA | Status: FUNCTIONAL

## 2025-04-08 DEVICE — IMPLANTABLE DEVICE
Type: IMPLANTABLE DEVICE | Site: SCAPULA | Status: FUNCTIONAL
Brand: TORNIER PERFORM™ HUMERAL SYSTEM

## 2025-04-08 DEVICE — IMPLANTABLE DEVICE
Type: IMPLANTABLE DEVICE | Site: SCAPULA | Status: FUNCTIONAL
Brand: TORNIER PERFORM® REVERSED GLENOID

## 2025-04-08 DEVICE — IMPLANTABLE DEVICE
Type: IMPLANTABLE DEVICE | Site: SCAPULA | Status: FUNCTIONAL
Brand: TORNIER PERFORM® REVERSED AUGMENTED GLENOID

## 2025-04-08 DEVICE — SCREW CENTRAL 6.5 X 30MM: Type: IMPLANTABLE DEVICE | Site: SCAPULA | Status: FUNCTIONAL

## 2025-04-08 DEVICE — SCREW PERIPHERAL 5 X 30MM: Type: IMPLANTABLE DEVICE | Site: SCAPULA | Status: FUNCTIONAL

## 2025-04-08 DEVICE — SCREW PERIPHERAL 5 X 18MM: Type: IMPLANTABLE DEVICE | Site: SCAPULA | Status: FUNCTIONAL

## 2025-04-08 RX ORDER — SODIUM CHLORIDE, SODIUM LACTATE, POTASSIUM CHLORIDE, CALCIUM CHLORIDE 600; 310; 30; 20 MG/100ML; MG/100ML; MG/100ML; MG/100ML
125 INJECTION, SOLUTION INTRAVENOUS CONTINUOUS
Status: DISCONTINUED | OUTPATIENT
Start: 2025-04-08 | End: 2025-04-08 | Stop reason: HOSPADM

## 2025-04-08 RX ORDER — ONDANSETRON 2 MG/ML
4 INJECTION INTRAMUSCULAR; INTRAVENOUS ONCE AS NEEDED
Status: DISCONTINUED | OUTPATIENT
Start: 2025-04-08 | End: 2025-04-08 | Stop reason: HOSPADM

## 2025-04-08 RX ORDER — ACETAMINOPHEN 325 MG/1
650 TABLET ORAL EVERY 6 HOURS PRN
Status: DISCONTINUED | OUTPATIENT
Start: 2025-04-08 | End: 2025-04-08 | Stop reason: HOSPADM

## 2025-04-08 RX ORDER — LIDOCAINE HYDROCHLORIDE 10 MG/ML
INJECTION, SOLUTION EPIDURAL; INFILTRATION; INTRACAUDAL; PERINEURAL AS NEEDED
Status: DISCONTINUED | OUTPATIENT
Start: 2025-04-08 | End: 2025-04-08

## 2025-04-08 RX ORDER — ROCURONIUM BROMIDE 10 MG/ML
INJECTION, SOLUTION INTRAVENOUS AS NEEDED
Status: DISCONTINUED | OUTPATIENT
Start: 2025-04-08 | End: 2025-04-08

## 2025-04-08 RX ORDER — FENTANYL CITRATE 50 UG/ML
INJECTION, SOLUTION INTRAMUSCULAR; INTRAVENOUS AS NEEDED
Status: DISCONTINUED | OUTPATIENT
Start: 2025-04-08 | End: 2025-04-08

## 2025-04-08 RX ORDER — CHLORHEXIDINE GLUCONATE ORAL RINSE 1.2 MG/ML
15 SOLUTION DENTAL ONCE
Status: COMPLETED | OUTPATIENT
Start: 2025-04-08 | End: 2025-04-08

## 2025-04-08 RX ORDER — OXYCODONE HYDROCHLORIDE 5 MG/1
5 TABLET ORAL EVERY 4 HOURS PRN
Refills: 0 | Status: DISCONTINUED | OUTPATIENT
Start: 2025-04-08 | End: 2025-04-08 | Stop reason: HOSPADM

## 2025-04-08 RX ORDER — MIDAZOLAM HYDROCHLORIDE 2 MG/2ML
INJECTION, SOLUTION INTRAMUSCULAR; INTRAVENOUS AS NEEDED
Status: DISCONTINUED | OUTPATIENT
Start: 2025-04-08 | End: 2025-04-08

## 2025-04-08 RX ORDER — DEXAMETHASONE SODIUM PHOSPHATE 10 MG/ML
INJECTION, SOLUTION INTRAMUSCULAR; INTRAVENOUS AS NEEDED
Status: DISCONTINUED | OUTPATIENT
Start: 2025-04-08 | End: 2025-04-08

## 2025-04-08 RX ORDER — ONDANSETRON 2 MG/ML
4 INJECTION INTRAMUSCULAR; INTRAVENOUS EVERY 6 HOURS PRN
Status: DISCONTINUED | OUTPATIENT
Start: 2025-04-08 | End: 2025-04-08 | Stop reason: HOSPADM

## 2025-04-08 RX ORDER — FENTANYL CITRATE/PF 50 MCG/ML
25 SYRINGE (ML) INJECTION
Status: DISCONTINUED | OUTPATIENT
Start: 2025-04-08 | End: 2025-04-08 | Stop reason: HOSPADM

## 2025-04-08 RX ORDER — PROPOFOL 10 MG/ML
INJECTION, EMULSION INTRAVENOUS AS NEEDED
Status: DISCONTINUED | OUTPATIENT
Start: 2025-04-08 | End: 2025-04-08

## 2025-04-08 RX ORDER — OXYCODONE HYDROCHLORIDE 5 MG/1
5 TABLET ORAL EVERY 6 HOURS PRN
Qty: 13 TABLET | Refills: 0 | Status: SHIPPED | OUTPATIENT
Start: 2025-04-08

## 2025-04-08 RX ORDER — PHENYLEPHRINE HCL IN 0.9% NACL 1 MG/10 ML
SYRINGE (ML) INTRAVENOUS AS NEEDED
Status: DISCONTINUED | OUTPATIENT
Start: 2025-04-08 | End: 2025-04-08

## 2025-04-08 RX ORDER — TRANEXAMIC ACID 10 MG/ML
1000 INJECTION, SOLUTION INTRAVENOUS ONCE
Status: COMPLETED | OUTPATIENT
Start: 2025-04-08 | End: 2025-04-08

## 2025-04-08 RX ORDER — CEFAZOLIN SODIUM 2 G/50ML
2000 SOLUTION INTRAVENOUS ONCE
Status: COMPLETED | OUTPATIENT
Start: 2025-04-08 | End: 2025-04-08

## 2025-04-08 RX ORDER — BUPIVACAINE HYDROCHLORIDE 5 MG/ML
INJECTION, SOLUTION EPIDURAL; INTRACAUDAL; PERINEURAL
Status: COMPLETED | OUTPATIENT
Start: 2025-04-08 | End: 2025-04-08

## 2025-04-08 RX ORDER — MAGNESIUM HYDROXIDE 1200 MG/15ML
LIQUID ORAL AS NEEDED
Status: DISCONTINUED | OUTPATIENT
Start: 2025-04-08 | End: 2025-04-08 | Stop reason: HOSPADM

## 2025-04-08 RX ORDER — ONDANSETRON 2 MG/ML
INJECTION INTRAMUSCULAR; INTRAVENOUS AS NEEDED
Status: DISCONTINUED | OUTPATIENT
Start: 2025-04-08 | End: 2025-04-08

## 2025-04-08 RX ORDER — CEPHALEXIN 500 MG/1
2000 CAPSULE ORAL EVERY 12 HOURS SCHEDULED
Qty: 8 CAPSULE | Refills: 0 | Status: SHIPPED | OUTPATIENT
Start: 2025-04-08 | End: 2025-04-09

## 2025-04-08 RX ADMIN — CHLORHEXIDINE GLUCONATE 15 ML: 1.2 SOLUTION ORAL at 12:02

## 2025-04-08 RX ADMIN — ROCURONIUM BROMIDE 50 MG: 10 INJECTION, SOLUTION INTRAVENOUS at 13:29

## 2025-04-08 RX ADMIN — Medication 50 MCG: at 14:05

## 2025-04-08 RX ADMIN — PHENYLEPHRINE HYDROCHLORIDE 50 MCG/MIN: 10 INJECTION INTRAVENOUS at 13:37

## 2025-04-08 RX ADMIN — PROPOFOL 120 MG: 10 INJECTION, EMULSION INTRAVENOUS at 13:29

## 2025-04-08 RX ADMIN — LIDOCAINE HYDROCHLORIDE 50 MG: 10 INJECTION, SOLUTION EPIDURAL; INFILTRATION; INTRACAUDAL; PERINEURAL at 13:29

## 2025-04-08 RX ADMIN — DEXAMETHASONE SODIUM PHOSPHATE 10 MG: 10 INJECTION, SOLUTION INTRAMUSCULAR; INTRAVENOUS at 13:33

## 2025-04-08 RX ADMIN — CEFAZOLIN SODIUM 2000 MG: 2 SOLUTION INTRAVENOUS at 13:25

## 2025-04-08 RX ADMIN — FENTANYL CITRATE 50 MCG: 50 INJECTION INTRAMUSCULAR; INTRAVENOUS at 13:29

## 2025-04-08 RX ADMIN — SODIUM CHLORIDE, SODIUM LACTATE, POTASSIUM CHLORIDE, AND CALCIUM CHLORIDE 125 ML/HR: .6; .31; .03; .02 INJECTION, SOLUTION INTRAVENOUS at 12:02

## 2025-04-08 RX ADMIN — BUPIVACAINE 10 ML: 13.3 INJECTION, SUSPENSION, LIPOSOMAL INFILTRATION at 12:30

## 2025-04-08 RX ADMIN — ONDANSETRON 4 MG: 2 INJECTION INTRAMUSCULAR; INTRAVENOUS at 13:33

## 2025-04-08 RX ADMIN — MIDAZOLAM 2 MG: 1 INJECTION INTRAMUSCULAR; INTRAVENOUS at 12:29

## 2025-04-08 RX ADMIN — TRANEXAMIC ACID 1000 MG: 10 INJECTION, SOLUTION INTRAVENOUS at 13:34

## 2025-04-08 RX ADMIN — BUPIVACAINE HYDROCHLORIDE 15 ML: 5 INJECTION, SOLUTION EPIDURAL; INTRACAUDAL; PERINEURAL at 12:30

## 2025-04-08 RX ADMIN — FENTANYL CITRATE 50 MCG: 50 INJECTION INTRAMUSCULAR; INTRAVENOUS at 12:29

## 2025-04-08 RX ADMIN — ACETAMINOPHEN 650 MG: 325 TABLET ORAL at 16:04

## 2025-04-08 RX ADMIN — SUGAMMADEX 200 MG: 100 INJECTION, SOLUTION INTRAVENOUS at 14:34

## 2025-04-08 NOTE — ANESTHESIA POSTPROCEDURE EVALUATION
Post-Op Assessment Note    CV Status:  Stable    Pain management: satisfactory to patient       Mental Status:  Alert and awake   Hydration Status:  Stable   PONV Controlled:  None   Airway Patency:  Patent  Airway: intubated     Post Op Vitals Reviewed: Yes    No anethesia notable event occurred.    Staff: CRNA           Last Filed PACU Vitals:  Vitals Value Taken Time   Temp 98.5    Pulse 73    /64 04/08/25 1444   Resp 14    SpO2 100    Vitals shown include unfiled device data.

## 2025-04-08 NOTE — DISCHARGE INSTR - AVS FIRST PAGE
What to Expect Before and After Shoulder Replacement Surgery  You are being scheduled for a shoulder replacement by Dr. Cortez to treat your shoulder condition.  Here is some information which may help to answer questions that you may have.  Please do not hesitate to reach out to our team to answer questions not addressed here.    Before Surgery  You will be contacted the evening prior to your surgery to confirm the scheduled time of the procedure and when to arrive at the hospital.   Do not eat or drink anything after midnight the night before your surgery so that the anesthesia can be performed safely.  If you have been fitted for a sling in the office prior to the surgery please remember to bring it to the hospital.  You will meet the anesthesiologist the morning of the surgery.  The surgery is performed under a general anesthetic but they will also offer you a regional block (shot to numb the arm) to help control your post-operative pain.  While everyone experiences that effect of the block differently, most patients find it very helpful at managing the post operative pain.    After Surgery  The shoulder replacement surgery typically takes approximately an hour.  When surgery is completed, your surgeon will update your family and friends on your condition and progress. You will remain in the recovery room for at least an hour or until the anesthesia has worn off and your blood pressure and pulse are stable. If you have pain, the nurses will give you medication.   Once out of surgery, your surgeon will decide on how long you will be using a sling in order to protect and position your shoulder. However, this won't keep you from starting physical therapy.  Exercises typically begin on the day after surgery with emphasis on moving the shoulder, wrist, and hand.  The physical therapist will be provided with a detailed protocol but typically the first 8 weeks are used to regain range of motion and then strengthening  is initiated. Starting strengthening exercises too early may lead to complications.    When You Are Discharged from the Orthopedic Hospital  You can expect to be released from the hospital the same day of the surgery as this has been defined as an outpatient surgery since 2023 (of course this may change if you have special needs or medical conditions). Before you are released, the treatment team (Orthopaedic surgery residents, physician assistants and physical therapists) will talk with you about the importance of limiting any sudden or stressful movements to the arm for several weeks or longer. Activities that involve pushing, pulling, and lifting should not be done until you are given permission from your surgeon.     Your First Day at Home  You may need help with your daily activities, so it is a good idea to have family and friends prepared to help you.   It is okay to remove the sling and let the arm hang at the side so that you can get cleaned and change your clothes.  To put on a shirt, place the bad arm in first and then the good arm.  Reverse to take it off.    Don't forget to wear the sling every night for at least the first month after surgery, and never use your arm to push yourself up in bed or from a chair. The added weight on your shoulder may cause you to re-injure the joint.    How to [place] in the First Week  You are encouraged to return to your normal eating and sleeping patterns as soon as possible. It is important for you to be active in order to control your weight and muscle tone.  It is ok to increase your activity level and even perform light aerobic exercise (like walking or riding a stationary bike) within the first week or so if you are feeling up to it.  If there is concern about these activates best to wait until the first post-operative visit and discuss this with the team.   You might be able to return to work within several days if you can perform your job while wearing a sling.  Consult with your doctor, as this differs from patient to patient. However, if your job requires heavy lifting or climbing, there may be a delay for several months.   Until you are seen for your first follow-up visit, please try and keep wound dry.   It is okay to shower but try your best to keep the incision out of direct contact with the water (or consider using a waterproof bandage).  If it does gets wet please dry as best as possible afterwards.  If you notice any drainage or a foul odor from your incision or your temperature goes above 101.5 degrees, please contact the office.     What You Can Expect in the First Month  Your first post-operative appointment will be with Dr Cortez’s physician assistant (PA) around 2 weeks after the surgery.  You skin staples will be removed and X-rays will be obtained at that visit.  Please understand that it is quite common to still experience pain at that time but the pain should be steadily improving.   Hopefully physical therapy has already begun but if not it will be initiated at this visit and will continue for the 8-12 weeks.   At about 12 weeks after surgery you will start a progressive strengthening program. Physical therapy is a deliberate process of not only strengthening your shoulder but also altering how you use your arm. It may be many months before your desired results are achieved, so do not get discouraged.  Your shoulder will generally continue to improve steadily up to 6-8 months after surgery. After that point further improvement is very slow; although it has been shown that even after a year or more, activity can increase as muscle strength continues to improve.    After the First Month at Home   Because each person heals differently, there are different recovery timelines. An average recovery period typically lasts about between 3-6 months.  Talk with your surgeon about which activities will be appropriate for you once you have recovered

## 2025-04-08 NOTE — OP NOTE
OPERATIVE REPORT  PATIENT NAME: Jamee Shaikh    :  1955  MRN: [id number]  Pt Location: [facility] OR ROOM 03    SURGERY DATE: 2025     SURGEON: Abdullahi Cortez MD     ASSISTANT: Alyssa Moe PA-C     NOTE: Alyssa Moe PA-C was present throughout the entire procedure and performed essential assistance with patient prepping, draping, positioning, trial implantation/range of motion, final implant insertion, careful retraction, wound closure, sterile dressing application and sling application, all under my direct supervision.     : Richie Montoya MD PGY-2 Assisted in the procedure.  I, Abdullahi Cortez MD, was present for the entire procedure and was scrubbed for and performed all the key and essential components of the procedure.     PREOPERATIVE DIAGNOSIS: Left Shoulder Rotator Cuff Tear Arthropathy    POSTOPERATIVE DIAGNOSIS: Same    PROCEDURES: Left Reverse Total Shoulder Arthroplasty     ANESTHESIA STAFF: Augustin Max MD     ANESTHESIA TYPE: General with endotracheal tube with ultrasound guided interscalene block (Exparel).  The interscalene block was provided by the anesthesia staff per my request for postoperative pain control and to decrease the use of postoperative narcotic medication for pain control.    COMPLICATIONS: None    FINDINGS: Rotator Cuff Tear Arthropathy    SPECIMEN(S): None    ESTIMATED BLOOD LOSS: Minimal    INDICATIONS FOR PROCEDURE:  The patient is a 70 y.o. female presenting with pain and lack of function secondary to rotator cuff tear arthropathy of the left shoulder (recurrent supraspinatus tendon tear status post repair x 2 with glenohumeral osteoarthritis).  After a thorough discussion of the risks and benefits of operative and nonoperative care the patient elected for left reverse total shoulder arthroplasty. Informed consent was obtained in the office.    OPERATIVE TECHNIQUE:  The day of surgery I identified the patient's left shoulder and marked  it with my initials.  The patient was taken back to the operating room where anesthesia was induced by the anesthesia staff without complication. The patient was placed in the beachchair position with all bony prominences padded. The left shoulder was prepped and draped in routine sterile fashion and after a time-out for safety and confirming 2 grams of IV Cefazolin were given, a standard deltopectoral approach was performed. The dissection was carried down to the subscapularis which was intact.  It was released and tagged for repair to the anterior humerus later in the case.   The long head of biceps was torn and not present in the surgical field.  The humeral head was exposed and found to have severe degenerative change with a suprapsinatus rupture with an intact infraspinatus.  There were multiple sutures from her prior repair and all are easily accessible sutures and anchors including the all suture anchor was able to be removed, there were some BioComposite anchors which were left in place as they were not interfering with our surgical procedure and removing them would have injured the proximal humerus bone and possibly compromise fixation.  The humerus was prepared keeping with the surgical technique for a Tornier Perform reverse prosthesis. After preparing the humerus the glenoid was exposed and prepared for a 25 mm Tornier Perform Plus full wedge augmented baseplate.  The full wedge augment was utilized to correct the superior inclination as well as restore lateralization of the joint line appreciated on preoperative templating and confirmed intraoperatively.  The baseplate was placed and then secured with a central 6.5 mm diameter 30 mm length screw.  Additional fixation was achieved with a 18 mm non-locking screw followed by a superior 30 mm and an inferior 26 mm locking screws. These achieved excellent fixation of the baseplate to the glenoid.  A 36 mm glenosphere was then fixated to the baseplate. The  humerus was finished and a size 1+ Tornier Perform Stem with a + 0 mm, 55% capture polyethylene was trialed and found to have excellent stability with full range of motion and appropriate soft tissue tension. Final implants were placed and the area was irrigated with pulse lavage. The subscapularis was repaired to the anterior humerus with Orthocord sutures. The deltopectoral interval was loosely closed with 0 Vicryl and the subdermal layer with 2-0 Vicryl with staples for skin. Sterile dressings and a sling with abduction pillow was placed and the patient was awoken. The patient was transported to the recovery room in good condition and will be discharged to home and physical therapy will be initiated following the standard reverse total shoulder arthroplasty protocol.    PATIENT DISPOSITION:  Stable to PACU      SIGNATURE: Abdullahi Cortez MD  DATE: April 8, 2025  TIME: 2:34 PM

## 2025-04-08 NOTE — ANESTHESIA PROCEDURE NOTES
Peripheral Block    Patient location during procedure: holding area  Start time: 4/8/2025 12:30 PM  Reason for block: at surgeon's request and post-op pain management  Staffing  Performed by: Augustin Max MD  Authorized by: Isaac Cerda MD    Preanesthetic Checklist  Completed: patient identified, IV checked, site marked, risks and benefits discussed, surgical consent, monitors and equipment checked, pre-op evaluation and timeout performed  Peripheral Block  Patient position: sitting  Prep: ChloraPrep  Patient monitoring: frequent blood pressure checks, continuous pulse oximetry and heart rate  Block type: Interscalene  Laterality: left  Injection technique: single-shot  Procedures: ultrasound guided, Ultrasound guidance required for the procedure to increase accuracy and safety of medication placement and decrease risk of complications.  Ultrasound permanent image saved  bupivacaine (PF) (MARCAINE) 0.5 % injection 20 mL - Perineural   15 mL - 4/8/2025 12:30:00 PM  bupivacaine liposomal (EXPAREL) 1.3 % injection 20 mL - Perineural   10 mL - 4/8/2025 12:30:00 PM  Needle  Needle type: Stimuplex   Needle gauge: 20 G  Needle length: 4 in  Needle localization: anatomical landmarks and ultrasound guidance  Assessment  Injection assessment: incremental injection, frequent aspiration, injected with ease, negative aspiration, negative for heart rate change, no paresthesia on injection, no symptoms of intraneural/intravenous injection and needle tip visualized at all times  Paresthesia pain: none  Post-procedure:  site cleaned  patient tolerated the procedure well with no immediate complications

## 2025-04-08 NOTE — ANESTHESIA PREPROCEDURE EVALUATION
Procedure:  ARTHROPLASTY SHOULDER REVERSE (Left: Shoulder)    Relevant Problems   CARDIO   (+) Essential hypertension   (+) Hyperlipidemia      GI/HEPATIC   (+) Dysphagia   (+) GERD (gastroesophageal reflux disease)   (+) Paraesophageal hernia      MUSCULOSKELETAL   (+) Osteoarthritis of glenohumeral joint, left   (+) Paraesophageal hernia      NEURO/PSYCH   (+) Chronic pain syndrome   (+) Left leg weakness      Orthopedic/Musculoskeletal   (+) S/P left rotator cuff repair      Lab Results   Component Value Date    WBC 6.07 03/31/2025    HGB 13.8 03/31/2025    HCT 43.0 03/31/2025    MCV 90 03/31/2025     03/31/2025     Lab Results   Component Value Date     05/14/2018    K 4.2 03/31/2025    CO2 29 03/31/2025     03/31/2025    BUN 21 03/31/2025    CREATININE 0.68 03/31/2025     Lab Results   Component Value Date    INR 0.93 03/02/2023    PROTIME 12.5 03/02/2023     Lab Results   Component Value Date    PTT 28 03/02/2023     Lab Results   Component Value Date    HGBA1C 5.9 (H) 03/31/2025       Type and Screen:  A    Physical Exam    Airway    Mallampati score: II  TM Distance: >3 FB  Neck ROM: full     Dental    upper dentures    Cardiovascular      Pulmonary      Other Findings  post-pubertal.      Anesthesia Plan  ASA Score- 2     Anesthesia Type- general with ASA Monitors.         Additional Monitors:     Airway Plan: ETT.    Comment: Discussed long acting interscalene nerve block for postoperative pain control with risks/benefits/alternatives. Patient made aware of possible postoperative shortness of breath related to transient hemidiaphragmatic paralysis. Discussed extremely low likelihood of transient or permanent nerve injury in the setting of ultrasound guidance and patient participation. Patient aware and would like to proceed.    .       Plan Factors-Exercise tolerance (METS): >4 METS.    Chart reviewed.   Existing labs reviewed. Patient summary reviewed.                  Induction-  intravenous.    Postoperative Plan- Plan for postoperative opioid use. Planned trial extubation        Informed Consent- Anesthetic plan and risks discussed with patient.  I personally reviewed this patient with the CRNA. Discussed and agreed on the Anesthesia Plan with the CRNA..      NPO Status:  No vitals data found for the desired time range.

## 2025-04-08 NOTE — OCCUPATIONAL THERAPY NOTE
Occupational Therapy Screen       04/08/25 1640   OT Last Visit   OT Visit Date 04/08/25   Note Type   Note type Screen   Additional Comments OT consult recieved. Pt seated EOB upon arrival. Pt states she is familiar with sling management and UB dressing due to previous surgeries. No acute OT needs at this time. Pt performed UB dressing while OT present in room, RN notified. Will complete OT screen and d/c at this time. Will reassess if medically necessary.     Jayne Gonzalez, OT      Patient Name: Jamee Shaikh  Today's Date: 4/8/2025

## 2025-04-08 NOTE — ANESTHESIA POSTPROCEDURE EVALUATION
Post-Op Assessment Note    Last Filed PACU Vitals:  Vitals Value Taken Time   Temp 97.8 °F (36.6 °C) 04/08/25 1515   Pulse 68 04/08/25 1515   /68 04/08/25 1515   Resp 16 04/08/25 1515   SpO2 92 % 04/08/25 1515       Modified Paty:     Vitals Value Taken Time   Activity 2 04/08/25 1515   Respiration 2 04/08/25 1515   Circulation 2 04/08/25 1515   Consciousness 2 04/08/25 1515   Oxygen Saturation 2 04/08/25 1515     Modified Paty Score: 10

## 2025-04-11 ENCOUNTER — EVALUATION (OUTPATIENT)
Dept: PHYSICAL THERAPY | Facility: CLINIC | Age: 70
End: 2025-04-11
Payer: COMMERCIAL

## 2025-04-11 DIAGNOSIS — M75.102 TEAR OF LEFT SUPRASPINATUS TENDON: Primary | ICD-10-CM

## 2025-04-11 DIAGNOSIS — M25.512 LEFT SHOULDER PAIN, UNSPECIFIED CHRONICITY: ICD-10-CM

## 2025-04-11 PROCEDURE — 97140 MANUAL THERAPY 1/> REGIONS: CPT | Performed by: PHYSICAL THERAPIST

## 2025-04-11 PROCEDURE — 97162 PT EVAL MOD COMPLEX 30 MIN: CPT | Performed by: PHYSICAL THERAPIST

## 2025-04-11 PROCEDURE — 97110 THERAPEUTIC EXERCISES: CPT | Performed by: PHYSICAL THERAPIST

## 2025-04-11 NOTE — PROGRESS NOTES
PT Evaluation     Today's date: 2025  Patient name: Jamee Shaikh  : 1955  MRN: [id number]  Referring provider: Abdullahi Cortez*  Dx:   Encounter Diagnosis     ICD-10-CM    1. Tear of left supraspinatus tendon  M75.102       2. Left shoulder pain, unspecified chronicity  M25.512           Start Time: 0840  Stop Time: 0920  Total time in clinic (min): 40 minutes    Assessment  Impairments: abnormal muscle firing, abnormal or restricted ROM, abnormal movement, activity intolerance, impaired physical strength, lacks appropriate home exercise program, pain with function, scapular dyskinesis, poor posture  and poor body mechanics    Assessment details: Jamee Shaikh was seen for an initial PT evaluation today. Patient is a 70 y.o. female with diagnosis of rTSA and past medical history significant for OA, chronic pain, thyroid disease, GERD, hiatal hernia, hyperlipidemia, HTN, rotator cuff tear with repair bilaterally, L buttock pain with history of surgery, lipomas with removal, lumbar radiculopathy, vertigo. Moderate complexity evaluation  due to number of participation restrictions, functional outcome measure of 67% limitation, and evolving clinical presentation. Findings today show limitation in left shoulder strength, range of motion, mobility, and stability with pain consistent with s/p diagnosis impacting overall functional mobility including ability to lift, reach, carry. Skilled PT indicated to treat at this time to address above stated deficits and return patient to PLOF.       Goals  STG (6 weeks)  1. Patient's left shoulder flexion AROM will increase to 120 with 2/10 pain for increased ability to perform overhead ADLs.   2. Patient will have 0/10 pain in left shoulder at rest.  3. Patient's left shoulder strength will increase to 3+/5 for increased ability to lift.   LTG (12 weeks)  1. Patient's UE AROM equal bilaterally for ability to complete hair hygiene, overhead, and behind the  "back ADLs.   2. Patient's UE strength will be equal bilaterally for ability to lift and carry at PLOF.   3. Patient will be independent with home exercise program for continued maintenance post PT discharge.      Plan  Patient would benefit from: skilled physical therapy  Planned modality interventions: unattended electrical stimulation, cryotherapy and thermotherapy: hydrocollator packs    Planned therapy interventions: neuromuscular re-education, manual therapy, therapeutic exercise, therapeutic activities, self care and home exercise program    Frequency: 2x week  Duration in weeks: 12  Plan of Care beginning date: 2025  Plan of Care expiration date: 2025  Treatment plan discussed with: patient and PTA  Plan details: Progress note in 4 weeks.       Subjective Evaluation    History of Present Illness  Date of surgery: 2025  Mechanism of injury: Jamee Shaikh is a 70 y.o. female who presents to outpatient Physical Therapy today with complaints of left shoulder pain. Underwent rTSA . Felt ok the first few days with the block, but is now having more soreness and pain. Currently sleeping in a recliner. Wearing sling at all times.  To f/u  with ortho.     Patient Goals  Patient goal: Return use of LUE without pain  Pain  Current pain ratin  At best pain ratin  At worst pain ratin  Location: anterior proximal arm  Quality: \"heavy\"  Relieving factors: medications (IBU)  Exacerbated by: LUE movement.    Social Support  Steps to enter house: yes  Stairs in house: yes   Lives in: multiple-level home  Lives with: spouse    Employment status: not working  Hand dominance: right          Objective     Postural Observations    Additional Postural Observation Details  L shoulder elevated, forward head    Observations     Additional Observation Details  Incision at anterior left shoulder covered with dressing. Minimal bruising and edema, no redness or noted drainage    Cervical/Thoracic Screen "   Cervical range of motion within normal limits    Active Range of Motion     Right Shoulder   Normal active range of motion  Flexion: 145 degrees   Extension: 50 degrees   Abduction: 140 degrees   External rotation BTH: T3   Internal rotation BTB: T12     Passive Range of Motion   Left Shoulder   Flexion: 15 degrees with pain  Abduction: 15 degrees with pain  External rotation 0°: -25 degrees with pain  Internal rotation 0°: Left shoulder passive internal rotation at 0 degrees: to belly.     Additional Passive Range of Motion Details  L elbow  with pain    Strength/Myotome Testing     Right Shoulder     Planes of Motion   Flexion: 4   Abduction: 4-   External rotation at 0°: 5   Internal rotation at 0°: 5     Isolated Muscles   Biceps: 5   Triceps: 5     General Comments:      Shoulder Comments       FOTO: 33% function, 46% predicted function                  Precautions: s/p protocol attached to chart  Access Code: [email or web address]  Progress note: 5/11  POC: 7/11    Manuals 4/11       Stretching with active release KB within parameters       GH jt mobs        Scapular PNF                Neuro Re-Ed                scap retraction reviewed       Shoulder rolls reviewed       Chin tucks reviewed       Scapular iso nv               Ther Ex                UT stretch        LS stretch        Wrist AROM reviewed        reviewed                                                                                                                       Ther Activity                        Gait Training                        Modalities

## 2025-04-11 NOTE — HOME EXERCISE EDUCATION
Program_ID:[id number]   Access Code: [email or web address]  URL: [email or web address]  Date: 04-  Prepared By: Giselle Sloan    Program Notes      Exercises      - Seated Scapular Retraction - 3 x daily - 7 x weekly - 1 sets - 10 reps      - Seated Cervical Retraction - 3 x daily - 7 x weekly - 1 sets - 10 reps - 3 sec hold      - Standing Backward Shoulder Rolls - 3 x daily - 7 x weekly - 1 sets - 10 reps      - Seated Gripping Towel - 3 x daily - 7 x weekly - 3 sets - 10 reps      - Wrist AROM Flexion Extension - 3 x daily - 7 x weekly - 1 sets - 10 reps      - Wrist AROM Radial Ulnar Deviation - 3 x daily - 7 x weekly - 1 sets - 10 reps      - Seated Forearm Pronation and Supination AROM - 3 x daily - 7 x weekly - 1 sets - 10 reps

## 2025-04-15 ENCOUNTER — OFFICE VISIT (OUTPATIENT)
Age: 70
End: 2025-04-15
Payer: COMMERCIAL

## 2025-04-15 ENCOUNTER — OFFICE VISIT (OUTPATIENT)
Dept: PHYSICAL THERAPY | Facility: CLINIC | Age: 70
End: 2025-04-15
Attending: ORTHOPAEDIC SURGERY
Payer: COMMERCIAL

## 2025-04-15 VITALS — WEIGHT: 162 LBS | BODY MASS INDEX: 28.7 KG/M2 | HEIGHT: 63 IN

## 2025-04-15 DIAGNOSIS — M51.16 LUMBAR DISC DISEASE WITH RADICULOPATHY: Primary | ICD-10-CM

## 2025-04-15 DIAGNOSIS — M25.512 LEFT SHOULDER PAIN, UNSPECIFIED CHRONICITY: ICD-10-CM

## 2025-04-15 DIAGNOSIS — M75.102 TEAR OF LEFT SUPRASPINATUS TENDON: Primary | ICD-10-CM

## 2025-04-15 DIAGNOSIS — G89.4 CHRONIC PAIN SYNDROME: ICD-10-CM

## 2025-04-15 PROCEDURE — G2211 COMPLEX E/M VISIT ADD ON: HCPCS | Performed by: ANESTHESIOLOGY

## 2025-04-15 PROCEDURE — 97110 THERAPEUTIC EXERCISES: CPT | Performed by: PHYSICAL THERAPIST

## 2025-04-15 PROCEDURE — 97140 MANUAL THERAPY 1/> REGIONS: CPT | Performed by: PHYSICAL THERAPIST

## 2025-04-15 PROCEDURE — 99214 OFFICE O/P EST MOD 30 MIN: CPT | Performed by: ANESTHESIOLOGY

## 2025-04-15 NOTE — HOME EXERCISE EDUCATION
Program_ID:[id number]   Access Code: [email or web address]  URL: [email or web address]  Date: 04-  Prepared By: Giselle Sloan    Program Notes      Exercises      - Seated Scapular Retraction - 3 x daily - 7 x weekly - 1 sets - 10 reps      - Seated Cervical Retraction - 3 x daily - 7 x weekly - 1 sets - 10 reps - 3 sec hold      - Standing Backward Shoulder Rolls - 3 x daily - 7 x weekly - 1 sets - 10 reps      - Seated Gripping Towel - 3 x daily - 7 x weekly - 3 sets - 10 reps      - Wrist AROM Flexion Extension - 3 x daily - 7 x weekly - 1 sets - 10 reps      - Wrist AROM Radial Ulnar Deviation - 3 x daily - 7 x weekly - 1 sets - 10 reps      - Seated Forearm Pronation and Supination AROM - 3 x daily - 7 x weekly - 1 sets - 10 reps      - Isometric Shoulder Abduction at Wall - 2 x daily - 7 x weekly - 1 sets - 5 reps - 5 sec hold      - Isometric Shoulder Flexion at Wall - 2 x daily - 7 x weekly - 1 sets - 5 reps - 5 hold

## 2025-04-15 NOTE — PROGRESS NOTES
Daily Note     Today's date: 4/15/2025  Patient name: Jamee Shaikh  : 1955  MRN: [id number]  Referring provider: Abdullahi Cortez*  Dx:   Encounter Diagnosis     ICD-10-CM    1. Tear of left supraspinatus tendon  M75.102       2. Left shoulder pain, unspecified chronicity  M25.512           Start Time: 915  Stop Time: 945  Total time in clinic (min): 30 minutes    Subjective: No changes in shoulder since IE. Came from pain management office for LBP prior to today's session.       Objective: See treatment diary below      Assessment: Tolerated treatment well. Guarding of L shoulder, decreased during end of MT. Did show improvement in tolerance to PROM stretching today with increased motion, although still limited. Reviewed home exercise program and added scapular isometrics today. Patient demonstrated fatigue post treatment and would benefit from continued PT      Plan: Continue per plan of care.  Progress treatment as tolerated.       Precautions: s/p protocol attached to chart  Access Code: [email or web address]  Progress note:   POC:     Manuals 4/11 4/15      Stretching with active release KB within parameters Flexion to 1st end feel,  ER<30- KB      GH jt mobs        Scapular PNF                Neuro Re-Ed                scap retraction reviewed 10x      Shoulder rolls reviewed 10x      Chin tucks reviewed 10x      Scapular iso nv :05x5              Ther Ex                UT stretch        LS stretch        Wrist AROM reviewed 15x ea       reviewed nv      Elbow PROM  10x                                                                                                              Ther Activity                        Gait Training                        Modalities

## 2025-04-15 NOTE — PROGRESS NOTES
Assessment:  1. Lumbar disc disease with radiculopathy    2. Chronic pain syndrome        Plan:  Patient is a 70-year-old female complains of left-sided low back and hip pain and left leg weakness presents to office for follow-up visit.  Patient continues to have significant left-sided buttock and hip pain which appears to be neurogenic in nature.  MRI of lumbar spine does show moderate foraminal narrowing at the L5-S1 and L4-L5 disc space that does correlate with patient's left-sided pain.  At this time we will try epidural steroid injection to alleviate the symptoms.  Patient is currently recovering from a left total shoulder.  1.  Will schedule patient for a left L4-L5 and L5-S1 transforaminal epidural steroid injection  2.  Follow-up 1 month after injection      Complete risks and benefits including bleeding, infection, tissue reaction, nerve injury and allergic reaction were discussed. The approach was demonstrated using models and literature was provided. Verbal and written consent was obtained.          History of Present Illness:  The patient is a 70 y.o. female who presents for a follow up office visit in regards to Hip Pain.   The patient’s current symptoms include 7/10 constant sharp, pressure-like pain without particular time pattern.    Current pain medications includes: Medrol Dosepak.  The patient reports that this regimen is providing 0% pain relief.  The patient is reporting no side effects from this pain medication regimen.    I have personally reviewed and/or updated the patient's past medical history, past surgical history, family history, social history, current medications, allergies, and vital signs today.         Review of Systems  Review of Systems   Constitutional:  Negative for unexpected weight change.   HENT:  Negative for hearing loss.    Eyes:  Negative for visual disturbance.   Respiratory:  Negative for shortness of breath.    Cardiovascular:  Negative for leg swelling.  "  Gastrointestinal:  Negative for constipation.   Endocrine: Negative for polyuria.   Genitourinary:  Negative for difficulty urinating.   Musculoskeletal:  Negative for gait problem, joint swelling and myalgias.   Skin:  Negative for rash.   Neurological:  Negative for weakness and headaches.   Psychiatric/Behavioral:  Negative for decreased concentration.    All other systems reviewed and are negative.      Past Medical History:   Diagnosis Date    Anemia     Arthritis     Bilateral bunions     Chronic pain disorder     back and hip pain    Colon polyp     Disease of thyroid gland     nodules    History of COVID-2020    mild s/s    History of gastroesophageal reflux (GERD)     \"had hiatal hernia surgery\"    History of heart murmur in childhood     Hyperlipidemia     Hypertension     Overactive bladder     Rotator cuff tear, right     had surgery    Vitamin D deficiency     unsure    Wears glasses     Wears partial dentures     upper       Past Surgical History:   Procedure Laterality Date     SECTION      Last Assessed: 2017    COLONOSCOPY      ELBOW SURGERY      Last Assessed: 2017    ESOPHAGOGASTRODUODENOSCOPY N/A 2017    Procedure: ESOPHAGOGASTRODUODENOSCOPY (EGD);  Surgeon: Jolly Lin DO;  Location: [facility];  Service: Gastroenterology    HIP SURGERY Left 2018    glut medius/minimus repair  and 18--with pin implanted    HYSTERECTOMY          JOINT REPLACEMENT      PARAESOPHAGEAL HERNIA REPAIR N/A 2020    Procedure: LAPAROSCOPIC PARAESOPHAGEAL HERNIA REPAIR WITH MESH AND NISSEN FUNDOPLICATION WITH ROBOTICS;  Surgeon: Drarin Leon MD;  Location: [facility];  Service: General    NH ARTHROPLASTY GLENOHUMERAL JOINT TOTAL SHOULDER Left 2025    Procedure: ARTHROPLASTY SHOULDER REVERSE;  Surgeon: Abdullahi Cortez MD;  Location:  MAIN OR;  Service: Orthopedics    NH ARTHRP KNE CONDYLE&PLATU MEDIAL&LAT COMPARTMENTS Left 03/15/2023    Procedure: " ARTHROPLASTY KNEE TOTAL;  Surgeon: Bart Potts DO;  Location: [facility];  Service: Orthopedics    TX ARTHRS KNE SURG W/MENISCECTOMY MED/LAT W/SHVG Left 06/15/2022    Procedure: ;left knee arthroscopy, meniscectomy,synevectomy,chondroplasty, loose body removal, injection;  Surgeon: Bart Potts DO;  Location: [facility];  Service: Orthopedics    TX COLONOSCOPY FLX DX W/COLLJ SPEC WHEN PFRMD N/A 06/26/2017    Procedure: EGD AND COLONOSCOPY;  Surgeon: Jolly Lin DO;  Location: [facility];  Service: Gastroenterology    TX DIAGNOSTIC ARTHROSCOPY SHOULDER +- SYNOVIAL BX Left 9/18/2024    Procedure: DIAGNOSTIC ARTHROSCOPY SHOULDER POSSIBLE REVISION ROTATOR CUFF REPAIR, EXTENSIVE DEBRIDEMENT;  Surgeon: Abdullahi Cortez MD;  [facility];  Service: Orthopedics    TX EXCISON TUMOR SOFT TISSUE THIGH/KNEE SUBQ 3 CM/> Bilateral 05/26/2022    Procedure: EXCISION BIOPSY TISSUE LESION/MASS LOWER EXTREMITY;  Surgeon: Kit Tavera MD;  Location: [facility];  Service: General    TX EXCISON TUMOR SOFT TISSUE THIGH/KNEE SUBQ 3 CM/> Bilateral 08/25/2022    Procedure: EXCISION BIOPSY TISSUE LESION/MASS LOWER EXTREMITY;  Surgeon: Kit Tavera MD;  Location: [facility];  Service: General    TX OSTECTOMY PRTL 5TH METAR HEAD SPX Bilateral 08/15/2023    Procedure: BUNIONECTOMY TAILOR WITHOUT OSTEOTOMY;  Surgeon: Monet Pradhan DPM;  Location: [facility];  Service: Podiatry    TX SURGICAL ARTHROSCOPY JENNIFER W/CORACOACRM LIGM RLS Left 12/13/2023    Procedure: Left - ARTHROSCOPY SHOULDER  Synovectomy Debridement/chondroplasty Acromioplasty Arthroscopic rotator cuff repair Post arthroscopic injection of intra-articular joint space and peripheral portals;  Surgeon: Bart Potts DO;  Location: [facility];  Service: Orthopedics    SHOULDER ARTHROCENTESIS      SHOULDER ARTHROSCOPY Right 06/23/2021    Procedure: SHOULDER ARTHROSCOPY WITH acromioplasty, debridment, and ROTATOR CUFF REPAIR;  Surgeon:  Bart Potts DO;  Location:  MAIN OR;  Service: Orthopedics    TUBAL LIGATION         Family History   Problem Relation Age of Onset    Colon cancer Mother     Heart attack Father     Other Father         Cardiac Disorder    Diabetes type II Father     Colon cancer Sister     No Known Problems Sister     No Known Problems Sister     No Known Problems Sister     No Known Problems Sister     No Known Problems Daughter     No Known Problems Maternal Aunt     No Known Problems Maternal Aunt     No Known Problems Maternal Aunt     Cancer Maternal Grandmother     No Known Problems Paternal Grandmother        Social History     Occupational History    Not on file   Tobacco Use    Smoking status: Former     Current packs/day: 0.00     Average packs/day: 1.5 packs/day for 30.0 years (45.0 ttl pk-yrs)     Types: Cigarettes     Start date: 1961     Quit date: 1991     Years since quittin.3    Smokeless tobacco: Never    Tobacco comments:     quit 25 yrs ago   Vaping Use    Vaping status: Never Used   Substance and Sexual Activity    Alcohol use: Never    Drug use: Never    Sexual activity: Not Currently     Comment: defer         Current Outpatient Medications:     amLODIPine (NORVASC) 5 mg tablet, Take 2 tablets (10 mg total) by mouth daily, Disp: 180 tablet, Rfl: 1    atorvastatin (LIPITOR) 10 mg tablet, Take 1 tablet (10 mg total) by mouth daily, Disp: 90 tablet, Rfl: 1    famotidine (PEPCID) 20 mg tablet, Take 1 tablet (20 mg total) by mouth 2 (two) times a day (Patient taking differently: Take 20 mg by mouth if needed), Disp: 180 tablet, Rfl: 1    hydrOXYzine HCL (ATARAX) 25 mg tablet, take 1 tablet by mouth every 6 hours if needed for itching, Disp: 180 tablet, Rfl: 1    losartan (COZAAR) 100 MG tablet, Take 1 tablet (100 mg total) by mouth daily, Disp: 90 tablet, Rfl: 1    meloxicam (Mobic) 15 mg tablet, Take 1 tablet (15 mg total) by mouth daily, Disp: 90 tablet, Rfl: 1    oxyCODONE (ROXICODONE) 5  "immediate release tablet, Take 1 tablet (5 mg total) by mouth every 6 (six) hours as needed for severe pain for up to 13 doses Max Daily Amount: 20 mg, Disp: 13 tablet, Rfl: 0    valACYclovir (VALTREX) 1,000 mg tablet, take 1 tablet by mouth twice a day for 3 days (Patient taking differently: As needed), Disp: 180 tablet, Rfl: 1    Allergies   Allergen Reactions    Hydrocodone Irritability       Physical Exam:    Ht 5' 3\" (1.6 m)   Wt 73.5 kg (162 lb)   BMI 28.70 kg/m²     Constitutional:normal, well developed, well nourished, alert, in no distress and non-toxic and no overt pain behavior.  Eyes:anicteric  HEENT:grossly intact  Neck:supple, symmetric, trachea midline and no masses   Pulmonary:even and unlabored  Cardiovascular:No edema or pitting edema present  Skin:Normal without rashes or lesions and well hydrated  Psychiatric:Mood and affect appropriate  Neurologic:Cranial Nerves II-XII grossly intact  Musculoskeletal:antalgic    Lumbar/Sacral Spine examination demonstrates.  Decreased range of motion lumbar spine with pain upon: flexion, lateral rotation to the left/right, and bending to the left/right.  Bilateral lumbar paraspinals tender to palpation. Muscle spasms noted in the lumbar area bilaterally. 4/5 left lower extremity strength in all muscle groups. Positive seated straight leg raise for bilateral lower extremities.  Sensitivity to light touch intact bilateral lower extremities. 2+ reflexes in the patella and Achilles.  No ankle clonus        Imaging  No orders to display       No orders of the defined types were placed in this encounter.        "

## 2025-04-16 ENCOUNTER — OFFICE VISIT (OUTPATIENT)
Dept: PHYSICAL THERAPY | Facility: CLINIC | Age: 70
End: 2025-04-16
Attending: ORTHOPAEDIC SURGERY
Payer: COMMERCIAL

## 2025-04-16 DIAGNOSIS — M75.102 TEAR OF LEFT SUPRASPINATUS TENDON: Primary | ICD-10-CM

## 2025-04-16 DIAGNOSIS — M25.512 LEFT SHOULDER PAIN, UNSPECIFIED CHRONICITY: ICD-10-CM

## 2025-04-16 PROCEDURE — 97110 THERAPEUTIC EXERCISES: CPT | Performed by: PHYSICAL THERAPIST

## 2025-04-16 PROCEDURE — 97112 NEUROMUSCULAR REEDUCATION: CPT | Performed by: PHYSICAL THERAPIST

## 2025-04-16 PROCEDURE — 97140 MANUAL THERAPY 1/> REGIONS: CPT | Performed by: PHYSICAL THERAPIST

## 2025-04-16 NOTE — PROGRESS NOTES
Daily Note     Today's date: 2025  Patient name: Jamee Shaikh  : 1955  MRN: [id number]  Referring provider: Abdullahi Cortez*  Dx:   Encounter Diagnosis     ICD-10-CM    1. Tear of left supraspinatus tendon  M75.102       2. Left shoulder pain, unspecified chronicity  M25.512           Start Time: 0900  Stop Time: 09  Total time in clinic (min): 38 minutes    Subjective: Stiff today. No significant changes since yesterday's session.       Objective: See treatment diary below      Assessment: Tolerated treatment well. Improved tolerance to exercise today. Did not increase HEP today, should re-assess next session to increase as able per tolerance and protocol. Patient exhibited good technique with therapeutic exercises and would benefit from continued PT      Plan: Continue per plan of care.  Progress treatment as tolerated.       Precautions: s/p protocol attached to chart  Access Code: [email or web address]  Progress note:   POC:     Manuals 4/11 4/15 4/16     Stretching with active release KB within parameters Flexion to 1st end feel,  ER<30- KB Flexion to 1st end feel,  ER<30- KB     GH jt mobs        Scapular PNF                Neuro Re-Ed                scap retraction reviewed 10x 10x     Shoulder rolls reviewed 10x 10x     Chin tucks reviewed 10x 10x     Scapular iso nv :05x5 :05x10 flex, abd             Ther Ex                UT stretch        LS stretch        Wrist AROM reviewed 15x ea 20x ea      reviewed nv Yellow 20x     Elbow PROM  10x 20x                                                                                                             Ther Activity                        Gait Training                        Modalities

## 2025-04-21 ENCOUNTER — APPOINTMENT (OUTPATIENT)
Dept: RADIOLOGY | Facility: OTHER | Age: 70
End: 2025-04-21
Attending: PHYSICIAN ASSISTANT
Payer: COMMERCIAL

## 2025-04-21 ENCOUNTER — OFFICE VISIT (OUTPATIENT)
Dept: OBGYN CLINIC | Facility: OTHER | Age: 70
End: 2025-04-21

## 2025-04-21 VITALS — BODY MASS INDEX: 28.7 KG/M2 | HEIGHT: 63 IN | WEIGHT: 162 LBS

## 2025-04-21 DIAGNOSIS — Z96.612 S/P REVERSE TOTAL SHOULDER ARTHROPLASTY, LEFT: Primary | ICD-10-CM

## 2025-04-21 DIAGNOSIS — Z96.612 S/P REVERSE TOTAL SHOULDER ARTHROPLASTY, LEFT: ICD-10-CM

## 2025-04-21 PROCEDURE — 99024 POSTOP FOLLOW-UP VISIT: CPT | Performed by: PHYSICIAN ASSISTANT

## 2025-04-21 PROCEDURE — 73030 X-RAY EXAM OF SHOULDER: CPT

## 2025-04-21 NOTE — PATIENT INSTRUCTIONS
Reverse Total Shoulder Rehabilitation Protocol  Abdullahi Cortez MD  [facility]      Notes for Physical Therapist:  Normal shoulder arthrokinematics are no longer relevant.  Aggressive PROM and strengthening can lead to complications - please avoid combined movements of IR/ext and ER/abd.  Advise patients to not carry heavy objects at side - i.e. grocery bags.  Special care with patients with known Osteoporosis and/or thinned acromion.  Review operative report to understand RTC involvement and other variants which may require attention.  Sling - D/C'd at 2 or 4 weeks.  This will be dependent on surgical findings and will be specified in patient's operative report.  Please direct protocol questions to one of the authors and patient issues to surgeon.    Phase I - Protective phase: Day 1-2 weeks:  Patient/family education on surgery and rehab/functional expectations.  PROM: flexion to first end-feel and ER to <30 degrees.  Instruct in DELTOID isometrics and ensure completion at home throughout rehab process.  Postural correction exercises.    Phase II - Sub-Acute Phase:  2-6 weeks:  Initiate AAROM and progress to AROM to promote shoulder elevation/scaption avoiding scapular compensation.    PROM to the following permanent limits:  flexion: 120 and ER: to first end feel and not to exceed 50 degrees.  Functional IR: not past greater trochanter.    Phase III - Return to Function:  6- 8 weeks:  Continue to progress AROM and functional activities to allow patient to perform desired ADL activities.  3# weight limit with ADL activities.  Consider light CKC activities appreciating PROM limitations.    Discharge Criteria:  The goal is to discharge the patient by 8 weeks post-op with functional shoulder elevation/scaption.  If the therapist feels that additional PT is needed based upon complications or patient's goals, this should be discussed with Dr. Cortez.

## 2025-04-21 NOTE — PROGRESS NOTES
Orthopaedic Surgery - Office Note  Jamee Shaikh (70 y.o. female)   : 1955   MRN: [id number]  Encounter Date: 2025    Chief Complaint   Patient presents with    Left Shoulder - Post-op         Assessment & Plan  S/P reverse total shoulder arthroplasty, left  Patient will follow all postoperative instructions.  Patient will wear the sling for 4 weeks, removing the pillow portion at 2 weeks postoperative.  Patient will attend physical therapy as scheduled following the reverse total shoulder protocol which was provided to the patient in the office today in the after visit summary.  X-rays were reviewed with the patient in the office today.  Wound care instructions were again reviewed with the patient in the office today.  All question and concerns were reviewed with the patient in the office today.            Return for Recheck in 8 weeks with myself.        History of Present Illness  Patient is here for recheck after left reverse total shoulder on 2025.  Patient has been attending physical therapy and doing well.  No postoperative complications are reported.  Patient reports she is not having much pain at all.    Review of Systems  Pertinent items are noted in HPI.  All other systems were reviewed and are negative.    Physical Exam  There were no vitals taken for this visit.  Cons: Appears well.  No apparent distress.  Psych: Alert. Oriented x3.  Mood and affect normal.    Operative shoulder incisions are healing well.  There are no signs of infection.  Patient is neurovascular intact in the operative extremity with full active wrist extension and full range of motion of all digits.  Sling is fitting appropriately.      X-rays performed in the office today and reviewed by myself today show no acute fracture or dislocation.  Prosthesis is in place in proper anatomical alignment.  X-rays were reviewed from orthopedic standpoint will await official radiologist interpretation      Studies  "Reviewed  Operative report, protocol, and therapy notes were reviewed for today's visit    Procedures  No procedures today.    Medical, Surgical, Family, and Social History  The patient's medical history, family history, and social history, were reviewed and updated as appropriate.    Past Medical History:   Diagnosis Date    Anemia     Arthritis     Bilateral bunions     Chronic pain disorder     back and hip pain    Colon polyp     Disease of thyroid gland     nodules    History of COVID-2020    mild s/s    History of gastroesophageal reflux (GERD)     \"had hiatal hernia surgery\"    History of heart murmur in childhood     Hyperlipidemia     Hypertension     Overactive bladder     Rotator cuff tear, right     had surgery    Vitamin D deficiency     unsure    Wears glasses     Wears partial dentures     upper       Past Surgical History:   Procedure Laterality Date     SECTION      Last Assessed: 2017    COLONOSCOPY      ELBOW SURGERY      Last Assessed: 2017    ESOPHAGOGASTRODUODENOSCOPY N/A 2017    Procedure: ESOPHAGOGASTRODUODENOSCOPY (EGD);  Surgeon: Jolly Lin DO;  Location: [facility];  Service: Gastroenterology    HIP SURGERY Left 2018    glut medius/minimus repair  and 18--with pin implanted    HYSTERECTOMY          JOINT REPLACEMENT      PARAESOPHAGEAL HERNIA REPAIR N/A 2020    Procedure: LAPAROSCOPIC PARAESOPHAGEAL HERNIA REPAIR WITH MESH AND NISSEN FUNDOPLICATION WITH ROBOTICS;  Surgeon: Darrin Leon MD;  Location: [facility];  Service: General    LA ARTHROPLASTY GLENOHUMERAL JOINT TOTAL SHOULDER Left 2025    Procedure: ARTHROPLASTY SHOULDER REVERSE;  Surgeon: Abdullahi Cortez MD;  Location:  MAIN OR;  Service: Orthopedics    LA ARTHRP KNE CONDYLE&PLATU MEDIAL&LAT COMPARTMENTS Left 03/15/2023    Procedure: ARTHROPLASTY KNEE TOTAL;  Surgeon: Bart Potts DO;  Location: [facility];  Service: Orthopedics    LA ARTHRS KNE SURG " W/MENISCECTOMY MED/LAT W/SHVG Left 06/15/2022    Procedure: ;left knee arthroscopy, meniscectomy,synevectomy,chondroplasty, loose body removal, injection;  Surgeon: Bart Potts DO;  Location: [facility];  Service: Orthopedics    ME COLONOSCOPY FLX DX W/COLLJ SPEC WHEN PFRMD N/A 06/26/2017    Procedure: EGD AND COLONOSCOPY;  Surgeon: Jolly Lin DO;  Location: [facility];  Service: Gastroenterology    ME DIAGNOSTIC ARTHROSCOPY SHOULDER +- SYNOVIAL BX Left 9/18/2024    Procedure: DIAGNOSTIC ARTHROSCOPY SHOULDER POSSIBLE REVISION ROTATOR CUFF REPAIR, EXTENSIVE DEBRIDEMENT;  Surgeon: Abdullahi Cortez MD;  [facility];  Service: Orthopedics    ME EXCISON TUMOR SOFT TISSUE THIGH/KNEE SUBQ 3 CM/> Bilateral 05/26/2022    Procedure: EXCISION BIOPSY TISSUE LESION/MASS LOWER EXTREMITY;  Surgeon: Kit Tavera MD;  [facility]: [facility];  Service: General    ME EXCISON TUMOR SOFT TISSUE THIGH/KNEE SUBQ 3 CM/> Bilateral 08/25/2022    Procedure: EXCISION BIOPSY TISSUE LESION/MASS LOWER EXTREMITY;  Surgeon: Kit Tavera MD;  [facility]: [facility];  Service: General    ME OSTECTOMY PRTL 5TH METAR HEAD SPX Bilateral 08/15/2023    Procedure: BUNIONECTOMY TAILOR WITHOUT OSTEOTOMY;  Surgeon: Monet Pradhan DPM;  Location: [facility];  Service: Podiatry    ME SURGICAL ARTHROSCOPY JENNIFER W/CORACOACRM LIGM RLS Left 12/13/2023    Procedure: Left - ARTHROSCOPY SHOULDER  Synovectomy Debridement/chondroplasty Acromioplasty Arthroscopic rotator cuff repair Post arthroscopic injection of intra-articular joint space and peripheral portals;  Surgeon: Bart Potts DO;  Location: [facility];  Service: Orthopedics    SHOULDER ARTHROCENTESIS      SHOULDER ARTHROSCOPY Right 06/23/2021    Procedure: SHOULDER ARTHROSCOPY WITH acromioplasty, debridment, and ROTATOR CUFF REPAIR;  Surgeon: Bart Potts DO;  Location:  MAIN OR;  Service: Orthopedics    TUBAL LIGATION         Family History   Problem Relation  Age of Onset    Colon cancer Mother     Heart attack Father     Other Father         Cardiac Disorder    Diabetes type II Father     Colon cancer Sister     No Known Problems Sister     No Known Problems Sister     No Known Problems Sister     No Known Problems Sister     No Known Problems Daughter     No Known Problems Maternal Aunt     No Known Problems Maternal Aunt     No Known Problems Maternal Aunt     Cancer Maternal Grandmother     No Known Problems Paternal Grandmother        Social History     Occupational History    Not on file   Tobacco Use    Smoking status: Former     Current packs/day: 0.00     Average packs/day: 1.5 packs/day for 30.0 years (45.0 ttl pk-yrs)     Types: Cigarettes     Start date: 1961     Quit date: 1991     Years since quittin.3    Smokeless tobacco: Never    Tobacco comments:     quit 25 yrs ago   Vaping Use    Vaping status: Never Used   Substance and Sexual Activity    Alcohol use: Never    Drug use: Never    Sexual activity: Not Currently     Comment: defer       Allergies   Allergen Reactions    Hydrocodone Irritability         Current Outpatient Medications:     amLODIPine (NORVASC) 5 mg tablet, Take 2 tablets (10 mg total) by mouth daily, Disp: 180 tablet, Rfl: 1    atorvastatin (LIPITOR) 10 mg tablet, Take 1 tablet (10 mg total) by mouth daily, Disp: 90 tablet, Rfl: 1    famotidine (PEPCID) 20 mg tablet, Take 1 tablet (20 mg total) by mouth 2 (two) times a day (Patient taking differently: Take 20 mg by mouth if needed), Disp: 180 tablet, Rfl: 1    hydrOXYzine HCL (ATARAX) 25 mg tablet, take 1 tablet by mouth every 6 hours if needed for itching, Disp: 180 tablet, Rfl: 1    losartan (COZAAR) 100 MG tablet, Take 1 tablet (100 mg total) by mouth daily, Disp: 90 tablet, Rfl: 1    meloxicam (Mobic) 15 mg tablet, Take 1 tablet (15 mg total) by mouth daily, Disp: 90 tablet, Rfl: 1    oxyCODONE (ROXICODONE) 5 immediate release tablet, Take 1 tablet (5 mg total) by mouth  every 6 (six) hours as needed for severe pain for up to 13 doses Max Daily Amount: 20 mg, Disp: 13 tablet, Rfl: 0    valACYclovir (VALTREX) 1,000 mg tablet, take 1 tablet by mouth twice a day for 3 days (Patient taking differently: As needed), Disp: 180 tablet, Rfl: 1      Jong Lassiter PA-C

## 2025-04-21 NOTE — ASSESSMENT & PLAN NOTE
Patient will follow all postoperative instructions.  Patient will wear the sling for 4 weeks, removing the pillow portion at 2 weeks postoperative.  Patient will attend physical therapy as scheduled following the reverse total shoulder protocol which was provided to the patient in the office today in the after visit summary.  X-rays were reviewed with the patient in the office today.  Wound care instructions were again reviewed with the patient in the office today.  All question and concerns were reviewed with the patient in the office today.

## 2025-04-22 ENCOUNTER — RESULTS FOLLOW-UP (OUTPATIENT)
Dept: FAMILY MEDICINE CLINIC | Facility: CLINIC | Age: 70
End: 2025-04-22

## 2025-04-22 ENCOUNTER — TELEPHONE (OUTPATIENT)
Dept: MAMMOGRAPHY | Facility: CLINIC | Age: 70
End: 2025-04-22

## 2025-04-22 NOTE — TELEPHONE ENCOUNTER
Campbell Briggs,    We were unsuccessful in contacting the above patient for her diagnostic follow up mammogram/ultrasound.  I have left messages for her on 4/8 and 4/15 with no response to schedule.      Please attempt to contact this patient and have them schedule their appointment.  Please let me know if you have any questions or if I can be of any further assistance.    Thank you,  Hali Guzmán, MSN, RN, BAIRON, CN-BN  Breast Nurse Navigator   Per protocol. Last appointment: 11/19/19. Future appointment: 3/13/20.

## 2025-04-22 NOTE — TELEPHONE ENCOUNTER
Thank you, we called her this morning and she answered. She had shoulder surgery and is in a sling- she asked us to call in another couple weeks and she would schedule. I will leave a reminder for myself. Thank you for helping with this!

## 2025-04-23 ENCOUNTER — OFFICE VISIT (OUTPATIENT)
Dept: PHYSICAL THERAPY | Facility: CLINIC | Age: 70
End: 2025-04-23
Attending: ORTHOPAEDIC SURGERY
Payer: COMMERCIAL

## 2025-04-23 ENCOUNTER — TELEPHONE (OUTPATIENT)
Age: 70
End: 2025-04-23

## 2025-04-23 ENCOUNTER — TRANSCRIBE ORDERS (OUTPATIENT)
Dept: FAMILY MEDICINE CLINIC | Facility: CLINIC | Age: 70
End: 2025-04-23

## 2025-04-23 ENCOUNTER — RESULTS FOLLOW-UP (OUTPATIENT)
Dept: FAMILY MEDICINE CLINIC | Facility: CLINIC | Age: 70
End: 2025-04-23

## 2025-04-23 DIAGNOSIS — N39.0 RECURRENT UTI: Primary | ICD-10-CM

## 2025-04-23 DIAGNOSIS — R31.9 HEMATURIA, UNSPECIFIED TYPE: Primary | ICD-10-CM

## 2025-04-23 DIAGNOSIS — R31.9 HEMATURIA, UNSPECIFIED TYPE: ICD-10-CM

## 2025-04-23 DIAGNOSIS — R30.0 BURNING WITH URINATION: ICD-10-CM

## 2025-04-23 DIAGNOSIS — M75.102 TEAR OF LEFT SUPRASPINATUS TENDON: Primary | ICD-10-CM

## 2025-04-23 DIAGNOSIS — M25.512 LEFT SHOULDER PAIN, UNSPECIFIED CHRONICITY: ICD-10-CM

## 2025-04-23 DIAGNOSIS — R35.0 URINARY FREQUENCY: Primary | ICD-10-CM

## 2025-04-23 LAB

## 2025-04-23 PROCEDURE — 97110 THERAPEUTIC EXERCISES: CPT | Performed by: PHYSICAL THERAPIST

## 2025-04-23 PROCEDURE — 87086 URINE CULTURE/COLONY COUNT: CPT | Performed by: NURSE PRACTITIONER

## 2025-04-23 PROCEDURE — 97140 MANUAL THERAPY 1/> REGIONS: CPT | Performed by: PHYSICAL THERAPIST

## 2025-04-23 PROCEDURE — 87077 CULTURE AEROBIC IDENTIFY: CPT | Performed by: NURSE PRACTITIONER

## 2025-04-23 PROCEDURE — 87186 SC STD MICRODIL/AGAR DIL: CPT | Performed by: NURSE PRACTITIONER

## 2025-04-23 PROCEDURE — 81001 URINALYSIS AUTO W/SCOPE: CPT

## 2025-04-23 PROCEDURE — 97112 NEUROMUSCULAR REEDUCATION: CPT | Performed by: PHYSICAL THERAPIST

## 2025-04-23 RX ORDER — SULFAMETHOXAZOLE AND TRIMETHOPRIM 800; 160 MG/1; MG/1
1 TABLET ORAL EVERY 12 HOURS SCHEDULED
Qty: 14 TABLET | Refills: 0 | Status: SHIPPED | OUTPATIENT
Start: 2025-04-23 | End: 2025-04-30

## 2025-04-23 NOTE — TELEPHONE ENCOUNTER
WHO - patient     WHAT - possible UTI - burning and frequency    WHEN - started yesterday    How Often/Duration -    Pain -    Alleviating Factors (anything they tried to use to help so far) -    Next Steps - no available appointment today. Can patient take urine  sample in office  Please advise     Callback- patient

## 2025-04-23 NOTE — PROGRESS NOTES
Daily Note     Today's date: 2025  Patient name: Jamee Shaikh  : 1955  MRN: [id number]  Referring provider: Abdullahi Cortez*  Dx:   Encounter Diagnosis     ICD-10-CM    1. Tear of left supraspinatus tendon  M75.102       2. Left shoulder pain, unspecified chronicity  M25.512           Start Time: 945  Stop Time: 1023  Total time in clinic (min): 38 minutes    Subjective: Seen by ortho earlier this week. Had adductor pillow and staples removed. Incision looks good, no issues. Able to DC sling in 2 weeks (May 5th).       Objective: See treatment diary below      Assessment: Tolerated treatment well. Improvement noted with left shoulder passive range of motion and exercise tolerance. Patient exhibited good technique with therapeutic exercises and would benefit from continued PT progressing per protocol. Able to progress to AAROM at next session.       Plan: Continue per plan of care.  Progress treatment as tolerated.       Precautions: s/p protocol attached to chart  Access Code: [email or web address]  Progress note:   POC:     Manuals 4/11 4/15 4/16 4/23    Stretching with active release KB within parameters Flexion to 1st end feel,  ER<30- KB Flexion to 1st end feel,  ER<30- KB Flexion to 1st end feel,  ER<30- KB ^   GH jt mobs        Scapular PNF                Neuro Re-Ed                scap retraction reviewed 10x 10x 10x    Shoulder rolls reviewed 10x 10x 10x    Chin tucks reviewed 10x 10x 10x    Scapular iso nv :05x5 :05x10 flex, abd :05x10 flex, abd            Ther Ex                UT stretch        LS stretch        Wrist AROM reviewed 15x ea 20x ea 30x ea     reviewed nv Yellow 20x Red 30x    Elbow PROM  10x 20x 30x                                                                                                            Ther Activity                        Gait Training                        Modalities

## 2025-04-23 NOTE — TELEPHONE ENCOUNTER
Patient called in stating she dropped off a urine sample. Advised her it looks like it is in process and once we get the results back someone will reach out to her

## 2025-04-23 NOTE — TELEPHONE ENCOUNTER
Called and spoke with patient, she will drop sample off at the office. Entered orders for Urine Culture and UA.

## 2025-04-24 NOTE — TELEPHONE ENCOUNTER
I did call patient to follow up to see how she is doing. States she is still having some burning, but is going to the pharmacy once her  gets home. She was appreciative of my call.

## 2025-04-25 ENCOUNTER — OFFICE VISIT (OUTPATIENT)
Dept: PHYSICAL THERAPY | Facility: CLINIC | Age: 70
End: 2025-04-25
Attending: ORTHOPAEDIC SURGERY
Payer: COMMERCIAL

## 2025-04-25 DIAGNOSIS — M75.102 TEAR OF LEFT SUPRASPINATUS TENDON: Primary | ICD-10-CM

## 2025-04-25 DIAGNOSIS — M25.512 LEFT SHOULDER PAIN, UNSPECIFIED CHRONICITY: ICD-10-CM

## 2025-04-25 LAB — BACTERIA UR CULT: ABNORMAL

## 2025-04-25 PROCEDURE — 97110 THERAPEUTIC EXERCISES: CPT | Performed by: PHYSICAL THERAPIST

## 2025-04-25 PROCEDURE — 97112 NEUROMUSCULAR REEDUCATION: CPT | Performed by: PHYSICAL THERAPIST

## 2025-04-25 PROCEDURE — 97140 MANUAL THERAPY 1/> REGIONS: CPT | Performed by: PHYSICAL THERAPIST

## 2025-04-25 NOTE — PROGRESS NOTES
Daily Note     Today's date: 2025  Patient name: Jamee Shaikh  : 1955  MRN: [id number]  Referring provider: Abdullahi Cortez*  Dx:   Encounter Diagnosis     ICD-10-CM    1. Tear of left supraspinatus tendon  M75.102       2. Left shoulder pain, unspecified chronicity  M25.512           Start Time: 0830  Stop Time: 0910  Total time in clinic (min): 40 minutes    Subjective: Patient increased soreness yesterday and today 6/10. Continues to use sling without add pillow without difficulty.       Objective: See treatment diary below      Assessment: Tolerated treatment well. Added AAROM exercised today per flowsheet without significant difficulty. Some soreness post session today with addition of new exercises, but is progressing appropriately towards goals with increased PROM. Patient exhibited good technique with therapeutic exercises and would benefit from continued PT      Plan: Continue per plan of care.  Progress treatment as tolerated.       Precautions: s/p protocol attached to chart  Access Code: [email or web address]  Progress note:   POC:     Manuals 4/11 4/15 4/16 4/23 4/25   Stretching with active release KB within parameters Flexion to 1st end feel,  ER<30- KB Flexion to 1st end feel,  ER<30- KB Flexion to 1st end feel,  ER<30-  flex ER <50 - KB   GH jt mobs        Scapular PNF                Neuro Re-Ed                scap retraction reviewed 10x 10x 10x    Shoulder rolls reviewed 10x 10x 10x    Chin tucks reviewed 10x 10x 10x 10x   Scapular iso nv :05x5 :05x10 flex, abd :05x10 flex, abd :05x5 flex, abd           Ther Ex                UT stretch        LS stretch        Wrist AROM reviewed 15x ea 20x ea 30x ea 30x ea    reviewed nv Yellow 20x Red 30x Red 30x   Elbow PROM  10x 20x 30x 30x   Table slide     :05x10 flex, scap   Supine flexion AAROM     10x                                                                                           Ther Activity                         Gait Training                        Modalities

## 2025-04-29 ENCOUNTER — OFFICE VISIT (OUTPATIENT)
Dept: PHYSICAL THERAPY | Facility: CLINIC | Age: 70
End: 2025-04-29
Attending: ORTHOPAEDIC SURGERY
Payer: COMMERCIAL

## 2025-04-29 DIAGNOSIS — M25.512 LEFT SHOULDER PAIN, UNSPECIFIED CHRONICITY: ICD-10-CM

## 2025-04-29 DIAGNOSIS — M75.102 TEAR OF LEFT SUPRASPINATUS TENDON: Primary | ICD-10-CM

## 2025-04-29 PROCEDURE — 97110 THERAPEUTIC EXERCISES: CPT | Performed by: PHYSICAL THERAPIST

## 2025-04-29 PROCEDURE — 97140 MANUAL THERAPY 1/> REGIONS: CPT | Performed by: PHYSICAL THERAPIST

## 2025-04-29 PROCEDURE — 97112 NEUROMUSCULAR REEDUCATION: CPT | Performed by: PHYSICAL THERAPIST

## 2025-05-01 ENCOUNTER — OFFICE VISIT (OUTPATIENT)
Dept: PHYSICAL THERAPY | Facility: CLINIC | Age: 70
End: 2025-05-01
Attending: ORTHOPAEDIC SURGERY
Payer: COMMERCIAL

## 2025-05-01 DIAGNOSIS — M25.512 LEFT SHOULDER PAIN, UNSPECIFIED CHRONICITY: ICD-10-CM

## 2025-05-01 DIAGNOSIS — M75.102 TEAR OF LEFT SUPRASPINATUS TENDON: Primary | ICD-10-CM

## 2025-05-01 PROCEDURE — 97110 THERAPEUTIC EXERCISES: CPT | Performed by: PHYSICAL THERAPIST

## 2025-05-01 PROCEDURE — 97140 MANUAL THERAPY 1/> REGIONS: CPT | Performed by: PHYSICAL THERAPIST

## 2025-05-01 NOTE — PROGRESS NOTES
Daily Note     Today's date: 2025  Patient name: Jamee Shaikh  : 1955  MRN: [id number]  Referring provider: Abdullahi Cortez*  Dx:   Encounter Diagnosis     ICD-10-CM    1. Tear of left supraspinatus tendon  M75.102       2. Left shoulder pain, unspecified chronicity  M25.512           Start Time: 0900  Stop Time: 0930  Total time in clinic (min): 30 minutes    Subjective: No new complaints. Overall is feeling better and continues to improve. Is excited about sling removal .       Objective: See treatment diary below      Assessment: Tolerated treatment well. Increased AAROM exercises today in preparation for DC of brace next week. Patient exhibited good technique with therapeutic exercises and would benefit from continued PT working on L shoulder strength and stability progressing per protocol.       Plan: Continue per plan of care.  Progress treatment as tolerated.  Progress note next session.      Precautions: s/p protocol attached to chart  Access Code: [email or web address]  Progress note:   POC:     Manuals    Stretching with active release 120 flex ER <50 - KB  Flexion to 1st end feel,  ER<30- KB Flexion to 1st end feel,  ER<30-  flex ER <50 - KB   GH jt mobs        Scapular PNF                Neuro Re-Ed                scap retraction 10x 10x 10x 10x    Shoulder rolls 10x 10x 10x 10x    Chin tucks 10x 10x 10x 10x 10x   Scapular iso :05x10 :05x10 ea :05x10 flex, abd :05x10 flex, abd :05x5 flex, abd           Ther Ex                UT stretch        LS stretch        Wrist AROM 30x ea 30x ea 20x ea 30x ea 30x ea    Red  Green 10x Yellow 20x Red 30x Red 30x   Elbow PROM AROM 30x AROM 30x 20x 30x 30x   Table slide :05x10 flex, scap :05x10 flex, scap   :05x10 flex, scap   Supine flexion AAROM :05x10  :05x10   10x   Wall walk flex, scap 5x ea 5x ea      Standing I, Y,T  nv      Standing AAROM Nv* Cane with mirror  Flex: 10x  Scap:10x                                                                       Ther Activity                        Gait Training                        Modalities

## 2025-05-06 ENCOUNTER — EVALUATION (OUTPATIENT)
Dept: PHYSICAL THERAPY | Facility: CLINIC | Age: 70
End: 2025-05-06
Attending: ORTHOPAEDIC SURGERY
Payer: COMMERCIAL

## 2025-05-06 DIAGNOSIS — M25.512 LEFT SHOULDER PAIN, UNSPECIFIED CHRONICITY: ICD-10-CM

## 2025-05-06 DIAGNOSIS — M75.102 TEAR OF LEFT SUPRASPINATUS TENDON: Primary | ICD-10-CM

## 2025-05-06 PROCEDURE — 97110 THERAPEUTIC EXERCISES: CPT | Performed by: PHYSICAL THERAPIST

## 2025-05-06 PROCEDURE — 97140 MANUAL THERAPY 1/> REGIONS: CPT | Performed by: PHYSICAL THERAPIST

## 2025-05-06 PROCEDURE — 97112 NEUROMUSCULAR REEDUCATION: CPT | Performed by: PHYSICAL THERAPIST

## 2025-05-06 NOTE — PROGRESS NOTES
PT Re-Evaluation     Today's date: 2025  Patient name: Jamee Shaikh  : 1955  MRN: [id number]  Referring provider: Abdullahi Cortez*  Dx:   Encounter Diagnosis     ICD-10-CM    1. Tear of left supraspinatus tendon  M75.102       2. Left shoulder pain, unspecified chronicity  M25.512           Start Time: 0900  Stop Time: 0940  Total time in clinic (min): 40 minutes    Assessment  Impairments: abnormal muscle firing, abnormal or restricted ROM, abnormal movement, activity intolerance, impaired physical strength, lacks appropriate home exercise program, pain with function, scapular dyskinesis, poor posture  and poor body mechanics    Assessment details: Jamee Shaikh was seen for an initial PT evaluation and 7 f/u sessions. Patient is a 70 y.o. female with diagnosis of rTSA and past medical history significant for OA, chronic pain, thyroid disease, GERD, hiatal hernia, hyperlipidemia, HTN, rotator cuff tear with repair bilaterally, L buttock pain with history of surgery, lipomas with removal, lumbar radiculopathy, vertigo. FOTO functional score shows improvement from 4% at intake to 44% today progressing towards predicted value of 46%. Findings of examination today shows good progress towards goals in line with protocol guidelines. Continues to have deficit in left shoulder strength, range of motion and stability with pain indicating the need for further skilled PT services to continued to progress per protocol. Plan to continue 2x/week for an additional 4 weeks until next re-evaluation.         Goals  STG (6 weeks)  1. Patient's left shoulder flexion AROM will increase to 120 with 2/10 pain for increased ability to perform overhead ADLs. - PROGRESSING  2. Patient will have 0/10 pain in left shoulder at rest. - PROGRESSING  3. Patient's left shoulder strength will increase to 3+/5 for increased ability to lift. - PROGRESSING  LTG (12 weeks)  1. Patient's UE AROM equal bilaterally for ability to  "complete hair hygiene, overhead, and behind the back ADLs. - PROGRESSING  2. Patient's UE strength will be equal bilaterally for ability to lift and carry at PLOF. - PROGRESSING  3. Patient will be independent with home exercise program for continued maintenance post PT discharge. PROGRESSING      Plan  Patient would benefit from: skilled physical therapy  Planned modality interventions: unattended electrical stimulation, cryotherapy and thermotherapy: hydrocollator packs    Planned therapy interventions: neuromuscular re-education, manual therapy, therapeutic exercise, therapeutic activities, self care and home exercise program    Frequency: 2x week  Duration in weeks: 12  Plan of Care beginning date: 2025  Plan of Care expiration date: 2025  Treatment plan discussed with: patient and PTA  Plan details: Progress note in 4 weeks.       Subjective Evaluation    History of Present Illness  Date of surgery: 2025  Subjective 5/6: Patient removed sling yesterday and has had some soreness into arm since. Overall is feeling better than she has in awhile. Arm still feels weak and has difficulty lifting OH. Unable to reach BTB and is aware of precaution.     Mechanism of injury: Jamee Shaikh is a 70 y.o. female who presents to outpatient Physical Therapy today with complaints of left shoulder pain. Underwent rTSA . Felt ok the first few days with the block, but is now having more soreness and pain. Currently sleeping in a recliner. Wearing sling at all times.  To f/u  with ortho.     Patient Goals  Patient goal: Return use of LUE without pain  Pain    5/6  Current pain ratin  4  At best pain ratin  4  At worst pain ratin  6  Location: anterior proximal arm  Quality: \"heavy\"  Relieving factors: medications (IBU)  Exacerbated by: LUE movement.    Social Support  Steps to enter house: yes  Stairs in house: yes   Lives in: multiple-level home  Lives with: spouse    Employment status: not " working  Hand dominance: right          Objective     Postural Observations    Additional Postural Observation Details  L shoulder elevated, forward head    Observations     Additional Observation Details  Incision at anterior left shoulder covered with dressing. Minimal bruising and edema, no redness or noted drainage  5/6: mod limited scar mobility    Cervical/Thoracic Screen   Cervical range of motion within normal limits    Active Range of Motion     Shoulder - AROM IE 5/6   LEFT      Flexion NT 80 pain   Extension NT 25 pain   Abduction NT 80 pain   External Rotation NT 40   Internal Rotation NT 50   RIGHT     Flexion 145    Extension 50    Abduction 140    External Rotation BTH T3    Internal Rotation BTB T12          Passive Range of Motion     Shoulder - PROM IE 5/6   LEFT      Flexion 15 pain 90 pain   Abduction 15 pain 90 pain   External Rotation -25 pain 40 pain   Internal Rotation belly 50     Additional Passive Range of Motion Details  IE: L elbow  with pain  5/6: L elbow  with pain at ext    Strength/Myotome Testing   Shoulder - MMT IE 5/6   LEFT      Flexion NT 2+   Abduction NT 2+   External Rotation NT 3-   Internal Rotation NT 4-   Bicep NT 4-   Tricep NT 4+   RIGHT     Flexion 4    Abduction 4-    External Rotation 5    Internal Rotation 5    Bicep 5    Tricep 5          General Comments:      Shoulder Comments       FOTO: 33% function, 46% predicted function   5/6: 44%             Precautions: s/p protocol attached to chart  Access Code: [email or web address]  Progress note:6/6  POC: 7/11    Manuals 4/29 5/1 5/6 4/23 4/25   Stretching with active release 120 flex ER <50 - KB  KB per protocol Flexion to 1st end feel,  ER<30-  flex ER <50 - KB   GH jt mobs        Scapular PNF                Neuro Re-Ed                scap retraction 10x 10x 10x 10x    Shoulder rolls 10x 10x 10x 10x    Chin tucks 10x 10x 10x 10x 10x   Scapular iso :05x10 :05x10 ea  :05x10 flex, abd :05x5 flex, abd           Ther  Ex        pulleys   10x scap     UT stretch        LS stretch        Wrist AROM 30x ea 30x ea  30x ea 30x ea    Red  Green 10x 15x green Red 30x Red 30x   Elbow PROM AROM 30x AROM 30x AROM 30x 30x 30x   Table slide :05x10 flex, scap :05x10 flex, scap   :05x10 flex, scap   Supine flexion AAROM :05x10  :05x10 :05x10  10x   Wall walk flex, scap 5x ea 5x ea 5x ea     Standing I, Y,T  nv 5x ea     Standing AAROM Nv* Cane with mirror  Flex: 10x  Scap:10x Cane with mirror  Flex: 10x  Scap:10x                                                                     Ther Activity                        Gait Training                        Modalities

## 2025-05-08 ENCOUNTER — OFFICE VISIT (OUTPATIENT)
Dept: PHYSICAL THERAPY | Facility: CLINIC | Age: 70
End: 2025-05-08
Attending: ORTHOPAEDIC SURGERY
Payer: COMMERCIAL

## 2025-05-08 DIAGNOSIS — M25.512 LEFT SHOULDER PAIN, UNSPECIFIED CHRONICITY: ICD-10-CM

## 2025-05-08 DIAGNOSIS — M75.102 TEAR OF LEFT SUPRASPINATUS TENDON: Primary | ICD-10-CM

## 2025-05-08 PROCEDURE — 97112 NEUROMUSCULAR REEDUCATION: CPT | Performed by: PHYSICAL THERAPIST

## 2025-05-08 PROCEDURE — 97110 THERAPEUTIC EXERCISES: CPT | Performed by: PHYSICAL THERAPIST

## 2025-05-08 PROCEDURE — 97140 MANUAL THERAPY 1/> REGIONS: CPT | Performed by: PHYSICAL THERAPIST

## 2025-05-08 NOTE — PROGRESS NOTES
Daily Note     Today's date: 2025  Patient name: Jamee Shaikh  : 1955  MRN: [id number]  Referring provider: Abdullahi Cortez*  Dx:   Encounter Diagnosis     ICD-10-CM    1. Tear of left supraspinatus tendon  M75.102       2. Left shoulder pain, unspecified chronicity  M25.512           Start Time: 0900  Stop Time: 0940  Total time in clinic (min): 40 minutes    Subjective: Increased soreness since she is no longer wearing sling. Has not attempted driving yet.       Objective: See treatment diary below      Assessment: Tolerated treatment well. Discomfort at end ranges with guarding during MT. Was able to increase exercises as noted in flowsheet with minimal symptom provocation.  Patient exhibited good technique with therapeutic exercises and would benefit from continued PT      Plan: Continue per plan of care.  Progress treatment as tolerated.       Precautions: s/p protocol attached to chart  Access Code: [email or web address]  Progress note:  POC:     Manuals    Stretching with active release 120 flex ER <50 - KB  KB per protocol KB per protocol 120 flex ER <50 - KB   GH jt mobs        Scapular PNF                Neuro Re-Ed        UBE    4 min seated L1    scap retraction 10x 10x 10x 10x    Shoulder rolls 10x 10x 10x 10x    Chin tucks 10x 10x 10x 10x 10x   Scapular iso :05x10 :05x10 ea  :05x10 ea :05x5 flex, abd           Ther Ex        pulleys   10x scap 10x scap    UT stretch        LS stretch        Wrist AROM 30x ea 30x ea   30x ea    Red  Green 10x 15x green 20x green Red 30x   Elbow PROM AROM 30x AROM 30x AROM 30x AROM 30x 30x   Table slide :05x10 flex, scap :05x10 flex, scap  10x flex, scap :05x10 flex, scap   Supine flexion AAROM :05x10  :05x10 :05x10 :05x10 10x   Wall walk flex, scap 5x ea 5x ea 5x ea 5x ea    Standing I, Y,T  nv 5x ea 5x ea    Standing AAROM Nv* Cane with mirror  Flex: 10x  Scap:10x Cane with mirror  Flex: 10x  Scap:10x Cane with mirror  Flex:  10x  Scap:10x    SL ER    10x                                                            Ther Activity                        Gait Training                        Modalities

## 2025-05-13 ENCOUNTER — OFFICE VISIT (OUTPATIENT)
Dept: PHYSICAL THERAPY | Facility: CLINIC | Age: 70
End: 2025-05-13
Attending: ORTHOPAEDIC SURGERY
Payer: COMMERCIAL

## 2025-05-13 DIAGNOSIS — M25.512 LEFT SHOULDER PAIN, UNSPECIFIED CHRONICITY: ICD-10-CM

## 2025-05-13 DIAGNOSIS — M75.102 TEAR OF LEFT SUPRASPINATUS TENDON: Primary | ICD-10-CM

## 2025-05-13 PROCEDURE — 97112 NEUROMUSCULAR REEDUCATION: CPT | Performed by: PHYSICAL THERAPIST

## 2025-05-13 PROCEDURE — 97110 THERAPEUTIC EXERCISES: CPT | Performed by: PHYSICAL THERAPIST

## 2025-05-13 PROCEDURE — 97140 MANUAL THERAPY 1/> REGIONS: CPT | Performed by: PHYSICAL THERAPIST

## 2025-05-13 NOTE — PROGRESS NOTES
Daily Note     Today's date: 2025  Patient name: Jamee Shaikh  : 1955  MRN: [id number]  Referring provider: Abdullahi Cortez*  Dx:   Encounter Diagnosis     ICD-10-CM    1. Tear of left supraspinatus tendon  M75.102       2. Left shoulder pain, unspecified chronicity  M25.512           Start Time: 0900  Stop Time: 0940  Total time in clinic (min): 40 minutes    Subjective: States soreness in proximal L arm over the past few days. Stiffness and discomfort in the am, thinks she may be sleeping on it wrong.       Objective: See treatment diary below      Assessment: Tolerated treatment well. Noted LUE in guarded position to side causing prolonged activation of bicep. Also with DC of sling symptoms likely due to mm overuse. Educated patient on relaxing arm to side. Patient exhibited good technique with therapeutic exercises and would benefit from continued PT      Plan: Continue per plan of care.      Precautions: s/p protocol attached to chart  Access Code: [email or web address]  Progress note:  POC:     Manuals    Stretching with active release 120 flex ER <50 - KB  KB per protocol KB per protocol KB per protocol   GH jt mobs        Scapular PNF                Neuro Re-Ed        UBE    4 min seated L1 5 min seated L1   scap retraction 10x 10x 10x 10x 10x   Shoulder rolls 10x 10x 10x 10x 10x   Chin tucks 10x 10x 10x 10x 10x   Scapular iso :05x10 :05x10 ea  :05x10 ea :05x5           Ther Ex        pulleys   10x scap 10x scap 10x   UT stretch        LS stretch        Wrist AROM 30x ea 30x ea       Red  Green 10x 15x green 20x green 20x green   Elbow PROM AROM 30x AROM 30x AROM 30x AROM 30x AROM 30x   Table slide :05x10 flex, scap :05x10 flex, scap  10x flex, scap 10x flex, scap   Supine flexion AAROM :05x10  :05x10 :05x10 :05x10 :05x10 and ER   Wall walk flex, scap 5x ea 5x ea 5x ea 5x ea 5x ea   Standing I, Y,T  nv 5x ea 5x ea 5x ea   Standing AAROM Nv* Cane with mirror  Flex:  10x  Scap:10x Cane with mirror  Flex: 10x  Scap:10x Cane with mirror  Flex: 10x  Scap:10x Cane with mirror  Flex: 10x  Scap:10x   SL ER    10x nv                                                           Ther Activity                        Gait Training                        Modalities

## 2025-05-15 ENCOUNTER — OFFICE VISIT (OUTPATIENT)
Dept: PHYSICAL THERAPY | Facility: CLINIC | Age: 70
End: 2025-05-15
Attending: ORTHOPAEDIC SURGERY
Payer: COMMERCIAL

## 2025-05-15 DIAGNOSIS — M25.512 LEFT SHOULDER PAIN, UNSPECIFIED CHRONICITY: ICD-10-CM

## 2025-05-15 DIAGNOSIS — M75.102 TEAR OF LEFT SUPRASPINATUS TENDON: Primary | ICD-10-CM

## 2025-05-15 PROCEDURE — 97112 NEUROMUSCULAR REEDUCATION: CPT | Performed by: PHYSICAL THERAPIST

## 2025-05-15 PROCEDURE — 97110 THERAPEUTIC EXERCISES: CPT | Performed by: PHYSICAL THERAPIST

## 2025-05-15 PROCEDURE — 97140 MANUAL THERAPY 1/> REGIONS: CPT | Performed by: PHYSICAL THERAPIST

## 2025-05-15 NOTE — PROGRESS NOTES
Daily Note     Today's date: 5/15/2025  Patient name: Jamee Shaikh  : 1955  MRN: [id number]  Referring provider: Abdullahi Cortez*  Dx:   Encounter Diagnosis     ICD-10-CM    1. Tear of left supraspinatus tendon  M75.102       2. Left shoulder pain, unspecified chronicity  M25.512           Start Time: 0900  Stop Time: 0940  Total time in clinic (min): 40 minutes    Subjective: States 7/10 pain today. Pain with stretching arm out. More in superior shoulder jt today than proximal arm.       Objective: See treatment diary below      Assessment: Tolerated treatment fair+. C/o pain today into proximal anterior arm with TTP and noted muscle trigger point. May benefit from increasing scapular work to help stabilize shoulder and decrease strain on bicep/deltoid.  Patient exhibited good technique with therapeutic exercises and would benefit from continued PT      Plan: Continue per plan of care.  Progress treatment as tolerated.       Precautions: s/p protocol attached to chart  Access Code: [email or web address]  Progress note:  POC:     Manuals 5/15 5/1 5/6 5/8 5/13   Stretching with active release KB per protocol  KB per protocol KB per protocol KB per protocol   GH jt mobs        Scapular PNF                Neuro Re-Ed        UBE 5 min seated L1   4 min seated L1 5 min seated L1   scap retraction 10x 10x 10x 10x 10x   Shoulder rolls 10x 10x 10x 10x 10x   Chin tucks 10x 10x 10x 10x 10x   Scapular iso :05x5 ea :05x10 ea  :05x10 ea :05x5           Ther Ex        pulleys 10x  10x scap 10x scap 10x   UT stretch        LS stretch        Wrist AROM 1# 20x ea 30x ea       20x green Green 10x 15x green 20x green 20x green   Elbow PROM AROM 30x AROM 30x AROM 30x AROM 30x AROM 30x   Table slide 10x flex, scap :05x10 flex, scap  10x flex, scap 10x flex, scap   Supine flexion AAROM :05x10 and ER :05x10 :05x10 :05x10 :05x10 and ER   Wall walk flex, scap 5x ea 5x ea 5x ea 5x ea 5x ea   Standing I, Y,T  nv 5x ea 5x ea 5x  ea   Standing AAROM Cane with mirror  Flex: 10x  Scap:10x Cane with mirror  Flex: 10x  Scap:10x Cane with mirror  Flex: 10x  Scap:10x Cane with mirror  Flex: 10x  Scap:10x Cane with mirror  Flex: 10x  Scap:10x   SL ER 10x   10x nv                                                           Ther Activity                        Gait Training                        Modalities

## 2025-05-20 ENCOUNTER — OFFICE VISIT (OUTPATIENT)
Dept: PHYSICAL THERAPY | Facility: CLINIC | Age: 70
End: 2025-05-20
Attending: ORTHOPAEDIC SURGERY
Payer: COMMERCIAL

## 2025-05-20 DIAGNOSIS — M75.102 TEAR OF LEFT SUPRASPINATUS TENDON: Primary | ICD-10-CM

## 2025-05-20 DIAGNOSIS — M25.512 LEFT SHOULDER PAIN, UNSPECIFIED CHRONICITY: ICD-10-CM

## 2025-05-20 PROCEDURE — 97112 NEUROMUSCULAR REEDUCATION: CPT | Performed by: PHYSICAL THERAPIST

## 2025-05-20 PROCEDURE — 97140 MANUAL THERAPY 1/> REGIONS: CPT | Performed by: PHYSICAL THERAPIST

## 2025-05-20 PROCEDURE — 97110 THERAPEUTIC EXERCISES: CPT | Performed by: PHYSICAL THERAPIST

## 2025-05-20 NOTE — PROGRESS NOTES
Daily Note     Today's date: 2025  Patient name: Jamee Shaikh  : 1955  MRN: [id number]  Referring provider: Abdullahi Cortez*  Dx:   Encounter Diagnosis     ICD-10-CM    1. Tear of left supraspinatus tendon  M75.102       2. Left shoulder pain, unspecified chronicity  M25.512           Start Time: 0900  Stop Time: 0940  Total time in clinic (min): 40 minutes    Subjective: States overall she has been feeling really good. Will occasionally move in a position that irritates her shoulder, but resolves quickly.       Objective: See treatment diary below      Assessment: Tolerated treatment well. Continues to progress with left shoulder range of motion. Would benefit from increasing strength as tolerated for further functional gains. Patient exhibited good technique with therapeutic exercises and would benefit from continued PT      Plan: Continue per plan of care.  Progress treatment as tolerated.       Precautions: s/p protocol attached to chart  Access Code: [email or web address]  Progress note:  POC:     Manuals 5/15 5/20 5/6 5/8 5/13   Stretching with active release KB per protocol KB per protocol KB per protocol KB per protocol KB per protocol   GH jt mobs        Scapular PNF                Neuro Re-Ed        UBE 5 min seated L1 6 min L1 seated alt  4 min seated L1 5 min seated L1   scap retraction 10x 10x 10x 10x 10x   Shoulder rolls 10x 10x 10x 10x 10x   Chin tucks 10x 10x 10x 10x 10x   Scapular iso :05x5 ea :05x5 ea  :05x10 ea :05x5           Ther Ex        pulleys 10x 10x 10x scap 10x scap 10x   UT stretch        LS stretch        Wrist AROM 1# 20x ea 1# 30x ea       20x green 30x green 15x green 20x green 20x green   Elbow PROM AROM 30x AROM 30x AROM 30x AROM 30x AROM 30x   Table slide 10x flex, scap 10x flex, scap  10x flex, scap 10x flex, scap   Supine flexion AAROM :05x10 and ER :05x10 and ER :05x10 :05x10 :05x10 and ER   Wall walk flex, scap 5x ea 5x ea 5x ea 5x ea 5x ea   Standing I,  Y,T  5x ea 5x ea 5x ea 5x ea   Standing AAROM Cane with mirror  Flex: 10x  Scap:10x Cane with mirror  Flex: 15x  Scap:15x Cane with mirror  Flex: 10x  Scap:10x Cane with mirror  Flex: 10x  Scap:10x Cane with mirror  Flex: 10x  Scap:10x   SL ER 10x 10x  10x nv                                                           Ther Activity                        Gait Training                        Modalities

## 2025-05-22 ENCOUNTER — OFFICE VISIT (OUTPATIENT)
Dept: PHYSICAL THERAPY | Facility: CLINIC | Age: 70
End: 2025-05-22
Attending: ORTHOPAEDIC SURGERY
Payer: COMMERCIAL

## 2025-05-22 DIAGNOSIS — M75.102 TEAR OF LEFT SUPRASPINATUS TENDON: Primary | ICD-10-CM

## 2025-05-22 DIAGNOSIS — M25.512 LEFT SHOULDER PAIN, UNSPECIFIED CHRONICITY: ICD-10-CM

## 2025-05-22 PROCEDURE — 97140 MANUAL THERAPY 1/> REGIONS: CPT | Performed by: PHYSICAL THERAPIST

## 2025-05-22 PROCEDURE — 97110 THERAPEUTIC EXERCISES: CPT | Performed by: PHYSICAL THERAPIST

## 2025-05-22 PROCEDURE — 97112 NEUROMUSCULAR REEDUCATION: CPT | Performed by: PHYSICAL THERAPIST

## 2025-05-22 NOTE — PROGRESS NOTES
Daily Note     Today's date: 2025  Patient name: Jamee Shaikh  : 1955  MRN: [id number]  Referring provider: Abdullahi Cortez*  Dx:   Encounter Diagnosis     ICD-10-CM    1. Tear of left supraspinatus tendon  M75.102       2. Left shoulder pain, unspecified chronicity  M25.512           Start Time: 0900  Stop Time: 0940  Total time in clinic (min): 40 minutes    Subjective: States she is doing good today.       Objective: See treatment diary below      Assessment: Tolerated treatment well. Some pain with end range ER, noted improvement with passive OH. Should progress active OH exercises as tolerated for further functional gains. Patient exhibited good technique with therapeutic exercises and would benefit from continued PT      Plan: Continue per plan of care.  Progress treatment as tolerated.       Precautions: s/p protocol attached to chart  Access Code: [email or web address]  Progress note:  POC:     Manuals 5/15 5/20 5/22 5/8 5/13   Stretching with active release KB per protocol KB per protocol KB per protocol KB per protocol KB per protocol   GH jt mobs        Scapular PNF                Neuro Re-Ed        UBE 5 min seated L1 6 min L1 seated alt 6 min L1 seated alt 4 min seated L1 5 min seated L1   scap retraction 10x 10x 10x 10x 10x   Shoulder rolls 10x 10x 10x 10x 10x   Chin tucks 10x 10x 10x 10x 10x   Scapular iso :05x5 ea :05x5 ea :05x5 ea :05x10 ea :05x5           Ther Ex        pulleys 10x 10x 10x 10x scap 10x   UT stretch        LS stretch        Wrist AROM 1# 20x ea 1# 30x ea 1# 30x ea      20x green 30x green 30x green 20x green 20x green   Elbow PROM AROM 30x AROM 30x AROM 30x AROM 30x AROM 30x   Table slide 10x flex, scap 10x flex, scap 10x flex, scap 10x flex, scap 10x flex, scap   Supine flexion AAROM :05x10 and ER :05x10 and ER :05x10 and ER :05x10 :05x10 and ER   Wall walk flex, scap 5x ea 5x ea 5x ea 5x ea 5x ea   Standing I, Y,T  5x ea 5x ea 5x ea 5x ea   Standing AAROM Cane  with mirror  Flex: 10x  Scap:10x Cane with mirror  Flex: 15x  Scap:15x Cane with mirror  Flex: 15x  Scap:15x Cane with mirror  Flex: 10x  Scap:10x Cane with mirror  Flex: 10x  Scap:10x   SL ER 10x 10x 15x 10x nv                                                           Ther Activity                        Gait Training                        Modalities

## 2025-05-24 DIAGNOSIS — G47.09 OTHER INSOMNIA: ICD-10-CM

## 2025-05-25 RX ORDER — HYDROXYZINE HYDROCHLORIDE 25 MG/1
25 TABLET, FILM COATED ORAL EVERY 6 HOURS PRN
Qty: 180 TABLET | Refills: 0 | Status: SHIPPED | OUTPATIENT
Start: 2025-05-25

## 2025-05-27 ENCOUNTER — APPOINTMENT (OUTPATIENT)
Dept: PHYSICAL THERAPY | Facility: CLINIC | Age: 70
End: 2025-05-27
Attending: ORTHOPAEDIC SURGERY
Payer: COMMERCIAL

## 2025-05-27 ENCOUNTER — TELEPHONE (OUTPATIENT)
Age: 70
End: 2025-05-27

## 2025-05-27 NOTE — TELEPHONE ENCOUNTER
PA for hydrOXYzine HCL (ATARAX) 25 mg SUBMITTED         via    [x]SS  [x]PA sent as URGENT    All office notes, labs and other pertaining documents and studies sent. Clinical questions answered. Awaiting determination from insurance company.     Turnaround time for your insurance to make a decision on your Prior Authorization can take 7-21 business days.

## 2025-05-28 NOTE — TELEPHONE ENCOUNTER
PA for hydrOXYzine HCL (ATARAX) 25 mg  APPROVED     Date(s) approved       Patient advised by          [x]MyChart Message  []Phone call   []LMOM  []L/M to call office as no active Communication consent on file  []Unable to leave detailed message as VM not approved on Communication consent       Pharmacy advised by    [x]Fax  []Phone call  []Secure Chat     Approval letter scanned into Media no upon determination

## 2025-05-29 ENCOUNTER — OFFICE VISIT (OUTPATIENT)
Dept: PHYSICAL THERAPY | Facility: CLINIC | Age: 70
End: 2025-05-29
Attending: ORTHOPAEDIC SURGERY
Payer: COMMERCIAL

## 2025-05-29 ENCOUNTER — OFFICE VISIT (OUTPATIENT)
Dept: FAMILY MEDICINE CLINIC | Facility: CLINIC | Age: 70
End: 2025-05-29
Payer: COMMERCIAL

## 2025-05-29 VITALS
TEMPERATURE: 98.3 F | BODY MASS INDEX: 28.7 KG/M2 | RESPIRATION RATE: 18 BRPM | WEIGHT: 162 LBS | HEART RATE: 65 BPM | OXYGEN SATURATION: 98 % | DIASTOLIC BLOOD PRESSURE: 80 MMHG | SYSTOLIC BLOOD PRESSURE: 120 MMHG | HEIGHT: 63 IN

## 2025-05-29 DIAGNOSIS — R35.0 FREQUENT URINATION: ICD-10-CM

## 2025-05-29 DIAGNOSIS — N39.0 RECURRENT UTI: Primary | ICD-10-CM

## 2025-05-29 DIAGNOSIS — M75.102 TEAR OF LEFT SUPRASPINATUS TENDON: Primary | ICD-10-CM

## 2025-05-29 DIAGNOSIS — M25.512 LEFT SHOULDER PAIN, UNSPECIFIED CHRONICITY: ICD-10-CM

## 2025-05-29 DIAGNOSIS — R30.0 BURNING WITH URINATION: ICD-10-CM

## 2025-05-29 LAB
BACTERIA UR QL AUTO: ABNORMAL /HPF
BILIRUB UR QL STRIP: NEGATIVE
CLARITY UR: CLEAR
COLOR UR: COLORLESS
GLUCOSE UR STRIP-MCNC: NEGATIVE MG/DL
HGB UR QL STRIP.AUTO: NEGATIVE
KETONES UR STRIP-MCNC: NEGATIVE MG/DL
LEUKOCYTE ESTERASE UR QL STRIP: ABNORMAL
NITRITE UR QL STRIP: NEGATIVE
NON-SQ EPI CELLS URNS QL MICRO: ABNORMAL /HPF
PH UR STRIP.AUTO: 6.5 [PH]
PROT UR STRIP-MCNC: NEGATIVE MG/DL
RBC #/AREA URNS AUTO: ABNORMAL /HPF
SL AMB  POCT GLUCOSE, UA: ABNORMAL
SL AMB LEUKOCYTE ESTERASE,UA: ABNORMAL
SL AMB POCT BILIRUBIN,UA: ABNORMAL
SL AMB POCT BLOOD,UA: ABNORMAL
SL AMB POCT CLARITY,UA: CLEAR
SL AMB POCT COLOR,UA: YELLOW
SL AMB POCT KETONES,UA: ABNORMAL
SL AMB POCT NITRITE,UA: ABNORMAL
SL AMB POCT PH,UA: 5.5
SL AMB POCT SPECIFIC GRAVITY,UA: 1.01
SL AMB POCT URINE PROTEIN: ABNORMAL
SL AMB POCT UROBILINOGEN: 0.2
SP GR UR STRIP.AUTO: 1.01 (ref 1–1.03)
UROBILINOGEN UR STRIP-ACNC: <2 MG/DL
WBC #/AREA URNS AUTO: ABNORMAL /HPF

## 2025-05-29 PROCEDURE — 81002 URINALYSIS NONAUTO W/O SCOPE: CPT | Performed by: NURSE PRACTITIONER

## 2025-05-29 PROCEDURE — 97110 THERAPEUTIC EXERCISES: CPT | Performed by: PHYSICAL THERAPIST

## 2025-05-29 PROCEDURE — 97112 NEUROMUSCULAR REEDUCATION: CPT | Performed by: PHYSICAL THERAPIST

## 2025-05-29 PROCEDURE — 99213 OFFICE O/P EST LOW 20 MIN: CPT | Performed by: NURSE PRACTITIONER

## 2025-05-29 PROCEDURE — 97140 MANUAL THERAPY 1/> REGIONS: CPT | Performed by: PHYSICAL THERAPIST

## 2025-05-29 PROCEDURE — 87086 URINE CULTURE/COLONY COUNT: CPT | Performed by: NURSE PRACTITIONER

## 2025-05-29 PROCEDURE — 81001 URINALYSIS AUTO W/SCOPE: CPT | Performed by: NURSE PRACTITIONER

## 2025-05-29 RX ORDER — CIPROFLOXACIN 500 MG/1
500 TABLET, FILM COATED ORAL EVERY 12 HOURS SCHEDULED
Qty: 10 TABLET | Refills: 0 | Status: SHIPPED | OUTPATIENT
Start: 2025-05-29 | End: 2025-06-03

## 2025-05-29 NOTE — ASSESSMENT & PLAN NOTE
Start daily Cranberry pills  Orders:    ciprofloxacin (CIPRO) 500 mg tablet; Take 1 tablet (500 mg total) by mouth every 12 (twelve) hours for 5 days

## 2025-05-29 NOTE — PROGRESS NOTES
"Name: Jamee Shaikh      : 1955      MRN: [id number]  Encounter Provider: GUS Sosa  Encounter Date: 2025   Encounter department: [facility]  :  Assessment & Plan  Frequent urination    Orders:    POCT urine dip    Urine culture; Future    Urinalysis with microscopic; Future    Burning with urination    Orders:    POCT urine dip    Urine culture; Future    Urinalysis with microscopic; Future    Recurrent UTI  Start daily Cranberry pills  Orders:    ciprofloxacin (CIPRO) 500 mg tablet; Take 1 tablet (500 mg total) by mouth every 12 (twelve) hours for 5 days           History of Present Illness   Here for recurrent UTI- has chills, bladder pressure, frequency, burning with urination, urgency, flank pain - right side. Has a back injection scheduled for tomorrow      Review of Systems   Genitourinary:  Positive for dysuria, flank pain, frequency and urgency.       Objective   /80   Pulse 65   Temp 98.3 °F (36.8 °C)   Resp 18   Ht 5' 3\" (1.6 m)   Wt 73.5 kg (162 lb)   SpO2 98%   BMI 28.70 kg/m²      Physical Exam  Vitals and nursing note reviewed.   Constitutional:       General: She is not in acute distress.     Appearance: She is well-developed. She is not diaphoretic.     Cardiovascular:      Rate and Rhythm: Normal rate and regular rhythm.      Heart sounds: Normal heart sounds.   Pulmonary:      Effort: Pulmonary effort is normal. No respiratory distress.      Breath sounds: Normal breath sounds. No wheezing.   Abdominal:      Tenderness: There is no right CVA tenderness or left CVA tenderness.     Musculoskeletal:         General: No tenderness or deformity. Normal range of motion.     Skin:     General: Skin is warm and dry.      Findings: No erythema or rash.     Neurological:      Mental Status: She is alert and oriented to person, place, and time.     Psychiatric:         Mood and Affect: Mood normal.         Behavior: Behavior normal. Behavior is " cooperative.         Thought Content: Thought content normal.         Judgment: Judgment normal.

## 2025-05-30 ENCOUNTER — HOSPITAL ENCOUNTER (OUTPATIENT)
Dept: RADIOLOGY | Facility: HOSPITAL | Age: 70
Discharge: HOME | End: 2025-05-30
Attending: ANESTHESIOLOGY
Payer: COMMERCIAL

## 2025-05-30 ENCOUNTER — TELEPHONE (OUTPATIENT)
Dept: RADIOLOGY | Facility: HOSPITAL | Age: 70
End: 2025-05-30

## 2025-05-30 DIAGNOSIS — M51.16 LUMBAR DISC DISEASE WITH RADICULOPATHY: ICD-10-CM

## 2025-05-30 DIAGNOSIS — G89.4 CHRONIC PAIN SYNDROME: ICD-10-CM

## 2025-05-30 LAB — BACTERIA UR CULT: NORMAL

## 2025-06-02 ENCOUNTER — RESULTS FOLLOW-UP (OUTPATIENT)
Dept: FAMILY MEDICINE CLINIC | Facility: CLINIC | Age: 70
End: 2025-06-02

## 2025-06-03 ENCOUNTER — APPOINTMENT (OUTPATIENT)
Dept: PHYSICAL THERAPY | Facility: CLINIC | Age: 70
End: 2025-06-03
Attending: ORTHOPAEDIC SURGERY
Payer: COMMERCIAL

## 2025-06-05 ENCOUNTER — EVALUATION (OUTPATIENT)
Dept: PHYSICAL THERAPY | Facility: CLINIC | Age: 70
End: 2025-06-05
Attending: ORTHOPAEDIC SURGERY
Payer: COMMERCIAL

## 2025-06-05 DIAGNOSIS — M25.512 LEFT SHOULDER PAIN, UNSPECIFIED CHRONICITY: ICD-10-CM

## 2025-06-05 DIAGNOSIS — M75.102 TEAR OF LEFT SUPRASPINATUS TENDON: Primary | ICD-10-CM

## 2025-06-05 PROCEDURE — 97112 NEUROMUSCULAR REEDUCATION: CPT | Performed by: PHYSICAL THERAPIST

## 2025-06-05 PROCEDURE — 97140 MANUAL THERAPY 1/> REGIONS: CPT | Performed by: PHYSICAL THERAPIST

## 2025-06-05 PROCEDURE — 97110 THERAPEUTIC EXERCISES: CPT | Performed by: PHYSICAL THERAPIST

## 2025-06-05 NOTE — PROGRESS NOTES
PT Re-Evaluation     Today's date: 2025  Patient name: Jamee Shaikh  : 1955  MRN: [id number]  Referring provider: Abdullahi Cortez*  Dx:   Encounter Diagnosis     ICD-10-CM    1. Tear of left supraspinatus tendon  M75.102       2. Left shoulder pain, unspecified chronicity  M25.512           Start Time: 0900  Stop Time: 0945  Total time in clinic (min): 45 minutes    Assessment  Impairments: abnormal muscle firing, abnormal or restricted ROM, abnormal movement, activity intolerance, impaired physical strength, lacks appropriate home exercise program, pain with function, scapular dyskinesis, poor posture  and poor body mechanics    Assessment details: Jamee Shaikh was seen for an initial PT evaluation and 14 f/u sessions. Patient is a 70 y.o. female with diagnosis of rTSA and past medical history significant for OA, chronic pain, thyroid disease, GERD, hiatal hernia, hyperlipidemia, HTN, rotator cuff tear with repair bilaterally, L buttock pain with history of surgery, lipomas with removal, lumbar radiculopathy, vertigo. FOTO functional score shows improvement from 4% at intake to 50% today surpassing predicted value of 46%. Findings of examination today shows gradual progress towards goals with increased OH range of motion and reduction of pain occurrence. Continuation of skilled PT is indicated for further progress towards goals with focus on end range of motion and strength/stability. Able to reduce frequency to 1x/wk.        Goals  STG (6 weeks)  1. Patient's left shoulder flexion AROM will increase to 120 with 2/10 pain for increased ability to perform overhead ADLs. - PROGRESSING  2. Patient will have 0/10 pain in left shoulder at rest. - PROGRESSING  3. Patient's left shoulder strength will increase to 3+/5 for increased ability to lift. - PROGRESSING  LTG (12 weeks)  1. Patient's UE AROM equal bilaterally for ability to complete hair hygiene, overhead, and behind the back ADLs. -  PROGRESSING  2. Patient's UE strength will be equal bilaterally for ability to lift and carry at PLOF. - PROGRESSING  3. Patient will be independent with home exercise program for continued maintenance post PT discharge. PROGRESSING      Plan  Patient would benefit from: skilled physical therapy  Planned modality interventions: unattended electrical stimulation, cryotherapy and thermotherapy: hydrocollator packs    Planned therapy interventions: neuromuscular re-education, manual therapy, therapeutic exercise, therapeutic activities, self care and home exercise program    Frequency: 1-2x week  Duration in weeks: 12  Plan of Care beginning date: 2025  Plan of Care expiration date: 2025  Treatment plan discussed with: patient and PTA  Plan details: Progress note in 4 weeks.       Subjective Evaluation    History of Present Illness  Date of surgery: 2025  Subjective : Patient states 60% improvement since the start of PT. Will have pain with certain movements. Noted with attempting to pull up pants from the front and occasionally reaching out to the side. Avoid reaching behind her back. Able now to reaching higher and out front a lot better than she had previously.     Subjective 5/6: Patient removed sling yesterday and has had some soreness into arm since. Overall is feeling better than she has in awhile. Arm still feels weak and has difficulty lifting OH. Unable to reach BTB and is aware of precaution.     Mechanism of injury: Jamee Shaikh is a 70 y.o. female who presents to outpatient Physical Therapy today with complaints of left shoulder pain. Underwent rTSA . Felt ok the first few days with the block, but is now having more soreness and pain. Currently sleeping in a recliner. Wearing sling at all times.  To f/u  with ortho.     Patient Goals  Patient goal: Return use of LUE without pain  Pain     6  Current pain ratin  4 0  At best pain ratin  4 0  At worst pain rating:  "8 6 7  Location: anterior proximal arm  Quality: \"heavy\"  Relieving factors: medications (IBU)  Exacerbated by: LUE movement.    Social Support  Steps to enter house: yes  Stairs in house: yes   Lives in: multiple-level home  Lives with: spouse    Employment status: not working  Hand dominance: right          Objective     Postural Observations    Additional Postural Observation Details  L shoulder elevated, forward head    Observations     Additional Observation Details  Incision at anterior left shoulder covered with dressing. Minimal bruising and edema, no redness or noted drainage  5/6: mod limited scar mobility  6/5: min-mod limited scar mobility    Cervical/Thoracic Screen   Cervical range of motion within normal limits    Active Range of Motion     Shoulder - AROM IE 5/6 6/5   LEFT       Flexion NT 80 pain 120   Extension NT 25 pain 35   Abduction NT 80 pain 90   External Rotation NT 40 20   Internal Rotation NT 50 60   RIGHT      Flexion 145     Extension 50     Abduction 140     External Rotation BTH T3     Internal Rotation BTB T12           Passive Range of Motion     Shoulder - PROM IE 5/6 6/5   LEFT       Flexion 15 pain 90 pain 90   Abduction 15 pain 90 pain 90   External Rotation -25 pain 40 pain 40   Internal Rotation belly 50 65     Additional Passive Range of Motion Details  IE: L elbow  with pain  5/6: L elbow  with pain at ext  6/5: Elbow ext -5 degrees    Strength/Myotome Testing   Shoulder - MMT IE 5/6 6/5   LEFT       Flexion NT 2+ 3-   Abduction NT 2+ 2+   External Rotation NT 3- 3+   Internal Rotation NT 4- 4-   Bicep NT 4- 4-   Tricep NT 4+ 4+   RIGHT      Flexion 4     Abduction 4-     External Rotation 5     Internal Rotation 5     Bicep 5     Tricep 5           General Comments:      Shoulder Comments       FOTO: 33% function, 46% predicted function   5/6: 44%  6/5: 50%             Precautions: s/p protocol attached to chart  Access Code: [email or web address]  Progress note:7/5  POC: " 7/11    Manuals 5/15 5/20 5/22 5/29 6/5   Stretching with active release KB per protocol KB per protocol KB per protocol KB per protocol KB per protocol   GH jt mobs        Scapular PNF                Neuro Re-Ed        UBE 5 min seated L1 6 min L1 seated alt 6 min L1 seated alt 6 min L1 seated alt 6 min L1 seated alt   scap retraction 10x 10x 10x 10x    Shoulder rolls 10x 10x 10x 10x    Chin tucks 10x 10x 10x 10x    Scapular iso :05x5 ea :05x5 ea :05x5 ea :05x5 ea    TB mtp/LTP        Ther Ex        pulleys 10x 10x 10x 10x 10x   UT stretch        LS stretch        Wrist AROM 1# 20x ea 1# 30x ea 1# 30x ea prn     20x green 30x green 30x green     Elbow PROM AROM 30x AROM 30x AROM 30x AROM 30x    Table slide 10x flex, scap 10x flex, scap 10x flex, scap GSB 10x ea    Supine flexion AAROM :05x10 and ER :05x10 and ER :05x10 and ER :05x10 and ER    Wall walk flex, scap 5x ea 5x ea 5x ea 5x ea    Standing I, Y,T  5x ea 5x ea 5x ea    Standing AAROM Cane with mirror  Flex: 10x  Scap:10x Cane with mirror  Flex: 15x  Scap:15x Cane with mirror  Flex: 15x  Scap:15x Cane with mirror  Flex: 15x  Scap:15x Cane with mirror  Flex: 20x  Scap:20x   Standing AROM  Full can  Abd to 90      10x  1x pain   10x  10x   SL ER 10x 10x 15x 15x                                                            Ther Activity                        Gait Training                        Modalities

## 2025-06-09 ENCOUNTER — OFFICE VISIT (OUTPATIENT)
Dept: PHYSICAL THERAPY | Facility: CLINIC | Age: 70
End: 2025-06-09
Attending: ORTHOPAEDIC SURGERY
Payer: COMMERCIAL

## 2025-06-09 DIAGNOSIS — M75.102 TEAR OF LEFT SUPRASPINATUS TENDON: Primary | ICD-10-CM

## 2025-06-09 DIAGNOSIS — M25.512 LEFT SHOULDER PAIN, UNSPECIFIED CHRONICITY: ICD-10-CM

## 2025-06-09 PROCEDURE — 97112 NEUROMUSCULAR REEDUCATION: CPT | Performed by: PHYSICAL THERAPIST

## 2025-06-09 PROCEDURE — 97110 THERAPEUTIC EXERCISES: CPT | Performed by: PHYSICAL THERAPIST

## 2025-06-09 PROCEDURE — 97140 MANUAL THERAPY 1/> REGIONS: CPT | Performed by: PHYSICAL THERAPIST

## 2025-06-09 NOTE — PROGRESS NOTES
Daily Note     Today's date: 2025  Patient name: Jamee Shaikh  : 1955  MRN: [id number]  Referring provider: Abdullahi Cortez*  Dx:   Encounter Diagnosis     ICD-10-CM    1. Tear of left supraspinatus tendon  M75.102       2. Left shoulder pain, unspecified chronicity  M25.512           Start Time: 0815  Stop Time: 0900  Total time in clinic (min): 45 minutes    Subjective: Some soreness in shoulder today with difficulty reaching to the side and behind back.       Objective: See treatment diary below      Assessment: Tolerated treatment well. Some guarding during ER MT today, was able to achieve greater ROM with AAROM vs PROM. Progressing well towards goals. Patient exhibited good technique with therapeutic exercises and would benefit from continued PT      Plan: Continue per plan of care.  Progress treatment as tolerated.       Precautions: s/p protocol attached to chart  Access Code: [email or web address]  Progress note:  POC:     Manuals    Stretching with active release KB KB per protocol KB per protocol KB per protocol KB per protocol   GH jt mobs        Scapular PNF                Neuro Re-Ed        UBE 6 min L1 seated alt 6 min L1 seated alt 6 min L1 seated alt 6 min L1 seated alt 6 min L1 seated alt   scap retraction  10x 10x 10x    Shoulder rolls  10x 10x 10x    Chin tucks  10x 10x 10x    Scapular iso :05x5 ea :05x5 ea :05x5 ea :05x5 ea    TB mtp/LTP        Ther Ex        pulleys 10x 10x 10x 10x 10x   UT stretch        LS stretch        Wrist AROM  1# 30x ea 1# 30x ea prn      30x green 30x green     Elbow PROM AROM 30x AROM 30x AROM 30x AROM 30x    Table slide GSB flex, abd 10x ea 10x flex, scap 10x flex, scap GSB 10x ea    Supine flexion AAROM ER:05x10 :05x10 and ER :05x10 and ER :05x10 and ER    Wall walk flex, scap 5x ea 5x ea 5x ea 5x ea    Standing I, Y,T 5x ea 5x ea 5x ea 5x ea    Standing AAROM Cane with mirror  Flex: 20x  Scap:20x Cane with mirror  Flex:  15x  Scap:15x Cane with mirror  Flex: 15x  Scap:15x Cane with mirror  Flex: 15x  Scap:15x Cane with mirror  Flex: 20x  Scap:20x   Standing AROM  Full can  Abd to 90   10x  10x     10x  1x pain   10x  10x   SL ER 15x 10x 15x 15x                                                            Ther Activity                        Gait Training                        Modalities

## 2025-06-10 ENCOUNTER — TELEPHONE (OUTPATIENT)
Age: 70
End: 2025-06-10

## 2025-06-10 NOTE — TELEPHONE ENCOUNTER
Caller: Jamee    Doctor/Office: Dr Watson     Call regarding :  schedule procedure      Call was transferred to:

## 2025-06-17 ENCOUNTER — OFFICE VISIT (OUTPATIENT)
Dept: PHYSICAL THERAPY | Facility: CLINIC | Age: 70
End: 2025-06-17
Attending: ORTHOPAEDIC SURGERY
Payer: COMMERCIAL

## 2025-06-17 DIAGNOSIS — M75.102 TEAR OF LEFT SUPRASPINATUS TENDON: Primary | ICD-10-CM

## 2025-06-17 DIAGNOSIS — M25.512 LEFT SHOULDER PAIN, UNSPECIFIED CHRONICITY: ICD-10-CM

## 2025-06-17 PROCEDURE — 97112 NEUROMUSCULAR REEDUCATION: CPT | Performed by: PHYSICAL THERAPIST

## 2025-06-17 PROCEDURE — 97140 MANUAL THERAPY 1/> REGIONS: CPT | Performed by: PHYSICAL THERAPIST

## 2025-06-17 PROCEDURE — 97110 THERAPEUTIC EXERCISES: CPT | Performed by: PHYSICAL THERAPIST

## 2025-06-17 NOTE — TELEPHONE ENCOUNTER
Caller: Patient    Doctor/Office: Dr Bradford    Call regarding :  patient calling back to get an earlier appt that was originally offered for her procedure     Call was transferred to: procedure

## 2025-06-17 NOTE — PROGRESS NOTES
Daily Note     Today's date: 2025  Patient name: Jamee Shaikh  : 1955  MRN: [id number]  Referring provider: Abdullahi Cortez*  Dx:   Encounter Diagnosis     ICD-10-CM    1. Tear of left supraspinatus tendon  M75.102       2. Left shoulder pain, unspecified chronicity  M25.512           Start Time: 855  Stop Time: 935  Total time in clinic (min): 40 minutes    Subjective: States when she's resting she will be pain free, but it will increase with certain moves. Has difficulty moving arm after sitting on center consul.       Objective: See treatment diary below      Assessment: Tolerated treatment well. Still with weakness at end range OH motions, but did note improvement today with RC strength. Patient exhibited good technique with therapeutic exercises and would benefit from continued PT      Plan: Continue per plan of care.  Progress treatment as tolerated.       Precautions: s/p protocol attached to chart  Access Code: [email or web address]  Progress note:  POC:     Manuals    Stretching with active release KB KB KB per protocol KB per protocol KB per protocol   GH jt mobs        Scapular PNF                Neuro Re-Ed        UBE 6 min L1 seated alt 6 min L1 seated alt 6 min L1 seated alt 6 min L1 seated alt 6 min L1 seated alt   scap retraction   10x 10x    Shoulder rolls   10x 10x    Chin tucks   10x 10x    Scapular iso :05x5 ea :05x5 ea :05x5 ea :05x5 ea    TB MTP/LTP  RTB 10x ea      Ther Ex        pulleys 10x 10x 10x 10x 10x   UT stretch        LS stretch        Wrist AROM   1# 30x ea prn       30x green     Elbow PROM AROM 30x AROM 30x AROM 30x AROM 30x    Table slide GSB flex, abd 10x ea GSB flex, abd 10x ea 10x flex, scap GSB 10x ea    Supine flexion AAROM ER:05x10 ER:05x10 :05x10 and ER :05x10 and ER    Wall walk flex, scap 5x ea 5x ea 5x ea 5x ea    Standing I, Y,T 5x ea 10x ea 5x ea 5x ea    Standing AAROM Cane with mirror  Flex: 20x  Scap:20x Cane with  mirror  Flex: 20x  Scap:20x Cane with mirror  Flex: 15x  Scap:15x Cane with mirror  Flex: 15x  Scap:15x Cane with mirror  Flex: 20x  Scap:20x   Standing AROM  Full can  Abd to 90   10x  10x   15x  15x    10x  1x pain   10x  10x   SL ER 15x 15x 15x 15x                                                            Ther Activity                        Gait Training                        Modalities

## 2025-06-18 ENCOUNTER — TELEPHONE (OUTPATIENT)
Age: 70
End: 2025-06-18

## 2025-06-18 NOTE — TELEPHONE ENCOUNTER
Caller: Jamee     Doctor and/or Office: Dr. Delong     #: [phone]     Escalation: Care would like to know when giving a injection does the Dr use the cold spray.  Please advise thank you

## 2025-06-20 ENCOUNTER — HOSPITAL ENCOUNTER (OUTPATIENT)
Dept: RADIOLOGY | Facility: HOSPITAL | Age: 70
Discharge: HOME | End: 2025-06-20
Attending: ANESTHESIOLOGY
Payer: COMMERCIAL

## 2025-06-20 VITALS
OXYGEN SATURATION: 97 % | DIASTOLIC BLOOD PRESSURE: 84 MMHG | RESPIRATION RATE: 20 BRPM | HEART RATE: 61 BPM | SYSTOLIC BLOOD PRESSURE: 133 MMHG | TEMPERATURE: 97.5 F

## 2025-06-20 PROCEDURE — 64483 NJX AA&/STRD TFRM EPI L/S 1: CPT | Performed by: ANESTHESIOLOGY

## 2025-06-20 PROCEDURE — 64484 NJX AA&/STRD TFRM EPI L/S EA: CPT | Performed by: ANESTHESIOLOGY

## 2025-06-20 RX ORDER — PAPAVERINE HCL 150 MG
15 CAPSULE, EXTENDED RELEASE ORAL ONCE
Status: COMPLETED | OUTPATIENT
Start: 2025-06-20 | End: 2025-06-20

## 2025-06-20 RX ORDER — PAPAVERINE HCL 150 MG
15 CAPSULE, EXTENDED RELEASE ORAL ONCE
Status: DISCONTINUED | OUTPATIENT
Start: 2025-06-20 | End: 2025-06-20

## 2025-06-20 RX ADMIN — Medication 15 MG: at 13:24

## 2025-06-20 RX ADMIN — IOHEXOL 2 ML: 300 INJECTION, SOLUTION INTRAVENOUS at 13:24

## 2025-06-20 NOTE — H&P
Assessment:  Lumbar disc disease with radiculopathy    Plan:  Jamee Shaikh is a 70 y.o. female with complaints of low back and left leg pain presents to surgical center for procedure.  We will perform a left L4-5 and L5-S1  2. Follow-up 1 month after injection    Complete risks and benefits including bleeding, infection, tissue reaction, nerve injury and allergic reaction were discussed. The approach was demonstrated using models and literature was provided. Verbal and written consent was obtained.    My impressions and treatment recommendations were discussed in detail with the patient who verbalized understanding and had no further questions.  Discharge instructions were provided. I personally saw and examined the patient and I agree with the above discussed plan of care.    Orders Placed This Encounter   Procedures    Discharge Diet     Diet Type::   Regular    Activity as tolerated    Remove dressing in 24 hours    Call provider for:  persistent nausea or vomiting    Call provider for:  severe uncontrolled pain    Call provider for:  redness, tenderness, or signs of infection (pain, swelling, redness, odor or green/yellow discharge around incision site)    Call provider for: active or persistent bleeding    Call provider for:  difficulty breathing, headache or visual disturbances    Call provider for:  hives    Call provider for:  persistent dizziness or light-headedness    Call provider for:  extreme fatigue    Discharge patient     Standing Status:   Standing     Number of Occurrences:   1     Is this a planned readmission (within 30 days)?:   No     No orders of the defined types were placed in this encounter.      History of Present Illness:  Jamee Shaikh is a 70 y.o. female who presents for a follow up office visit in regards to low back and left leg pain.   The patient’s current symptoms include 8 out of 10 sharp stabbing throbbing pain through particular time pattern.      I have personally reviewed  and/or updated the patient's past medical history, past surgical history, family history, social history, current medications, allergies, and vital signs today.     Review of Systems   Musculoskeletal:  Positive for arthralgias and back pain.   All other systems reviewed and are negative.      Past Surgical History[1]    Allergies[2]    Physical Exam:    /83   Pulse 66   Temp 97.5 °F (36.4 °C) (Tympanic)   Resp 20   SpO2 100%     Constitutional:normal, well developed, well nourished, alert, in no distress and non-toxic and no overt pain behavior.  Eyes:anicteric  HEENT:grossly intact  Neck:supple, symmetric, trachea midline and no masses   Pulmonary:even and unlabored  Cardiovascular:No edema or pitting edema present  Skin:Normal without rashes or lesions and well hydrated  Psychiatric:Mood and affect appropriate  Neurologic:Cranial Nerves II-XII grossly intact  Musculoskeletal:antalgic             [1]   Past Surgical History:  Procedure Laterality Date     SECTION      Last Assessed: 2017    COLONOSCOPY      ELBOW SURGERY      Last Assessed: 2017    ESOPHAGOGASTRODUODENOSCOPY N/A 2017    Procedure: ESOPHAGOGASTRODUODENOSCOPY (EGD);  Surgeon: Jolly Lin DO;  Location: [facility];  Service: Gastroenterology    HIP SURGERY Left 2018    glut medius/minimus repair  and 18--with pin implanted    HYSTERECTOMY          JOINT REPLACEMENT      PARAESOPHAGEAL HERNIA REPAIR N/A 2020    Procedure: LAPAROSCOPIC PARAESOPHAGEAL HERNIA REPAIR WITH MESH AND NISSEN FUNDOPLICATION WITH ROBOTICS;  Surgeon: Darrin Leon MD;  [facility];  Service: General    MI ARTHROPLASTY GLENOHUMERAL JOINT TOTAL SHOULDER Left 2025    Procedure: ARTHROPLASTY SHOULDER REVERSE;  Surgeon: Abdullahi Cortez MD;  Location:  MAIN OR;  Service: Orthopedics    MI ARTHRP KNE CONDYLE&PLATU MEDIAL&LAT COMPARTMENTS Left 03/15/2023    Procedure: ARTHROPLASTY KNEE TOTAL;   Surgeon: Bart Potts DO;  [facility];  Service: Orthopedics    AR ARTHRS KNE SURG W/MENISCECTOMY MED/LAT W/SHVG Left 06/15/2022    Procedure: ;left knee arthroscopy, meniscectomy,synevectomy,chondroplasty, loose body removal, injection;  Surgeon: Bart Potts DO;  [facility];  Service: Orthopedics    AR COLONOSCOPY FLX DX W/COLLJ SPEC WHEN PFRMD N/A 06/26/2017    Procedure: EGD AND COLONOSCOPY;  Surgeon: Jolly Lin DO;  Location: [facility];  Service: Gastroenterology    AR DIAGNOSTIC ARTHROSCOPY SHOULDER +- SYNOVIAL BX Left 9/18/2024    Procedure: DIAGNOSTIC ARTHROSCOPY SHOULDER POSSIBLE REVISION ROTATOR CUFF REPAIR, EXTENSIVE DEBRIDEMENT;  Surgeon: Abdullahi Cortez MD;  [facility];  Service: Orthopedics    AR EXCISON TUMOR SOFT TISSUE THIGH/KNEE SUBQ 3 CM/> Bilateral 05/26/2022    Procedure: EXCISION BIOPSY TISSUE LESION/MASS LOWER EXTREMITY;  Surgeon: Kit Tavera MD;  [facility];  Service: General    AR EXCISON TUMOR SOFT TISSUE THIGH/KNEE SUBQ 3 CM/> Bilateral 08/25/2022    Procedure: EXCISION BIOPSY TISSUE LESION/MASS LOWER EXTREMITY;  Surgeon: Kit Tavera MD;  [facility];  Service: General    AR OSTECTOMY PRTL 5TH METAR HEAD SPX Bilateral 08/15/2023    Procedure: BUNIONECTOMY TAILOR WITHOUT OSTEOTOMY;  Surgeon: Monet Pradhan DPM;  [facility];  Service: Podiatry    AR SURGICAL ARTHROSCOPY JENNIFER W/CORACOACRM LIGM RLS Left 12/13/2023    Procedure: Left - ARTHROSCOPY SHOULDER  Synovectomy Debridement/chondroplasty Acromioplasty Arthroscopic rotator cuff repair Post arthroscopic injection of intra-articular joint space and peripheral portals;  Surgeon: Bart Potts DO;  [facility];  Service: Orthopedics    SHOULDER ARTHROCENTESIS      SHOULDER ARTHROSCOPY Right 06/23/2021    Procedure: SHOULDER ARTHROSCOPY WITH acromioplasty, debridment, and ROTATOR CUFF REPAIR;  Surgeon: Bart Potts DO;   Location:  MAIN OR;  Service: Orthopedics    TUBAL LIGATION     [2]   Allergies  Allergen Reactions    Hydrocodone Irritability

## 2025-06-20 NOTE — DISCHARGE INSTR - LAB
Epidural Steroid Injection   WHAT YOU NEED TO KNOW:   An epidural steroid injection (BOYD) is a procedure to inject steroid medicine into the epidural space. The epidural space is between your spinal cord and vertebrae. Steroids reduce inflammation and fluid buildup in your spine that may be causing pain. You may be given pain medicine along with the steroids.          ACTIVITY  Do not drive or operate machinery today.  No strenuous activity today - bending, lifting, etc.  You may resume normal activites starting tomorrow - start slowly and as tolerated.  You may shower today, but no tub baths or hot tubs.  You may have numbness for several hours from the local anesthetic. Please use caution and common sense, especially with weight-bearing activities.    CARE OF THE INJECTION SITE  If you have soreness or pain, apply ice to the area today (20 minutes on/20 minutes off).  Starting tomorrow, you may use warm, moist heat or ice if needed.  You may have an increase or change in your discomfort for 36-48 hours after your treatment.  Apply ice and continue with any pain medication you have been prescribed.  Notify the [facility] if you have any of the following: redness, drainage, swelling, headache, stiff neck or fever above 100°F.    SPECIAL INSTRUCTIONS  Our office will contact you in approximately 14 days for a progress report.    MEDICATIONS  Continue to take all routine medications.  Our office may have instructed you to hold some medications.    As no general anesthesia was used in today's procedure, you should not experience any side effects related to anesthesia.     If you are diabetic, the steroids used in today's injection may temporarily increase your blood sugar levels after the first few days after your injection. Please keep a close eye on your sugars and alert the doctor who manages your diabetes if your sugars are significantly high from your baseline or you are symptomatic.     If you have a  problem specifically related to your procedure, please call our office at [phone].  Problems not related to your procedure should be directed to your primary care physician.

## 2025-06-23 ENCOUNTER — OFFICE VISIT (OUTPATIENT)
Dept: OBGYN CLINIC | Facility: OTHER | Age: 70
End: 2025-06-23

## 2025-06-23 VITALS — BODY MASS INDEX: 28.7 KG/M2 | WEIGHT: 162 LBS | HEIGHT: 63 IN

## 2025-06-23 DIAGNOSIS — Z96.612 S/P REVERSE TOTAL SHOULDER ARTHROPLASTY, LEFT: Primary | ICD-10-CM

## 2025-06-23 PROCEDURE — 99024 POSTOP FOLLOW-UP VISIT: CPT | Performed by: PHYSICIAN ASSISTANT

## 2025-06-23 NOTE — ASSESSMENT & PLAN NOTE
Protocols were reviewed:    Phase III - Return to Function:  6- 8 weeks:  Continue to progress AROM and functional activities to allow patient to perform desired ADL activities.  3# weight limit with ADL activities.  Consider light CKC activities appreciating PROM limitations.    Discharge Criteria:  The goal is to discharge the patient by 8 weeks post-op with functional shoulder elevation/scaption.  If the therapist feels that additional PT is needed based upon complications or patient's goals, this should be discussed with Dr. Cortez.   The natural healing and recovery after the surgery was reviewed with the patient in the office today.  The lifetime restrictions of 25 pounds lifting were reviewed.  Patient was encouraged to attend physical therapy and continued home exercise program to optimize her range of motion, strength, and decrease in pain symptoms.  All questions and concerns were answered in the office today

## 2025-06-23 NOTE — PROGRESS NOTES
Orthopaedic Surgery - Office Note  Jamee Shaikh (70 y.o. female)   : 1955   MRN: [id number]  Encounter Date: 2025    Chief Complaint   Patient presents with    Left Shoulder - Post-op         Assessment & Plan  S/P reverse total shoulder arthroplasty, left  Protocols were reviewed:    Phase III - Return to Function:  6- 8 weeks:  Continue to progress AROM and functional activities to allow patient to perform desired ADL activities.  3# weight limit with ADL activities.  Consider light CKC activities appreciating PROM limitations.    Discharge Criteria:  The goal is to discharge the patient by 8 weeks post-op with functional shoulder elevation/scaption.  If the therapist feels that additional PT is needed based upon complications or patient's goals, this should be discussed with Dr. Cortez.   The natural healing and recovery after the surgery was reviewed with the patient in the office today.  The lifetime restrictions of 25 pounds lifting were reviewed.  Patient was encouraged to attend physical therapy and continued home exercise program to optimize her range of motion, strength, and decrease in pain symptoms.  All questions and concerns were answered in the office today            No follow-ups on file.        History of Present Illness  Patient is here for postop recheck after left reverse total shoulder arthroplasty on 2025.  Patient has been attending physical therapy and doing the home exercise program.  No complications are reported.  She does note some mild discomfort with certain movements.    Review of Systems  Pertinent items are noted in HPI.  All other systems were reviewed and are negative.    Physical Exam  There were no vitals taken for this visit.  Cons: Appears well.  No apparent distress.  Psych: Alert. Oriented x3.  Mood and affect normal.    Left shoulder incisions well-healed.  No signs of infection.  Active forward flexion is to 110 degrees.  Internal rotation is to the  buttocks.  Abduction is to 80 degrees.  She has good initial empty can testing 4+ out of 5.  Internal rotation and external rotation testing is at 4+ out of 5.  She is neurovascular intact.  Sensations intact to the deltoid.          Studies Reviewed  XR shoulder 2+ vw left  Order date: 2025  Authorizing: Jong Lassiter PA-C  Ordered by Jong Lassiter PA-C on 2025.     Narrative & Impression          LEFT SHOULDER     INDICATION:   Presence of left artificial shoulder joint.      COMPARISON:  None.     VIEWS:  XR SHOULDER 2+ VW LEFT      FINDINGS:     There is no acute fracture or dislocation.     Status post reverse right shoulder arthroplasty. Anatomic alignment. Intact hardware. No loosening or heterotopic bone formation. Previous subcutaneous emphysema has resolved. Acromioclavicular joint is intact..     No lytic or blastic osseous lesion.     Soft tissues are unremarkable.     IMPRESSION:        Satisfactory reverse left shoulder arthroplasty as above, unchanged from prior hardware.  Anatomic alignment without loosening.  Resorption of previous subcutaneous emphysema postoperatively     Electronically signed: 2025 03:21 PM Yonathan Saucedo MD   Operative report, protocol, therapy notes, and postoperative x-rays were reviewed for today's visit.    Procedures  No procedures today.    Medical, Surgical, Family, and Social History  The patient's medical history, family history, and social history, were reviewed and updated as appropriate.    Past Medical History[1]    Past Surgical History[2]    Family History[3]    Social History     Occupational History    Not on file   Tobacco Use    Smoking status: Former     Current packs/day: 0.00     Average packs/day: 1.5 packs/day for 30.0 years (45.0 ttl pk-yrs)     Types: Cigarettes     Start date: 1961     Quit date: 1991     Years since quittin.4    Smokeless tobacco: Never    Tobacco comments:     quit 25 yrs ago   Vaping Use     "Vaping status: Never Used   Substance and Sexual Activity    Alcohol use: Never    Drug use: Never    Sexual activity: Not Currently     Comment: defer       Allergies[4]    Current Medications[5]      Jong Lassiter PA-C         [1]   Past Medical History:  Diagnosis Date    Anemia     Arthritis     Bilateral bunions     Chronic pain disorder     back and hip pain    Colon polyp     Disease of thyroid gland     nodules    History of COVID-2020    mild s/s    History of gastroesophageal reflux (GERD)     \"had hiatal hernia surgery\"    History of heart murmur in childhood     Hyperlipidemia     Hypertension     Overactive bladder     Rotator cuff tear, right     had surgery    Vitamin D deficiency     unsure    Wears glasses     Wears partial dentures     upper   [2]   Past Surgical History:  Procedure Laterality Date     SECTION      Last Assessed: 2017    COLONOSCOPY      ELBOW SURGERY      Last Assessed: 2017    ESOPHAGOGASTRODUODENOSCOPY N/A 2017    Procedure: ESOPHAGOGASTRODUODENOSCOPY (EGD);  Surgeon: Jolly Lin DO;  Location: [facility];  Service: Gastroenterology    HIP SURGERY Left 2018    glut medius/minimus repair  and 18--with pin implanted    HYSTERECTOMY          JOINT REPLACEMENT      PARAESOPHAGEAL HERNIA REPAIR N/A 2020    Procedure: LAPAROSCOPIC PARAESOPHAGEAL HERNIA REPAIR WITH MESH AND NISSEN FUNDOPLICATION WITH ROBOTICS;  Surgeon: Darrin Leon MD;  [facility];  Service: General    KY ARTHROPLASTY GLENOHUMERAL JOINT TOTAL SHOULDER Left 2025    Procedure: ARTHROPLASTY SHOULDER REVERSE;  Surgeon: Abdullahi Cortez MD;  Location:  MAIN OR;  Service: Orthopedics    KY ARTHRP KNE CONDYLE&PLATU MEDIAL&LAT COMPARTMENTS Left 03/15/2023    Procedure: ARTHROPLASTY KNEE TOTAL;  Surgeon: Bart Potts DO;  [facility];  Service: Orthopedics    KY ARTHRS KNE SURG W/MENISCECTOMY MED/LAT W/SHVG Left 06/15/2022 "    Procedure: ;left knee arthroscopy, meniscectomy,synevectomy,chondroplasty, loose body removal, injection;  Surgeon: Bart Potts DO;  [facility];  Service: Orthopedics    SD COLONOSCOPY FLX DX W/COLLJ SPEC WHEN PFRMD N/A 06/26/2017    Procedure: EGD AND COLONOSCOPY;  Surgeon: Jolly Lin DO;  Location: [facility];  Service: Gastroenterology    SD DIAGNOSTIC ARTHROSCOPY SHOULDER +- SYNOVIAL BX Left 9/18/2024    Procedure: DIAGNOSTIC ARTHROSCOPY SHOULDER POSSIBLE REVISION ROTATOR CUFF REPAIR, EXTENSIVE DEBRIDEMENT;  Surgeon: Abdullahi Cortez MD;  [facility];  Service: Orthopedics    SD EXCISON TUMOR SOFT TISSUE THIGH/KNEE SUBQ 3 CM/> Bilateral 05/26/2022    Procedure: EXCISION BIOPSY TISSUE LESION/MASS LOWER EXTREMITY;  Surgeon: Kit Tavera MD;  [facility];  Service: General    SD EXCISON TUMOR SOFT TISSUE THIGH/KNEE SUBQ 3 CM/> Bilateral 08/25/2022    Procedure: EXCISION BIOPSY TISSUE LESION/MASS LOWER EXTREMITY;  Surgeon: Kit Tavera MD;  [facility];  Service: General    SD OSTECTOMY PRTL 5TH METAR HEAD SPX Bilateral 08/15/2023    Procedure: BUNIONECTOMY TAILOR WITHOUT OSTEOTOMY;  Surgeon: Monet Pradhan DPM;  [facility];  Service: Podiatry    SD SURGICAL ARTHROSCOPY JENNIFER W/CORACOACRM LIGM RLS Left 12/13/2023    Procedure: Left - ARTHROSCOPY SHOULDER  Synovectomy Debridement/chondroplasty Acromioplasty Arthroscopic rotator cuff repair Post arthroscopic injection of intra-articular joint space and peripheral portals;  Surgeon: Bart Potts DO;  [facility];  Service: Orthopedics    SHOULDER ARTHROCENTESIS      SHOULDER ARTHROSCOPY Right 06/23/2021    Procedure: SHOULDER ARTHROSCOPY WITH acromioplasty, debridment, and ROTATOR CUFF REPAIR;  Surgeon: Bart Potts DO;  Location:  MAIN OR;  Service: Orthopedics    TUBAL LIGATION     [3]   Family History  Problem Relation Name Age of Onset    Colon cancer Mother  Nicol Allen     Heart attack Father      Other Father          Cardiac Disorder    Diabetes type II Father      Colon cancer Sister annabella     No Known Problems Sister diane     No Known Problems Sister spencer     No Known Problems Sister ash     No Known Problems Sister sanford     No Known Problems Daughter adalgisa     No Known Problems Maternal Aunt leo     No Known Problems Maternal Aunt andrea     No Known Problems Maternal Aunt tere     Cancer Maternal Grandmother Judith     No Known Problems Paternal Grandmother     [4]   Allergies  Allergen Reactions    Hydrocodone Irritability   [5]   Current Outpatient Medications:     amLODIPine (NORVASC) 5 mg tablet, Take 2 tablets (10 mg total) by mouth daily, Disp: 180 tablet, Rfl: 1    atorvastatin (LIPITOR) 10 mg tablet, Take 1 tablet (10 mg total) by mouth daily, Disp: 90 tablet, Rfl: 1    famotidine (PEPCID) 20 mg tablet, Take 1 tablet (20 mg total) by mouth 2 (two) times a day, Disp: 180 tablet, Rfl: 1    hydrOXYzine HCL (ATARAX) 25 mg tablet, Take 1 tablet (25 mg total) by mouth every 6 (six) hours as needed for itching, Disp: 180 tablet, Rfl: 0    losartan (COZAAR) 100 MG tablet, Take 1 tablet (100 mg total) by mouth daily, Disp: 90 tablet, Rfl: 1    meloxicam (Mobic) 15 mg tablet, Take 1 tablet (15 mg total) by mouth daily, Disp: 90 tablet, Rfl: 1    valACYclovir (VALTREX) 1,000 mg tablet, take 1 tablet by mouth twice a day for 3 days (Patient taking differently: As needed), Disp: 180 tablet, Rfl: 1

## 2025-06-23 NOTE — PATIENT INSTRUCTIONS
Reverse Total Shoulder Rehabilitation Protocol  Abdullahi Cortez MD  [facility]      Notes for Physical Therapist:  Normal shoulder arthrokinematics are no longer relevant.  Aggressive PROM and strengthening can lead to complications - please avoid combined movements of IR/ext and ER/abd.  Advise patients to not carry heavy objects at side - i.e. grocery bags.  Special care with patients with known Osteoporosis and/or thinned acromion.  Review operative report to understand RTC involvement and other variants which may require attention.  Sling - D/C'd at 2 or 4 weeks.  This will be dependent on surgical findings and will be specified in patient's operative report.  Please direct protocol questions to one of the authors and patient issues to surgeon.    Phase I - Protective phase: Day 1-2 weeks:  Patient/family education on surgery and rehab/functional expectations.  PROM: flexion to first end-feel and ER to <30 degrees.  Instruct in DELTOID isometrics and ensure completion at home throughout rehab process.  Postural correction exercises.    Phase II - Sub-Acute Phase:  2-6 weeks:  Initiate AAROM and progress to AROM to promote shoulder elevation/scaption avoiding scapular compensation.    PROM to the following permanent limits:  flexion: 120 and ER: to first end feel and not to exceed 50 degrees.  Functional IR: not past greater trochanter.    Phase III - Return to Function:  6- 8 weeks:  Continue to progress AROM and functional activities to allow patient to perform desired ADL activities.  3# weight limit with ADL activities.  Consider light CKC activities appreciating PROM limitations.    Discharge Criteria:  The goal is to discharge the patient by 8 weeks post-op with functional shoulder elevation/scaption.  If the therapist feels that additional PT is needed based upon complications or patient's goals, this should be discussed with Dr. Cortez.

## 2025-06-24 ENCOUNTER — OFFICE VISIT (OUTPATIENT)
Dept: PHYSICAL THERAPY | Facility: CLINIC | Age: 70
End: 2025-06-24
Attending: ORTHOPAEDIC SURGERY
Payer: COMMERCIAL

## 2025-06-24 DIAGNOSIS — M25.512 LEFT SHOULDER PAIN, UNSPECIFIED CHRONICITY: ICD-10-CM

## 2025-06-24 DIAGNOSIS — M75.102 TEAR OF LEFT SUPRASPINATUS TENDON: Primary | ICD-10-CM

## 2025-06-24 PROCEDURE — 97110 THERAPEUTIC EXERCISES: CPT | Performed by: PHYSICAL THERAPIST

## 2025-06-24 PROCEDURE — 97112 NEUROMUSCULAR REEDUCATION: CPT | Performed by: PHYSICAL THERAPIST

## 2025-06-24 PROCEDURE — 97140 MANUAL THERAPY 1/> REGIONS: CPT | Performed by: PHYSICAL THERAPIST

## 2025-06-24 NOTE — PROGRESS NOTES
Daily Note     Today's date: 2025  Patient name: Jamee Shaikh  : 1955  MRN: [id number]  Referring provider: Abdullahi Cortez*  Dx:   Encounter Diagnosis     ICD-10-CM    1. Tear of left supraspinatus tendon  M75.102       2. Left shoulder pain, unspecified chronicity  M25.512           Start Time: 0900  Stop Time: 09  Total time in clinic (min): 45 minutes    Subjective: Patient saw ortho yesterday who would like her to continue PT to improve function. Patient states pain into proximal arm today, this is the common area of pain.       Objective: See treatment diary below      Assessment: Tolerated treatment well. Showed improvement today with tolerance to PROM and stretching, was able to improve passive range during MT today. Weakness noted at end ranges as well as general L shoulder fatigue with exercise. Patient exhibited good technique with therapeutic exercises and would benefit from continued PT      Plan: Continue per plan of care.  Progress treatment as tolerated.       Precautions: s/p protocol attached to chart  Access Code: [email or web address]  Progress note:  POC:     Manuals    Stretching with active release KB KB KB KB per protocol KB per protocol   GH jt mobs        Scapular PNF                Neuro Re-Ed        UBE 6 min L1 seated alt 6 min L1 seated alt 6 min L1 seated alt 6 min L1 seated alt 6 min L1 seated alt   scap retraction    10x    Shoulder rolls    10x    Chin tucks    10x    Scapular iso :05x5 ea :05x5 ea :05x5 ea :05x5 ea    TB MTP/LTP  RTB 10x ea RTB 10x ea     Ther Ex        pulleys 10x 10x 10x flex, abd, IR 10x 10x   UT stretch        LS stretch        Wrist AROM    prn            Elbow PROM AROM 30x AROM 30x 1# 30x AROM 30x    Table slide GSB flex, abd 10x ea GSB flex, abd 10x ea GSB flex, abd 10x ea GSB 10x ea    Supine flexion AAROM ER:05x10 ER:05x10  :05x10 and ER    Wall walk flex, scap 5x ea 5x ea 5x ea 5x ea    Standing I, Y,T 5x ea  10x ea 10x ea 5x ea    Standing AAROM Cane with mirror  Flex: 20x  Scap:20x Cane with mirror  Flex: 20x  Scap:20x Cane with mirror  Flex: 20x  Scap:20x Cane with mirror  Flex: 15x  Scap:15x Cane with mirror  Flex: 20x  Scap:20x   Standing AROM  Full can  Abd to 90   10x  10x   15x  15x   15x  10x   10x  1x pain   10x  10x   SL ER 15x 15x 15x 15x    TB shoulder IR/ER   15x ea RTB                                                     Ther Activity                        Gait Training                        Modalities

## 2025-06-28 PROBLEM — N39.0 RECURRENT UTI: Status: RESOLVED | Noted: 2025-05-29 | Resolved: 2025-06-28

## 2025-07-01 ENCOUNTER — EVALUATION (OUTPATIENT)
Dept: PHYSICAL THERAPY | Facility: CLINIC | Age: 70
End: 2025-07-01
Attending: ORTHOPAEDIC SURGERY
Payer: COMMERCIAL

## 2025-07-01 DIAGNOSIS — M75.102 TEAR OF LEFT SUPRASPINATUS TENDON: Primary | ICD-10-CM

## 2025-07-01 DIAGNOSIS — M25.512 LEFT SHOULDER PAIN, UNSPECIFIED CHRONICITY: ICD-10-CM

## 2025-07-01 PROCEDURE — 97164 PT RE-EVAL EST PLAN CARE: CPT | Performed by: PHYSICAL THERAPIST

## 2025-07-01 PROCEDURE — 97110 THERAPEUTIC EXERCISES: CPT | Performed by: PHYSICAL THERAPIST

## 2025-07-01 PROCEDURE — 97140 MANUAL THERAPY 1/> REGIONS: CPT | Performed by: PHYSICAL THERAPIST

## 2025-07-01 NOTE — HOME EXERCISE EDUCATION
Program_ID:[id number]   Access Code: [email or web address]  URL: [email or web address]  Date: 07-  Prepared By: Giselle Sloan    Program Notes      Exercises      - Standing Shoulder Flexion Wall Walk - 1 x daily - 7 x weekly - 1 sets - 10 reps      - Standing Shoulder Abduction Finger Walk at Wall - 1 x daily - 7 x weekly - 1 sets - 10 reps      - Standing Shoulder Row with Anchored Resistance - 1 x daily - 7 x weekly - 3 sets - 10 reps      - Shoulder Extension with Resistance - 1 x daily - 7 x weekly - 3 sets - 10 reps      - Standing Shoulder Abduction AAROM with Dowel - 1 x daily - 7 x weekly - 3 sets - 10 reps      - Shoulder Flexion Overhead with Dowel - 1 x daily - 7 x weekly - 3 sets - 10 reps      - Shoulder Abduction - Thumbs Up - 1 x daily - 7 x weekly - 3 sets - 10 reps      - Standing Shoulder Scaption - 1 x daily - 7 x weekly - 3 sets - 10 reps      - Shoulder Internal Rotation with Resistance - 1 x daily - 7 x weekly - 3 sets - 10 reps      - Shoulder External Rotation with Anchored Resistance - 1 x daily - 7 x weekly - 3 sets - 10 reps

## 2025-07-01 NOTE — HOME EXERCISE EDUCATION
Program_ID:[id number]   Access Code: [email or web address]  URL: [email or web address]  Date: 07-  Prepared By: Giselle Sloan    Program Notes      Exercises      - Standing Shoulder Flexion Wall Walk - 1 x daily - 7 x weekly - 1 sets - 10 reps      - Standing Shoulder Abduction Finger Walk at Wall - 1 x daily - 7 x weekly - 1 sets - 10 reps      - Standing Shoulder Row with Anchored Resistance - 1 x daily - 7 x weekly - 3 sets - 10 reps      - Shoulder Extension with Resistance - 1 x daily - 7 x weekly - 3 sets - 10 reps      - Standing Shoulder Abduction AAROM with Dowel - 1 x daily - 7 x weekly - 3 sets - 10 reps      - Shoulder Flexion Overhead with Dowel - 1 x daily - 7 x weekly - 3 sets - 10 reps      - Shoulder Abduction - Thumbs Up - 1 x daily - 7 x weekly - 3 sets - 10 reps      - Standing Shoulder Scaption - 1 x daily - 7 x weekly - 3 sets - 10 reps      - Shoulder Internal Rotation with Resistance - 1 x daily - 7 x weekly - 3 sets - 10 reps      - Shoulder External Rotation with Anchored Resistance - 1 x daily - 7 x weekly - 3 sets - 10 reps

## 2025-07-01 NOTE — PROGRESS NOTES
PT Re-Evaluation     Today's date: 2025  Patient name: Jamee Shaikh  : 1955  MRN: [id number]  Referring provider: Abdullahi Cortez*  Dx:   Encounter Diagnosis     ICD-10-CM    1. Tear of left supraspinatus tendon  M75.102       2. Left shoulder pain, unspecified chronicity  M25.512           Start Time: 0900  Stop Time: 09  Total time in clinic (min): 38 minutes    Assessment  Impairments: abnormal muscle firing, abnormal or restricted ROM, abnormal movement, activity intolerance, impaired physical strength, lacks appropriate home exercise program, pain with function, scapular dyskinesis, poor posture  and poor body mechanics    Assessment details: Jamee Shaikh was seen for an initial PT evaluation and 18 f/u sessions. Patient is a 70 y.o. female with diagnosis of rTSA and past medical history significant for OA, chronic pain, thyroid disease, GERD, hiatal hernia, hyperlipidemia, HTN, rotator cuff tear with repair bilaterally, L buttock pain with history of surgery, lipomas with removal, lumbar radiculopathy, vertigo. FOTO functional score shows improvement from 4% at intake to 50% today surpassing predicted value of 46%. Findings of examination today shows improved passive range of motion and slight increases in strength. Needs to continue to work on end range of motion as well as strengthening for continued gains. Discussed findings with patient today, educated on attempting to increase use of LUE, and given updated HEP. Will plan to continue 1x/wk for an additional 4 weeks until next re-evaluation.        Goals  STG (6 weeks)  1. Patient's left shoulder flexion AROM will increase to 120 with 2/10 pain for increased ability to perform overhead ADLs. - PROGRESSING  2. Patient will have 0/10 pain in left shoulder at rest. - MET 7/  3. Patient's left shoulder strength will increase to 3+/5 for increased ability to lift. - PROGRESSING  LTG (12 weeks)  1. Patient's UE AROM equal bilaterally  for ability to complete hair hygiene, overhead, and behind the back ADLs. - PROGRESSING  2. Patient's UE strength will be equal bilaterally for ability to lift and carry at PLOF. - PROGRESSING  3. Patient will be independent with home exercise program for continued maintenance post PT discharge. PROGRESSING      Plan  Patient would benefit from: skilled physical therapy  Planned modality interventions: unattended electrical stimulation, cryotherapy and thermotherapy: hydrocollator packs    Planned therapy interventions: neuromuscular re-education, manual therapy, therapeutic exercise, therapeutic activities, self care and home exercise program    Frequency: 1-2x week  Duration in weeks: 12  Plan of Care beginning date: 4/11/2025  Plan of Care expiration date: 10/1/2025  Treatment plan discussed with: patient and PTA  Plan details: Progress note in 4 weeks.       Subjective Evaluation    History of Present Illness  Date of surgery: 4/8/2025  Subjective 7/1: Patient states 70% improvement since the start of PT. Overall shoulder feels ok, but is unable to reach behind her back without pain. Has also been avoiding lifting. Minimal pain with moving the arm functionally. Able to sleep without much difficulty. F/u with ortho 8/18.     Subjective 6/5: Patient states 60% improvement since the start of PT. Will have pain with certain movements. Noted with attempting to pull up pants from the front and occasionally reaching out to the side. Avoid reaching behind her back. Able now to reaching higher and out front a lot better than she had previously.     Subjective 5/6: Patient removed sling yesterday and has had some soreness into arm since. Overall is feeling better than she has in awhile. Arm still feels weak and has difficulty lifting OH. Unable to reach BTB and is aware of precaution.     Mechanism of injury: Jamee Shaikh is a 70 y.o. female who presents to outpatient Physical Therapy today with complaints of left  "shoulder pain. Underwent rTSA . Felt ok the first few days with the block, but is now having more soreness and pain. Currently sleeping in a recliner. Wearing sling at all times.  To f/u  with ortho.     Patient Goals  Patient goal: Return use of LUE without pain  Pain      Current pain ratin  4 0 0  At best pain ratin  4 0  At worst pain ratin  6 7 7  Location: anterior proximal arm  Quality: \"heavy\"  Relieving factors: medications (IBU)  Exacerbated by: LUE movement.    Social Support  Steps to enter house: yes  Stairs in house: yes   Lives in: multiple-level home  Lives with: spouse    Employment status: not working  Hand dominance: right          Objective     Postural Observations    Additional Postural Observation Details  L shoulder elevated, forward head    Observations     Additional Observation Details  Incision at anterior left shoulder covered with dressing. Minimal bruising and edema, no redness or noted drainage  : mod limited scar mobility  : min-mod limited scar mobility  : min limited scar mobility    Cervical/Thoracic Screen   Cervical range of motion within normal limits    Active Range of Motion     Shoulder - AROM IE    LEFT        Flexion NT 80 pain 120 115   Extension NT 25 pain 35 35   Abduction NT 80 pain 90 90   External Rotation NT 40 20 50   Internal Rotation NT 50 60 45   RIGHT       Flexion 145      Extension 50      Abduction 140      External Rotation BTH T3      Internal Rotation BTB T12            Passive Range of Motion     Shoulder - PROM IE    LEFT        Flexion 15 pain 90 pain 90 110   Abduction 15 pain 90 pain 90 120   External Rotation -25 pain 40 pain 40 55   Internal Rotation belly 50 65 70     Additional Passive Range of Motion Details  IE: L elbow  with pain  : L elbow  with pain at ext  : Elbow ext -5 degrees  : Elbow ext -5 degrees    Strength/Myotome Testing   Shoulder - MMT IE  " 7/1   LEFT        Flexion NT 2+ 3- 3-   Abduction NT 2+ 2+ 3-   External Rotation NT 3- 3+ 4-   Internal Rotation NT 4- 4- 4   Bicep NT 4- 4- 4+   Tricep NT 4+ 4+ 5   RIGHT       Flexion 4      Abduction 4-      External Rotation 5      Internal Rotation 5      Bicep 5      Tricep 5            General Comments:      Shoulder Comments       FOTO: 33% function, 46% predicted function   5/6: 44%  6/5: 50%  7/1: 50%           Precautions: s/p protocol attached to chart  Access Code: [email or web address]  Progress note:8/1  POC: 10/1    Manuals 6/9 6/17 6/24 7/1 6/5   Stretching with active release KB KB KB KB KB per protocol   GH jt mobs        Scapular PNF                Neuro Re-Ed        UBE 6 min L1 seated alt 6 min L1 seated alt 6 min L1 seated alt 6 min L1 seated alt 6 min L1 seated alt   scap retraction        Shoulder rolls        Chin tucks        Scapular iso :05x5 ea :05x5 ea :05x5 ea     TB MTP/LTP  RTB 10x ea RTB 10x ea REVIEWED    Ther Ex        pulleys 10x 10x 10x flex, abd, IR 10x flex, abd 10x   UT stretch        LS stretch        Wrist AROM                Elbow PROM AROM 30x AROM 30x 1# 30x     Table slide GSB flex, abd 10x ea GSB flex, abd 10x ea GSB flex, abd 10x ea     Supine flexion AAROM ER:05x10 ER:05x10      Wall walk flex, scap 5x ea 5x ea 5x ea REVIEWED    Standing I, Y,T 5x ea 10x ea 10x ea     Standing AAROM Cane with mirror  Flex: 20x  Scap:20x Cane with mirror  Flex: 20x  Scap:20x Cane with mirror  Flex: 20x  Scap:20x REVIEWED Cane with mirror  Flex: 20x  Scap:20x   Standing AROM  Full can  Abd to 90   10x  10x   15x  15x   15x  10x REVIEWED   10x  10x   SL ER 15x 15x 15x     TB shoulder IR/ER   15x ea RTB REVIEWED                                                    Ther Activity                        Gait Training                        Modalities

## 2025-07-02 NOTE — PROGRESS NOTES
"Name: Jamee Shaikh      : 1955      MRN: [id number]  Encounter Provider: J Carlos Delong DPM  Encounter Date: 7/3/2025   Encounter department: [facility]  :  Assessment & Plan  Pain in both feet    Orders:    FL guided needle plac bx/asp/inj; Future    FL guided needle plac bx/asp/inj; Future    Soft tissue swelling of joint of foot    Orders:    FL guided needle plac bx/asp/inj; Future    FL guided needle plac bx/asp/inj; Future    Arthritis of left foot    Orders:    FL guided needle plac bx/asp/inj; Future    Arthritis of right foot    Orders:    FL guided needle plac bx/asp/inj; Future      -Patient was educated regarding their condition.  -Recommend use of voltaren gel  -FL guided needle injection ordered for right tarsometatarsal joints 1, 2, 3 and left tarsometatarsal joints 1 and 2.  -Patient instructed on how to relace shoes in a way to take pressure off of her arthritic midfoot.   -Recommend more supportive shoes with wide toe box, OTC orthotics  -RTC as needed       X-ray of left and right foot from 3/25/2025 interpreted independently: No acute osseous abnormalities, radiographic evidence of osteoarthritis bilateral midfoot    History of Present Illness   HPI  Jamee Shaikh is a 70 y.o. female who presents for follow up bilateral midfoot pain. She reports pain has been worsening over the past month and she has been noticing increased in swelling at midfoot.     History obtained from: patient    Review of Systems  Medications Ordered Prior to Encounter[1]      Objective   Ht 5' 3\" (1.6 m)   Wt 73.5 kg (162 lb)   BMI 28.70 kg/m²      Physical Exam    Vascular:  -DP and PT pulses intact b/l  -Capillary refill time <2 sec b/l  -Digital hair growth: Present  -Skin temp: WNL     MSK:  -minimal pain on palpation of the 1,2,3 TMTJ(s) right foot and 1,2 TMTJs left foot  -No gross deformities noted   -MMT is 5/5 to all muscle compartments of the lower extremity  -Ankle " dorsiflexion >10 degrees with knee extended and knee flexed b/l  -No pain with dorsiflexion and plantarflexion of the metatarsals     Neuro:  -Light sensation intact bilaterally  -Protective sensation intact bilaterally with 10/10 points felt with [place] monofilament     Derm:  -No lesions, abrasions, or open wounds noted  -Erythema not present  -Mild edema dorsal midfoot bilateral  -No callus formation noted on exam           [1]   Current Outpatient Medications on File Prior to Visit   Medication Sig Dispense Refill    amLODIPine (NORVASC) 5 mg tablet Take 2 tablets (10 mg total) by mouth daily 180 tablet 1    atorvastatin (LIPITOR) 10 mg tablet Take 1 tablet (10 mg total) by mouth daily 90 tablet 1    famotidine (PEPCID) 20 mg tablet Take 1 tablet (20 mg total) by mouth 2 (two) times a day 180 tablet 1    hydrOXYzine HCL (ATARAX) 25 mg tablet Take 1 tablet (25 mg total) by mouth every 6 (six) hours as needed for itching 180 tablet 0    losartan (COZAAR) 100 MG tablet Take 1 tablet (100 mg total) by mouth daily 90 tablet 1    meloxicam (Mobic) 15 mg tablet Take 1 tablet (15 mg total) by mouth daily 90 tablet 1    valACYclovir (VALTREX) 1,000 mg tablet take 1 tablet by mouth twice a day for 3 days (Patient taking differently: As needed) 180 tablet 1     No current facility-administered medications on file prior to visit.

## 2025-07-03 ENCOUNTER — TELEPHONE (OUTPATIENT)
Age: 70
End: 2025-07-03

## 2025-07-03 ENCOUNTER — TELEPHONE (OUTPATIENT)
Dept: PAIN MEDICINE | Facility: CLINIC | Age: 70
End: 2025-07-03

## 2025-07-03 ENCOUNTER — OFFICE VISIT (OUTPATIENT)
Age: 70
End: 2025-07-03
Payer: COMMERCIAL

## 2025-07-03 VITALS — BODY MASS INDEX: 28.7 KG/M2 | WEIGHT: 162 LBS | HEIGHT: 63 IN

## 2025-07-03 DIAGNOSIS — M19.071 ARTHRITIS OF RIGHT FOOT: ICD-10-CM

## 2025-07-03 DIAGNOSIS — M19.072 ARTHRITIS OF LEFT FOOT: ICD-10-CM

## 2025-07-03 DIAGNOSIS — M79.672 PAIN IN BOTH FEET: Primary | ICD-10-CM

## 2025-07-03 DIAGNOSIS — M25.476 SOFT TISSUE SWELLING OF JOINT OF FOOT: ICD-10-CM

## 2025-07-03 DIAGNOSIS — M79.671 PAIN IN BOTH FEET: Primary | ICD-10-CM

## 2025-07-03 PROCEDURE — 99213 OFFICE O/P EST LOW 20 MIN: CPT

## 2025-07-03 NOTE — TELEPHONE ENCOUNTER
Pt called in stating [facility] does not have any of her medications on file at their pharmacy.     Pt states the scripts were supposed to be transferred from [facility].     Pt is requesting for us to please send her current medication list over to new pharmacy, so they may have her meds on file.     [id number] - [place] - [address] [phone]

## 2025-07-03 NOTE — TELEPHONE ENCOUNTER
S/w pt who states Lt lbp --> lt buttock and lt le is same as prior to inj. Pt states current pain 7/10. Pt asking if her current HA could be related to inj on 6/20/25? RN advised pt that typically if a HA occurs post inj it is mild and does not persist unless dural HA ;in which pt would have partial or total relief w/ laying down or consuming caffeine/incr water. Pt states HA is frontal and constant w/ no relief w/ posture change or caffeine that pt has noticed. Pt states taking Advil and Tylenol PM which does help HA during the night.    Pls advise any addit recs at this time. Thank you!

## 2025-07-03 NOTE — TELEPHONE ENCOUNTER
Caller: Patient  Doctor/office: Dr Bradford  #: [phone]    % of improvement:0%  Pain Scale (1-10): 6/10     Patient states that she has had a headache everyday since she had the injection. Advil helps, but it is constant

## 2025-07-03 NOTE — TELEPHONE ENCOUNTER
No typically a transforming does not cause headaches. There is no direct access to the site that would cause the leak

## 2025-07-08 ENCOUNTER — OFFICE VISIT (OUTPATIENT)
Dept: PHYSICAL THERAPY | Facility: CLINIC | Age: 70
End: 2025-07-08
Attending: ORTHOPAEDIC SURGERY
Payer: COMMERCIAL

## 2025-07-08 DIAGNOSIS — M75.102 TEAR OF LEFT SUPRASPINATUS TENDON: Primary | ICD-10-CM

## 2025-07-08 DIAGNOSIS — M25.512 LEFT SHOULDER PAIN, UNSPECIFIED CHRONICITY: ICD-10-CM

## 2025-07-08 PROCEDURE — 97140 MANUAL THERAPY 1/> REGIONS: CPT | Performed by: PHYSICAL THERAPIST

## 2025-07-08 PROCEDURE — 97110 THERAPEUTIC EXERCISES: CPT | Performed by: PHYSICAL THERAPIST

## 2025-07-08 PROCEDURE — 97112 NEUROMUSCULAR REEDUCATION: CPT | Performed by: PHYSICAL THERAPIST

## 2025-07-08 NOTE — PROGRESS NOTES
Daily Note     Today's date: 2025  Patient name: Jamee Shaikh  : 1955  MRN: [id number]  Referring provider: Abdullahi Cortez*  Dx:   Encounter Diagnosis     ICD-10-CM    1. Tear of left supraspinatus tendon  M75.102       2. Left shoulder pain, unspecified chronicity  M25.512           Start Time: 0900  Stop Time: 0940  Total time in clinic (min): 40 minutes    Subjective: Patient states she is a little sore today, thinks she may have slept wrong on it.       Objective: See treatment diary below      Assessment: Tolerated treatment well. Improved tolerance to MT today, did have some pain at end ranges. Overall continues to show progression towards goals. Patient exhibited good technique with therapeutic exercises and would benefit from continued PT working on end range of motion, should strength and stability for return to PLOF.       Plan: Continue per plan of care.  Progress treatment as tolerated.       Precautions: s/p protocol attached to chart  Access Code: [email or web address]  Progress note:  POC: 10/1    Manuals    Stretching with active release KB KB KB KB KB   GH jt mobs        Scapular PNF                Neuro Re-Ed        UBE 6 min L1 seated alt 6 min L1 seated alt 6 min L1 seated alt 6 min L1 seated alt 6 min L1 seated alt   scap retraction        Shoulder rolls        Chin tucks        Scapular iso :05x5 ea :05x5 ea :05x5 ea     TB MTP/LTP  RTB 10x ea RTB 10x ea REVIEWED RTB 15x ea   Ther Ex        pulleys 10x 10x 10x flex, abd, IR 10x flex, abd 10x flex, abd   UT stretch        LS stretch        Wrist AROM                Elbow PROM AROM 30x AROM 30x 1# 30x     Table slide GSB flex, abd 10x ea GSB flex, abd 10x ea GSB flex, abd 10x ea     Supine flexion AAROM ER:05x10 ER:05x10      Wall walk flex, scap 5x ea 5x ea 5x ea REVIEWED 5x ea   Standing I, Y,T 5x ea 10x ea 10x ea     Standing AAROM Cane with mirror  Flex: 20x  Scap:20x Cane with mirror  Flex: 20x  Scap:20x  Cane with mirror  Flex: 20x  Scap:20x REVIEWED Cane with mirror  Flex: 20x  Scap:20x   Standing AROM  Full can  Abd to 90   10x  10x   15x  15x   15x  10x REVIEWED   15x  10x   SL ER 15x 15x 15x  2x10   TB shoulder IR/ER   15x ea RTB REVIEWED Red 2x10ea                                                   Ther Activity                        Gait Training                        Modalities

## 2025-07-15 ENCOUNTER — TELEPHONE (OUTPATIENT)
Dept: RADIOLOGY | Facility: HOSPITAL | Age: 70
End: 2025-07-15

## 2025-07-15 ENCOUNTER — HOSPITAL ENCOUNTER (OUTPATIENT)
Dept: MAMMOGRAPHY | Facility: HOSPITAL | Age: 70
Discharge: HOME | End: 2025-07-15
Attending: NURSE PRACTITIONER
Payer: COMMERCIAL

## 2025-07-15 ENCOUNTER — HOSPITAL ENCOUNTER (OUTPATIENT)
Dept: ULTRASOUND IMAGING | Facility: HOSPITAL | Age: 70
Discharge: HOME | End: 2025-07-15
Attending: NURSE PRACTITIONER
Payer: COMMERCIAL

## 2025-07-15 DIAGNOSIS — R92.8 ABNORMAL MAMMOGRAM: ICD-10-CM

## 2025-07-15 PROCEDURE — 76642 ULTRASOUND BREAST LIMITED: CPT

## 2025-07-15 PROCEDURE — 77065 DX MAMMO INCL CAD UNI: CPT

## 2025-07-15 PROCEDURE — G0279 TOMOSYNTHESIS, MAMMO: HCPCS

## 2025-07-16 ENCOUNTER — HOSPITAL ENCOUNTER (OUTPATIENT)
Dept: RADIOLOGY | Facility: HOSPITAL | Age: 70
Discharge: HOME | End: 2025-07-16
Payer: COMMERCIAL

## 2025-07-16 DIAGNOSIS — M25.476 SOFT TISSUE SWELLING OF JOINT OF FOOT: ICD-10-CM

## 2025-07-16 DIAGNOSIS — M79.672 PAIN IN BOTH FEET: ICD-10-CM

## 2025-07-16 DIAGNOSIS — M79.671 PAIN IN BOTH FEET: ICD-10-CM

## 2025-07-16 DIAGNOSIS — M19.071 ARTHRITIS OF RIGHT FOOT: ICD-10-CM

## 2025-07-16 PROCEDURE — 77002 NEEDLE LOCALIZATION BY XRAY: CPT

## 2025-07-16 PROCEDURE — 20600 DRAIN/INJ JOINT/BURSA W/O US: CPT

## 2025-07-16 RX ORDER — ROPIVACAINE HYDROCHLORIDE 2 MG/ML
2 INJECTION, SOLUTION EPIDURAL; INFILTRATION; PERINEURAL ONCE
Status: DISCONTINUED | OUTPATIENT
Start: 2025-07-16 | End: 2025-07-17 | Stop reason: HOSPADM

## 2025-07-16 RX ORDER — BETAMETHASONE SODIUM PHOSPHATE AND BETAMETHASONE ACETATE 3; 3 MG/ML; MG/ML
12 INJECTION, SUSPENSION INTRA-ARTICULAR; INTRALESIONAL; INTRAMUSCULAR; SOFT TISSUE EVERY 24 HOURS
Status: DISCONTINUED | OUTPATIENT
Start: 2025-07-16 | End: 2025-07-16

## 2025-07-16 RX ORDER — BETAMETHASONE SODIUM PHOSPHATE AND BETAMETHASONE ACETATE 3; 3 MG/ML; MG/ML
12 INJECTION, SUSPENSION INTRA-ARTICULAR; INTRALESIONAL; INTRAMUSCULAR; SOFT TISSUE ONCE
Status: DISCONTINUED | OUTPATIENT
Start: 2025-07-16 | End: 2025-07-17 | Stop reason: HOSPADM

## 2025-07-16 RX ADMIN — IOHEXOL 0.6 ML: 300 INJECTION, SOLUTION INTRAVENOUS at 13:50

## 2025-07-17 ENCOUNTER — OFFICE VISIT (OUTPATIENT)
Dept: PHYSICAL THERAPY | Facility: CLINIC | Age: 70
End: 2025-07-17
Attending: ORTHOPAEDIC SURGERY
Payer: COMMERCIAL

## 2025-07-17 DIAGNOSIS — M75.102 TEAR OF LEFT SUPRASPINATUS TENDON: Primary | ICD-10-CM

## 2025-07-17 DIAGNOSIS — M25.512 LEFT SHOULDER PAIN, UNSPECIFIED CHRONICITY: ICD-10-CM

## 2025-07-17 PROCEDURE — 97112 NEUROMUSCULAR REEDUCATION: CPT | Performed by: PHYSICAL THERAPIST

## 2025-07-17 PROCEDURE — 97110 THERAPEUTIC EXERCISES: CPT | Performed by: PHYSICAL THERAPIST

## 2025-07-17 PROCEDURE — 97140 MANUAL THERAPY 1/> REGIONS: CPT | Performed by: PHYSICAL THERAPIST

## 2025-07-17 NOTE — PROGRESS NOTES
Daily Note     Today's date: 2025  Patient name: Jamee Shaikh  : 1955  MRN: [id number]  Referring provider: Abdullahi Cortez*  Dx:   Encounter Diagnosis     ICD-10-CM    1. Tear of left supraspinatus tendon  M75.102       2. Left shoulder pain, unspecified chronicity  M25.512           Start Time: 0900  Stop Time: 0940  Total time in clinic (min): 40 minutes    Subjective: States she is doing well. Has difficulty reaching behind her back.       Objective: See treatment diary below      Assessment: Tolerated treatment well. Progressing well towards goals with functional range of motion and decreased pain. Patient exhibited good technique with therapeutic exercises and would benefit from continued PT working on end range shoulder strength and general stability for return to PLOF.       Plan: Continue per plan of care.  Progress treatment as tolerated.       Precautions: s/p protocol attached to chart  Access Code: [email or web address]  Progress note:  POC: 10/1    Manuals    Stretching with active release KB KB KB KB KB   GH jt mobs        Scapular PNF                Neuro Re-Ed        UBE 6 min L1 seated alt 6 min L1 seated alt 6 min L1 seated alt 6 min L1 seated alt 6 min L1 seated alt   scap retraction        Shoulder rolls        PNF *       Scapular iso  :05x5 ea :05x5 ea     TB MTP/LTP RTB 2x10 RTB 10x ea RTB 10x ea REVIEWED RTB 15x ea   Ther Ex        pulleys 10x flex, abd 10x 10x flex, abd, IR 10x flex, abd 10x flex, abd   UT stretch        LS stretch        Wrist AROM                Elbow PROM  AROM 30x 1# 30x     Table slide  GSB flex, abd 10x ea GSB flex, abd 10x ea     Supine flexion AAROM  ER:05x10      Wall walk flex, scap 5x ea 5x ea 5x ea REVIEWED 5x ea   Standing I, Y,T  10x ea 10x ea     Standing AAROM Cane with mirror  Flex: 20x  Scap:20x Cane with mirror  Flex: 20x  Scap:20x Cane with mirror  Flex: 20x  Scap:20x REVIEWED Cane with mirror  Flex: 20x  Scap:20x    Standing AROM  Full can  Abd to 90   15x  15x   15x  15x   15x  10x REVIEWED   15x  10x   SL ER nv 15x 15x  2x10   TB shoulder IR/ER Red 2x10 ea  15x ea RTB REVIEWED Red 2x10ea                                                   Ther Activity                        Gait Training                        Modalities

## 2025-07-22 ENCOUNTER — OFFICE VISIT (OUTPATIENT)
Dept: PHYSICAL THERAPY | Facility: CLINIC | Age: 70
End: 2025-07-22
Attending: ORTHOPAEDIC SURGERY
Payer: COMMERCIAL

## 2025-07-22 ENCOUNTER — OFFICE VISIT (OUTPATIENT)
Age: 70
End: 2025-07-22
Payer: COMMERCIAL

## 2025-07-22 VITALS — HEIGHT: 63 IN | WEIGHT: 152 LBS | BODY MASS INDEX: 26.93 KG/M2

## 2025-07-22 DIAGNOSIS — G89.4 CHRONIC PAIN SYNDROME: ICD-10-CM

## 2025-07-22 DIAGNOSIS — M25.512 LEFT SHOULDER PAIN, UNSPECIFIED CHRONICITY: ICD-10-CM

## 2025-07-22 DIAGNOSIS — M51.16 LUMBAR DISC DISEASE WITH RADICULOPATHY: Primary | ICD-10-CM

## 2025-07-22 DIAGNOSIS — M47.816 LUMBAR SPONDYLOSIS: ICD-10-CM

## 2025-07-22 DIAGNOSIS — M54.16 LUMBAR RADICULOPATHY: ICD-10-CM

## 2025-07-22 DIAGNOSIS — M75.102 TEAR OF LEFT SUPRASPINATUS TENDON: Primary | ICD-10-CM

## 2025-07-22 PROCEDURE — 97140 MANUAL THERAPY 1/> REGIONS: CPT | Performed by: PHYSICAL THERAPIST

## 2025-07-22 PROCEDURE — G2211 COMPLEX E/M VISIT ADD ON: HCPCS | Performed by: ANESTHESIOLOGY

## 2025-07-22 PROCEDURE — 97110 THERAPEUTIC EXERCISES: CPT | Performed by: PHYSICAL THERAPIST

## 2025-07-22 PROCEDURE — 97112 NEUROMUSCULAR REEDUCATION: CPT | Performed by: PHYSICAL THERAPIST

## 2025-07-22 PROCEDURE — 99214 OFFICE O/P EST MOD 30 MIN: CPT | Performed by: ANESTHESIOLOGY

## 2025-07-22 NOTE — PROGRESS NOTES
Daily Note     Today's date: 2025  Patient name: Jamee Shaikh  : 1955  MRN: [id number]  Referring provider: Abdullahi Cortez*  Dx:   Encounter Diagnosis     ICD-10-CM    1. Tear of left supraspinatus tendon  M75.102       2. Left shoulder pain, unspecified chronicity  M25.512           Start Time: 0930  Stop Time: 1010  Total time in clinic (min): 40 minutes    Subjective: Patient states overall she is doing well. Will have some pain when moving the wrong way, but overall able to function with minimal pain.       Objective: See treatment diary below      Assessment: Tolerated treatment well. Added PNF exercise to program today for continued functional gains. Did note some weakness of LUE during exercise, unable to complete with good mechanics under resistance. Improved tolerance to L UE PROM stretching today with noted functional PROM measurements. Patient exhibited good technique with therapeutic exercises and would benefit from continued PT      Plan: Continue per plan of care.  Progress treatment as tolerated.       Precautions: s/p protocol attached to chart  Access Code: [email or web address]  Progress note:  POC: 10/1    Manuals    Stretching with active release KB KB KB KB KB   GH jt mobs        Scapular PNF                Neuro Re-Ed        UBE 6 min L1 seated alt 6 min L1 seated alt 6 min L1 seated alt 6 min L1 seated alt 6 min L1 seated alt   scap retraction        Shoulder rolls        PNF * 10x ea      Scapular iso   :05x5 ea     TB MTP/LTP RTB 2x10 RTB 2x10 RTB 10x ea REVIEWED RTB 15x ea   Ther Ex        pulleys 10x flex, abd 10x flex, abd 10x flex, abd, IR 10x flex, abd 10x flex, abd   UT stretch        LS stretch        Wrist AROM                Elbow PROM   1# 30x     Table slide   GSB flex, abd 10x ea     Supine flexion AAROM        Wall walk flex, scap 5x ea 5x ea 5x ea REVIEWED 5x ea   Standing I, Y,T   10x ea     Standing AAROM Cane with mirror  Flex:  20x  Scap:20x Cane with mirror  Flex: 20x  Scap:20x Cane with mirror  Flex: 20x  Scap:20x REVIEWED Cane with mirror  Flex: 20x  Scap:20x   Standing AROM  Full can  Abd to 90   15x  15x   15x  15x   15x  10x REVIEWED   15x  10x   SL ER nv 2x10 15x  2x10   TB shoulder IR/ER Red 2x10 ea Red 2x10 ea 15x ea RTB REVIEWED Red 2x10ea                                                   Ther Activity                        Gait Training                        Modalities

## 2025-07-22 NOTE — PROGRESS NOTES
Name: Jamee Shaikh      : 1955      MRN: [id number]  Encounter Provider: Claudette Bradford MD  Encounter Date: 2025   Encounter [facility]: . [facility]  :  Assessment & Plan    Patient is a 70-year-old female complains of left-sided low back and hip pain and left leg weakness presents to office for follow-up visit.  Patient's pain now instead of being localized to the left buttock is rating down the entire left lower extremity in the L5 nerve distribution.  Patient is status post left L4-L5 and L5-S1 transforaminal epidural steroid injection performed on 2025 to she reported 0% alleviation of symptoms  1.  Provide patient with physical therapy lumbar core strengthening exercises          My impressions and treatment recommendations were discussed in detail with the patient who verbalized understanding and had no further questions.  Discharge instructions were provided. I personally saw and examined the patient and I agree with the above discussed plan of care.    History of Present Illness     Jamee Shaikh is a 70 y.o. female who presents for a follow up office visit in regards to Back Pain and Leg Pain. The patient’s current symptoms include 6 out of 10 constant doses aching pain without particular time pattern.    Current pain medications includes:  none.      Review of Systems   Constitutional:  Negative for unexpected weight change.   HENT:  Negative for hearing loss.    Eyes:  Negative for visual disturbance.   Respiratory:  Negative for shortness of breath.    Cardiovascular:  Negative for leg swelling.   Gastrointestinal:  Negative for constipation.   Endocrine: Negative for polyuria.   Genitourinary:  Negative for difficulty urinating.   Musculoskeletal:  Positive for gait problem. Negative for joint swelling and myalgias.   Skin:  Negative for rash.   Neurological:  Negative for weakness and headaches.   Psychiatric/Behavioral:  Negative for decreased  "concentration.    All other systems reviewed and are negative.    Medical History Reviewed by provider this encounter:     .       Objective   Ht 5' 3\" (1.6 m)   Wt 68.9 kg (152 lb)   BMI 26.93 kg/m²      Pain Score:   6  Physical Exam  Constitutional: normal, well developed, well nourished, alert, in no distress and non-toxic and no overt pain behavior.  Eyes: anicteric  HEENT: grossly intact  Neck: supple, symmetric, trachea midline and no masses   Pulmonary: even and unlabored  Cardiovascular: No edema or pitting edema present  Skin: Normal without rashes or lesions and well hydrated  Psychiatric: Mood and affect appropriate  Neurologic: Cranial Nerves II-XII grossly intact  Musculoskeletal: antalgic          "

## 2025-07-22 NOTE — PATIENT INSTRUCTIONS
Patient Education     Core Strengthening Exercises on Back or on Hands and Knees   About this topic   Your core muscles are in your chest, back, buttock, and stomach area. They are your abdominal, back, and pelvis muscles. These muscles help keep your body stable when using your arms or legs. They also help with balance and posture. There are many exercises you can do to keep these muscles strong.  If you have back problems like a compression fracture or a ruptured disc, doing some of these exercises could make your problem worse. Some of these exercises may cause lower back pain.  General   Before starting with a program, ask your doctor if you are healthy enough to do these exercises. Your doctor may have you work with a , chiropractor, or physical therapist to make a safe exercise program to meet your needs.  Strengthening Exercises   Strengthening exercises keep your muscles firm and strong. Start by repeating each exercise 2 to 3 times. Work up to doing each exercise 10 times. Try to do the exercises 2 to 3 times each day. Hold each exercise for 3 to 5 seconds. Do all exercises slowly.  Hip lifts ? Lie on your back with your knees bent and feet flat on the floor. Tighten your stomach muscles and push your heels into the floor to lift your buttocks off the floor. Relax.  Pelvic tilts ? Lie on your back with your knees bent and feet flat on the floor. Tighten your stomach muscles and press your lower back down to the floor. Relax.  Straight leg raises lying down ? Lie on your back with one leg straight. Bend your other knee so the foot is flat on the bed. Keeping your leg straight, lift the leg up to the level of your other knee. Lower it back down. Repeat with the other leg.  Knee flex lying down ? Lie on your back with both knees bent and your feet flat on the floor. Tighten your belly muscles. Raise one leg up and back down as if you are marching in slow motion. Keep belly muscles tight while you move  your leg. Switch legs. To make this exercise harder, raise both arms straight up in the air. Tighten your belly muscles. When you raise one leg up, reach the opposite arm over your head. Switch, moving the opposite arm and leg until you have done 10 repetitions on each side.  Abdominal crunches ? Lie on your back with both knees bent. Keep your feet flat on the floor. Place your hands in one of these positions. Try starting with the first position since it is the easiest. As you get better, use the other positions to make it harder.  Crunches with arms at sides.  Crunches with arms across chest.  Crunches with arms behind head. Be careful not to interlock your fingers behind your neck or head while doing crunches. This may add tension to your neck and cause strain.  Look at the ceiling. Tighten your belly muscles and lift your shoulders and upper back off the floor. Breathe out while you are doing this. Lower your shoulders to the floor. Breathe in while you are doing this. Relax your belly muscles all the way before starting another crunch.  Arm and leg lifts on hands and knees ? Start on your hands and knees. With all of these exercises, keep your back as level as possible. If you are having trouble with this, you may want to put a small object on your back such as a book. If it falls off, you are not keeping your back level enough during the exercise.  Lift one arm up to shoulder level and hold. Lower it back down. Now, lift up the other arm and hold.  Lift one leg up and kick it straight out until it is in line with your back and hold. Lower it back down. Now, lift up the other leg and hold.  Lift one arm and the OPPOSITE leg up at the same time and hold. Lower them down. Now, repeat using the other arm and leg. This is a very hard exercise. It may take time to be able to do this.               What will the results be?   Stronger core  Better balance  More toned belly and back muscles  Easier to do daily  activities  Better sports performance  Less low back pain  Helpful tips   Stay active and work out to keep your muscles strong and flexible.  Keep a healthy weight to avoid putting too much stress on your spine. Eat a healthy diet to keep your muscles healthy.  Be sure you do not hold your breath when exercising. This can raise your blood pressure. If you tend to hold your breath, try counting out loud when exercising. If any exercise bothers you, stop right away.  Try walking or cycling at an easy pace for a few minutes to warm up your muscles. Do this again after exercising.  Exercise may be slightly uncomfortable, but you should not have sharp pains. If you do get sharp pains, stop what you are doing. If the sharp pains continue, call your doctor.  Last Reviewed Date   2021-03-18  Consumer Information Use and Disclaimer   This generalized information is a limited summary of diagnosis, treatment, and/or medication information. It is not meant to be comprehensive and should be used as a tool to help the user understand and/or assess potential diagnostic and treatment options. It does NOT include all information about conditions, treatments, medications, side effects, or risks that may apply to a specific patient. It is not intended to be medical advice or a substitute for the medical advice, diagnosis, or treatment of a health care provider based on the health care provider's examination and assessment of a patient’s specific and unique circumstances. Patients must speak with a health care provider for complete information about their health, medical questions, and treatment options, including any risks or benefits regarding use of medications. This information does not endorse any treatments or medications as safe, effective, or approved for treating a specific patient. [employer] and its affiliates disclaim any warranty or liability relating to this information or the use thereof. The use of this information  is governed by the Terms of Use, available at https://www.woltersevocataluwer.com/en/know/clinical-effectiveness-terms   Copyright   Copyright © 2024 [employer], [employer] and its affiliates and/or licensors. All rights reserved.

## 2025-07-22 NOTE — PROGRESS NOTES
Name: Jamee Shaikh      : 1955      MRN: [id number]  Encounter Provider: Chilo Layton PA-C  Encounter Date: 2025   Encounter department: [facility] [place]  :  Assessment & Plan  Dermatofibroma of female breast  Pleasant 70-year-old female history of skin excision from the left breast in  with pathology showing dermatofibroma.  Reports a persistent skin lump to the area since the time of surgery.  On exam there is a 10 x 9 mm hard solid skin nodule.  Differential to include recurrent dermatofibroma versus keloid scar versus other neoplasm.  Options for management reviewed including excision for definitive diagnosis and treatment.  Informed verbal consent and patient agreeable to this.  Elliptical incision made around the lesion today and lesion sent for permanent pathologic evaluation.  Skin closed with multiple vertical mattress sutures of 4-0 nylon.  Advised return to the office for suture removal in 2 weeks and would recommend Steri-Strip placement at that time due to tension on the incision.  Additional recommendations pending final biopsy results.  Wound care instructions reviewed all questions answered and patient agreeable to the plan.             History of Present Illness   HPI  Jamee Shaikh is a 70 y.o. female who presents with cyst that was removed from left breast 2025 that she would like the doctor to take another look at  History obtained from: patient    Review of Systems   All other systems reviewed and are negative.    Past Medical History   Past Medical History[1]  Past Surgical History[2]  Family History[3]   reports that she quit smoking about 34 years ago. Her smoking use included cigarettes. She started smoking about 64 years ago. She has a 45 pack-year smoking history. She has never used smokeless tobacco. She reports that she does not drink alcohol and does not use drugs.  Current Outpatient Medications   Medication Instructions     "amLODIPine (NORVASC) 10 mg, Oral, Daily    atorvastatin (LIPITOR) 10 mg, Oral, Daily    famotidine (PEPCID) 20 mg, Oral, 2 times daily    hydrOXYzine HCL (ATARAX) 25 mg, Oral, Every 6 hours PRN    losartan (COZAAR) 100 mg, Oral, Daily    meloxicam (MOBIC) 15 mg, Oral, Daily    valACYclovir (VALTREX) 1,000 mg tablet take 1 tablet by mouth twice a day for 3 days   Allergies[4]      Objective   /78   Pulse 64   Temp (!) 96 °F (35.6 °C) (Temporal)   Resp 18   Ht 5' 3\" (1.6 m)   Wt 71.2 kg (157 lb)   SpO2 99%   BMI 27.81 kg/m²      Physical Exam  Vitals and nursing note reviewed.   Constitutional:       General: She is not in acute distress.     Appearance: She is well-developed. She is not diaphoretic.   HENT:      Head: Normocephalic and atraumatic.     Eyes:      Conjunctiva/sclera: Conjunctivae normal.     Pulmonary:      Effort: No respiratory distress.     Musculoskeletal:         General: Normal range of motion.      Cervical back: Normal range of motion.     Skin:     General: Skin is warm and dry.      Capillary Refill: Capillary refill takes less than 2 seconds.      Comments: Prior surgical site of the left breast, upper inner quadrant confidently identified based on prior scar.  Affecting the lateral portion of the scar is a solid pink nodule of the skin measuring about 10 x 9 mm in total area of pigmentation with the actual associated nodule little bit smaller.     Neurological:      Mental Status: She is alert and oriented to person, place, and time.     Psychiatric:         Behavior: Behavior normal.       Skin excision    Date/Time: 7/23/2025 1:30 PM    Performed by: Chilo Layton PA-C  Authorized by: Chilo Layton PA-C    [place] Protocol:  procedure performed by consultantConsent: Verbal consent obtained  Risks and benefits: risks, benefits and alternatives were discussed  Consent given by: patient  Patient understanding: patient states understanding of the procedure " "being performed  Patient identity confirmed: verbally with patient    Procedure Details - Skin Excision:     Number of Lesions:  1  Lesion 1:     Body area:  Trunk    Trunk location:  L breast    Malignancy: benign lesion            Final defect size (mm):  25    Repair type:  Linear closure  Lesion 6:      Verbal consent obtained.  Positioned supine.  Area prepped with Betadine swab x 2 then draped sterilely.  Using aseptic technique local anesthetic administered utilizing 24 mL 1% Xylocaine with epinephrine in a field block.  Using 15 blade scalpel elliptical incision made around the lesion through the full-thickness of the dermis into the subcutaneous fatty tissue.  Lesion then excised and placed in formalin.  Good spontaneous hemostasis.  Skin then reapproximated using 4-0 nylon vertical mattress sutures.  Tolerated well without immediate complications and pressure dressing applied.             [1]   Past Medical History:  Diagnosis Date    Anemia     Arthritis     Bilateral bunions     Chronic pain disorder     back and hip pain    Colon polyp     Disease of thyroid gland     nodules    History of COVID-2020    mild s/s    History of gastroesophageal reflux (GERD)     \"had hiatal hernia surgery\"    History of heart murmur in childhood     Hyperlipidemia     Hypertension     Overactive bladder     Rotator cuff tear, right     had surgery    Vitamin D deficiency     unsure    Wears glasses     Wears partial dentures     upper   [2]   Past Surgical History:  Procedure Laterality Date     SECTION      Last Assessed: 2017    COLONOSCOPY      ELBOW SURGERY      Last Assessed: 2017    ESOPHAGOGASTRODUODENOSCOPY N/A 2017    Procedure: ESOPHAGOGASTRODUODENOSCOPY (EGD);  Surgeon: Jolly Lin DO;  Location: [facility] GI LAB;  Service: Gastroenterology    FL GUIDED NEEDLE PLAC BX/ASP/INJ  2025    HIP SURGERY Left 2018    glut medius/minimus repair  and 18--with pin implanted    " HYSTERECTOMY      2011    JOINT REPLACEMENT      PARAESOPHAGEAL HERNIA REPAIR N/A 05/19/2020    Procedure: LAPAROSCOPIC PARAESOPHAGEAL HERNIA REPAIR WITH MESH AND NISSEN FUNDOPLICATION WITH ROBOTICS;  Surgeon: Darrin Leon MD;  Location: [facility];  Service: General    MS ARTHROPLASTY GLENOHUMERAL JOINT TOTAL SHOULDER Left 4/8/2025    Procedure: ARTHROPLASTY SHOULDER REVERSE;  Surgeon: Abdullahi Cortez MD;  Location: [facility];  Service: Orthopedics    MS ARTHRP KNE CONDYLE&PLATU MEDIAL&LAT COMPARTMENTS Left 03/15/2023    Procedure: ARTHROPLASTY KNEE TOTAL;  Surgeon: Bart Potts DO;  Location: [facility];  Service: Orthopedics    MS ARTHRS KNE SURG W/MENISCECTOMY MED/LAT W/SHVG Left 06/15/2022    Procedure: ;left knee arthroscopy, meniscectomy,synevectomy,chondroplasty, loose body removal, injection;  Surgeon: Bart Potts DO;  Location: [facility];  Service: Orthopedics    MS COLONOSCOPY FLX DX W/COLLJ SPEC WHEN PFRMD N/A 06/26/2017    Procedure: EGD AND COLONOSCOPY;  Surgeon: Jolly Lin DO;  Location: [facility] LAB;  Service: Gastroenterology    MS DIAGNOSTIC ARTHROSCOPY SHOULDER +- SYNOVIAL BX Left 9/18/2024    Procedure: DIAGNOSTIC ARTHROSCOPY SHOULDER POSSIBLE REVISION ROTATOR CUFF REPAIR, EXTENSIVE DEBRIDEMENT;  Surgeon: Abdullahi Cortez MD;  [facility];  Service: Orthopedics    MS EXCISON TUMOR SOFT TISSUE THIGH/KNEE SUBQ 3 CM/> Bilateral 05/26/2022    Procedure: EXCISION BIOPSY TISSUE LESION/MASS LOWER EXTREMITY;  Surgeon: Kit Tavera MD;  Location: [facility];  Service: General    MS EXCISON TUMOR SOFT TISSUE THIGH/KNEE SUBQ 3 CM/> Bilateral 08/25/2022    Procedure: EXCISION BIOPSY TISSUE LESION/MASS LOWER EXTREMITY;  Surgeon: Kit Tavera MD;  [facility];  Service: General    MS OSTECTOMY PRTL 5TH METAR HEAD SPX Bilateral 08/15/2023    Procedure: BUNIONECTOMY TAILOR WITHOUT OSTEOTOMY;  Surgeon: Monet Pradhan DPM;  [facility];  Service: Podiatry    WI SURGICAL ARTHROSCOPY JENNIFER W/CORACOACRM LIGM RLS Left 12/13/2023    Procedure: Left - ARTHROSCOPY SHOULDER  Synovectomy Debridement/chondroplasty Acromioplasty Arthroscopic rotator cuff repair Post arthroscopic injection of intra-articular joint space and peripheral portals;  Surgeon: Bart Potts DO;  Location: [facility];  Service: Orthopedics    SHOULDER ARTHROCENTESIS      SHOULDER ARTHROSCOPY Right 06/23/2021    Procedure: SHOULDER ARTHROSCOPY WITH acromioplasty, debridment, and ROTATOR CUFF REPAIR;  Surgeon: Bart Potts DO;  Location:  MAIN OR;  Service: Orthopedics    TUBAL LIGATION     [3]   Family History  Problem Relation Name Age of Onset    Colon cancer Mother Nicolle Allen     Heart attack Father      Other Father          Cardiac Disorder    Diabetes type II Father      Colon cancer Sister annabella     No Known Problems Sister diane     No Known Problems Sister spencer     No Known Problems Sister ash     No Known Problems Sister sanford     No Known Problems Daughter adalgisa     No Known Problems Maternal Aunt leo     No Known Problems Maternal Aunt andrea     No Known Problems Maternal Aunt tere     Cancer Maternal Grandmother Judith     No Known Problems Paternal Grandmother     [4]   Allergies  Allergen Reactions    Hydrocodone Irritability

## 2025-07-23 ENCOUNTER — OFFICE VISIT (OUTPATIENT)
Dept: SURGERY | Facility: CLINIC | Age: 70
End: 2025-07-23
Payer: COMMERCIAL

## 2025-07-23 VITALS
HEIGHT: 63 IN | SYSTOLIC BLOOD PRESSURE: 120 MMHG | WEIGHT: 157 LBS | TEMPERATURE: 96 F | BODY MASS INDEX: 27.82 KG/M2 | DIASTOLIC BLOOD PRESSURE: 78 MMHG | HEART RATE: 64 BPM | RESPIRATION RATE: 18 BRPM | OXYGEN SATURATION: 99 %

## 2025-07-23 DIAGNOSIS — D23.5: Primary | ICD-10-CM

## 2025-07-23 PROCEDURE — 11403 EXC TR-EXT B9+MARG 2.1-3CM: CPT | Performed by: PHYSICIAN ASSISTANT

## 2025-07-23 PROCEDURE — 88305 TISSUE EXAM BY PATHOLOGIST: CPT | Performed by: PATHOLOGY

## 2025-07-23 PROCEDURE — 99203 OFFICE O/P NEW LOW 30 MIN: CPT | Performed by: PHYSICIAN ASSISTANT

## 2025-07-23 NOTE — ASSESSMENT & PLAN NOTE
Pleasant 70-year-old female history of skin excision from the left breast in 2023 with pathology showing dermatofibroma.  Reports a persistent skin lump to the area since the time of surgery.  On exam there is a 10 x 9 mm hard solid skin nodule.  Differential to include recurrent dermatofibroma versus keloid scar versus other neoplasm.  Options for management reviewed including excision for definitive diagnosis and treatment.  Informed verbal consent and patient agreeable to this.  Elliptical incision made around the lesion today and lesion sent for permanent pathologic evaluation.  Skin closed with multiple vertical mattress sutures of 4-0 nylon.  Advised return to the office for suture removal in 2 weeks and would recommend Steri-Strip placement at that time due to tension on the incision.  Additional recommendations pending final biopsy results.  Wound care instructions reviewed all questions answered and patient agreeable to the plan.

## 2025-07-24 ENCOUNTER — TELEPHONE (OUTPATIENT)
Dept: RADIOLOGY | Facility: HOSPITAL | Age: 70
End: 2025-07-24

## 2025-07-24 NOTE — NURSING NOTE
Call placed to pt to discuss upcoming appointment at [facility] Radiology Department and consultation completed.  Pt is having a L Foot TMTJ CSI completed on 7/30/2025.  Allergies reviewed and verified pt does not currently take any anticoagulant medications.  Pre procedure instructions including diet and taking own medications discussed with pt.  Pt instructed that she may eat normally and take medications as usual before the procedure.  Instructed pt that she will require a  since procedure is being completed on R Foot.  Pt verbalized understanding of instructions given.  Reminded pt of the location, date and time of procedure.  My number was given to call if any questions or concerns arise pre or post procedure.

## 2025-07-25 ENCOUNTER — APPOINTMENT (OUTPATIENT)
Dept: RADIOLOGY | Age: 70
End: 2025-07-25
Attending: STUDENT IN AN ORGANIZED HEALTH CARE EDUCATION/TRAINING PROGRAM
Payer: COMMERCIAL

## 2025-07-25 ENCOUNTER — OFFICE VISIT (OUTPATIENT)
Dept: OBGYN CLINIC | Facility: CLINIC | Age: 70
End: 2025-07-25
Payer: COMMERCIAL

## 2025-07-25 VITALS — HEIGHT: 63 IN | WEIGHT: 157 LBS | BODY MASS INDEX: 27.82 KG/M2

## 2025-07-25 DIAGNOSIS — Z96.652 STATUS POST TOTAL LEFT KNEE REPLACEMENT: ICD-10-CM

## 2025-07-25 DIAGNOSIS — T84.093A FAILED TOTAL LEFT KNEE REPLACEMENT, INITIAL ENCOUNTER (HCC): Primary | ICD-10-CM

## 2025-07-25 DIAGNOSIS — T14.8XXA JOINT INJURY: ICD-10-CM

## 2025-07-25 DIAGNOSIS — M17.11 PRIMARY OSTEOARTHRITIS OF RIGHT KNEE: ICD-10-CM

## 2025-07-25 PROCEDURE — 73562 X-RAY EXAM OF KNEE 3: CPT

## 2025-07-25 PROCEDURE — 99215 OFFICE O/P EST HI 40 MIN: CPT | Performed by: STUDENT IN AN ORGANIZED HEALTH CARE EDUCATION/TRAINING PROGRAM

## 2025-07-25 RX ORDER — MULTIVIT-MIN/IRON FUM/FOLIC AC 7.5 MG-4
1 TABLET ORAL DAILY
Qty: 30 TABLET | Refills: 0 | Status: SHIPPED | OUTPATIENT
Start: 2025-07-25

## 2025-07-25 RX ORDER — SODIUM CHLORIDE, SODIUM LACTATE, POTASSIUM CHLORIDE, CALCIUM CHLORIDE 600; 310; 30; 20 MG/100ML; MG/100ML; MG/100ML; MG/100ML
20 INJECTION, SOLUTION INTRAVENOUS CONTINUOUS
OUTPATIENT
Start: 2025-07-25

## 2025-07-25 RX ORDER — MUPIROCIN 2 %
OINTMENT (GRAM) TOPICAL DAILY
Qty: 15 G | Refills: 0 | Status: SHIPPED | OUTPATIENT
Start: 2025-07-25

## 2025-07-25 RX ORDER — SODIUM CHLORIDE, SODIUM LACTATE, POTASSIUM CHLORIDE, CALCIUM CHLORIDE 600; 310; 30; 20 MG/100ML; MG/100ML; MG/100ML; MG/100ML
125 INJECTION, SOLUTION INTRAVENOUS CONTINUOUS
OUTPATIENT
Start: 2025-07-25

## 2025-07-25 RX ORDER — ASCORBIC ACID 500 MG
500 TABLET ORAL DAILY
Qty: 30 TABLET | Refills: 0 | Status: SHIPPED | OUTPATIENT
Start: 2025-07-25

## 2025-07-25 RX ORDER — CHLORHEXIDINE GLUCONATE 40 MG/ML
SOLUTION TOPICAL DAILY PRN
OUTPATIENT
Start: 2025-07-25

## 2025-07-25 RX ORDER — FOLIC ACID 1 MG/1
1 TABLET ORAL DAILY
Qty: 30 TABLET | Refills: 0 | Status: SHIPPED | OUTPATIENT
Start: 2025-07-25

## 2025-07-25 RX ORDER — CHLORHEXIDINE GLUCONATE ORAL RINSE 1.2 MG/ML
15 SOLUTION DENTAL ONCE
OUTPATIENT
Start: 2025-07-25 | End: 2025-07-25

## 2025-07-25 RX ORDER — FERROUS SULFATE 324(65)MG
324 TABLET, DELAYED RELEASE (ENTERIC COATED) ORAL EVERY OTHER DAY
Qty: 60 TABLET | Refills: 0 | Status: SHIPPED | OUTPATIENT
Start: 2025-07-25

## 2025-07-28 ENCOUNTER — TELEPHONE (OUTPATIENT)
Age: 70
End: 2025-07-28

## 2025-07-28 PROCEDURE — 88305 TISSUE EXAM BY PATHOLOGIST: CPT | Performed by: PATHOLOGY

## 2025-07-29 ENCOUNTER — LAB REQUISITION (OUTPATIENT)
Dept: LAB | Facility: HOSPITAL | Age: 70
End: 2025-07-29
Payer: COMMERCIAL

## 2025-07-29 ENCOUNTER — APPOINTMENT (OUTPATIENT)
Dept: LAB | Facility: HOSPITAL | Age: 70
End: 2025-07-29
Payer: COMMERCIAL

## 2025-07-29 ENCOUNTER — TELEPHONE (OUTPATIENT)
Dept: OBGYN CLINIC | Facility: HOSPITAL | Age: 70
End: 2025-07-29

## 2025-07-29 ENCOUNTER — OFFICE VISIT (OUTPATIENT)
Dept: LAB | Facility: HOSPITAL | Age: 70
End: 2025-07-29
Payer: COMMERCIAL

## 2025-07-29 DIAGNOSIS — R30.0 BURNING WITH URINATION: ICD-10-CM

## 2025-07-29 DIAGNOSIS — T14.8XXA JOINT INJURY: ICD-10-CM

## 2025-07-29 DIAGNOSIS — Z96.652 STATUS POST TOTAL LEFT KNEE REPLACEMENT: ICD-10-CM

## 2025-07-29 DIAGNOSIS — E78.5 HYPERLIPIDEMIA, UNSPECIFIED: ICD-10-CM

## 2025-07-29 DIAGNOSIS — R35.0 URINARY FREQUENCY: ICD-10-CM

## 2025-07-29 DIAGNOSIS — E55.9 VITAMIN D DEFICIENCY, UNSPECIFIED: ICD-10-CM

## 2025-07-29 DIAGNOSIS — T84.093A FAILED TOTAL LEFT KNEE REPLACEMENT, INITIAL ENCOUNTER (HCC): ICD-10-CM

## 2025-07-29 DIAGNOSIS — R73.01 IMPAIRED FASTING GLUCOSE: ICD-10-CM

## 2025-07-29 DIAGNOSIS — T14.8XXA OTHER INJURY OF UNSPECIFIED BODY REGION, INITIAL ENCOUNTER: ICD-10-CM

## 2025-07-29 DIAGNOSIS — Z96.652 PRESENCE OF LEFT ARTIFICIAL KNEE JOINT: ICD-10-CM

## 2025-07-29 DIAGNOSIS — E55.9 VITAMIN D DEFICIENCY: ICD-10-CM

## 2025-07-29 DIAGNOSIS — E78.5 HYPERLIPIDEMIA, UNSPECIFIED HYPERLIPIDEMIA TYPE: ICD-10-CM

## 2025-07-29 DIAGNOSIS — R30.0 DYSURIA: ICD-10-CM

## 2025-07-29 DIAGNOSIS — T84.093A OTHER MECHANICAL COMPLICATION OF INTERNAL LEFT KNEE PROSTHESIS, INITIAL ENCOUNTER (HCC): ICD-10-CM

## 2025-07-29 DIAGNOSIS — R35.0 FREQUENCY OF MICTURITION: ICD-10-CM

## 2025-07-29 DIAGNOSIS — Z00.00 ENCOUNTER FOR MEDICARE ANNUAL WELLNESS EXAM: ICD-10-CM

## 2025-07-29 DIAGNOSIS — Z00.00 ENCOUNTER FOR GENERAL ADULT MEDICAL EXAMINATION WITHOUT ABNORMAL FINDINGS: ICD-10-CM

## 2025-07-29 LAB
25(OH)D3 SERPL-MCNC: 33.2 NG/ML (ref 30–100)
ALBUMIN SERPL BCG-MCNC: 4.3 G/DL (ref 3.5–5)
ALP SERPL-CCNC: 84 U/L (ref 34–104)
ALT SERPL W P-5'-P-CCNC: 15 U/L (ref 7–52)
ANION GAP SERPL CALCULATED.3IONS-SCNC: 6 MMOL/L (ref 4–13)
APTT PPP: 26 SECONDS (ref 23–34)
AST SERPL W P-5'-P-CCNC: 21 U/L (ref 13–39)
BACTERIA UR QL AUTO: ABNORMAL /HPF
BASOPHILS # BLD AUTO: 0.03 THOUSANDS/ÂΜL (ref 0–0.1)
BASOPHILS NFR BLD AUTO: 1 % (ref 0–1)
BILIRUB SERPL-MCNC: 0.61 MG/DL (ref 0.2–1)
BILIRUB UR QL STRIP: NEGATIVE
BUN SERPL-MCNC: 23 MG/DL (ref 5–25)
CALCIUM SERPL-MCNC: 9.8 MG/DL (ref 8.4–10.2)
CHLORIDE SERPL-SCNC: 106 MMOL/L (ref 96–108)
CHOLEST SERPL-MCNC: 204 MG/DL (ref ?–200)
CLARITY UR: CLEAR
CO2 SERPL-SCNC: 29 MMOL/L (ref 21–32)
COLOR UR: YELLOW
CREAT SERPL-MCNC: 0.67 MG/DL (ref 0.6–1.3)
CRP SERPL QL: 1 MG/L
EOSINOPHIL # BLD AUTO: 0.43 THOUSAND/ÂΜL (ref 0–0.61)
EOSINOPHIL NFR BLD AUTO: 7 % (ref 0–6)
ERYTHROCYTE [DISTWIDTH] IN BLOOD BY AUTOMATED COUNT: 13.9 % (ref 11.6–15.1)
ERYTHROCYTE [SEDIMENTATION RATE] IN BLOOD: 5 MM/HOUR (ref 0–29)
GFR SERPL CREATININE-BSD FRML MDRD: 90 ML/MIN/1.73SQ M
GLUCOSE P FAST SERPL-MCNC: 101 MG/DL (ref 65–99)
GLUCOSE UR STRIP-MCNC: NEGATIVE MG/DL
HCT VFR BLD AUTO: 45.1 % (ref 34.8–46.1)
HDLC SERPL-MCNC: 59 MG/DL
HGB BLD-MCNC: 14.1 G/DL (ref 11.5–15.4)
HGB UR QL STRIP.AUTO: ABNORMAL
IMM GRANULOCYTES # BLD AUTO: 0.01 THOUSAND/UL (ref 0–0.2)
IMM GRANULOCYTES NFR BLD AUTO: 0 % (ref 0–2)
INR PPP: 0.97 (ref 0.85–1.19)
KETONES UR STRIP-MCNC: NEGATIVE MG/DL
LDLC SERPL CALC-MCNC: 114 MG/DL (ref 0–100)
LEUKOCYTE ESTERASE UR QL STRIP: NEGATIVE
LYMPHOCYTES # BLD AUTO: 1.65 THOUSANDS/ÂΜL (ref 0.6–4.47)
LYMPHOCYTES NFR BLD AUTO: 27 % (ref 14–44)
MCH RBC QN AUTO: 27.6 PG (ref 26.8–34.3)
MCHC RBC AUTO-ENTMCNC: 31.3 G/DL (ref 31.4–37.4)
MCV RBC AUTO: 88 FL (ref 82–98)
MONOCYTES # BLD AUTO: 0.49 THOUSAND/ÂΜL (ref 0.17–1.22)
MONOCYTES NFR BLD AUTO: 8 % (ref 4–12)
NEUTROPHILS # BLD AUTO: 3.55 THOUSANDS/ÂΜL (ref 1.85–7.62)
NEUTS SEG NFR BLD AUTO: 57 % (ref 43–75)
NITRITE UR QL STRIP: NEGATIVE
NON-SQ EPI CELLS URNS QL MICRO: ABNORMAL /HPF
NONHDLC SERPL-MCNC: 145 MG/DL
NRBC BLD AUTO-RTO: 0 /100 WBCS
PH UR STRIP.AUTO: 6 [PH]
PLATELET # BLD AUTO: 288 THOUSANDS/UL (ref 149–390)
PMV BLD AUTO: 10 FL (ref 8.9–12.7)
POTASSIUM SERPL-SCNC: 5 MMOL/L (ref 3.5–5.3)
PROT SERPL-MCNC: 7 G/DL (ref 6.4–8.4)
PROT UR STRIP-MCNC: NEGATIVE MG/DL
PROTHROMBIN TIME: 13.1 SECONDS (ref 12.3–15)
RBC # BLD AUTO: 5.1 MILLION/UL (ref 3.81–5.12)
RBC #/AREA URNS AUTO: ABNORMAL /HPF
SODIUM SERPL-SCNC: 141 MMOL/L (ref 135–147)
SP GR UR STRIP.AUTO: 1.01
TRIGL SERPL-MCNC: 155 MG/DL (ref ?–150)
UROBILINOGEN UR QL STRIP.AUTO: 0.2 E.U./DL
WBC # BLD AUTO: 6.16 THOUSAND/UL (ref 4.31–10.16)
WBC #/AREA URNS AUTO: ABNORMAL /HPF

## 2025-07-29 PROCEDURE — 93005 ELECTROCARDIOGRAM TRACING: CPT

## 2025-07-29 PROCEDURE — 80061 LIPID PANEL: CPT

## 2025-07-29 PROCEDURE — 85730 THROMBOPLASTIN TIME PARTIAL: CPT

## 2025-07-29 PROCEDURE — 85652 RBC SED RATE AUTOMATED: CPT

## 2025-07-29 PROCEDURE — 86901 BLOOD TYPING SEROLOGIC RH(D): CPT

## 2025-07-29 PROCEDURE — 87086 URINE CULTURE/COLONY COUNT: CPT

## 2025-07-29 PROCEDURE — 85025 COMPLETE CBC W/AUTO DIFF WBC: CPT

## 2025-07-29 PROCEDURE — 86850 RBC ANTIBODY SCREEN: CPT

## 2025-07-29 PROCEDURE — 87081 CULTURE SCREEN ONLY: CPT

## 2025-07-29 PROCEDURE — 85610 PROTHROMBIN TIME: CPT

## 2025-07-29 PROCEDURE — 83036 HEMOGLOBIN GLYCOSYLATED A1C: CPT

## 2025-07-29 PROCEDURE — 86900 BLOOD TYPING SEROLOGIC ABO: CPT

## 2025-07-29 PROCEDURE — 36415 COLL VENOUS BLD VENIPUNCTURE: CPT

## 2025-07-29 PROCEDURE — 80053 COMPREHEN METABOLIC PANEL: CPT

## 2025-07-29 PROCEDURE — 82306 VITAMIN D 25 HYDROXY: CPT

## 2025-07-29 PROCEDURE — 81001 URINALYSIS AUTO W/SCOPE: CPT

## 2025-07-29 PROCEDURE — 86140 C-REACTIVE PROTEIN: CPT

## 2025-07-30 ENCOUNTER — HOSPITAL ENCOUNTER (OUTPATIENT)
Dept: RADIOLOGY | Facility: HOSPITAL | Age: 70
Discharge: HOME | End: 2025-07-30
Payer: COMMERCIAL

## 2025-07-30 ENCOUNTER — APPOINTMENT (OUTPATIENT)
Dept: PHYSICAL THERAPY | Facility: CLINIC | Age: 70
End: 2025-07-30
Attending: ORTHOPAEDIC SURGERY
Payer: COMMERCIAL

## 2025-07-30 DIAGNOSIS — M79.671 PAIN IN BOTH FEET: ICD-10-CM

## 2025-07-30 DIAGNOSIS — M25.476 SOFT TISSUE SWELLING OF JOINT OF FOOT: ICD-10-CM

## 2025-07-30 DIAGNOSIS — M19.072 ARTHRITIS OF LEFT FOOT: ICD-10-CM

## 2025-07-30 DIAGNOSIS — M79.672 PAIN IN BOTH FEET: ICD-10-CM

## 2025-07-30 LAB
ABO GROUP BLD: NORMAL
ATRIAL RATE: 58 BPM
BACTERIA UR CULT: NORMAL
BLD GP AB SCN SERPL QL: NEGATIVE
EST. AVERAGE GLUCOSE BLD GHB EST-MCNC: 123 MG/DL
HBA1C MFR BLD: 5.9 %
MRSA NOSE QL CULT: NORMAL
P AXIS: 11 DEGREES
PR INTERVAL: 156 MS
QRS AXIS: -32 DEGREES
QRSD INTERVAL: 84 MS
QT INTERVAL: 436 MS
QTC INTERVAL: 429 MS
RH BLD: POSITIVE
SPECIMEN EXPIRATION DATE: NORMAL
T WAVE AXIS: -8 DEGREES
VENTRICULAR RATE: 58 BPM

## 2025-07-30 PROCEDURE — 77002 NEEDLE LOCALIZATION BY XRAY: CPT

## 2025-07-30 PROCEDURE — 93010 ELECTROCARDIOGRAM REPORT: CPT | Performed by: INTERNAL MEDICINE

## 2025-07-30 RX ORDER — ROPIVACAINE HYDROCHLORIDE 2 MG/ML
2 INJECTION, SOLUTION EPIDURAL; INFILTRATION; PERINEURAL ONCE
Status: DISCONTINUED | OUTPATIENT
Start: 2025-07-30 | End: 2025-07-31 | Stop reason: HOSPADM

## 2025-07-30 RX ORDER — BETAMETHASONE SODIUM PHOSPHATE AND BETAMETHASONE ACETATE 3; 3 MG/ML; MG/ML
12 INJECTION, SUSPENSION INTRA-ARTICULAR; INTRALESIONAL; INTRAMUSCULAR; SOFT TISSUE ONCE
Status: DISCONTINUED | OUTPATIENT
Start: 2025-07-30 | End: 2025-07-31 | Stop reason: HOSPADM

## 2025-07-30 RX ORDER — BETAMETHASONE SODIUM PHOSPHATE AND BETAMETHASONE ACETATE 3; 3 MG/ML; MG/ML
2 INJECTION, SUSPENSION INTRA-ARTICULAR; INTRALESIONAL; INTRAMUSCULAR; SOFT TISSUE EVERY 24 HOURS
Status: DISCONTINUED | OUTPATIENT
Start: 2025-07-30 | End: 2025-07-31 | Stop reason: HOSPADM

## 2025-07-30 RX ORDER — LIDOCAINE HYDROCHLORIDE 10 MG/ML
2 INJECTION, SOLUTION EPIDURAL; INFILTRATION; INTRACAUDAL; PERINEURAL
Status: DISCONTINUED | OUTPATIENT
Start: 2025-07-30 | End: 2025-07-31 | Stop reason: HOSPADM

## 2025-08-04 ENCOUNTER — OFFICE VISIT (OUTPATIENT)
Dept: SURGERY | Facility: CLINIC | Age: 70
End: 2025-08-04
Payer: COMMERCIAL

## 2025-08-04 VITALS — TEMPERATURE: 97.6 F

## 2025-08-04 DIAGNOSIS — Z48.02 VISIT FOR SUTURE REMOVAL: Primary | ICD-10-CM

## 2025-08-04 PROCEDURE — 99213 OFFICE O/P EST LOW 20 MIN: CPT | Performed by: SURGERY

## 2025-08-12 ENCOUNTER — EVALUATION (OUTPATIENT)
Dept: PHYSICAL THERAPY | Facility: CLINIC | Age: 70
End: 2025-08-12
Attending: ORTHOPAEDIC SURGERY
Payer: COMMERCIAL

## 2025-08-12 ENCOUNTER — OFFICE VISIT (OUTPATIENT)
Age: 70
End: 2025-08-12
Payer: COMMERCIAL

## 2025-08-12 PROBLEM — M76.892 HAMSTRING TENDONITIS OF LEFT THIGH: Status: ACTIVE | Noted: 2025-08-12

## 2025-08-14 ENCOUNTER — OFFICE VISIT (OUTPATIENT)
Age: 70
End: 2025-08-14
Payer: COMMERCIAL

## 2025-08-14 ENCOUNTER — TELEPHONE (OUTPATIENT)
Dept: OBGYN CLINIC | Facility: HOSPITAL | Age: 70
End: 2025-08-14

## 2025-08-21 ENCOUNTER — EVALUATION (OUTPATIENT)
Dept: PHYSICAL THERAPY | Facility: CLINIC | Age: 70
End: 2025-08-21
Attending: ORTHOPAEDIC SURGERY
Payer: COMMERCIAL

## 2025-08-21 DIAGNOSIS — M47.816 LUMBAR SPONDYLOSIS: ICD-10-CM

## 2025-08-21 DIAGNOSIS — M51.16 LUMBAR DISC DISEASE WITH RADICULOPATHY: Primary | ICD-10-CM

## 2025-08-21 DIAGNOSIS — M54.16 LUMBAR RADICULOPATHY: ICD-10-CM

## 2025-08-21 DIAGNOSIS — G89.4 CHRONIC PAIN SYNDROME: ICD-10-CM

## 2025-08-21 PROCEDURE — 97162 PT EVAL MOD COMPLEX 30 MIN: CPT | Performed by: PHYSICAL THERAPIST

## 2025-08-21 PROCEDURE — 97110 THERAPEUTIC EXERCISES: CPT | Performed by: PHYSICAL THERAPIST

## (undated) DEVICE — 3M™ DURAPORE™ SURGICAL TAPE 1538-3, 3 INCH X 10 YARD (7,5CM X 9,1M), 4 ROLLS/BOX: Brand: 3M™ DURAPORE™

## (undated) DEVICE — GLOVE SRG BIOGEL 8

## (undated) DEVICE — NEEDLE 18 G X 1 1/2 SAFETY

## (undated) DEVICE — HEWSON SUTURE RETRIEVER: Brand: HEWSON SUTURE RETRIEVER

## (undated) DEVICE — MEGA SUTURECUT ND: Brand: ENDOWRIST

## (undated) DEVICE — GLOVE SRG BIOGEL 7

## (undated) DEVICE — GLOVE INDICATOR PI UNDERGLOVE SZ 7.5 BLUE

## (undated) DEVICE — SUT ETHILON 4-0 PS-2 18 IN 1667H

## (undated) DEVICE — PLUMEPEN PRO 10FT

## (undated) DEVICE — TUBING SUCTION 5MM X 12 FT

## (undated) DEVICE — DISPOSABLE CANNULA WITH OBTURATOR, SMOOTH, 6.0 MM X 75 MM: Brand: CONMED

## (undated) DEVICE — TIBURON LAPAROSCOPIC ABDOMINAL DRAPE: Brand: CONVERTORS

## (undated) DEVICE — SUT MONOCRYL 4-0 PS-2 27 IN Y426H

## (undated) DEVICE — MAT FLOOR STEP DRI 24 X 36 IN N-STRL

## (undated) DEVICE — PADDING CAST 6IN COTTON STRL

## (undated) DEVICE — VIOLET BRAIDED (POLYGLACTIN 910), SYNTHETIC ABSORBABLE SUTURE: Brand: COATED VICRYL

## (undated) DEVICE — PACK PBDS SHOULDER ARTHROSCOPY RF

## (undated) DEVICE — INTENDED FOR TISSUE SEPARATION, AND OTHER PROCEDURES THAT REQUIRE A SHARP SURGICAL BLADE TO PUNCTURE OR CUT.: Brand: BARD-PARKER ® CARBON RIB-BACK BLADES

## (undated) DEVICE — SAW BLADE RECIPROCATING 179

## (undated) DEVICE — ARM DRAPE

## (undated) DEVICE — CANNULA DISP 8.25 X 70MM W/SEAL SIDE PORT THRD

## (undated) DEVICE — CUP MEDICINE 1OZ 5000/CS 50/PLT: Brand: MEDEGEN MEDICAL PRODUCTS, LLC

## (undated) DEVICE — TOWEL SURG XR DETECT GREEN STRL RFD

## (undated) DEVICE — DRESSING MEPILEX AG BORDER 4 X 4 IN

## (undated) DEVICE — PROXIMATE PLUS MD MULTI-DIRECTIONAL RELEASE SKIN STAPLERS CONTAINS 35 STAINLESS STEEL STAPLES APPROXIMATE CLOSED DIMENSIONS: 6.9MM X 3.9MM WIDE: Brand: PROXIMATE

## (undated) DEVICE — 2000CC GUARDIAN II: Brand: GUARDIAN

## (undated) DEVICE — SUT VICRYL 1 CP 27 IN J196H

## (undated) DEVICE — GAUZE SPONGES,16 PLY: Brand: CURITY

## (undated) DEVICE — BLADE SHAVER EXCALIBUR 4MM 13CM COOLCUT

## (undated) DEVICE — TUBING ARTHROSCOPY REDUCE PUMP

## (undated) DEVICE — READY WET SKIN SCRUB TRAY-LF: Brand: MEDLINE INDUSTRIES, INC.

## (undated) DEVICE — SYRINGE 5ML LL

## (undated) DEVICE — NEEDLE 25G X 1 1/2

## (undated) DEVICE — SURGICAL GOWN, XL SMARTSLEEVE: Brand: CONVERTORS

## (undated) DEVICE — GLOVE INDICATOR PI UNDERGLOVE SZ 8 BLUE

## (undated) DEVICE — STOCKINETTE,IMPERVIOUS,12X48,STERILE: Brand: MEDLINE

## (undated) DEVICE — SUT ETHIBOND 0 CT-1 30 IN X424H

## (undated) DEVICE — POOLE SUCTION HANDLE W/TUBING: Brand: CARDINAL HEALTH

## (undated) DEVICE — SUT ETHILON 3-0 INFS-1 30 IN 669H

## (undated) DEVICE — GLOVE SRG BIOGEL 7.5

## (undated) DEVICE — 3M™ STERI-DRAPE™ U-DRAPE 1015: Brand: STERI-DRAPE™

## (undated) DEVICE — BLADE SHAVER DISSECTOR 3.5MM 13CM COOLCUT

## (undated) DEVICE — TIBURON SPLIT SHEET: Brand: CONVERTORS

## (undated) DEVICE — IRRIG ENDO FLO TUBING

## (undated) DEVICE — BETHLEHEM UNIVERSAL MINOR GEN: Brand: CARDINAL HEALTH

## (undated) DEVICE — SUT VICRYL 3-0 SH 27 IN J416H

## (undated) DEVICE — STRAP LITHOTOMY CANDY CANE

## (undated) DEVICE — AMBIENT COVAC 50 IFS: Brand: COBLATION

## (undated) DEVICE — CADIERE FORCEPS: Brand: ENDOWRIST

## (undated) DEVICE — IMPERVIOUS STOCKINETTE: Brand: DEROYAL

## (undated) DEVICE — ANTIBACTERIAL UNDYED BRAIDED (POLYGLACTIN 910), SYNTHETIC ABSORBABLE SUTURE: Brand: COATED VICRYL

## (undated) DEVICE — FRAZIER SUCTION INSTRUMENT 18 FR W/OBTURATOR, NO CONTROL VENT: Brand: FRAZIER

## (undated) DEVICE — GLOVE INDICATOR PI UNDERGLOVE SZ 7 BLUE

## (undated) DEVICE — CAPIT UPCHRG PERFORM RVRS LAT/AUG BP

## (undated) DEVICE — SYRINGE 10ML LL

## (undated) DEVICE — T-MAX DISPOSABLE FACE MASK 8 PER BOX

## (undated) DEVICE — AMBIENT MEGAVAC 90: Brand: COBLATION

## (undated) DEVICE — STRL PENROSE DRAIN 18" X 1/2": Brand: CARDINAL HEALTH

## (undated) DEVICE — CAPIT SHLDR PERFORM STEM RSA-PERF-REV STD

## (undated) DEVICE — ACE WRAP 6 IN XL STERILE

## (undated) DEVICE — MEDI-VAC YANK SUCT HNDL W/TPRD BULBOUS TIP: Brand: CARDINAL HEALTH

## (undated) DEVICE — SYRINGE 20ML LL

## (undated) DEVICE — PREP SURGICAL PURPREP 26ML

## (undated) DEVICE — HANDPIECE SET WITH RETRACTABLE COAXIAL FAN SPRAY TIP AND SUCTION TUBE: Brand: INTERPULSE

## (undated) DEVICE — NEEDLE 21 G X 1 1/2 SAFETY

## (undated) DEVICE — INTENDED FOR TISSUE SEPARATION, AND OTHER PROCEDURES THAT REQUIRE A SHARP SURGICAL BLADE TO PUNCTURE OR CUT.: Brand: BARD-PARKER SAFETY BLADES SIZE 15, STERILE

## (undated) DEVICE — GROUNDING PAD RFL

## (undated) DEVICE — GLOVE SRG BIOGEL 6.5

## (undated) DEVICE — SUT ETHILON 3-0 PS-1 18 IN 1663H

## (undated) DEVICE — LIGHT GLOVE GREEN

## (undated) DEVICE — FAN SPRAY KIT: Brand: PULSAVAC®

## (undated) DEVICE — CURITY NON-ADHERENT STRIPS: Brand: CURITY

## (undated) DEVICE — BLADELESS OBTURATOR: Brand: WECK VISTA

## (undated) DEVICE — NEEDLE SPINAL 18G X 3IN DISP

## (undated) DEVICE — CONMED SUCTION CONNECTING TUBING, 20' LONG (6.1 M), 9/32" I.D. (7.1 MM): Brand: CONMED

## (undated) DEVICE — GLOVE SRG BIOGEL ECLIPSE 7

## (undated) DEVICE — SPECIMEN TISSUE TRAP 12MM RIGID

## (undated) DEVICE — INSUFFLATION NEEDLE TO ESTABLISH PNEUMOPERITONEUM.: Brand: INSUFFLATION NEEDLE

## (undated) DEVICE — SCD SEQUENTIAL COMPRESSION COMFORT SLEEVE MEDIUM KNEE LENGTH: Brand: KENDALL SCD

## (undated) DEVICE — BAG DECANTER

## (undated) DEVICE — NEPTUNE E-SEP SMOKE EVACUATION PENCIL, COATED, 70MM BLADE, PUSH BUTTON SWITCH: Brand: NEPTUNE E-SEP

## (undated) DEVICE — COLUMN DRAPE

## (undated) DEVICE — SUT VICRYL 2-0 CP-1 27 IN J266H

## (undated) DEVICE — SUT VICRYL PLUS 0 CTB-1 27 IN VCPB260H

## (undated) DEVICE — 10 MM BABCOCKS WITH RATCHET HANDLES: Brand: ENDOPATH

## (undated) DEVICE — FIBERTAPE 2MM X 7IN AR-7237-7

## (undated) DEVICE — STAPLER SKIN 35 WIDE ULC APPOSE

## (undated) DEVICE — SKIN MARKER DUAL TIP WITH RULER CAP, FLEXIBLE RULER AND LABELS: Brand: DEVON

## (undated) DEVICE — CHLORAPREP HI-LITE 26ML ORANGE

## (undated) DEVICE — DRAPE EQUIPMENT RF WAND

## (undated) DEVICE — ACE WRAP 6 IN STERILE

## (undated) DEVICE — Device

## (undated) DEVICE — ASTOUND STANDARD SURGICAL GOWN, XL: Brand: CONVERTORS

## (undated) DEVICE — ACE WRAP 4 IN STERILE

## (undated) DEVICE — 4-PORT MANIFOLD: Brand: NEPTUNE 2

## (undated) DEVICE — SHOULDER SUSPENSION KIT 6 PER BOX

## (undated) DEVICE — TROCAR: Brand: KII FIOS FIRST ENTRY

## (undated) DEVICE — THREADED CLEAR CANNULA WITH OBTURATOR 7MM X 75MM

## (undated) DEVICE — 3M™ STERI-STRIP™ REINFORCED ADHESIVE SKIN CLOSURES, R1547, 1/2 IN X 4 IN (12 MM X 100 MM), 6 STRIPS/ENVELOPE: Brand: 3M™ STERI-STRIP™

## (undated) DEVICE — COBAN 6 IN STERILE

## (undated) DEVICE — NEEDLE SPINAL18G X 3.5 IN QUINCKE

## (undated) DEVICE — BURR  OVAL 4MM 13CM 8 FLUTE COOLCUT

## (undated) DEVICE — ADHESIVE SKIN CLSR DERMABOND NX

## (undated) DEVICE — CEMENT BOWL VACUUM MIXING

## (undated) DEVICE — ABDOMINAL PAD: Brand: DERMACEA

## (undated) DEVICE — STIRRUP STRAP ADULT DISP

## (undated) DEVICE — PENCIL ELECTROSURG E-Z CLEAN -0035H

## (undated) DEVICE — DRESSING XEROFORM 2 X 2

## (undated) DEVICE — SAW BLADE OSCILLATING BRAZOL 167

## (undated) DEVICE — BETHLEHEM UNIVERSAL  ARTHRO PK: Brand: CARDINAL HEALTH

## (undated) DEVICE — BLOOD CONSERVATION SYSTEM WITH QUICK DISCONNECT AND PVC DRAIN: Brand: CBCII CONSTAVAC

## (undated) DEVICE — PAD CAST 6 IN COTTON NON STERILE

## (undated) DEVICE — MONOPOLAR CURVED SCISSORS: Brand: ENDOWRIST

## (undated) DEVICE — ASTOUND FABRIC REINFORCED SURGICAL GOWN: Brand: CONVERTORS

## (undated) DEVICE — SEALANT FIBRIN VISTASEAL 10ML

## (undated) DEVICE — CANNULA SEAL

## (undated) DEVICE — POSITIONER HEAD DISP STRL

## (undated) DEVICE — DRESSING XEROFORM 5 X 9

## (undated) DEVICE — INTENT TO BE USED WITH SUTURE MATERIAL FOR TISSUE CLOSURE: Brand: RICHARD-ALLAN®  NEEDLE 1/2 CIRCLE REVERSE CUTTING

## (undated) DEVICE — THREADED CLEAR CANNULA WITH OBTURATOR 8.5MM X 75MM

## (undated) DEVICE — BETHLEHEM TOTAL HIP, KIT: Brand: CARDINAL HEALTH

## (undated) DEVICE — SYRINGE 50ML LL

## (undated) DEVICE — 3M™ IOBAN™ 2 ANTIMICROBIAL INCISE DRAPE 6651EZ: Brand: IOBAN™ 2

## (undated) DEVICE — CYSTO TUBING SINGLE IRRIGATION

## (undated) DEVICE — ADHESIVE SKIN HIGH VISCOSITY EXOFIN 1ML

## (undated) DEVICE — DRAPE SHEET X-LG

## (undated) DEVICE — BLADE SHAVER DISSECTOR 4MM 13CM COOLCUT

## (undated) DEVICE — TRAVELKIT CONTAINS FIRST STEP KIT (200ML EP-4 KIT) AND SOILED SCOPE BAG - 1 KIT: Brand: TRAVELKIT CONTAINS FIRST STEP KIT AND SOILED SCOPE BAG

## (undated) DEVICE — DRAPE TOWEL: Brand: CONVERTORS

## (undated) DEVICE — TUBING ARTHROSCOPY REDUCE PATIENT

## (undated) DEVICE — TUBING ARTHROSCOPIC WAVE  MAIN PUMP

## (undated) DEVICE — WEBRIL 6 IN UNSTERILE

## (undated) DEVICE — HOOD WITH PEEL AWAY FACE SHIELD: Brand: T7PLUS

## (undated) DEVICE — SYRINGE 30ML LL

## (undated) DEVICE — NO-SCRATCH ™ SMALL WHITNEY CURETTE ™ IS A SINGLE-USE, PLASTIC CURETTE FOR QUICKLY APPLYING, MANIPULATING AND REMOVING BONE CEMENT DURING HIP AND KNEE REPLACEMENT SURGERY. THE PLASTIC IS SOFTER THAN STEEL INSTRUMENTS, REDUCING THE RISK OF DAMAGING THE PROSTHESIS WITH METAL INSTRUMENTS.  THE CURETTE’S 6MM TIP REMOVES EXCESS CEMENT FROM REPLACEMENT HIPS AND KNEES. EASY-TO-MANEUVER, THE SMALL BLUE CURETTE LETS YOU REMOVE CEMENT FROM ALL EDGES OF THE PROSTHESIS.NO-SCRATCH WHITNEY SMALL CURETTE FEATURES:SAFER THAN STEEL- MADE OF PLASTIC - STURDY YET SOFTER THAN SURGICAL STEEL.HANDIER- EACH TOOL HAS A MOLDED-IN THUMB INDENTATION INSTANTLY ORIENTING THE TOOL.- EASIER TO MANEUVER IN HARD TO SEE PLACES.- COLOR-CODED FOR EASY IDENTIFICATION.FASTER- COMES INDIVIDUALLY PACKAGED IN STERILE, PEEL OPEN POUCH, READY TO GO.- APPLIES, MANIPULATES, OR REMOVES CEMENT WITH FINGERTIP PRECISION.ECONOMICAL- THE COST OF A SINGLE REVISION DWARFS THE COST OF A SINGLE-USE CURETTE. - DISPOSABLE – THERE’S NO NEED TO WASTE TIME REMOVING HARDENED CEMENT OR RE-STERILIZING TOOLS.- LESS EXPENSIVE TO BUY AND INVENTORY - ORDER ONLY THE TOOL YOU USE.- PACKAGED 25 INDIVIDUALLY WRAPPED TOOLS TO A CARTON FOR CONVENIENT SHELF STORAGE.: Brand: WHITNEY NO-SCRATCH CURETTE (SMALL)

## (undated) DEVICE — GARMENT,MEDLINE,DVT,INT,CALF,FOAM,MED: Brand: MEDLINE

## (undated) DEVICE — HEMOVAC TUBING 1/4 IN

## (undated) DEVICE — BETHLEHEM UNIV TOTAL KNEE, KIT: Brand: CARDINAL HEALTH

## (undated) DEVICE — KIT STABILIZATION SHOULDER MARCO

## (undated) DEVICE — BLADE SAW OSCILLATING SMALL BONE

## (undated) DEVICE — NEEDLE SUT SCORPION MULTIFIRE

## (undated) DEVICE — BLADE REAMER PATELLA 41MM

## (undated) DEVICE — CAPIT KNEE GSF FLX CEM FEM/CEM TIB/FLX SURF/STD PAT - ZIMMER

## (undated) DEVICE — PREMIUM DRY TRAY LF: Brand: MEDLINE INDUSTRIES, INC.

## (undated) DEVICE — WET SKIN PREP TRAY: Brand: MEDLINE INDUSTRIES, INC.

## (undated) DEVICE — OCCLUSIVE GAUZE STRIP,3% BISMUTH TRIBROMOPHENATE IN PETROLATUM BLEND: Brand: XEROFORM

## (undated) DEVICE — SUT 2 ORTHOCORD 223114

## (undated) DEVICE — BETHLEHEM UNIVERSAL  MIONR EXT: Brand: CARDINAL HEALTH

## (undated) DEVICE — SKN PRP WNG SPNGE PVP SCRB STR: Brand: MEDLINE INDUSTRIES, INC.

## (undated) DEVICE — ANTI-FOG SOLUTION WITH FOAM PAD: Brand: DEVON

## (undated) DEVICE — CURITY STRETCH BANDAGE: Brand: CURITY

## (undated) DEVICE — SUT VICRYL 0 CP-1 27 IN J267H

## (undated) DEVICE — DRESSING MEPILEX AG BORDER POST-OP 4 X 8 IN

## (undated) DEVICE — 3000CC GUARDIAN II: Brand: GUARDIAN

## (undated) DEVICE — POV-IOD SOLUTION 4OZ BT

## (undated) DEVICE — HOOD: Brand: T7PLUS

## (undated) DEVICE — CUFF TOURNIQUET 30 X 4 IN QUICK CONNECT DISP 1BLA

## (undated) DEVICE — BLADE OSCILLATOR 86.0 X 19.5MM

## (undated) DEVICE — [HIGH FLOW INSUFFLATOR,  DO NOT USE IF PACKAGE IS DAMAGED,  KEEP DRY,  KEEP AWAY FROM SUNLIGHT,  PROTECT FROM HEAT AND RADIOACTIVE SOURCES.]: Brand: PNEUMOSURE

## (undated) DEVICE — URETERAL CATHETER ADAPTOR TIP

## (undated) DEVICE — BLUE HEAT SCOPE WARMER